# Patient Record
Sex: FEMALE | Race: WHITE | NOT HISPANIC OR LATINO | Employment: OTHER | URBAN - METROPOLITAN AREA
[De-identification: names, ages, dates, MRNs, and addresses within clinical notes are randomized per-mention and may not be internally consistent; named-entity substitution may affect disease eponyms.]

---

## 2017-01-02 ENCOUNTER — MEDICAL CORRESPONDENCE (OUTPATIENT)
Dept: HEALTH INFORMATION MANAGEMENT | Facility: CLINIC | Age: 47
End: 2017-01-02

## 2017-01-04 DIAGNOSIS — E78.5 HYPERLIPIDEMIA LDL GOAL <100: Primary | ICD-10-CM

## 2017-01-04 DIAGNOSIS — I10 ESSENTIAL HYPERTENSION WITH GOAL BLOOD PRESSURE LESS THAN 130/85: ICD-10-CM

## 2017-01-04 RX ORDER — FLUVASTATIN 40 MG/1
40 CAPSULE ORAL 2 TIMES DAILY
Qty: 180 CAPSULE | Refills: 3 | Status: SHIPPED | OUTPATIENT
Start: 2017-01-04 | End: 2017-09-28

## 2017-01-05 DIAGNOSIS — J45.31 MILD PERSISTENT ASTHMA WITH ACUTE EXACERBATION: Primary | ICD-10-CM

## 2017-01-05 RX ORDER — ALBUTEROL SULFATE 0.83 MG/ML
1 SOLUTION RESPIRATORY (INHALATION) EVERY 6 HOURS PRN
Qty: 30 VIAL | Refills: 1 | Status: SHIPPED | OUTPATIENT
Start: 2017-01-05 | End: 2017-11-10

## 2017-01-05 NOTE — TELEPHONE ENCOUNTER
Message received from pharmacy. Patient is requesting nebulizer solution and mask for the machine. Please print out prescription and patient will . Thank you         Routed to Dr. Bullock

## 2017-01-06 ENCOUNTER — OFFICE VISIT (OUTPATIENT)
Dept: FAMILY MEDICINE | Facility: CLINIC | Age: 47
End: 2017-01-06

## 2017-01-06 ENCOUNTER — RECORDS - HEALTHEAST (OUTPATIENT)
Dept: ADMINISTRATIVE | Facility: OTHER | Age: 47
End: 2017-01-06

## 2017-01-06 VITALS
OXYGEN SATURATION: 97 % | SYSTOLIC BLOOD PRESSURE: 137 MMHG | DIASTOLIC BLOOD PRESSURE: 90 MMHG | HEART RATE: 114 BPM | TEMPERATURE: 99.1 F

## 2017-01-06 DIAGNOSIS — L02.01 ABSCESS OF CHIN: ICD-10-CM

## 2017-01-06 DIAGNOSIS — J45.31 MILD PERSISTENT ASTHMA WITH ACUTE EXACERBATION: Primary | ICD-10-CM

## 2017-01-06 RX ORDER — PREDNISONE 50 MG/1
50 TABLET ORAL DAILY
Qty: 5 TABLET | Refills: 0 | Status: SHIPPED | OUTPATIENT
Start: 2017-01-06 | End: 2017-01-20

## 2017-01-06 RX ORDER — AZITHROMYCIN 250 MG/1
TABLET, FILM COATED ORAL
Qty: 6 TABLET | Refills: 0 | Status: SHIPPED | OUTPATIENT
Start: 2017-01-06 | End: 2017-09-28

## 2017-01-06 NOTE — PATIENT INSTRUCTIONS
Thank you for coming to [facility].  **If you had lab testing today and your results are reassuring or normal they will be be mailed to you within 7 days.   **If the lab tests need quick action we will call you with the results.  The phone number we will call with results is # [phone] (home) . If this is not the best number please call our clinic and change the number.  If you need any refills please call your pharmacy and they will contact us.  If you have any concerns about today's visit or wish to schedule another appointment please call our office during normal business hours [phone] (8-5:00 M-F)  If you have urgent medical concerns please call [phone] at any time of the day.  If you a medical emergency please call [phone]  Again thank you for choosing [facility] and please let us know how we can best partner with you to improve you and your family's health.

## 2017-01-06 NOTE — MR AVS SNAPSHOT
After Visit Summary   1/6/2017    Teresa Perez    MRN: [id number]           Patient Information     Date Of Birth          1970        Visit Information        Provider Department      1/6/2017 2:30 PM Tyra Bullock MD [facility]        Today's Diagnoses     Mild persistent asthma with acute exacerbation    -  1       Care Instructions    Thank you for coming to [facility].  **If you had lab testing today and your results are reassuring or normal they will be be mailed to you within 7 days.   **If the lab tests need quick action we will call you with the results.  The phone number we will call with results is # [phone] (home) . If this is not the best number please call our clinic and change the number.  If you need any refills please call your pharmacy and they will contact us.  If you have any concerns about today's visit or wish to schedule another appointment please call our office during normal business hours [phone] (8-5:00 M-F)  If you have urgent medical concerns please call [phone] at any time of the day.  If you a medical emergency please call [phone]  Again thank you for choosing [facility] and please let us know how we can best partner with you to improve you and your family's health.            Follow-ups after your visit        Who to contact     Please call your clinic at [phone] to:    Ask questions about your health    Make or cancel appointments    Discuss your medicines    Learn about your test results    Speak to your doctor   If you have compliments or concerns about an experience at your clinic, or if you wish to file a complaint, please contact [facility] Patient Relations at [phone] or email us at [email or web address]         Additional Information About Your Visit        MyChart Information     [employer] is an electronic gateway that provides easy, online access to your medical records. With  [employer], you can request a clinic appointment, read your test results, renew a prescription or communicate with your care team.     To sign up for [employer] visit the website at [email or web address]   You will be asked to enter the access code listed below, as well as some personal information. Please follow the directions to create your username and password.     Your access code is: [email or web address]  Expires: 3/29/2017  3:06 PM     Your access code will  in 90 days. If you need help or a new code, please contact your [facility] or call [phone] for assistance.        Care EveryWhere ID     This is your Care EveryWhere ID. This could be used by other organizations to access your [place] medical records  [id number]        Your Vitals Were     Pulse Temperature Pulse Oximetry             114 99.1  F (37.3  C) (Oral) 97%          Blood Pressure from Last 3 Encounters:   17 137/90   16 135/89   16 133/85    Weight from Last 3 Encounters:   16 245 lb 4 oz (111.245 kg)   16 247 lb (112.038 kg)   16 243 lb 3.2 oz (110.315 kg)              Today, you had the following     No orders found for display         Today's Medication Changes          These changes are accurate as of: 17  3:29 PM.  If you have any questions, ask your nurse or doctor.               Start taking these medicines.        Dose/Directions    azithromycin 250 MG tablet   Commonly known as:  ZITHROMAX   Used for:  Mild persistent asthma with acute exacerbation   Started by:  Tyra Bullock MD        Two tablets first day, then one tablet daily for four days.   Quantity:  6 tablet   Refills:  0       predniSONE 50 MG tablet   Commonly known as:  DELTASONE   Used for:  Mild persistent asthma with acute exacerbation   Started by:  Tyra Bullock MD        Dose:  50 mg   Take 1 tablet (50 mg) by mouth daily   Quantity:  5 tablet   Refills:  0         These medicines  have changed or have updated prescriptions.        Dose/Directions    * order for DME   This may have changed:  Another medication with the same name was added. Make sure you understand how and when to take each.   Used for:  NNAMDI (obstructive sleep apnea)   Changed by:  Tyra Bullock MD        Equipment being ordered: CPAP.  1 device.  Per sleep study recommendations.  Brand:  Respironics, Type:  Nasal Wisp,  Size:  Small   Quantity:  1 Device   Refills:  0       * order for DME   This may have changed:  Another medication with the same name was added. Make sure you understand how and when to take each.   Used for:  Mild persistent asthma with acute exacerbation   Changed by:  Tyra Bullock MD        Adult nebulizer mask and tubing.   Quantity:  1 each   Refills:  0       * order for DME   This may have changed:  You were already taking a medication with the same name, and this prescription was added. Make sure you understand how and when to take each.   Used for:  Mild persistent asthma with acute exacerbation   Changed by:  Tyra Bullock MD        Equipment being ordered: Nebulizer supplies for life.   Quantity:  1 Device   Refills:  0       * Notice:  This list has 3 medication(s) that are the same as other medications prescribed for you. Read the directions carefully, and ask your doctor or other care provider to review them with you.         Where to get your medicines      These medications were sent to [facility] - [place], [place] - [address], [place] [address]     Phone:  [phone]    - azithromycin 250 MG tablet  - predniSONE 50 MG tablet      Some of these will need a paper prescription and others can be bought over the counter.  Ask your nurse if you have questions.     Bring a paper prescription for each of these medications    - order for DME             Primary Care Provider Office Phone # Fax #    Tyra Bullock [phone]        [facility] [address] [place] [address]        Thank you!     Thank you for choosing [facility]  for your care. Our goal is always to provide you with excellent care. Hearing back from our patients is one way we can continue to improve our services. Please take a few minutes to complete the written survey that you may receive in the mail after your visit with us. Thank you!             Your Updated Medication List - Protect others around you: Learn how to safely use, store and throw away your medicines at www.disposemymeds.org.          This list is accurate as of: 1/6/17  3:29 PM.  Always use your most recent med list.                   Brand Name Dispense Instructions for use    * albuterol 108 (90 BASE) MCG/ACT Inhaler    PROAIR HFA/PROVENTIL HFA/VENTOLIN HFA    3 Inhaler    Inhale 2 puffs into the lungs every 6 hours as needed for shortness of breath / dyspnea or wheezing       * albuterol (2.5 MG/3ML) 0.083% neb solution     30 vial    Take 1 vial (2.5 mg) by nebulization every 6 hours as needed for shortness of breath / dyspnea or wheezing       azelastine 0.05 % Soln ophthalmic solution    OPTIVAR    1 Bottle    Apply 1 drop to eye 2 times daily       azithromycin 250 MG tablet    ZITHROMAX    6 tablet    Two tablets first day, then one tablet daily for four days.       blood glucose lancets standard    no brand specified    1 Box    Use to test blood sugar 4 times daily or as directed.       blood glucose monitoring meter device kit    no brand specified    1 kit    Use to test blood sugar 4 times daily or as directed.       blood glucose monitoring test strip    no brand specified    100 strip    Use to test blood sugars 4 times daily or as directed       carboxymethylcellul-glycerin 0.5-0.9 % Soln ophthalmic solution    OPTIVE/REFRESH OPTIVE    1 Bottle    Place 1 drop into both eyes 3 times daily       clindamycin 1 % lotion    CLINDAMAX    60 mL    Apply topically 2 times daily        dicyclomine 10 MG capsule    BENTYL    120 capsule    Take 1 capsule (10 mg) by mouth 4 times daily (before meals and nightly) From Judy PEÑA       diphenhydrAMINE 50 MG capsule    BENADRYL     Take  mg by mouth At Bedtime.       docosanol 10 % Crea cream    ABREVA    1 Tube    Apply topically 5 times daily       docusate sodium 100 MG tablet    COLACE    120 tablet    Take 200 mg by mouth 2 times daily       EPINEPHrine 0.3 MG/0.3ML injection     2 each    Inject 0.3 mLs (0.3 mg) into the muscle once as needed for anaphylaxis       * FentaNYL 62.5 MCG/HR Pt72     10 patch    Place 1 patch onto the skin every 72 hours       * fentaNYL 50 mcg/hr 72 hr patch    DURAGESIC    10 patch    Place 1 patch onto the skin every 72 hours       fluticasone 50 MCG/ACT spray    FLONASE    9.9 g    Spray 2 sprays into both nostrils daily       fluvastatin 40 MG capsule    LESCOL    180 capsule    Take 1 capsule (40 mg) by mouth 2 times daily       gabapentin 300 MG capsule    NEURONTIN    90 capsule    Take 1 capsule (300 mg) by mouth 3 times daily       glipiZIDE 10 MG 24 hr tablet    GLUCOTROL XL    60 tablet    Take 2 tablets (20 mg) by mouth daily       HYDROcodone-acetaminophen 5-325 MG per tablet    NORCO    270 tablet    Take max 10 tablest per day.  Take 2 tabs 4x per day and can take an extra 1-2 tabs PRN.       hydrOXYzine 25 MG tablet    ATARAX    60 tablet    Take 1-2 tablets (25-50 mg) by mouth every 6 hours as needed for itching       insulin aspart 100 UNIT/ML injection    NovoLOG FLEXPEN    1 Month    Take 14 Units with meals.       insulin degludec 200 UNIT/ML pen    TRESIBA    10 pen    Inject 54 Units Subcutaneous daily       lisinopril 20 MG tablet    PRINIVIL/ZESTRIL    30 tablet    Take 1 tablet (20 mg) by mouth daily       loratadine 10 MG tablet    CLARITIN    30 tablet    Take 1 tablet (10 mg) by mouth daily       medroxyPROGESTERone 150 MG/ML injection    DEPO-PROVERA    1 mL     Inject 1 mL (150 mg) into the muscle every 3 months       melatonin 3 MG tablet      Take 3 tablets (9 mg) by mouth At Bedtime       miconazole 2 % cream    MICATIN    5 g    Place 1 applicator vaginally At Bedtime Substitute as needed.       nabumetone 750 MG tablet    RELAFEN    60 tablet    Take 1 tablet (750 mg) by mouth 2 times daily as needed for moderate pain       naloxone nasal spray    NARCAN    0.2 mL    Spray 1 spray (4 mg) into one nostril alternating nostrils as needed for opioid reversal (every 2-3 minutes until medical assistance arrives.)       ondansetron 4 MG tablet    ZOFRAN    18 tablet    Take 1 tablet (4 mg) by mouth every 8 hours as needed for nausea       * order for DME     1 Device    Equipment being ordered: CPAP.  1 device.  Per sleep study recommendations.  Brand:  RespirFiFully, Type:  Nasal Wisp,  Size:  Small       * order for DME     1 each    Adult nebulizer mask and tubing.       * order for DME     1 Device    Equipment being ordered: Nebulizer supplies for life.       pramipexole 0.125 MG tablet    MIRAPEX    90 tablet    Take 1 tablet (0.125 mg) by mouth At Bedtime       predniSONE 50 MG tablet    DELTASONE    5 tablet    Take 1 tablet (50 mg) by mouth daily       ranitidine 150 MG tablet    ZANTAC    60 tablet    Take 1 tablet (150 mg) by mouth 2 times daily       sennosides 8.6 MG tablet    SENOKOT    60 tablet    Take 2 tablets by mouth 2 times daily       * SEROQUEL 300 MG tablet   Generic drug:  QUEtiapine      Take 300 mg by mouth daily.       * SEROQUEL 50 MG tablet   Generic drug:  QUEtiapine     1 tablet    Take 1 tablet (50 mg) by mouth every morning For anxiety.  From Psychiatrist.       sitagliptin 100 MG tablet    JANUVIA    90 tablet    Take 1 tablet (100 mg) by mouth daily       SUMAtriptan 100 MG tablet    IMITREX    9 tablet    Take 1 tablet (100 mg) by mouth at onset of headache       topiramate ER - 24 hour 100 MG sprinkle capsule    QUDEXY XR    90 capsule     Take 1 capsule (100 mg) by mouth daily       venlafaxine 75 MG 24 hr capsule    EFFEXOR-XR    30 capsule    Take 1 capsule (75 mg) by mouth 3 times daily       * Notice:  This list has 9 medication(s) that are the same as other medications prescribed for you. Read the directions carefully, and ask your doctor or other care provider to review them with you.

## 2017-01-07 NOTE — PROGRESS NOTES
"SUBJECTIVE:  This is a 46-year-old female, well-known to me, with past medical history significant for asthma, chronic cigarette use, hypertension, chronic pain, poorly controlled insulin-dependent diabetes, and recent abscess beneath the chin, who presents for followup of abscess and evaluation of breathing.     After removal of the packing last week, the abscess in her chin is closed up on the outside.  It is nontender.  There is no erythema and it appears to be healing well.  She hasn't had any discharge from it.  It feels quite normal on exam.  There is a small area that is still scabbed over, but it is healing appropriately.     Her biggest concern is her breathing.  She had a sleep study and was diagnosed with obstructive sleep apnea and she picked up CPAP machine about a week ago.  She talked with a gal at the supply company, who told her that it was working fine and taught her how to use it, and she went home.  The night she started the CPAP, she started feeling uncomfortable.  She describes this as feeling as if there was fluid going into her lungs.  It felt like it was hard to get air in.  She got wheezy and had a cough that produced thicker sputum.  She no longer had albuterol medication for her nebulizer, except what was , but has been using her inhaler, which helps somewhat but it doesn't last very long.  She noticed fevers and chills with this; her temperature was somewhat elevated today in clinic.  She has sweats and mild tachycardia.  She tried taking a couple of over-the-counter Nyquil, as well as DayQuil and Tylenol Cold and Flu, and none of this was particularly helpful.  She says she also has problems with the CPAP; it shows it is \"redlined\", as if she hasn't been using it, but she has been using it regularly at night.  She feels her lungs are not adjusting well to the humidified air.  In discussing the sleep study this further, she actually had the sleep study done a year and a half ago " at [facility].  She was supposed to go back for a second portion of the testing and she never did.  She isn't sure of the CPAP machine is sitting appropriately or if it is the right size and settings for her.     OBJECTIVE:   VITAL SIGNS:  Reviewed.  They are within the normal range.  Initially, she is mildly tachycardic, but this was normalized by the time I listened.   GENERAL:  This is a fatigued-appearing,  female in no acute distress.   HEENT:  Pupils are equal and reactive.  No conjunctival erythema.  She has sinus tenderness most prominent in the left maxillary sinus, but it is present bilaterally in the frontal sinuses as well.  No significant TMJ tenderness.  Throat is clear and nonerythematous.  There is mild irritation in the posterior pharynx.  Tympanic membranes are viewed bilaterally and these are of normal appearance.   NECK:  No palpable lymphadenopathy.   CARDIAC:  Heart has regular rate and rhythm with no murmurs, rubs, or gallops.  She has mild tachycardia, around 100.   RESPIRATORY:  Lungs show diffuse wheezes that are more significant on expiration than inspiration.  She has trouble moving air.  O2 sats are 97%.     ASSESSMENT:  A 46-year-old female presenting for evaluation of chin abscess, issues with CPAP, and acute respiratory distress.   1.  Abscess beneath the chin.  This is resolving appropriately and should continue to improve by itself.  No intervention needed.   2.  CPAP use for obstructive sleep apnea.  Clearly, it seems like she needs the second half of the test completed and that she needs instructions on how to use it appropriately.  She'll plan to return her CPAP, as she doesn't want to get in trouble for noncompliance.  She'll go back to [facility] and request the second portion of the test.   3.  Asthma exacerbation.  She has diffuse wheezing and poor air movement on exam, although her O2 sats are appropriate.  We'll treat this with 50 mg of prednisone daily x five days, as  well as short course of antibiotics with azithromycin, as she tolerates this better than most other antibiotics.  We'll refill albuterol nebs and prescribe new tubing and mask.  We'll have her use the nebs q.i.d.  If she doesn't feel significantly better by Monday, she should come back for recheck.  Otherwise, we'll follow up later next week or at her chronic pain management visit.     Tyra Bullock

## 2017-01-09 ENCOUNTER — TELEPHONE (OUTPATIENT)
Dept: FAMILY MEDICINE | Facility: CLINIC | Age: 47
End: 2017-01-09

## 2017-01-09 DIAGNOSIS — E11.9 DIABETES MELLITUS, TYPE 2 (H): Primary | ICD-10-CM

## 2017-01-09 NOTE — TELEPHONE ENCOUNTER
[facility] phone call message-patient reporting a symptom:     Symptom:  Still having cough and shortness of breath and needs nebulizer RX called into pharmacy.    Same Day Visit Offered: n/a     Additional comments: Please advise.     OK to leave message on voice mail? Yes    Primary language: English      needed? No    Call taken on January 9, 2017 at 9:51 AM by Makeda Stanton

## 2017-01-09 NOTE — TELEPHONE ENCOUNTER
Per [facility], tribesa is covered this yr. Will need pen needles.     Pen needles sent to pharmacy.     Routed to Dr. Bullock. /YULISA Massey

## 2017-01-09 NOTE — TELEPHONE ENCOUNTER
Pt is taking her azithromycin and prednisone currently. Pt would like nebulizer rx, she called [facility] and it was not there.     1/5/17 nebulizer soln rx was printed. Called this into [facility] today since they never got this rx. Advised to call clinic if no improvement in her symptoms. Pt verbalizes understanding of the directions and information. Gave pt opportunity to ask additional questions or address concerns. Pt is directed to call back if condition worsens, new symptoms develop, or there is no improvement.     Routed to Dr. Bullock. /YULISA Massey

## 2017-01-18 DIAGNOSIS — G89.4 CHRONIC PAIN SYNDROME: Primary | ICD-10-CM

## 2017-01-18 RX ORDER — HYDROXYZINE HYDROCHLORIDE 25 MG/1
25-50 TABLET, FILM COATED ORAL EVERY 6 HOURS PRN
Qty: 120 TABLET | Refills: 3 | Status: SHIPPED | OUTPATIENT
Start: 2017-01-18 | End: 2017-05-17

## 2017-01-19 ENCOUNTER — TELEPHONE (OUTPATIENT)
Dept: FAMILY MEDICINE | Facility: CLINIC | Age: 47
End: 2017-01-19

## 2017-01-19 DIAGNOSIS — F11.90 CHRONIC NARCOTIC USE: Primary | ICD-10-CM

## 2017-01-19 NOTE — TELEPHONE ENCOUNTER
Per [facility], PA needed for topiramate ER. topiramate immediate release is covered but pt prefers ER.     Also, fluvastatin requires PA. Would you like to fill out PA or send an alternative.     Routed to Dr. Bullock. /YULISA Massey

## 2017-01-19 NOTE — TELEPHONE ENCOUNTER
Ok.  I will fill both of those out.  I think I already did the fluvastatin, but probably did it too early before the new year.      I'm assuming the insulin situation for this patient has been taken care of, correct?    Tyra Bullock    Routed to triage RN.

## 2017-01-20 ENCOUNTER — OFFICE VISIT (OUTPATIENT)
Dept: FAMILY MEDICINE | Facility: CLINIC | Age: 47
End: 2017-01-20

## 2017-01-20 ENCOUNTER — TELEPHONE (OUTPATIENT)
Dept: FAMILY MEDICINE | Facility: CLINIC | Age: 47
End: 2017-01-20

## 2017-01-20 VITALS
OXYGEN SATURATION: 98 % | BODY MASS INDEX: 47.85 KG/M2 | WEIGHT: 245 LBS | HEART RATE: 114 BPM | DIASTOLIC BLOOD PRESSURE: 86 MMHG | SYSTOLIC BLOOD PRESSURE: 124 MMHG | TEMPERATURE: 98.4 F

## 2017-01-20 DIAGNOSIS — Z79.4 TYPE 2 DIABETES MELLITUS WITHOUT COMPLICATION, WITH LONG-TERM CURRENT USE OF INSULIN (H): ICD-10-CM

## 2017-01-20 DIAGNOSIS — F11.90 CHRONIC NARCOTIC USE: ICD-10-CM

## 2017-01-20 DIAGNOSIS — F51.01 PRIMARY INSOMNIA: ICD-10-CM

## 2017-01-20 DIAGNOSIS — Z00.00 PREVENTATIVE HEALTH CARE: ICD-10-CM

## 2017-01-20 DIAGNOSIS — G25.81 RESTLESS LEG SYNDROME: ICD-10-CM

## 2017-01-20 DIAGNOSIS — G89.4 CHRONIC PAIN SYNDROME: ICD-10-CM

## 2017-01-20 DIAGNOSIS — E11.9 TYPE 2 DIABETES MELLITUS WITHOUT COMPLICATION, WITH LONG-TERM CURRENT USE OF INSULIN (H): ICD-10-CM

## 2017-01-20 DIAGNOSIS — J45.31 MILD PERSISTENT ASTHMA WITH ACUTE EXACERBATION: ICD-10-CM

## 2017-01-20 LAB
AMPHETAMINES QUAL: NEGATIVE
BARBITURATES QUAL URINE: NEGATIVE
BENZODIAZEPINE QUAL URINE: NEGATIVE
BUPRENORPHINE QUAL URINE: NEGATIVE
CANNABINOIDS UR QL SCN: NEGATIVE
COCAINE QUAL URINE: NEGATIVE
HBA1C MFR BLD: 9.4 % (ref 4.1–5.7)
METHAMPHETAMINE: NEGATIVE
METHODONE QUAL: NEGATIVE
MORPHINE QUAL: NEGATIVE
OXYCODONE QUAL: NEGATIVE
TEMPERATURE OF URINE WAS BETWEEN 90-100 DEGREES F: YES

## 2017-01-20 RX ORDER — PREDNISONE 10 MG/1
TABLET ORAL
Qty: 21 TABLET | Refills: 0 | Status: SHIPPED | OUTPATIENT
Start: 2017-01-20 | End: 2017-09-28

## 2017-01-20 RX ORDER — PHENOL 1.4 %
10 AEROSOL, SPRAY (ML) MUCOUS MEMBRANE
Qty: 30 TABLET | Refills: 11 | Status: SHIPPED | OUTPATIENT
Start: 2017-01-20 | End: 2017-09-28

## 2017-01-20 RX ORDER — FENTANYL 50 UG/1
1 PATCH TRANSDERMAL
Qty: 10 PATCH | Refills: 0 | Status: SHIPPED | OUTPATIENT
Start: 2017-01-20 | End: 2017-02-23

## 2017-01-20 RX ORDER — HYDROCODONE BITARTRATE AND ACETAMINOPHEN 5; 325 MG/1; MG/1
TABLET ORAL
Qty: 270 TABLET | Refills: 0 | Status: SHIPPED | OUTPATIENT
Start: 2017-01-20 | End: 2017-02-23

## 2017-01-20 RX ORDER — PRAMIPEXOLE DIHYDROCHLORIDE 0.25 MG/1
0.25 TABLET ORAL AT BEDTIME
Qty: 30 TABLET | Refills: 3 | Status: SHIPPED | OUTPATIENT
Start: 2017-01-20 | End: 2017-04-10

## 2017-01-20 NOTE — TELEPHONE ENCOUNTER
[facility] phone call message- general phone call:    Reason for call: Patient had blood work done today and forgot to ask Dr. Bullock to include a Hep C test. Patient reports she was seen today in clinic. Would like to speak with a nurse or Dr. Bullock.     Return call needed: Yes    OK to leave a message on voice mail? Yes    Primary language: English      needed? No    Call taken on January 20, 2017 at 11:56 AM by Zachery Pena

## 2017-01-20 NOTE — MR AVS SNAPSHOT
After Visit Summary   1/20/2017    Teresa Perez    MRN: [id number]           Patient Information     Date Of Birth          1970        Visit Information        Provider Department      1/20/2017 9:40 AM Tyra Bullock MD [facility]        Today's Diagnoses     Diabetes mellitus, type 2 (H)    -  1     Chronic narcotic use         Mild persistent asthma with acute exacerbation         Restless leg syndrome         Primary insomnia         Chronic pain syndrome           Care Instructions    1)  Take 2 pill daily for 1 week then 1 pill daily for 1 week of the prednisone  2)  Continue to use the nebs 4x per day.    3)  Keep pain medications the same.    Heat on the stomach to help with pain  4)  Prescription for melatonin for sleep, and get in to see your psychiast to talk about sleep  5)  Higher dose of the restless legs medicine.      Follow up 2 weeks.          Follow-ups after your visit        Who to contact     Please call your clinic at [phone] to:    Ask questions about your health    Make or cancel appointments    Discuss your medicines    Learn about your test results    Speak to your doctor   If you have compliments or concerns about an experience at your clinic, or if you wish to file a complaint, please contact [facility] Patient Relations at [phone] or email us at [email or web address]         Additional Information About Your Visit        MyChart Information     Getaround is an electronic gateway that provides easy, online access to your medical records. With Getaround, you can request a clinic appointment, read your test results, renew a prescription or communicate with your care team.     To sign up for [employer] visit the website at [email or web address]   You will be asked to enter the access code listed below, as well as some personal information. Please follow the directions to create your username and password.      Your access code is: [email or web address]  Expires: 3/29/2017  3:06 PM     Your access code will  in 90 days. If you need help or a new code, please contact your [facility] or call [phone] for assistance.        Care EveryWhere ID     This is your Care EveryWhere ID. This could be used by other organizations to access your [facility] medical records  [id number]        Your Vitals Were     Pulse Temperature Pulse Oximetry             114 98.4  F (36.9  C) (Oral) 98%          Blood Pressure from Last 3 Encounters:   17 124/86   17 137/90   16 135/89    Weight from Last 3 Encounters:   17 245 lb (111.131 kg)   16 245 lb 4 oz (111.245 kg)   16 247 lb (112.038 kg)              We Performed the Following     Hemoglobin A1c ([facility])     Rapid Urine Drug Screen ([facility])          Today's Medication Changes          These changes are accurate as of: 17 11:00 AM.  If you have any questions, ask your nurse or doctor.               These medicines have changed or have updated prescriptions.        Dose/Directions    * melatonin 3 MG tablet   This may have changed:  Another medication with the same name was added. Make sure you understand how and when to take each.   Changed by:  Tyra Bullock MD        Dose:  9 mg   Take 3 tablets (9 mg) by mouth At Bedtime   Refills:  0       * Melatonin 10 MG Tabs tablet   This may have changed:  You were already taking a medication with the same name, and this prescription was added. Make sure you understand how and when to take each.   Used for:  Primary insomnia   Changed by:  Tyra Bullock MD        Dose:  10 mg   Take 1 tablet (10 mg) by mouth nightly as needed for sleep   Quantity:  30 tablet   Refills:  11       pramipexole 0.25 MG tablet   Commonly known as:  MIRAPEX   This may have changed:    - medication strength  - how much to take   Used for:  Restless leg syndrome   Changed by:  Kobe  Tyra ZAVALETA MD        Dose:  0.25 mg   Take 1 tablet (0.25 mg) by mouth At Bedtime   Quantity:  30 tablet   Refills:  3       predniSONE 10 MG tablet   Commonly known as:  DELTASONE   This may have changed:    - medication strength  - how much to take  - how to take this  - when to take this  - additional instructions   Used for:  Mild persistent asthma with acute exacerbation   Changed by:  Tyra Bullock MD        Take 2 tabs daily for 1 week, then 1 tab daily for 1 week.   Quantity:  21 tablet   Refills:  0       * Notice:  This list has 2 medication(s) that are the same as other medications prescribed for you. Read the directions carefully, and ask your doctor or other care provider to review them with you.         Where to get your medicines      These medications were sent to [facility] - [place], [place] - [address], [place] [address]     Phone:  [phone]    - Melatonin 10 MG Tabs tablet  - pramipexole 0.25 MG tablet  - predniSONE 10 MG tablet      Some of these will need a paper prescription and others can be bought over the counter.  Ask your nurse if you have questions.     Bring a paper prescription for each of these medications    - fentaNYL 50 mcg/hr 72 hr patch  - HYDROcodone-acetaminophen 5-325 MG per tablet             Primary Care Provider Office Phone # Fax #    Tyra Bullock [phone]       [facility] [address] [place] [address]        Thank you!     Thank you for choosing [facility]  for your care. Our goal is always to provide you with excellent care. Hearing back from our patients is one way we can continue to improve our services. Please take a few minutes to complete the written survey that you may receive in the mail after your visit with us. Thank you!             Your Updated Medication List - Protect others around you: Learn how to safely use, store and throw away your medicines at  www.disposemymeds.org.          This list is accurate as of: 1/20/17 11:00 AM.  Always use your most recent med list.                   Brand Name Dispense Instructions for use    * albuterol 108 (90 BASE) MCG/ACT Inhaler    PROAIR HFA/PROVENTIL HFA/VENTOLIN HFA    3 Inhaler    Inhale 2 puffs into the lungs every 6 hours as needed for shortness of breath / dyspnea or wheezing       * albuterol (2.5 MG/3ML) 0.083% neb solution     30 vial    Take 1 vial (2.5 mg) by nebulization every 6 hours as needed for shortness of breath / dyspnea or wheezing       azelastine 0.05 % Soln ophthalmic solution    OPTIVAR    1 Bottle    Apply 1 drop to eye 2 times daily       azithromycin 250 MG tablet    ZITHROMAX    6 tablet    Two tablets first day, then one tablet daily for four days.       blood glucose lancets standard    no brand specified    1 Box    Use to test blood sugar 4 times daily or as directed.       blood glucose monitoring meter device kit    no brand specified    1 kit    Use to test blood sugar 4 times daily or as directed.       blood glucose monitoring test strip    no brand specified    100 strip    Use to test blood sugars 4 times daily or as directed       carboxymethylcellul-glycerin 0.5-0.9 % Soln ophthalmic solution    OPTIVE/REFRESH OPTIVE    1 Bottle    Place 1 drop into both eyes 3 times daily       clindamycin 1 % lotion    CLINDAMAX    60 mL    Apply topically 2 times daily       dicyclomine 10 MG capsule    BENTYL    120 capsule    Take 1 capsule (10 mg) by mouth 4 times daily (before meals and nightly) From Judy PEÑA       diphenhydrAMINE 50 MG capsule    BENADRYL     Take  mg by mouth At Bedtime.       docosanol 10 % Crea cream    ABREVA    1 Tube    Apply topically 5 times daily       docusate sodium 100 MG tablet    COLACE    120 tablet    Take 200 mg by mouth 2 times daily       EPINEPHrine 0.3 MG/0.3ML injection     2 each    Inject 0.3 mLs (0.3 mg) into the muscle once as  needed for anaphylaxis       * FentaNYL 62.5 MCG/HR Pt72     10 patch    Place 1 patch onto the skin every 72 hours       * fentaNYL 50 mcg/hr 72 hr patch    DURAGESIC    10 patch    Place 1 patch onto the skin every 72 hours       fluticasone 50 MCG/ACT spray    FLONASE    9.9 g    Spray 2 sprays into both nostrils daily       fluvastatin 40 MG capsule    LESCOL    180 capsule    Take 1 capsule (40 mg) by mouth 2 times daily       gabapentin 300 MG capsule    NEURONTIN    90 capsule    Take 1 capsule (300 mg) by mouth 3 times daily       glipiZIDE 10 MG 24 hr tablet    GLUCOTROL XL    60 tablet    Take 2 tablets (20 mg) by mouth daily       HYDROcodone-acetaminophen 5-325 MG per tablet    NORCO    270 tablet    Take max 10 tablest per day.  Take 2 tabs 4x per day and can take an extra 1-2 tabs PRN.       hydrOXYzine 25 MG tablet    ATARAX    120 tablet    Take 1-2 tablets (25-50 mg) by mouth every 6 hours as needed for itching       insulin aspart 100 UNIT/ML injection    NovoLOG FLEXPEN    1 Month    Take 14 Units with meals.       insulin degludec 200 UNIT/ML pen    TRESIBA    10 pen    Inject 54 Units Subcutaneous daily       insulin pen needle 31G X 5 MM    B-D U/F    100 each    Use 1 daily or as directed.       lisinopril 20 MG tablet    PRINIVIL/ZESTRIL    30 tablet    Take 1 tablet (20 mg) by mouth daily       loratadine 10 MG tablet    CLARITIN    30 tablet    Take 1 tablet (10 mg) by mouth daily       medroxyPROGESTERone 150 MG/ML injection    DEPO-PROVERA    1 mL    Inject 1 mL (150 mg) into the muscle every 3 months       * melatonin 3 MG tablet      Take 3 tablets (9 mg) by mouth At Bedtime       * Melatonin 10 MG Tabs tablet     30 tablet    Take 1 tablet (10 mg) by mouth nightly as needed for sleep       miconazole 2 % cream    MICATIN    5 g    Place 1 applicator vaginally At Bedtime Substitute as needed.       nabumetone 750 MG tablet    RELAFEN    60 tablet    Take 1 tablet (750 mg) by mouth 2  times daily as needed for moderate pain       naloxone nasal spray    NARCAN    0.2 mL    Spray 1 spray (4 mg) into one nostril alternating nostrils as needed for opioid reversal (every 2-3 minutes until medical assistance arrives.)       ondansetron 4 MG tablet    ZOFRAN    18 tablet    Take 1 tablet (4 mg) by mouth every 8 hours as needed for nausea       * order for DME     1 Device    Equipment being ordered: CPAP.  1 device.  Per sleep study recommendations.  Brand:  Respironics, Type:  Nasal Wisp,  Size:  Small       * order for DME     1 each    Adult nebulizer mask and tubing.       * order for DME     1 Device    Equipment being ordered: Nebulizer supplies for life.       pramipexole 0.25 MG tablet    MIRAPEX    30 tablet    Take 1 tablet (0.25 mg) by mouth At Bedtime       predniSONE 10 MG tablet    DELTASONE    21 tablet    Take 2 tabs daily for 1 week, then 1 tab daily for 1 week.       ranitidine 150 MG tablet    ZANTAC    60 tablet    Take 1 tablet (150 mg) by mouth 2 times daily       sennosides 8.6 MG tablet    SENOKOT    60 tablet    Take 2 tablets by mouth 2 times daily       * SEROQUEL 300 MG tablet   Generic drug:  QUEtiapine      Take 300 mg by mouth daily.       * SEROQUEL 50 MG tablet   Generic drug:  QUEtiapine     1 tablet    Take 1 tablet (50 mg) by mouth every morning For anxiety.  From Psychiatrist.       sitagliptin 100 MG tablet    JANUVIA    90 tablet    Take 1 tablet (100 mg) by mouth daily       SUMAtriptan 100 MG tablet    IMITREX    9 tablet    Take 1 tablet (100 mg) by mouth at onset of headache       topiramate ER - 24 hour 100 MG sprinkle capsule    QUDEXY XR    90 capsule    Take 1 capsule (100 mg) by mouth daily       venlafaxine 75 MG 24 hr capsule    EFFEXOR-XR    30 capsule    Take 1 capsule (75 mg) by mouth 3 times daily       * Notice:  This list has 11 medication(s) that are the same as other medications prescribed for you. Read the directions carefully, and ask your  doctor or other care provider to review them with you.

## 2017-01-20 NOTE — TELEPHONE ENCOUNTER
We should be able to add on the Hep C.  If we cannot add it on today, can add it on when she is next due for diabetes testing.  And yes, we were going to adjust her insulin.  She should take 60 Units of Tresiba, and 22 Units of Novolog with meals.    And please apologize that we forgot to finish the details on her insulin plan.      Thank you!    Tyra Bullock    ROuted to triage RN.

## 2017-01-20 NOTE — TELEPHONE ENCOUNTER
Can test for Hep C be added to the labs she had drawn today. ( Per lab they are able to add this if okayed by Dr. Bullock). Patient also wants to know if her insulin should be adjusted based of her A1c results.  Please advise. /YULISA Ozuna      /YULISA Ozuna  Routed to Dr. Bullock

## 2017-01-20 NOTE — Clinical Note
January 23, 2017      Teresa Perez  [address]  [place] [address]        Dear Teresa,    Please see below for your test results.    Resulted Orders   Rapid Urine Drug Screen ([facility])   Result Value Ref Range    Amphetamines Qual NEGATIVE NEGATIVE    Barbiturates Qual Urine NEGATIVE NEGATIVE    Buprenorphine Qual Urine NEGATIVE NEGATIVE    Benzodiazepine Qual Urine NEGATIVE NEGATIVE    Cocaine Qual Urine NEGATIVE NEGATIVE    Cannabinoids Qual Urine NEGATIVE NEGATIVE    Methamphetamine Qual NEGATIVE NEGATIVE    Methadone Qual NEGATIVE NEGATIVE    Morphine Qual NEGATIVE NEGATIVE    Oxycodone Qual NEGATIVE NEGATIVE    Temperature of Urine was Between  Degrees F YES YES      Comment:      This is a preliminary screening test that detects drugs-of-abuse in urine at   specified detection levels.  To confirm preliminary results, a more specific   method such as Gas Chromatography/Mass Spectrometry (GC/MS) must be used.        Hemoglobin A1c ([facility])   Result Value Ref Range    Hemoglobin A1C 9.4 (H) 4.1 - 5.7 %   Hepatitis C Antibody ([facility])   Result Value Ref Range    Hepatitis C Antibody Screen Negative Negative    Narrative    Test performed by:  [facility]  [address] [place] [address]   Teresa Perez-    Your hepatitis C testing is normal.  This is good news.  Please call the clinic at [phone] if you have any questions.      Tyra Bullock

## 2017-01-20 NOTE — TELEPHONE ENCOUNTER
Patient notified of insulin plan and that the Hep c can be added to her current labs. Patient verbalized understanding. /YULISA Ozuna

## 2017-01-20 NOTE — PATIENT INSTRUCTIONS
1)  Take 2 pill daily for 1 week then 1 pill daily for 1 week of the prednisone  2)  Continue to use the nebs 4x per day.    3)  Keep pain medications the same.    Heat on the stomach to help with pain  4)  Prescription for melatonin for sleep, and get in to see your psychiast to talk about sleep  5)  Higher dose of the restless legs medicine.      Follow up 2 weeks.

## 2017-01-21 LAB — HCV AB SER QL: NEGATIVE

## 2017-01-22 NOTE — PROGRESS NOTES
Quick Note:    Teresa Perez-    Your hepatitis C testing is normal. This is good news. Please call the clinic at [phone] if you have any questions.     Tyra Bullock    Please send results to patient.     ______

## 2017-01-22 NOTE — PROGRESS NOTES
There are no exam notes on file for this visit.  Chief Complaint   Patient presents with     Pain     CPM     Cough     f/u cough, still not better after finishing meds     Blood pressure 124/86, pulse 114, temperature 98.4  F (36.9  C), temperature source Oral, weight 245 lb (111.131 kg), SpO2 98 %.    SUBJECTIVE:  This is a 46-year-old female, well-known to me with a complex past medical history including asthma and chronic smoker and obstructive sleep apnea and restless legs syndrome, chronic pain syndrome with chronic headaches, neck pain and low back pain, insulin-dependent type 2 diabetes and multiple mental health diagnoses including bipolar disorder.  She presents today for chronic pain management followup visit, as well as followup of a recent asthma exacerbation.     1.  Asthma exacerbation.  She feels like her breathing is a little bit better.  She still reports frequent coughing.  She is no longer coughing as much stuff up, but still feels quite wheezy.  She explains that her breathing is not better because she has not received any medication of albuterol for her nebulizer machine.  She did take the prednisone, which she thinks helps some and then used her albuterol inhaler.  When she was able to fill the nebs over the following couple of days her breathing got better, but she was concerned that she needs an extended course of prednisone as well as azithromycin because her breathing was not improving as much.  She continues to have some mild intermittent fevers but nothing too significant.  She is not using the CPAP and she has returned it.  She feels like the restless leg medication is helping with movement at night and has tried taking 2 pills at a time which is more helpful than just taking 1.  This helps with sleep, which has been quite frustrating for her as the coughing keeps her up at night.   2.  Diabetes.  Has had difficulty with controlling her diabetes on the prednisone.  Blood sugar this  morning was 260 and typically has been between 200 and 250.  She has had a couple of blood sugars that were lower for her at 98 and 100 but nothing below 70.  She gets symptomatic when she is in the low 100s.  Appetite has been erratic because of the difficulty breathing.  She is now taking Trasiba as Lantus is no longer covered by her insurance, but is still doing NovoLog.  She is doing 50 units of Trasiba at night and 18 units of NovoLog with meals between 2 and 3 times a day depending on her numbers.       3.  Pain continues to be a problem.  She is hopeful that her breathing will get better and then she will be able to get in for steroid injections of her SI joints.  Her knees are also becoming more painful.  She is wanting to get into pool therapy but has not gotten this established yet again because of difficulties with her breathing.  She requests that we continue her fentanyl patch on a stable dose in the short-term because of the difficulties she has had with getting in for these additional treatment.  She is also complaining of some abdominal pain.  This is located in the mid-epigastric region and extending more toward the right.  She describes it as a stabbing type pain that feels like it is jabbing into her abdomen.  She thinks this might be from trying to open a window at her apartment that has been stuck and frozen shut.  Both she and her PCA have been unable to open the window and tried and this seems to aggravate the pain.  When I ask her if this could be associated with her cough she agrees with this too as the coughing has been quite painful for her.        OBJECTIVE:   VITAL SIGNS:  Reviewed and they are all within the normal range.  O2 sats are good today 98%.  Blood pressure is appropriate.  She is mildly tachycardic.   GENERAL:  Comfortable appearing  female, mildly pale, no significant respiratory distress.   HEENT:  She continues to have some bilateral sinus tenderness.  Throat is  clear and nonerythematous.  No palpable lymphadenopathy.   CARDIAC:  Heart is mildly tachycardic between 100 and 105. Regular rate and rhythm, no murmurs, rubs, or gallops   RESPIRATORY:  Lungs are clear.  She has some diminished air flow bilaterally in the bases, but the wheezing has improved significantly.  I don't appreciate any crackles.   ABDOMEN:  Belly is soft.  She has mild pain with palpation over the mid epigastric to the right side area which is worse when she is trying to get up from a seated position.      LABORATORY DATA:  Her urine was checked today and was as expected given her pain medication regimen.  A1c was also checked today which was 9.4, up from her last visit.      ASSESSMENT AND PLAN:  46-year-old female presenting for followup of asthma exacerbation as well as chronic pain management visit.   1.  Asthma exacerbation.  O2 sats are good.  Wheezing has improved.  Recommended to continue to use the neb solution 4 times daily.  I will give her chronic prolonged course of prednisone at 20 mg daily for the next 7 days.  I think this will clear things up.  I do not think further antibiotics are warranted.  No significant signs of pneumonia today and exam is negative for that.  I think she would benefit from pulmonary function testing in the future, as I am worried that there is a component to COPD to this as well.  She continues to smoke and smoking cessation has been a challenge, but we will continue to encourage that going forward as well.   2.  Type II diabetes, on insulin.  Poorly controlled and worse now that she is on the prednisone.  We will increase her Trasiba to 60 units daily and increase the mealtime NovoLog to 22 units.  I think we will have to do split dosing of the Trasiba if we are unable to get her fasting sugars down.  It do think this will improve when she is off of the prednisone.   3.  Hypertension.  This is well-controlled today.   4.  Restless leg syndrome.  Increased  prescription for her Mirapex.   5.  Obstructive sleep apnea.  We will plan to get her back in for a sleep study.  Sleeping on her side currently.   4.  Chronic pain management:  Due to the multiple situations I will keep her on a fentanyl patch at 50 mcg.  We will continue to decrease by about 10% starting at her next visit going forward.  Will need to decrease some of the hydrocodone as well, has this has been up to help her with withdrawal symptoms.  She needs to get in for SI joint injections and pool therapy going forward.     She will follow up with me in 2 weeks for a recheck given the multiple areas of concern.  If breathing is better, would recommend pulmonary function testing at that time.  She may also need a controller inhaler for her asthma.     Tyra Bullock            Patient Instructions   1)  Take 2 pill daily for 1 week then 1 pill daily for 1 week of the prednisone  2)  Continue to use the nebs 4x per day.    3)  Keep pain medications the same.    Heat on the stomach to help with pain  4)  Prescription for melatonin for sleep, and get in to see your psychiast to talk about sleep  5)  Higher dose of the restless legs medicine.      Follow up 2 weeks.

## 2017-01-23 NOTE — TELEPHONE ENCOUNTER
[facility] phone call message- general phone call:    Reason for call: Calling about two prior auths.  Please call    Return call needed: Yes    OK to leave a message on voice mail? Yes    Primary language: English      needed? No    Call taken on January 23, 2017 at 4:53 PM by Mary Ann Giraldo

## 2017-01-25 ENCOUNTER — TELEPHONE (OUTPATIENT)
Dept: FAMILY MEDICINE | Facility: CLINIC | Age: 47
End: 2017-01-25

## 2017-01-25 NOTE — TELEPHONE ENCOUNTER
[facility] phone call message- patient requesting results:    Test: Lab    Date of test: 1/20/17    Additional Comments: she needs a call back with her results.    OK to leave a message on voice mail? Yes      Primary language: English      needed? No    Call taken on January 25, 2017 at 3:48 PM by Lo Lao

## 2017-01-25 NOTE — TELEPHONE ENCOUNTER
Did pt's PA over the phone yesterday afternoon (through [employer] [facility]) and it will take 24-48 hrs to process.     /YULISA Massey

## 2017-01-25 NOTE — TELEPHONE ENCOUNTER
Received decision from [employer]. Fluvastatin PA has been denied. They would like pt to try the preferred formulary alternative: Pravastatin first.     *oer note from insurance-This case has been reviewed by a registered pharmacist and a physician. If your treating doctor would like to discuss this decision w/ a physician, the doctor may call [facility] at [phone]    Decision on topiramate ER has not been received yet.     Routed to Dr. Bullock. /YULISA Massey

## 2017-01-26 DIAGNOSIS — G89.4 CHRONIC PAIN SYNDROME: Primary | ICD-10-CM

## 2017-01-26 DIAGNOSIS — T78.40XS ALLERGIC REACTION, SEQUELA: Primary | ICD-10-CM

## 2017-01-26 DIAGNOSIS — R11.0 NAUSEA: ICD-10-CM

## 2017-01-26 RX ORDER — AZELASTINE HYDROCHLORIDE 0.5 MG/ML
1 SOLUTION/ DROPS OPHTHALMIC 2 TIMES DAILY
Qty: 1 BOTTLE | Refills: 11 | Status: SHIPPED | OUTPATIENT
Start: 2017-01-26 | End: 2017-01-31

## 2017-01-26 RX ORDER — ONDANSETRON 4 MG/1
4 TABLET, FILM COATED ORAL EVERY 8 HOURS PRN
Qty: 18 TABLET | Refills: 2 | Status: SHIPPED | OUTPATIENT
Start: 2017-01-26 | End: 2017-06-22

## 2017-01-31 ENCOUNTER — TELEPHONE (OUTPATIENT)
Dept: FAMILY MEDICINE | Facility: CLINIC | Age: 47
End: 2017-01-31

## 2017-01-31 DIAGNOSIS — T78.40XS ALLERGIC REACTION, SEQUELA: Primary | ICD-10-CM

## 2017-01-31 RX ORDER — AZELASTINE HYDROCHLORIDE 0.5 MG/ML
1 SOLUTION/ DROPS OPHTHALMIC 2 TIMES DAILY
Qty: 1 BOTTLE | Refills: 11 | Status: SHIPPED | OUTPATIENT
Start: 2017-01-31 | End: 2018-02-14

## 2017-01-31 NOTE — TELEPHONE ENCOUNTER
Per Tara the patient has to have tried pravastatin or lovastatin before insurance will cover the fluvastatin. /YULISA Ozuna  Routed to Dr. Bullock

## 2017-01-31 NOTE — TELEPHONE ENCOUNTER
[facility] phone call message- general phone call:    Reason for call: The prior authorization you sent was rejected.  The reason why was due to a formulary change.  Pt is stating she is unable to tolerate any of the other statin's.  They need the prior authorization to state that she cannot tolerate the other statin's.  Please call.    Return call needed: Yes    OK to leave a message on voice mail? Yes    Primary language: English      needed? No    Call taken on January 31, 2017 at 3:37 PM by Mary Ann Giraldo

## 2017-02-02 NOTE — TELEPHONE ENCOUNTER
Can you guys call Teresa and let her know that the fluvastatin has been denied, and have her schedule a follow up visit so we can discuss which alternative we should try.      Trya Bullock    Routed to triage RN.

## 2017-02-15 DIAGNOSIS — K59.00 CONSTIPATION, UNSPECIFIED CONSTIPATION TYPE: ICD-10-CM

## 2017-02-15 DIAGNOSIS — G89.4 CHRONIC PAIN SYNDROME: ICD-10-CM

## 2017-02-16 RX ORDER — SENNOSIDES 8.6 MG
2 TABLET ORAL 2 TIMES DAILY
Qty: 120 TABLET | Refills: 11 | Status: SHIPPED | OUTPATIENT
Start: 2017-02-16 | End: 2018-03-14

## 2017-02-16 RX ORDER — ASPIRIN 81 MG
200 TABLET, DELAYED RELEASE (ENTERIC COATED) ORAL
Qty: 120 TABLET | Refills: 3 | Status: SHIPPED | OUTPATIENT
Start: 2017-02-16 | End: 2017-07-11

## 2017-02-16 NOTE — TELEPHONE ENCOUNTER
Prescriptions sent.  It was not totally clear what Senexon is--I'm assuming that is Senna-S, so 120 tablet monthly supply of that was sent, as well as the docusate.  There is also a combo tab of the two together which could be an option as well.  Please have patient call if she would like a different make up to the pills.      Tyra Bullock

## 2017-02-20 DIAGNOSIS — M54.50 CHRONIC BILATERAL LOW BACK PAIN WITHOUT SCIATICA: Primary | ICD-10-CM

## 2017-02-20 DIAGNOSIS — G89.29 CHRONIC BILATERAL LOW BACK PAIN WITHOUT SCIATICA: Primary | ICD-10-CM

## 2017-02-20 DIAGNOSIS — F11.90 CHRONIC NARCOTIC USE: ICD-10-CM

## 2017-02-22 ENCOUNTER — TELEPHONE (OUTPATIENT)
Dept: FAMILY MEDICINE | Facility: CLINIC | Age: 47
End: 2017-02-22

## 2017-02-22 NOTE — TELEPHONE ENCOUNTER
Pt states she had blood work done at psych yesterday. A1c=9.2 and cholesterol lipids are elevated. Pt has an appt with Dr. Bullock tomorrow. Pt understands that statins will be discussed tomorrow.   Pt states her psych report is on care everywhere. Pt states stress test was done yesterday as well.     Pt has an alppt at 2:10pm on 2/23.  Routed to Dr. Bullock. /YULISA Massey

## 2017-02-22 NOTE — TELEPHONE ENCOUNTER
[facility] phone call message- general phone call:    Reason for call: She had blood work done yesterday in [facility] with her mental health doctor and she wanted to give you results.    Return call needed: Yes    OK to leave a message on voice mail? Yes    Primary language: English      needed? No    Call taken on February 22, 2017 at 3:22 PM by Lo Lao

## 2017-02-23 ENCOUNTER — OFFICE VISIT (OUTPATIENT)
Dept: FAMILY MEDICINE | Facility: CLINIC | Age: 47
End: 2017-02-23

## 2017-02-23 VITALS
WEIGHT: 242 LBS | DIASTOLIC BLOOD PRESSURE: 86 MMHG | SYSTOLIC BLOOD PRESSURE: 132 MMHG | TEMPERATURE: 98.2 F | BODY MASS INDEX: 47.26 KG/M2 | OXYGEN SATURATION: 97 % | HEART RATE: 111 BPM

## 2017-02-23 DIAGNOSIS — F11.90 CHRONIC NARCOTIC USE: ICD-10-CM

## 2017-02-23 DIAGNOSIS — M54.50 CHRONIC BILATERAL LOW BACK PAIN WITHOUT SCIATICA: ICD-10-CM

## 2017-02-23 DIAGNOSIS — G89.29 CHRONIC BILATERAL LOW BACK PAIN WITHOUT SCIATICA: ICD-10-CM

## 2017-02-23 DIAGNOSIS — E78.5 HYPERLIPIDEMIA LDL GOAL <100: Primary | ICD-10-CM

## 2017-02-23 DIAGNOSIS — G89.4 CHRONIC PAIN SYNDROME: ICD-10-CM

## 2017-02-23 DIAGNOSIS — R05.9 COUGH: ICD-10-CM

## 2017-02-23 LAB
AMPHETAMINES QUAL: NEGATIVE
BARBITURATES QUAL URINE: NEGATIVE
BENZODIAZEPINE QUAL URINE: NEGATIVE
BUPRENORPHINE QUAL URINE: NEGATIVE
CANNABINOIDS UR QL SCN: NEGATIVE
COCAINE QUAL URINE: NEGATIVE
METHAMPHETAMINE: NEGATIVE
METHODONE QUAL: NEGATIVE
MORPHINE QUAL: NEGATIVE
OXYCODONE QUAL: NEGATIVE
TEMPERATURE OF URINE WAS BETWEEN 90-100 DEGREES F: YES

## 2017-02-23 RX ORDER — BENZONATATE 200 MG/1
200 CAPSULE ORAL 3 TIMES DAILY PRN
Qty: 42 CAPSULE | Refills: 0 | Status: SHIPPED | OUTPATIENT
Start: 2017-02-23 | End: 2017-03-14

## 2017-02-23 RX ORDER — PRAVASTATIN SODIUM 20 MG
20 TABLET ORAL DAILY
Qty: 90 TABLET | Refills: 3 | Status: SHIPPED | OUTPATIENT
Start: 2017-02-23 | End: 2017-03-23

## 2017-02-23 RX ORDER — HYDROCODONE BITARTRATE AND ACETAMINOPHEN 5; 325 MG/1; MG/1
TABLET ORAL
Qty: 270 TABLET | Refills: 0 | Status: SHIPPED | OUTPATIENT
Start: 2017-02-23 | End: 2017-03-23

## 2017-02-23 RX ORDER — FENTANYL 50 UG/1
1 PATCH TRANSDERMAL
Qty: 10 PATCH | Refills: 0 | Status: SHIPPED | OUTPATIENT
Start: 2017-02-23 | End: 2017-03-23

## 2017-02-23 NOTE — TELEPHONE ENCOUNTER
Good to know.  Despite the fact that she has given me permission to see the records, I cannot view care everywhere until she signs the form--so we will have her do that when she arrives for her visit.  We'll have to continue to work on that A1c, as it is still too high and puts her at risk for infections.  I'll discuss all of this with her at the visit today.  Thanks!    Tyra Bullock    Routed to triage RN.

## 2017-02-24 ENCOUNTER — RECORDS - HEALTHEAST (OUTPATIENT)
Dept: ADMINISTRATIVE | Facility: OTHER | Age: 47
End: 2017-02-24

## 2017-02-24 NOTE — PROGRESS NOTES
There are no exam notes on file for this visit.  Chief Complaint   Patient presents with     Refill Request     Cough     not getting better, is coughing up mucus     Radiology Visit     would like to get an MRI of her spine. [facility] or [facility]- pt is claustophobia     Medication Request     was told she would need to start on a cholesterol medicine     Blood pressure 132/86, pulse 111, temperature 98.2  F (36.8  C), temperature source Oral, weight 242 lb (109.8 kg), SpO2 97 %.    SUBJECTIVE:  This is a 46-year-old female, well-known to me, with complex past medical history including chronic pain, hypertension, hyperlipidemia, insulin-dependent type II diabetes, obesity, asthma, and chronic smoker.  She presents today for chronic pain management visit but she also has a couple of other concerns to address:     1.  Shortness of breath.  She continues to have difficulty with breathing.  She had multiple respiratory infections over the winter season.  The most recent was treated with Augmentin and prednisone.  It improved an awful lot, but over the last week it has gotten worse.  She coughs up yellow-white sputum.  She endorses fevers and chills.  She continues to have frequent nightly sweats.  She coughs throughout the day and night, and this exacerbates her neck and back pain, as well as causing pain in the ribs.  She had loose stools since taking the Augmentin and the clindamycin, and she would like to avoid antibiotics if possible.  She uses her albuterol inhaler regularly and finds it  somewhat helpful.     2.  She has an appointment with the [facility] tomorrow.  The plan is to get settings all organized for her sleep apnea machine.  She is only being seen for an initial evaluation tomorrow.       3.  She continues to have increased trouble with mental health.  She is approaching the anniversary of the death of her mother, as well as her mother's upcoming birthday.  She is worried about how she will  be able to tolerate this.  She saw her psychiatrist recently at [facility].  He said she has some aspects of borderline personality disorder and he recommended DBT.  He says that he'll prescribe sleep medications for her as long as they are approved by the Sleep Clinic.  She has been taking 25 mg of melatonin.  He doesn't want her to be taking that much, nor do I.  He recommended that she be seen at the [facility] at [facility], and she requests a referral for this today.       4.  Pain is worse lately.  The coughing makes her more uncomfortable.  She feels that she needs an MRI.  She says that her back continues to get worse and that she has worsening shooting pain down into the legs.  She describes it as an electric feeling that is worse on the left side.  It makes it very difficult for her to lie down and get much sleep.  Previously, during an MRI, she had to be under general anesthesia and she requests that she be sedated with general anesthesia for this procedure as well. Recalls most recent MRI wasabout 10 years ago, but I don't see records of this in the chart.  It most likely was done when she was getting pain management services at [facility].  We discussed my concerns with doing imaging here.  If we do further imagine, I would want old comparisons to be available, and to have an expert order the appropriate views, given her significant history.  I would like to talk to [facility] first.  She does not want imaging done at [facility] because of previous difficulty with delivery at [facility].  She nearly  and she had significant bleeding.  She has a PTSD from the experience and worries that getting an MRI would cause flashbacks.    5.  Also reports worsening withdrawal-type symptoms.  She gets an electric sensation down the legs and she feels that the legs are jerking.  She takes restless leg medicine with some improvement, but not as much as she hoped for.  She describes increased pain in her  thighs and difficulty sleeping for more than 2 hours straight because of the movement and jerking-type of motion.   6.  Blood sugars are somewhat better.  Her A1C was rechecked at the psychiatrist due to medication monitoring, and it decreased from 9.4 to 9.2.  Blood sugars at home are typically between 150 and 210, with a high in the 280s.  She still had a couple symptomatic episodes of hypoglycemia, and she hasn't checked her sugars since this happened.  She ate some snacks and that was helpful.  She didn't bring her meter in today and I've never really consistently seen her bring in a review of blood sugars.     An EKG was done at the psychiatrist's office and she would like to review this with me as well.     OBJECTIVE:   VITAL SIGNS:  Reviewed.  Blood pressure is mildly elevated.  Pulse is mildly tachycardic.  She is afebrile.  O2  sats are 97%.   GENERAL:  Fatigued-appearing  female in no acute distress.   HEENT:  Mild bilateral sinus tenderness with palpation, in both maxillary and frontal sinuses.  Mild nasal congestion.  Throat is clear and nonerythematous.  No exudate.   NECK:  Lymphadenopathy that is nontender.   CARDIAC:  Heart is regular rate and rhythm with no murmurs, rubs or gallops.  She is mildly tachycardic at about 100-105.   RESPIRATORY:  Lungs are clear to auscultation bilaterally with no crackles or wheezes.  Good air flow.  No increased work of breathing.   EXTREMITIES:  Warm and well-perfused.  She walks slowly, but without a limp and is able to get up and down off the chair without significant difficulty.     LABS:  Labs were discussed with her.  Her urine is appropriate.  We also discussed other labs from [facility] noted above.     ASSESSMENT AND PLAN:  This is a 46-year-old female presenting for CPM visit, as well as evaluation of a number of other issues.   1.  Shortness of breath.  She has history of asthma but there is no wheezing or fevers here in clinic.  She doesn't want to take  further antibiotics.  In fact, I think she appears nontoxic.  We'll treat this as a URI, give her some Tessalon Perles, and encourage increased fluid intake.   2.  Obstructive sleep apnea.  She'll follow up with sleep doctor tomorrow.   3.  Hyperlipidemia.  Her lipids are elevated as measured at the psychiatrist.  She continues to have difficulty with simvastatin.  She has an allergy to simvastatin and atorvastatin.  We discussed pravastatin and we'll start at low dose of 20 mg daily.   4.  Type II diabetes.  A1C is still above goal.  She is on prednisone for her breathing.  Sugars are improving, so we'll give it a little more time.  We'll plan to recheck this in 6 weeks and we'll likely need to increase insulin.  Her Lantus is now approaching the level where we may need to do split dosing.  We could potentially consider 70/30 insulin at that time.     5.  Depression and PTSD.  I appreciate the input of her psychiatrist.  Agree that DBT would likely be helpful.  We'll need Sleep Medicine's advice.  We'll watch her closely over the next couple of weeks, given the upcoming anniversary dates.  She sees a psychologist who comes to her house and we'll increase his visits in preparation for those upcoming dates.   6.  Chronic pain.  She goes to [facility].  We were supposed to decrease fentanyl today, but we'll stick to it for another month.  I discussed that we'll decrease it at the next visit.  She wants to get into [facility] and see what they have to offer her, so I'll place a referral.  She also requests a repeat MRI.  Has more symptoms per histry symptoms, including increased electrical sensations going down her legs; however, I wanted to make sure we get appropriate next step imaging, given the fact that she has claustrophobia and wants to be put completely out during the procedure.  We'll need to contact the folks at [facility] and possibly [facility] as well to arrange which MRI is appropriate and  to see whether she needs to go over to have it done under increased sedation.     I spent 45 minutes with the patient, greater than 50% in counseling and coordination of care.     Tyra Bullock        Patient Instructions   Dr. Bullock will check on MRI (what stuff is need to get the right pictures under anesthesia for the lumbar spine)  Dr. Bullock will place referral for pain clinic at [place].    Take the tessalon perles for cough.  Lots of fluids.    Start taking the new cholesterol medication.  1 pill per day (Pravastatin)    Keep diabetes medications the same and check in 4 weeks.   Go to DBT!  Increase your visits with your therapist for the short term.      Schedule every 2 weeks for a while so we can take care of everything.

## 2017-03-01 ENCOUNTER — RECORDS - HEALTHEAST (OUTPATIENT)
Dept: ADMINISTRATIVE | Facility: OTHER | Age: 47
End: 2017-03-01

## 2017-03-08 DIAGNOSIS — G89.4 CHRONIC PAIN SYNDROME: ICD-10-CM

## 2017-03-10 ENCOUNTER — RECORDS - HEALTHEAST (OUTPATIENT)
Dept: ADMINISTRATIVE | Facility: OTHER | Age: 47
End: 2017-03-10

## 2017-03-14 DIAGNOSIS — R05.9 COUGH: ICD-10-CM

## 2017-03-15 RX ORDER — BENZONATATE 200 MG/1
200 CAPSULE ORAL 3 TIMES DAILY PRN
Qty: 42 CAPSULE | Refills: 1 | Status: SHIPPED | OUTPATIENT
Start: 2017-03-15 | End: 2017-04-07

## 2017-03-20 ENCOUNTER — HOSPITAL ENCOUNTER (OUTPATIENT)
Dept: CARDIOLOGY | Facility: CLINIC | Age: 47
Discharge: HOME | End: 2017-03-20

## 2017-03-20 ENCOUNTER — TRANSFERRED RECORDS (OUTPATIENT)
Dept: HEALTH INFORMATION MANAGEMENT | Facility: CLINIC | Age: 47
End: 2017-03-20

## 2017-03-20 DIAGNOSIS — I15.8 OTHER SECONDARY HYPERTENSION: ICD-10-CM

## 2017-03-20 LAB
AORTIC ROOT: 3.1 CM
BSA FOR ECHO PROCEDURE: 2.14 M2
CV ECHO HEIGHT: 60 IN
CV ECHO WEIGHT: 239 LBS
DOP CALC LVOT AREA: 3.14 CM2
DOP CALC LVOT DIAMETER: 2 CM
DOP CALC LVOT PEAK VEL: 73.2 CM/S
DOP CALC LVOT STROKE VOLUME: 34.2 CM3
DOP CALCLVOT PEAK VEL VTI: 10.9 CM
EJECTION FRACTION: 60 % (ref 55–75)
INTERVENTRICULAR SEPTUM IN END DIASTOLE: 1.1 CM (ref 0.6–0.9)
IVS/PW RATIO: 1.1
LA AREA 1: 11.8 CM2
LA AREA 2: 10 CM2
LEFT ATRIUM LENGTH: 4 CM
LEFT ATRIUM VOLUME INDEX: 11.7 ML/M2
LEFT ATRIUM VOLUME: 25.1 CM3
LEFT VENTRICLE DIASTOLIC VOLUME INDEX: 20.1 CM3/M2 (ref 34–74)
LEFT VENTRICLE DIASTOLIC VOLUME: 43.1 CM3 (ref 46–106)
LEFT VENTRICLE MASS INDEX: 63.6 G/M2
LEFT VENTRICLE SYSTOLIC VOLUME INDEX: 8 CM3/M2 (ref 11–31)
LEFT VENTRICLE SYSTOLIC VOLUME: 17.2 CM3 (ref 14–42)
LEFT VENTRICULAR INTERNAL DIMENSION IN DIASTOLE: 4 CM (ref 3.8–5.2)
LEFT VENTRICULAR MASS: 136.2 G
LEFT VENTRICULAR OUTFLOW TRACT MEAN GRADIENT: 1 MMHG
LEFT VENTRICULAR OUTFLOW TRACT MEAN VELOCITY: 45.8 CM/S
LEFT VENTRICULAR OUTFLOW TRACT PEAK GRADIENT: 2 MMHG
LEFT VENTRICULAR POSTERIOR WALL IN END DIASTOLE: 1 CM (ref 0.6–0.9)
LV STROKE VOLUME INDEX: 16 ML/M2
MITRAL VALVE E/A RATIO: 0.7
MV AVERAGE E/E' RATIO: 10.6 CM/S
MV DECELERATION TIME: 158 MS
MV E'TISSUE VEL-LAT: 6.85 CM/S
MV E'TISSUE VEL-MED: 5.22 CM/S
MV LATERAL E/E' RATIO: 9.3
MV MEDIAL E/E' RATIO: 12.3
MV PEAK A VELOCITY: 88.8 CM/S
MV PEAK E VELOCITY: 64 CM/S
NUC REST DIASTOLIC VOLUME INDEX: 3824 LBS
NUC REST SYSTOLIC VOLUME INDEX: 60 IN

## 2017-03-20 ASSESSMENT — MIFFLIN-ST. JEOR: SCORE: 1630.6

## 2017-03-23 ENCOUNTER — OFFICE VISIT (OUTPATIENT)
Dept: FAMILY MEDICINE | Facility: CLINIC | Age: 47
End: 2017-03-23

## 2017-03-23 VITALS
BODY MASS INDEX: 48.04 KG/M2 | OXYGEN SATURATION: 99 % | HEART RATE: 102 BPM | DIASTOLIC BLOOD PRESSURE: 85 MMHG | TEMPERATURE: 98.5 F | WEIGHT: 246 LBS | SYSTOLIC BLOOD PRESSURE: 126 MMHG

## 2017-03-23 DIAGNOSIS — E78.5 HYPERLIPIDEMIA LDL GOAL <100: ICD-10-CM

## 2017-03-23 DIAGNOSIS — J45.901 ASTHMA EXACERBATION: ICD-10-CM

## 2017-03-23 DIAGNOSIS — G89.4 CHRONIC PAIN SYNDROME: ICD-10-CM

## 2017-03-23 DIAGNOSIS — M51.369 DDD (DEGENERATIVE DISC DISEASE), LUMBAR: ICD-10-CM

## 2017-03-23 DIAGNOSIS — Z23 NEED FOR VACCINATION: Primary | ICD-10-CM

## 2017-03-23 DIAGNOSIS — B00.9 HSV (HERPES SIMPLEX VIRUS) INFECTION: ICD-10-CM

## 2017-03-23 RX ORDER — PRAVASTATIN SODIUM 20 MG
40 TABLET ORAL DAILY
Qty: 180 TABLET | Refills: 0 | Status: SHIPPED | OUTPATIENT
Start: 2017-03-23 | End: 2017-10-24

## 2017-03-23 RX ORDER — FENTANYL 50 UG/1
1 PATCH TRANSDERMAL
Qty: 10 PATCH | Refills: 0 | Status: SHIPPED | OUTPATIENT
Start: 2017-03-23 | End: 2017-07-01

## 2017-03-23 RX ORDER — DOCOSANOL 100 MG/G
CREAM TOPICAL
Qty: 1 TUBE | Refills: 5 | Status: SHIPPED | OUTPATIENT
Start: 2017-03-23 | End: 2018-06-26

## 2017-03-23 RX ORDER — HYDROCODONE BITARTRATE AND ACETAMINOPHEN 5; 325 MG/1; MG/1
TABLET ORAL
Qty: 240 TABLET | Refills: 0 | Status: SHIPPED | OUTPATIENT
Start: 2017-03-23 | End: 2017-04-20

## 2017-03-23 NOTE — MR AVS SNAPSHOT
After Visit Summary   3/23/2017    Teresa Perez    MRN: [id number]           Patient Information     Date Of Birth          1970        Visit Information        Provider Department      3/23/2017 9:20 AM Tyra Bullock MD [facility]        Today's Diagnoses     Need for vaccination    -  1    HSV (herpes simplex virus) infection        Asthma exacerbation        Chronic pain syndrome          Care Instructions    New inhaler!  Use 2 puff 2x per day to help with cough  Will do breathing tests next visit if not improving.    Refill pain medications.  Keep fentanyl the same.    Make a plan for mom's birthday.          Follow-ups after your visit        Who to contact     Please call your clinic at [phone] to:    Ask questions about your health    Make or cancel appointments    Discuss your medicines    Learn about your test results    Speak to your doctor   If you have compliments or concerns about an experience at your clinic, or if you wish to file a complaint, please contact [facility] Patient Relations at [phone] or email us at [email or web address]         Additional Information About Your Visit        MyChart Information     Tutamee is an electronic gateway that provides easy, online access to your medical records. With Tutamee, you can request a clinic appointment, read your test results, renew a prescription or communicate with your care team.     To sign up for Tutamee visit the website at [email or web address]   You will be asked to enter the access code listed below, as well as some personal information. Please follow the directions to create your username and password.     Your access code is: [email or web address]  Expires: 3/29/2017  4:06 PM     Your access code will  in 90 days. If you need help or a new code, please contact your [facility] or call [phone] for assistance.        Care  EveryWhere ID     This is your Care EveryWhere ID. This could be used by other organizations to access your [facility] medical records  [id number]        Your Vitals Were     Pulse Temperature Pulse Oximetry BMI (Body Mass Index)          102 98.5  F (36.9  C) (Oral) 99% 48.04 kg/m2         Blood Pressure from Last 3 Encounters:   03/23/17 126/85   02/23/17 132/86   01/20/17 124/86    Weight from Last 3 Encounters:   03/23/17 246 lb (111.6 kg)   02/23/17 242 lb (109.8 kg)   01/20/17 245 lb (111.1 kg)              We Performed the Following     INJECTION INTRAMUSCULAR OR SUB-Q     medroxyPROGESTERone (DEPO-PROVERA) injection 150 mg (Charge)          Today's Medication Changes          These changes are accurate as of: 3/23/17 10:39 AM.  If you have any questions, ask your nurse or doctor.               Start taking these medicines.        Dose/Directions    mometasone-formoterol 100-5 MCG/ACT oral inhaler   Commonly known as:  DULERA   Used for:  Asthma exacerbation   Started by:  Tyra Bullock MD        Dose:  2 puff   Inhale 2 puffs into the lungs 2 times daily   Quantity:  13 g   Refills:  3         These medicines have changed or have updated prescriptions.        Dose/Directions    HYDROcodone-acetaminophen 5-325 MG per tablet   Commonly known as:  NORCO   This may have changed:  additional instructions   Used for:  Chronic pain syndrome   Changed by:  Tyra Bullock MD        Take max 8 tablest per day.  Take 1 tabs 4x per day and can take an extra 2-3 tabs PRN.   Quantity:  240 tablet   Refills:  0            Where to get your medicines      These medications were sent to [facility] - [place], [place] - [facility]   [address], [place] [address]     Phone:  [phone]     docosanol 10 % Crea cream    mometasone-formoterol 100-5 MCG/ACT oral inhaler         Some of these will need a paper prescription and others can be bought over the counter.  Ask your nurse if you have  questions.     Bring a paper prescription for each of these medications     fentaNYL 50 mcg/hr 72 hr patch    HYDROcodone-acetaminophen 5-325 MG per tablet                Primary Care Provider Office Phone # Fax #    Tyra Bullock [phone]       [facility] [address] [place] [address]        Thank you!     Thank you for choosing [facility]  for your care. Our goal is always to provide you with excellent care. Hearing back from our patients is one way we can continue to improve our services. Please take a few minutes to complete the written survey that you may receive in the mail after your visit with us. Thank you!             Your Updated Medication List - Protect others around you: Learn how to safely use, store and throw away your medicines at www.disposemymeds.org.          This list is accurate as of: 3/23/17 10:39 AM.  Always use your most recent med list.                   Brand Name Dispense Instructions for use    * albuterol 108 (90 BASE) MCG/ACT Inhaler    PROAIR HFA/PROVENTIL HFA/VENTOLIN HFA    3 Inhaler    Inhale 2 puffs into the lungs every 6 hours as needed for shortness of breath / dyspnea or wheezing       * albuterol (2.5 MG/3ML) 0.083% neb solution     30 vial    Take 1 vial (2.5 mg) by nebulization every 6 hours as needed for shortness of breath / dyspnea or wheezing       azelastine 0.05 % Soln ophthalmic solution    OPTIVAR    1 Bottle    Apply 1 drop to eye 2 times daily       azithromycin 250 MG tablet    ZITHROMAX    6 tablet    Two tablets first day, then one tablet daily for four days.       benzonatate 200 MG capsule    TESSALON    42 capsule    Take 1 capsule (200 mg) by mouth 3 times daily as needed for cough       blood glucose lancets standard    no brand specified    1 Box    Use to test blood sugar 4 times daily or as directed.       blood glucose monitoring meter device kit    no brand specified    1 kit    Use to test blood sugar 4 times  daily or as directed.       blood glucose monitoring test strip    no brand specified    100 strip    Use to test blood sugars 4 times daily or as directed       carboxymethylcellul-glycerin 0.5-0.9 % Soln ophthalmic solution    OPTIVE/REFRESH OPTIVE    1 Bottle    Place 1 drop into both eyes 3 times daily       clindamycin 1 % lotion    CLINDAMAX    60 mL    Apply topically 2 times daily       dicyclomine 10 MG capsule    BENTYL    120 capsule    Take 1 capsule (10 mg) by mouth 4 times daily (before meals and nightly) From Judy PEÑA       diphenhydrAMINE 50 MG capsule    BENADRYL     Take  mg by mouth At Bedtime.       docosanol 10 % Crea cream    ABREVA    1 Tube    Apply topically 5 times daily       docusate sodium 100 MG tablet    COLACE    120 tablet    Take 200 mg by mouth 2 times daily       EPINEPHrine 0.3 MG/0.3ML injection     2 each    Inject 0.3 mLs (0.3 mg) into the muscle once as needed for anaphylaxis       * FentaNYL 62.5 MCG/HR Pt72     10 patch    Place 1 patch onto the skin every 72 hours       * fentaNYL 50 mcg/hr 72 hr patch    DURAGESIC    10 patch    Place 1 patch onto the skin every 72 hours       fluticasone 50 MCG/ACT spray    FLONASE    9.9 g    Spray 2 sprays into both nostrils daily       fluvastatin 40 MG capsule    LESCOL    180 capsule    Take 1 capsule (40 mg) by mouth 2 times daily       gabapentin 300 MG capsule    NEURONTIN    90 capsule    Take 1 capsule (300 mg) by mouth 3 times daily       glipiZIDE 10 MG 24 hr tablet    GLUCOTROL XL    60 tablet    Take 2 tablets (20 mg) by mouth daily       HYDROcodone-acetaminophen 5-325 MG per tablet    NORCO    240 tablet    Take max 8 tablest per day.  Take 1 tabs 4x per day and can take an extra 2-3 tabs PRN.       hydrOXYzine 25 MG tablet    ATARAX    120 tablet    Take 1-2 tablets (25-50 mg) by mouth every 6 hours as needed for itching       insulin aspart 100 UNIT/ML injection    NovoLOG FLEXPEN    3 mL    Take  22 Units with meals.       insulin degludec 200 UNIT/ML pen    TRESIBA    3 mL    Inject 60 Units Subcutaneous daily       insulin pen needle 31G X 5 MM    B-D U/F    100 each    Use 1 daily or as directed.       lisinopril 20 MG tablet    PRINIVIL/ZESTRIL    30 tablet    Take 1 tablet (20 mg) by mouth daily       loratadine 10 MG tablet    CLARITIN    30 tablet    Take 1 tablet (10 mg) by mouth daily       medroxyPROGESTERone 150 MG/ML injection    DEPO-PROVERA    1 mL    Inject 1 mL (150 mg) into the muscle every 3 months       * melatonin 3 MG tablet      Take 3 tablets (9 mg) by mouth At Bedtime       * Melatonin 10 MG Tabs tablet     30 tablet    Take 1 tablet (10 mg) by mouth nightly as needed for sleep       miconazole 2 % cream    MICATIN    5 g    Place 1 applicator vaginally At Bedtime Substitute as needed.       mometasone-formoterol 100-5 MCG/ACT oral inhaler    DULERA    13 g    Inhale 2 puffs into the lungs 2 times daily       nabumetone 750 MG tablet    RELAFEN    60 tablet    Take 1 tablet (750 mg) by mouth 2 times daily as needed for moderate pain       naloxone nasal spray    NARCAN    0.2 mL    Spray 1 spray (4 mg) into one nostril alternating nostrils as needed for opioid reversal (every 2-3 minutes until medical assistance arrives.)       ondansetron 4 MG tablet    ZOFRAN    18 tablet    Take 1 tablet (4 mg) by mouth every 8 hours as needed for nausea       * order for DME     1 Device    Equipment being ordered: CPAP.  1 device.  Per sleep study recommendations.  Brand:  Respironics, Type:  Nasal Wisp,  Size:  Small       * order for DME     1 each    Adult nebulizer mask and tubing.       * order for DME     1 Device    Equipment being ordered: Nebulizer supplies for life.       pramipexole 0.25 MG tablet    MIRAPEX    30 tablet    Take 1 tablet (0.25 mg) by mouth At Bedtime       pravastatin 20 MG tablet    PRAVACHOL    90 tablet    Take 1 tablet (20 mg) by mouth daily       predniSONE 10 MG  tablet    DELTASONE    21 tablet    Take 2 tabs daily for 1 week, then 1 tab daily for 1 week.       ranitidine 150 MG tablet    ZANTAC    60 tablet    Take 1 tablet (150 mg) by mouth 2 times daily       sennosides 8.6 MG tablet    SENOKOT    120 tablet    Take 2 tablets by mouth 2 times daily       * SEROQUEL 300 MG tablet   Generic drug:  QUEtiapine      Take 300 mg by mouth daily.       * SEROQUEL 50 MG tablet   Generic drug:  QUEtiapine     1 tablet    Take 1 tablet (50 mg) by mouth every morning For anxiety.  From Psychiatrist.       sitagliptin 100 MG tablet    JANUVIA    90 tablet    Take 1 tablet (100 mg) by mouth daily       SUMAtriptan 100 MG tablet    IMITREX    9 tablet    Take 1 tablet (100 mg) by mouth at onset of headache       topiramate ER - 24 hour 100 MG sprinkle capsule    QUDEXY XR    90 capsule    Take 1 capsule (100 mg) by mouth daily       venlafaxine 75 MG 24 hr capsule    EFFEXOR-XR    30 capsule    Take 1 capsule (75 mg) by mouth 3 times daily       * Notice:  This list has 11 medication(s) that are the same as other medications prescribed for you. Read the directions carefully, and ask your doctor or other care provider to review them with you.

## 2017-03-23 NOTE — PATIENT INSTRUCTIONS
New inhaler!  Use 2 puff 2x per day to help with cough  Will do breathing tests next visit if not improving.    Refill pain medications.  Keep fentanyl the same.    Make a plan for mom's birthday.      XR Lumbar Epidural Injection referral:  Patient requested that referral and demographics faxed to [facility] and they will call patient to schedule.  Information faxed today.  [facility]  PH:  [phone]  FAX:  [phone]  Makeda ZAVALETA Pack  3/27/17

## 2017-03-23 NOTE — NURSING NOTE
The following medication was given:     MEDICATION: Medroxyprogesterone 150 mg  ROUTE: IM  SITE: Deltoid - Right  DOSE: 1ml  LOT #: [id number]  :  Modality   EXPIRATION DATE:  9/2020  NDC#: [address]-5   Medication administered by: Fran No CMA  Pt was on time, depo given. Next one due 6/8/17-6/22/17. Reminder card given to pt.

## 2017-03-24 NOTE — PROGRESS NOTES
"  Chronic Pain Follow-Up Visit    Also having continued difficulty with breathing.  Continues to have a cough, coughing up some sputum.  Gets a little better with nebs.  Still having some tactile fevers and chills.  Less wheezing, but still wheezing occasionally.  Having more difficulty sleeping.  Currently working to get another sleep study.  She had an ECHO completed at [facility], and would like me to look at the results through Care Everywhere.  Continues to note shortenss of breath at night.  Continues to smoke 1/2 pack on a good day (mental health) and up to 2 packs on a bad day.  Would like to quit, but has struggled even with medicinal support and given upcoming anniversaries does not feel that now would be a good time.      Also wants a refill on Abreva.  Continues to have lip pain and sores, worse with this cough.      Due to depo shot for menorrhagia.      Tolerating the change to pravastatin due to insurance not covering fluvastatin.  Allergy to atorvastatin and simvastatin.  Okay with increasing dose today.  High risk for vascular problems.      BP improved today from last visit.  Taking her medications regularly.      Reports blood sugars are \"good\". Does not remember her numbers.  Has been giving her insulin regularly.  Denies any low sugars.  Appetite has been poor due to respiratory symptoms and abominal pain from coughing.  Eating very irregularly.      Pain Update:  Location of pain: lower back.    Overall control: Inadequate pain control  Analgesia/pain control: Recent changes:  Wors.  Having more pain.  Improvement in symptoms with SI joint injections, but hoping for another lumbar spine injection.  Last one was 8/16/2016.  Good results and good improvement in pain.  Would be eligible fr another injection as she is 6 months out.  She is requesting a referral today.    Has been stable on Fentanyl 50mcg pain for last few months.  Have not decreased dose due to difficulty with withdrawals, " difficulty getting in for injections because of recent skin and respiratory infections.    Has been taking extra norco to help with withdrawals, but these have improved so will plan to decrease dose of this today.    Referral placed for pain clinic at [place].  Patient's psychiatrist is there, and recommended coordinating services.  Patient has not pursued an appointment as of yet.  Has not started DBT as recommended by her psychiatrist either.  Has been difficulty as recently celebrated anniversary of her mothers death.  Ordered [employer] pizza with her brother (was her mom's favorite spot).  Mom's birthday coming up April 9th, and worried about this anniversary as well.    She is only able to do limited activity, but is working on increasing her walking.  Feels better when she is able to get outside.  Now walking 8 blocks per day.  Gets sore, but makes it through.  Does not walk when weather is poor.      Adherance     How often do you take extra pain medicine:Daily.  Taking extra PRN norco regularly.  Out of entire 270 tabs per month today    Did you take your pain medication today? YES    Adverse effects: No.  Denies constipation.       Database checked today? Yes. Details: appropriate.      PHQ-9 SCORE 12/2/2016   Total Score 11     No flowsheet data found.      Functional Assessment  Questionnaire -5  Physical Functional Ability Questionnaire:  See flowsheet.  3, 2, 2, 1, 1 Total 45.        FUNCTIONAL ASSESSMENT QUESTIONNAIRE SCORE 3/24/2017   Total Score 45        Problem, Medication and Allergy Lists were reviewed and are current..           Physical Exam:     Vitals:    03/23/17 0918   BP: 126/85   Pulse: 102   Temp: 98.5  F (36.9  C)   TempSrc: Oral   SpO2: 99%   Weight: 246 lb (111.6 kg)     Body mass index is 48.04 kg/(m^2).  Vitals were reviewed and were normal  Vitals:  Vitals are reviewed and are within the normal range.  No increased work of breathing.  Intermittent parosysmal cough.    Gen:   Fatigued, pale, mild distress.    Throat:  Clear.  Non-erythematous and without exudate  Neck:  No cervical lymphadenopathy  Cardiac:  Regular rate and rhythm, no murmurs, rubs or gallops  Respiratory:  Lungs clear anteriorly.  Scattered crackles, but no wheezes bilaterally.  Lungs sounds in bases are distant.    Abdomen:  Soft, non-tender, non-distended, bowel sounds positive.  Mild diffuse tenderness.  No guarding.    Extremities:  Warm, well-perfused, pulses 2+/4, no lower extremity edema  Skin:  Mucous membranes moist.  No rash.   Capillary refill <2 secs.          Results:     Results for orders placed or performed in visit on 02/23/17   Rapid Urine Drug Screen ([facility])   Result Value Ref Range    Amphetamines Qual NEGATIVE NEGATIVE    Barbiturates Qual Urine NEGATIVE NEGATIVE    Buprenorphine Qual Urine NEGATIVE NEGATIVE    Benzodiazepine Qual Urine NEGATIVE NEGATIVE    Cocaine Qual Urine NEGATIVE NEGATIVE    Cannabinoids Qual Urine NEGATIVE NEGATIVE    Methamphetamine Qual NEGATIVE NEGATIVE    Methadone Qual NEGATIVE NEGATIVE    Morphine Qual NEGATIVE NEGATIVE    Oxycodone Qual NEGATIVE NEGATIVE    Temperature of Urine was Between  Degrees F YES YES      rapid urine drug screen obtained today: Yes    Assessment and Plan    Teresa Perez is here for follow up of chronic pain caused by degenerative disc disease of neck and low back, bilateral osteoarthritis of knees s/p TKA, chronic headaches.  .    Teresa was seen today for pain and results.    Diagnoses and all orders for this visit:    Need for vaccination.  Due for depo for menorrhagia.    -     INJECTION INTRAMUSCULAR OR SUB-Q  -     medroxyPROGESTERone (DEPO-PROVERA) injection 150 mg (Charge)    HSV (herpes simplex virus) infection.  HAving continued symptoms.  Refill given.    -     docosanol (ABREVA) 10 % CREA cream; Apply topically 5 times daily    Asthma exacerbation.  Continued shortness of breath.  Lungs clear, but diminished.  O2 sats  stable, but still having trouble with cough.  Has never been on a controller.  2 courses of oral prednisone.  Long history of smoking still smoking 1/2-2 packs daily.  Will cover for both asthma and COPD with dulera.  Would recommend PFTs once cough is improved.  Will work on smoking cessation once anniversaries are past.    -     mometasone-formoterol (DULERA) 100-5 MCG/ACT oral inhaler; Inhale 2 puffs into the lungs 2 times daily    Chronic pain syndrome.  COntinue same dose of fentanyl.  Place orders for epidural spine injection per patient request.  Decrease Hydrocodone by 30 pills as no longer having as much withdrawal.  Will continue to work on titrating down on dose.  WIll encourage [facility] evaluation in the upcoming months.  Pursing NNAMDI evaluation and treatment.  Feel this will help patient function better with pain.  Continue daily walks and activity.    -     fentaNYL (DURAGESIC) 50 mcg/hr 72 hr patch; Place 1 patch onto the skin every 72 hours  -     HYDROcodone-acetaminophen (NORCO) 5-325 MG per tablet; Take max 8 tablest per day.  Take 1 tabs 4x per day and can take an extra 2-3 tabs PRN.    Hyperlipidemia LDL goal <100  -     pravastatin (PRAVACHOL) 20 MG tablet; Take 2 tablets (40 mg) by mouth daily    I spent 40 min with patient >50% on counseling and coordination of care.      Chronic Pain Syndrome:  Care plan updated with patient, see below for details.  Naloxone has been prescribed.       Tyra Bullock MD    Patient Instructions   New inhaler!  Use 2 puff 2x per day to help with cough  Will do breathing tests next visit if not improving.    Refill pain medications.  Keep fentanyl the same.    Make a plan for mom's birthday.

## 2017-03-27 NOTE — PROGRESS NOTES
Left voicemail message for patient to call me to schedule steroid injection.  Makeda ZAVALETA Pack  3/27/17

## 2017-04-07 DIAGNOSIS — K58.9 IRRITABLE BOWEL SYNDROME, UNSPECIFIED TYPE: Primary | ICD-10-CM

## 2017-04-07 DIAGNOSIS — R05.9 COUGH: ICD-10-CM

## 2017-04-07 RX ORDER — BENZONATATE 200 MG/1
200 CAPSULE ORAL 3 TIMES DAILY PRN
Qty: 42 CAPSULE | Refills: 1 | Status: SHIPPED | OUTPATIENT
Start: 2017-04-07 | End: 2017-09-28

## 2017-04-07 NOTE — TELEPHONE ENCOUNTER
Pt requesting a refill on Dicyclomine 10mg capsule, take 1 capsule by mouth 4 times daily before meals and nightly from [facility]. Please advise. Thanks

## 2017-04-10 DIAGNOSIS — G25.81 RESTLESS LEG SYNDROME: ICD-10-CM

## 2017-04-10 RX ORDER — PRAMIPEXOLE DIHYDROCHLORIDE 0.25 MG/1
0.25 TABLET ORAL AT BEDTIME
Qty: 30 TABLET | Refills: 5 | Status: SHIPPED | OUTPATIENT
Start: 2017-04-10 | End: 2017-04-20

## 2017-04-11 RX ORDER — DICYCLOMINE HYDROCHLORIDE 10 MG/1
10 CAPSULE ORAL
Qty: 120 CAPSULE | Refills: 0 | Status: SHIPPED | OUTPATIENT
Start: 2017-04-11 | End: 2017-09-28

## 2017-04-11 NOTE — TELEPHONE ENCOUNTER
Prescription for Bentyl sent.  Should get prescription from GI in the long term.      Tyra Bullock    Routed to triage RN.

## 2017-04-19 DIAGNOSIS — G62.9 NEUROPATHY: ICD-10-CM

## 2017-04-19 DIAGNOSIS — I10 ESSENTIAL HYPERTENSION, BENIGN: ICD-10-CM

## 2017-04-20 ENCOUNTER — OFFICE VISIT (OUTPATIENT)
Dept: FAMILY MEDICINE | Facility: CLINIC | Age: 47
End: 2017-04-20

## 2017-04-20 VITALS — DIASTOLIC BLOOD PRESSURE: 93 MMHG | SYSTOLIC BLOOD PRESSURE: 147 MMHG | HEART RATE: 97 BPM | TEMPERATURE: 99 F

## 2017-04-20 DIAGNOSIS — G89.4 CHRONIC PAIN SYNDROME: ICD-10-CM

## 2017-04-20 DIAGNOSIS — K75.81 NASH (NONALCOHOLIC STEATOHEPATITIS): ICD-10-CM

## 2017-04-20 DIAGNOSIS — Z79.4 TYPE 2 DIABETES MELLITUS WITH OTHER SKIN COMPLICATION, WITH LONG-TERM CURRENT USE OF INSULIN (H): Primary | ICD-10-CM

## 2017-04-20 DIAGNOSIS — J45.901 ASTHMA EXACERBATION: ICD-10-CM

## 2017-04-20 DIAGNOSIS — E11.628 TYPE 2 DIABETES MELLITUS WITH OTHER SKIN COMPLICATION, WITH LONG-TERM CURRENT USE OF INSULIN (H): Primary | ICD-10-CM

## 2017-04-20 DIAGNOSIS — I10 ESSENTIAL HYPERTENSION: ICD-10-CM

## 2017-04-20 DIAGNOSIS — E78.01 FAMILIAL HYPERCHOLESTEROLEMIA: ICD-10-CM

## 2017-04-20 DIAGNOSIS — Z79.4 TYPE 2 DIABETES MELLITUS WITH OTHER SKIN COMPLICATION, WITH LONG-TERM CURRENT USE OF INSULIN (H): ICD-10-CM

## 2017-04-20 DIAGNOSIS — G25.81 RESTLESS LEG SYNDROME: ICD-10-CM

## 2017-04-20 DIAGNOSIS — E11.628 TYPE 2 DIABETES MELLITUS WITH OTHER SKIN COMPLICATION, WITH LONG-TERM CURRENT USE OF INSULIN (H): ICD-10-CM

## 2017-04-20 DIAGNOSIS — F11.90 CHRONIC NARCOTIC USE: ICD-10-CM

## 2017-04-20 LAB
BUN SERPL-MCNC: 10.3 MG/DL (ref 7–19)
CALCIUM SERPL-MCNC: 10 MG/DL (ref 8.5–10.1)
CHLORIDE SERPLBLD-SCNC: 103.5 MMOL/L (ref 98–110)
CHOLEST SERPL-MCNC: 192.5 MG/DL (ref 0–200)
CHOLEST/HDLC SERPL: 6.3 {RATIO} (ref 0–5)
CO2 SERPL-SCNC: 17.5 MMOL/L (ref 20–32)
CREAT SERPL-MCNC: 0.6 MG/DL (ref 0.5–1)
GFR SERPL CREATININE-BSD FRML MDRD: >90 ML/MIN/1.7 M2
GLUCOSE SERPL-MCNC: 167 MG'DL (ref 70–99)
HBA1C MFR BLD: 8.2 % (ref 4.1–5.7)
HDLC SERPL-MCNC: 30.8 MG/DL
LDLC SERPL CALC-MCNC: 128 MG/DL (ref 0–129)
POTASSIUM SERPL-SCNC: 3.8 MMOL/DL (ref 3.2–4.6)
SODIUM SERPL-SCNC: 138.3 MMOL/L (ref 132–142)
TRIGL SERPL-MCNC: 170.8 MG/DL (ref 0–150)
VLDL CHOLESTEROL: 34.2 MG/DL (ref 7–32)

## 2017-04-20 RX ORDER — PRAMIPEXOLE DIHYDROCHLORIDE 0.5 MG/1
0.5 TABLET ORAL AT BEDTIME
Qty: 90 TABLET | Refills: 1 | Status: SHIPPED | OUTPATIENT
Start: 2017-04-20 | End: 2017-10-05

## 2017-04-20 RX ORDER — LISINOPRIL 20 MG/1
20 TABLET ORAL DAILY
Qty: 30 TABLET | Refills: 5 | Status: SHIPPED | OUTPATIENT
Start: 2017-04-20 | End: 2017-10-24

## 2017-04-20 RX ORDER — FENTANYL 37.5 UG/H
1 PATCH, EXTENDED RELEASE TRANSDERMAL
Qty: 10 PATCH | Refills: 0 | Status: SHIPPED | OUTPATIENT
Start: 2017-04-20 | End: 2017-05-24

## 2017-04-20 RX ORDER — PRAMIPEXOLE DIHYDROCHLORIDE 0.5 MG/1
0.25 TABLET ORAL AT BEDTIME
Qty: 90 TABLET | Refills: 1 | Status: SHIPPED | OUTPATIENT
Start: 2017-04-20 | End: 2017-04-20

## 2017-04-20 RX ORDER — HYDROCODONE BITARTRATE AND ACETAMINOPHEN 5; 325 MG/1; MG/1
TABLET ORAL
Qty: 270 TABLET | Refills: 0 | Status: SHIPPED | OUTPATIENT
Start: 2017-04-20 | End: 2017-05-24

## 2017-04-20 RX ORDER — GABAPENTIN 300 MG/1
300 CAPSULE ORAL 3 TIMES DAILY
Qty: 90 CAPSULE | Refills: 5 | Status: SHIPPED | OUTPATIENT
Start: 2017-04-20 | End: 2017-09-28

## 2017-04-20 RX ORDER — FENTANYL 25 UG/1
1 PATCH TRANSDERMAL
Qty: 10 PATCH | Refills: 0 | Status: SHIPPED | OUTPATIENT
Start: 2017-04-20 | End: 2017-05-24

## 2017-04-20 NOTE — PATIENT INSTRUCTIONS
1)  Increase inhaler to stronger dose.    2)  Increase restless leg medication to higher dose  3)  Complete sleep study  4)  Get injections done!  5)  Keep up with the walking and the weight loss  6)  Increase insulin with meals to 24 units.

## 2017-04-20 NOTE — LETTER
April 21, 2017      Teresa Perez  [address]    [place] [address]        Dear Teresa,    Please see below for your test results.    Here is a copy of your lab results.  As we discussed in clinic, your A1c (diabetes test) is better at 8.2.  Nice job with the weight loss and exercise.  Your kidney tests (BMP) are good.  Your cholesterol is high.  As long as the new medication isn't causing side effects, I'd like you to increase the dose.  Please take 60mg of the Pravastatin daily.  If you still have 20mg tabs, take 3 together.  I have sent a new prescription for 40mg tabs--taking 1.5 tabs of these.  We'll continue to increase the dose slowly over time.  Please call the clinic at [phone] if you have any questions.        Resulted Orders   Lipid Panel ([facility])   Result Value Ref Range    Cholesterol 192.5 0.0 - 200.0 mg/dL    Cholesterol/HDL Ratio 6.3 (H) 0.0 - 5.0    HDL Cholesterol 30.8 (L) >40.0 mg/dL    LDL Cholesterol Calculated 128 0 - 129 mg/dL    Triglycerides 170.8 (H) 0.0 - 150.0 mg/dL    VLDL Cholesterol 34.2 (H) 7.0 - 32.0 mg/dL   Basic Metabolic Panel ([facility])   Result Value Ref Range    Urea Nitrogen 10.3 7.0 - 19.0 mg/dL    Calcium 10.0 8.5 - 10.1 mg/dL    Chloride 103.5 98.0 - 110.0 mmol/L    Carbon Dioxide 17.5 (L) 20.0 - 32.0 mmol/L    Creatinine 0.6 0.5 - 1.0 mg/dL    Glucose 167.0 (H) 70.0 - 99.0 mg'dL    Potassium 3.8 3.2 - 4.6 mmol/dL    Sodium 138.3 132.0 - 142.0 mmol/L    GFR Estimate >90 >60.0 mL/min/1.7 m2    GFR Estimate If Black >90 >60.0 mL/min/1.7 m2   Hemoglobin A1c ([facility])   Result Value Ref Range    Hemoglobin A1C 8.2 (H) 4.1 - 5.7 %       If you have any questions, please call the clinic to make an appointment.    Sincerely,    Tyra Bullock MD

## 2017-04-20 NOTE — MR AVS SNAPSHOT
After Visit Summary   4/20/2017    Teresa Perez    MRN: [id number]           Patient Information     Date Of Birth          1970        Visit Information        Provider Department      4/20/2017 3:10 PM Tyra Bullock MD [facility]        Today's Diagnoses     Chronic narcotic use        LOMAS (nonalcoholic steatohepatitis)        Familial hypercholesterolemia        Type 2 diabetes mellitus with other skin complication, with long-term current use of insulin (H)        Essential hypertension        Chronic pain syndrome        Asthma exacerbation        Restless leg syndrome          Care Instructions    1)  Increase inhaler to stronger dose.    2)  Increase restless leg medication to higher dose  3)  Complete sleep study  4)  Get injections done!  5)  Keep up with the walking and the weight loss  6)  Increase insulin with meals to 24 units.            Follow-ups after your visit        Who to contact     Please call your clinic at [phone] to:    Ask questions about your health    Make or cancel appointments    Discuss your medicines    Learn about your test results    Speak to your doctor   If you have compliments or concerns about an experience at your clinic, or if you wish to file a complaint, please contact [facility] Patient Relations at [phone] or email us at [email or web address]         Additional Information About Your Visit        MyChart Information     [employer] is an electronic gateway that provides easy, online access to your medical records. With [employer], you can request a clinic appointment, read your test results, renew a prescription or communicate with your care team.     To sign up for [employer] visit the website at [email or web address]   You will be asked to enter the access code listed below, as well as some personal information. Please follow the directions to create your username and password.     Your  access code is: [email or web address]  Expires: 2017  4:05 PM     Your access code will  in 90 days. If you need help or a new code, please contact your [facility] or call [phone] for assistance.        Care EveryWhere ID     This is your Care EveryWhere ID. This could be used by other organizations to access your [facility] medical records  [id number]        Your Vitals Were     Pulse Temperature                97 99  F (37.2  C) (Oral)           Blood Pressure from Last 3 Encounters:   17 (!) 147/93   17 126/85   17 132/86    Weight from Last 3 Encounters:   17 246 lb (111.6 kg)   17 242 lb (109.8 kg)   17 245 lb (111.1 kg)              We Performed the Following     Basic Metabolic Panel ([facility])     Hemoglobin A1c ([facility])     Lipid Panel ([facility])     Rapid Urine Drug Screen ([facility])          Today's Medication Changes          These changes are accurate as of: 17  4:05 PM.  If you have any questions, ask your nurse or doctor.               These medicines have changed or have updated prescriptions.        Dose/Directions    * FentaNYL 62.5 MCG/HR Pt72   This may have changed:  Another medication with the same name was added. Make sure you understand how and when to take each.   Used for:  Chronic pain syndrome   Changed by:  Tyra Bullock MD        Dose:  1 patch   Place 1 patch onto the skin every 72 hours   Quantity:  10 patch   Refills:  0       * fentaNYL 50 mcg/hr 72 hr patch   Commonly known as:  DURAGESIC   This may have changed:  Another medication with the same name was added. Make sure you understand how and when to take each.   Used for:  Chronic pain syndrome   Changed by:  Tyra Bullock MD        Dose:  1 patch   Place 1 patch onto the skin every 72 hours   Quantity:  10 patch   Refills:  0       * FentaNYL 37.5 MCG/HR Pt72   This may have changed:  You were already taking a medication with the same name,  and this prescription was added. Make sure you understand how and when to take each.   Used for:  Chronic narcotic use   Changed by:  Tyra Bullock MD        Dose:  1 patch   Place 1 patch onto the skin every 72 hours   Quantity:  10 patch   Refills:  0       HYDROcodone-acetaminophen 5-325 MG per tablet   Commonly known as:  NORCO   This may have changed:  additional instructions   Used for:  Chronic pain syndrome   Changed by:  Tyra Bullock MD        Take max 9 tablest per day.  Take 1 tabs 4x per day and can take an extra 2-3 tabs PRN.   Quantity:  270 tablet   Refills:  0       * mometasone-formoterol 100-5 MCG/ACT oral inhaler   Commonly known as:  DULERA   This may have changed:  Another medication with the same name was added. Make sure you understand how and when to take each.   Used for:  Asthma exacerbation   Changed by:  Tyra Bullock MD        Dose:  2 puff   Inhale 2 puffs into the lungs 2 times daily   Quantity:  13 g   Refills:  3       * mometasone-formoterol 200-5 MCG/ACT oral inhaler   Commonly known as:  DULERA   This may have changed:  You were already taking a medication with the same name, and this prescription was added. Make sure you understand how and when to take each.   Used for:  Asthma exacerbation   Changed by:  Tyra Bullock MD        Dose:  2 puff   Inhale 2 puffs into the lungs 2 times daily   Quantity:  13 g   Refills:  1       pramipexole 0.5 MG tablet   Commonly known as:  MIRAPEX   This may have changed:  medication strength   Used for:  Restless leg syndrome   Changed by:  Tyra Bullock MD        Dose:  0.25 mg   Take 0.5 tablets (0.25 mg) by mouth At Bedtime   Quantity:  90 tablet   Refills:  1       * Notice:  This list has 5 medication(s) that are the same as other medications prescribed for you. Read the directions carefully, and ask your doctor or other care provider to review them with you.         Where to get your medicines      These  medications were sent to [facility] [place], [place] - [address] [facility]  [address], [place] [address]     Phone:  [phone]     mometasone-formoterol 200-5 MCG/ACT oral inhaler    pramipexole 0.5 MG tablet         Some of these will need a paper prescription and others can be bought over the counter.  Ask your nurse if you have questions.     Bring a paper prescription for each of these medications     FentaNYL 37.5 MCG/HR Pt72    HYDROcodone-acetaminophen 5-325 MG per tablet                Primary Care Provider Office Phone # Fax #    Tyra Bullock [phone]       [facility] [address] [place] [address]        Thank you!     Thank you for choosing [facility]  for your care. Our goal is always to provide you with excellent care. Hearing back from our patients is one way we can continue to improve our services. Please take a few minutes to complete the written survey that you may receive in the mail after your visit with us. Thank you!             Your Updated Medication List - Protect others around you: Learn how to safely use, store and throw away your medicines at www.disposemymeds.org.          This list is accurate as of: 4/20/17  4:05 PM.  Always use your most recent med list.                   Brand Name Dispense Instructions for use    * albuterol 108 (90 BASE) MCG/ACT Inhaler    PROAIR HFA/PROVENTIL HFA/VENTOLIN HFA    3 Inhaler    Inhale 2 puffs into the lungs every 6 hours as needed for shortness of breath / dyspnea or wheezing       * albuterol (2.5 MG/3ML) 0.083% neb solution     30 vial    Take 1 vial (2.5 mg) by nebulization every 6 hours as needed for shortness of breath / dyspnea or wheezing       azelastine 0.05 % Soln ophthalmic solution    OPTIVAR    1 Bottle    Apply 1 drop to eye 2 times daily       azithromycin 250 MG tablet    ZITHROMAX    6 tablet    Two tablets first day, then one tablet daily for four days.       benzonatate  200 MG capsule    TESSALON    42 capsule    Take 1 capsule (200 mg) by mouth 3 times daily as needed for cough       blood glucose lancets standard    no brand specified    1 Box    Use to test blood sugar 4 times daily or as directed.       blood glucose monitoring meter device kit    no brand specified    1 kit    Use to test blood sugar 4 times daily or as directed.       blood glucose monitoring test strip    no brand specified    100 strip    Use to test blood sugars 4 times daily or as directed       carboxymethylcellul-glycerin 0.5-0.9 % Soln ophthalmic solution    OPTIVE/REFRESH OPTIVE    1 Bottle    Place 1 drop into both eyes 3 times daily       clindamycin 1 % lotion    CLINDAMAX    60 mL    Apply topically 2 times daily       * dicyclomine 10 MG capsule    BENTYL    120 capsule    Take 1 capsule (10 mg) by mouth 4 times daily (before meals and nightly) From [facility]       * dicyclomine 10 MG capsule    BENTYL    120 capsule    Take 1 capsule (10 mg) by mouth 4 times daily (before meals and nightly)       diphenhydrAMINE 50 MG capsule    BENADRYL     Take  mg by mouth At Bedtime.       docosanol 10 % Crea cream    ABREVA    1 Tube    Apply topically 5 times daily       docusate sodium 100 MG tablet    COLACE    120 tablet    Take 200 mg by mouth 2 times daily       EPINEPHrine 0.3 MG/0.3ML injection     2 each    Inject 0.3 mLs (0.3 mg) into the muscle once as needed for anaphylaxis       * FentaNYL 62.5 MCG/HR Pt72     10 patch    Place 1 patch onto the skin every 72 hours       * fentaNYL 50 mcg/hr 72 hr patch    DURAGESIC    10 patch    Place 1 patch onto the skin every 72 hours       * FentaNYL 37.5 MCG/HR Pt72     10 patch    Place 1 patch onto the skin every 72 hours       fluticasone 50 MCG/ACT spray    FLONASE    9.9 g    Spray 2 sprays into both nostrils daily       fluvastatin 40 MG capsule    LESCOL    180 capsule    Take 1 capsule (40 mg) by mouth 2 times daily        gabapentin 300 MG capsule    NEURONTIN    90 capsule    Take 1 capsule (300 mg) by mouth 3 times daily       glipiZIDE 10 MG 24 hr tablet    GLUCOTROL XL    60 tablet    Take 2 tablets (20 mg) by mouth daily       HYDROcodone-acetaminophen 5-325 MG per tablet    NORCO    270 tablet    Take max 9 tablest per day.  Take 1 tabs 4x per day and can take an extra 2-3 tabs PRN.       hydrOXYzine 25 MG tablet    ATARAX    120 tablet    Take 1-2 tablets (25-50 mg) by mouth every 6 hours as needed for itching       insulin aspart 100 UNIT/ML injection    NovoLOG FLEXPEN    3 mL    Take 22 Units with meals.       insulin degludec 200 UNIT/ML pen    TRESIBA    3 mL    Inject 60 Units Subcutaneous daily       insulin pen needle 31G X 5 MM    B-D U/F    100 each    Use 1 daily or as directed.       lisinopril 20 MG tablet    PRINIVIL/ZESTRIL    30 tablet    Take 1 tablet (20 mg) by mouth daily       loratadine 10 MG tablet    CLARITIN    30 tablet    Take 1 tablet (10 mg) by mouth daily       medroxyPROGESTERone 150 MG/ML injection    DEPO-PROVERA    1 mL    Inject 1 mL (150 mg) into the muscle every 3 months       * melatonin 3 MG tablet      Take 3 tablets (9 mg) by mouth At Bedtime       * Melatonin 10 MG Tabs tablet     30 tablet    Take 1 tablet (10 mg) by mouth nightly as needed for sleep       miconazole 2 % cream    MICATIN    5 g    Place 1 applicator vaginally At Bedtime Substitute as needed.       * mometasone-formoterol 100-5 MCG/ACT oral inhaler    DULERA    13 g    Inhale 2 puffs into the lungs 2 times daily       * mometasone-formoterol 200-5 MCG/ACT oral inhaler    DULERA    13 g    Inhale 2 puffs into the lungs 2 times daily       nabumetone 750 MG tablet    RELAFEN    60 tablet    Take 1 tablet (750 mg) by mouth 2 times daily as needed for moderate pain       naloxone nasal spray    NARCAN    0.2 mL    Spray 1 spray (4 mg) into one nostril alternating nostrils as needed for opioid reversal (every 2-3 minutes  until medical assistance arrives.)       ondansetron 4 MG tablet    ZOFRAN    18 tablet    Take 1 tablet (4 mg) by mouth every 8 hours as needed for nausea       * order for DME     1 Device    Equipment being ordered: CPAP.  1 device.  Per sleep study recommendations.  Brand:  Respironics, Type:  Nasal Wisp,  Size:  Small       * order for DME     1 each    Adult nebulizer mask and tubing.       * order for DME     1 Device    Equipment being ordered: Nebulizer supplies for life.       pramipexole 0.5 MG tablet    MIRAPEX    90 tablet    Take 0.5 tablets (0.25 mg) by mouth At Bedtime       pravastatin 20 MG tablet    PRAVACHOL    180 tablet    Take 2 tablets (40 mg) by mouth daily       predniSONE 10 MG tablet    DELTASONE    21 tablet    Take 2 tabs daily for 1 week, then 1 tab daily for 1 week.       ranitidine 150 MG tablet    ZANTAC    60 tablet    Take 1 tablet (150 mg) by mouth 2 times daily       sennosides 8.6 MG tablet    SENOKOT    120 tablet    Take 2 tablets by mouth 2 times daily       * SEROQUEL 300 MG tablet   Generic drug:  QUEtiapine      Take 300 mg by mouth daily.       * SEROQUEL 50 MG tablet   Generic drug:  QUEtiapine     1 tablet    Take 1 tablet (50 mg) by mouth every morning For anxiety.  From Psychiatrist.       sitagliptin 100 MG tablet    JANUVIA    90 tablet    Take 1 tablet (100 mg) by mouth daily       SUMAtriptan 100 MG tablet    IMITREX    9 tablet    Take 1 tablet (100 mg) by mouth at onset of headache       topiramate ER - 24 hour 100 MG sprinkle capsule    QUDEXY XR    90 capsule    Take 1 capsule (100 mg) by mouth daily       venlafaxine 75 MG 24 hr capsule    EFFEXOR-XR    30 capsule    Take 1 capsule (75 mg) by mouth 3 times daily       * Notice:  This list has 16 medication(s) that are the same as other medications prescribed for you. Read the directions carefully, and ask your doctor or other care provider to review them with you.

## 2017-04-21 DIAGNOSIS — E78.5 HYPERLIPIDEMIA LDL GOAL <100: ICD-10-CM

## 2017-04-21 RX ORDER — PRAVASTATIN SODIUM 40 MG
60 TABLET ORAL DAILY
Qty: 135 TABLET | Refills: 0 | Status: SHIPPED | OUTPATIENT
Start: 2017-04-21 | End: 2017-07-19

## 2017-04-21 NOTE — PROGRESS NOTES
Teresa Perez-    Here is a copy of your lab results.  As we discussed in clinic, your A1c (diabetes test) is better at 8.2.  Nice job with the weight loss and exercise.  Your kidney tests (BMP) are good.  Your cholesterol is high.  As long as the new medication isn't causing side effects, I'd like you to increase the dose.  Please take 60mg of the Pravastatin daily.  If you still have 20mg tabs, take 3 together.  I have sent a new prescription for 40mg tabs--taking 1.5 tabs of these.  We'll continue to increase the dose slowly over time.  Please call the clinic at [phone] if you have any questions.      Tyra Bullock    Please send results to patient.

## 2017-04-24 NOTE — PROGRESS NOTES
There are no exam notes on file for this visit.  Chief Complaint   Patient presents with     Pain Management     Pt is here for CPM today.      Blood pressure (!) 147/93, pulse 97, temperature 99  F (37.2  C), temperature source Oral.    SUBJECTIVE:  Teresa Perez is a 46-year-old female presenting for CPM followup.  She has a complex past medical history including multiple areas of joint pain, hypertension, hyperlipidemia, insulin-dependent type II diabetes, asthma.       She reports that everything is high today.  The blood sugars have been up.  She said that her breathing has been better.  She has been using her daily inhaler, Dulera and she is taking 2 puffs twice a day.  She finds that helpful.  He feels like her breathing is finally improving.  She has some coughing and coughs up some clearish stuff, especially in the morning.  She is using albuterol a couple times a day as well, 2 puffs at a time and finds that this is helpful.   She still is having difficulty with sleeping.  Her psychiatrist will not prescribe anything for her, until she has a sleep study completed.  This was scheduled for 5/24.  She feels her sleeping is in a crisis.  She denies any sleep in the last 2 days.  She can't get her brain to slow down.  We sent an evaluation and Sleep Clinic and saw REINA Villagomez there.  She states the sleep troubles are not associated with her breathing, does not feel like she is gasping for air and it does not sound like she is coughing or waking up short of breath, just feels like she cannot get her brain to slow down.  Both going to sleep and staying asleep are difficult.  She does not feel comfortable making changes right now until the sleep study has been completed.      Pain remains a problem.  She has been trying to walk more.  She is doing like 8 blocks.  He has lost a couple pounds from our last visit.  She tried to get up all the things that are on her list but she has not scheduled the pain  clinic as of yet.   She has been offered injections, but says that they called with a new order which is set up and in place for her.  She continues to notice that she is sweating all the time and is uncomfortable.  The pain is most severe in her knees and back.     Diabetes.  Blood sugars remain high.  She says that they are typically in the 170s to 200s in the morning.  He does not have sugars with her today.  She is taking 50 units of Lantus at night and 22 units of NovoLog when she eats meals which can be between 3 times daily depending on her eating schedule.     She had been taking her blood pressure meds and will have some mild chest discomfort at times.  She feels like her heart is racing and that she gets sweaty.     We discussed starting more intensive care management plan here and she is on board with doing this.     OBJECTIVE:   VITAL SIGNS:  Reviewed.  Blood pressure is mildly elevated today.   GENERAL:  Comfortable-appearing,  female in no acute distress.  Walks slowly and does appear mildly anxious today.   CARDIAC:  Heart is regular rate and rhythm, no murmurs, rubs, or gallops   RESPIRATORY:  Lungs are improved.  They are clear without wheezes bilaterally, no crackles.  Air flow is somewhat diminished in the bases, but improved.   EXTREMITIES:  Warm and well-perfused.      LABS:  She did not leave urine today.  I didn't notice this until after the visit.   PHQ-9 and FAQ5 were completed.  FAQ5 with a total score of 35 and a PHQ-9 with a total of 17.      ASSESSMENT:  A 46-year-old female here for chronic pain management followup as well as followup on multiple issues.   1.  Asthma.  Better with a controller inhaler.  We will continue this.   2.  Hypertension.  Blood pressure was elevated today.  It has been stable previously.  We will continue with same medications and follow closely.   3.  Diabetes.  Blood sugars are elevated fasting.  We will continue Lantus with 60 units and increase her  NovoLog to 20 with meals.   4.  Obstructive sleep apnea and sleep study.  Will have her complete a sleep study and follow up with the psychiatrist for options for insomnia treatment.   5.  Chronic pain management.  She will work with Mally our care coordinator.  We will schedule a time in the near future.     I spent 40 min with the patient >50% on counseling and coordination of care.     Tyra Bullock          Patient Instructions   1)  Increase inhaler to stronger dose.    2)  Increase restless leg medication to higher dose  3)  Complete sleep study  4)  Get injections done!  5)  Keep up with the walking and the weight loss  6)  Increase insulin with meals to 24 units.

## 2017-05-11 ENCOUNTER — TRANSFERRED RECORDS (OUTPATIENT)
Dept: HEALTH INFORMATION MANAGEMENT | Facility: CLINIC | Age: 47
End: 2017-05-11

## 2017-05-17 DIAGNOSIS — J45.901 ASTHMA EXACERBATION: ICD-10-CM

## 2017-05-17 DIAGNOSIS — Z79.4 TYPE 2 DIABETES MELLITUS WITH HYPERGLYCEMIA, WITH LONG-TERM CURRENT USE OF INSULIN (H): Primary | ICD-10-CM

## 2017-05-17 DIAGNOSIS — E11.9 TYPE 2 DIABETES MELLITUS WITHOUT COMPLICATION (H): ICD-10-CM

## 2017-05-17 DIAGNOSIS — E11.65 TYPE 2 DIABETES MELLITUS WITH HYPERGLYCEMIA, WITH LONG-TERM CURRENT USE OF INSULIN (H): Primary | ICD-10-CM

## 2017-05-17 DIAGNOSIS — G89.4 CHRONIC PAIN SYNDROME: ICD-10-CM

## 2017-05-17 RX ORDER — HYDROXYZINE HYDROCHLORIDE 25 MG/1
25-50 TABLET, FILM COATED ORAL EVERY 6 HOURS PRN
Qty: 120 TABLET | Refills: 5 | Status: SHIPPED | OUTPATIENT
Start: 2017-05-17 | End: 2018-12-15

## 2017-05-22 DIAGNOSIS — G89.29 CHRONIC BILATERAL LOW BACK PAIN WITHOUT SCIATICA: ICD-10-CM

## 2017-05-22 DIAGNOSIS — M54.50 CHRONIC BILATERAL LOW BACK PAIN WITHOUT SCIATICA: ICD-10-CM

## 2017-05-22 DIAGNOSIS — M51.369 BULGING LUMBAR DISC: ICD-10-CM

## 2017-05-24 ENCOUNTER — TELEPHONE (OUTPATIENT)
Dept: FAMILY MEDICINE | Facility: CLINIC | Age: 47
End: 2017-05-24

## 2017-05-24 ENCOUNTER — TRANSFERRED RECORDS (OUTPATIENT)
Dept: HEALTH INFORMATION MANAGEMENT | Facility: CLINIC | Age: 47
End: 2017-05-24

## 2017-05-24 DIAGNOSIS — G89.4 CHRONIC PAIN SYNDROME: ICD-10-CM

## 2017-05-24 DIAGNOSIS — F11.90 CHRONIC NARCOTIC USE: ICD-10-CM

## 2017-05-24 RX ORDER — FENTANYL 37.5 UG/H
1 PATCH, EXTENDED RELEASE TRANSDERMAL
Qty: 1 PATCH | Refills: 0 | Status: SHIPPED | OUTPATIENT
Start: 2017-05-24 | End: 2017-05-24

## 2017-05-24 RX ORDER — FENTANYL 25 UG/1
1 PATCH TRANSDERMAL
Qty: 1 PATCH | Refills: 0 | Status: SHIPPED | OUTPATIENT
Start: 2017-05-24 | End: 2017-05-26

## 2017-05-24 RX ORDER — HYDROCODONE BITARTRATE AND ACETAMINOPHEN 5; 325 MG/1; MG/1
TABLET ORAL
Qty: 18 TABLET | Refills: 0 | Status: SHIPPED | OUTPATIENT
Start: 2017-05-24 | End: 2017-05-26

## 2017-05-24 NOTE — TELEPHONE ENCOUNTER
Patient walked in to clinic today requesting pain medications.  Due to change her last Fentanyl patch today, and out of her oxycodone.  Has an appointment scheduled with me on Friday.  Discussed with Luz, and will fill patient for 1 patch and enough oxycodone to get her through until her visit on friday, then will refill the rest of her medications at that time.      Tyra Bullock

## 2017-05-25 DIAGNOSIS — F11.90 CHRONIC NARCOTIC USE: Primary | ICD-10-CM

## 2017-05-25 DIAGNOSIS — E11.9 DIABETES MELLITUS, TYPE 2 (H): ICD-10-CM

## 2017-05-26 ENCOUNTER — OFFICE VISIT (OUTPATIENT)
Dept: FAMILY MEDICINE | Facility: CLINIC | Age: 47
End: 2017-05-26

## 2017-05-26 VITALS
HEART RATE: 97 BPM | TEMPERATURE: 98.3 F | BODY MASS INDEX: 45.5 KG/M2 | SYSTOLIC BLOOD PRESSURE: 115 MMHG | OXYGEN SATURATION: 97 % | WEIGHT: 233 LBS | DIASTOLIC BLOOD PRESSURE: 79 MMHG

## 2017-05-26 DIAGNOSIS — G89.4 CHRONIC PAIN SYNDROME: ICD-10-CM

## 2017-05-26 DIAGNOSIS — Z79.4 TYPE 2 DIABETES MELLITUS WITHOUT COMPLICATION, WITH LONG-TERM CURRENT USE OF INSULIN (H): ICD-10-CM

## 2017-05-26 DIAGNOSIS — K59.00 CONSTIPATION, UNSPECIFIED CONSTIPATION TYPE: Primary | ICD-10-CM

## 2017-05-26 DIAGNOSIS — J30.2 SEASONAL ALLERGIC RHINITIS, UNSPECIFIED ALLERGIC RHINITIS TRIGGER: ICD-10-CM

## 2017-05-26 DIAGNOSIS — F11.90 CHRONIC NARCOTIC USE: ICD-10-CM

## 2017-05-26 DIAGNOSIS — E11.9 TYPE 2 DIABETES MELLITUS WITHOUT COMPLICATION, WITH LONG-TERM CURRENT USE OF INSULIN (H): ICD-10-CM

## 2017-05-26 DIAGNOSIS — J45.40 MODERATE PERSISTENT ASTHMA WITHOUT COMPLICATION: ICD-10-CM

## 2017-05-26 DIAGNOSIS — M51.369 DDD (DEGENERATIVE DISC DISEASE), LUMBAR: ICD-10-CM

## 2017-05-26 LAB
AMPHETAMINES QUAL: NEGATIVE
BARBITURATES QUAL URINE: NEGATIVE
BENZODIAZEPINE QUAL URINE: NEGATIVE
BUPRENORPHINE QUAL URINE: NEGATIVE
CANNABINOIDS UR QL SCN: NEGATIVE
COCAINE QUAL URINE: POSITIVE
METHAMPHETAMINE: NEGATIVE
METHODONE QUAL: NEGATIVE
MORPHINE QUAL: NEGATIVE
OXYCODONE QUAL: NEGATIVE
TEMPERATURE OF URINE WAS BETWEEN 90-100 DEGREES F: YES

## 2017-05-26 RX ORDER — HYDROCODONE BITARTRATE AND ACETAMINOPHEN 5; 325 MG/1; MG/1
TABLET ORAL
Qty: 270 TABLET | Refills: 0 | Status: SHIPPED | OUTPATIENT
Start: 2017-05-26 | End: 2017-05-26

## 2017-05-26 RX ORDER — LORATADINE 10 MG/1
20 TABLET ORAL DAILY
Qty: 180 TABLET | Refills: 3 | Status: SHIPPED | OUTPATIENT
Start: 2017-05-26 | End: 2018-05-24

## 2017-05-26 RX ORDER — HYDROCODONE BITARTRATE AND ACETAMINOPHEN 5; 325 MG/1; MG/1
TABLET ORAL
Qty: 270 TABLET | Refills: 0 | Status: SHIPPED | OUTPATIENT
Start: 2017-05-26 | End: 2017-06-30

## 2017-05-26 RX ORDER — HYDROCODONE BITARTRATE AND ACETAMINOPHEN 5; 325 MG/1; MG/1
TABLET ORAL
Qty: 18 TABLET | Refills: 0 | OUTPATIENT
Start: 2017-05-26

## 2017-05-26 RX ORDER — FENTANYL 25 UG/1
1 PATCH TRANSDERMAL
Qty: 10 PATCH | Refills: 0 | Status: SHIPPED | OUTPATIENT
Start: 2017-05-26 | End: 2017-07-01

## 2017-05-26 RX ORDER — FENTANYL 25 UG/1
1 PATCH TRANSDERMAL
Qty: 1 PATCH | Refills: 0 | OUTPATIENT
Start: 2017-05-26

## 2017-05-26 RX ORDER — FENTANYL 25 UG/1
1 PATCH TRANSDERMAL
Qty: 10 PATCH | Refills: 0 | Status: SHIPPED | OUTPATIENT
Start: 2017-05-26 | End: 2017-05-26

## 2017-05-26 NOTE — PATIENT INSTRUCTIONS
Try the suppository to get things moving  Use 1 per day, until BM  If not working after 3 days, call Dr. Bullock for other option.    Keep using the docusate and senna as you are doing.    We will try to do the increased claritin  Refill pain medications.    Referral for the Right side of the back (L4-5)  New inhaler--try using the Combivent instead of the albuterol when having trouble breathing.    Diabetes supplies!          Referral for injection has been sent to [facility], they will contact patient to schedule this.  F: [phone]    05/30/17  11:32 AM    Carey  Referral Coordinator  [phone]

## 2017-05-26 NOTE — MR AVS SNAPSHOT
After Visit Summary   5/26/2017    Teresa Perez    MRN: [id number]           Patient Information     Date Of Birth          1970        Visit Information        Provider Department      5/26/2017 9:40 AM Tyra Bullock MD [facility]        Today's Diagnoses     Constipation, unspecified constipation type    -  1    Chronic pain syndrome        Moderate persistent asthma without complication        Type 2 diabetes mellitus without complication, with long-term current use of insulin (H)        Seasonal allergic rhinitis, unspecified allergic rhinitis trigger          Care Instructions    Try the suppository to get things moving  Use 1 per day, until BM  If not working after 3 days, call Dr. Bullock for other option.    Keep using the docusate and senna as you are doing.    We will try to do the increased claritin  Refill pain medications.    Referral for the Right side of the back (L4-5)  New inhaler--try using the Combivent instead of the albuterol when having trouble breathing.    Diabetes supplies!              Follow-ups after your visit        Future tests that were ordered for you today     Open Future Orders        Priority Expected Expires Ordered    Rapid Urine Drug Screen ([facility]) Routine 5/26/2017 8/25/2017 5/25/2017            Who to contact     Please call your clinic at [phone] to:    Ask questions about your health    Make or cancel appointments    Discuss your medicines    Learn about your test results    Speak to your doctor   If you have compliments or concerns about an experience at your clinic, or if you wish to file a complaint, please contact [facility] Patient Relations at [phone] or email us at [email or web address]         Additional Information About Your Visit        MyChart Information     [employer] is an electronic gateway that provides easy, online access to your medical records. With [employer], you can request a  clinic appointment, read your test results, renew a prescription or communicate with your care team.     To sign up for My[employer] visit the website at [email or web address]   You will be asked to enter the access code listed below, as well as some personal information. Please follow the directions to create your username and password.     Your access code is: [email or web address]  Expires: 2017  4:05 PM     Your access code will  in 90 days. If you need help or a new code, please contact your [facility] or call [phone] for assistance.        Care EveryWhere ID     This is your Care EveryWhere ID. This could be used by other organizations to access your [place] medical records  [id number]        Your Vitals Were     Pulse Temperature Pulse Oximetry BMI (Body Mass Index)          97 98.3  F (36.8  C) (Oral) 97% 45.5 kg/m2         Blood Pressure from Last 3 Encounters:   17 115/79   17 (!) 147/93   17 126/85    Weight from Last 3 Encounters:   17 233 lb (105.7 kg)   17 246 lb (111.6 kg)   17 242 lb (109.8 kg)              Today, you had the following     No orders found for display         Today's Medication Changes          These changes are accurate as of: 17 11:08 AM.  If you have any questions, ask your nurse or doctor.               Start taking these medicines.        Dose/Directions    glycerin (adult) 2 G Supp Suppository   Used for:  Constipation, unspecified constipation type   Started by:  Tyra Bullock MD        Dose:  1 suppository   Place 1 suppository rectally daily as needed for constipation   Quantity:  3 suppository   Refills:  3       Ipratropium-Albuterol  MCG/ACT inhaler   Commonly known as:  COMBIVENT RESPIMAT   Used for:  Moderate persistent asthma without complication   Started by:  Tyra Bullock MD        Dose:  1 puff   Inhale 1 puff into the lungs 4 times daily Not to exceed 6 doses per  day.   Quantity:  1 Inhaler   Refills:  1         These medicines have changed or have updated prescriptions.        Dose/Directions    * blood glucose lancets standard   Commonly known as:  no brand specified   This may have changed:  Another medication with the same name was added. Make sure you understand how and when to take each.   Used for:  Type 2 diabetes mellitus without complication, with long-term current use of insulin (H)   Changed by:  Tyra Bullock MD        Use to test blood sugar 4 times daily or as directed.   Quantity:  1 Box   Refills:  0       * blood glucose lancets standard   Commonly known as:  no brand specified   This may have changed:  You were already taking a medication with the same name, and this prescription was added. Make sure you understand how and when to take each.   Used for:  Type 2 diabetes mellitus without complication, with long-term current use of insulin (H)   Changed by:  Tyra Bullock MD        Use to test blood sugar 3 times daily or as directed.   Quantity:  1 Box   Refills:  prn       * blood glucose monitoring meter device kit   Commonly known as:  no brand specified   This may have changed:  Another medication with the same name was added. Make sure you understand how and when to take each.   Used for:  Type 2 diabetes mellitus without complication, with long-term current use of insulin (H)   Changed by:  Tyra Bullock MD        Use to test blood sugar 4 times daily or as directed.   Quantity:  1 kit   Refills:  0       * blood glucose monitoring meter device kit   Commonly known as:  no brand specified   This may have changed:  You were already taking a medication with the same name, and this prescription was added. Make sure you understand how and when to take each.   Used for:  Type 2 diabetes mellitus without complication, with long-term current use of insulin (H)   Changed by:  Tyra Bullock MD        Use to test blood sugar 3 times  daily or as directed.   Quantity:  1 kit   Refills:  0       * blood glucose monitoring test strip   Commonly known as:  no brand specified   This may have changed:  Another medication with the same name was added. Make sure you understand how and when to take each.   Used for:  Type 2 diabetes mellitus without complication, with long-term current use of insulin (H)   Changed by:  Tyra Bullock MD        Use to test blood sugars 4 times daily or as directed   Quantity:  100 strip   Refills:  prn       * blood glucose monitoring test strip   Commonly known as:  no brand specified   This may have changed:  You were already taking a medication with the same name, and this prescription was added. Make sure you understand how and when to take each.   Used for:  Type 2 diabetes mellitus without complication, with long-term current use of insulin (H)   Changed by:  Tyra Bullock MD        Use to test blood sugars 3 times daily or as directed   Quantity:  100 strip   Refills:  prn       loratadine 10 MG tablet   Commonly known as:  CLARITIN   This may have changed:  how much to take   Used for:  Seasonal allergic rhinitis, unspecified allergic rhinitis trigger   Changed by:  Tyra Bullock MD        Dose:  20 mg   Take 2 tablets (20 mg) by mouth daily   Quantity:  180 tablet   Refills:  3       * Notice:  This list has 6 medication(s) that are the same as other medications prescribed for you. Read the directions carefully, and ask your doctor or other care provider to review them with you.         Where to get your medicines      These medications were sent to [facility] - [place], [place] - [address], [place] [address]     Phone:  [phone]     blood glucose lancets standard    blood glucose monitoring meter device kit    blood glucose monitoring test strip    glycerin (adult) 2 G Supp Suppository    Ipratropium-Albuterol  MCG/ACT inhaler    loratadine 10 MG  tablet         Some of these will need a paper prescription and others can be bought over the counter.  Ask your nurse if you have questions.     Bring a paper prescription for each of these medications     fentaNYL 25 mcg/hr 72 hr patch    HYDROcodone-acetaminophen 5-325 MG per tablet                Primary Care Provider Office Phone # Fax #    Tyra Bullokc [phone]       [facility] [address] [place] [address]        Thank you!     Thank you for choosing [facility]  for your care. Our goal is always to provide you with excellent care. Hearing back from our patients is one way we can continue to improve our services. Please take a few minutes to complete the written survey that you may receive in the mail after your visit with us. Thank you!             Your Updated Medication List - Protect others around you: Learn how to safely use, store and throw away your medicines at www.disposemymeds.org.          This list is accurate as of: 5/26/17 11:08 AM.  Always use your most recent med list.                   Brand Name Dispense Instructions for use    * albuterol 108 (90 BASE) MCG/ACT Inhaler    PROAIR HFA/PROVENTIL HFA/VENTOLIN HFA    3 Inhaler    Inhale 2 puffs into the lungs every 6 hours as needed for shortness of breath / dyspnea or wheezing       * albuterol (2.5 MG/3ML) 0.083% neb solution     30 vial    Take 1 vial (2.5 mg) by nebulization every 6 hours as needed for shortness of breath / dyspnea or wheezing       azelastine 0.05 % Soln ophthalmic solution    OPTIVAR    1 Bottle    Apply 1 drop to eye 2 times daily       azithromycin 250 MG tablet    ZITHROMAX    6 tablet    Two tablets first day, then one tablet daily for four days.       benzonatate 200 MG capsule    TESSALON    42 capsule    Take 1 capsule (200 mg) by mouth 3 times daily as needed for cough       * blood glucose lancets standard    no brand specified    1 Box    Use to test blood sugar 4 times  daily or as directed.       * blood glucose lancets standard    no brand specified    1 Box    Use to test blood sugar 3 times daily or as directed.       * blood glucose monitoring meter device kit    no brand specified    1 kit    Use to test blood sugar 4 times daily or as directed.       * blood glucose monitoring meter device kit    no brand specified    1 kit    Use to test blood sugar 3 times daily or as directed.       * blood glucose monitoring test strip    no brand specified    100 strip    Use to test blood sugars 4 times daily or as directed       * blood glucose monitoring test strip    no brand specified    100 strip    Use to test blood sugars 3 times daily or as directed       carboxymethylcellul-glycerin 0.5-0.9 % Soln ophthalmic solution    OPTIVE/REFRESH OPTIVE    1 Bottle    Place 1 drop into both eyes 3 times daily       clindamycin 1 % lotion    CLINDAMAX    60 mL    Apply topically 2 times daily       * dicyclomine 10 MG capsule    BENTYL    120 capsule    Take 1 capsule (10 mg) by mouth 4 times daily (before meals and nightly) From CARMEN RODRIGUEZ, Judy Chavarria       * dicyclomine 10 MG capsule    BENTYL    120 capsule    Take 1 capsule (10 mg) by mouth 4 times daily (before meals and nightly)       diphenhydrAMINE 50 MG capsule    BENADRYL     Take  mg by mouth At Bedtime.       docosanol 10 % Crea cream    ABREVA    1 Tube    Apply topically 5 times daily       docusate sodium 100 MG tablet    COLACE    120 tablet    Take 200 mg by mouth 2 times daily       EPINEPHrine 0.3 MG/0.3ML injection     2 each    Inject 0.3 mLs (0.3 mg) into the muscle once as needed for anaphylaxis       * FentaNYL 62.5 MCG/HR Pt72     10 patch    Place 1 patch onto the skin every 72 hours       * fentaNYL 50 mcg/hr 72 hr patch    DURAGESIC    10 patch    Place 1 patch onto the skin every 72 hours       * fentaNYL 25 mcg/hr 72 hr patch    DURAGESIC    10 patch    Place 1 patch onto the skin every 72 hours        fluticasone 50 MCG/ACT spray    FLONASE    9.9 g    Spray 2 sprays into both nostrils daily       fluvastatin 40 MG capsule    LESCOL    180 capsule    Take 1 capsule (40 mg) by mouth 2 times daily       gabapentin 300 MG capsule    NEURONTIN    90 capsule    Take 1 capsule (300 mg) by mouth 3 times daily       glipiZIDE 10 MG 24 hr tablet    GLUCOTROL XL    60 tablet    Take 2 tablets (20 mg) by mouth daily       glycerin (adult) 2 G Supp Suppository     3 suppository    Place 1 suppository rectally daily as needed for constipation       HYDROcodone-acetaminophen 5-325 MG per tablet    NORCO    270 tablet    Take max 9 tablest per day.  Take 1 tabs 4x per day and can take an extra 2-3 tabs PRN.       hydrOXYzine 25 MG tablet    ATARAX    120 tablet    Take 1-2 tablets (25-50 mg) by mouth every 6 hours as needed for itching, anxiety or other (sleep)       insulin aspart 100 UNIT/ML injection    NovoLOG FLEXPEN    3 mL    Take 22 Units with meals.       insulin degludec 200 UNIT/ML pen    TRESIBA    3 mL    Inject 60 Units Subcutaneous daily       insulin pen needle 31G X 5 MM    B-D U/F    100 each    Use 1 daily or as directed.       Ipratropium-Albuterol  MCG/ACT inhaler    COMBIVENT RESPIMAT    1 Inhaler    Inhale 1 puff into the lungs 4 times daily Not to exceed 6 doses per day.       lisinopril 20 MG tablet    PRINIVIL/ZESTRIL    30 tablet    Take 1 tablet (20 mg) by mouth daily       loratadine 10 MG tablet    CLARITIN    180 tablet    Take 2 tablets (20 mg) by mouth daily       medroxyPROGESTERone 150 MG/ML injection    DEPO-PROVERA    1 mL    Inject 1 mL (150 mg) into the muscle every 3 months       * melatonin 3 MG tablet      Take 3 tablets (9 mg) by mouth At Bedtime       * Melatonin 10 MG Tabs tablet     30 tablet    Take 1 tablet (10 mg) by mouth nightly as needed for sleep       miconazole 2 % cream    MICATIN    5 g    Place 1 applicator vaginally At Bedtime Substitute as needed.       *  mometasone-formoterol 100-5 MCG/ACT oral inhaler    DULERA    13 g    Inhale 2 puffs into the lungs 2 times daily       * mometasone-formoterol 200-5 MCG/ACT oral inhaler    DULERA    13 g    Inhale 2 puffs into the lungs 2 times daily       nabumetone 750 MG tablet    RELAFEN    60 tablet    Take 1 tablet (750 mg) by mouth 2 times daily as needed for moderate pain       naloxone nasal spray    NARCAN    0.2 mL    Spray 1 spray (4 mg) into one nostril alternating nostrils as needed for opioid reversal (every 2-3 minutes until medical assistance arrives.)       ondansetron 4 MG tablet    ZOFRAN    18 tablet    Take 1 tablet (4 mg) by mouth every 8 hours as needed for nausea       * order for DME     1 Device    Equipment being ordered: CPAP.  1 device.  Per sleep study recommendations.  Brand:  RespirEBOOKAPLACE, Type:  Nasal Wisp,  Size:  Small       * order for DME     1 each    Adult nebulizer mask and tubing.       * order for DME     1 Device    Equipment being ordered: Nebulizer supplies for life.       pramipexole 0.5 MG tablet    MIRAPEX    90 tablet    Take 1 tablet (0.5 mg) by mouth At Bedtime       * pravastatin 20 MG tablet    PRAVACHOL    180 tablet    Take 2 tablets (40 mg) by mouth daily       * pravastatin 40 MG tablet    PRAVACHOL    135 tablet    Take 1.5 tablets (60 mg) by mouth daily       predniSONE 10 MG tablet    DELTASONE    21 tablet    Take 2 tabs daily for 1 week, then 1 tab daily for 1 week.       ranitidine 150 MG tablet    ZANTAC    60 tablet    Take 1 tablet (150 mg) by mouth 2 times daily       sennosides 8.6 MG tablet    SENOKOT    120 tablet    Take 2 tablets by mouth 2 times daily       * SEROQUEL 300 MG tablet   Generic drug:  QUEtiapine      Take 300 mg by mouth daily.       * SEROQUEL 50 MG tablet   Generic drug:  QUEtiapine     1 tablet    Take 1 tablet (50 mg) by mouth every morning For anxiety.  From Psychiatrist.       sitagliptin 100 MG tablet    JANUVIA    90 tablet    Take 1  tablet (100 mg) by mouth daily       SUMAtriptan 100 MG tablet    IMITREX    9 tablet    Take 1 tablet (100 mg) by mouth at onset of headache       topiramate  MG 24 hr capsule    QUDEXY XR    90 capsule    Take 1 capsule (100 mg) by mouth daily       venlafaxine 75 MG 24 hr capsule    EFFEXOR-XR    30 capsule    Take 1 capsule (75 mg) by mouth 3 times daily       * Notice:  This list has 24 medication(s) that are the same as other medications prescribed for you. Read the directions carefully, and ask your doctor or other care provider to review them with you.

## 2017-05-26 NOTE — PROGRESS NOTES
There are no exam notes on file for this visit.  Chief Complaint   Patient presents with     Recheck Medication     Pain     Blood Draw     per pt's Othopedics recommend her getting a uric acid drawn to ck for gout     Referral     needs another referral to [facility] to get spine injection on R side, had it on the L side on Monday     Abdominal Pain     per pt had new mesh put in last year and now having some abd pain     Blood pressure 115/79, pulse 97, temperature 98.3  F (36.8  C), temperature source Oral, weight 233 lb (105.7 kg), SpO2 97 %.    SUBJECTIVE:  This is a 46-year-old female presenting for followup of chronic pain today.  She has a couple of concerns currently.  She was seen by Dr. Sales at the orthopedic office for evaluation of swelling in her left knee.  They looked at the imaging there and they felt that everything was in alignment and there was no concern about the replacement; however, he was concerned that there may be some component of gout contributing to what is going on in her knee, and they would like to have testing for this.    She recently had an injection done one week ago on the left side into her L4-L5 disk.  Since then, it has been more irritated.  This is a typical pattern for her and she says that it starts to slow down over time.  She would like another referral placed to do the right side of the L4-L5 area.       She has been more active and has been outside walking.  She has been watching her diet and has lost 13 pounds and is quite proud about this.  She would like to continue to work on losing weight going forward.     She hasn't been able to get to the [facility] for further sleep apnea testing.  She has rescheduled this appointment for 7/7.       She continues to have some difficulty breathing.  She feels like she is coughing all of the time.  The Dulera inhaler has been helping her, but she is still having some trouble with coughing.  She is still using her albuterol  three or four times daily at minimum.  She still gets fevers and chills at night.  She also feels that she has some stuff draining down the back of her throat.       She says that her blood sugars are currently okay, but she can't tell me what the numbers are.  Later, she states that her glucometer isn't working, so she has been unable to check them.  She would like me to send for a new glucometer, as well as strips and lancets, to [facility].  She does endorse that she has some lows.  She says at times during the day she just doesn't feel hungry and forgets to eat.  She does eat something when she starts to feel that way and things are better.       She continues to have abdominal pain.  It is achy and uncomfortable around the area where she had her previous hernia repair.  She has had more difficulty lately with constipation.  She hasn't had a bowel movement for the last couple of days and had a bad one last night.  The last couple of stools have also been hard and painful.  They were darker-colored, but not black, and there was no blood.  She is taking docusate b.i.d. and Senna two tablets each time, and she needs a prescription for them today.       She is in agreement with coming back in the next few weeks for her depo injection and to talk about her diabetes again.  She is also in agreement with completing pulmonary function testing to better quantify her lung disease.     OBJECTIVE:   VITAL SIGNS:  Reviewed.  Blood pressure is 115/79.  Pulse is 97.  Weight is 233, which is down 13 pounds from last visit.  O2 sats are 97%.   GENERAL:  Comfortable-appearing,  female in no acute distress.   CARDIAC:  Heart is regular rate and rhythm w/no murmurs, rubs, or gallops.   RESPIRATORY:  Overall, lungs are clear.  There is a little bit of rhonchi at the bases bilaterally that is a little worse on the right than on the left.  There are scattered wheezes.   EXTREMITIES:  Her left knee shows a previous  surgical scar.  There is some mild fluid in the anterior medial side, but it is pretty minimal.  ROM is good.  There is no erythema or warmth to the touch.  Overall, it looks like it is healing appropriately.     LABS: UDS was positive for cocaine today.  Sample was sent for confirmatory testing.     ASSESSMENT/PLAN:  A 46-year-old female presenting for CPM followup.   1.  Lower back pain.  She is having an injection on Monday.  Today, I'll place a referral for an injection to be completed on the other side.   2.  Left knee pain.  Per Dr. Sales, no concerns about her replacement.  He would like us check uric acid level and I'll do this and check diabetes labs as well.   3.  Constipation.  This is likely secondary to her opiates.  We'll prescribe a glycerin suppository that she can use p.r.n.  I recommended continuing docusate and Senna and increasing water as well as intake of fiber in her diet.   4.  Diabetes.  I think she has no idea where her sugars are.  Januvia wasn't covered by insurance. Prescription sent for glucose meter and supplies so she can check sugars at home and we can have a better  Understanding of her glucose control.  I'm not sure what she is really taking and at what frequency.  She would benefit from diabetic education again as well.  5.  Hypertension.  This is well-controlled.   6.  Allergies.  She is having more trouble.  She is wondering if she could try Claritin 20 mg daily.  This has been suggested to many of my patients in the past, but I'm concerned that she isn't covered.  We'll try prescribing it to see if this is covered.     I spent 40 minutes with the patient,  greater than 60% in counseling and coordination of care.     Tyra Bullock      Patient Instructions   Try the suppository to get things moving  Use 1 per day, until BM  If not working after 3 days, call Dr. Bullock for other option.    Keep using the docusate and senna as you are doing.    We will try to do the  increased claritin  Refill pain medications.    Referral for the Right side of the back (L4-5)  New inhaler--try using the Combivent instead of the albuterol when having trouble breathing.    Diabetes supplies!

## 2017-05-26 NOTE — PROGRESS NOTES
Teresa Perez-    Your initial urine test is positive for cocaine.  I will send it for a confirmatory test, as this may be a false positive.  We will discuss this more at your next visit, but it is against clinic policy to prescribe narcotics to those who are also using other drugs.  Please call the clinic at [phone] if you have any questions.      Tyra Bullock    Please send results to patient.

## 2017-05-26 NOTE — LETTER
June 5, 2017      Teresa Perez  [address]   [place] [address]      Please see below for your test results.    Resulted Orders   Rapid Urine Drug Screen ([facility])   Result Value Ref Range    Amphetamines Qual NEGATIVE NEGATIVE    Barbiturates Qual Urine NEGATIVE NEGATIVE    Buprenorphine Qual Urine NEGATIVE NEGATIVE    Benzodiazepine Qual Urine NEGATIVE NEGATIVE    Cocaine Qual Urine POSITIVE (A) NEGATIVE    Cannabinoids Qual Urine NEGATIVE NEGATIVE    Methamphetamine Qual NEGATIVE NEGATIVE    Methadone Qual NEGATIVE NEGATIVE    Morphine Qual NEGATIVE NEGATIVE    Oxycodone Qual NEGATIVE NEGATIVE    Temperature of Urine was Between  Degrees F YES YES      Comment:      This is a preliminary screening test that detects drugs-of-abuse in urine at   specified detection levels.  To confirm preliminary results, a more specific   method such as Gas Chromatography/Mass Spectrometry (GC/MS) must be used.        Cocaine Urine (C[place]u) ([facility])   Result Value Ref Range    Cocaine-By Gc/Ms 95 Cutoff: 50 ng/mL    Benzoylecgonine-By Gc/Ms 46645 Cutoff: 50 ng/mL    Interpretation Positive.       Comment:         -------------------ADDITIONAL INFORMATION-------------------  This report is intended for use in clinical monitoring and   management of patients.  It is not intended for use in   employment-related testing.  This test was developed and its performance characteristics   determined by [facility] in a manner consistent with CLIA   requirements. This test has not been cleared or approved by   the U.S. Food and Drug Administration.     Test Performed by:  [facility]  [address] [place] [address]      Narrative    Test performed by:  [address] [address]             Teresa Perez-    Your urine test was positive for cocaine.  This is a problem with our current pain medicine protocol.  We'll discuss this  more at your follow up visit.  Please call the clinic at [phone] if you have any questions.      Tyra Bullock

## 2017-05-26 NOTE — LETTER
May 26, 2017      Teresa Perez  [address]   [place] [address]      Please see below for your test results.    Resulted Orders   Rapid Urine Drug Screen ([facility])   Result Value Ref Range    Amphetamines Qual NEGATIVE NEGATIVE    Barbiturates Qual Urine NEGATIVE NEGATIVE    Buprenorphine Qual Urine NEGATIVE NEGATIVE    Benzodiazepine Qual Urine NEGATIVE NEGATIVE    Cocaine Qual Urine POSITIVE (A) NEGATIVE    Cannabinoids Qual Urine NEGATIVE NEGATIVE    Methamphetamine Qual NEGATIVE NEGATIVE    Methadone Qual NEGATIVE NEGATIVE    Morphine Qual NEGATIVE NEGATIVE    Oxycodone Qual NEGATIVE NEGATIVE    Temperature of Urine was Between  Degrees F YES YES      Comment:      This is a preliminary screening test that detects drugs-of-abuse in urine at   specified detection levels.  To confirm preliminary results, a more specific   method such as Gas Chromatography/Mass Spectrometry (GC/MS) must be used.              Teresa Perez-    Your initial urine test is positive for cocaine.  I will send it for a confirmatory test, as this may be a false positive.  We will discuss this more at your next visit, but it is against clinic policy to prescribe narcotics to those who are also using other drugs.  Please call the clinic at [phone] if you have any questions.      Tyra Bullock

## 2017-05-31 ENCOUNTER — TELEPHONE (OUTPATIENT)
Dept: FAMILY MEDICINE | Facility: CLINIC | Age: 47
End: 2017-05-31

## 2017-05-31 NOTE — TELEPHONE ENCOUNTER
Medication clarification request from [facility]    PRESCRIBED: FENTANYL 25 MCG/HR PATCH  QUANTITY: 9  DIRECTIONS: PLACE 1 PATCH ONTO THE SKIN EVERY 72 HOURS    CLARIFICATION REQUEST: **PER INSURANCE, ONLY ALLOWS MAX #10/25 DAYS. PT L/F #1 ON 5/24/17, SO WE CAN ONLY GIVE HER #9 FOR THE MONTH**

## 2017-06-01 ENCOUNTER — TRANSFERRED RECORDS (OUTPATIENT)
Dept: HEALTH INFORMATION MANAGEMENT | Facility: CLINIC | Age: 47
End: 2017-06-01

## 2017-06-02 LAB
BENZOYLECGONINE-BY GC/MS: NORMAL NG/ML
COCAINE-BY GC/MS: 95 NG/ML
INTERPRETATION: NORMAL

## 2017-06-03 NOTE — PROGRESS NOTES
Teresa Perez-    Your urine test was positive for cocaine.  This is a problem with our current pain medicine protocol.  We'll discuss this more at your follow up visit.  Please call the clinic at [phone] if you have any questions.      Tyra Bullock    Please send results to patient.

## 2017-06-08 ENCOUNTER — ALLIED HEALTH/NURSE VISIT (OUTPATIENT)
Dept: FAMILY MEDICINE | Facility: CLINIC | Age: 47
End: 2017-06-08

## 2017-06-08 VITALS
SYSTOLIC BLOOD PRESSURE: 135 MMHG | DIASTOLIC BLOOD PRESSURE: 90 MMHG | HEART RATE: 107 BPM | OXYGEN SATURATION: 97 % | TEMPERATURE: 98.4 F

## 2017-06-08 DIAGNOSIS — N80.9 ENDOMETRIOSIS: Primary | ICD-10-CM

## 2017-06-08 NOTE — MR AVS SNAPSHOT
After Visit Summary   2017    Teresa Perez    MRN: [id number]           Patient Information     Date Of Birth          1970        Visit Information        Provider Department      2017 1:30 PM Nurse, [facility]        Today's Diagnoses     Endometriosis    -  1       Follow-ups after your visit        Who to contact     Please call your clinic at [phone] to:    Ask questions about your health    Make or cancel appointments    Discuss your medicines    Learn about your test results    Speak to your doctor   If you have compliments or concerns about an experience at your clinic, or if you wish to file a complaint, please contact [facility] Patient Relations at [phone] or email us at [email or web address]         Additional Information About Your Visit        MyChart Information     Digitick is an electronic gateway that provides easy, online access to your medical records. With Digitick, you can request a clinic appointment, read your test results, renew a prescription or communicate with your care team.     To sign up for [employer] visit the website at [email or web address]   You will be asked to enter the access code listed below, as well as some personal information. Please follow the directions to create your username and password.     Your access code is: [email or web address]  Expires: 2017  4:05 PM     Your access code will  in 90 days. If you need help or a new code, please contact your [facility] or call [phone] for assistance.        Care EveryWhere ID     This is your Care EveryWhere ID. This could be used by other organizations to access your [facility] medical records  [id number]        Your Vitals Were     Pulse Temperature Pulse Oximetry             107 98.4  F (36.9  C) (Oral) 97%          Blood Pressure from Last 3 Encounters:   17 135/90   17 115/79    04/20/17 (!) 147/93    Weight from Last 3 Encounters:   05/26/17 233 lb (105.7 kg)   03/23/17 246 lb (111.6 kg)   02/23/17 242 lb (109.8 kg)              We Performed the Following     INJECTION INTRAMUSCULAR OR SUB-Q     medroxyPROGESTERone (DEPO-PROVERA) injection 150 mg (Charge)        Primary Care Provider Office Phone # Fax #    Tyra Bullock [phone]       [facility] [address] [place] [address]        Thank you!     Thank you for choosing [facility]  for your care. Our goal is always to provide you with excellent care. Hearing back from our patients is one way we can continue to improve our services. Please take a few minutes to complete the written survey that you may receive in the mail after your visit with us. Thank you!             Your Updated Medication List - Protect others around you: Learn how to safely use, store and throw away your medicines at www.disposemymeds.org.          This list is accurate as of: 6/8/17  2:57 PM.  Always use your most recent med list.                   Brand Name Dispense Instructions for use    * albuterol 108 (90 BASE) MCG/ACT Inhaler    PROAIR HFA/PROVENTIL HFA/VENTOLIN HFA    3 Inhaler    Inhale 2 puffs into the lungs every 6 hours as needed for shortness of breath / dyspnea or wheezing       * albuterol (2.5 MG/3ML) 0.083% neb solution     30 vial    Take 1 vial (2.5 mg) by nebulization every 6 hours as needed for shortness of breath / dyspnea or wheezing       azelastine 0.05 % Soln ophthalmic solution    OPTIVAR    1 Bottle    Apply 1 drop to eye 2 times daily       azithromycin 250 MG tablet    ZITHROMAX    6 tablet    Two tablets first day, then one tablet daily for four days.       benzonatate 200 MG capsule    TESSALON    42 capsule    Take 1 capsule (200 mg) by mouth 3 times daily as needed for cough       * blood glucose lancets standard    no brand specified    1 Box    Use to test blood sugar 4 times daily or as  directed.       * blood glucose lancets standard    no brand specified    1 Box    Use to test blood sugar 3 times daily or as directed.       * blood glucose monitoring meter device kit    no brand specified    1 kit    Use to test blood sugar 4 times daily or as directed.       * blood glucose monitoring meter device kit    no brand specified    1 kit    Use to test blood sugar 3 times daily or as directed.       * blood glucose monitoring test strip    no brand specified    100 strip    Use to test blood sugars 4 times daily or as directed       * blood glucose monitoring test strip    no brand specified    100 strip    Use to test blood sugars 3 times daily or as directed       carboxymethylcellul-glycerin 0.5-0.9 % Soln ophthalmic solution    OPTIVE/REFRESH OPTIVE    1 Bottle    Place 1 drop into both eyes 3 times daily       clindamycin 1 % lotion    CLINDAMAX    60 mL    Apply topically 2 times daily       * dicyclomine 10 MG capsule    BENTYL    120 capsule    Take 1 capsule (10 mg) by mouth 4 times daily (before meals and nightly) From CARMEN RODRIGUEZ, Judy Chavarria       * dicyclomine 10 MG capsule    BENTYL    120 capsule    Take 1 capsule (10 mg) by mouth 4 times daily (before meals and nightly)       diphenhydrAMINE 50 MG capsule    BENADRYL     Take  mg by mouth At Bedtime.       docosanol 10 % Crea cream    ABREVA    1 Tube    Apply topically 5 times daily       docusate sodium 100 MG tablet    COLACE    120 tablet    Take 200 mg by mouth 2 times daily       EPINEPHrine 0.3 MG/0.3ML injection     2 each    Inject 0.3 mLs (0.3 mg) into the muscle once as needed for anaphylaxis       * FentaNYL 62.5 MCG/HR Pt72     10 patch    Place 1 patch onto the skin every 72 hours       * fentaNYL 50 mcg/hr 72 hr patch    DURAGESIC    10 patch    Place 1 patch onto the skin every 72 hours       * fentaNYL 25 mcg/hr 72 hr patch    DURAGESIC    10 patch    Place 1 patch onto the skin every 72 hours       fluticasone  50 MCG/ACT spray    FLONASE    9.9 g    Spray 2 sprays into both nostrils daily       fluvastatin 40 MG capsule    LESCOL    180 capsule    Take 1 capsule (40 mg) by mouth 2 times daily       gabapentin 300 MG capsule    NEURONTIN    90 capsule    Take 1 capsule (300 mg) by mouth 3 times daily       glipiZIDE 10 MG 24 hr tablet    GLUCOTROL XL    60 tablet    Take 2 tablets (20 mg) by mouth daily       glycerin (adult) 2 G Supp Suppository     3 suppository    Place 1 suppository rectally daily as needed for constipation       HYDROcodone-acetaminophen 5-325 MG per tablet    NORCO    270 tablet    Take max 9 tablest per day.  Take 1 tabs 4x per day and can take an extra 2-3 tabs PRN.       hydrOXYzine 25 MG tablet    ATARAX    120 tablet    Take 1-2 tablets (25-50 mg) by mouth every 6 hours as needed for itching, anxiety or other (sleep)       insulin aspart 100 UNIT/ML injection    NovoLOG FLEXPEN    3 mL    Take 22 Units with meals.       insulin degludec 200 UNIT/ML pen    TRESIBA    3 mL    Inject 60 Units Subcutaneous daily       insulin pen needle 31G X 5 MM    B-D U/F    100 each    Use 1 daily or as directed.       Ipratropium-Albuterol  MCG/ACT inhaler    COMBIVENT RESPIMAT    1 Inhaler    Inhale 1 puff into the lungs 4 times daily Not to exceed 6 doses per day.       lisinopril 20 MG tablet    PRINIVIL/ZESTRIL    30 tablet    Take 1 tablet (20 mg) by mouth daily       loratadine 10 MG tablet    CLARITIN    180 tablet    Take 2 tablets (20 mg) by mouth daily       medroxyPROGESTERone 150 MG/ML injection    DEPO-PROVERA    1 mL    Inject 1 mL (150 mg) into the muscle every 3 months       * melatonin 3 MG tablet      Take 3 tablets (9 mg) by mouth At Bedtime       * Melatonin 10 MG Tabs tablet     30 tablet    Take 1 tablet (10 mg) by mouth nightly as needed for sleep       miconazole 2 % cream    MICATIN    5 g    Place 1 applicator vaginally At Bedtime Substitute as needed.       *  mometasone-formoterol 100-5 MCG/ACT oral inhaler    DULERA    13 g    Inhale 2 puffs into the lungs 2 times daily       * mometasone-formoterol 200-5 MCG/ACT oral inhaler    DULERA    13 g    Inhale 2 puffs into the lungs 2 times daily       nabumetone 750 MG tablet    RELAFEN    60 tablet    Take 1 tablet (750 mg) by mouth 2 times daily as needed for moderate pain       naloxone nasal spray    NARCAN    0.2 mL    Spray 1 spray (4 mg) into one nostril alternating nostrils as needed for opioid reversal (every 2-3 minutes until medical assistance arrives.)       ondansetron 4 MG tablet    ZOFRAN    18 tablet    Take 1 tablet (4 mg) by mouth every 8 hours as needed for nausea       * order for DME     1 Device    Equipment being ordered: CPAP.  1 device.  Per sleep study recommendations.  Brand:  RespirVirgin Play, Type:  Nasal Wisp,  Size:  Small       * order for DME     1 each    Adult nebulizer mask and tubing.       * order for DME     1 Device    Equipment being ordered: Nebulizer supplies for life.       pramipexole 0.5 MG tablet    MIRAPEX    90 tablet    Take 1 tablet (0.5 mg) by mouth At Bedtime       * pravastatin 20 MG tablet    PRAVACHOL    180 tablet    Take 2 tablets (40 mg) by mouth daily       * pravastatin 40 MG tablet    PRAVACHOL    135 tablet    Take 1.5 tablets (60 mg) by mouth daily       predniSONE 10 MG tablet    DELTASONE    21 tablet    Take 2 tabs daily for 1 week, then 1 tab daily for 1 week.       ranitidine 150 MG tablet    ZANTAC    60 tablet    Take 1 tablet (150 mg) by mouth 2 times daily       sennosides 8.6 MG tablet    SENOKOT    120 tablet    Take 2 tablets by mouth 2 times daily       * SEROQUEL 300 MG tablet   Generic drug:  QUEtiapine      Take 300 mg by mouth daily.       * SEROQUEL 50 MG tablet   Generic drug:  QUEtiapine     1 tablet    Take 1 tablet (50 mg) by mouth every morning For anxiety.  From Psychiatrist.       sitagliptin 100 MG tablet    JANUVIA    90 tablet    Take 1  tablet (100 mg) by mouth daily       SUMAtriptan 100 MG tablet    IMITREX    9 tablet    Take 1 tablet (100 mg) by mouth at onset of headache       topiramate  MG 24 hr capsule    QUDEXY XR    90 capsule    Take 1 capsule (100 mg) by mouth daily       venlafaxine 75 MG 24 hr capsule    EFFEXOR-XR    30 capsule    Take 1 capsule (75 mg) by mouth 3 times daily       * Notice:  This list has 24 medication(s) that are the same as other medications prescribed for you. Read the directions carefully, and ask your doctor or other care provider to review them with you.

## 2017-06-08 NOTE — NURSING NOTE
I administered the following to Teresa Perez.    MEDICATION: Medroxyprogesterone 150 mg  ROUTE: IM  SITE: Deltoid - Right  DOSE: 150 mg per ml  LOT #: [id number]  :  [employer]   EXPIRATION DATE:  10 / 2021  NDC#: [address]-4   Patient is on time and the reminder card was given to her.  Her next depo due between 08/24/2017 - 09/07/2017      Was entire vial of medication used? Yes    Name of provider who requested the injection: Dr. Bullock  Name of provider on site (faculty or community preceptor) at the time of performing the injection: Dr. Vidal    November Paw, RMA

## 2017-06-19 DIAGNOSIS — M51.369 DDD (DEGENERATIVE DISC DISEASE), LUMBAR: ICD-10-CM

## 2017-06-22 DIAGNOSIS — G89.4 CHRONIC PAIN SYNDROME: ICD-10-CM

## 2017-06-22 DIAGNOSIS — R11.0 NAUSEA: ICD-10-CM

## 2017-06-22 RX ORDER — ONDANSETRON 4 MG/1
4 TABLET, FILM COATED ORAL EVERY 8 HOURS PRN
Qty: 18 TABLET | Refills: 2 | Status: SHIPPED | OUTPATIENT
Start: 2017-06-22 | End: 2018-12-15

## 2017-06-27 ENCOUNTER — TRANSFERRED RECORDS (OUTPATIENT)
Dept: HEALTH INFORMATION MANAGEMENT | Facility: CLINIC | Age: 47
End: 2017-06-27

## 2017-06-28 DIAGNOSIS — G89.29 CHRONIC BILATERAL LOW BACK PAIN WITHOUT SCIATICA: Primary | ICD-10-CM

## 2017-06-28 DIAGNOSIS — M54.50 CHRONIC BILATERAL LOW BACK PAIN WITHOUT SCIATICA: Primary | ICD-10-CM

## 2017-06-28 RX ORDER — ACETAMINOPHEN 500 MG
1000 TABLET ORAL 3 TIMES DAILY PRN
Qty: 180 TABLET | Refills: 3 | Status: SHIPPED | OUTPATIENT
Start: 2017-06-28 | End: 2017-12-08

## 2017-06-28 RX ORDER — ACETAMINOPHEN 500 MG
TABLET ORAL
COMMUNITY
Start: 2015-03-10 | End: 2017-06-28

## 2017-06-30 ENCOUNTER — OFFICE VISIT (OUTPATIENT)
Dept: FAMILY MEDICINE | Facility: CLINIC | Age: 47
End: 2017-06-30

## 2017-06-30 VITALS
SYSTOLIC BLOOD PRESSURE: 137 MMHG | OXYGEN SATURATION: 99 % | TEMPERATURE: 98.8 F | BODY MASS INDEX: 44.72 KG/M2 | WEIGHT: 229 LBS | DIASTOLIC BLOOD PRESSURE: 88 MMHG | HEART RATE: 226 BPM

## 2017-06-30 DIAGNOSIS — E11.9 TYPE 2 DIABETES MELLITUS WITHOUT COMPLICATION, WITH LONG-TERM CURRENT USE OF INSULIN (H): ICD-10-CM

## 2017-06-30 DIAGNOSIS — G89.29 CHRONIC NECK PAIN: ICD-10-CM

## 2017-06-30 DIAGNOSIS — G43.009 NONINTRACTABLE MIGRAINE, UNSPECIFIED MIGRAINE TYPE: ICD-10-CM

## 2017-06-30 DIAGNOSIS — G44.219 EPISODIC TENSION-TYPE HEADACHE, NOT INTRACTABLE: ICD-10-CM

## 2017-06-30 DIAGNOSIS — G89.29 CHRONIC PAIN OF LEFT KNEE: Primary | ICD-10-CM

## 2017-06-30 DIAGNOSIS — G89.29 CHRONIC BILATERAL LOW BACK PAIN WITH BILATERAL SCIATICA: ICD-10-CM

## 2017-06-30 DIAGNOSIS — Z79.4 TYPE 2 DIABETES MELLITUS WITHOUT COMPLICATION, WITH LONG-TERM CURRENT USE OF INSULIN (H): ICD-10-CM

## 2017-06-30 DIAGNOSIS — M54.41 CHRONIC BILATERAL LOW BACK PAIN WITH BILATERAL SCIATICA: ICD-10-CM

## 2017-06-30 DIAGNOSIS — M54.42 CHRONIC BILATERAL LOW BACK PAIN WITH BILATERAL SCIATICA: ICD-10-CM

## 2017-06-30 DIAGNOSIS — M54.2 CHRONIC NECK PAIN: ICD-10-CM

## 2017-06-30 DIAGNOSIS — M25.562 CHRONIC PAIN OF LEFT KNEE: Primary | ICD-10-CM

## 2017-06-30 DIAGNOSIS — G89.4 CHRONIC PAIN SYNDROME: ICD-10-CM

## 2017-06-30 LAB
HBA1C MFR BLD: 8.8 % (ref 4.1–5.7)
URATE SERPL-MCNC: 3.3 MG/DL (ref 2–7.5)

## 2017-06-30 RX ORDER — QUETIAPINE FUMARATE 300 MG/1
TABLET, FILM COATED ORAL
COMMUNITY
Start: 2017-06-14 | End: 2020-04-21

## 2017-06-30 RX ORDER — SUMATRIPTAN 6 MG/.5ML
6 INJECTION, SOLUTION SUBCUTANEOUS ONCE
Qty: 0.5 ML | Refills: 0 | OUTPATIENT
Start: 2017-06-30 | End: 2017-06-30

## 2017-06-30 RX ORDER — CLONIDINE HYDROCHLORIDE 0.1 MG/1
0.1 TABLET ORAL
COMMUNITY
Start: 2017-06-27 | End: 2017-09-28

## 2017-06-30 RX ORDER — QUETIAPINE FUMARATE 50 MG/1
50 TABLET, FILM COATED ORAL 3 TIMES DAILY
COMMUNITY
Start: 2017-06-14 | End: 2022-03-30

## 2017-06-30 RX ORDER — HYDROCODONE BITARTRATE AND ACETAMINOPHEN 5; 325 MG/1; MG/1
TABLET ORAL
Qty: 105 TABLET | Refills: 0 | Status: SHIPPED | OUTPATIENT
Start: 2017-06-30 | End: 2017-07-21

## 2017-06-30 ASSESSMENT — PATIENT HEALTH QUESTIONNAIRE - PHQ9: 5. POOR APPETITE OR OVEREATING: NEARLY EVERY DAY

## 2017-06-30 ASSESSMENT — ANXIETY QUESTIONNAIRES
2. NOT BEING ABLE TO STOP OR CONTROL WORRYING: MORE THAN HALF THE DAYS
IF YOU CHECKED OFF ANY PROBLEMS ON THIS QUESTIONNAIRE, HOW DIFFICULT HAVE THESE PROBLEMS MADE IT FOR YOU TO DO YOUR WORK, TAKE CARE OF THINGS AT HOME, OR GET ALONG WITH OTHER PEOPLE: VERY DIFFICULT
7. FEELING AFRAID AS IF SOMETHING AWFUL MIGHT HAPPEN: NOT AT ALL
5. BEING SO RESTLESS THAT IT IS HARD TO SIT STILL: MORE THAN HALF THE DAYS
6. BECOMING EASILY ANNOYED OR IRRITABLE: SEVERAL DAYS
1. FEELING NERVOUS, ANXIOUS, OR ON EDGE: NEARLY EVERY DAY
3. WORRYING TOO MUCH ABOUT DIFFERENT THINGS: MORE THAN HALF THE DAYS
GAD7 TOTAL SCORE: 13

## 2017-06-30 NOTE — PATIENT INSTRUCTIONS
Continue with the walking and the weight loss.  Schedule MRI with sedation  May need to schedule a pre-op exam given the need for sedation  Schedule with [place] [facility].      Pain medication titration:  Start at 6 pills/day, decrease by one pill every 5 days.      No further pain medication prescriptions from this clinic.        [facility]  [phone]   Fax: [phone]   [address]   [address]   [place] [address]   Orders for MRIs have been sent and they will contact patient to schedule.   Carey  06/30/17    Pain Clinic  [facility]- s/w intake and she has to be referred from within [facility] to be reviewed and scheduled.   D/w Pt and she will call back next week   Daiana No 1:46 PM 6/30/2017

## 2017-06-30 NOTE — PROGRESS NOTES
Discussed A1c with patient.  Elevated from last time, but did get an epidural steroid injection.  Will continue with the 60 of Lantus, and will increase the mealtime to 28 Units with meals.  Discussed that next step is to increase the Long acting, and that this may require splitting the dose morning and evening, and patient would like to avoid that if possible.      Tyra Bullock    Please send results to patient.

## 2017-06-30 NOTE — MR AVS SNAPSHOT
After Visit Summary   6/30/2017    Teresa Perez    MRN: [id number]           Patient Information     Date Of Birth          1970        Visit Information        Provider Department      6/30/2017 9:00 AM Tyra Bullock MD [facility]        Today's Diagnoses     Chronic pain of left knee    -  1    Chronic bilateral low back pain with bilateral sciatica        Chronic neck pain        Type 2 diabetes mellitus without complication, with long-term current use of insulin (H)        Episodic tension-type headache, not intractable        Chronic pain syndrome          Care Instructions    Continue with the walking and the weight loss.  Schedule MRI with sedation  May need to schedule a pre-op exam given the need for sedation  Schedule with [facility].      Pain medication titration:  Start at 6 pills/day, decrease by one pill every 5 days.      No further pain medication prescriptions from this clinic.            Follow-ups after your visit        Additional Services     PAIN MANAGEMENT REFERRAL (External)       Patient prefers to be called    Reason for Referral: Pain Clinic Referral:  Patient request [facility].  Patient gets psychiatric care at [facility] and would like to have coordinated services.       needed: No  Language: English    May leave message on voicemail: Yes    ([place] Only) Referral should be tracked (Yes/No)?                  Future tests that were ordered for you today     Open Future Orders        Priority Expected Expires Ordered    MRI THORACIC SPINE W/O CONTRAST Routine  6/30/2018 6/30/2017    MRI CERVICAL SPINE W/O CONTRAST Routine  6/30/2018 6/30/2017    MRI LUMBAR SPINE W/O CONTRAST Routine  6/30/2018 6/30/2017    PAIN MANAGEMENT REFERRAL (External) Routine  9/30/2017 6/30/2017            Who to contact     Please call your clinic at [phone] to:    Ask questions about your health    Make or cancel appointments    Discuss  your medicines    Learn about your test results    Speak to your doctor   If you have compliments or concerns about an experience at your clinic, or if you wish to file a complaint, please contact [facility] Patient Relations at [phone] or email us at [email or web address]         Additional Information About Your Visit        [employer] Information     [employer] is an electronic gateway that provides easy, online access to your medical records. With [employer], you can request a clinic appointment, read your test results, renew a prescription or communicate with your care team.     To sign up for [employer] visit the website at [email or web address]   You will be asked to enter the access code listed below, as well as some personal information. Please follow the directions to create your username and password.     Your access code is: [email or web address]  Expires: 2017  4:05 PM     Your access code will  in 90 days. If you need help or a new code, please contact your [facility] or call [phone] for assistance.        Care EveryWhere ID     This is your Care EveryWhere ID. This could be used by other organizations to access your [place] medical records  [id number]        Your Vitals Were     Pulse Temperature Pulse Oximetry BMI (Body Mass Index)          226 98.8  F (37.1  C) 99% 44.72 kg/m2         Blood Pressure from Last 3 Encounters:   17 137/88   17 135/90   17 115/79    Weight from Last 3 Encounters:   17 229 lb (103.9 kg)   17 233 lb (105.7 kg)   17 246 lb (111.6 kg)              We Performed the Following     Hemoglobin A1c (P FM)     Rapid Urine Drug Screen (P FM)     Uric Acid ([facility])          Today's Medication Changes          These changes are accurate as of: 17  9:48 AM.  If you have any questions, ask your nurse or doctor.               These medicines have changed or have  updated prescriptions.        Dose/Directions    HYDROcodone-acetaminophen 5-325 MG per tablet   Commonly known as:  NORCO   This may have changed:  additional instructions   Used for:  Chronic pain syndrome   Changed by:  Tyra Bullock MD        Take 6 pills for 5 days, then 5 pills for 5 days, then 4 pills for 5 days, then 3 pills for 5 days, the 2 pills for 5 days then 1 pill for 5 days.  No further refills   Quantity:  105 tablet   Refills:  0       * SUMAtriptan 100 MG tablet   Commonly known as:  IMITREX   This may have changed:  Another medication with the same name was added. Make sure you understand how and when to take each.   Used for:  Migraine, unspecified, without mention of intractable migraine without mention of status migrainosus, Endometriosis, Encounter for smoking cessation counseling, Diabetes mellitus, type 2 (H), Asthma with exacerbation, Asthma, Type II or unspecified type diabetes mellitus without mention of complication, not stated as uncontrolled, Familial hypercholesterolemia, HTN (hypertension), Pneumonia, Up-to-date with immunizations, Chronic allergic conjunctivitis, Pain, Anaphylactic reaction, Hyperlipidemia LDL goal <100, COPD (chronic obstructive pulmonary disease) (H), Skin rash   Changed by:  Tyra Bullock MD        Dose:  100 mg   Take 1 tablet (100 mg) by mouth at onset of headache   Quantity:  9 tablet   Refills:  5       * SUMAtriptan 6 MG/0.5ML injection   Commonly known as:  IMITREX   This may have changed:  You were already taking a medication with the same name, and this prescription was added. Make sure you understand how and when to take each.   Used for:  Episodic tension-type headache, not intractable   Changed by:  Tyra Bullock MD        Dose:  6 mg   Inject 0.5 mLs (6 mg) Subcutaneous once for 1 dose   Quantity:  0.5 mL   Refills:  0       * Notice:  This list has 2 medication(s) that are the same as other medications prescribed for you. Read the  directions carefully, and ask your doctor or other care provider to review them with you.         Where to get your medicines      Some of these will need a paper prescription and others can be bought over the counter.  Ask your nurse if you have questions.     Bring a paper prescription for each of these medications     HYDROcodone-acetaminophen 5-325 MG per tablet       You don't need a prescription for these medications     SUMAtriptan 6 MG/0.5ML injection                Primary Care Provider Office Phone # Fax #    Tyra Bullock [phone]       [facility] [address] [place] [address]        Equal Access to Services     [facility]: Hadii aad ku hadasho Soomaali, waaxda luqadaha, qaybta kaalmada jason, miguel parekh. So [facility] [phone].    ATENCIÓN: Si habla español, tiene a tang disposición servicios gratuitos de asistencia lingüística. Llame al [phone].    We comply with applicable federal civil rights laws and [place] laws. We do not discriminate on the basis of race, color, national origin, age, disability sex, sexual orientation or gender identity.            Thank you!     Thank you for choosing [facility]  for your care. Our goal is always to provide you with excellent care. Hearing back from our patients is one way we can continue to improve our services. Please take a few minutes to complete the written survey that you may receive in the mail after your visit with us. Thank you!             Your Updated Medication List - Protect others around you: Learn how to safely use, store and throw away your medicines at www.disposemymeds.org.          This list is accurate as of: 6/30/17  9:48 AM.  Always use your most recent med list.                   Brand Name Dispense Instructions for use Diagnosis    acetaminophen 500 MG tablet    TYLENOL    180 tablet    Take 2 tablets (1,000 mg) by mouth 3 times daily as needed for mild pain     Chronic bilateral low back pain without sciatica       * albuterol 108 (90 BASE) MCG/ACT Inhaler    PROAIR HFA/PROVENTIL HFA/VENTOLIN HFA    3 Inhaler    Inhale 2 puffs into the lungs every 6 hours as needed for shortness of breath / dyspnea or wheezing    Mild persistent asthma without complication       * albuterol (2.5 MG/3ML) 0.083% neb solution     30 vial    Take 1 vial (2.5 mg) by nebulization every 6 hours as needed for shortness of breath / dyspnea or wheezing    Mild persistent asthma with acute exacerbation       azelastine 0.05 % Soln ophthalmic solution    OPTIVAR    1 Bottle    Apply 1 drop to eye 2 times daily    Allergic reaction, sequela       azithromycin 250 MG tablet    ZITHROMAX    6 tablet    Two tablets first day, then one tablet daily for four days.    Mild persistent asthma with acute exacerbation       benzonatate 200 MG capsule    TESSALON    42 capsule    Take 1 capsule (200 mg) by mouth 3 times daily as needed for cough    Cough       * blood glucose lancets standard    no brand specified    1 Box    Use to test blood sugar 4 times daily or as directed.    Type 2 diabetes mellitus without complication, with long-term current use of insulin (H)       * blood glucose lancets standard    no brand specified    1 Box    Use to test blood sugar 3 times daily or as directed.    Type 2 diabetes mellitus without complication, with long-term current use of insulin (H)       * blood glucose monitoring meter device kit    no brand specified    1 kit    Use to test blood sugar 4 times daily or as directed.    Type 2 diabetes mellitus without complication, with long-term current use of insulin (H)       * blood glucose monitoring meter device kit    no brand specified    1 kit    Use to test blood sugar 3 times daily or as directed.    Type 2 diabetes mellitus without complication, with long-term current use of insulin (H)       * blood glucose monitoring test strip    no brand specified    100  strip    Use to test blood sugars 4 times daily or as directed    Type 2 diabetes mellitus without complication, with long-term current use of insulin (H)       * blood glucose monitoring test strip    no brand specified    100 strip    Use to test blood sugars 3 times daily or as directed    Type 2 diabetes mellitus without complication, with long-term current use of insulin (H)       carboxymethylcellul-glycerin 0.5-0.9 % Soln ophthalmic solution    OPTIVE/REFRESH OPTIVE    1 Bottle    Place 1 drop into both eyes 3 times daily    Dry eyes       clindamycin 1 % lotion    CLINDAMAX    60 mL    Apply topically 2 times daily    Abscess of anal and rectal regions       cloNIDine 0.1 MG tablet    CATAPRES     Take 0.1 mg by mouth        * dicyclomine 10 MG capsule    BENTYL    120 capsule    Take 1 capsule (10 mg) by mouth 4 times daily (before meals and nightly) From Judy PEÑA    Irritable bowel syndrome without diarrhea       * dicyclomine 10 MG capsule    BENTYL    120 capsule    Take 1 capsule (10 mg) by mouth 4 times daily (before meals and nightly)    Irritable bowel syndrome, unspecified type       diphenhydrAMINE 50 MG capsule    BENADRYL     Take  mg by mouth At Bedtime.        docosanol 10 % Crea cream    ABREVA    1 Tube    Apply topically 5 times daily    HSV (herpes simplex virus) infection       docusate sodium 100 MG tablet    COLACE    120 tablet    Take 200 mg by mouth 2 times daily    Chronic pain syndrome, Constipation, unspecified constipation type       EPINEPHrine 0.3 MG/0.3ML injection     2 each    Inject 0.3 mLs (0.3 mg) into the muscle once as needed for anaphylaxis    Endometriosis, Encounter for smoking cessation counseling       * FentaNYL 62.5 MCG/HR Pt72     10 patch    Place 1 patch onto the skin every 72 hours    Chronic pain syndrome       * fentaNYL 50 mcg/hr 72 hr patch    DURAGESIC    10 patch    Place 1 patch onto the skin every 72 hours    Chronic pain syndrome        * fentaNYL 25 mcg/hr 72 hr patch    DURAGESIC    10 patch    Place 1 patch onto the skin every 72 hours    Chronic pain syndrome       fluticasone 50 MCG/ACT spray    FLONASE    9.9 g    Spray 2 sprays into both nostrils daily    Chronic allergic conjunctivitis       fluvastatin 40 MG capsule    LESCOL    180 capsule    Take 1 capsule (40 mg) by mouth 2 times daily    Hyperlipidemia LDL goal <100, Essential hypertension with goal blood pressure less than 130/85       gabapentin 300 MG capsule    NEURONTIN    90 capsule    Take 1 capsule (300 mg) by mouth 3 times daily    Neuropathy (H)       glipiZIDE 10 MG 24 hr tablet    GLUCOTROL XL    60 tablet    Take 2 tablets (20 mg) by mouth daily    Endometriosis, Encounter for smoking cessation counseling       glycerin (adult) 2 G Supp Suppository     3 suppository    Place 1 suppository rectally daily as needed for constipation    Constipation, unspecified constipation type       HYDROcodone-acetaminophen 5-325 MG per tablet    NORCO    105 tablet    Take 6 pills for 5 days, then 5 pills for 5 days, then 4 pills for 5 days, then 3 pills for 5 days, the 2 pills for 5 days then 1 pill for 5 days.  No further refills    Chronic pain syndrome       hydrOXYzine 25 MG tablet    ATARAX    120 tablet    Take 1-2 tablets (25-50 mg) by mouth every 6 hours as needed for itching, anxiety or other (sleep)    Chronic pain syndrome       insulin aspart 100 UNIT/ML injection    NovoLOG FLEXPEN    3 mL    Take 22 Units with meals.    Type 2 diabetes mellitus without complication, with long-term current use of insulin (H)       insulin degludec 200 UNIT/ML pen    TRESIBA    3 mL    Inject 60 Units Subcutaneous daily    Type 2 diabetes mellitus without complication, with long-term current use of insulin (H)       insulin pen needle 31G X 5 MM    B-D U/F    100 each    Use 1 daily or as directed.    Diabetes mellitus, type 2 (H)       Ipratropium-Albuterol  MCG/ACT inhaler     COMBIVENT RESPIMAT    1 Inhaler    Inhale 1 puff into the lungs 4 times daily Not to exceed 6 doses per day.    Moderate persistent asthma without complication       lisinopril 20 MG tablet    PRINIVIL/ZESTRIL    30 tablet    Take 1 tablet (20 mg) by mouth daily    Essential hypertension, benign       loratadine 10 MG tablet    CLARITIN    180 tablet    Take 2 tablets (20 mg) by mouth daily    Seasonal allergic rhinitis, unspecified allergic rhinitis trigger       medroxyPROGESTERone 150 MG/ML injection    DEPO-PROVERA    1 mL    Inject 1 mL (150 mg) into the muscle every 3 months    Endometriosis       * melatonin 3 MG tablet      Take 3 tablets (9 mg) by mouth At Bedtime        * Melatonin 10 MG Tabs tablet     30 tablet    Take 1 tablet (10 mg) by mouth nightly as needed for sleep    Primary insomnia       miconazole 2 % cream    MICATIN    5 g    Place 1 applicator vaginally At Bedtime Substitute as needed.    Vaginal candidiasis       * mometasone-formoterol 100-5 MCG/ACT oral inhaler    DULERA    13 g    Inhale 2 puffs into the lungs 2 times daily    Asthma exacerbation       * mometasone-formoterol 200-5 MCG/ACT oral inhaler    DULERA    13 g    Inhale 2 puffs into the lungs 2 times daily    Asthma exacerbation       nabumetone 750 MG tablet    RELAFEN    60 tablet    Take 1 tablet (750 mg) by mouth 2 times daily as needed for moderate pain    Chronic pain syndrome       naloxone nasal spray    NARCAN    0.2 mL    Spray 1 spray (4 mg) into one nostril alternating nostrils as needed for opioid reversal (every 2-3 minutes until medical assistance arrives.)    Chronic narcotic use       ondansetron 4 MG tablet    ZOFRAN    18 tablet    Take 1 tablet (4 mg) by mouth every 8 hours as needed for nausea    Chronic pain syndrome, Nausea       * order for DME     1 Device    Equipment being ordered: CPAP.  1 device.  Per sleep study recommendations.  Brand:  Respironics, Type:  Nasal Wisp,  Size:  Small    NNAMDI  (obstructive sleep apnea)       * order for DME     1 each    Adult nebulizer mask and tubing.    Mild persistent asthma with acute exacerbation       * order for DME     1 Device    Equipment being ordered: Nebulizer supplies for life.    Mild persistent asthma with acute exacerbation       pramipexole 0.5 MG tablet    MIRAPEX    90 tablet    Take 1 tablet (0.5 mg) by mouth At Bedtime    Restless leg syndrome       * pravastatin 20 MG tablet    PRAVACHOL    180 tablet    Take 2 tablets (40 mg) by mouth daily    Hyperlipidemia LDL goal <100       * pravastatin 40 MG tablet    PRAVACHOL    135 tablet    Take 1.5 tablets (60 mg) by mouth daily    Hyperlipidemia LDL goal <100       predniSONE 10 MG tablet    DELTASONE    21 tablet    Take 2 tabs daily for 1 week, then 1 tab daily for 1 week.    Mild persistent asthma with acute exacerbation       ranitidine 150 MG tablet    ZANTAC    60 tablet    Take 1 tablet (150 mg) by mouth 2 times daily    Gastroesophageal reflux disease without esophagitis       sennosides 8.6 MG tablet    SENOKOT    120 tablet    Take 2 tablets by mouth 2 times daily    Constipation, unspecified constipation type       * SEROQUEL 300 MG tablet   Generic drug:  QUEtiapine      Take 300 mg by mouth daily.        * SEROQUEL 50 MG tablet   Generic drug:  QUEtiapine     1 tablet    Take 1 tablet (50 mg) by mouth every morning For anxiety.  From Psychiatrist.        * QUEtiapine 50 MG tablet    SEROquel     TAKE 1 TABLET BY MOUTH TWICE DAILY        * QUEtiapine 300 MG tablet    SEROquel     TAKE 1 TABLET BY MOUTH AT BEDTIME. INDICATIONS: MANIC PHASE OF MANIC-DEPRESSION -CARMEN DURAN RN        sitagliptin 100 MG tablet    JANUVIA    90 tablet    Take 1 tablet (100 mg) by mouth daily    Type 2 diabetes mellitus with hyperglycemia, with long-term current use of insulin (H)       * SUMAtriptan 100 MG tablet    IMITREX    9 tablet    Take 1 tablet (100 mg) by mouth at onset of headache    Migraine,  unspecified, without mention of intractable migraine without mention of status migrainosus, Endometriosis, Encounter for smoking cessation counseling, Diabetes mellitus, type 2 (H), Asthma with exacerbation, Asthma, Type II or unspecified type diabetes mellitus without mention of complication, not stated as uncontrolled, Familial hypercholesterolemia, HTN (hypertension), Pneumonia, Up-to-date with immunizations, Chronic allergic conjunctivitis, Pain, Anaphylactic reaction, Hyperlipidemia LDL goal <100, COPD (chronic obstructive pulmonary disease) (H), Skin rash       * SUMAtriptan 6 MG/0.5ML injection    IMITREX    0.5 mL    Inject 0.5 mLs (6 mg) Subcutaneous once for 1 dose    Episodic tension-type headache, not intractable       topiramate  MG 24 hr capsule    QUDEXY XR    90 capsule    Take 1 capsule (100 mg) by mouth daily    Chronic migraine without aura without status migrainosus, not intractable       venlafaxine 75 MG 24 hr capsule    EFFEXOR-XR    30 capsule    Take 1 capsule (75 mg) by mouth 3 times daily        * Notice:  This list has 28 medication(s) that are the same as other medications prescribed for you. Read the directions carefully, and ask your doctor or other care provider to review them with you.

## 2017-06-30 NOTE — LETTER
July 3, 2017      Teresa Perez  [address]   [place] [address]        Dear Teresa,    Please see below for your test results.  Here is a copy of your lab results.  Your uric acid level is normal.  Your hemoglobin A1c we discussed over the phone, is higher than expected.  I hope that all your hard work walking and the increased insulin will help.  Please call the clinic at [phone] if you have any questions.        Resulted Orders   Hemoglobin A1c ([facility])   Result Value Ref Range    Hemoglobin A1C 8.8 (H) 4.1 - 5.7 %       If you have any questions, please call the clinic to make an appointment.    Sincerely,    Tyra Bullock MD

## 2017-06-30 NOTE — NURSING NOTE
The following medication was given:     MEDICATION: Sumatriptan Succinate 6MG/0.5ML SC INJ SOLN (IMITREX)  ROUTE: SQ  SITE: Arm - Right  DOSE: 1   LOT #: [id number]  :  [employer]  EXPIRATION DATE:  10/01/2017  NDC#: 9852-9017-67   Medication administered by: DENY Dahl Dr. was available on site at the time of this service.

## 2017-06-30 NOTE — LETTER
July 3, 2017      Teresa Perez  [address]    [place] [address]        Please see below for your test results.    Resulted Orders   Uric Acid ([facility])   Result Value Ref Range    Uric Acid 3.3 2.0 - 7.5 mg/dL    Narrative    Test performed by:  [facility]  [address] [place] [address]   Hemoglobin A1c ([facility])   Result Value Ref Range    Hemoglobin A1C 8.8 (H) 4.1 - 5.7 %     Teresa Perez-    Here is a copy of your lab results.  Your uric acid level is normal.  Your hemoglobin A1c we discussed over the phone, is higher than expected.  I hope that all your hard work walking and the increased insulin will help.  Please call the clinic at [phone] if you have any questions.      Tyra Bullock

## 2017-07-01 NOTE — PROGRESS NOTES
Nursing Notes:   Delilah Frost CMA  6/30/2017 11:43 AM  Signed  The following medication was given:     MEDICATION: Sumatriptan Succinate 6MG/0.5ML SC INJ SOLN (IMITREX)  ROUTE: SQ  SITE: Arm - Right  DOSE: 1   LOT #: [id number]  :  [employer]  EXPIRATION DATE:  10/01/2017  NDC#: 5808-0076-02   Medication administered by: DENY Dahl Dr. was available on site at the time of this service.       SUBJECTIVE  Teresa Perez is a 46 year old female with past medical history significant for    Patient Active Problem List   Diagnosis     Health Care Home     Acute peptic ulcer     Other allergy, other than to medicinal agents     Bipolar disorder (H)     Bulging lumbar disc     Cervical dysplasia     Common migraine without aura     Constipation     Dwarfism     Familial hypercholesterolemia     HTN (hypertension)     Insomnia     Low back pain     Intermittent asthma     Leg pain, bilateral     Smoking     Degeneration of thoracic or thoracolumbar intervertebral disc     Diabetes mellitus, type 2 (H)     LOMAS (nonalcoholic steatohepatitis)     Disease of lung     Hemorrhoids     Parotid mass     Endometriosis     NNAMDI (obstructive sleep apnea)     History of total right knee replacement     Lateral epicondylitis     Impingement syndrome, shoulder, left     Hx of total knee replacement, left     Chronic pain syndrome     Others present at the visit:  None    Presents for   Chief Complaint   Patient presents with     Pain Management     CPM     Presents for CPM.  Shares that last few days have been difficult.  Ran out of medications 4 days ago and has been having significant withdrawal symptoms.  Was seen at the Emergency room for this, and was evaluated, given clonidine and discharged, because nothing else was wrong.  Lots of nausea, vomiting, and diarrhea. She does not want to go back on the Fentanyl--doesn't feel like it really help, but does report improvement with  "hydrocodone/acetaminophen.  Discussed that urine tx screen and confirmatory testing were positive for cocaine.  Patient denies using.  Reports that she must have gotten a \"laced\" cigarette.  Understands that we will no longer be able to give her pain medications here at [facility]--the plan in the long term was to transfer her to a pain clinic anyway.  She has been requesting to go to pain clinic at [facility], because that is where she gets her psychiatric care.  Would like both groups to be able to communicated with each other.      Recently go an injection in her lumbar spine.  She reports that it was not helpful--\"went down the left leg instead of the right\".  Worried that she may have to get an MRI at [facility]--has PTSD response to the machine there after a difficult delivery that lead to bleeding.  Would like to do this at [facility] prior to starting pain treatment at [facility].  Would need to be sedated, as she gets very claustrophobic in small spaces.  This seems reasonable, and I will place referral for this today.  She will need to come back in for a pre-operative evaluation.      Having more stressors with family.  Father is visiting from [place] and showed up early.  Continues to have conflict with sister, and drawn into that with father's visit.  Reports no sleep in the past 4 days due to pain.  Having significant headache and requesting an imitrex injection today to help with headache.  Wanting to get in for pool therapy--has the referral for [facility], but has been unable to schedule due to family visiting.  Recently saw the orthopedics for another evaluation for knee pain and swelling.  Requesting a uric acid test, because she continues to have pain, and intermittent swelling and redness.      Also requesting diabetes testing.  Has been walking more and watching what she is eating.  Weight is down--proud to be below 230.  Sugars are between 150-200, and she expects that her A1c is going to be better.  "       OBJECTIVE:  Vitals: /88  Pulse (!) 226  Temp 98.8  F (37.1  C)  Wt 229 lb (103.9 kg)  SpO2 99%  BMI 44.72 kg/m2  BMI= Body mass index is 44.72 kg/(m^2).  Objective:    Vitals:  Vitals are reviewed and are within the normal range  Gen:  Alert, pleasant, no acute distress.    Cardiac:  Regular rate and rhythm, no murmurs, rubs or gallops.  MIld tachycardia, about 110.    Respiratory:  Lungs clear to auscultation bilaterally  Abdomen:  Soft, non-tender, non-distended, bowel sounds positive  Extremities:  Warm, well-perfused, pulses 2+/4, no lower extremity edema.  Knee is tender along joint line with palpation, but no erythema or warmth.  [place] slow, but normal.      Results for orders placed or performed in visit on 06/30/17   Uric Acid ([facility])   Result Value Ref Range    Uric Acid 3.3 2.0 - 7.5 mg/dL    Narrative    Test performed by:  [facility]  [address] [place] [address]   Hemoglobin A1c ([facility])   Result Value Ref Range    Hemoglobin A1C 8.8 (H) 4.1 - 5.7 %       ASSESSMENT AND PLAN:      Teresa was seen today for pain management. Due to cocaine in screen and confirmatory test, will no longer be eligible for chronic narcotics here.  I did give her one final script for hydrocodone to taper-  105 pills--starting with 6 pills per day, decreasing by 1 pill every 5 days for a 30 day taper.  Referral placed for pain clinic at [place].  Recommended pool therapy at [facility], continuing to walk and lose weight. Will place orders for MRI to reassess back and neck pain.      Diagnoses and all orders for this visit:    Chronic pain of left knee  -     Uric Acid ([facility])    Chronic bilateral low back pain with bilateral sciatica  -     PAIN MANAGEMENT REFERRAL (External); Future  -     MRI THORACIC SPINE W/O CONTRAST; Future  -     MRI CERVICAL SPINE W/O CONTRAST; Future  -     MRI LUMBAR SPINE W/O CONTRAST; Future  -     Cancel: Rapid Urine Drug Screen ([facility])    Chronic neck  pain  -     MRI THORACIC SPINE W/O CONTRAST; Future  -     MRI CERVICAL SPINE W/O CONTRAST; Future    Type 2 diabetes mellitus without complication, with long-term current use of insulin (H)  -     Hemoglobin A1c ([facility])    Episodic tension-type headache, not intractable  -     SUMAtriptan (IMITREX) 6 MG/0.5ML injection; Inject 0.5 mLs (6 mg) Subcutaneous once for 1 dose    Chronic pain syndrome  -     HYDROcodone-acetaminophen (NORCO) 5-325 MG per tablet; Take 6 pills for 5 days, then 5 pills for 5 days, then 4 pills for 5 days, then 3 pills for 5 days, the 2 pills for 5 days then 1 pill for 5 days.  No further refills    Migraine  -     SUMAtriptan (IMITREX) 6 MG/0.5ML, SC inj ([facility])  -     INJECTION INTRAMUSCULAR OR SUB-Q        Patient Instructions   Continue with the walking and the weight loss.  Schedule MRI with sedation  May need to schedule a pre-op exam given the need for sedation  Schedule with [facility] pain clinic.      Pain medication titration:  Start at 6 pills/day, decrease by one pill every 5 days.      No further pain medication prescriptions from this clinic.        [facility]  [phone]   Fax: [phone]   [address]   [address]   [place] [address]   Orders for MRIs have been sent and they will contact patient to schedule.   Carey  06/30/17    Pain Clinic  [facility]- s/w intake and she has to be referred from within [facility] to be reviewed and scheduled.   D/w Pt and she will call back next week   Daiana No 1:46 PM 6/30/2017          Tyra Bullokc

## 2017-07-02 NOTE — PROGRESS NOTES
Teresa Perez-    Here is a copy of your lab results.  Your uric acid level is normal.  Your hemoglobin A1c we discussed over the phone, is higher than expected.  I hope that all your hard work walking and the increased insulin will help.  Please call the clinic at [phone] if you have any questions.      Tyra Bullock    Please send results to patient.

## 2017-07-03 ENCOUNTER — TELEPHONE (OUTPATIENT)
Dept: FAMILY MEDICINE | Facility: CLINIC | Age: 47
End: 2017-07-03

## 2017-07-03 DIAGNOSIS — G89.4 CHRONIC PAIN SYNDROME: Primary | ICD-10-CM

## 2017-07-03 NOTE — TELEPHONE ENCOUNTER
Message noted.  Will place referrals for pain clinic as long as clinic is appropriate ([facility] would be fine).  Let me know if you hear more.  Patient is NOT eligible for further narcotics here at [facility].      Thanks for your help Le!    Tyra Bullock    Routed to Daiana No

## 2017-07-03 NOTE — TELEPHONE ENCOUNTER
Pt states she would like to be referred to [facility]. Told pt I was not aware of this clinic at [place], I will call to verify and see what the referral process is and let the pt know. If a referral is needed I will connect with Dr. Kobe Hernandez

## 2017-07-03 NOTE — NURSING NOTE
Results from appointment with Dr. Bullock on 6/30/17 at 9:00 am    Bipolar Screening    Have you experienced sustained periods of feeling uncharacteristically energetic? No    Have you had periods of not sleeping, but not feeling tired? No    Have you felt that your thoughts were racing and couldn't be slowed down? Yes    Have you had periods where you were excessive in sexual interest, spending money, or taking unusual risks? Yes    Primary Care PTSD Screen    In your life, have you ever had any experience that was so frightening, horrible, or upsetting that, in the past month, you...    1.) Have had nightmares about it or thought about it when you did not want to? Yes    2.) Tried hard not to think about it or went out of your way to avoid situations that remind you of it? Yes    3.) Were constantly on guard, watchful, or easily startled? Yes    4.) Felt numb or detached from others, activities, or your surroundings? Yes

## 2017-07-04 ASSESSMENT — ANXIETY QUESTIONNAIRES: GAD7 TOTAL SCORE: 13

## 2017-07-04 ASSESSMENT — PATIENT HEALTH QUESTIONNAIRE - PHQ9: SUM OF ALL RESPONSES TO PHQ QUESTIONS 1-9: 9

## 2017-07-05 NOTE — TELEPHONE ENCOUNTER
"S/w [place] and they do not have any type of pain clinic with in [place]. Contacted pt and gave her info, she states \" they why do they advertise it on their website.\" I told pt I cannot speak for [place], but pt is more than welcome to call and ask again. Pt declined. Pt states she would like to try [facility] on [address] or [facility] on [address]. I told the pt [facility] on [address] is  Pain Clinic and they are not accepting pts at this time, they have [facility] providers but is owned by . [facility] is not accepting new pts. Pt then stated she would like to try [facility]. Told pt that it can take up tp 4-8 weeks to get in, explained to pt that we would send records and referral. Then [facility] would send pt a packet to complete and send back after review if pt is a candidate they would schedule her an appt. Will need to send message to Dr. Bullock for new referral to [facility]. Pt verbalized understanding. Told py in the meantime if she find anothers pain clinic she welcome to call them and ask what the referral process is and I'd be happy to d/w Dr. Bullock. Pt verbalized understanding.    Daiana No  Routed to Dr. Bullock   "

## 2017-07-06 NOTE — TELEPHONE ENCOUNTER
Referral and records faxed to [facility] - [phone]  They will send packet to pt to complete and then review and call pt to schedule if she is a candidate./PT is aware  Daiana No

## 2017-07-08 ENCOUNTER — HEALTH MAINTENANCE LETTER (OUTPATIENT)
Age: 47
End: 2017-07-08

## 2017-07-11 DIAGNOSIS — G89.4 CHRONIC PAIN SYNDROME: ICD-10-CM

## 2017-07-11 DIAGNOSIS — K59.00 CONSTIPATION, UNSPECIFIED CONSTIPATION TYPE: ICD-10-CM

## 2017-07-12 RX ORDER — ASPIRIN 81 MG
200 TABLET, DELAYED RELEASE (ENTERIC COATED) ORAL
Qty: 120 TABLET | Refills: 3 | Status: SHIPPED | OUTPATIENT
Start: 2017-07-12 | End: 2017-09-28

## 2017-07-19 DIAGNOSIS — E78.5 HYPERLIPIDEMIA LDL GOAL <100: ICD-10-CM

## 2017-07-20 DIAGNOSIS — J45.909 ASTHMA: ICD-10-CM

## 2017-07-20 DIAGNOSIS — J45.901 ASTHMA WITH EXACERBATION: ICD-10-CM

## 2017-07-20 DIAGNOSIS — G43.709 CHRONIC MIGRAINE WITHOUT AURA WITHOUT STATUS MIGRAINOSUS, NOT INTRACTABLE: Primary | ICD-10-CM

## 2017-07-20 DIAGNOSIS — Z71.6 ENCOUNTER FOR SMOKING CESSATION COUNSELING: ICD-10-CM

## 2017-07-20 DIAGNOSIS — E78.01 FAMILIAL HYPERCHOLESTEROLEMIA: ICD-10-CM

## 2017-07-20 DIAGNOSIS — E11.9 DIABETES MELLITUS, TYPE 2 (H): ICD-10-CM

## 2017-07-20 DIAGNOSIS — N80.9 ENDOMETRIOSIS: ICD-10-CM

## 2017-07-20 RX ORDER — PRAVASTATIN SODIUM 40 MG
80 TABLET ORAL DAILY
Qty: 90 TABLET | Refills: 1 | Status: SHIPPED | OUTPATIENT
Start: 2017-07-20 | End: 2017-09-21

## 2017-07-20 RX ORDER — SUMATRIPTAN 100 MG/1
100 TABLET, FILM COATED ORAL
Qty: 9 TABLET | Refills: 5 | Status: SHIPPED | OUTPATIENT
Start: 2017-07-20 | End: 2017-11-10

## 2017-07-21 ENCOUNTER — OFFICE VISIT (OUTPATIENT)
Dept: FAMILY MEDICINE | Facility: CLINIC | Age: 47
End: 2017-07-21

## 2017-07-21 VITALS
OXYGEN SATURATION: 97 % | HEART RATE: 115 BPM | SYSTOLIC BLOOD PRESSURE: 137 MMHG | WEIGHT: 231 LBS | DIASTOLIC BLOOD PRESSURE: 89 MMHG | BODY MASS INDEX: 45.11 KG/M2 | TEMPERATURE: 97.6 F

## 2017-07-21 DIAGNOSIS — M54.42 CHRONIC BILATERAL LOW BACK PAIN WITH LEFT-SIDED SCIATICA: ICD-10-CM

## 2017-07-21 DIAGNOSIS — B37.0 THRUSH: ICD-10-CM

## 2017-07-21 DIAGNOSIS — G89.29 CHRONIC BILATERAL LOW BACK PAIN WITH LEFT-SIDED SCIATICA: ICD-10-CM

## 2017-07-21 DIAGNOSIS — G62.9 PERIPHERAL POLYNEUROPATHY: ICD-10-CM

## 2017-07-21 DIAGNOSIS — G62.9 NEUROPATHY: ICD-10-CM

## 2017-07-21 DIAGNOSIS — G89.4 CHRONIC PAIN SYNDROME: Primary | ICD-10-CM

## 2017-07-21 DIAGNOSIS — J45.40 MODERATE PERSISTENT ASTHMA WITHOUT COMPLICATION: ICD-10-CM

## 2017-07-21 DIAGNOSIS — J45.901 ASTHMA EXACERBATION: ICD-10-CM

## 2017-07-21 LAB
% GRANULOCYTES: 77.8 %G (ref 40–75)
FOLATE SERPL-MCNC: 15.2 NG/ML
GRANULOCYTES #: 11.8 K/UL (ref 1.6–8.3)
HCT VFR BLD AUTO: 50.9 % (ref 35–47)
HEMOGLOBIN: 16.5 G/DL (ref 11.7–15.7)
LYMPHOCYTES # BLD AUTO: 2.7 K/UL (ref 0.8–5.3)
LYMPHOCYTES NFR BLD AUTO: 17.5 %L (ref 20–48)
MCH RBC QN AUTO: 30.8 PG (ref 26.5–35)
MCHC RBC AUTO-ENTMCNC: 32.4 G/DL (ref 32–36)
MCV RBC AUTO: 95 FL (ref 78–100)
MID #: 0.7 K/UL (ref 0–2.2)
MID %: 4.7 %M (ref 0–20)
PLATELET # BLD AUTO: 352 K/UL (ref 150–450)
RBC # BLD AUTO: 5.4 M/UL (ref 3.8–5.2)
TSH SERPL DL<=0.05 MIU/L-ACNC: 2.54 UIU/ML (ref 0.3–5)
VIT B12 SERPL-MCNC: 1012 PG/ML (ref 213–816)
WBC # BLD AUTO: 15.2 K/UL (ref 4–11)

## 2017-07-21 RX ORDER — BACLOFEN 10 MG/1
TABLET ORAL
Qty: 116 TABLET | Refills: 1 | Status: SHIPPED | OUTPATIENT
Start: 2017-07-21 | End: 2017-08-18

## 2017-07-21 RX ORDER — NYSTATIN 100000/ML
500000 SUSPENSION, ORAL (FINAL DOSE FORM) ORAL 4 TIMES DAILY
Qty: 60 ML | Refills: 0 | Status: SHIPPED | OUTPATIENT
Start: 2017-07-21 | End: 2017-08-02

## 2017-07-21 RX ORDER — GABAPENTIN 600 MG/1
TABLET ORAL
Qty: 90 TABLET | Refills: 3 | Status: SHIPPED | OUTPATIENT
Start: 2017-07-21 | End: 2017-10-24

## 2017-07-21 ASSESSMENT — ANXIETY QUESTIONNAIRES
3. WORRYING TOO MUCH ABOUT DIFFERENT THINGS: NEARLY EVERY DAY
1. FEELING NERVOUS, ANXIOUS, OR ON EDGE: MORE THAN HALF THE DAYS
7. FEELING AFRAID AS IF SOMETHING AWFUL MIGHT HAPPEN: NOT AT ALL
5. BEING SO RESTLESS THAT IT IS HARD TO SIT STILL: NEARLY EVERY DAY
6. BECOMING EASILY ANNOYED OR IRRITABLE: NOT AT ALL
IF YOU CHECKED OFF ANY PROBLEMS ON THIS QUESTIONNAIRE, HOW DIFFICULT HAVE THESE PROBLEMS MADE IT FOR YOU TO DO YOUR WORK, TAKE CARE OF THINGS AT HOME, OR GET ALONG WITH OTHER PEOPLE: VERY DIFFICULT
2. NOT BEING ABLE TO STOP OR CONTROL WORRYING: MORE THAN HALF THE DAYS
GAD7 TOTAL SCORE: 13

## 2017-07-21 ASSESSMENT — PATIENT HEALTH QUESTIONNAIRE - PHQ9: 5. POOR APPETITE OR OVEREATING: NEARLY EVERY DAY

## 2017-07-21 NOTE — PATIENT INSTRUCTIONS
1)  Referral for pain clinic at [facility]  --After you hear back from your  if there are other options we want to look at as well, give us a call.    2)  Get in to schedule pool therapy, and work on Y membership  3)  Take 600mg of Gabapentin in the morning, 1200 at night.    4)  Use the baclofen, starting 5mg 3x daily  5)  Use tylenol, 1000mg 3x per day, BUT NOT MORE!  6)  Get back on the Dulera and Combivent, 2 puffs of each 2x daily.    7)  Use the swish and spit for the thrush.      Try to cut down on the smoking.      Stop in lab for blood draw.      Follow up in 2-4 weeks for recheck.        [facility] --[facility] is  Pain Clinic  [address] [place] [address]  [phone]      Appointment   Date: 10/5/17  Time: 3:40pm  Dr. Jose Roberto Mg  *Referral and notes have been sent to clinic and they have already contacted patient to schedule this.   Carey  07/21/17

## 2017-07-21 NOTE — MR AVS SNAPSHOT
After Visit Summary   7/21/2017    Teresa Perez    MRN: [id number]           Patient Information     Date Of Birth          1970        Visit Information        Provider Department      7/21/2017 8:40 AM Tyra Bullock MD [facility]        Today's Diagnoses     Chronic pain syndrome    -  1    Peripheral polyneuropathy (H)        Moderate persistent asthma without complication        Asthma exacerbation        Thrush        Neuropathy (H)        Chronic bilateral low back pain with left-sided sciatica          Care Instructions    1)  Referral for pain clinic at [facility]  --After you hear back from your  if there are other options we want to look at as well, give us a call.    2)  Get in to schedule pool therapy, and work on Y membership  3)  Take 600mg of Gabapentin in the morning, 1200 at night.    4)  Use the baclofen, starting 5mg 3x daily  5)  Use tylenol, 1000mg 3x per day, BUT NOT MORE!  6)  Get back on the Dulera and Combivent, 2 puffs of each 2x daily.    7)  Use the swish and spit for the thrush.      Try to cut down on the smoking.      Stop in lab for blood draw.      Follow up in 2-4 weeks for recheck.            Follow-ups after your visit        Additional Services     PAIN MANAGEMENT REFERRAL (External)       Patient prefers to be called    Reason for Referral: Pain Clinic Referral.  [facility].    Patient was previously seen at [facility] for an evaluation in fall of 2016.  Had evaluation but plan at that time was to look at other options.  She has chronic pain in neck. Low back, knees, feet, and chronic headaches.  Previously at [facility] for many years until not covered by her insurance.       needed: No  Language: English    May leave message on voicemail: Yes    ([place] Only) Referral should be tracked (Yes/No)?                  Future tests that were ordered for you today     Open Future Orders        Priority Expected  Expires Ordered    PAIN MANAGEMENT REFERRAL (External) Routine  2017            Who to contact     Please call your clinic at [phone] to:    Ask questions about your health    Make or cancel appointments    Discuss your medicines    Learn about your test results    Speak to your doctor   If you have compliments or concerns about an experience at your clinic, or if you wish to file a complaint, please contact [facility] Patient Relations at [phone] or email us at [email or web address]         Additional Information About Your Visit        Night Out Information     Night Out is an electronic gateway that provides easy, online access to your medical records. With Night Out, you can request a clinic appointment, read your test results, renew a prescription or communicate with your care team.     To sign up for Night Out visit the website at [email or web address]   You will be asked to enter the access code listed below, as well as some personal information. Please follow the directions to create your username and password.     Your access code is: [email or web address]  Expires: 10/19/2017  9:47 AM     Your access code will  in 90 days. If you need help or a new code, please contact your [facility] or call [phone] for assistance.        Care EveryWhere ID     This is your Care EveryWhere ID. This could be used by other organizations to access your [place] medical records  [id number]        Your Vitals Were     Pulse Temperature Pulse Oximetry BMI (Body Mass Index)          115 97.6  F (36.4  C) 97% 45.11 kg/m2         Blood Pressure from Last 3 Encounters:   17 137/89   17 137/88   17 135/90    Weight from Last 3 Encounters:   17 231 lb (104.8 kg)   17 229 lb (103.9 kg)   17 233 lb (105.7 kg)              We Performed the Following     CBC with Diff Plt ([facility])     Folate  Serum  ([facility])     Thyroid [place] ([facility])     Vitamin B12 ([facility])          Today's Medication Changes          These changes are accurate as of: 7/21/17  9:50 AM.  If you have any questions, ask your nurse or doctor.               Start taking these medicines.        Dose/Directions    baclofen 10 MG tablet   Commonly known as:  LIORESAL   Used for:  Chronic bilateral low back pain with left-sided sciatica   Started by:  Tyra Bullock MD        Take 1/2 tab (5 mg) three times daily for 3-7 days, then Take 1 tab (10 mg) three times daily for 3-7 days, then Take 1 and 1/2 tab (15 mg) three times daily.   Quantity:  116 tablet   Refills:  1       nystatin 290227 UNIT/ML suspension   Commonly known as:  MYCOSTATIN   Used for:  Thrush   Started by:  Tyra Bullock MD        Dose:  834683 Units   Take 5 mLs (500,000 Units) by mouth 4 times daily   Quantity:  60 mL   Refills:  0         These medicines have changed or have updated prescriptions.        Dose/Directions    * gabapentin 300 MG capsule   Commonly known as:  NEURONTIN   This may have changed:  Another medication with the same name was added. Make sure you understand how and when to take each.   Used for:  Neuropathy (H)   Changed by:  Tyra Bullock MD        Dose:  300 mg   Take 1 capsule (300 mg) by mouth 3 times daily   Quantity:  90 capsule   Refills:  5       * gabapentin 600 MG tablet   Commonly known as:  NEURONTIN   This may have changed:  You were already taking a medication with the same name, and this prescription was added. Make sure you understand how and when to take each.   Used for:  Chronic bilateral low back pain with left-sided sciatica   Changed by:  Tyra Bullock MD        Take 1 tab in the morning, and 2 tabs at night.   Quantity:  90 tablet   Refills:  3       Ipratropium-Albuterol  MCG/ACT inhaler   Commonly known as:  COMBIVENT RESPIMAT   This may have changed:    - how much to take  - when to take  this   Used for:  Moderate persistent asthma without complication   Changed by:  Tyra Bullock MD        Dose:  2 puff   Inhale 2 puffs into the lungs 2 times daily Not to exceed 6 doses per day.   Quantity:  1 Inhaler   Refills:  1       * Notice:  This list has 2 medication(s) that are the same as other medications prescribed for you. Read the directions carefully, and ask your doctor or other care provider to review them with you.      Stop taking these medicines if you haven't already. Please contact your care team if you have questions.     HYDROcodone-acetaminophen 5-325 MG per tablet   Commonly known as:  NORCO   Stopped by:  Tyra Bullock MD                Where to get your medicines      These medications were sent to [facility] - [place], [place] - [address], [place] [address]     Phone:  [phone]     gabapentin 600 MG tablet    Ipratropium-Albuterol  MCG/ACT inhaler    mometasone-formoterol 200-5 MCG/ACT oral inhaler    nystatin [id number] UNIT/ML suspension         Some of these will need a paper prescription and others can be bought over the counter.  Ask your nurse if you have questions.     Bring a paper prescription for each of these medications     baclofen 10 MG tablet                Primary Care Provider Office Phone # Fax #    Tyra Bullock [phone]       [facility] [address] [place] [address]        Equal Access to Services     GALINA DOTY AH: Hadii ismael russo hadasho Soomaali, waaxda luqadaha, qaybta kaalmada barrieyaeileen, miguel parekh. So [facility] [phone].    ATENCIÓN: Si habla español, tiene a tang disposición servicios gratuitos de asistencia lingüística. Lucas al [phone].    We comply with applicable federal civil rights laws and [place] laws. We do not discriminate on the basis of race, color, national origin, age, disability sex, sexual orientation or gender  identity.            Thank you!     Thank you for choosing [facility]  for your care. Our goal is always to provide you with excellent care. Hearing back from our patients is one way we can continue to improve our services. Please take a few minutes to complete the written survey that you may receive in the mail after your visit with us. Thank you!             Your Updated Medication List - Protect others around you: Learn how to safely use, store and throw away your medicines at www.disposemymeds.org.          This list is accurate as of: 7/21/17  9:50 AM.  Always use your most recent med list.                   Brand Name Dispense Instructions for use Diagnosis    acetaminophen 500 MG tablet    TYLENOL    180 tablet    Take 2 tablets (1,000 mg) by mouth 3 times daily as needed for mild pain    Chronic bilateral low back pain without sciatica       * albuterol 108 (90 BASE) MCG/ACT Inhaler    PROAIR HFA/PROVENTIL HFA/VENTOLIN HFA    3 Inhaler    Inhale 2 puffs into the lungs every 6 hours as needed for shortness of breath / dyspnea or wheezing    Mild persistent asthma without complication       * albuterol (2.5 MG/3ML) 0.083% neb solution     30 vial    Take 1 vial (2.5 mg) by nebulization every 6 hours as needed for shortness of breath / dyspnea or wheezing    Mild persistent asthma with acute exacerbation       azelastine 0.05 % Soln ophthalmic solution    OPTIVAR    1 Bottle    Apply 1 drop to eye 2 times daily    Allergic reaction, sequela       azithromycin 250 MG tablet    ZITHROMAX    6 tablet    Two tablets first day, then one tablet daily for four days.    Mild persistent asthma with acute exacerbation       baclofen 10 MG tablet    LIORESAL    116 tablet    Take 1/2 tab (5 mg) three times daily for 3-7 days, then Take 1 tab (10 mg) three times daily for 3-7 days, then Take 1 and 1/2 tab (15 mg) three times daily.    Chronic bilateral low back pain with left-sided sciatica       benzonatate 200 MG  capsule    TESSALON    42 capsule    Take 1 capsule (200 mg) by mouth 3 times daily as needed for cough    Cough       * blood glucose lancets standard    no brand specified    1 Box    Use to test blood sugar 4 times daily or as directed.    Type 2 diabetes mellitus without complication, with long-term current use of insulin (H)       * blood glucose lancets standard    no brand specified    1 Box    Use to test blood sugar 3 times daily or as directed.    Type 2 diabetes mellitus without complication, with long-term current use of insulin (H)       * blood glucose monitoring meter device kit    no brand specified    1 kit    Use to test blood sugar 4 times daily or as directed.    Type 2 diabetes mellitus without complication, with long-term current use of insulin (H)       * blood glucose monitoring meter device kit    no brand specified    1 kit    Use to test blood sugar 3 times daily or as directed.    Type 2 diabetes mellitus without complication, with long-term current use of insulin (H)       * blood glucose monitoring test strip    no brand specified    100 strip    Use to test blood sugars 4 times daily or as directed    Type 2 diabetes mellitus without complication, with long-term current use of insulin (H)       * blood glucose monitoring test strip    no brand specified    100 strip    Use to test blood sugars 3 times daily or as directed    Type 2 diabetes mellitus without complication, with long-term current use of insulin (H)       carboxymethylcellul-glycerin 0.5-0.9 % Soln ophthalmic solution    OPTIVE/REFRESH OPTIVE    1 Bottle    Place 1 drop into both eyes 3 times daily    Dry eyes       clindamycin 1 % lotion    CLINDAMAX    60 mL    Apply topically 2 times daily    Abscess of anal and rectal regions       cloNIDine 0.1 MG tablet    CATAPRES     Take 0.1 mg by mouth        * dicyclomine 10 MG capsule    BENTYL    120 capsule    Take 1 capsule (10 mg) by mouth 4 times daily (before meals and  nightly) From [facility], Judy Chavarria    Irritable bowel syndrome without diarrhea       * dicyclomine 10 MG capsule    BENTYL    120 capsule    Take 1 capsule (10 mg) by mouth 4 times daily (before meals and nightly)    Irritable bowel syndrome, unspecified type       diphenhydrAMINE 50 MG capsule    BENADRYL     Take  mg by mouth At Bedtime.        docosanol 10 % Crea cream    ABREVA    1 Tube    Apply topically 5 times daily    HSV (herpes simplex virus) infection       docusate sodium 100 MG tablet    COLACE    120 tablet    Take 200 mg by mouth 2 times daily    Chronic pain syndrome, Constipation, unspecified constipation type       EPINEPHrine 0.3 MG/0.3ML injection 2-pack    EPIPEN/ADRENACLICK/or ANY BX GENERIC EQUIV    2 each    Inject 0.3 mLs (0.3 mg) into the muscle once as needed for anaphylaxis    Endometriosis, Encounter for smoking cessation counseling       FentaNYL 62.5 MCG/HR Pt72     10 patch    Place 1 patch onto the skin every 72 hours    Chronic pain syndrome       fluticasone 50 MCG/ACT spray    FLONASE    9.9 g    Spray 2 sprays into both nostrils daily    Chronic allergic conjunctivitis       fluvastatin 40 MG capsule    LESCOL    180 capsule    Take 1 capsule (40 mg) by mouth 2 times daily    Hyperlipidemia LDL goal <100, Essential hypertension with goal blood pressure less than 130/85       * gabapentin 300 MG capsule    NEURONTIN    90 capsule    Take 1 capsule (300 mg) by mouth 3 times daily    Neuropathy (H)       * gabapentin 600 MG tablet    NEURONTIN    90 tablet    Take 1 tab in the morning, and 2 tabs at night.    Chronic bilateral low back pain with left-sided sciatica       glipiZIDE 10 MG 24 hr tablet    GLUCOTROL XL    60 tablet    Take 2 tablets (20 mg) by mouth daily    Endometriosis, Encounter for smoking cessation counseling       glycerin (adult) 2 G Supp Suppository     3 suppository    Place 1 suppository rectally daily as needed for constipation    Constipation,  unspecified constipation type       hydrOXYzine 25 MG tablet    ATARAX    120 tablet    Take 1-2 tablets (25-50 mg) by mouth every 6 hours as needed for itching, anxiety or other (sleep)    Chronic pain syndrome       insulin aspart 100 UNIT/ML injection    NovoLOG FLEXPEN    3 mL    Take 28 Units with meals.    Type 2 diabetes mellitus without complication, with long-term current use of insulin (H)       insulin degludec 200 UNIT/ML pen    TRESIBA    3 mL    Inject 60 Units Subcutaneous daily    Type 2 diabetes mellitus without complication, with long-term current use of insulin (H)       insulin pen needle 31G X 5 MM    B-D U/F    100 each    Use 1 daily or as directed.    Diabetes mellitus, type 2 (H)       Ipratropium-Albuterol  MCG/ACT inhaler    COMBIVENT RESPIMAT    1 Inhaler    Inhale 2 puffs into the lungs 2 times daily Not to exceed 6 doses per day.    Moderate persistent asthma without complication       lisinopril 20 MG tablet    PRINIVIL/ZESTRIL    30 tablet    Take 1 tablet (20 mg) by mouth daily    Essential hypertension, benign       loratadine 10 MG tablet    CLARITIN    180 tablet    Take 2 tablets (20 mg) by mouth daily    Seasonal allergic rhinitis, unspecified allergic rhinitis trigger       medroxyPROGESTERone 150 MG/ML injection    DEPO-PROVERA    1 mL    Inject 1 mL (150 mg) into the muscle every 3 months    Endometriosis       * melatonin 3 MG tablet      Take 3 tablets (9 mg) by mouth At Bedtime        * Melatonin 10 MG Tabs tablet     30 tablet    Take 1 tablet (10 mg) by mouth nightly as needed for sleep    Primary insomnia       miconazole 2 % cream    MICATIN    5 g    Place 1 applicator vaginally At Bedtime Substitute as needed.    Vaginal candidiasis       * mometasone-formoterol 100-5 MCG/ACT oral inhaler    DULERA    13 g    Inhale 2 puffs into the lungs 2 times daily    Asthma exacerbation       * mometasone-formoterol 200-5 MCG/ACT oral inhaler    DULERA    13 g    Inhale 2  puffs into the lungs 2 times daily    Asthma exacerbation       nabumetone 750 MG tablet    RELAFEN    60 tablet    Take 1 tablet (750 mg) by mouth 2 times daily as needed for moderate pain    Chronic pain syndrome       naloxone nasal spray    NARCAN    0.2 mL    Spray 1 spray (4 mg) into one nostril alternating nostrils as needed for opioid reversal (every 2-3 minutes until medical assistance arrives.)    Chronic narcotic use       nystatin [id number] UNIT/ML suspension    MYCOSTATIN    60 mL    Take 5 mLs (500,000 Units) by mouth 4 times daily    Thrush       ondansetron 4 MG tablet    ZOFRAN    18 tablet    Take 1 tablet (4 mg) by mouth every 8 hours as needed for nausea    Chronic pain syndrome, Nausea       * order for DME     1 Device    Equipment being ordered: CPAP.  1 device.  Per sleep study recommendations.  Brand:  Respironics, Type:  Nasal Wisp,  Size:  Small    NNAMDI (obstructive sleep apnea)       * order for DME     1 each    Adult nebulizer mask and tubing.    Mild persistent asthma with acute exacerbation       * order for DME     1 Device    Equipment being ordered: Nebulizer supplies for life.    Mild persistent asthma with acute exacerbation       pramipexole 0.5 MG tablet    MIRAPEX    90 tablet    Take 1 tablet (0.5 mg) by mouth At Bedtime    Restless leg syndrome       * pravastatin 20 MG tablet    PRAVACHOL    180 tablet    Take 2 tablets (40 mg) by mouth daily    Hyperlipidemia LDL goal <100       * pravastatin 40 MG tablet    PRAVACHOL    90 tablet    Take 2 tablets (80 mg) by mouth daily    Hyperlipidemia LDL goal <100       predniSONE 10 MG tablet    DELTASONE    21 tablet    Take 2 tabs daily for 1 week, then 1 tab daily for 1 week.    Mild persistent asthma with acute exacerbation       ranitidine 150 MG tablet    ZANTAC    60 tablet    Take 1 tablet (150 mg) by mouth 2 times daily    Gastroesophageal reflux disease without esophagitis       sennosides 8.6 MG tablet    SENOKOT    120 tablet     Take 2 tablets by mouth 2 times daily    Constipation, unspecified constipation type       * SEROQUEL 300 MG tablet   Generic drug:  QUEtiapine      Take 300 mg by mouth daily.        * SEROQUEL 50 MG tablet   Generic drug:  QUEtiapine     1 tablet    Take 1 tablet (50 mg) by mouth every morning For anxiety.  From Psychiatrist.        * QUEtiapine 50 MG tablet    SEROquel     TAKE 1 TABLET BY MOUTH TWICE DAILY        * QUEtiapine 300 MG tablet    SEROquel     TAKE 1 TABLET BY MOUTH AT BEDTIME. INDICATIONS: MANIC PHASE OF MANIC-DEPRESSION -CARMEN DURAN RN        sitagliptin 100 MG tablet    JANUVIA    90 tablet    Take 1 tablet (100 mg) by mouth daily    Type 2 diabetes mellitus with hyperglycemia, with long-term current use of insulin (H)       SUMAtriptan 100 MG tablet    IMITREX    9 tablet    Take 1 tablet (100 mg) by mouth at onset of headache    Chronic migraine without aura without status migrainosus, not intractable       topiramate  MG 24 hr capsule    QUDEXY XR    90 capsule    Take 1 capsule (100 mg) by mouth daily    Chronic migraine without aura without status migrainosus, not intractable       venlafaxine 75 MG 24 hr capsule    EFFEXOR-XR    30 capsule    Take 1 capsule (75 mg) by mouth 3 times daily        * Notice:  This list has 25 medication(s) that are the same as other medications prescribed for you. Read the directions carefully, and ask your doctor or other care provider to review them with you.

## 2017-07-21 NOTE — LETTER
July 24, 2017      Teresa Perez  [address]   [place] [address]        Dear Teresa,  Here is a copy of your lab results.  The good news is that your B12, folate, and thyroid levels are all normal.  This is good news, but it does not explain why you are having numbness in your toes.  I suspect that it is because we are not doing a good job with your blood sugars.  The other thing is that you have some abnormalities on your complete blood count.  Your hemoglobin is high, and so is your white count.  A couple of things can do this, but one of the biggest one is if your body is not getting enough oxygen--either because of sleep apnea at night, or because of smoking during the day, or because of both.  We need to make sure that we are doing a good job taking care of your breathing.  If you have not finished the sleep study and need us to help you schedule this, please call and let us know.  Cutting back on smoking will help as well.  Please call the clinic at [phone] if you have any questions.      Please see below for your test results.    Resulted Orders   CBC with Diff Plt ([facility])   Result Value Ref Range    WBC 15.2 (H) 4.0 - 11.0 K/uL    Lymphocytes # 2.7 0.8 - 5.3 K/uL    % Lymphocytes 17.5 (L) 20.0 - 48.0 %L    Mid # 0.7 0.0 - 2.2 K/uL    Mid % 4.7 0.0 - 20.0 %M    GRANULOCYTES # 11.8 (H) 1.6 - 8.3 K/uL    % Granulocytes 77.8 (H) 40.0 - 75.0 %G    RBC 5.4 (H) 3.8 - 5.2 M/uL    Hemoglobin 16.5 (H) 11.7 - 15.7 g/dL    Hematocrit 50.9 (H) 35.0 - 47.0 %    MCV 95.0 78.0 - 100.0 fL    MCH 30.8 26.5 - 35.0    MCHC 32.4 32.0 - 36.0 g/dL    Platelets 352.0 150.0 - 450.0 K/uL   Vitamin B12 ([facility])   Result Value Ref Range    Vitamin B12 1012 (H) 213 - 816 pg/mL    Narrative    Test performed by:  [facility]  [address] [place] [address]   Folate  Serum ([email or web address]   Result Value Ref Range    Folate 15.2 >=3.5 ng/mL    Narrative    Test performed by:  [facility]  LABORAT[facility]  [address] [place] [address]   Thyroid [place] ([facility])   Result Value Ref Range    TSH 2.54 0.30 - 5.00 uIU/mL    Narrative    Test performed by:  [facility]  [address] [place] [address]       If you have any questions, please call the clinic to make an appointment.    Sincerely,    Tyra Bullock MD

## 2017-07-22 ASSESSMENT — ANXIETY QUESTIONNAIRES: GAD7 TOTAL SCORE: 13

## 2017-07-22 ASSESSMENT — PATIENT HEALTH QUESTIONNAIRE - PHQ9: SUM OF ALL RESPONSES TO PHQ QUESTIONS 1-9: 10

## 2017-07-23 NOTE — PROGRESS NOTES
"  There are no exam notes on file for this visit.    SUBJECTIVE  Teresa Perez is a 46 year old female with past medical history significant for    Patient Active Problem List   Diagnosis     Health Care Home     Acute peptic ulcer     Other allergy, other than to medicinal agents     Bipolar disorder (H)     Bulging lumbar disc     Cervical dysplasia     Common migraine without aura     Constipation     Dwarfism     Familial hypercholesterolemia     HTN (hypertension)     Insomnia     Low back pain     Intermittent asthma     Leg pain, bilateral     Smoking     Degeneration of thoracic or thoracolumbar intervertebral disc     Diabetes mellitus, type 2 (H)     LOMAS (nonalcoholic steatohepatitis)     Disease of lung     Hemorrhoids     Parotid mass     Endometriosis     NNAMDI (obstructive sleep apnea)     History of total right knee replacement     Lateral epicondylitis     Impingement syndrome, shoulder, left     Hx of total knee replacement, left     Chronic pain syndrome     Others present at the visit:  Patient's PCA Carey was present for the end of the visit.      Presents for   Chief Complaint   Patient presents with     Pain Management     cpm numbness and tingling in fourth toe and arms     Here for CPM follow up visit.  Last visit we discussed that urine testing for cocaine was positive.  Patient shared that this was because family was visit.  Father, who was in town from [place] uses, and she \"wasn't making good choices.\"  Shares that she was \"sick of being good all the time.\"  Has not used since then, and shares that things have been difficult without the pain medications.  She is noting pain in her low back, bilateral knees, neck, as well as headache.  Has been having numbness in her feet, specifically in the 4th toe on each side.  Migraine headaches have been worse.  She is also noting sweats and chills, especially at night.      She shares that pain clinic at [facility] is a no-go, because she would " "have to have a primary care doc there.  Feels that the wait for [facility] is too long--that they want all records for the past 5 years, and that after that there are no guarantees that they would accept her.  She has placed a call with her Med[employer] Coordinator looking at other options in [facility], but has not heard back.  Has been seen previously at [facility], and would like to try going back there for now, so referral was placed today.      Has been having more headaches.  Uses imitrex more regularly, but notes that it makes her feel flushes and dizzy.  Having more aura symptoms as well--doesn't feel like herself.  Noting increased sensitivity to light and sound and increased neck pain.     Not sleeping well because of pain.  Has been having more soreness in her knees.  Told at the orthopedist that she is not a candidate currently for further injections.  Has not scheduled with pool therapy \"my head is not in the right place, so it is hard to get things done.\" Currently she is using tylenol (\"too much\"), some left over vicodin, heating pad, and \"some other OTC stuff\".  Using melatonin to sleep.  Takes gabapentin--currently 300mg in AM and 600mg in PM.  Has been on muscle relaxers in the past--she remembers flexeril, but didn't think it helped much.      Wondering if we should continue to increase her Pravastatin, as she is due for a refill of this.     Having increased sinus pressure.  Sensation of fullness.  Feels like her breathing has been worse.  Has been using combivent 2x per day, and PRN albuterol.  She has not been using Dulera--did not know that she was supposed to continue this.  Some sputum, but not much coming up.  Has not finished sleep study and does not have CPAP.  Has significant allergies.        Noting white coating on her gums, worried that she has thrush.      OBJECTIVE:  Vitals: /89  Pulse 115  Temp 97.6  F (36.4  C)  Wt 231 lb (104.8 kg)  SpO2 97%  BMI 45.11 kg/m2  BMI= Body " mass index is 45.11 kg/(m^2).  Vitals:  Vitals are reviewed and are within the normal range  Gen:  Alert, pleasant, no acute distress  HEENT:  Throat clear.  Mild nasal congestion.  Bilateral sinus tenderness.  No lymphadenopathy.  Small, flat white patches over gums.    Cardiac:  Regular rate and rhythm, no murmurs, rubs or gallops  Respiratory:  Lungs clear to auscultation bilaterally  Abdomen:  Soft, non-tender, non-distended, bowel sounds positive  Extremities:  Warm, well-perfused, pulses 2+/4, no lower extremity edema.  Decreased sensation over 4th toe, otherwise normal.  Non-tender on arch or base of foot.  No erythema or swelling.     Results for orders placed or performed in visit on 07/21/17   CBC with Diff Plt ([facility])   Result Value Ref Range    WBC 15.2 (H) 4.0 - 11.0 K/uL    Lymphocytes # 2.7 0.8 - 5.3 K/uL    % Lymphocytes 17.5 (L) 20.0 - 48.0 %L    Mid # 0.7 0.0 - 2.2 K/uL    Mid % 4.7 0.0 - 20.0 %M    GRANULOCYTES # 11.8 (H) 1.6 - 8.3 K/uL    % Granulocytes 77.8 (H) 40.0 - 75.0 %G    RBC 5.4 (H) 3.8 - 5.2 M/uL    Hemoglobin 16.5 (H) 11.7 - 15.7 g/dL    Hematocrit 50.9 (H) 35.0 - 47.0 %    MCV 95.0 78.0 - 100.0 fL    MCH 30.8 26.5 - 35.0    MCHC 32.4 32.0 - 36.0 g/dL    Platelets 352.0 150.0 - 450.0 K/uL   Vitamin B12 ([facility])   Result Value Ref Range    Vitamin B12 1012 (H) 213 - 816 pg/mL    Narrative    Test performed by:  [facility] [address] [place] [address]   Folate  Serum ([facility])   Result Value Ref Range    Folate 15.2 >=3.5 ng/mL    Narrative    Test performed by:  [facility] [address] [place] [address]   Thyroid [place] ([facility])   Result Value Ref Range    TSH 2.54 0.30 - 5.00 uIU/mL    Narrative    Test performed by:  [facility]  [address] [place] [address]           ASSESSMENT AND PLAN:      Teresa was seen today for pain management.  Discussed that she is no longer eligible for narcotics here.  Will place  referral as requested for [facility].  Discussed appropriate tylenol dosing of 1000mg TID, will increase gabapenting to 600mg in AM, 1200mg in PM.  Baclofen for muscle relaxant.  Encouraged her to get in for pool therapy, keep working on sleep with her psychiatrist.  We will test for possible etiologies for her peripheral neuropathy--TSH, B12, Folate, as well as a CBC.  IF normal, will have to re-discuss diabetes control.      Discussed importance of controller meds for asthma and will have her restart dulera.  Refilled combivent for break through.  Encouraged smoking cessation.      Diagnoses and all orders for this visit:    Chronic pain syndrome  -     PAIN MANAGEMENT REFERRAL (External); Future    Peripheral polyneuropathy (H)  -     CBC with Diff Plt ([facility])  -     Vitamin B12 ([facility])  -     Folate  Serum ([facility])  -     Thyroid [place] ([facility])    Moderate persistent asthma without complication  -     Ipratropium-Albuterol (COMBIVENT RESPIMAT)  MCG/ACT inhaler; Inhale 2 puffs into the lungs 2 times daily Not to exceed 6 doses per day.    Asthma exacerbation  -     mometasone-formoterol (DULERA) 200-5 MCG/ACT oral inhaler; Inhale 2 puffs into the lungs 2 times daily    Thrush  -     nystatin (MYCOSTATIN) 432484 UNIT/ML suspension; Take 5 mLs (500,000 Units) by mouth 4 times daily    Neuropathy (H)    Chronic bilateral low back pain with left-sided sciatica  -     gabapentin (NEURONTIN) 600 MG tablet; Take 1 tab in the morning, and 2 tabs at night.  -     baclofen (LIORESAL) 10 MG tablet; Take 1/2 tab (5 mg) three times daily for 3-7 days, then  Take 1 tab (10 mg) three times daily for 3-7 days, then  Take 1 and 1/2 tab (15 mg) three times daily.        Patient Instructions   1)  Referral for pain clinic at [facility]  --After you hear back from your  if there are other options we want to look at as well, give us a call.    2)  Get in to schedule pool therapy, and work on [employer]  membership  3)  Take 600mg of Gabapentin in the morning, 1200 at night.    4)  Use the baclofen, starting 5mg 3x daily  5)  Use tylenol, 1000mg 3x per day, BUT NOT MORE!  6)  Get back on the Dulera and Combivent, 2 puffs of each 2x daily.    7)  Use the swish and spit for the thrush.      Try to cut down on the smoking.      Stop in lab for blood draw.      Follow up in 2-4 weeks for recheck.        [facility]  [facility]  [address] [place] [address]  [phone]      Appointment   Date: 10/5/17  Time: 3:40pm  Dr. Jose Roberto Mg  *Referral and notes have been sent to clinic and they have already contacted patient to schedule this.   Carey  07/21/17        Follow up in 1 month for follow up diabetes.      Tyra Bullock

## 2017-07-23 NOTE — PROGRESS NOTES
Teresa Perez-    Here is a copy of your lab results.  The good news is that your B12, folate, and thyroid levels are all normal.  This is good news, but it does not explain why you are having numbness in your toes.  I suspect that it is because we are not doing a good job with your blood sugars.  The other thing is that you have some abnormalities on your complete blood count.  Your hemoglobin is high, and so is your white count.  A couple of things can do this, but one of the biggest one is if your body is not getting enough oxygen--either because of sleep apnea at night, or because of smoking during the day, or because of both.  We need to make sure that we are doing a good job taking care of your breathing.  If you have not finished the sleep study and need us to help you schedule this, please call and let us know.  Cutting back on smoking will help as well.  Please call the clinic at [phone] if you have any questions.      Tyra Bullock    Please send results to patient.

## 2017-07-25 ENCOUNTER — TELEPHONE (OUTPATIENT)
Dept: FAMILY MEDICINE | Facility: CLINIC | Age: 47
End: 2017-07-25

## 2017-07-25 NOTE — TELEPHONE ENCOUNTER
"She told me that she needs to be \"put out\" for the procedure or it won't work.  She has requested [facility] because she feels comfortable and knows the people there, but is insistent that she needs to be put out for the procedure.  She will need to pre-op for this as well.      Can you call and ask her what she would prefer--scheduling at [facility] with sedation, or going to [facility] and using oral sedation medications.      Thanks!    Tyra Bullock    "

## 2017-07-25 NOTE — TELEPHONE ENCOUNTER
Is there another medication she should be taking? I am assuming she is claustrophobic and needing medication for this? We would need to prescribe this for her.   Please advise.

## 2017-07-25 NOTE — TELEPHONE ENCOUNTER
[facility] phone call message- general phone call:    Reason for call: she was scheduled for a MRI and the only thing they have is IV vallum and she is allergic to that so she needs to be scheduled at [facility].    Return call needed: Yes    OK to leave a message on voice mail? Yes    Primary language: English      needed? No    Call taken on July 25, 2017 at 9:36 AM by Lo Lao

## 2017-07-27 DIAGNOSIS — E11.9 DIABETES MELLITUS, TYPE 2 (H): ICD-10-CM

## 2017-07-28 ENCOUNTER — TRANSFERRED RECORDS (OUTPATIENT)
Dept: HEALTH INFORMATION MANAGEMENT | Facility: CLINIC | Age: 47
End: 2017-07-28

## 2017-07-28 NOTE — TELEPHONE ENCOUNTER
Sounds like a plan.  We will do [facility] with sedation.  I think they order their own conscious sedation--I only send in scripts if she needs or medications.  But she may need a pre-op.  Can you see what other information they need from me to schedule the imaging?    THanks!    Tyra Bullock    Routed to referral coordinator.

## 2017-08-02 DIAGNOSIS — B37.0 THRUSH: ICD-10-CM

## 2017-08-02 RX ORDER — NYSTATIN 100000/ML
500000 SUSPENSION, ORAL (FINAL DOSE FORM) ORAL 4 TIMES DAILY
Qty: 60 ML | Refills: 0 | Status: SHIPPED | OUTPATIENT
Start: 2017-08-02 | End: 2017-09-28

## 2017-08-18 DIAGNOSIS — M54.42 CHRONIC BILATERAL LOW BACK PAIN WITH LEFT-SIDED SCIATICA: ICD-10-CM

## 2017-08-18 DIAGNOSIS — G89.29 CHRONIC BILATERAL LOW BACK PAIN WITH LEFT-SIDED SCIATICA: ICD-10-CM

## 2017-08-18 RX ORDER — BACLOFEN 10 MG/1
TABLET ORAL
Qty: 116 TABLET | Refills: 1 | Status: SHIPPED | OUTPATIENT
Start: 2017-08-18 | End: 2017-09-28

## 2017-09-06 ENCOUNTER — TELEPHONE (OUTPATIENT)
Dept: FAMILY MEDICINE | Facility: CLINIC | Age: 47
End: 2017-09-06

## 2017-09-06 ENCOUNTER — ALLIED HEALTH/NURSE VISIT (OUTPATIENT)
Dept: FAMILY MEDICINE | Facility: CLINIC | Age: 47
End: 2017-09-06

## 2017-09-06 VITALS
SYSTOLIC BLOOD PRESSURE: 107 MMHG | HEART RATE: 103 BPM | DIASTOLIC BLOOD PRESSURE: 74 MMHG | TEMPERATURE: 97.7 F | BODY MASS INDEX: 42.69 KG/M2 | WEIGHT: 218.6 LBS

## 2017-09-06 DIAGNOSIS — Z30.9 ENCOUNTER FOR CONTRACEPTIVE MANAGEMENT, UNSPECIFIED TYPE: Primary | ICD-10-CM

## 2017-09-06 DIAGNOSIS — K59.00 CONSTIPATION, UNSPECIFIED CONSTIPATION TYPE: Primary | ICD-10-CM

## 2017-09-06 DIAGNOSIS — G89.4 CHRONIC PAIN SYNDROME: ICD-10-CM

## 2017-09-06 RX ORDER — POLYETHYLENE GLYCOL 3350 17 G/17G
1 POWDER, FOR SOLUTION ORAL DAILY
Qty: 510 G | Refills: 3 | Status: SHIPPED | OUTPATIENT
Start: 2017-09-06 | End: 2017-09-28

## 2017-09-06 NOTE — NURSING NOTE
I administered the following to Teresa Perez.    MEDICATION: Medroxyprogesterone 150 mg  ROUTE: IM  SITE: Deltoid - Right  DOSE: 150 mg / mL  LOT #: [id number]  :  Syn[employer]   EXPIRATION DATE:  01/2022  NDC#: [address]-3   Gave patient a reminder card to come back November 22, 2017 through December 6, 2017.     Was entire vial of medication used? Yes    Name of provider who requested the injection: Dr. Bullock  Name of provider on site (faculty or community preceptor) at the time of performing the injection: Dr. Rolo Nickerson, A

## 2017-09-06 NOTE — MR AVS SNAPSHOT
After Visit Summary   2017    Teresa Perez    MRN: [id number]           Patient Information     Date Of Birth          1970        Visit Information        [facility]        Today's Diagnoses     Encounter for contraceptive management, unspecified type    -  1       Follow-ups after your visit        Follow-up notes from your care team     Return in about 3 months (around 2017).      Who to contact     Please call your clinic at [phone] to:    Ask questions about your health    Make or cancel appointments    Discuss your medicines    Learn about your test results    Speak to your doctor   If you have compliments or concerns about an experience at your clinic, or if you wish to file a complaint, please contact [facility] Patient Relations at [phone] or email us at [email or web address]         Additional Information About Your Visit        MyChart Information     [employer] is an electronic gateway that provides easy, online access to your medical records. With [email or web address], you can request a clinic appointment, read your test results, renew a prescription or communicate with your care team.     To sign up for [employer] visit the website at [email or web address]   You will be asked to enter the access code listed below, as well as some personal information. Please follow the directions to create your username and password.     Your access code is: [email or web address]  Expires: 10/19/2017  9:47 AM     Your access code will  in 90 days. If you need help or a new code, please contact your [facility] or call [phone] for assistance.        Care EveryWhere ID     This is your Care EveryWhere ID. This could be used by other organizations to access your [place] medical records  [id number]        Your Vitals Were     Pulse Temperature BMI (Body Mass  Index)             103 97.7  F (36.5  C) (Oral) 42.69 kg/m2          Blood Pressure from Last 3 Encounters:   09/06/17 107/74   07/21/17 137/89   06/30/17 137/88    Weight from Last 3 Encounters:   09/06/17 218 lb 9.6 oz (99.2 kg)   07/21/17 231 lb (104.8 kg)   06/30/17 229 lb (103.9 kg)              We Performed the Following     INJECTION INTRAMUSCULAR OR SUB-Q     medroxyPROGESTERone (DEPO-PROVERA) injection 150 mg (Charge)        Primary Care Provider Office Phone # Fax #    Tyra Bullock [phone]       [facility] [address] [place] [address]        Equal Access to Services     GALINA DOTY : Dex gilbert Soedenilson, waaxda luqadaha, qaybta kaalmada adeblayneyaeileen, miguel parekh. So [facility] [phone].    ATENCIÓN: Si habla español, tiene a atng disposición servicios gratuitos de asistencia lingüística. Llame al [phone].    We comply with applicable federal civil rights laws and [place] laws. We do not discriminate on the basis of race, color, national origin, age, disability sex, sexual orientation or gender identity.            Thank you!     Thank you for choosing [facility]  for your care. Our goal is always to provide you with excellent care. Hearing back from our patients is one way we can continue to improve our services. Please take a few minutes to complete the written survey that you may receive in the mail after your visit with us. Thank you!             Your Updated Medication List - Protect others around you: Learn how to safely use, store and throw away your medicines at www.disposemymeds.org.          This list is accurate as of: 9/6/17 11:15 AM.  Always use your most recent med list.                   Brand Name Dispense Instructions for use Diagnosis    acetaminophen 500 MG tablet    TYLENOL    180 tablet    Take 2 tablets (1,000 mg) by mouth 3 times daily as needed for mild pain    Chronic bilateral low back pain  without sciatica       * albuterol 108 (90 BASE) MCG/ACT Inhaler    PROAIR HFA/PROVENTIL HFA/VENTOLIN HFA    3 Inhaler    Inhale 2 puffs into the lungs every 6 hours as needed for shortness of breath / dyspnea or wheezing    Mild persistent asthma without complication       * albuterol (2.5 MG/3ML) 0.083% neb solution     30 vial    Take 1 vial (2.5 mg) by nebulization every 6 hours as needed for shortness of breath / dyspnea or wheezing    Mild persistent asthma with acute exacerbation       azelastine 0.05 % Soln ophthalmic solution    OPTIVAR    1 Bottle    Apply 1 drop to eye 2 times daily    Allergic reaction, sequela       azithromycin 250 MG tablet    ZITHROMAX    6 tablet    Two tablets first day, then one tablet daily for four days.    Mild persistent asthma with acute exacerbation       baclofen 10 MG tablet    LIORESAL    116 tablet    Take 1/2 tab (5 mg) three times daily for 3-7 days, then Take 1 tab (10 mg) three times daily for 3-7 days, then Take 1 and 1/2 tab (15 mg) three times daily.    Chronic bilateral low back pain with left-sided sciatica       benzonatate 200 MG capsule    TESSALON    42 capsule    Take 1 capsule (200 mg) by mouth 3 times daily as needed for cough    Cough       * blood glucose lancets standard    no brand specified    1 Box    Use to test blood sugar 4 times daily or as directed.    Type 2 diabetes mellitus without complication, with long-term current use of insulin (H)       * blood glucose lancets standard    no brand specified    1 Box    Use to test blood sugar 3 times daily or as directed.    Type 2 diabetes mellitus without complication, with long-term current use of insulin (H)       * blood glucose monitoring meter device kit    no brand specified    1 kit    Use to test blood sugar 4 times daily or as directed.    Type 2 diabetes mellitus without complication, with long-term current use of insulin (H)       * blood glucose monitoring meter device kit    no brand  specified    1 kit    Use to test blood sugar 3 times daily or as directed.    Type 2 diabetes mellitus without complication, with long-term current use of insulin (H)       * blood glucose monitoring test strip    no brand specified    100 strip    Use to test blood sugars 4 times daily or as directed    Type 2 diabetes mellitus without complication, with long-term current use of insulin (H)       * blood glucose monitoring test strip    no brand specified    100 strip    Use to test blood sugars 3 times daily or as directed    Type 2 diabetes mellitus without complication, with long-term current use of insulin (H)       carboxymethylcellul-glycerin 0.5-0.9 % Soln ophthalmic solution    OPTIVE/REFRESH OPTIVE    1 Bottle    Place 1 drop into both eyes 3 times daily    Dry eyes       clindamycin 1 % lotion    CLINDAMAX    60 mL    Apply topically 2 times daily    Abscess of anal and rectal regions       cloNIDine 0.1 MG tablet    CATAPRES     Take 0.1 mg by mouth        * dicyclomine 10 MG capsule    BENTYL    120 capsule    Take 1 capsule (10 mg) by mouth 4 times daily (before meals and nightly) From [place] Judy RODRIGUEZ    Irritable bowel syndrome without diarrhea       * dicyclomine 10 MG capsule    BENTYL    120 capsule    Take 1 capsule (10 mg) by mouth 4 times daily (before meals and nightly)    Irritable bowel syndrome, unspecified type       diphenhydrAMINE 50 MG capsule    BENADRYL     Take  mg by mouth At Bedtime.        docosanol 10 % Crea cream    ABREVA    1 Tube    Apply topically 5 times daily    HSV (herpes simplex virus) infection       docusate sodium 100 MG tablet    COLACE    120 tablet    Take 200 mg by mouth 2 times daily    Chronic pain syndrome, Constipation, unspecified constipation type       EPINEPHrine 0.3 MG/0.3ML injection 2-pack    EPIPEN/ADRENACLICK/or ANY BX GENERIC EQUIV    2 each    Inject 0.3 mLs (0.3 mg) into the muscle once as needed for anaphylaxis    Endometriosis,  Encounter for smoking cessation counseling       FentaNYL 62.5 MCG/HR Pt72     10 patch    Place 1 patch onto the skin every 72 hours    Chronic pain syndrome       fluticasone 50 MCG/ACT spray    FLONASE    9.9 g    Spray 2 sprays into both nostrils daily    Chronic allergic conjunctivitis       fluvastatin 40 MG capsule    LESCOL    180 capsule    Take 1 capsule (40 mg) by mouth 2 times daily    Hyperlipidemia LDL goal <100, Essential hypertension with goal blood pressure less than 130/85       * gabapentin 300 MG capsule    NEURONTIN    90 capsule    Take 1 capsule (300 mg) by mouth 3 times daily    Neuropathy (H)       * gabapentin 600 MG tablet    NEURONTIN    90 tablet    Take 1 tab in the morning, and 2 tabs at night.    Chronic bilateral low back pain with left-sided sciatica       glipiZIDE 10 MG 24 hr tablet    GLUCOTROL XL    60 tablet    Take 2 tablets (20 mg) by mouth daily    Endometriosis, Encounter for smoking cessation counseling       glycerin (adult) 2 G Supp Suppository     3 suppository    Place 1 suppository rectally daily as needed for constipation    Constipation, unspecified constipation type       hydrOXYzine 25 MG tablet    ATARAX    120 tablet    Take 1-2 tablets (25-50 mg) by mouth every 6 hours as needed for itching, anxiety or other (sleep)    Chronic pain syndrome       insulin aspart 100 UNIT/ML injection    NovoLOG FLEXPEN    3 mL    Take 28 Units with meals.    Type 2 diabetes mellitus without complication, with long-term current use of insulin (H)       insulin degludec 200 UNIT/ML pen    TRESIBA    3 mL    Inject 60 Units Subcutaneous daily    Type 2 diabetes mellitus without complication, with long-term current use of insulin (H)       insulin pen needle 31G X 5 MM    B-D U/F    100 each    Use 4 daily or as directed.    Diabetes mellitus, type 2 (H)       Ipratropium-Albuterol  MCG/ACT inhaler    COMBIVENT RESPIMAT    1 Inhaler    Inhale 2 puffs into the lungs 2 times  daily Not to exceed 6 doses per day.    Moderate persistent asthma without complication       lisinopril 20 MG tablet    PRINIVIL/ZESTRIL    30 tablet    Take 1 tablet (20 mg) by mouth daily    Essential hypertension, benign       loratadine 10 MG tablet    CLARITIN    180 tablet    Take 2 tablets (20 mg) by mouth daily    Seasonal allergic rhinitis, unspecified allergic rhinitis trigger       medroxyPROGESTERone 150 MG/ML injection    DEPO-PROVERA    1 mL    Inject 1 mL (150 mg) into the muscle every 3 months    Endometriosis       * melatonin 3 MG tablet      Take 3 tablets (9 mg) by mouth At Bedtime        * Melatonin 10 MG Tabs tablet     30 tablet    Take 1 tablet (10 mg) by mouth nightly as needed for sleep    Primary insomnia       miconazole 2 % cream    MICATIN    5 g    Place 1 applicator vaginally At Bedtime Substitute as needed.    Vaginal candidiasis       * mometasone-formoterol 100-5 MCG/ACT oral inhaler    DULERA    13 g    Inhale 2 puffs into the lungs 2 times daily    Asthma exacerbation       * mometasone-formoterol 200-5 MCG/ACT oral inhaler    DULERA    13 g    Inhale 2 puffs into the lungs 2 times daily    Asthma exacerbation       nabumetone 750 MG tablet    RELAFEN    60 tablet    Take 1 tablet (750 mg) by mouth 2 times daily as needed for moderate pain    Chronic pain syndrome       naloxone nasal spray    NARCAN    0.2 mL    Spray 1 spray (4 mg) into one nostril alternating nostrils as needed for opioid reversal (every 2-3 minutes until medical assistance arrives.)    Chronic narcotic use       nystatin 862302 UNIT/ML suspension    MYCOSTATIN    60 mL    Take 5 mLs (500,000 Units) by mouth 4 times daily    Thrush       ondansetron 4 MG tablet    ZOFRAN    18 tablet    Take 1 tablet (4 mg) by mouth every 8 hours as needed for nausea    Chronic pain syndrome, Nausea       * order for DME     1 Device    Equipment being ordered: CPAP.  1 device.  Per sleep study recommendations.  Brand:   Respironics, Type:  Nasal Wisp,  Size:  Small    NNAMDI (obstructive sleep apnea)       * order for DME     1 each    Adult nebulizer mask and tubing.    Mild persistent asthma with acute exacerbation       * order for DME     1 Device    Equipment being ordered: Nebulizer supplies for life.    Mild persistent asthma with acute exacerbation       pramipexole 0.5 MG tablet    MIRAPEX    90 tablet    Take 1 tablet (0.5 mg) by mouth At Bedtime    Restless leg syndrome       * pravastatin 20 MG tablet    PRAVACHOL    180 tablet    Take 2 tablets (40 mg) by mouth daily    Hyperlipidemia LDL goal <100       * pravastatin 40 MG tablet    PRAVACHOL    90 tablet    Take 2 tablets (80 mg) by mouth daily    Hyperlipidemia LDL goal <100       predniSONE 10 MG tablet    DELTASONE    21 tablet    Take 2 tabs daily for 1 week, then 1 tab daily for 1 week.    Mild persistent asthma with acute exacerbation       ranitidine 150 MG tablet    ZANTAC    60 tablet    Take 1 tablet (150 mg) by mouth 2 times daily    Gastroesophageal reflux disease without esophagitis       sennosides 8.6 MG tablet    SENOKOT    120 tablet    Take 2 tablets by mouth 2 times daily    Constipation, unspecified constipation type       * SEROQUEL 300 MG tablet   Generic drug:  QUEtiapine      Take 300 mg by mouth daily.        * SEROQUEL 50 MG tablet   Generic drug:  QUEtiapine     1 tablet    Take 1 tablet (50 mg) by mouth every morning For anxiety.  From Psychiatrist.        * QUEtiapine 50 MG tablet    SEROquel     TAKE 1 TABLET BY MOUTH TWICE DAILY        * QUEtiapine 300 MG tablet    SEROquel     TAKE 1 TABLET BY MOUTH AT BEDTIME. INDICATIONS: MANIC PHASE OF MANIC-DEPRESSION -CARMEN DURAN RN        sitagliptin 100 MG tablet    JANUVIA    90 tablet    Take 1 tablet (100 mg) by mouth daily    Type 2 diabetes mellitus with hyperglycemia, with long-term current use of insulin (H)       SUMAtriptan 100 MG tablet    IMITREX    9 tablet    Take 1 tablet (100 mg) by  mouth at onset of headache    Chronic migraine without aura without status migrainosus, not intractable       topiramate  MG 24 hr capsule    QUDEXY XR    90 capsule    Take 1 capsule (100 mg) by mouth daily    Chronic migraine without aura without status migrainosus, not intractable       venlafaxine 75 MG 24 hr capsule    EFFEXOR-XR    30 capsule    Take 1 capsule (75 mg) by mouth 3 times daily        * Notice:  This list has 25 medication(s) that are the same as other medications prescribed for you. Read the directions carefully, and ask your doctor or other care provider to review them with you.

## 2017-09-12 ENCOUNTER — TELEPHONE (OUTPATIENT)
Dept: FAMILY MEDICINE | Facility: CLINIC | Age: 47
End: 2017-09-12

## 2017-09-12 NOTE — TELEPHONE ENCOUNTER
[facility] phone call message- general phone call:    Reason for call: the pt called to ask for a prior authorization  for her nabumetone 750 mg it is no longer covered by  Her insurance     Return call needed: Yes    OK to leave a message on voice mail? Yes    Primary language: English      needed? No    Call taken on September 12, 2017 at 3:09 PM by Juan Peralta

## 2017-09-15 NOTE — TELEPHONE ENCOUNTER
PA was denied on 9/15/17. Reason: is not supported by the FDA or by one of the Medicare approved referrences for treating your medical condition(s): chronic pain syndrome.   You have the right to appeal.   Routed to Dr. Bullock. /YULISA Massey

## 2017-09-15 NOTE — TELEPHONE ENCOUNTER
If patient would like to continue to take the medication, she should schedule a follow up visit for us to complete the prior authorization together.  I would be more than happy to work on it with her.  Can you let her know?    Tyra Bullock    Routed to Triage RN.

## 2017-09-18 NOTE — TELEPHONE ENCOUNTER
Gave msg to pt, she will call back to schedule an appt w/ Dr. Bullock to discuss this further. /YULISA Massey

## 2017-09-20 DIAGNOSIS — J45.40 MODERATE PERSISTENT ASTHMA WITHOUT COMPLICATION: ICD-10-CM

## 2017-09-20 DIAGNOSIS — J45.30 MILD PERSISTENT ASTHMA WITHOUT COMPLICATION: ICD-10-CM

## 2017-09-20 RX ORDER — ALBUTEROL SULFATE 90 UG/1
2 AEROSOL, METERED RESPIRATORY (INHALATION) EVERY 6 HOURS PRN
Qty: 1 INHALER | Refills: 0 | Status: SHIPPED | OUTPATIENT
Start: 2017-09-20 | End: 2017-11-10

## 2017-09-21 DIAGNOSIS — E78.5 HYPERLIPIDEMIA LDL GOAL <100: ICD-10-CM

## 2017-09-21 RX ORDER — PRAVASTATIN SODIUM 80 MG/1
80 TABLET ORAL DAILY
Qty: 90 TABLET | Refills: 3 | Status: SHIPPED | OUTPATIENT
Start: 2017-09-21 | End: 2017-09-28

## 2017-09-23 ENCOUNTER — TRANSFERRED RECORDS (OUTPATIENT)
Dept: HEALTH INFORMATION MANAGEMENT | Facility: CLINIC | Age: 47
End: 2017-09-23

## 2017-09-28 ENCOUNTER — TELEPHONE (OUTPATIENT)
Dept: FAMILY MEDICINE | Facility: CLINIC | Age: 47
End: 2017-09-28

## 2017-09-28 ENCOUNTER — OFFICE VISIT (OUTPATIENT)
Dept: FAMILY MEDICINE | Facility: CLINIC | Age: 47
End: 2017-09-28

## 2017-09-28 VITALS
SYSTOLIC BLOOD PRESSURE: 121 MMHG | BODY MASS INDEX: 44.41 KG/M2 | HEART RATE: 103 BPM | TEMPERATURE: 99.1 F | DIASTOLIC BLOOD PRESSURE: 84 MMHG | WEIGHT: 227.4 LBS

## 2017-09-28 DIAGNOSIS — J45.40 MODERATE PERSISTENT ASTHMA WITHOUT COMPLICATION: Primary | ICD-10-CM

## 2017-09-28 DIAGNOSIS — S20.212D CHEST WALL CONTUSION, LEFT, SUBSEQUENT ENCOUNTER: Primary | ICD-10-CM

## 2017-09-28 DIAGNOSIS — M54.42 CHRONIC BILATERAL LOW BACK PAIN WITH LEFT-SIDED SCIATICA: ICD-10-CM

## 2017-09-28 DIAGNOSIS — G89.29 CHRONIC BILATERAL LOW BACK PAIN WITH LEFT-SIDED SCIATICA: ICD-10-CM
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
## 2017-09-28 DIAGNOSIS — R42 DIZZINESS: ICD-10-CM

## 2017-09-28 DIAGNOSIS — S01.311D: ICD-10-CM

## 2017-09-28 DIAGNOSIS — J45.20 INTERMITTENT ASTHMA, UNCOMPLICATED: ICD-10-CM

## 2017-09-28 RX ORDER — ASPIRIN 81 MG
100-200 TABLET, DELAYED RELEASE (ENTERIC COATED) ORAL
Qty: 120 TABLET | Refills: 3 | COMMUNITY
Start: 2017-09-28 | End: 2018-03-14

## 2017-09-28 RX ORDER — OXYCODONE AND ACETAMINOPHEN 5; 325 MG/1; MG/1
1 TABLET ORAL EVERY 4 HOURS PRN
Qty: 12 TABLET | Refills: 0 | Status: SHIPPED | OUTPATIENT
Start: 2017-09-28 | End: 2018-03-14

## 2017-09-28 RX ORDER — BUDESONIDE AND FORMOTEROL FUMARATE DIHYDRATE 160; 4.5 UG/1; UG/1
2 AEROSOL RESPIRATORY (INHALATION) 2 TIMES DAILY
Qty: 3 INHALER | Refills: 3 | Status: SHIPPED | OUTPATIENT
Start: 2017-09-28 | End: 2018-10-18

## 2017-09-28 RX ORDER — BACLOFEN 10 MG/1
TABLET ORAL
Qty: 135 TABLET | Refills: 1 | Status: SHIPPED | OUTPATIENT
Start: 2017-09-28 | End: 2018-01-03

## 2017-09-28 RX ORDER — BACITRACIN ZINC AND POLYMYXIN B SULFATE 500; 1000 [USP'U]/G; [USP'U]/G
OINTMENT TOPICAL 2 TIMES DAILY
Qty: 15 G | Refills: 0 | Status: SHIPPED | OUTPATIENT
Start: 2017-09-28 | End: 2018-03-14
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
## 2017-09-28 NOTE — TELEPHONE ENCOUNTER
Prior Authorization needed on:  09/28/17    Medication:  Dulera Dose:  200 mcg/5mcg    Pharmacy confirmed as   [facility] - [place], [place] - [address]  [address]  [place] [address]  Phone: [phone] Fax: [phone]    [facility] [address] [facility] [employer] - [place], [place] - [address]  [address] [address]  Phone: [phone] Fax: [phone]  : Yes    Insurance Name:  [employer] Part D [id number]   Insurance Phone: [phone]  Insurance Patient ID: [id number]    Alternatives Suggested:  Breo or symbicort    Siobhan Fernández September 28, 2017 at 2:36 PM

## 2017-09-28 NOTE — TELEPHONE ENCOUNTER
Prescription sent for Breo.  Patient may need additional teaching in how to use this medication.  Will assess at next clinic visit.      Tyra Bullock    Routed to triage RN

## 2017-09-28 NOTE — PROGRESS NOTES
Transitional Care / Medication Management Note                                                       Teresa was referred by Dr. Chauhan for pharmacy services for Med Rec    MEDICATION REVIEW:  Discussed all medication indications, dosage and effectiveness, adverse effects, and adherence with patient/caregiver.    Pt had meds with them: no  Pt had med list with them: no  Pt was knowledgeable about meds: yes, and reported she felt very comfortable with names, directions, and indications  Medications set up by: self  Medications administered by someone else (e.g., LTCF): No  Pt uses a medication box or automated dispenser: yes  Called pharmacy to obtain or clarify med list:  yes  Called HHN or LTCF to obtain or clarify med list:  no  Patient has been seen by PharmD in past 6 months:  no    Medication Discrepancies  Medications on EMR med list that pt is NOT taking:  yes,     Azityhromycin    Benzonatate    Optive/refresh drops    Clonidine    Dicyclomine    Fentanyl    Fluvastatin    Glipizide    Nystatin    Miralax    Prednisone    Sitagliptin  Medications pt IS taking that are NOT on EMR med list (e.g., from specialist, hospital): none  OTC meds/ dietary supplements pt taking on own that are NOT on EMR med list:  none  Dosage listed differently than how patient is taking: yes     docusate and senna    melatonin  Frequency listed differently than how patient is taking: yes, venlafaxine  Duplicate medication on list (two occurrences of the same medication):  Yes    Gabapentin    Melatonin    Dulera    Pravastatin    quetiapine fumarate  TOTAL NUMBER OF MEDICATION DISCREPANCIES:  19    Subjective                                                       Patient reports the following problems or concerns with their medications:  yes, the clinic list is not up to dateand it gets more confusing when at home if the clinic list is not the same as what she is doing in her medication box.   Patient reports the following adverse  reactions to medications:  none  Pt reports missing doses:  2 to 3 times per month  Additional subjective information (e.g., reason for visit, frequency of PRNs, reasons meds were D/C ed):    Patient was quite confident in medication use as reviewed    Patient sees psychologist as well as Dr. Bullock for care    Objective                                                       Patient Active Problem List   Diagnosis     Health Care Home     Acute peptic ulcer     Other allergy, other than to medicinal agents     Bipolar disorder (H)     Bulging lumbar disc     Cervical dysplasia     Common migraine without aura     Constipation     Dwarfism     Familial hypercholesterolemia     HTN (hypertension)     Insomnia     Low back pain     Intermittent asthma     Leg pain, bilateral     Smoking     Degeneration of thoracic or thoracolumbar intervertebral disc     Diabetes mellitus, type 2 (H)     LOMAS (nonalcoholic steatohepatitis)     Disease of lung     Hemorrhoids     Parotid mass     Endometriosis     NNAMDI (obstructive sleep apnea)     History of total right knee replacement     Lateral epicondylitis     Impingement syndrome, shoulder, left     Hx of total knee replacement, left     Chronic pain syndrome       Current Outpatient Prescriptions   Medication Sig Dispense Refill     oxyCODONE-acetaminophen (PERCOCET) 5-325 MG per tablet Take 1 tablet by mouth every 4 hours as needed for pain 12 tablet 0     bacitracin-polymyxin b (POLYSPORIN) ointment Apply topically 2 times daily 15 g 0     docusate sodium (COLACE) 100 MG tablet Take 100-200 mg by mouth 2 times daily 120 tablet 3     baclofen (LIORESAL) 10 MG tablet Take 1 and 1/2 tab (15 mg) three times daily. 135 tablet 1     [DISCONTINUED] pravastatin (PRAVACHOL) 80 MG tablet Take 1 tablet (80 mg) by mouth daily 90 tablet 3     albuterol (PROAIR HFA/PROVENTIL HFA/VENTOLIN HFA) 108 (90 BASE) MCG/ACT Inhaler Inhale 2 puffs into the lungs every 6 hours as needed for shortness  of breath / dyspnea or wheezing 1 Inhaler 0     Ipratropium-Albuterol (COMBIVENT RESPIMAT)  MCG/ACT inhaler Inhale 2 puffs into the lungs 2 times daily Not to exceed 6 doses per day. 1 Inhaler 0     nabumetone (RELAFEN) 750 MG tablet Take 1 tablet (750 mg) by mouth 2 times daily as needed for moderate pain 60 tablet 1     insulin pen needle (B-D U/F) 31G X 5 MM Use 4 daily or as directed. 100 each 11     mometasone-formoterol (DULERA) 200-5 MCG/ACT oral inhaler Inhale 2 puffs into the lungs 2 times daily 13 g 11     gabapentin (NEURONTIN) 600 MG tablet Take 1 tab in the morning, and 2 tabs at night. 90 tablet 3     SUMAtriptan (IMITREX) 100 MG tablet Take 1 tablet (100 mg) by mouth at onset of headache 9 tablet 5     QUEtiapine (SEROQUEL) 50 MG tablet TAKE 1 TABLET BY MOUTH TWICE DAILY       QUEtiapine (SEROQUEL) 300 MG tablet TAKE 1 TABLET BY MOUTH AT BEDTIME. INDICATIONS: MANIC PHASE OF MANIC-DEPRESSION -CARMEN DURAN, YULISA       insulin aspart (NOVOLOG FLEXPEN) 100 UNIT/ML injection Take 28 Units with meals. 3 mL 11     acetaminophen (TYLENOL) 500 MG tablet Take 2 tablets (1,000 mg) by mouth 3 times daily as needed for mild pain 180 tablet 3     ondansetron (ZOFRAN) 4 MG tablet Take 1 tablet (4 mg) by mouth every 8 hours as needed for nausea 18 tablet 2     glycerin, adult, 2 G SUPP Suppository Place 1 suppository rectally daily as needed for constipation 3 suppository 3     blood glucose monitoring (NO BRAND SPECIFIED) meter device kit Use to test blood sugar 3 times daily or as directed. 1 kit 0     blood glucose monitoring (NO BRAND SPECIFIED) test strip Use to test blood sugars 3 times daily or as directed 100 strip prn     blood glucose (NO BRAND SPECIFIED) lancets standard Use to test blood sugar 3 times daily or as directed. 1 Box prn     loratadine (CLARITIN) 10 MG tablet Take 2 tablets (20 mg) by mouth daily 180 tablet 3     hydrOXYzine (ATARAX) 25 MG tablet Take 1-2 tablets (25-50 mg) by mouth every 6  hours as needed for itching, anxiety or other (sleep) 120 tablet 5     lisinopril (PRINIVIL/ZESTRIL) 20 MG tablet Take 1 tablet (20 mg) by mouth daily 30 tablet 5     pramipexole (MIRAPEX) 0.5 MG tablet Take 1 tablet (0.5 mg) by mouth At Bedtime 90 tablet 1     [DISCONTINUED] gabapentin (NEURONTIN) 300 MG capsule Take 1 capsule (300 mg) by mouth 3 times daily 90 capsule 5     docosanol (ABREVA) 10 % CREA cream Apply topically 5 times daily 1 Tube 5     pravastatin (PRAVACHOL) 20 MG tablet Take 2 tablets (40 mg) by mouth daily 180 tablet 0     [DISCONTINUED] mometasone-formoterol (DULERA) 100-5 MCG/ACT oral inhaler Inhale 2 puffs into the lungs 2 times daily 13 g 3     sennosides (SENOKOT) 8.6 MG tablet Take 2 tablets by mouth 2 times daily 120 tablet 11     azelastine (OPTIVAR) 0.05 % SOLN ophthalmic solution Apply 1 drop to eye 2 times daily 1 Bottle 11     insulin degludec (TRESIBA) 200 UNIT/ML pen Inject 60 Units Subcutaneous daily 3 mL 11     order for DME Equipment being ordered: Nebulizer supplies for life. 1 Device 0     albuterol (2.5 MG/3ML) 0.083% neb solution Take 1 vial (2.5 mg) by nebulization every 6 hours as needed for shortness of breath / dyspnea or wheezing 30 vial 1     order for DME Adult nebulizer mask and tubing. 1 each 0     ranitidine (ZANTAC) 150 MG tablet Take 1 tablet (150 mg) by mouth 2 times daily 60 tablet 11     topiramate ER - 24 hour (QUDEXY XR) 100 MG sprinkle capsule Take 1 capsule (100 mg) by mouth daily 90 capsule 3     order for DME Equipment being ordered: CPAP.  1 device.  Per sleep study recommendations.  Brand:  Respironics, Type:  Nasal Wisp,  Size:  Small 1 Device 0     naloxone (NARCAN) nasal spray Spray 1 spray (4 mg) into one nostril alternating nostrils as needed for opioid reversal (every 2-3 minutes until medical assistance arrives.) 0.2 mL 0     fluticasone (FLONASE) 50 MCG/ACT nasal spray Spray 2 sprays into both nostrils daily 9.9 g 11     venlafaxine (EFFEXOR-XR)  75 MG 24 hr capsule Take 225 mg by mouth daily 30 capsule      melatonin 3 MG tablet Take 3-9 mg by mouth nightly as needed       medroxyPROGESTERone (DEPO-PROVERA) 150 MG/ML injection Inject 1 mL (150 mg) into the muscle every 3 months 1 mL 3     EPINEPHrine (EPIPEN) 0.3 MG/0.3ML injection Inject 0.3 mLs (0.3 mg) into the muscle once as needed for anaphylaxis 2 each 1     [DISCONTINUED] QUEtiapine (SEROQUEL) 50 MG tablet Take 1 tablet (50 mg) by mouth every morning For anxiety.  From Psychiatrist. 1 tablet 0     miconazole (MICATIN) 2 % vaginal cream Place 1 applicator vaginally At Bedtime Substitute as needed. 5 g 3     clindamycin (CLINDAMAX) 1 % lotion Apply topically 2 times daily 60 mL 11     diphenhydrAMINE (BENADRYL) 50 MG capsule Take  mg by mouth At Bedtime.       [DISCONTINUED] QUEtiapine (SEROQUEL) 300 MG tablet Take 300 mg by mouth daily.         Social History   Substance Use Topics     Smoking status: Current Every Day Smoker     Packs/day: 1.00     Types: Cigarettes     Smokeless tobacco: Never Used     Alcohol use No       Lab Results   Component Value Date    A1C 8.8 06/30/2017    A1C 8.2 04/20/2017    A1C 9.4 01/20/2017    A1C 8.7 11/04/2016    A1C 8.5 09/06/2016     Last Basic Metabolic Panel:  Lab Results   Component Value Date    .3 04/20/2017      Lab Results   Component Value Date    POTASSIUM 3.8 04/20/2017     Lab Results   Component Value Date    CHLORIDE 103.5 04/20/2017     Lab Results   Component Value Date    TRUNG 10.0 04/20/2017     Lab Results   Component Value Date    CO2 17.5 04/20/2017     Lab Results   Component Value Date    BUN 10.3 04/20/2017     Lab Results   Component Value Date    CR 0.6 04/20/2017     Lab Results   Component Value Date    .0 04/20/2017       BP Readings from Last 3 Encounters:   09/28/17 121/84   09/06/17 107/74   07/21/17 137/89       The 10-year ASCVD risk score ([place] JOHNSON Jr, et al., 2013) is: 20.6%    Values used to calculate the  score:      Age: 46 years      Sex: Female      Is Non- : No      Diabetic: Yes      Tobacco smoker: Yes      Systolic Blood Pressure: 121 mmHg      Is BP treated: Yes      HDL Cholesterol: 30.8 mg/dL      Total Cholesterol: 6.3 mmol/L    PHQ-9 score:    PHQ-9 SCORE 7/21/2017   Total Score 10       Assessment                                                       Polypharmacy - uncontrolled     Medication list is not updated, and confuses patient at times    Drug-drug interactions to be aware of    Quetiapine + Ondansetron + Hydroxyzine + Venlafaxine --> increase risk for QT prolongation    Nabumetone (NSAID) + Venlafaxine --> increase risk of bleed and GI ulcer    Sumatriptan + Venlafaxine  --> Increase risk of serotonin syndrome    Plan/Recommendations                                                       Updated medication list in the EMR; deleted meds patient no longer taking and added meds patient is now taking, and changed doses where there was a dose discrepancy.    All medications were reviewed and found to be indicated, effective, safe and convenient/ affordable unless drug therapy problem(s) was/were identified, as are described below.      Completed at this visit    Polypharmacy     Updated clinic medication list     Options for treatment and/or follow-up care were reviewed with the patient.  Teresa was engaged and actively involved in the decision making process, verbalized understanding of the options discussed, and was satisfied with the final plan.    Follow-up                                                       Patient should follow up with Dr. Norman or Dr. Bullock.  Patient was provided with written instructions/medication list via AVS.     Dr. Norman was provided the recommendations above  in clinic today, was available for supervision during this visit and is the authorizing prescriber for this visit through the pharmacist collaborative practice agreement.    [place]  JENISE Terrell, PharmD Student      Drug therapy problems identified  1. Med: Polypharmacy - Safety - medication instruction discrepancies - Resolution: update clinic med list; resolved    # of medical conditions addressed: 10+  # of medications addressed: 46+  # of medication discrepancies identified: 19  # of DTP identified: 1  Time spent: 45 minutes  Level of service: 2NC    The student acted as scribe and the encounter documented was completely performed by myself. I have reviewed and verified the student s documentation and found it to be correct and complete.  Alejandra Olson, Pharm.D.

## 2017-09-29 NOTE — TELEPHONE ENCOUNTER
Yes.  She doesn't need both!  Sorry for the confusion.      Tyra Bullock    Routed to Triage RN.
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
## 2017-10-05 DIAGNOSIS — G25.81 RESTLESS LEG SYNDROME: ICD-10-CM

## 2017-10-05 DIAGNOSIS — G89.4 CHRONIC PAIN SYNDROME: ICD-10-CM

## 2017-10-05 RX ORDER — PRAMIPEXOLE DIHYDROCHLORIDE 0.5 MG/1
0.5 TABLET ORAL AT BEDTIME
Qty: 90 TABLET | Refills: 0 | Status: SHIPPED | OUTPATIENT
Start: 2017-10-05 | End: 2017-11-10

## 2017-10-06 ENCOUNTER — HOSPITAL ENCOUNTER (OUTPATIENT)
Dept: NUCLEAR MEDICINE | Facility: CLINIC | Age: 47
Discharge: HOME | End: 2017-10-06
Attending: STUDENT IN AN ORGANIZED HEALTH CARE EDUCATION/TRAINING PROGRAM

## 2017-10-06 ENCOUNTER — RECORDS - HEALTHEAST (OUTPATIENT)
Dept: ADMINISTRATIVE | Facility: OTHER | Age: 47
End: 2017-10-06

## 2017-10-06 ENCOUNTER — OFFICE VISIT (OUTPATIENT)
Dept: FAMILY MEDICINE | Facility: CLINIC | Age: 47
End: 2017-10-06

## 2017-10-06 ENCOUNTER — HOSPITAL ENCOUNTER (OUTPATIENT)
Dept: RADIOLOGY | Facility: CLINIC | Age: 47
Discharge: HOME | End: 2017-10-06
Attending: STUDENT IN AN ORGANIZED HEALTH CARE EDUCATION/TRAINING PROGRAM

## 2017-10-06 VITALS
WEIGHT: 221.4 LBS | DIASTOLIC BLOOD PRESSURE: 85 MMHG | OXYGEN SATURATION: 94 % | BODY MASS INDEX: 43.24 KG/M2 | TEMPERATURE: 99 F | HEART RATE: 118 BPM | SYSTOLIC BLOOD PRESSURE: 132 MMHG

## 2017-10-06 DIAGNOSIS — R06.02 SHORTNESS OF BREATH: ICD-10-CM

## 2017-10-06 DIAGNOSIS — R00.0 TACHYCARDIA: ICD-10-CM

## 2017-10-06 DIAGNOSIS — R05.9 COUGH: Primary | ICD-10-CM

## 2017-10-06 DIAGNOSIS — R06.02 SOB (SHORTNESS OF BREATH): ICD-10-CM

## 2017-10-06 LAB
BUN SERPL-MCNC: 13.4 MG/DL (ref 7–19)
CALCIUM SERPL-MCNC: 10.2 MG/DL (ref 8.5–10.1)
CHLORIDE SERPLBLD-SCNC: 98.6 MMOL/L (ref 98–110)
CO2 SERPL-SCNC: 21.1 MMOL/L (ref 20–32)
CREAT SERPL-MCNC: 0.5 MG/DL (ref 0.5–1)
D DIMER PPP FEU-MCNC: 0.94 FEU UG/ML
GFR SERPL CREATININE-BSD FRML MDRD: >90 ML/MIN/1.7 M2
GLUCOSE SERPL-MCNC: 290.6 MG'DL (ref 70–99)
HCT VFR BLD AUTO: 44.6 % (ref 35–47)
HEMOGLOBIN: 14.6 G/DL (ref 11.7–15.7)
MCH RBC QN AUTO: 30.5 PG (ref 26.5–35)
MCHC RBC AUTO-ENTMCNC: 32.7 G/DL (ref 32–36)
MCV RBC AUTO: 93.1 FL (ref 78–100)
PLATELET # BLD AUTO: 283 K/UL (ref 150–450)
POTASSIUM SERPL-SCNC: 3.7 MMOL/DL (ref 3.2–4.6)
RBC # BLD AUTO: 4.8 M/UL (ref 3.8–5.2)
SODIUM SERPL-SCNC: 131.7 MMOL/L (ref 132–142)
WBC # BLD AUTO: 18 K/UL (ref 4–11)

## 2017-10-06 RX ORDER — OXYCODONE AND ACETAMINOPHEN 5; 325 MG/1; MG/1
1 TABLET ORAL EVERY 4 HOURS PRN
Qty: 18 TABLET | Refills: 0 | Status: SHIPPED | OUTPATIENT
Start: 2017-10-06 | End: 2018-03-14

## 2017-10-06 RX ORDER — DOXYCYCLINE 100 MG/1
100 CAPSULE ORAL 2 TIMES DAILY
Qty: 20 CAPSULE | Refills: 0 | Status: SHIPPED | OUTPATIENT
Start: 2017-10-06 | End: 2017-11-10

## 2017-10-06 RX ORDER — BENZONATATE 100 MG/1
100 CAPSULE ORAL 3 TIMES DAILY PRN
Qty: 42 CAPSULE | Refills: 1 | Status: SHIPPED | OUTPATIENT
Start: 2017-10-06 | End: 2017-10-17

## 2017-10-06 RX ORDER — PREDNISONE 20 MG/1
40 TABLET ORAL DAILY
Qty: 10 TABLET | Refills: 0 | Status: SHIPPED | OUTPATIENT
Start: 2017-10-06 | End: 2017-10-11

## 2017-10-06 ASSESSMENT — PATIENT HEALTH QUESTIONNAIRE - PHQ9: SUM OF ALL RESPONSES TO PHQ QUESTIONS 1-9: 10
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
## 2017-10-06 NOTE — PATIENT INSTRUCTIONS
Teresa,    Thank you for coming to see me in clinic today.  You most likely have a pneumonia that is causing your cough and increased sputum production.  I would like to treat you for this with Doxycycline 100 mg, twice daily, for a total of 10 days.  I am also drawing a D-Dimer level to rule out a blood clot in your lung that would make you short of breath and have an elevated heart rate.  I will call you with these results.  If the D-Dimer test is elevated, you will need to go to the hospital for a CT scan of the chest to make sure you don't have a blood clot.  Call or let us know if your symptoms change or do not improve in the next few days.    Sincerely,    Dr. Masters      [facility]  Radiology Department 1st floor  [address] [address]  [phone]    Appointment  Date: Today 10/6/17  Time: 3:45PM    Please contact the above clinic if you need to cancel or reschedule. Feel free to contact me with any questions. Thanks!    Carey  Referral Coordinator  [phone]      Called patient.

## 2017-10-06 NOTE — PROGRESS NOTES
Preceptor attestation:  Patient seen and discussed with the resident. Assessment and plan reviewed with resident and agreed upon.  Supervising physician: Alber Rivera  [facility]
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
## 2017-10-07 ASSESSMENT — ASTHMA QUESTIONNAIRES: ACT_TOTALSCORE: 20

## 2017-10-10 ENCOUNTER — TELEPHONE (OUTPATIENT)
Dept: FAMILY MEDICINE | Facility: CLINIC | Age: 47
End: 2017-10-10

## 2017-10-10 NOTE — TELEPHONE ENCOUNTER
Received note from insurance that prior authorization for Nabumetone has been denied.  Other covered options include meloxicam, ibuprofen, naproxen.  Pt has allergy to naproxen and aspirin.  Given this allergy history, would like to discuss options with patient before prescribing and alternative.  Recommend that patient schedule follow up appointment to discuss.      Tyra Bullock    Routed to triage RN.

## 2017-10-11 NOTE — TELEPHONE ENCOUNTER
Noted.   Routed to - per DR. Bullock, let's discuss nabumetone not being covered and discuss optons. Please call pt to schedule this. Thank you.     /YULISA Massey

## 2017-10-13 ENCOUNTER — TELEPHONE (OUTPATIENT)
Dept: FAMILY MEDICINE | Facility: CLINIC | Age: 47
End: 2017-10-13

## 2017-10-13 NOTE — TELEPHONE ENCOUNTER
Pt states she has been having diarrhea since taking doxy. Going to the bathroom at least 5x a day. Denies nausea/vomiting. Symptoms are the same, has not worsened since ov on 10/6. Pt unable to come in today for f/u (as recommended by Dr. Masters). Pt states she doesn't have a ride to the clinic.     Discussed this w/ Dr. Woodard in clinic, stop the doxy and come on Monday. Gave these instructions to the pt. Also told pt to increase water intake and get plenty of rest. If symptoms worsen, bao breathing problems, go to the ED.      Routed to Dr. Masters and Dr. Woodard and Dr. Bullock (PCP).

## 2017-10-13 NOTE — TELEPHONE ENCOUNTER
[facility] phone call message- general phone call:    Reason for call: returning Dr Masters's phone call from yesterday.  She does not feel well.    Return call needed: Yes    OK to leave a message on voice mail? Yes    Primary language: English      needed? No    Call taken on October 13, 2017 at 1:04 PM by Mary Ann Giraldo

## 2017-10-17 ENCOUNTER — OFFICE VISIT (OUTPATIENT)
Dept: FAMILY MEDICINE | Facility: CLINIC | Age: 47
End: 2017-10-17

## 2017-10-17 VITALS
HEART RATE: 124 BPM | BODY MASS INDEX: 43.4 KG/M2 | TEMPERATURE: 98.6 F | SYSTOLIC BLOOD PRESSURE: 148 MMHG | DIASTOLIC BLOOD PRESSURE: 85 MMHG | WEIGHT: 222.2 LBS

## 2017-10-17 DIAGNOSIS — R05.9 COUGH: ICD-10-CM

## 2017-10-17 DIAGNOSIS — G89.4 CHRONIC PAIN SYNDROME: ICD-10-CM

## 2017-10-17 DIAGNOSIS — H10.45 CHRONIC ALLERGIC CONJUNCTIVITIS: ICD-10-CM

## 2017-10-17 RX ORDER — FLUTICASONE PROPIONATE 50 MCG
2 SPRAY, SUSPENSION (ML) NASAL DAILY
Qty: 9.9 G | Refills: 11 | Status: SHIPPED | OUTPATIENT
Start: 2017-10-17 | End: 2018-11-13

## 2017-10-17 RX ORDER — IBUPROFEN 600 MG/1
600 TABLET, FILM COATED ORAL EVERY 6 HOURS PRN
Qty: 90 TABLET | Refills: 1 | Status: SHIPPED | OUTPATIENT
Start: 2017-10-17 | End: 2017-10-17

## 2017-10-17 RX ORDER — BENZONATATE 100 MG/1
200 CAPSULE ORAL 3 TIMES DAILY PRN
Qty: 42 CAPSULE | Refills: 1 | Status: SHIPPED | OUTPATIENT
Start: 2017-10-17 | End: 2017-11-07

## 2017-10-17 RX ORDER — PREDNISONE 20 MG/1
TABLET ORAL
Qty: 20 TABLET | Refills: 0 | Status: SHIPPED | OUTPATIENT
Start: 2017-10-17 | End: 2017-11-10

## 2017-10-17 NOTE — PATIENT INSTRUCTIONS
Teresa,    Thanks for coming in to see me today!  I prescribed you Flonase nasal spray, Prednisone tapering dose, the pain medication, and more of the Tessalon pearls.  Please come back on Friday to see me so we can do an EKG and see how your symptoms are.  Thank you!    Sincerely,    Dr. Masters
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
## 2017-10-17 NOTE — PROGRESS NOTES
Preceptor attestation:  Patient seen and discussed with the resident. Assessment and plan reviewed with resident and agreed upon.  Supervising physician: Rao Lux

## 2017-10-17 NOTE — PROGRESS NOTES
S: Teresa Perez is a 46 year old female with a PMH of hypertension, bipolar disorder, chronic tobacco abuse, and severe asthma presenting to clinic today with a chief complaint of continued cough, sputum production, and general malaise.    Teresa was seen by me in clinic on 10/6.  At that time, she had an elevated white blood cell count, cough productive of yellow/green sputum, and shortness of breath.  Since she was tachycardic, there was also concern for a PE; her D-Dimer was elevated and she was sent over to [facility] for a V/Q scan (contrast allergy so no CT PE run).  Her V/Q scan was negative.  She was sent home with a 10-day course of Doxycycline and a 5-day course of Prednisone for presumed community-acquired pneumonia vs. COPD exacerbation.      She reports that she started taking the Doxycycline as prescribed but started getting diarrhea 4-5 x per day with the abx.  She called the clinic and was advised to stop the antibiotic, which she did after about 7 days total of treatment.  The diarrhea has resolved at this time.  She felt that the Prednisone burst did help her cough and shortness of breath.  She also noted that the Tessalon pearls I had prescribed at that time had helped with her cough.  However, today, the patient states that her cough, sputum production, nasal congestion, and ear fullness are worsening.  She does not note increased shortness of breath, but endorses continued patterns of fevers and chills.  She has cut back on smoking to about 5 cigarettes per day and continues to use her 3 inhalers; Breo-Ellipta (Fluticasone-vilanterol), Combivent (Ipratropium-Albuterol), and her Albuterol rescue inhaler.    Of note, the patient asked me repeatedly for refills of her Percocet, which she was originally prescribed due to bruised ribs in the emergency room several weeks ago.       ROS:  Constitutional: + fevers, + chills.  ENT:  No acute change in hearing, + for left ear fullness/popping.  +  sinus/nasal congestion.  + sore throat.    Card/Vasc: No chest pain or palpitations.  Respiratory: No shortness of breath. + cough. + sputum production.  GI: No nausea, vomiting, or constipation, + diarrhea.  Musculoskeletal:  + paracostal pain.  Psych:  Euthymic.  Heme/Lymph:  No lower extremity edema.  Allergy/Immunology: + allergies (cat dander).    Patient Active Problem List   Diagnosis     Health Care Home     Acute peptic ulcer     Other allergy, other than to medicinal agents     Bipolar disorder (H)     Bulging lumbar disc     Cervical dysplasia     Common migraine without aura     Constipation     Dwarfism     Familial hypercholesterolemia     HTN (hypertension)     Insomnia     Low back pain     Intermittent asthma     Leg pain, bilateral     Smoking     Degeneration of thoracic or thoracolumbar intervertebral disc     Diabetes mellitus, type 2 (H)     LOMAS (nonalcoholic steatohepatitis)     Disease of lung     Hemorrhoids     Parotid mass     Endometriosis     NNAMDI (obstructive sleep apnea)     History of total right knee replacement     Lateral epicondylitis     Impingement syndrome, shoulder, left     Hx of total knee replacement, left     Chronic pain syndrome     Current Outpatient Prescriptions   Medication Sig Dispense Refill     benzonatate (TESSALON) 100 MG capsule Take 2 capsules (200 mg) by mouth 3 times daily as needed for cough 42 capsule 1     predniSONE (DELTASONE) 20 MG tablet Take 3 tabs (60 mg) by mouth daily x 3 days, 2 tabs (40 mg) daily x 3 days, 1 tab (20 mg) daily x 3 days, then 1/2 tab (10 mg) x 3 days. 20 tablet 0     fluticasone (FLONASE) 50 MCG/ACT spray Spray 2 sprays into both nostrils daily 9.9 g 11     doxycycline (VIBRAMYCIN) 100 MG capsule Take 1 capsule (100 mg) by mouth 2 times daily 20 capsule 0     oxyCODONE-acetaminophen (PERCOCET) 5-325 MG per tablet Take 1 tablet by mouth every 4 hours as needed for pain maximum 2 tablet(s) per day 18 tablet 0     pramipexole  (MIRAPEX) 0.5 MG tablet Take 1 tablet (0.5 mg) by mouth At Bedtime 90 tablet 0     oxyCODONE-acetaminophen (PERCOCET) 5-325 MG per tablet Take 1 tablet by mouth every 4 hours as needed for pain 12 tablet 0     bacitracin-polymyxin b (POLYSPORIN) ointment Apply topically 2 times daily 15 g 0     docusate sodium (COLACE) 100 MG tablet Take 100-200 mg by mouth 2 times daily 120 tablet 3     baclofen (LIORESAL) 10 MG tablet Take 1 and 1/2 tab (15 mg) three times daily. 135 tablet 1     budesonide-formoterol (SYMBICORT) 160-4.5 MCG/ACT Inhaler Inhale 2 puffs into the lungs 2 times daily 3 Inhaler 3     fluticasone-vilanterol (BREO ELLIPTA) 100-25 MCG/INH oral inhaler Inhale 1 puff into the lungs daily 1 Inhaler 1     albuterol (PROAIR HFA/PROVENTIL HFA/VENTOLIN HFA) 108 (90 BASE) MCG/ACT Inhaler Inhale 2 puffs into the lungs every 6 hours as needed for shortness of breath / dyspnea or wheezing 1 Inhaler 0     Ipratropium-Albuterol (COMBIVENT RESPIMAT)  MCG/ACT inhaler Inhale 2 puffs into the lungs 2 times daily Not to exceed 6 doses per day. 1 Inhaler 0     insulin pen needle (B-D U/F) 31G X 5 MM Use 4 daily or as directed. 100 each 11     gabapentin (NEURONTIN) 600 MG tablet Take 1 tab in the morning, and 2 tabs at night. 90 tablet 3     SUMAtriptan (IMITREX) 100 MG tablet Take 1 tablet (100 mg) by mouth at onset of headache 9 tablet 5     QUEtiapine (SEROQUEL) 50 MG tablet TAKE 1 TABLET BY MOUTH TWICE DAILY       QUEtiapine (SEROQUEL) 300 MG tablet TAKE 1 TABLET BY MOUTH AT BEDTIME. INDICATIONS: MANIC PHASE OF MANIC-DEPRESSION -CARMEN DURAN RN       insulin aspart (NOVOLOG FLEXPEN) 100 UNIT/ML injection Take 28 Units with meals. 3 mL 11     acetaminophen (TYLENOL) 500 MG tablet Take 2 tablets (1,000 mg) by mouth 3 times daily as needed for mild pain 180 tablet 3     ondansetron (ZOFRAN) 4 MG tablet Take 1 tablet (4 mg) by mouth every 8 hours as needed for nausea 18 tablet 2     glycerin, adult, 2 G SUPP  Suppository Place 1 suppository rectally daily as needed for constipation 3 suppository 3     blood glucose monitoring (NO BRAND SPECIFIED) meter device kit Use to test blood sugar 3 times daily or as directed. 1 kit 0     blood glucose monitoring (NO BRAND SPECIFIED) test strip Use to test blood sugars 3 times daily or as directed 100 strip prn     blood glucose (NO BRAND SPECIFIED) lancets standard Use to test blood sugar 3 times daily or as directed. 1 Box prn     loratadine (CLARITIN) 10 MG tablet Take 2 tablets (20 mg) by mouth daily 180 tablet 3     hydrOXYzine (ATARAX) 25 MG tablet Take 1-2 tablets (25-50 mg) by mouth every 6 hours as needed for itching, anxiety or other (sleep) 120 tablet 5     lisinopril (PRINIVIL/ZESTRIL) 20 MG tablet Take 1 tablet (20 mg) by mouth daily 30 tablet 5     docosanol (ABREVA) 10 % CREA cream Apply topically 5 times daily 1 Tube 5     pravastatin (PRAVACHOL) 20 MG tablet Take 2 tablets (40 mg) by mouth daily 180 tablet 0     sennosides (SENOKOT) 8.6 MG tablet Take 2 tablets by mouth 2 times daily 120 tablet 11     azelastine (OPTIVAR) 0.05 % SOLN ophthalmic solution Apply 1 drop to eye 2 times daily 1 Bottle 11     insulin degludec (TRESIBA) 200 UNIT/ML pen Inject 60 Units Subcutaneous daily 3 mL 11     order for DME Equipment being ordered: Nebulizer supplies for life. 1 Device 0     albuterol (2.5 MG/3ML) 0.083% neb solution Take 1 vial (2.5 mg) by nebulization every 6 hours as needed for shortness of breath / dyspnea or wheezing 30 vial 1     order for DME Adult nebulizer mask and tubing. 1 each 0     ranitidine (ZANTAC) 150 MG tablet Take 1 tablet (150 mg) by mouth 2 times daily 60 tablet 11     topiramate ER - 24 hour (QUDEXY XR) 100 MG sprinkle capsule Take 1 capsule (100 mg) by mouth daily 90 capsule 3     order for DME Equipment being ordered: CPAP.  1 device.  Per sleep study recommendations.  Brand:  Respironics, Type:  Nasal Wisp,  Size:  Small 1 Device 0      naloxone (NARCAN) nasal spray Spray 1 spray (4 mg) into one nostril alternating nostrils as needed for opioid reversal (every 2-3 minutes until medical assistance arrives.) 0.2 mL 0     venlafaxine (EFFEXOR-XR) 75 MG 24 hr capsule Take 225 mg by mouth daily 30 capsule      melatonin 3 MG tablet Take 3-9 mg by mouth nightly as needed       medroxyPROGESTERone (DEPO-PROVERA) 150 MG/ML injection Inject 1 mL (150 mg) into the muscle every 3 months 1 mL 3     EPINEPHrine (EPIPEN) 0.3 MG/0.3ML injection Inject 0.3 mLs (0.3 mg) into the muscle once as needed for anaphylaxis 2 each 1     miconazole (MICATIN) 2 % vaginal cream Place 1 applicator vaginally At Bedtime Substitute as needed. 5 g 3     clindamycin (CLINDAMAX) 1 % lotion Apply topically 2 times daily 60 mL 11     diphenhydrAMINE (BENADRYL) 50 MG capsule Take  mg by mouth At Bedtime.       O: /85  Pulse 124  Temp 98.6  F (37  C) (Oral)  Wt 222 lb 3.2 oz (100.8 kg)  BMI 43.4 kg/m2     Constitutional:  Well-nourished, pale, obese female who appears uncomfortable but in no acute distress.  Eyes:  Extraocular movements are intact.  Head:  Normocephalic, hair short.  ENT/Mouth:  TMs normal color and landmarks, with mild erythema noted over the middle ear ossicles on the left but no bulging or erythema over the membrane itself; oropharynx pink and moist without exudates.  Neck: Supple, without cervical or supraclavicular lymphadenopathy.  CV:  Heart rate is tachycardic, with a regular rhythm.  I do not appreciate any clicks, murmurs, or rubs on exam.   Respiratory:  Wheezes on exam diffusely throughout the lung field.  Inspiration is clear but long expiratory wheezes are consistent throughout.  GI/Abdomen: Soft, non-tender, no masses noted, BS intact throughout.  Long midline scar present with numerous stria as well.  Integument:  No rash or skin change noted over the anterior or posterior thorax or abdomen.  Extrem: No cyanosis, edema, or calf  tenderness.  Psych: Euthymic      Assessment and Plan:  Teresa was seen today for recheck.    Diagnoses and all orders for this visit:    Cough, chronic  -with stable O2 saturation at 96% and tachycardic at 124 on exam today, with diffuse wheezes.  Her tachycardia appears to be chronic, as she has been documented to be tachycardic at her visits over the last several months.  -most likely multi-factorial at this point.  The patient was treated for a COPD exacerbation with Doxycycline and Prednisone with minimal improvement.  She takes three inhalers daily and has severe environmental allergies (including to cat dander, although she lives with a cat and does not want to get rid of it).  -will proceed with benzonatate (TESSALON) 100 MG capsule; Take 2 capsules (200 mg) by mouth 3 times daily as needed for cough, since this helped with her cough initially.  -will also recommend predniSONE (DELTASONE) 20 MG tablet; Take 3 tabs (60 mg) by mouth daily x 3 days, 2 tabs (40 mg) daily x 3 days, 1 tab (20 mg) daily x 3 days, then 1/2 tab (10 mg) x 3 days to decrease inflammation.  -fluticasone (FLONASE) 50 MCG/ACT spray; Spray 2 sprays into both nostrils daily for nasal congestion and subsequent eustacian tube dysfunction.  - after discussion with Dr. Timmons, recommend that Teresa return to clinic by the end of the week to evaluate symptom progression and perform EKG at that time, given persistent tachycardia and it does not appear that we have a baseline EKG.    Chronic pain syndrome  -asked by the patient to refill her Nabumetone, but it is not covered by her insurance and it does not appear that she has been taking this for quite some time.  Prescribed Ibuprofen but patient states she is allergic (anaphylaxis) to all other NSAIDs aside from Nabumetone.  Spoke with the patient on the phone after her visit and she does have Extra-Strength Tylenol at home, which she will continue to use.  We discussed at length the reason why we  would not continue to prescribe narcotic medications and that we would not be refilling her Percocet.     Hypertension  -elevated BP today in clinic of 148/85.  Appears that patient is on Lisinopril 20 mg daily; will re-assess at next visit for need to make change in dose or begin second medication.  Previously BP was well controlled.    Chronic issues  -I think Teresa could benefit greatly from the input of our pharmacy staff, as well as behavioral health.  There appear to be a few medications that are on her med list that she is not actually taking.  I also think with her psychiatric history and desire for narcotic medications (with previous + test for cocaine in the urine), our behavioral health staff may be able to help work through some of her health issues in an efficient manner.  Would greatly appreciate their assistance during follow-up visit with her later this week.       This patient was seen and discussed with Dr. Rao Timmons MD.    Alber Masters MD, PGY 1  [facility]

## 2017-10-24 DIAGNOSIS — I10 ESSENTIAL HYPERTENSION, BENIGN: ICD-10-CM

## 2017-10-24 DIAGNOSIS — G89.29 CHRONIC BILATERAL LOW BACK PAIN WITH LEFT-SIDED SCIATICA: ICD-10-CM

## 2017-10-24 DIAGNOSIS — M54.42 CHRONIC BILATERAL LOW BACK PAIN WITH LEFT-SIDED SCIATICA: ICD-10-CM

## 2017-10-24 DIAGNOSIS — E78.5 HYPERLIPIDEMIA LDL GOAL <100: ICD-10-CM

## 2017-10-25 RX ORDER — LISINOPRIL 20 MG/1
20 TABLET ORAL DAILY
Qty: 30 TABLET | Refills: 5 | Status: SHIPPED | OUTPATIENT
Start: 2017-10-25 | End: 2017-11-10

## 2017-10-25 RX ORDER — PRAVASTATIN SODIUM 40 MG
40 TABLET ORAL DAILY
Qty: 90 TABLET | Refills: 0 | Status: SHIPPED | OUTPATIENT
Start: 2017-10-25 | End: 2017-11-10

## 2017-10-25 RX ORDER — GABAPENTIN 600 MG/1
TABLET ORAL
Qty: 90 TABLET | Refills: 1 | Status: SHIPPED | OUTPATIENT
Start: 2017-10-25 | End: 2018-01-10

## 2017-11-03 DIAGNOSIS — Z79.4 TYPE 2 DIABETES MELLITUS WITHOUT COMPLICATION, WITH LONG-TERM CURRENT USE OF INSULIN (H): ICD-10-CM

## 2017-11-03 DIAGNOSIS — E11.9 TYPE 2 DIABETES MELLITUS WITHOUT COMPLICATION, WITH LONG-TERM CURRENT USE OF INSULIN (H): ICD-10-CM

## 2017-11-06 ENCOUNTER — TELEPHONE (OUTPATIENT)
Dept: FAMILY MEDICINE | Facility: CLINIC | Age: 47
End: 2017-11-06

## 2017-11-06 DIAGNOSIS — E11.8 TYPE 2 DIABETES MELLITUS WITH COMPLICATION, WITH LONG-TERM CURRENT USE OF INSULIN (H): Primary | ICD-10-CM

## 2017-11-06 DIAGNOSIS — Z79.4 TYPE 2 DIABETES MELLITUS WITH COMPLICATION, WITH LONG-TERM CURRENT USE OF INSULIN (H): Primary | ICD-10-CM

## 2017-11-06 NOTE — TELEPHONE ENCOUNTER
PA is needed for novolog now, per [facility]. The alternative is lispro (Humalog). Please send new rx if alt is appropriate. Thanks.  Routed to Dr. Bullock PCP. /YULISA Massey

## 2017-11-07 ENCOUNTER — DOCUMENTATION ONLY (OUTPATIENT)
Dept: FAMILY MEDICINE | Facility: CLINIC | Age: 47
End: 2017-11-07

## 2017-11-07 ENCOUNTER — OFFICE VISIT (OUTPATIENT)
Dept: FAMILY MEDICINE | Facility: CLINIC | Age: 47
End: 2017-11-07

## 2017-11-07 VITALS
BODY MASS INDEX: 44.18 KG/M2 | OXYGEN SATURATION: 97 % | DIASTOLIC BLOOD PRESSURE: 89 MMHG | SYSTOLIC BLOOD PRESSURE: 129 MMHG | HEART RATE: 114 BPM | TEMPERATURE: 99.1 F | WEIGHT: 226.2 LBS

## 2017-11-07 DIAGNOSIS — R05.9 COUGH: Primary | ICD-10-CM

## 2017-11-07 DIAGNOSIS — G47.33 OSA (OBSTRUCTIVE SLEEP APNEA): Primary | ICD-10-CM

## 2017-11-07 DIAGNOSIS — E11.8 TYPE 2 DIABETES MELLITUS WITH COMPLICATION, WITH LONG-TERM CURRENT USE OF INSULIN (H): ICD-10-CM

## 2017-11-07 DIAGNOSIS — G47.33 OSA (OBSTRUCTIVE SLEEP APNEA): ICD-10-CM

## 2017-11-07 DIAGNOSIS — E78.01 FAMILIAL HYPERCHOLESTEROLEMIA: ICD-10-CM

## 2017-11-07 DIAGNOSIS — K75.81 NASH (NONALCOHOLIC STEATOHEPATITIS): ICD-10-CM

## 2017-11-07 DIAGNOSIS — G43.009 MIGRAINE WITHOUT AURA AND WITHOUT STATUS MIGRAINOSUS, NOT INTRACTABLE: ICD-10-CM

## 2017-11-07 DIAGNOSIS — Z79.4 TYPE 2 DIABETES MELLITUS WITH COMPLICATION, WITH LONG-TERM CURRENT USE OF INSULIN (H): ICD-10-CM

## 2017-11-07 DIAGNOSIS — R05.9 COUGH: ICD-10-CM

## 2017-11-07 DIAGNOSIS — I10 ESSENTIAL HYPERTENSION: ICD-10-CM

## 2017-11-07 DIAGNOSIS — I10 ESSENTIAL HYPERTENSION, BENIGN: ICD-10-CM

## 2017-11-07 DIAGNOSIS — J01.90 ACUTE SINUSITIS WITH COEXISTING CONDITION REQUIRING PROPHYLACTIC TREATMENT: ICD-10-CM

## 2017-11-07 DIAGNOSIS — F17.200 SMOKING: ICD-10-CM

## 2017-11-07 RX ORDER — BENZONATATE 100 MG/1
200 CAPSULE ORAL 3 TIMES DAILY PRN
Qty: 42 CAPSULE | Refills: 1 | Status: SHIPPED | OUTPATIENT
Start: 2017-11-07 | End: 2017-11-17

## 2017-11-07 RX ORDER — AZITHROMYCIN 250 MG/1
TABLET, FILM COATED ORAL
Qty: 6 TABLET | Refills: 0 | Status: SHIPPED | OUTPATIENT
Start: 2017-11-07 | End: 2018-03-14

## 2017-11-07 NOTE — PROGRESS NOTES
Spoke with patient who called and stated she was still coughing and feeling ill after getting over a respiratory infection(s) that she has been dealing with over the last several weeks. She has been treated with multiple rounds of steroids as well as an antibiotic. She also endorses a headache with some neck soreness. No vision changes She took her migraine medication last night but that did not resolve headache. She also mentions some throat discomfort and body aches. She has been using her controller inhalers and then also using albuterol 2 puffs every 6 hours. Denies significant shortness of breath. No chest pain. Has ongoing intermittent chills, night sweats.    Advised patient that she should be re-evaluated in clinic this morning to see if she has another respiratory infection. Told her she can use her albuterol every few hours until she is seen. She is agreeable to plan and if starts feeling worse she will call back.    Usman Saravia DO  PGY 3
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
## 2017-11-10 ENCOUNTER — OFFICE VISIT (OUTPATIENT)
Dept: FAMILY MEDICINE | Facility: CLINIC | Age: 47
End: 2017-11-10

## 2017-11-10 VITALS
HEART RATE: 101 BPM | WEIGHT: 227.4 LBS | SYSTOLIC BLOOD PRESSURE: 129 MMHG | BODY MASS INDEX: 44.41 KG/M2 | TEMPERATURE: 98 F | DIASTOLIC BLOOD PRESSURE: 86 MMHG | OXYGEN SATURATION: 98 %

## 2017-11-10 DIAGNOSIS — G47.33 OSA (OBSTRUCTIVE SLEEP APNEA): ICD-10-CM

## 2017-11-10 DIAGNOSIS — I10 ESSENTIAL HYPERTENSION, BENIGN: ICD-10-CM

## 2017-11-10 DIAGNOSIS — K21.9 GASTROESOPHAGEAL REFLUX DISEASE WITHOUT ESOPHAGITIS: ICD-10-CM

## 2017-11-10 DIAGNOSIS — J45.30 MILD PERSISTENT ASTHMA WITHOUT COMPLICATION: ICD-10-CM

## 2017-11-10 DIAGNOSIS — I10 ESSENTIAL HYPERTENSION: ICD-10-CM

## 2017-11-10 DIAGNOSIS — E78.01 FAMILIAL HYPERCHOLESTEROLEMIA: ICD-10-CM

## 2017-11-10 DIAGNOSIS — B37.0 THRUSH: ICD-10-CM

## 2017-11-10 DIAGNOSIS — Z71.6 ENCOUNTER FOR SMOKING CESSATION COUNSELING: ICD-10-CM

## 2017-11-10 DIAGNOSIS — R07.89 CHEST WALL PAIN: ICD-10-CM

## 2017-11-10 DIAGNOSIS — I10 ESSENTIAL HYPERTENSION: Primary | ICD-10-CM

## 2017-11-10 DIAGNOSIS — Z79.4 TYPE 2 DIABETES MELLITUS WITH COMPLICATION, WITH LONG-TERM CURRENT USE OF INSULIN (H): ICD-10-CM

## 2017-11-10 DIAGNOSIS — J45.31 MILD PERSISTENT ASTHMA WITH ACUTE EXACERBATION: ICD-10-CM

## 2017-11-10 DIAGNOSIS — E78.5 HYPERLIPIDEMIA LDL GOAL <100: ICD-10-CM

## 2017-11-10 DIAGNOSIS — G25.81 RESTLESS LEG SYNDROME: ICD-10-CM

## 2017-11-10 DIAGNOSIS — R06.02 SOB (SHORTNESS OF BREATH): Primary | ICD-10-CM

## 2017-11-10 DIAGNOSIS — G43.709 CHRONIC MIGRAINE WITHOUT AURA WITHOUT STATUS MIGRAINOSUS, NOT INTRACTABLE: ICD-10-CM

## 2017-11-10 DIAGNOSIS — N80.9 ENDOMETRIOSIS: ICD-10-CM

## 2017-11-10 DIAGNOSIS — E11.8 TYPE 2 DIABETES MELLITUS WITH COMPLICATION, WITH LONG-TERM CURRENT USE OF INSULIN (H): ICD-10-CM

## 2017-11-10 LAB
% GRANULOCYTES: 75 %G (ref 40–75)
BUN SERPL-MCNC: 13.1 MG/DL (ref 7–19)
CALCIUM SERPL-MCNC: 9.1 MG/DL (ref 8.5–10.1)
CHLORIDE SERPLBLD-SCNC: 104.5 MMOL/L (ref 98–110)
CHOLEST SERPL-MCNC: 164 MG/DL (ref 0–200)
CHOLEST/HDLC SERPL: 5 {RATIO} (ref 0–5)
CO2 SERPL-SCNC: 23.1 MMOL/L (ref 20–32)
CREAT SERPL-MCNC: 0.5 MG/DL (ref 0.5–1)
GFR SERPL CREATININE-BSD FRML MDRD: >90 ML/MIN/1.7 M2
GLUCOSE SERPL-MCNC: 332.4 MG'DL (ref 70–99)
GRANULOCYTES #: 10.2 K/UL (ref 1.6–8.3)
HBA1C MFR BLD: 9.2 % (ref 4.1–5.7)
HCT VFR BLD AUTO: 44.6 % (ref 35–47)
HDLC SERPL-MCNC: 32.8 MG/DL
HEMOGLOBIN: 14.3 G/DL (ref 11.7–15.7)
LDLC SERPL CALC-MCNC: 60 MG/DL (ref 0–129)
LYMPHOCYTES # BLD AUTO: 2.6 K/UL (ref 0.8–5.3)
LYMPHOCYTES NFR BLD AUTO: 18.9 %L (ref 20–48)
MCH RBC QN AUTO: 30 PG (ref 26.5–35)
MCHC RBC AUTO-ENTMCNC: 32.1 G/DL (ref 32–36)
MCV RBC AUTO: 93.6 FL (ref 78–100)
MID #: 0.8 K/UL (ref 0–2.2)
MID %: 6.1 %M (ref 0–20)
PLATELET # BLD AUTO: 284 K/UL (ref 150–450)
POTASSIUM SERPL-SCNC: 3.9 MMOL/DL (ref 3.2–4.6)
RBC # BLD AUTO: 4.8 M/UL (ref 3.8–5.2)
SODIUM SERPL-SCNC: 134.7 MMOL/L (ref 132–142)
TRIGL SERPL-MCNC: 355.2 MG/DL (ref 0–150)
VLDL CHOLESTEROL: 71 MG/DL (ref 7–32)
WBC # BLD AUTO: 13.6 K/UL (ref 4–11)

## 2017-11-10 RX ORDER — PRAVASTATIN SODIUM 80 MG/1
80 TABLET ORAL DAILY
Qty: 90 TABLET | Refills: 1 | Status: SHIPPED | OUTPATIENT
Start: 2017-11-10 | End: 2018-03-09

## 2017-11-10 RX ORDER — EPINEPHRINE 0.3 MG/.3ML
0.3 INJECTION SUBCUTANEOUS
Qty: 2 ML | Refills: 0 | Status: SHIPPED | OUTPATIENT
Start: 2017-11-10 | End: 2018-10-18

## 2017-11-10 RX ORDER — PRAMIPEXOLE DIHYDROCHLORIDE 0.75 MG/1
0.75 TABLET ORAL AT BEDTIME
Qty: 90 TABLET | Refills: 1 | Status: SHIPPED | OUTPATIENT
Start: 2017-11-10 | End: 2018-02-14

## 2017-11-10 RX ORDER — MELOXICAM 7.5 MG/1
7.5 TABLET ORAL 2 TIMES DAILY
Qty: 180 TABLET | Refills: 1 | Status: SHIPPED | OUTPATIENT
Start: 2017-11-10 | End: 2018-02-06

## 2017-11-10 RX ORDER — SUMATRIPTAN 100 MG/1
100 TABLET, FILM COATED ORAL
Qty: 9 TABLET | Refills: 5 | Status: SHIPPED | OUTPATIENT
Start: 2017-11-10 | End: 2018-12-15

## 2017-11-10 RX ORDER — ALBUTEROL SULFATE 0.83 MG/ML
1 SOLUTION RESPIRATORY (INHALATION) EVERY 6 HOURS PRN
Qty: 30 VIAL | Refills: 1 | Status: SHIPPED | OUTPATIENT
Start: 2017-11-10 | End: 2018-10-18

## 2017-11-10 RX ORDER — LISINOPRIL 20 MG/1
20 TABLET ORAL DAILY
Qty: 30 TABLET | Refills: 5 | Status: SHIPPED | OUTPATIENT
Start: 2017-11-10 | End: 2017-12-08

## 2017-11-10 RX ORDER — OXYCODONE AND ACETAMINOPHEN 7.5; 325 MG/1; MG/1
1 TABLET ORAL EVERY 6 HOURS PRN
Qty: 15 TABLET | Refills: 0 | Status: SHIPPED | OUTPATIENT
Start: 2017-11-10 | End: 2018-03-14

## 2017-11-10 RX ORDER — NYSTATIN 100000/ML
500000 SUSPENSION, ORAL (FINAL DOSE FORM) ORAL 4 TIMES DAILY
Qty: 60 ML | Refills: 0 | Status: SHIPPED | OUTPATIENT
Start: 2017-11-10 | End: 2019-09-20

## 2017-11-10 RX ORDER — ALBUTEROL SULFATE 90 UG/1
2 AEROSOL, METERED RESPIRATORY (INHALATION) EVERY 6 HOURS PRN
Qty: 2 INHALER | Refills: 2 | Status: SHIPPED | OUTPATIENT
Start: 2017-11-10 | End: 2018-03-14

## 2017-11-10 NOTE — LETTER
November 13, 2017      Teresa Perez  [address]    [place] [address]        Dear Teresa,  Here is a copy of your lab results from our visit on Friday.  Your A1c is better than we expected at 9.2, but is still too high.  Continue to work on taking your insulin regularly, and things should get better now that you no longer need to take the prednisone for your breathing.  Your cholesterol is much better--I would like you taking 80mg per day given your risk factors.  You kidney tests (including creatinine) look good.  Your potassium is normal. Your white blood count is improving, and your hemoglobin is good.  We are still waiting on the tuberculosis testing.  Please call the clinic at [phone] if you have any questions.     Please see below for your test results.    Resulted Orders   CBC with Diff Plt ([facility])   Result Value Ref Range    WBC 13.6 (H) 4.0 - 11.0 K/uL    Lymphocytes # 2.6 0.8 - 5.3 K/uL    % Lymphocytes 18.9 (L) 20.0 - 48.0 %L    Mid # 0.8 0.0 - 2.2 K/uL    Mid % 6.1 0.0 - 20.0 %M    GRANULOCYTES # 10.2 (H) 1.6 - 8.3 K/uL    % Granulocytes 75.0 40.0 - 75.0 %G    RBC 4.8 3.8 - 5.2 M/uL    Hemoglobin 14.3 11.7 - 15.7 g/dL    Hematocrit 44.6 35.0 - 47.0 %    MCV 93.6 78.0 - 100.0 fL    MCH 30.0 26.5 - 35.0    MCHC 32.1 32.0 - 36.0 g/dL    Platelets 284.0 150.0 - 450.0 K/uL   Lipid Panel ([place])   Result Value Ref Range    Cholesterol 164.0 0.0 - 200.0 mg/dL    Cholesterol/HDL Ratio 5.0 0.0 - 5.0    HDL Cholesterol 32.8 (L) >40.0 mg/dL    LDL Cholesterol Calculated 60 0 - 129 mg/dL    Triglycerides 355.2 (H) 0.0 - 150.0 mg/dL    VLDL Cholesterol 71.0 (H) 7.0 - 32.0 mg/dL   Hemoglobin A1c ([facility])   Result Value Ref Range    Hemoglobin A1C 9.2 (H) 4.1 - 5.7 %   Basic Metabolic Panel ([place])   Result Value Ref Range    Urea Nitrogen 13.1 7.0 - 19.0 mg/dL    Calcium 9.1 8.5 - 10.1 mg/dL    Chloride 104.5 98.0 - 110.0 mmol/L    Carbon Dioxide 23.1 20.0 - 32.0 mmol/L    Creatinine 0.5  0.5 - 1.0 mg/dL    Glucose 332.4 (H) 70.0 - 99.0 mg'dL    Potassium 3.9 3.2 - 4.6 mmol/dL    Sodium 134.7 132.0 - 142.0 mmol/L    GFR Estimate >90 >60.0 mL/min/1.7 m2    GFR Estimate If Black >90 >60.0 mL/min/1.7 m2       If you have any questions, please call the clinic to make an appointment.    Sincerely,    Tyra Bullock MD

## 2017-11-10 NOTE — LETTER
November 14, 2017      Teresa Perez  [address]   [place] [address]      Please see below for your test results.    Resulted Orders   CBC with Diff Plt ([facility])   Result Value Ref Range    WBC 13.6 (H) 4.0 - 11.0 K/uL    Lymphocytes # 2.6 0.8 - 5.3 K/uL    % Lymphocytes 18.9 (L) 20.0 - 48.0 %L    Mid # 0.8 0.0 - 2.2 K/uL    Mid % 6.1 0.0 - 20.0 %M    GRANULOCYTES # 10.2 (H) 1.6 - 8.3 K/uL    % Granulocytes 75.0 40.0 - 75.0 %G    RBC 4.8 3.8 - 5.2 M/uL    Hemoglobin 14.3 11.7 - 15.7 g/dL    Hematocrit 44.6 35.0 - 47.0 %    MCV 93.6 78.0 - 100.0 fL    MCH 30.0 26.5 - 35.0    MCHC 32.1 32.0 - 36.0 g/dL    Platelets 284.0 150.0 - 450.0 K/uL   Lipid Panel ([facility])   Result Value Ref Range    Cholesterol 164.0 0.0 - 200.0 mg/dL    Cholesterol/HDL Ratio 5.0 0.0 - 5.0    HDL Cholesterol 32.8 (L) >40.0 mg/dL    LDL Cholesterol Calculated 60 0 - 129 mg/dL    Triglycerides 355.2 (H) 0.0 - 150.0 mg/dL    VLDL Cholesterol 71.0 (H) 7.0 - 32.0 mg/dL   Hemoglobin A1c ([facility])   Result Value Ref Range    Hemoglobin A1C 9.2 (H) 4.1 - 5.7 %   Basic Metabolic Panel ([facility])   Result Value Ref Range    Urea Nitrogen 13.1 7.0 - 19.0 mg/dL    Calcium 9.1 8.5 - 10.1 mg/dL    Chloride 104.5 98.0 - 110.0 mmol/L    Carbon Dioxide 23.1 20.0 - 32.0 mmol/L    Creatinine 0.5 0.5 - 1.0 mg/dL    Glucose 332.4 (H) 70.0 - 99.0 mg'dL    Potassium 3.9 3.2 - 4.6 mmol/dL    Sodium 134.7 132.0 - 142.0 mmol/L    GFR Estimate >90 >60.0 mL/min/1.7 m2    GFR Estimate If Black >90 >60.0 mL/min/1.7 m2   Myc Tuberc Qtferon ([facility])   Result Value Ref Range    QTF Result Negative Negative    QTF Interpretation       No interferon-gamma response to M. tuberculosis antigens was detected.  Infecton with M.   tuberculosis is unlikely.  A negative result alone does not exclude infection with M.   tuberculosis      QTF Nil 0.05 IU/mL    QTF TB Antigen _ Nil 0.12 IU/mL    QTF Mitogen - Nil >10.00 IU/mL    Narrative    Test performed by:  [facility]  [address] [place] [address]           Teresa Perez-    Your tuberculosis blood test was negative.  This is good news.  Please call the clinic at [phone] if you have any questions.      Tyra Bullock

## 2017-11-10 NOTE — PATIENT INSTRUCTIONS
Meloxicam:  Anti-inflammatory, 1 pill morning and night.   Oxycodone, Can take for chest wall pain  Get in to pain clinic.  No more scripts for the rib pain from [place].    Pravastatin, take 80mg per day (2 pills for now, then go to one with next prescription)  Diabetes is not bad.  Keep on same regimen.   Lungs getting better:  --Complete sleep study  --Do pulmonary function testing  --Work on quitting smoking.    Stop in lab for TB test.      Schedule appt with Dr. Bullock in 2-3 weeks.
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
## 2017-11-11 ASSESSMENT — ASTHMA QUESTIONNAIRES: ACT_TOTALSCORE: 11

## 2017-11-12 NOTE — PROGRESS NOTES
Teresa Perez-    Here is a copy of your lab results from our visit on Friday.  Your A1c is better than we expected at 9.2, but is still too high.  Continue to work on taking your insulin regularly, and things should get better now that you no longer need to take the prednisone for your breathing.  Your cholesterol is much better--I would like you taking 80mg per day given your risk factors.  You kidney tests (including creatinine) look good.  Your potassium is normal. Your white blood count is improving, and your hemoglobin is good.  We are still waiting on the tuberculosis testing.  Please call the clinic at [phone] if you have any questions.      Tyra Bullock    Please send results to patient.
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
## 2017-11-13 LAB
QTF INTERPRETATION: NORMAL
QTF MITOGEN - NIL: >10 IU/ML
QTF NIL: 0.05 IU/ML
QTF RESULT: NEGATIVE
QTF TB ANTIGEN - NIL: 0.12 IU/ML

## 2017-11-13 NOTE — PROGRESS NOTES
Teresa Perez-    Your tuberculosis blood test was negative.  This is good news.  Please call the clinic at [phone] if you have any questions.      Tyra Bullock    Please send results to patient.

## 2017-11-17 DIAGNOSIS — R05.9 COUGH: ICD-10-CM

## 2017-11-17 RX ORDER — BENZONATATE 100 MG/1
200 CAPSULE ORAL 3 TIMES DAILY PRN
Qty: 42 CAPSULE | Refills: 0 | Status: SHIPPED | OUTPATIENT
Start: 2017-11-17 | End: 2017-11-27

## 2017-11-27 ENCOUNTER — ALLIED HEALTH/NURSE VISIT (OUTPATIENT)
Dept: FAMILY MEDICINE | Facility: CLINIC | Age: 47
End: 2017-11-27

## 2017-11-27 VITALS
TEMPERATURE: 99.6 F | DIASTOLIC BLOOD PRESSURE: 88 MMHG | HEART RATE: 108 BPM | SYSTOLIC BLOOD PRESSURE: 131 MMHG | OXYGEN SATURATION: 97 %

## 2017-11-27 DIAGNOSIS — Z30.42 ENCOUNTER FOR SURVEILLANCE OF INJECTABLE CONTRACEPTIVE: Primary | ICD-10-CM

## 2017-11-27 DIAGNOSIS — R05.9 COUGH: ICD-10-CM

## 2017-11-27 RX ORDER — BENZONATATE 100 MG/1
200 CAPSULE ORAL 3 TIMES DAILY PRN
Qty: 42 CAPSULE | Refills: 0 | Status: SHIPPED | OUTPATIENT
Start: 2017-11-27 | End: 2017-12-08
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
## 2017-11-27 NOTE — NURSING NOTE
I administered the following to Teresa Perez.    MEDICATION: Medroxyprogesterone 150 mg  ROUTE: IM  SITE: Deltoid - Right  DOSE: 150 mg/ mL  LOT #: [id number]   :  Cloud[employer]   EXPIRATION DATE:  02/2022  NDC#: [address]-9     Gave patient a reminder to come back February 12, 2018 through March 2018     Was entire vial of medication used? Yes    Name of provider who requested the injection: Dr. Bullock  Name of provider on site (faculty or community preceptor) at the time of performing the injection: Dr. Keven Nickerson, A

## 2017-12-08 ENCOUNTER — OFFICE VISIT (OUTPATIENT)
Dept: FAMILY MEDICINE | Facility: CLINIC | Age: 47
End: 2017-12-08

## 2017-12-08 VITALS
BODY MASS INDEX: 45.04 KG/M2 | HEART RATE: 109 BPM | TEMPERATURE: 98.4 F | WEIGHT: 230.6 LBS | OXYGEN SATURATION: 99 % | DIASTOLIC BLOOD PRESSURE: 87 MMHG | SYSTOLIC BLOOD PRESSURE: 133 MMHG

## 2017-12-08 DIAGNOSIS — H91.93 BILATERAL HEARING LOSS, UNSPECIFIED HEARING LOSS TYPE: ICD-10-CM

## 2017-12-08 DIAGNOSIS — R05.9 COUGH: ICD-10-CM

## 2017-12-08 DIAGNOSIS — G89.29 CHRONIC BILATERAL LOW BACK PAIN WITHOUT SCIATICA: ICD-10-CM

## 2017-12-08 DIAGNOSIS — H92.01 RIGHT EAR PAIN: ICD-10-CM

## 2017-12-08 DIAGNOSIS — M79.644 PAIN OF FINGER OF RIGHT HAND: Primary | ICD-10-CM

## 2017-12-08 DIAGNOSIS — I10 ESSENTIAL HYPERTENSION, BENIGN: ICD-10-CM

## 2017-12-08 DIAGNOSIS — M54.50 CHRONIC BILATERAL LOW BACK PAIN WITHOUT SCIATICA: ICD-10-CM

## 2017-12-08 RX ORDER — LISINOPRIL 40 MG/1
40 TABLET ORAL DAILY
Qty: 90 TABLET | Refills: 1 | Status: SHIPPED | OUTPATIENT
Start: 2017-12-08 | End: 2018-05-24

## 2017-12-08 RX ORDER — ACETAMINOPHEN 500 MG
1000 TABLET ORAL 3 TIMES DAILY PRN
Qty: 180 TABLET | Refills: 3 | Status: SHIPPED | OUTPATIENT
Start: 2017-12-08 | End: 2018-08-16

## 2017-12-08 RX ORDER — BENZONATATE 200 MG/1
200 CAPSULE ORAL 3 TIMES DAILY PRN
Qty: 60 CAPSULE | Refills: 1 | Status: SHIPPED | OUTPATIENT
Start: 2017-12-08 | End: 2018-06-26
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
## 2017-12-08 NOTE — LETTER
December 12, 2017      Teresa Perez  [address]   [place] [address]        Dear Teresa,    Please see below for your test results.    I am one of Dr. Bullock's partners covering for her while she is out of town.   The radiology read of your finger confirms that there are no fractures or dislocations.    Sincerely,    Tyra Bullock MD
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
## 2017-12-12 ENCOUNTER — TELEPHONE (OUTPATIENT)
Dept: FAMILY MEDICINE | Facility: CLINIC | Age: 47
End: 2017-12-12

## 2017-12-12 ENCOUNTER — TRANSFERRED RECORDS (OUTPATIENT)
Dept: HEALTH INFORMATION MANAGEMENT | Facility: CLINIC | Age: 47
End: 2017-12-12

## 2017-12-12 NOTE — TELEPHONE ENCOUNTER
Patient states that the welts went away but questions if there is something else that can be prescribed. Please advise. /YULISA Ozuna  Routed to Dr. Bullock

## 2017-12-12 NOTE — PROGRESS NOTES
I am one of Dr. Bullock's partners covering for her while she is out of town.   The radiology read of your finger confirms that there are no fractures or dislocations.

## 2017-12-12 NOTE — TELEPHONE ENCOUNTER
[facility] phone call message-patient reporting a symptom:     Symptom: Pt said she is having an allergic reaction to the capsaicin 0.035% cream. She said she is has welts. She wants to know if something else could be prescribed?      Same Day Visit Offered: Yes, declined    Additional comments: None     OK to leave message on voice mail? Yes    Primary language: English      needed? No    Call taken on December 12, 2017 at 1:23 PM by Mary Osman

## 2017-12-13 NOTE — TELEPHONE ENCOUNTER
Patient was also told to artemio tape, ice, and use tylenol.  There are not likely other creams that would be helpful.  Traumatic pain does usually heal over time (a few days) and the Xray was negative for fracture. Would defer any further prescriptions to Dr. Bullock.  IF it is not getting better over the next week or two she should have a follow up visit.    aMi Quinn MD  Routed to RN.

## 2017-12-14 ENCOUNTER — OFFICE VISIT (OUTPATIENT)
Dept: FAMILY MEDICINE | Facility: CLINIC | Age: 47
End: 2017-12-14
Payer: MEDICARE

## 2017-12-14 VITALS
SYSTOLIC BLOOD PRESSURE: 128 MMHG | TEMPERATURE: 97 F | DIASTOLIC BLOOD PRESSURE: 85 MMHG | HEART RATE: 112 BPM | OXYGEN SATURATION: 98 %

## 2017-12-14 DIAGNOSIS — M54.42 ACUTE LEFT-SIDED LOW BACK PAIN WITH LEFT-SIDED SCIATICA: Primary | ICD-10-CM

## 2017-12-14 RX ORDER — CYCLOBENZAPRINE HCL 5 MG
5 TABLET ORAL 3 TIMES DAILY PRN
Qty: 42 TABLET | Refills: 0 | Status: SHIPPED | OUTPATIENT
Start: 2017-12-14 | End: 2018-03-14

## 2017-12-14 ASSESSMENT — PAIN SCALES - GENERAL: PAINLEVEL: WORST PAIN (10)
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
## 2017-12-14 NOTE — PATIENT INSTRUCTIONS
We will call to set up care with [facility].  Can buy Biofreeze over the counter for back.  Stop taking Baclofen and take Flexeril instead.     When You Have Low Back Pain  Caring for Your Back  You are not alone.  Low back pain is very common. Nearly half of all adults have low back pain in any given year.  The good news is that back pain is rarely a danger to your health. Most people can manage their back pain on their own and about half of them start feeling better within 2 weeks. In 9 out of 10 cases, low back pain goes away or no longer limits daily activity within 6 weeks.  Your outlook is good!  Your symptoms tell us that your low back pain is most likely not a danger to you. Most of the time we do not know the exact cause of low back pain, even if you see a doctor or have an MRI. However, treatment can still work without knowing the cause of the pain. Less than 1 in 100 people need surgery for their back pain.  What can I do about my low back pain?  There are three things you can do to ease low back pain and help it go away.    Use heat or cold packs.    Take medicine as directed.    Use positions, movements and exercises.  Using heat or cold packs  Try cold packs or gentle heat to ease your pain. Use whichever gives you the most relief. Apply the cold pack or heat for 15 minutes at a time, as often as needed.  Taking medicine    If your doctor has prescribed medicine, be sure to follow the directions.    If you take over-the-counter medicine, read and follow the directions.    Talk to your doctor if you have any questions.  Using positions, movements and exercises  Research tells us that moving your joints and muscles can help you recover from back pain. Such activity should be simple and gentle.  Use the positions in the photos as well as walking to help relieve your pain. Try taking a short walk every 3 to 4 hours during the day. Walk for a few minutes inside your home or take longer walks outside, on a  treadmill or at a mall. Slowly increase the amount of time you walk.  Expect discomfort when you begin, but it should lessen as your back starts to heal. When your back feels better, walk daily to keep your back and body healthy.  Finding a comfortable position  When your back pain is new, certain positions will ease your pain. Gently try each of the positions below until you find one that is helpful. Once you find a position of comfort, use it as often as you like when you are resting. You will recover faster if you combine rest with activity.         When should I call my doctor?  Your back pain should improve over the first couple of weeks. As it improves, you should be able to return to your normal activities. But call your doctor if:    You have a sudden change in your ability to control?your bladder or bowels.    You feel tingling in your groin or legs.    The pain spreads down your leg and into your foot.    Your toes, feet or leg muscles feel weak.    You feel generally unwell or sick.    Your pain does not get better or gets worse.  For informational purposes only. Not to replace the advice of your health care provider.  Copyright   2013 [employer]. All rights reserved. [employer] [id number] - REV 03/16.      Referral for  Sp[facility] Center has been sent along with notes for their review. They will reach out to patient to schedule appointment once reviewed.   Carey  12/19/17
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
## 2017-12-14 NOTE — PROGRESS NOTES
Preceptor attestation:  Vital signs reviewed: /85  Pulse 112  Temp 97  F (36.1  C) (Tympanic)  SpO2 98%  Breastfeeding? No    Patient seen and discussed with the resident. Assessment and plan reviewed with resident and agreed upon.    Supervising physician: Lis Vidal MD  [facility]         SUBJECTIVE       Teresa Perez is a 47 year old  female with a PMH significant for:     Patient Active Problem List   Diagnosis     Health Care Home     Acute peptic ulcer     Other allergy, other than to medicinal agents     Bipolar disorder (H)     Bulging lumbar disc     Cervical dysplasia     Common migraine without aura     Constipation     Dwarfism     Familial hypercholesterolemia     HTN (hypertension)     Insomnia     Low back pain     Intermittent asthma     Leg pain, bilateral     Smoking     Degeneration of thoracic or thoracolumbar intervertebral disc     Diabetes mellitus, type 2 (H)     LOMAS (nonalcoholic steatohepatitis)     Disease of lung     Hemorrhoids     Parotid mass     Endometriosis     NNAMDI (obstructive sleep apnea)     History of total right knee replacement     Lateral epicondylitis     Impingement syndrome, shoulder, left     Hx of total knee replacement, left     Chronic pain syndrome     Cough     Patient presents with:  ER F/U: [facility] on Tuesday 12/11/17 for low back pain radiating down left hip and leg - cat scan showed a compressed disc - pain is radiating towards the left groin area   Med Rec    Patient presents for follow-up from [facility] ER visit for fall and back pain.  Cat tripped her at home. This is the second time in two weeks. Cat [place] as she was heading there and caught her off guard. She landed on her bottom and did not hit her head. She has pain in her low back and it radiates to the right side and goes down her right leg. Moving and getting up and down is painful. She has chronic back pain, however pain is different than the pain she  has had before. More excruciating. Radiating pain. No loss of bowel or bladder. She has had night sweats for several months. No new fevers or chills and no infection seen on CT. Not currently taking pain medications. Not going to pain clinic currently, although plans to go to [facility] pain clinic in [place], [place].    She is taking gabapentin and Baclofen. She is not using capsaicin because she had an allergic reaction to it, which she says is hives. Burned her skin and left welts. States she is due for a cortisone shot in her back, last was perhaps 6-7 months ago. That has been helpful for her in the past.    Still working on quitting smoking but having trouble due to anxiety.    PMH, Medications and Allergies were reviewed and updated as needed.    ROS: As above per HPI        OBJECTIVE     Vitals:    12/14/17 1010   BP: 128/85   Pulse: 112   Temp: 97  F (36.1  C)   TempSrc: Tympanic   SpO2: 98%     There is no height or weight on file to calculate BMI.    GEN: AAox3, appears stated age, appears moderately uncomfortable with movement  HEENT: EOMI, head normocephalic, sclera anicteric, trachea midline  PULM: Non-labored  NEURO: GCS 15  EXTREMITIES: Tender to palpation over L3-L4 area and over left sacroiliac joint, also tender over left greater trochanter, referred pain in the left lower back with straight leg raise on the left, referred pain in the same area with internal and external rotation of the hip  GAIT: normal  PSYCH: euthymic, linear thoughts, no delusions/hallucinations, comprehensible    LABS/IMAGING/EKG  No results found for this or any previous visit (from the past 24 hour(s)).    ASSESSMENT AND PLAN     (M54.42) Acute left-sided low back pain with left-sided sciatica  (primary encounter diagnosis)  Comment: Acute on chronic low back pain with left sided sciatica. CT in the Emergency Dept with chronic lumbar degenerative changes as well as worsened compression of the L1-L2 disc space. We  discussed how her symptoms are not due to fracture or herniation and that managing her pain would require a good long term solution. She did request narcotics, which I denied. I feel that she would benefit from referral to spine care for possible injections, which has been helpful for her in the past. In the meantime, she will take Flexeril PRN for muscle spasms, continue Tylenol and gabapentin, and use ice/heat and movement. I explained my reasoning and she seemed to be fine with this plan.   Plan: cyclobenzaprine (FLEXERIL) 5 MG tablet,         [facility] SPINE CARE REFERRAL    Patient did not comply with medication reconciliation and so complete rec was unable to be performed.     RTC in 1 month for diabetes check or sooner if develops new or worsening back symptoms.       Mari Carter MD PGY-1  [facility]    Discussed with Dr. Vidal who agrees with the above assessment and plan.

## 2017-12-21 ENCOUNTER — AMBULATORY - HEALTHEAST (OUTPATIENT)
Dept: PHYSICAL MEDICINE AND REHAB | Facility: CLINIC | Age: 47
End: 2017-12-21

## 2017-12-21 DIAGNOSIS — M54.42 ACUTE LEFT-SIDED LOW BACK PAIN WITH LEFT-SIDED SCIATICA: ICD-10-CM

## 2018-01-03 ENCOUNTER — COMMUNICATION - HEALTHEAST (OUTPATIENT)
Dept: PHYSICAL MEDICINE AND REHAB | Facility: CLINIC | Age: 48
End: 2018-01-03

## 2018-01-03 DIAGNOSIS — G89.29 CHRONIC BILATERAL LOW BACK PAIN WITH LEFT-SIDED SCIATICA: ICD-10-CM

## 2018-01-03 DIAGNOSIS — M54.42 CHRONIC BILATERAL LOW BACK PAIN WITH LEFT-SIDED SCIATICA: ICD-10-CM

## 2018-01-03 RX ORDER — BACLOFEN 10 MG/1
TABLET ORAL
Qty: 135 TABLET | Refills: 3 | Status: SHIPPED | OUTPATIENT
Start: 2018-01-03 | End: 2018-03-14

## 2018-01-09 ENCOUNTER — TELEPHONE (OUTPATIENT)
Dept: FAMILY MEDICINE | Facility: CLINIC | Age: 48
End: 2018-01-09

## 2018-01-09 NOTE — TELEPHONE ENCOUNTER
[facility] phone call message- patient requesting a refill:    Full Medication Name: Neurontin     Dose: 600mg    Pharmacy confirmed as   [facility] - [place], [place] - [address]  [address]  [address]  [place] [address]  Phone: [phone] Fax: [phone]    [id number] - [place], [place] - [address] [facility] [place] [place]  [address] [facility] [address]  Phone: [phone] Fax: [phone]  : [facility]    Additional Comments: she is put and needs a refill.     OK to leave a message on voice mail? Yes    Primary language: English      needed? No    Call taken on January 9, 2018 at 2:09 PM by Lo Lao

## 2018-01-10 DIAGNOSIS — M54.42 CHRONIC BILATERAL LOW BACK PAIN WITH LEFT-SIDED SCIATICA: ICD-10-CM

## 2018-01-10 DIAGNOSIS — G89.29 CHRONIC BILATERAL LOW BACK PAIN WITH LEFT-SIDED SCIATICA: ICD-10-CM

## 2018-01-11 RX ORDER — GABAPENTIN 600 MG/1
TABLET ORAL
Qty: 90 TABLET | Refills: 5 | Status: SHIPPED | OUTPATIENT
Start: 2018-01-11 | End: 2018-03-14

## 2018-02-06 DIAGNOSIS — R07.89 CHEST WALL PAIN: ICD-10-CM

## 2018-02-06 RX ORDER — MELOXICAM 7.5 MG/1
7.5 TABLET ORAL 2 TIMES DAILY
Qty: 180 TABLET | Refills: 1 | Status: SHIPPED | OUTPATIENT
Start: 2018-02-06 | End: 2018-03-14

## 2018-02-09 DIAGNOSIS — G43.709 CHRONIC MIGRAINE WITHOUT AURA WITHOUT STATUS MIGRAINOSUS, NOT INTRACTABLE: ICD-10-CM

## 2018-02-09 RX ORDER — TOPIRAMATE 100 MG/1
100 CAPSULE, EXTENDED RELEASE ORAL DAILY
Qty: 90 CAPSULE | Refills: 0 | Status: SHIPPED | OUTPATIENT
Start: 2018-02-09 | End: 2018-03-14

## 2018-02-14 DIAGNOSIS — G25.81 RESTLESS LEG SYNDROME: ICD-10-CM

## 2018-02-14 DIAGNOSIS — T78.40XS ALLERGIC REACTION, SEQUELA: ICD-10-CM

## 2018-02-15 RX ORDER — PRAMIPEXOLE DIHYDROCHLORIDE 0.75 MG/1
0.75 TABLET ORAL AT BEDTIME
Qty: 90 TABLET | Refills: 1 | Status: SHIPPED | OUTPATIENT
Start: 2018-02-15 | End: 2018-03-14

## 2018-02-15 RX ORDER — AZELASTINE HYDROCHLORIDE 0.5 MG/ML
1 SOLUTION/ DROPS OPHTHALMIC 2 TIMES DAILY
Qty: 1 BOTTLE | Refills: 11 | Status: SHIPPED | OUTPATIENT
Start: 2018-02-15 | End: 2018-12-15

## 2018-02-20 ENCOUNTER — TELEPHONE (OUTPATIENT)
Dept: FAMILY MEDICINE | Facility: CLINIC | Age: 48
End: 2018-02-20

## 2018-02-22 NOTE — TELEPHONE ENCOUNTER
Spoke with patient on the phone in the evening of 2/20.  Patient states that she has had ongoing cough chills fatigue for last several days.  She says this is been an on again and off again problem throughout the winter.  He endorses being diagnosed with pneumonia at one point as well.  She says the cough is in the fatigue of the most concerning things are troubling things for her.  She has taken some over-the-counter cough medicines with minimal relief.  She is using her Combivent 3-4 times a day.  She does not believe she is wheezing.  She does not had any measured fevers.  She is hydrating well.  She is not having trouble breathing or chest pain.    Instructed patient to use small doses of honey for the cough and she can increase her Combivent every 4 hours if needed.  Recommend that she be evaluated tomorrow in clinic or sooner if she were to develop trouble breathing, chest pain.    Usman Saravia DO  PGY 3

## 2018-03-02 DIAGNOSIS — G89.4 CHRONIC PAIN SYNDROME: ICD-10-CM

## 2018-03-02 DIAGNOSIS — R11.0 NAUSEA: ICD-10-CM

## 2018-03-05 RX ORDER — ONDANSETRON 4 MG/1
4 TABLET, FILM COATED ORAL EVERY 8 HOURS PRN
Qty: 18 TABLET | Refills: 2 | OUTPATIENT
Start: 2018-03-05

## 2018-03-07 DIAGNOSIS — E11.8 TYPE 2 DIABETES MELLITUS WITH COMPLICATION, WITH LONG-TERM CURRENT USE OF INSULIN (H): Primary | ICD-10-CM

## 2018-03-07 DIAGNOSIS — Z79.4 TYPE 2 DIABETES MELLITUS WITH COMPLICATION, WITH LONG-TERM CURRENT USE OF INSULIN (H): Primary | ICD-10-CM

## 2018-03-07 DIAGNOSIS — I10 ESSENTIAL HYPERTENSION: ICD-10-CM

## 2018-03-09 ENCOUNTER — ALLIED HEALTH/NURSE VISIT (OUTPATIENT)
Dept: FAMILY MEDICINE | Facility: CLINIC | Age: 48
End: 2018-03-09
Payer: MEDICARE

## 2018-03-09 VITALS
BODY MASS INDEX: 46.29 KG/M2 | DIASTOLIC BLOOD PRESSURE: 90 MMHG | TEMPERATURE: 98.3 F | SYSTOLIC BLOOD PRESSURE: 141 MMHG | WEIGHT: 237 LBS | HEART RATE: 85 BPM

## 2018-03-09 DIAGNOSIS — N80.9 ENDOMETRIOSIS: Primary | ICD-10-CM

## 2018-03-09 DIAGNOSIS — E78.5 HYPERLIPIDEMIA LDL GOAL <100: ICD-10-CM

## 2018-03-09 RX ORDER — PRAVASTATIN SODIUM 80 MG/1
80 TABLET ORAL DAILY
Qty: 90 TABLET | Refills: 1 | Status: SHIPPED | OUTPATIENT
Start: 2018-03-09 | End: 2018-09-19
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
## 2018-03-09 NOTE — NURSING NOTE
The following medication was given:     MEDICATION: Medroxyprogesterone 150 mg  ROUTE: IM  SITE: Deltoid - Left  DOSE: 150 mg  LOT #: [id number]  :  [email or web address]   EXPIRATION DATE:  5/31/2021  NDC#: [address]-2   Medication administered by: Geneva Rivera CMA  Next depo injection is due between 5/25 to 6/22/2018    Dr. Guzman was available on site at the time of this service.     Reminder card given to patient.

## 2018-03-14 ENCOUNTER — OFFICE VISIT (OUTPATIENT)
Dept: FAMILY MEDICINE | Facility: CLINIC | Age: 48
End: 2018-03-14
Payer: MEDICARE

## 2018-03-14 VITALS
DIASTOLIC BLOOD PRESSURE: 88 MMHG | SYSTOLIC BLOOD PRESSURE: 139 MMHG | TEMPERATURE: 98.5 F | HEART RATE: 103 BPM | WEIGHT: 234.6 LBS | BODY MASS INDEX: 45.82 KG/M2

## 2018-03-14 DIAGNOSIS — G43.809 OTHER MIGRAINE WITHOUT STATUS MIGRAINOSUS, NOT INTRACTABLE: Primary | ICD-10-CM

## 2018-03-14 DIAGNOSIS — E11.8 TYPE 2 DIABETES MELLITUS WITH COMPLICATION, WITH LONG-TERM CURRENT USE OF INSULIN (H): ICD-10-CM

## 2018-03-14 DIAGNOSIS — G25.81 RESTLESS LEG SYNDROME: ICD-10-CM

## 2018-03-14 DIAGNOSIS — K59.00 CONSTIPATION, UNSPECIFIED CONSTIPATION TYPE: ICD-10-CM

## 2018-03-14 DIAGNOSIS — I10 ESSENTIAL HYPERTENSION: ICD-10-CM

## 2018-03-14 DIAGNOSIS — G89.29 CHRONIC BILATERAL LOW BACK PAIN WITH LEFT-SIDED SCIATICA: ICD-10-CM

## 2018-03-14 DIAGNOSIS — K43.9 VENTRAL HERNIA WITHOUT OBSTRUCTION OR GANGRENE: ICD-10-CM

## 2018-03-14 DIAGNOSIS — M54.42 CHRONIC BILATERAL LOW BACK PAIN WITH LEFT-SIDED SCIATICA: ICD-10-CM

## 2018-03-14 DIAGNOSIS — Z79.4 TYPE 2 DIABETES MELLITUS WITH COMPLICATION, WITH LONG-TERM CURRENT USE OF INSULIN (H): ICD-10-CM

## 2018-03-14 DIAGNOSIS — N80.9 ENDOMETRIOSIS: ICD-10-CM

## 2018-03-14 LAB
BUN SERPL-MCNC: 20.5 MG/DL (ref 7–19)
CALCIUM SERPL-MCNC: 10.3 MG/DL (ref 8.5–10.1)
CHLORIDE SERPLBLD-SCNC: 98 MMOL/L (ref 98–110)
CO2 SERPL-SCNC: 25.9 MMOL/L (ref 20–32)
CREAT SERPL-MCNC: 0.5 MG/DL (ref 0.5–1)
GFR SERPL CREATININE-BSD FRML MDRD: >90 ML/MIN/1.7 M2
GLUCOSE SERPL-MCNC: 313.7 MG'DL (ref 70–99)
HBA1C MFR BLD: 9.3 % (ref 4.1–5.7)
POTASSIUM SERPL-SCNC: 4.2 MMOL/DL (ref 3.2–4.6)
SODIUM SERPL-SCNC: 134.2 MMOL/L (ref 132–142)

## 2018-03-14 RX ORDER — GABAPENTIN 600 MG/1
TABLET ORAL
Qty: 450 TABLET | Refills: 1 | Status: SHIPPED | OUTPATIENT
Start: 2018-03-14 | End: 2018-06-26

## 2018-03-14 RX ORDER — BACLOFEN 20 MG/1
20 TABLET ORAL 3 TIMES DAILY
Qty: 180 TABLET | Refills: 1 | Status: SHIPPED | OUTPATIENT
Start: 2018-03-14 | End: 2018-06-26

## 2018-03-14 RX ORDER — PRAMIPEXOLE DIHYDROCHLORIDE 1 MG/1
1 TABLET ORAL AT BEDTIME
Qty: 90 TABLET | Refills: 1 | Status: SHIPPED | OUTPATIENT
Start: 2018-03-14 | End: 2018-06-20

## 2018-03-14 RX ORDER — SENNOSIDES 8.6 MG
2 TABLET ORAL 2 TIMES DAILY
Qty: 1 TABLET | Refills: 0 | Status: SHIPPED | OUTPATIENT
Start: 2018-03-14 | End: 2018-10-18

## 2018-03-14 RX ORDER — PRAMIPEXOLE DIHYDROCHLORIDE 0.75 MG/1
0.75 TABLET ORAL AT BEDTIME
Qty: 90 TABLET | Refills: 1 | Status: SHIPPED | OUTPATIENT
Start: 2018-03-14 | End: 2018-06-26

## 2018-03-14 RX ORDER — TOPIRAMATE 25 MG/1
TABLET, FILM COATED ORAL
Qty: 140 TABLET | Refills: 0 | Status: SHIPPED | OUTPATIENT
Start: 2018-03-14 | End: 2018-04-13

## 2018-03-14 RX ORDER — ASPIRIN 81 MG
100-200 TABLET, DELAYED RELEASE (ENTERIC COATED) ORAL
Qty: 1 TABLET | Refills: 0 | Status: SHIPPED | OUTPATIENT
Start: 2018-03-14 | End: 2018-10-18

## 2018-03-14 NOTE — PATIENT INSTRUCTIONS
Allergies:   Deep clean  Continue claritin and flonase.      Foot pain:  Wear something on your feet!    Soaking feet is good.  Check everynight  Increase gabapentin to 900mg AM, afternoon, and 1200 at Bedtime.      For diabetes:  Increase trujeo to 70 Units at night.  36 Units with meals.    Call if BS consistently above 200mg.    Keep up with the weight loss!      For pain:  Increase baclofen to 20mg 3x per day.  No zombie  Increase the pramipexole to 1 mg per day.      Stop stool softeners from getting sent.  Use diet.      Continue to get depo shots.      Stop in Lab.      Think about diabetic shoes.        [facility]  Address: [address] [place], [place] [address]  [address]  [phone]  Appointment  Date:3/19/18  Time:10:15    Please contact the above clinic if you need to cancel or reschedule. Feel free to contact me with any questions. Thanks!    Carey  Care Coordinator  [phone]    Called patient.     [facility] for injection  [address] [place] [address]   [phone]   Fax: [phone]   Orders have been sent, they will contact patient to schedule.  Carey  18

## 2018-03-14 NOTE — MR AVS SNAPSHOT
After Visit Summary   3/14/2018    Teresa Perez    MRN: [id number]           Patient Information     Date Of Birth          1970        Visit Information        Provider Department      3/14/2018 1:50 PM Tyra Bullock MD [facility]        Today's Diagnoses     Other migraine without status migrainosus, not intractable    -  1    Restless leg syndrome        Chronic bilateral low back pain with left-sided sciatica        Constipation, unspecified constipation type        Ventral hernia without obstruction or gangrene        Type 2 diabetes mellitus with complication, with long-term current use of insulin (H)        Essential hypertension          Care Instructions    Allergies:   Deep clean  Continue claritin and flonase.      Foot pain:  Wear something on your feet!    Soaking feet is good.  Check everynight  Increase gabapentin to 900mg AM, afternoon, and 1200 at Bedtime.      For diabetes:  Increase trujeo to 70 Units at night.  36 Units with meals.    Call if BS consistently above 200mg.    Keep up with the weight loss!      For pain:  Increase baclofen to 20mg 3x per day.  No zombie  Increase the pramipexole to 1 mg per day.      Stop stool softeners from getting sent.  Use diet.      Continue to get depo shots.      Stop in Lab.      Think about diabetic shoes.              Follow-ups after your visit        Additional Services     GENERAL SURG ADULT REFERRAL       Patient prefers to be called    Reason for Referral: recurrent ventral hernia.  Please refer back to previous surgeon.       needed: No  Language: English    May leave message on voicemail: Yes    ([facility] Only) Referral should be tracked (Yes/No)?                  Future tests that were ordered for you today     Open Future Orders        Priority Expected Expires Ordered    GENERAL SURG ADULT REFERRAL Routine  6/14/2018 3/14/2018            Who to contact     Please call your clinic at [phone]  to:    Ask questions about your health    Make or cancel appointments    Discuss your medicines    Learn about your test results    Speak to your doctor            Additional Information About Your Visit        [employer] Information     [employer] is an electronic gateway that provides easy, online access to your medical records. With [employer], you can request a clinic appointment, read your test results, renew a prescription or communicate with your care team.     To sign up for [employer] visit the website at [email or web address]   You will be asked to enter the access code listed below, as well as some personal information. Please follow the directions to create your username and password.     Your access code is: [email or web address]  Expires: 2018  4:03 PM     Your access code will  in 90 days. If you need help or a new code, please contact your [facility] or call [phone] for assistance.        Care EveryWhere ID     This is your Care EveryWhere ID. This could be used by other organizations to access your [facility] medical records  [id number]        Your Vitals Were     Pulse Temperature BMI (Body Mass Index)             103 98.5  F (36.9  C) (Oral) 45.82 kg/m2          Blood Pressure from Last 3 Encounters:   18 139/88   18 141/90   17 128/85    Weight from Last 3 Encounters:   18 234 lb 9.6 oz (106.4 kg)   18 237 lb (107.5 kg)   17 230 lb 9.6 oz (104.6 kg)              We Performed the Following     Basic Metabolic Panel ([facility])     Hemoglobin A1c ([facility])          Today's Medication Changes          These changes are accurate as of 3/14/18  3:16 PM.  If you have any questions, ask your nurse or doctor.               Start taking these medicines.        Dose/Directions    topiramate 25 MG tablet   Commonly known as:  TOPAMAX   Used for:  Other migraine without status migrainosus, not intractable   Replaces:  topiramate  MG 24 hr  capsule   Started by:  Tyra Bullock MD        Take 1 tablet (25 mg) twice daily for 1 week, 2 tablets twice daily for 1 week, 3 tablets twice daily for 1 week, then 4 tablets twice daily.   Quantity:  140 tablet   Refills:  0         These medicines have changed or have updated prescriptions.        Dose/Directions    albuterol (2.5 MG/3ML) 0.083% neb solution   This may have changed:  Another medication with the same name was removed. Continue taking this medication, and follow the directions you see here.   Used for:  Mild persistent asthma with acute exacerbation   Changed by:  Tyra Bullock MD        Dose:  1 vial   Take 1 vial (2.5 mg) by nebulization every 6 hours as needed for shortness of breath / dyspnea or wheezing   Quantity:  30 vial   Refills:  1       baclofen 20 MG tablet   Commonly known as:  LIORESAL   This may have changed:    - medication strength  - how much to take  - how to take this  - when to take this  - additional instructions   Used for:  Chronic bilateral low back pain with left-sided sciatica   Changed by:  Tyra Bullock MD        Dose:  20 mg   Take 1 tablet (20 mg) by mouth 3 times daily   Quantity:  180 tablet   Refills:  1       gabapentin 600 MG tablet   Commonly known as:  NEURONTIN   This may have changed:  additional instructions   Used for:  Chronic bilateral low back pain with left-sided sciatica   Changed by:  Tyra Bullock MD        Take 1.5 tabs in AM and afternoon, take 2 tabs at bedtime.   Quantity:  450 tablet   Refills:  1       * pramipexole dihydrochloride 0.75 MG Tabs   This may have changed:  Another medication with the same name was added. Make sure you understand how and when to take each.   Used for:  Restless leg syndrome   Changed by:  Tyra Bullock MD        Dose:  0.75 mg   Take 0.75 mg by mouth At Bedtime   Quantity:  90 tablet   Refills:  1       * pramipexole 1 MG tablet   Commonly known as:  MIRAPEX   This may have changed:   You were already taking a medication with the same name, and this prescription was added. Make sure you understand how and when to take each.   Used for:  Restless leg syndrome   Changed by:  Tyra Bullock MD        Dose:  1 mg   Take 1 tablet (1 mg) by mouth At Bedtime   Quantity:  90 tablet   Refills:  1       * Notice:  This list has 2 medication(s) that are the same as other medications prescribed for you. Read the directions carefully, and ask your doctor or other care provider to review them with you.      Stop taking these medicines if you haven't already. Please contact your care team if you have questions.     azithromycin 250 MG tablet   Commonly known as:  ZITHROMAX   Stopped by:  Tyra Bullock MD           bacitracin-polymyxin b ointment   Commonly known as:  POLYSPORIN   Stopped by:  Tyra Bullock MD           capsaicin 0.035 % Crea   Stopped by:  Tyra Bullock MD           cyclobenzaprine 5 MG tablet   Commonly known as:  FLEXERIL   Stopped by:  Tyra Bullock MD           meloxicam 7.5 MG tablet   Commonly known as:  MOBIC   Stopped by:  Tyra Bullock MD           oxyCODONE-acetaminophen 5-325 MG per tablet   Commonly known as:  PERCOCET   Stopped by:  Tyra Bullock MD           oxyCODONE-acetaminophen 7.5-325 MG per tablet   Commonly known as:  PERCOCET   Stopped by:  Tyra Bullock MD           topiramate  MG 24 hr capsule   Commonly known as:  QUDEXY XR   Replaced by:  topiramate 25 MG tablet   Stopped by:  Tyra Bullock MD                Where to get your medicines      These medications were sent to [facility] - [place], [place] - [address], [place] [address]     Phone:  [phone]     baclofen 20 MG tablet    docusate sodium 100 MG tablet    gabapentin 600 MG tablet    pramipexole 1 MG tablet    pramipexole dihydrochloride 0.75 MG Tabs    sennosides 8.6 MG tablet         Some of these will need a  paper prescription and others can be bought over the counter.  Ask your nurse if you have questions.     Bring a paper prescription for each of these medications     topiramate 25 MG tablet                Primary Care Provider Office Phone # Fax #    Tyra Bullock [phone]       [facility] [address] [place] [address]        Equal Access to Services     GALINA DOTY : Hadii aad ku hadasho Soomaali, waaxda luqadaha, qaybta kaalmada adeegyada, waxay idiin hayaan adeeg khaggiesh lalandon . So [facility] [phone].    ATENCIÓN: Si habla español, tiene a tang disposición servicios gratuitos de asistencia lingüística. Llame al [phone].    We comply with applicable federal civil rights laws and [place] laws. We do not discriminate on the basis of race, color, national origin, age, disability, sex, sexual orientation, or gender identity.            Thank you!     Thank you for choosing [facility]  for your care. Our goal is always to provide you with excellent care. Hearing back from our patients is one way we can continue to improve our services. Please take a few minutes to complete the written survey that you may receive in the mail after your visit with us. Thank you!             Your Updated Medication List - Protect others around you: Learn how to safely use, store and throw away your medicines at www.disposemymeds.org.          This list is accurate as of 3/14/18  3:16 PM.  Always use your most recent med list.                   Brand Name Dispense Instructions for use Diagnosis    acetaminophen 500 MG tablet    TYLENOL    180 tablet    Take 2 tablets (1,000 mg) by mouth 3 times daily as needed for mild pain    Chronic bilateral low back pain without sciatica       albuterol (2.5 MG/3ML) 0.083% neb solution     30 vial    Take 1 vial (2.5 mg) by nebulization every 6 hours as needed for shortness of breath / dyspnea or wheezing    Mild persistent asthma with acute exacerbation        azelastine 0.05 % Soln ophthalmic solution    OPTIVAR    1 Bottle    Apply 1 drop to eye 2 times daily    Allergic reaction, sequela       baclofen 20 MG tablet    LIORESAL    180 tablet    Take 1 tablet (20 mg) by mouth 3 times daily    Chronic bilateral low back pain with left-sided sciatica       benzonatate 200 MG capsule    TESSALON    60 capsule    Take 1 capsule (200 mg) by mouth 3 times daily as needed for cough    Cough       blood glucose lancets standard    no brand specified    1 Box    Use to test blood sugar 3 times daily or as directed.    Type 2 diabetes mellitus without complication, with long-term current use of insulin (H)       blood glucose monitoring meter device kit    no brand specified    1 kit    Use to test blood sugar 3 times daily or as directed.    Type 2 diabetes mellitus without complication, with long-term current use of insulin (H)       blood glucose monitoring test strip    no brand specified    100 strip    Use to test blood sugars 3 times daily or as directed    Type 2 diabetes mellitus without complication, with long-term current use of insulin (H)       budesonide-formoterol 160-4.5 MCG/ACT Inhaler    SYMBICORT    3 Inhaler    Inhale 2 puffs into the lungs 2 times daily    Intermittent asthma, uncomplicated       clindamycin 1 % lotion    CLINDAMAX    60 mL    Apply topically 2 times daily    Abscess of anal and rectal regions       diphenhydrAMINE 50 MG capsule    BENADRYL     Take  mg by mouth At Bedtime.        docosanol 10 % Crea cream    ABREVA    1 Tube    Apply topically 5 times daily    HSV (herpes simplex virus) infection       docusate sodium 100 MG tablet    COLACE    1 tablet    Take 100-200 mg by mouth 2 times daily    Constipation, unspecified constipation type       EPINEPHrine 0.3 MG/0.3ML injection 2-pack    EPIPEN/ADRENACLICK/or ANY BX GENERIC EQUIV    2 mL    Inject 0.3 mLs (0.3 mg) into the muscle once as needed for anaphylaxis    Endometriosis,  Encounter for smoking cessation counseling       fluticasone 50 MCG/ACT spray    FLONASE    9.9 g    Spray 2 sprays into both nostrils daily    Chronic allergic conjunctivitis       gabapentin 600 MG tablet    NEURONTIN    450 tablet    Take 1.5 tabs in AM and afternoon, take 2 tabs at bedtime.    Chronic bilateral low back pain with left-sided sciatica       glycerin (adult) 2 G Supp Suppository     3 suppository    Place 1 suppository rectally daily as needed for constipation    Constipation, unspecified constipation type       hydrOXYzine 25 MG tablet    ATARAX    120 tablet    Take 1-2 tablets (25-50 mg) by mouth every 6 hours as needed for itching, anxiety or other (sleep)    Chronic pain syndrome       insulin glargine U-300 300 UNIT/ML injection    TOUJEO    6 mL    Inject 60 Units Subcutaneous At Bedtime    Type 2 diabetes mellitus with complication, with long-term current use of insulin (H)       insulin lispro 100 UNIT/ML injection    HumaLOG PEN    15 mL    Inject 28 Units Subcutaneous 3 times daily (before meals)    Type 2 diabetes mellitus with complication, with long-term current use of insulin (H)       insulin pen needle 31G X 5 MM    B-D U/F    100 each    Use 4 daily or as directed.    Diabetes mellitus, type 2 (H)       Ipratropium-Albuterol  MCG/ACT inhaler    COMBIVENT RESPIMAT    1 Inhaler    Inhale 2 puffs into the lungs 2 times daily Not to exceed 6 doses per day.    Moderate persistent asthma without complication       lisinopril 40 MG tablet    PRINIVIL/ZESTRIL    90 tablet    Take 1 tablet (40 mg) by mouth daily    Essential hypertension, benign       loratadine 10 MG tablet    CLARITIN    180 tablet    Take 2 tablets (20 mg) by mouth daily    Seasonal allergic rhinitis, unspecified allergic rhinitis trigger       medroxyPROGESTERone 150 MG/ML injection    DEPO-PROVERA    1 mL    Inject 1 mL (150 mg) into the muscle every 3 months    Endometriosis       melatonin 3 MG tablet       Take 3-9 mg by mouth nightly as needed        miconazole 2 % cream    MICATIN    5 g    Place 1 applicator vaginally At Bedtime Substitute as needed.    Vaginal candidiasis       naloxone nasal spray    NARCAN    0.2 mL    Spray 1 spray (4 mg) into one nostril alternating nostrils as needed for opioid reversal (every 2-3 minutes until medical assistance arrives.)    Chronic narcotic use       nystatin 091477 UNIT/ML suspension    MYCOSTATIN    60 mL    Take 5 mLs (500,000 Units) by mouth 4 times daily    Thrush       ondansetron 4 MG tablet    ZOFRAN    18 tablet    Take 1 tablet (4 mg) by mouth every 8 hours as needed for nausea    Chronic pain syndrome, Nausea       * order for DME     1 Device    Equipment being ordered: CPAP.  1 device.  Per sleep study recommendations.  Brand:  Respironics, Type:  Nasal Wisp,  Size:  Small    NNAMDI (obstructive sleep apnea)       * order for DME     1 each    Adult nebulizer mask and tubing.    Mild persistent asthma with acute exacerbation       * order for DME     1 Device    Equipment being ordered: Nebulizer supplies for life.    Mild persistent asthma with acute exacerbation       * pramipexole dihydrochloride 0.75 MG Tabs     90 tablet    Take 0.75 mg by mouth At Bedtime    Restless leg syndrome       * pramipexole 1 MG tablet    MIRAPEX    90 tablet    Take 1 tablet (1 mg) by mouth At Bedtime    Restless leg syndrome       pravastatin 80 MG tablet    PRAVACHOL    90 tablet    Take 1 tablet (80 mg) by mouth daily    Hyperlipidemia LDL goal <100       * QUEtiapine 50 MG tablet    SEROquel     TAKE 1 TABLET BY MOUTH TWICE DAILY        * QUEtiapine 300 MG tablet    SEROquel     TAKE 1 TABLET BY MOUTH AT BEDTIME. INDICATIONS: MANIC PHASE OF MANIC-DEPRESSION -CARMEN DURAN RN        ranitidine 150 MG tablet    ZANTAC    60 tablet    Take 1 tablet (150 mg) by mouth 2 times daily    Gastroesophageal reflux disease without esophagitis       sennosides 8.6 MG tablet    SENOKOT    1  tablet    Take 2 tablets by mouth 2 times daily    Constipation, unspecified constipation type       SUMAtriptan 100 MG tablet    IMITREX    9 tablet    Take 1 tablet (100 mg) by mouth at onset of headache    Chronic migraine without aura without status migrainosus, not intractable       topiramate 25 MG tablet    TOPAMAX    140 tablet    Take 1 tablet (25 mg) twice daily for 1 week, 2 tablets twice daily for 1 week, 3 tablets twice daily for 1 week, then 4 tablets twice daily.    Other migraine without status migrainosus, not intractable       venlafaxine 75 MG 24 hr capsule    EFFEXOR-XR    30 capsule    Take 225 mg by mouth daily        * Notice:  This list has 7 medication(s) that are the same as other medications prescribed for you. Read the directions carefully, and ask your doctor or other care provider to review them with you.

## 2018-03-14 NOTE — LETTER
March 16, 2018      Teresa Perez  [address]   [place] [address]        Dear Teresa,  Here are your lab results.  Your blood sugars and A1c are still higher than we'd like.  We'll keep working on it.  Please call the clinic at [phone] if you have any questions.     Please see below for your test results.    Resulted Orders   Hemoglobin A1c ([facility])   Result Value Ref Range    Hemoglobin A1C 9.3 (H) 4.1 - 5.7 %   Basic Metabolic Panel ([facility])   Result Value Ref Range    Urea Nitrogen 20.5 (H) 7.0 - 19.0 mg/dL    Calcium 10.3 (H) 8.5 - 10.1 mg/dL    Chloride 98.0 98.0 - 110.0 mmol/L    Carbon Dioxide 25.9 20.0 - 32.0 mmol/L    Creatinine 0.5 0.5 - 1.0 mg/dL    Glucose 313.7 (H) 70.0 - 99.0 mg'dL    Potassium 4.2 3.2 - 4.6 mmol/dL    Sodium 134.2 132.0 - 142.0 mmol/L    GFR Estimate >90 >60.0 mL/min/1.7 m2    GFR Estimate If Black >90 >60.0 mL/min/1.7 m2       If you have any questions, please call the clinic to make an appointment.    Sincerely,    Tyra Bullock MD

## 2018-03-15 NOTE — PROGRESS NOTES
Teresa Perez-    Here are your lab results.  Your blood sugars and A1c are still higher than we'd like.  We'll keep working on it.  Please call the clinic at [phone] if you have any questions.      Tyra Bullock    Please send results to patient.

## 2018-03-16 ENCOUNTER — TELEPHONE (OUTPATIENT)
Dept: FAMILY MEDICINE | Facility: CLINIC | Age: 48
End: 2018-03-16

## 2018-03-16 NOTE — TELEPHONE ENCOUNTER
Patient is calling wondering if we will send her a referral to [facility] for an injection.   Please advise.

## 2018-03-17 ASSESSMENT — ASTHMA QUESTIONNAIRES: ACT_TOTALSCORE: 17

## 2018-03-19 ENCOUNTER — COMMUNICATION - HEALTHEAST (OUTPATIENT)
Dept: SURGERY | Facility: CLINIC | Age: 48
End: 2018-03-19

## 2018-03-22 ENCOUNTER — OFFICE VISIT (OUTPATIENT)
Dept: FAMILY MEDICINE | Facility: CLINIC | Age: 48
End: 2018-03-22
Payer: MEDICARE

## 2018-03-22 VITALS
BODY MASS INDEX: 44.18 KG/M2 | HEART RATE: 96 BPM | OXYGEN SATURATION: 97 % | WEIGHT: 234 LBS | RESPIRATION RATE: 20 BRPM | HEIGHT: 61 IN | DIASTOLIC BLOOD PRESSURE: 87 MMHG | TEMPERATURE: 97.1 F | SYSTOLIC BLOOD PRESSURE: 133 MMHG

## 2018-03-22 DIAGNOSIS — Z91.81 PERSONAL HISTORY OF FALL: ICD-10-CM

## 2018-03-22 DIAGNOSIS — M25.562 ACUTE PAIN OF LEFT KNEE: ICD-10-CM

## 2018-03-22 DIAGNOSIS — M25.521 RIGHT ELBOW PAIN: ICD-10-CM

## 2018-03-22 DIAGNOSIS — F44.5 PSYCHOGENIC NONEPILEPTIC SEIZURE: Primary | ICD-10-CM

## 2018-03-22 RX ORDER — LIDOCAINE 50 MG/G
OINTMENT TOPICAL PRN
Qty: 30 G | Refills: 0 | Status: SHIPPED | OUTPATIENT
Start: 2018-03-22 | End: 2018-06-26

## 2018-03-22 NOTE — PATIENT INSTRUCTIONS
- Continue using tylenol 1000mg (2 tablets) three times a day  - Try lidocaine cream topically to left knee, right elbow as needed for the next few days  - If no improvement or symptoms worsen, return to clinic next week for further assessment

## 2018-03-22 NOTE — PROGRESS NOTES
Preceptor attestation:  Patient seen and discussed with the resident. Assessment and plan reviewed with resident and agreed upon.  Supervising physician: Alber Linton  [facility]

## 2018-03-22 NOTE — TELEPHONE ENCOUNTER
Thank you Carey.  I told patient it might take me a while to get these set up because the orders are complicated.  I think I have found the previous orders from last year, and placed them in patient's visit not from 3/14.  Let me know if this is not correct or if you need anything else!    Tyra Bullock    Routed to Referral coordinator.

## 2018-03-22 NOTE — MR AVS SNAPSHOT
After Visit Summary   3/22/2018    Teresa Perez    MRN: [id number]           Patient Information     Date Of Birth          1970        Visit Information        Provider Department      3/22/2018 11:20 AM Lyle Juarez MD [facility]        Today's Diagnoses     Psychogenic nonepileptic seizure    -  1    Personal history of fall        Acute pain of left knee        Right elbow pain          Care Instructions    - Continue using tylenol 1000mg (2 tablets) three times a day  - Try lidocaine cream topically to left knee, right elbow as needed for the next few days  - If no improvement or symptoms worsen, return to clinic next week for further assessment          Follow-ups after your visit        Who to contact     Please call your clinic at [phone] to:    Ask questions about your health    Make or cancel appointments    Discuss your medicines    Learn about your test results    Speak to your doctor            Additional Information About Your Visit        MyChart Information     [employer] is an electronic gateway that provides easy, online access to your medical records. With [employer], you can request a clinic appointment, read your test results, renew a prescription or communicate with your care team.     To sign up for "[employer]"t visit the website at [email or web address]   You will be asked to enter the access code listed below, as well as some personal information. Please follow the directions to create your username and password.     Your access code is: [email or web address]  Expires: 2018  4:03 PM     Your access code will  in 90 days. If you need help or a new code, please contact your [facility] or call [phone] for assistance.        Care EveryWhere ID     This is your Care EveryWhere ID. This could be used by other organizations to access your [facility] medical records  [id number]        Your Vitals Were     Pulse Temperature Respirations  "Height Pulse Oximetry BMI (Body Mass Index)    96 97.1  F (36.2  C) (Oral) 20 5' 1\" (154.9 cm) 97% 44.21 kg/m2       Blood Pressure from Last 3 Encounters:   03/22/18 133/87   03/14/18 139/88   03/09/18 141/90    Weight from Last 3 Encounters:   03/22/18 234 lb (106.1 kg)   03/14/18 234 lb 9.6 oz (106.4 kg)   03/09/18 237 lb (107.5 kg)              Today, you had the following     No orders found for display         Today's Medication Changes          These changes are accurate as of 3/22/18 12:06 PM.  If you have any questions, ask your nurse or doctor.               Start taking these medicines.        Dose/Directions    lidocaine 5 % ointment   Commonly known as:  XYLOCAINE   Used for:  Personal history of fall, Acute pain of left knee, Right elbow pain, Psychogenic nonepileptic seizure   Started by:  Lyle Juarez MD        Apply topically as needed for moderate pain   Quantity:  30 g   Refills:  0            Where to get your medicines      These medications were sent to [facility] - [place], [place] - [address], [place] [address]     Phone:  [phone]     lidocaine 5 % ointment                Primary Care Provider Office Phone # Fax #    Tyra Bullock [phone]       [facility] [address] [place] [address]        Equal Access to Services     AGLINA DOTY AH: Hadii ismael ku hadasho Soomaali, waaxda luqadaha, qaybta kaalmada adeegyada, waxay joshua valle ah. So [facility] [phone].    ATENCIÓN: Si habla español, tiene a tang disposición servicios gratuitos de asistencia lingüística. Llame al [phone].    We comply with applicable federal civil rights laws and [place] laws. We do not discriminate on the basis of race, color, national origin, age, disability, sex, sexual orientation, or gender identity.            Thank you!     Thank you for choosing [facility]  for your care. Our goal is always to provide you with " excellent care. Hearing back from our patients is one way we can continue to improve our services. Please take a few minutes to complete the written survey that you may receive in the mail after your visit with us. Thank you!             Your Updated Medication List - Protect others around you: Learn how to safely use, store and throw away your medicines at www.disposemymeds.org.          This list is accurate as of 3/22/18 12:06 PM.  Always use your most recent med list.                   Brand Name Dispense Instructions for use Diagnosis    acetaminophen 500 MG tablet    TYLENOL    180 tablet    Take 2 tablets (1,000 mg) by mouth 3 times daily as needed for mild pain    Chronic bilateral low back pain without sciatica       albuterol (2.5 MG/3ML) 0.083% neb solution     30 vial    Take 1 vial (2.5 mg) by nebulization every 6 hours as needed for shortness of breath / dyspnea or wheezing    Mild persistent asthma with acute exacerbation       azelastine 0.05 % Soln ophthalmic solution    OPTIVAR    1 Bottle    Apply 1 drop to eye 2 times daily    Allergic reaction, sequela       baclofen 20 MG tablet    LIORESAL    180 tablet    Take 1 tablet (20 mg) by mouth 3 times daily    Chronic bilateral low back pain with left-sided sciatica       benzonatate 200 MG capsule    TESSALON    60 capsule    Take 1 capsule (200 mg) by mouth 3 times daily as needed for cough    Cough       blood glucose lancets standard    no brand specified    1 Box    Use to test blood sugar 3 times daily or as directed.    Type 2 diabetes mellitus without complication, with long-term current use of insulin (H)       blood glucose monitoring meter device kit    no brand specified    1 kit    Use to test blood sugar 3 times daily or as directed.    Type 2 diabetes mellitus without complication, with long-term current use of insulin (H)       blood glucose monitoring test strip    no brand specified    100 strip    Use to test blood sugars 3 times  daily or as directed    Type 2 diabetes mellitus without complication, with long-term current use of insulin (H)       budesonide-formoterol 160-4.5 MCG/ACT Inhaler    SYMBICORT    3 Inhaler    Inhale 2 puffs into the lungs 2 times daily    Intermittent asthma, uncomplicated       clindamycin 1 % lotion    CLINDAMAX    60 mL    Apply topically 2 times daily    Abscess of anal and rectal regions       diphenhydrAMINE 50 MG capsule    BENADRYL     Take  mg by mouth At Bedtime.        docosanol 10 % Crea cream    ABREVA    1 Tube    Apply topically 5 times daily    HSV (herpes simplex virus) infection       docusate sodium 100 MG tablet    COLACE    1 tablet    Take 100-200 mg by mouth 2 times daily    Constipation, unspecified constipation type       EPINEPHrine 0.3 MG/0.3ML injection 2-pack    EPIPEN/ADRENACLICK/or ANY BX GENERIC EQUIV    2 mL    Inject 0.3 mLs (0.3 mg) into the muscle once as needed for anaphylaxis    Endometriosis, Encounter for smoking cessation counseling       fluticasone 50 MCG/ACT spray    FLONASE    9.9 g    Spray 2 sprays into both nostrils daily    Chronic allergic conjunctivitis       gabapentin 600 MG tablet    NEURONTIN    450 tablet    Take 1.5 tabs in AM and afternoon, take 2 tabs at bedtime.    Chronic bilateral low back pain with left-sided sciatica       glycerin (adult) 2 G Supp Suppository     3 suppository    Place 1 suppository rectally daily as needed for constipation    Constipation, unspecified constipation type       hydrOXYzine 25 MG tablet    ATARAX    120 tablet    Take 1-2 tablets (25-50 mg) by mouth every 6 hours as needed for itching, anxiety or other (sleep)    Chronic pain syndrome       insulin glargine U-300 300 UNIT/ML injection    TOUJEO    6 mL    Inject 60 Units Subcutaneous At Bedtime    Type 2 diabetes mellitus with complication, with long-term current use of insulin (H)       insulin lispro 100 UNIT/ML injection    HumaLOG PEN    15 mL    Inject 28  Units Subcutaneous 3 times daily (before meals)    Type 2 diabetes mellitus with complication, with long-term current use of insulin (H)       insulin pen needle 31G X 5 MM    B-D U/F    100 each    Use 4 daily or as directed.    Diabetes mellitus, type 2 (H)       Ipratropium-Albuterol  MCG/ACT inhaler    COMBIVENT RESPIMAT    1 Inhaler    Inhale 2 puffs into the lungs 2 times daily Not to exceed 6 doses per day.    Moderate persistent asthma without complication       lidocaine 5 % ointment    XYLOCAINE    30 g    Apply topically as needed for moderate pain    Personal history of fall, Acute pain of left knee, Right elbow pain, Psychogenic nonepileptic seizure       lisinopril 40 MG tablet    PRINIVIL/ZESTRIL    90 tablet    Take 1 tablet (40 mg) by mouth daily    Essential hypertension, benign       loratadine 10 MG tablet    CLARITIN    180 tablet    Take 2 tablets (20 mg) by mouth daily    Seasonal allergic rhinitis, unspecified allergic rhinitis trigger       medroxyPROGESTERone 150 MG/ML injection    DEPO-PROVERA    1 mL    Inject 1 mL (150 mg) into the muscle every 3 months    Endometriosis       melatonin 3 MG tablet      Take 3-9 mg by mouth nightly as needed        miconazole 2 % cream    MICATIN    5 g    Place 1 applicator vaginally At Bedtime Substitute as needed.    Vaginal candidiasis       naloxone nasal spray    NARCAN    0.2 mL    Spray 1 spray (4 mg) into one nostril alternating nostrils as needed for opioid reversal (every 2-3 minutes until medical assistance arrives.)    Chronic narcotic use       nystatin 262820 UNIT/ML suspension    MYCOSTATIN    60 mL    Take 5 mLs (500,000 Units) by mouth 4 times daily    Thrush       ondansetron 4 MG tablet    ZOFRAN    18 tablet    Take 1 tablet (4 mg) by mouth every 8 hours as needed for nausea    Chronic pain syndrome, Nausea       * order for DME     1 Device    Equipment being ordered: CPAP.  1 device.  Per sleep study recommendations.  Brand:   Respironics, Type:  Nasal Wisp,  Size:  Small    NNADMI (obstructive sleep apnea)       * order for DME     1 each    Adult nebulizer mask and tubing.    Mild persistent asthma with acute exacerbation       * order for DME     1 Device    Equipment being ordered: Nebulizer supplies for life.    Mild persistent asthma with acute exacerbation       * pramipexole dihydrochloride 0.75 MG Tabs     90 tablet    Take 0.75 mg by mouth At Bedtime    Restless leg syndrome       * pramipexole 1 MG tablet    MIRAPEX    90 tablet    Take 1 tablet (1 mg) by mouth At Bedtime    Restless leg syndrome       pravastatin 80 MG tablet    PRAVACHOL    90 tablet    Take 1 tablet (80 mg) by mouth daily    Hyperlipidemia LDL goal <100       * QUEtiapine 50 MG tablet    SEROquel     TAKE 1 TABLET BY MOUTH TWICE DAILY        * QUEtiapine 300 MG tablet    SEROquel     TAKE 1 TABLET BY MOUTH AT BEDTIME. INDICATIONS: MANIC PHASE OF MANIC-DEPRESSION -CARMEN DURAN RN        ranitidine 150 MG tablet    ZANTAC    60 tablet    Take 1 tablet (150 mg) by mouth 2 times daily    Gastroesophageal reflux disease without esophagitis       sennosides 8.6 MG tablet    SENOKOT    1 tablet    Take 2 tablets by mouth 2 times daily    Constipation, unspecified constipation type       SUMAtriptan 100 MG tablet    IMITREX    9 tablet    Take 1 tablet (100 mg) by mouth at onset of headache    Chronic migraine without aura without status migrainosus, not intractable       topiramate 25 MG tablet    TOPAMAX    140 tablet    Take 1 tablet (25 mg) twice daily for 1 week, 2 tablets twice daily for 1 week, 3 tablets twice daily for 1 week, then 4 tablets twice daily.    Other migraine without status migrainosus, not intractable       venlafaxine 75 MG 24 hr capsule    EFFEXOR-XR    30 capsule    Take 225 mg by mouth daily        * Notice:  This list has 7 medication(s) that are the same as other medications prescribed for you. Read the directions carefully, and ask your  doctor or other care provider to review them with you.

## 2018-03-22 NOTE — PROGRESS NOTES
[facility] Visit    Subjective:  Teresa Perez is a 47 year old female with a PMHx significant for   Patient Active Problem List   Diagnosis     Health Care Home     Acute peptic ulcer     Other allergy, other than to medicinal agents     Bipolar disorder (H)     Bulging lumbar disc     Cervical dysplasia     Common migraine without aura     Constipation     Dwarfism     Familial hypercholesterolemia     HTN (hypertension)     Insomnia     Low back pain     Intermittent asthma     Leg pain, bilateral     Smoking     Degeneration of thoracic or thoracolumbar intervertebral disc     Diabetes mellitus, type 2 (H)     LOMAS (nonalcoholic steatohepatitis)     Disease of lung     Hemorrhoids     Parotid mass     Endometriosis     NNAMDI (obstructive sleep apnea)     History of total right knee replacement     Lateral epicondylitis     Impingement syndrome, shoulder, left     Hx of total knee replacement, left     Chronic pain syndrome     Cough    who presents with right knee and left elbow soreness after a fall.     Patient fell 5 days ago from sitting position after reportedly suffering a psychogenic seizure. She hit her head, left knee and right arm. She describes the impact on her head as minor as it only bumped against a piece of furniture, but the fall to the ground onto her left knee and right arm were more significant. She believes she had a stress-induced psychogenic seizure, which she reports a history of since 12 years ago. This episode was witnessed by 2 friends who told her that she didn't move or blink for possibly a few minutes after she fell. Denies any focal movements, tongue biting, abnormal posturing, head turning, incontinence. She was sitting for awhile and then stood up when she maybe felt some palpitations and unsteady. Unsure if she felt dizzy, lightheaded. Does not describe any presyncope or postictal state and was alert after waking. No significant cardiac history.      Of  "note, she recalls having a Neurology evaluation and EEG about 12 years after her first episode and was told she gets psychogenic seizures. She's had three in the past year. Her known triggers include stress, including emotional and physical stress. She was thinking about the upcoming 2nd anniversary of her mother's passing in April before this most recent episode.     Her main concern today is for pain control for her knee and elbow related to the fall. She denies any limited range of motion, weakness, numbness or tingling, incoordination, significant bruising, difficulty walking. She does feel supported by her brother, PCA, and several nearby friends. She sees her therapist every other week, and her psychiatrist every couple of months, and maintains strong relationships which both. She denies any SI/HI and doesn't need any additional resources right now.     Significant ROS:   No N/V, migraine, headache, vertigo, chest pain, SOB, cough.   No hx of CAD/MI, exertional syncope, heart failure, conduction abnormality.   No known FHx of sudden cardiac death.  No tongue biting, focal movements, abnormal posturing, head turning.   No recent new meds or changes.    Objective:  Vitals:    03/22/18 1121   BP: 133/87   BP Location: Left arm   Patient Position: Sitting   Cuff Size: Adult Large   Pulse: 96   Resp: 20   Temp: 97.1  F (36.2  C)   TempSrc: Oral   SpO2: 97%   Weight: 234 lb (106.1 kg)   Height: 5' 1\" (154.9 cm)     Body mass index is 44.21 kg/(m^2).    GEN: NAD, pleasant, alert  EYES: grossly normal to inspection, PERRL, EOMI, normal conjunctivae/sclerae  HENT: normal ear canals/TM's, nose & mouth w/o ulcers or lesions, clear oropharynx, MMM  NECK: no LAD, asymmetry, masses, scars; thyroid normal to palpation  RESP: CTAB, no w/r/r  CV: RRR, nl S1/S2, no S3/S4, no m/r/g, no peripheral edema, peripheral pulses strong  ABD: soft, NT/ND, no rebound  MSK: no MSK defects noted. Gait appears unsteady but at baseline with a " cane  No swelling, erythema, ecchymosis, limited ROM or strength, or significant tenderness to palpation of any extremities including RUE and LLE  NEURO: normal strength and tone, sensory exam grossly normal, mentation intact, speech normal  PSYCH: mentation appears normal, affect normal/bright     Assessment/Plan:  Teresa was seen today for seizures.    Diagnoses and all orders for this visit:    Personal history of fall resulting in acute pain of left knee and right elbow pain.  Patient's main concern is pain control. Benign physical exam; no imaging indicated. After discussion, we will try tylenol 1000mg TID in addition to lidocaine ointment. Of note, she has a history of significant opioid dependence and was tapered over several years off high-dose opioids, as well as cocaine use.   -     lidocaine (XYLOCAINE) 5 % ointment; Apply topically as needed for moderate pain    Psychogenic nonepileptic seizure. She is confident that this led to her fall. No concerning history for epileptic seizure activity nor cardiac disease. Ddx includes vasovagal, orthostatic hypotension, dehydration, hypoglycemia. Could consider future work up with orthostatics, EKG, Hgb/Hct, Holter, BMP.     Options for treatment and follow-up care were reviewed with the patient who was engaged and actively involved in the decision making process, verbalized understanding of the options discussed, and satisfied with the final plan.    Patient was staffed with supervising physician, Dr. Linton.     Lyle Juarez MD, PGY-1  [facility]
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
## 2018-04-04 DIAGNOSIS — G89.29 CHRONIC BILATERAL LOW BACK PAIN WITH LEFT-SIDED SCIATICA: ICD-10-CM

## 2018-04-04 DIAGNOSIS — M54.42 CHRONIC BILATERAL LOW BACK PAIN WITH LEFT-SIDED SCIATICA: ICD-10-CM

## 2018-04-09 ENCOUNTER — TRANSFERRED RECORDS (OUTPATIENT)
Dept: HEALTH INFORMATION MANAGEMENT | Facility: CLINIC | Age: 48
End: 2018-04-09

## 2018-04-13 DIAGNOSIS — G43.809 OTHER MIGRAINE WITHOUT STATUS MIGRAINOSUS, NOT INTRACTABLE: ICD-10-CM

## 2018-04-13 RX ORDER — TOPIRAMATE 50 MG/1
TABLET, FILM COATED ORAL
Qty: 60 TABLET | Refills: 3 | Status: SHIPPED | OUTPATIENT
Start: 2018-04-13 | End: 2018-07-19

## 2018-05-24 DIAGNOSIS — J30.2 CHRONIC SEASONAL ALLERGIC RHINITIS, UNSPECIFIED TRIGGER: Primary | ICD-10-CM

## 2018-05-24 DIAGNOSIS — I10 ESSENTIAL HYPERTENSION, BENIGN: ICD-10-CM

## 2018-05-24 RX ORDER — LISINOPRIL 40 MG/1
40 TABLET ORAL DAILY
Qty: 90 TABLET | Refills: 1 | Status: SHIPPED | OUTPATIENT
Start: 2018-05-24 | End: 2018-06-26

## 2018-05-24 RX ORDER — LORATADINE 10 MG/1
20 TABLET ORAL DAILY
Qty: 60 TABLET | Refills: 0 | Status: SHIPPED | OUTPATIENT
Start: 2018-05-24 | End: 2018-07-19

## 2018-06-19 ENCOUNTER — ALLIED HEALTH/NURSE VISIT (OUTPATIENT)
Dept: FAMILY MEDICINE | Facility: CLINIC | Age: 48
End: 2018-06-19
Payer: MEDICARE

## 2018-06-19 VITALS
OXYGEN SATURATION: 97 % | SYSTOLIC BLOOD PRESSURE: 127 MMHG | RESPIRATION RATE: 18 BRPM | TEMPERATURE: 98.9 F | DIASTOLIC BLOOD PRESSURE: 88 MMHG | BODY MASS INDEX: 43.27 KG/M2 | WEIGHT: 229 LBS | HEART RATE: 119 BPM

## 2018-06-19 DIAGNOSIS — N80.9 ENDOMETRIOSIS: Primary | ICD-10-CM

## 2018-06-19 NOTE — NURSING NOTE
The following medication was given:     MEDICATION: Medroxyprogesterone 150 mg  ROUTE: IM  SITE: Deltoid - Left  DOSE: 150 mg  LOT #: [id number]  :  [employer]   EXPIRATION DATE:  7/31/22  NDC#: [address]-5   Medication administered by: Geneva Rivera CMA  Next depo injection is due between 9/4 to 10/2/2018  A reminder card was given    Dr. Bullock was available on site at the time of this service.     
no deformity, pain or tenderness. no restriction of movement

## 2018-06-19 NOTE — MR AVS SNAPSHOT
After Visit Summary   2018    Teresa Perez    MRN: [id number]           Patient Information     Date Of Birth          1970        Visit Information        Provider Department      2018 1:15 PM Nurse, Michael [facility]        Today's Diagnoses     Endometriosis    -  1       Follow-ups after your visit        Who to contact     Please call your clinic at [phone] to:    Ask questions about your health    Make or cancel appointments    Discuss your medicines    Learn about your test results    Speak to your doctor            Additional Information About Your Visit        MyChart Information     [employer] is an electronic gateway that provides easy, online access to your medical records. With [employer], you can request a clinic appointment, read your test results, renew a prescription or communicate with your care team.     To sign up for [employer] visit the website at [email or web address]   You will be asked to enter the access code listed below, as well as some personal information. Please follow the directions to create your username and password.     Your access code is: [email or web address]  Expires: 2018  1:43 PM     Your access code will  in 90 days. If you need help or a new code, please contact your [facility] or call [phone] for assistance.        Care EveryWhere ID     This is your Care EveryWhere ID. This could be used by other organizations to access your [facility] medical records  [id number]        Your Vitals Were     Pulse Temperature Respirations Pulse Oximetry BMI (Body Mass Index)       119 98.9  F (37.2  C) (Oral) 18 97% 43.27 kg/m2        Blood Pressure from Last 3 Encounters:   18 127/88   18 133/87   18 139/88    Weight from Last 3 Encounters:   18 229 lb (103.9 kg)   18 234 lb (106.1 kg)   18 234 lb 9.6 oz (106.4 kg)              We Performed the Following     INJECTION  INTRAMUSCULAR OR SUB-Q     medroxyPROGESTERone (DEPO-PROVERA) injection 150 mg (Charge)        Primary Care Provider Office Phone # Fax #    Tyra Bullock [phone]       [address] [place] [address]        Equal Access to Services     GALINA DOTY : Hadii ismael russo hadasho Soomaali, waaxda luqadaha, qaybta kaalmada adeegyada, waxbladimir lewis madisyntiana meadaggieotilia parekh. So [facility] [phone].    ATENCIÓN: Si habla español, tiene a tang disposición servicios gratuitos de asistencia lingüística. Llame al [phone].    We comply with applicable federal civil rights laws and [place] laws. We do not discriminate on the basis of race, color, national origin, age, disability, sex, sexual orientation, or gender identity.            Thank you!     Thank you for choosing [facility]  for your care. Our goal is always to provide you with excellent care. Hearing back from our patients is one way we can continue to improve our services. Please take a few minutes to complete the written survey that you may receive in the mail after your visit with us. Thank you!             Your Updated Medication List - Protect others around you: Learn how to safely use, store and throw away your medicines at www.disposemymeds.org.          This list is accurate as of 6/19/18  1:43 PM.  Always use your most recent med list.                   Brand Name Dispense Instructions for use Diagnosis    acetaminophen 500 MG tablet    TYLENOL    180 tablet    Take 2 tablets (1,000 mg) by mouth 3 times daily as needed for mild pain    Chronic bilateral low back pain without sciatica       albuterol (2.5 MG/3ML) 0.083% neb solution     30 vial    Take 1 vial (2.5 mg) by nebulization every 6 hours as needed for shortness of breath / dyspnea or wheezing    Mild persistent asthma with acute exacerbation       azelastine 0.05 % Soln ophthalmic solution    OPTIVAR    1 Bottle    Apply 1 drop to eye 2 times daily    Allergic  reaction, sequela       baclofen 20 MG tablet    LIORESAL    180 tablet    Take 1 tablet (20 mg) by mouth 3 times daily    Chronic bilateral low back pain with left-sided sciatica       benzonatate 200 MG capsule    TESSALON    60 capsule    Take 1 capsule (200 mg) by mouth 3 times daily as needed for cough    Cough       blood glucose lancets standard    no brand specified    1 Box    Use to test blood sugar 3 times daily or as directed.    Type 2 diabetes mellitus without complication, with long-term current use of insulin (H)       blood glucose monitoring meter device kit    no brand specified    1 kit    Use to test blood sugar 3 times daily or as directed.    Type 2 diabetes mellitus without complication, with long-term current use of insulin (H)       blood glucose monitoring test strip    no brand specified    100 strip    Use to test blood sugars 3 times daily or as directed    Type 2 diabetes mellitus without complication, with long-term current use of insulin (H)       budesonide-formoterol 160-4.5 MCG/ACT Inhaler    SYMBICORT    3 Inhaler    Inhale 2 puffs into the lungs 2 times daily    Intermittent asthma, uncomplicated       clindamycin 1 % lotion    CLINDAMAX    60 mL    Apply topically 2 times daily    Abscess of anal and rectal regions       diphenhydrAMINE 50 MG capsule    BENADRYL     Take  mg by mouth At Bedtime.        docosanol 10 % Crea cream    ABREVA    1 Tube    Apply topically 5 times daily    HSV (herpes simplex virus) infection       docusate sodium 100 MG tablet    COLACE    1 tablet    Take 100-200 mg by mouth 2 times daily    Constipation, unspecified constipation type       EPINEPHrine 0.3 MG/0.3ML injection 2-pack    EPIPEN/ADRENACLICK/or ANY BX GENERIC EQUIV    2 mL    Inject 0.3 mLs (0.3 mg) into the muscle once as needed for anaphylaxis    Endometriosis, Encounter for smoking cessation counseling       fluticasone 50 MCG/ACT spray    FLONASE    9.9 g    Spray 2 sprays into  both nostrils daily    Chronic allergic conjunctivitis       gabapentin 600 MG tablet    NEURONTIN    450 tablet    Take 1.5 tabs in AM and afternoon, take 2 tabs at bedtime.    Chronic bilateral low back pain with left-sided sciatica       glycerin (adult) 2 g Supp Suppository     3 suppository    Place 1 suppository rectally daily as needed for constipation    Constipation, unspecified constipation type       hydrOXYzine 25 MG tablet    ATARAX    120 tablet    Take 1-2 tablets (25-50 mg) by mouth every 6 hours as needed for itching, anxiety or other (sleep)    Chronic pain syndrome       insulin glargine U-300 300 UNIT/ML injection    TOUJEO    6 mL    Inject 70 Units Subcutaneous At Bedtime    Type 2 diabetes mellitus with complication, with long-term current use of insulin (H)       insulin lispro 100 UNIT/ML injection    HumaLOG PEN    15 mL    Inject 36 Units Subcutaneous 2 times daily (before meals)    Type 2 diabetes mellitus with complication, with long-term current use of insulin (H)       insulin pen needle 31G X 5 MM    B-D U/F    100 each    Use 4 daily or as directed.    Diabetes mellitus, type 2 (H)       Ipratropium-Albuterol  MCG/ACT inhaler    COMBIVENT RESPIMAT    1 Inhaler    Inhale 2 puffs into the lungs 2 times daily Not to exceed 6 doses per day.    Moderate persistent asthma without complication       lidocaine 5 % ointment    XYLOCAINE    30 g    Apply topically as needed for moderate pain    Personal history of fall, Acute pain of left knee, Right elbow pain, Psychogenic nonepileptic seizure       lisinopril 40 MG tablet    PRINIVIL/ZESTRIL    90 tablet    Take 1 tablet (40 mg) by mouth daily    Essential hypertension, benign       loratadine 10 MG tablet    CLARITIN    60 tablet    Take 2 tablets (20 mg) by mouth daily    Chronic seasonal allergic rhinitis, unspecified trigger       medroxyPROGESTERone 150 MG/ML injection    DEPO-PROVERA    1 mL    Inject 1 mL (150 mg) into the  muscle every 3 months    Endometriosis       melatonin 3 MG tablet      Take 3-9 mg by mouth nightly as needed        miconazole 2 % cream    MICATIN    5 g    Place 1 applicator vaginally At Bedtime Substitute as needed.    Vaginal candidiasis       naloxone nasal spray    NARCAN    0.2 mL    Spray 1 spray (4 mg) into one nostril alternating nostrils as needed for opioid reversal (every 2-3 minutes until medical assistance arrives.)    Chronic narcotic use       nystatin 846102 UNIT/ML suspension    MYCOSTATIN    60 mL    Take 5 mLs (500,000 Units) by mouth 4 times daily    Thrush       ondansetron 4 MG tablet    ZOFRAN    18 tablet    Take 1 tablet (4 mg) by mouth every 8 hours as needed for nausea    Chronic pain syndrome, Nausea       * order for DME     1 Device    Equipment being ordered: CPAP.  1 device.  Per sleep study recommendations.  Brand:  RespirADEA Cutterss, Type:  Nasal Wisp,  Size:  Small    NNAMDI (obstructive sleep apnea)       * order for DME     1 each    Adult nebulizer mask and tubing.    Mild persistent asthma with acute exacerbation       * order for DME     1 Device    Equipment being ordered: Nebulizer supplies for life.    Mild persistent asthma with acute exacerbation       * pramipexole dihydrochloride 0.75 MG Tabs     90 tablet    Take 0.75 mg by mouth At Bedtime    Restless leg syndrome       * pramipexole 1 MG tablet    MIRAPEX    90 tablet    Take 1 tablet (1 mg) by mouth At Bedtime    Restless leg syndrome       pravastatin 80 MG tablet    PRAVACHOL    90 tablet    Take 1 tablet (80 mg) by mouth daily    Hyperlipidemia LDL goal <100       * QUEtiapine 50 MG tablet    SEROquel     TAKE 1 TABLET BY MOUTH TWICE DAILY        * QUEtiapine 300 MG tablet    SEROquel     TAKE 1 TABLET BY MOUTH AT BEDTIME. INDICATIONS: MANIC PHASE OF MANIC-DEPRESSION -CARMEN DURAN RN        ranitidine 150 MG tablet    ZANTAC    60 tablet    Take 1 tablet (150 mg) by mouth 2 times daily    Gastroesophageal reflux  disease without esophagitis       sennosides 8.6 MG tablet    SENOKOT    1 tablet    Take 2 tablets by mouth 2 times daily    Constipation, unspecified constipation type       SUMAtriptan 100 MG tablet    IMITREX    9 tablet    Take 1 tablet (100 mg) by mouth at onset of headache    Chronic migraine without aura without status migrainosus, not intractable       topiramate 50 MG tablet    TOPAMAX    60 tablet    Take 1 tablet 2x daily.    Other migraine without status migrainosus, not intractable       venlafaxine 75 MG 24 hr capsule    EFFEXOR-XR    30 capsule    Take 225 mg by mouth daily        * Notice:  This list has 7 medication(s) that are the same as other medications prescribed for you. Read the directions carefully, and ask your doctor or other care provider to review them with you.

## 2018-06-20 DIAGNOSIS — G25.81 RESTLESS LEG SYNDROME: ICD-10-CM

## 2018-06-20 RX ORDER — PRAMIPEXOLE DIHYDROCHLORIDE 1 MG/1
1 TABLET ORAL AT BEDTIME
Qty: 90 TABLET | Refills: 1 | Status: SHIPPED | OUTPATIENT
Start: 2018-06-20 | End: 2018-12-13

## 2018-06-24 DIAGNOSIS — E78.2 MIXED HYPERLIPIDEMIA: ICD-10-CM

## 2018-06-24 DIAGNOSIS — Z79.4 TYPE 2 DIABETES MELLITUS WITH COMPLICATION, WITH LONG-TERM CURRENT USE OF INSULIN (H): ICD-10-CM

## 2018-06-24 DIAGNOSIS — E11.8 TYPE 2 DIABETES MELLITUS WITH COMPLICATION, WITH LONG-TERM CURRENT USE OF INSULIN (H): ICD-10-CM

## 2018-06-24 DIAGNOSIS — I10 ESSENTIAL HYPERTENSION: Primary | ICD-10-CM

## 2018-06-26 ENCOUNTER — OFFICE VISIT (OUTPATIENT)
Dept: FAMILY MEDICINE | Facility: CLINIC | Age: 48
End: 2018-06-26
Payer: MEDICARE

## 2018-06-26 VITALS
HEART RATE: 105 BPM | TEMPERATURE: 99.4 F | RESPIRATION RATE: 16 BRPM | OXYGEN SATURATION: 97 % | SYSTOLIC BLOOD PRESSURE: 113 MMHG | BODY MASS INDEX: 43.76 KG/M2 | DIASTOLIC BLOOD PRESSURE: 80 MMHG | WEIGHT: 231.6 LBS

## 2018-06-26 DIAGNOSIS — J32.9 CHRONIC SINUSITIS, UNSPECIFIED LOCATION: ICD-10-CM

## 2018-06-26 DIAGNOSIS — I10 ESSENTIAL HYPERTENSION, BENIGN: ICD-10-CM

## 2018-06-26 DIAGNOSIS — I10 ESSENTIAL HYPERTENSION: ICD-10-CM

## 2018-06-26 DIAGNOSIS — B00.9 HSV (HERPES SIMPLEX VIRUS) INFECTION: ICD-10-CM

## 2018-06-26 DIAGNOSIS — K61.2 ABSCESS OF ANAL AND RECTAL REGIONS: ICD-10-CM

## 2018-06-26 DIAGNOSIS — E78.2 MIXED HYPERLIPIDEMIA: ICD-10-CM

## 2018-06-26 DIAGNOSIS — E11.8 TYPE 2 DIABETES MELLITUS WITH COMPLICATION, WITH LONG-TERM CURRENT USE OF INSULIN (H): ICD-10-CM

## 2018-06-26 DIAGNOSIS — F44.5 PSYCHOGENIC NONEPILEPTIC SEIZURE: ICD-10-CM

## 2018-06-26 DIAGNOSIS — M54.42 CHRONIC BILATERAL LOW BACK PAIN WITH LEFT-SIDED SCIATICA: ICD-10-CM

## 2018-06-26 DIAGNOSIS — G89.29 CHRONIC BILATERAL LOW BACK PAIN WITH LEFT-SIDED SCIATICA: ICD-10-CM

## 2018-06-26 DIAGNOSIS — R05.9 COUGH: ICD-10-CM

## 2018-06-26 DIAGNOSIS — R10.2 PELVIC PAIN IN FEMALE: Primary | ICD-10-CM

## 2018-06-26 DIAGNOSIS — Z79.4 TYPE 2 DIABETES MELLITUS WITH COMPLICATION, WITH LONG-TERM CURRENT USE OF INSULIN (H): ICD-10-CM

## 2018-06-26 DIAGNOSIS — M25.521 RIGHT ELBOW PAIN: ICD-10-CM

## 2018-06-26 DIAGNOSIS — Z91.81 PERSONAL HISTORY OF FALL: ICD-10-CM

## 2018-06-26 DIAGNOSIS — M25.562 ACUTE PAIN OF LEFT KNEE: ICD-10-CM

## 2018-06-26 DIAGNOSIS — J45.40 MODERATE PERSISTENT ASTHMA WITHOUT COMPLICATION: ICD-10-CM

## 2018-06-26 LAB
BILIRUBIN UR: NEGATIVE
BLOOD UR: NEGATIVE
BUN SERPL-MCNC: 15.6 MG/DL (ref 7–19)
CALCIUM SERPL-MCNC: 9.3 MG/DL (ref 8.5–10.1)
CHLORIDE SERPLBLD-SCNC: 98.5 MMOL/L (ref 98–110)
CHOLEST SERPL-MCNC: 185.8 MG/DL (ref 0–200)
CHOLEST/HDLC SERPL: 6.1 {RATIO} (ref 0–5)
CO2 SERPL-SCNC: 23 MMOL/L (ref 20–32)
CREAT SERPL-MCNC: 0.5 MG/DL (ref 0.5–1)
CREAT UR-MCNC: 109.8 MG/DL
GFR SERPL CREATININE-BSD FRML MDRD: >90 ML/MIN/1.7 M2
GLUCOSE SERPL-MCNC: 370 MG'DL (ref 70–99)
GLUCOSE URINE: ABNORMAL
HBA1C MFR BLD: 10.9 % (ref 4.1–5.7)
HDLC SERPL-MCNC: 30.6 MG/DL
KETONES UR QL: NEGATIVE
LDLC SERPL CALC-MCNC: 104 MG/DL (ref 0–129)
LEUKOCYTE ESTERASE UR: NEGATIVE
MICROALBUMIN UR-MCNC: 1.49 MG/DL (ref 0–1.99)
MICROALBUMIN/CREAT UR: 13.6 MG/G
NITRITE UR QL STRIP: NEGATIVE
PH UR STRIP: 5.5 [PH] (ref 5–7)
POTASSIUM SERPL-SCNC: 4.1 MMOL/DL (ref 3.2–4.6)
PROTEIN UR: NEGATIVE
SODIUM SERPL-SCNC: 130.2 MMOL/L (ref 132–142)
SP GR UR STRIP: >=1.03
TRIGL SERPL-MCNC: 254.2 MG/DL (ref 0–150)
UROBILINOGEN UR STRIP-ACNC: ABNORMAL
VLDL CHOLESTEROL: 50.8 MG/DL (ref 7–32)

## 2018-06-26 RX ORDER — GABAPENTIN 600 MG/1
TABLET ORAL
Qty: 450 TABLET | Refills: 1 | Status: SHIPPED | OUTPATIENT
Start: 2018-06-26 | End: 2018-08-21

## 2018-06-26 RX ORDER — DOCOSANOL 100 MG/G
CREAM TOPICAL
Qty: 1 TUBE | Refills: 5 | Status: SHIPPED | OUTPATIENT
Start: 2018-06-26 | End: 2018-12-15

## 2018-06-26 RX ORDER — BACLOFEN 20 MG/1
20 TABLET ORAL 3 TIMES DAILY
Qty: 180 TABLET | Refills: 1 | Status: SHIPPED | OUTPATIENT
Start: 2018-06-26 | End: 2018-12-15

## 2018-06-26 RX ORDER — CLINDAMYCIN PHOSPHATE 10 UG/ML
LOTION TOPICAL 2 TIMES DAILY
Qty: 60 ML | Refills: 11 | Status: SHIPPED | OUTPATIENT
Start: 2018-06-26 | End: 2018-12-15

## 2018-06-26 RX ORDER — BENZONATATE 200 MG/1
200 CAPSULE ORAL 3 TIMES DAILY PRN
Qty: 60 CAPSULE | Refills: 1 | Status: SHIPPED | OUTPATIENT
Start: 2018-06-26 | End: 2018-07-19

## 2018-06-26 RX ORDER — LISINOPRIL 40 MG/1
40 TABLET ORAL DAILY
Qty: 90 TABLET | Refills: 1 | Status: SHIPPED | OUTPATIENT
Start: 2018-06-26 | End: 2018-09-17

## 2018-06-26 RX ORDER — LIDOCAINE 50 MG/G
OINTMENT TOPICAL PRN
Qty: 30 G | Refills: 0 | Status: SHIPPED | OUTPATIENT
Start: 2018-06-26 | End: 2018-10-18

## 2018-06-26 RX ORDER — SULFAMETHOXAZOLE/TRIMETHOPRIM 800-160 MG
1 TABLET ORAL 2 TIMES DAILY
Qty: 14 TABLET | Refills: 0 | Status: SHIPPED | OUTPATIENT
Start: 2018-06-26 | End: 2018-08-21

## 2018-06-26 NOTE — MR AVS SNAPSHOT
After Visit Summary   2018    Teresa Perez    MRN: [id number]           Patient Information     Date Of Birth          1970        Visit Information        Provider Department      2018 9:20 AM Tyra Bullock MD [facility]        Today's Diagnoses     Pelvic pain in female    -  1    Type 2 diabetes mellitus with complication, with long-term current use of insulin (H)        Essential hypertension        Mixed hyperlipidemia        Abscess of anal and rectal regions        Personal history of fall        Acute pain of left knee        Right elbow pain        Psychogenic nonepileptic seizure        Chronic bilateral low back pain with left-sided sciatica        Cough        Essential hypertension, benign        HSV (herpes simplex virus) infection        Moderate persistent asthma without complication        Chronic sinusitis, unspecified location           Follow-ups after your visit        Follow-up notes from your care team     Return in about 4 weeks (around 2018).      Who to contact     Please call your clinic at [phone] to:    Ask questions about your health    Make or cancel appointments    Discuss your medicines    Learn about your test results    Speak to your doctor            Additional Information About Your Visit        MyChart Information     [employer] is an electronic gateway that provides easy, online access to your medical records. With [facility], you can request a clinic appointment, read your test results, renew a prescription or communicate with your care team.     To sign up for [facility] visit the website at [email or web address]   You will be asked to enter the access code listed below, as well as some personal information. Please follow the directions to create your username and password.     Your access code is: [email or web address]  Expires: 2018  1:43 PM     Your access code will  in 90 days. If you need help or a new code, please  contact your [facility] or call [phone] for assistance.        Care EveryWhere ID     This is your Care EveryWhere ID. This could be used by other organizations to access your [place] medical records  [id number]        Your Vitals Were     Pulse Temperature Respirations Pulse Oximetry BMI (Body Mass Index)       105 99.4  F (37.4  C) (Oral) 16 97% 43.76 kg/m2        Blood Pressure from Last 3 Encounters:   06/26/18 113/80   06/19/18 127/88   03/22/18 133/87    Weight from Last 3 Encounters:   06/26/18 231 lb 9.6 oz (105.1 kg)   06/19/18 229 lb (103.9 kg)   03/22/18 234 lb (106.1 kg)              We Performed the Following     Basic Metabolic Panel ([place])     Hemoglobin A1c ([facility])     Lipid Panel ([place])     Microalbumin Creatinine Ratio Random Ur ([facility])     Urinalysis, Micro If ([facility])          Today's Medication Changes          These changes are accurate as of 6/26/18 11:59 PM.  If you have any questions, ask your nurse or doctor.               Start taking these medicines.        Dose/Directions    sulfamethoxazole-trimethoprim 800-160 MG per tablet   Commonly known as:  BACTRIM DS/SEPTRA DS   Used for:  Chronic sinusitis, unspecified location   Started by:  Tyra Bullock MD        Dose:  1 tablet   Take 1 tablet by mouth 2 times daily   Quantity:  14 tablet   Refills:  0         These medicines have changed or have updated prescriptions.        Dose/Directions    insulin glargine U-300 300 UNIT/ML injection   Commonly known as:  TOUJEO   This may have changed:  how much to take   Used for:  Type 2 diabetes mellitus with complication, with long-term current use of insulin (H)   Changed by:  Tyra Bullock MD        Dose:  80 Units   Inject 80 Units Subcutaneous At Bedtime   Quantity:  6 mL   Refills:  11       insulin lispro 100 UNIT/ML injection   Commonly known as:  HumaLOG PEN   This may have changed:  how much to take   Used for:  Type 2  diabetes mellitus with complication, with long-term current use of insulin (H)   Changed by:  Tyra Bullock MD        Dose:  40 Units   Inject 40 Units Subcutaneous 2 times daily (before meals)   Quantity:  15 mL   Refills:  11       pramipexole 1 MG tablet   Commonly known as:  MIRAPEX   This may have changed:  Another medication with the same name was removed. Continue taking this medication, and follow the directions you see here.   Used for:  Restless leg syndrome   Changed by:  Tyra Bullock MD        Dose:  1 mg   Take 1 tablet (1 mg) by mouth At Bedtime   Quantity:  90 tablet   Refills:  1         Stop taking these medicines if you haven't already. Please contact your care team if you have questions.     melatonin 3 MG tablet   Stopped by:  Tyra Bullock MD                Where to get your medicines      These medications were sent to [facility] - [place], [place] - [address], [place] [address]     Phone:  [phone]     baclofen 20 MG tablet    benzonatate 200 MG capsule    clindamycin 1 % lotion    docosanol 10 % Crea cream    gabapentin 600 MG tablet    insulin glargine U-300 300 UNIT/ML injection    insulin lispro 100 UNIT/ML injection    Ipratropium-Albuterol  MCG/ACT inhaler    lidocaine 5 % ointment    lisinopril 40 MG tablet    sulfamethoxazole-trimethoprim 800-160 MG per tablet                Primary Care Provider Office Phone # Fax #    Tyra Bullock [phone]       [address] [place] [address]        Equal Access to Services     [facility] AH: Hadii ismael ku hadasho Soomaali, waaxda luqadaha, qaybta kaalmada adeegyada, miguel valle . So [facility] [phone].    ATENCIÓN: Si habla español, tiene a tang disposición servicios gratuitos de asistencia lingüística. Llame al [phone].    We comply with applicable federal civil rights laws and [place] laws. We do not discriminate on the  basis of race, color, national origin, age, disability, sex, sexual orientation, or gender identity.            Thank you!     Thank you for choosing [facility]  for your care. Our goal is always to provide you with excellent care. Hearing back from our patients is one way we can continue to improve our services. Please take a few minutes to complete the written survey that you may receive in the mail after your visit with us. Thank you!             Your Updated Medication List - Protect others around you: Learn how to safely use, store and throw away your medicines at www.disposemymeds.org.          This list is accurate as of 6/26/18 11:59 PM.  Always use your most recent med list.                   Brand Name Dispense Instructions for use Diagnosis    acetaminophen 500 MG tablet    TYLENOL    180 tablet    Take 2 tablets (1,000 mg) by mouth 3 times daily as needed for mild pain    Chronic bilateral low back pain without sciatica       albuterol (2.5 MG/3ML) 0.083% neb solution     30 vial    Take 1 vial (2.5 mg) by nebulization every 6 hours as needed for shortness of breath / dyspnea or wheezing    Mild persistent asthma with acute exacerbation       azelastine 0.05 % Soln ophthalmic solution    OPTIVAR    1 Bottle    Apply 1 drop to eye 2 times daily    Allergic reaction, sequela       baclofen 20 MG tablet    LIORESAL    180 tablet    Take 1 tablet (20 mg) by mouth 3 times daily    Chronic bilateral low back pain with left-sided sciatica       benzonatate 200 MG capsule    TESSALON    60 capsule    Take 1 capsule (200 mg) by mouth 3 times daily as needed for cough    Cough       blood glucose lancets standard    no brand specified    1 Box    Use to test blood sugar 3 times daily or as directed.    Type 2 diabetes mellitus without complication, with long-term current use of insulin (H)       blood glucose monitoring meter device kit    no brand specified    1 kit    Use to test blood sugar 3 times daily  or as directed.    Type 2 diabetes mellitus without complication, with long-term current use of insulin (H)       blood glucose monitoring test strip    no brand specified    100 strip    Use to test blood sugars 3 times daily or as directed    Type 2 diabetes mellitus without complication, with long-term current use of insulin (H)       budesonide-formoterol 160-4.5 MCG/ACT Inhaler    SYMBICORT    3 Inhaler    Inhale 2 puffs into the lungs 2 times daily    Intermittent asthma, uncomplicated       clindamycin 1 % lotion    CLINDAMAX    60 mL    Apply topically 2 times daily    Abscess of anal and rectal regions       diphenhydrAMINE 50 MG capsule    BENADRYL     Take  mg by mouth At Bedtime.        docosanol 10 % Crea cream    ABREVA    1 Tube    Apply topically 5 times daily    HSV (herpes simplex virus) infection       docusate sodium 100 MG tablet    COLACE    1 tablet    Take 100-200 mg by mouth 2 times daily    Constipation, unspecified constipation type       EPINEPHrine 0.3 MG/0.3ML injection 2-pack    EPIPEN/ADRENACLICK/or ANY BX GENERIC EQUIV    2 mL    Inject 0.3 mLs (0.3 mg) into the muscle once as needed for anaphylaxis    Endometriosis, Encounter for smoking cessation counseling       fluticasone 50 MCG/ACT spray    FLONASE    9.9 g    Spray 2 sprays into both nostrils daily    Chronic allergic conjunctivitis       gabapentin 600 MG tablet    NEURONTIN    450 tablet    Take 1.5 tabs in AM and afternoon, take 2 tabs at bedtime.    Chronic bilateral low back pain with left-sided sciatica       glycerin (adult) 2 g Supp Suppository     3 suppository    Place 1 suppository rectally daily as needed for constipation    Constipation, unspecified constipation type       hydrOXYzine 25 MG tablet    ATARAX    120 tablet    Take 1-2 tablets (25-50 mg) by mouth every 6 hours as needed for itching, anxiety or other (sleep)    Chronic pain syndrome       insulin glargine U-300 300 UNIT/ML injection    TOUJEO     6 mL    Inject 80 Units Subcutaneous At Bedtime    Type 2 diabetes mellitus with complication, with long-term current use of insulin (H)       insulin lispro 100 UNIT/ML injection    HumaLOG PEN    15 mL    Inject 40 Units Subcutaneous 2 times daily (before meals)    Type 2 diabetes mellitus with complication, with long-term current use of insulin (H)       insulin pen needle 31G X 5 MM    B-D U/F    100 each    Use 4 daily or as directed.    Diabetes mellitus, type 2 (H)       Ipratropium-Albuterol  MCG/ACT inhaler    COMBIVENT RESPIMAT    1 Inhaler    Inhale 2 puffs into the lungs 2 times daily Not to exceed 6 doses per day.    Moderate persistent asthma without complication       lidocaine 5 % ointment    XYLOCAINE    30 g    Apply topically as needed for moderate pain    Personal history of fall, Acute pain of left knee, Right elbow pain, Psychogenic nonepileptic seizure       lisinopril 40 MG tablet    PRINIVIL/ZESTRIL    90 tablet    Take 1 tablet (40 mg) by mouth daily    Essential hypertension, benign       loratadine 10 MG tablet    CLARITIN    60 tablet    Take 2 tablets (20 mg) by mouth daily    Chronic seasonal allergic rhinitis, unspecified trigger       medroxyPROGESTERone 150 MG/ML injection    DEPO-PROVERA    1 mL    Inject 1 mL (150 mg) into the muscle every 3 months    Endometriosis       miconazole 2 % cream    MICATIN    5 g    Place 1 applicator vaginally At Bedtime Substitute as needed.    Vaginal candidiasis       naloxone nasal spray    NARCAN    0.2 mL    Spray 1 spray (4 mg) into one nostril alternating nostrils as needed for opioid reversal (every 2-3 minutes until medical assistance arrives.)    Chronic narcotic use       nystatin 364129 UNIT/ML suspension    MYCOSTATIN    60 mL    Take 5 mLs (500,000 Units) by mouth 4 times daily    Thrush       ondansetron 4 MG tablet    ZOFRAN    18 tablet    Take 1 tablet (4 mg) by mouth every 8 hours as needed for nausea    Chronic pain  syndrome, Nausea       * order for DME     1 Device    Equipment being ordered: CPAP.  1 device.  Per sleep study recommendations.  Brand:  Respironics, Type:  Nasal Wisp,  Size:  Small    NNAMDI (obstructive sleep apnea)       * order for DME     1 each    Adult nebulizer mask and tubing.    Mild persistent asthma with acute exacerbation       * order for DME     1 Device    Equipment being ordered: Nebulizer supplies for life.    Mild persistent asthma with acute exacerbation       pramipexole 1 MG tablet    MIRAPEX    90 tablet    Take 1 tablet (1 mg) by mouth At Bedtime    Restless leg syndrome       pravastatin 80 MG tablet    PRAVACHOL    90 tablet    Take 1 tablet (80 mg) by mouth daily    Hyperlipidemia LDL goal <100       * QUEtiapine 50 MG tablet    SEROquel     Take 50 mg by mouth 3 times daily        * QUEtiapine 300 MG tablet    SEROquel     TAKE 1 TABLET BY MOUTH AT BEDTIME. INDICATIONS: MANIC PHASE OF MANIC-DEPRESSION -CARMEN DURAN RN        ranitidine 150 MG tablet    ZANTAC    60 tablet    Take 1 tablet (150 mg) by mouth 2 times daily    Gastroesophageal reflux disease without esophagitis       sennosides 8.6 MG tablet    SENOKOT    1 tablet    Take 2 tablets by mouth 2 times daily    Constipation, unspecified constipation type       sulfamethoxazole-trimethoprim 800-160 MG per tablet    BACTRIM DS/SEPTRA DS    14 tablet    Take 1 tablet by mouth 2 times daily    Chronic sinusitis, unspecified location       SUMAtriptan 100 MG tablet    IMITREX    9 tablet    Take 1 tablet (100 mg) by mouth at onset of headache    Chronic migraine without aura without status migrainosus, not intractable       topiramate 50 MG tablet    TOPAMAX    60 tablet    Take 1 tablet 2x daily.    Other migraine without status migrainosus, not intractable       venlafaxine 75 MG 24 hr capsule    EFFEXOR-XR    30 capsule    Take 225 mg by mouth daily        * Notice:  This list has 5 medication(s) that are the same as other  medications prescribed for you. Read the directions carefully, and ask your doctor or other care provider to review them with you.

## 2018-06-26 NOTE — LETTER
June 27, 2018      Teresa Perez  [address]    [place] [address]        Dear Teresa,  Here is a copy of your lab results.  Your urine is negative for infection--but has glucose.  You may be having more pressure because we urinate more when our blood sugars are too high.  You glucose and your A1c are both also too high.  Your cholesterol is stable.  I look forward to discussing your diabetes with you further at your next follow up visit, and hope you feel better.  Please call the clinic at [phone] if you have any questions.    Please see below for your test results.    Resulted Orders   Hemoglobin A1c ([facility])   Result Value Ref Range    Hemoglobin A1C 10.9 (H) 4.1 - 5.7 %   Basic Metabolic Panel ([place])   Result Value Ref Range    Urea Nitrogen 15.6 7.0 - 19.0 mg/dL    Calcium 9.3 8.5 - 10.1 mg/dL    Chloride 98.5 98.0 - 110.0 mmol/L    Carbon Dioxide 23.0 20.0 - 32.0 mmol/L    Creatinine 0.5 0.5 - 1.0 mg/dL    Glucose 370.0 (H) 70.0 - 99.0 mg'dL    Potassium 4.1 3.2 - 4.6 mmol/dL    Sodium 130.2 (L) 132.0 - 142.0 mmol/L    GFR Estimate >90 >60.0 mL/min/1.7 m2    GFR Estimate If Black >90 >60.0 mL/min/1.7 m2   Lipid Panel ([place])   Result Value Ref Range    Cholesterol 185.8 0.0 - 200.0 mg/dL    Cholesterol/HDL Ratio 6.1 (H) 0.0 - 5.0    HDL Cholesterol 30.6 (L) >40.0 mg/dL    LDL Cholesterol Calculated 104 0 - 129 mg/dL    Triglycerides 254.2 (H) 0.0 - 150.0 mg/dL    VLDL Cholesterol 50.8 (H) 7.0 - 32.0 mg/dL   Urinalysis, Micro If ([facility])   Result Value Ref Range    Specific Gravity Urine >=1.030 1.005 - 1.030    pH Urine 5.5 4.5 - 8.0    Leukocyte Esterase UR Negative NEGATIVE    Nitrite Urine Negative NEGATIVE    Protein UR Negative NEGATIVE    Glucose Urine 3+ (A) NEGATIVE    Ketones Urine Negative NEGATIVE    Urobilinogen mg/dL 0.2 E.U./dL 0.2 E.U./dL    Bilirubin UR Negative NEGATIVE    Blood UR Negative NEGATIVE       If you have any questions, please call the clinic to make  an appointment.    Sincerely,    Tyra Bullock MD

## 2018-06-26 NOTE — PROGRESS NOTES
Teresa Perez-    Here is a copy of your lab results.  Your urine is negative for infection--but has glucose.  You may be having more pressure because we urinate more when our blood sugars are too high.  You glucose and your A1c are both also too high.  Your cholesterol is stable.  I look forward to discussing your diabetes with you further at your next follow up visit, and hope you feel better.  Please call the clinic at [phone] if you have any questions.      Tyra Bullock    Please send results to patient.

## 2018-06-26 NOTE — PROGRESS NOTES
There are no exam notes on file for this visit.    SUBJECTIVE  Teresa Perez is a 47 year old female with past medical history significant for    Patient Active Problem List   Diagnosis     Health Care Home     Acute peptic ulcer     Other allergy, other than to medicinal agents     Bipolar disorder (H)     Bulging lumbar disc     Cervical dysplasia     Common migraine without aura     Constipation     Dwarfism     Familial hypercholesterolemia     HTN (hypertension)     Insomnia     Low back pain     Intermittent asthma     Leg pain, bilateral     Smoking     Degeneration of thoracic or thoracolumbar intervertebral disc     Diabetes mellitus, type 2 (H)     LOMAS (nonalcoholic steatohepatitis)     Disease of lung     Hemorrhoids     Parotid mass     Endometriosis     NNAMDI (obstructive sleep apnea)     History of total right knee replacement     Lateral epicondylitis     Impingement syndrome, shoulder, left     Hx of total knee replacement, left     Chronic pain syndrome     Cough     Others present at the visit:  none    Presents for   Chief Complaint   Patient presents with     Cough     Pt would like to discuss her cough.     UTI     Pt would like to know if she has a kidney infection because her whole body hurts with this cough.     Medication Reconciliation     Needs attention - pt states she was called and told that she needs to go through her meds to make sure she has refills on everything.      Smoking Cessation     Pt would like to quit smoking.      Patient is here for evaluation of cough, congestion, and increased shortness of breath.  She has underlying asthma and is a chronic smoker.  Shares that she would like to quit smoking.  Plans to use patches, but cannot afford them at this time until her next check comes in.  She lives in public housing and shares that they are supporting tendons who would like to quit 3 years and quit plan and she plans to utilize the services.  Breathing has been  challenging since this past winter.  Things got better for a while, but about 10 days ago there was a hot spell, and her breathing worsens.  Today she notices cough, productive of yellow sputum, whole body aches, runny nose, ear pressure, and bilateral sinus pressure.  She also has been feeling some fevers and chills, but has not taken her temperature and is having some nausea.  She has tried using both her Symbicort inhaler and her albuterol and they have been somewhat helpful.  She is also using cough drops, honey, tea, and warm salt water gargles.  Has not tried any other over-the-counter medications because that she worries they may have interaction.    She is also noting some lower pelvic pain.  She describes it as a sensation of fullness across her lower abdomen.  This is new.  She denies dysuria, no blood in the urine, no difficulty urinating.  Sometimes it is itchy.  She has had a abscess in the lower pannus region, but does not have symptoms of this today.  She has used clindamycin topical cream to help with some hidradenitis and is requesting a refill of this today.  Stools have been normal.  No vomiting.  She is worried she might have a UTI.    Shares that she has noted worsening in a ventral hernia.  Noticing that is protruding more than previously.  Has an appointment to see the surgeon.  She has been working on weight loss, but this has been challenging.  Has a history of a previous bariatric surgery.  A1c today is 10.9.  Discussed that this will affect her potential to heal from the surgery.  She has increased her insulin on her own and is using 70 units of long-acting insulin, and 40 units of short acting with meals.  Has not been checking her sugars regularly but will start doing so.  Discussed that this must decrease down to 9 for her to be a good candidate for surgery.  She will schedule a follow-up appointment to do this.    OBJECTIVE:  Vitals: /80 (BP Location: Left arm, Patient Position:  Sitting, Cuff Size: Adult Large)  Pulse 105  Temp 99.4  F (37.4  C) (Oral)  Resp 16  Wt 231 lb 9.6 oz (105.1 kg)  SpO2 97%  BMI 43.76 kg/m2  BMI= Body mass index is 43.76 kg/(m^2).  Objective:    Vitals:  Vitals are reviewed and are within the normal range  Gen:  Alert, pleasant, no acute distress  Head:  Normal cephalic, atraumatic  Ears:  Tympanic membranes viewed bilaterally, no erythema, bulging, or fluid present  Nose:  Bilateral nasal congestion, and bilateral sinus tenderness.    Throat:  Clear.  Non-erythematous and without exudate  Neck:  No cervical lymphadenopathy  Cardiac:  Regular rate and rhythm, no murmurs, rubs or gallops  Respiratory:  Lungs clear to auscultation bilaterally. Somewhat diminished in bases, but no crackles.    Abdomen:  Soft, non-tender, non-distended, bowel sounds positive  Extremities:  Warm, well-perfused, pulses 2+/4, no lower extremity edema  Skin:  Mucous membranes moist.  No rash.   Capillary refill <2 secs.      Results for orders placed or performed in visit on 06/26/18   Hemoglobin A1c ([facility])   Result Value Ref Range    Hemoglobin A1C 10.9 (H) 4.1 - 5.7 %   Basic Metabolic Panel ([place])   Result Value Ref Range    Urea Nitrogen 15.6 7.0 - 19.0 mg/dL    Calcium 9.3 8.5 - 10.1 mg/dL    Chloride 98.5 98.0 - 110.0 mmol/L    Carbon Dioxide 23.0 20.0 - 32.0 mmol/L    Creatinine 0.5 0.5 - 1.0 mg/dL    Glucose 370.0 (H) 70.0 - 99.0 mg'dL    Potassium 4.1 3.2 - 4.6 mmol/dL    Sodium 130.2 (L) 132.0 - 142.0 mmol/L    GFR Estimate >90 >60.0 mL/min/1.7 m2    GFR Estimate If Black >90 >60.0 mL/min/1.7 m2   Microalbumin Creatinine Ratio Random Ur ([facility])   Result Value Ref Range    Microalbumin, Urine 1.49 0.00 - 1.99 mg/dL    Creatinine, Urine 109.8 mg/dL    Albumin Urine mg/g Cr 13.6 <=19.9 mg/g    Narrative    Test performed by:  [facility]  [address] [place] [address]  Microalbumin, Random Urine  <2.0 mg/dL . . . . . . . . Normal  3.0-30.0 mg/dL  . . . . . . Microalbuminuria  >30.0 mg/dL . . . . . .  . Clinical Proteinuria  Microalbumin/Creatinine Ratio, Random Urine  <20 mg/g . . . . .. . . . Normal   mg/g . . . . . . . Microalbuminuria  >300 mg/g . . . . . . . . Clinical Proteinuria   Lipid Panel ([place])   Result Value Ref Range    Cholesterol 185.8 0.0 - 200.0 mg/dL    Cholesterol/HDL Ratio 6.1 (H) 0.0 - 5.0    HDL Cholesterol 30.6 (L) >40.0 mg/dL    LDL Cholesterol Calculated 104 0 - 129 mg/dL    Triglycerides 254.2 (H) 0.0 - 150.0 mg/dL    VLDL Cholesterol 50.8 (H) 7.0 - 32.0 mg/dL   Urinalysis, Micro If ([facility])   Result Value Ref Range    Specific Gravity Urine >=1.030 1.005 - 1.030    pH Urine 5.5 4.5 - 8.0    Leukocyte Esterase UR Negative NEGATIVE    Nitrite Urine Negative NEGATIVE    Protein UR Negative NEGATIVE    Glucose Urine 3+ (A) NEGATIVE    Ketones Urine Negative NEGATIVE    Urobilinogen mg/dL 0.2 E.U./dL 0.2 E.U./dL    Bilirubin UR Negative NEGATIVE    Blood UR Negative NEGATIVE             ASSESSMENT AND PLAN:      Teresa was seen today for cough, uti, medication reconciliation and smoking cessation.  Patient is concerned for UTI, though no urine available yet, and does have sinusitis on exam.  Lungs clear, without wheezing, so no concern for asthma exacerbation today, and would like to avoid steroids given uncontrolled diabetes.  Will give Bactrim to cover for both potential UTI and known sinusitis.  Refilled her regular medications.  Will increase insulin some from what she is already doing:  Will do 80 units of Lantus daily, and 40 Units of Novolog BID with meals.  Encouraged improved diabetes checks and control.  Follow up in 2-3 weeks to further discuss DM.      Diagnoses and all orders for this visit:    Pelvic pain in female  -     Urinalysis, Micro If ([facility])    Type 2 diabetes mellitus with complication, with long-term current use of insulin (H)  -     Hemoglobin A1c ([facility])  -     Microalbumin Creatinine Ratio Random  Ur ([facility])  -  Troujeo 80 Units, Novolog 40 Units BID.  Check sugars 2x daily before eating.      Essential hypertension  -     Basic Metabolic Panel ([place])    Mixed hyperlipidemia  -     Lipid Panel ([facility])    Chronic bilateral low back pain with left-sided sciatica  -     gabapentin (NEURONTIN) 600 MG tablet; Take 1.5 tabs in AM and afternoon, take 2 tabs at bedtime.  -     baclofen (LIORESAL) 20 MG tablet; Take 1 tablet (20 mg) by mouth 3 times daily    Cough  -     benzonatate (TESSALON) 200 MG capsule; Take 1 capsule (200 mg) by mouth 3 times daily as needed for cough    Essential hypertension, benign  -     lisinopril (PRINIVIL/ZESTRIL) 40 MG tablet; Take 1 tablet (40 mg) by mouth daily    HSV (herpes simplex virus) infection  -     docosanol (ABREVA) 10 % CREA cream; Apply topically 5 times daily    Moderate persistent asthma without complication  -     Ipratropium-Albuterol (COMBIVENT RESPIMAT)  MCG/ACT inhaler; Inhale 2 puffs into the lungs 2 times daily Not to exceed 6 doses per day.    Chronic sinusitis, unspecified location  -     sulfamethoxazole-trimethoprim (BACTRIM DS/SEPTRA DS) 800-160 MG per tablet; Take 1 tablet by mouth 2 times daily      Follow up in 2-3 weeks to discuss diabetes and potential referral to surgery for ventral hernia.      Tyra Bullock

## 2018-07-02 DIAGNOSIS — E11.8 TYPE 2 DIABETES MELLITUS WITH COMPLICATION, WITH LONG-TERM CURRENT USE OF INSULIN (H): ICD-10-CM

## 2018-07-02 DIAGNOSIS — Z79.4 TYPE 2 DIABETES MELLITUS WITH COMPLICATION, WITH LONG-TERM CURRENT USE OF INSULIN (H): ICD-10-CM

## 2018-07-19 DIAGNOSIS — R05.9 COUGH: ICD-10-CM

## 2018-07-19 DIAGNOSIS — G43.809 OTHER MIGRAINE WITHOUT STATUS MIGRAINOSUS, NOT INTRACTABLE: ICD-10-CM

## 2018-07-19 DIAGNOSIS — J30.2 CHRONIC SEASONAL ALLERGIC RHINITIS, UNSPECIFIED TRIGGER: ICD-10-CM

## 2018-07-19 RX ORDER — TOPIRAMATE 50 MG/1
TABLET, FILM COATED ORAL
Qty: 60 TABLET | Refills: 3 | Status: SHIPPED | OUTPATIENT
Start: 2018-07-19 | End: 2018-10-18

## 2018-07-19 RX ORDER — LORATADINE 10 MG/1
20 TABLET ORAL DAILY
Qty: 60 TABLET | Refills: 0 | Status: SHIPPED | OUTPATIENT
Start: 2018-07-19 | End: 2018-08-20

## 2018-07-19 RX ORDER — BENZONATATE 200 MG/1
200 CAPSULE ORAL 3 TIMES DAILY PRN
Qty: 60 CAPSULE | Refills: 0 | Status: SHIPPED | OUTPATIENT
Start: 2018-07-19 | End: 2018-10-16

## 2018-07-23 ENCOUNTER — OFFICE VISIT - HEALTHEAST (OUTPATIENT)
Dept: SURGERY | Facility: CLINIC | Age: 48
End: 2018-07-23

## 2018-07-23 DIAGNOSIS — K43.2 INCISIONAL HERNIA: ICD-10-CM

## 2018-07-23 ASSESSMENT — MIFFLIN-ST. JEOR: SCORE: 1595.22

## 2018-07-26 ENCOUNTER — HOSPITAL ENCOUNTER (OUTPATIENT)
Dept: CT IMAGING | Facility: CLINIC | Age: 48
Discharge: HOME | End: 2018-07-26
Attending: SURGERY

## 2018-07-26 DIAGNOSIS — K43.2 INCISIONAL HERNIA: ICD-10-CM

## 2018-07-27 ENCOUNTER — TRANSFERRED RECORDS (OUTPATIENT)
Dept: HEALTH INFORMATION MANAGEMENT | Facility: CLINIC | Age: 48
End: 2018-07-27

## 2018-07-30 ENCOUNTER — TRANSFERRED RECORDS (OUTPATIENT)
Dept: HEALTH INFORMATION MANAGEMENT | Facility: CLINIC | Age: 48
End: 2018-07-30

## 2018-07-30 ENCOUNTER — OFFICE VISIT - HEALTHEAST (OUTPATIENT)
Dept: SURGERY | Facility: CLINIC | Age: 48
End: 2018-07-30

## 2018-07-30 DIAGNOSIS — K43.2 INCISIONAL HERNIA, WITHOUT OBSTRUCTION OR GANGRENE: ICD-10-CM

## 2018-08-02 ENCOUNTER — AMBULATORY - HEALTHEAST (OUTPATIENT)
Dept: SURGERY | Facility: CLINIC | Age: 48
End: 2018-08-02

## 2018-08-02 DIAGNOSIS — K43.2 INCISIONAL HERNIA: ICD-10-CM

## 2018-08-16 DIAGNOSIS — G89.29 CHRONIC BILATERAL LOW BACK PAIN WITHOUT SCIATICA: ICD-10-CM

## 2018-08-16 DIAGNOSIS — M54.50 CHRONIC BILATERAL LOW BACK PAIN WITHOUT SCIATICA: ICD-10-CM

## 2018-08-16 RX ORDER — ACETAMINOPHEN 500 MG
1000 TABLET ORAL 3 TIMES DAILY PRN
Qty: 180 TABLET | Refills: 3 | Status: SHIPPED | OUTPATIENT
Start: 2018-08-16 | End: 2018-12-14

## 2018-08-16 NOTE — TELEPHONE ENCOUNTER
"Pt's pharmacy is requesting a refill of BD PEN DLS ЕЛЕНА 32X5/32\", however it is no longer on the pt's med list.  Please advise.  Deana Castellanos, DENY      "

## 2018-08-20 DIAGNOSIS — J30.2 CHRONIC SEASONAL ALLERGIC RHINITIS, UNSPECIFIED TRIGGER: ICD-10-CM

## 2018-08-20 DIAGNOSIS — Z01.818 PRE-OP EXAM: Primary | ICD-10-CM

## 2018-08-20 DIAGNOSIS — E11.8 TYPE 2 DIABETES MELLITUS WITH COMPLICATION, WITH LONG-TERM CURRENT USE OF INSULIN (H): ICD-10-CM

## 2018-08-20 DIAGNOSIS — I10 ESSENTIAL HYPERTENSION: ICD-10-CM

## 2018-08-20 DIAGNOSIS — Z79.4 TYPE 2 DIABETES MELLITUS WITH COMPLICATION, WITH LONG-TERM CURRENT USE OF INSULIN (H): ICD-10-CM

## 2018-08-20 DIAGNOSIS — K75.81 NASH (NONALCOHOLIC STEATOHEPATITIS): ICD-10-CM

## 2018-08-21 ENCOUNTER — RECORDS - HEALTHEAST (OUTPATIENT)
Dept: ADMINISTRATIVE | Facility: OTHER | Age: 48
End: 2018-08-21

## 2018-08-21 ENCOUNTER — OFFICE VISIT (OUTPATIENT)
Dept: FAMILY MEDICINE | Facility: CLINIC | Age: 48
End: 2018-08-21
Payer: MEDICARE

## 2018-08-21 VITALS
OXYGEN SATURATION: 99 % | BODY MASS INDEX: 43.65 KG/M2 | HEART RATE: 104 BPM | SYSTOLIC BLOOD PRESSURE: 139 MMHG | HEIGHT: 61 IN | RESPIRATION RATE: 20 BRPM | DIASTOLIC BLOOD PRESSURE: 88 MMHG | TEMPERATURE: 98.8 F | WEIGHT: 231.2 LBS

## 2018-08-21 DIAGNOSIS — I10 ESSENTIAL HYPERTENSION: ICD-10-CM

## 2018-08-21 DIAGNOSIS — Z79.4 TYPE 2 DIABETES MELLITUS WITH HYPERGLYCEMIA, WITH LONG-TERM CURRENT USE OF INSULIN (H): ICD-10-CM

## 2018-08-21 DIAGNOSIS — E11.8 TYPE 2 DIABETES MELLITUS WITH COMPLICATION, WITH LONG-TERM CURRENT USE OF INSULIN (H): ICD-10-CM

## 2018-08-21 DIAGNOSIS — J30.2 CHRONIC SEASONAL ALLERGIC RHINITIS, UNSPECIFIED TRIGGER: ICD-10-CM

## 2018-08-21 DIAGNOSIS — Z79.4 TYPE 2 DIABETES MELLITUS WITH COMPLICATION, WITH LONG-TERM CURRENT USE OF INSULIN (H): ICD-10-CM

## 2018-08-21 DIAGNOSIS — K75.81 NASH (NONALCOHOLIC STEATOHEPATITIS): ICD-10-CM

## 2018-08-21 DIAGNOSIS — G89.29 CHRONIC BILATERAL LOW BACK PAIN WITH LEFT-SIDED SCIATICA: ICD-10-CM

## 2018-08-21 DIAGNOSIS — Z01.818 PRE-OP EXAM: Primary | ICD-10-CM

## 2018-08-21 DIAGNOSIS — M54.42 CHRONIC BILATERAL LOW BACK PAIN WITH LEFT-SIDED SCIATICA: ICD-10-CM

## 2018-08-21 DIAGNOSIS — E11.65 TYPE 2 DIABETES MELLITUS WITH HYPERGLYCEMIA, WITH LONG-TERM CURRENT USE OF INSULIN (H): ICD-10-CM

## 2018-08-21 DIAGNOSIS — J01.01 ACUTE RECURRENT MAXILLARY SINUSITIS: ICD-10-CM

## 2018-08-21 LAB
% GRANULOCYTES: 75.3 %G (ref 40–75)
ALBUMIN SERPL-MCNC: 4.2 MG/DL (ref 3.9–5.1)
ALP SERPL-CCNC: 104.5 U/L (ref 40–150)
ALT SERPL-CCNC: 21.5 U/L (ref 0–45)
AST SERPL-CCNC: 14.8 U/L (ref 0–45)
BILIRUB SERPL-MCNC: <0.3 MG/DL (ref 0.2–1.3)
BILIRUBIN DIRECT: 0.1 MG/DL (ref 0–0.2)
BUN SERPL-MCNC: 11.7 MG/DL (ref 7–19)
CALCIUM SERPL-MCNC: 9.4 MG/DL (ref 8.5–10.1)
CHLORIDE SERPLBLD-SCNC: 100.4 MMOL/L (ref 98–110)
CO2 SERPL-SCNC: 27.6 MMOL/L (ref 20–32)
CREAT SERPL-MCNC: 0.5 MG/DL (ref 0.5–1)
GFR SERPL CREATININE-BSD FRML MDRD: >90 ML/MIN/1.7 M2
GLUCOSE SERPL-MCNC: 279.8 MG'DL (ref 70–99)
GRANULOCYTES #: 9 K/UL (ref 1.6–8.3)
HBA1C MFR BLD: 10.1 % (ref 4.1–5.7)
HCT VFR BLD AUTO: 45.7 % (ref 35–47)
HEMOGLOBIN: 14.9 G/DL (ref 11.7–15.7)
LYMPHOCYTES # BLD AUTO: 2.3 K/UL (ref 0.8–5.3)
LYMPHOCYTES NFR BLD AUTO: 19.4 %L (ref 20–48)
MCH RBC QN AUTO: 30.3 PG (ref 26.5–35)
MCHC RBC AUTO-ENTMCNC: 32.6 G/DL (ref 32–36)
MCV RBC AUTO: 93 FL (ref 78–100)
MID #: 0.6 K/UL (ref 0–2.2)
MID %: 5.3 %M (ref 0–20)
PLATELET # BLD AUTO: 265 K/UL (ref 150–450)
POTASSIUM SERPL-SCNC: 4 MMOL/DL (ref 3.2–4.6)
PROT SERPL-MCNC: 7 G/DL (ref 6.8–8.8)
RBC # BLD AUTO: 4.9 M/UL (ref 3.8–5.2)
SODIUM SERPL-SCNC: 133.8 MMOL/L (ref 132–142)
WBC # BLD AUTO: 11.9 K/UL (ref 4–11)

## 2018-08-21 RX ORDER — AZITHROMYCIN 250 MG/1
TABLET, FILM COATED ORAL
Qty: 11 TABLET | Refills: 0 | Status: SHIPPED | OUTPATIENT
Start: 2018-08-21 | End: 2018-10-18

## 2018-08-21 RX ORDER — GABAPENTIN 600 MG/1
TABLET ORAL
Qty: 450 TABLET | Refills: 3 | Status: SHIPPED | OUTPATIENT
Start: 2018-08-21 | End: 2018-10-18

## 2018-08-21 RX ORDER — LORATADINE 10 MG/1
20 TABLET ORAL DAILY
Qty: 60 TABLET | Refills: 5 | Status: SHIPPED | OUTPATIENT
Start: 2018-08-21 | End: 2018-09-17

## 2018-08-21 RX ORDER — LORATADINE 10 MG/1
20 TABLET ORAL DAILY
Qty: 60 TABLET | Refills: 5 | Status: SHIPPED | OUTPATIENT
Start: 2018-08-21 | End: 2018-08-21

## 2018-08-21 NOTE — MR AVS SNAPSHOT
After Visit Summary   8/21/2018    Teresa Perez    MRN: [id number]           Patient Information     Date Of Birth          1970        Visit Information        Provider Department      8/21/2018 2:30 PM Tyra Bullock MD [facility]        Today's Diagnoses     Pre-op exam    -  1    Essential hypertension        Type 2 diabetes mellitus with complication, with long-term current use of insulin (H)        LOMAS (nonalcoholic steatohepatitis)        Acute recurrent maxillary sinusitis        Type 2 diabetes mellitus with hyperglycemia, with long-term current use of insulin (H)        Chronic seasonal allergic rhinitis, unspecified trigger        Chronic bilateral low back pain with left-sided sciatica          Care Instructions    1)  For sinus and cough:  Azithromycin:  2 pills day, then 1 pill every day for 10 days.    2)  Continue to use your inhalers.  Use the red inhaler (symbicort EVERYDAY)  Try to increase the combivent to 3x per day--puffs.    3)  Keep working on quitting smoking.    4)  Increase the Trudjeo  90 Units at night.   Keep the humolog at 40 Units with meals.   Check blood sugars before meals and call us on Monday  Want blood sugars less than 200 for a couple of days before surgery.      Call us on Monday with blood sugars and an update on your breathing.      Schedule another pre-op prior to surgery.              Follow-ups after your visit        Who to contact     Please call your clinic at [phone] to:    Ask questions about your health    Make or cancel appointments    Discuss your medicines    Learn about your test results    Speak to your doctor            Additional Information About Your Visit        MyChart Information     [employer] is an electronic gateway that provides easy, online access to your medical records. With [facility], you can request a clinic appointment, read your test results, renew a prescription or communicate with your care team.     To  "sign up for MyChart visit the website at [email or web address]   You will be asked to enter the access code listed below, as well as some personal information. Please follow the directions to create your username and password.     Your access code is: [email or web address]  Expires: 2018  1:43 PM     Your access code will  in 90 days. If you need help or a new code, please contact your [facility] or call [phone] for assistance.        Care EveryWhere ID     This is your Care EveryWhere ID. This could be used by other organizations to access your [facility] medical records  [id number]        Your Vitals Were     Pulse Temperature Respirations Height Pulse Oximetry BMI (Body Mass Index)    104 98.8  F (37.1  C) (Oral) 20 5' 1.42\" (156 cm) 99% 43.09 kg/m2       Blood Pressure from Last 3 Encounters:   18 139/88   18 113/80   18 127/88    Weight from Last 3 Encounters:   18 231 lb 3.2 oz (104.9 kg)   18 231 lb 9.6 oz (105.1 kg)   18 229 lb (103.9 kg)              We Performed the Following     Basic Metabolic Panel ([place])     CBC with Diff Plt ([facility])     EKG 12-lead complete w/read - Clinics     Hemoglobin A1c ([facility])     Hepatic Panel ([place])          Today's Medication Changes          These changes are accurate as of 18  3:46 PM.  If you have any questions, ask your nurse or doctor.               Start taking these medicines.        Dose/Directions    azithromycin 250 MG tablet   Commonly known as:  ZITHROMAX   Used for:  Acute recurrent maxillary sinusitis   Started by:  Tyra Bullock MD        Two tablets first day, then one tablet daily for nine days.   Quantity:  11 tablet   Refills:  0         Stop taking these medicines if you haven't already. Please contact your care team if you have questions.     sulfamethoxazole-trimethoprim 800-160 MG per tablet   Commonly known as:  BACTRIM DS/SEPTRA DS   Stopped by:  " Tyra Bullock MD                Where to get your medicines      These medications were sent to [facility] - [place], [place] - [address], [place] [address]     Phone:  [phone]     azithromycin 250 MG tablet    gabapentin 600 MG tablet    insulin pen needle 31G X 5 MM    loratadine 10 MG tablet                Primary Care Provider Office Phone # Fax #    Tyra Bullock [phone]       [address] [place] [address]        Equal Access to Services     [facility] : Hadii aad ku hadasho Soomaali, waaxda luqadaha, qaybta kaalmada adeegyada, waxay idiin hayaan adeeg khhilario valle . So [facility] [phone].    ATENCIÓN: Si habla español, tiene a tang disposición servicios gratuitos de asistencia lingüística. [facility] [phone].    We comply with applicable federal civil rights laws and [place] laws. We do not discriminate on the basis of race, color, national origin, age, disability, sex, sexual orientation, or gender identity.            Thank you!     Thank you for choosing [facility]  for your care. Our goal is always to provide you with excellent care. Hearing back from our patients is one way we can continue to improve our services. Please take a few minutes to complete the written survey that you may receive in the mail after your visit with us. Thank you!             Your Updated Medication List - Protect others around you: Learn how to safely use, store and throw away your medicines at www.disposemymeds.org.          This list is accurate as of 8/21/18  3:46 PM.  Always use your most recent med list.                   Brand Name Dispense Instructions for use Diagnosis    acetaminophen 500 MG tablet    TYLENOL    180 tablet    Take 2 tablets (1,000 mg) by mouth 3 times daily as needed for mild pain    Chronic bilateral low back pain without sciatica       albuterol (2.5 MG/3ML) 0.083% neb solution     30 vial    Take 1 vial (2.5  mg) by nebulization every 6 hours as needed for shortness of breath / dyspnea or wheezing    Mild persistent asthma with acute exacerbation       azelastine 0.05 % ophthalmic solution    OPTIVAR    1 Bottle    Apply 1 drop to eye 2 times daily    Allergic reaction, sequela       azithromycin 250 MG tablet    ZITHROMAX    11 tablet    Two tablets first day, then one tablet daily for nine days.    Acute recurrent maxillary sinusitis       baclofen 20 MG tablet    LIORESAL    180 tablet    Take 1 tablet (20 mg) by mouth 3 times daily    Chronic bilateral low back pain with left-sided sciatica       benzonatate 200 MG capsule    TESSALON    60 capsule    Take 1 capsule (200 mg) by mouth 3 times daily as needed for cough    Cough       blood glucose lancets standard    no brand specified    1 Box    Use to test blood sugar 3 times daily or as directed.    Type 2 diabetes mellitus without complication, with long-term current use of insulin (H)       blood glucose monitoring meter device kit    no brand specified    1 kit    Use to test blood sugar 3 times daily or as directed.    Type 2 diabetes mellitus without complication, with long-term current use of insulin (H)       blood glucose monitoring test strip    no brand specified    100 strip    Use to test blood sugars 3 times daily or as directed    Type 2 diabetes mellitus without complication, with long-term current use of insulin (H)       budesonide-formoterol 160-4.5 MCG/ACT Inhaler    SYMBICORT    3 Inhaler    Inhale 2 puffs into the lungs 2 times daily    Intermittent asthma, uncomplicated       clindamycin 1 % lotion    CLINDAMAX    60 mL    Apply topically 2 times daily    Abscess of anal and rectal regions       diphenhydrAMINE 50 MG capsule    BENADRYL     Take  mg by mouth At Bedtime.        docosanol 10 % Crea cream    ABREVA    1 Tube    Apply topically 5 times daily    HSV (herpes simplex virus) infection       docusate sodium 100 MG tablet     COLACE    1 tablet    Take 100-200 mg by mouth 2 times daily    Constipation, unspecified constipation type       EPINEPHrine 0.3 MG/0.3ML injection 2-pack    EPIPEN/ADRENACLICK/or ANY BX GENERIC EQUIV    2 mL    Inject 0.3 mLs (0.3 mg) into the muscle once as needed for anaphylaxis    Endometriosis, Encounter for smoking cessation counseling       fluticasone 50 MCG/ACT spray    FLONASE    9.9 g    Spray 2 sprays into both nostrils daily    Chronic allergic conjunctivitis       gabapentin 600 MG tablet    NEURONTIN    450 tablet    Take 1.5 tabs in AM and afternoon, take 2 tabs at bedtime.    Chronic bilateral low back pain with left-sided sciatica       glycerin (adult) 2 g Supp Suppository     3 suppository    Place 1 suppository rectally daily as needed for constipation    Constipation, unspecified constipation type       hydrOXYzine 25 MG tablet    ATARAX    120 tablet    Take 1-2 tablets (25-50 mg) by mouth every 6 hours as needed for itching, anxiety or other (sleep)    Chronic pain syndrome       insulin glargine U-300 300 UNIT/ML injection    TOUJEO    6 mL    Inject 80 Units Subcutaneous At Bedtime    Type 2 diabetes mellitus with complication, with long-term current use of insulin (H)       insulin lispro 100 UNIT/ML injection    HumaLOG PEN    15 mL    Inject 40 Units Subcutaneous 2 times daily (before meals)    Type 2 diabetes mellitus with complication, with long-term current use of insulin (H)       insulin pen needle 31G X 5 MM    B-D U/F    100 each    Use 4 daily or as directed.    Type 2 diabetes mellitus with hyperglycemia, with long-term current use of insulin (H)       Ipratropium-Albuterol  MCG/ACT inhaler    COMBIVENT RESPIMAT    1 Inhaler    Inhale 2 puffs into the lungs 2 times daily Not to exceed 6 doses per day.    Moderate persistent asthma without complication       lidocaine 5 % ointment    XYLOCAINE    30 g    Apply topically as needed for moderate pain    Personal history of  fall, Acute pain of left knee, Right elbow pain, Psychogenic nonepileptic seizure       lisinopril 40 MG tablet    PRINIVIL/ZESTRIL    90 tablet    Take 1 tablet (40 mg) by mouth daily    Essential hypertension, benign       loratadine 10 MG tablet    CLARITIN    60 tablet    Take 2 tablets (20 mg) by mouth daily    Chronic seasonal allergic rhinitis, unspecified trigger       medroxyPROGESTERone 150 MG/ML injection    DEPO-PROVERA    1 mL    Inject 1 mL (150 mg) into the muscle every 3 months    Endometriosis       miconazole 2 % cream    MICATIN    5 g    Place 1 applicator vaginally At Bedtime Substitute as needed.    Vaginal candidiasis       naloxone nasal spray    NARCAN    0.2 mL    Spray 1 spray (4 mg) into one nostril alternating nostrils as needed for opioid reversal (every 2-3 minutes until medical assistance arrives.)    Chronic narcotic use       nystatin 143924 UNIT/ML suspension    MYCOSTATIN    60 mL    Take 5 mLs (500,000 Units) by mouth 4 times daily    Thrush       ondansetron 4 MG tablet    ZOFRAN    18 tablet    Take 1 tablet (4 mg) by mouth every 8 hours as needed for nausea    Chronic pain syndrome, Nausea       * order for DME     1 Device    Equipment being ordered: CPAP.  1 device.  Per sleep study recommendations.  Brand:  Respironics, Type:  Nasal Wisp,  Size:  Small    NNAMDI (obstructive sleep apnea)       * order for DME     1 each    Adult nebulizer mask and tubing.    Mild persistent asthma with acute exacerbation       * order for DME     1 Device    Equipment being ordered: Nebulizer supplies for life.    Mild persistent asthma with acute exacerbation       pramipexole 1 MG tablet    MIRAPEX    90 tablet    Take 1 tablet (1 mg) by mouth At Bedtime    Restless leg syndrome       pravastatin 80 MG tablet    PRAVACHOL    90 tablet    Take 1 tablet (80 mg) by mouth daily    Hyperlipidemia LDL goal <100       * QUEtiapine 50 MG tablet    SEROquel     Take 50 mg by mouth 3 times daily         * QUEtiapine 300 MG tablet    SEROquel     TAKE 1 TABLET BY MOUTH AT BEDTIME. INDICATIONS: MANIC PHASE OF MANIC-DEPRESSION -CARMEN DURAN RN        ranitidine 150 MG tablet    ZANTAC    60 tablet    Take 1 tablet (150 mg) by mouth 2 times daily    Gastroesophageal reflux disease without esophagitis       sennosides 8.6 MG tablet    SENOKOT    1 tablet    Take 2 tablets by mouth 2 times daily    Constipation, unspecified constipation type       SUMAtriptan 100 MG tablet    IMITREX    9 tablet    Take 1 tablet (100 mg) by mouth at onset of headache    Chronic migraine without aura without status migrainosus, not intractable       topiramate 50 MG tablet    TOPAMAX    60 tablet    Take 1 tablet 2x daily.    Other migraine without status migrainosus, not intractable       venlafaxine 75 MG 24 hr capsule    EFFEXOR-XR    30 capsule    Take 225 mg by mouth daily        * Notice:  This list has 5 medication(s) that are the same as other medications prescribed for you. Read the directions carefully, and ask your doctor or other care provider to review them with you.

## 2018-08-21 NOTE — PATIENT INSTRUCTIONS
1)  For sinus and cough:  Azithromycin:  2 pills day, then 1 pill every day for 10 days.    2)  Continue to use your inhalers.  Use the red inhaler (symbicort EVERYDAY)  Try to increase the combivent to 3x per day--puffs.    3)  Keep working on quitting smoking.    4)  Increase the Trudjeo  90 Units at night.   Keep the humolog at 40 Units with meals.   Check blood sugars before meals and call us on Monday  Want blood sugars less than 200 for a couple of days before surgery.      Call us on Monday with blood sugars and an update on your breathing.      Schedule another pre-op prior to surgery.

## 2018-08-22 NOTE — PROGRESS NOTES
There are no exam notes on file for this visit.    SUBJECTIVE  Teresa Perez is a 47 year old female with past medical history significant for    Patient Active Problem List   Diagnosis     Health Care Home     Acute peptic ulcer     Other allergy, other than to medicinal agents     Bipolar disorder (H)     Bulging lumbar disc     Cervical dysplasia     Common migraine without aura     Constipation     Dwarfism     Familial hypercholesterolemia     HTN (hypertension)     Insomnia     Low back pain     Intermittent asthma     Leg pain, bilateral     Smoking     Degeneration of thoracic or thoracolumbar intervertebral disc     Diabetes mellitus, type 2 (H)     LOMAS (nonalcoholic steatohepatitis)     Disease of lung     Hemorrhoids     Parotid mass     Endometriosis     NNAMDI (obstructive sleep apnea)     History of total right knee replacement     Lateral epicondylitis     Impingement syndrome, shoulder, left     Hx of total knee replacement, left     Chronic pain syndrome     Cough     Others present at the visit:  Patient's VINEET, Carey    Presents for   Chief Complaint   Patient presents with     Pre-Op Exam     hernia repair, on 9/7/18, [facility], Dr. Ware     Patient presents today for a preoperative evaluation prior to undergoing a hernia repair for a large ventral hernia with Dr. Ware at [facility] on September 7.  Unfortunately, patient is having significant cough, congestion, and shortness of breath today.  She also has an A1c of 10.1.  Because of this, I do not feel comfortable clearing her for surgery so we will switch this visit from a preop to an acute care visit to address those 2 concerns.    Type 2 diabetes.  She endorses taking 80 units of Troudjeo long acting insulin daily at bedtime.  Is also taking 40 units of Humalog twice daily with her large meals.  Said sugars are typically between 200-300.  Highest level has been up to 375.  Lowest has been down to 115 and she has only  had symptoms of hypoglycemia once.  Describes an appetite that waxes and wanes.  Some days she does not feel hungry and will eat much of anything.  Some days she eats large amounts of food.  Depends on how she is feeling.  Oftentimes her breathing affects how much she is comfortable eating.  She does not have her meter with her today.  Says that she has lost weight, but looking at her monitored weights here her weight is relatively stable.  Does endorse some increased urinary frequency.  No dysuria.  Has been drinking more water lately.  Does note that her urine has a strong smell.    She is also complaining of a cough.  The cough is productive with white thick mucousy stuff.  Occasionally when the cough is bad there is some blood.  It is at times yellow-green and sometimes it is gray.  She notices wheezing, most prominently in the morning time.  Describes some difficulty with throat clearing.  Denies shortness of breath.  Denies worsening heartburn or reflux symptoms.  Has been using her inhalers intermittently.  She is using the Combivent 2 puffs twice daily.  Also endorses using the Symbicort 2 puffs twice daily.  She has been working on quitting smoking.  Has increased incentives as her building is now smoke-free.  She is still smoking a couple of cigarettes a day, and is using a nicotine vapor instead.  Would like to quit, but is happy that she has been able to cut back.    She has previous diagnosis of sleep apnea.  Says this is no longer a problem.  Is not waking up gasping for air at night.  Says this is improved since she has lost weight however her weight has not decreased.    She received tramadol for her hernia pain from the surgeon and is requesting that I refill this medication today.  Discussed with her, that she is not a candidate for chronic pain management here at [facility], and that we cannot refill her tramadol.    OBJECTIVE:  Vitals: /88  Pulse 104  Temp 98.8  F (37.1  C) (Oral)  Resp 20  " Ht 5' 1.42\" (156 cm)  Wt 231 lb 3.2 oz (104.9 kg)  SpO2 99%  BMI 43.09 kg/m2  BMI= Body mass index is 43.09 kg/(m^2).  Vitals:  Vitals are reviewed and are within the normal range.  Oxygen saturations are quite good. Blood pressure is borderline elevated.  Gen:  Alert, pleasant, occasional cough.  She wears a mask today.  No increased work of breathing.  Nose: Bilateral maxillary sinus tenderness.  Bilateral frontal sinus tenderness.  Significant nasal congestion with drainage today.  Throat: Clear.  Mild pharyngeal irritation and evidence of postnasal drip.  Neck: No significant anterior or posterior cervical lymphadenopathy.  Cardiac:  Regular rate and rhythm, no murmurs, rubs or gallops  Respiratory: Scattered wheezes bilaterally that clear with cough.  No evidence of consolidation.  Somewhat diminished at bases.  Abdomen:  Soft, non-tender, mild distention.  bowel sounds positive, growing protruding anterior ventral hernia extending up from the umbilicus towards the xiphoid process.  Extremities:  Warm, well-perfused, pulses 2+/4, no lower extremity edema    Results for orders placed or performed in visit on 08/21/18   Basic Metabolic Panel ([facility])   Result Value Ref Range    Urea Nitrogen 11.7 7.0 - 19.0 mg/dL    Calcium 9.4 8.5 - 10.1 mg/dL    Chloride 100.4 98.0 - 110.0 mmol/L    Carbon Dioxide 27.6 20.0 - 32.0 mmol/L    Creatinine 0.5 0.5 - 1.0 mg/dL    Glucose 279.8 (H) 70.0 - 99.0 mg'dL    Potassium 4.0 3.2 - 4.6 mmol/dL    Sodium 133.8 132.0 - 142.0 mmol/L    GFR Estimate >90 >60.0 mL/min/1.7 m2    GFR Estimate If Black >90 >60.0 mL/min/1.7 m2   Hemoglobin A1c ([facility])   Result Value Ref Range    Hemoglobin A1C 10.1 (H) 4.1 - 5.7 %   CBC with Diff Plt ([facility])   Result Value Ref Range    WBC 11.9 (H) 4.0 - 11.0 K/uL    Lymphocytes # 2.3 0.8 - 5.3 K/uL    % Lymphocytes 19.4 (L) 20.0 - 48.0 %L    Mid # 0.6 0.0 - 2.2 K/uL    Mid % 5.3 0.0 - 20.0 %M    GRANULOCYTES # 9.0 (H) 1.6 - 8.3 K/uL    % " Granulocytes 75.3 (H) 40.0 - 75.0 %G    RBC 4.9 3.8 - 5.2 M/uL    Hemoglobin 14.9 11.7 - 15.7 g/dL    Hematocrit 45.7 35.0 - 47.0 %    MCV 93.0 78.0 - 100.0 fL    MCH 30.3 26.5 - 35.0    MCHC 32.6 32.0 - 36.0 g/dL    Platelets 265.0 150.0 - 450.0 K/uL   Hepatic Panel ([facility])   Result Value Ref Range    Albumin 4.2 3.9 - 5.1 mg/dL    Alkaline Phosphatase 104.5 40.0 - 150.0 U/L    ALT 21.5 0.0 - 45.0 U/L    AST 14.8 0.0 - 45.0 U/L    Bilirubin Direct 0.1 0.0 - 0.2 mg/dL    Bilirubin Total <0.3 0.2 - 1.3 mg/dL    Protein Total 7.0 6.8 - 8.8 g/dL       ASSESSMENT AND PLAN:      Teresa was seen today for pre-op exam.  Patient is high risk for surgery given her poorly controlled diabetes, poorly controlled asthma, and acute sinus symptoms.  We will treat with antibiotics for acute sinusitis.  Ideally, I would use amoxicillin or Augmentin, but she has an allergy.  We will treat with azithromycin.  I will try to avoid steroids given her poorly controlled diabetes.  Encouraged her to continue to take her allergy medications, including no sprays, and use her asthma inhalers.  Reviewed her inhalers and timing for taking them.  I am not sure she is consistent about using them.  I continue to worry about her obstructive sleep apnea but she has struggled with the mask is not interested in doing that at this time.    Diabetes is also poorly controlled.  We will increase her Troujeo from 80 to 90 units.  Continue with the 40 of HumLog with meals.  I did discuss with the pharmacist switching to Bydureon once weekly as this will likely improve compliance.  I think she might still need some mealtime insulin as well, but will approach this with her next week.  She is to call on Monday with her blood sugars so we can adjust insulin levels at that time.  Would like to see blood sugars fairly consistently around 200 in order to clear her for surgery.  She will schedule a follow-up preop visit prior to surgery on September 7.    Patient  was not given tramadol or any pain medication for chronic pain today.  She is not a candidate for this at [facility] and should not be given pain medications except for acute new pathology.    Diagnoses and all orders for this visit:    Pre-op exam  -     EKG 12-lead complete w/read - Clinics  -     Basic Metabolic Panel ([facility])  -     Hemoglobin A1c ([facility])  -     CBC with Diff Plt ([facility])  -     Hepatic Panel ([facility])    Essential hypertension  -     Basic Metabolic Panel ([facility])    Type 2 diabetes mellitus with complication, with long-term current use of insulin (H)  -     Basic Metabolic Panel ([facility])  -     Hemoglobin A1c ([facility])    LOMAS (nonalcoholic steatohepatitis)  -     Basic Metabolic Panel ([facility])  -     Hepatic Panel ([facility])    Acute recurrent maxillary sinusitis  -     azithromycin (ZITHROMAX) 250 MG tablet; Two tablets first day, then one tablet daily for nine days.    Type 2 diabetes mellitus with hyperglycemia, with long-term current use of insulin (H)  -     insulin pen needle (B-D U/F) 31G X 5 MM; Use 4 daily or as directed.    Chronic seasonal allergic rhinitis, unspecified trigger  -     loratadine (CLARITIN) 10 MG tablet; Take 2 tablets (20 mg) by mouth daily    Chronic bilateral low back pain with left-sided sciatica  -     gabapentin (NEURONTIN) 600 MG tablet; Take 1.5 tabs in AM and afternoon, take 2 tabs at bedtime.        Patient Instructions   1)  For sinus and cough:  Azithromycin:  2 pills day, then 1 pill every day for 10 days.    2)  Continue to use your inhalers.  Use the red inhaler (symbicort EVERYDAY)  Try to increase the combivent to 3x per day--puffs.    3)  Keep working on quitting smoking.    4)  Increase the Trudjeo  90 Units at night.   Keep the humolog at 40 Units with meals.   Check blood sugars before meals and call us on Monday  Want blood sugars less than 200 for a couple of days before surgery.      Call us on Monday with blood sugars and an  update on your breathing.      Schedule another pre-op prior to surgery.          Tyra Bullock

## 2018-08-22 NOTE — PROGRESS NOTES
Teresa Perez-    Here is a copy of your lab results.  As we discussed in clinic, your kidney tests look good.  Liver tests are good.  Blood counts, including hemoglobin look okay.  Make sure to take the medication until it is gone, and hopefully the breathing will improve.  If not, we should do that xray that we talked about in clinic.  Make sure to check your blood sugars regularly and write them down so we can adjust your insulin to get the A1c down.  Additionally, the pharmacists recommended a once weekly injection, Trulicity, that might help as well.  Please call the clinic at [phone] if you have any questions.      Tyra Bullock    Please send results to patient.

## 2018-08-27 ENCOUNTER — TELEPHONE (OUTPATIENT)
Dept: FAMILY MEDICINE | Facility: CLINIC | Age: 48
End: 2018-08-27

## 2018-08-27 NOTE — TELEPHONE ENCOUNTER
Patient calling with her blood sugar reading. Log listed below, please advise. /YULISA Ozuna    08/23 124 pm 204   1009pm  153    08/24 1146 am 476   625 pm   346    08/25 11 am  239  145 pm  308  634 pm  144       08/26 205 pm 447  458 pm  339  938pm   202    08/27 923am  342  1157am  272

## 2018-08-27 NOTE — TELEPHONE ENCOUNTER
[facility] phone call message- general phone call:    Reason for call: Pt calling to give Dr Bullock glucose readings.     Return call needed: Yes    OK to leave a message on voice mail? Yes    Primary language: English      needed? No    Call taken on August 27, 2018 at 12:55 PM by Alix Moeller

## 2018-08-27 NOTE — TELEPHONE ENCOUNTER
Thank you!  We need to increase things some.      Would like her to go up to 100 Units daily of the Troujeo, and we'll see what that does for the sugars.      Please have patient continue to check sugars 2x per day before meals, call back if she has any low sugars, and call back on Friday morning so we can adjust her insulin again at that time.      Tyra Bullock    Routed to Triage RN.

## 2018-08-29 ENCOUNTER — MEDICAL CORRESPONDENCE (OUTPATIENT)
Dept: HEALTH INFORMATION MANAGEMENT | Facility: CLINIC | Age: 48
End: 2018-08-29

## 2018-08-31 ENCOUNTER — TELEPHONE (OUTPATIENT)
Dept: FAMILY MEDICINE | Facility: CLINIC | Age: 48
End: 2018-08-31

## 2018-08-31 NOTE — TELEPHONE ENCOUNTER
Information below was called to patient and she verbalizes understanding. Patient will call with he bs readings on Tuesday. /YULISA Ozuna  Routed to Dr. Bullock

## 2018-08-31 NOTE — TELEPHONE ENCOUNTER
Patient calling with bs readings. Please advise. /YULISA Ozuna      08/27 507 pm 170   911pm  191  08/28 641 am 242   219pm  186   841 pm 158  08/29 748 am 392 Forgot to take bs before eating.   838 pm 165  08/30 1010 am 205   5 pm  194   746 pm 252  08/31 944 am 207    Routed to Dr. Bullock

## 2018-08-31 NOTE — TELEPHONE ENCOUNTER
This looks much better!  I would like her to go up to 105 Units of Troujeo daily.      Please have her call us with blood sugars on Tuesday.      I don't think we are going to be able to get sugars down well enough prior to surgery on 9/7, so I would suggest that she call the surgeon to reschedule for later in the month and schedule a pre operative visit as well.      Tyra Bullock    Routed to Triage RN.

## 2018-08-31 NOTE — TELEPHONE ENCOUNTER
[facility] phone call message- general phone call:    Reason for call: Pt calling with glucose readings.     Return call needed: Yes    OK to leave a message on voice mail? Yes    Primary language: English      needed? No    Call taken on August 31, 2018 at 9:55 AM by Alix Moeller

## 2018-09-04 ENCOUNTER — TELEPHONE (OUTPATIENT)
Dept: FAMILY MEDICINE | Facility: CLINIC | Age: 48
End: 2018-09-04

## 2018-09-04 NOTE — TELEPHONE ENCOUNTER
[facility] phone call message- general phone call:    Reason for call: She needs to give a list of blood glucose readings.    Return call needed: Yes    OK to leave a message on voice mail? Yes    Primary language: English      needed? No    Call taken on September 4, 2018 at 9:22 AM by Lo Lao

## 2018-09-04 NOTE — TELEPHONE ENCOUNTER
All are fasting blood sugars before meals except for evening bs on 9/1:   8/31 207 240  9/1  321 245 (381 this was not fasting)  9/2  248 194 271 (=125 @ HS)  9/3 221 190 135   9/4 196 227     Hernia repair surgery rescheduled to Sept 27th. Pt was told to get DM under control first.     Routed to Dr. Bullock. /YULISA Massey

## 2018-09-05 DIAGNOSIS — Z79.4 TYPE 2 DIABETES MELLITUS WITHOUT COMPLICATION, WITH LONG-TERM CURRENT USE OF INSULIN (H): ICD-10-CM

## 2018-09-05 DIAGNOSIS — E11.9 TYPE 2 DIABETES MELLITUS WITHOUT COMPLICATION, WITH LONG-TERM CURRENT USE OF INSULIN (H): ICD-10-CM

## 2018-09-05 NOTE — TELEPHONE ENCOUNTER
Let's go up to 110 unit of Troujeo.  Call in with BS on Monday.      Tyra Bullock    Routed to Triage RN.

## 2018-09-10 ENCOUNTER — TELEPHONE (OUTPATIENT)
Dept: FAMILY MEDICINE | Facility: CLINIC | Age: 48
End: 2018-09-10

## 2018-09-10 DIAGNOSIS — Z79.4 TYPE 2 DIABETES MELLITUS WITH COMPLICATION, WITH LONG-TERM CURRENT USE OF INSULIN (H): ICD-10-CM

## 2018-09-10 DIAGNOSIS — E11.8 TYPE 2 DIABETES MELLITUS WITH COMPLICATION, WITH LONG-TERM CURRENT USE OF INSULIN (H): ICD-10-CM

## 2018-09-10 NOTE — TELEPHONE ENCOUNTER
[facility] phone call message- general phone call:    Reason for call: Pt would like a call back re being seen for her pre op.    Return call needed: Yes    OK to leave a message on voice mail? Yes    Primary language: English      needed? No    Call taken on September 10, 2018 at 11:35 AM by Whitney Pedraza

## 2018-09-10 NOTE — TELEPHONE ENCOUNTER
Blood sugars (fasting before meals):   9/4 196 227 199  9/5 182 273 167 187(she sometimes check before she gives gives herself Toujeo)  9/6 270 127 99  9/7 171 203 188 142  9/8 209 247 213  9/9 190 223 (PM)  9/10 275     Pt has been taking 40 units of Humalog before meals and 110 units of Toujeo at bedtime.      Routed to Dr. Bullock. /YULISA Massey

## 2018-09-10 NOTE — TELEPHONE ENCOUNTER
[facility] phone call message- general phone call:    Reason for call: Calling to give the nurse her blood sugar readings for the last week.    Return call needed: Yes    OK to leave a message on voice mail? Yes    Primary language: English      needed? No    Call taken on September 10, 2018 at 8:55 AM by Mary Ann Giraldo

## 2018-09-10 NOTE — TELEPHONE ENCOUNTER
Pt would like to know if scheduling an appt on 9/26 for pre op is too short notice, given her surgery is on 9/27? Should she see a different provider?     Routed to Dr. Bullock. /YULISA Massey

## 2018-09-11 ENCOUNTER — TELEPHONE (OUTPATIENT)
Dept: FAMILY MEDICINE | Facility: CLINIC | Age: 48
End: 2018-09-11

## 2018-09-11 NOTE — TELEPHONE ENCOUNTER
Clearly she and I are thinking the same thing.      If I know that her blood sugars are better (which I'm pretty sure they will be), and she is NOT having any trouble breathing, doing the pre-op on the 26th would be fine.  My worry is that if she is having trouble breathing that we'd have to do antibiotics or steroids and then would not have time to do so before surgery.      Probably safest to have her schedule with someone else.  Make sure she discusses her breathing closely with Dr. Ulloa today.      Thanks!    Tyra Bullock

## 2018-09-11 NOTE — TELEPHONE ENCOUNTER
These are way better!  Yay!!!!    Let's increase the Toujeo to 115, keep the mealtime at 40.      Have her call back on Friday.      We're getting closer.  Can you make sure she has scheduled a pre-op evaluation so we don't have to scramble right before surgery?    Tyra Bullock    Routed to Triage RN>

## 2018-09-11 NOTE — TELEPHONE ENCOUNTER
Please clarify dosing of pt's Juojeo Solostar 300 units/M.  Pt states she is taking 110 units at bedtime.  If increased please send a new Rx.  Deana Castellanos, CMA

## 2018-09-11 NOTE — TELEPHONE ENCOUNTER
Gave msg to pt.     Pt will have to see a different provider for pre-op. Dr. Bullock doesn't have an opening anymore on 26th. Pt aware. Will schedule a wk before surgery.    Pt canceled her appt w/ Dr. Ulloa but will see Dr. Masters tomorrow to discuss breathing.     Per [facility] pharmacy they need a new Rx for Toujeo. See pending Rx--please review and sign. Thank you.     Routed to Dr. Bullock. /YULISA Massey

## 2018-09-12 ENCOUNTER — TELEPHONE (OUTPATIENT)
Dept: FAMILY MEDICINE | Facility: CLINIC | Age: 48
End: 2018-09-12

## 2018-09-12 PROBLEM — K43.2 RECURRENT VENTRAL HERNIA: Status: ACTIVE | Noted: 2018-09-12

## 2018-09-12 NOTE — TELEPHONE ENCOUNTER
Message noted.  Glad that we are getting the breathing taken care of early as well, and this could also delay surgery.      Prescription signed.  Please let me know if you need anything else.      Spoke with Pharm Ds about this this morning as well.      Tyra Bullock

## 2018-09-12 NOTE — TELEPHONE ENCOUNTER
Pt states her medical transport didn't show up today. She rescheduled for Monday w/ Dr. Hirsch and she has an appt w/ Dr. Bullock on the 26th just in case she needs to do anything.     Routed to Dr. Kobe bartholomew-- /YULISA Massey

## 2018-09-12 NOTE — TELEPHONE ENCOUNTER
[facility] phone call message- general phone call:    Reason for call: the pt canceled the appointment and would like the Dr to call them    Return call needed: Yes    OK to leave a message on voice mail? Yes    Primary language: English      needed? No    Call taken on September 12, 2018 at 1:04 PM by Juan Peralta

## 2018-09-17 ENCOUNTER — TELEPHONE (OUTPATIENT)
Dept: FAMILY MEDICINE | Facility: CLINIC | Age: 48
End: 2018-09-17

## 2018-09-17 ENCOUNTER — OFFICE VISIT (OUTPATIENT)
Dept: FAMILY MEDICINE | Facility: CLINIC | Age: 48
End: 2018-09-17
Payer: MEDICARE

## 2018-09-17 ENCOUNTER — RECORDS - HEALTHEAST (OUTPATIENT)
Dept: ADMINISTRATIVE | Facility: OTHER | Age: 48
End: 2018-09-17

## 2018-09-17 VITALS
HEART RATE: 110 BPM | DIASTOLIC BLOOD PRESSURE: 90 MMHG | SYSTOLIC BLOOD PRESSURE: 126 MMHG | TEMPERATURE: 98.4 F | OXYGEN SATURATION: 96 % | BODY MASS INDEX: 44.44 KG/M2 | WEIGHT: 238.4 LBS | RESPIRATION RATE: 20 BRPM

## 2018-09-17 DIAGNOSIS — Z79.4 TYPE 2 DIABETES MELLITUS WITHOUT COMPLICATION, WITH LONG-TERM CURRENT USE OF INSULIN (H): ICD-10-CM

## 2018-09-17 DIAGNOSIS — K43.2 INCISIONAL HERNIA, WITHOUT OBSTRUCTION OR GANGRENE: ICD-10-CM

## 2018-09-17 DIAGNOSIS — K21.9 GASTROESOPHAGEAL REFLUX DISEASE WITHOUT ESOPHAGITIS: ICD-10-CM

## 2018-09-17 DIAGNOSIS — R10.84 ABDOMINAL PAIN, GENERALIZED: ICD-10-CM

## 2018-09-17 DIAGNOSIS — I10 ESSENTIAL HYPERTENSION, BENIGN: ICD-10-CM

## 2018-09-17 DIAGNOSIS — E11.8 TYPE 2 DIABETES MELLITUS WITH COMPLICATION, WITH LONG-TERM CURRENT USE OF INSULIN (H): Primary | ICD-10-CM

## 2018-09-17 DIAGNOSIS — J45.40 MODERATE PERSISTENT ASTHMA WITHOUT COMPLICATION: ICD-10-CM

## 2018-09-17 DIAGNOSIS — J30.2 CHRONIC SEASONAL ALLERGIC RHINITIS, UNSPECIFIED TRIGGER: ICD-10-CM

## 2018-09-17 DIAGNOSIS — E11.9 TYPE 2 DIABETES MELLITUS WITHOUT COMPLICATION, WITH LONG-TERM CURRENT USE OF INSULIN (H): ICD-10-CM

## 2018-09-17 DIAGNOSIS — Z01.818 PREOP GENERAL PHYSICAL EXAM: Primary | ICD-10-CM

## 2018-09-17 DIAGNOSIS — Z79.4 TYPE 2 DIABETES MELLITUS WITH COMPLICATION, WITH LONG-TERM CURRENT USE OF INSULIN (H): Primary | ICD-10-CM

## 2018-09-17 RX ORDER — LISINOPRIL 40 MG/1
40 TABLET ORAL DAILY
Qty: 90 TABLET | Refills: 1 | Status: SHIPPED | OUTPATIENT
Start: 2018-09-17 | End: 2018-12-15

## 2018-09-17 RX ORDER — LORATADINE 10 MG/1
10 TABLET ORAL DAILY
Qty: 60 TABLET | Refills: 5 | Status: SHIPPED | OUTPATIENT
Start: 2018-09-17 | End: 2018-11-23

## 2018-09-17 RX ORDER — SALSALATE 500 MG/1
500 TABLET, FILM COATED ORAL 2 TIMES DAILY
Qty: 60 TABLET | Refills: 1 | Status: SHIPPED | OUTPATIENT
Start: 2018-09-17 | End: 2018-09-21

## 2018-09-17 NOTE — PATIENT INSTRUCTIONS
Presurgery Checklist  You are scheduled to have surgery. The healthcare staff will try to make your stay comfortable. Use the guidelines below to remind yourself what to do before surgery. Be sure to follow any specific pre-op instructions from your surgeon or nurse.   Preparing for Surgery  Ask your surgeon if you ll need a blood transfusion during surgery and if so, how to prepare for it. In some cases, you can donate blood before surgery. If needed, this blood can be given back (transfused) to you during or after surgery.  If you are having abdominal surgery, ask what you need to do to clear your bowel.  Tell your surgeon if you have allergies to any medications or foods.  Arrange for an adult family member or friend to drive you home after surgery. If possible, have someone ready to help you at home as you recover.  Call the surgeon if you get a cold, fever, sore throat, diarrhea, or other health problem just before surgery. Your surgeon can decide whether or not to postpone the surgery.  Medications  Tell your surgeon about all medications you take, including prescription and over-the-counter products such as herbal remedies and vitamins. Ask if you should continue taking them.  If you take ibuprofen, naproxen, or  blood thinners  such as aspirin, clopidogrel (Plavix), or warfarin (Coumadin), ask your surgeon whether you should stop taking them and how long before surgery you should stop.  You may be told to take antibiotics just before surgery to prevent infection. If so, follow instructions carefully on how to take them.  If you are told to take medications called anticoagulants to prevent blood clots after surgery, be sure to follow the instructions on how to take them.  Stop Smoking  If you smoke, healing may take longer. So at least 2 week(s) before surgery, stop smoking.  Bathing or Showering Before Surgery  If instructed, wash with antibacterial soap. Afterward, do not use lotions or powders.  If you  are having surgery on the head, you may be asked to shampoo with antibacterial soap. Follow instructions for doing so.  Do Not Remove Hair from the Surgery Site  Do not shave hair from the incision site, unless you are given specific instructions to do so. Usually, if hair needs to be removed, it will be done at the hospital right before surgery.  Don t Eat or Drink  Your doctor will tell you when to stop eating and drinking. If you do not follow your doctor's instructions, your procedure may be postponed or rescheduled for another day.  If your surgeon tells you to continue any medications, take them with small sips of water.  You can brush your teeth and rinse your mouth, but don t swallow any water.  Day of Surgery  Do not wear makeup. Do not use perfume, deodorant, or hairspray. Remove nail polish and artificial nails.  Leave jewelry (including rings), watches, and other valuables at home.  Be sure to bring health insurance cards or forms and a photo ID.  Bring a list of your medications (include the name, dose, how often you take them, and the time last dose was taken).  Arrive on time at the hospital or surgery facility.    DIABETES EDUCATOR REFERRAL  September 17, 2018 at 4:43 pm Referral, demographics and medication list faxed to [facility] at [phone] who will contact patient to schedule.    [facility]  Phone: [phone]  Fax: [phone]    [facility]  [address] [place] [address]    [address] [place] [address]

## 2018-09-17 NOTE — TELEPHONE ENCOUNTER
Pharmacy Note    I called [facility] to see which Toujeo she had picked up, and she got the regular Toujeo Solostar pen, for which the max dose to give in one injection is 80U.  There is a new Toujeo Solostar Max pen, for which the max dose to dial up in one injection is 160U.  Roger, pharmacist at [facility], ran it through and it is covered for her, for 0 dollar copay.    I sent Rx for the Solostar Max pen, for the new dose Dr. Bullock wants of 120 units to [facility].  Dr. Bullock will call pt to let her know.  Roger will order it today and it will be in tomorrow.    Chandni Phillips, Pharm.D.

## 2018-09-17 NOTE — NURSING NOTE
I administered the following to Teresa Perez.    MEDICATION: Medroxyprogesterone 150 mg  ROUTE: IM  SITE: Deltoid - Left  DOSE: 150 mg/ mL  LOT #: [id number]  :  Critique^It   EXPIRATION DATE:  11/30/20  NDC#: [address]-7     Gave patient  a reminder card to come back December 3, 2018- December 31, 2018   Was entire vial of medication used? Yes    Did the patient bring this medication to the clinic to be injected? No    Name of provider who requested the injection: Dr. Bullock  Name of provider on site (faculty or community preceptor) at the time of performing the injection: Dr. Hirsch/ Dr. Keven Nickerson A

## 2018-09-17 NOTE — TELEPHONE ENCOUNTER
I spoke with Dr. Hirsch and it sounds like patient saw her in clinic today and no changes were made in her insulin regimen, but patient is still not controlled well enough to be appropriate for surgery.      Would like her to increase Troujeo to 120 Units.  Continue with same mealtime insulin.  Will discuss with patient at her follow up visit on 9/26.  Please have her call if she is experiencing any low sugars in the interim.      Tyra Bullock    Routed to Triage RN.

## 2018-09-17 NOTE — MR AVS SNAPSHOT
After Visit Summary   9/17/2018    Teresa Perez    MRN: [id number]           Patient Information     Date Of Birth          1970        Visit Information        Provider Department      9/17/2018 11:00 AM Shavonne Hirsch MD [facility]        Today's Diagnoses     Preop general physical exam    -  1    Type 2 diabetes mellitus without complication, with long-term current use of insulin (H)        Chronic seasonal allergic rhinitis, unspecified trigger        Essential hypertension, benign        Gastroesophageal reflux disease without esophagitis        Moderate persistent asthma without complication          Care Instructions      Presurgery Checklist  You are scheduled to have surgery. The healthcare staff will try to make your stay comfortable. Use the guidelines below to remind yourself what to do before surgery. Be sure to follow any specific pre-op instructions from your surgeon or nurse.   Preparing for Surgery  Ask your surgeon if you ll need a blood transfusion during surgery and if so, how to prepare for it. In some cases, you can donate blood before surgery. If needed, this blood can be given back (transfused) to you during or after surgery.  If you are having abdominal surgery, ask what you need to do to clear your bowel.  Tell your surgeon if you have allergies to any medications or foods.  Arrange for an adult family member or friend to drive you home after surgery. If possible, have someone ready to help you at home as you recover.  Call the surgeon if you get a cold, fever, sore throat, diarrhea, or other health problem just before surgery. Your surgeon can decide whether or not to postpone the surgery.  Medications  Tell your surgeon about all medications you take, including prescription and over-the-counter products such as herbal remedies and vitamins. Ask if you should continue taking them.  If you take ibuprofen, naproxen, or  blood thinners  such as aspirin,  clopidogrel (Plavix), or warfarin (Coumadin), ask your surgeon whether you should stop taking them and how long before surgery you should stop.  You may be told to take antibiotics just before surgery to prevent infection. If so, follow instructions carefully on how to take them.  If you are told to take medications called anticoagulants to prevent blood clots after surgery, be sure to follow the instructions on how to take them.  Stop Smoking  If you smoke, healing may take longer. So at least 2 week(s) before surgery, stop smoking.  Bathing or Showering Before Surgery  If instructed, wash with antibacterial soap. Afterward, do not use lotions or powders.  If you are having surgery on the head, you may be asked to shampoo with antibacterial soap. Follow instructions for doing so.  Do Not Remove Hair from the Surgery Site  Do not shave hair from the incision site, unless you are given specific instructions to do so. Usually, if hair needs to be removed, it will be done at the hospital right before surgery.  Don t Eat or Drink  Your doctor will tell you when to stop eating and drinking. If you do not follow your doctor's instructions, your procedure may be postponed or rescheduled for another day.  If your surgeon tells you to continue any medications, take them with small sips of water.  You can brush your teeth and rinse your mouth, but don t swallow any water.  Day of Surgery  Do not wear makeup. Do not use perfume, deodorant, or hairspray. Remove nail polish and artificial nails.  Leave jewelry (including rings), watches, and other valuables at home.  Be sure to bring health insurance cards or forms and a photo ID.  Bring a list of your medications (include the name, dose, how often you take them, and the time last dose was taken).  Arrive on time at the hospital or surgery facility.    DIABETES EDUCATOR REFERRAL  September 17, 2018 at 4:43 pm Referral, demographics and medication list faxed to [facility] at [phone] who will contact patient to schedule.    [facility]  Phone: [phone]  Fax: [phone]    [facility]  [address] [place] [address]    [facility]  [address] [place] [address]          Follow-ups after your visit        Additional Services     DIABETES EDUCATOR REFERRAL       Date of Diagnosis: years ago    Diabetes Education Order:  Choose either self management  Diabetes Self Management Class  One on One with Diabetes Educator:  Medical Nutrition Therapy, Glucose Monitoring, Meal Planning , Weight Management/Exercise and Coping with Diabetes   Insulin Start or Adjust:                        New to insulin?  No                        RN to calculate and adjust insulin?   Yes                        Patient to continue oral medicine?  Yes     needed: No  Language: English    Recent labs including A1C, Lipid panel, FBS or casual glucose or GCT:    Lab Results       Component                Value               Date                       A1C                      10.1                08/21/2018                 A1C                      10.9                06/26/2018            Lab Results       Component                Value               Date                       CHOL                     185.8               06/26/2018                 CHOL                     164.0               11/10/2017            Lab Results       Component                Value               Date                       HDL                      30.6                06/26/2018                 HDL                      32.8                11/10/2017            Lab Results       Component                Value               Date                       LDL                      104                 06/26/2018                 LDL                      60                  11/10/2017            Lab Results       Component                Value                Date                       TRIG                     254.2               2018                 TRIG                     355.2               11/10/2017            Lab Results       Component                Value               Date                       CHOLHDLRATIO             6.1                 2018                 CHOLHDLRATIO             5.0                 11/10/2017            Lab Results       Component                Value               Date                       GLC                      279.8               2018                 GLC                      370.0               2018          ]    ([facility] Only) Referral should be tracked (Yes/No)?                  Follow-up notes from your care team     Return in about 2 weeks (around 10/1/2018) for DM2.      Your next 10 appointments already scheduled     Sep 26, 2018  8:00 AM CDT   Return Visit with Tyra Bullock MD   [facility] ([facility])    [address] [place] [address]   [phone]              Who to contact     Please call your clinic at [phone] to:    Ask questions about your health    Make or cancel appointments    Discuss your medicines    Learn about your test results    Speak to your doctor            Additional Information About Your Visit        MyChart Information     [employer] is an electronic gateway that provides easy, online access to your medical records. With [employer], you can request a clinic appointment, read your test results, renew a prescription or communicate with your care team.     To sign up for Everypostt visit the website at [email or web address]   You will be asked to enter the access code listed below, as well as some personal information. Please follow the directions to create your username and password.     Your access code is: [email or web address]  Expires: 2018 12:17 PM     Your access code will  in 90 days. If you need help or a new code, please contact your  [facility] or call [phone] for assistance.        Care EveryWhere ID     This is your Care EveryWhere ID. This could be used by other organizations to access your [place] medical records  [id number]        Your Vitals Were     Pulse Temperature Respirations Pulse Oximetry BMI (Body Mass Index)       110 98.4  F (36.9  C) (Oral) 20 96% 44.44 kg/m2        Blood Pressure from Last 3 Encounters:   09/17/18 126/90   08/21/18 139/88   06/26/18 113/80    Weight from Last 3 Encounters:   09/17/18 238 lb 6.4 oz (108.1 kg)   08/21/18 231 lb 3.2 oz (104.9 kg)   06/26/18 231 lb 9.6 oz (105.1 kg)                 Today's Medication Changes          These changes are accurate as of 9/17/18 11:59 PM.  If you have any questions, ask your nurse or doctor.               Start taking these medicines.        Dose/Directions    insulin glargine U-300 300 UNIT/ML injection   Commonly known as:  TOUJEO   Used for:  Type 2 diabetes mellitus with complication, with long-term current use of insulin (H)   Started by:  Tyra Bullock MD        Dose:  120 Units   Inject 120 Units Subcutaneous daily   Quantity:  12 mL   Refills:  11       salsalate 500 MG tablet   Commonly known as:  DISALCID   Used for:  Abdominal pain, generalized, Incisional hernia, without obstruction or gangrene   Started by:  Tyra Bullock MD        Dose:  500 mg   Take 1 tablet (500 mg) by mouth 2 times daily   Quantity:  60 tablet   Refills:  1         These medicines have changed or have updated prescriptions.        Dose/Directions    loratadine 10 MG tablet   Commonly known as:  CLARITIN   This may have changed:  how much to take   Used for:  Chronic seasonal allergic rhinitis, unspecified trigger   Changed by:  Shavonne Hirsch MD        Dose:  10 mg   Take 1 tablet (10 mg) by mouth daily   Quantity:  60 tablet   Refills:  5            Where to get your medicines      These medications were sent to [facility] - [place], [place] - [address], [place] [address]     Phone:  [phone]     blood glucose monitoring test strip    insulin glargine U-300 300 UNIT/ML injection    Ipratropium-Albuterol  MCG/ACT inhaler    lisinopril 40 MG tablet    loratadine 10 MG tablet    ranitidine 150 MG tablet    salsalate 500 MG tablet                Primary Care Provider Office Phone # Fax #    Tyra Bullock [phone]       [address] [place] [address]        Equal Access to Services     [facility]: Hadii aad ku hadasho Soomaali, waaxda luqadaha, qaybta kaalmada adeegyada, waxbladimir avalosin haybetyn adeblayne valle . So [facility] [phone].    ATENCIÓN: Si habla español, tiene a tang disposición servicios gratuitos de asistencia lingüística. [facility] [phone].    We comply with applicable federal civil rights laws and [place] laws. We do not discriminate on the basis of race, color, national origin, age, disability, sex, sexual orientation, or gender identity.            Thank you!     Thank you for choosing [facility]  for your care. Our goal is always to provide you with excellent care. Hearing back from our patients is one way we can continue to improve our services. Please take a few minutes to complete the written survey that you may receive in the mail after your visit with us. Thank you!             Your Updated Medication List - Protect others around you: Learn how to safely use, store and throw away your medicines at www.disposemymeds.org.          This list is accurate as of 9/17/18 11:59 PM.  Always use your most recent med list.                   Brand Name Dispense Instructions for use Diagnosis    acetaminophen 500 MG tablet    TYLENOL    180 tablet    Take 2 tablets (1,000 mg) by mouth 3 times daily as needed for mild pain    Chronic bilateral low back pain without sciatica       albuterol (2.5 MG/3ML) 0.083% neb solution     30 vial     Take 1 vial (2.5 mg) by nebulization every 6 hours as needed for shortness of breath / dyspnea or wheezing    Mild persistent asthma with acute exacerbation       azelastine 0.05 % ophthalmic solution    OPTIVAR    1 Bottle    Apply 1 drop to eye 2 times daily    Allergic reaction, sequela       azithromycin 250 MG tablet    ZITHROMAX    11 tablet    Two tablets first day, then one tablet daily for nine days.    Acute recurrent maxillary sinusitis       baclofen 20 MG tablet    LIORESAL    180 tablet    Take 1 tablet (20 mg) by mouth 3 times daily    Chronic bilateral low back pain with left-sided sciatica       benzonatate 200 MG capsule    TESSALON    60 capsule    Take 1 capsule (200 mg) by mouth 3 times daily as needed for cough    Cough       blood glucose lancets standard    no brand specified    1 Box    Use to test blood sugar 3 times daily or as directed.    Type 2 diabetes mellitus without complication, with long-term current use of insulin (H)       blood glucose monitoring meter device kit    no brand specified    1 kit    Use to test blood sugar 3 times daily or as directed.    Type 2 diabetes mellitus without complication, with long-term current use of insulin (H)       blood glucose monitoring test strip    no brand specified    100 strip    Use to test blood sugars 3 times daily or as directed    Type 2 diabetes mellitus without complication, with long-term current use of insulin (H)       budesonide-formoterol 160-4.5 MCG/ACT Inhaler    SYMBICORT    3 Inhaler    Inhale 2 puffs into the lungs 2 times daily    Intermittent asthma, uncomplicated       clindamycin 1 % lotion    CLINDAMAX    60 mL    Apply topically 2 times daily    Abscess of anal and rectal regions       diphenhydrAMINE 50 MG capsule    BENADRYL     Take  mg by mouth At Bedtime.        docosanol 10 % Crea cream    ABREVA    1 Tube    Apply topically 5 times daily    HSV (herpes simplex virus) infection       docusate sodium 100  MG tablet    COLACE    1 tablet    Take 100-200 mg by mouth 2 times daily    Constipation, unspecified constipation type       EPINEPHrine 0.3 MG/0.3ML injection 2-pack    EPIPEN/ADRENACLICK/or ANY BX GENERIC EQUIV    2 mL    Inject 0.3 mLs (0.3 mg) into the muscle once as needed for anaphylaxis    Endometriosis, Encounter for smoking cessation counseling       fluticasone 50 MCG/ACT spray    FLONASE    9.9 g    Spray 2 sprays into both nostrils daily    Chronic allergic conjunctivitis       gabapentin 600 MG tablet    NEURONTIN    450 tablet    Take 1.5 tabs in AM and afternoon, take 2 tabs at bedtime.    Chronic bilateral low back pain with left-sided sciatica       glycerin (adult) 2 g Supp Suppository     3 suppository    Place 1 suppository rectally daily as needed for constipation    Constipation, unspecified constipation type       hydrOXYzine 25 MG tablet    ATARAX    120 tablet    Take 1-2 tablets (25-50 mg) by mouth every 6 hours as needed for itching, anxiety or other (sleep)    Chronic pain syndrome       insulin glargine U-300 300 UNIT/ML injection    TOUJEO    12 mL    Inject 120 Units Subcutaneous daily    Type 2 diabetes mellitus with complication, with long-term current use of insulin (H)       insulin lispro 100 UNIT/ML injection    HumaLOG PEN    15 mL    Inject 40 Units Subcutaneous 2 times daily (before meals)    Type 2 diabetes mellitus with complication, with long-term current use of insulin (H)       insulin pen needle 31G X 5 MM    B-D U/F    100 each    Use 4 daily or as directed.    Type 2 diabetes mellitus with hyperglycemia, with long-term current use of insulin (H)       Ipratropium-Albuterol  MCG/ACT inhaler    COMBIVENT RESPIMAT    1 Inhaler    Inhale 2 puffs into the lungs 2 times daily Not to exceed 6 doses per day.    Moderate persistent asthma without complication       lidocaine 5 % ointment    XYLOCAINE    30 g    Apply topically as needed for moderate pain    Personal  history of fall, Acute pain of left knee, Right elbow pain, Psychogenic nonepileptic seizure       lisinopril 40 MG tablet    PRINIVIL/ZESTRIL    90 tablet    Take 1 tablet (40 mg) by mouth daily    Essential hypertension, benign       loratadine 10 MG tablet    CLARITIN    60 tablet    Take 1 tablet (10 mg) by mouth daily    Chronic seasonal allergic rhinitis, unspecified trigger       medroxyPROGESTERone 150 MG/ML injection    DEPO-PROVERA    1 mL    Inject 1 mL (150 mg) into the muscle every 3 months    Endometriosis       miconazole 2 % cream    MICATIN    5 g    Place 1 applicator vaginally At Bedtime Substitute as needed.    Vaginal candidiasis       naloxone nasal spray    NARCAN    0.2 mL    Spray 1 spray (4 mg) into one nostril alternating nostrils as needed for opioid reversal (every 2-3 minutes until medical assistance arrives.)    Chronic narcotic use       nystatin 574881 UNIT/ML suspension    MYCOSTATIN    60 mL    Take 5 mLs (500,000 Units) by mouth 4 times daily    Thrush       ondansetron 4 MG tablet    ZOFRAN    18 tablet    Take 1 tablet (4 mg) by mouth every 8 hours as needed for nausea    Chronic pain syndrome, Nausea       * order for DME     1 Device    Equipment being ordered: CPAP.  1 device.  Per sleep study recommendations.  Brand:  Respironics, Type:  Nasal Wisp,  Size:  Small    NNAMDI (obstructive sleep apnea)       * order for DME     1 each    Adult nebulizer mask and tubing.    Mild persistent asthma with acute exacerbation       * order for DME     1 Device    Equipment being ordered: Nebulizer supplies for life.    Mild persistent asthma with acute exacerbation       pramipexole 1 MG tablet    MIRAPEX    90 tablet    Take 1 tablet (1 mg) by mouth At Bedtime    Restless leg syndrome       * QUEtiapine 50 MG tablet    SEROquel     Take 50 mg by mouth 3 times daily        * QUEtiapine 300 MG tablet    SEROquel     TAKE 1 TABLET BY MOUTH AT BEDTIME. INDICATIONS: MANIC PHASE OF  MANIC-DEPRESSION -CARMEN DURAN RN        ranitidine 150 MG tablet    ZANTAC    60 tablet    Take 1 tablet (150 mg) by mouth 2 times daily    Gastroesophageal reflux disease without esophagitis       salsalate 500 MG tablet    DISALCID    60 tablet    Take 1 tablet (500 mg) by mouth 2 times daily    Abdominal pain, generalized, Incisional hernia, without obstruction or gangrene       sennosides 8.6 MG tablet    SENOKOT    1 tablet    Take 2 tablets by mouth 2 times daily    Constipation, unspecified constipation type       SUMAtriptan 100 MG tablet    IMITREX    9 tablet    Take 1 tablet (100 mg) by mouth at onset of headache    Chronic migraine without aura without status migrainosus, not intractable       topiramate 50 MG tablet    TOPAMAX    60 tablet    Take 1 tablet 2x daily.    Other migraine without status migrainosus, not intractable       venlafaxine 75 MG 24 hr capsule    EFFEXOR-XR    30 capsule    Take 225 mg by mouth daily        * Notice:  This list has 5 medication(s) that are the same as other medications prescribed for you. Read the directions carefully, and ask your doctor or other care provider to review them with you.

## 2018-09-17 NOTE — TELEPHONE ENCOUNTER
Discussed with patient over the phone.  She has been doing one injection:  Will dial the pen up to 80U, but then keep it in her skin to dial up the additional units.  Not sure if this is absorbing appropriately, so will have her go back down to 110 Units with the new Max Troujeo pens that Dr. Phillips called in for her.      Pt will continue to check sugars, will call with lows, and will bring BS readings in for her appt on 9/26.  Will do diabetic ed per Dr. Hirsch's recs.      Pt also reporting continued abdominal pain, crampy, throughout her abdomen.  Stools are somewhat lose.  Also had a recent fall and having pain in her elbow.  Discussed that we cannot prescribe narcotics.  She has a referral for pain clinic, and reports having a clinic picked out, but plans to wait until after surgery to pursue this.  She is requesting an anti-inflammatory medication for pain.  Was on nabumetone in the past, but no longer covered by insurance.  Cannot tolerate ibuprofen or naproxen, meloxicam was not effective.      Will see if pharm D have a different NSAID to recommend.  For now, will see if salsylate is covered and go from there.      Tyra Bullock    Routed to Triage RN and Pharm D.

## 2018-09-17 NOTE — TELEPHONE ENCOUNTER
[facility] phone call message- general phone call:    Reason for call: Giving her BS readings:    09/10     8:45 am 275     1:58 pm 138     7:17 pm 258    09/11   9:51 am 225     1:20 pm 164     6:52 pm 183    09/12   8:08 am 323     1:53 pm 386     7:27 pm 296    09/13   5:36 am 250    11:56 am 195     4:24 pm 288     8:00 pm 86    09/14   8:27 am 219     8:24 pm 124    09/15  11:33 am 195     7:14 pm 183    09/16   9:34 am 220     3:10 pm 207     7:51 pm 175                             Return call needed: Yes    OK to leave a message on voice mail? YES    Primary language: English      needYeed? No    Call taken on September 17, 2018 at 9:02 AM by Mary Ann Giraldo

## 2018-09-17 NOTE — PROGRESS NOTES
[address] [address]  Phone: [phone]  Fax: [phone]    9/17/2018    Adult PRE-OP Evaluation:    Teresa Perez, 1970 presents for pre-operative evaluation and assessment as requested by  (Unknown by patient), prior to undergoing surgery/procedure for treatment of  hernia .    Proposed procedure: hernia repair    Date of Surgery/ Procedure: 9/27/2018  Hospital/Surgical Facility: [facility], Fax: [phone]     Primary Physician: Tyra Bullock  Type of Anesthesia Anticipated: General  History of anesthesia complications: NONE  History of  abnormal bleeding: NONE   History of blood transfusions: YES.  No complications.  Patient has a Health Care Directive or Living Will:  YES     Preoperative Questions   1. NO - Do you have a history of heart attack, stroke, stent, bypass or surgery on an artery in the head, neck, heart or legs?  2. NO - Do you ever have any pain or discomfort in your chest?  3. NO - Have you ever had a severe pain across the front of your chest lasting for half an hour or more?  4. NO - Do you have a history of Congestive Heart Failure?  5. NO - Are you troubled by shortness of breath when: walking on the level/ up a slight hill/ at night?  6. NO - Does your chest ever sound wheezy or whistling?  7. NO - Do you currently have a cold, bronchitis or other respiratory infection?  8. NO - Have you had a cold, bronchitis or other respiratory infection within the last 2 weeks?  9. Yes- Do you usually have a cough? Related to smoking. 1/2ppd  10. Yes- Do you sometimes get pains in the calves of your legs when you walk?  11. NO - Do you or anyone in your family have previous history of blood clots?  12. NO - Do you or does anyone in your family have a serious bleeding problem such as prolonged bleeding following surgeries or cuts?  13. Yes- Have you ever had problems with anemia or been told to take iron pills?  14. NO- Have you had any  abnormal blood loss such as black, tarry or bloody stools, or abnormal vaginal bleeding?  15. YES- Have you ever had a blood transfusion?  16. YES- Have you or any of your relatives ever had problems with anesthesia? Mother with Malignant hyperthermia  17. YES- Do you have sleep apnea, excessive snoring or daytime drowsiness? Does not use CPAP  18. NO - Do you have any prosthetic heart valves?  19. YES- Do you have prosthetic joints? Both Knees  20. NO - Is there any chance that you may be pregnant?    Patient Active Problem List   Diagnosis     Health Care Home     Acute peptic ulcer     Other allergy, other than to medicinal agents     Bipolar disorder (H)     Bulging lumbar disc     Cervical dysplasia     Common migraine without aura     Constipation     Dwarfism     Familial hypercholesterolemia     HTN (hypertension)     Insomnia     Low back pain     Intermittent asthma     Leg pain, bilateral     Smoking     Degeneration of thoracic or thoracolumbar intervertebral disc     Diabetes mellitus, type 2 (H)     LOMAS (nonalcoholic steatohepatitis)     Disease of lung     Hemorrhoids     Parotid mass     Endometriosis     NNAMDI (obstructive sleep apnea)     History of total right knee replacement     Lateral epicondylitis     Impingement syndrome, shoulder, left     Hx of total knee replacement, left     Chronic pain syndrome     Cough     Borderline personality disorder     Moderate recurrent major depression (H)     Recurrent ventral hernia         Current Outpatient Prescriptions on File Prior to Visit:  acetaminophen (TYLENOL) 500 MG tablet Take 2 tablets (1,000 mg) by mouth 3 times daily as needed for mild pain   albuterol (2.5 MG/3ML) 0.083% neb solution Take 1 vial (2.5 mg) by nebulization every 6 hours as needed for shortness of breath / dyspnea or wheezing   azelastine (OPTIVAR) 0.05 % SOLN ophthalmic solution Apply 1 drop to eye 2 times daily   azithromycin (ZITHROMAX) 250 MG tablet Two tablets first day,  then one tablet daily for nine days.   baclofen (LIORESAL) 20 MG tablet Take 1 tablet (20 mg) by mouth 3 times daily   benzonatate (TESSALON) 200 MG capsule Take 1 capsule (200 mg) by mouth 3 times daily as needed for cough   blood glucose (NO BRAND SPECIFIED) lancets standard Use to test blood sugar 3 times daily or as directed.   blood glucose monitoring (NO BRAND SPECIFIED) meter device kit Use to test blood sugar 3 times daily or as directed.   budesonide-formoterol (SYMBICORT) 160-4.5 MCG/ACT Inhaler Inhale 2 puffs into the lungs 2 times daily   clindamycin (CLINDAMAX) 1 % lotion Apply topically 2 times daily   diphenhydrAMINE (BENADRYL) 50 MG capsule Take  mg by mouth At Bedtime.   docosanol (ABREVA) 10 % CREA cream Apply topically 5 times daily   docusate sodium (COLACE) 100 MG tablet Take 100-200 mg by mouth 2 times daily   EPINEPHrine (EPIPEN/ADRENACLICK/OR ANY BX GENERIC EQUIV) 0.3 MG/0.3ML injection 2-pack Inject 0.3 mLs (0.3 mg) into the muscle once as needed for anaphylaxis   fluticasone (FLONASE) 50 MCG/ACT spray Spray 2 sprays into both nostrils daily   gabapentin (NEURONTIN) 600 MG tablet Take 1.5 tabs in AM and afternoon, take 2 tabs at bedtime.   glycerin, adult, 2 G SUPP Suppository Place 1 suppository rectally daily as needed for constipation   hydrOXYzine (ATARAX) 25 MG tablet Take 1-2 tablets (25-50 mg) by mouth every 6 hours as needed for itching, anxiety or other (sleep)   insulin glargine U-300 (TOUJEO) 300 UNIT/ML injection Inject 115 Units Subcutaneous At Bedtime   insulin lispro (HUMALOG PEN) 100 UNIT/ML injection Inject 40 Units Subcutaneous 2 times daily (before meals)   insulin pen needle (B-D U/F) 31G X 5 MM Use 4 daily or as directed.   lidocaine (XYLOCAINE) 5 % ointment Apply topically as needed for moderate pain   medroxyPROGESTERone (DEPO-PROVERA) 150 MG/ML injection Inject 1 mL (150 mg) into the muscle every 3 months   miconazole (MICATIN) 2 % vaginal cream Place 1  applicator vaginally At Bedtime Substitute as needed.   naloxone (NARCAN) nasal spray Spray 1 spray (4 mg) into one nostril alternating nostrils as needed for opioid reversal (every 2-3 minutes until medical assistance arrives.)   nystatin (MYCOSTATIN) 495286 UNIT/ML suspension Take 5 mLs (500,000 Units) by mouth 4 times daily   ondansetron (ZOFRAN) 4 MG tablet Take 1 tablet (4 mg) by mouth every 8 hours as needed for nausea   order for DME Equipment being ordered: Nebulizer supplies for life.   order for DME Adult nebulizer mask and tubing.   order for DME Equipment being ordered: CPAP.  1 device.  Per sleep study recommendations.  Brand:  Respironics, Type:  Nasal Wisp,  Size:  Small   pramipexole (MIRAPEX) 1 MG tablet Take 1 tablet (1 mg) by mouth At Bedtime   pravastatin (PRAVACHOL) 80 MG tablet Take 1 tablet (80 mg) by mouth daily   QUEtiapine (SEROQUEL) 300 MG tablet TAKE 1 TABLET BY MOUTH AT BEDTIME. INDICATIONS: MANIC PHASE OF MANIC-DEPRESSION -CARMEN DURAN RN   QUEtiapine (SEROQUEL) 50 MG tablet Take 50 mg by mouth 3 times daily   sennosides (SENOKOT) 8.6 MG tablet Take 2 tablets by mouth 2 times daily   SUMAtriptan (IMITREX) 100 MG tablet Take 1 tablet (100 mg) by mouth at onset of headache   topiramate (TOPAMAX) 50 MG tablet Take 1 tablet 2x daily.   venlafaxine (EFFEXOR-XR) 75 MG 24 hr capsule Take 225 mg by mouth daily   [DISCONTINUED] Ipratropium-Albuterol (COMBIVENT RESPIMAT)  MCG/ACT inhaler Inhale 2 puffs into the lungs 2 times daily Not to exceed 6 doses per day.   [DISCONTINUED] lisinopril (PRINIVIL/ZESTRIL) 40 MG tablet Take 1 tablet (40 mg) by mouth daily     No current facility-administered medications on file prior to visit.     OTC products: Tylenol    Allergies   Allergen Reactions     Augmentin Nausea and Vomiting and Hives     Bee Venom Anaphylaxis     Nuts Anaphylaxis     Abilify Discmelt      Animal Dander Other (See Comments)     asthma     Aspirin      Atorvastatin      Contrast  Dye      Metformin      Naproxen      Niacin      Valium [Diazepam]      Zocor [Simvastatin - High Dose]      Latex Allergy: NO    Social History     Social History     Marital status: Single     Spouse name: N/A     Number of children: N/A     Years of education: N/A     Social History Main Topics     Smoking status: Current Every Day Smoker     Packs/day: 1.00     Types: Cigarettes     Smokeless tobacco: Never Used     Alcohol use No     Drug use: No     Sexual activity: Not Asked     Other Topics Concern     None     Social History Narrative       REVIEW OF SYSTEMS:   Constitutional, HEENT, cardiovascular, pulmonary, GI, , musculoskeletal, neuro, skin, endocrine and psych systems are negative, except as otherwise noted.    Feelings of fevers/chills 9/17/2018, weight gain, hearing loss not see by ENT. Fall yesterday onto right arm.     EXAM:     Patient Vitals for the past 24 hrs:   BP Temp Temp src Pulse Resp SpO2 Weight   09/17/18 1107 126/90 98.4  F (36.9  C) Oral 110 20 96 % 238 lb 6.4 oz (108.1 kg)     Body mass index is 44.44 kg/(m^2).  GENERAL: healthy, alert and no distress  EYES: Eyes grossly normal to inspection, extraocular movements - intact, and PERRL  HENT: ear canals- normal; TMs- normal; Nose- normal; Mouth- no ulcers, no lesions  NECK: no tenderness, no adenopathy, no asymmetry, no masses, no stiffness; thyroid- normal to palpation  RESP: lungs clear to auscultation - no rales, no rhonchi, no wheezes  CV: regular rates and rhythm, normal S1 S2, no S3 or S4 and no murmur, no click or rub -  ABDOMEN: soft, obese, with soft and reducible ventral hernia, no tenderness, no  hepatosplenomegaly, no masses, normal bowel sounds  MS: extremities- no gross deformities noted, no edema  SKIN: no suspicious lesions, no rashes  NEURO: strength and tone- normal, sensory exam- grossly normal, mentation- intact, speech- normal, reflexes- symmetric  BACK: no CVA tenderness, no paralumbar tenderness  PSYCH: Alert  and oriented times 3; speech- coherent , normal rate and volume; able to articulate logical thoughts  LYMPHATICS: ant. cervical- normal, post. cervical- normal    DIAGNOSTICS:      EKG reviewed from prior and A1c reviewed> no new labs at this time.     RISK ASSESSMENT:     Cardiovascular Risk:  -Patient is able to perform ADL's without assistance without chest pain.  -The patient does not have chest pain with exertion.  -Patient does not have a history of congestive heart failure.    -The patient does not have a history of stroke and does not have a history of valvular disease.    Pulmonary Risk:  -In terms of risk factors for pulmonary complication, the patient has a history of smoking as well as obstructive sleep apnea which she does not wear a CPAP for.  She also has a recurrent cough associated with smoking.  Patient is high risk for possible COPD.    Perioperative Complications:  -The patient does not have a history of bleeding or clotting problems in the past.    -The patient has not had complications from surgeries.    -The patient has a family history of any anesthesia or surgical complications.      IMPRESSION:   Reason for surgery/procedure: Midline surgical site ventral hernia      RECOMMENDATIONS:     At this point in time we will not proceed with planned surgical intervention for patient's hernia.  Patient still does not appear to have her blood sugars well controlled at this time and due to risks associated with healing and infection postoperatively, discussed with patient that it would be best to continue working on improving blood sugars prior to this nonurgent surgery.  Patient agrees with this plan.  She will continue lifestyle modification and to work with her primary care physician, Dr. Bullock to work on improving her overall glucose control. Patient also should also plan to continue working on smoking cessation and be advised to consider using her CPAP for sleep apnea.  Patient may benefit from  spirometry testing as well.  At this point in time, patient will contact the surgery center to cancel her surgery until further notice.  Patient agrees with plan.  Patient last had increase in insulin 1 week ago and therefore unlikely seen for benefit and recheck an A1c will not be beneficial at this time.     Patient was seen and discussed with Dr. Baca who agrees with assessment and plan.     Shavonne Hirsch, DO  PGY3    Please contact our office if there are any further questions or information required about this patient.

## 2018-09-18 ENCOUNTER — COMMUNICATION - HEALTHEAST (OUTPATIENT)
Dept: ADMINISTRATIVE | Facility: CLINIC | Age: 48
End: 2018-09-18

## 2018-09-19 DIAGNOSIS — E78.5 HYPERLIPIDEMIA LDL GOAL <100: ICD-10-CM

## 2018-09-19 RX ORDER — PRAVASTATIN SODIUM 80 MG/1
80 TABLET ORAL DAILY
Qty: 90 TABLET | Refills: 3 | Status: SHIPPED | OUTPATIENT
Start: 2018-09-19 | End: 2018-12-13

## 2018-09-20 NOTE — TELEPHONE ENCOUNTER
Other NSAID options (in order of recommendation) -    Meloxicam    Diclofenac    Etodolac    Alejandra Olson Pharm.D.

## 2018-09-21 DIAGNOSIS — R10.84 ABDOMINAL PAIN, GENERALIZED: ICD-10-CM

## 2018-09-21 DIAGNOSIS — K43.2 INCISIONAL HERNIA, WITHOUT OBSTRUCTION OR GANGRENE: ICD-10-CM

## 2018-09-21 RX ORDER — SALSALATE 500 MG/1
500 TABLET, FILM COATED ORAL 2 TIMES DAILY
Qty: 60 TABLET | Refills: 1 | Status: SHIPPED | OUTPATIENT
Start: 2018-09-21 | End: 2018-10-18

## 2018-09-21 NOTE — PROGRESS NOTES
Preceptor Attestation:   Patient seen, evaluated and discussed with the resident. I have verified the content of the note, which accurately reflects my assessment of the patient and the plan of care.   Supervising Physician:  Jaswant Baca MD

## 2018-10-01 NOTE — TELEPHONE ENCOUNTER
Message from pharm D noted.  Will discuss with patient at next follow up visit.      Have not heard from or seen patient recently to talk about blood sugars and pain.  Can we reach out to patient and have her schedule a follow up visit?    Tyra Bullock    Routed to LAURA Leblanc

## 2018-10-02 ENCOUNTER — DOCUMENTATION ONLY (OUTPATIENT)
Dept: FAMILY MEDICINE | Facility: CLINIC | Age: 48
End: 2018-10-02

## 2018-10-02 NOTE — PROGRESS NOTES
Received a call from [facility] Scheduling department to let us know they have made 3 attempts to reach Teresa regarding her Diabetic Education referral however patient is not returning their calls. They will not be making any additional attempt to reach her for this referral at this time.

## 2018-10-08 NOTE — TELEPHONE ENCOUNTER
Called patient and left a generic message for pt to call the clinic back.  Please schedule an appointment to follow up on blood sugars.  Deana Castellanos, CMA

## 2018-10-11 ENCOUNTER — TRANSFERRED RECORDS (OUTPATIENT)
Dept: HEALTH INFORMATION MANAGEMENT | Facility: CLINIC | Age: 48
End: 2018-10-11

## 2018-10-15 ENCOUNTER — COMMUNICATION - HEALTHEAST (OUTPATIENT)
Dept: SURGERY | Facility: CLINIC | Age: 48
End: 2018-10-15

## 2018-10-15 ENCOUNTER — TELEPHONE (OUTPATIENT)
Dept: FAMILY MEDICINE | Facility: CLINIC | Age: 48
End: 2018-10-15

## 2018-10-15 NOTE — TELEPHONE ENCOUNTER
[facility] phone call message- general phone call:    Reason for call: Pt was in [facility] and Dr Baca and Dr Walker wrote her out a prescription for ultra fine sis pen needle and she doesn't have this one. Can this be called to [facility].    Return call needed: Yes    OK to leave a message on voice mail? Yes    Primary language: English      needed? No    Call taken on October 15, 2018 at 12:57 PM by Mary Ann Giraldo

## 2018-10-16 ENCOUNTER — TELEPHONE (OUTPATIENT)
Dept: FAMILY MEDICINE | Facility: CLINIC | Age: 48
End: 2018-10-16

## 2018-10-16 DIAGNOSIS — R05.9 COUGH: ICD-10-CM

## 2018-10-16 RX ORDER — BENZONATATE 200 MG/1
200 CAPSULE ORAL 3 TIMES DAILY PRN
Qty: 60 CAPSULE | Refills: 0 | Status: SHIPPED | OUTPATIENT
Start: 2018-10-16 | End: 2018-12-15

## 2018-10-16 NOTE — TELEPHONE ENCOUNTER
Agree with plan.  60 Units of Troudjeo is equivalent to 60 Units of lantus, and likely easier to take with the pen than with the syringe.      Patient should bring ALL of her medications, including all of the insulin she is using/has used recently to the visit.  Please also bring glucometer and list of blood sugars as well.      Tyra Bullock    Routed to LAURA Krishnan.

## 2018-10-16 NOTE — TELEPHONE ENCOUNTER
Date of discharge: 10/12/18   Facility of discharge: [facility]  Patient concerns about condition: Concerns include the insulin she was given because she did not get the syringes for the vial. I told her I would talk to Dr. Bullock about that.   Patient concerns about medications: Concerns include no syringes and also needs a medication refilled.  Full med reconciliation will be completed at clinic visit.  Patient concerns about transitioning: No concerns at this time.  Clinic office visit appointment date: 10/18/18  Patient reminded to bring all medications (prescription and over-the-counter) to clinic appointment: Yes    Talked to Dr. Bullock about the insulin that she was given in the hospital. Dr. Bullock does not want her to take that insulin because she has some at home to take but would like Teresa to take the 60 units instead of the 120 unit she was taking before. Will contact the patient about the change in insulin and will see her on Thursday.

## 2018-10-16 NOTE — TELEPHONE ENCOUNTER
Patient needs medication refilked and also needs syringes for her diabetes medication that she received in the hospital. She has the needs just needs the syringe to draw up the medication.

## 2018-10-18 ENCOUNTER — OFFICE VISIT (OUTPATIENT)
Dept: FAMILY MEDICINE | Facility: CLINIC | Age: 48
End: 2018-10-18
Payer: MEDICARE

## 2018-10-18 ENCOUNTER — OFFICE VISIT (OUTPATIENT)
Dept: PHARMACY | Facility: CLINIC | Age: 48
End: 2018-10-18
Payer: MEDICARE

## 2018-10-18 VITALS
SYSTOLIC BLOOD PRESSURE: 134 MMHG | TEMPERATURE: 99.2 F | DIASTOLIC BLOOD PRESSURE: 83 MMHG | OXYGEN SATURATION: 97 % | HEART RATE: 110 BPM | RESPIRATION RATE: 16 BRPM

## 2018-10-18 DIAGNOSIS — J45.20 INTERMITTENT ASTHMA, UNCOMPLICATED: ICD-10-CM

## 2018-10-18 DIAGNOSIS — R05.9 COUGH: Primary | ICD-10-CM

## 2018-10-18 DIAGNOSIS — F44.5 PSYCHOGENIC NONEPILEPTIC SEIZURE: ICD-10-CM

## 2018-10-18 DIAGNOSIS — Z91.81 PERSONAL HISTORY OF FALL: ICD-10-CM

## 2018-10-18 DIAGNOSIS — N80.9 ENDOMETRIOSIS: ICD-10-CM

## 2018-10-18 DIAGNOSIS — Z79.4 TYPE 2 DIABETES MELLITUS WITH COMPLICATION, WITH LONG-TERM CURRENT USE OF INSULIN (H): ICD-10-CM

## 2018-10-18 DIAGNOSIS — G43.809 OTHER MIGRAINE WITHOUT STATUS MIGRAINOSUS, NOT INTRACTABLE: ICD-10-CM

## 2018-10-18 DIAGNOSIS — M54.42 CHRONIC BILATERAL LOW BACK PAIN WITH LEFT-SIDED SCIATICA: ICD-10-CM

## 2018-10-18 DIAGNOSIS — J45.31 MILD PERSISTENT ASTHMA WITH ACUTE EXACERBATION: ICD-10-CM

## 2018-10-18 DIAGNOSIS — Z71.6 ENCOUNTER FOR SMOKING CESSATION COUNSELING: ICD-10-CM

## 2018-10-18 DIAGNOSIS — J45.40 MODERATE PERSISTENT ASTHMA WITHOUT COMPLICATION: ICD-10-CM

## 2018-10-18 DIAGNOSIS — E11.8 TYPE 2 DIABETES MELLITUS WITH COMPLICATION, WITH LONG-TERM CURRENT USE OF INSULIN (H): ICD-10-CM

## 2018-10-18 DIAGNOSIS — G89.29 CHRONIC BILATERAL LOW BACK PAIN WITH LEFT-SIDED SCIATICA: ICD-10-CM

## 2018-10-18 DIAGNOSIS — M25.562 ACUTE PAIN OF LEFT KNEE: ICD-10-CM

## 2018-10-18 DIAGNOSIS — M62.82 NON-TRAUMATIC RHABDOMYOLYSIS: Primary | ICD-10-CM

## 2018-10-18 DIAGNOSIS — M25.521 RIGHT ELBOW PAIN: ICD-10-CM

## 2018-10-18 RX ORDER — BUDESONIDE AND FORMOTEROL FUMARATE DIHYDRATE 160; 4.5 UG/1; UG/1
2 AEROSOL RESPIRATORY (INHALATION) 2 TIMES DAILY
Qty: 3 INHALER | Refills: 3 | Status: SHIPPED | OUTPATIENT
Start: 2018-10-18 | End: 2018-12-15

## 2018-10-18 RX ORDER — PREDNISONE 50 MG/1
50 TABLET ORAL DAILY
Qty: 7 TABLET | Refills: 0 | Status: SHIPPED | OUTPATIENT
Start: 2018-10-18 | End: 2019-03-01

## 2018-10-18 RX ORDER — GABAPENTIN 600 MG/1
TABLET ORAL
Qty: 180 TABLET | Refills: 3 | Status: SHIPPED | OUTPATIENT
Start: 2018-10-18 | End: 2018-12-15

## 2018-10-18 RX ORDER — EPINEPHRINE 0.3 MG/.3ML
0.3 INJECTION SUBCUTANEOUS
Qty: 2 ML | Refills: 0 | Status: SHIPPED | OUTPATIENT
Start: 2018-10-18 | End: 2018-12-15

## 2018-10-18 RX ORDER — TOPIRAMATE 50 MG/1
TABLET, FILM COATED ORAL
Qty: 60 TABLET | Refills: 3 | COMMUNITY
Start: 2018-10-18 | End: 2018-12-13

## 2018-10-18 RX ORDER — AZITHROMYCIN 250 MG/1
TABLET, FILM COATED ORAL
Qty: 6 TABLET | Refills: 0 | Status: SHIPPED | OUTPATIENT
Start: 2018-10-18 | End: 2019-06-18

## 2018-10-18 RX ORDER — ALBUTEROL SULFATE 0.83 MG/ML
2.5 SOLUTION RESPIRATORY (INHALATION) EVERY 6 HOURS PRN
Qty: 30 VIAL | Refills: 1 | Status: SHIPPED | OUTPATIENT
Start: 2018-10-18 | End: 2018-12-15

## 2018-10-18 RX ORDER — LIDOCAINE 50 MG/G
OINTMENT TOPICAL PRN
Qty: 150 G | Refills: 3 | Status: SHIPPED | OUTPATIENT
Start: 2018-10-18 | End: 2018-12-15

## 2018-10-18 RX ORDER — LIDOCAINE 50 MG/G
OINTMENT TOPICAL PRN
Qty: 30 G | Refills: 3 | Status: SHIPPED | OUTPATIENT
Start: 2018-10-18 | End: 2018-10-18

## 2018-10-18 NOTE — PATIENT INSTRUCTIONS
Prednisone, 1 pill daily for 1 week  Azithromycin-  2 pills today, then 1 pill each day after  Gabapentin 2 pills 3x perday  Refill inhalers  Large jar of lidocaine   Follow up next week.    Bydureon 1x per week.

## 2018-10-18 NOTE — PROGRESS NOTES
Transitional Care / Medication Management Note                                                       Teresa was referred by Dr. Bullock for pharmacy services for TCM.    MEDICATION REVIEW:  Discussed all medication indications, dosage and effectiveness, adverse effects, and adherence with patient/caregiver.    Pt had meds with them: no  Pt had med list with them: no  Pt was knowledgeable about meds: yes  Medications set up by: Self  Medications administered by someone else (e.g., [facility]): No  Pt uses a medication box or automated dispenser: Did not address  Called pharmacy to obtain or clarify med list:  no  Called [facility] or [facility] to obtain or clarify med list:  no  Patient has been seen by PharmD in past 6 months:  no   Needed: no    Medication Discrepancies  Medications on EMR med list that pt is NOT taking:  yes, Docusate, Salsalate, Senokot  Medications pt IS taking that are NOT on EMR med list (e.g., from specialist, hospital): none  OTC meds/ dietary supplements pt taking on own that are NOT on EMR med list:  none  Dosage listed differently than how patient is taking: yes, Toujeo, Humalog  Frequency listed differently than how patient is taking: none  Duplicate medication on list (two occurrences of the same medication):  none  TOTAL NUMBER OF MEDICATION DISCREPANCIES:  5    The following medications were added in the hospital:    None  The following medications were discontinued in the hospital:    None  The following medications had dose/frequency changes in the hospital:    Toujeo (basal insulin) changed from 120 unit(s) to 60 unit(s)     Humalog (mealtime insulin) changed from 40 unit(s) to 20 unit(s)     Subjective                                                       Patient reports the following problems or concerns with their medications:  none  Patient reports the following adverse reactions to medications:  none  Pt reports missing doses:  never  Additional subjective information (e.g., reason  for visit, frequency of PRNs, reasons meds were D/C ed):    Pt would like to know if she could get a jar of lidocaine instead of a tube due to needing a larger quantity    Pt is needing refills of her inhaler and lidocaine    Objective                                                       Patient Active Problem List   Diagnosis     Health Care Home     Acute peptic ulcer     Other allergy, other than to medicinal agents     Bipolar disorder (H)     Bulging lumbar disc     Cervical dysplasia     Common migraine without aura     Constipation     Dwarfism     Familial hypercholesterolemia     HTN (hypertension)     Insomnia     Low back pain     Intermittent asthma     Leg pain, bilateral     Smoking     Degeneration of thoracic or thoracolumbar intervertebral disc     Diabetes mellitus, type 2 (H)     LOMAS (nonalcoholic steatohepatitis)     Disease of lung     Hemorrhoids     Parotid mass     Endometriosis     NNAMDI (obstructive sleep apnea)     History of total right knee replacement     Lateral epicondylitis     Impingement syndrome, shoulder, left     Hx of total knee replacement, left     Chronic pain syndrome     Cough     Borderline personality disorder (H)     Moderate recurrent major depression (H)     Recurrent ventral hernia       Current Outpatient Prescriptions   Medication Sig Dispense Refill     acetaminophen (TYLENOL) 500 MG tablet Take 2 tablets (1,000 mg) by mouth 3 times daily as needed for mild pain 180 tablet 3     albuterol (2.5 MG/3ML) 0.083% neb solution Take 1 vial (2.5 mg) by nebulization every 6 hours as needed for shortness of breath / dyspnea or wheezing 30 vial 1     azelastine (OPTIVAR) 0.05 % SOLN ophthalmic solution Apply 1 drop to eye 2 times daily 1 Bottle 11     azithromycin (ZITHROMAX) 250 MG tablet Two tablets first day, then one tablet daily for four days. 6 tablet 0     azithromycin (ZITHROMAX) 250 MG tablet Two tablets first day, then one tablet daily for nine days. 11 tablet 0      baclofen (LIORESAL) 20 MG tablet Take 1 tablet (20 mg) by mouth 3 times daily 180 tablet 1     benzonatate (TESSALON) 200 MG capsule Take 1 capsule (200 mg) by mouth 3 times daily as needed for cough 60 capsule 0     blood glucose (NO BRAND SPECIFIED) lancets standard Use to test blood sugar 3 times daily or as directed. 1 Box prn     blood glucose monitoring (NO BRAND SPECIFIED) meter device kit Use to test blood sugar 3 times daily or as directed. 1 kit 0     blood glucose monitoring (NO BRAND SPECIFIED) test strip Use to test blood sugars 3 times daily or as directed 100 strip prn     budesonide-formoterol (SYMBICORT) 160-4.5 MCG/ACT Inhaler Inhale 2 puffs into the lungs 2 times daily 3 Inhaler 3     clindamycin (CLINDAMAX) 1 % lotion Apply topically 2 times daily 60 mL 11     diphenhydrAMINE (BENADRYL) 50 MG capsule Take  mg by mouth At Bedtime.       docosanol (ABREVA) 10 % CREA cream Apply topically 5 times daily 1 Tube 5     docusate sodium (COLACE) 100 MG tablet Take 100-200 mg by mouth 2 times daily 1 tablet 0     EPINEPHrine (EPIPEN/ADRENACLICK/OR ANY BX GENERIC EQUIV) 0.3 MG/0.3ML injection 2-pack Inject 0.3 mLs (0.3 mg) into the muscle once as needed for anaphylaxis 2 mL 0     exenatide ER (BYDUREON BCISE) 2 MG/0.85ML SQ autoinjector for weekly inj Inject 2 mg Subcutaneous every 7 days 3.4 mL 1     fluticasone (FLONASE) 50 MCG/ACT spray Spray 2 sprays into both nostrils daily 9.9 g 11     gabapentin (NEURONTIN) 600 MG tablet Take 2 tabs three times daily. 180 tablet 3     glycerin, adult, 2 G SUPP Suppository Place 1 suppository rectally daily as needed for constipation 3 suppository 3     hydrOXYzine (ATARAX) 25 MG tablet Take 1-2 tablets (25-50 mg) by mouth every 6 hours as needed for itching, anxiety or other (sleep) 120 tablet 5     insulin glargine U-300 (TOUJEO) 300 UNIT/ML injection Inject 120 Units Subcutaneous daily 12 mL 11     insulin lispro (HUMALOG PEN) 100 UNIT/ML injection Inject  40 Units Subcutaneous 2 times daily (before meals) 15 mL 11     insulin pen needle (B-D U/F) 31G X 5 MM Use 4 daily or as directed. 100 each 11     Ipratropium-Albuterol (COMBIVENT RESPIMAT)  MCG/ACT inhaler Inhale 2 puffs into the lungs 2 times daily Not to exceed 6 doses per day. 1 Inhaler 0     lidocaine (XYLOCAINE) 5 % ointment Apply topically as needed for moderate pain 150 g 3     lidocaine (XYLOCAINE) 5 % ointment Apply topically as needed for moderate pain 30 g 0     lisinopril (PRINIVIL/ZESTRIL) 40 MG tablet Take 1 tablet (40 mg) by mouth daily 90 tablet 1     loratadine (CLARITIN) 10 MG tablet Take 1 tablet (10 mg) by mouth daily 60 tablet 5     medroxyPROGESTERone (DEPO-PROVERA) 150 MG/ML injection Inject 1 mL (150 mg) into the muscle every 3 months 1 mL 3     miconazole (MICATIN) 2 % vaginal cream Place 1 applicator vaginally At Bedtime Substitute as needed. 5 g 3     naloxone (NARCAN) nasal spray Spray 1 spray (4 mg) into one nostril alternating nostrils as needed for opioid reversal (every 2-3 minutes until medical assistance arrives.) 0.2 mL 0     nystatin (MYCOSTATIN) 575979 UNIT/ML suspension Take 5 mLs (500,000 Units) by mouth 4 times daily 60 mL 0     ondansetron (ZOFRAN) 4 MG tablet Take 1 tablet (4 mg) by mouth every 8 hours as needed for nausea 18 tablet 2     order for DME Equipment being ordered: Nebulizer supplies for life. 1 Device 0     order for DME Adult nebulizer mask and tubing. 1 each 0     order for DME Equipment being ordered: CPAP.  1 device.  Per sleep study recommendations.  Brand:  Respironics, Type:  Nasal Wisp,  Size:  Small 1 Device 0     pramipexole (MIRAPEX) 1 MG tablet Take 1 tablet (1 mg) by mouth At Bedtime 90 tablet 1     pravastatin (PRAVACHOL) 80 MG tablet Take 1 tablet (80 mg) by mouth daily 90 tablet 3     predniSONE (DELTASONE) 50 MG tablet Take 1 tablet (50 mg) by mouth daily 7 tablet 0     QUEtiapine (SEROQUEL) 300 MG tablet TAKE 1 TABLET BY MOUTH AT  BEDTIME. INDICATIONS: MANIC PHASE OF MANIC-DEPRESSION -CARMEN DURAN RN       QUEtiapine (SEROQUEL) 50 MG tablet Take 50 mg by mouth 3 times daily       ranitidine (ZANTAC) 150 MG tablet Take 1 tablet (150 mg) by mouth 2 times daily 60 tablet 11     salsalate (DISALCID) 500 MG tablet Take 1 tablet (500 mg) by mouth 2 times daily 60 tablet 1     sennosides (SENOKOT) 8.6 MG tablet Take 2 tablets by mouth 2 times daily 1 tablet 0     SUMAtriptan (IMITREX) 100 MG tablet Take 1 tablet (100 mg) by mouth at onset of headache 9 tablet 5     topiramate (TOPAMAX) 50 MG tablet Take 1 tablet 2x daily. 60 tablet 3     venlafaxine (EFFEXOR-XR) 75 MG 24 hr capsule Take 225 mg by mouth daily 30 capsule      [DISCONTINUED] gabapentin (NEURONTIN) 600 MG tablet Take 1.5 tabs in AM and afternoon, take 2 tabs at bedtime. 450 tablet 3       Social History   Substance Use Topics     Smoking status: Current Every Day Smoker     Packs/day: 1.00     Types: Cigarettes     Smokeless tobacco: Never Used     Alcohol use No       Estimated Creatinine Clearance: 160.9 mL/min (based on Cr of 0.5).    Lab Results   Component Value Date    A1C 10.1 08/21/2018    A1C 10.9 06/26/2018    A1C 9.3 03/14/2018    A1C 9.2 11/10/2017    A1C 8.8 06/30/2017     Last Comprehensive Metabolic Panel:  Sodium   Date Value Ref Range Status   08/21/2018 133.8 132.0 - 142.0 mmol/L Final     Potassium   Date Value Ref Range Status   08/21/2018 4.0 3.2 - 4.6 mmol/dL Final     Chloride   Date Value Ref Range Status   08/21/2018 100.4 98.0 - 110.0 mmol/L Final     Carbon Dioxide   Date Value Ref Range Status   08/21/2018 27.6 20.0 - 32.0 mmol/L Final     Glucose   Date Value Ref Range Status   08/21/2018 279.8 (H) 70.0 - 99.0 mg'dL Final     Urea Nitrogen   Date Value Ref Range Status   08/21/2018 11.7 7.0 - 19.0 mg/dL Final     Creatinine   Date Value Ref Range Status   08/21/2018 0.5 0.5 - 1.0 mg/dL Final     GFR Estimate   Date Value Ref Range Status   08/21/2018 >90  >60.0 mL/min/1.7 m2 Final     Calcium   Date Value Ref Range Status   08/21/2018 9.4 8.5 - 10.1 mg/dL Final       BP Readings from Last 3 Encounters:   10/18/18 134/83   09/17/18 126/90   08/21/18 139/88       The 10-year ASCVD risk score (Gopal JOHNSON Jr, et al., 2013) is: 23.5%    Values used to calculate the score:      Age: 47 years      Sex: Female      Is Non- : No      Diabetic: Yes      Tobacco smoker: Yes      Systolic Blood Pressure: 134 mmHg      Is BP treated: Yes      HDL Cholesterol: 30.6 mg/dL      Total Cholesterol: 6.1 mmol/L    PHQ-9 score:    PHQ-9 SCORE 10/6/2017   Total Score 10       Assessment                                                       T2DM - uncontrolled     A1c 9.1 from 10/11/18    Currently taking basal and mealtime insulin    Pain - uncontrolled     Back/joint pain still present with Lidocaine ointment, APAP 2 tab TID and Gabapentin 900 mg BID, 1200 mg at bedtime     Plan/Recommendations                                                       Updated medication list in the EMR; deleted meds patient no longer taking and added meds patient is now taking, and changed doses where there was a dose discrepancy.    All medications were reviewed and found to be indicated, effective, safe and convenient/ affordable unless drug therapy problem(s) was/were identified, as are described below.      Completed at this visit    T2DM     Per discussion with Dr. Bullock, started pt on Bydureon    Continued insulin as changed in the hospital    Pain    Per Dr. Bullock, increased Gabapentin dose to 1200 mg TID    COPD    Dr. Bullock assessed the pt's symptoms and prescribed Prednisone, Ahritmyocin, and refilled pt's inhalers.    To be completed at a future visit    T2DM     Reassess BG & efficacy of meds at next visit    Pain    Reassess pain level to determine if therapy is efficacious    COPD    Follow-up to see if pt's symptoms have resolved.    Options for treatment and/or  follow-up care were reviewed with the patient.  Teresa was engaged and actively involved in the decision making process, verbalized understanding of the options discussed, and was satisfied with the final plan.      Follow-up                                                       Patient should follow up in 1 week with Dr. Bullock.  Patient was provided with written instructions/medication list via AVS.     Dr. Bullock was provided the recommendations above  in clinic today and Dr. Bullock was available for supervision during this visit and is the authorizing prescriber for this visit through the pharmacist collaborative practice agreement.    Tamara Aquino, [facility] Resident      Drug therapy problems identified  1. Med: Bydureon - Indication - Needs additional drug therapy - Resolution: Intiate Drug; resolved    # of medical conditions addressed: 3  # of medications addressed: 46  # of medication discrepancies identified: 5  # of DTP identified: 1  Time spent: 20 minutes  Level of service: 2NC

## 2018-10-18 NOTE — MR AVS SNAPSHOT
After Visit Summary   10/18/2018    Teresa Perez    MRN: [id number]           Patient Information     Date Of Birth          1970        Visit Information        Provider Department      10/18/2018 2:30 PM Tamara Aquino, [facility]        Today's Diagnoses     Endometriosis        Encounter for smoking cessation counseling        Type 2 diabetes mellitus with complication, with long-term current use of insulin (H)        Other migraine without status migrainosus, not intractable           Follow-ups after your visit        Who to contact     Please call your clinic at [phone] to:    Ask questions about your health    Make or cancel appointments    Discuss your medicines    Learn about your test results    Speak to your doctor            Additional Information About Your Visit        MyChart Information     [facility] is an electronic gateway that provides easy, online access to your medical records. With [facility], you can request a clinic appointment, read your test results, renew a prescription or communicate with your care team.     To sign up for [facility] visit the website at [email or web address]   You will be asked to enter the access code listed below, as well as some personal information. Please follow the directions to create your username and password.     Your access code is: [email or web address]  Expires: 2018 12:17 PM     Your access code will  in 90 days. If you need help or a new code, please contact your [facility] or call [phone] for assistance.        Care EveryWhere ID     This is your Care EveryWhere ID. This could be used by other organizations to access your [facility] medical records  [id number]         Blood Pressure from Last 3 Encounters:   10/18/18 134/83   18 126/90   18 139/88    Weight from Last 3 Encounters:   18 238 lb 6.4 oz (108.1 kg)   18 231 lb 3.2 oz (104.9 kg)   18 231  lb 9.6 oz (105.1 kg)              Today, you had the following     No orders found for display         Today's Medication Changes          These changes are accurate as of 10/18/18 11:59 PM.  If you have any questions, ask your nurse or doctor.               Start taking these medicines.        Dose/Directions    azithromycin 250 MG tablet   Commonly known as:  ZITHROMAX   Used for:  Cough   Started by:  Tyra Bullock MD        Two tablets first day, then one tablet daily for four days.   Quantity:  6 tablet   Refills:  0       exenatide ER 2 MG/0.85ML SQ autoinjector for weekly inj   Commonly known as:  BYDUREON BCise   Used for:  Type 2 diabetes mellitus with complication, with long-term current use of insulin (H)   Started by:  Tyra Bullock MD        Dose:  2 mg   Inject 2 mg Subcutaneous every 7 days   Quantity:  3.4 mL   Refills:  1       predniSONE 50 MG tablet   Commonly known as:  DELTASONE   Used for:  Cough   Started by:  Tyra Bullock MD        Dose:  50 mg   Take 1 tablet (50 mg) by mouth daily   Quantity:  7 tablet   Refills:  0         These medicines have changed or have updated prescriptions.        Dose/Directions    gabapentin 600 MG tablet   Commonly known as:  NEURONTIN   This may have changed:  additional instructions   Used for:  Chronic bilateral low back pain with left-sided sciatica   Changed by:  Tyra Bullock MD        Take 2 tabs three times daily.   Quantity:  180 tablet   Refills:  3       insulin glargine U-300 300 UNIT/ML injection   Commonly known as:  TOUJEO   This may have changed:  how much to take   Used for:  Type 2 diabetes mellitus with complication, with long-term current use of insulin (H)   Changed by:  Tyra Bullock MD        Dose:  60 Units   Inject 60 Units Subcutaneous daily   Quantity:  12 mL   Refills:  11       insulin lispro 100 UNIT/ML injection   Commonly known as:  HumaLOG PEN   This may have changed:    - how much to take  - when  to take this   Used for:  Type 2 diabetes mellitus with complication, with long-term current use of insulin (H)   Changed by:  Tyra Bullock MD        Dose:  20 Units   Inject 20 Units Subcutaneous 3 times daily (before meals)   Quantity:  15 mL   Refills:  11       topiramate 50 MG tablet   Commonly known as:  TOPAMAX   This may have changed:  additional instructions   Used for:  Other migraine without status migrainosus, not intractable   Changed by:  Tamara Aquino, ALYSIA        Take 1 tablet by mouth twice daily   Quantity:  60 tablet   Refills:  3            Where to get your medicines      These [facility] - [place], [place] - [address], [place] [address]     Phone:  [phone]     albuterol (2.5 MG/3ML) 0.083% neb solution    azithromycin 250 MG tablet    budesonide-formoterol 160-4.5 MCG/ACT Inhaler    EPINEPHrine 0.3 MG/0.3ML injection 2-pack    exenatide ER 2 MG/0.85ML SQ autoinjector for weekly inj    gabapentin 600 MG tablet    insulin glargine U-300 300 UNIT/ML injection    insulin lispro 100 UNIT/ML injection    Ipratropium-Albuterol  MCG/ACT inhaler    lidocaine 5 % ointment    predniSONE 50 MG tablet                Primary Care Provider Office Phone # Fax #    Tyra Bullock [phone]       [address] [place] [address]        Equal Access to Services     [facility] AH: Hadii aad ku hadasho Soomaali, waaxda luqadaha, qaybta kaalmada adeegyada, miguel lewis haybeti valle . So [facility] [phone].    ATENCIÓN: Si habla español, tiene a tang disposición servicios gratuitos de asistencia lingüística. Llame al [phone].    We comply with applicable federal civil rights laws and [place] laws. We do not discriminate on the basis of race, color, national origin, age, disability, sex, sexual orientation, or gender identity.            Thank you!     Thank you for choosing [facility]   for your care. Our goal is always to provide you with excellent care. Hearing back from our patients is one way we can continue to improve our services. Please take a few minutes to complete the written survey that you may receive in the mail after your visit with us. Thank you!             Your Updated Medication List - Protect others around you: Learn how to safely use, store and throw away your medicines at www.disposemymeds.org.          This list is accurate as of 10/18/18 11:59 PM.  Always use your most recent med list.                   Brand Name Dispense Instructions for use Diagnosis    acetaminophen 500 MG tablet    TYLENOL    180 tablet    Take 2 tablets (1,000 mg) by mouth 3 times daily as needed for mild pain    Chronic bilateral low back pain without sciatica       albuterol (2.5 MG/3ML) 0.083% neb solution     30 vial    Take 1 vial (2.5 mg) by nebulization every 6 hours as needed for shortness of breath / dyspnea or wheezing    Mild persistent asthma with acute exacerbation       azelastine 0.05 % ophthalmic solution    OPTIVAR    1 Bottle    Apply 1 drop to eye 2 times daily    Allergic reaction, sequela       azithromycin 250 MG tablet    ZITHROMAX    6 tablet    Two tablets first day, then one tablet daily for four days.    Cough       baclofen 20 MG tablet    LIORESAL    180 tablet    Take 1 tablet (20 mg) by mouth 3 times daily    Chronic bilateral low back pain with left-sided sciatica       benzonatate 200 MG capsule    TESSALON    60 capsule    Take 1 capsule (200 mg) by mouth 3 times daily as needed for cough    Cough       blood glucose lancets standard    no brand specified    1 Box    Use to test blood sugar 3 times daily or as directed.    Type 2 diabetes mellitus without complication, with long-term current use of insulin (H)       blood glucose monitoring meter device kit    no brand specified    1 kit    Use to test blood sugar 3 times daily or as directed.    Type 2 diabetes  mellitus without complication, with long-term current use of insulin (H)       blood glucose monitoring test strip    no brand specified    100 strip    Use to test blood sugars 3 times daily or as directed    Type 2 diabetes mellitus without complication, with long-term current use of insulin (H)       budesonide-formoterol 160-4.5 MCG/ACT Inhaler    SYMBICORT    3 Inhaler    Inhale 2 puffs into the lungs 2 times daily    Intermittent asthma, uncomplicated       clindamycin 1 % lotion    CLINDAMAX    60 mL    Apply topically 2 times daily    Abscess of anal and rectal regions       diphenhydrAMINE 50 MG capsule    BENADRYL     Take  mg by mouth At Bedtime.        docosanol 10 % Crea cream    ABREVA    1 Tube    Apply topically 5 times daily    HSV (herpes simplex virus) infection       EPINEPHrine 0.3 MG/0.3ML injection 2-pack    EPIPEN/ADRENACLICK/or ANY BX GENERIC EQUIV    2 mL    Inject 0.3 mLs (0.3 mg) into the muscle once as needed for anaphylaxis    Endometriosis, Encounter for smoking cessation counseling       exenatide ER 2 MG/0.85ML SQ autoinjector for weekly inj    BYDUREON BCise    3.4 mL    Inject 2 mg Subcutaneous every 7 days    Type 2 diabetes mellitus with complication, with long-term current use of insulin (H)       fluticasone 50 MCG/ACT spray    FLONASE    9.9 g    Spray 2 sprays into both nostrils daily    Chronic allergic conjunctivitis       gabapentin 600 MG tablet    NEURONTIN    180 tablet    Take 2 tabs three times daily.    Chronic bilateral low back pain with left-sided sciatica       glycerin (adult) 2 g Supp Suppository     3 suppository    Place 1 suppository rectally daily as needed for constipation    Constipation, unspecified constipation type       hydrOXYzine 25 MG tablet    ATARAX    120 tablet    Take 1-2 tablets (25-50 mg) by mouth every 6 hours as needed for itching, anxiety or other (sleep)    Chronic pain syndrome       insulin glargine U-300 300 UNIT/ML injection     TOUJEO    12 mL    Inject 60 Units Subcutaneous daily    Type 2 diabetes mellitus with complication, with long-term current use of insulin (H)       insulin lispro 100 UNIT/ML injection    HumaLOG PEN    15 mL    Inject 20 Units Subcutaneous 3 times daily (before meals)    Type 2 diabetes mellitus with complication, with long-term current use of insulin (H)       insulin pen needle 31G X 5 MM    B-D U/F    100 each    Use 4 daily or as directed.    Type 2 diabetes mellitus with hyperglycemia, with long-term current use of insulin (H)       Ipratropium-Albuterol  MCG/ACT inhaler    COMBIVENT RESPIMAT    1 Inhaler    Inhale 2 puffs into the lungs 2 times daily Not to exceed 6 doses per day.    Moderate persistent asthma without complication       lidocaine 5 % ointment    XYLOCAINE    150 g    Apply topically as needed for moderate pain    Chronic bilateral low back pain with left-sided sciatica       lisinopril 40 MG tablet    PRINIVIL/ZESTRIL    90 tablet    Take 1 tablet (40 mg) by mouth daily    Essential hypertension, benign       loratadine 10 MG tablet    CLARITIN    60 tablet    Take 1 tablet (10 mg) by mouth daily    Chronic seasonal allergic rhinitis, unspecified trigger       medroxyPROGESTERone 150 MG/ML injection    DEPO-PROVERA    1 mL    Inject 1 mL (150 mg) into the muscle every 3 months    Endometriosis       miconazole 2 % cream    MICATIN    5 g    Place 1 applicator vaginally At Bedtime Substitute as needed.    Vaginal candidiasis       naloxone nasal spray    NARCAN    0.2 mL    Spray 1 spray (4 mg) into one nostril alternating nostrils as needed for opioid reversal (every 2-3 minutes until medical assistance arrives.)    Chronic narcotic use       nystatin 260718 UNIT/ML suspension    MYCOSTATIN    60 mL    Take 5 mLs (500,000 Units) by mouth 4 times daily    Thrush       ondansetron 4 MG tablet    ZOFRAN    18 tablet    Take 1 tablet (4 mg) by mouth every 8 hours as needed for nausea     Chronic pain syndrome, Nausea       * order for DME     1 each    Adult nebulizer mask and tubing.    Mild persistent asthma with acute exacerbation       * order for DME     1 Device    Equipment being ordered: Nebulizer supplies for life.    Mild persistent asthma with acute exacerbation       pramipexole 1 MG tablet    MIRAPEX    90 tablet    Take 1 tablet (1 mg) by mouth At Bedtime    Restless leg syndrome       pravastatin 80 MG tablet    PRAVACHOL    90 tablet    Take 1 tablet (80 mg) by mouth daily    Hyperlipidemia LDL goal <100       predniSONE 50 MG tablet    DELTASONE    7 tablet    Take 1 tablet (50 mg) by mouth daily    Cough       * QUEtiapine 50 MG tablet    SEROquel     Take 50 mg by mouth 3 times daily        * QUEtiapine 300 MG tablet    SEROquel     TAKE 1 TABLET BY MOUTH AT BEDTIME. INDICATIONS: MANIC PHASE OF MANIC-DEPRESSION -CARMEN DURAN RN        ranitidine 150 MG tablet    ZANTAC    60 tablet    Take 1 tablet (150 mg) by mouth 2 times daily    Gastroesophageal reflux disease without esophagitis       SUMAtriptan 100 MG tablet    IMITREX    9 tablet    Take 1 tablet (100 mg) by mouth at onset of headache    Chronic migraine without aura without status migrainosus, not intractable       topiramate 50 MG tablet    TOPAMAX    60 tablet    Take 1 tablet by mouth twice daily    Other migraine without status migrainosus, not intractable       venlafaxine 75 MG 24 hr capsule    EFFEXOR-XR    30 capsule    Take 225 mg by mouth daily        * Notice:  This list has 4 medication(s) that are the same as other medications prescribed for you. Read the directions carefully, and ask your doctor or other care provider to review them with you.

## 2018-10-18 NOTE — MR AVS SNAPSHOT
After Visit Summary   10/18/2018    Teresa Perez    MRN: [id number]           Patient Information     Date Of Birth          1970        Visit Information        Provider Department      10/18/2018 1:50 PM Tyra Bullock MD [facility]        Today's Diagnoses     Cough    -  1    Chronic bilateral low back pain with left-sided sciatica        Type 2 diabetes mellitus with complication, with long-term current use of insulin (H)          Care Instructions    Prednisone, 1 pill daily for 1 week  Azithromycin-  2 pills today, then 1 pill each day after  Gabapentin 2 pills 3x perday  Refill inhalers  Large jar of lidocaine   Follow up next week.    Bydureon 1x per week.            Follow-ups after your visit        Future tests that were ordered for you today     Open Future Orders        Priority Expected Expires Ordered    Basic Metabolic Panel ([place]) Routine 10/18/2018 2018 10/18/2018    Hepatic Panel ([place]) Routine 10/18/2018 2018 10/18/2018    CK Total ([facility]) Routine 10/18/2018 2018 10/18/2018            Who to contact     Please call your clinic at [phone] to:    Ask questions about your health    Make or cancel appointments    Discuss your medicines    Learn about your test results    Speak to your doctor            Additional Information About Your Visit        MyChart Information     [employer] is an electronic gateway that provides easy, online access to your medical records. With [employer], you can request a clinic appointment, read your test results, renew a prescription or communicate with your care team.     To sign up for [employer] visit the website at [email or web address]   You will be asked to enter the access code listed below, as well as some personal information. Please follow the directions to create your username and password.     Your access code is: [email or web address]  Expires: 2018 12:17 PM     Your access code will  in  90 days. If you need help or a new code, please contact your [facility] or call [phone] for assistance.        Care EveryWhere ID     This is your Care EveryWhere ID. This could be used by other organizations to access your [place] medical records  [id number]        Your Vitals Were     Pulse Temperature Respirations Pulse Oximetry          110 99.2  F (37.3  C) (Oral) 16 97%         Blood Pressure from Last 3 Encounters:   10/18/18 134/83   09/17/18 126/90   08/21/18 139/88    Weight from Last 3 Encounters:   09/17/18 238 lb 6.4 oz (108.1 kg)   08/21/18 231 lb 3.2 oz (104.9 kg)   06/26/18 231 lb 9.6 oz (105.1 kg)              We Performed the Following     XR CHEST 2 VW          Today's Medication Changes          These changes are accurate as of 10/18/18  3:33 PM.  If you have any questions, ask your nurse or doctor.               Start taking these medicines.        Dose/Directions    exenatide ER 2 MG/0.85ML SQ autoinjector for weekly inj   Commonly known as:  BYDUREON BCise   Used for:  Type 2 diabetes mellitus with complication, with long-term current use of insulin (H)   Started by:  Tyra Bullock MD        Dose:  2 mg   Inject 2 mg Subcutaneous every 7 days   Quantity:  3.4 mL   Refills:  1       predniSONE 50 MG tablet   Commonly known as:  DELTASONE   Used for:  Cough   Started by:  Tyra Bullock MD        Dose:  50 mg   Take 1 tablet (50 mg) by mouth daily   Quantity:  7 tablet   Refills:  0         These medicines have changed or have updated prescriptions.        Dose/Directions    * azithromycin 250 MG tablet   Commonly known as:  ZITHROMAX   This may have changed:  Another medication with the same name was added. Make sure you understand how and when to take each.   Used for:  Acute recurrent maxillary sinusitis   Changed by:  Tyra Bullock MD        Two tablets first day, then one tablet daily for nine days.   Quantity:  11 tablet   Refills:  0        * azithromycin 250 MG tablet   Commonly known as:  ZITHROMAX   This may have changed:  You were already taking a medication with the same name, and this prescription was added. Make sure you understand how and when to take each.   Used for:  Cough   Changed by:  Tyra Bullock MD        Two tablets first day, then one tablet daily for four days.   Quantity:  6 tablet   Refills:  0       gabapentin 600 MG tablet   Commonly known as:  NEURONTIN   This may have changed:  additional instructions   Used for:  Chronic bilateral low back pain with left-sided sciatica   Changed by:  Tyra Bullock MD        Take 2 tabs three times daily.   Quantity:  180 tablet   Refills:  3       * lidocaine 5 % ointment   Commonly known as:  XYLOCAINE   This may have changed:  Another medication with the same name was added. Make sure you understand how and when to take each.   Used for:  Personal history of fall, Acute pain of left knee, Right elbow pain, Psychogenic nonepileptic seizure   Changed by:  Tyra Bullock MD        Apply topically as needed for moderate pain   Quantity:  30 g   Refills:  0       * lidocaine 5 % ointment   Commonly known as:  XYLOCAINE   This may have changed:  You were already taking a medication with the same name, and this prescription was added. Make sure you understand how and when to take each.   Used for:  Chronic bilateral low back pain with left-sided sciatica   Changed by:  Tyra Bullock MD        Apply topically as needed for moderate pain   Quantity:  150 g   Refills:  3       * Notice:  This list has 4 medication(s) that are the same as other medications prescribed for you. Read the directions carefully, and ask your doctor or other care provider to review them with you.         Where to get your medicines      [facility] [place] [place] - [facility] [address], [place] [address]     Phone:  [phone]      azithromycin 250 MG tablet    exenatide ER 2 MG/0.85ML SQ autoinjector for weekly inj    gabapentin 600 MG tablet    lidocaine 5 % ointment    predniSONE 50 MG tablet                Primary Care Provider Office Phone # Fax #    Tyra Bullock [phone]       [address] [place] [address]        Equal Access to Services     GALINA DOTY : Hadii aad ku hadasho Soomaali, waaxda luqadaha, qaybta kaalmada adeegyada, waxbladimir avalosin haybetyn adeblayne kelsyotilia valle . So [facility] [phone].    ATENCIÓN: Si habla español, tiene a tang disposición servicios gratuitos de asistencia lingüística. [facility] [phone].    We comply with applicable federal civil rights laws and [place] laws. We do not discriminate on the basis of race, color, national origin, age, disability, sex, sexual orientation, or gender identity.            Thank you!     Thank you for choosing [facility]  for your care. Our goal is always to provide you with excellent care. Hearing back from our patients is one way we can continue to improve our services. Please take a few minutes to complete the written survey that you may receive in the mail after your visit with us. Thank you!             Your Updated Medication List - Protect others around you: Learn how to safely use, store and throw away your medicines at www.disposemymeds.org.          This list is accurate as of 10/18/18  3:33 PM.  Always use your most recent med list.                   Brand Name Dispense Instructions for use Diagnosis    acetaminophen 500 MG tablet    TYLENOL    180 tablet    Take 2 tablets (1,000 mg) by mouth 3 times daily as needed for mild pain    Chronic bilateral low back pain without sciatica       albuterol (2.5 MG/3ML) 0.083% neb solution     30 vial    Take 1 vial (2.5 mg) by nebulization every 6 hours as needed for shortness of breath / dyspnea or wheezing    Mild persistent asthma with acute exacerbation       azelastine 0.05 % ophthalmic  solution    OPTIVAR    1 Bottle    Apply 1 drop to eye 2 times daily    Allergic reaction, sequela       * azithromycin 250 MG tablet    ZITHROMAX    11 tablet    Two tablets first day, then one tablet daily for nine days.    Acute recurrent maxillary sinusitis       * azithromycin 250 MG tablet    ZITHROMAX    6 tablet    Two tablets first day, then one tablet daily for four days.    Cough       baclofen 20 MG tablet    LIORESAL    180 tablet    Take 1 tablet (20 mg) by mouth 3 times daily    Chronic bilateral low back pain with left-sided sciatica       benzonatate 200 MG capsule    TESSALON    60 capsule    Take 1 capsule (200 mg) by mouth 3 times daily as needed for cough    Cough       blood glucose lancets standard    no brand specified    1 Box    Use to test blood sugar 3 times daily or as directed.    Type 2 diabetes mellitus without complication, with long-term current use of insulin (H)       blood glucose monitoring meter device kit    no brand specified    1 kit    Use to test blood sugar 3 times daily or as directed.    Type 2 diabetes mellitus without complication, with long-term current use of insulin (H)       blood glucose monitoring test strip    no brand specified    100 strip    Use to test blood sugars 3 times daily or as directed    Type 2 diabetes mellitus without complication, with long-term current use of insulin (H)       budesonide-formoterol 160-4.5 MCG/ACT Inhaler    SYMBICORT    3 Inhaler    Inhale 2 puffs into the lungs 2 times daily    Intermittent asthma, uncomplicated       clindamycin 1 % lotion    CLINDAMAX    60 mL    Apply topically 2 times daily    Abscess of anal and rectal regions       diphenhydrAMINE 50 MG capsule    BENADRYL     Take  mg by mouth At Bedtime.        docosanol 10 % Crea cream    ABREVA    1 Tube    Apply topically 5 times daily    HSV (herpes simplex virus) infection       docusate sodium 100 MG tablet    COLACE    1 tablet    Take 100-200 mg by mouth  2 times daily    Constipation, unspecified constipation type       EPINEPHrine 0.3 MG/0.3ML injection 2-pack    EPIPEN/ADRENACLICK/or ANY BX GENERIC EQUIV    2 mL    Inject 0.3 mLs (0.3 mg) into the muscle once as needed for anaphylaxis    Endometriosis, Encounter for smoking cessation counseling       exenatide ER 2 MG/0.85ML SQ autoinjector for weekly inj    BYDUREON BCise    3.4 mL    Inject 2 mg Subcutaneous every 7 days    Type 2 diabetes mellitus with complication, with long-term current use of insulin (H)       fluticasone 50 MCG/ACT spray    FLONASE    9.9 g    Spray 2 sprays into both nostrils daily    Chronic allergic conjunctivitis       gabapentin 600 MG tablet    NEURONTIN    180 tablet    Take 2 tabs three times daily.    Chronic bilateral low back pain with left-sided sciatica       glycerin (adult) 2 g Supp Suppository     3 suppository    Place 1 suppository rectally daily as needed for constipation    Constipation, unspecified constipation type       hydrOXYzine 25 MG tablet    ATARAX    120 tablet    Take 1-2 tablets (25-50 mg) by mouth every 6 hours as needed for itching, anxiety or other (sleep)    Chronic pain syndrome       insulin glargine U-300 300 UNIT/ML injection    TOUJEO    12 mL    Inject 120 Units Subcutaneous daily    Type 2 diabetes mellitus with complication, with long-term current use of insulin (H)       insulin lispro 100 UNIT/ML injection    HumaLOG PEN    15 mL    Inject 40 Units Subcutaneous 2 times daily (before meals)    Type 2 diabetes mellitus with complication, with long-term current use of insulin (H)       insulin pen needle 31G X 5 MM    B-D U/F    100 each    Use 4 daily or as directed.    Type 2 diabetes mellitus with hyperglycemia, with long-term current use of insulin (H)       Ipratropium-Albuterol  MCG/ACT inhaler    COMBIVENT RESPIMAT    1 Inhaler    Inhale 2 puffs into the lungs 2 times daily Not to exceed 6 doses per day.    Moderate persistent asthma  without complication       * lidocaine 5 % ointment    XYLOCAINE    30 g    Apply topically as needed for moderate pain    Personal history of fall, Acute pain of left knee, Right elbow pain, Psychogenic nonepileptic seizure       * lidocaine 5 % ointment    XYLOCAINE    150 g    Apply topically as needed for moderate pain    Chronic bilateral low back pain with left-sided sciatica       lisinopril 40 MG tablet    PRINIVIL/ZESTRIL    90 tablet    Take 1 tablet (40 mg) by mouth daily    Essential hypertension, benign       loratadine 10 MG tablet    CLARITIN    60 tablet    Take 1 tablet (10 mg) by mouth daily    Chronic seasonal allergic rhinitis, unspecified trigger       medroxyPROGESTERone 150 MG/ML injection    DEPO-PROVERA    1 mL    Inject 1 mL (150 mg) into the muscle every 3 months    Endometriosis       miconazole 2 % cream    MICATIN    5 g    Place 1 applicator vaginally At Bedtime Substitute as needed.    Vaginal candidiasis       naloxone nasal spray    NARCAN    0.2 mL    Spray 1 spray (4 mg) into one nostril alternating nostrils as needed for opioid reversal (every 2-3 minutes until medical assistance arrives.)    Chronic narcotic use       nystatin 234408 UNIT/ML suspension    MYCOSTATIN    60 mL    Take 5 mLs (500,000 Units) by mouth 4 times daily    Thrush       ondansetron 4 MG tablet    ZOFRAN    18 tablet    Take 1 tablet (4 mg) by mouth every 8 hours as needed for nausea    Chronic pain syndrome, Nausea       * order for DME     1 Device    Equipment being ordered: CPAP.  1 device.  Per sleep study recommendations.  Brand:  Respironics, Type:  Nasal Wisp,  Size:  Small    NNAMDI (obstructive sleep apnea)       * order for DME     1 each    Adult nebulizer mask and tubing.    Mild persistent asthma with acute exacerbation       * order for DME     1 Device    Equipment being ordered: Nebulizer supplies for life.    Mild persistent asthma with acute exacerbation       pramipexole 1 MG tablet     MIRAPEX    90 tablet    Take 1 tablet (1 mg) by mouth At Bedtime    Restless leg syndrome       pravastatin 80 MG tablet    PRAVACHOL    90 tablet    Take 1 tablet (80 mg) by mouth daily    Hyperlipidemia LDL goal <100       predniSONE 50 MG tablet    DELTASONE    7 tablet    Take 1 tablet (50 mg) by mouth daily    Cough       * QUEtiapine 50 MG tablet    SEROquel     Take 50 mg by mouth 3 times daily        * QUEtiapine 300 MG tablet    SEROquel     TAKE 1 TABLET BY MOUTH AT BEDTIME. INDICATIONS: MANIC PHASE OF MANIC-DEPRESSION -CARMEN DURAN RN        ranitidine 150 MG tablet    ZANTAC    60 tablet    Take 1 tablet (150 mg) by mouth 2 times daily    Gastroesophageal reflux disease without esophagitis       salsalate 500 MG tablet    DISALCID    60 tablet    Take 1 tablet (500 mg) by mouth 2 times daily    Abdominal pain, generalized, Incisional hernia, without obstruction or gangrene       sennosides 8.6 MG tablet    SENOKOT    1 tablet    Take 2 tablets by mouth 2 times daily    Constipation, unspecified constipation type       SUMAtriptan 100 MG tablet    IMITREX    9 tablet    Take 1 tablet (100 mg) by mouth at onset of headache    Chronic migraine without aura without status migrainosus, not intractable       topiramate 50 MG tablet    TOPAMAX    60 tablet    Take 1 tablet 2x daily.    Other migraine without status migrainosus, not intractable       venlafaxine 75 MG 24 hr capsule    EFFEXOR-XR    30 capsule    Take 225 mg by mouth daily        * Notice:  This list has 9 medication(s) that are the same as other medications prescribed for you. Read the directions carefully, and ask your doctor or other care provider to review them with you.

## 2018-10-18 NOTE — LETTER
October 22, 2018      Teresa Perez  [address]   [place] [address]        Dear Teresa,    The official read on your chest xray was negative.  No signs of pneumonia or cancer.  This is good news.  Please call the clinic at [phone] if you have any questions.    If you have any questions, please call the clinic to make an appointment.    Sincerely,    Tyra Bullock MD

## 2018-10-19 NOTE — PROGRESS NOTES
Hospitalization Follow-up Visit         HPI       Hospital Follow-up Visit:    Hospital:  [facility]   Date of Admission: 10/11/2018  Date of Discharge: 10/12/2018  Reason(s) for Admission: Prolonged episode of loss of consciousness.  Rhabdomyolysis.            Problems taking medications regularly: Patient had some initial confusion over her insulin.  Had Toujeo at home but was getting Lantus in the hospital.  Was discharged with a prescription for Lantus but no vials or syringes.  This was previously discussed over the phone it was okay to her continue the Toujeo.  Has been taking 20 units of Humalog with meals, sometimes 2, sometimes 3 depending on her appetite that day.       Post Discharge Medication Reconciliation: discharge medications reconciled and changed, per note/orders (see AVS).       Problems adhering to non-medication therapy:  None       Medications reviewed by: by PharmD    Summary of hospitalization:  [facility] discharge summary reviewed  [facility] discharge summary reviewed    Patient was admitted overnight, after experiencing a period of up to 8 hours where she lost consciousness.  Had an elevated CK of 3900 on admission.  This down trended.  She was given a fluid resuscitation, and renal function was normal.  UA was positive for cocaine opiates and THC.  Patient endorses that the opiates per from the emergency department.  Worried that her THC was laced with K2 and cocaine.  Head CT done at that time was normal.  She did have some headache, swelling externally, but CT internally was normal.  Additionally there were concerns that she may have had a hypoglycemic episode.  We had been titrating up on her insulin in an attempt to get her blood sugars under better control so she can have abdominal hernia surgery.  Finally, she is on multiple sedating medications, and it was concerned that these were interacting to cause problems.  She continues to have generalized pain,  including pain    Bilaterally in her legs and lower back.  Pain radiates down, and is worse on the left-hand side.  She has been using lidocaine ointment which has been mildly helpful.  Finds gabapentin helpful and is wondering if she can increase her dose of this.  Would also like to increase the dose of her Mirapex.  Is requesting stronger pain medications, as she has difficulty sleeping moving around and functioning.    Additionally today she is having difficulty with cough.  This started the day after hospital discharge.  Describes it as being productive of thick brown to white sputum.  Notes some fevers and chills, and a temperature is somewhat elevated today.  She has pain with coughing.  Feels more short of breath.  Would like a refill on her albuterol, Combivent, and Symbicort today.  Feels like the medications are helping, but they are not strong enough.  Has mask and tubing for her neb machine, but needs more albuterol for this as well.    Feels more pain in her abdomen.  This is worse with coughing.  Feels as though her hernia is getting bigger and it is a burning sensation as the inside pushes outwards.  This pain has become more and more intolerable for her.  Feels like she needs to have the surgery as soon as possible to improve this pain.    Has been checking blood sugars regularly.  Shares that for the first few days after discharge these were relatively well controlled in the mid 100s to mid 200s.  This morning however blood sugar was 448.  She thinks this is because she has an infection.  Has not had any low sugars.    Diagnostic Tests/Treatments reviewed.  Follow up needed: none  Other Healthcare Providers Involved in Patient s Care:         None  Update since discharge: She has had no further episodes of loss of consciousness.  Blood sugars remain difficult to control.  Has developed a new cough with increased shortness of breath.  Having quite a bit of diffuse body pain from where she hit arms,  legs, and head when she fell.   Plan of care communicated with patient and Carey MANLEY                       Review of Systems:   CONSTITUTIONAL: Reports fatigue, fevers, chills.  Generalized weakness.  SKIN: no worrisome rashes, no worrisome moles, no worrisome lesions.  Multiple areas of bruising.  Has a bruise in the area of her previous IVs well.  EYES: no acute vision problems or changes  ENT: no ear problems, no mouth problems, no throat problems  RESP: Endorses cough, productive, and shortness of breath.  CV: no chest pain, no palpitations, no new or worsening peripheral edema  GI: no nausea, no vomiting, no constipation, no diarrhea            Physical Exam:     Vitals:    10/18/18 1405   BP: 134/83   BP Location: Left arm   Patient Position: Sitting   Cuff Size: Adult Large   Pulse: 110   Resp: 16   Temp: 99.2  F (37.3  C)   TempSrc: Oral   SpO2: 97%     There is no height or weight on file to calculate BMI.  Objective:    Vitals:  Vitals are reviewed and are within the normal range.  Sats are appropriate.  Temperature is mildly elevated but not febrile.  Gen:  Alert, pleasant, n appears tired.  Some discomfort but no acute distress.  Head:  Normal cephalic, atraumatic  Ears:  Tympanic membranes viewed bilaterally, no erythema, bulging, or fluid present.  Does look somewhat dull as if fluid is present.  Nose: Bilateral congestion.  Mild bilateral frontal sinus tenderness.  Throat:  Clear.  Non-erythematous and without exudate  Neck:  No cervical lymphadenopathy.  Skin feels mildly warm.  Cardiac:  Regular rate and rhythm, no murmurs, rubs or gallops  Respiratory: Occasional scattered expiratory wheeze.  No crackles.  Airflow is good.  Abdomen:  Soft, epigastric tenderness in the area of the hernia.  It is easily reducible and soft.,  Mild distention., bowel sounds positive  Extremities:  Warm, well-perfused, pulses 2+/4, no lower extremity edema  Skin:  Mucous membranes moist.  Multiple areas of bruising.   Capillary refill <2 secs.             Results:     Results from last visit   Results for orders placed or performed in visit on 08/21/18   Basic Metabolic Panel ([place])   Result Value Ref Range    Urea Nitrogen 11.7 7.0 - 19.0 mg/dL    Calcium 9.4 8.5 - 10.1 mg/dL    Chloride 100.4 98.0 - 110.0 mmol/L    Carbon Dioxide 27.6 20.0 - 32.0 mmol/L    Creatinine 0.5 0.5 - 1.0 mg/dL    Glucose 279.8 (H) 70.0 - 99.0 mg'dL    Potassium 4.0 3.2 - 4.6 mmol/dL    Sodium 133.8 132.0 - 142.0 mmol/L    GFR Estimate >90 >60.0 mL/min/1.7 m2    GFR Estimate If Black >90 >60.0 mL/min/1.7 m2   Hemoglobin A1c ([facility])   Result Value Ref Range    Hemoglobin A1C 10.1 (H) 4.1 - 5.7 %   CBC with Diff Plt ([facility])   Result Value Ref Range    WBC 11.9 (H) 4.0 - 11.0 K/uL    Lymphocytes # 2.3 0.8 - 5.3 K/uL    % Lymphocytes 19.4 (L) 20.0 - 48.0 %L    Mid # 0.6 0.0 - 2.2 K/uL    Mid % 5.3 0.0 - 20.0 %M    GRANULOCYTES # 9.0 (H) 1.6 - 8.3 K/uL    % Granulocytes 75.3 (H) 40.0 - 75.0 %G    RBC 4.9 3.8 - 5.2 M/uL    Hemoglobin 14.9 11.7 - 15.7 g/dL    Hematocrit 45.7 35.0 - 47.0 %    MCV 93.0 78.0 - 100.0 fL    MCH 30.3 26.5 - 35.0    MCHC 32.6 32.0 - 36.0 g/dL    Platelets 265.0 150.0 - 450.0 K/uL   Hepatic Panel ([place])   Result Value Ref Range    Albumin 4.2 3.9 - 5.1 mg/dL    Alkaline Phosphatase 104.5 40.0 - 150.0 U/L    ALT 21.5 0.0 - 45.0 U/L    AST 14.8 0.0 - 45.0 U/L    Bilirubin Direct 0.1 0.0 - 0.2 mg/dL    Bilirubin Total <0.3 0.2 - 1.3 mg/dL    Protein Total 7.0 6.8 - 8.8 g/dL     X-ray: This was directly read and interpreted by me.  No evidence of infiltrate.  Mild perihilar thickening.  She has known pulmonary nodules.  I do not appreciate a change in this but will await official radiology read.    Assessment and Plan      Teresa was seen today for follow up for and medication reconciliation.    Diagnoses and all orders for this visit:    Cough.  No signs of pneumonia outside of the mild increase in temp.  Will treat this as a  COPD exacerbation.  We will do prednisone and azithromycin.  Goal is to get the cough calm down so it is not straining on her hernia and her pain is better controlled.  We will refill her Symbicort, Combivent, and albuterol nebs.  She has equipment for her neb machine.  Okay to continue to use those.  Tessalon Perles were also refilled and these are another option.  -     XR CHEST 2 VW  -     predniSONE (DELTASONE) 50 MG tablet; Take 1 tablet (50 mg) by mouth daily  -     azithromycin (ZITHROMAX) 250 MG tablet; Two tablets first day, then one tablet daily for four days.    Chronic bilateral low back pain with left-sided sciatica back and joint pain continue to be a problem.  She is not a candidate for narcotics here, especially with the substances that were present in her urine.  We will increase her gabapentin to 1200 mg 3 times daily.  Refilled lidocaine.. We will need to be careful as medications could have contributed to her symptoms.  -     gabapentin (NEURONTIN) 600 MG tablet; Take 2 tabs three times daily.  -     lidocaine (XYLOCAINE) 5 % ointment; Apply topically as needed for moderate pain    Type 2 diabetes mellitus with complication, with long-term current use of insulin (H).  Blood sugars are still kind of all over the place.  She did not have her meter with her today.  We will add the by Marcell is recommended by Pharm.D.'s.  I will keep her other insulin the same and we will do the 60 units of the Toujeo, as well as 20 units of Humalog with meals.  We will have her call in with her sugars every week and follow-up with me with a goal of getting the A1c down for upcoming surgery on November 16.  Discussed that I will not click her for surgery until her A1c is below 8 or sugars are consistently below 200 as we want this procedure to be successful and keep her pain down for the long-term.  She is in agreement with this.  -     exenatide ER (BYDUREON BCISE) 2 MG/0.85ML SQ autoinjector for weekly inj; Inject  2 mg Subcutaneous every 7 days    I spent 45 minutes with the patient greater than 50% in counseling and coordination of care.    E&M code to be billed if TCM cannot be: 42342    Type of decision making: High complexity (69553)    Options for treatment and follow-up care were reviewed with the patient  Teresa Perez   engaged in the decision making process and verbalized understanding of the options discussed and agreed with the final plan.      Tyra Bullock MD

## 2018-10-20 ASSESSMENT — ASTHMA QUESTIONNAIRES: ACT_TOTALSCORE: 15

## 2018-10-20 NOTE — PROGRESS NOTES
Teresa Perez-    The official read on your chest xray was negative.  No signs of pneumonia or cancer.  This is good news.  Please call the clinic at [phone] if you have any questions.      Tyra Bullock    Please send results to patient.

## 2018-10-22 ENCOUNTER — TELEPHONE (OUTPATIENT)
Dept: FAMILY MEDICINE | Facility: CLINIC | Age: 48
End: 2018-10-22

## 2018-10-22 NOTE — TELEPHONE ENCOUNTER
[facility] phone call message- general phone call:    Reason for call: Her blood sugar is running at 458.She would like a call back from a nurse re her insulin.    Return call needed: Yes    OK to leave a message on voice mail? Yes    Primary language: English      needed? No    Call taken on October 22, 2018 at 8:47 AM by Lo Lao

## 2018-10-22 NOTE — TELEPHONE ENCOUNTER
"Spoke with Patient who stated that Dr Bullock had placed her on Prednisone on 10/18/18 for a cough.  Since then her Blood sugars have been running \"really high\".  Over the weekend they have been in the 350's, but this morning they are 458.  Patient is having no c/o symptoms or issues with the high blood sugars but continues with her cough and wheezing.  Patient states she takes 20 units of insulin before meals and then 60 units of insulin at bedtime.  Patient wondering if she should be increasing her insulin doses.    Routed to Dr. Bullock/NANCY Kohler RN    "

## 2018-10-23 NOTE — TELEPHONE ENCOUNTER
Spoke with patient and gave her insulin orders per Dr. Bullock:    Increase to 65 units of the Troudejeo daily  Continue with the 20 units Humolog with meals.  Give 2 additional units for every 50 over 150    150-199:  20 units  200-249:  22 units  250-299:  24 units  300-349:  26 units  400-449:  28 units   > 450:      30 units.    Asked patient to write down instructions and then had patient read back instructions to ensure she understood.  Patient will be done with her Prednisone tomorrow.    Pt verbalizes understanding of the directions and information. Gave pt opportunity to ask additional questions or address concerns. Pt is directed to call back if condition worsens, new symptoms develop, or there is no improvement.     Routed to Dr. Bullock/NANCY Kohler RN

## 2018-10-23 NOTE — TELEPHONE ENCOUNTER
I am a little nervous about overshooting given her recent episode.      Let's increase to 65 Units of the Nancydjejean daily    Continue with the 20 units Humolog with meals, and see if she could add a sliding scale:    2 additional units for every 50 over 150:    150-199:  20 Units  200-249:  22 Units  250-299:  24 Units  300-349:  26 Units  400-449:  28 Units  >450:  30 Units.      I think she can handle the complexity of the sliding scale, but if you have concerns when you call her, please let me know.

## 2018-10-26 NOTE — PROGRESS NOTES
I have verified the content of the note, which accurately reflects my assessment of the patient and the plan of care.   Alejandra Olson, PharmD

## 2018-11-06 ENCOUNTER — TELEPHONE (OUTPATIENT)
Dept: FAMILY MEDICINE | Facility: CLINIC | Age: 48
End: 2018-11-06

## 2018-11-06 NOTE — TELEPHONE ENCOUNTER
Called and spoke with patient.  She has a pre-op scheduled on 11/12 with Dr. Rivera.  Surgery to repair an abdominal hernia is scheduled for 11/16.      Patient wants me to know about upcoming appt and contact Dr. Rivera about labs ahead of time.      We have not cleared her previously for this surgery because of difficulty breathing--patient has poorly controlled asthma with likely component of COPD and is a smoker.  Also because of poorly controlled blood sugars.  She shares that sugars have been better.  She is now taking Bydureon as well as Troudjeo and Humolog with meals.  Had episode where she lost consciousness in October, which hypoglycemia may have played a part in, but she denies any recent lows, and reports BS have been between 150-250 pre-meal.      Will send this message along to Dr. Rivera to make sure he is aware of upcoming visit.  Counseled patient to bring in medication and glucometer to the visit.      Tyra Bullock    Routed to Dr. Rivera.     Problem: Diabetes  Goal: Glycemic balance achieved/maintained  Goal is to maintain blood sugar within range with no episodes of hypoglycemia   Outcome: Outcome Not Met, Continue to Monitor  Pt on insulin gtt. BS monitored q1 hours. Will continue to monitor closely.

## 2018-11-06 NOTE — TELEPHONE ENCOUNTER
[facility] phone call message- general phone call:    Reason for call: Pt would like to speak with Dr Bullock only.regarding her up coming surgery.    Return call needed: Yes    OK to leave a message on voice mail? Yes    Primary language: English      needed? No    Call taken on November 6, 2018 at 2:16 PM by Mary Ann Giraldo

## 2018-11-07 NOTE — TELEPHONE ENCOUNTER
Meghna,    If you have previously not recommended her for surgery has anything changed?  What else do I need to know before seeing her on Monday?    Alber

## 2018-11-12 DIAGNOSIS — E11.8 TYPE 2 DIABETES MELLITUS WITH COMPLICATION, WITH LONG-TERM CURRENT USE OF INSULIN (H): ICD-10-CM

## 2018-11-12 DIAGNOSIS — I10 ESSENTIAL HYPERTENSION: Primary | ICD-10-CM

## 2018-11-12 DIAGNOSIS — Z79.4 TYPE 2 DIABETES MELLITUS WITH COMPLICATION, WITH LONG-TERM CURRENT USE OF INSULIN (H): ICD-10-CM

## 2018-11-13 ENCOUNTER — TELEPHONE (OUTPATIENT)
Dept: FAMILY MEDICINE | Facility: CLINIC | Age: 48
End: 2018-11-13

## 2018-11-13 DIAGNOSIS — H10.45 CHRONIC ALLERGIC CONJUNCTIVITIS: ICD-10-CM

## 2018-11-13 RX ORDER — FLUTICASONE PROPIONATE 50 MCG
2 SPRAY, SUSPENSION (ML) NASAL DAILY
Qty: 9.9 G | Refills: 11 | Status: SHIPPED | OUTPATIENT
Start: 2018-11-13 | End: 2018-12-15

## 2018-11-13 NOTE — TELEPHONE ENCOUNTER
Called and spoke to patient.  She did not show up yesterday because she developed nausea, vomiting and diarrhea.  Does not feel well.  Blood sugars have been high during this time, and she is having more trouble with her breathing.  Difficulty with solids, but taking in fluids okay and does not think she is dehydrated.      Recommended that patient come in and be seen, but she does not want to come in.  Recommended pushing fluids, clear liquids, advance as tolerated, getting rest and using tylenol for pain.      Patient will call and cancel her upcoming surgery for Friday.  I shared that we want her feeling well, blood sugars under good control, breathing well, prior to surgery.  Would like her to schedule follow up with me.  Patient will call to schedule.      Tyra Bullock    Routed to Triage RN.

## 2018-11-13 NOTE — TELEPHONE ENCOUNTER
[facility] [[email or web address] Family Medicine phone call message- general phone call:    Reason for call: The Pt called to ask to talk to the Dr about her surgery she has scheduled and would like a call back she only wants to talk to the Dr not the nurse       Return call needed: Yes    OK to leave a message on voice mail? Yes    Primary language: English      needed? No    Call taken on November 13, 2018 at 1:09 PM by Juan Peralta

## 2018-11-14 ENCOUNTER — RECORDS - HEALTHEAST (OUTPATIENT)
Dept: ADMINISTRATIVE | Facility: OTHER | Age: 48
End: 2018-11-14

## 2018-11-23 ENCOUNTER — TELEPHONE (OUTPATIENT)
Dept: FAMILY MEDICINE | Facility: CLINIC | Age: 48
End: 2018-11-23

## 2018-11-23 DIAGNOSIS — E11.8 TYPE 2 DIABETES MELLITUS WITH COMPLICATION, WITH LONG-TERM CURRENT USE OF INSULIN (H): ICD-10-CM

## 2018-11-23 DIAGNOSIS — Z79.4 TYPE 2 DIABETES MELLITUS WITH COMPLICATION, WITH LONG-TERM CURRENT USE OF INSULIN (H): ICD-10-CM

## 2018-11-23 DIAGNOSIS — J30.2 SEASONAL ALLERGIES: Primary | ICD-10-CM

## 2018-11-23 RX ORDER — LORATADINE 10 MG/1
10 TABLET ORAL DAILY
Qty: 60 TABLET | Refills: 5 | Status: SHIPPED | OUTPATIENT
Start: 2018-11-23 | End: 2019-08-30

## 2018-11-23 NOTE — TELEPHONE ENCOUNTER
Refill request put in to Dr. Bullock, per  staff, patient only have enough till tomorrow. I was ask to have one of the preceptor who is on the floor to see if they are comfortable to go ahead and refill patient's insulin. Discuss with Dr. Rivera, who is comfortable and agree to do refill for patient. I re-route refill request to Dr. Rivera.     I called patient to update her of refill statue.     Meenakshi Lacey, CMA

## 2018-11-23 NOTE — TELEPHONE ENCOUNTER
[facility] phone call message- patient requesting a refill:    Full Medication Name:   insulin lispro (HUMALOG PEN) 100 UNIT/ML injection        Pharmacy confirmed as   [address]  : Yes    Additional Comments:    Please refill RX above, patient still waiting.     OK to leave a message on voice mail? Yes    Primary language: English      needed? No    Call taken on November 23, 2018 at 1:10 PM by Makeda Stanton

## 2018-12-12 DIAGNOSIS — G43.809 OTHER MIGRAINE WITHOUT STATUS MIGRAINOSUS, NOT INTRACTABLE: ICD-10-CM

## 2018-12-12 DIAGNOSIS — Z79.4 TYPE 2 DIABETES MELLITUS WITH COMPLICATION, WITH LONG-TERM CURRENT USE OF INSULIN (H): ICD-10-CM

## 2018-12-12 DIAGNOSIS — G25.81 RESTLESS LEG SYNDROME: ICD-10-CM

## 2018-12-12 DIAGNOSIS — E11.8 TYPE 2 DIABETES MELLITUS WITH COMPLICATION, WITH LONG-TERM CURRENT USE OF INSULIN (H): ICD-10-CM

## 2018-12-12 DIAGNOSIS — K21.9 GASTROESOPHAGEAL REFLUX DISEASE WITHOUT ESOPHAGITIS: ICD-10-CM

## 2018-12-12 DIAGNOSIS — E78.5 HYPERLIPIDEMIA LDL GOAL <100: ICD-10-CM

## 2018-12-13 DIAGNOSIS — E11.8 TYPE 2 DIABETES MELLITUS WITH COMPLICATION, WITH LONG-TERM CURRENT USE OF INSULIN (H): ICD-10-CM

## 2018-12-13 DIAGNOSIS — G89.29 CHRONIC BILATERAL LOW BACK PAIN WITHOUT SCIATICA: ICD-10-CM

## 2018-12-13 DIAGNOSIS — Z79.4 TYPE 2 DIABETES MELLITUS WITH COMPLICATION, WITH LONG-TERM CURRENT USE OF INSULIN (H): ICD-10-CM

## 2018-12-13 DIAGNOSIS — M54.50 CHRONIC BILATERAL LOW BACK PAIN WITHOUT SCIATICA: ICD-10-CM

## 2018-12-13 DIAGNOSIS — G25.81 RESTLESS LEG SYNDROME: ICD-10-CM

## 2018-12-13 RX ORDER — TOPIRAMATE 50 MG/1
TABLET, FILM COATED ORAL
Qty: 60 TABLET | Refills: 3 | Status: SHIPPED | OUTPATIENT
Start: 2018-12-13 | End: 2018-12-15

## 2018-12-13 RX ORDER — PRAMIPEXOLE DIHYDROCHLORIDE 1 MG/1
1 TABLET ORAL AT BEDTIME
Qty: 90 TABLET | Refills: 1 | Status: SHIPPED | OUTPATIENT
Start: 2018-12-13 | End: 2019-03-12

## 2018-12-13 RX ORDER — PRAVASTATIN SODIUM 80 MG/1
80 TABLET ORAL DAILY
Qty: 90 TABLET | Refills: 3 | Status: SHIPPED | OUTPATIENT
Start: 2018-12-13 | End: 2020-02-24

## 2018-12-14 ENCOUNTER — OFFICE VISIT (OUTPATIENT)
Dept: FAMILY MEDICINE | Facility: CLINIC | Age: 48
End: 2018-12-14
Payer: MEDICARE

## 2018-12-14 VITALS
RESPIRATION RATE: 20 BRPM | DIASTOLIC BLOOD PRESSURE: 82 MMHG | SYSTOLIC BLOOD PRESSURE: 134 MMHG | WEIGHT: 237 LBS | BODY MASS INDEX: 44.17 KG/M2 | TEMPERATURE: 98.8 F | HEART RATE: 103 BPM | OXYGEN SATURATION: 97 %

## 2018-12-14 DIAGNOSIS — J45.20 INTERMITTENT ASTHMA, UNCOMPLICATED: ICD-10-CM

## 2018-12-14 DIAGNOSIS — H10.45 CHRONIC ALLERGIC CONJUNCTIVITIS: ICD-10-CM

## 2018-12-14 DIAGNOSIS — J45.40 MODERATE PERSISTENT ASTHMA WITHOUT COMPLICATION: ICD-10-CM

## 2018-12-14 DIAGNOSIS — M54.42 CHRONIC BILATERAL LOW BACK PAIN WITH LEFT-SIDED SCIATICA: ICD-10-CM

## 2018-12-14 DIAGNOSIS — B00.9 HSV (HERPES SIMPLEX VIRUS) INFECTION: ICD-10-CM

## 2018-12-14 DIAGNOSIS — R05.9 COUGH: ICD-10-CM

## 2018-12-14 DIAGNOSIS — K61.2 ABSCESS OF ANAL AND RECTAL REGIONS: ICD-10-CM

## 2018-12-14 DIAGNOSIS — G89.4 CHRONIC PAIN SYNDROME: ICD-10-CM

## 2018-12-14 DIAGNOSIS — G43.709 CHRONIC MIGRAINE WITHOUT AURA WITHOUT STATUS MIGRAINOSUS, NOT INTRACTABLE: ICD-10-CM

## 2018-12-14 DIAGNOSIS — Z79.4 TYPE 2 DIABETES MELLITUS WITH HYPERGLYCEMIA, WITH LONG-TERM CURRENT USE OF INSULIN (H): ICD-10-CM

## 2018-12-14 DIAGNOSIS — J45.31 MILD PERSISTENT ASTHMA WITH ACUTE EXACERBATION: ICD-10-CM

## 2018-12-14 DIAGNOSIS — L03.211 CELLULITIS OF FACE: Primary | ICD-10-CM

## 2018-12-14 DIAGNOSIS — G43.809 OTHER MIGRAINE WITHOUT STATUS MIGRAINOSUS, NOT INTRACTABLE: ICD-10-CM

## 2018-12-14 DIAGNOSIS — I10 ESSENTIAL HYPERTENSION, BENIGN: ICD-10-CM

## 2018-12-14 DIAGNOSIS — E11.9 TYPE 2 DIABETES MELLITUS WITHOUT COMPLICATION, WITH LONG-TERM CURRENT USE OF INSULIN (H): ICD-10-CM

## 2018-12-14 DIAGNOSIS — E11.8 TYPE 2 DIABETES MELLITUS WITH COMPLICATION, WITH LONG-TERM CURRENT USE OF INSULIN (H): ICD-10-CM

## 2018-12-14 DIAGNOSIS — N80.9 ENDOMETRIOSIS: ICD-10-CM

## 2018-12-14 DIAGNOSIS — F11.90 CHRONIC NARCOTIC USE: ICD-10-CM

## 2018-12-14 DIAGNOSIS — R11.0 NAUSEA: ICD-10-CM

## 2018-12-14 DIAGNOSIS — E11.65 TYPE 2 DIABETES MELLITUS WITH HYPERGLYCEMIA, WITH LONG-TERM CURRENT USE OF INSULIN (H): ICD-10-CM

## 2018-12-14 DIAGNOSIS — K59.00 CONSTIPATION, UNSPECIFIED CONSTIPATION TYPE: ICD-10-CM

## 2018-12-14 DIAGNOSIS — B37.0 THRUSH: ICD-10-CM

## 2018-12-14 DIAGNOSIS — Z71.6 ENCOUNTER FOR SMOKING CESSATION COUNSELING: ICD-10-CM

## 2018-12-14 DIAGNOSIS — B37.31 VAGINAL CANDIDIASIS: ICD-10-CM

## 2018-12-14 DIAGNOSIS — T78.40XS ALLERGIC REACTION, SEQUELA: ICD-10-CM

## 2018-12-14 DIAGNOSIS — Z79.4 TYPE 2 DIABETES MELLITUS WITHOUT COMPLICATION, WITH LONG-TERM CURRENT USE OF INSULIN (H): ICD-10-CM

## 2018-12-14 DIAGNOSIS — B02.9 HERPES ZOSTER WITHOUT COMPLICATION: ICD-10-CM

## 2018-12-14 DIAGNOSIS — Z79.4 TYPE 2 DIABETES MELLITUS WITH COMPLICATION, WITH LONG-TERM CURRENT USE OF INSULIN (H): ICD-10-CM

## 2018-12-14 DIAGNOSIS — G89.29 CHRONIC BILATERAL LOW BACK PAIN WITH LEFT-SIDED SCIATICA: ICD-10-CM

## 2018-12-14 RX ORDER — MEDROXYPROGESTERONE ACETATE 150 MG/ML
150 INJECTION, SUSPENSION INTRAMUSCULAR
Qty: 1 ML | Refills: 3 | OUTPATIENT
Start: 2018-12-14 | End: 2019-12-20

## 2018-12-14 RX ORDER — MEDROXYPROGESTERONE ACETATE 150 MG/ML
150 INJECTION, SUSPENSION INTRAMUSCULAR
Status: DISCONTINUED | OUTPATIENT
Start: 2018-12-14 | End: 2019-12-20

## 2018-12-14 RX ORDER — ACETAMINOPHEN 500 MG
1000 TABLET ORAL 3 TIMES DAILY PRN
Qty: 180 TABLET | Refills: 3 | Status: SHIPPED | OUTPATIENT
Start: 2018-12-14 | End: 2019-12-18

## 2018-12-14 RX ORDER — CEPHALEXIN 500 MG/1
500 CAPSULE ORAL 4 TIMES DAILY
Qty: 40 CAPSULE | Refills: 0 | Status: SHIPPED | OUTPATIENT
Start: 2018-12-14 | End: 2019-02-27

## 2018-12-14 RX ADMIN — MEDROXYPROGESTERONE ACETATE 150 MG: 150 INJECTION, SUSPENSION INTRAMUSCULAR at 17:00

## 2018-12-14 NOTE — TELEPHONE ENCOUNTER
Pt. Was going to the [facility]'s off [address] she did not like them so now she is going to [facility] pharmacy which when they transferred everything over the medications that did not have refills left on them did not get sent over. I called the pharmacy to see what they had in their system and the pateint would like the rest of the medications sent over. Please advice on if all of this medications are correct.

## 2018-12-14 NOTE — PROCEDURES
Prior to injection, I verified the patient identity using patient's name and date of birth. Patient was instructed to report any adverse reaction to me immediately.    I administered the injection to Teresa Perez.    Was entire vial of medication used? Yes    Did the patient bring this medication to the clinic to be injected? No    Name of provider who requested the injection: Dr. Sal Norman   Name of provider on site (faculty or community preceptor) at the time of performing the injection: Dr. Sal Norman     Date of next injection: Between 03/01 to 03/24/2019  Date of next office visit with provider to renew medication plan (must be seen annually): 12/14/2019    Mj Damon CMA

## 2018-12-14 NOTE — PROGRESS NOTES
"There are no exam notes on file for this visit.  Chief Complaint   Patient presents with     URI     still sick, nothing is getting better, she woke up yesterday and felt a lump on her ear that extends to her head and down to the middle of the neck      Derm Problem     has a rash on her nose and redness started a few days ago    The patient comes in today with several concerns.  She is accompanied by a friend who is present throughout the visit.    The patient tells me she is \"still battling this chest cold\".  She notes that she was on antibiotics about 1 month ago for a cough.  She notes that she continues to have the cough.  The cough is productive of white and occasionally yellow sputum.  The patient does tell me that she has shortness of breath, chills, and occasional night sweats.  She does not have a thermometer at home and so has not checked for fever.  The patient tells me over the past 2 weeks she has had a runny nose, as well as pressure in her ears.    What is concerning to the patient, and what brought her in today, is that she now has a lump in front of her left ear, and lumps in the left side of her neck.  Additionally, during this time she has developed some redness on her nose and around her eyes.  She attributes this to the support area of her eyeglasses, which she believes has irritated her nose.    The redness on her face has developed over the past 2 days.  During this time she believes that it is worsening.  She believes that it is become \"more swollen\" overnight.    Objective: This is a well-developed female who is in no acute distress.  Her vital signs are as noted below, and are within normal limits with the exception of a minimally elevated heart rate.  Blood pressure 134/82, pulse 103, temperature 98.8  F (37.1  C), temperature source Oral, resp. rate 20, weight 107.5 kg (237 lb), SpO2 97 %, not currently breastfeeding.    Her tympanic membranes are within normal limits.  Her heart has a " regular rate and rhythm.  I do not appreciate any murmurs, rubs, or gallops.  Her lungs are clear to auscultation.  She has a normal respiratory rate.  There are no wheezes, rales, or rhonchi.      Examination of her face does reveal an erythematous rash which extends from the bridge of her nose to the area under and above her left eye.  This is a salmon colored erythema.  There is some question of small vesicles present on her nasal bridge.  She does have a tender left preauricular lymph node, and some left anterior cervical adenopathy.  All of these lymph nodes are freely mobile.    Assessment and plan:    Cellulitis of face  -     cephALEXin (KEFLEX) 500 MG capsule; Take 1 capsule (500 mg) by mouth 4 times daily for 10 days  -     Herpes Simp PCR ([employer])  -     Varicella Zost Pcr ([facility])    I believe the erythematous rash is consistent with cellulitis.  I am a little concerned about herpes simplex in view of the question of vesicles on the bridge of her nose.  Therefore, I have obtained specimens for PCR for both herpes simplex as well as varicella-zoster.  This is a midline lesion, and I do not believe therefore that zoster is likely.    The patient and I discussed indications to return to clinic.  We discussed indications to proceed to the emergency department over the weekend.  If she is not greatly improved by Monday, she will call our office and return to clinic.    Endometriosis  -     medroxyPROGESTERone (DEPO-PROVERA) injection 150 mg; Inject 1 mL (150 mg) into the muscle every 3 months    The patient would like her medroxyprogesterone injection today.  She is within the range of dates of that are acceptable for injection of medroxyprogesterone.  She tells me that she does not use this for contraception, but instead uses this for control of her endometriosis.  This is been helpful for her.  This injection is repeated for her today.        Patient Instructions     Patient Education     Facial  Cellulitis  Cellulitis is an infection of the deep layers of skin. A break in the skin, such as a cut or scratch, can let bacteria under the skin. It may also occur from an infected oil gland (pimple) or hair follicle. If the bacteria get to deep layers of the skin, it can be serious. If not treated, cellulitis can get into the bloodstream and lymph nodes. The infection can then spread throughout the body. This causes serious illness.  Cellulitis causes the affected skin to become red, swollen, warm, and sore. The reddened areas have a visible border. You may have a fever, chills, and pain.  Cellulitis is treated with antibiotics taken for 7 to 10 days. Symptoms should get better 1 to 2 days after treatment is started. Make sure to take all the antibiotics for the full number of days until they are gone. Keep taking the medication even if your symptoms go away.  Home care  Follow these tips:    Take all of the antibiotic medicine exactly as directed until it is gone. Don t miss any doses, especially during the first 7 days. Don t stop taking it when your symptoms get better.    Use a cool compress (face cloth soaked in cool water) on your face to help reduce swelling and pain.    You may use acetaminophen or ibuprofen to reduce pain. Don t use these if you have chronic liver or kidney disease, or ever had a stomach ulcer or gastrointestinal bleeding. Talk with your healthcare provider first.  Follow-up care  Follow up with your healthcare provider, or as advised. If your infection does not go away on the first antibiotic, your healthcare provider will prescribe a different one.  When to seek medical advice  Call your healthcare provider right away if any of these occur:    Fever higher of 100.4  F (38.0  C) or higher after 2 days on antibiotics    Red areas that spread    Swelling or pain that gets worse    Fluid leaking from the skin (pus)    An eyelid that swells shut or leaks fluid (pus)    Headache or neck pain  that gets worse    Unusual drowsiness or confusion    Convulsions (seizure)    Change in eyesight     Date Last Reviewed: 9/1/2016 2000-2018 [employer]. [address] [place] [address], [place] [address]. All rights reserved. This information is not intended as a substitute for professional medical care. Always follow your healthcare professional's instructions.         The patient was actively involved in the decision making process, and all the questions were answered to their satisfaction prior to leaving.

## 2018-12-14 NOTE — PATIENT INSTRUCTIONS
Patient Education     Facial Cellulitis  Cellulitis is an infection of the deep layers of skin. A break in the skin, such as a cut or scratch, can let bacteria under the skin. It may also occur from an infected oil gland (pimple) or hair follicle. If the bacteria get to deep layers of the skin, it can be serious. If not treated, cellulitis can get into the bloodstream and lymph nodes. The infection can then spread throughout the body. This causes serious illness.  Cellulitis causes the affected skin to become red, swollen, warm, and sore. The reddened areas have a visible border. You may have a fever, chills, and pain.  Cellulitis is treated with antibiotics taken for 7 to 10 days. Symptoms should get better 1 to 2 days after treatment is started. Make sure to take all the antibiotics for the full number of days until they are gone. Keep taking the medication even if your symptoms go away.  Home care  Follow these tips:    Take all of the antibiotic medicine exactly as directed until it is gone. Don t miss any doses, especially during the first 7 days. Don t stop taking it when your symptoms get better.    Use a cool compress (face cloth soaked in cool water) on your face to help reduce swelling and pain.    You may use acetaminophen or ibuprofen to reduce pain. Don t use these if you have chronic liver or kidney disease, or ever had a stomach ulcer or gastrointestinal bleeding. Talk with your healthcare provider first.  Follow-up care  Follow up with your healthcare provider, or as advised. If your infection does not go away on the first antibiotic, your healthcare provider will prescribe a different one.  When to seek medical advice  Call your healthcare provider right away if any of these occur:    Fever higher of 100.4  F (38.0  C) or higher after 2 days on antibiotics    Red areas that spread    Swelling or pain that gets worse    Fluid leaking from the skin (pus)    An eyelid that swells shut or leaks fluid  (pus)    Headache or neck pain that gets worse    Unusual drowsiness or confusion    Convulsions (seizure)    Change in eyesight     Date Last Reviewed: 9/1/2016 2000-2018 [employer]. [address] [place] [address], [place] [address]. All rights reserved. This information is not intended as a substitute for professional medical care. Always follow your healthcare professional's instructions.

## 2018-12-15 LAB
HSV SPECIMEN: NORMAL
HSV TYPE 1: NEGATIVE
HSV TYPE 2: NEGATIVE
VARICELLA ZOSTER DNA COMMENT: ABNORMAL
VZV DNA SPEC QL NAA+PROBE: ABNORMAL
VZV PCR SPECIMEN: ABNORMAL

## 2018-12-15 RX ORDER — HYDROXYZINE HYDROCHLORIDE 25 MG/1
25-50 TABLET, FILM COATED ORAL EVERY 6 HOURS PRN
Qty: 120 TABLET | Refills: 5 | Status: SHIPPED | OUTPATIENT
Start: 2018-12-15 | End: 2019-03-06

## 2018-12-15 RX ORDER — EPINEPHRINE 0.3 MG/.3ML
0.3 INJECTION SUBCUTANEOUS
Qty: 2 ML | Refills: 0 | Status: SHIPPED | OUTPATIENT
Start: 2018-12-15 | End: 2020-12-16

## 2018-12-15 RX ORDER — ONDANSETRON 4 MG/1
4 TABLET, FILM COATED ORAL EVERY 8 HOURS PRN
Qty: 18 TABLET | Refills: 0 | Status: SHIPPED | OUTPATIENT
Start: 2018-12-15 | End: 2020-11-20

## 2018-12-15 RX ORDER — AZELASTINE HYDROCHLORIDE 0.5 MG/ML
1 SOLUTION/ DROPS OPHTHALMIC 2 TIMES DAILY
Qty: 1 BOTTLE | Refills: 11 | Status: SHIPPED | OUTPATIENT
Start: 2018-12-15 | End: 2020-04-14

## 2018-12-15 RX ORDER — FLUTICASONE PROPIONATE 50 MCG
2 SPRAY, SUSPENSION (ML) NASAL DAILY
Qty: 9.9 G | Refills: 11 | Status: SHIPPED | OUTPATIENT
Start: 2018-12-15 | End: 2020-04-21

## 2018-12-15 RX ORDER — DOCOSANOL 100 MG/G
CREAM TOPICAL
Qty: 1 TUBE | Refills: 5 | Status: SHIPPED | OUTPATIENT
Start: 2018-12-15 | End: 2019-06-18

## 2018-12-15 RX ORDER — BENZONATATE 200 MG/1
200 CAPSULE ORAL 3 TIMES DAILY PRN
Qty: 60 CAPSULE | Refills: 0 | Status: SHIPPED | OUTPATIENT
Start: 2018-12-15 | End: 2019-06-18

## 2018-12-15 RX ORDER — LISINOPRIL 40 MG/1
40 TABLET ORAL DAILY
Qty: 90 TABLET | Refills: 1 | Status: SHIPPED | OUTPATIENT
Start: 2018-12-15 | End: 2019-11-11

## 2018-12-15 RX ORDER — TOPIRAMATE 50 MG/1
TABLET, FILM COATED ORAL
Qty: 60 TABLET | Refills: 5 | Status: SHIPPED | OUTPATIENT
Start: 2018-12-15 | End: 2019-08-30

## 2018-12-15 RX ORDER — BUDESONIDE AND FORMOTEROL FUMARATE DIHYDRATE 160; 4.5 UG/1; UG/1
2 AEROSOL RESPIRATORY (INHALATION) 2 TIMES DAILY
Qty: 3 INHALER | Refills: 3 | Status: SHIPPED | OUTPATIENT
Start: 2018-12-15 | End: 2019-06-18

## 2018-12-15 RX ORDER — CLINDAMYCIN PHOSPHATE 10 UG/ML
LOTION TOPICAL 2 TIMES DAILY
Qty: 60 ML | Refills: 11 | Status: SHIPPED | OUTPATIENT
Start: 2018-12-15 | End: 2019-06-18

## 2018-12-15 RX ORDER — GABAPENTIN 600 MG/1
TABLET ORAL
Qty: 180 TABLET | Refills: 3 | Status: SHIPPED | OUTPATIENT
Start: 2018-12-15 | End: 2019-06-18

## 2018-12-15 RX ORDER — BACLOFEN 20 MG/1
20 TABLET ORAL 3 TIMES DAILY
Qty: 180 TABLET | Refills: 1 | Status: SHIPPED | OUTPATIENT
Start: 2018-12-15 | End: 2019-02-25

## 2018-12-15 RX ORDER — MICONAZOLE NITRATE 2 %
1 CREAM WITH APPLICATOR VAGINAL AT BEDTIME
Qty: 5 G | Refills: 3 | OUTPATIENT
Start: 2018-12-15

## 2018-12-15 RX ORDER — LIDOCAINE 50 MG/G
OINTMENT TOPICAL PRN
Qty: 150 G | Refills: 3 | Status: SHIPPED | OUTPATIENT
Start: 2018-12-15 | End: 2019-06-18

## 2018-12-15 RX ORDER — NYSTATIN 100000/ML
500000 SUSPENSION, ORAL (FINAL DOSE FORM) ORAL 4 TIMES DAILY
Qty: 60 ML | Refills: 0 | OUTPATIENT
Start: 2018-12-15

## 2018-12-15 RX ORDER — ALBUTEROL SULFATE 0.83 MG/ML
2.5 SOLUTION RESPIRATORY (INHALATION) EVERY 6 HOURS PRN
Qty: 30 VIAL | Refills: 1 | Status: SHIPPED | OUTPATIENT
Start: 2018-12-15 | End: 2019-10-09

## 2018-12-15 RX ORDER — SUMATRIPTAN 100 MG/1
100 TABLET, FILM COATED ORAL
Qty: 9 TABLET | Refills: 5 | Status: SHIPPED | OUTPATIENT
Start: 2018-12-15 | End: 2019-10-22

## 2018-12-15 ASSESSMENT — ASTHMA QUESTIONNAIRES: ACT_TOTALSCORE: 15

## 2018-12-18 ENCOUNTER — OFFICE VISIT (OUTPATIENT)
Dept: FAMILY MEDICINE | Facility: CLINIC | Age: 48
End: 2018-12-18
Payer: MEDICARE

## 2018-12-18 ENCOUNTER — TELEPHONE (OUTPATIENT)
Dept: FAMILY MEDICINE | Facility: CLINIC | Age: 48
End: 2018-12-18

## 2018-12-18 VITALS
DIASTOLIC BLOOD PRESSURE: 82 MMHG | RESPIRATION RATE: 16 BRPM | SYSTOLIC BLOOD PRESSURE: 134 MMHG | OXYGEN SATURATION: 94 % | HEART RATE: 112 BPM | TEMPERATURE: 98.6 F

## 2018-12-18 DIAGNOSIS — B02.9 HERPES ZOSTER WITHOUT COMPLICATION: Primary | ICD-10-CM

## 2018-12-18 RX ORDER — FAMCICLOVIR 500 MG/1
500 TABLET ORAL 3 TIMES DAILY
Qty: 21 TABLET | Refills: 0 | Status: SHIPPED | OUTPATIENT
Start: 2018-12-18 | End: 2018-12-20

## 2018-12-18 RX ORDER — OXYCODONE HYDROCHLORIDE 5 MG/1
5 TABLET ORAL EVERY 6 HOURS PRN
Qty: 12 TABLET | Refills: 0 | Status: SHIPPED | OUTPATIENT
Start: 2018-12-18 | End: 2018-12-18

## 2018-12-18 RX ORDER — KETOROLAC TROMETHAMINE 30 MG/ML
30 INJECTION, SOLUTION INTRAMUSCULAR; INTRAVENOUS ONCE
Status: COMPLETED | OUTPATIENT
Start: 2018-12-18 | End: 2018-12-18

## 2018-12-18 RX ORDER — OXYCODONE HYDROCHLORIDE 5 MG/1
5 TABLET ORAL EVERY 6 HOURS PRN
Qty: 20 TABLET | Refills: 0 | Status: SHIPPED | OUTPATIENT
Start: 2018-12-18 | End: 2019-02-27

## 2018-12-18 RX ADMIN — KETOROLAC TROMETHAMINE 30 MG: 30 INJECTION, SOLUTION INTRAMUSCULAR; INTRAVENOUS at 15:21

## 2018-12-18 NOTE — PATIENT INSTRUCTIONS
Continue taking the antibiotics and the antivirals until they are gone.    Cold or warm compress on the eye and forehead.    Schedule with eye doctor in next 2 days.    Toradol today.    Oxycodone for next couple of days;  #20 tabs.      Increase the Troujeo to 70 Units at bedtime for next 1 week, and then see where sugars are.    Call if sugars >400 -500.      OPHTHALMOLOGY ADULT REFERRAL  December 21, 2018 at 10:48 am patient states she had seen by her own eye doctor yesterday. Also asked for refill of oxycodone - explained would need an office visit to discuss. Leanna Car, CMA

## 2018-12-18 NOTE — NURSING NOTE
I administered the following to Teresa Perez.    MEDICATION: Ketorolac Tromethamine 60MG/2ML (30 mg/mL) (Toradol)  ROUTE: IM  SITE: Deltoid - Left  DOSE: 30 mg per 1 mL  LOT #: [id number]  :  [employer]  EXPIRATION DATE:  11/31/2019  NDC#: 06205-587-82     Was entire vial of medication used? No, The remainder 30 MG of 1ML was discarded as unavoidable waste.    Did the patient bring this medication to the clinic to be injected? No    Name of provider who requested the injection: Dr. Bullock  Name of provider on site (faculty or community preceptor) at the time of performing the injection: Dr. Kobe No MA

## 2018-12-18 NOTE — TELEPHONE ENCOUNTER
Discussed with staff.  Pt seen in ED over weekend (because she wasn't improved) and started on doxycycline.  Pt should say on doxycyline and start the antiviral.  Follow up with us toward the end of the week

## 2018-12-18 NOTE — TELEPHONE ENCOUNTER
Patient called back, she stated that she was seen at the ED on Sunday because it was not getting better. She was given Doxycyline but now this is spreading to her one of her eyes and moving to the other one. We made an appointment today 12/18/19 at 210 pm with Dr. Bullock to see what is going on.

## 2018-12-19 NOTE — PROGRESS NOTES
Nursing Notes:   Faizan No Mai  12/18/2018  3:54 PM  Signed  I administered the following to Teresa Perez.    MEDICATION: Ketorolac Tromethamine 60MG/2ML (30 mg/mL) (Toradol)  ROUTE: IM  SITE: Deltoid - Left  DOSE: 30 mg per 1 mL  LOT #: [id number]  :  [employer]  EXPIRATION DATE:  11/31/2019  NDC#: 17994-083-04     Was entire vial of medication used? No, The remainder 30 MG of 1ML was discarded as unavoidable waste.    Did the patient bring this medication to the clinic to be injected? No    Name of provider who requested the injection: Dr. Bullock  Name of provider on site (faculty or community preceptor) at the time of performing the injection: MARCIE Ragsdale Mai  Treesa Perez is a 48 year old female with past medical history significant for    Patient Active Problem List   Diagnosis     Health Care Home     Acute peptic ulcer     Other allergy, other than to medicinal agents     Bipolar disorder (H)     Bulging lumbar disc     Cervical dysplasia     Common migraine without aura     Constipation     Dwarfism     Familial hypercholesterolemia     Insomnia     Low back pain     Intermittent asthma     Leg pain, bilateral     Smoking     Degeneration of thoracic or thoracolumbar intervertebral disc     LOMAS (nonalcoholic steatohepatitis)     Disease of lung     Hemorrhoids     Parotid mass     Endometriosis     NNAMDI (obstructive sleep apnea)     History of total right knee replacement     Lateral epicondylitis     Impingement syndrome, shoulder, left     Hx of total knee replacement, left     Chronic pain syndrome     Cough     Borderline personality disorder (H)     Moderate recurrent major depression (H)     Recurrent ventral hernia     Type 2 diabetes mellitus with complication, with long-term current use of insulin (H)     Essential hypertension     Others present at the visit:  Patient's PCA, Carey and son, Aubrey    Presents for   Chief Complaint    Patient presents with     RECHECK     follow up on shingle per patient      Patient is here for follow-up on the rash present on her face.  Was seen this past Friday in clinic, and started on Keflex for likely cellulitis.  Symptoms were not getting better on the Keflex, so she was seen at [facility] ER on Sunday night, and was started on doxycycline at that time.  She has been taking the doxycycline since Sunday and does not feel that the rash is getting better.  Received a call this morning that her swabs were positive for herpes zoster, and has started treatment that was sent in for this.  Has only taken 1 dose and does not notice any change yet at this time.  She shares that the redness is slightly worse.  She notices pain over her nasal bridge as well as extending towards her eye on both sides, however it is worse on the left.  Notices some itching, pain, and irritation in her eye as well.  Has bought some over-the-counter moisturizing drops which has been somewhat helpful.      Also complaining of a severe headache.  This is located on the left side is present behind her eye and she describes it as a stabbing pain that shoots back into her head.  She endorses fever and chills, with some diaphoresis, and continuous pounding pressure from the headache.  Did receive some Toradol in the ER you on Sunday and found this helpful.  Was given tramadol to take at home which has not been helpful for her.  Also received some type of Magic mouthwash, swish and spit because of ulcerations inside her mouth.  She has been using this and has found it helpful.  Shares that those lesions are slowly starting to resolve.  She also describes irritation and ulcerations within her nose.  She would like to see an eye doctor about this.  No pain with eye motion.  Positive for sensitivity to light.  Positive for increased hearing but no double vision, or specific vision changes.    She continues to have difficulty with pain.   Noticing worsening pain now in her left lower back.  No new trauma.  Describes it as feeling like she is been pricked by needles and itching along the lower back extending towards her lateral side.  Has been using a heating pad on this which is now become more irritating.  Has tried using some topical cream, but this also irritates the area.  She is worried that she has further changes in her back.  Previously had discussed her going to a pain clinic, and she said she is not being seen anywhere for this.  We have also had multiple discussions that she has a complex pain in back and neck history, which I think warrants specialty care.  Has gotten her imaging through [facility] in the past.  She thinks she might need a MRI.    Shares that since she is developed the infection, that she has noted that blood sugars have increased, and recently have been consistently >300.  Before the infection, BS were in the upper 100s.  Has been using 65 Units daily of the Troudjeo, and 20 units of Novolog with meals.  No hypoglycemic episodes.  Has not been eating well since the infection started.      OBJECTIVE:  Vitals: /82   Pulse 112   Temp 98.6  F (37  C) (Oral)   Resp 16   SpO2 94%   BMI= There is no height or weight on file to calculate BMI.  Objective:    Vitals:  Vitals are reviewed and are within the normal range  Gen:  Alert, pleasant, no acute distress  HEENT: Cranial nerves intact.  Eye movements normal.  No pain with movement to the eye.  Normal pupil constriction with light.  She has a about 1 cm vertical and half a centimeter horizontal black eschar on the bridge of her nose.  Extending erythema around it that extends into the eyebrow ridge on the left side.  Some swelling above the eye but no swelling extending down below the eye on that side.  Mild conjunctival erythema.  Some area of redness also extending towards the eyebrow on the right side.  No noticeable pus or blisters present.  She has tenderness with  palpation over her frontal and maxillary sinuses.  Some palpable lymph nodes in the preauricular, postauricular, and upper tonsillar region on the left side.  Cardiac:  Regular rate and rhythm, no murmurs, rubs or gallops.  Mild tachycardia  Respiratory: Initially has some scattered rhonchi but these clear with cough.  Her lungs actually sound quite good today.  Abdomen:  Soft, non-tender, non-distended, bowel sounds positive  Back: She has pain with palpation over the vertebral bodies extending from T4 all the way down to L5.  Some paraspinous muscle tenderness as well.  The skin extending to the left side is warm and clammy it looks like some early intertrigo forming, but I do not appreciate any significant blisters or other rash present.  She has CVA tenderness but I think this is referred.    ASSESSMENT AND PLAN:      Teresa was seen today for recheck.  Unfortunately, patient has what I believe to be a shingles infection in the ophthalmic branch with an overlying cellulitis.  She is Galdino on doxycycline and we will continue with this.  Has started treatment with famciclovir for herpes zoster.  We will continue with this.  I would like her to see the ophthalmologist to ensure that there is no visual complications at this time.  There is an eschar forming over her nose, recommended using warm compresses and some bacitracin ointment.  Recommended cold packs and compresses to help with discomfort as well as moisturizing eyedrops.  She is on gabapentin and will continue this for pain.  I do think that this warrants narcotics, despite her history of previous substance abuse.  We will give her 20 tabs of oxycodone today.  I would like her to follow-up with myself or Dr. Riley who is seen the rash in the past at the end of this week or early next week, to ensure that things are resolving.    Additionally, the healing process will be slowed because of her poorly controlled diabetes.  We will increase the Toujeo to 70  units, and continue with the 20 units with meals.  Instructed that she should call the clinic and discuss if blood sugars are above 400 or if she is having episodes of hyperglycemia.    She has concerns about her back, but I do not think this is urgent today.  No history of trauma and no acute neurologic symptoms.  We discussed again that I do not feel comfortable managing her back pain, and that she should see a specialty provider for this.    Should follow-up after symptoms have improved as she is still hoping to have an abdominal hernia repaired, and we need to ensure that her asthma and diabetes are under appropriate control for her to do well without surgery.    Diagnoses and all orders for this visit:    Herpes zoster without complication  -     Discontinue: oxyCODONE (ROXICODONE) 5 MG tablet; Take 1 tablet (5 mg) by mouth every 6 hours as needed for pain  -     ketorolac (TORADOL) injection 30 mg; Inject 1 mL (30 mg) into the muscle once  -     OPHTHALMOLOGY ADULT REFERRAL; Future  -     oxyCODONE (ROXICODONE) 5 MG tablet; Take 1 tablet (5 mg) by mouth every 6 hours as needed for pain        Patient Instructions   Continue taking the antibiotics and the antivirals until they are gone.    Cold or warm compress on the eye and forehead.    Schedule with eye doctor in next 2 days.    Toradol today.    Oxycodone for next couple of days;  #20 tabs.      Increase the Troujeo to 70 Units at bedtime for next 1 week, and then see where sugars are.    Call if sugars >400 -500.          Patient will call and schedule appointment later this week.  We will get her in to see ophthalmology.  He is to follow-up in the next couple of weeks for chronic disease management.  I will call her surgeon to cancel her upcoming hernia repair.    Tyra Bullock

## 2018-12-20 DIAGNOSIS — B02.9 HERPES ZOSTER WITHOUT COMPLICATION: ICD-10-CM

## 2018-12-20 RX ORDER — FAMCICLOVIR 500 MG/1
500 TABLET ORAL 3 TIMES DAILY
Qty: 21 TABLET | Refills: 0 | Status: SHIPPED | OUTPATIENT
Start: 2018-12-20 | End: 2019-02-27

## 2018-12-21 ENCOUNTER — TELEPHONE (OUTPATIENT)
Dept: FAMILY MEDICINE | Facility: CLINIC | Age: 48
End: 2018-12-21

## 2018-12-21 NOTE — TELEPHONE ENCOUNTER
Called and spoke with patient.  Was seen at her eye doctor and everything looked okay.  Using the drops, taking the antibiotics.  She thinks some of the areas of swelling are a little better, but things are still painful.  Noting cough and increased shortness of breath, as well as some nausea an stomach upset.      Discussed that she should continue to take her medications and follow up.  She is unable to get an appt with myself or Dr. Chauhan.  Will go to the ER if things are not getting better.      Tyra Bullock    Routed to Triage RN.

## 2018-12-21 NOTE — TELEPHONE ENCOUNTER
Patient wants Dr. Bullock to call personally.     Wants to discuss her condition - has not changed.

## 2019-01-16 ENCOUNTER — TELEPHONE (OUTPATIENT)
Dept: FAMILY MEDICINE | Facility: CLINIC | Age: 49
End: 2019-01-16

## 2019-01-16 ENCOUNTER — DOCUMENTATION ONLY (OUTPATIENT)
Dept: FAMILY MEDICINE | Facility: CLINIC | Age: 49
End: 2019-01-16

## 2019-01-16 DIAGNOSIS — Z79.4 TYPE 2 DIABETES MELLITUS WITH COMPLICATION, WITH LONG-TERM CURRENT USE OF INSULIN (H): Primary | ICD-10-CM

## 2019-01-16 DIAGNOSIS — E11.8 TYPE 2 DIABETES MELLITUS WITH COMPLICATION, WITH LONG-TERM CURRENT USE OF INSULIN (H): Primary | ICD-10-CM

## 2019-01-16 NOTE — TELEPHONE ENCOUNTER
Prescription sent for Rominaity.  Per Dr. Aquino, will cost $3.80.  Will have Deana call and inform patient and have her schedule a follow up appt to discuss further.      Tyra Bullock

## 2019-01-16 NOTE — PROGRESS NOTES
Informed that patient's Bydureon is backordered until late May 2019. Sent rx for Trulicity due to also being once weekly. Called pharmacy, Trulicity is covered with a $3.80 co-pay.    Tamara Aquino, PharmD Resident

## 2019-01-16 NOTE — TELEPHONE ENCOUNTER
Bydureon 2 mg autoinject is on backorder until late May 2019.  Please advise.  Deana Castellanos, CMA

## 2019-01-21 ENCOUNTER — TELEPHONE (OUTPATIENT)
Dept: FAMILY MEDICINE | Facility: CLINIC | Age: 49
End: 2019-01-21

## 2019-01-21 NOTE — TELEPHONE ENCOUNTER
[facility] pharmacy [phone] sent a script clarification stating that they received two different scripts for Trulicity.  One for 0.75MG as well as one for 1.5MG.  They are wondering which is correct.  Please clarify and I will call them back.  Deana Castellanos, [facility]

## 2019-02-25 DIAGNOSIS — G89.29 CHRONIC BILATERAL LOW BACK PAIN WITH LEFT-SIDED SCIATICA: ICD-10-CM

## 2019-02-25 DIAGNOSIS — M54.42 CHRONIC BILATERAL LOW BACK PAIN WITH LEFT-SIDED SCIATICA: ICD-10-CM

## 2019-02-25 RX ORDER — BACLOFEN 20 MG/1
20 TABLET ORAL 3 TIMES DAILY
Qty: 180 TABLET | Refills: 0 | Status: SHIPPED | OUTPATIENT
Start: 2019-02-25 | End: 2019-03-07

## 2019-02-27 ENCOUNTER — RECORDS - HEALTHEAST (OUTPATIENT)
Dept: ADMINISTRATIVE | Facility: OTHER | Age: 49
End: 2019-02-27

## 2019-02-27 ENCOUNTER — OFFICE VISIT (OUTPATIENT)
Dept: FAMILY MEDICINE | Facility: CLINIC | Age: 49
End: 2019-02-27
Payer: MEDICARE

## 2019-02-27 VITALS
SYSTOLIC BLOOD PRESSURE: 135 MMHG | BODY MASS INDEX: 42.27 KG/M2 | RESPIRATION RATE: 16 BRPM | HEART RATE: 113 BPM | OXYGEN SATURATION: 99 % | DIASTOLIC BLOOD PRESSURE: 84 MMHG | TEMPERATURE: 98.9 F | WEIGHT: 226.8 LBS

## 2019-02-27 DIAGNOSIS — K75.81 NASH (NONALCOHOLIC STEATOHEPATITIS): Primary | ICD-10-CM

## 2019-02-27 DIAGNOSIS — E11.8 TYPE 2 DIABETES MELLITUS WITH COMPLICATION, WITH LONG-TERM CURRENT USE OF INSULIN (H): ICD-10-CM

## 2019-02-27 DIAGNOSIS — Z79.4 TYPE 2 DIABETES MELLITUS WITH COMPLICATION, WITH LONG-TERM CURRENT USE OF INSULIN (H): ICD-10-CM

## 2019-02-27 DIAGNOSIS — J01.01 ACUTE RECURRENT MAXILLARY SINUSITIS: Primary | ICD-10-CM

## 2019-02-27 DIAGNOSIS — M79.675 PAIN OF TOE OF LEFT FOOT: ICD-10-CM

## 2019-02-27 DIAGNOSIS — K75.81 NASH (NONALCOHOLIC STEATOHEPATITIS): ICD-10-CM

## 2019-02-27 LAB
ALBUMIN SERPL-MCNC: 4.9 MG/DL (ref 3.9–5.1)
ALP SERPL-CCNC: 113.2 U/L (ref 40–150)
ALT SERPL-CCNC: 22.5 U/L (ref 0–45)
AST SERPL-CCNC: 16.5 U/L (ref 0–45)
BILIRUB SERPL-MCNC: <0.3 MG/DL (ref 0.2–1.3)
BILIRUBIN DIRECT: 0.1 MG/DL (ref 0–0.2)
BUN SERPL-MCNC: 12.2 MG/DL (ref 7–19)
CALCIUM SERPL-MCNC: 9.9 MG/DL (ref 8.5–10.1)
CHLORIDE SERPLBLD-SCNC: 100.3 MMOL/L (ref 98–110)
CO2 SERPL-SCNC: 22 MMOL/L (ref 20–32)
CREAT SERPL-MCNC: 0.6 MG/DL (ref 0.5–1)
GFR SERPL CREATININE-BSD FRML MDRD: >90 ML/MIN/1.7 M2
GLUCOSE SERPL-MCNC: 282.5 MG'DL (ref 70–99)
HBA1C MFR BLD: 10.6 % (ref 4.1–5.7)
POTASSIUM SERPL-SCNC: 3.7 MMOL/DL (ref 3.2–4.6)
PROT SERPL-MCNC: 7.7 G/DL (ref 6.8–8.8)
SODIUM SERPL-SCNC: 135.4 MMOL/L (ref 132–142)

## 2019-02-27 RX ORDER — ECHINACEA PURPUREA EXTRACT 125 MG
TABLET ORAL
Qty: 15 ML | Refills: 1 | Status: SHIPPED | OUTPATIENT
Start: 2019-02-27 | End: 2022-01-04

## 2019-02-27 RX ORDER — AZITHROMYCIN 250 MG/1
TABLET, FILM COATED ORAL
Qty: 6 TABLET | Refills: 0 | Status: SHIPPED | OUTPATIENT
Start: 2019-02-27 | End: 2019-06-18

## 2019-02-27 NOTE — PATIENT INSTRUCTIONS
Schedule follow up visit.      Antibiotics:  2 pills today, then 1 pill daily    Nasal saline nose spray.    Lots of fluids    Insulin to 25 Units with each meal plus sliding scale  Keep long insulin at 70.      Follow up in 1 week, and every 1-2 weeks since then.      We will figure out podiatrist.      Diabetic shoes.      PODIATRY/FOOT & ANKLE SURGERY REFERRAL  February 28, 2019 at 4:21 pm patient wants to go here in [place]. Advised will call back with appointment details.    [facility]   Phone: [phone]  Fax: [phone]    [facility]  [address] [place] [address]    Appointment:  Thursday March 7, 2019  Arrival Time:  12:45 pm  Provider:  Dr. Sherman    Please bring a copy of your insurance card and photo ID    If you cannot make this appointment please call [phone] to reschedule    February 28, 2019 at 4:37 pm called and relayed details - no additional questions at this time. [facility]    ADDENDUM 3/4/2019 9:08 AM Referral, demographics, medication list, radiology reports and office note faxed to [phone]. [facility]

## 2019-02-27 NOTE — LETTER
March 1, 2019      Teresa Perez  [address]   [place] [address]        Dear Teresa,  Here is a copy of your lab results.  Your liver tests look good.  Hemoglobin A1c is still high.  Your kidney tests are stable.  I look forward to us working together to get your A1c down and your breathing better so that you can have surgery!  Please call the clinic at [phone] if you have any questions.     Please see below for your test results.    Resulted Orders   Hepatic Panel ([place])   Result Value Ref Range    Albumin 4.9 3.9 - 5.1 mg/dL    Alkaline Phosphatase 113.2 40.0 - 150.0 U/L    ALT 22.5 0.0 - 45.0 U/L    AST 16.5 0.0 - 45.0 U/L    Bilirubin Direct 0.1 0.0 - 0.2 mg/dL    Bilirubin Total <0.3 0.2 - 1.3 mg/dL    Protein Total 7.7 6.8 - 8.8 g/dL   Basic Metabolic Panel ([facility])   Result Value Ref Range    Urea Nitrogen 12.2 7.0 - 19.0 mg/dL    Calcium 9.9 8.5 - 10.1 mg/dL    Chloride 100.3 98.0 - 110.0 mmol/L    Carbon Dioxide 22.0 20.0 - 32.0 mmol/L    Creatinine 0.6 0.5 - 1.0 mg/dL    Glucose 282.5 (H) 70.0 - 99.0 mg'dL    Potassium 3.7 3.2 - 4.6 mmol/dL    Sodium 135.4 132.0 - 142.0 mmol/L    GFR Estimate >90 >60.0 mL/min/1.7 m2    GFR Estimate If Black >90 >60.0 mL/min/1.7 m2   Hemoglobin A1c ([facility])   Result Value Ref Range    Hemoglobin A1C 10.6 (H) 4.1 - 5.7 %       If you have any questions, please call the clinic to make an appointment.    Sincerely,    Tyra Bullock MD

## 2019-02-28 NOTE — RESULT ENCOUNTER NOTE
Teresa Perez-    Here is a copy of your lab results.  Your liver tests look good.  Hemoglobin A1c is still high.  Your kidney tests are stable.  I look forward to us working together to get your A1c down and your breathing better so that you can have surgery!  Please call the clinic at [phone] if you have any questions.      Tyra Bullock    Please send results to patient.

## 2019-03-01 NOTE — PROGRESS NOTES
There are no exam notes on file for this visit.    SUBJECTIVE  Teresa Perez is a 48 year old female with past medical history significant for    Patient Active Problem List   Diagnosis     Health Care Home     Acute peptic ulcer     Other allergy, other than to medicinal agents     Bipolar disorder (H)     Bulging lumbar disc     Cervical dysplasia     Common migraine without aura     Constipation     Dwarfism     Familial hypercholesterolemia     Insomnia     Low back pain     Intermittent asthma     Leg pain, bilateral     Smoking     Degeneration of thoracic or thoracolumbar intervertebral disc     LOMAS (nonalcoholic steatohepatitis)     Disease of lung     Hemorrhoids     Parotid mass     Endometriosis     NNAMDI (obstructive sleep apnea)     History of total right knee replacement     Lateral epicondylitis     Impingement syndrome, shoulder, left     Hx of total knee replacement, left     Chronic pain syndrome     Cough     Borderline personality disorder (H)     Moderate recurrent major depression (H)     Recurrent ventral hernia     Type 2 diabetes mellitus with complication, with long-term current use of insulin (H)     Essential hypertension     Others present at the visit:  Patient's PCA, Carey    Presents for   Chief Complaint   Patient presents with     Shingles     Pt is here to follow up on shingles.     Medication Reconciliation     Complete.      Asthma     Pt states she is unable to perform a PFT today as she is coughing still and does not have time.      Patient is here to follow-up on a number of things.    1) noting continued pain in her nose and face since the shingles infection in December.  She notes that it is difficult for her to breathe in and out of her nose and it feels like she has an ulcer or something stuck inside.  Painful with blowing her nose.  It hurts where her glasses sit on the area where she had a large scab, and which has currently scarred.  She describes nasal congestion,  some redness, itching, and drainage from both eyes, as well as some difficulty with her vision.  Has had some intermittent fevers and chills, but these have been long-standing.  At night she wakes up feeling drenched, and this is been worse over the last few days.  She has tried using Tessalon Perles to help with the cough but they have not been helpful anymore.  Is using some Tylenol which is also not helpful.  She denies any wheezing, shortness of breath, but endorses a uncomfortable, severe cough.  Is coughing up thick yellow mucus.  She describes fullness in both of her ears, but it is worse in the left.  Has had more difficulty with balance from this as well.    2) continues to have abdominal pain.  This is located in her mid abdomen at the location of her large ventral hernia.  Painful when she coughs.  Painful when she takes a deep breath.  She feels like the area has become larger, and more firm, and protrudes more.  It causes her significant pain, and she like to be able to have it surgically repaired.  She has been getting loose, watery diarrhea stools for the last month.  No difficulties with urination.  Has stopped taking any stool softeners.  Does notice that the diarrhea gets worse when the hernia is more painful.  We discussed again that she needs to be medically stable from a respiratory and diabetes standpoint in order to be an appropriate surgical candidate.  We do not want her to break open the surgical site afterwards fixed.    3) blood sugars have been challenging.  She shares that blood sugars are often up to 300-400.  She has been taking 70 units of Tresiba at bedtime, and 20 units of NovoLog with meals.  Denies any symptoms of lows.  No periods where she is passed out.  She shares that her diet has been more irregular.  Her son and his significant other have been living with her because they do not have another place to stay.  Often have unhealthy foods and sweets in the home.  She is trying  to avoid these but is been challenging.    4) mental health continues to be a challenge.  She is seeing a psychiatrist at [facility], and this is been helpful.  Worries a lot about her son, as well as her grandson, who is no longer in son's custody.  She worries that she may be asked upon to provide foster care, however she does not feel that this would be a good situation for her.  Continues to have difficulty with sleep.  Is seeing a counselor, Ofelia, for this and is found this to be helpful.    5) She is having left-sided toe pain, and would like to see a podiatrist about this.    OBJECTIVE:  Vitals: /84 (BP Location: Left arm, Patient Position: Sitting, Cuff Size: Adult Large)   Pulse 113   Temp 98.9  F (37.2  C) (Oral)   Resp 16   Wt 102.9 kg (226 lb 12.8 oz)   SpO2 99%   BMI 42.27 kg/m    BMI= Body mass index is 42.27 kg/m .    Vitals:  Vitals are reviewed and are within the normal range.  Patient is tachycardic, But this has been a consistent pattern for her.  Is  Gen:  Alert, pleasant, no acute distress, appears anxious.  Some coughing but no increased work of breathing.  Head:  Normal cephalic, atraumatic  Ears:  Tympanic membranes viewed bilaterally, no erythema, bulging.  There is some fluid present in the left TM, and the right lower quadrant.  Nose: Bilateral congestion with some hypertrophy of the turbinates.  I do not appreciate any bleeding or ulceration.  She has an external scar on the skin on her left nasal bridge.  Does not appear infected today.  Well-healed.  I see no evidence of acute shingles.  It is tender with palpation.  Throat: Bilateral erythema  without exudate  Neck: Bilateral nontender tonsillar lymphadenopathy  Cardiac:  Regular rate and rhythm, no murmurs, rubs or gallops, tachycardic  Respiratory:  Lungs clear to auscultation bilaterally, no wheezing or crackles.  This is the best that her lungs have sounded in a long time.  Abdomen: Tender with palpation.  Large  protruding ventral hernia in the left upper quadrant.  It is firm but reducible.  Bowel sounds are present.  Extremities: Not examined.        ASSESSMENT AND PLAN:      Teresa was seen today for shingles, medication reconciliation and asthma.  She has new acute upper respiratory symptoms.  Consistent with a sinusitis, but patient has multiple medication allergies.  Will treat with azithromycin as she has tolerated this in the past.  Discussed using nasal saline.  Encouraged her to push fluids.  Lungs are clear, but she is having a significant cough as well.  She is using her inhalers regularly.  We will need to do pulmonary function testing once acute respiratory symptoms have improved.     We had a long denise conversation about the requirements for her to qualify for surgery.  This includes improved A1c of less than 9 with good control of her sugars, as well as stable respiratory status with clearance of cough and appropriate control of her asthma/COPD.  Without appropriate blood sugar control and improvement of her respiratory symptoms, I do not think she will do well long-term following this hernia repair, encouraged her to schedule visits every 1-2 weeks for the next few months so we can get this under control.    Diagnoses and all orders for this visit:    Acute recurrent maxillary sinusitis  -     azithromycin (ZITHROMAX) 250 MG tablet; Take 2 tablets (500 mg) by mouth daily for 1 day, THEN 1 tablet (250 mg) daily for 4 days.  -     sodium chloride (OCEAN) 0.65 % nasal spray; Use 3-4x daily    LOMAS (nonalcoholic steatohepatitis)  -     Hepatic Panel ([facility])    Type 2 diabetes mellitus with complication, with long-term current use of insulin (H)  -     Basic Metabolic Panel ([facility])  -     Hemoglobin A1c ([facility])    Pain of toe of left foot  -     PODIATRY/FOOT & ANKLE SURGERY REFERRAL; Future        Patient Instructions   Schedule follow up visit.      Antibiotics:  2 pills today, then 1 pill  daily    Nasal saline nose spray.    Lots of fluids    Insulin to 25 Units with each meal plus sliding scale  Keep long insulin at 70.      Follow up in 1 week, and every 1-2 weeks since then.      We will figure out podiatrist.      Diabetic shoes.      PODIATRY/FOOT & ANKLE SURGERY REFERRAL  February 28, 2019 at 4:21 pm patient wants to go here in [place]. Advised will call back with appointment details.    [facility]   Phone: [phone]  Fax: [phone]    [address] [place] [address]    Appointment:  Thursday March 7, 2019  Arrival Time:  12:45 pm  Provider:  Dr. Sherman    Please bring a copy of your insurance card and photo ID    If you cannot make this appointment please call [phone] to reschedule    February 28, 2019 at 4:37 pm called and relayed details - no additional questions at this time. [facility]    **HOLD FOR COMPLETED OV NOTE Referral, demographics, medication list, radiology reports and office note faxed to [phone]      Follow up in 1-2 weeks.      Tyra Bullock

## 2019-03-04 ENCOUNTER — RECORDS - HEALTHEAST (OUTPATIENT)
Dept: ADMINISTRATIVE | Facility: OTHER | Age: 49
End: 2019-03-04

## 2019-03-04 ENCOUNTER — AMBULATORY - HEALTHEAST (OUTPATIENT)
Dept: ADMINISTRATIVE | Facility: CLINIC | Age: 49
End: 2019-03-04

## 2019-03-04 DIAGNOSIS — M79.675 PAIN OF TOE OF LEFT FOOT: ICD-10-CM

## 2019-03-06 ENCOUNTER — AMBULATORY - HEALTHEAST (OUTPATIENT)
Dept: PODIATRY | Facility: CLINIC | Age: 49
End: 2019-03-06

## 2019-03-06 ENCOUNTER — TELEPHONE (OUTPATIENT)
Dept: FAMILY MEDICINE | Facility: CLINIC | Age: 49
End: 2019-03-06

## 2019-03-06 DIAGNOSIS — G89.4 CHRONIC PAIN SYNDROME: ICD-10-CM

## 2019-03-06 RX ORDER — HYDROXYZINE HYDROCHLORIDE 25 MG/1
25-50 TABLET, FILM COATED ORAL EVERY 6 HOURS PRN
Qty: 360 TABLET | Refills: 3 | Status: SHIPPED | OUTPATIENT
Start: 2019-03-06 | End: 2019-06-18

## 2019-03-06 NOTE — TELEPHONE ENCOUNTER
Pt's insurance or pharmacy is requesting a 90 day supply for Hydroxyzine.  Please send a new prescription to reflect this request.  Deana Castellanos, CMA

## 2019-03-07 ENCOUNTER — TELEPHONE (OUTPATIENT)
Dept: FAMILY MEDICINE | Facility: CLINIC | Age: 49
End: 2019-03-07

## 2019-03-07 DIAGNOSIS — G89.29 CHRONIC BILATERAL LOW BACK PAIN WITH LEFT-SIDED SCIATICA: ICD-10-CM

## 2019-03-07 DIAGNOSIS — M54.42 CHRONIC BILATERAL LOW BACK PAIN WITH LEFT-SIDED SCIATICA: ICD-10-CM

## 2019-03-07 RX ORDER — BACLOFEN 20 MG/1
20 TABLET ORAL 3 TIMES DAILY
Qty: 270 TABLET | Refills: 1 | Status: SHIPPED | OUTPATIENT
Start: 2019-03-07 | End: 2019-12-21

## 2019-03-07 NOTE — TELEPHONE ENCOUNTER
Pt's insurance or pharmacy is requesting a 90 day supply for Baclofen.  Please send a new prescription to reflect this request.  Deana Castellanos, CMA

## 2019-03-08 NOTE — TELEPHONE ENCOUNTER
Pt's insurance or pharmacy is requesting a 90 day supply for Pramipexol.  Please send a new prescription to reflect this request.  Deana Castellanos, CMA

## 2019-03-12 DIAGNOSIS — G25.81 RESTLESS LEG SYNDROME: ICD-10-CM

## 2019-03-12 RX ORDER — PRAMIPEXOLE DIHYDROCHLORIDE 1 MG/1
1 TABLET ORAL AT BEDTIME
Qty: 90 TABLET | Refills: 0 | Status: SHIPPED | OUTPATIENT
Start: 2019-03-12 | End: 2019-06-18

## 2019-03-15 ENCOUNTER — ANESTHESIA - HEALTHEAST (OUTPATIENT)
Dept: INTENSIVE CARE | Facility: CLINIC | Age: 49
End: 2019-03-15

## 2019-03-15 ENCOUNTER — TRANSFERRED RECORDS (OUTPATIENT)
Dept: HEALTH INFORMATION MANAGEMENT | Facility: CLINIC | Age: 49
End: 2019-03-15

## 2019-03-15 ENCOUNTER — RECORDS - HEALTHEAST (OUTPATIENT)
Dept: INTENSIVE CARE | Facility: CLINIC | Age: 49
End: 2019-03-15

## 2019-03-15 ASSESSMENT — MIFFLIN-ST. JEOR
SCORE: 1592.04
SCORE: 1592.04

## 2019-03-16 ENCOUNTER — TRANSFERRED RECORDS (OUTPATIENT)
Dept: HEALTH INFORMATION MANAGEMENT | Facility: CLINIC | Age: 49
End: 2019-03-16

## 2019-03-16 ASSESSMENT — MIFFLIN-ST. JEOR
SCORE: 1614.27
SCORE: 1614.27

## 2019-03-18 ENCOUNTER — SURGERY - HEALTHEAST (OUTPATIENT)
Dept: SURGERY | Facility: CLINIC | Age: 49
End: 2019-03-18

## 2019-03-18 ENCOUNTER — AMBULATORY - HEALTHEAST (OUTPATIENT)
Dept: VASCULAR SURGERY | Facility: CLINIC | Age: 49
End: 2019-03-18

## 2019-03-18 ENCOUNTER — ANESTHESIA - HEALTHEAST (OUTPATIENT)
Dept: SURGERY | Facility: CLINIC | Age: 49
End: 2019-03-18

## 2019-03-18 ENCOUNTER — TRANSFERRED RECORDS (OUTPATIENT)
Dept: HEALTH INFORMATION MANAGEMENT | Facility: CLINIC | Age: 49
End: 2019-03-18

## 2019-03-18 ASSESSMENT — MIFFLIN-ST. JEOR
SCORE: 1615.63
SCORE: 1615.63

## 2019-03-19 ENCOUNTER — ANESTHESIA - HEALTHEAST (OUTPATIENT)
Dept: SURGERY | Facility: CLINIC | Age: 49
End: 2019-03-19

## 2019-03-20 ENCOUNTER — APPOINTMENT (OUTPATIENT)
Dept: SURGERY | Facility: PHYSICIAN GROUP | Age: 49
End: 2019-03-20
Payer: MEDICARE

## 2019-03-20 ENCOUNTER — COMMUNICATION - HEALTHEAST (OUTPATIENT)
Dept: VASCULAR SURGERY | Facility: CLINIC | Age: 49
End: 2019-03-20

## 2019-03-20 ENCOUNTER — SURGERY - HEALTHEAST (OUTPATIENT)
Dept: SURGERY | Facility: CLINIC | Age: 49
End: 2019-03-20

## 2019-03-20 ENCOUNTER — TRANSFERRED RECORDS (OUTPATIENT)
Dept: HEALTH INFORMATION MANAGEMENT | Facility: CLINIC | Age: 49
End: 2019-03-20

## 2019-03-20 ASSESSMENT — MIFFLIN-ST. JEOR
SCORE: 1584.79

## 2019-03-21 ENCOUNTER — TRANSFERRED RECORDS (OUTPATIENT)
Dept: HEALTH INFORMATION MANAGEMENT | Facility: CLINIC | Age: 49
End: 2019-03-21

## 2019-03-21 ENCOUNTER — RECORDS - HEALTHEAST (OUTPATIENT)
Dept: ADMINISTRATIVE | Facility: OTHER | Age: 49
End: 2019-03-21

## 2019-03-21 ASSESSMENT — MIFFLIN-ST. JEOR
SCORE: 1589.78
SCORE: 1589.78

## 2019-03-22 ENCOUNTER — TRANSFERRED RECORDS (OUTPATIENT)
Dept: HEALTH INFORMATION MANAGEMENT | Facility: CLINIC | Age: 49
End: 2019-03-22

## 2019-03-22 ASSESSMENT — MIFFLIN-ST. JEOR
SCORE: 1589.78
SCORE: 1589.78

## 2019-03-25 ENCOUNTER — TRANSFERRED RECORDS (OUTPATIENT)
Dept: HEALTH INFORMATION MANAGEMENT | Facility: CLINIC | Age: 49
End: 2019-03-25

## 2019-03-28 ENCOUNTER — TRANSFERRED RECORDS (OUTPATIENT)
Dept: HEALTH INFORMATION MANAGEMENT | Facility: CLINIC | Age: 49
End: 2019-03-28

## 2019-04-01 ENCOUNTER — AMBULATORY - HEALTHEAST (OUTPATIENT)
Dept: NEUROLOGY | Facility: CLINIC | Age: 49
End: 2019-04-01

## 2019-04-01 ENCOUNTER — TRANSFERRED RECORDS (OUTPATIENT)
Dept: HEALTH INFORMATION MANAGEMENT | Facility: CLINIC | Age: 49
End: 2019-04-01

## 2019-04-04 ENCOUNTER — TELEPHONE (OUTPATIENT)
Dept: FAMILY MEDICINE | Facility: CLINIC | Age: 49
End: 2019-04-04

## 2019-04-04 NOTE — TELEPHONE ENCOUNTER
[facility] phone call message- general phone call:    Reason for call: Pt is looking for lab counts. Would like to speak to Dr. Chauhan or Dr. Bullock.     Return call needed: Yes    OK to leave a message on voice mail? Yes    Primary language: English      needed? No    Call taken on April 4, 2019 at 9:10 AM by Nay Jefferson

## 2019-04-11 PROBLEM — I63.9 CEREBROVASCULAR ACCIDENT (CVA) DUE TO EMBOLISM (H): Status: ACTIVE | Noted: 2019-04-11

## 2019-04-11 PROBLEM — N18.5 CKD (CHRONIC KIDNEY DISEASE) STAGE 5, GFR LESS THAN 15 ML/MIN (H): Status: ACTIVE | Noted: 2019-04-11

## 2019-04-11 PROBLEM — I67.1 NONRUPTURED CEREBRAL ANEURYSM: Status: ACTIVE | Noted: 2019-04-11

## 2019-04-11 PROBLEM — S88.919A AMPUTATION OF LEG (H): Status: ACTIVE | Noted: 2019-04-11

## 2019-04-26 ENCOUNTER — MEDICAL CORRESPONDENCE (OUTPATIENT)
Dept: HEALTH INFORMATION MANAGEMENT | Facility: CLINIC | Age: 49
End: 2019-04-26

## 2019-04-30 ENCOUNTER — RECORDS - HEALTHEAST (OUTPATIENT)
Dept: LAB | Facility: CLINIC | Age: 49
End: 2019-04-30

## 2019-04-30 LAB
ANION GAP SERPL CALCULATED.3IONS-SCNC: 10 MMOL/L (ref 5–18)
BUN SERPL-MCNC: 20 MG/DL (ref 8–22)
CALCIUM SERPL-MCNC: 9.4 MG/DL (ref 8.5–10.5)
CHLORIDE BLD-SCNC: 104 MMOL/L (ref 98–107)
CO2 SERPL-SCNC: 22 MMOL/L (ref 22–31)
CREAT SERPL-MCNC: 0.93 MG/DL (ref 0.6–1.1)
GFR SERPL CREATININE-BSD FRML MDRD: >60 ML/MIN/1.73M2
GLUCOSE BLD-MCNC: 293 MG/DL (ref 70–125)
POTASSIUM BLD-SCNC: 3.7 MMOL/L (ref 3.5–5)
SODIUM SERPL-SCNC: 136 MMOL/L (ref 136–145)

## 2019-05-06 ENCOUNTER — TRANSFERRED RECORDS (OUTPATIENT)
Dept: HEALTH INFORMATION MANAGEMENT | Facility: CLINIC | Age: 49
End: 2019-05-06

## 2019-05-06 ENCOUNTER — OFFICE VISIT - HEALTHEAST (OUTPATIENT)
Dept: VASCULAR SURGERY | Facility: CLINIC | Age: 49
End: 2019-05-06

## 2019-05-06 DIAGNOSIS — Z89.512 HX OF BKA, LEFT (H): ICD-10-CM

## 2019-05-07 ENCOUNTER — TELEPHONE (OUTPATIENT)
Dept: FAMILY MEDICINE | Facility: CLINIC | Age: 49
End: 2019-05-07

## 2019-05-07 NOTE — TELEPHONE ENCOUNTER
[facility] phone call message- general phone call:    Reason for call: Wants to know if  will follow for home care.    Return call needed: Yes    OK to leave a message on voice mail? Yes    Primary language: English      needed? No    Call taken on May 7, 2019 at 11:49 AM by Lo Lao

## 2019-05-07 NOTE — TELEPHONE ENCOUNTER
Agree.  I will follow the patient for home care.  I also noted that she has a 40 min extended visit scheduled for 5/21 to review her hospitalizations both at [facility] and at [facility], as well as her recent TCU stay.      Tyra Bullock

## 2019-05-07 NOTE — TELEPHONE ENCOUNTER
Gave verbal order that Dr. Bullock will follow the pt for home care.  Routed to Dr. Bullock. /YULISA Massey

## 2019-05-10 ENCOUNTER — MEDICAL CORRESPONDENCE (OUTPATIENT)
Dept: HEALTH INFORMATION MANAGEMENT | Facility: CLINIC | Age: 49
End: 2019-05-10

## 2019-05-14 ENCOUNTER — TELEPHONE (OUTPATIENT)
Dept: FAMILY MEDICINE | Facility: CLINIC | Age: 49
End: 2019-05-14

## 2019-05-14 DIAGNOSIS — Z86.73 HISTORY OF CVA (CEREBROVASCULAR ACCIDENT): ICD-10-CM

## 2019-05-14 DIAGNOSIS — I10 BENIGN ESSENTIAL HYPERTENSION: Primary | ICD-10-CM

## 2019-05-14 RX ORDER — AMLODIPINE BESYLATE 10 MG/1
10 TABLET ORAL DAILY
Qty: 30 TABLET | Refills: 0 | Status: SHIPPED | OUTPATIENT
Start: 2019-05-14 | End: 2019-06-07

## 2019-05-14 RX ORDER — METOPROLOL TARTRATE 25 MG/1
25 TABLET, FILM COATED ORAL 2 TIMES DAILY
Qty: 60 TABLET | Refills: 0 | Status: SHIPPED | OUTPATIENT
Start: 2019-05-14 | End: 2019-06-07

## 2019-05-14 NOTE — TELEPHONE ENCOUNTER
I am okay with giving a 1 month supply until she can come in for her TCU follow up visit.      Prescriptions sent.      Tyra Bullock    Routed to LAURA Leblanc.

## 2019-05-14 NOTE — TELEPHONE ENCOUNTER
[facility] phone call message- patient requesting a refill:    Full Medication Name: Metoprolol 25 mg - take two times daily    amlodipine 10 mg  - one tablet every evening    Pharmacy confirmed as   [facility]/pharmacy #[id number] - [place], [place] - [address] [place] [facility]  [address]  [place] [address]  Phone: [phone] Fax: [phone]  : Yes    Additional Comments: pt was put on these medications by another physician.  She was discharged and now she will not have enough to last her until she comes and sees Dr Bullock.  She is unsure if she should continue to take these or not.    OK to leave a message on voice mail? Yes    Primary language: English      needed? No    Call taken on May 14, 2019 at 4:40 PM by Mary Ann Giraldo

## 2019-05-15 ENCOUNTER — TELEPHONE (OUTPATIENT)
Dept: FAMILY MEDICINE | Facility: CLINIC | Age: 49
End: 2019-05-15

## 2019-05-15 NOTE — TELEPHONE ENCOUNTER
[facility] phone call message- general phone call:    Reason for call:     Pt is calling to request Clopidogrel. She states the hospital prescribed them to her and she now needs a refill    Pharmacy    [facility]   [facility]    Return call needed: Yes    OK to leave a message on voice mail? Yes    Primary language: English      needed? No    Call taken on May 15, 2019 at 2:30 PM by Nay Jefferson

## 2019-05-16 ENCOUNTER — TELEPHONE (OUTPATIENT)
Dept: FAMILY MEDICINE | Facility: CLINIC | Age: 49
End: 2019-05-16

## 2019-05-16 ENCOUNTER — MEDICAL CORRESPONDENCE (OUTPATIENT)
Dept: HEALTH INFORMATION MANAGEMENT | Facility: CLINIC | Age: 49
End: 2019-05-16

## 2019-05-16 DIAGNOSIS — Z86.73 HISTORY OF STROKE: Primary | ICD-10-CM

## 2019-05-16 RX ORDER — CLOPIDOGREL BISULFATE 75 MG/1
75 TABLET ORAL DAILY
Qty: 90 TABLET | Refills: 1 | Status: SHIPPED | OUTPATIENT
Start: 2019-05-16 | End: 2019-11-11

## 2019-05-16 NOTE — TELEPHONE ENCOUNTER
Pt is calling to request Clopidogrel. She states the hospital prescribed them to her and she now needs a refill. Please advise.  Deana Castellanos, CMA

## 2019-05-16 NOTE — TELEPHONE ENCOUNTER
Prescription sent.  Will review medication list with patient at hospital follow up visit next week.      Tyra Bullock      Routed to LAURA Leblanc.

## 2019-05-20 DIAGNOSIS — E11.8 TYPE 2 DIABETES MELLITUS WITH COMPLICATION, WITH LONG-TERM CURRENT USE OF INSULIN (H): ICD-10-CM

## 2019-05-20 DIAGNOSIS — D50.9 IRON DEFICIENCY ANEMIA, UNSPECIFIED IRON DEFICIENCY ANEMIA TYPE: ICD-10-CM

## 2019-05-20 DIAGNOSIS — I10 BENIGN ESSENTIAL HYPERTENSION: Primary | ICD-10-CM

## 2019-05-20 DIAGNOSIS — Z79.4 TYPE 2 DIABETES MELLITUS WITH COMPLICATION, WITH LONG-TERM CURRENT USE OF INSULIN (H): ICD-10-CM

## 2019-05-28 ENCOUNTER — DOCUMENTATION ONLY (OUTPATIENT)
Dept: FAMILY MEDICINE | Facility: CLINIC | Age: 49
End: 2019-05-28

## 2019-05-28 ENCOUNTER — MEDICAL CORRESPONDENCE (OUTPATIENT)
Dept: HEALTH INFORMATION MANAGEMENT | Facility: CLINIC | Age: 49
End: 2019-05-28

## 2019-05-28 NOTE — PROGRESS NOTES
To be completed in Nursing note:    Please reference list for forms that require a visit for completion.  Please remind patients that providers are given 3-5 business days to complete and return forms.      Form type: Home Health Care Orders    Date form received: 19     Date form completed by Physician: 19    How was form returned to patient (mailed, faxed, or at  for patient to ): Faxed Back to Home Health Care at [phone] on 19    Date form mailed/faxed/left at  for patient and sent to HIM for [email or web address]      Once form is left for patient, faxed, or mailed PCS will then close the documentation only encounter.

## 2019-06-07 DIAGNOSIS — I10 BENIGN ESSENTIAL HYPERTENSION: ICD-10-CM

## 2019-06-07 DIAGNOSIS — Z86.73 HISTORY OF CVA (CEREBROVASCULAR ACCIDENT): ICD-10-CM

## 2019-06-07 RX ORDER — AMLODIPINE BESYLATE 10 MG/1
10 TABLET ORAL DAILY
Qty: 30 TABLET | Refills: 0 | Status: SHIPPED | OUTPATIENT
Start: 2019-06-07 | End: 2019-07-09

## 2019-06-07 RX ORDER — METOPROLOL TARTRATE 25 MG/1
25 TABLET, FILM COATED ORAL 2 TIMES DAILY
Qty: 60 TABLET | Refills: 0 | Status: SHIPPED | OUTPATIENT
Start: 2019-06-07 | End: 2019-06-18

## 2019-06-18 ENCOUNTER — OFFICE VISIT (OUTPATIENT)
Dept: FAMILY MEDICINE | Facility: CLINIC | Age: 49
End: 2019-06-18
Payer: MEDICARE

## 2019-06-18 VITALS
SYSTOLIC BLOOD PRESSURE: 116 MMHG | BODY MASS INDEX: 38.58 KG/M2 | TEMPERATURE: 99.2 F | RESPIRATION RATE: 20 BRPM | WEIGHT: 207 LBS | OXYGEN SATURATION: 96 % | HEART RATE: 87 BPM | DIASTOLIC BLOOD PRESSURE: 77 MMHG

## 2019-06-18 DIAGNOSIS — Z86.73 HISTORY OF CVA (CEREBROVASCULAR ACCIDENT): ICD-10-CM

## 2019-06-18 DIAGNOSIS — K61.2 ABSCESS OF ANAL AND RECTAL REGIONS: ICD-10-CM

## 2019-06-18 DIAGNOSIS — E83.42 HYPOMAGNESEMIA: ICD-10-CM

## 2019-06-18 DIAGNOSIS — J45.20 MILD INTERMITTENT ASTHMA WITHOUT COMPLICATION: ICD-10-CM

## 2019-06-18 DIAGNOSIS — Z79.4 TYPE 2 DIABETES MELLITUS WITH COMPLICATION, WITH LONG-TERM CURRENT USE OF INSULIN (H): ICD-10-CM

## 2019-06-18 DIAGNOSIS — G89.29 CHRONIC BILATERAL LOW BACK PAIN WITH LEFT-SIDED SCIATICA: ICD-10-CM

## 2019-06-18 DIAGNOSIS — F31.70 BIPOLAR DISORDER IN FULL REMISSION, MOST RECENT EPISODE UNSPECIFIED TYPE (H): ICD-10-CM

## 2019-06-18 DIAGNOSIS — E11.8 TYPE 2 DIABETES MELLITUS WITH COMPLICATION, WITH LONG-TERM CURRENT USE OF INSULIN (H): ICD-10-CM

## 2019-06-18 DIAGNOSIS — I10 BENIGN ESSENTIAL HYPERTENSION: ICD-10-CM

## 2019-06-18 DIAGNOSIS — M54.42 CHRONIC BILATERAL LOW BACK PAIN WITH LEFT-SIDED SCIATICA: ICD-10-CM

## 2019-06-18 DIAGNOSIS — Z51.89 ENCOUNTER FOR WOUND CARE: ICD-10-CM

## 2019-06-18 DIAGNOSIS — K43.2 RECURRENT VENTRAL HERNIA: ICD-10-CM

## 2019-06-18 DIAGNOSIS — I10 ESSENTIAL HYPERTENSION: ICD-10-CM

## 2019-06-18 DIAGNOSIS — S88.919A AMPUTATION OF LEG (H): Primary | ICD-10-CM

## 2019-06-18 DIAGNOSIS — D50.9 IRON DEFICIENCY ANEMIA, UNSPECIFIED IRON DEFICIENCY ANEMIA TYPE: ICD-10-CM

## 2019-06-18 LAB
% GRANULOCYTES: 68.7 %G (ref 40–75)
BUN SERPL-MCNC: 23.9 MG/DL (ref 7–19)
CALCIUM SERPL-MCNC: 9.3 MG/DL (ref 8.5–10.1)
CHLORIDE SERPLBLD-SCNC: 104.2 MMOL/L (ref 98–110)
CO2 SERPL-SCNC: 21.8 MMOL/L (ref 20–32)
CREAT SERPL-MCNC: 0.7 MG/DL (ref 0.5–1)
GFR SERPL CREATININE-BSD FRML MDRD: >90 ML/MIN/1.7 M2
GLUCOSE SERPL-MCNC: 205.6 MG'DL (ref 70–99)
GRANULOCYTES #: 7.6 K/UL (ref 1.6–8.3)
HBA1C MFR BLD: 7.4 % (ref 4.1–5.7)
HCT VFR BLD AUTO: 38.2 % (ref 35–47)
HEMOGLOBIN: 12.4 G/DL (ref 11.7–15.7)
LYMPHOCYTES # BLD AUTO: 2.9 K/UL (ref 0.8–5.3)
LYMPHOCYTES NFR BLD AUTO: 25.8 %L (ref 20–48)
MAGNESIUM SERPL-MCNC: 2 MG/DL (ref 1.8–2.6)
MCH RBC QN AUTO: 29 PG (ref 26.5–35)
MCHC RBC AUTO-ENTMCNC: 32.5 G/DL (ref 32–36)
MCV RBC AUTO: 89.5 FL (ref 78–100)
MID #: 0.6 K/UL (ref 0–2.2)
MID %: 5.5 %M (ref 0–20)
PLATELET # BLD AUTO: 281 K/UL (ref 150–450)
POTASSIUM SERPL-SCNC: 3.6 MMOL/DL (ref 3.2–4.6)
RBC # BLD AUTO: 4.3 M/UL (ref 3.8–5.2)
SODIUM SERPL-SCNC: 136.6 MMOL/L (ref 132–142)
WBC # BLD AUTO: 11.1 K/UL (ref 4–11)

## 2019-06-18 RX ORDER — VENLAFAXINE HYDROCHLORIDE 150 MG/1
300 CAPSULE, EXTENDED RELEASE ORAL DAILY
COMMUNITY
Start: 2019-06-18 | End: 2020-06-24

## 2019-06-18 RX ORDER — NYSTATIN 100000 [USP'U]/G
POWDER TOPICAL PRN
Qty: 60 G | Refills: 0 | Status: SHIPPED | OUTPATIENT
Start: 2019-06-18 | End: 2020-06-03

## 2019-06-18 RX ORDER — QUETIAPINE FUMARATE 50 MG/1
50 TABLET, FILM COATED ORAL 2 TIMES DAILY
COMMUNITY
Start: 2019-06-18 | End: 2019-09-20

## 2019-06-18 RX ORDER — METOPROLOL TARTRATE 25 MG/1
25 TABLET, FILM COATED ORAL 2 TIMES DAILY
Qty: 60 TABLET | Refills: 0 | Status: SHIPPED | OUTPATIENT
Start: 2019-06-18 | End: 2019-07-09

## 2019-06-18 RX ORDER — HYDROXYZINE HYDROCHLORIDE 50 MG/1
50 TABLET, FILM COATED ORAL EVERY 6 HOURS PRN
Qty: 180 TABLET | Refills: 0 | Status: SHIPPED | OUTPATIENT
Start: 2019-06-18 | End: 2019-07-18

## 2019-06-18 RX ORDER — GABAPENTIN 600 MG/1
TABLET ORAL
Qty: 180 TABLET | Refills: 3 | Status: SHIPPED | OUTPATIENT
Start: 2019-06-18 | End: 2020-06-03

## 2019-06-18 RX ORDER — CLINDAMYCIN PHOSPHATE 10 UG/ML
LOTION TOPICAL 2 TIMES DAILY
Qty: 60 ML | Refills: 11 | Status: SHIPPED | OUTPATIENT
Start: 2019-06-18 | End: 2019-09-20

## 2019-06-18 RX ORDER — INSULIN GLARGINE 100 [IU]/ML
70 INJECTION, SOLUTION SUBCUTANEOUS DAILY
Qty: 15 ML | Refills: 11 | Status: SHIPPED | OUTPATIENT
Start: 2019-06-18 | End: 2019-10-23

## 2019-06-18 NOTE — PATIENT INSTRUCTIONS
Order physical therapy  Increase mealtime insulin to 8 units plus sliding scale.   No more wound care!  Use powder if too moist  Ask the [facility] folks about prosthetic options to help with irritation and moisture.      PHYSICAL THERAPY REFERRAL   June 20, 2019 Demographics and referral for Physical Therapy faxed to [facility] at [phone].   [facility]  Phone: [phone]  Fax: [phone]  Scheduling Hours: Monday - Friday, 7 am to 4:30 pm    [facility]  [address] [place] [address]    [facility]   [address], [address]  [place] [address]    [facility]  [facility]  [address] [place] [address]    [facility]  [address], [address]  [place] [address]    [facility]  [address] [address]

## 2019-06-18 NOTE — LETTER
June 19, 2019      Teresa Perez  [address]    [place] [address]        Dear Teresa,    Here is a copy of your lab results.  Magnesium level is good, so you do not need to take that medication any more.  The rest of your electrolytes:  Calcium, sodium, chloride, potassium all look good as well, so no other vitamins are needed.  Your A1c is excellent!  Great job with the blood sugars and diabetes.  Your blood counts are also good.  I have sent in the forms for your prosthetic, and you should here from [facility] about this in the next few weeks.  Please call the clinic at [phone] if you have any questions.       Please see below for your test results.    Resulted Orders   Basic Metabolic Panel ([place])   Result Value Ref Range    Urea Nitrogen 23.9 (H) 7.0 - 19.0 mg/dL    Calcium 9.3 8.5 - 10.1 mg/dL    Chloride 104.2 98.0 - 110.0 mmol/L    Carbon Dioxide 21.8 20.0 - 32.0 mmol/L    Creatinine 0.7 0.5 - 1.0 mg/dL    Glucose 205.6 (H) 70.0 - 99.0 mg'dL    Potassium 3.6 3.2 - 4.6 mmol/dL    Sodium 136.6 132.0 - 142.0 mmol/L    GFR Estimate >90 >60.0 mL/min/1.7 m2    GFR Estimate If Black >90 >60.0 mL/min/1.7 m2   Hemoglobin A1c ([facility])   Result Value Ref Range    Hemoglobin A1C 7.4 (H) 4.1 - 5.7 %   CBC with Diff Plt ([facility] )   Result Value Ref Range    WBC 11.1 (H) 4.0 - 11.0 K/uL    Lymphocytes # 2.9 0.8 - 5.3 K/uL    % Lymphocytes 25.8 20.0 - 48.0 %L    Mid # 0.6 0.0 - 2.2 K/uL    Mid % 5.5 0.0 - 20.0 %M    GRANULOCYTES # 7.6 1.6 - 8.3 K/uL    % Granulocytes 68.7 40.0 - 75.0 %G    RBC 4.3 3.8 - 5.2 M/uL    Hemoglobin 12.4 11.7 - 15.7 g/dL    Hematocrit 38.2 35.0 - 47.0 %    MCV 89.5 78.0 - 100.0 fL    MCH 29.0 26.5 - 35.0    MCHC 32.5 32.0 - 36.0 g/dL    Platelets 281.0 150.0 - 450.0 K/uL   Magnesium ([facility])   Result Value Ref Range    Magnesium 2.0 1.8 - 2.6 mg/dL    Narrative    Test performed by:  [facility]  [address] [place] [address]       If you have any  questions, please call the clinic to make an appointment.    Sincerely,    Tyra Bullock MD

## 2019-06-19 ASSESSMENT — PATIENT HEALTH QUESTIONNAIRE - PHQ9: SUM OF ALL RESPONSES TO PHQ QUESTIONS 1-9: 8

## 2019-06-19 NOTE — RESULT ENCOUNTER NOTE
Teresa Perez-    Here is a copy of your lab results.  Magnesium level is good, so you do not need to take that medication any more.  The rest of your electrolytes:  Calcium, sodium, chloride, potassium all look good as well, so no other vitamins are needed.  Your A1c is excellent!  Great job with the blood sugars and diabetes.  Your blood counts are also good.  I have sent in the forms for your prosthetic, and you should here from [facility] about this in the next few weeks.  Please call the clinic at [phone] if you have any questions.      Tyra Bullock    Please send results to patient.

## 2019-06-19 NOTE — PROGRESS NOTES
There are no exam notes on file for this visit.    SUBJECTIVE  Teresa Perez is a 48 year old female with past medical history significant for    Patient Active Problem List   Diagnosis     Health Care Home     Acute peptic ulcer     Other allergy, other than to medicinal agents     Bipolar disorder (H)     Bulging lumbar disc     Cervical dysplasia     Common migraine without aura     Constipation     Dwarfism     Familial hypercholesterolemia     Insomnia     Low back pain     Intermittent asthma     Leg pain, bilateral     Smoking     Degeneration of thoracic or thoracolumbar intervertebral disc     LOMAS (nonalcoholic steatohepatitis)     Disease of lung     Hemorrhoids     Parotid mass     Endometriosis     NNAMDI (obstructive sleep apnea)     History of total right knee replacement     Lateral epicondylitis     Impingement syndrome, shoulder, left     Hx of total knee replacement, left     Chronic pain syndrome     Cough     Borderline personality disorder (H)     Moderate recurrent major depression (H)     Recurrent ventral hernia     Type 2 diabetes mellitus with complication, with long-term current use of insulin (H)     Essential hypertension     Nonruptured cerebral aneurysm     Cerebrovascular accident (CVA) due to embolism (H)     Amputation of leg (H)     CKD (chronic kidney disease) stage 5, GFR less than 15 ml/min (H)     Others present at the visit:  Patient's Carey MANLEY    Presents for   Chief Complaint   Patient presents with     Wound Check     follow up naif     Patient presents today for follow-up after a prolonged hospitalization.  She initially was hospitalized at [facility], after being found down at home.  Testing was positive for cocaine, and rhabdomyolysis, with acute limb ischemia.  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  Also suffered a stroke during this time.  She discharged from [facility] to [facility], then to [facility], and  then to [facility].  She returned back home on June 7, where she received some home care, PT, and OT.    Patient reports that things in general have been going well since returning home.  She reports some difficulty with the nursing company, that they were quite unreliable, and regularly missed visits, including her first intake and medication set up visit.  She was informed about her medications prior to discharge from TCU, and has been taking them appropriately herself since arriving at home.  We were able to read review and update her entire medication list today.    Had been receiving wound care for a large pressure ulcer on her left hip and buttocks.  Previously had dressings in place, and had some bacitracin and other creams that they were putting on the area.  Also had chronic wounds on her left elbow, and across her forehead in the mid forehead as well as over her left ear.  Shares that she does pick at the area on her forehead and ear, and this has been slower to heal.  Notes that she has had more moisture and irritation, as well as rubbing on her backside since the change in weather, and the increased heat.  She spends most of her time in her wheelchair.  Would like to try putting a towel beneath it to help prevent moisture.  They are hoping they will not need further nursing care, as this has not been particularly helpful.  She notices a sensation of numbness in the area of her stump, where the wound is, but is developing increased sensation there.  It is mildly painful.  The gabapentin, and baclofen seem to help some.  She was given exercises and strengthening activities to do during rehab and TCU, and she continues to do these at home.    She plans to go to [facility] for a prosthetic for her left lower leg.  Has had some difficulty with the sleep proper that she is supposed to use.  The Dreft detergent that they recommend it is cost prohibitive for her.  Gets redness and irritation when she has it in  place.  Also notices that it becomes sweaty and uncomfortable.  She has been getting around well in her wheelchair.  Is able to get up and stand on her single leg.  Does worry that she has not been able to be as active and that she is gaining weight back.  Is requesting a referral for physical therapy.  She would like to get out of her house to do this if possible.    She reports that mental health has been okay.  Medications have been stable.  She was around family members and did resume smoking.  She is smoking about a half a pack per day.  Would like to work on quitting, but does not feel like she is ready right now.  Would like to schedule follow-up with her psychiatrist to review medications.  Is not seeing a therapist right now.    She reports her breathing is good.  She is not using any inhalers regularly.  She does have some occasional cough, which she feels is worse from her smoking.  Some wheezing early in the morning.  This is been worse over the last couple weeks with increased allergies.  She is taking her allergy medication regularly.  Continues to notice protrusion and discomfort in her abdomen in the area of the hernia when she coughs.    She and I reviewed her current diabetes regimen.  She is taking 70 units once daily of the Basaglar, and is doing 6 units with meals as well as a 2units for every 50 above 200 sliding scale.  Brings her meter in with her today.  We reviewed her A1c, which was 7.4.  She reports taking her insulin regularly.  Has had one sugar in the 70s, and a couple other times above that where she was mildly symptomatic with hypoglycemia.  No episodes of passing out.  No sugars below 70.  Blood sugars on her meter typically range between 140 and 190, with an occasional 200-300.  Congratulated her on his future improvement.    She is requesting a referral to audiology for hearing test.    She wants to check about the dates on her next Depo shot.      She has multiple forms that she  would like completed for her home care services, as well as a face-to-face form for her prosthetic from [email or web address].    REVIEW OF SYMPTOMS    GENERAL:  No intentional weight loss or gain.  Occasional headaches.  No dizziness or lightheadedness.  No increased fatigue.  No fevers or chills  HEENT:  No problems with vision or hearing.  No eye concerns.  Positive for allergies or nasal congestion.  No sore throat or difficulty swallowing.  Positive for difficulty hearing  Neck:  Chronic neck pain, stable.    CARDS:  No chest pain or palpitations.  Positive for shortness of breath with exertion.    RESP:  History of asthma.  Occasional morning wheezing.  Chronic daily cough  GI:  Positive for abdominal pain at location of hernia.  No heartburn.  No nausea or vomitting.  No constipation or diarrhea  :  Normal urination, no dysuria, no sexual concerns  EXTREMITIES:  No weakness.  No extremity pain.    SKIN:  No rashes, bruises, lumps or bumps.    Psyche:  PHQ-9 negative.  No hx of depression or anxiety.      Habits:    Smoking:  Current daily smoker  Alcohol:  Very intermittent alcohol use  Illicit Drugs: No current illicit drug use.  Hx of previous marijuana and cocaine use.      OBJECTIVE:  Vitals: /77 (BP Location: Right arm, Patient Position: Sitting, Cuff Size: Adult Large)   Pulse 87   Temp 99.2  F (37.3  C) (Oral)   Resp 20   Wt 93.9 kg (207 lb)   SpO2 96%   BMI 38.58 kg/m    BMI= Body mass index is 38.58 kg/m .  Objective:    Vitals:  Vitals are reviewed and are within the normal range  Gen:  Alert, pleasant, no acute distress  Psych: Mood and affect are bright.  Patient is engaged, with clear thought process.  She demonstrates high level of motivation to continue with rehab and to improve.  Good insight, and realistic expectations.  Head: Scars across the upper forehead with 2 slightly open areas yet to heal.  Nose: Bilateral congestion.  Mild sinus tenderness.  Throat:  Clear.  Non-erythematous and  without exudate  Neck:  No cervical lymphadenopathy  Cardiac:  Regular rate and rhythm, no murmurs, rubs or gallops  Respiratory:  Lungs clear to auscultation bilaterally.  No wheezing.  Good airflow.  Abdomen:  Soft, mildly tender.  Large postsurgical scar in mid abdomen.  Large protruding ventral hernia.  Easily reducible.  Back: She has a well-healed scar in her left buttocks extending to her posterior thigh and inner leg.  Outer area shows hyperpigmentation and inner area shows pink well-healed tissue.  No open lesions.  Good wound healing present.  No discharge, erythema, or warmth.  Extremities: Above-the-knee amputation of the left leg.  The stump is well-healed.  No swelling, tenderness, or erythema.  No evidence of infection or discharge.  She has good strength with lifting her left upper leg, as well as abducting and abducting.  Can also fully straighten that limb.  She is able to get up and down off of her wheelchair on her own, including lifting and raising her pants.  Gait is uneven due to the lack of leg.  Balance on the one leg is good and she is able to turn and pivot.  Right leg with 5 out of 5 strength throughout.    Results for orders placed or performed in visit on 06/18/19   Basic Metabolic Panel ([place])   Result Value Ref Range    Urea Nitrogen 23.9 (H) 7.0 - 19.0 mg/dL    Calcium 9.3 8.5 - 10.1 mg/dL    Chloride 104.2 98.0 - 110.0 mmol/L    Carbon Dioxide 21.8 20.0 - 32.0 mmol/L    Creatinine 0.7 0.5 - 1.0 mg/dL    Glucose 205.6 (H) 70.0 - 99.0 mg'dL    Potassium 3.6 3.2 - 4.6 mmol/dL    Sodium 136.6 132.0 - 142.0 mmol/L    GFR Estimate >90 >60.0 mL/min/1.7 m2    GFR Estimate If Black >90 >60.0 mL/min/1.7 m2   Hemoglobin A1c ([facility])   Result Value Ref Range    Hemoglobin A1C 7.4 (H) 4.1 - 5.7 %   CBC with Diff Plt ([facility])   Result Value Ref Range    WBC 11.1 (H) 4.0 - 11.0 K/uL    Lymphocytes # 2.9 0.8 - 5.3 K/uL    % Lymphocytes 25.8 20.0 - 48.0 %L    Mid # 0.6 0.0 - 2.2 K/uL    Mid %  5.5 0.0 - 20.0 %M    GRANULOCYTES # 7.6 1.6 - 8.3 K/uL    % Granulocytes 68.7 40.0 - 75.0 %G    RBC 4.3 3.8 - 5.2 M/uL    Hemoglobin 12.4 11.7 - 15.7 g/dL    Hematocrit 38.2 35.0 - 47.0 %    MCV 89.5 78.0 - 100.0 fL    MCH 29.0 26.5 - 35.0    MCHC 32.5 32.0 - 36.0 g/dL    Platelets 281.0 150.0 - 450.0 K/uL   Magnesium ([facility])   Result Value Ref Range    Magnesium 2.0 1.8 - 2.6 mg/dL    Narrative    Test performed by:  [facility]  [address] [place] [address]        ASSESSMENT AND PLAN:      Teresa was seen today for wound check, post hospital check, and needs forms completed for home care as well as face-to-face visit for prosthetic.    Diagnoses and all orders for this visit:    Hypomagnesemia.  Patient concerned about needing continued magnesium supplement.  Will check levels.  These were normal.  No need for continued supplementation.  -     [facility]    Benign essential hypertension.  Blood pressure is well controlled today.  We will continue lisinopril 40, amlodipine 10, metoprolol 25 twice daily.  Electrolytes checked today, and renal function is normal.  Appropriate potassium.  -     Basic Metabolic Panel ([place])  -     metoprolol tartrate (LOPRESSOR) 25 MG tablet; Take 1 tablet (25 mg) by mouth 2 times daily    Type 2 diabetes mellitus with complication, with long-term current use of insulin (H).    A1c much improved at 7.4.  Blood sugars typically between 140 and 180.  Will increase mealtime Humalog insulin from 6 units to 8 units, and continue with the 2 for 50 sliding scale above 200.  She is currently taking Basaglar, 70 units, and so this was added to her medication list as well.  -     Hemoglobin A1c (P )    Iron deficiency anemia, unspecified iron deficiency anemia type.  Hemoglobin is stable.  -     CBC with Diff Plt ([facility])    Chronic bilateral low back pain with left-sided sciatica.  Back pain is stable.  -     gabapentin (NEURONTIN) 600 MG tablet; Take 2  tabs three times daily.    History of CVA (cerebrovascular accident).  Patient has allergy to aspirin.  We will continue with Plavix.  No new symptoms at this time.    Encounter for wound care.  Wounds appear to be healing well on exam.  Anticipate they will continue to feel better.  We discussed putting a towel underneath her bottom in the chair, getting up regularly, and resuming physical therapy.  I also provided them with some nystatin powder in case she gets more irritation from the warmth.  Her PCA, Carey will look at the area twice a week, and call and let us know if there is new skin breakdown appearing.  -     nystatin (MYCOSTATIN) 169628 UNIT/GM external powder; Apply topically as needed for other (moisture)    Bipolar disorder in full remission, most recent episode unspecified type (H).  Mental health is good today.  Updated her med list to reflect the 300 mg of venlafaxine she is taking daily, as well as the Seroquel 300 mg at night and 50 mg 3 times daily, and the hydroxyzine 50 mg 3 times daily as needed.  She will schedule follow-up with her psychiatrist.  -     hydrOXYzine (ATARAX) 50 MG tablet; Take 1 tablet (50 mg) by mouth every 6 hours as needed for itching, anxiety or other (sleep)      Mild intermittent asthma without complication.  Asthma is well controlled.  She is currently just using albuterol as needed.  Lungs are clear.  Working on quitting smoking again.  She is not ready to resume patches today but we will plan to do so over the next few weeks.    Recurrent ventral hernia, this is a chronic problem.  As breathing, and blood sugars are better, after things are set with her prosthetic, we can discuss moving forward with surgery to repair this.    Amputation of leg (H).  Patient with history of above-the-knee amputation on the left secondary to ischemic limb.  Patient requesting continued physical therapy, and orders were placed for this today.  She is highly motivated, and has been doing  exercises at home already.  Has good range of motion and strength in the stump at this time.  Patient typically has ambulated in the community regularly.  She is taking public transportation currently.  She is using her wheelchair around her building.  Anticipate the patient will be motivated and successful with walking on uneven terrain, walking up to 1 to 2 miles per day in [facility], and throughout the city.  She does live in apartment but it has been elevated.  She plans to continue to visit family, spend time outside, care for her cat, and perform activities at home.  Patient has potential to be a community ambulator, and would be expected to resume previous activities that she enjoyed prior to surgery.    I spent 60 minutes with the patient greater than 50% on chart review, review of documentation and hospitalization history, including med rec, and extensive physical examination.      Patient Instructions   Order physical therapy  Increase mealtime insulin to 8 units plus sliding scale.   No more wound care!  Use powder if too moist  Ask the [employer] folks about prosthetic options to help with irritation and moisture.      Follow up in 1 month for recheck.      Tyra Bullock

## 2019-06-20 ENCOUNTER — AMBULATORY - HEALTHEAST (OUTPATIENT)
Dept: ADMINISTRATIVE | Facility: REHABILITATION | Age: 49
End: 2019-06-20

## 2019-06-20 DIAGNOSIS — S88.919A AMPUTATION OF LEG (H): ICD-10-CM

## 2019-06-20 ASSESSMENT — ASTHMA QUESTIONNAIRES: ACT_TOTALSCORE: 25

## 2019-06-24 ENCOUNTER — TELEPHONE (OUTPATIENT)
Dept: FAMILY MEDICINE | Facility: CLINIC | Age: 49
End: 2019-06-24

## 2019-06-24 NOTE — TELEPHONE ENCOUNTER
Alternative has been requested from patient's pharmacy for her Basaglar.  Ok to change to Lantus?  Deana Castellanos, [facility]  [phone]

## 2019-06-24 NOTE — TELEPHONE ENCOUNTER
Patient was previously on Tresiba.  I would prefer that if it is covered.  Can you check?  Then I will send in a prescription either for Tresiba or for Lantus if not covered.      Tyra Bullock    Routed to LAURA Leblanc.

## 2019-06-26 ENCOUNTER — DOCUMENTATION ONLY (OUTPATIENT)
Dept: FAMILY MEDICINE | Facility: CLINIC | Age: 49
End: 2019-06-26

## 2019-06-26 NOTE — PROGRESS NOTES
According to [facility] pharmacy pt has another prescription of Gabapentin for 300 mg from another provider.  Not sure who it is, I tried calling the pharmacy and waited over 10 minutes to speak to the pharmacist.  Which dose would you like her to be on?  I will call the pharmacy and let them know.  Deana Castellanos, [facility]

## 2019-07-03 ENCOUNTER — DOCUMENTATION ONLY (OUTPATIENT)
Dept: FAMILY MEDICINE | Facility: CLINIC | Age: 49
End: 2019-07-03

## 2019-07-03 ENCOUNTER — MEDICAL CORRESPONDENCE (OUTPATIENT)
Dept: HEALTH INFORMATION MANAGEMENT | Facility: CLINIC | Age: 49
End: 2019-07-03

## 2019-07-03 NOTE — PROGRESS NOTES
To be completed in Nursing note:    Please reference list for forms that require a visit for completion.  Please remind patients that providers are given 3-5 business days to complete and return forms.      Form type: Drug Interaction Physician Order    Date form received: 19    Date form completed by Physician: 7/3/19    How was form returned to patient (mailed, faxed, or at  for patient to ): Fax to [employer] [facility] at [phone]    Date form mailed/faxed/left at  for patient and sent to HIM for scannin/3/19      Once form is left for patient, faxed, or mailed PCS will then close the documentation only encounter.

## 2019-07-05 ENCOUNTER — MEDICAL CORRESPONDENCE (OUTPATIENT)
Dept: HEALTH INFORMATION MANAGEMENT | Facility: CLINIC | Age: 49
End: 2019-07-05

## 2019-07-09 DIAGNOSIS — Z86.73 HISTORY OF CVA (CEREBROVASCULAR ACCIDENT): ICD-10-CM

## 2019-07-09 DIAGNOSIS — I10 BENIGN ESSENTIAL HYPERTENSION: ICD-10-CM

## 2019-07-09 RX ORDER — METOPROLOL TARTRATE 25 MG/1
25 TABLET, FILM COATED ORAL 2 TIMES DAILY
Qty: 180 TABLET | Refills: 3 | Status: SHIPPED | OUTPATIENT
Start: 2019-07-09 | End: 2020-06-03

## 2019-07-09 RX ORDER — AMLODIPINE BESYLATE 10 MG/1
10 TABLET ORAL DAILY
Qty: 90 TABLET | Refills: 3 | Status: SHIPPED | OUTPATIENT
Start: 2019-07-09 | End: 2020-06-03

## 2019-07-10 ENCOUNTER — DOCUMENTATION ONLY (OUTPATIENT)
Dept: FAMILY MEDICINE | Facility: CLINIC | Age: 49
End: 2019-07-10

## 2019-07-10 ENCOUNTER — TRANSFERRED RECORDS (OUTPATIENT)
Dept: HEALTH INFORMATION MANAGEMENT | Facility: CLINIC | Age: 49
End: 2019-07-10

## 2019-07-10 ENCOUNTER — MEDICAL CORRESPONDENCE (OUTPATIENT)
Dept: HEALTH INFORMATION MANAGEMENT | Facility: CLINIC | Age: 49
End: 2019-07-10

## 2019-07-10 NOTE — PROGRESS NOTES
To be completed in Nursing note:    Please reference list for forms that require a visit for completion.  Please remind patients that providers are given 3-5 business days to complete and return forms.      Form type: Home Health Care Plan of Care    Date form received: 19    Date form completed by Physician: 07/10/19    How was form returned to patient (mailed, faxed, or at  for patient to ): Faxed back to [facility] at [phone]    Date form mailed/faxed/left at  for patient and sent to HIM for scannin/10/19      Once form is left for patient, faxed, or mailed PCS will then close the documentation only encounter.

## 2019-07-12 ENCOUNTER — TELEPHONE (OUTPATIENT)
Dept: FAMILY MEDICINE | Facility: CLINIC | Age: 49
End: 2019-07-12

## 2019-07-12 NOTE — TELEPHONE ENCOUNTER
Attempted to call, left message to see how pt is doing after hospital stay and for pt to call clinic back. Will try again later. Bryant MAGANAA

## 2019-07-13 ENCOUNTER — TELEPHONE (OUTPATIENT)
Dept: FAMILY MEDICINE | Facility: CLINIC | Age: 49
End: 2019-07-13

## 2019-07-13 NOTE — TELEPHONE ENCOUNTER
Writer was called by microbiology lab due to a stool culture result that was positive for Salmonella.  Patient recently hospitalized at [facility] for diarrhea and hyperkalemia.  On discharge patient was continuing to have diarrhea but had good p.o. intake.  Writer called patient, however phone was not in service.  This phone number was updated at last hospitalization, discharge date 7/11/19.    Answering service page responded to at ~12:30PM 7/13/19  Patient called at ~12:35 PM 7/13/19 - no answer  Patient called for a second time at 2:37PM 7/13/19 - no answer    Aubrey Ulloa MD PGY2

## 2019-07-18 DIAGNOSIS — F41.1 GENERALIZED ANXIETY DISORDER: Primary | ICD-10-CM

## 2019-07-18 RX ORDER — HYDROXYZINE HYDROCHLORIDE 50 MG/1
50 TABLET, FILM COATED ORAL EVERY 6 HOURS PRN
Qty: 180 TABLET | Refills: 0 | Status: SHIPPED | OUTPATIENT
Start: 2019-07-18 | End: 2019-08-30

## 2019-07-21 ENCOUNTER — MEDICAL CORRESPONDENCE (OUTPATIENT)
Dept: HEALTH INFORMATION MANAGEMENT | Facility: CLINIC | Age: 49
End: 2019-07-21

## 2019-07-22 ENCOUNTER — DOCUMENTATION ONLY (OUTPATIENT)
Dept: FAMILY MEDICINE | Facility: CLINIC | Age: 49
End: 2019-07-22

## 2019-07-22 ENCOUNTER — MEDICAL CORRESPONDENCE (OUTPATIENT)
Dept: HEALTH INFORMATION MANAGEMENT | Facility: CLINIC | Age: 49
End: 2019-07-22

## 2019-07-22 NOTE — PROGRESS NOTES
To be completed in Nursing note:    Please reference list for forms that require a visit for completion.  Please remind patients that providers are given 3-5 business days to complete and return forms.      Form type: Home Health Care Plan of Care    Date form received: 19    Date form completed by Physician: 19    How was form returned to patient (mailed, faxed, or at  for patient to ): Faxed back to [phone]    Date form mailed/faxed/left at  for patient and sent to HIM for scannin19      Once form is left for patient, faxed, or mailed PCS will then close the documentation only encounter.

## 2019-07-25 ENCOUNTER — DOCUMENTATION ONLY (OUTPATIENT)
Dept: FAMILY MEDICINE | Facility: CLINIC | Age: 49
End: 2019-07-25

## 2019-07-25 NOTE — PROGRESS NOTES
To be completed in Nursing note:    Please reference list for forms that require a visit for completion.  Please remind patients that providers are given 3-5 business days to complete and return forms.      Form type: Home Care discontinue Summary    Date form received: 19    Date form completed by Physician: 19    How was form returned to patient (mailed, faxed, or at  for patient to ): Faxed back to [phone]    Date form mailed/faxed/left at  for patient and sent to HIM for scannin19      Once form is left for patient, faxed, or mailed PCS will then close the documentation only encounter.

## 2019-07-31 ENCOUNTER — TELEPHONE (OUTPATIENT)
Dept: FAMILY MEDICINE | Facility: CLINIC | Age: 49
End: 2019-07-31

## 2019-07-31 DIAGNOSIS — F41.9 ANXIETY: Primary | ICD-10-CM

## 2019-07-31 RX ORDER — HYDROXYZINE PAMOATE 25 MG/1
25-50 CAPSULE ORAL 3 TIMES DAILY PRN
Qty: 100 CAPSULE | Refills: 1 | Status: SHIPPED | OUTPATIENT
Start: 2019-07-31 | End: 2019-08-30

## 2019-07-31 NOTE — TELEPHONE ENCOUNTER
Alternative Requested: Hydroxyzine HCL 50 mg tablet    See pharmacy comments: Alternative requested: can not get Hydroxyzine 50 mg tabs manufactur can not supply. Would you like to change to the capsules we are getting that now.     Please advise. Meenakshi Lacey CMA

## 2019-08-13 ENCOUNTER — TELEPHONE (OUTPATIENT)
Dept: FAMILY MEDICINE | Facility: CLINIC | Age: 49
End: 2019-08-13

## 2019-08-13 NOTE — TELEPHONE ENCOUNTER
[facility] phone call message- general phone call:    Reason for call: Has questions about 5% Lidocaine ointment.    Return call needed: Yes    OK to leave a message on voice mail? Yes    Primary language: English      needed? No    Call taken on August 13, 2019 at 9:14 AM by Mary Ann Giraldo

## 2019-08-14 ENCOUNTER — DOCUMENTATION ONLY (OUTPATIENT)
Dept: FAMILY MEDICINE | Facility: CLINIC | Age: 49
End: 2019-08-14

## 2019-08-14 NOTE — PROGRESS NOTES
To be completed in Nursing note:    Please reference list for forms that require a visit for completion.  Please remind patients that providers are given 3-5 business days to complete and return forms.      Form type: [employer]    Date form received: 19    Date form completed by Physician: 19    How was form returned to patient (mailed, faxed, or at  for patient to ): Faxed back to [employer] at [phone]    Date form mailed/faxed/left at  for patient and sent to HIM for scannin19      Once form is left for patient, faxed, or mailed PCS will then close the documentation only encounter.

## 2019-08-15 ENCOUNTER — MEDICAL CORRESPONDENCE (OUTPATIENT)
Dept: HEALTH INFORMATION MANAGEMENT | Facility: CLINIC | Age: 49
End: 2019-08-15

## 2019-08-23 NOTE — TELEPHONE ENCOUNTER
Called [facility] back and had them take Teresa out of her system.  They also took Dr. Bullock out of their system so we will no longer receive faxes or calls from them.   Deana Castellanos, CMA

## 2019-08-30 ENCOUNTER — OFFICE VISIT (OUTPATIENT)
Dept: FAMILY MEDICINE | Facility: CLINIC | Age: 49
End: 2019-08-30
Payer: MEDICARE

## 2019-08-30 VITALS
SYSTOLIC BLOOD PRESSURE: 117 MMHG | RESPIRATION RATE: 16 BRPM | WEIGHT: 211.4 LBS | TEMPERATURE: 99 F | DIASTOLIC BLOOD PRESSURE: 75 MMHG | HEART RATE: 85 BPM | OXYGEN SATURATION: 99 % | BODY MASS INDEX: 39.4 KG/M2

## 2019-08-30 DIAGNOSIS — S88.919A AMPUTATION OF LEG (H): ICD-10-CM

## 2019-08-30 DIAGNOSIS — M25.511 ACUTE PAIN OF RIGHT SHOULDER: ICD-10-CM

## 2019-08-30 DIAGNOSIS — J45.31 MILD PERSISTENT ASTHMA WITH ACUTE EXACERBATION: ICD-10-CM

## 2019-08-30 DIAGNOSIS — L73.2 HYDRADENITIS: ICD-10-CM

## 2019-08-30 DIAGNOSIS — Z79.4 TYPE 2 DIABETES MELLITUS WITH COMPLICATION, WITH LONG-TERM CURRENT USE OF INSULIN (H): Primary | ICD-10-CM

## 2019-08-30 DIAGNOSIS — F41.1 GENERALIZED ANXIETY DISORDER: ICD-10-CM

## 2019-08-30 DIAGNOSIS — J30.2 SEASONAL ALLERGIES: ICD-10-CM

## 2019-08-30 DIAGNOSIS — F12.90 MARIJUANA USE, CONTINUOUS: ICD-10-CM

## 2019-08-30 DIAGNOSIS — E11.8 TYPE 2 DIABETES MELLITUS WITH COMPLICATION, WITH LONG-TERM CURRENT USE OF INSULIN (H): Primary | ICD-10-CM

## 2019-08-30 DIAGNOSIS — H90.71 MIXED CONDUCTIVE AND SENSORINEURAL HEARING LOSS OF RIGHT EAR, UNSPECIFIED HEARING STATUS ON CONTRALATERAL SIDE: ICD-10-CM

## 2019-08-30 DIAGNOSIS — N92.4 EXCESSIVE BLEEDING IN PREMENOPAUSAL PERIOD: ICD-10-CM

## 2019-08-30 DIAGNOSIS — Z87.891 PERSONAL HISTORY OF TOBACCO USE, PRESENTING HAZARDS TO HEALTH: ICD-10-CM

## 2019-08-30 DIAGNOSIS — G54.6 PHANTOM LIMB PAIN (H): ICD-10-CM

## 2019-08-30 DIAGNOSIS — I10 BENIGN ESSENTIAL HYPERTENSION: ICD-10-CM

## 2019-08-30 LAB
BUN SERPL-MCNC: 16 MG/DL (ref 7–19)
CALCIUM SERPL-MCNC: 8.7 MG/DL (ref 8.5–10.1)
CHLORIDE SERPLBLD-SCNC: 104.4 MMOL/L (ref 98–110)
CHOLEST SERPL-MCNC: 166.9 MG/DL (ref 0–200)
CHOLEST/HDLC SERPL: 6 {RATIO} (ref 0–5)
CO2 SERPL-SCNC: 20.9 MMOL/L (ref 20–32)
CREAT SERPL-MCNC: 0.6 MG/DL (ref 0.5–1)
CREAT UR-MCNC: 38.8 MG/DL
GFR SERPL CREATININE-BSD FRML MDRD: >90 ML/MIN/1.7 M2
GLUCOSE SERPL-MCNC: 225 MG'DL (ref 70–99)
HBA1C MFR BLD: 8.1 % (ref 4.1–5.7)
HDLC SERPL-MCNC: 27.7 MG/DL
LDLC SERPL CALC-MCNC: 84 MG/DL (ref 0–129)
MICROALBUMIN UR-MCNC: <0.5 MG/DL (ref 0–1.99)
MICROALBUMIN/CREAT UR: NORMAL MG/G{CREAT}
POTASSIUM SERPL-SCNC: 4.2 MMOL/DL (ref 3.2–4.6)
SODIUM SERPL-SCNC: 134.9 MMOL/L (ref 132–142)
TRIGL SERPL-MCNC: 278 MG/DL (ref 0–150)
VLDL CHOLESTEROL: 55.6 MG/DL (ref 7–32)

## 2019-08-30 RX ORDER — MEDROXYPROGESTERONE ACETATE 150 MG/ML
150 INJECTION, SUSPENSION INTRAMUSCULAR ONCE
Status: COMPLETED | OUTPATIENT
Start: 2019-08-30 | End: 2019-08-30

## 2019-08-30 RX ORDER — LORATADINE 10 MG/1
10 TABLET ORAL DAILY
Qty: 60 TABLET | Refills: 5 | Status: SHIPPED | OUTPATIENT
Start: 2019-08-30 | End: 2019-10-22

## 2019-08-30 RX ORDER — CLINDAMYCIN PHOSPHATE 11.9 MG/ML
SOLUTION TOPICAL 2 TIMES DAILY
Qty: 60 ML | Refills: 11 | Status: SHIPPED | OUTPATIENT
Start: 2019-08-30 | End: 2021-07-13

## 2019-08-30 RX ADMIN — MEDROXYPROGESTERONE ACETATE 150 MG: 150 INJECTION, SUSPENSION INTRAMUSCULAR at 16:07

## 2019-08-30 NOTE — LETTER
"September 3, 2019      Teresa Perez  [address]  [place] [address]        Dear Teresa,    Here is a copy of your lab results.  Your hemoglobin A1c is increasing.  I think the new medication will help with this.  If you have any questions or concerns about how to use the new pen, please let us know.  Your cholesterol looks good.  Your kidney tests are excellent.  I look forward to hearing how things do with the new prosthetic!  Please call the clinic at [phone] if you have any questions.       Please see below for your test results.    Resulted Orders   Hemoglobin A1c ([facility])   Result Value Ref Range    Hemoglobin A1C 8.1 (H) 4.1 - 5.7 %   Basic Metabolic Panel ([place])   Result Value Ref Range    Urea Nitrogen 16.0 7.0 - 19.0 mg/dL    Calcium 8.7 8.5 - 10.1 mg/dL    Chloride 104.4 98.0 - 110.0 mmol/L    Carbon Dioxide 20.9 20.0 - 32.0 mmol/L    Creatinine 0.6 0.5 - 1.0 mg/dL    Glucose 225.0 (H) 70.0 - 99.0 mg'dL    Potassium 4.2 3.2 - 4.6 mmol/dL    Sodium 134.9 132.0 - 142.0 mmol/L    GFR Estimate >90 >60.0 mL/min/1.7 m2    GFR Estimate If Black >90 >60.0 mL/min/1.7 m2   Lipid Panel ([place])   Result Value Ref Range    Cholesterol 166.9 0.0 - 200.0 mg/dL    Cholesterol/HDL Ratio 6.0 (H) 0.0 - 5.0    HDL Cholesterol 27.7 (L) >40.0 mg/dL    LDL Cholesterol Calculated 84 0 - 129 mg/dL    Triglycerides 278.0 (H) 0.0 - 150.0 mg/dL    VLDL Cholesterol 55.6 (H) 7.0 - 32.0 mg/dL   Microalbumin Creatinine Ratio Random Ur ([facility])   Result Value Ref Range    Microalbumin, Urine <0.50 0.00 - 1.99 mg/dL    Creatinine, Urine 38.8 mg/dL    Albumin Urine mg/g Cr See Note.       Comment:      \"Unable to calculate: Creatinine and/or Microalbumin value below detectable   level\"      Narrative    Test performed by:  [facility]  [address] [place] [address]  Microalbumin, Random Urine  <2.0 mg/dL . . . . . . . . Normal  3.0-30.0 mg/dL . . . . . . Microalbuminuria  >30.0 mg/dL . . . " . . .  . Clinical Proteinuria  Microalbumin/Creatinine Ratio, Random Urine  <20 mg/g . . . . .. . . . Normal   mg/g . . . . . . . Microalbuminuria  >300 mg/g . . . . . . . . Clinical Proteinuria       If you have any questions, please call the clinic to make an appointment.    Sincerely,    Tyra Bullock MD

## 2019-08-30 NOTE — PROGRESS NOTES
Nursing Notes:   Deana Castellanos CMA  8/30/2019  4:09 PM  Signed  Clinic Administered Medication Documentation    MEDICATION LIST:   Depo Provera Documentation    Prior to injection, verified patient identity using patient's name and date of birth. Medication was administered. Please see MAR and medication order for additional information.     BP: 117/75    LAST PAP/EXAM: No results found for: PAP  URINE HCG:not indicated    NEXT INJECTION DUE: 11/15/19 - 11/29/19    Was entire vial of medication used? Yes  Vial/Syringe: Single dose vial  Expiration Date:  10/20    Next Depo due between November 15, 2019- December 13, 2019        SUBJECTIVE  Teresajacobo Perez is a 48 year old female with past medical history significant for    Patient Active Problem List   Diagnosis     Health Care Home     Acute peptic ulcer     Other allergy, other than to medicinal agents     Bipolar disorder (H)     Bulging lumbar disc     Cervical dysplasia     Common migraine without aura     Constipation     Dwarfism     Familial hypercholesterolemia     Insomnia     Low back pain     Intermittent asthma     Leg pain, bilateral     Smoking     Degeneration of thoracic or thoracolumbar intervertebral disc     LOMAS (nonalcoholic steatohepatitis)     Disease of lung     Hemorrhoids     Parotid mass     Endometriosis     NNAMDI (obstructive sleep apnea)     History of total right knee replacement     Lateral epicondylitis     Impingement syndrome, shoulder, left     Hx of total knee replacement, left     Chronic pain syndrome     Cough     Borderline personality disorder (H)     Moderate recurrent major depression (H)     Recurrent ventral hernia     Type 2 diabetes mellitus with complication, with long-term current use of insulin (H)     Essential hypertension     Nonruptured cerebral aneurysm     Cerebrovascular accident (CVA) due to embolism (H)     Amputation of leg (H)     CKD (chronic kidney disease) stage 5, GFR less than 15 ml/min  (H)     Others present at the visit:  None    Presents for   Chief Complaint   Patient presents with     Follow Up     Pt is here to follow up on many things.     Medication Reconciliation     Complete.      1)  Status post amputation.  Prosthetic.  Phantom Limb pain.  Patient reports doing well with her physical therapy.  She has been to the [facility] and they have prescribed her new prosthetic.  There were canceled final measurements and it supposed to be ready soon.  [facility] to utilize her [facility].  Is getting around okay in her wheelchair but does get stiff and sore in her shoulders and arms at times.  She continues to be more active than she was previously.  Is making new friends at her new place of residence off of [address] in [place].  She likes the building, as it is quiet, there is not the same exposure to drugs and other things.  She has made some friends in the building.  Even without a prosthetic she has been more active and is feeling less fear and Agoraphobia.  She has cut off connections with her son, due to his drug use and poor influence, and is feeling good about this.      She is noticing pain in her area of previous limb.  She notices an aching, and overall discomfort there.  The gabapentin does not seem to help.  Sleep medicines does not seem to help.  She does not want to take narcotics.  Discussed a couple options including trying Cymbalta, but with that would need to talk to her psychiatrist and switch her off of the Effexor.  She would be open to seeing a pain clinic for specifically treatment of phantom limb pain.    Has been noticing some increased pain and discomfort in her right shoulder as well as in her hands.  Worse with using her wheelchair regularly.  Has some difficulty raising her right arm above her head, and notices soreness in her shoulder blade as well as anterior portion of the shoulder.  It hurts with movement.  She  has been trying Tylenol for this but it is not helped.  Is interested in using intermittent NSAIDs for discomfort in her hands and arms..  Has been taking Tylenol without much improvement in her pain.      2)  Mental Health and Substance Use: Was in to see her psychiatrist.  Reports that things are going well.  Again she is less anxious, and having fewer Agoura phobia symptoms.  Had been taking quite a bit of Benadryl and hydroxyzine is no longer taking these medications.  Is still getting good relief from the Effexor.  She is seeing a therapist every other week, but has access to see them more if needed.  Additionally she is wondering if she can come off of the Topamax.  Had taken this previously for headaches but has not found it felt helpful and would like to decrease her medications.  Psychiatrist had suggested she no longer needs that medication either.  She declines any recent cocaine use.  She is still smoking, and reports using marijuana regularly.    3)  Breathing.  Smoking.  Mold.  Breathing has been fairly stable.  She reports using her inhalers regularly.  Has been worse due to increased allergy symptoms as of late.  She is requesting refills on her allergy medications today.  Also is concerned that there is some black mold in her building, and feels this makes her symptoms worse.  She does continue to smoke, and would like to work on quitting.  Has to leave that building to do so so she thinks the winter months might be a good time to quit.  Is not ready to do this yet.    4)  Diabetes.  Blood pressure.   She reports that blood sugars overall have been good.  She continues to use 60 units of Lantus daily at bedtime.  Is a bit disappointed because she has gained some weight back over the last couple of months.  Is doing mealtime sugars, and gives herself insulin only if her sugar levels are above 200.  This only happens 4-5 times a week, so she is may be taking an extra 50 to 60 units during the week.   Had been on Trulicity in the past, and did well with this as a once weekly injection.  She is interested in restarting this or similar GLP-1 medication.  Does continue to endorse having difficulty with her stools, and was admitted earlier this summer for severe diarrhea.  This has improved but she is still having 2-3 looses stools per day.      5)  Menorrhagia/Depo/fevers.    She is very concerned today about being able to get her Depakote shot.  Last received a shot at our clinic in January, and then reports receiving a an injection while at [facility], she thinks in April.  She gets double shots to help with menorrhagia.  Reports concerns that she will develop a blood clot if she does not get her shot.  She is not sexually active at this time.  When sexually active she is sexually active with females.  She also continues to notice fevers.  She gets flushed and sweaty occasionally.  Oftentimes will feel like her head is on fire.  This has become more common lately.  Does get dizzy and lightheaded with this.  Discussed that this could be premenopausal symptoms, especially with the change in her double dosing.    6) right ear difficulty hearing.  Would like a referral to have her hearing tested.  Is having difficulty hearing feeling congestion and blockage in her right ear.    7) Hydradenitis:  develops bumps under her armpits.  Sometimes they burst open with pussy discharge.  Are very painful.  Worse on the right side than the left.  She also sometimes gets these in her groin area.      OBJECTIVE:  Vitals: /75 (BP Location: Left arm, Patient Position: Sitting, Cuff Size: Adult Large)   Pulse 85   Temp 99  F (37.2  C) (Oral)   Resp 16   Wt 95.9 kg (211 lb 6.4 oz)   SpO2 99%   BMI 39.40 kg/m    BMI= Body mass index is 39.4 kg/m .  Vitals:  Vitals are reviewed and are within the normal range.  Blood pressures well controlled.  Gen:  Alert, pleasant, no acute distress.  Cooperative and in good spirits  today.  Chest: Evidence bilaterally of scarring as well as healed areas of hidradenitis present.  Cardiac:  Regular rate and rhythm, no murmurs, rubs or gallops  Respiratory:  Lungs clear to auscultation bilaterally  Abdomen: Mild diffuse tenderness.  Large palpable ventral hernia.  Bowel sounds positive.  Extremities: Amputation of left lower leg above the knee.  Stump is healing well.  Mild swelling bilaterally in the hands.  No erythema or warmth.  Normal range of motion, without focal symptoms.  Right shoulder has some pain in the anterior AC joint, as well as posteriorly adjacent to the scapula.  Has some difficulty with reaching her arm behind her head.  Negative Neer's test.  Negative empty can test.  Pain with crossover raise.  Mild pain with Willams, negative apprehension test.    Results for orders placed or performed in visit on 08/30/19   Hemoglobin A1c ([facility])   Result Value Ref Range    Hemoglobin A1C 8.1 (H) 4.1 - 5.7 %   Basic Metabolic Panel ([facility])   Result Value Ref Range    Urea Nitrogen 16.0 7.0 - 19.0 mg/dL    Calcium 8.7 8.5 - 10.1 mg/dL    Chloride 104.4 98.0 - 110.0 mmol/L    Carbon Dioxide 20.9 20.0 - 32.0 mmol/L    Creatinine 0.6 0.5 - 1.0 mg/dL    Glucose 225.0 (H) 70.0 - 99.0 mg'dL    Potassium 4.2 3.2 - 4.6 mmol/dL    Sodium 134.9 132.0 - 142.0 mmol/L    GFR Estimate >90 >60.0 mL/min/1.7 m2    GFR Estimate If Black >90 >60.0 mL/min/1.7 m2   Lipid Panel ([facility])   Result Value Ref Range    Cholesterol 166.9 0.0 - 200.0 mg/dL    Cholesterol/HDL Ratio 6.0 (H) 0.0 - 5.0    HDL Cholesterol 27.7 (L) >40.0 mg/dL    LDL Cholesterol Calculated 84 0 - 129 mg/dL    Triglycerides 278.0 (H) 0.0 - 150.0 mg/dL    VLDL Cholesterol 55.6 (H) 7.0 - 32.0 mg/dL   Microalbumin Creatinine Ratio Random Ur ([facility])   Result Value Ref Range    Microalbumin, Urine <0.50 0.00 - 1.99 mg/dL    Creatinine, Urine 38.8 mg/dL    Albumin Urine mg/g Cr See Note.     Narrative    Test performed by:  [facility]  [address] [place] [address]  Microalbumin, Random Urine  <2.0 mg/dL . . . . . . . . Normal  3.0-30.0 mg/dL . . . . . . Microalbuminuria  >30.0 mg/dL . . . . . .  . Clinical Proteinuria  Microalbumin/Creatinine Ratio, Random Urine  <20 mg/g . . . . .. . . . Normal   mg/g . . . . . . . Microalbuminuria  >300 mg/g . . . . . . . . Clinical Proteinuria       ASSESSMENT AND PLAN:      Teresa was seen today for follow up and medication reconciliation.    Diagnoses and all orders for this visit:    Type 2 diabetes mellitus with complication, with long-term current use of insulin (H).  Type 2 diabetes on insulin.  A1c is trending upwards.  Is only receiving minimal mealtime insulin, and only if sugars are above 200.  We will have her continue her Lantus 60 units, as well as her sliding scale insulin if mealtime sugars are elevated, but will restart the GLP-1 and use by tracee as this is the medication approved by her insurance.  She will come back if she needs further instruction how to use the pen.  They this will also help from a weight loss standpoint.  Encouraged her to continue to be active and work hard with physical therapy.  -     Hemoglobin A1c ([facility])  -     Basic Metabolic Panel ([facility])  -     Lipid Panel ([facility])  -     Microalbumin Creatinine Ratio Random Ur ([facility])  -     exenatide ER (BYDUREON) 2 MG pen; Inject 2 mg Subcutaneous every 7 days    Mild persistent asthma with acute exacerbation, Seasonal allergies,Personal history of tobacco use, presenting hazards to health,  Marijuana use, continuous.  Breathing has been somewhat worse since the allergy season restarted.  Will restart her Claritin.  Continue with regular inhalers.  Encourage smoking cessation, but she does not feel she is ready at this time.  We will continue to work on this.  Discussed that this would be important to do prior to having her ventral hernia repaired.  -     loratadine (CLARITIN) 10 MG tablet; Take  1 tablet (10 mg) by mouth daily    Hydradenitis.  Having episodes of this.  Will refill clindamycin gel to use daily.  -     clindamycin (CLEOCIN T) 1 % external solution; Apply topically 2 times daily To underarms and groin area.    Mixed conductive and sensorineural hearing loss of right ear, unspecified hearing status on contralateral side.  Patient is concerned with hearing loss.  Will do an audiology referral.  -     AUDIOLOGY ADULT REFERRAL; Future    Amputation of leg (H).  History of previous amputation.  Stump appears to be healing better.  Will place referral for physical therapy to learn how to use her prosthetic.  Has been much more social recently, and is excited to get out more with her new leg.  -     Cancel: PHYSICAL THERAPY REFERRAL; Future  -     PHYSICAL THERAPY REFERRAL; Future    Acute pain of right shoulder.  Pain in shoulders, likely through overuse from being out in her wheelchair.  Consistent with rotator cuff irritation.  Will refer for physical therapy.  While she was using her prosthetic I have a feeling she will still spend much time in her wheelchair, and exercises will be helpful for this.  Okay to use some additional ibuprofen 200 to 400 mg 1-2 times a day as needed.  -     PHYSICAL THERAPY REFERRAL; Future    Excessive bleeding in premenopausal period.  Has heavy periods.  Double given today.  She is not sexually active, uses it only for menorrhagia.  It was appropriate to do this without the 2-week waiting.  As we had difficulty getting the dates of her last injection in [facility].  -     medroxyPROGESTERone (DEPO-PROVERA) injection 150 mg    Phantom limb pain (H), Generalized anxiety disorder, borderline personality disorder, agoraphobia.   Mental health symptoms have been improving.  Medication list updated to reflect the changes made by her psychiatrist, which include discontinuation of Benadryl, hydroxyzine, and recommendations to stop Topamax.  She is only taking 25 mg  once daily with Topamax, so we will stop that today.  Cymbalta might be better for phantom limb pain than Effexor, but Effexor has been effective for her mental health.  Patient is open to doing a pain consult if needed in the future for this.  We will continue with the gabapentin, and her Effexor as prescribed by psychiatry.    Benign essential hypertension.  Blood pressure well controlled today.  Continue lisinopril.      I spent 50 minutes with the patient greater than 50% in counseling coronation care.    Tyra Bullock MD      Patient Instructions   For blood pressure:    Amlodipine in AM  Lisinopril at night  Metoprolol both AM and night.      STOP:  Hydroxyzine (anxiety medicine)  Topamax  (Headache Medicine)  Ropinirole/Mirapex  (leg medication)    Start:  Ibuprofen:  400mg 2x per day as needed for shoulder and leg pain.    Make sure to take with food!  Restart allergy medications.  (pill, nose sprays)  Cream for the armpits.  (Clindamycin)  Use after shower and in the morning.  Use a deodorant NOT antiperspirant.    Once weekly diabetes medication, so hopefully will not have to do meal time, (but will have available in case you need it!)    Physical therapy for leg and right shoulder.      Audiology appt for the hearing.      Depo shot.      Dr. Bullock to call Dr. Georges about trying Cymbalta instead of Effexor for phantom limb pain.      Stop in lab for blood tests.          Follow up in 6 weeks  for recheck of multiple issues.      Tyra Bullock MD

## 2019-08-30 NOTE — NURSING NOTE
Clinic Administered Medication Documentation    MEDICATION LIST:   Depo Provera Documentation    Prior to injection, verified patient identity using patient's name and date of birth. Medication was administered. Please see MAR and medication order for additional information.     BP: 117/75    LAST PAP/EXAM: No results found for: PAP  URINE HCG:not indicated    NEXT INJECTION DUE: 11/15/19 - 11/29/19    Was entire vial of medication used? Yes  Vial/Syringe: Single dose vial  Expiration Date:  10/20    Next Depo due between November 15, 2019- December 13, 2019

## 2019-08-30 NOTE — PATIENT INSTRUCTIONS
For blood pressure:    Amlodipine in AM  Lisinopril at night  Metoprolol both AM and night.      STOP:  Hydroxyzine (anxiety medicine)  Topamax  (Headache Medicine)  Ropinirole/Mirapex  (leg medication)    Start:  Ibuprofen:  400mg 2x per day as needed for shoulder and leg pain.    Make sure to take with food!  Restart allergy medications.  (pill, nose sprays)  Cream for the armpits.  (Clindamycin)  Use after shower and in the morning.  Use a deodorant NOT antiperspirant.    Once weekly diabetes medication, so hopefully will not have to do meal time, (but will have available in case you need it!)    Physical therapy for leg and right shoulder.      Audiology appt for the hearing.      Depo shot.      Dr. Bullock to call Dr. Georges about trying Cymbalta instead of Effexor for phantom limb pain.      Stop in lab for blood tests.      09/03/19  PHYSICAL THERAPY REFERRAL   Both PT referrals:   Demographics and referral for Physical Therapy faxed to [facility] at [phone], they will contact patient.     [facility]  Phone: [phone]  Fax: [phone]    OTOLARYNGOLOGY REFERRAL   [facility]  Phone: [phone]  Fax: [phone]    Referral, demographics and office note faxed to [phone], they will contact patient to schedule.     Maura Galvan

## 2019-09-02 NOTE — RESULT ENCOUNTER NOTE
Teresa Perez-    Here is a copy of your lab results.  Your hemoglobin A1c is increasing.  I think the new medication will help with this.  If you have any questions or concerns about how to use the new pen, please let us know.  Your cholesterol looks good.  Your kidney tests are excellent.  I look forward to hearing how things do with the new prosthetic!  Please call the clinic at [phone] if you have any questions.      Tyra Bullock MD    Please send results to patient.

## 2019-09-03 ENCOUNTER — AMBULATORY - HEALTHEAST (OUTPATIENT)
Dept: ADMINISTRATIVE | Facility: REHABILITATION | Age: 49
End: 2019-09-03

## 2019-09-03 DIAGNOSIS — S88.919A AMPUTATION OF LEG (H): ICD-10-CM

## 2019-09-03 DIAGNOSIS — M25.511 ACUTE PAIN OF RIGHT SHOULDER: ICD-10-CM

## 2019-09-04 ENCOUNTER — AMBULATORY - HEALTHEAST (OUTPATIENT)
Dept: ADMINISTRATIVE | Facility: CLINIC | Age: 49
End: 2019-09-04

## 2019-09-04 DIAGNOSIS — H91.93 DIFFICULTY HEARING, BILATERAL: ICD-10-CM

## 2019-09-11 ENCOUNTER — DOCUMENTATION ONLY (OUTPATIENT)
Dept: FAMILY MEDICINE | Facility: CLINIC | Age: 49
End: 2019-09-11

## 2019-09-11 ENCOUNTER — TELEPHONE (OUTPATIENT)
Dept: FAMILY MEDICINE | Facility: CLINIC | Age: 49
End: 2019-09-11

## 2019-09-11 DIAGNOSIS — G47.33 OSA (OBSTRUCTIVE SLEEP APNEA): Primary | ICD-10-CM

## 2019-09-11 NOTE — TELEPHONE ENCOUNTER
Pt requesting prescription from Dr. Bullock for new CPAP mask and tubing. Requesting prescription be faxed to [facility] (fax #[phone]). Note routed to Dr. Bullock. ./[email or web address]

## 2019-09-11 NOTE — PROGRESS NOTES
To be completed in Nursing note:    Please reference list for forms that require a visit for completion.  Please remind patients that providers are given 3-5 business days to complete and return forms.      Form type: [facility] MEDICAL STATEMENT    Date form received: 19    Date form completed by Physician: 19    How was form returned to patient (mailed, faxed, or at  for patient to ): Faxed back to [phone]    Date form mailed/faxed/left at  for patient and sent to HIM for [email or web address]      Once form is left for patient, faxed, or mailed PCS will then close the documentation only encounter.

## 2019-09-12 ENCOUNTER — TELEPHONE (OUTPATIENT)
Dept: FAMILY MEDICINE | Facility: CLINIC | Age: 49
End: 2019-09-12

## 2019-09-12 NOTE — TELEPHONE ENCOUNTER
[facility] phone call message- general phone call:    Reason for call: She needs a new sleep apnea machine sent to get.    Action desired: call back.    Return call needed: Yes    OK to leave a message on voice mail? Yes    Advised patient to response may take up to 2 business days: Yes    Primary language: English      needed? No    Call taken on September 12, 2019 at 8:20 AM by Lo Lao

## 2019-09-13 ENCOUNTER — TRANSFERRED RECORDS (OUTPATIENT)
Dept: HEALTH INFORMATION MANAGEMENT | Facility: CLINIC | Age: 49
End: 2019-09-13

## 2019-09-13 NOTE — TELEPHONE ENCOUNTER
Emanuel BARCLAY verified that it is CPAP mask and tubing that is needed -- NOT machine as is indicated in a subsequent telephone message.  Dr. Bullock is out of the clinic currently, so I filled this as precepting provider.    Lis Vidal MD

## 2019-09-16 ENCOUNTER — TELEPHONE (OUTPATIENT)
Dept: FAMILY MEDICINE | Facility: CLINIC | Age: 49
End: 2019-09-16

## 2019-09-16 NOTE — TELEPHONE ENCOUNTER
Had appointment cancelled so it did not count as a no-show against the patient.  Deana Castellanos, CMA

## 2019-09-16 NOTE — TELEPHONE ENCOUNTER
[facility] phone call message-patient reporting a symptom:     Symptom: Glucose running about 397-480 in am and also evening     Same Day Visit Offered: no, pt talk to triage    Additional comments: pt tallked to triage    OK to leave message on voice mail? No    Primary language: English      needed? No    Call taken on September 16, 2019 at 11:16 AM by Alix Moeller

## 2019-09-16 NOTE — TELEPHONE ENCOUNTER
"Pt states her sugars have been running in the 300-400s for a couple days. Stated she feels like she has infection in her lung, endorses cough. Pt also states her brain \"feels weird,\" and she has been dizzy intermittently for a few days with blurred vision and intermittent headaches. Denies headache at this time.     Pt requesting instructions on changing her insulin dose. Informed pt I was unable to do so over the phone, and requested she come in for an appt today. Pt agreeable, transferred to  to schedule.    Pt advised to call back or call 911 if she experienced a sudden, worse headache of her life, increased in dizziness, passing out, or other concerning symptoms.     Note routed to Dr. Sung, Dr. Bullock. ./LR  "

## 2019-09-16 NOTE — TELEPHONE ENCOUNTER
Pt states she is at the hospital now and will be discharging soon. She will call back tomorrow to make an appointment. She wanted to let the RN and Deana know

## 2019-09-20 ENCOUNTER — OFFICE VISIT (OUTPATIENT)
Dept: PHARMACY | Facility: PHYSICIAN GROUP | Age: 49
End: 2019-09-20
Payer: COMMERCIAL

## 2019-09-20 ENCOUNTER — OFFICE VISIT (OUTPATIENT)
Dept: FAMILY MEDICINE | Facility: CLINIC | Age: 49
End: 2019-09-20
Payer: MEDICARE

## 2019-09-20 VITALS
TEMPERATURE: 98.6 F | OXYGEN SATURATION: 96 % | SYSTOLIC BLOOD PRESSURE: 130 MMHG | DIASTOLIC BLOOD PRESSURE: 85 MMHG | RESPIRATION RATE: 16 BRPM | HEART RATE: 112 BPM

## 2019-09-20 VITALS
SYSTOLIC BLOOD PRESSURE: 130 MMHG | HEART RATE: 112 BPM | TEMPERATURE: 98.6 F | RESPIRATION RATE: 16 BRPM | OXYGEN SATURATION: 96 % | DIASTOLIC BLOOD PRESSURE: 85 MMHG

## 2019-09-20 DIAGNOSIS — Z79.4 TYPE 2 DIABETES MELLITUS WITH COMPLICATION, WITH LONG-TERM CURRENT USE OF INSULIN (H): ICD-10-CM

## 2019-09-20 DIAGNOSIS — K43.2 RECURRENT VENTRAL HERNIA: ICD-10-CM

## 2019-09-20 DIAGNOSIS — Z79.4 TYPE 2 DIABETES MELLITUS WITH COMPLICATION, WITH LONG-TERM CURRENT USE OF INSULIN (H): Primary | ICD-10-CM

## 2019-09-20 DIAGNOSIS — E11.8 TYPE 2 DIABETES MELLITUS WITH COMPLICATION, WITH LONG-TERM CURRENT USE OF INSULIN (H): ICD-10-CM

## 2019-09-20 DIAGNOSIS — E11.8 TYPE 2 DIABETES MELLITUS WITH COMPLICATION, WITH LONG-TERM CURRENT USE OF INSULIN (H): Primary | ICD-10-CM

## 2019-09-20 LAB — GLUCOSE CASUAL: 268 MG/DL (ref 51–200)

## 2019-09-20 PROCEDURE — 99207 ZZC NO CHARGE LOS: CPT | Performed by: PHARMACIST

## 2019-09-20 RX ORDER — BUDESONIDE AND FORMOTEROL FUMARATE DIHYDRATE 160; 4.5 UG/1; UG/1
2 AEROSOL RESPIRATORY (INHALATION) 2 TIMES DAILY
COMMUNITY
Start: 2019-09-20 | End: 2020-06-03

## 2019-09-20 NOTE — PROGRESS NOTES
[facility]         SUBJECTIVE       Teresa Perez is a 48 year old female with a PMH of     Patient Active Problem List   Diagnosis     Health Care Home     Acute peptic ulcer     Other allergy, other than to medicinal agents     Bipolar disorder (H)     Bulging lumbar disc     Cervical dysplasia     Common migraine without aura     Constipation     Dwarfism     Familial hypercholesterolemia     Insomnia     Low back pain     Intermittent asthma     Leg pain, bilateral     Smoking     Degeneration of thoracic or thoracolumbar intervertebral disc     LOMAS (nonalcoholic steatohepatitis)     Disease of lung     Hemorrhoids     Parotid mass     Endometriosis     NNAMDI (obstructive sleep apnea)     History of total right knee replacement     Lateral epicondylitis     Impingement syndrome, shoulder, left     Hx of total knee replacement, left     Chronic pain syndrome     Cough     Borderline personality disorder (H)     Moderate recurrent major depression (H)     Recurrent ventral hernia     Type 2 diabetes mellitus with complication, with long-term current use of insulin (H)     Essential hypertension     Nonruptured cerebral aneurysm     Cerebrovascular accident (CVA) due to embolism (H)     Amputation of leg (H)     CKD (chronic kidney disease) stage 5, GFR less than 15 ml/min (H)      presenting to clinic today for follow-up after ED visit on September 16.  She presented to the emergency department for evaluation of generalized weakness and fatigue, which she was concerned that she had had a stroke.  She had no focal neuro weakness or findings.  Per ED note patient was found to have a mild leukocytosis and no evidence of infection on chest x-ray and UA.  Patient was treated with fluids for mild hyperglycemia.  Additionally she was given morphine for pain related to her hernia.  Patient says ED doctors thought her symptoms were most likely related to a viral illness.    Since her time in the  emergency department, she has had little improvement in symptoms.  Continues to endorse dizziness, productive cough, subjective fevers and feeling flushed, and headaches.  She notes that her headaches feels a bit different than her migraines that she experiences.  The headaches come and go and are primarily on the left side.  In addition to the symptoms the patient notes pain related to her abdominal hernia and increased urinary frequency.    Patient denies focal weakness, chest pain, shortness of breath, nausea, vomiting, constipation, diarrhea, urinary pains.    She reports that her sugars have been high in the last week.  She says as high as 300-400.  In the emergency department her glucose level was about 250.  She describes how she uses her insulin around mealtimes.  She checks her glucose levels before mealtime, and if it is less than 200 then the patient does not use any insulin.  If her blood glucose is more than 200 but less than 250, she uses 8 units.  If it is above 250 but less than 300, she uses 10 units.  This is different than how her sliding scale is prescribed by her primary care provider, Dr. Bullock.    PMH, Medications and Allergies were reviewed and updated as needed.      ROS:  See HPI    Current Outpatient Medications   Medication Sig Dispense Refill     acetaminophen (TYLENOL) 500 MG tablet Take 2 tablets (1,000 mg) by mouth 3 times daily as needed for mild pain 180 tablet 3     albuterol (PROVENTIL) (2.5 MG/3ML) 0.083% neb solution Take 1 vial (2.5 mg) by nebulization every 6 hours as needed for shortness of breath / dyspnea or wheezing 30 vial 1     amLODIPine (NORVASC) 10 MG tablet Take 1 tablet (10 mg) by mouth daily 90 tablet 3     azelastine (OPTIVAR) 0.05 % ophthalmic solution Apply 1 drop to eye 2 times daily 1 Bottle 11     baclofen (LIORESAL) 20 MG tablet Take 1 tablet (20 mg) by mouth 3 times daily 270 tablet 1     blood glucose (NO BRAND SPECIFIED) lancets standard Use to test  blood sugar 3 times daily or as directed. 100 each 11     blood glucose monitoring (NO BRAND SPECIFIED) test strip Use to test blood sugars 3 times daily or as directed 100 strip prn     budesonide-formoterol (SYMBICORT) 160-4.5 MCG/ACT Inhaler Inhale 2 puffs into the lungs 2 times daily       clindamycin (CLEOCIN T) 1 % external solution Apply topically 2 times daily To underarms and groin area. 60 mL 11     clopidogrel (PLAVIX) 75 MG tablet Take 1 tablet (75 mg) by mouth daily 90 tablet 1     EPINEPHrine (EPIPEN/ADRENACLICK/OR ANY BX GENERIC EQUIV) 0.3 MG/0.3ML injection 2-pack Inject 0.3 mLs (0.3 mg) into the muscle once as needed for anaphylaxis 2 mL 0     exenatide ER (BYDUREON) 2 MG pen Inject 2 mg Subcutaneous every 7 days 12 each 3     fluticasone (FLONASE) 50 MCG/ACT nasal spray Spray 2 sprays into both nostrils daily 9.9 g 11     gabapentin (NEURONTIN) 600 MG tablet Take 2 tabs three times daily. 180 tablet 3     insulin glargine (BASAGLAR KWIKPEN) 100 UNIT/ML pen Inject 70 Units Subcutaneous daily 15 mL 11     insulin lispro (HUMALOG PEN) 100 UNIT/ML pen Take 8 Units daily with meals, plus 2 Units for every 50 above 200. 15 mL 11     insulin pen needle (B-D U/F) 31G X 5 MM miscellaneous Use 4 daily or as directed. 100 each 11     lisinopril (PRINIVIL/ZESTRIL) 40 MG tablet Take 1 tablet (40 mg) by mouth daily 90 tablet 1     loratadine (CLARITIN) 10 MG tablet Take 1 tablet (10 mg) by mouth daily 60 tablet 5     medroxyPROGESTERone (DEPO-PROVERA) 150 MG/ML IM injection Inject 1 mL (150 mg) into the muscle every 3 months 1 mL 3     metoprolol tartrate (LOPRESSOR) 25 MG tablet Take 1 tablet (25 mg) by mouth 2 times daily 180 tablet 3     nystatin (MYCOSTATIN) 679234 UNIT/GM external powder Apply topically as needed for other (moisture) 60 g 0     ondansetron (ZOFRAN) 4 MG tablet Take 1 tablet (4 mg) by mouth every 8 hours as needed for nausea 18 tablet 0     order for DME Equipment being ordered: CPAP mask  and tubing 1 each 0     order for DME Equipment being ordered: Nebulizer supplies for life. 1 Device 0     order for DME Adult nebulizer mask and tubing. 1 each 0     pravastatin (PRAVACHOL) 80 MG tablet Take 1 tablet (80 mg) by mouth daily 90 tablet 3     QUEtiapine (SEROQUEL) 300 MG tablet TAKE 1 TABLET BY MOUTH AT BEDTIME. INDICATIONS: MANIC PHASE OF MANIC-DEPRESSION -CARMEN DURAN RN       QUEtiapine (SEROQUEL) 50 MG tablet Take 50 mg by mouth 3 times daily       ranitidine (ZANTAC) 150 MG tablet Take 1 tablet (150 mg) by mouth 2 times daily 60 tablet 11     sodium chloride (OCEAN) 0.65 % nasal spray Use 3-4x daily 15 mL 1     SUMAtriptan (IMITREX) 100 MG tablet Take 1 tablet (100 mg) by mouth at onset of headache for migraine 9 tablet 5     venlafaxine (EFFEXOR-XR) 150 MG 24 hr capsule Take 2 capsules (300 mg) by mouth daily              OBJECTIVE:       Vitals:   Vitals:    09/20/19 1320   BP: 130/85   Pulse: 112   Resp: 16   Temp: 98.6  F (37  C)   TempSrc: Oral   SpO2: 96%     BMI: There is no height or weight on file to calculate BMI.    GEN: NAD, sitting in wheelchair  EYES: grossly normal to inspection, EOMI, normal conjunctivae/sclerae  HENT: MMM  NECK: no LAD, masses  RESP: CTAB, no w/r/r  CV: tachycardic, regular rhythm, nl S1/S2, no m/r/g, no peripheral edema  ABD: Left-sided abdominal hernia, non-reducible while patient is sitting in wheelchair.  Very tender to palpation.  MSK: no MSK defects noted  SKIN: no suspicious lesions or rashes  NEURO: no obvious focal deficits, normal strength and tone, mentation intact, speech normal  PSYCH: mentation appears normal, affect normal/bright          ASSESSMENT and PLAN:   Patient here for follow-up after going to the emergency department earlier this week because she thought she was having a stroke.  The patient's symptoms and work-up completed at the emergency department is most consistent with viral illness.  This may explain why her glucose has been running  a little high lately, and her fatigue.  Reassured patient that it may take a few weeks before she starts feeling better after a viral illness.  Reassured patient that her symptoms are not consistent with stroke.    (E11.8,  Z79.4) Type 2 diabetes mellitus with complication, with long-term current use of insulin (H)  (primary encounter diagnosis)  Comment: Has been experiencing hyperglycemia most likely secondary to a viral infection.  Glucose today was 268.  Advised patient to continue taking her insulin as she has historically.  It should be noted that the patient is taking her mealtime insulin differently than how it is prescribed.  Instead of taking 8 units of insulin before every meal, the patient only takes insulin if her sugars are higher than 200.   Plan: Glucose Casual ([facility]).  Insulin use before mealtime should be reviewed once the patient is feeling better.    (K43.2) Recurrent ventral hernia  Comment: No acute changes to the hernia.  Patient received morphine in the emergency department and says that it helps with the pain associated with the ventral hernia.   Plan: Follow up with Dr. Bullock regarding pain management.    The patient would also like to talk about her asthma symptoms.  Since she has not been experiencing any acute asthma related symptoms, I recommended that she follow-up with Dr. Bullock.    Return to clinic within 1 month for follow up of blood sugars, hernia, and asthma.  Patient was advised to return sooner if develops new or worsening symptoms.    Options for treatment and/or follow-up care were reviewed with the patient was actively involved in the decision making process. Patient verbalized understanding and was in agreement with the plan.    The patient was seen by and discussed with MD Madhuri Rizvi MD PGY1  [facility]  [phone]

## 2019-09-20 NOTE — PROGRESS NOTES
Preceptor attestation:  Vital signs reviewed: /85   Pulse 112   Temp 98.6  F (37  C) (Oral)   Resp 16   SpO2 96%     Patient seen, evaluated, and discussed with the resident.  I have verified the content of the note, which accurately reflects my assessment of the patient and the plan of care.    Supervising physician: Lis Vidal MD  [facility]

## 2019-09-20 NOTE — PROGRESS NOTES
SUBJECTIVE/OBJECTIVE:                Teresa Perez is a 48 year old female coming in for a TCM visit.  She was discharged from [facility] on 9/16/19 for dizziness (rule out stroke per patient).     Chief Complaint: Er follow up; worried about resolution of stroke.    Allergies/ADRs: Reviewed in Epic  Tobacco: 0-1 pack per day - is not interested in quitting Tobacco Cessation Action Plan: Information offered: Patient not interested at this time  Alcohol: none  PMH: Reviewed in Epic    Medication Adherence/Access:  no issues reported    Diabetes:  Pt currently taking basaglar and Humalog.  Was supposed to be taking exanetide but patient states there was an issue with it getting filled at the pharmacy. Pt is not experiencing side effects.  SMBG.   Ranges (patient reported): 300-400. States it is due to current viral infection.  Patient is not experiencing hypoglycemia  Recent symptoms of high blood sugar? none  ACEi/ARB: Yes: lisinopril 40mg daily.   Urine Albumin:   Lab Results   Component Value Date    UMALCR See Note. 08/30/2019      Aspirin: Not taking due to age <50 years    Today's Vitals: /85   Pulse 112   Temp 98.6  F (37  C) (Oral)   Resp 16   SpO2 96%     ASSESSMENT:                 Current medications were reviewed today.      Medication Adherence: good, no issues identified    Diabetes: Needs Improvement. Patient is not meeting A1c goal of < 7%. Aspirin therapy is not indicated in this patient due to age. Pt is not taking aspirin.. Called pharmacy and straightened out issue with Bydureon.  Patient will be able to       PLAN:                  Post Discharge Medication Reconciliation Status: discharge medications reconciled, continue medications without change.    For future visit-    Follow up Bydureon adherence and efficacy.    I spent 20 minutes with this patient today. Dr. Paige was provided the recommendations above  in clinic today and Dr. Ronnie Jean was available for supervision  during this visit and is the authorizing prescriber for this visit through the pharmacist collaborative practice agreement.    Will follow up in 2 - 4 weeks.    The patient was given a summary of these recommendations as an after visit summary.    Alejandra Olson Pharm.D.

## 2019-09-23 NOTE — RESULT ENCOUNTER NOTE
Hello November,    Please call the following patient with the results below. Thank you!    Chela Moore,    I hope you're well. I wanted to communicate with you the results of the tests that we did.     The laboratory results show your glucose level was elevated at 268. As we discussed during our visit, your sugars may be elevated due to your viral illness. Please follow up with Dr. Bullock once you are feeling better to check your re-check your sugars. If your symptoms do not get better in the next week or you start feeling worse, please make an appointment in clinic sooner. Let me know if you have any other questions or concerns.     Thank you!  Madhuri Paige MD PGY1

## 2019-09-26 ENCOUNTER — COMMUNICATION - HEALTHEAST (OUTPATIENT)
Dept: AUDIOLOGY | Facility: CLINIC | Age: 49
End: 2019-09-26

## 2019-09-30 ENCOUNTER — TELEPHONE (OUTPATIENT)
Dept: FAMILY MEDICINE | Facility: CLINIC | Age: 49
End: 2019-09-30

## 2019-09-30 DIAGNOSIS — Z79.4 TYPE 2 DIABETES MELLITUS WITH HYPERGLYCEMIA, WITH LONG-TERM CURRENT USE OF INSULIN (H): Primary | ICD-10-CM

## 2019-09-30 DIAGNOSIS — E11.65 TYPE 2 DIABETES MELLITUS WITH HYPERGLYCEMIA, WITH LONG-TERM CURRENT USE OF INSULIN (H): Primary | ICD-10-CM

## 2019-09-30 NOTE — TELEPHONE ENCOUNTER
Following copied from result note:    Notes recorded by Bryant No CMA on 9/30/2019 at 11:23 AM CDT  Pt states she is not getting better and her sugars are still high. Routed to Dr. Bullock for FYI no action needed. JUNIOR Rodrigues    ------    Notes recorded by Bryant No CMA on 9/30/2019 at 11:13 AM CDT  Called and notified pt of results. Pt understood and scheduled a f/u appt. W/ Dr. Bullock on 10/15/19. Bryant PACHECO    NEW PLAN:        Patient is schedule for follow up, but given that she is feeling unwell and sugars remain high, we should increase her insulin.      Right now she is taking 70 Units of basaglar and the Bydureon, as well as novolog.    I would like her to take the Novolog as follows with her meals.      If BS <100, no Novolog  IF -150, take 4 Units  If -200, take 6 Units  If -250, take 8 Units  If -300, take 10 Units  If -350, take 12 Units  If -400, take 14 Units  If BS above 400, take 16 Units.     I also want to make sure that she keeps the 10/15 appt, and let us know next week if BS remain consistently above 200.       Vicky, can you call patient and let her know?    Let me know if you have further questions.      Tyra Bullock MD

## 2019-10-01 ENCOUNTER — TELEPHONE (OUTPATIENT)
Dept: FAMILY MEDICINE | Facility: CLINIC | Age: 49
End: 2019-10-01

## 2019-10-01 DIAGNOSIS — S88.919A AMPUTATION OF LEG (H): ICD-10-CM

## 2019-10-01 DIAGNOSIS — Z79.4 TYPE 2 DIABETES MELLITUS WITH HYPERGLYCEMIA, WITH LONG-TERM CURRENT USE OF INSULIN (H): Primary | ICD-10-CM

## 2019-10-01 DIAGNOSIS — E11.65 TYPE 2 DIABETES MELLITUS WITH HYPERGLYCEMIA, WITH LONG-TERM CURRENT USE OF INSULIN (H): Primary | ICD-10-CM

## 2019-10-01 DIAGNOSIS — L84 PRE-ULCERATIVE CALLUSES: ICD-10-CM

## 2019-10-01 NOTE — TELEPHONE ENCOUNTER
[facility] phone call message- general phone call:    Reason for call: Pt is calling to speak with nurse regarding her blood sugars and dm.    Return call needed: Yes    OK to leave a message on voice mail? Yes    Primary language: English      needed? No    Call taken on October 1, 2019 at 12:21 PM by Grisel Flores-Cardona

## 2019-10-01 NOTE — TELEPHONE ENCOUNTER
Spoke with pt, aware Dr. Bullock would like to change her sliding scale as sugars remain high. Pt understood, wanted to call back later today to discuss as she was busy at the time of call. ./LR

## 2019-10-04 ENCOUNTER — TELEPHONE (OUTPATIENT)
Dept: FAMILY MEDICINE | Facility: CLINIC | Age: 49
End: 2019-10-04

## 2019-10-04 NOTE — TELEPHONE ENCOUNTER
Relayed following sliding scale to pt per Dr. Bullock:     If BS <100, no Humalog  IF -150, take 4 Units  If -200, take 6 Units  If -250, take 8 Units  If -300, take 10 Units  If -350, take 12 Units  If -400, take 14 Units  If BS above 400, take 16 Units.     Pt was able to repeat scale back correctly. All questions answered. Letter sent today with instructions as well.    Pt instructed to check sugar frequently during this change and call the clinic if they remained over 200 or went below 70. Pt verbalized understanding.    Pt also requesting an order for Diabetic shoes be sent to [facility] (fax [phone]). Note routed to Dr. Bullock. ./KRISS

## 2019-10-09 ENCOUNTER — OFFICE VISIT (OUTPATIENT)
Dept: FAMILY MEDICINE | Facility: CLINIC | Age: 49
End: 2019-10-09
Payer: MEDICARE

## 2019-10-09 VITALS
SYSTOLIC BLOOD PRESSURE: 127 MMHG | HEART RATE: 102 BPM | RESPIRATION RATE: 20 BRPM | DIASTOLIC BLOOD PRESSURE: 82 MMHG | OXYGEN SATURATION: 97 % | TEMPERATURE: 99.3 F

## 2019-10-09 DIAGNOSIS — H10.31 ACUTE CONJUNCTIVITIS OF RIGHT EYE, UNSPECIFIED ACUTE CONJUNCTIVITIS TYPE: Primary | ICD-10-CM

## 2019-10-09 DIAGNOSIS — J06.9 VIRAL URI: ICD-10-CM

## 2019-10-09 DIAGNOSIS — J45.30 MILD PERSISTENT ASTHMA WITHOUT COMPLICATION: ICD-10-CM

## 2019-10-09 RX ORDER — POLYMYXIN B SULFATE AND TRIMETHOPRIM 1; 10000 MG/ML; [USP'U]/ML
1-2 SOLUTION OPHTHALMIC EVERY 4 HOURS
Qty: 10 ML | Refills: 0 | Status: SHIPPED | OUTPATIENT
Start: 2019-10-09 | End: 2019-12-20

## 2019-10-09 RX ORDER — ALBUTEROL SULFATE 0.83 MG/ML
2.5 SOLUTION RESPIRATORY (INHALATION) EVERY 6 HOURS PRN
Qty: 30 VIAL | Refills: 1 | Status: SHIPPED | OUTPATIENT
Start: 2019-10-09 | End: 2020-04-21

## 2019-10-09 ASSESSMENT — ANXIETY QUESTIONNAIRES
1. FEELING NERVOUS, ANXIOUS, OR ON EDGE: SEVERAL DAYS
7. FEELING AFRAID AS IF SOMETHING AWFUL MIGHT HAPPEN: NOT AT ALL
5. BEING SO RESTLESS THAT IT IS HARD TO SIT STILL: SEVERAL DAYS
6. BECOMING EASILY ANNOYED OR IRRITABLE: NOT AT ALL
IF YOU CHECKED OFF ANY PROBLEMS ON THIS QUESTIONNAIRE, HOW DIFFICULT HAVE THESE PROBLEMS MADE IT FOR YOU TO DO YOUR WORK, TAKE CARE OF THINGS AT HOME, OR GET ALONG WITH OTHER PEOPLE: SOMEWHAT DIFFICULT
3. WORRYING TOO MUCH ABOUT DIFFERENT THINGS: SEVERAL DAYS
2. NOT BEING ABLE TO STOP OR CONTROL WORRYING: MORE THAN HALF THE DAYS
GAD7 TOTAL SCORE: 7

## 2019-10-09 ASSESSMENT — PATIENT HEALTH QUESTIONNAIRE - PHQ9
5. POOR APPETITE OR OVEREATING: MORE THAN HALF THE DAYS
SUM OF ALL RESPONSES TO PHQ QUESTIONS 1-9: 8

## 2019-10-09 ASSESSMENT — PAIN SCALES - GENERAL: PAINLEVEL: WORST PAIN (10)

## 2019-10-09 NOTE — PROGRESS NOTES
Preceptor Attestation:   Patient seen, evaluated and discussed with the resident. I have verified the content of the note, which accurately reflects my assessment of the patient and the plan of care.   Supervising Physician:  Abrahan Guzman MD.

## 2019-10-09 NOTE — PATIENT INSTRUCTIONS
Eye irritation  - Start Polytrim drops every 4-6 hours for 5-7 days   - If noticing worsening pain, redness or swelling, vision changes or light sensitivity--> call the clinic and be seen right away    Throat  - Can try cepacol to help with pain in the back of your throat  - Can try salt water rinses to help with ulcers  - Call the clinic if pain worsens and makes it challenging for you to eat or drink fluids

## 2019-10-09 NOTE — NURSING NOTE
Chief Complaint   Patient presents with     RECHECK     [place] [facility] (Right) and facial swelling for the last 2 days      Mj Damon, CMA

## 2019-10-09 NOTE — PROGRESS NOTES
SUBJECTIVE       Teresa Perez is a 48 year old  female with a PMH significant for   Patient Active Problem List   Diagnosis     Health Care Home     Acute peptic ulcer     Other allergy, other than to medicinal agents     Bipolar disorder (H)     Bulging lumbar disc     Cervical dysplasia     Common migraine without aura     Constipation     Dwarfism     Familial hypercholesterolemia     Insomnia     Low back pain     Intermittent asthma     Leg pain, bilateral     Smoking     Degeneration of thoracic or thoracolumbar intervertebral disc     LOMAS (nonalcoholic steatohepatitis)     Disease of lung     Hemorrhoids     Parotid mass     Endometriosis     NNAMDI (obstructive sleep apnea)     History of total right knee replacement     Lateral epicondylitis     Impingement syndrome, shoulder, left     Hx of total knee replacement, left     Chronic pain syndrome     Cough     Borderline personality disorder (H)     Moderate recurrent major depression (H)     Recurrent ventral hernia     Type 2 diabetes mellitus with complication, with long-term current use of insulin (H)     Essential hypertension     Nonruptured cerebral aneurysm     Cerebrovascular accident (CVA) due to embolism (H)     Amputation of leg (H)     CKD (chronic kidney disease) stage 5, GFR less than 15 ml/min (H)        who presents with right eye redness and pain that started a few days ago. Initially began with sore throat. Does have sore throat and productive cough with mostly clear sputum. Right eye redness and irritation is stable. She did have right eye discharge this morning that made it difficult to open her right eye. She wears glasses, but no contacts. Has had tactile fevers. Headaches have been worse with current symptoms. No significant congestion. She is eating less because of pain with swallowing, but doing okay with fluids. Not sleeping as well with current illness. Feels that her mood is stable. No recent known sick contacts.  History of tonsillectomy many years ago.     Tylenol and ibuprofen to help with symptoms with limited improvement in symptoms.           REVIEW OF SYSTEMS     See HPI          OBJECTIVE     Vitals:    10/09/19 1248   BP: 127/82   Pulse: 102   Resp: 20   Temp: 99.3  F (37.4  C)   TempSrc: Oral   SpO2: 97%     There is no height or weight on file to calculate BMI.    Gen:  NAD, good color, appears well hydrated  Head: Normocephalic and atraumatic  Eyes: Right conjunctival injection and approximately 3mm papule on upper lateral right eyelid, not visible on external eyelid. No significant discharge or drainage. Does have right infraorbital edema, but no erythema or other evidence of infection. Left eye normal. PERRLA and EOMI bilaterally.   Ears: TMs normal color and landmarks  Nose: No significant  congestion  Throat: oropharynx pink and moist, no tonsils. No significant erythema in posterior pharynx. Does have oral ulcers on upper gum line.No other ulcerations noted on exam.    Neck: supple with shotty lymphadenopathy  CV:  RRR  - no murmurs, age appropriate rate. Rate improved from initial vitals.   Pulm:  CTAB, no wheezes/rales/rhonchi, good air entry   Skin: No rash      No results found for this or any previous visit (from the past 24 hour(s)).        ASSESSMENT AND PLAN      Teresa was seen today for recheck.    Diagnoses and all orders for this visit:    Acute conjunctivitis of right eye, unspecified acute conjunctivitis type  Exam findings consistent with right eye conjunctivitis and likely internal stye. Less likely to be chalazion as painful and more erythematous than would typically expect. Recommended warm compressed and polytrim drops for 5-7 days. No evidence of periorbital or preseptal cellulitis on exam today, but did discuss return precautions. Patient has follow up with Dr. Bullock already scheduled on 10/15/19, but will return if symptoms worsen.    -     trimethoprim-polymyxin b (POLYTRIM) 30433-8.1  UNIT/ML-% ophthalmic solution; Place 1-2 drops into the right eye every 4 hours    Viral URI  Symptoms most likely caused by viral etiology, discussed supportive cares. Afebrile on exam today. No evidence of AOM or pneumonia on exam. No ulcers in posterior pharynx. Able to maintain adequate oral intake at this time. Ordered Cepacol to help with posterior throat pain. Discussed that she could try salt water rinses to help with discomfort related to oral ulcers, but that if this makes pain worse, would recommend discontinuing. Discussed return precautions.   -     Menthol (CEPACOL SORE THROAT) 5.4 MG LOZG; Take 1 lozenge by mouth every 3 hours as needed (Sore throat)    Mild persistent asthma without complication  Patient currently only has symbicort at home. Requesting refill of albuterol. Ordered today. Lungs clear to auscultation on exam and no evidence of acute exacerbation with current illness.   -     albuterol (PROVENTIL) (2.5 MG/3ML) 0.083% neb solution; Take 1 vial (2.5 mg) by nebulization every 6 hours as needed for shortness of breath / dyspnea or wheezing          Carey Mills MD, PGY2  [facility]    Patient discussed with Dr. Abrahan Guzman  who agrees with the assessment and plan.

## 2019-10-10 ASSESSMENT — ANXIETY QUESTIONNAIRES: GAD7 TOTAL SCORE: 7

## 2019-10-14 DIAGNOSIS — E11.65 TYPE 2 DIABETES MELLITUS WITH HYPERGLYCEMIA, WITH LONG-TERM CURRENT USE OF INSULIN (H): Primary | ICD-10-CM

## 2019-10-14 DIAGNOSIS — Z79.4 TYPE 2 DIABETES MELLITUS WITH HYPERGLYCEMIA, WITH LONG-TERM CURRENT USE OF INSULIN (H): Primary | ICD-10-CM

## 2019-10-21 DIAGNOSIS — I10 BENIGN ESSENTIAL HYPERTENSION: ICD-10-CM

## 2019-10-21 DIAGNOSIS — Z79.4 TYPE 2 DIABETES MELLITUS WITH HYPERGLYCEMIA, WITH LONG-TERM CURRENT USE OF INSULIN (H): Primary | ICD-10-CM

## 2019-10-21 DIAGNOSIS — E11.65 TYPE 2 DIABETES MELLITUS WITH HYPERGLYCEMIA, WITH LONG-TERM CURRENT USE OF INSULIN (H): Primary | ICD-10-CM

## 2019-10-22 ENCOUNTER — OFFICE VISIT (OUTPATIENT)
Dept: FAMILY MEDICINE | Facility: CLINIC | Age: 49
End: 2019-10-22
Payer: MEDICARE

## 2019-10-22 ENCOUNTER — OFFICE VISIT (OUTPATIENT)
Dept: PHARMACY | Facility: PHYSICIAN GROUP | Age: 49
End: 2019-10-22
Payer: COMMERCIAL

## 2019-10-22 VITALS
DIASTOLIC BLOOD PRESSURE: 83 MMHG | OXYGEN SATURATION: 100 % | WEIGHT: 220.4 LBS | BODY MASS INDEX: 41.08 KG/M2 | TEMPERATURE: 98.7 F | SYSTOLIC BLOOD PRESSURE: 126 MMHG | HEART RATE: 96 BPM | RESPIRATION RATE: 18 BRPM

## 2019-10-22 DIAGNOSIS — I10 ESSENTIAL HYPERTENSION: ICD-10-CM

## 2019-10-22 DIAGNOSIS — I10 BENIGN ESSENTIAL HYPERTENSION: ICD-10-CM

## 2019-10-22 DIAGNOSIS — Z79.4 TYPE 2 DIABETES MELLITUS WITH HYPERGLYCEMIA, WITH LONG-TERM CURRENT USE OF INSULIN (H): ICD-10-CM

## 2019-10-22 DIAGNOSIS — R10.84 ABDOMINAL PAIN, GENERALIZED: ICD-10-CM

## 2019-10-22 DIAGNOSIS — J45.20 MILD INTERMITTENT ASTHMA WITHOUT COMPLICATION: ICD-10-CM

## 2019-10-22 DIAGNOSIS — K21.9 GASTROESOPHAGEAL REFLUX DISEASE, ESOPHAGITIS PRESENCE NOT SPECIFIED: ICD-10-CM

## 2019-10-22 DIAGNOSIS — R05.9 COUGH: Primary | ICD-10-CM

## 2019-10-22 DIAGNOSIS — J30.2 SEASONAL ALLERGIES: ICD-10-CM

## 2019-10-22 DIAGNOSIS — G43.709 CHRONIC MIGRAINE WITHOUT AURA WITHOUT STATUS MIGRAINOSUS, NOT INTRACTABLE: ICD-10-CM

## 2019-10-22 DIAGNOSIS — Z23 NEED FOR PROPHYLACTIC VACCINATION AND INOCULATION AGAINST INFLUENZA: ICD-10-CM

## 2019-10-22 DIAGNOSIS — Z79.4 TYPE 2 DIABETES MELLITUS WITH COMPLICATION, WITH LONG-TERM CURRENT USE OF INSULIN (H): Primary | ICD-10-CM

## 2019-10-22 DIAGNOSIS — Z71.6 ENCOUNTER FOR SMOKING CESSATION COUNSELING: ICD-10-CM

## 2019-10-22 DIAGNOSIS — E11.8 TYPE 2 DIABETES MELLITUS WITH COMPLICATION, WITH LONG-TERM CURRENT USE OF INSULIN (H): Primary | ICD-10-CM

## 2019-10-22 DIAGNOSIS — E11.65 TYPE 2 DIABETES MELLITUS WITH HYPERGLYCEMIA, WITH LONG-TERM CURRENT USE OF INSULIN (H): ICD-10-CM

## 2019-10-22 LAB
BUN SERPL-MCNC: 15.1 MG/DL (ref 7–19)
CALCIUM SERPL-MCNC: 9.3 MG/DL (ref 8.5–10.1)
CHLORIDE SERPLBLD-SCNC: 97.7 MMOL/L (ref 98–110)
CO2 SERPL-SCNC: 28.9 MMOL/L (ref 20–32)
CREAT SERPL-MCNC: 0.5 MG/DL (ref 0.5–1)
GFR SERPL CREATININE-BSD FRML MDRD: >90 ML/MIN/1.7 M2
GLUCOSE SERPL-MCNC: 374.7 MG'DL (ref 70–99)
HBA1C MFR BLD: 11.1 % (ref 4.1–5.7)
POTASSIUM SERPL-SCNC: 4 MMOL/DL (ref 3.2–4.6)
SODIUM SERPL-SCNC: 131.2 MMOL/L (ref 132–142)

## 2019-10-22 PROCEDURE — 99606 MTMS BY PHARM EST 15 MIN: CPT | Performed by: PHARMACIST

## 2019-10-22 PROCEDURE — 99607 MTMS BY PHARM ADDL 15 MIN: CPT | Performed by: PHARMACIST

## 2019-10-22 RX ORDER — IBUPROFEN 600 MG/1
600 TABLET, FILM COATED ORAL EVERY 6 HOURS PRN
Qty: 60 TABLET | Refills: 1 | Status: SHIPPED | OUTPATIENT
Start: 2019-10-22 | End: 2020-02-17

## 2019-10-22 RX ORDER — AZITHROMYCIN 250 MG/1
TABLET, FILM COATED ORAL
Qty: 6 TABLET | Refills: 0 | Status: SHIPPED | OUTPATIENT
Start: 2019-10-22 | End: 2019-11-26

## 2019-10-22 RX ORDER — LIDOCAINE 50 MG/G
OINTMENT TOPICAL 3 TIMES DAILY PRN
Qty: 150 G | Refills: 1 | Status: SHIPPED | OUTPATIENT
Start: 2019-10-22 | End: 2020-04-14

## 2019-10-22 RX ORDER — SUMATRIPTAN 100 MG/1
100 TABLET, FILM COATED ORAL
Qty: 9 TABLET | Refills: 5 | Status: SHIPPED | OUTPATIENT
Start: 2019-10-22 | End: 2020-04-21

## 2019-10-22 RX ORDER — LORATADINE 10 MG/1
10 TABLET ORAL 2 TIMES DAILY
Qty: 60 TABLET | Refills: 5 | Status: SHIPPED | OUTPATIENT
Start: 2019-10-22 | End: 2020-01-28

## 2019-10-22 RX ORDER — NICOTINE 21 MG/24HR
1 PATCH, TRANSDERMAL 24 HOURS TRANSDERMAL EVERY 24 HOURS
Qty: 30 PATCH | Refills: 1 | Status: SHIPPED | OUTPATIENT
Start: 2019-10-22 | End: 2021-07-13

## 2019-10-22 RX ORDER — CALCIUM CARBONATE 500 MG/1
1 TABLET, CHEWABLE ORAL 2 TIMES DAILY PRN
Qty: 100 TABLET | Refills: 1 | Status: SHIPPED | OUTPATIENT
Start: 2019-10-22 | End: 2021-05-19

## 2019-10-22 RX ORDER — ALBUTEROL SULFATE 90 UG/1
1-2 AEROSOL, METERED RESPIRATORY (INHALATION) EVERY 4 HOURS PRN
Qty: 1 INHALER | Refills: 11 | Status: SHIPPED | OUTPATIENT
Start: 2019-10-22 | End: 2020-06-03

## 2019-10-22 NOTE — Clinical Note
Andi Coffman,I saw her A1C after she left. I have some suggestions for DM control for next visit- see my note. I will keep an eye out for her, and you can blue dot her next time she comes in. Chandni

## 2019-10-22 NOTE — PATIENT INSTRUCTIONS
Suellen continue with 60Unit.    Continue with the Bydureon    If BS <100, no Humalog  IF -150, take 8 Units  If -200, take 10 Units  If -250, take 12 Units  If -300, take 14Units  If -350, take 16 Units  If -400, take 18 Units  If BS above 400, take 20 Units.     For cough:    1)  Take azithromycin, 2 pills today, then 1 pill per day until gone.  2)  Keep using your symbicort  3)  Use your nebs in AM and PM.  In between, use the Combivent or albuterol inhaler 2 puffs every 4 hours when awake.   Wear patches and work on quitting smoking.    Ibuprofen, 1 pill up to 3x per day with food to help with pain.  Watch for heart burn!.

## 2019-10-22 NOTE — LETTER
October 23, 2019      Teresa Perez  [address]  [place] [address]        Dear Teresa,    Here is a copy of your lab results.  They show that your blood sugar continues to be high.  I hope the new sliding scale helps.  Make sure to let us know if those levels are high at home, and follow up in 3-4 weeks for recheck.  Please call the clinic at [phone] if you have any questions.       Please see below for your test results.    Resulted Orders   Hemoglobin A1c ([facility])   Result Value Ref Range    Hemoglobin A1C 11.1 (H) 4.1 - 5.7 %   Basic Metabolic Panel ([place])   Result Value Ref Range    Urea Nitrogen 15.1 7.0 - 19.0 mg/dL    Calcium 9.3 8.5 - 10.1 mg/dL    Chloride 97.7 (L) 98.0 - 110.0 mmol/L    Carbon Dioxide 28.9 20.0 - 32.0 mmol/L    Creatinine 0.5 0.5 - 1.0 mg/dL    Glucose 374.7 (H) 70.0 - 99.0 mg'dL    Potassium 4.0 3.2 - 4.6 mmol/dL    Sodium 131.2 (L) 132.0 - 142.0 mmol/L    GFR Estimate >90 >60.0 mL/min/1.7 m2    GFR Estimate If Black >90 >60.0 mL/min/1.7 m2       If you have any questions, please call the clinic to make an appointment.    Sincerely,    Tyra Bullock MD

## 2019-10-23 ASSESSMENT — ASTHMA QUESTIONNAIRES: ACT_TOTALSCORE: 14

## 2019-10-23 NOTE — RESULT ENCOUNTER NOTE
Teresa Perez-    Here is a copy of your lab results.  They show that your blood sugar continues to be high.  I hope the new sliding scale helps.  Make sure to let us know if those levels are high at home, and follow up in 3-4 weeks for recheck.  Please call the clinic at [phone] if you have any questions.      Tyra Bullock MD    Please send results to patient.

## 2019-10-23 NOTE — PROGRESS NOTES
"SUBJECTIVE/OBJECTIVE:                Teresa Perez is a 48 year old female seen for a follow-up visit for Medication Therapy Management.  She was referred to me from Dr. Bullock.     Chief Complaint: Follow up from MTM visit on 9/20/19.     Tobacco:  reports that she has been smoking cigarettes. She has been smoking about 0.50 packs per day. She has never used smokeless tobacco.  Alcohol: Social History    Substance and Sexual Activity      Alcohol use: No        Alcohol/week: 0.0 standard drinks    Medication Adherence/Access:  no issues reported    Diabetes:  Pt currently taking Bydureon (she restarted this after the last visit 9/20 and has not missed doses), Humalog per sliding scale, and Toujeo 60 units daily per pt. Pt is not experiencing side effects.  SMBG Ranges (patient reported): low 300s. She said this is because of her current viral illness and her BS were in the 200s prior to this.  Patient is not experiencing hypoglycemia  ACEi/ARB: Yes: lisinopril.   Urine Albumin:   Lab Results   Component Value Date    UMALCR See Note. 08/30/2019      Aspirin: taking clopidogrel daily  Lab Results   Component Value Date    A1C 11.1 10/22/2019    A1C 8.1 08/30/2019    A1C 7.4 06/18/2019    A1C 10.6 02/27/2019    A1C 10.1 08/21/2018       Asthma: Currently taking Sybicort 2 puffs BID and is taking regularly as a controller.  She does not have a rescue inhaler and uses albuterol nebs when she has symptoms. She prefers to have an inhaler for rescue.    HTN: Currently taking amlodipine, lisinopril, metoprolol. No adverse effects.    BP Readings from Last 1 Encounters:   10/22/19 126/83     Pulse Readings from Last 1 Encounters:   10/22/19 96     Wt Readings from Last 1 Encounters:   10/22/19 220 lb 6.4 oz (100 kg)     Ht Readings from Last 1 Encounters:   08/21/18 5' 1.42\" (1.56 m)     Estimated body mass index is 41.08 kg/m  as calculated from the following:    Height as of 8/21/18: 5' 1.42\" (1.56 m).    Weight as " of an earlier encounter on 10/22/19: 220 lb 6.4 oz (100 kg).    Temp Readings from Last 1 Encounters:   10/22/19 98.7  F (37.1  C) (Oral)         ASSESSMENT:                  Medication Adherence: good, no issues identified    Diabetes: A1C elevated today.  Although current elevated BS could be from her viral illness, the high A1C shows long-term diabetes control is not adequate.    Bydureon may take up to 2 months to reach maximum/peak effect; she has only been taking for 1 month.    She has an allergy listed to metformin; unsure what the reaction was (could have been GI side effect)    I do not see on her past med list that she has taken pioglitazone in the past.    Asthma: On controller. No rescue inhaler    HTN: Stable     PLAN:                    Start rescue inhaler.  Sent Rx for albuterol MDI.    Dr. Bullock adjusted her Humalog sliding scale.    I spent 30 minutes with this patient today. Dr. Bullock was provided the recommendations above  in clinic today and Dr. Bullock was available for supervision during this visit and is the authorizing prescriber for this visit through the pharmacist collaborative practice agreement.. A copy of the visit note was provided to the patient's primary care provider.     The patient was given a summary of these recommendations as an after visit summary per Dr. Bullock.    Medication issues to be addressed at a future visit      Reassess and adjust diabetes therapy. After ~11/20/19, Renettareon will be at steady state and peak effect.    Determine what her reaction was to metformin and determine if this can be restarted.  If not, consider adding pioglitazone.    Can increase Toujeo.  Ultimate goal is to decrease/discontinue Humalog as her GLP-1 agonist works to increase mealtime insulin release and decrease post-prandial blood sugars.    Chandni Phillips, Pharm.D.

## 2019-10-25 NOTE — PROGRESS NOTES
There are no exam notes on file for this visit.    SUBJECTIVE  Teresa Perez is a 48 year old female with past medical history significant for    Patient Active Problem List   Diagnosis     Health Care Home     Acute peptic ulcer     Other allergy, other than to medicinal agents     Bipolar disorder (H)     Bulging lumbar disc     Cervical dysplasia     Common migraine without aura     Constipation     Dwarfism     Familial hypercholesterolemia     Insomnia     Low back pain     Intermittent asthma     Leg pain, bilateral     Smoking     Degeneration of thoracic or thoracolumbar intervertebral disc     LOMAS (nonalcoholic steatohepatitis)     Disease of lung     Hemorrhoids     Parotid mass     Endometriosis     NNAMDI (obstructive sleep apnea)     History of total right knee replacement     Lateral epicondylitis     Impingement syndrome, shoulder, left     Hx of total knee replacement, left     Chronic pain syndrome     Cough     Borderline personality disorder (H)     Moderate recurrent major depression (H)     Recurrent ventral hernia     Type 2 diabetes mellitus with complication, with long-term current use of insulin (H)     Essential hypertension     Nonruptured cerebral aneurysm     Cerebrovascular accident (CVA) due to embolism (H)     Amputation of leg (H)     CKD (chronic kidney disease) stage 5, GFR less than 15 ml/min (H)     Others present at the visit: Patient's VINEET, Carey.    Presents for   Chief Complaint   Patient presents with     Cough     Pt is here for a cough.     Imm/Inj     Pt will have her flu shot today.  She is wondering if she needs the PCV-13 yet.     Medication Reconciliation     Complete.      Imm/Inj     Flu Shot     Patient was seen earlier this month for evaluation of conjunctivitis and viral upper respiratory infection.  Patient shares that this improved.  3 days ago, however, she developed another cough.  She describes production of yellow/green sputum.  Cough is present all the  time but is worse when she wakes up in the morning and when she goes to bed at night.  She does continue to smoke and is interested in quitting today.  She would like a prescription for some patches as well as some gum.  She also shares that they have been doing some renovations in the building and there is increased her to the air and this has been worsening her symptoms.  She endorses fevers and chills.  Endorses ear pressure and popping.  Endorses runny nose, denies sore throat.  She continues to have both chest and lower belly pain when she is coughing.  Has had more difficulty eating because of this and notices that her appetite is down.  Her acid reflux has been slightly worse but not significantly.  She got some over-the-counter Robitussin on Sunday and helped a little bit but did not really make any difference.    She has underlying COPD.  Has been taking her controller inhaler, Symbicort, on a regular basis.  Also has a nebulizer machine with albuterol at home and has been using this a couple of times per day.  Does feel like it helps.  Feels like her breathing is loud and somewhat wheezy.  Feels she needs an antibiotic at this time.      We also discussed her diabetes.   She reports that blood sugars have been quite high.  Oftentimes into the 300s.  Sometimes it so high that she can even read them.  She is still doing 60 units daily of the Toujeo.  Is also doing the once weekly Bydurian.  She has been doing a sliding scale based on her blood sugar readings.  Has not had any low sugars.  She has noticed that she is gained weight.  Has been less active because of the cough.  Has not been able to get fitted for her prosthetic yet because of this.  Is worried that if she had to be on steroids for cough that it will make her diabetes worse.  We discussed her A1c today which is 11.1.  She is disappointed with this and would like to make changes.    OBJECTIVE:  Vitals: /83 (BP Location: Left arm, Patient  Position: Sitting, Cuff Size: Adult Large)   Pulse 96   Temp 98.7  F (37.1  C) (Oral)   Resp 18   Wt 100 kg (220 lb 6.4 oz)   SpO2 100%   BMI 41.08 kg/m    BMI= Body mass index is 41.08 kg/m .  Vitals:  Vitals are reviewed and are within the normal range.  Afebrile.  Mildly tachycardic, but this is actually better than her normal baseline.  Weight is up from last visit.  Gen:  Alert, pleasant, no acute distress  HEENT: Significant bilateral sinus congestion and pain with palpation over the sinuses.  Throat is clear.  No significant lymphadenopathy.  Tympanic membranes show small amount of fluid behind both TMs.  No erythema or bulging.  Cardiac:  Regular rate and rhythm, no murmurs, rubs or gallops  Respiratory: Lungs with scattered wheezes, worse on expiration.  No rhonchi or crackles.  Abdomen:  Soft, non-tender, non-distended, bowel sounds positive  Extremities:  Warm, well-perfused, pulses 2+/4, no lower extremity edema    Results for orders placed or performed in visit on 10/22/19   Hemoglobin A1c ([facility])   Result Value Ref Range    Hemoglobin A1C 11.1 (H) 4.1 - 5.7 %   Basic Metabolic Panel ([place])   Result Value Ref Range    Urea Nitrogen 15.1 7.0 - 19.0 mg/dL    Calcium 9.3 8.5 - 10.1 mg/dL    Chloride 97.7 (L) 98.0 - 110.0 mmol/L    Carbon Dioxide 28.9 20.0 - 32.0 mmol/L    Creatinine 0.5 0.5 - 1.0 mg/dL    Glucose 374.7 (H) 70.0 - 99.0 mg'dL    Potassium 4.0 3.2 - 4.6 mmol/dL    Sodium 131.2 (L) 132.0 - 142.0 mmol/L    GFR Estimate >90 >60.0 mL/min/1.7 m2    GFR Estimate If Black >90 >60.0 mL/min/1.7 m2           ASSESSMENT AND PLAN:      Teresa was seen today for cough, imm/inj, medication reconciliation and imm/inj.    Diagnoses and all orders for this visit:    Cough.  Cough, with increased shortness of breath.  She does not have an albuterol or a Combivent inhaler at home.  Needs a rescue.  Prescribe this today.  She will continue with her Symbicort, and I will do a short course of  antibiotics for presumed sinusitis.  We will do azithromycin as patient has multiple other allergies and this is worked well in the past.  She is having worsening heartburn with the cough so we will prescribe Tums to help with this.  She can use external lidocaine ointment on the areas of her belly that it becomes sore from all the coughing.  -     Ipratropium-Albuterol (COMBIVENT RESPIMAT)  MCG/ACT inhaler; Inhale 1 puff into the lungs 4 times daily  -     azithromycin (ZITHROMAX) 250 MG tablet; Take 2 tablets (500 mg) by mouth daily for 1 day, THEN 1 tablet (250 mg) daily for 4 days.  -     ibuprofen (ADVIL/MOTRIN) 600 MG tablet; Take 1 tablet (600 mg) by mouth every 6 hours as needed for moderate pain  -     calcium carbonate (TUMS) 500 MG chewable tablet; Take 1 tablet (500 mg) by mouth 2 times daily as needed for heartburn  -     lidocaine (XYLOCAINE) 5 % external ointment; Apply topically 3 times daily as needed for moderate pain    Type 2 diabetes mellitus with hyperglycemia, with long-term current use of insulin (H).  Diabetes is poorly controlled today.  We adjusted her sliding scale insulin.  Please see after visit notes.  We will continue with the Toujeo 60 and the Bydureon once weekly.  Request that the patient follow-up in 2 weeks so we can recheck her sugars and adjust accordingly.  -     Hemoglobin A1c ([facility])    Benign essential hypertension.  Blood pressure is appropriate today.  BMP is normal.  Continue with the lisinopril.  Patient did have some questions about lisinopril and whether not this might be causing her cough, and I do not think this is the case.  -     Basic Metabolic Panel ([place])    Need for prophylactic vaccination and inoculation against influenza  -     Fluzone quad, multidose 0.5ml, 6+ months [92669]    Mild intermittent asthma without complication.  Underlying asthma is worsened currently due to infection.  Refilled her inhalers.  -     albuterol (PROAIR HFA/PROVENTIL  HFA/VENTOLIN HFA) 108 (90 Base) MCG/ACT inhaler; Inhale 1-2 puffs into the lungs every 4 hours as needed for shortness of breath / dyspnea or wheezing  -     Ipratropium-Albuterol (COMBIVENT RESPIMAT)  MCG/ACT inhaler; Inhale 1 puff into the lungs 4 times daily    Encounter for smoking cessation counseling.  Discussed smoking cessation.  She is smoking about a pack to pack and half per day.  Will prescribe patches and gum.  She endorses high motivation to quit as she does not like the cough nor the abdominal pain.  -     nicotine (NICODERM CQ) 21 MG/24HR 24 hr patch; Place 1 patch onto the skin every 24 hours  -     nicotine (NICORETTE) 2 MG gum; Place 1 each (2 mg) inside cheek as needed for smoking cessation    Gastroesophageal reflux disease, esophagitis presence not specified  -     calcium carbonate (TUMS) 500 MG chewable tablet; Take 1 tablet (500 mg) by mouth 2 times daily as needed for heartburn    Abdominal pain, generalized  -     lidocaine (XYLOCAINE) 5 % external ointment; Apply topically 3 times daily as needed for moderate pain    Seasonal allergies  -     loratadine (CLARITIN) 10 MG tablet; Take 1 tablet (10 mg) by mouth 2 times daily    Chronic migraine without aura without status migrainosus, not intractable  -     SUMAtriptan (IMITREX) 100 MG tablet; Take 1 tablet (100 mg) by mouth at onset of headache for migraine        Patient Instructions   Troudjeo continue with 60Unit.    Continue with the Bydureon    If BS <100, no Humalog  IF -150, take 8 Units  If -200, take 10 Units  If -250, take 12 Units  If -300, take 14Units  If -350, take 16 Units  If -400, take 18 Units  If BS above 400, take 20 Units.     For cough:    1)  Take azithromycin, 2 pills today, then 1 pill per day until gone.  2)  Keep using your symbicort  3)  Use your nebs in AM and PM.  In between, use the Combivent or albuterol inhaler 2 puffs every 4 hours when awake.   Wear patches and  work on quitting smoking.    Ibuprofen, 1 pill up to 3x per day with food to help with pain.  Watch for heart burn!.           Follow up in 2 weeks for recheck breathing and diabetes.      Tyra Bullock MD

## 2019-11-11 DIAGNOSIS — I10 ESSENTIAL HYPERTENSION, BENIGN: ICD-10-CM

## 2019-11-11 DIAGNOSIS — Z86.73 HISTORY OF STROKE: ICD-10-CM

## 2019-11-11 RX ORDER — LISINOPRIL 40 MG/1
40 TABLET ORAL DAILY
Qty: 90 TABLET | Refills: 1 | Status: SHIPPED | OUTPATIENT
Start: 2019-11-11 | End: 2020-05-26

## 2019-11-11 RX ORDER — CLOPIDOGREL BISULFATE 75 MG/1
75 TABLET ORAL DAILY
Qty: 90 TABLET | Refills: 1 | Status: SHIPPED | OUTPATIENT
Start: 2019-11-11 | End: 2020-05-26

## 2019-11-26 ENCOUNTER — MEDICAL CORRESPONDENCE (OUTPATIENT)
Dept: HEALTH INFORMATION MANAGEMENT | Facility: CLINIC | Age: 49
End: 2019-11-26

## 2019-11-26 ENCOUNTER — OFFICE VISIT (OUTPATIENT)
Dept: FAMILY MEDICINE | Facility: CLINIC | Age: 49
End: 2019-11-26
Payer: MEDICARE

## 2019-11-26 VITALS
DIASTOLIC BLOOD PRESSURE: 76 MMHG | HEART RATE: 106 BPM | TEMPERATURE: 98.2 F | RESPIRATION RATE: 18 BRPM | OXYGEN SATURATION: 97 % | SYSTOLIC BLOOD PRESSURE: 112 MMHG

## 2019-11-26 DIAGNOSIS — M79.642 PAIN OF LEFT HAND: ICD-10-CM

## 2019-11-26 DIAGNOSIS — N92.4 EXCESSIVE BLEEDING IN PREMENOPAUSAL PERIOD: ICD-10-CM

## 2019-11-26 DIAGNOSIS — E11.65 TYPE 2 DIABETES MELLITUS WITH HYPERGLYCEMIA, WITH LONG-TERM CURRENT USE OF INSULIN (H): Primary | ICD-10-CM

## 2019-11-26 DIAGNOSIS — Z79.4 TYPE 2 DIABETES MELLITUS WITH HYPERGLYCEMIA, WITH LONG-TERM CURRENT USE OF INSULIN (H): Primary | ICD-10-CM

## 2019-11-26 DIAGNOSIS — M79.642 PAIN OF LEFT HAND: Primary | ICD-10-CM

## 2019-11-26 DIAGNOSIS — J45.20 MILD INTERMITTENT ASTHMA WITHOUT COMPLICATION: ICD-10-CM

## 2019-11-26 DIAGNOSIS — B37.0 THRUSH: ICD-10-CM

## 2019-11-26 DIAGNOSIS — E11.65 TYPE 2 DIABETES MELLITUS WITH HYPERGLYCEMIA, WITH LONG-TERM CURRENT USE OF INSULIN (H): ICD-10-CM

## 2019-11-26 DIAGNOSIS — Z79.4 TYPE 2 DIABETES MELLITUS WITH HYPERGLYCEMIA, WITH LONG-TERM CURRENT USE OF INSULIN (H): ICD-10-CM

## 2019-11-26 DIAGNOSIS — L84 PRE-ULCERATIVE CORN OR CALLOUS: ICD-10-CM

## 2019-11-26 LAB — HBA1C MFR BLD: 10.1 % (ref 4.1–5.7)

## 2019-11-26 RX ORDER — MEDROXYPROGESTERONE ACETATE 150 MG/ML
150 INJECTION, SUSPENSION INTRAMUSCULAR
Status: DISCONTINUED | OUTPATIENT
Start: 2019-11-26 | End: 2022-07-19

## 2019-11-26 RX ORDER — NYSTATIN 100000/ML
500000 SUSPENSION, ORAL (FINAL DOSE FORM) ORAL DAILY PRN
Qty: 500 ML | Refills: 1 | Status: SHIPPED | OUTPATIENT
Start: 2019-11-26 | End: 2021-05-18

## 2019-11-26 RX ORDER — TIOTROPIUM BROMIDE 18 UG/1
18 CAPSULE ORAL; RESPIRATORY (INHALATION) DAILY
Qty: 90 CAPSULE | Refills: 1 | Status: SHIPPED | OUTPATIENT
Start: 2019-11-26 | End: 2020-12-23

## 2019-11-26 RX ADMIN — MEDROXYPROGESTERONE ACETATE 150 MG: 150 INJECTION, SUSPENSION INTRAMUSCULAR at 10:26

## 2019-11-26 NOTE — LETTER
November 27, 2019      Teresa Perez  [address]  [place] [address]        Dear Teresa,    As we discussed in clinic, your A1c is getting better.  This is good news!  I heard you were not able to stay to meet with the pharmacist.  Can you make sure to bring all of your medications, including your inhalers, and your glucometer to your next follow up visit so we can review the medications together?   I'll be thinking about you and your family during this holiday season.  Hang in there, and reach out for help if needed.  Please call the clinic at [phone] if you have any questions.       Please see below for your test results.    Resulted Orders   Hemoglobin A1c ([facility])   Result Value Ref Range    Hemoglobin A1C 10.1 (H) 4.1 - 5.7 %       If you have any questions, please call the clinic to make an appointment.    Sincerely,    Tyra Bullock MD

## 2019-11-26 NOTE — NURSING NOTE
Clinic Administered Medication Documentation    MEDICATION LIST:   Depo Provera Documentation    Prior to injection, verified patient identity using patient's name and date of birth. Medication was administered. Please see MAR and medication order for additional information. Patient instructed to remain in clinic for 15 minutes.    BP: 112/76    LAST PAP/EXAM: No results found for: PAP  URINE HCG:not indicated    NEXT INJECTION DUE: 2/11/20 - 2/25/20    Was entire vial of medication used? Yes  Vial/Syringe: Single dose vial  Expiration Date:  12/20      Name of provider who requested the medication administration: Kobe  Name of provider on site (faculty or community preceptor) at the time of performing the medication administration: Kobe    Date of next administration: 11/26/19

## 2019-11-26 NOTE — PATIENT INSTRUCTIONS
1)  Diabetes:    Troudjeo continue with 60Unit.    Continue with the Bydureon     If BS <100, no Humalog  IF -150, take 12 Units  If -200, take 14 Units  If -250, take 16 Units  If -300, take 18Units  If -350, take 20 Units  If -400, take 22 Units  If BS above 400, take 24 Units.     2)  For Cough/Breathing    New inhaler:  Spiriva/tioptropium.  One puff daily.    Pharmacist to show how to do it.      3)  For Hand  xrays  Ice and ace wrap, and gentle range of motion exercises.    4)  Foot    Forms completed today for [employer].

## 2019-11-26 NOTE — LETTER
November 27, 2019      Teresa Perez  [address]  [place] [address]        Dear Teresa,    Your xray shows no sign of fracture in the bones.  This is good news!  Continue to ice and use the ace wrap to help with the pain.  Please call the clinic at [phone] if you have any questions.       If you have any questions, please call the clinic to make an appointment.    Sincerely,    Tyra Bullock MD

## 2019-11-26 NOTE — PROGRESS NOTES
Nursing Notes:   Deana Castellanos CMA  11/26/2019 10:28 AM  Signed  Clinic Administered Medication Documentation    MEDICATION LIST:   Depo Provera Documentation    Prior to injection, verified patient identity using patient's name and date of birth. Medication was administered. Please see MAR and medication order for additional information. Patient instructed to remain in clinic for 15 minutes.    BP: 112/76    LAST PAP/EXAM: No results found for: PAP  URINE HCG:not indicated    NEXT INJECTION DUE: 2/11/20 - 2/25/20    Was entire vial of medication used? Yes  Vial/Syringe: Single dose vial  Expiration Date:  12/20      Name of provider who requested the medication administration: Kobe  Name of provider on site (faculty or community preceptor) at the time of performing the medication administration: Kobe    Date of next administration: 11/26/19            TACO AUSTIN Chris is a 49 year old female with past medical history significant for    Patient Active Problem List   Diagnosis     Health Care Home     Acute peptic ulcer     Other allergy, other than to medicinal agents     Bipolar disorder (H)     Bulging lumbar disc     Cervical dysplasia     Common migraine without aura     Constipation     Dwarfism     Familial hypercholesterolemia     Insomnia     Low back pain     Intermittent asthma     Leg pain, bilateral     Smoking     Degeneration of thoracic or thoracolumbar intervertebral disc     LOMAS (nonalcoholic steatohepatitis)     Disease of lung     Hemorrhoids     Parotid mass     Endometriosis     NNAMDI (obstructive sleep apnea)     History of total right knee replacement     Lateral epicondylitis     Impingement syndrome, shoulder, left     Hx of total knee replacement, left     Chronic pain syndrome     Cough     Borderline personality disorder (H)     Moderate recurrent major depression (H)     Recurrent ventral hernia     Type 2 diabetes mellitus with complication, with long-term  current use of insulin (H)     Essential hypertension     Nonruptured cerebral aneurysm     Cerebrovascular accident (CVA) due to embolism (H)     Amputation of leg (H)     CKD (chronic kidney disease) stage 5, GFR less than 15 ml/min (H)     Pre-ulcerative corn or callous     Others present at the visit:  None    Presents for   Chief Complaint   Patient presents with     Diabetes     Pt is here to follow up on blood sugars.     Cough     Pt is here to follow up on cough.     Contraception     Pt is due for her Depo.     Forms     Pt has forms for diabetic shoes to be filled out.     Medication Reconciliation     Complete.      Fall     Pt fell and needs her L hand looked at today.      Patient is here to follow-up on multiple issues.    1) diabetes.  She shares that her blood sugars have been going down fairly consistently over the last month.  She has had several blood sugars under 200, but most of them are right around 200, and she has occasional above 300.  Is eating well and regularly, but does have some intermittent difficulty with her stomach, and she has yesterday she had one episode of vomiting and diarrhea.  She did so from something she ate and she feels better today.  Frequently she is not feeling hungry earlier in the day and so does not eat until later in the daytime.  She has been taking 60 units of the Toujeo, using the weekly Bydureon, and is doing the sliding scale as instructed.  She is usually only eating twice a day so she is not often taking 3 times daily of the mealtime insulin.  She does report some difficulties with numbness and tingling in her one remaining leg.  Feels like she has a burning sensation that is present within the muscle in the back of the calf.  Sometimes associated with activity and sometimes present at rest.    2) her breathing has improved since completing the antibiotics and the prednisone.  She reports that she is using the gray and red Symbicort inhaler 2 puffs twice  daily.  She is also using the nebulizer a.m. and p.m. regularly, and will take the Combivent a couple of times per day.  Also has albuterol that she uses for rescue.  ACT is 18 and is improved today.  She still does notice an occasional cough and this causes pain in her abdomen in the area of the previous hernia.  She is working to quit smoking, but this has been challenging.  Is not feeling confident that she would be able to quit right now.    3) she shares that she needs forms completed for a diabetic shoe.  Also shares that she is getting fitted next week until just for her new prosthetic leg.  Is looking forward to being more active and walking around more.  She shares that in her new building she has been much more social and is out of her room a lot.  Feels the prosthetic will give her more function in life back.    4) Also noticing some left hand and wrist pain.  Present over the thumb and extending down towards the radial head.  Injured the area when she fell off the bed during the night while having a drain.  Hit the thumb straight forward onto the floor.  No bleeding or abrasions.  She did have some pain and swelling immediately.  This happens 3 days ago and the swelling has improved.  She does feel like the hand is a little bit weaker than normal, but some of that is because it is painful.  She is been using lidocaine cream which has not been helpful.  She is been using Tylenol and ibuprofen, which have not been helpful.  She has been using ice and that has been somewhat helpful but only for short period of time.  She is worried that this might be broken.    5) she continues to follow regularly with her psychiatrist and therapist.  Shares that her brother recently went to visit her father and he is doing poorly from health perspective.  This is very challenging for her, and she worries about her response once he passes away.  Has been dealing with this relatively well and does have a appointment with the  psychiatrist next week.  Shares that she continues to do well with her PCA, and now has a new person who comes in for homemaking support.  Is happy where she lives and feels safe  again.    OBJECTIVE:  Vitals: /76 (BP Location: Left arm, Patient Position: Sitting, Cuff Size: Adult Large)   Pulse 106   Temp 98.2  F (36.8  C) (Oral)   Resp 18   SpO2 97%   BMI= There is no height or weight on file to calculate BMI.  Objective:    Vitals:  Vitals are reviewed and are within the normal range.  Sats are good.  Blood pressure is good.  She remains mildly tachycardic, which is chronic for her.  Gen:  Alert, pleasant, no acute distress  Cardiac:  Regular rate and rhythm, no murmurs, rubs or gallops  Respiratory:  Lungs clear to auscultation bilaterally, no crackles or wheezes present.  Abdomen:  Soft, moderate tenderness over the bulging area of her ventral hernia., bowel sounds positive  Extremities: Has a above-the-knee amputation on the left leg.  The right leg shows somewhat diminished but still present monofilament testing sensation on exam.  There is evidence of pre-ulcer callus along the medial portion of the toe as well as on the base of the foot.  She has decreased sensation in particular in toes 3 and 4.  Her hand is somewhat tender with palpation.  There is no significant erythema, swelling.  Has decreased range of motion secondary to pain, but no real point tenderness.   strength is diminished right and this is secondary to effort.  Psych: Patient is appropriately concerned and worried about the health of her father.  Otherwise is bright and engaged, very pleasant today.    Results for orders placed or performed in visit on 11/26/19   Hemoglobin A1c ([facility])     Status: Abnormal   Result Value Ref Range    Hemoglobin A1C 10.1 (H) 4.1 - 5.7 %       ASSESSMENT AND PLAN:      Teresa was seen today for diabetes, cough, contraception, forms, medication reconciliation and fall.    Diagnoses and all orders  for this visit:    Pain of left hand.  X-ray negative for fracture.  Given a bandage and encouraged to use ice to treat this.  -     XR Hand Left G/E 3 Views; Future    Type 2 diabetes mellitus with hyperglycemia, with long-term current use of insulin (H).  A1c is down one-point and just 4 weeks.  Congratulated her on this.  I did titrate up her sliding scale slightly.  Could consider going up in the Toujeo, although she had difficulty when she was hospitalized last time with significant hypoglycemia on the dose of 70 units/day.  We will give her a little bit more time to settle and at this new sliding scale, and we will see her back in 1 month to recheck.  Discussed again that I do not feel would be appropriate to do an elective surgery on her hernia until her blood sugars and cough are better.  -     Hemoglobin A1c ([facility])    Mild intermittent asthma without complication.  Still with an ACT of 18.  Is doing her Symbicort regularly as well as nebs, Combivent, and albuterol.  Given her long history of smoking there is likely a component of COPD to this and will try Spiriva.  It was unclear to me whether this is covered by insurance and patient was not willing to stay to talk with pharmacist about how to appropriately use the medication.  We will have her bring this with her to her next follow-up visit, to ensure she is able to use the inhaler appropriately.  -     tiotropium (SPIRIVA) 18 MCG inhaled capsule; Inhale 1 capsule (18 mcg) into the lungs daily    Thrush.  Getting some thrush symptoms with inhaled corticosteroid.  We will give her some nystatin swish and spit to use after using inhaler.  -     nystatin (MYCOSTATIN) 511726 UNIT/ML suspension; Take 5 mLs (500,000 Units) by mouth daily as needed (thrush)    Excessive bleeding in premenopausal period.  Capital given for menorrhagia today.  As she has not had periods with this and is doing well.  -     medroxyPROGESTERone (DEPO-PROVERA) injection 150  mg    Pre-ulcerative corn or callous.  Poorly controlled diabetes with pre-ulcerative callus and abnormal sensation on her feet.  Forms completed for diabetic foot wear.  She will let us know if she needs additional information to receive the shoes going forward.  Is looking forward to getting her new prosthetic and increasing her mobility and function.        Patient Instructions   1)  Diabetes:    Troudjeo continue with 60Unit.    Continue with the Bydureon     If BS <100, no Humalog  IF -150, take  12 Units  If -200, take 1 4 Units  If -250, take 1 6 Units  If -300, take 18Units  If -350, take 20 Units  If -400, take 22 Units  If BS above 400, take 2 4 Units.     2)  For Cough/Breathing    New inhaler:  Spiriva/tioptropium.  One puff daily.    Pharmacist to show how to do it.      3)  For Hand  xrays  Ice and ace wrap, and gentle range of motion exercises.    4)  Foot    Forms completed today for [employer].      I spent 45 minutes with patient greater than 50% in counseling and coordination of care.    Follow up in 4 weeks  for recheck diabetes and breathing.      Tyra Bullock MD

## 2019-11-27 ASSESSMENT — ASTHMA QUESTIONNAIRES: ACT_TOTALSCORE: 19

## 2019-11-27 NOTE — RESULT ENCOUNTER NOTE
Teresa Perez-    Your xray shows no sign of fracture in the bones.  This is good news!  Continue to ice and use the ace wrap to help with the pain.  Please call the clinic at [phone] if you have any questions.      Tyra Bullock MD    Please send results to patient.

## 2019-11-27 NOTE — RESULT ENCOUNTER NOTE
Teresa Perez-    As we discussed in clinic, your A1c is getting better.  This is good news!  I heard you were not able to stay to meet with the pharmacist.  Can you make sure to bring all of your medications, including your inhalers, and your glucometer to your next follow up visit so we can review the medications together?   I'll be thinking about you and your family during this holiday season.  Hang in there, and reach out for help if needed.  Please call the clinic at [phone] if you have any questions.      Tyra Bullock MD    Please send results to patient.

## 2019-12-05 ENCOUNTER — DOCUMENTATION ONLY (OUTPATIENT)
Dept: FAMILY MEDICINE | Facility: CLINIC | Age: 49
End: 2019-12-05

## 2019-12-05 NOTE — PATIENT INSTRUCTIONS
My Asthma Action Plan  Name: Teresa Perez  YOB: 1970  Date: 12/5/2019   My doctor: Tyra Bullock   My clinic:   [facility] [address] [place] [address]  [phone]    My Asthma Severity: Intermittent Asthma Avoid your asthma triggers: Patient is unaware of triggers      GREEN ZONE   Good Control    I feel good    No cough or wheeze    Can work, sleep and play without asthma symptoms       Take your asthma control medicine every day.  Take the medications listed below daily.    Albuterol    1. If exercise triggers your asthma, take your rescue medication (2 puffs of albuterol, Ventolin/Pro-Air) 15 minutes before exercise or sports, and during exercise if you have asthma symptoms.  2. Spacer to use with inhaler: If you have a spacer, make sure to use it with your inhaler.              YELLOW ZONE Getting Worse  I have ANY of these:    I do not feel good    Cough or wheeze    Chest feels tight    Wake up at night   1. Keep taking your Green Zone medications.  2. Start taking your rescue medicine (1-2 puffs of albuterol - Ventolin/Pro-Air) every 4-6 hours as needed.  3. If symptoms are not controlled with above, can take 2 puffs every 20 minutes for up to 1 hour, then continue every 4 hours if needed.   4. If you do not return to the Green Zone in 12-24 hours or you get worse, call the clinic.         RED ZONE Medical Alert - Get Help  I have ANY of these:    I feel awful    Medicine is not helping    Breathing getting harder    Trouble walking or talking    Nose opens wide to breathe       1. Take your rescue medicine NOW (6-8 puffs of albuterol - Ventolin/Pro-Air) for every 20 minutes for up to 1 hour.  2. Call your doctor NOW.  3. If you are still in the Red Zone after 20 minutes and you have not reached your doctor:    Take your rescue medicine again (6-8 puffs of albuterol - Ventolin/Pro-Air) and    Call 911 or go to the emergency room right away    See your regular doctor  within 1 weeks of an Emergency Room or Urgent Care visit for follow-up treatment.        This Asthma Action Plan provides authorization for the administration of medication described in the AAP.  YES  Electronically signed by: Deana Castellanos CMA    Annual Reminders:  Meet with Asthma Educator,  Flu Shot in the Fall, Pneumonia Shot  Pharmacy: [id number] - [place], [place] - [address] AT [facility]

## 2019-12-05 NOTE — PROGRESS NOTES
To be completed in Nursing note:    Please reference list for forms that require a visit for completion.  Please remind patients that providers are given 3-5 business days to complete and return forms.      Form type: Statement of Certifying Physician for [employer]    Date form received: 19    Date form completed by Physician: 19    How was form returned to patient (mailed, faxed, or at  for patient to ): Faxed back to [phone]    Date form mailed/faxed/left at  for patient and sent to HIM for [email or web address]      Once form is left for patient, faxed, or mailed PCS will then close the documentation only encounter.

## 2019-12-05 NOTE — PROGRESS NOTES
To be completed in Nursing note:    Please reference list for forms that require a visit for completion.  Please remind patients that providers are given 3-5 business days to complete and return forms.      Form type: Prosthetic CMN Orders    Date form received: 19    Date form completed by Physician: 19    How was form returned to patient (mailed, faxed, or at  for patient to ): Faxed back to [phone]    Date form mailed/faxed/left at  for patient and sent to HIM for [email or web address]      Once form is left for patient, faxed, or mailed PCS will then close the documentation only encounter.

## 2019-12-05 NOTE — MR AVS SNAPSHOT
After Visit Summary   2/23/2017    Teresa Perez    MRN: [id number]           Patient Information     Date Of Birth          1970        Visit Information        Provider Department      2/23/2017 2:10 PM Tyra Bullock MD [facility]        Today's Diagnoses     Hyperlipidemia LDL goal <100    -  1    Chronic pain syndrome        Cough          Care Instructions    Dr. Bullock will check on MRI (what stuff is need to get the right pictures under anesthesia for the lumbar spine)  Dr. Bullock will place referral for pain clinic at [facility].    Take the tessalon perles for cough.  Lots of fluids.    Start taking the new cholesterol medication.  1 pill per day (Pravastatin)    Keep diabetes medications the same and check in 4 weeks.   Go to DBT!  Increase your visits with your therapist for the short term.      Schedule every 2 weeks for a while so we can take care of everything.          Follow-ups after your visit        Who to contact     Please call your clinic at [phone] to:    Ask questions about your health    Make or cancel appointments    Discuss your medicines    Learn about your test results    Speak to your doctor   If you have compliments or concerns about an experience at your clinic, or if you wish to file a complaint, please contact [facility] Patient Relations at [phone] or email us at [email or web address]         Additional Information About Your Visit        MyChart Information     [employer] is an electronic gateway that provides easy, online access to your medical records. With [employer], you can request a clinic appointment, read your test results, renew a prescription or communicate with your care team.     To sign up for [employer] visit the website at [email or web address]   You will be asked to enter the access code listed below, as well as some personal information. Please follow the directions to create your  DATE OF SERVICE:  2019    On 2019 at 1448, this 21-year-old  1, para 0 -American   female with an intrauterine pregnancy at 39 weeks and 2/7 days under epidural   anesthesia, delivered a viable female infant with Apgar scores of 8 and 9,   weighing 7 pounds 1.4 ounces or 3215 g.  Patient was admitted in active labor,   found to be 4-5 cm, and GBS was positive.  Patient received 2 doses of   penicillin prior to delivery and also received an epidural.  Patient did   immediately after the epidural had an artificial rupture of membranes with   clear fluid and progressed to complete.  Delivery was via normal spontaneous   vaginal delivery to a sterile field in an occiput anterior position.  Loose   nuchal cord x1 was reduced after delivery of the infant.  Infant was placed on   maternal abdomen with the waiting RN.  Delayed cord clamping occurred until   the cord stopped pulsating.  The cord was clamped by myself and cut by the   father of the baby.  An intact placenta with a 3-vessel cord delivered   spontaneously at 1454.  Pitocin was then started wide open in the IV.  Fundus   was found to be firm.  Patient had excellent hemostasis.  Vagina was explored   and periurethral abrasions were noted, but no lacerations, no repair was   performed.    ESTIMATED BLOOD LOSS:  200 mL.       ____________________________________     VAMSHI YEUNG / CELESTE    DD:  2019 15:10:19  DT:  2019 22:54:36    D#:  [id number]  Job#:  [id number]   username and password.     Your access code is: [email or web address]  Expires: 3/29/2017  3:06 PM     Your access code will  in 90 days. If you need help or a new code, please contact your [facility] or call [phone] for assistance.        Care EveryWhere ID     This is your Care EveryWhere ID. This could be used by other organizations to access your [place] medical records  [id number]        Your Vitals Were     Pulse Temperature Pulse Oximetry BMI (Body Mass Index)          111 98.2  F (36.8  C) (Oral) 97% 47.26 kg/m2         Blood Pressure from Last 3 Encounters:   17 132/86   17 124/86   17 137/90    Weight from Last 3 Encounters:   17 242 lb (109.8 kg)   17 245 lb (111.1 kg)   16 245 lb 4 oz (111.2 kg)              Today, you had the following     No orders found for display         Today's Medication Changes          These changes are accurate as of: 17  3:04 PM.  If you have any questions, ask your nurse or doctor.               Start taking these medicines.        Dose/Directions    benzonatate 200 MG capsule   Commonly known as:  TESSALON   Used for:  Cough   Started by:  Tyra Bullock MD        Dose:  200 mg   Take 1 capsule (200 mg) by mouth 3 times daily as needed for cough   Quantity:  42 capsule   Refills:  0       pravastatin 20 MG tablet   Commonly known as:  PRAVACHOL   Used for:  Hyperlipidemia LDL goal <100   Started by:  Tyra Bullock MD        Dose:  20 mg   Take 1 tablet (20 mg) by mouth daily   Quantity:  90 tablet   Refills:  3            Where to get your medicines      These medications were sent to [facility] - [place], [place] - [address], [place] [address]     Phone:  [phone]     benzonatate 200 MG capsule    pravastatin 20 MG tablet         Some of these will need a paper prescription and others can be bought over the counter.  Ask your nurse if you have  questions.     Bring a paper prescription for each of these medications     fentaNYL 50 mcg/hr 72 hr patch    HYDROcodone-acetaminophen 5-325 MG per tablet                Primary Care Provider Office Phone # Fax #    Tyra Bullock [phone]       [facility] [address] [place] [address]        Thank you!     Thank you for choosing [facility]  for your care. Our goal is always to provide you with excellent care. Hearing back from our patients is one way we can continue to improve our services. Please take a few minutes to complete the written survey that you may receive in the mail after your visit with us. Thank you!             Your Updated Medication List - Protect others around you: Learn how to safely use, store and throw away your medicines at www.disposemymeds.org.          This list is accurate as of: 2/23/17  3:04 PM.  Always use your most recent med list.                   Brand Name Dispense Instructions for use    * albuterol 108 (90 BASE) MCG/ACT Inhaler    PROAIR HFA/PROVENTIL HFA/VENTOLIN HFA    3 Inhaler    Inhale 2 puffs into the lungs every 6 hours as needed for shortness of breath / dyspnea or wheezing       * albuterol (2.5 MG/3ML) 0.083% neb solution     30 vial    Take 1 vial (2.5 mg) by nebulization every 6 hours as needed for shortness of breath / dyspnea or wheezing       azelastine 0.05 % Soln ophthalmic solution    OPTIVAR    1 Bottle    Apply 1 drop to eye 2 times daily       azithromycin 250 MG tablet    ZITHROMAX    6 tablet    Two tablets first day, then one tablet daily for four days.       benzonatate 200 MG capsule    TESSALON    42 capsule    Take 1 capsule (200 mg) by mouth 3 times daily as needed for cough       blood glucose lancets standard    no brand specified    1 Box    Use to test blood sugar 4 times daily or as directed.       blood glucose monitoring meter device kit    no brand specified    1 kit    Use to test blood sugar 4 times  daily or as directed.       blood glucose monitoring test strip    no brand specified    100 strip    Use to test blood sugars 4 times daily or as directed       carboxymethylcellul-glycerin 0.5-0.9 % Soln ophthalmic solution    OPTIVE/REFRESH OPTIVE    1 Bottle    Place 1 drop into both eyes 3 times daily       clindamycin 1 % lotion    CLINDAMAX    60 mL    Apply topically 2 times daily       dicyclomine 10 MG capsule    BENTYL    120 capsule    Take 1 capsule (10 mg) by mouth 4 times daily (before meals and nightly) From Judy PEÑA       diphenhydrAMINE 50 MG capsule    BENADRYL     Take  mg by mouth At Bedtime.       docosanol 10 % Crea cream    ABREVA    1 Tube    Apply topically 5 times daily       docusate sodium 100 MG tablet    COLACE    120 tablet    Take 200 mg by mouth 2 times daily       EPINEPHrine 0.3 MG/0.3ML injection     2 each    Inject 0.3 mLs (0.3 mg) into the muscle once as needed for anaphylaxis       * FentaNYL 62.5 MCG/HR Pt72     10 patch    Place 1 patch onto the skin every 72 hours       * fentaNYL 50 mcg/hr 72 hr patch    DURAGESIC    10 patch    Place 1 patch onto the skin every 72 hours       fluticasone 50 MCG/ACT spray    FLONASE    9.9 g    Spray 2 sprays into both nostrils daily       fluvastatin 40 MG capsule    LESCOL    180 capsule    Take 1 capsule (40 mg) by mouth 2 times daily       gabapentin 300 MG capsule    NEURONTIN    90 capsule    Take 1 capsule (300 mg) by mouth 3 times daily       glipiZIDE 10 MG 24 hr tablet    GLUCOTROL XL    60 tablet    Take 2 tablets (20 mg) by mouth daily       HYDROcodone-acetaminophen 5-325 MG per tablet    NORCO    270 tablet    Take max 10 tablest per day.  Take 2 tabs 4x per day and can take an extra 1-2 tabs PRN.       hydrOXYzine 25 MG tablet    ATARAX    120 tablet    Take 1-2 tablets (25-50 mg) by mouth every 6 hours as needed for itching       insulin aspart 100 UNIT/ML injection    NovoLOG FLEXPEN    3 mL    Take  22 Units with meals.       insulin degludec 200 UNIT/ML pen    TRESIBA    3 mL    Inject 60 Units Subcutaneous daily       insulin pen needle 31G X 5 MM    B-D U/F    100 each    Use 1 daily or as directed.       lisinopril 20 MG tablet    PRINIVIL/ZESTRIL    30 tablet    Take 1 tablet (20 mg) by mouth daily       loratadine 10 MG tablet    CLARITIN    30 tablet    Take 1 tablet (10 mg) by mouth daily       medroxyPROGESTERone 150 MG/ML injection    DEPO-PROVERA    1 mL    Inject 1 mL (150 mg) into the muscle every 3 months       * melatonin 3 MG tablet      Take 3 tablets (9 mg) by mouth At Bedtime       * Melatonin 10 MG Tabs tablet     30 tablet    Take 1 tablet (10 mg) by mouth nightly as needed for sleep       miconazole 2 % cream    MICATIN    5 g    Place 1 applicator vaginally At Bedtime Substitute as needed.       nabumetone 750 MG tablet    RELAFEN    60 tablet    Take 1 tablet (750 mg) by mouth 2 times daily as needed for moderate pain       naloxone nasal spray    NARCAN    0.2 mL    Spray 1 spray (4 mg) into one nostril alternating nostrils as needed for opioid reversal (every 2-3 minutes until medical assistance arrives.)       ondansetron 4 MG tablet    ZOFRAN    18 tablet    Take 1 tablet (4 mg) by mouth every 8 hours as needed for nausea       * order for DME     1 Device    Equipment being ordered: CPAP.  1 device.  Per sleep study recommendations.  Brand:  Respironics, Type:  Nasal Wisp,  Size:  Small       * order for DME     1 each    Adult nebulizer mask and tubing.       * order for DME     1 Device    Equipment being ordered: Nebulizer supplies for life.       pramipexole 0.25 MG tablet    MIRAPEX    30 tablet    Take 1 tablet (0.25 mg) by mouth At Bedtime       pravastatin 20 MG tablet    PRAVACHOL    90 tablet    Take 1 tablet (20 mg) by mouth daily       predniSONE 10 MG tablet    DELTASONE    21 tablet    Take 2 tabs daily for 1 week, then 1 tab daily for 1 week.       ranitidine 150 MG  tablet    ZANTAC    60 tablet    Take 1 tablet (150 mg) by mouth 2 times daily       sennosides 8.6 MG tablet    SENOKOT    120 tablet    Take 2 tablets by mouth 2 times daily       * SEROQUEL 300 MG tablet   Generic drug:  QUEtiapine      Take 300 mg by mouth daily.       * SEROQUEL 50 MG tablet   Generic drug:  QUEtiapine     1 tablet    Take 1 tablet (50 mg) by mouth every morning For anxiety.  From Psychiatrist.       sitagliptin 100 MG tablet    JANUVIA    90 tablet    Take 1 tablet (100 mg) by mouth daily       SUMAtriptan 100 MG tablet    IMITREX    9 tablet    Take 1 tablet (100 mg) by mouth at onset of headache       topiramate ER - 24 hour 100 MG sprinkle capsule    QUDEXY XR    90 capsule    Take 1 capsule (100 mg) by mouth daily       venlafaxine 75 MG 24 hr capsule    EFFEXOR-XR    30 capsule    Take 1 capsule (75 mg) by mouth 3 times daily       * Notice:  This list has 11 medication(s) that are the same as other medications prescribed for you. Read the directions carefully, and ask your doctor or other care provider to review them with you.

## 2019-12-06 ENCOUNTER — MEDICAL CORRESPONDENCE (OUTPATIENT)
Dept: HEALTH INFORMATION MANAGEMENT | Facility: CLINIC | Age: 49
End: 2019-12-06

## 2019-12-17 ENCOUNTER — TRANSFERRED RECORDS (OUTPATIENT)
Dept: HEALTH INFORMATION MANAGEMENT | Facility: CLINIC | Age: 49
End: 2019-12-17

## 2019-12-20 ENCOUNTER — OFFICE VISIT (OUTPATIENT)
Dept: FAMILY MEDICINE | Facility: CLINIC | Age: 49
End: 2019-12-20
Payer: MEDICARE

## 2019-12-20 ENCOUNTER — OFFICE VISIT (OUTPATIENT)
Dept: PHARMACY | Facility: PHYSICIAN GROUP | Age: 49
End: 2019-12-20
Payer: COMMERCIAL

## 2019-12-20 VITALS
BODY MASS INDEX: 42.8 KG/M2 | WEIGHT: 229.6 LBS | SYSTOLIC BLOOD PRESSURE: 111 MMHG | TEMPERATURE: 98.4 F | RESPIRATION RATE: 16 BRPM | OXYGEN SATURATION: 97 % | DIASTOLIC BLOOD PRESSURE: 71 MMHG | HEART RATE: 100 BPM

## 2019-12-20 DIAGNOSIS — E11.65 TYPE 2 DIABETES MELLITUS WITH HYPERGLYCEMIA, WITH LONG-TERM CURRENT USE OF INSULIN (H): ICD-10-CM

## 2019-12-20 DIAGNOSIS — Z79.4 TYPE 2 DIABETES MELLITUS WITH HYPERGLYCEMIA, WITH LONG-TERM CURRENT USE OF INSULIN (H): ICD-10-CM

## 2019-12-20 DIAGNOSIS — Z71.6 ENCOUNTER FOR SMOKING CESSATION COUNSELING: ICD-10-CM

## 2019-12-20 DIAGNOSIS — E11.65 TYPE 2 DIABETES MELLITUS WITH HYPERGLYCEMIA, WITH LONG-TERM CURRENT USE OF INSULIN (H): Primary | ICD-10-CM

## 2019-12-20 DIAGNOSIS — J01.01 ACUTE RECURRENT MAXILLARY SINUSITIS: Primary | ICD-10-CM

## 2019-12-20 DIAGNOSIS — Z79.4 TYPE 2 DIABETES MELLITUS WITH HYPERGLYCEMIA, WITH LONG-TERM CURRENT USE OF INSULIN (H): Primary | ICD-10-CM

## 2019-12-20 DIAGNOSIS — K21.9 GASTROESOPHAGEAL REFLUX DISEASE, ESOPHAGITIS PRESENCE NOT SPECIFIED: ICD-10-CM

## 2019-12-20 DIAGNOSIS — J44.9 CHRONIC OBSTRUCTIVE PULMONARY DISEASE, UNSPECIFIED COPD TYPE (H): ICD-10-CM

## 2019-12-20 DIAGNOSIS — M79.10 MUSCLE PAIN: ICD-10-CM

## 2019-12-20 LAB
CRP SERPL-MCNC: 1.2 MG/DL (ref 0–0.8)
ERYTHROCYTE [SEDIMENTATION RATE] IN BLOOD: 45 MM/HR (ref 0–20)
HBA1C MFR BLD: 10.1 % (ref 4.1–5.7)

## 2019-12-20 PROCEDURE — 99607 MTMS BY PHARM ADDL 15 MIN: CPT | Performed by: PHARMACIST

## 2019-12-20 PROCEDURE — 99606 MTMS BY PHARM EST 15 MIN: CPT | Performed by: PHARMACIST

## 2019-12-20 RX ORDER — AZITHROMYCIN 250 MG/1
TABLET, FILM COATED ORAL
Qty: 6 TABLET | Refills: 0 | Status: SHIPPED | OUTPATIENT
Start: 2019-12-20 | End: 2019-12-25

## 2019-12-20 RX ORDER — CYCLOBENZAPRINE HCL 10 MG
10 TABLET ORAL DAILY PRN
Qty: 30 TABLET | Refills: 0 | Status: SHIPPED | OUTPATIENT
Start: 2019-12-20 | End: 2020-01-17

## 2019-12-20 RX ORDER — INSULIN LISPRO 100 [IU]/ML
INJECTION, SOLUTION INTRAVENOUS; SUBCUTANEOUS
Qty: 15 ML | Refills: 3 | Status: SHIPPED | OUTPATIENT
Start: 2019-12-20 | End: 2020-01-28

## 2019-12-20 NOTE — PROGRESS NOTES
SUBJECTIVE/OBJECTIVE:                Teresa Perez is a 49 year old female coming in for a follow-up visit for Medication Therapy Management.  She was referred to me from Dr. Meghna Bullock.     Chief Complaint: Follow up from [facility] visit on 9/20/19 and 10/22/2019.    Tobacco:  reports that she has been smoking cigarettes. She has been smoking about 0.50 packs per day. She has never used smokeless tobacco.Tobacco Cessation Action Plan: Continue nicoderm CQ and Nicotine gum as needed. Patient with increased stressors recently and not a point to strongly consider complete cessation.   Alcohol: none    Medication Adherence/Access:  no issues reported    Diabetes:  Pt currently taking Tuojeo (60 units at bedtime), Humalog sliding scale with meals and exanetide (2mg q7days). Pt is not experiencing side effects.  SMBG: typically four times daily.   Ranges (patient reported): 300s fasting 4/7 days of the week, typically in the 400's before meals. No recent lows. Patient notes she has been sick recently.   Patient is not experiencing hypoglycemia  ACEi/ARB: Yes: Lisinopril 40 mg daily.   Urine Albumin:   Lab Results   Component Value Date    UMALCR See Note. 08/30/2019      Aspirin: Not taking due to allergy. On Plavix 75 mg daily instead.     COPD versus asthma: Current medications: Short-Acting Bronchodilator: Albuterol MDI as needed. Not needing on a regular basis.  Patient is also taking ICS/LABA- Symbicort 2 puff(s) twice daily, LAMA- Spiriva one puff(s) once daily and JAMAR/MORIS- Combivent -respimat 20-100mcg/act 1 puff four times daily.    Smoking Cessation:  How much does she smoke:  1/2 ppd, recently slightly less than normal. She is using nicoderm patch and gum occasionally, but not yet ready to completely quit smoking. Too much stress right now.    Today's Vitals:   BP Readings from Last 1 Encounters:   12/20/19 111/71     Pulse Readings from Last 1 Encounters:   12/20/19 100     Wt Readings from Last 1  "Encounters:   12/20/19 229 lb 9.6 oz (104.1 kg)     Ht Readings from Last 1 Encounters:   08/21/18 5' 1.42\" (1.56 m)     Estimated body mass index is 42.8 kg/m  as calculated from the following:    Height as of 8/21/18: 5' 1.42\" (1.56 m).    Weight as of an earlier encounter on 12/20/19: 229 lb 9.6 oz (104.1 kg).    Temp Readings from Last 1 Encounters:   12/20/19 98.4  F (36.9  C) (Oral)       ASSESSMENT:                Medication Adherence: good, no issues identified    Diabetes: Needs Improvement. Patient is not meeting A1c goal of < 8%. BS are significantly elevated in the 300-400 range, although patient reports that she hasn't been feeling well recently. Bydureon should now be at steady state and patient, but her sugars have not improved.   Pt would benefit from Basal Insulin (Tuojeo) :  increase dose to 65u daily at bedtime and Bolus / Rapid Acting Insulin (Humalog) : increase dose to 14u for BG between 100-150 plus additional 2u for every 50 above 150. No humalog if BG <100. Could consider pioglitazone in the future as no known history of CHF and patient does have LOMAS. Patient does have history of \"throat swelling\" with metformin.     COPD: Stable. Patient would benefit from discontinuation of Combivent inhaler as she is already getting LAMA daily and MORIS as needed.     Smoking cessation: Needs Improvement. Pt continues to use tobacco. Pt is not ready to quit using tobacco.      PLAN:                  Diabetes    Increase Tuojeo to 65u at bedtime    Increase Humalog to 14u with meals if BG between 100-150, plus 2u for every 50 above 150. Hold if BG <100.    COPD    Discontinue Combivent-respimat     Continue Spiriva, Symbicort and albuterol with no medication changes     Smoking cessation    Continue Nicoderm and Nicorette for now     Medication issues to be addressed af a future visit    Reassess and adjust diabetes therapy as needed. May need to further increase Toujeo and Humalog.    Could consider " adding Pioglitazone in the future if diabetes still not well controlled    Continue to assess readiness for additional smoking cessation assistance        I spent 20 minutes with this patient today. All changes were made via collaborative practice agreement with Dr. Meghna Bullock. A copy of the visit note was provided to the patient's referring provider.     Will follow up in 1 month.    The patient was given a summary of these recommendations as an after visit summary by Dr. Bullock.     Carey Mills MD, PGY2    I have verified the content of the note, which accurately reflects my assessment of the patient and the plan of care.   Alejandra Olson, [facility], PharmD

## 2019-12-20 NOTE — RESULT ENCOUNTER NOTE
Teresa Perez-    Here is a copy of your lab results.  Your CRP and ESR are slightly elevated.   Please follow up with your orthopedic doctor to discuss this.   Your A1c is stable, but still higher than we would like.  We'll keep working on this.  The increase insulin should help.  Please call the clinic at [phone] if you have any questions.      Tyra Bullock MD    Please send results to patient AND to patient's orthopedist, Dr. Turpin, [facility].

## 2019-12-20 NOTE — PATIENT INSTRUCTIONS
Azithromycin, 2 pills today, then 1 pill per day for next 4 days.    Keep doing nasal saline washes and flonase  Keep doing Symbicort and Spiriva  Use albuterol rescue as needed  Stop taking combivent.      For pain:    Can take up to 1000mg (2 extra strength) tylenol, up to 3x per day  Can take up to 600mg ibuprofen, up to 3x per day.  (Take with food or milk)      For diabetes:  Increase toujeo to 65 Units once per day at Bedtime.    Continue with the Bydureon     Increase your sliding scale:    If BS <100, no Humalog  IF -150, take 14 Units  If -200, take 16 Units  If -250, take 18 Units  If -300, take 20 Units  If -350, take 22 Units  If -400, take 24 Units  If BS above 400, take 26 Units.

## 2019-12-20 NOTE — LETTER
December 23, 2019      Teresa Perez  [address]  [place] [address]        Dear Teresa,    Please see below for your test results.   Here is a copy of your lab results.  Your CRP and ESR are slightly elevated.   Please follow up with your orthopedic doctor to discuss this.   Your A1c is stable, but still higher than we would like.  We'll keep working on this.  The increase insulin should help.  Please call the clinic at [phone] if you have any questions.       Resulted Orders   Hemoglobin A1c ([facility])   Result Value Ref Range    Hemoglobin A1C 10.1 (H) 4.1 - 5.7 %   Erythrocyte Sedimentation Rate ([facility])   Result Value Ref Range    Sed Rate 45 (H) 0 - 20 mm/hr   C-Reactive Protein ([facility])   Result Value Ref Range    C-Reactive Protein 1.2 (H) 0.0 - 0.8 mg/dL    Narrative    Test performed by:  [facility]  [address] [place] [address]       If you have any questions, please call the clinic to make an appointment.    Sincerely,    Tyra Bullock MD

## 2019-12-21 RX ORDER — OMEPRAZOLE 40 MG/1
40 CAPSULE, DELAYED RELEASE ORAL DAILY
Qty: 30 CAPSULE | Refills: 0 | Status: SHIPPED | OUTPATIENT
Start: 2019-12-21 | End: 2020-01-20
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
## 2019-12-23 ENCOUNTER — OFFICE VISIT - HEALTHEAST (OUTPATIENT)
Dept: AUDIOLOGY | Facility: CLINIC | Age: 49
End: 2019-12-23

## 2019-12-23 ENCOUNTER — COMMUNICATION - HEALTHEAST (OUTPATIENT)
Dept: ADMINISTRATIVE | Facility: CLINIC | Age: 49
End: 2019-12-23

## 2019-12-23 DIAGNOSIS — H93.13 TINNITUS OF BOTH EARS: ICD-10-CM

## 2019-12-23 DIAGNOSIS — H92.02 EAR PAIN, LEFT: ICD-10-CM

## 2019-12-23 DIAGNOSIS — R42 DIZZINESS: ICD-10-CM

## 2019-12-23 DIAGNOSIS — H90.3 SENSORINEURAL HEARING LOSS, BILATERAL: ICD-10-CM

## 2019-12-24 DIAGNOSIS — K21.9 GASTROESOPHAGEAL REFLUX DISEASE, ESOPHAGITIS PRESENCE NOT SPECIFIED: Primary | ICD-10-CM

## 2019-12-26 RX ORDER — FAMOTIDINE 40 MG/1
40 TABLET, FILM COATED ORAL 2 TIMES DAILY
Qty: 180 TABLET | Refills: 3 | Status: SHIPPED | OUTPATIENT
Start: 2019-12-26 | End: 2020-11-21

## 2019-12-31 NOTE — TELEPHONE ENCOUNTER
Called [facility] today and the pharmacy tech told me that her insurance will only cover Ranitidine and that they have stock from a safe  to give to the patient.  Please send the Ranitidine back to the pharmacy.  Deana Castellanos, CMA

## 2020-01-07 ENCOUNTER — OFFICE VISIT - HEALTHEAST (OUTPATIENT)
Dept: OTOLARYNGOLOGY | Facility: CLINIC | Age: 50
End: 2020-01-07

## 2020-01-07 ENCOUNTER — TRANSFERRED RECORDS (OUTPATIENT)
Dept: HEALTH INFORMATION MANAGEMENT | Facility: CLINIC | Age: 50
End: 2020-01-07

## 2020-01-07 ENCOUNTER — OFFICE VISIT - HEALTHEAST (OUTPATIENT)
Dept: AUDIOLOGY | Facility: CLINIC | Age: 50
End: 2020-01-07

## 2020-01-07 DIAGNOSIS — H90.3 SENSORINEURAL HEARING LOSS (SNHL) OF BOTH EARS: ICD-10-CM

## 2020-01-07 DIAGNOSIS — H90.3 SENSORINEURAL HEARING LOSS, BILATERAL: ICD-10-CM

## 2020-01-07 DIAGNOSIS — R42 LIGHTHEADEDNESS: ICD-10-CM

## 2020-01-21 ENCOUNTER — TELEPHONE (OUTPATIENT)
Dept: FAMILY MEDICINE | Facility: CLINIC | Age: 50
End: 2020-01-21

## 2020-01-21 NOTE — TELEPHONE ENCOUNTER
[facility] phone call message- general phone call:    Reason for call:     Kristian from [employer]  is calling because pt's certificate of need is to term on 02/18/2020 and they are needing a new one to be filled by Dr. Bullock before the service is lapsed.    Pt still needs medical transportation and nothing else has really changed. However, this form can be filled online. If there is any questions, please call Kristian back.       [email or web address]    Action desired:    Fill form online    Return call needed: Only if needed    OK to leave a message on voice mail? Yes    Advised patient to response may take up to 2 business days: Yes    Primary language: English      needed? No    Call taken on January 21, 2020 at 3:18 PM by Nay Jefferson CMA

## 2020-01-21 NOTE — TELEPHONE ENCOUNTER
I have never completed this type of form before.  Typically with a more complicated form, we request that the patient come in for a visit.  She is coming in for a follow up visit on 1/28, and I will complete this form with the patient to assist at that time.     Tyra Bullock MD    Routed to YULISA Perry.

## 2020-01-28 ENCOUNTER — OFFICE VISIT (OUTPATIENT)
Dept: FAMILY MEDICINE | Facility: CLINIC | Age: 50
End: 2020-01-28
Payer: MEDICARE

## 2020-01-28 ENCOUNTER — DOCUMENTATION ONLY (OUTPATIENT)
Dept: FAMILY MEDICINE | Facility: CLINIC | Age: 50
End: 2020-01-28

## 2020-01-28 VITALS
RESPIRATION RATE: 16 BRPM | OXYGEN SATURATION: 95 % | HEART RATE: 95 BPM | TEMPERATURE: 98.5 F | SYSTOLIC BLOOD PRESSURE: 115 MMHG | DIASTOLIC BLOOD PRESSURE: 76 MMHG

## 2020-01-28 DIAGNOSIS — J44.9 CHRONIC OBSTRUCTIVE PULMONARY DISEASE, UNSPECIFIED COPD TYPE (H): ICD-10-CM

## 2020-01-28 DIAGNOSIS — E11.65 TYPE 2 DIABETES MELLITUS WITH HYPERGLYCEMIA, WITH LONG-TERM CURRENT USE OF INSULIN (H): ICD-10-CM

## 2020-01-28 DIAGNOSIS — K59.00 CONSTIPATION, UNSPECIFIED CONSTIPATION TYPE: ICD-10-CM

## 2020-01-28 DIAGNOSIS — K76.0 FATTY LIVER: ICD-10-CM

## 2020-01-28 DIAGNOSIS — Z79.4 TYPE 2 DIABETES MELLITUS WITH HYPERGLYCEMIA, WITH LONG-TERM CURRENT USE OF INSULIN (H): ICD-10-CM

## 2020-01-28 DIAGNOSIS — R10.13 ABDOMINAL PAIN, EPIGASTRIC: Primary | ICD-10-CM

## 2020-01-28 DIAGNOSIS — J30.2 SEASONAL ALLERGIES: ICD-10-CM

## 2020-01-28 RX ORDER — ASPIRIN 81 MG
100 TABLET, DELAYED RELEASE (ENTERIC COATED) ORAL DAILY PRN
Qty: 60 TABLET | Refills: 1 | Status: SHIPPED | OUTPATIENT
Start: 2020-01-28 | End: 2020-06-03

## 2020-01-28 RX ORDER — LORATADINE 10 MG/1
10 TABLET ORAL DAILY
Qty: 90 TABLET | Refills: 3 | Status: SHIPPED | OUTPATIENT
Start: 2020-01-28 | End: 2020-04-21

## 2020-01-28 RX ORDER — PANTOPRAZOLE SODIUM 40 MG/1
40 TABLET, DELAYED RELEASE ORAL DAILY
COMMUNITY
End: 2020-02-17

## 2020-01-28 RX ORDER — INSULIN LISPRO 100 [IU]/ML
INJECTION, SOLUTION INTRAVENOUS; SUBCUTANEOUS
Qty: 15 ML | Refills: 3 | Status: SHIPPED | OUTPATIENT
Start: 2020-01-28 | End: 2020-01-30

## 2020-01-28 NOTE — PATIENT INSTRUCTIONS
Increase insulin today for meals.  (Start at 20 Units with meals)  Loratidine 1x per day.  New prescription sent.    Schedule appt with gastroenterologist  Use throat lozenges and drink lots of water.    Try to cut back on the smoking.    Keep up with the healthy eating and diet!  Keep working on getting the weight down.   Stop taking the omeprazole, and keep taking the protonix (pantoprazole)  Take     Follow up in 4 weeks and will do labs at that time.

## 2020-01-28 NOTE — PROGRESS NOTES
There are no exam notes on file for this visit.    SUBJECTIVE  Teresa Perez is a 49 year old female with past medical history significant for    Patient Active Problem List   Diagnosis     Health Care Home     Acute peptic ulcer     Other allergy, other than to medicinal agents     Bipolar disorder (H)     Bulging lumbar disc     Cervical dysplasia     Common migraine without aura     Constipation     Dwarfism     Familial hypercholesterolemia     Insomnia     Low back pain     Intermittent asthma     Leg pain, bilateral     Smoking     Degeneration of thoracic or thoracolumbar intervertebral disc     LOMAS (nonalcoholic steatohepatitis)     Disease of lung     Hemorrhoids     Parotid mass     Endometriosis     NNAMDI (obstructive sleep apnea)     History of total right knee replacement     Lateral epicondylitis     Impingement syndrome, shoulder, left     Hx of total knee replacement, left     Chronic pain syndrome     Cough     Borderline personality disorder (H)     Moderate recurrent major depression (H)     Recurrent ventral hernia     Type 2 diabetes mellitus with complication, with long-term current use of insulin (H)     Essential hypertension     Nonruptured cerebral aneurysm     Cerebrovascular accident (CVA) due to embolism (H)     Amputation of leg (H)     CKD (chronic kidney disease) stage 5, GFR less than 15 ml/min (H)     Pre-ulcerative corn or callous     Others present at the visit:  None    Presents for   Chief Complaint   Patient presents with     RECHECK     follow up Er visit     Patient presents for follow-up from recent ER visit today.  She was seen at [facility] 2 weeks ago, with complaints of bilateral belly pain in the upper epigastric region.  She describes it as feeling like a charley horse as if someone is stabbing her, and it happens different times in different sides, often feeling like a pulled muscle.  She has a known large hernia in her abdomen, but this is worse than the pain  she had previously in that area.  They did labs which showed an elevated white count but no other etiology.  Had an ultrasound that was negative for gallstones, and a CT abdomen pelvis that showed the hernia without other changes.  It did also show chronic fatty liver disease which is known and severe.  She has been taking pantoprazole, and this seems to help some with pain.  Is also taking omeprazole.  Is using some Zofran and this is been helpful.  She continues to feel intermittently nauseous and with these sharp pains that last for 2 to 3 hours.  Has not had vomiting.  Appetite has been somewhat diminished because of this.  She describes normal stools that are loose but not diarrhea, but has had some intermittent constipation.  No blood in her stool.  Feels like she is not fully able to empty her bladder with urination, but denies dysuria, no hematuria.  She has been able to drink fluids although this is somewhat hard.    Patient has been using marijuana regularly to treat her chronic pain in her back and neck as well as her chronic abdominal pain.  She buys this off the street and is using about 2 blunts a day.  Feels like it helps with the pain.  She reports that she took a week off of using the marijuana and did not notice any improvement in the nausea or abdominal symptoms.  She has chronic poorly controlled diabetes.  Has had some sugars in the last couple weeks that have been above high at 500.  She has had 2 times when she is had fasting below 200 but otherwise they have been significantly high.  Has had difficulty with hyperglycemia in the past, but also severe symptomatic hypoglycemia.  She has been taking 65 units of the Toujeo and 18 units of Humalog +2 units for every 50/150 with her meals.  She is trying to work on eating healthy and is getting meals from the chips program that are specific for her diabetes.  She is trying to eat more fresh fruits and veggies, and avoiding red meat and switching to  turkey.  Has been trying to hold off on eating for periods of time until she is too hungry in order to help lose weight.  She is frustrated because her prosthetic will not fit with her current weight.  Did well with her diet and exercise when she was in a transitional care unit, and denies willingness to go for further diet or lifestyle education about diabetes.  She has been frustrated with the weight gain, and is committed to losing weight.    She has chronic fevers chills and sweats and these are unchanged.  Does not have a thermometer at home.  She wonders if she is having some upper respiratory symptoms as well.  She notices increased wheezing and some coughing.  This is nonproductive.  Has been using her inhalers regularly and finds it helpful.  Noticed wheezing this morning but says that is has improved by the time of our visit today.  She does continue to smoke cigarettes as well.    Also has multiple questions today about needing a potential electric scooter, and how she might be able to get 1.  Has some difficulty getting around because of her amputation, and can use her wheelchair and a walker, but uses the walker only when her PCA is around due to balance issues.    She shares that she recently saw the ear specialist and has ordered her hearing aids, and needs to  new glasses, but that they are ready.  Things are up-to-date on the health maintenance for cat and so she is ready to make more changes and get into see the gastroenterologist for her belly pain.    OBJECTIVE:  Vitals: /76 (BP Location: Right arm)   Pulse 95   Temp 98.5  F (36.9  C) (Oral)   Resp 16   SpO2 95%   BMI= There is no height or weight on file to calculate BMI.  Vitals:  Vitals are reviewed and are within the normal range.  No weight obtained today.  Gen:  Alert, pleasant, no acute distress  HEENT: Throat is clear, nonerythematous, no cervical lymphadenopathy present.  Cardiac:  Regular rate and rhythm, no murmurs,  rubs or gallops, mild tachycardia.  Respiratory:  Lungs clear to auscultation bilaterally, good airflow to bases, no crackles or wheezes.  Abdomen:  Soft, generalized tenderness.  She has a large palpable hernia which is reproducible.  Extremities: Above-the-knee amputation on the left leg.      ASSESSMENT AND PLAN:      Teresa was seen today for recheck.  Here for ER follow-up visit.  Fairly extensive lab and imaging, which showed known hernia, known fatty liver, and elevated white count.  Has had some improvement in pain with pantoprazole.  She has a GI consult scheduled.  We discussed some of my thoughts on possible contributing factors to her abdominal pain, which include her regular marijuana use, her chronic hyperglycemia which could lead to gastroparesis, as well as the underlying hernia.  Discussed the importance of improving her blood sugar control, and improving her breathing so that she would qualify for a hernia repair.  I did prescribe some Colace to use as needed with constipation.  She will discontinue her omeprazole as the pantoprazole has been helping.  We will continue to increase her diabetes medications and she will continue on the Toujeo 65 units once daily and increase her lispro to 20 units +2 for every 50 above 150.  She will bring her meter with her next visit.  We will recheck an A1c at that time.    Her breathing is actually better than it typically is.  No changes in medications right now.  No need for antibiotics or steroids.  What does need further monitoring for CPAP and obstructive sleep apnea, but we did not discuss this today and should be followed up with at her next visit.    Diagnoses and all orders for this visit:    Abdominal pain, epigastric.  Likely secondary to patient's large hernia, but could be due to irritable bowel, fatty liver, or GERD.  She appears comfortable today, and has follow-up scheduled with [facility].  Appreciate their input.  Constipation, unspecified  constipation type  -     docusate sodium (COLACE) 100 MG tablet; Take 1 tablet (100 mg) by mouth daily as needed for constipation  Fatty liver.  Has severe fatty liver with normal LFTs.  Will follow with [facility] for this as well.    Type 2 diabetes mellitus with hyperglycemia, with long-term current use of insulin (H).  We will continue to titrate up on her insulin.  We will increase her mealtime insulin to 20 units if less than 150, and 2 units for every 50 above 150.  Continue with the same dose of 65 units of the Toujeo.  -     insulin lispro (HUMALOG KWIKPEN) 100 UNIT/ML (1 unit dial) pen; If BS <100, no Humalog IF -150, take 20 Units.  Increase 2 units for every 50 above 150.    Seasonal allergies.  Patient is requesting to stay this only once daily.  New prescription was given.  -     loratadine (CLARITIN) 10 MG tablet; Take 1 tablet (10 mg) by mouth daily    Chronic obstructive pulmonary disease, unspecified COPD type (H).  Breathing is overall stable today.  Lungs are clear.  No need for steroids, or antibiotics currently.  We will continue with current inhaler regimen.    I discussed my concerns about her frequent marijuana use, and that this is both increasing her appetite, worsening her breathing, and causing her abdominal pain.  She does not feel this is the case, and has stopped using for a week without improvement in symptoms.  We will continue to discuss this going forward.    Patient Instructions   Increase insulin today for meals.  (Start at 20 Units with meals)  Loratidine 1x per day.  New prescription sent.    Schedule appt with gastroenterologist  Use throat lozenges and drink lots of water.    Try to cut back on the smoking.    Keep up with the healthy eating and diet!  Keep working on getting the weight down.   Stop taking the omeprazole, and keep taking the protonix (pantoprazole)  Take     Follow up in 4 weeks and will do labs at that time.      I spent 45 minutes with the patient  greater than 50% counseling coronation of care.    Tyra Bullock MD

## 2020-01-28 NOTE — PROGRESS NOTES
Interprofessional Team Consultation Note     Requesting Provider: Dr. Bullock    Consultants:  Behavioral Health: Dr. Lopez  Care Coordination: Matthew  PharmD: Dr. Phillips  Family Medicine Physicians: Dr. Bullock, Dr. Sung, Dr. Juarez, and Dr. Tidewll    IDENTIFYING DATA/REASON FOR REFERRAL:  Teresa Perez is 49 year old female who is cared for by Dr. Bullock.? Dr. Bullock is requesting consultation related to patient with challenging diabetes. ?Relevant clinical information obtained from requesting PCP, interprofessional team members noted above and review of the medical record.     Patient Active Problem List   Diagnosis     Health Care Home     Acute peptic ulcer     Other allergy, other than to medicinal agents     Bipolar disorder (H)     Bulging lumbar disc     Cervical dysplasia     Common migraine without aura     Constipation     Dwarfism     Familial hypercholesterolemia     Insomnia     Low back pain     Intermittent asthma     Leg pain, bilateral     Smoking     Degeneration of thoracic or thoracolumbar intervertebral disc     LOMAS (nonalcoholic steatohepatitis)     Disease of lung     Hemorrhoids     Parotid mass     Endometriosis     NNAMDI (obstructive sleep apnea)     History of total right knee replacement     Lateral epicondylitis     Impingement syndrome, shoulder, left     Hx of total knee replacement, left     Chronic pain syndrome     Cough     Borderline personality disorder (H)     Moderate recurrent major depression (H)     Recurrent ventral hernia     Type 2 diabetes mellitus with complication, with long-term current use of insulin (H)     Essential hypertension     Nonruptured cerebral aneurysm     Cerebrovascular accident (CVA) due to embolism (H)     Amputation of leg (H)     CKD (chronic kidney disease) stage 5, GFR less than 15 ml/min (H)     Pre-ulcerative corn or callous     Current Outpatient Medications   Medication     albuterol (PROAIR HFA/PROVENTIL HFA/VENTOLIN  HFA) 108 (90 Base) MCG/ACT inhaler     albuterol (PROVENTIL) (2.5 MG/3ML) 0.083% neb solution     amLODIPine (NORVASC) 10 MG tablet     azelastine (OPTIVAR) 0.05 % ophthalmic solution     blood glucose (NO BRAND SPECIFIED) lancets standard     blood glucose monitoring (NO BRAND SPECIFIED) test strip     budesonide-formoterol (SYMBICORT) 160-4.5 MCG/ACT Inhaler     calcium carbonate (TUMS) 500 MG chewable tablet     clindamycin (CLEOCIN T) 1 % external solution     clopidogrel (PLAVIX) 75 MG tablet     [employer]S ACETAMINOPHEN EX  MG tablet     cyclobenzaprine (FLEXERIL) 10 MG tablet     docusate sodium (COLACE) 100 MG tablet     EPINEPHrine (EPIPEN/ADRENACLICK/OR ANY BX GENERIC EQUIV) 0.3 MG/0.3ML injection 2-pack     exenatide ER (BYDUREON) 2 MG pen     famotidine (PEPCID) 40 MG tablet     fluticasone (FLONASE) 50 MCG/ACT nasal spray     gabapentin (NEURONTIN) 600 MG tablet     ibuprofen (ADVIL/MOTRIN) 600 MG tablet     insulin glargine U-300 (TOUJEO SOLOSTAR) 300 UNIT/ML (1 units dial) pen     insulin lispro (HUMALOG KWIKPEN) 100 UNIT/ML (1 unit dial) pen     insulin pen needle (31G X 5 MM) 31G X 5 MM miscellaneous     lidocaine (XYLOCAINE) 5 % external ointment     lisinopril (PRINIVIL/ZESTRIL) 40 MG tablet     loratadine (CLARITIN) 10 MG tablet     metoprolol tartrate (LOPRESSOR) 25 MG tablet     nicotine (NICODERM CQ) 21 MG/24HR 24 hr patch     nicotine (NICORETTE) 2 MG gum     nystatin (MYCOSTATIN) 750411 UNIT/GM external powder     nystatin (MYCOSTATIN) 617713 UNIT/ML suspension     ondansetron (ZOFRAN) 4 MG tablet     order for DME     pantoprazole (PROTONIX) 40 MG EC tablet     pravastatin (PRAVACHOL) 80 MG tablet     QUEtiapine (SEROQUEL) 300 MG tablet     QUEtiapine (SEROQUEL) 50 MG tablet     sodium chloride (OCEAN) 0.65 % nasal spray     SUMAtriptan (IMITREX) 100 MG tablet     tiotropium (SPIRIVA) 18 MCG inhaled capsule     venlafaxine (EFFEXOR-XR) 150 MG 24 hr capsule     Current Facility-Administered  Medications   Medication     medroxyPROGESTERone (DEPO-PROVERA) injection 150 mg       Topics Discussed:  Patient is well known to Dr. Bullock. Dr. Bullock reported that patient experienced a lot of change after using cocaine and experiencing a stroke and an amputation. After release from [facility], patient was referred to [facility] and a TCU where she became medically stable and diabetes improved. Patient has a history of mental health concerns with diagnoses of bipolar disorder, MDD, and borderline personality disorder. She is now in an independent living facility with PCA services. She is connected to psychiatry, but has been reluctant to go to therapy. Since change of setting, patient's health has declined. She has been having more abdominal pain, complicated by a history of hernia. She is smoking marijuana and has started smoking cigarettes again. Patient has gained weight and diabetes is uncontrolled. Team discussed connecting patient with nurse medication management services to provide psychoeducation and support. Patient needs to improve abdominal pain in order to have a needed surgery, however, she frequently presents multiple issues in her medical appointments and has difficulty with medical adherence and behavioral changes. Discussed benefits of motivational interviewing and additional support from pharmacy and behavioral health. Patient may also benefit from an updated sleep study and a CPAP, and connection to homecare nurses.     Recommendations/Action Items:  1. SW will reach out to patient to update care team and identify case management and connection to services.   2. SW will look into referral to CPAP and previous sleep study visit.   3. Dr. Bullock will plan to schedule patient during [facility] time when pharmacy and behavioral health is available to speak to patient.     Lola Lopez, PhD     Disclaimer  The above treatment recommendations are based on consultation with the patient's  primary care provider and a review of relevant information in EPIC.? I have not personally examined the patient.? All recommendations should be implemented with considerations of the patient's relevant prior history and current clinical status.  Please contact me with any questions about the care of this patient.

## 2020-01-29 ENCOUNTER — OFFICE VISIT - HEALTHEAST (OUTPATIENT)
Dept: AUDIOLOGY | Facility: CLINIC | Age: 50
End: 2020-01-29

## 2020-01-29 DIAGNOSIS — H90.3 SENSORINEURAL HEARING LOSS, BILATERAL: ICD-10-CM

## 2020-01-29 NOTE — PROGRESS NOTES
Social Work Note:    Data and Intervention:     SW called Pt to gather more information on natural and formal supports. Pt confirmed her PCA remains the same as documented in Care Teams. When asked if her PCA assists in medication management, Pt advised she manages them on her own. SW asked if she wanted/ needed assistance with administering her meds. Pt declined. Pt receives psychiatric care at [facility]. Psychiatrist remains the same as documented in Care Teams. DESMOND needs to be updated. Psychotherapy services are provided bi-weekly through [facility] with [place]. This provider has been updated in Care Teams. An DESMOND is needed to speak with this provider, if there is a need in the future. Per Pt, she has a  through [facility], Lis, who recently assisted her in applying for CADI waiver. Lis has assisted Teresa with transitioning from the TCU to her current residence. SW asked about services provided to her at current residence. Teresa responded that she is offered many services. When asked to provide examples, she suggested she has another doctor appt now and had to go. This worker thanked Teresa for her time and advised I will call back another time.     CHADD called [facility] and spoke with Shaun who declared there is no  assigned. SW advised Pt states she has a worker from [facility] by the name of Lis. He provided phone numbers for two financial workers with the name of Lis; Pillo ([phone]) and Sue ([phone]). SW called both numbers. Neither were available and no V/M messages were left.      CHADD called [facility] to know if [employer] Care Coordinator Michael Dunn ([phone]) is currently working with Pt. Michael no longer is employed at [facility] [employer] nor is there a different  working with Pt at this time. SW removed this person from Care Teams.     CHADD called [facility] to know if Nurse Practitioner  Arlin Helm ([phone]) is a current provider for Pt. Arlin no longer is employed with [employer]. SW removed this person from Care Teams.       Assessment and Plan:    SW will reach out to [facility] with more questions to determine who Pt is connected with for case management and which programs/ services are currently being offered to Pt.     SW will contact Pt again to continue the conversation in regards to her formal and informal supports.     MEGHANN Lam

## 2020-01-30 ENCOUNTER — DOCUMENTATION ONLY (OUTPATIENT)
Dept: FAMILY MEDICINE | Facility: CLINIC | Age: 50
End: 2020-01-30

## 2020-01-30 DIAGNOSIS — E11.65 TYPE 2 DIABETES MELLITUS WITH HYPERGLYCEMIA, WITH LONG-TERM CURRENT USE OF INSULIN (H): ICD-10-CM

## 2020-01-30 DIAGNOSIS — Z79.4 TYPE 2 DIABETES MELLITUS WITH HYPERGLYCEMIA, WITH LONG-TERM CURRENT USE OF INSULIN (H): ICD-10-CM

## 2020-01-30 RX ORDER — INSULIN LISPRO 100 [IU]/ML
INJECTION, SOLUTION INTRAVENOUS; SUBCUTANEOUS
Qty: 15 ML | Refills: 3 | Status: SHIPPED | OUTPATIENT
Start: 2020-01-30 | End: 2020-03-21

## 2020-01-30 NOTE — PROGRESS NOTES
[facility] pharmacy sent a script clarification request, please clarify total daily dose on her Humalog Kwikpen on script.   PH: [phone]  Deana Castellanos, [facility]

## 2020-01-30 NOTE — PROGRESS NOTES
I think I fixed the problem by adding a total daily insulin amount to the prescription.  Can you call them and make sure this works?    Thanks.    Tyra Bullock MD    Routed to LAURA Leblanc.

## 2020-01-30 NOTE — PROGRESS NOTES
I have reviewed and agree with the behavioral health fellow's summary and recommendations.  Evie Santiago, PhD., LP

## 2020-02-03 NOTE — PROGRESS NOTES
Social Work Note:    Data and Intervention:     SW contacted Lis Ferro ([phone]) with [facility]. No answer. No V/M was left. SW contacted Lis Rogers ([phone]) with [facility]. No V/M was left.    Assessment and Plan:    SW will attempt another outreach to gather more information in regards to who Pt may be assigned to for case management and what services potentially are being offered at this time.     MEGHANN Lam

## 2020-02-14 ENCOUNTER — TELEPHONE (OUTPATIENT)
Dept: FAMILY MEDICINE | Facility: CLINIC | Age: 50
End: 2020-02-14

## 2020-02-14 NOTE — TELEPHONE ENCOUNTER
[facility] phone call message- general phone call:    Reason for call: the care coordinator called to ask about the certificate of need for transportation form.  And would like a call back at [phone] ext [phone] and the fax number [phone]  Attention to taylor  And member id and pt name on the cover sheet     Return call needed: Yes    OK to leave a message on voice mail? Yes    Primary language: English      needed? No    Call taken on February 14, 2020 at 11:18 AM by Juan Peralta

## 2020-02-14 NOTE — TELEPHONE ENCOUNTER
Dr. Bullock,       Do you know anything about this form?  I guess you stated in an earlier telephone encounter that you needed to fill it out online with the patient present.   [facility], [facility]

## 2020-02-17 ENCOUNTER — DOCUMENTATION ONLY (OUTPATIENT)
Dept: FAMILY MEDICINE | Facility: CLINIC | Age: 50
End: 2020-02-17

## 2020-02-17 ENCOUNTER — OFFICE VISIT (OUTPATIENT)
Dept: FAMILY MEDICINE | Facility: CLINIC | Age: 50
End: 2020-02-17
Payer: MEDICARE

## 2020-02-17 VITALS
WEIGHT: 231 LBS | BODY MASS INDEX: 43.06 KG/M2 | OXYGEN SATURATION: 98 % | TEMPERATURE: 98.8 F | HEART RATE: 111 BPM | RESPIRATION RATE: 24 BRPM | DIASTOLIC BLOOD PRESSURE: 84 MMHG | SYSTOLIC BLOOD PRESSURE: 136 MMHG

## 2020-02-17 DIAGNOSIS — R05.9 COUGH: Primary | ICD-10-CM

## 2020-02-17 DIAGNOSIS — R10.11 RUQ ABDOMINAL PAIN: ICD-10-CM

## 2020-02-17 DIAGNOSIS — N92.4 EXCESSIVE BLEEDING IN PREMENOPAUSAL PERIOD: ICD-10-CM

## 2020-02-17 LAB
FLUAV AG UPPER RESP QL IA.RAPID: NEGATIVE
FLUBV AG UPPER RESP QL IA.RAPID: NEGATIVE

## 2020-02-17 RX ORDER — AZITHROMYCIN 250 MG/1
TABLET, FILM COATED ORAL
Qty: 6 TABLET | Refills: 0 | Status: SHIPPED | OUTPATIENT
Start: 2020-02-17 | End: 2020-06-03

## 2020-02-17 RX ORDER — PANTOPRAZOLE SODIUM 40 MG/1
40 TABLET, DELAYED RELEASE ORAL DAILY
Qty: 30 TABLET | Refills: 0 | Status: SHIPPED | OUTPATIENT
Start: 2020-02-17 | End: 2020-03-10

## 2020-02-17 RX ORDER — IBUPROFEN 600 MG/1
600 TABLET, FILM COATED ORAL EVERY 6 HOURS PRN
Qty: 60 TABLET | Refills: 1 | Status: SHIPPED | OUTPATIENT
Start: 2020-02-17 | End: 2020-05-13

## 2020-02-17 RX ORDER — MEDROXYPROGESTERONE ACETATE 150 MG/ML
150 INJECTION, SUSPENSION INTRAMUSCULAR ONCE
Status: COMPLETED | OUTPATIENT
Start: 2020-02-17 | End: 2020-02-17

## 2020-02-17 RX ADMIN — MEDROXYPROGESTERONE ACETATE 150 MG: 150 INJECTION, SUSPENSION INTRAMUSCULAR at 16:36

## 2020-02-17 NOTE — TELEPHONE ENCOUNTER
I looked back farther into patient's chart and found a message that included the website for the form.  I went ahead and filled it out online and with the okay from Dr. Guzman, put Dr. Bullock's name at the bottom of the form.  I called Kristian from [employer] and asked him if there was a print out we can scan to the patient's chart and if it were going to be effective for tomorrow, when her current term ends.    Deana Castellanos, CMA

## 2020-02-17 NOTE — PROGRESS NOTES
Preceptor Attestation:   Patient seen, evaluated and discussed with the resident. I have verified the content of the note, which accurately reflects my assessment of the patient and the plan of care.   Supervising Physician:  Darryl Newman MD.

## 2020-02-17 NOTE — NURSING NOTE
Clinic Administered Medication Documentation    MEDICATION LIST:   Depo Provera Documentation    Prior to injection, verified patient identity using patient's name and date of birth. Medication was administered. Please see MAR and medication order for additional information. Patient instructed to remain in clinic for 15 minutes.    BP: 136/84    LAST PAP/EXAM: No results found for: PAP  URINE HCG:not indicated    NEXT INJECTION DUE: 5/4/20 - 5/18/20    Was entire vial of medication used? Yes  Vial/Syringe: Single dose vial  Expiration Date:  01/2021  Name of provider requesting the injection: Erika  Name of provider supervising immunization: Trent

## 2020-02-17 NOTE — PROGRESS NOTES
[facility]         SUBJECTIVE   Teresa Perez is a 49 year old female with a PMH of:  Patient Active Problem List   Diagnosis     Health Care Home     Acute peptic ulcer     Other allergy, other than to medicinal agents     Bipolar disorder (H)     Bulging lumbar disc     Cervical dysplasia     Common migraine without aura     Constipation     Dwarfism     Familial hypercholesterolemia     Insomnia     Low back pain     Intermittent asthma     Leg pain, bilateral     Smoking     Degeneration of thoracic or thoracolumbar intervertebral disc     LOMAS (nonalcoholic steatohepatitis)     Disease of lung     Hemorrhoids     Parotid mass     Endometriosis     NNAMDI (obstructive sleep apnea)     History of total right knee replacement     Lateral epicondylitis     Impingement syndrome, shoulder, left     Hx of total knee replacement, left     Chronic pain syndrome     Cough     Borderline personality disorder (H)     Moderate recurrent major depression (H)     Recurrent ventral hernia     Type 2 diabetes mellitus with complication, with long-term current use of insulin (H)     Essential hypertension     Nonruptured cerebral aneurysm     Cerebrovascular accident (CVA) due to embolism (H)     Amputation of leg (H)     CKD (chronic kidney disease) stage 5, GFR less than 15 ml/min (H)     Pre-ulcerative corn or callous     presenting to clinic today with a chief complaint of night sweats a 3-day history of night sweats, productive cough, and posttussive emesis.  She states that she chronically has a smoker's cough, so she is unsure when this new cough started.  However, for the last 3 nights, she reports that she has woken up multiple times a drenched in sweat and had to change her clothing and sheets multiple times each night.  She also reports that she is coughing hard enough that she is having posttussive emesis 2 or more times per day.  Her cough is productive of blood-tinged brown sputum.  She also  endorses wheezing.  She is using her Spiriva and Symbicort regularly.  She is also using albuterol 4 times per day, twice with her inhaler and twice with her nebulizer.  She has no known sick contacts.    PMH, Medications and Allergies were reviewed and updated as needed.    ROS:  General: Positive for subjective fevers, night sweats; no chills  Head: No headache  Ears: No acute change in hearing.    CV: No chest pain or palpitations.  Resp: See HPI.  GI: No nausea, vomiting, constipation, diarrhea  : No urinary pains    Current Outpatient Medications   Medication Sig Dispense Refill     albuterol (PROAIR HFA/PROVENTIL HFA/VENTOLIN HFA) 108 (90 Base) MCG/ACT inhaler Inhale 1-2 puffs into the lungs every 4 hours as needed for shortness of breath / dyspnea or wheezing 1 Inhaler 11     albuterol (PROVENTIL) (2.5 MG/3ML) 0.083% neb solution Take 1 vial (2.5 mg) by nebulization every 6 hours as needed for shortness of breath / dyspnea or wheezing 30 vial 1     amLODIPine (NORVASC) 10 MG tablet Take 1 tablet (10 mg) by mouth daily 90 tablet 3     azelastine (OPTIVAR) 0.05 % ophthalmic solution Apply 1 drop to eye 2 times daily 1 Bottle 11     blood glucose (NO BRAND SPECIFIED) lancets standard Use to test blood sugar 3 times daily or as directed. 100 each 11     blood glucose (ONETOUCH ULTRA) test strip USE TO TEST BLOOD SUGARS 3 TIMES DAILY OR AS DIRECTED 300 strip PRN     budesonide-formoterol (SYMBICORT) 160-4.5 MCG/ACT Inhaler Inhale 2 puffs into the lungs 2 times daily       calcium carbonate (TUMS) 500 MG chewable tablet Take 1 tablet (500 mg) by mouth 2 times daily as needed for heartburn 100 tablet 1     clindamycin (CLEOCIN T) 1 % external solution Apply topically 2 times daily To underarms and groin area. 60 mL 11     clopidogrel (PLAVIX) 75 MG tablet Take 1 tablet (75 mg) by mouth daily 90 tablet 1     CVS ACETAMINOPHEN EX  MG tablet TAKE 2 TABLETS BY MOUTH 3 TIMES DAILY AS NEEDED FOR MILD PAIN 180  tablet 2     cyclobenzaprine (FLEXERIL) 10 MG tablet TAKE 1 TABLET (10 MG) BY MOUTH DAILY AS NEEDED FOR MUSCLE SPASMS 30 tablet 0     docusate sodium (COLACE) 100 MG tablet Take 1 tablet (100 mg) by mouth daily as needed for constipation 60 tablet 1     EPINEPHrine (EPIPEN/ADRENACLICK/OR ANY BX GENERIC EQUIV) 0.3 MG/0.3ML injection 2-pack Inject 0.3 mLs (0.3 mg) into the muscle once as needed for anaphylaxis 2 mL 0     exenatide ER (BYDUREON) 2 MG pen Inject 2 mg Subcutaneous every 7 days 12 each 3     famotidine (PEPCID) 40 MG tablet Take 1 tablet (40 mg) by mouth 2 times daily 180 tablet 3     fluticasone (FLONASE) 50 MCG/ACT nasal spray Spray 2 sprays into both nostrils daily 9.9 g 11     gabapentin (NEURONTIN) 600 MG tablet Take 2 tabs three times daily. 180 tablet 3     ibuprofen (ADVIL/MOTRIN) 600 MG tablet Take 1 tablet (600 mg) by mouth every 6 hours as needed for moderate pain 60 tablet 1     insulin glargine U-300 (TOUJEO SOLOSTAR) 300 UNIT/ML (1 units dial) pen Inject 65 Units Subcutaneous At Bedtime 7.5 mL 11     insulin lispro (HUMALOG KWIKPEN) 100 UNIT/ML (1 unit dial) pen If BS <100, no Humalog IF -150, take 20 Units.  Increase 2 units for every 50 above 150.  Total Daily dose is 80 units/day 15 mL 3     insulin pen needle (31G X 5 MM) 31G X 5 MM miscellaneous USE 4 DAILY OR AS DIRECTED. 100 each 10     lidocaine (XYLOCAINE) 5 % external ointment Apply topically 3 times daily as needed for moderate pain 150 g 1     lisinopril (PRINIVIL/ZESTRIL) 40 MG tablet Take 1 tablet (40 mg) by mouth daily 90 tablet 1     loratadine (CLARITIN) 10 MG tablet Take 1 tablet (10 mg) by mouth daily 90 tablet 3     metoprolol tartrate (LOPRESSOR) 25 MG tablet Take 1 tablet (25 mg) by mouth 2 times daily 180 tablet 3     nicotine (NICODERM CQ) 21 MG/24HR 24 hr patch Place 1 patch onto the skin every 24 hours 30 patch 1     nicotine (NICORETTE) 2 MG gum Place 1 each (2 mg) inside cheek as needed for smoking cessation  100 tablet 1     nystatin (MYCOSTATIN) 042999 UNIT/GM external powder Apply topically as needed for other (moisture) 60 g 0     nystatin (MYCOSTATIN) 737109 UNIT/ML suspension Take 5 mLs (500,000 Units) by mouth daily as needed (thrush) 500 mL 1     ondansetron (ZOFRAN) 4 MG tablet Take 1 tablet (4 mg) by mouth every 8 hours as needed for nausea 18 tablet 0     order for DME Equipment being ordered: CPAP mask and tubing 1 each 0     pantoprazole (PROTONIX) 40 MG EC tablet Take 40 mg by mouth daily       pravastatin (PRAVACHOL) 80 MG tablet Take 1 tablet (80 mg) by mouth daily 90 tablet 3     QUEtiapine (SEROQUEL) 300 MG tablet TAKE 1 TABLET BY MOUTH AT BEDTIME. INDICATIONS: MANIC PHASE OF MANIC-DEPRESSION -CARMEN DURAN RN       QUEtiapine (SEROQUEL) 50 MG tablet Take 50 mg by mouth 3 times daily       sodium chloride (OCEAN) 0.65 % nasal spray Use 3-4x daily 15 mL 1     SUMAtriptan (IMITREX) 100 MG tablet Take 1 tablet (100 mg) by mouth at onset of headache for migraine 9 tablet 5     tiotropium (SPIRIVA) 18 MCG inhaled capsule Inhale 1 capsule (18 mcg) into the lungs daily 90 capsule 1     venlafaxine (EFFEXOR-XR) 150 MG 24 hr capsule Take 2 capsules (300 mg) by mouth daily            OBJECTIVE:   Vitals:   Vitals:    02/17/20 1554   BP: 136/84   Pulse: 111   Resp: 24   Temp: 98.8  F (37.1  C)   TempSrc: Oral   SpO2: 98%   Weight: 104.8 kg (231 lb)     BMI: Body mass index is 43.06 kg/m .    Gen:  Well nourished and in no acute distress  HEENT: Extraocular movement intact.  Neck: Supple without lymphadenopathy  CV:  RRR  - no murmurs noted   Pulm:  Diffusely coarse breath sounds without any focal findings, no wheezes or crackles noted, good air entry  ABD: Soft, nontender, large hernia present, no rebound, BS intact throughout  Extrem: No cyanosis, edema or clubbing. L BKA.  Psych: Euthymic           ASSESSMENT and PLAN:   Teresa was seen today for cough, perspiration and contraception.    Diagnoses and all orders  for this visit:    Cough  -     XR CHEST 2 VW  -     Influenza A/B Antigen ([facility])  -     ibuprofen (ADVIL/MOTRIN) 600 MG tablet; Take 1 tablet (600 mg) by mouth every 6 hours as needed for moderate pain  -     azithromycin (ZITHROMAX) 250 MG tablet; Take 2 tablets (500 mg) by mouth daily for 1 day, THEN 1 tablet (250 mg) daily for 4 days.  Concern for infectious etiology with her reported night sweats and posttussive emesis.  Chest x-ray did not show infiltrate.  Influenza was negative.  Will treat with a course of azithromycin.    RUQ abdominal pain  -     pantoprazole (PROTONIX) 40 MG EC tablet; Take 1 tablet (40 mg) by mouth daily  Refilled prescription that was started in the ED on 1/20/2020 for a 30 day supply.  Will need reevaluation on follow-up. Suspect that her pain is related to her abdominal hernia, but she would like to continue this medication at this time.    Excessive bleeding in premenopausal period  -     medroxyPROGESTERone (DEPO-PROVERA) injection 150 mg    Return to clinic in on 3/4/2020 as previously scheduled for follow up with PCP, Dr. Bullock, regarding diabetes. Return sooner if develops new or worsening symptoms.    Options for treatment and/or follow-up care were reviewed with the patient was actively involved in the decision making process. Patient verbalized understanding and was in agreement with the plan.    The patient was seen by and discussed with Reji Newman MD.    Patricia James MD PGY2

## 2020-02-17 NOTE — PROGRESS NOTES
To be completed in Nursing note:    Please reference list for forms that require a visit for completion.  Please remind patients that providers are given 3-5 business days to complete and return forms.      Form type: [employer] Certification of Need for Special Transportation Form    Date form received: 20    Date form completed by Physician: 20    How was form returned to patient (mailed, faxed, or at  for patient to ): Faxed back to [employer] at [phone] attn: Kristian    Date form mailed/faxed/left at  for patient and sent to HIM for [email or web address]      Once form is left for patient, faxed, or mailed PCS will then close the documentation only encounter.

## 2020-02-20 ENCOUNTER — TELEPHONE (OUTPATIENT)
Dept: FAMILY MEDICINE | Facility: CLINIC | Age: 50
End: 2020-02-20

## 2020-02-20 NOTE — TELEPHONE ENCOUNTER
Pt's pharmacy states that patient is requesting a prescription of Tramadol, however it is no longer on the pt's med list.  Please advise.  Deana Castellanos, CMA

## 2020-02-24 DIAGNOSIS — E78.5 HYPERLIPIDEMIA LDL GOAL <100: ICD-10-CM

## 2020-02-24 RX ORDER — PRAVASTATIN SODIUM 80 MG/1
80 TABLET ORAL DAILY
Qty: 90 TABLET | Refills: 3 | Status: SHIPPED | OUTPATIENT
Start: 2020-02-24 | End: 2021-02-02

## 2020-03-02 DIAGNOSIS — E11.65 TYPE 2 DIABETES MELLITUS WITH HYPERGLYCEMIA, WITH LONG-TERM CURRENT USE OF INSULIN (H): ICD-10-CM

## 2020-03-02 DIAGNOSIS — R10.11 RUQ ABDOMINAL PAIN: ICD-10-CM

## 2020-03-02 DIAGNOSIS — Z79.4 TYPE 2 DIABETES MELLITUS WITH HYPERGLYCEMIA, WITH LONG-TERM CURRENT USE OF INSULIN (H): ICD-10-CM

## 2020-03-02 DIAGNOSIS — E78.5 HYPERLIPIDEMIA LDL GOAL <100: ICD-10-CM

## 2020-03-02 DIAGNOSIS — R05.9 COUGH: Primary | ICD-10-CM

## 2020-03-02 NOTE — PROGRESS NOTES
Social Work Note:    Data and Intervention:     CHADD contacted Lis Ferro ([phone]) with [facility]. Lis reports Joy Ramirez is Pt's [facility]  ([phone]). SW added Joy to care teams. Pt is receiving food support benefits, MSA, full medical assistance, and full QMB (Qualified Medicare Beneficiary) benefits, which covers all premiums, through [facility].      MEGHANN Lam

## 2020-03-03 ENCOUNTER — TRANSFERRED RECORDS (OUTPATIENT)
Dept: HEALTH INFORMATION MANAGEMENT | Facility: CLINIC | Age: 50
End: 2020-03-03

## 2020-03-10 DIAGNOSIS — R10.11 RUQ ABDOMINAL PAIN: ICD-10-CM

## 2020-03-10 RX ORDER — PANTOPRAZOLE SODIUM 40 MG/1
TABLET, DELAYED RELEASE ORAL
Qty: 30 TABLET | Refills: 0 | Status: SHIPPED | OUTPATIENT
Start: 2020-03-10 | End: 2020-04-14

## 2020-03-16 DIAGNOSIS — R10.84 ABDOMINAL PAIN, GENERALIZED: ICD-10-CM

## 2020-03-16 DIAGNOSIS — R05.9 COUGH: ICD-10-CM

## 2020-03-16 RX ORDER — LIDOCAINE 50 MG/G
OINTMENT TOPICAL 3 TIMES DAILY PRN
Qty: 150 G | Status: CANCELLED | OUTPATIENT
Start: 2020-03-16

## 2020-03-19 DIAGNOSIS — M79.10 MUSCLE PAIN: ICD-10-CM

## 2020-03-19 DIAGNOSIS — E11.65 TYPE 2 DIABETES MELLITUS WITH HYPERGLYCEMIA, WITH LONG-TERM CURRENT USE OF INSULIN (H): Primary | ICD-10-CM

## 2020-03-19 DIAGNOSIS — Z79.4 TYPE 2 DIABETES MELLITUS WITH HYPERGLYCEMIA, WITH LONG-TERM CURRENT USE OF INSULIN (H): Primary | ICD-10-CM

## 2020-03-19 DIAGNOSIS — K76.0 FATTY LIVER: ICD-10-CM

## 2020-03-19 DIAGNOSIS — I10 ESSENTIAL HYPERTENSION, BENIGN: ICD-10-CM

## 2020-03-20 ENCOUNTER — VIRTUAL VISIT (OUTPATIENT)
Dept: FAMILY MEDICINE | Facility: CLINIC | Age: 50
End: 2020-03-20
Payer: MEDICARE

## 2020-03-20 DIAGNOSIS — I10 ESSENTIAL HYPERTENSION, BENIGN: ICD-10-CM

## 2020-03-20 DIAGNOSIS — G89.29 CHRONIC PAIN OF RIGHT KNEE: ICD-10-CM

## 2020-03-20 DIAGNOSIS — Z79.4 TYPE 2 DIABETES MELLITUS WITH COMPLICATION, WITH LONG-TERM CURRENT USE OF INSULIN (H): ICD-10-CM

## 2020-03-20 DIAGNOSIS — J32.9 SINUSITIS, UNSPECIFIED CHRONICITY, UNSPECIFIED LOCATION: Primary | ICD-10-CM

## 2020-03-20 DIAGNOSIS — R05.9 COUGH: ICD-10-CM

## 2020-03-20 DIAGNOSIS — J45.20 MILD INTERMITTENT ASTHMA WITHOUT COMPLICATION: ICD-10-CM

## 2020-03-20 DIAGNOSIS — M25.561 CHRONIC PAIN OF RIGHT KNEE: ICD-10-CM

## 2020-03-20 DIAGNOSIS — E11.8 TYPE 2 DIABETES MELLITUS WITH COMPLICATION, WITH LONG-TERM CURRENT USE OF INSULIN (H): ICD-10-CM

## 2020-03-20 DIAGNOSIS — K76.0 FATTY LIVER: ICD-10-CM

## 2020-03-20 DIAGNOSIS — J44.9 CHRONIC OBSTRUCTIVE PULMONARY DISEASE, UNSPECIFIED COPD TYPE (H): ICD-10-CM

## 2020-03-20 DIAGNOSIS — Z79.4 TYPE 2 DIABETES MELLITUS WITH HYPERGLYCEMIA, WITH LONG-TERM CURRENT USE OF INSULIN (H): ICD-10-CM

## 2020-03-20 DIAGNOSIS — E11.65 TYPE 2 DIABETES MELLITUS WITH HYPERGLYCEMIA, WITH LONG-TERM CURRENT USE OF INSULIN (H): ICD-10-CM

## 2020-03-20 RX ORDER — CEFDINIR 300 MG/1
300 CAPSULE ORAL 2 TIMES DAILY
Qty: 20 CAPSULE | Refills: 0 | Status: SHIPPED | OUTPATIENT
Start: 2020-03-20 | End: 2020-04-21

## 2020-03-20 NOTE — PATIENT INSTRUCTIONS
1)  Do antibiotics for the sinuses.    Omnicef. 1 pill 2x per day for 10 days.    Watch out for weird colored urine and poop.     2)  Diabetes:  --Always eat a small snack unless you absolutely cant.   --If eating a snack but not meal, don't need to check sugar, and only give 10 units.    --Keep same units and sliding scale if you have a meal.    --Keep doing the Bydureon  --Increase your Toujeo (long acting to 70 Units).

## 2020-03-21 RX ORDER — INSULIN GLARGINE 300 U/ML
70 INJECTION, SOLUTION SUBCUTANEOUS AT BEDTIME
Qty: 7.5 ML | Refills: 11 | Status: SHIPPED | OUTPATIENT
Start: 2020-03-21 | End: 2020-04-21

## 2020-03-21 RX ORDER — INSULIN LISPRO 100 [IU]/ML
INJECTION, SOLUTION INTRAVENOUS; SUBCUTANEOUS
Qty: 15 ML | Refills: 3 | Status: SHIPPED | OUTPATIENT
Start: 2020-03-21 | End: 2020-06-29

## 2020-03-27 ENCOUNTER — TELEPHONE (OUTPATIENT)
Dept: FAMILY MEDICINE | Facility: CLINIC | Age: 50
End: 2020-03-27

## 2020-03-27 DIAGNOSIS — R05.9 COUGH: ICD-10-CM

## 2020-03-27 DIAGNOSIS — T78.40XS ALLERGIC REACTION, SEQUELA: ICD-10-CM

## 2020-03-27 DIAGNOSIS — R10.84 ABDOMINAL PAIN, GENERALIZED: ICD-10-CM

## 2020-03-27 NOTE — TELEPHONE ENCOUNTER
[facility] phone call message- patient requesting a refill:    Full Medication Name: eye drops, lidocaine  .    Dose: .    Pharmacy confirmed as   [id number] - [place], [place] - [address] AT [facility]  [address]  [place] [address]  Phone: [phone] Fax: [phone]  : Yes    Additional Comments: she needs these refilled.     OK to leave a message on voice mail? Yes      Primary language: English      needed? No    Call taken on March 27, 2020 at 11:32 AM by Lo Lao

## 2020-04-03 ENCOUNTER — TELEPHONE (OUTPATIENT)
Dept: PHARMACY | Facility: PHYSICIAN GROUP | Age: 50
End: 2020-04-03

## 2020-04-03 DIAGNOSIS — E11.65 TYPE 2 DIABETES MELLITUS WITH HYPERGLYCEMIA, WITH LONG-TERM CURRENT USE OF INSULIN (H): Primary | ICD-10-CM

## 2020-04-03 DIAGNOSIS — Z79.4 TYPE 2 DIABETES MELLITUS WITH HYPERGLYCEMIA, WITH LONG-TERM CURRENT USE OF INSULIN (H): Primary | ICD-10-CM

## 2020-04-03 RX ORDER — FLASH GLUCOSE SCANNING READER
EACH MISCELLANEOUS
Qty: 1 DEVICE | Refills: 0 | Status: SHIPPED | OUTPATIENT
Start: 2020-04-03 | End: 2020-04-13

## 2020-04-03 RX ORDER — FLASH GLUCOSE SENSOR
KIT MISCELLANEOUS
Qty: 2 EACH | Refills: 11 | Status: SHIPPED | OUTPATIENT
Start: 2020-04-03 | End: 2020-06-03

## 2020-04-03 NOTE — TELEPHONE ENCOUNTER
I was reviewing patient chart because of the a history of poorly controlled diabetes.  Patient has Medicare and Medicaid and should be able to get the freestyle augustine covered under her insurance.  Spoke with Dr. Bullock to see if this would be a good idea and she agreed.  I called patient and she is agreeable to this as well.  I will send prescriptions to [facility] to get started on coverage.    Alejandra Olson, Pharm.D.

## 2020-04-13 ENCOUNTER — TELEPHONE (OUTPATIENT)
Dept: FAMILY MEDICINE | Facility: CLINIC | Age: 50
End: 2020-04-13

## 2020-04-13 RX ORDER — FLASH GLUCOSE SCANNING READER
EACH MISCELLANEOUS
Qty: 1 DEVICE | Refills: 1 | Status: SHIPPED | OUTPATIENT
Start: 2020-04-13 | End: 2020-06-03

## 2020-04-13 NOTE — TELEPHONE ENCOUNTER
Patient scheduled for visit on 4/21. Called patient to request she come in for this appointment rather than telephone/video. Left message with this request. ./LR

## 2020-04-13 NOTE — TELEPHONE ENCOUNTER
[facility] phone call message- general phone call:    Reason for call: she needs orders for a wheel chair and a hospital bed sent to her  waver person   [phone].    Action desired: call back.    Return call needed: Yes    OK to leave a message on voice mail? Yes    Advised patient to response may take up to 2 business days: Yes    Primary language: English      needed? No    Call taken on April 13, 2020 at 11:26 AM by Lo Lao

## 2020-04-13 NOTE — TELEPHONE ENCOUNTER
Message reviewed.  I am unable to complete these prescriptions for the patient because I have not seen her in person for an evaluation since 1/28/2020.  She is due for labs and follow up.  I think we will have to have her come in to complete the form.  She already has a wheelchair (per my knowledge) and I'd have to review the criteria for the hospital bed.      Can you see if patient would be willing to come in?    Tyra Bullock MD    Routed to YULISA Perry.

## 2020-04-14 DIAGNOSIS — R10.11 RUQ ABDOMINAL PAIN: ICD-10-CM

## 2020-04-14 RX ORDER — LIDOCAINE 50 MG/G
OINTMENT TOPICAL 3 TIMES DAILY PRN
Qty: 150 G | Refills: 1 | Status: SHIPPED | OUTPATIENT
Start: 2020-04-14 | End: 2020-04-21

## 2020-04-14 RX ORDER — PANTOPRAZOLE SODIUM 40 MG/1
40 TABLET, DELAYED RELEASE ORAL DAILY
Qty: 30 TABLET | Refills: 0 | Status: SHIPPED | OUTPATIENT
Start: 2020-04-14 | End: 2020-04-21

## 2020-04-14 RX ORDER — AZELASTINE HYDROCHLORIDE 0.5 MG/ML
1 SOLUTION/ DROPS OPHTHALMIC 2 TIMES DAILY
Qty: 1 BOTTLE | Refills: 11 | Status: SHIPPED | OUTPATIENT
Start: 2020-04-14 | End: 2020-04-21

## 2020-04-16 ENCOUNTER — MEDICAL CORRESPONDENCE (OUTPATIENT)
Dept: HEALTH INFORMATION MANAGEMENT | Facility: CLINIC | Age: 50
End: 2020-04-16

## 2020-04-16 NOTE — TELEPHONE ENCOUNTER
MTM referral placed to review new Freestyle Elder and reconcile medication.  Patient has upcoming visit on 4/21.  Would want MTM

## 2020-04-20 DIAGNOSIS — K76.0 FATTY LIVER: ICD-10-CM

## 2020-04-20 DIAGNOSIS — E11.65 TYPE 2 DIABETES MELLITUS WITH HYPERGLYCEMIA, WITH LONG-TERM CURRENT USE OF INSULIN (H): ICD-10-CM

## 2020-04-20 DIAGNOSIS — E11.9 TYPE 2 DIABETES, HBA1C GOAL < 7% (H): ICD-10-CM

## 2020-04-20 DIAGNOSIS — R05.9 COUGH: Primary | ICD-10-CM

## 2020-04-20 DIAGNOSIS — I10 ESSENTIAL HYPERTENSION, BENIGN: ICD-10-CM

## 2020-04-20 DIAGNOSIS — Z79.4 TYPE 2 DIABETES MELLITUS WITH HYPERGLYCEMIA, WITH LONG-TERM CURRENT USE OF INSULIN (H): ICD-10-CM

## 2020-04-21 ENCOUNTER — OFFICE VISIT (OUTPATIENT)
Dept: FAMILY MEDICINE | Facility: CLINIC | Age: 50
End: 2020-04-21
Payer: MEDICARE

## 2020-04-21 VITALS
DIASTOLIC BLOOD PRESSURE: 76 MMHG | SYSTOLIC BLOOD PRESSURE: 114 MMHG | BODY MASS INDEX: 44.17 KG/M2 | HEART RATE: 82 BPM | OXYGEN SATURATION: 96 % | WEIGHT: 237 LBS | RESPIRATION RATE: 16 BRPM | TEMPERATURE: 98.7 F

## 2020-04-21 DIAGNOSIS — I10 ESSENTIAL HYPERTENSION, BENIGN: ICD-10-CM

## 2020-04-21 DIAGNOSIS — E11.65 TYPE 2 DIABETES MELLITUS WITH HYPERGLYCEMIA, WITH LONG-TERM CURRENT USE OF INSULIN (H): ICD-10-CM

## 2020-04-21 DIAGNOSIS — M54.50 CHRONIC BILATERAL LOW BACK PAIN WITHOUT SCIATICA: ICD-10-CM

## 2020-04-21 DIAGNOSIS — R10.84 ABDOMINAL PAIN, GENERALIZED: ICD-10-CM

## 2020-04-21 DIAGNOSIS — G43.709 CHRONIC MIGRAINE WITHOUT AURA WITHOUT STATUS MIGRAINOSUS, NOT INTRACTABLE: ICD-10-CM

## 2020-04-21 DIAGNOSIS — I63.419 CEREBROVASCULAR ACCIDENT (CVA) DUE TO EMBOLISM OF MIDDLE CEREBRAL ARTERY, UNSPECIFIED BLOOD VESSEL LATERALITY (H): ICD-10-CM

## 2020-04-21 DIAGNOSIS — J30.2 SEASONAL ALLERGIES: ICD-10-CM

## 2020-04-21 DIAGNOSIS — S88.919A AMPUTATION OF LEG (H): ICD-10-CM

## 2020-04-21 DIAGNOSIS — T78.40XS ALLERGIC REACTION, SEQUELA: ICD-10-CM

## 2020-04-21 DIAGNOSIS — Z79.4 TYPE 2 DIABETES MELLITUS WITH HYPERGLYCEMIA, WITH LONG-TERM CURRENT USE OF INSULIN (H): ICD-10-CM

## 2020-04-21 DIAGNOSIS — R07.9 CHEST PAIN, UNSPECIFIED TYPE: Primary | ICD-10-CM

## 2020-04-21 DIAGNOSIS — H10.45 CHRONIC ALLERGIC CONJUNCTIVITIS: ICD-10-CM

## 2020-04-21 DIAGNOSIS — R10.11 RUQ ABDOMINAL PAIN: ICD-10-CM

## 2020-04-21 DIAGNOSIS — R05.9 COUGH: ICD-10-CM

## 2020-04-21 DIAGNOSIS — J45.30 MILD PERSISTENT ASTHMA WITHOUT COMPLICATION: ICD-10-CM

## 2020-04-21 DIAGNOSIS — K76.0 FATTY LIVER: ICD-10-CM

## 2020-04-21 DIAGNOSIS — G89.29 CHRONIC BILATERAL LOW BACK PAIN WITHOUT SCIATICA: ICD-10-CM

## 2020-04-21 LAB
% GRANULOCYTES: 72.8 %G (ref 40–75)
ALBUMIN SERPL-MCNC: 4.2 MG/DL (ref 3.9–5.1)
ALP SERPL-CCNC: 77.3 U/L (ref 40–150)
ALT SERPL-CCNC: 34.1 U/L (ref 0–45)
AST SERPL-CCNC: 21.5 U/L (ref 0–45)
BILIRUB SERPL-MCNC: 0.3 MG/DL (ref 0.2–1.3)
BILIRUBIN DIRECT: <0.1 MG/DL (ref 0–0.2)
BUN SERPL-MCNC: 18.9 MG/DL (ref 7–19)
CALCIUM SERPL-MCNC: 9.7 MG/DL (ref 8.5–10.1)
CHLORIDE SERPLBLD-SCNC: 104.2 MMOL/L (ref 98–110)
CHOLEST SERPL-MCNC: 161.7 MG/DL (ref 0–200)
CHOLEST/HDLC SERPL: 5.9 {RATIO} (ref 0–5)
CO2 SERPL-SCNC: 24.2 MMOL/L (ref 20–32)
CREAT SERPL-MCNC: 0.6 MG/DL (ref 0.5–1)
CRP SERPL-MCNC: 1.5 MG/DL (ref 0–0.8)
GFR SERPL CREATININE-BSD FRML MDRD: >90 ML/MIN/1.7 M2
GLUCOSE SERPL-MCNC: 249.1 MG'DL (ref 70–99)
GRANULOCYTES #: 6.8 K/UL (ref 1.6–8.3)
HBA1C MFR BLD: 10.7 % (ref 4.1–5.7)
HCT VFR BLD AUTO: 44.4 % (ref 35–47)
HDLC SERPL-MCNC: 27.6 MG/DL
HEMOGLOBIN: 14.2 G/DL (ref 11.7–15.7)
LDLC SERPL CALC-MCNC: 69 MG/DL (ref 0–129)
LYMPHOCYTES # BLD AUTO: 2 K/UL (ref 0.8–5.3)
LYMPHOCYTES NFR BLD AUTO: 20.9 %L (ref 20–48)
MCH RBC QN AUTO: 30.2 PG (ref 26.5–35)
MCHC RBC AUTO-ENTMCNC: 32 G/DL (ref 32–36)
MCV RBC AUTO: 94.5 FL (ref 78–100)
MID #: 0.6 K/UL (ref 0–2.2)
MID %: 6.3 %M (ref 0–20)
PLATELET # BLD AUTO: 263 K/UL (ref 150–450)
POTASSIUM SERPL-SCNC: 4.4 MMOL/L (ref 3.2–4.6)
PROT SERPL-MCNC: 7.3 G/DL (ref 6.8–8.8)
RBC # BLD AUTO: 4.7 M/UL (ref 3.8–5.2)
SODIUM SERPL-SCNC: 135.1 MMOL/L (ref 132–142)
TRIGL SERPL-MCNC: 325.6 MG/DL (ref 0–150)
VLDL CHOLESTEROL: 65.1 MG/DL (ref 7–32)
WBC # BLD AUTO: 9.4 K/UL (ref 4–11)

## 2020-04-21 RX ORDER — LIDOCAINE 50 MG/G
OINTMENT TOPICAL 3 TIMES DAILY PRN
Qty: 150 G | Refills: 1 | Status: SHIPPED | OUTPATIENT
Start: 2020-04-21 | End: 2020-06-03

## 2020-04-21 RX ORDER — PANTOPRAZOLE SODIUM 40 MG/1
40 TABLET, DELAYED RELEASE ORAL DAILY
Qty: 90 TABLET | Refills: 3 | Status: SHIPPED | OUTPATIENT
Start: 2020-04-21 | End: 2021-03-18

## 2020-04-21 RX ORDER — LORATADINE 10 MG/1
10 TABLET ORAL DAILY
Qty: 90 TABLET | Refills: 3 | Status: SHIPPED | OUTPATIENT
Start: 2020-04-21 | End: 2021-07-13

## 2020-04-21 RX ORDER — ALBUTEROL SULFATE 0.83 MG/ML
2.5 SOLUTION RESPIRATORY (INHALATION) EVERY 6 HOURS PRN
Qty: 30 VIAL | Refills: 3 | Status: SHIPPED | OUTPATIENT
Start: 2020-04-21 | End: 2021-02-24

## 2020-04-21 RX ORDER — FLUTICASONE PROPIONATE 50 MCG
2 SPRAY, SUSPENSION (ML) NASAL DAILY
Qty: 9.9 G | Refills: 11 | Status: SHIPPED | OUTPATIENT
Start: 2020-04-21 | End: 2023-03-31

## 2020-04-21 RX ORDER — SUMATRIPTAN 50 MG/1
50 TABLET, FILM COATED ORAL
Qty: 18 TABLET | Refills: 1 | Status: SHIPPED | OUTPATIENT
Start: 2020-04-21 | End: 2020-06-03

## 2020-04-21 RX ORDER — INSULIN GLARGINE 300 U/ML
75 INJECTION, SOLUTION SUBCUTANEOUS AT BEDTIME
Qty: 7.5 ML | Refills: 11 | Status: SHIPPED | OUTPATIENT
Start: 2020-04-21 | End: 2020-06-03

## 2020-04-21 RX ORDER — QUETIAPINE FUMARATE 400 MG/1
500 TABLET, FILM COATED ORAL AT BEDTIME
COMMUNITY
Start: 2020-04-21 | End: 2022-06-06

## 2020-04-21 RX ORDER — AZELASTINE HYDROCHLORIDE 0.5 MG/ML
1 SOLUTION/ DROPS OPHTHALMIC 2 TIMES DAILY
Qty: 1 BOTTLE | Refills: 11 | Status: SHIPPED | OUTPATIENT
Start: 2020-04-21 | End: 2021-02-09

## 2020-04-21 ASSESSMENT — ASTHMA QUESTIONNAIRES
QUESTION_2 LAST FOUR WEEKS HOW OFTEN HAVE YOU HAD SHORTNESS OF BREATH: ONCE A DAY
ACT_TOTALSCORE: 18
QUESTION_4 LAST FOUR WEEKS HOW OFTEN HAVE YOU USED YOUR RESCUE INHALER OR NEBULIZER MEDICATION (SUCH AS ALBUTEROL): ONCE A WEEK OR LESS
QUESTION_3 LAST FOUR WEEKS HOW OFTEN DID YOUR ASTHMA SYMPTOMS (WHEEZING, COUGHING, SHORTNESS OF BREATH, CHEST TIGHTNESS OR PAIN) WAKE YOU UP AT NIGHT OR EARLIER THAN USUAL IN THE MORNING: NOT AT ALL
QUESTION_1 LAST FOUR WEEKS HOW MUCH OF THE TIME DID YOUR ASTHMA KEEP YOU FROM GETTING AS MUCH DONE AT WORK, SCHOOL OR AT HOME: A LITTLE OF THE TIME
QUESTION_5 LAST FOUR WEEKS HOW WOULD YOU RATE YOUR ASTHMA CONTROL: SOMEWHAT CONTROLLED

## 2020-04-21 NOTE — Clinical Note
Hi.  Let me know if you have questions or need different referrals for OT and injections.  I tend to mess these ones up.  Thanks for your patience!

## 2020-04-21 NOTE — LETTER
April 23, 2020      Teresa Perez  [address]  [place] [address]        Dear ,    We are writing to inform you of your test results.    Here is a copy of your lab results.  Your cholesterol remains excellent.  There are still some signs of inflammation in your blood, but they are low. Your liver function tests are normal.  This is great!  Your kidney scores look good.  You A1c is high and continues to go up. I hope that increasing the long acting insulin will help, but I also think checking in regularly with our pharmacy team will be helpful as well.  Please call the clinic at [phone] if you have any questions.  I will have our team fax these results to Dr. Salamanca as well.       Resulted Orders   Hemoglobin A1c ([facility])   Result Value Ref Range    Hemoglobin A1C 10.7 (H) 4.1 - 5.7 %   Hepatic Panel ([facility])   Result Value Ref Range    Albumin 4.2 3.9 - 5.1 mg/dL    Alkaline Phosphatase 77.3 40.0 - 150.0 U/L    ALT 34.1 0.0 - 45.0 U/L    AST 21.5 0.0 - 45.0 U/L    Bilirubin Direct <0.1 0.0 - 0.2 mg/dL    Bilirubin Total 0.3 0.2 - 1.3 mg/dL    Protein Total 7.3 6.8 - 8.8 g/dL   C-Reactive Protein ([facility])   Result Value Ref Range    C-Reactive Protein 1.5 (H) 0.0 - 0.8 mg/dL    Narrative    Test performed by:  [facility] [address] [place] [address]   CBC with Diff Plt ([facility])   Result Value Ref Range    WBC 9.4 4.0 - 11.0 K/uL    Lymphocytes # 2.0 0.8 - 5.3 K/uL    % Lymphocytes 20.9 20.0 - 48.0 %L    Mid # 0.6 0.0 - 2.2 K/uL    Mid % 6.3 0.0 - 20.0 %M    GRANULOCYTES # 6.8 1.6 - 8.3 K/uL    % Granulocytes 72.8 40.0 - 75.0 %G    RBC 4.7 3.8 - 5.2 M/uL    Hemoglobin 14.2 11.7 - 15.7 g/dL    Hematocrit 44.4 35.0 - 47.0 %    MCV 94.5 78.0 - 100.0 fL    MCH 30.2 26.5 - 35.0    MCHC 32.0 32.0 - 36.0 g/dL    Platelets 263.0 150.0 - 450.0 K/uL   Basic Metabolic Panel ([facility])   Result Value Ref Range    Urea Nitrogen 18.9 7.0 - 19.0 mg/dL    Calcium 9.7 8.5 -  10.1 mg/dL    Chloride 104.2 98.0 - 110.0 mmol/L    Carbon Dioxide 24.2 20.0 - 32.0 mmol/L    Creatinine 0.6 0.5 - 1.0 mg/dL    Glucose 249.1 (H) 70.0 - 99.0 mg'dL    Potassium 4.4 3.2 - 4.6 mmol/L    Sodium 135.1 132.0 - 142.0 mmol/L    GFR Estimate >90 >60.0 mL/min/1.7 m2    GFR Estimate If Black >90 >60.0 mL/min/1.7 m2   Lipid Panel ([facility]) - Results < 1 hr   Result Value Ref Range    Cholesterol 161.7 0.0 - 200.0 mg/dL    Cholesterol/HDL Ratio 5.9 (H) 0.0 - 5.0    HDL Cholesterol 27.6 (L) >40.0 mg/dL    LDL Cholesterol Calculated 69 0 - 129 mg/dL    Triglycerides 325.6 (H) 0.0 - 150.0 mg/dL    VLDL Cholesterol 65.1 (H) 7.0 - 32.0 mg/dL       If you have any questions or concerns, please call the clinic at the number listed above.       Sincerely,        Tyra Bullock MD

## 2020-04-21 NOTE — PATIENT INSTRUCTIONS
Expect call from Becca about hospital bed and scooter/power chair  Expect call from Maura about injections for back  Expect call from Pharmacy team about new glucose meter and adjusting medications.      -Increase Toudjeo to 75 Units  -Keep sliding scale the same (20 + 2 for greater than 50)  -Continue with Tuesday Bydureon    Lungs sound GREAT!  Keep working on the smoking.  No changes in inhalers today  New prescription for nebs if you need them.      For migraine to decrease the dose on the sumatriptan to 50mcg to see if this help with side effects.      Follow up in 1 month in person for Depo and recheck of breathing/medications.      Stop in lab for blood tests today.  Will also send results to Dr. Salamanca, psychiatrist ([facility])    04/24/20  [facility]  XR Epidural Injection  [facility]: [phone]  Fax: [phone]    Demographics and referral faxed to [phone]. They will contact patient to schedule    Maura Galvan    04/27/20  OT Referral  [facility] [place] [facility] [place] [address] [place] [address]  Phone: [phone]  Fax: [phone]     Demographics and orders faxed to [facility] [phone] who will contact patient to schedule.     Maura Galvan

## 2020-04-21 NOTE — LETTER
April 22, 2020      Teresa Perez  [address]  [place] [address]        Dear ,    We are writing to inform you of your test results.    Here is a copy of your lab results.  Your cholesterol remains excellent.  There are still some signs of inflammation in your blood, but they are low. Your liver function tests are normal.  This is great!  Your kidney scores look good.  You A1c is high and continues to go up. I hope that increasing the long acting insulin will help, but I also think checking in regularly with our pharmacy team will be helpful as well.  Please call the clinic at [phone] if you have any questions.  I will have our team fax these results to Dr. Salamanca as well.       Resulted Orders   Hemoglobin A1c ([facility])   Result Value Ref Range    Hemoglobin A1C 10.7 (H) 4.1 - 5.7 %   Hepatic Panel ([facility])   Result Value Ref Range    Albumin 4.2 3.9 - 5.1 mg/dL    Alkaline Phosphatase 77.3 40.0 - 150.0 U/L    ALT 34.1 0.0 - 45.0 U/L    AST 21.5 0.0 - 45.0 U/L    Bilirubin Direct <0.1 0.0 - 0.2 mg/dL    Bilirubin Total 0.3 0.2 - 1.3 mg/dL    Protein Total 7.3 6.8 - 8.8 g/dL   C-Reactive Protein ([facility])   Result Value Ref Range    C-Reactive Protein 1.5 (H) 0.0 - 0.8 mg/dL    Narrative    Test performed by:  [facility]  [address] [place] [address]   CBC with Diff Plt ([facility])   Result Value Ref Range    WBC 9.4 4.0 - 11.0 K/uL    Lymphocytes # 2.0 0.8 - 5.3 K/uL    % Lymphocytes 20.9 20.0 - 48.0 %L    Mid # 0.6 0.0 - 2.2 K/uL    Mid % 6.3 0.0 - 20.0 %M    GRANULOCYTES # 6.8 1.6 - 8.3 K/uL    % Granulocytes 72.8 40.0 - 75.0 %G    RBC 4.7 3.8 - 5.2 M/uL    Hemoglobin 14.2 11.7 - 15.7 g/dL    Hematocrit 44.4 35.0 - 47.0 %    MCV 94.5 78.0 - 100.0 fL    MCH 30.2 26.5 - 35.0    MCHC 32.0 32.0 - 36.0 g/dL    Platelets 263.0 150.0 - 450.0 K/uL   Basic Metabolic Panel ([place])   Result Value Ref Range    Urea Nitrogen 18.9 7.0 - 19.0 mg/dL    Calcium 9.7 8.5 -  10.1 mg/dL    Chloride 104.2 98.0 - 110.0 mmol/L    Carbon Dioxide 24.2 20.0 - 32.0 mmol/L    Creatinine 0.6 0.5 - 1.0 mg/dL    Glucose 249.1 (H) 70.0 - 99.0 mg'dL    Potassium 4.4 3.2 - 4.6 mmol/L    Sodium 135.1 132.0 - 142.0 mmol/L    GFR Estimate >90 >60.0 mL/min/1.7 m2    GFR Estimate If Black >90 >60.0 mL/min/1.7 m2   Lipid Panel ([facility]) - Results < 1 hr   Result Value Ref Range    Cholesterol 161.7 0.0 - 200.0 mg/dL    Cholesterol/HDL Ratio 5.9 (H) 0.0 - 5.0    HDL Cholesterol 27.6 (L) >40.0 mg/dL    LDL Cholesterol Calculated 69 0 - 129 mg/dL    Triglycerides 325.6 (H) 0.0 - 150.0 mg/dL    VLDL Cholesterol 65.1 (H) 7.0 - 32.0 mg/dL       If you have any questions or concerns, please call the clinic at the number listed above.       Sincerely,        Tyra Bullock MD

## 2020-04-22 ENCOUNTER — TELEPHONE (OUTPATIENT)
Dept: FAMILY MEDICINE | Facility: CLINIC | Age: 50
End: 2020-04-22

## 2020-04-22 ASSESSMENT — ASTHMA QUESTIONNAIRES: ACT_TOTALSCORE: 18

## 2020-04-22 NOTE — TELEPHONE ENCOUNTER
Called pharmacy they said that the patient does not meet Medicare guidelines. The patient needs to be testing 4 times a day and also injecting at least 3 times a day to be covered. The clinics notes show that she is only testing 3 times a day. Please advise. JUNIOR Caicedo

## 2020-04-22 NOTE — RESULT ENCOUNTER NOTE
Teresa Perez-    Here is a copy of your lab results.  Your cholesterol remains excellent.  There are still some signs of inflammation in your blood, but they are low. Your liver function tests are normal.  This is great!  Your kidney scores look good.  You A1c is high and continues to go up. I hope that increasing the long acting insulin will help, but I also think checking in regularly with our pharmacy team will be helpful as well.  Please call the clinic at [phone] if you have any questions.  I will have our team fax these results to Dr. Salamanca as well.      Tyra Bullock MD    Please send results to patient.  Please also send a copy of results and EKG to patient's psychiatrist at [facility], DR. Salamanca.

## 2020-04-22 NOTE — TELEPHONE ENCOUNTER
[facility] phone call message- general phone call:    Reason for call: she needs a call back re her DM she got a call from [place] [facility] and they told her her machine is not covered.    Action desired: call back.    Return call needed: Yes    OK to leave a message on voice mail? Yes    Advised patient to response may take up to 2 business days: Yes    Primary language: English      needed? No    Call taken on April 22, 2020 at 10:39 AM by Lo Lao

## 2020-04-22 NOTE — PROGRESS NOTES
There are no exam notes on file for this visit.    SUBJECTIVE  Teresa Perez is a 49 year old female with past medical history significant for    Patient Active Problem List   Diagnosis     Health Care Home     Acute peptic ulcer     Other allergy, other than to medicinal agents     Bipolar disorder (H)     Bulging lumbar disc     Cervical dysplasia     Common migraine without aura     Constipation     Dwarfism     Familial hypercholesterolemia     Insomnia     Low back pain     Intermittent asthma     Leg pain, bilateral     Smoking     Degeneration of thoracic or thoracolumbar intervertebral disc     LOMAS (nonalcoholic steatohepatitis)     Disease of lung     Hemorrhoids     Parotid mass     Endometriosis     NNAMDI (obstructive sleep apnea)     History of total right knee replacement     Lateral epicondylitis     Impingement syndrome, shoulder, left     Hx of total knee replacement, left     Chronic pain syndrome     Cough     Borderline personality disorder (H)     Moderate recurrent major depression (H)     Recurrent ventral hernia     Type 2 diabetes mellitus with complication, with long-term current use of insulin (H)     Essential hypertension     Nonruptured cerebral aneurysm     Cerebrovascular accident (CVA) due to embolism (H)     Amputation of leg (H)     CKD (chronic kidney disease) stage 5, GFR less than 15 ml/min (H)     Pre-ulcerative corn or callous     Others present at the visit:  None    Presents for   Chief Complaint   Patient presents with     Follow Up     Follow up cough     other     Power wheel chair and hospital bed     Patient presents today for follow-up on multiple chronic issues.  She is overall she has been feeling well.  Describes some difficulty with her sleep, but talks with her psychiatrist, and Dr. Salamanca about this recently, and he increased her Seroquel from 300-400.  This seems to be helping some.    Also wanted to follow-up on her breathing.  She finds her breathing is  sporadic, and at times has some difficulty.  This is been going on for months.  She endorses symptoms of wheezing, cough, and some worsening difficulty before she goes to bed at night.  She notices some increased sputum production.  No fevers or chills.  She reports that she has been sheltering in and not leaving her home.  Is worried that this could be her asthma but also that it could be some anxiety.  She does some relaxation meditation before she goes to bed and this does help some.  We reviewed her inhalers including Symbicort, Spiriva, and she is taking both regularly and appropriately.  She is using her rescue inhaler 1-2 times per day 2 puffs at a time and feels better when she uses it.  She shares that she has been cutting down her smoking and is now smoking only about half a pack a day.  Is using nicotine gum and has a piece of gum in place right now.  Feels like it is been helpful.    She shares that she remains frustrated with decreased mobility.  Was in to see the orthopedist for some pain in her knee, and they do not think she is a surgical candidate.  She has been less mobile, and does report having some difficulty with occasional chest pain.  Sometimes it is sharp, sometimes dull, and feels like it hard to take a big deep breath.  This comes and goes.  It is located in her mid to left upper chest and the pain feels deep inside.  Typically it will last for about 15 minutes.  Sometimes she gets shooting pain that goes down her left arm.  She endorses some episodes of diaphoresis as well as shortness of breath and nausea with these episodes.  Do not seem to be associated with physical activity or movement.  They also get better when she is able to take some deep breaths and use relaxation and meditation techniques.    We reviewed her diabetes.  She has been trying to eat more regularly and get some snacks and during the day versus just one large meal.  She brings her glucometer with her today, and I was  "able to review these.  Typically blood sugars have been consistently between 250 and 350.  She has had none above \"too high to read \", and has a couple less than 200 but she endorses feeling symptoms of hypoglycemia when she is below 200 including shakes and symptoms of nausea.  She does not have any symptoms during the night.  She reports taking her insulin regularly, and this includes doing 70 units of the Toujeo nightly, as well as Humalog with meals she is doing 20 units and then 2 units for every 50 above 100.  She usually gives herself between 20 and 30 units with each meal.  She still does not quite eat regular meals and so she will give herself mealtime insulin between 2 or 3 times a day when she has a larger amount to eat.  She does the Bydureon every Tuesday.  Some days when her mood is poor she has poor appetite and will eat only small amounts of food.  Then she does not use any mealtime insulin.    She endorses some difficulty with daily headaches.  These have been more frequent.  She takes sumatriptan and this improves her headaches but she notices that it makes her feel warm and flushed and makes it feel like her arms and legs are swollen.  She is wondering if there are other options because she does not like the side effects.    Patient is also describing worsening low back pain.  Describes aching discomfort bilaterally across her lower back.  She has had injections done at [facility] previously with good relief.  Has been out extended period of time since she has had these done and would like me to place referrals for this today.    She shares that she has a new , who she is working with to pursue a hospital bed and a power wheelchair.  She endorses difficulties with transfers in and out of her wheelchair as and she has pain and discomfort in her hands from using the wheels herself.  About a year ago, she was undergoing the process of evaluation for prosthetic leg, but this is currently very " uncomfortable and she is not able to use it.  Feels like the power chair would give her increased mobility.  She reports that she had an evaluation when she was in rehab at [facility], and that they qualified her for the services.  Has seen physical therapy intermittently at home as well for conditioning since her discharge from [facility].    Stomach continues to cause her pain and she hopes to have the hernia repaired at some point in time.  Pain improved with daily pantoprazole.  When not taking the medication, patient has worsening symptoms.      OBJECTIVE:  Vitals: /76 (BP Location: Right arm)   Pulse 82   Temp 98.7  F (37.1  C) (Oral)   Resp 16   Wt 107.5 kg (237 lb)   SpO2 96%   BMI 44.17 kg/m    BMI= Body mass index is 44.17 kg/m .  Objective:    Vitals:  Vitals are reviewed and are within the normal range.  Blood pressure under good control.  Gen:  Alert, pleasant, no acute distress  Cardiac:  Regular rate and rhythm, no murmurs, rubs or gallops  Respiratory:  Lungs clear to auscultation bilaterally, no crackles or wheezes.  This is the best I have heard her lungs in a long time.  Abdomen:  Soft, mild generalized discomfort, with large ventral hernia present.  Mild distention, bowel sounds positive  Extremities: Strength is intact in her right leg.  She has 1+ pitting edema in the lower shin.  Pain along the joint line and crepitus with range of motion.  Her warm, well-perfused, pulses 2+/4, no lower extremity edema.  Left leg is amputated above the knee.  Well-healed.  Patient endorses pain with palpation across the upper shoulders.  She has some evidence of thickening and arthritis bilaterally in her hands with callus formation from using the wheelchair.    Results for orders placed or performed in visit on 04/21/20   Hemoglobin A1c ([facility])     Status: Abnormal   Result Value Ref Range    Hemoglobin A1C 10.7 (H) 4.1 - 5.7 %   Hepatic Panel ([place])     Status: None   Result Value Ref Range     Albumin 4.2 3.9 - 5.1 mg/dL    Alkaline Phosphatase 77.3 40.0 - 150.0 U/L    ALT 34.1 0.0 - 45.0 U/L    AST 21.5 0.0 - 45.0 U/L    Bilirubin Direct <0.1 0.0 - 0.2 mg/dL    Bilirubin Total 0.3 0.2 - 1.3 mg/dL    Protein Total 7.3 6.8 - 8.8 g/dL   C-Reactive Protein ([facility])     Status: Abnormal   Result Value Ref Range    C-Reactive Protein 1.5 (H) 0.0 - 0.8 mg/dL    Narrative    Test performed by:  [facility]  [address] [place] [address]   CBC with Diff Plt ([facility])     Status: None   Result Value Ref Range    WBC 9.4 4.0 - 11.0 K/uL    Lymphocytes # 2.0 0.8 - 5.3 K/uL    % Lymphocytes 20.9 20.0 - 48.0 %L    Mid # 0.6 0.0 - 2.2 K/uL    Mid % 6.3 0.0 - 20.0 %M    GRANULOCYTES # 6.8 1.6 - 8.3 K/uL    % Granulocytes 72.8 40.0 - 75.0 %G    RBC 4.7 3.8 - 5.2 M/uL    Hemoglobin 14.2 11.7 - 15.7 g/dL    Hematocrit 44.4 35.0 - 47.0 %    MCV 94.5 78.0 - 100.0 fL    MCH 30.2 26.5 - 35.0    MCHC 32.0 32.0 - 36.0 g/dL    Platelets 263.0 150.0 - 450.0 K/uL   Basic Metabolic Panel ([place])     Status: Abnormal   Result Value Ref Range    Urea Nitrogen 18.9 7.0 - 19.0 mg/dL    Calcium 9.7 8.5 - 10.1 mg/dL    Chloride 104.2 98.0 - 110.0 mmol/L    Carbon Dioxide 24.2 20.0 - 32.0 mmol/L    Creatinine 0.6 0.5 - 1.0 mg/dL    Glucose 249.1 (H) 70.0 - 99.0 mg'dL    Potassium 4.4 3.2 - 4.6 mmol/L    Sodium 135.1 132.0 - 142.0 mmol/L    GFR Estimate >90 >60.0 mL/min/1.7 m2    GFR Estimate If Black >90 >60.0 mL/min/1.7 m2   Lipid Panel ([facility]) - Results < 1 hr     Status: Abnormal   Result Value Ref Range    Cholesterol 161.7 0.0 - 200.0 mg/dL    Cholesterol/HDL Ratio 5.9 (H) 0.0 - 5.0    HDL Cholesterol 27.6 (L) >40.0 mg/dL    LDL Cholesterol Calculated 69 0 - 129 mg/dL    Triglycerides 325.6 (H) 0.0 - 150.0 mg/dL    VLDL Cholesterol 65.1 (H) 7.0 - 32.0 mg/dL       EKG: This was reviewed by me.  It shows normal sinus rhythm with no ST or T wave changes present.    ASSESSMENT AND PLAN:      Teresa was seen today for  follow up and other.  Multiple chronic issues were addressed today.  We ordered labs, but these were not available till after the visit and have not yet been discussed by me with the patient.  Pending labs include BMP, A1c, lipid panel, CBC, LFTs, and CRP.    Diagnoses and all orders for this visit:    Chest pain, unspecified type.  Pain is not associated with activity and is atypical in nature.  Nonetheless patient has a number of risk factors.  EKG obtained today and shows no evidence of ST changes.  Does improve with deep breathing and meditation.  Will need to follow closely.  -     EKG 12-lead complete w/read - Clinics    Chest and abdominal pain with cough.  Concern for more persistent lung infection.  We will recheck elevated inflammatory markers, and continuing to use the lidocaine as this helps with her cough and chest pain, as well as abdominal pain from coughing.  -     lidocaine (XYLOCAINE) 5 % external ointment; Apply topically 3 times daily as needed for moderate pain  -     C-Reactive Protein ([facility])  -     CBC with Diff Plt ([facility])    Abdominal pain, generalized.  Persistent large ventral hernia.  Likely causing worsening reflux and chronic pain.  Patient should continue on pantoprazole because of this.  -     pantoprazole (PROTONIX) 40 MG EC tablet; Take 1 tablet (40 mg) by mouth daily    Moderate persistent asthma without likely component of COPD.  Patient endorses taking regular regimen of Spiriva, Symbicort, and using albuterol appropriately.  Still not well controlled but improved and lung sound better.  I think much of this is because she is not smoking as much and congratulated patient on this.  She is needing refills on her nebs which she uses for worsening exacerbation and this was provided today.  -     albuterol (PROVENTIL) (2.5 MG/3ML) 0.083% neb solution; Take 1 vial (2.5 mg) by nebulization every 6 hours as needed for shortness of breath / dyspnea or wheezing    Chronic allergic  conjunctivitis  Seasonal allergies  Refilled patient's chronic allergy medications.  -     azelastine (OPTIVAR) 0.05 % ophthalmic solution; Apply 1 drop to eye 2 times daily  -     fluticasone (FLONASE) 50 MCG/ACT nasal spray; Spray 2 sprays into both nostrils daily  -     loratadine (CLARITIN) 10 MG tablet; Take 1 tablet (10 mg) by mouth daily    Chronic migraine without aura without status migrainosus, not intractable.  Discussed that triptan's have a very different action and many other medications for migraine, and that she may just have side effects because of her high dose.  Will decrease to 100 mg to 50 mg tablets and see if this improves her symptoms.  -     SUMAtriptan (IMITREX) 50 MG tablet; Take 1 tablet (50 mg) by mouth at onset of headache for migraine    Type 2 diabetes mellitus with hyperglycemia, with long-term current use of insulin (H).  Patient's glucometer showing significant chronic hyperglycemia with symptoms with blood sugars below 200.  Increased her Toujeo today from 70Units to 75 Units.  Continue with same sliding scale and weeklyBydurian  Appreciate Pharm.D. input, and assistance with getting her covered for a freestyle augustine.  -     Hemoglobin A1c (P )  -     Basic Metabolic Panel ([place])    Essential hypertension, benign.  Blood pressure well controlled today on lisinopril, metoprolol, and amlodipine.  Will recheck renal function.  No change in medication today.    -     Basic Metabolic Panel ([place])  -     Lipid Panel ([facility]) - Results < 1 hr    Fatty liver.  LFTs are stable.  Patient is on a high dose statin as is appropriate.  LDL is appropriate.  -     Hepatic Panel ([place])    Chronic bilateral low back pain without sciatica.  Patient reports worsening low back pain.  Requesting a repeat epidural back injection in the lumbar L4-5 and L5-S1 region.  These have been done previously at [facility].  Referral placed for this.  Appreciate referral coordinator support in  getting this scheduled.  -     XR Lumbar Sacral Transforminal Inj Left; Future    Amputation of leg (H)  Chronic bilateral low back pain without sciatica.  Cerebrovascular accident (CVA) due to embolism of middle cerebral artery, unspecified blood vessel laterality (H)  Right knee pain.    Bilateral hand pain and swelling.     -     OCCUPATIONAL THERAPY REFERRAL; Future    Other orders.  We will have Pharm.D. follow-up with patient about lab results as well as adjustment in medications.  Would request that they focus specifically on her diabetes at this time as it is been challenging to control.  -     MED THERAPY MANAGE REFERRAL        Patient Instructions   Expect call from Becca about hospital bed and scooter/power chair  Expect call from Maura about injections for back  Expect call from Pharmacy team about new glucose meter and adjusting medications.      -Increase Toudjeo to 75 Units  -Keep sliding scale the same (20 + 2 for greater than 50)  -Continue with Tuesday Bydureon    Lungs sound GREAT!  Keep working on the smoking.  No changes in inhalers today  New prescription for nebs if you need them.      For migraine to decrease the dose on the sumatriptan to 50mcg to see if this help with side effects.      Follow up in 1 month in person for Depo and recheck of breathing/medications.      Stop in lab for blood tests today.  Will also send results to Dr. Salamanca, [place]      Follow up in 1 month  for Depo shot and follow up chronic issue.  Follow up weekly with pharm D by phone for focused diabetes management.      I spent 50 minutes with the patient greater than for percent in counseling and coordination of care.    Tyra Bullock MD

## 2020-04-22 NOTE — TELEPHONE ENCOUNTER
They said that the notes that we have on file does not state she test her sugars 4 times a day. She does meet the injection 4 times a day just not checking at least 4 times a day. The office notes need to show that kind of evidence. Please advise. JUNIOR Caicedo

## 2020-04-23 ENCOUNTER — ALLIED HEALTH/NURSE VISIT (OUTPATIENT)
Dept: PHARMACY | Facility: PHYSICIAN GROUP | Age: 50
End: 2020-04-23
Payer: MEDICARE

## 2020-04-23 DIAGNOSIS — Z53.9 ERRONEOUS ENCOUNTER--DISREGARD: Primary | ICD-10-CM

## 2020-05-04 ENCOUNTER — TRANSFERRED RECORDS (OUTPATIENT)
Dept: HEALTH INFORMATION MANAGEMENT | Facility: CLINIC | Age: 50
End: 2020-05-04

## 2020-05-13 DIAGNOSIS — R05.9 COUGH: ICD-10-CM

## 2020-05-13 RX ORDER — IBUPROFEN 600 MG/1
TABLET, FILM COATED ORAL
Qty: 60 TABLET | Refills: 3 | Status: SHIPPED | OUTPATIENT
Start: 2020-05-13 | End: 2020-06-03

## 2020-05-13 NOTE — TELEPHONE ENCOUNTER
Patient is allergic to ASA and Naproxen however per dispense report patient has been filling ibuprofen consistently with last fill 3/13/20. Sent in 3 month refill for requested prescription (ibuprofen).    Mehran Alfaro  Pharmacy Resident

## 2020-05-14 NOTE — TELEPHONE ENCOUNTER
I have verified the content of the note, which accurately reflects my assessment of the patient and the plan of care.   Alejandra Olson, [facility], PharmD

## 2020-05-22 ENCOUNTER — ALLIED HEALTH/NURSE VISIT (OUTPATIENT)
Dept: FAMILY MEDICINE | Facility: CLINIC | Age: 50
End: 2020-05-22
Payer: MEDICARE

## 2020-05-22 DIAGNOSIS — N80.9 ENDOMETRIOSIS: Primary | ICD-10-CM

## 2020-05-22 RX ORDER — MEDROXYPROGESTERONE ACETATE 150 MG/ML
150 INJECTION, SUSPENSION INTRAMUSCULAR
Status: DISCONTINUED | OUTPATIENT
Start: 2020-05-22 | End: 2022-07-19

## 2020-05-22 RX ADMIN — MEDROXYPROGESTERONE ACETATE 150 MG: 150 INJECTION, SUSPENSION INTRAMUSCULAR at 13:56

## 2020-05-22 NOTE — PROGRESS NOTES
Clinic Administered Medication Documentation      Depo Provera Documentation    URINE HCG: not indicated    Depo-Provera Standing Order inclusion/exclusion criteria reviewed.   Patient meets: inclusion criteria     BP: Data Unavailable  LAST PAP/EXAM: No results found for: PAP    Prior to injection, verified patient identity using patient's name and date of birth. Medication was administered. Please see MAR and medication order for additional information.     Was entire vial of medication used? Yes  Vial/Syringe: Single dose vial  Expiration Date:  10/21    Patient instructed to report any adverse reaction to staff immediately .  NEXT INJECTION DUE: 8/7/20 - 8/21/20      Name of provider who requested the medication administration: Kobe  Name of provider on site (faculty or community preceptor) at the time of performing the medication administration: Kobe    Date of next administration: 8/8/20 - 8/22/20  Deana Castellanos [facility]

## 2020-06-03 ENCOUNTER — TELEPHONE (OUTPATIENT)
Dept: FAMILY MEDICINE | Facility: CLINIC | Age: 50
End: 2020-06-03

## 2020-06-03 ENCOUNTER — VIRTUAL VISIT (OUTPATIENT)
Dept: FAMILY MEDICINE | Facility: CLINIC | Age: 50
End: 2020-06-03
Payer: MEDICARE

## 2020-06-03 VITALS — WEIGHT: 240 LBS | HEIGHT: 61 IN | BODY MASS INDEX: 45.31 KG/M2

## 2020-06-03 DIAGNOSIS — J44.1 COPD EXACERBATION (H): ICD-10-CM

## 2020-06-03 DIAGNOSIS — G25.81 RESTLESS LEGS SYNDROME: Primary | ICD-10-CM

## 2020-06-03 DIAGNOSIS — R10.84 ABDOMINAL PAIN, GENERALIZED: ICD-10-CM

## 2020-06-03 DIAGNOSIS — Z86.73 HISTORY OF CVA (CEREBROVASCULAR ACCIDENT): ICD-10-CM

## 2020-06-03 DIAGNOSIS — G89.29 CHRONIC BILATERAL LOW BACK PAIN WITH LEFT-SIDED SCIATICA: ICD-10-CM

## 2020-06-03 DIAGNOSIS — Z79.4 TYPE 2 DIABETES MELLITUS WITHOUT COMPLICATION, WITH LONG-TERM CURRENT USE OF INSULIN (H): ICD-10-CM

## 2020-06-03 DIAGNOSIS — M54.42 CHRONIC BILATERAL LOW BACK PAIN WITH LEFT-SIDED SCIATICA: ICD-10-CM

## 2020-06-03 DIAGNOSIS — Z20.822 SUSPECTED COVID-19 VIRUS INFECTION: ICD-10-CM

## 2020-06-03 DIAGNOSIS — G43.709 CHRONIC MIGRAINE WITHOUT AURA WITHOUT STATUS MIGRAINOSUS, NOT INTRACTABLE: ICD-10-CM

## 2020-06-03 DIAGNOSIS — E11.9 TYPE 2 DIABETES MELLITUS WITHOUT COMPLICATION, WITH LONG-TERM CURRENT USE OF INSULIN (H): ICD-10-CM

## 2020-06-03 DIAGNOSIS — Z51.89 ENCOUNTER FOR WOUND CARE: ICD-10-CM

## 2020-06-03 DIAGNOSIS — Z79.4 TYPE 2 DIABETES MELLITUS WITH HYPERGLYCEMIA, WITH LONG-TERM CURRENT USE OF INSULIN (H): ICD-10-CM

## 2020-06-03 DIAGNOSIS — I10 BENIGN ESSENTIAL HYPERTENSION: ICD-10-CM

## 2020-06-03 DIAGNOSIS — J45.20 MILD INTERMITTENT ASTHMA WITHOUT COMPLICATION: ICD-10-CM

## 2020-06-03 DIAGNOSIS — K59.00 CONSTIPATION, UNSPECIFIED CONSTIPATION TYPE: ICD-10-CM

## 2020-06-03 DIAGNOSIS — R05.9 COUGH: ICD-10-CM

## 2020-06-03 DIAGNOSIS — E11.65 TYPE 2 DIABETES MELLITUS WITH HYPERGLYCEMIA, WITH LONG-TERM CURRENT USE OF INSULIN (H): ICD-10-CM

## 2020-06-03 RX ORDER — LIDOCAINE 50 MG/G
OINTMENT TOPICAL 3 TIMES DAILY PRN
Qty: 150 G | Refills: 1 | Status: SHIPPED | OUTPATIENT
Start: 2020-06-03 | End: 2021-01-06

## 2020-06-03 RX ORDER — GABAPENTIN 600 MG/1
TABLET ORAL
Qty: 180 TABLET | Refills: 3 | Status: SHIPPED | OUTPATIENT
Start: 2020-06-03 | End: 2020-07-23

## 2020-06-03 RX ORDER — AMLODIPINE BESYLATE 10 MG/1
10 TABLET ORAL DAILY
Qty: 90 TABLET | Refills: 3 | Status: SHIPPED | OUTPATIENT
Start: 2020-06-03 | End: 2021-07-05

## 2020-06-03 RX ORDER — ACETAMINOPHEN 500 MG
1000 TABLET ORAL 3 TIMES DAILY PRN
Qty: 100 TABLET | Refills: 3 | Status: SHIPPED | OUTPATIENT
Start: 2020-06-03 | End: 2020-09-14

## 2020-06-03 RX ORDER — ASPIRIN 81 MG
100 TABLET, DELAYED RELEASE (ENTERIC COATED) ORAL DAILY PRN
Qty: 60 TABLET | Refills: 1 | Status: SHIPPED | OUTPATIENT
Start: 2020-06-03 | End: 2022-07-19

## 2020-06-03 RX ORDER — PRAMIPEXOLE DIHYDROCHLORIDE 0.25 MG/1
0.25 TABLET ORAL AT BEDTIME
Qty: 30 TABLET | Refills: 1 | Status: SHIPPED | OUTPATIENT
Start: 2020-06-03 | End: 2020-06-28

## 2020-06-03 RX ORDER — PREDNISONE 50 MG/1
50 TABLET ORAL DAILY
Qty: 5 TABLET | Refills: 0 | Status: SHIPPED | OUTPATIENT
Start: 2020-06-03 | End: 2020-11-03

## 2020-06-03 RX ORDER — BUDESONIDE AND FORMOTEROL FUMARATE DIHYDRATE 160; 4.5 UG/1; UG/1
2 AEROSOL RESPIRATORY (INHALATION) 2 TIMES DAILY
Qty: 10.2 G | Refills: 11 | Status: SHIPPED | OUTPATIENT
Start: 2020-06-03 | End: 2021-07-23

## 2020-06-03 RX ORDER — BACLOFEN 20 MG/1
20 TABLET ORAL 3 TIMES DAILY PRN
Qty: 90 TABLET | Refills: 1 | Status: SHIPPED | OUTPATIENT
Start: 2020-06-03 | End: 2020-06-28

## 2020-06-03 RX ORDER — ALBUTEROL SULFATE 90 UG/1
1-2 AEROSOL, METERED RESPIRATORY (INHALATION) EVERY 4 HOURS PRN
Qty: 1 INHALER | Refills: 11 | Status: SHIPPED | OUTPATIENT
Start: 2020-06-03 | End: 2021-07-13

## 2020-06-03 RX ORDER — IBUPROFEN 600 MG/1
600 TABLET, FILM COATED ORAL 2 TIMES DAILY PRN
Qty: 60 TABLET | Refills: 3 | Status: SHIPPED | OUTPATIENT
Start: 2020-06-03 | End: 2021-04-07

## 2020-06-03 RX ORDER — INSULIN GLARGINE 300 U/ML
80 INJECTION, SOLUTION SUBCUTANEOUS AT BEDTIME
Qty: 7.5 ML | Refills: 11 | Status: SHIPPED | OUTPATIENT
Start: 2020-06-03 | End: 2020-08-12

## 2020-06-03 RX ORDER — NYSTATIN 100000 [USP'U]/G
POWDER TOPICAL PRN
Qty: 60 G | Refills: 0 | Status: SHIPPED | OUTPATIENT
Start: 2020-06-03 | End: 2022-05-24

## 2020-06-03 RX ORDER — METOPROLOL TARTRATE 25 MG/1
25 TABLET, FILM COATED ORAL 2 TIMES DAILY
Qty: 180 TABLET | Refills: 3 | Status: SHIPPED | OUTPATIENT
Start: 2020-06-03 | End: 2021-03-30

## 2020-06-03 RX ORDER — AZITHROMYCIN 250 MG/1
TABLET, FILM COATED ORAL
Qty: 6 TABLET | Refills: 0 | Status: SHIPPED | OUTPATIENT
Start: 2020-06-03 | End: 2020-06-08

## 2020-06-03 RX ORDER — SUMATRIPTAN 50 MG/1
50 TABLET, FILM COATED ORAL
Qty: 18 TABLET | Refills: 1 | Status: SHIPPED | OUTPATIENT
Start: 2020-06-03 | End: 2021-04-09

## 2020-06-03 ASSESSMENT — MIFFLIN-ST. JEOR: SCORE: 1648.07

## 2020-06-03 NOTE — PROGRESS NOTES
"Family Medicine Telephone Visit Note         Telephone Visit Consent   Patient was verbally read the following and verbal consent was obtained.    \"Telephone visits are billed at different rates depending on your insurance coverage. During this emergency period, for some insurers they may be billed the same as an in-person visit.  Please reach out to your insurance provider with any questions.  If during the course of the call the physician/provider feels a telephone visit is not appropriate, you will not be charged for this service.\"    Name person giving consent:  Patient   Date verbal consent given:  6/3/2020  Time verbal consent given:  1:40 PM    Chief Complaint   Patient presents with     Diabetes     Cough     Refill Request     gabapentin (NEURONTIN) 600 MG tablet, lidocaine (XYLOCAINE) 5 % external ointment and muscle relaxer              HPI   Patients name: Teresa  Appointment start time:  1:41 PM  Appointment end time: 2:11PM     Came in to get DEPO shot back in April, but .  Didn't have any appointments avialablewith me at that time, so did not schedule a visit to talk about diabetes as we discussed last visit.      We discussed her blood sugars and she feels better about where they have been.    Describes  Several under 200,   Morning:  Between 200-300 usually.  180, up too 400  Mid day:  Before eating, between 180-250, 120 up to 450  Evenin-230, 130 up to 289.      Has some times when she feels like she is getting low in the afternoon.  Sugars then are below 200, but not less than 100. She notices symptoms of hypoglycemia frequently even when sugars are in the normal range.      Eating regularly has been difficult.  She describes some nausea and discomfort.  Is trying to drink more water but is been hard to keep the water down.  Her meals continue to be quite sporadic.  Appetite changes every day, and sometimes she has enough energy to eat and sometimes she does not.  Today at the time of our " call she had not had anything to eat.  Also feels like the weather is affecting her, and describes poor appetite and increased nausea when it is warm outside.      Her current insulin regimen is as follows:     She will use NovoLog and give  20 Units over 100, then 2 for every 50 above that.    75 for long acting Lanus   Bydureon once per week.      Weight seems up and down.  Does not think she is really gained a bunch for weight, but is not sure about this.    We discussed options, and she would like to start by increasing her Tresiba  from 75 to 80.   Agrees to do a follow up by phone in 2 weeks.      She is also describing new, and worsening upper respiratory symptoms.  Having more trouble with breathing, and coughing a lot.  Started about a week ago.  Coughing up stuff, brown, no blood, sometimes green.  Having running nose and congestion, having ear pain, headaches, and sinus pain.  Feels short of breath too.  Having fevers and chills.  Only contacts currently are a small [place] of friend and Carey,her PCA.  No known contacts who have COVID-19.    She does describe worsening of her breathing, and increased allergy symptoms over the last couple of weeks.  Also shares that typically the change to warm air will irritate her breathing.  She describes symptoms of sneezing, itchy eyes, and feeling somewhat puffy.     She has been using a rescue inhaler every few days.  2 puffs at a time.  Feels better with using it.  Using her other regular inhalers.  Grey and red inhaler, and Spiriva.  Belly hurts with all the coughing.  This is been quite painful and she worries that her hernia is getting worse.    Needs refills on many of her medication, and would like to go through the list together today.      Current Outpatient Medications   Medication Sig Dispense Refill     albuterol (PROAIR HFA/PROVENTIL HFA/VENTOLIN HFA) 108 (90 Base) MCG/ACT inhaler Inhale 1-2 puffs into the lungs every 4 hours as needed for shortness of  breath / dyspnea or wheezing 1 Inhaler 11     albuterol (PROVENTIL) (2.5 MG/3ML) 0.083% neb solution Take 1 vial (2.5 mg) by nebulization every 6 hours as needed for shortness of breath / dyspnea or wheezing 30 vial 3     amLODIPine (NORVASC) 10 MG tablet Take 1 tablet (10 mg) by mouth daily 90 tablet 3     azelastine (OPTIVAR) 0.05 % ophthalmic solution Apply 1 drop to eye 2 times daily 1 Bottle 11     blood glucose (NO BRAND SPECIFIED) lancets standard Use to test blood sugar 3 times daily or as directed. 100 each 11     blood glucose (ONETOUCH ULTRA) test strip USE TO TEST BLOOD SUGARS 3 TIMES DAILY OR AS DIRECTED 300 strip PRN     budesonide-formoterol (SYMBICORT) 160-4.5 MCG/ACT Inhaler Inhale 2 puffs into the lungs 2 times daily       calcium carbonate (TUMS) 500 MG chewable tablet Take 1 tablet (500 mg) by mouth 2 times daily as needed for heartburn 100 tablet 1     clindamycin (CLEOCIN T) 1 % external solution Apply topically 2 times daily To underarms and groin area. 60 mL 11     clopidogrel (PLAVIX) 75 MG tablet TAKE 1 TABLET BY MOUTH EVERY DAY 90 tablet 1     Continuous Blood Gluc  (TypekitYLE ANAIS 14 DAY READER) JONAH Use to read blood sugars as 's instructions. 1 Device 1     Continuous Blood Gluc Sensor (FREESTYLE ANAIS 14 DAY SENSOR) [facility] Use with sensor to check blood sugars. Remove and replace every 14 days. 2 each 11     [employer] ACETAMINOPHEN EX  MG tablet TAKE 2 TABLETS BY MOUTH 3 TIMES DAILY AS NEEDED FOR MILD PAIN 180 tablet 2     docusate sodium (COLACE) 100 MG tablet Take 1 tablet (100 mg) by mouth daily as needed for constipation 60 tablet 1     EPINEPHrine (EPIPEN/ADRENACLICK/OR ANY BX GENERIC EQUIV) 0.3 MG/0.3ML injection 2-pack Inject 0.3 mLs (0.3 mg) into the muscle once as needed for anaphylaxis 2 mL 0     exenatide ER (BYDUREON) 2 MG pen Inject 2 mg Subcutaneous every 7 days 12 each 3     famotidine (PEPCID) 40 MG tablet Take 1 tablet (40 mg) by mouth 2 times daily  180 tablet 3     fluticasone (FLONASE) 50 MCG/ACT nasal spray Spray 2 sprays into both nostrils daily 9.9 g 11     gabapentin (NEURONTIN) 600 MG tablet Take 2 tabs three times daily. 180 tablet 3     ibuprofen (ADVIL/MOTRIN) 600 MG tablet TAKE 1 TABLET BY MOUTH EVERY 6 HOURS AS NEEDED FOR MODERATE PAIN 60 tablet 3     insulin glargine U-300 (TOUJEO SOLOSTAR) 300 UNIT/ML (1 units dial) pen Inject 75 Units Subcutaneous At Bedtime 7.5 mL 11     insulin lispro (HUMALOG KWIKPEN) 100 UNIT/ML (1 unit dial) KWIKPEN If BS <100, no Humalog IF -150, take 20 Units.  Increase 2 units for every 50 above 150.  Total Daily dose is 80 units/day 15 mL 3     insulin pen needle (31G X 5 MM) 31G X 5 MM miscellaneous USE 4 DAILY OR AS DIRECTED. 100 each 10     lidocaine (XYLOCAINE) 5 % external ointment Apply topically 3 times daily as needed for moderate pain 150 g 1     lisinopril (ZESTRIL) 40 MG tablet TAKE 1 TABLET BY MOUTH EVERY DAY 90 tablet 1     loratadine (CLARITIN) 10 MG tablet Take 1 tablet (10 mg) by mouth daily 90 tablet 3     metoprolol tartrate (LOPRESSOR) 25 MG tablet Take 1 tablet (25 mg) by mouth 2 times daily 180 tablet 3     nicotine (NICODERM CQ) 21 MG/24HR 24 hr patch Place 1 patch onto the skin every 24 hours 30 patch 1     nicotine (NICORETTE) 2 MG gum Place 1 each (2 mg) inside cheek as needed for smoking cessation 100 tablet 1     nystatin (MYCOSTATIN) 476795 UNIT/GM external powder Apply topically as needed for other (moisture) 60 g 0     nystatin (MYCOSTATIN) 797259 UNIT/ML suspension Take 5 mLs (500,000 Units) by mouth daily as needed (thrush) 500 mL 1     ondansetron (ZOFRAN) 4 MG tablet Take 1 tablet (4 mg) by mouth every 8 hours as needed for nausea 18 tablet 0     order for DME Equipment being ordered: CPAP mask and tubing 1 each 0     pantoprazole (PROTONIX) 40 MG EC tablet Take 1 tablet (40 mg) by mouth daily 90 tablet 3     pravastatin (PRAVACHOL) 80 MG tablet Take 1 tablet (80 mg) by mouth daily  "90 tablet 3     QUEtiapine (SEROQUEL) 400 MG tablet Take 1 tablet (400 mg) by mouth At Bedtime       QUEtiapine (SEROQUEL) 50 MG tablet Take 50 mg by mouth 3 times daily       sodium chloride (OCEAN) 0.65 % nasal spray Use 3-4x daily 15 mL 1     SUMAtriptan (IMITREX) 50 MG tablet Take 1 tablet (50 mg) by mouth at onset of headache for migraine 18 tablet 1     tiotropium (SPIRIVA) 18 MCG inhaled capsule Inhale 1 capsule (18 mcg) into the lungs daily 90 capsule 1     venlafaxine (EFFEXOR-XR) 150 MG 24 hr capsule Take 2 capsules (300 mg) by mouth daily       Allergies   Allergen Reactions     Augmentin Nausea and Vomiting and Hives     Bee Venom Anaphylaxis     Nuts Anaphylaxis     Abilify Discmelt      Animal Dander Other (See Comments)     asthma     Aspirin      Atorvastatin      Contrast Dye      Metformin Difficulty breathing     \"throat swelling and elevated liver enzymes\"     Naproxen      Niacin      Valium [Diazepam]      Zocor [Simvastatin - High Dose]             Physical Exam:     Ht 1.537 m (5' 0.5\")   Wt 108.9 kg (240 lb)   BMI 46.10 kg/m    Estimated body mass index is 46.1 kg/m  as calculated from the following:    Height as of this encounter: 1.537 m (5' 0.5\").    Weight as of this encounter: 108.9 kg (240 lb).    Exam:  Constitutional: alert and mild distress  Psychiatric: mentation appears normal and affect normal/bright          Assessment and Plan     Teresa was seen today for diabetes, cough and refill request.   Multiple issues discussed.  Reviewed her medication list and refilled medications as appropriate.  We will send the prescriptions to the pharmacy in [place] as her regular pharmacy is not open currently.    Visit was initially scheduled for diabetes.  Blood sugar still significantly above goal.  Will increase her long-acting Tresiba insulin to 80 units daily.  Continue with same sliding scale as well as the Bydureon.  Sent in a prescription for testing supplies that she reports she is out " of these.  Her diet has been very irregular and I think is contributing to the problem.  Encouraged her at least eat a small amount during mealtimes even she is not feeling very hungry.    Additionally is having worsening shortness of breath.  Worsening allergy symptoms.  She is high risk for complications of COVID given underlying diabetes, previous history of need for dialysis, and underlying COPD/asthma.  Will order COVID testing, and start treatment for a asthma/COPD exacerbation.  Will treat with azithromycin and prednisone.  She is not feeling better in the next 2 days she needs to come in for an in person visit to be better evaluated in our respiratory response unit, and hopefully will be able to complete COVID testing here that time.    Diagnoses and all orders for this visit:    Restless legs syndrome  -     pramipexole (MIRAPEX) 0.25 MG tablet; Take 1 tablet (0.25 mg) by mouth At Bedtime    Chronic bilateral low back pain with left-sided sciatica  -     gabapentin (NEURONTIN) 600 MG tablet; Take 2 tabs three times daily.    Cough  -     ibuprofen (ADVIL/MOTRIN) 600 MG tablet; Take 1 tablet (600 mg) by mouth 2 times daily as needed for moderate pain  -     lidocaine (XYLOCAINE) 5 % external ointment; Apply topically 3 times daily as needed for moderate pain    Abdominal pain, generalized  -     baclofen (LIORESAL) 20 MG tablet; Take 1 tablet (20 mg) by mouth 3 times daily as needed for muscle spasms  -     acetaminophen (TYLENOL) 500 MG tablet; Take 2 tablets (1,000 mg) by mouth 3 times daily as needed for mild pain  -     lidocaine (XYLOCAINE) 5 % external ointment; Apply topically 3 times daily as needed for moderate pain    Benign essential hypertension  -     amLODIPine (NORVASC) 10 MG tablet; Take 1 tablet (10 mg) by mouth daily  -     metoprolol tartrate (LOPRESSOR) 25 MG tablet; Take 1 tablet (25 mg) by mouth 2 times daily    Type 2 diabetes mellitus without complication, with long-term current use  of insulin (H)  -     blood glucose (NO BRAND SPECIFIED) lancets standard; Use to test blood sugar 3 times daily or as directed.  -     blood glucose (ONETOUCH ULTRA) test strip; USE TO TEST BLOOD SUGARS 3 TIMES DAILY OR AS DIRECTED    Encounter for wound care  -     nystatin (MYCOSTATIN) 217964 UNIT/GM external powder; Apply topically as needed for other (moisture)    Mild intermittent asthma without complication  -     budesonide-formoterol (SYMBICORT) 160-4.5 MCG/ACT Inhaler; Inhale 2 puffs into the lungs 2 times daily  -     albuterol (PROAIR HFA/PROVENTIL HFA/VENTOLIN HFA) 108 (90 Base) MCG/ACT inhaler; Inhale 1-2 puffs into the lungs every 4 hours as needed for shortness of breath / dyspnea or wheezing    Constipation, unspecified constipation type  -     docusate sodium (COLACE) 100 MG tablet; Take 1 tablet (100 mg) by mouth daily as needed for constipation    History of CVA (cerebrovascular accident)  -     metoprolol tartrate (LOPRESSOR) 25 MG tablet; Take 1 tablet (25 mg) by mouth 2 times daily    Chronic migraine without aura without status migrainosus, not intractable  -     SUMAtriptan (IMITREX) 50 MG tablet; Take 1 tablet (50 mg) by mouth at onset of headache for migraine    COPD exacerbation (H)  -     predniSONE (DELTASONE) 50 MG tablet; Take 1 tablet (50 mg) by mouth daily  -     azithromycin (ZITHROMAX) 250 MG tablet; Take 2 tablets (500 mg) by mouth daily for 1 day, THEN 1 tablet (250 mg) daily for 4 days.    Suspected Covid-19 Virus Infection.  COVID testing ordered.        Refilled medications that would be required in the next 3 months.     After Visit Information:  Spoke with [facility] RN who will call to discuss process regarding testing with patient.  Would like her scheduled in our you for further evaluation later this week, and so she will get scheduled for that visit on Friday so we can better assess her breathing status.    Appointment end time: 2:11 PM  This is a telephone visit that  took 30 minutes.    Clinician location:  [facility]    Tyra Bullock MD

## 2020-06-03 NOTE — TELEPHONE ENCOUNTER
Informed patient of COVID-19 testing process. She states her PCA can assist her in driving to the test site. She is aware to call me with any issues or questions.     Scheduled for follow up on Friday w/ Dr. Juarez in [facility] for in clinic assessment. Routed to Dr. Bullock. ./KRISS

## 2020-06-04 DIAGNOSIS — Z20.822 SUSPECTED COVID-19 VIRUS INFECTION: ICD-10-CM

## 2020-06-04 PROCEDURE — 87635 SARS-COV-2 COVID-19 AMP PRB: CPT | Performed by: STUDENT IN AN ORGANIZED HEALTH CARE EDUCATION/TRAINING PROGRAM

## 2020-06-04 PROCEDURE — 99000 SPECIMEN HANDLING OFFICE-LAB: CPT | Performed by: STUDENT IN AN ORGANIZED HEALTH CARE EDUCATION/TRAINING PROGRAM

## 2020-06-04 NOTE — LETTER
June 8, 2020      Teresa Perez  [address]  [place] [address]        Dear ,    We are writing to inform you of your test results.    Your testing for COVID-19 came back negative.  This is good news.  Continue to be careful around others and wear a mask when you can.  I hope your breathing starts feeling better.  Please call the clinic at [phone] if you have any questions.       Resulted Orders   Symptomatic COVID-19 Virus (Coronavirus) by PCR   Result Value Ref Range    COVID-19 Virus PCR to [facility] - Source Nasopharyngeal     COVID-19 Virus PCR to [facility] - Result Not Detected       Comment:      Collection of multiple specimens from the same patient may be necessary to   detect the virus. The possibility of a false negative should be considered if   the patient's recent exposure or clinical presentation suggests 2019 nCOV   infection and diagnostic tests for other causes of illness are negative.   Repeat testing may be considered in this setting.  Viral RNA was extracted via a validated method and subsequently underwent   single step reverse transcriptase-real time polymerase chain reaction using   primers to the CDC specified N1,N2 gene targets of CoV2 and human RNP as an   internal control.  A negative result does not rule out the presence of real-time PCR inhibitors   in the specimen or COVID-19 RNA in concentrations below the limit of detection   of the assay. The possibility of a false negative should be considered if the   patients recent exposure or clinical presentation suggests COVID-19.   Additional testing or repeat testing requires consultation with the   laborator  y.  Nasopharyngeal specimen is the preferred choice for swab-based SARS CoV2   testing. When collection of a nasopharyngeal swab is not possible the   following are acceptable alternatives:  an oropharyngeal (OP) specimen collected by a healthcare professional, or a   nasal mid-turbinate (NMT) swab  collected by a healthcare professional or by   onsite self-collection (using a flocked tapered swab), or an anterior nares   specimen collected by a healthcare professional or by onsite self-collection   (using a round foam swab). (Centers for Disease Control)  Testing performed by [facility], Room [address], [address] [place] [address]. This test was developed and its   performance characteristics determined by the [facility] [employer]   [place]. It has not been cleared or approved by the FDA.  The laboratory is regulated under the Clinical Laboratory Improvement   Amendments of 1988 (CLIA-88) as qualified to perform high-complexity testin  g.   This test is used for clinical purposes. It should not be regarded as   investigational or for research.         If you have any questions or concerns, please call the clinic at the number listed above.       Sincerely,        LOGAN LATHAM MD 2

## 2020-06-05 ENCOUNTER — OFFICE VISIT (OUTPATIENT)
Dept: FAMILY MEDICINE | Facility: CLINIC | Age: 50
End: 2020-06-05
Payer: MEDICARE

## 2020-06-05 VITALS
OXYGEN SATURATION: 95 % | DIASTOLIC BLOOD PRESSURE: 82 MMHG | SYSTOLIC BLOOD PRESSURE: 118 MMHG | HEART RATE: 88 BPM | TEMPERATURE: 99.1 F

## 2020-06-05 DIAGNOSIS — N93.9 VAGINAL SPOTTING: ICD-10-CM

## 2020-06-05 DIAGNOSIS — M25.512 ACUTE PAIN OF LEFT SHOULDER: ICD-10-CM

## 2020-06-05 DIAGNOSIS — J44.1 COPD EXACERBATION (H): Primary | ICD-10-CM

## 2020-06-05 LAB
SARS-COV-2 RNA SPEC QL NAA+PROBE: NOT DETECTED
SPECIMEN SOURCE: NORMAL

## 2020-06-05 NOTE — PROGRESS NOTES
[facility] Visit    Subjective:  Teresa Perez is a 49 year old female with a PMHx significant for   Patient Active Problem List   Diagnosis     Health Care Home     Acute peptic ulcer     Other allergy, other than to medicinal agents     Bipolar disorder (H)     Bulging lumbar disc     Cervical dysplasia     Common migraine without aura     Constipation     Dwarfism     Familial hypercholesterolemia     Insomnia     Low back pain     Intermittent asthma     Leg pain, bilateral     Smoking     Degeneration of thoracic or thoracolumbar intervertebral disc     LOMAS (nonalcoholic steatohepatitis)     Disease of lung     Hemorrhoids     Parotid mass     Endometriosis     NNAMDI (obstructive sleep apnea)     History of total right knee replacement     Lateral epicondylitis     Impingement syndrome, shoulder, left     Hx of total knee replacement, left     Chronic pain syndrome     Cough     Borderline personality disorder (H)     Moderate recurrent major depression (H)     Recurrent ventral hernia     Type 2 diabetes mellitus with complication, with long-term current use of insulin (H)     Essential hypertension     Nonruptured cerebral aneurysm     Cerebrovascular accident (CVA) due to embolism (H)     Amputation of leg (H)     CKD (chronic kidney disease) stage 5, GFR less than 15 ml/min (H)     Pre-ulcerative corn or callous    who presents with cough.     Patient complains of ~1.5 weeks of a mildly productive cough, wheezing and subjective fevers and chills.  She hasn't measured any temps because she doesn't have a thermometer.  Denies rigorous chills.  Her cough was getting worse for a few days after it started but it has been stable for the past few days.  She has some dark sputum production but no blood.  Otherwise, she has clear nasal congestion, sore ears bilaterally and a mild sore throat.  No chest pain, significant respiratory distress, increased work of breathing, inability to speak or  sleep.  Is staying hydrated and tolerating her normal appetite.  Has chronic trace edema of her right lower leg without changes.      She was prescribed Prednisone x5d and Azithromycin x5d for a suspected COPD exacerbation, and started treatment yesterday.  She has also been using Albuterol PRN about 2-3 times per day with significant relief.     She also complains of a sore left shoulder after slipped out of her wheelchair 2 days ago.  She denies any bruising, swelling or focal neuro deficits.  She would be interested in following up for this after her lung issue resolves.  She also mentions having some vaginal spotting recently that she'd also like to discuss with her PCP after she feels better.       Objective:  Vitals:    06/05/20 1132   BP: 118/82   Pulse: 88   Temp: 99.1  F (37.3  C)   TempSrc: Oral   SpO2: 95%     GEN: NAD, cooperative, alert  EYES: grossly normal to inspection, normal conjunctivae/sclerae  HENT: normal ear canals/TM's, nose & mouth w/o ulcers or lesions, clear oropharynx, MMM  NECK: no LAD, masses  RESP: diffuse soft wheezes R>L, no obvious crackles or consolidation, no increased work of breathing, fair air movement throughout  CV: RRR, nl S1/S2, no m/r/g, trace RLE edema  MSK: in wheelchair, no LLE.  Left shoulder without gross deformity/bruising/swelling.  Some muscular tenderness diffusely over left deltoid but no focal bony tenderness. Forward shoulder flexion range of motion is mildly limited versus right side, but no weakness throughout  PSYCH: mentation appears normal, affect normal/bright    Assessment/Plan:  Teresa was seen today for cough and headache.    Diagnoses and all orders for this visit:    COPD exacerbation (H)  Patient has had wheezing, productive cough and subjective fevers for the past 1.5 weeks.  Ddx includes bacterial/viral pneumonia, URI, COPD exacerbation.  COVID19 testing is pending.  Vitals are normal and reassuring.  Exam is notable for wheezing, but no obvious  pneumonia or any signs of respiratory distress.  Held off CXR today since overall clinically reassuring.  Agree with prednisone and azithromycin, encouraged her to complete course and use albuterol PRN.  Return precautions reviewed including worsening or new symptoms.  Encouraged social distancing as well.  Given a thermometer to bring home today.    Acute pain of left shoulder, possibly muscle strain  Patient had a non-syncopal fall from her wheelchair onto her left shoulder 2 days ago and is feeling sore.  Encouraged stretches and supportive cares with Tylenol PRN for now.  Could consider imaging or PT at follow up if concerned.     Vaginal spotting, unclear etiology  Patient briefly mentioned wanting to discuss some vaginal spotting recently with her PCP.  Has been on Depo for several years without recent changes.       Options for treatment and follow-up care were reviewed with the patient who was engaged and actively involved in the decision making process, verbalized understanding of the options discussed, and satisfied with the final plan.    Patient was staffed with supervising physician, Dr. Bullock.     Lyle Juarez MD, PGY3  [facility]

## 2020-06-05 NOTE — PATIENT INSTRUCTIONS
Your symptoms show that you may have coronavirus (COVID-19). This illness can cause fever, cough and trouble breathing. Many people get a mild case and get better on their own. Some people can get very sick.     Not all patients are tested for COVID-19. If you need to be tested, your care team will let you know.     How can I protect others?    Without a test, we can't know for sure that you have COVID-19. For safety, it's very important to follow these rules.    Stay home and away from others (self-isolate) until:    At least 10 days have passed since your symptoms started. And     You've had no fever--and no medicine that reduces fever--for 3 full days (72 hours). And      Your other symptoms have resolved (gotten better).     During this time:    Stay in your own room (and use your own bathroom), if you can.    Stay away from others in your home. No hugging, kissing or shaking hands.    No visitors.    Don't go to work, school or anywhere else.     Clean  high touch  surfaces often (doorknobs, counters, handles, etc.). Use a household cleaning spray or wipes.    Cover your mouth and nose with a mask, tissue or wash cloth to avoid spreading germs.    Wash your hands and face often. Use soap and water.    For more tips, go to https://www.cdc.gov/coronavirus/2019-ncov/downloads/10Things.pdf.    How can I take care of myself?    1. Get lots of rest. Drink extra fluids (unless a doctor has told you not to).       2. If you have other health problems (like cancer, heart failure, an organ transplant or severe kidney disease): Call your specialty clinic if you don't feel better in the next 2 days.    3. Know when to call 911: If your breathing is so bad that it keeps you from doing normal activities, call 911 or go to the emergency room. Tell them that you've been staying home and may have COVID-19.      What are the symptoms of COVID-19?     The most common symptoms are cough, fever and trouble breathing.     Less  common symptoms include body aches, chills, diarrhea (loose, watery poops), fatigue (feeling very tired), headache, runny nose, sore throat and loss of smell.     COVID-19 can cause severe coughing (bronchitis) and lung infection (pneumonia).    How does it spread?     The virus may spread when a person coughs or sneezes into the air. The virus can travel about 6 feet this way, and it can live on surfaces.      Common  (household disinfectants) will kill the virus.    Who is at risk?  Anyone can catch COVID-19 if they're around someone who has the virus.    How can others protect themselves?     Stay away from people who have COVID-19 (or symptoms of COVID-19).    Wash hands often with soap and water. Or, use hand  with at least 60% alcohol.    Avoid touching the eyes, nose or mouth.     Wear a face mask when you go out in public, when sick or when caring for a sick person.      For more about COVID-19 and caring for yourself at home, please visit the CDC website at https://www.cdc.gov/coronavirus/2019-ncov/about/steps-when-sick.html.     To learn about care at [facility], go to [email or web address].    Below are the COVID-19 hotlines at the [place] [facility] ([facility]). Interpreters are available.     For health questions: Call 390-912-1360 or 1-835.539.8223 (7 a.m. to 7 p.m.)    For questions about schools and childcare: Call 897-362-6354 or 1-231.334.9373 (7 a.m. to 7 p.m.)        For the shoulder pain just try and take tylenol, then later on we can discuss maybe a shoulder injection which that can be discuss with Dr. Bullock.

## 2020-06-06 NOTE — RESULT ENCOUNTER NOTE
Teresa Perez-    Your testing for COVID-19 came back negative.  This is good news.  Continue to be careful around others and wear a mask when you can.  I hope your breathing starts feeling better.  Please call the clinic at [phone] if you have any questions.      Tyra Bullock MD    Please send results to patient.

## 2020-06-06 NOTE — PROGRESS NOTES
Preceptor Attestation:  Patient seen and evaluated in person. I discussed the patient with the resident. I have verified the content of the note, which accurately reflects my assessment of the patient and the plan of care.  Supervising Physician:  Tyra Bullock MD.

## 2020-06-18 ENCOUNTER — TELEPHONE (OUTPATIENT)
Dept: FAMILY MEDICINE | Facility: CLINIC | Age: 50
End: 2020-06-18

## 2020-06-18 ENCOUNTER — VIRTUAL VISIT (OUTPATIENT)
Dept: FAMILY MEDICINE | Facility: CLINIC | Age: 50
End: 2020-06-18
Payer: MEDICARE

## 2020-06-18 VITALS — BODY MASS INDEX: 45.14 KG/M2 | WEIGHT: 235 LBS

## 2020-06-18 DIAGNOSIS — M53.3 PAIN IN THE COCCYX: Primary | ICD-10-CM

## 2020-06-18 ASSESSMENT — PAIN SCALES - GENERAL: PAINLEVEL: WORST PAIN (10)

## 2020-06-18 NOTE — TELEPHONE ENCOUNTER
[facility] phone call message- general phone call:    Reason for call: Lower back pain doing down to legs,    Action desired: call back.    Return call needed: Yes    OK to leave a message on voice mail? Yes    Advised patient to response may take up to 2 business days: Yes    Primary language: English      needed? No    Call taken on June 18, 2020 at 9:35 AM by Lo Lao

## 2020-06-18 NOTE — PROGRESS NOTES
Preceptor Attestation:    I talked to the patient on the phone and discussed the patient with the resident. I have verified the content of the note, which accurately reflects my assessment of the patient and the plan of care.   Supervising Physician:  Abrahan Guzman MD.

## 2020-06-18 NOTE — PROGRESS NOTES
"Family Medicine Telephone Visit Note           Telephone Visit Consent   Patient was verbally read the following and verbal consent was obtained.    \"Telephone visits are billed at different rates depending on your insurance coverage. During this emergency period, for some insurers they may be billed the same as an in-person visit.  Please reach out to your insurance provider with any questions.  If during the course of the call the physician/provider feels a telephone visit is not appropriate, you will not be charged for this service.\"    Name person giving consent:  Patient   Date verbal consent given:  6/18/2020  Time verbal consent given:  1:15 PM      Chief Complaint   Patient presents with     Pain     pain near tailbone and radiating to left leg x2-3 days, pt sits in wheel chair, h/o chronic pain            HPI   Patients name: Teresa  Appointment start time:  1:18 PM    Excruciating tailbone pain going into left limb. This is different than her typical phantom pain. This pain started a couple of days ago. Fell out of her wheelchair about a week ago. She landed on her right knee and left limb. No saddle anesthesia. No new numbness. She does have residual mild numbness of left limb from stroke. Has not been able to get into a comfortable position to sleep.      Back Pain      Duration: 3-4 days        Specific cause: fall     Description:   Location of pain: low back both  Character of pain: stabbing and constant  Pain radiation:none  New numbness or weakness in legs, not attributed to pain:  No   Any new bowel or bladder incontinence?No     Intensity: severe    History:   Pain interferes with job/home/school: No, patient not working   History of back problems: previous degenerative joint disease of the lumbar spine  Therapies tried without relief: high dose Tylenol (1000mg) and 600mg Ibuprofen every 8 hours, ice    Alleviating factors:   Improved by: None      Precipitating factors:  Worsened by: " Nothing    Accompanying Signs & Symptoms:  Risk of Fracture:  YES Degenerative thoracic disc, amputation of leg, wheelchair, prior CVA  Risk of Cauda Equina:No   Risk of Infection:  No   Risk of Cancer:  No       Current Outpatient Medications   Medication Sig Dispense Refill     acetaminophen (TYLENOL) 500 MG tablet Take 2 tablets (1,000 mg) by mouth 3 times daily as needed for mild pain 100 tablet 3     albuterol (PROAIR HFA/PROVENTIL HFA/VENTOLIN HFA) 108 (90 Base) MCG/ACT inhaler Inhale 1-2 puffs into the lungs every 4 hours as needed for shortness of breath / dyspnea or wheezing 1 Inhaler 11     albuterol (PROVENTIL) (2.5 MG/3ML) 0.083% neb solution Take 1 vial (2.5 mg) by nebulization every 6 hours as needed for shortness of breath / dyspnea or wheezing 30 vial 3     amLODIPine (NORVASC) 10 MG tablet Take 1 tablet (10 mg) by mouth daily 90 tablet 3     azelastine (OPTIVAR) 0.05 % ophthalmic solution Apply 1 drop to eye 2 times daily 1 Bottle 11     baclofen (LIORESAL) 20 MG tablet Take 1 tablet (20 mg) by mouth 3 times daily as needed for muscle spasms 90 tablet 1     blood glucose (NO BRAND SPECIFIED) lancets standard Use to test blood sugar 3 times daily or as directed. 100 each 11     blood glucose (ONETOUCH ULTRA) test strip USE TO TEST BLOOD SUGARS 3 TIMES DAILY OR AS DIRECTED 300 strip PRN     budesonide-formoterol (SYMBICORT) 160-4.5 MCG/ACT Inhaler Inhale 2 puffs into the lungs 2 times daily 10.2 g 11     calcium carbonate (TUMS) 500 MG chewable tablet Take 1 tablet (500 mg) by mouth 2 times daily as needed for heartburn 100 tablet 1     clindamycin (CLEOCIN T) 1 % external solution Apply topically 2 times daily To underarms and groin area. 60 mL 11     clopidogrel (PLAVIX) 75 MG tablet TAKE 1 TABLET BY MOUTH EVERY DAY 90 tablet 1     docusate sodium (COLACE) 100 MG tablet Take 1 tablet (100 mg) by mouth daily as needed for constipation 60 tablet 1     EPINEPHrine (EPIPEN/ADRENACLICK/OR ANY BX GENERIC  EQUIV) 0.3 MG/0.3ML injection 2-pack Inject 0.3 mLs (0.3 mg) into the muscle once as needed for anaphylaxis 2 mL 0     exenatide ER (BYDUREON) 2 MG pen Inject 2 mg Subcutaneous every 7 days 12 each 3     famotidine (PEPCID) 40 MG tablet Take 1 tablet (40 mg) by mouth 2 times daily 180 tablet 3     fluticasone (FLONASE) 50 MCG/ACT nasal spray Spray 2 sprays into both nostrils daily 9.9 g 11     gabapentin (NEURONTIN) 600 MG tablet Take 2 tabs three times daily. 180 tablet 3     ibuprofen (ADVIL/MOTRIN) 600 MG tablet Take 1 tablet (600 mg) by mouth 2 times daily as needed for moderate pain 60 tablet 3     insulin glargine U-300 (TOUJEO SOLOSTAR) 300 UNIT/ML (1 units dial) pen Inject 80 Units Subcutaneous At Bedtime 7.5 mL 11     insulin lispro (HUMALOG KWIKPEN) 100 UNIT/ML (1 unit dial) KWIKPEN If BS <100, no Humalog IF -150, take 20 Units.  Increase 2 units for every 50 above 150.  Total Daily dose is 80 units/day 15 mL 3     insulin pen needle (31G X 5 MM) 31G X 5 MM miscellaneous USE 4 DAILY OR AS DIRECTED. 100 each 10     lidocaine (XYLOCAINE) 5 % external ointment Apply topically 3 times daily as needed for moderate pain 150 g 1     lisinopril (ZESTRIL) 40 MG tablet TAKE 1 TABLET BY MOUTH EVERY DAY 90 tablet 1     loratadine (CLARITIN) 10 MG tablet Take 1 tablet (10 mg) by mouth daily 90 tablet 3     metoprolol tartrate (LOPRESSOR) 25 MG tablet Take 1 tablet (25 mg) by mouth 2 times daily 180 tablet 3     nicotine (NICODERM CQ) 21 MG/24HR 24 hr patch Place 1 patch onto the skin every 24 hours 30 patch 1     nicotine (NICORETTE) 2 MG gum Place 1 each (2 mg) inside cheek as needed for smoking cessation 100 tablet 1     nystatin (MYCOSTATIN) 503748 UNIT/GM external powder Apply topically as needed for other (moisture) 60 g 0     nystatin (MYCOSTATIN) 014405 UNIT/ML suspension Take 5 mLs (500,000 Units) by mouth daily as needed (thrush) 500 mL 1     ondansetron (ZOFRAN) 4 MG tablet Take 1 tablet (4 mg) by mouth  "every 8 hours as needed for nausea 18 tablet 0     order for DME Equipment being ordered: CPAP mask and tubing 1 each 0     pantoprazole (PROTONIX) 40 MG EC tablet Take 1 tablet (40 mg) by mouth daily 90 tablet 3     pramipexole (MIRAPEX) 0.25 MG tablet Take 1 tablet (0.25 mg) by mouth At Bedtime 30 tablet 1     pravastatin (PRAVACHOL) 80 MG tablet Take 1 tablet (80 mg) by mouth daily 90 tablet 3     predniSONE (DELTASONE) 50 MG tablet Take 1 tablet (50 mg) by mouth daily 5 tablet 0     QUEtiapine (SEROQUEL) 400 MG tablet Take 1 tablet (400 mg) by mouth At Bedtime       QUEtiapine (SEROQUEL) 50 MG tablet Take 50 mg by mouth 3 times daily       sodium chloride (OCEAN) 0.65 % nasal spray Use 3-4x daily 15 mL 1     SUMAtriptan (IMITREX) 50 MG tablet Take 1 tablet (50 mg) by mouth at onset of headache for migraine 18 tablet 1     tiotropium (SPIRIVA) 18 MCG inhaled capsule Inhale 1 capsule (18 mcg) into the lungs daily 90 capsule 1     venlafaxine (EFFEXOR-XR) 150 MG 24 hr capsule Take 2 capsules (300 mg) by mouth daily       Allergies   Allergen Reactions     Augmentin Nausea and Vomiting and Hives     Bee Venom Anaphylaxis     Nuts Anaphylaxis     Abilify Discmelt      Animal Dander Other (See Comments)     asthma     Aspirin      Atorvastatin      Contrast Dye      Metformin Difficulty breathing     \"throat swelling and elevated liver enzymes\"     Naproxen      Niacin      Valium [Diazepam]      Zocor [Simvastatin - High Dose]               Review of Systems:     Constitutional, HEENT, cardiovascular, pulmonary, gi and gu systems are negative, except as otherwise noted.         Physical Exam:     Wt 106.6 kg (235 lb)   BMI 45.14 kg/m    Estimated body mass index is 45.14 kg/m  as calculated from the following:    Height as of 6/3/20: 1.537 m (5' 0.5\").    Weight as of this encounter: 106.6 kg (235 lb).    Exam:  Constitutional: healthy, alert and no distress  Psychiatric: mentation appears normal and affect " normal/bright        Assessment and Plan   1. Pain in the coccyx  Patient with pain in tailbone starting a few days after a fall from her wheelchair suspicious for traumatic injury. Differential also includes muscle strain. Lack of saddle anesthesia/bladder incontinence reassuring against cauda equina involvement. No new neurologic symptoms. Patient was advised to offset her Tylenol and Ibuprofen for increased efficacy and to try heat therapy. Patient will come in tomorrow morning 6/19 for Xray of lumbar spine.    - XR Lumbar Spine 2-3 Views*; Future    Refilled medications that would be required in the next 3 months.     After Visit Information:  Patient declined AVS     No follow-ups on file.    Appointment end time: 1:38 PM  This is a telephone visit that took 20 minutes.      Clinician location:  [facility] MD  I precepted today with Dr. Abrahan Guzman.

## 2020-06-18 NOTE — TELEPHONE ENCOUNTER
Patient reports she is having lower back pain radiating down her left leg. The pain started two days ago. Patient denies injury or trauma. No numbness or tingling. She has tried extra strength Tylenol and Ibuprofen and using ice packs without relief. No bowel or bladder incontinence. No saddle anesthesia.     She does have chronic pain, but notes this is completely different. Recommended telephone appointment today which patient is agreeable to. Scheduled with Dr. Cheng this afternoon. Routed to Dr. Skylar Dr.  ./KRISS

## 2020-06-19 ENCOUNTER — OFFICE VISIT (OUTPATIENT)
Dept: FAMILY MEDICINE | Facility: CLINIC | Age: 50
End: 2020-06-19
Payer: MEDICARE

## 2020-06-19 VITALS
TEMPERATURE: 98 F | OXYGEN SATURATION: 95 % | RESPIRATION RATE: 18 BRPM | HEART RATE: 114 BPM | DIASTOLIC BLOOD PRESSURE: 88 MMHG | SYSTOLIC BLOOD PRESSURE: 137 MMHG

## 2020-06-19 DIAGNOSIS — M53.3 PAIN IN THE COCCYX: ICD-10-CM

## 2020-06-19 DIAGNOSIS — M53.3 PAIN IN THE COCCYX: Primary | ICD-10-CM

## 2020-06-19 ASSESSMENT — PAIN SCALES - GENERAL: PAINLEVEL: WORST PAIN (10)

## 2020-06-19 NOTE — PROGRESS NOTES
[facility] Note    Patient: Teresa Perez  : 1970  MRN: [id number]         SUBJECTIVE       Teresa Perez is a 49 year old female with a PMH significant for:  Patient Active Problem List   Diagnosis     Health Care Home     Acute peptic ulcer     Other allergy, other than to medicinal agents     Bipolar disorder (H)     Bulging lumbar disc     Cervical dysplasia     Common migraine without aura     Constipation     Dwarfism     Familial hypercholesterolemia     Insomnia     Low back pain     Intermittent asthma     Leg pain, bilateral     Smoking     Degeneration of thoracic or thoracolumbar intervertebral disc     LOMAS (nonalcoholic steatohepatitis)     Disease of lung     Hemorrhoids     Parotid mass     Endometriosis     NNAMDI (obstructive sleep apnea)     History of total right knee replacement     Lateral epicondylitis     Impingement syndrome, shoulder, left     Hx of total knee replacement, left     Chronic pain syndrome     Cough     Borderline personality disorder (H)     Moderate recurrent major depression (H)     Recurrent ventral hernia     Type 2 diabetes mellitus with complication, with long-term current use of insulin (H)     Essential hypertension     Nonruptured cerebral aneurysm     Cerebrovascular accident (CVA) due to embolism (H)     Amputation of leg (H)     CKD (chronic kidney disease) stage 5, GFR less than 15 ml/min (H)     Pre-ulcerative corn or callous     She presents to clinic today with chief complaint of tailbone pain.    The patient's has had ongoing severe pain in her tailbone for the past week.  Her pain started after she fell out of her wheelchair.  She was being pushed by a friend and she hit a bump and flew out of the chair.  She landed on her tailbone.  She did not hit her head or lose consciousness.  Ever since the fall she has had severe tailbone pain.    She reports the pain is 10 out of 10 severity.  She does have underlying chronic low  back pain with radicular symptoms.  She has also undergone above-knee amputation on the left.  Since the fall she has had severe shooting pain down into her left buttock and into the left thigh.  No motor weakness in her lower extremities.  No new numbness or tingling in her lower extremities.  No saddle anesthesia, bowel dysfunction, bladder dysfunction, or incontinence.    The patient reports no skin ulceration or skin breakdown involving her sacral region or lower extremities.    Past Medical History, Past Surgical History, Medications, Allergies, and Family History were reviewed and updated as needed.        REVIEW OF SYSTEMS     CONSTITUTIONAL: No fever or chills.   HEENT: No headache or recent changes in vision.  SKIN: See above.   GENITOURINARY: See above.   MUSCULOSKELETAL: See above.   NEUROLOGIC: See above.         OBJECTIVE     Vitals:    06/19/20 0907   BP: 137/88   Pulse: 114   Resp: 18   Temp: 98  F (36.7  C)   SpO2: 95%     There is no height or weight on file to calculate BMI.    Physical Exam:  GENERAL: Awake, alert. No acute distress. Appears comfortable.  HEENT: Head: Normocephalic and atraumatic; no dysmorphic features. Eyes: Eye lids and lashes normal; pupils equal, round; extra ocular eye movements intact in all directions; no scleral icterus or conjunctival injection.   NECK: Supple and symmetric.   SKIN: Warm and dry.  There is no skin ulceration, open wounds or erythema involving the sacral region or buttocks.  There is an area of resolving ecchymoss present on the posterior aspect of the left thigh.  BACK: Symmetric, no curvature. Cervical, thoracic and lumbar spinous processes are non-tender to palpation; paraspinous muscles are non-tender to palpation.  There is moderate tenderness to palpation over the sacrum.  No costal vertebral tenderness.  NEUROLOGIC: Awake, alert. Speech is normal, no dysarthria. Thought process is linear, responding appropriately to questions. Sensation to light  touch is intact throughout the upper and lower extremities. Motor strength is 5/5 with hip flexion and extension bilaterally as well as plantar flexion, dorsi flexion, knee extension, and knee flexion on the right.  PSYCH: Normal affect, mood, orientation, memory and insight.    LABORATORY:  No results found for this or any previous visit (from the past 24 hour(s)).      ASSESSMENT AND PLAN     1. Pain in the coccyx  Presents with 1 week history of sacral pain that started after falling onto her sacrum from a wheelchair.  Radiograph of the lumbar spine and sacrum was reviewed in clinic today and per my read did not reveal evidence of acute fracture or subluxation or dislocation.  We will await formal read by radiology.  No red flag symptoms or signs on history or exam today.  Or urgent need to obtain more advanced imaging to rule out spinal compression, nerve root compression, or vertebral compression fracture.  Suspect the patient sustained a severe bruise to the coccyx or sacrum or possibly a minor nondisplaced fracture of the coccyx or sacrum.  We will order a donut pillow for the patient to sit on in order to take pressure off of her coccyx and sacrum as this will hopefully help relieve her pain.  We will also prescribe diclofenac gel to help with management of her pain.  We will avoid adding additional NSAIDs due to history of airway compromise with naproxen use.  We will avoid narcotics given past extensive use of several narcotic pain medications.  - order for DME; Equipment being ordered: donut pillow  Dispense: 1 Units; Refill: 0  - diclofenac (VOLTAREN) 1 % topical gel; Apply 4 g topically 4 times daily  Dispense: 100 g; Refill: 1      Return to clinic in 2-4 weeks for follow-up of coccyx/sacral pain if develops new, worsening or persistent symptoms.    Patient was discussed with attending physician, Dr. Sal Chauhan MD, who agrees with the assessment and plan.    Aubrey Tidwell, PGY-2  [place]  [facility]  6/19/2020        DME (Durable Medical Equipment) Orders and Documentation  Order for DME; Equipment being ordered: donut pillow  Dispense: 1 Units; Refill: 0      The patient was assessed and it was determined the patient is in need of the following listed DME Supplies/Equipment. Please complete supporting documentation below to demonstrate medical necessity.      Aubrey Tidwell MD  June 19, 2020

## 2020-06-19 NOTE — PROGRESS NOTES
Preceptor Attestation:    Patient seen and evaluated in person. I discussed the patient with the resident. I personally reviewed the imaging and agree with the interpretation documented by the resident. I have verified the content of the note, which accurately reflects my assessment of the patient and the plan of care.   Supervising Physician:  Sal Chauhan MD.

## 2020-06-24 DIAGNOSIS — F31.70 BIPOLAR DISORDER IN FULL REMISSION, MOST RECENT EPISODE UNSPECIFIED TYPE (H): Primary | ICD-10-CM

## 2020-06-24 RX ORDER — VENLAFAXINE HYDROCHLORIDE 150 MG/1
300 CAPSULE, EXTENDED RELEASE ORAL DAILY
Qty: 180 CAPSULE | Refills: 0 | Status: SHIPPED | OUTPATIENT
Start: 2020-06-24 | End: 2023-08-22

## 2020-07-09 DIAGNOSIS — M54.42 CHRONIC BILATERAL LOW BACK PAIN WITH LEFT-SIDED SCIATICA: ICD-10-CM

## 2020-07-09 DIAGNOSIS — G89.29 CHRONIC BILATERAL LOW BACK PAIN WITH LEFT-SIDED SCIATICA: ICD-10-CM

## 2020-07-10 ENCOUNTER — VIRTUAL VISIT (OUTPATIENT)
Dept: FAMILY MEDICINE | Facility: CLINIC | Age: 50
End: 2020-07-10
Payer: MEDICARE

## 2020-07-10 ENCOUNTER — TELEPHONE (OUTPATIENT)
Dept: FAMILY MEDICINE | Facility: CLINIC | Age: 50
End: 2020-07-10

## 2020-07-10 VITALS — WEIGHT: 235 LBS | BODY MASS INDEX: 46.13 KG/M2 | HEIGHT: 60 IN

## 2020-07-10 DIAGNOSIS — L73.2 HIDRADENITIS: Primary | ICD-10-CM

## 2020-07-10 RX ORDER — CEPHALEXIN 500 MG/1
500 CAPSULE ORAL 4 TIMES DAILY
Qty: 28 CAPSULE | Refills: 0 | Status: SHIPPED | OUTPATIENT
Start: 2020-07-10 | End: 2020-07-17

## 2020-07-10 ASSESSMENT — MIFFLIN-ST. JEOR: SCORE: 1618.24

## 2020-07-10 NOTE — PROGRESS NOTES
"Family Medicine Telephone Visit Note               Telephone Visit Consent   Patient was verbally read the following and verbal consent was obtained.    \"Telephone visits are billed at different rates depending on your insurance coverage. During this emergency period, for some insurers they may be billed the same as an in-person visit.  Please reach out to your insurance provider with any questions.  If during the course of the call the physician/provider feels a telephone visit is not appropriate, you will not be charged for this service.\"    Name person giving consent:  Patient   Date verbal consent given:  7/10/2020  Time verbal consent given:  4:00 PM       Chief Complaint   Patient presents with     Hair/Scalp Problem     poss infected hair folicle near thigh area, popped it on wednesday and still draining pus and blood, very painful, h/o having these in the past and Dr. Bullock gives her abx              HPI   Patients name: Teresa  Appointment start time:  4:00 PM    Patient is calling with an infected hair follicle in the thighs. She has a history of this. She started getting them last week, however on Wednesday they were getting larger and she lanced them with a needle sterilized with heat. They have now been continuously draining for 2 days. The pain improved after lancing them, but has since plateaud. She is not able to see the area but lanced by feel, however as mentioned above, experienced pain relief with this. She denies a history of MRSA.      On review this has been thought to the 2/2 Hidradenitis, which does have in armpit, and she has had surgery on the arm pits. As per chart review she was previously treated with Azithromycin. She does have an allergy listed to Augmentin, however she reports that it was only n/v/d as side effects without hives or throat swelling.     She does report feeling warm, and her temp during the visit was 99.0F. She does support chills.       Current Outpatient " Medications   Medication Sig Dispense Refill     acetaminophen (TYLENOL) 500 MG tablet Take 2 tablets (1,000 mg) by mouth 3 times daily as needed for mild pain 100 tablet 3     albuterol (PROAIR HFA/PROVENTIL HFA/VENTOLIN HFA) 108 (90 Base) MCG/ACT inhaler Inhale 1-2 puffs into the lungs every 4 hours as needed for shortness of breath / dyspnea or wheezing 1 Inhaler 11     albuterol (PROVENTIL) (2.5 MG/3ML) 0.083% neb solution Take 1 vial (2.5 mg) by nebulization every 6 hours as needed for shortness of breath / dyspnea or wheezing 30 vial 3     amLODIPine (NORVASC) 10 MG tablet Take 1 tablet (10 mg) by mouth daily 90 tablet 3     azelastine (OPTIVAR) 0.05 % ophthalmic solution Apply 1 drop to eye 2 times daily 1 Bottle 11     baclofen (LIORESAL) 20 MG tablet TAKE 1 TABLET (20 MG) BY MOUTH 3 TIMES DAILY AS NEEDED FOR MUSCLE SPASMS 90 tablet 1     blood glucose (NO BRAND SPECIFIED) lancets standard Use to test blood sugar 3 times daily or as directed. 100 each 11     blood glucose (ONETOUCH ULTRA) test strip USE TO TEST BLOOD SUGARS 3 TIMES DAILY OR AS DIRECTED 300 strip PRN     budesonide-formoterol (SYMBICORT) 160-4.5 MCG/ACT Inhaler Inhale 2 puffs into the lungs 2 times daily 10.2 g 11     calcium carbonate (TUMS) 500 MG chewable tablet Take 1 tablet (500 mg) by mouth 2 times daily as needed for heartburn 100 tablet 1     clopidogrel (PLAVIX) 75 MG tablet TAKE 1 TABLET BY MOUTH EVERY DAY 90 tablet 1     diclofenac (VOLTAREN) 1 % topical gel Apply 4 g topically 4 times daily 100 g 1     docusate sodium (COLACE) 100 MG tablet Take 1 tablet (100 mg) by mouth daily as needed for constipation 60 tablet 1     EPINEPHrine (EPIPEN/ADRENACLICK/OR ANY BX GENERIC EQUIV) 0.3 MG/0.3ML injection 2-pack Inject 0.3 mLs (0.3 mg) into the muscle once as needed for anaphylaxis 2 mL 0     exenatide ER (BYDUREON) 2 MG pen Inject 2 mg Subcutaneous every 7 days 12 each 3     fluticasone (FLONASE) 50 MCG/ACT nasal spray Spray 2 sprays  into both nostrils daily 9.9 g 11     gabapentin (NEURONTIN) 600 MG tablet Take 2 tabs three times daily. 180 tablet 3     ibuprofen (ADVIL/MOTRIN) 600 MG tablet Take 1 tablet (600 mg) by mouth 2 times daily as needed for moderate pain 60 tablet 3     insulin glargine U-300 (TOUJEO SOLOSTAR) 300 UNIT/ML (1 units dial) pen Inject 80 Units Subcutaneous At Bedtime 7.5 mL 11     insulin lispro (HUMALOG KWIKPEN) 100 UNIT/ML (1 unit dial) KWIKPEN IF BS <100, NO HUMALOG IF -150, TAKE 20 UNITS. INCREASE 2 UNITS FOR EVERY 50 ABOVE 150. TOTAL DAILY DOSE IS 80 UNITS/DAY 15 mL 3     insulin pen needle (31G X 5 MM) 31G X 5 MM miscellaneous USE 4 DAILY OR AS DIRECTED. 100 each 10     lidocaine (XYLOCAINE) 5 % external ointment Apply topically 3 times daily as needed for moderate pain 150 g 1     lisinopril (ZESTRIL) 40 MG tablet TAKE 1 TABLET BY MOUTH EVERY DAY 90 tablet 1     metoprolol tartrate (LOPRESSOR) 25 MG tablet Take 1 tablet (25 mg) by mouth 2 times daily 180 tablet 3     nystatin (MYCOSTATIN) 141216 UNIT/GM external powder Apply topically as needed for other (moisture) 60 g 0     nystatin (MYCOSTATIN) 606004 UNIT/ML suspension Take 5 mLs (500,000 Units) by mouth daily as needed (thrush) 500 mL 1     ondansetron (ZOFRAN) 4 MG tablet Take 1 tablet (4 mg) by mouth every 8 hours as needed for nausea 18 tablet 0     order for DME Equipment being ordered: donut pillow 1 Units 0     order for DME Equipment being ordered: CPAP mask and tubing 1 each 0     pantoprazole (PROTONIX) 40 MG EC tablet Take 1 tablet (40 mg) by mouth daily 90 tablet 3     pramipexole (MIRAPEX) 0.25 MG tablet TAKE 1 TABLET (0.25 MG) BY MOUTH AT BEDTIME 30 tablet 1     pravastatin (PRAVACHOL) 80 MG tablet Take 1 tablet (80 mg) by mouth daily 90 tablet 3     QUEtiapine (SEROQUEL) 400 MG tablet Take 1 tablet (400 mg) by mouth At Bedtime       QUEtiapine (SEROQUEL) 50 MG tablet Take 50 mg by mouth 3 times daily       sodium chloride (OCEAN) 0.65 %  "nasal spray Use 3-4x daily 15 mL 1     SUMAtriptan (IMITREX) 50 MG tablet Take 1 tablet (50 mg) by mouth at onset of headache for migraine 18 tablet 1     tiotropium (SPIRIVA) 18 MCG inhaled capsule Inhale 1 capsule (18 mcg) into the lungs daily 90 capsule 1     venlafaxine (EFFEXOR-XR) 150 MG 24 hr capsule Take 2 capsules (300 mg) by mouth daily 180 capsule 0     clindamycin (CLEOCIN T) 1 % external solution Apply topically 2 times daily To underarms and groin area. (Patient not taking: Reported on 7/10/2020) 60 mL 11     famotidine (PEPCID) 40 MG tablet Take 1 tablet (40 mg) by mouth 2 times daily (Patient not taking: Reported on 7/10/2020) 180 tablet 3     loratadine (CLARITIN) 10 MG tablet Take 1 tablet (10 mg) by mouth daily (Patient not taking: Reported on 7/10/2020) 90 tablet 3     nicotine (NICODERM CQ) 21 MG/24HR 24 hr patch Place 1 patch onto the skin every 24 hours (Patient not taking: Reported on 7/10/2020) 30 patch 1     nicotine (NICORETTE) 2 MG gum Place 1 each (2 mg) inside cheek as needed for smoking cessation (Patient not taking: Reported on 7/10/2020) 100 tablet 1     predniSONE (DELTASONE) 50 MG tablet Take 1 tablet (50 mg) by mouth daily (Patient not taking: Reported on 7/10/2020) 5 tablet 0     Allergies   Allergen Reactions     Augmentin Nausea and Vomiting and Hives     Bee Venom Anaphylaxis     Nuts Anaphylaxis     Abilify Discmelt      Animal Dander Other (See Comments)     asthma     Aspirin      Atorvastatin      Contrast Dye      Metformin Difficulty breathing     \"throat swelling and elevated liver enzymes\"     Naproxen      Niacin      Valium [Diazepam]      Zocor [Simvastatin - High Dose]            Assessment and Plan       ICD-10-CM    1. Hidradenitis  L73.2 cephALEXin (KEFLEX) 500 MG capsule     Will treat with keflex, she denies a history of MRSA. Discussed return precautions extensively including when to present to the ED. Will schedule her a visit for Monday, 40 min, in case " there is a need for I&D. Recommended that she closely monitor herself after taking the keflex.    After Visit Information:  Will print and mail AVS     Appointment end time: 4:20 PM  This is a telephone visit that took 20 minutes.      Clinician location:  [facility]     Aubrey Ulloa MD  I precepted today with Dr. Wall.

## 2020-07-10 NOTE — PROGRESS NOTES
Preceptor Attestation:   I talked to the patient on the phone. I discussed the patient with the resident. I have verified the content of the note, which accurately reflects my assessment of the patient and the plan of care.   Supervising Physician:  Juan Wall MD.

## 2020-07-13 ENCOUNTER — OFFICE VISIT (OUTPATIENT)
Dept: FAMILY MEDICINE | Facility: CLINIC | Age: 50
End: 2020-07-13
Payer: MEDICARE

## 2020-07-13 VITALS
TEMPERATURE: 98.7 F | OXYGEN SATURATION: 96 % | RESPIRATION RATE: 24 BRPM | DIASTOLIC BLOOD PRESSURE: 81 MMHG | SYSTOLIC BLOOD PRESSURE: 116 MMHG | HEART RATE: 108 BPM

## 2020-07-13 DIAGNOSIS — L73.2 HIDRADENITIS: Primary | ICD-10-CM

## 2020-07-13 RX ORDER — LIDOCAINE 50 MG/G
OINTMENT TOPICAL PRN
Qty: 30 G | Refills: 0 | Status: SHIPPED | OUTPATIENT
Start: 2020-07-13 | End: 2020-12-24

## 2020-07-13 RX ORDER — DOXYCYCLINE HYCLATE 100 MG
100 TABLET ORAL 2 TIMES DAILY
Qty: 14 TABLET | Refills: 0 | Status: SHIPPED | OUTPATIENT
Start: 2020-07-13 | End: 2020-07-20

## 2020-07-13 NOTE — PROGRESS NOTES
[facility]         SUBJECTIVE   Teresa Perez is a 49 year old female with a PMH of:  Patient Active Problem List   Diagnosis     Health Care Home     Acute peptic ulcer     Other allergy, other than to medicinal agents     Bipolar disorder (H)     Bulging lumbar disc     Cervical dysplasia     Common migraine without aura     Constipation     Dwarfism     Familial hypercholesterolemia     Insomnia     Low back pain     Intermittent asthma     Leg pain, bilateral     Smoking     Degeneration of thoracic or thoracolumbar intervertebral disc     LOMAS (nonalcoholic steatohepatitis)     Disease of lung     Hemorrhoids     Parotid mass     Endometriosis     NNAMDI (obstructive sleep apnea)     History of total right knee replacement     Lateral epicondylitis     Impingement syndrome, shoulder, left     Hx of total knee replacement, left     Chronic pain syndrome     Cough     Borderline personality disorder (H)     Moderate recurrent major depression (H)     Recurrent ventral hernia     Type 2 diabetes mellitus with complication, with long-term current use of insulin (H)     Essential hypertension     Nonruptured cerebral aneurysm     Cerebrovascular accident (CVA) due to embolism (H)     Amputation of leg (H)     CKD (chronic kidney disease) stage 5, GFR less than 15 ml/min (H)     Pre-ulcerative corn or callous     presenting to clinic today with a chief complaint of an infected hair follicle in the left inguinal area.  She was evaluated on 7/10/2020 via a virtual visit and started on Keflex.  She lanced the lesion herself on 7/8/2020 using a sterile needle, and it has been continuously draining since then.  She states that is still draining pus and blood.  She does not feel like it has improved much since starting the Keflex.  She states that there is now one small area on her right labia as well.  She does endorse chills.  She denies fevers.    PMH, Medications and Allergies were reviewed and  updated as needed.    ROS:  General: No fevers, endorses chills  Head: No headache  Ears: No acute change in hearing.    CV: No chest pain or palpitations.  Resp: No shortness of breath.  No cough. No hemoptysis.  GI: No nausea, vomiting, constipation, diarrhea  : No urinary pains    Current Outpatient Medications   Medication Sig Dispense Refill     acetaminophen (TYLENOL) 500 MG tablet Take 2 tablets (1,000 mg) by mouth 3 times daily as needed for mild pain 100 tablet 3     albuterol (PROAIR HFA/PROVENTIL HFA/VENTOLIN HFA) 108 (90 Base) MCG/ACT inhaler Inhale 1-2 puffs into the lungs every 4 hours as needed for shortness of breath / dyspnea or wheezing 1 Inhaler 11     albuterol (PROVENTIL) (2.5 MG/3ML) 0.083% neb solution Take 1 vial (2.5 mg) by nebulization every 6 hours as needed for shortness of breath / dyspnea or wheezing 30 vial 3     amLODIPine (NORVASC) 10 MG tablet Take 1 tablet (10 mg) by mouth daily 90 tablet 3     azelastine (OPTIVAR) 0.05 % ophthalmic solution Apply 1 drop to eye 2 times daily 1 Bottle 11     baclofen (LIORESAL) 20 MG tablet TAKE 1 TABLET (20 MG) BY MOUTH 3 TIMES DAILY AS NEEDED FOR MUSCLE SPASMS 90 tablet 1     blood glucose (NO BRAND SPECIFIED) lancets standard Use to test blood sugar 3 times daily or as directed. 100 each 11     blood glucose (ONETOUCH ULTRA) test strip USE TO TEST BLOOD SUGARS 3 TIMES DAILY OR AS DIRECTED 300 strip PRN     budesonide-formoterol (SYMBICORT) 160-4.5 MCG/ACT Inhaler Inhale 2 puffs into the lungs 2 times daily 10.2 g 11     calcium carbonate (TUMS) 500 MG chewable tablet Take 1 tablet (500 mg) by mouth 2 times daily as needed for heartburn 100 tablet 1     cephALEXin (KEFLEX) 500 MG capsule Take 1 capsule (500 mg) by mouth 4 times daily for 7 days 28 capsule 0     clindamycin (CLEOCIN T) 1 % external solution Apply topically 2 times daily To underarms and groin area. (Patient not taking: Reported on 7/10/2020) 60 mL 11     clopidogrel (PLAVIX) 75  MG tablet TAKE 1 TABLET BY MOUTH EVERY DAY 90 tablet 1     diclofenac (VOLTAREN) 1 % topical gel Apply 4 g topically 4 times daily 100 g 1     docusate sodium (COLACE) 100 MG tablet Take 1 tablet (100 mg) by mouth daily as needed for constipation 60 tablet 1     EPINEPHrine (EPIPEN/ADRENACLICK/OR ANY BX GENERIC EQUIV) 0.3 MG/0.3ML injection 2-pack Inject 0.3 mLs (0.3 mg) into the muscle once as needed for anaphylaxis 2 mL 0     exenatide ER (BYDUREON) 2 MG pen Inject 2 mg Subcutaneous every 7 days 12 each 3     famotidine (PEPCID) 40 MG tablet Take 1 tablet (40 mg) by mouth 2 times daily (Patient not taking: Reported on 7/10/2020) 180 tablet 3     fluticasone (FLONASE) 50 MCG/ACT nasal spray Spray 2 sprays into both nostrils daily 9.9 g 11     gabapentin (NEURONTIN) 600 MG tablet Take 2 tabs three times daily. 180 tablet 3     ibuprofen (ADVIL/MOTRIN) 600 MG tablet Take 1 tablet (600 mg) by mouth 2 times daily as needed for moderate pain 60 tablet 3     insulin glargine U-300 (TOUJEO SOLOSTAR) 300 UNIT/ML (1 units dial) pen Inject 80 Units Subcutaneous At Bedtime 7.5 mL 11     insulin lispro (HUMALOG KWIKPEN) 100 UNIT/ML (1 unit dial) KWIKPEN IF BS <100, NO HUMALOG IF -150, TAKE 20 UNITS. INCREASE 2 UNITS FOR EVERY 50 ABOVE 150. TOTAL DAILY DOSE IS 80 UNITS/DAY 15 mL 3     insulin pen needle (31G X 5 MM) 31G X 5 MM miscellaneous USE 4 DAILY OR AS DIRECTED. 100 each 10     lidocaine (XYLOCAINE) 5 % external ointment Apply topically 3 times daily as needed for moderate pain 150 g 1     lisinopril (ZESTRIL) 40 MG tablet TAKE 1 TABLET BY MOUTH EVERY DAY 90 tablet 1     loratadine (CLARITIN) 10 MG tablet Take 1 tablet (10 mg) by mouth daily (Patient not taking: Reported on 7/10/2020) 90 tablet 3     metoprolol tartrate (LOPRESSOR) 25 MG tablet Take 1 tablet (25 mg) by mouth 2 times daily 180 tablet 3     nicotine (NICODERM CQ) 21 MG/24HR 24 hr patch Place 1 patch onto the skin every 24 hours (Patient not taking:  Reported on 7/10/2020) 30 patch 1     nicotine (NICORETTE) 2 MG gum Place 1 each (2 mg) inside cheek as needed for smoking cessation (Patient not taking: Reported on 7/10/2020) 100 tablet 1     nystatin (MYCOSTATIN) 199082 UNIT/GM external powder Apply topically as needed for other (moisture) 60 g 0     nystatin (MYCOSTATIN) 228247 UNIT/ML suspension Take 5 mLs (500,000 Units) by mouth daily as needed (thrush) 500 mL 1     ondansetron (ZOFRAN) 4 MG tablet Take 1 tablet (4 mg) by mouth every 8 hours as needed for nausea 18 tablet 0     order for DME Equipment being ordered: donut pillow 1 Units 0     order for DME Equipment being ordered: CPAP mask and tubing 1 each 0     pantoprazole (PROTONIX) 40 MG EC tablet Take 1 tablet (40 mg) by mouth daily 90 tablet 3     pramipexole (MIRAPEX) 0.25 MG tablet TAKE 1 TABLET (0.25 MG) BY MOUTH AT BEDTIME 30 tablet 1     pravastatin (PRAVACHOL) 80 MG tablet Take 1 tablet (80 mg) by mouth daily 90 tablet 3     predniSONE (DELTASONE) 50 MG tablet Take 1 tablet (50 mg) by mouth daily (Patient not taking: Reported on 7/10/2020) 5 tablet 0     QUEtiapine (SEROQUEL) 400 MG tablet Take 1 tablet (400 mg) by mouth At Bedtime       QUEtiapine (SEROQUEL) 50 MG tablet Take 50 mg by mouth 3 times daily       sodium chloride (OCEAN) 0.65 % nasal spray Use 3-4x daily 15 mL 1     SUMAtriptan (IMITREX) 50 MG tablet Take 1 tablet (50 mg) by mouth at onset of headache for migraine 18 tablet 1     tiotropium (SPIRIVA) 18 MCG inhaled capsule Inhale 1 capsule (18 mcg) into the lungs daily 90 capsule 1     venlafaxine (EFFEXOR-XR) 150 MG 24 hr capsule Take 2 capsules (300 mg) by mouth daily 180 capsule 0          OBJECTIVE:   Vitals:   Vitals:    07/13/20 1317   BP: 116/81   Pulse: 108   Resp: 24   Temp: 98.7  F (37.1  C)   TempSrc: Oral   SpO2: 96%     Gen:  Well nourished and in no acute distress  HEENT: Extraocular movement intact.  Neck: supple without lymphadenopathy  CV:  RRR  - no murmurs noted    Pulm:  CTAB, no wheezes or crackles noted, good air entry   ABD: Soft, nontender, obese  Extrem: No cyanosis, edema or clubbing  Skin: Area of induration of left inguinal area with open puncture wound with minimal pustular drainage, no area of fluctuance, singular pustule on right labia  Psych: Euthymic          ASSESSMENT and PLAN:   Teresa was seen today for infection.    Diagnoses and all orders for this visit:    Hidradenitis  -     doxycycline hyclate (VIBRA-TABS) 100 MG tablet; Take 1 tablet (100 mg) by mouth 2 times daily for 7 days  -     lidocaine (XYLOCAINE) 5 % external ointment; Apply topically as needed for moderate pain    Indurated on exam without area of fluctuance, not amenable to drainage in clinic.  We will continue Keflex and add doxycycline as well.  Instructed her to follow-up if it is not improving within 1 week.  Could consider an ultrasound at that time to evaluate for a deeper abscess.  Prescribed lidocaine ointment as needed for pain.    Return to clinic in 1 week for follow up if not improving. Return sooner if develops new or worsening symptoms.    Options for treatment and/or follow-up care were reviewed with the patient was actively involved in the decision making process. Patient verbalized understanding and was in agreement with the plan.    The patient was seen by and discussed with Reji Newman MD.    Patricia James MD PGY3

## 2020-07-13 NOTE — PROGRESS NOTES
Preceptor Attestation:    Patient seen and evaluated in person. I discussed the patient with the resident. I have verified the content of the note, which accurately reflects my assessment of the patient and the plan of care.   Supervising Physician:  Darryl Newman MD.

## 2020-07-13 NOTE — PATIENT INSTRUCTIONS
-Keep taking Keflex.  -Start taking doxycycline as well.  -Can use topical lidocaine for pain.  -If not improved, follow-up in 1 week. Would consider ultrasound at that time.

## 2020-07-22 ENCOUNTER — TELEPHONE (OUTPATIENT)
Dept: FAMILY MEDICINE | Facility: CLINIC | Age: 50
End: 2020-07-22

## 2020-07-22 NOTE — TELEPHONE ENCOUNTER
[facility] phone call message- general phone call:    Reason for call: the worker called to ask to talk to the Dr about getting a power chair for the Pt and would like a call back     Return call needed: Yes    OK to leave a message on voice mail? Yes    Primary language: English      needed? No    Call taken on July 22, 2020 at 11:55 AM by Juan Peralta

## 2020-07-23 RX ORDER — GABAPENTIN 600 MG/1
TABLET ORAL
Qty: 180 TABLET | Refills: 3 | Status: SHIPPED | OUTPATIENT
Start: 2020-07-23 | End: 2020-10-23

## 2020-07-27 ENCOUNTER — TELEPHONE (OUTPATIENT)
Dept: FAMILY MEDICINE | Facility: CLINIC | Age: 50
End: 2020-07-27

## 2020-07-27 DIAGNOSIS — R10.84 ABDOMINAL PAIN, GENERALIZED: Primary | ICD-10-CM

## 2020-07-27 DIAGNOSIS — K43.9 VENTRAL HERNIA WITHOUT OBSTRUCTION OR GANGRENE: ICD-10-CM

## 2020-07-27 NOTE — TELEPHONE ENCOUNTER
[facility] phone call message- general phone call:    Reason for call:     Pt's needing 3 panel abdominal binder order sent  to [facility] in [place]    Action desired:     Call back    Return call needed: Yes    OK to leave a message on voice mail? Yes    Advised patient to response may take up to 2 business days: Yes    Primary language: English      needed? No    Call taken on July 27, 2020 at 12:05 PM by Nay Jefferson CMA

## 2020-07-27 NOTE — TELEPHONE ENCOUNTER
Prescription ordered, but needs to be printed and faxed to [facility] in [place].      PCS, can you help me with that?    Routed to Deana Castellanos.      Tyra Bullock MD

## 2020-07-28 DIAGNOSIS — K43.9 VENTRAL HERNIA WITHOUT OBSTRUCTION OR GANGRENE: ICD-10-CM

## 2020-07-28 DIAGNOSIS — R10.84 ABDOMINAL PAIN, GENERALIZED: ICD-10-CM

## 2020-08-11 DIAGNOSIS — E11.65 TYPE 2 DIABETES MELLITUS WITH HYPERGLYCEMIA, WITH LONG-TERM CURRENT USE OF INSULIN (H): Primary | ICD-10-CM

## 2020-08-11 DIAGNOSIS — Z79.4 TYPE 2 DIABETES MELLITUS WITH HYPERGLYCEMIA, WITH LONG-TERM CURRENT USE OF INSULIN (H): Primary | ICD-10-CM

## 2020-08-12 ENCOUNTER — OFFICE VISIT (OUTPATIENT)
Dept: FAMILY MEDICINE | Facility: CLINIC | Age: 50
End: 2020-08-12
Payer: MEDICARE

## 2020-08-12 VITALS
DIASTOLIC BLOOD PRESSURE: 83 MMHG | SYSTOLIC BLOOD PRESSURE: 136 MMHG | BODY MASS INDEX: 45.66 KG/M2 | WEIGHT: 234.2 LBS | OXYGEN SATURATION: 97 % | TEMPERATURE: 98.6 F | HEART RATE: 98 BPM | RESPIRATION RATE: 16 BRPM

## 2020-08-12 DIAGNOSIS — E11.65 TYPE 2 DIABETES MELLITUS WITH HYPERGLYCEMIA, WITH LONG-TERM CURRENT USE OF INSULIN (H): ICD-10-CM

## 2020-08-12 DIAGNOSIS — Z79.4 TYPE 2 DIABETES MELLITUS WITH HYPERGLYCEMIA, WITH LONG-TERM CURRENT USE OF INSULIN (H): ICD-10-CM

## 2020-08-12 DIAGNOSIS — Z86.73 HISTORY OF STROKE: ICD-10-CM

## 2020-08-12 DIAGNOSIS — J45.20 MILD INTERMITTENT ASTHMA WITHOUT COMPLICATION: Primary | ICD-10-CM

## 2020-08-12 DIAGNOSIS — R10.84 ABDOMINAL PAIN, GENERALIZED: ICD-10-CM

## 2020-08-12 DIAGNOSIS — N92.4 EXCESSIVE BLEEDING IN PREMENOPAUSAL PERIOD: ICD-10-CM

## 2020-08-12 DIAGNOSIS — T78.40XS ALLERGIC REACTION, SEQUELA: ICD-10-CM

## 2020-08-12 LAB
BUN SERPL-MCNC: 17.6 MG/DL (ref 7–19)
CALCIUM SERPL-MCNC: 9.8 MG/DL (ref 8.5–10.1)
CHLORIDE SERPLBLD-SCNC: 102.7 MMOL/L (ref 98–110)
CO2 SERPL-SCNC: 24.9 MMOL/L (ref 20–32)
CREAT SERPL-MCNC: 0.5 MG/DL (ref 0.5–1)
GFR SERPL CREATININE-BSD FRML MDRD: >90 ML/MIN/1.7 M2
GLUCOSE SERPL-MCNC: 343.5 MG'DL (ref 70–99)
HBA1C MFR BLD: 9.7 % (ref 4.1–5.7)
POTASSIUM SERPL-SCNC: 4.1 MMOL/L (ref 3.2–4.6)
SODIUM SERPL-SCNC: 135.2 MMOL/L (ref 132–142)
TSH SERPL DL<=0.05 MIU/L-ACNC: 0.93 UIU/ML (ref 0.3–5)

## 2020-08-12 RX ORDER — CLOPIDOGREL BISULFATE 75 MG/1
75 TABLET ORAL DAILY
Qty: 90 TABLET | Refills: 1 | Status: SHIPPED | OUTPATIENT
Start: 2020-08-12 | End: 2021-04-09

## 2020-08-12 RX ORDER — BACLOFEN 20 MG/1
20 TABLET ORAL 3 TIMES DAILY PRN
Qty: 90 TABLET | Refills: 1 | Status: SHIPPED | OUTPATIENT
Start: 2020-08-12 | End: 2020-08-18

## 2020-08-12 RX ORDER — MONTELUKAST SODIUM 10 MG/1
10 TABLET ORAL AT BEDTIME
Qty: 90 TABLET | Refills: 1 | Status: SHIPPED | OUTPATIENT
Start: 2020-08-12 | End: 2021-01-22

## 2020-08-12 RX ORDER — INSULIN GLARGINE 300 U/ML
45 INJECTION, SOLUTION SUBCUTANEOUS 2 TIMES DAILY
Qty: 9 ML | Refills: 11 | Status: SHIPPED | OUTPATIENT
Start: 2020-08-12 | End: 2020-11-21

## 2020-08-12 RX ADMIN — MEDROXYPROGESTERONE ACETATE 150 MG: 150 INJECTION, SUSPENSION INTRAMUSCULAR at 10:09

## 2020-08-12 NOTE — PATIENT INSTRUCTIONS
1)  For sinuses/Allergies and Breathing    --Keep up the good work with the inhalers  --New medication:  Singulair, take 1 pill daily  --Continue to use the flonase and try using either nasal saline to clear things out.  --Going outside, and keep moving arounds.      2)  For shoulders:  Come back in for evaluation for shoulder.      3)  For diabetes:  Increase the bedtime insulin:  Do 45 Units 2x per day at bedtime and in the morning  Keep doing the Bydureon 1x per week.  Keep same sliding scale  Good job with diet stuff!    4)  Depo shot today.

## 2020-08-12 NOTE — PROGRESS NOTES
There are no exam notes on file for this visit.    SUBJECTIVE  Teresa Perez is a 49 year old female with past medical history significant for    Patient Active Problem List   Diagnosis     Health Care Home     Acute peptic ulcer     Other allergy, other than to medicinal agents     Bipolar disorder (H)     Bulging lumbar disc     Cervical dysplasia     Common migraine without aura     Constipation     Dwarfism     Familial hypercholesterolemia     Insomnia     Low back pain     Intermittent asthma     Leg pain, bilateral     Smoking     Degeneration of thoracic or thoracolumbar intervertebral disc     LOMAS (nonalcoholic steatohepatitis)     Disease of lung     Hemorrhoids     Parotid mass     Endometriosis     NNAMDI (obstructive sleep apnea)     History of total right knee replacement     Lateral epicondylitis     Impingement syndrome, shoulder, left     Hx of total knee replacement, left     Chronic pain syndrome     Cough     Borderline personality disorder (H)     Moderate recurrent major depression (H)     Recurrent ventral hernia     Type 2 diabetes mellitus with complication, with long-term current use of insulin (H)     Essential hypertension     Nonruptured cerebral aneurysm     Cerebrovascular accident (CVA) due to embolism (H)     Amputation of leg (H)     CKD (chronic kidney disease) stage 5, GFR less than 15 ml/min (H)     Pre-ulcerative corn or callous     Others present at the visit:  Patient's Carey MANLEY    Presents for   Chief Complaint   Patient presents with     RECHECK     Follow up lab work      Cough     cough for the past few weeks. Coughing up muscus     other     depo     Patient presents to follow-up on a number of issues.    First, diabetes.  She has noted that her blood sugars have been better.  Fairly consistently now between 200 and 300 with an occasional above 300 and occasional below 200.  When they are below 200, she does feel shaky and jittery.  Has not had any sugars below 100.   She reports taking her medications regularly and is doing 88 units of the Toujeo, once weekly Bydureon, and is doing a 20+2 for greater than 150 mealtime coverage.  Still having some difficulty eating consistently but she is trying to do more healthy snacking and is eating more vegetables and fruits with this.  Is trying to eat even when she is not feeling hungry in order to be eating on a more regular schedule.  She brought her meter with her today and we reviewed sugars.  Morning sugars typically between 250 and 300.  Afternoon sugars between 180 and 260.  Evening sugars tend to run a little higher, between 250 and 350.  There are no lows on her glucometer.  We discussed her A1c today which was 9.7.    Also wanted to talk about cough.  She continues to have a cough.  Reports productive of brown to white sputum, nothing yellow or green.  She does continue to notice wheezing when she is breathing.  She has been unable to take her allergy medicine because her insurance would not pay for it.  Has noted increased congestion with her allergies.  She still using Flonase regularly.  Is using her inhalers on a regular basis and does feel like they help.  She continues to smoke cigarettes, but has cut back.  Is now using between a half a pack and 1 pack/day.  When she gets anxious this helps her cope with her anxiety.  Has patches and gum at home and does not think further interventions will be helpful right now.  She is working on cutting down slowly and has made good progress.    She is also due for her Depo shot today.  Has been on this for many years as treatment for menorrhagia.  Has not been getting periods, and really likes this.  Helps maintain her hemoglobin level as well.  She is wondering if she is needing any other vaccinations.    Is taking time to go outside on a regular basis and feels good with this.  Unfortunately states she is having bilateral pain in her shoulders, and is wondering if she can have  injections here.  Has benefited from these in the past.  Also gets back injections on occasion wonders if she is due for these today.    Continues to have abdominal pain in the area of her hernia.  Feels like it is getting bit bigger.  We discussed that we would want her breathing under good control and her A1c less than 9 before we could consider moving forward with surgical repair and a consult with the surgeon.    OBJECTIVE:  Vitals: /83 (BP Location: Right arm, Patient Position: Sitting, Cuff Size: Adult Regular)   Pulse 98   Temp 98.6  F (37  C) (Oral)   Resp 16   Wt 106.2 kg (234 lb 3.2 oz)   SpO2 97%   BMI 45.66 kg/m    BMI= Body mass index is 45.66 kg/m .  Objective:    Vitals:  Vitals are reviewed and are within the normal range.  Blood pressure is appropriately controlled.  Weight is stable, 1 pound down from last visit.  BMI 45.  Gen:  Alert, pleasant, no acute distress.  Appears comfortable today, no shortness of breath.  Cardiac:  Regular rate and rhythm, no murmurs, rubs or gallops  Respiratory:  Lungs clear to auscultation bilaterally.  Lung sound clear all the way down to the bases with improved airflow.  This is the best her lungs of sound in a long time.  Abdomen:  Soft, diffuse moderate tenderness.  Palpable large ventral hernia, easily reducible.  Extremities: Monofilament testing was normal today.  She has trace to 1+ lower extremity edema in her 1 remaining leg.  Sensation is diminished.  No skin breakdown.    Results for orders placed or performed in visit on 08/12/20   Hemoglobin A1c ([facility])     Status: Abnormal   Result Value Ref Range    Hemoglobin A1C 9.7 (H) 4.1 - 5.7 %       ASSESSMENT AND PLAN:      Teresa was seen today for recheck, cough and other.  Multiple issues were discussed today.    Diagnoses and all orders for this visit:    Mild intermittent asthma without complication.  Continues to have cough.  Lungs actually sound quite good today.  She has been unable to take  her regular allergy medicines because of cost.  Will try alternative with montelukast.  She has cut back on smoking and I think this will help as well.  Continue to decrease as she is able.  Counseled on this today.  No evidence of acute infection.  -     montelukast (SINGULAIR) 10 MG tablet; Take 1 tablet (10 mg) by mouth At Bedtime    Type 2 diabetes mellitus with hyperglycemia, with long-term current use of insulin (H).  A1c is improved by one-point today.  We will increase the dose of her Toujeo.  We will go from 80 units once daily to 45 units twice daily.  Continue with mealtime insulin and the Bydureon.  Continue to work on healthy eating and appropriate healthy snacks..  -     Cancel: Microalbumin Creatinine Ratio Random Ur ([facility])  -     Hemoglobin A1c ([facility])  -     Basic Metabolic Panel ([facility])  -     Thyroid Desha ([facility])  -     insulin glargine U-300 (TOUJEO SOLOSTAR) 300 UNIT/ML (1 units dial) pen; Inject 45 Units Subcutaneous 2 times daily      Multiple areas of joint pain.  Refilled patient's baclofen which is been helpful.  She is also noticing more shoulder pain from using her wheelchair.  Did not have time to evaluate this today, so she will schedule a follow-up visit with 1 of my partners in the next few days for evaluation of bilateral shoulder pain and possible injections.  -     baclofen (LIORESAL) 20 MG tablet; Take 1 tablet (20 mg) by mouth 3 times daily as needed for muscle spasms    History of stroke.  Patient requesting a refill on her Plavix.  -     clopidogrel (PLAVIX) 75 MG tablet; Take 1 tablet (75 mg) by mouth daily    Menorrhagia.  Patient has been on Depo-Provera long-term for this.  Works well for her.  Is due for her Depo shot today.    Patient Instructions   1)  For sinuses/Allergies and Breathing    --Keep up the good work with the inhalers  --New medication:  Singulair, take 1 pill daily  --Continue to use the flonase and try using either nasal saline to clear  things out.  --Going outside, and keep moving arounds.      2)  For shoulders:  Come back in for evaluation for shoulder.      3)  For diabetes:  Increase the bedtime insulin:  Do 45 Units 2x per day at bedtime and in the morning  Keep doing the Bydureon 1x per week.  Keep same sliding scale  Good job with diet stuff!    4)  Depo shot today.        Follow up in next few days for evaluation of shoulder and possible shoulder injections.      Tyra Bullock MD

## 2020-08-12 NOTE — NURSING NOTE
Clinic Administered Medication Documentation      Depo Provera Documentation    URINE HCG: not indicated    Depo-Provera Standing Order inclusion/exclusion criteria reviewed.   Patient meets: inclusion criteria     BP: 136/83  LAST PAP/EXAM: No results found for: PAP    Prior to injection, verified patient identity using patient's name and date of birth. Medication was administered. Please see MAR and medication order for additional information.     Was entire vial of medication used? Yes  Vial/Syringe: Single dose vial  Expiration Date:  01/30/2021    Patient instructed to remain in clinic for 15 minutes.  NEXT INJECTION DUE: 10/29/20 - 11/12/20      Name of provider who requested the medication administration: Dr. Bullock  Name of provider on site (faculty or community preceptor) at the time of performing the medication administration:     Date of next administration:  10/29/20 - 11/12/20  Date of next office visit with provider to renew medication plan (must be seen annually):

## 2020-08-12 NOTE — LETTER
August 20, 2020      Teresa Perez  [address]  [place] [address]        Dear ,    We are writing to inform you of your test results.    Your thyroid testing is in the normal range.  Your kidney tests are stable, which is good news.  As we discussed in your visit, your A1c is coming down, but it's still higher than we like it to be.  Keep working on getting regular, healthy meals.  Please call the clinic at [phone] if you have any questions.      Resulted Orders   Hemoglobin A1c ([facility])   Result Value Ref Range    Hemoglobin A1C 9.7 (H) 4.1 - 5.7 %   Basic Metabolic Panel ([facility])   Result Value Ref Range    Urea Nitrogen 17.6 7.0 - 19.0 mg/dL    Calcium 9.8 8.5 - 10.1 mg/dL    Chloride 102.7 98.0 - 110.0 mmol/L    Carbon Dioxide 24.9 20.0 - 32.0 mmol/L    Creatinine 0.5 0.5 - 1.0 mg/dL    Glucose 343.5 (H) 70.0 - 99.0 mg'dL    Potassium 4.1 3.2 - 4.6 mmol/L    Sodium 135.2 132.0 - 142.0 mmol/L    GFR Estimate >90 >60.0 mL/min/1.7 m2    GFR Estimate If Black >90 >60.0 mL/min/1.7 m2   Thyroid Nahma ([facility])   Result Value Ref Range    TSH 0.93 0.30 - 5.00 uIU/mL    Narrative    Test performed by:  [facility]  [address] [place] [address]       If you have any questions or concerns, please call the clinic at the number listed above.       Sincerely,        Tyra Bullock MD

## 2020-08-14 ENCOUNTER — AMBULATORY - HEALTHEAST (OUTPATIENT)
Dept: ADMINISTRATIVE | Facility: REHABILITATION | Age: 50
End: 2020-08-14

## 2020-08-14 ENCOUNTER — OFFICE VISIT (OUTPATIENT)
Dept: FAMILY MEDICINE | Facility: CLINIC | Age: 50
End: 2020-08-14
Payer: MEDICARE

## 2020-08-14 VITALS
TEMPERATURE: 98.4 F | HEART RATE: 91 BPM | DIASTOLIC BLOOD PRESSURE: 82 MMHG | SYSTOLIC BLOOD PRESSURE: 114 MMHG | OXYGEN SATURATION: 95 % | RESPIRATION RATE: 20 BRPM

## 2020-08-14 DIAGNOSIS — M25.512 ACUTE PAIN OF LEFT SHOULDER: ICD-10-CM

## 2020-08-14 DIAGNOSIS — M25.512 ACUTE PAIN OF LEFT SHOULDER: Primary | ICD-10-CM

## 2020-08-14 NOTE — PROGRESS NOTES
Preceptor attestation:  Vital signs reviewed: /82 (BP Location: Right arm, Patient Position: Sitting, Cuff Size: Adult Large)   Pulse 91   Temp 98.4  F (36.9  C) (Oral)   Resp 20   SpO2 95%     Patient seen, evaluated, and discussed with the resident.  I have verified the content of the note, which accurately reflects my assessment of the patient and the plan of care.    Supervising physician: Lis Vidal MD  [facility]

## 2020-08-14 NOTE — PATIENT INSTRUCTIONS
08/14/20   ORTHOPEDICS ADULT REFERRAL  [facility]  Phone: [phone]  Fax: [phone]    Online referral placed   And   Demographics, referral faxed to [phone]. They will contact patient to schedule.     PHYSICAL THERAPY REFERRAL   [facility]  Phone: [phone]  Fax: [phone]    Demographics and referral faxed to [phone]. They will contact patient to schedule.     Maura Galvan

## 2020-08-14 NOTE — PROGRESS NOTES
[facility] Visit    Subjective:  Teresa Perez is a 49 year old female with a PMHx significant for   Patient Active Problem List   Diagnosis     Health Care Home     Acute peptic ulcer     Other allergy, other than to medicinal agents     Bipolar disorder (H)     Bulging lumbar disc     Cervical dysplasia     Common migraine without aura     Constipation     Dwarfism     Familial hypercholesterolemia     Insomnia     Low back pain     Intermittent asthma     Leg pain, bilateral     Smoking     Degeneration of thoracic or thoracolumbar intervertebral disc     LOMAS (nonalcoholic steatohepatitis)     Disease of lung     Hemorrhoids     Parotid mass     Endometriosis     NNAMDI (obstructive sleep apnea)     History of total right knee replacement     Lateral epicondylitis     Impingement syndrome, shoulder, left     Hx of total knee replacement, left     Chronic pain syndrome     Cough     Borderline personality disorder (H)     Moderate recurrent major depression (H)     Recurrent ventral hernia     Type 2 diabetes mellitus with complication, with long-term current use of insulin (H)     Essential hypertension     Nonruptured cerebral aneurysm     Cerebrovascular accident (CVA) due to embolism (H)     Amputation of leg (H)     CKD (chronic kidney disease) stage 5, GFR less than 15 ml/min (H)     Pre-ulcerative corn or callous     Excessive bleeding in premenopausal period    who presents with left shoulder pain for the last 2-3 months. She thinks it is related to wheeling her wheel chair. Notices some sharp and dull pain at times. Worse with using the wheel chair, raising her left arm and flexing. She points to the are area of the humeral bicep groove as maximum pain center. No alleviating factors. Was supposed to see OT to help with wheelchair use, but this was put on hold due to covid. No symptoms in the R shoulder. She has had a right shoulder injection several years ago, none in the left.  She  is accompanied by her PCA Carey.      ROS:   A complete 12-point ROS was obtained and was negative except as stated above in HPI.    Objective:  Vitals:    08/14/20 0933   BP: 114/82   BP Location: Right arm   Patient Position: Sitting   Cuff Size: Adult Large   Pulse: 91   Resp: 20   Temp: 98.4  F (36.9  C)   TempSrc: Oral   SpO2: 95%     There is no height or weight on file to calculate BMI.    GEN: NAD, healthy, alert  RESP: Breathing comfortably on RA, no cough or wheeze  MSK:Left shoulder, Full strength in all directions, some pain with active motion. Limited abduction, otherwise ROM appears mostly intact. She has point tenderness over the superior biceps tendon, worse with flexion. She also has tenderness to palpation inferior to the acromion and with axial load of the AC. No crepitous with passive motion. No overt joint line tenderness  NEURO: sensory exam grossly normal  PSYCH: mentation appears normal, affect normal/bright    No results found for this or any previous visit (from the past 24 hour(s)).    Assessment/Plan:  Teresa was seen today for recheck.    Diagnoses and all orders for this visit:    Acute pain of left shoulder  Acute pain related likely to wheelchair use. Exam consistent with bicep tendinopathy and I also suspect subacromial bursitis. Given limited capabilties for these injections here in clinic, will refer to sports med ortho at [place]. Exam does not appear consistent with GH arthritis, XR deferred to ortho at this point. Did reorder OT to assist with how to better function with the wheelchair .  -     Orthopedic & Spine  Referral; Future  -     OCCUPATIONAL THERAPY REFERRAL; Future      Patient was staffed with supervising physician, Dr. Vidal.     Joshua Dial MD PGY3  [facility]

## 2020-08-17 ENCOUNTER — MEDICAL CORRESPONDENCE (OUTPATIENT)
Dept: HEALTH INFORMATION MANAGEMENT | Facility: CLINIC | Age: 50
End: 2020-08-17

## 2020-08-17 ENCOUNTER — DOCUMENTATION ONLY (OUTPATIENT)
Dept: FAMILY MEDICINE | Facility: CLINIC | Age: 50
End: 2020-08-17

## 2020-08-17 NOTE — PROGRESS NOTES
To be completed in Nursing note:    Please reference list for forms that require a visit for completion.  Please remind patients that providers are given 3-5 business days to complete and return forms.      Form type: [employer] Abdominal belt    Date form received: 20    Date form completed by Physician: 20    How was form returned to patient (mailed, faxed, or at  for patient to ): Faxed to [facility] at [phone]    Date form mailed/faxed/left at  for patient and sent to HIM for scannin20      Once form is left for patient, faxed, or mailed PCS will then close the documentation only encounter.

## 2020-08-18 ENCOUNTER — TRANSFERRED RECORDS (OUTPATIENT)
Dept: HEALTH INFORMATION MANAGEMENT | Facility: CLINIC | Age: 50
End: 2020-08-18

## 2020-08-18 NOTE — RESULT ENCOUNTER NOTE
Teresa Perez-    Your thyroid testing is in the normal range.  Your kidney tests are stable, which is good news.  As we discussed in your visit, your A1c is coming down, but it's still higher than we like it to be.  Keep working on getting regular, healthy meals.  Please call the clinic at [phone] if you have any questions.      Tyra Bullock MD    Please send results to patient.

## 2020-08-25 ENCOUNTER — TELEPHONE (OUTPATIENT)
Dept: FAMILY MEDICINE | Facility: CLINIC | Age: 50
End: 2020-08-25

## 2020-08-25 DIAGNOSIS — Z96.651 HISTORY OF TOTAL RIGHT KNEE REPLACEMENT: ICD-10-CM

## 2020-08-25 DIAGNOSIS — G89.29 CHRONIC BILATERAL LOW BACK PAIN WITH LEFT-SIDED SCIATICA: Primary | ICD-10-CM

## 2020-08-25 DIAGNOSIS — M51.369 BULGING LUMBAR DISC: ICD-10-CM

## 2020-08-25 DIAGNOSIS — S88.919A AMPUTATION OF LEG (H): ICD-10-CM

## 2020-08-25 DIAGNOSIS — M54.42 CHRONIC BILATERAL LOW BACK PAIN WITH LEFT-SIDED SCIATICA: Primary | ICD-10-CM

## 2020-08-25 NOTE — TELEPHONE ENCOUNTER
Per office notes, power wheelchair put on hold due to issues going to OT during COVID-19. New referral placed on 8/14/20. Left message for Stephani with this information, instructions to call back with questions. ./LR

## 2020-08-25 NOTE — TELEPHONE ENCOUNTER
[facility] phone call message- general phone call:    Reason for call:     Checking to see if Pt spoke with Dr. Bullock about getting a power wheelchair      CADI:  Ther No [phone]    Action desired:     Call back    Return call needed: Yes    OK to leave a message on voice mail? Yes    Advised patient to response may take up to 2 business days: Yes    Primary language: English      needed? No    Call taken on August 25, 2020 at 10:18 AM by Nay Jefferson CMA

## 2020-08-26 ENCOUNTER — AMBULATORY - HEALTHEAST (OUTPATIENT)
Dept: ADMINISTRATIVE | Facility: REHABILITATION | Age: 50
End: 2020-08-26

## 2020-08-26 DIAGNOSIS — M25.512 ACUTE PAIN OF LEFT SHOULDER: ICD-10-CM

## 2020-08-26 DIAGNOSIS — G89.29 CHRONIC BILATERAL LOW BACK PAIN WITH LEFT-SIDED SCIATICA: ICD-10-CM

## 2020-08-26 DIAGNOSIS — M54.42 CHRONIC BILATERAL LOW BACK PAIN WITH LEFT-SIDED SCIATICA: ICD-10-CM

## 2020-08-26 DIAGNOSIS — M25.512 ACUTE PAIN OF LEFT SHOULDER: Primary | ICD-10-CM

## 2020-08-26 NOTE — PROGRESS NOTES
August 26, 2020   PHYSICAL THERAPY REFERRAL   [facility]  Phone: [phone]  Fax: [phone]    Demographics and referral faxed to [phone]. They will contact patient to schedule.     Maura Galvan

## 2020-08-27 ENCOUNTER — DOCUMENTATION ONLY (OUTPATIENT)
Dept: FAMILY MEDICINE | Facility: CLINIC | Age: 50
End: 2020-08-27

## 2020-08-27 NOTE — PROGRESS NOTES
To be completed in Nursing note:    Please reference list for forms that require a visit for completion.  Please remind patients that providers are given 3-5 business days to complete and return forms.      Form type: [employer] Abdominal Belt Order - More Info Requested    Date form received: 20    Date form completed by Physician: 20    How was form returned to patient (mailed, faxed, or at  for patient to ): Faxed back to [phone]    Date form mailed/faxed/left at  for patient and sent to HIM for scannin20      Once form is left for patient, faxed, or mailed PCS will then close the documentation only encounter.

## 2020-09-14 DIAGNOSIS — R10.84 ABDOMINAL PAIN, GENERALIZED: ICD-10-CM

## 2020-09-14 RX ORDER — ACETAMINOPHEN 500 MG
1000 TABLET ORAL 3 TIMES DAILY PRN
Qty: 100 TABLET | Refills: 3 | Status: SHIPPED | OUTPATIENT
Start: 2020-09-14 | End: 2020-11-23

## 2020-09-14 NOTE — TELEPHONE ENCOUNTER
Patient asked the pharmacy to ask us to put more refills on her Tylenol.  Please advise.  Deana Castellanos, CMA

## 2020-09-16 NOTE — TELEPHONE ENCOUNTER
Pt's  Stephani has called and left a voicemail on the referrals office phone on 9/14 regarding some clarification needed about an OT Referral for a wheelchair assessment.    She is requesting a call back.     Stephani [phone]    [facility] routed to RN

## 2020-09-16 NOTE — TELEPHONE ENCOUNTER
Per discussion with referrals coordinator, likely need new OT referral that explicitly requests assessment for wheelchair. Routed to Dr. Bullock. ./KRISS

## 2020-09-17 NOTE — TELEPHONE ENCOUNTER
09/17/20   [facility]  Rehabilitation Services  [address] [place] [address]                   First Appointment: [phone]   Fax: [phone]    Fax PT/OT referral and demographics to [phone] and the Scheduling Department will contact patient to schedule.     Maura Galvan

## 2020-09-21 NOTE — TELEPHONE ENCOUNTER
PHONE # UPDATE TO CONTACT INFO FOR REHAB CENTER    [facility]  Rehabilitation Services  [address] [place] [address]                   First Appointment: [phone]

## 2020-09-24 ENCOUNTER — VIRTUAL VISIT (OUTPATIENT)
Dept: FAMILY MEDICINE | Facility: CLINIC | Age: 50
End: 2020-09-24
Payer: MEDICARE

## 2020-09-24 DIAGNOSIS — Z20.822 SUSPECTED COVID-19 VIRUS INFECTION: Primary | ICD-10-CM

## 2020-09-24 DIAGNOSIS — J45.21 MILD INTERMITTENT ASTHMA WITH ACUTE EXACERBATION: ICD-10-CM

## 2020-09-24 NOTE — PROGRESS NOTES
"Family Medicine Telephone Visit Note               Telephone Visit Consent   Patient was verbally read the following and verbal consent was obtained.    \"Telephone visits are billed at different rates depending on your insurance coverage. During this emergency period, for some insurers they may be billed the same as an in-person visit.  Please reach out to your insurance provider with any questions.  If during the course of the call the physician/provider feels a telephone visit is not appropriate, you will not be charged for this service.\"    Name person giving consent:  Patient   Date verbal consent given:  9/24/2020  Time verbal consent given:  11:04 AM           Chief Complaint   Patient presents with     Cough     x 5 days     Asthma     Wheezing     Nasal Congestion                 HPI   Patients name: Teresa  Appointment start time:  11:32 AM    She has cough and congestion and head pressure in sinuses and ears.  Nose is runny sometimes.  No fever, but has some chills and cold sweats.  Symptoms started 5 days ago.  No sick contacts, maybe friend had a cold and visited for an hour and then she went home.  Does have history of asthma and has need her inhaler every day for the past few days.  Usually doesn't need it at all. It doesn't always help.  Using nebulizer as well twice a day. Denies chest pain or SOB.     She had Covid test that was negative in June and asthma exacerbation treated with prednisone at that time. She thinks she may need prednisone now due to worsening wheezing and cough.           Review of Systems:     Constitutional, HEENT, cardiovascular, pulmonary, gi and gu systems are negative, except as otherwise noted.         Physical Exam:     There were no vitals taken for this visit.  Estimated body mass index is 45.66 kg/m  as calculated from the following:    Height as of 7/10/20: 1.525 m (5' 0.05\").    Weight as of 8/12/20: 106.2 kg (234 lb 3.2 oz).    Exam:  Constitutional: alert and no " distress  Psychiatric: mentation appears normal and affect flat          Assessment and Plan   Teresa was seen today for cough, asthma and nasal congestion.    Diagnoses and all orders for this visit:    Suspected COVID-19 virus infection: patient has multiple comorbidities including asthma and diabetes making her higher risk if she does have covid.  Recommend a covid test.  If she has covid would need close follow up to watch for worsening of symptoms.  Discussed when to go to the ER. Discussed needing to isolate and wear mask when coming to visit.  -     COVID-19 Virus PCR MRF ([facility]); Future    Mild intermittent asthma with acute exacerbation: sounds like asthma exacerbation that may need steroids. Recommend that she be evaluated with O2 sat, vitals and lung exam to rule out pneumonia and evaluate for prednisone burst.      After Visit Information:  Patient declined AVS   MEssage sent to  to call patient to schedule this ASAP.  Return in about 1 day (around 9/25/2020) for Follow up today or tomorrow in person in [facility] for asthma exacerbation and COVID test..    Appointment end time: 11:47 AM  This is a telephone visit that took 15 minutes.      Clinician location:  [facility]     Mai Quinn MD

## 2020-09-25 ENCOUNTER — OFFICE VISIT (OUTPATIENT)
Dept: FAMILY MEDICINE | Facility: CLINIC | Age: 50
End: 2020-09-25
Payer: MEDICARE

## 2020-09-25 VITALS
OXYGEN SATURATION: 98 % | RESPIRATION RATE: 18 BRPM | DIASTOLIC BLOOD PRESSURE: 71 MMHG | TEMPERATURE: 97.1 F | HEART RATE: 88 BPM | SYSTOLIC BLOOD PRESSURE: 107 MMHG

## 2020-09-25 DIAGNOSIS — G43.709 CHRONIC MIGRAINE WITHOUT AURA WITHOUT STATUS MIGRAINOSUS, NOT INTRACTABLE: ICD-10-CM

## 2020-09-25 DIAGNOSIS — Z23 NEED FOR PROPHYLACTIC VACCINATION AND INOCULATION AGAINST INFLUENZA: ICD-10-CM

## 2020-09-25 DIAGNOSIS — J45.20 MILD INTERMITTENT ASTHMA WITHOUT COMPLICATION: Primary | ICD-10-CM

## 2020-09-25 DIAGNOSIS — Z20.822 ENCOUNTER FOR LABORATORY TESTING FOR COVID-19 VIRUS: ICD-10-CM

## 2020-09-25 DIAGNOSIS — Z20.822 SUSPECTED COVID-19 VIRUS INFECTION: ICD-10-CM

## 2020-09-25 LAB
COVID-19 VIRUS PCR TO U OF MN - SOURCE: NORMAL
SARS-COV-2 RNA SPEC QL NAA+PROBE: NOT DETECTED

## 2020-09-25 RX ORDER — ACETAMINOPHEN 500 MG
500 TABLET ORAL ONCE
Status: DISCONTINUED | OUTPATIENT
Start: 2020-09-25 | End: 2020-09-28 | Stop reason: CLARIF

## 2020-09-25 NOTE — PATIENT INSTRUCTIONS
1. Increase albuterol inhalers for the next couple days as needed for wheezing. Your lungs sounded great today in clinic!  2. If wheezing worsens, come back to clinic. May need imaging at that time or steroids.  3. Tylenol here, take your sumatriptan at home.    Thanks for coming in today! - I will call with your covid test results.

## 2020-09-25 NOTE — PROGRESS NOTES
[facility] Visit    Subjective:  Teresa Perez is a 49 year old female with a PMHx significant for   Patient Active Problem List   Diagnosis     Health Care Home     Acute peptic ulcer     Other allergy, other than to medicinal agents     Bipolar disorder (H)     Bulging lumbar disc     Cervical dysplasia     Common migraine without aura     Constipation     Dwarfism     Familial hypercholesterolemia     Insomnia     Low back pain     Intermittent asthma     Leg pain, bilateral     Smoking     Degeneration of thoracic or thoracolumbar intervertebral disc     LOMAS (nonalcoholic steatohepatitis)     Disease of lung     Hemorrhoids     Parotid mass     Endometriosis     NNAMDI (obstructive sleep apnea)     History of total right knee replacement     Lateral epicondylitis     Impingement syndrome, shoulder, left     Hx of total knee replacement, left     Chronic pain syndrome     Cough     Borderline personality disorder (H)     Moderate recurrent major depression (H)     Recurrent ventral hernia     Type 2 diabetes mellitus with complication, with long-term current use of insulin (H)     Essential hypertension     Nonruptured cerebral aneurysm     Cerebrovascular accident (CVA) due to embolism (H)     Amputation of leg (H)     CKD (chronic kidney disease) stage 5, GFR less than 15 ml/min (H)     Pre-ulcerative corn or callous     Excessive bleeding in premenopausal period    who presents for Covid testing and asthma follow up.     Patient seen for virtual visit yesterday with Dr. Quinn. Reported she has had a cough for 5-6 days, wheezing and nasal congestion. Denies fever, but reported chills and sweats.     Today, patient reports productive white cough for one week, body aches, chills, loose stools, occasional shortness of breath, chest pain with cough, post-tussive dry heaves, headaches (migraines at baseline), sore throat, ear pain, nasal congestion. She denies dizziness, nausea, abdominal  pain, true chest pain, leg swelling.     Of note, friend visited for an hour and maybe had a cold with congestion/cough. She uses spiriva and symbicort as prescribed. She is using her albuterol and nebulizers twice a day for the last week. Patient has allergies, to cat dander included and still has her cat. She smokes 1/2 ppd.     Preventative care: NOT UTD. Needs flu vaccine.    PMH, Medications and Allergies were reviewed and updated as needed.    Objective:  Vitals:    09/25/20 1212   BP: 107/71   BP Location: Right arm   Patient Position: Sitting   Cuff Size: Adult Large   Pulse: 88   Resp: 18   Temp: 97.1  F (36.2  C)   TempSrc: Oral   SpO2: 98%     There is no height or weight on file to calculate BMI.    GEN: NAD, healthy, alert  HEENT: NC/AT, TMs normal bilaterally, EOMI, normal conjunctivae/sclerae, clear oropharynx, MMM  RESP: CTAB, no w/r/r  CV: RRR, nl S1/S2, no m/r/g, no peripheral edema  ABD: soft, NT/ND, +BS throughout  MSK: Left AKA, right leg no edema  SKIN: no suspicious lesions or rashes  NEURO: no obvious focal deficits  PSYCH: mentation appears normal, affect normal    No results found for any visits on 09/25/20.     Assessment/Plan:  Teresa was seen today for asthma, flu shot, medication reconciliation and imm/inj.    Diagnoses and all orders for this visit:    Mild intermittent asthma without complication  Encounter for laboratory testing for COVID-19 virus  Suspected COVID-19 virus infection  She is a smoker of 1/2 ppd, has a cat at home and was around a friend who had cold symptoms. Subjective chills, cough, body aches. She definitely needed Covid test today, but overall she appears really well and lung exam is completely normal. She just had prednisone in June and has diabetes. Given this and normal exam, do not think she warrents prednisone for asthma exacerbation. We discussed taking tylenol/imitrex at home for headache, increasing albuterol the next couple days as needed. She will follow  up with us if she does not improve. Consider xray or prednisone if worsening. We will call with Covid test.  -     COVID-19 Virus PCR MRF ([facility])    Need for prophylactic vaccination and inoculation against influenza  -     INFLUENZA VACCINE IM > 6 MONTHS VALENT IIV4 [57770]    Chronic migraine without aura without status migrainosus, not intractable  Left before tylenol was given. She has some at home.   -     acetaminophen (TYLENOL) tablet 500 mg        RTC if symptoms worsen or do not improve in the next week.    Options for treatment and follow-up care were reviewed with the patient who was engaged and actively involved in the decision making process, verbalized understanding of the options discussed, and satisfied with the final plan.    Patient was staffed with supervising physician, Dr. Vidal.     Maura Trinh MD, PGY2  [facility]

## 2020-09-25 NOTE — PROGRESS NOTES
Preceptor attestation:  Patient seen, evaluated, and discussed with the resident.  I have verified the content of the note, which accurately reflects my assessment of the patient and the plan of care.    Supervising physician: Lis Vidal MD  [facility]

## 2020-09-26 ASSESSMENT — ASTHMA QUESTIONNAIRES: ACT_TOTALSCORE: 12

## 2020-10-01 NOTE — RESULT ENCOUNTER NOTE
Called patient about negative test.  Discussed that she is still coughing but feeling better.  Recommended follow up appointment if not getting better.

## 2020-10-02 NOTE — PROGRESS NOTES
It wasn't given.  She left before I knew she wanted it.  Deana Castellanos, [facility]

## 2020-10-23 DIAGNOSIS — M54.42 CHRONIC BILATERAL LOW BACK PAIN WITH LEFT-SIDED SCIATICA: ICD-10-CM

## 2020-10-23 DIAGNOSIS — G89.29 CHRONIC BILATERAL LOW BACK PAIN WITH LEFT-SIDED SCIATICA: ICD-10-CM

## 2020-10-23 RX ORDER — GABAPENTIN 600 MG/1
TABLET ORAL
Qty: 180 TABLET | Refills: 0 | Status: SHIPPED | OUTPATIENT
Start: 2020-10-23 | End: 2022-01-04

## 2020-10-23 NOTE — TELEPHONE ENCOUNTER
Pt's insurance or pharmacy is requesting a 90 day supply for Gabapentin 600 mg.  Please send a new prescription to reflect this request.

## 2020-10-27 ENCOUNTER — HOSPITAL ENCOUNTER (OUTPATIENT)
Dept: OCCUPATIONAL THERAPY | Facility: CLINIC | Age: 50
Setting detail: THERAPIES SERIES
End: 2020-10-27
Attending: STUDENT IN AN ORGANIZED HEALTH CARE EDUCATION/TRAINING PROGRAM
Payer: MEDICARE

## 2020-10-27 ENCOUNTER — TELEPHONE (OUTPATIENT)
Dept: FAMILY MEDICINE | Facility: CLINIC | Age: 50
End: 2020-10-27

## 2020-10-27 DIAGNOSIS — M25.512 ACUTE PAIN OF LEFT SHOULDER: ICD-10-CM

## 2020-10-27 PROCEDURE — 97542 WHEELCHAIR MNGMENT TRAINING: CPT | Mod: GO | Performed by: OCCUPATIONAL THERAPIST

## 2020-10-27 NOTE — PROGRESS NOTES
SEATING AND WHEELED MOBILITY ASSESSMENT  10/27/20 1300   Quick Adds   Quick Adds Certification;Current Manual Wheelchair      Rehab Discipline OT   Funding Medicare/[employer] [facility]   Service Outpatient;Occupational Therapy;Seating/Wheeled Mobility Evaluation   Height 5   Weight 235   Start Of Care Date 10/27/20   Referring Physician Joshua Dial   Orders Evaluate And Treat As Indicated;Per Therapist Evaluation   Orders Date 08/14/20   Others Present at Evaluation PCA   Patient/Caregiver Goals Power mobility   Rehabilitation Technology Supplier [employer] from [employer]   Current Community Support Personal Care Attendant;Meals On Wheels;Transportation Service;Housekeeping Service  (31.5 PCA)   Patient role/Employment history Other/Comments  (Wishing to volunteer )   Fall Risk Screen   Fall screen completed by OT   Have you fallen 2 or more times in the past year? Yes   Have you fallen and had an injury in the past year? Yes  (tailbone fracture)   Is patient a fall risk? Yes   Medical History   Onset Of Illness/injury Or Date Of Surgery 8/14/20  (order)   Medical Diagnosis CVA, B TKA, L AKA amp, L shoulder impingement, CKD with prior hemodialysis   Cardio-Respiratory Status Inhaler  (asthma)   Current Manual Wheelchair   Age 5/20203    [employer]   Cushion Gel   Wheelchair Back Upholstered   Footrest Style Swing away   Manual Wheelchair Comments Unable to functionally propell for full day activies or even out of home due to shoulder pain and impingement from prolonged w/c propulsion.   Home Accessibility   Living Environment Apartment/condo   Primary Entrance Level;Elevator   All Rooms Wheelchair Accessible Yes   Community ADL   Transportation Transportation Services   Community Mobility Requirements Medical Appointments   Cognitive/Visual/Hearing Status   Vision Corrective Lenses   Hearing Intact   ADL Status   Feeding Independent   Grooming/Hygiene Independent   Dressing Requires Assist   Toileting Requires  Assist;Uses Equipment  (grab bars)   Bathing Requires Assist;Uses Equipment  (roll in shower chair with bench and grab bars)   Meal Preparation Requires Assist   Home Management Requires Assist   Balance   Unsupported Sitting Balance Uses Upper Extremities for Balance   Sitting Balance in Chair Uses Upper Extremities for Balance   Standing Balance Physical Assist Required   Ambulation   Ambulation Non Ambulatory   Ambulation Assist Requires Assist   Ambulation Comments Not elligable for prothetic due to R TKA    Transfers   Transfer Assist Independent;Moderate Assist   Transfer Method Stand Pivot   Sleep/Rest   Sleep Surface/Equipment hospital bed   Wheelchair Ability   Wheelchair Ability Quick Adds Manual Chair;Wheelchair Use   Manual Wheelchair Propulsion   Manual Wheelchair Propulsion Assist   Comments Shoulder impingememnt   Wheelchair Use   Ability to Perform Weight Shifts Assist   Bed Confined Without Wheelchair? Yes   Hours in Wheelchair Daily 12   Hours Spent Alone Daily 15   Neuromuscular   History of Pressure Sores No   Current Pressure Sores No   Pain Yes   Pain Location L stump phantom pain, back shoulders elbows and shoulders   Coordination UE Intact;LE Impaired   Tone Normal   Sensory Deficits Reported R Le neuropathies   Upper Extremities   Shoulder Position Functional Bilaterally   UE ROM WFL limited rotation due to pain   UE Strength 4/5    Dominance Right   Pelvis   Anterior/Posterior Pelvis Position Posterior Tilt   Trunk   Anterior/Posterior Trunk Position Increased Thoracic Kyphosis   Lower Extremities   LE ROM WFL even in stump   LE Strength 4/5   Foot Positioning Plantar flexed   Patient Measurements   Other per atp    Education Assessment   Barriers to Learning Physical   Preferred Learning Style Listening;Demonstration   Assessment/Plan   Criteria for Skilled Interventions Met Yes, Treatment Indicated   Treatment Diagnosis impaired participaiton in MRADLs   Therapy Frequency once   Planned  Therapy Interventions Wheelchair Management/Propulsion Training   Planned Therapy Interventions Comments Educated patient on power wheelchair options.  Safe trials of group 2 in clinic today.  Specs determines and measurements taken.   Risks and benefits of treatment have been explained Yes   Patient/family & other staff in agreement with plan of care Yes   Comments LMN to be completed.    Session Time   OT Wheelchair Management Minutes (63232) 45   Certification   Certification date from 10/27/20   Certification date to 10/27/20   Adult OT Eval Goals   OT Eval Goals (Adult) 1    OT Goal 1   Goal Identifier power w/c   Goal Description Patient to demonstrate a successful clinical trial of the recommended wheelchair   Target Date 10/27/20   Date Met 10/27/20   Electronically signed by:  Michelle UNGER/L, ATP      Occupational Therapist, Assistive   [phone]      fax: [phone]      [email or web address]  Seating Clinic- [facility], [address]  [address]  [place] [address]

## 2020-10-28 DIAGNOSIS — Z86.73 HISTORY OF CVA (CEREBROVASCULAR ACCIDENT): Primary | ICD-10-CM

## 2020-10-29 NOTE — PROGRESS NOTES
10/29/20   SPEECH THERAPY REFERRAL  [facility]  Phone: [phone]  Fax: [phone]    [address], [address]   [place] [address]    Referral and demographics faxed to [facility] at [phone] who will contact patient/family to schedule.     Maura Galvan

## 2020-10-30 ENCOUNTER — TRANSFERRED RECORDS (OUTPATIENT)
Dept: HEALTH INFORMATION MANAGEMENT | Facility: CLINIC | Age: 50
End: 2020-10-30

## 2020-11-02 DIAGNOSIS — Z79.4 TYPE 2 DIABETES MELLITUS WITH HYPERGLYCEMIA, WITH LONG-TERM CURRENT USE OF INSULIN (H): Primary | ICD-10-CM

## 2020-11-02 DIAGNOSIS — E11.65 TYPE 2 DIABETES MELLITUS WITH HYPERGLYCEMIA, WITH LONG-TERM CURRENT USE OF INSULIN (H): Primary | ICD-10-CM

## 2020-11-03 ENCOUNTER — OFFICE VISIT (OUTPATIENT)
Dept: FAMILY MEDICINE | Facility: CLINIC | Age: 50
End: 2020-11-03
Payer: MEDICARE

## 2020-11-03 VITALS
DIASTOLIC BLOOD PRESSURE: 73 MMHG | HEART RATE: 101 BPM | SYSTOLIC BLOOD PRESSURE: 114 MMHG | OXYGEN SATURATION: 96 % | TEMPERATURE: 98.8 F | RESPIRATION RATE: 20 BRPM

## 2020-11-03 DIAGNOSIS — L03.119 CELLULITIS AND ABSCESS OF LEG: ICD-10-CM

## 2020-11-03 DIAGNOSIS — I10 BENIGN ESSENTIAL HYPERTENSION: ICD-10-CM

## 2020-11-03 DIAGNOSIS — R05.9 COUGH: ICD-10-CM

## 2020-11-03 DIAGNOSIS — G89.29 CHRONIC BILATERAL LOW BACK PAIN WITHOUT SCIATICA: ICD-10-CM

## 2020-11-03 DIAGNOSIS — L02.419 CELLULITIS AND ABSCESS OF LEG: ICD-10-CM

## 2020-11-03 DIAGNOSIS — J45.20 MILD INTERMITTENT ASTHMA WITHOUT COMPLICATION: Primary | ICD-10-CM

## 2020-11-03 DIAGNOSIS — Z79.4 TYPE 2 DIABETES MELLITUS WITH HYPERGLYCEMIA, WITH LONG-TERM CURRENT USE OF INSULIN (H): ICD-10-CM

## 2020-11-03 DIAGNOSIS — N87.9 CERVICAL DYSPLASIA: ICD-10-CM

## 2020-11-03 DIAGNOSIS — J01.01 ACUTE RECURRENT MAXILLARY SINUSITIS: ICD-10-CM

## 2020-11-03 DIAGNOSIS — E11.65 TYPE 2 DIABETES MELLITUS WITH HYPERGLYCEMIA, WITH LONG-TERM CURRENT USE OF INSULIN (H): ICD-10-CM

## 2020-11-03 DIAGNOSIS — R21 RASH: ICD-10-CM

## 2020-11-03 DIAGNOSIS — M54.50 CHRONIC BILATERAL LOW BACK PAIN WITHOUT SCIATICA: ICD-10-CM

## 2020-11-03 LAB
COVID-19 VIRUS PCR TO U OF MN - SOURCE: NORMAL
HBA1C MFR BLD: 9.4 % (ref 4.1–5.7)
SARS-COV-2 RNA SPEC QL NAA+PROBE: NOT DETECTED

## 2020-11-03 PROCEDURE — 96372 THER/PROPH/DIAG INJ SC/IM: CPT | Performed by: STUDENT IN AN ORGANIZED HEALTH CARE EDUCATION/TRAINING PROGRAM

## 2020-11-03 PROCEDURE — 87635 SARS-COV-2 COVID-19 AMP PRB: CPT | Performed by: STUDENT IN AN ORGANIZED HEALTH CARE EDUCATION/TRAINING PROGRAM

## 2020-11-03 PROCEDURE — 83036 HEMOGLOBIN GLYCOSYLATED A1C: CPT | Performed by: STUDENT IN AN ORGANIZED HEALTH CARE EDUCATION/TRAINING PROGRAM

## 2020-11-03 PROCEDURE — 36415 COLL VENOUS BLD VENIPUNCTURE: CPT | Performed by: STUDENT IN AN ORGANIZED HEALTH CARE EDUCATION/TRAINING PROGRAM

## 2020-11-03 PROCEDURE — 99214 OFFICE O/P EST MOD 30 MIN: CPT | Mod: 25 | Performed by: STUDENT IN AN ORGANIZED HEALTH CARE EDUCATION/TRAINING PROGRAM

## 2020-11-03 RX ORDER — MEDROXYPROGESTERONE ACETATE 150 MG/ML
150 INJECTION, SUSPENSION INTRAMUSCULAR
Status: ACTIVE | OUTPATIENT
Start: 2020-11-03 | End: 2021-10-29

## 2020-11-03 RX ORDER — FLASH GLUCOSE SENSOR
KIT MISCELLANEOUS
Qty: 2 EACH | Refills: 11 | Status: SHIPPED | OUTPATIENT
Start: 2020-11-03 | End: 2020-11-05

## 2020-11-03 RX ORDER — AMMONIUM LACTATE 12 G/100G
CREAM TOPICAL 2 TIMES DAILY
Qty: 385 G | Refills: 1 | Status: SHIPPED | OUTPATIENT
Start: 2020-11-03 | End: 2022-01-26

## 2020-11-03 RX ORDER — CYCLOBENZAPRINE HCL 10 MG
10 TABLET ORAL 3 TIMES DAILY PRN
Qty: 30 TABLET | Refills: 1 | Status: SHIPPED | OUTPATIENT
Start: 2020-11-03 | End: 2020-11-20

## 2020-11-03 RX ORDER — SULFAMETHOXAZOLE/TRIMETHOPRIM 800-160 MG
1 TABLET ORAL 2 TIMES DAILY
Qty: 14 TABLET | Refills: 0 | Status: SHIPPED | OUTPATIENT
Start: 2020-11-03 | End: 2020-11-21

## 2020-11-03 RX ORDER — FLASH GLUCOSE SCANNING READER
EACH MISCELLANEOUS
Qty: 1 EACH | Refills: 0 | Status: SHIPPED | OUTPATIENT
Start: 2020-11-03 | End: 2020-11-05

## 2020-11-03 RX ADMIN — MEDROXYPROGESTERONE ACETATE 150 MG: 150 INJECTION, SUSPENSION INTRAMUSCULAR at 13:30

## 2020-11-03 NOTE — PROGRESS NOTES
There are no exam notes on file for this visit.    SUBJECTIVE  Teresa Perez is a 50 year old female with past medical history significant for    Patient Active Problem List   Diagnosis     Health Care Home     Acute peptic ulcer     Other allergy, other than to medicinal agents     Bipolar disorder (H)     Bulging lumbar disc     Cervical dysplasia     Common migraine without aura     Constipation     Dwarfism     Familial hypercholesterolemia     Insomnia     Low back pain     Intermittent asthma     Leg pain, bilateral     Smoking     Degeneration of thoracic or thoracolumbar intervertebral disc     LOMAS (nonalcoholic steatohepatitis)     Disease of lung     Hemorrhoids     Parotid mass     Endometriosis     NNAMDI (obstructive sleep apnea)     History of total right knee replacement     Lateral epicondylitis     Impingement syndrome, shoulder, left     Hx of total knee replacement, left     Chronic pain syndrome     Cough     Borderline personality disorder (H)     Moderate recurrent major depression (H)     Recurrent ventral hernia     Type 2 diabetes mellitus with complication, with long-term current use of insulin (H)     Essential hypertension     Nonruptured cerebral aneurysm     Cerebrovascular accident (CVA) due to embolism (H)     Amputation of leg (H)     CKD (chronic kidney disease) stage 5, GFR less than 15 ml/min (H)     Pre-ulcerative corn or callous     Excessive bleeding in premenopausal period       Others present at the visit:  Patient's PCA Carey    Presents for   Chief Complaint   Patient presents with     Diabetes     Pt is here to follow up on her diabetes.     Contraception     Pt is here for her depo.     Medication Reconciliation     Complete.      Patient presents today for follow-up of her diabetes as well as to receive her Depo shot.  Continues to receive Depo in order to avoid heavy and painful menses.  This is worked well for her.  We will plan to continue this.    We reviewed her  "diabetes.  Goal is getting her blood sugars down to an A1c of less than 8 in order to allow her to move forward with a hernia repair surgery.  She is currently doing 45 units of the Toujeo in the morning and at night, and then doing 20 units +2 for every 50 above 100 with her meals. Checking her sugars prior to meal, AM fasting, and with any signs of hypoglycemia.  Diet in general is irregular, and sometimes she has 1 meal a day sometimes 2, sometimes 3.  She does feel like the sugars have been better except over the last couple days because she celebrated her birthday indulge on some sweets.  She endorses feeling symptoms of shakiness and jitteriness when her sugars get low, but actually notices this happens most of time when they are between 102 100.  Feels like she would be \"passed out \"if they were below 100.  Typically feels her best when blood sugars are below between 175 and 225.  She is also continuing to take the bydurian.  She has been trying to make healthier dietary choices, and is eating more salads and vegetables.  Trying to increase her fiber intake.  Continues to check her sugars regularly and adjust her sliding scale depending on the levels.    Additionally, she is complaining of respiratory symptoms today.  Describes a worsening of her chronic cough, with some positive sputum production, which she describes as being yellow, as well as runny nose, sinus pressure, ear pressure, and general headaches.  She does endorse fevers and chills, but is sweaty and warm at baseline.  She does not feel like she has been wheezing or had increased short of breath, but she does have some pain in her chest with the cough.  No recent sick contacts.  No known contacts with Covid.    She also describes episodes of chest pain.  No pain currently but she experienced this Saturday as well as yesterday.  The pain is in the middle of her chest.  She describes it as a squeezing, and is accompanied by shortness of breath.  It " is a little bit worse with deep breaths, and is more painful when she coughs periods no heartburn, no episodes of diaphoresis, she has been moving around frequently with her wheelchair and this does not lead to chest pain.  She reports using her inhalers regularly and that they are working well.  Has not needed to use her nebs and feels like her asthma is overall under good control.  Does feel that she has had more mucus, and this sometimes makes her gag and feel uncomfortable.  Also noticing increased abdominal pain in the area of her ventral hernia.    She does not want to do a urine microalbumin because she is having some boils in her groin area bilaterally.  1 is currently draining.  She is working on trying to keep it dry and clean, and does feel like it is draining appropriately.    She continues to have pain in her back, and wonders if there are other options for medications.  She is currently taking Tylenol and ibuprofen, as well as using some topical products.  She is also looking for a different size on her abdominal wrap, because it does not seem to fit well.    OBJECTIVE:  Vitals: /73 (BP Location: Left arm, Patient Position: Sitting, Cuff Size: Adult Large)   Pulse 101   Temp 98.8  F (37.1  C) (Oral)   Resp 20   SpO2 96%   BMI= There is no height or weight on file to calculate BMI.  Objective:    Vitals:  Vitals are reviewed and are within the normal range.  Oxygen levels are good.  She is in no respiratory distress.  Blood pressure good.  Mildly tachycardic.  Afebrile.  Gen:  Alert, pleasant, occasional dry cough.  No acute distress.  No increased work of breathing.  HEENT: Tympanic membranes viewed bilaterally.  She does have some fluid present on the left ear, right side is clear.  Bilateral nasal congestion, and bilateral sinus pressure, worse in maxillary and frontal sinuses.  Throat is clear nonerythematous and without exudate.  Mild left-sided anterior cervical  lymphadenopathy.  Cardiac:  Regular rate and rhythm, no murmurs, rubs or gallops  Respiratory:  Lungs clear to auscultation bilaterally.  No crackles or wheezes.  Good airflow down to the bases.  Abdomen: Large, easy to reduce, left upper abdominal ventral hernia.  Mildly tender.  Extremities: Area of scaling irritation on her left elbow posterior forearm region.    Results for orders placed or performed in visit on 11/03/20   Hemoglobin A1c ([facility])     Status: Abnormal   Result Value Ref Range    Hemoglobin A1C 9.4 (H) 4.1 - 5.7 %       ASSESSMENT AND PLAN:      Teresa was seen today for diabetes, contraception and medication reconciliation.    Diagnoses and all orders for this visit:    Chest Pain/Costochondritis  Cough  Acute recurrent maxillary sinusitis.  Presenting with upper respiratory infection symptoms.  Afebrile.  Productive cough.  No recent known exposures.  High risk for complications if she did receive Covid.  Lungs are clear without evidence of wheeze despite underlying asthma.  We will treat with a short course of antibiotics with Bactrim.  Test for COVID-19.  Recommended pushing fluids, Tylenol and ibuprofen, and general symptomatic cares.  Bactrim was chosen as it also covers for overlapping cellulitis.  -     sulfamethoxazole-trimethoprim (BACTRIM DS) 800-160 MG tablet; Take 1 tablet by mouth 2 times daily  -     COVID-19 Virus PCR MRF ([facility])    Cellulitis and abscess of groin.  Will use Bactrim for antibiotic for URI symptoms as well as to help speed the resolution of these abscesses in her groin.  -     sulfamethoxazole-trimethoprim (BACTRIM DS) 800-160 MG tablet; Take 1 tablet by mouth 2 times daily    Mild intermittent asthma without complication.  This is stable.  No signs of exacerbation today.    Type 2 diabetes mellitus with hyperglycemia, with long-term current use of insulin (H).  A1c still above goal.  Still unable to move forward with surgery given these high sugar levels.  Has  symptoms when sugars are in the normal range.  Will prescribe freestyle gwen products today, as I think this would be quite helpful.  Appreciate Pharm.D. support and figuring out why and how to get this covered.  We will increase her NovoLog to 24 units with meals +2 for every 50 over 100, and continue with the 45 Units BID of the Toujeo.  -     Hemoglobin A1c ([facility])  -     Cancel: Microalbumin Creatinine Ratio Random Ur ([facility])    Benign essential hypertension.  Blood pressure at goal today..    Rash.  Now has thickening and some scaling over her elbow region on the left arm.  Recommended AmLactin for this.  -     ammonium lactate (AMLACTIN) 12 % external cream; Apply topically 2 times daily    Chronic bilateral low back pain without sciatica.  Patient is not a candidate for narcotics here.  We will do Tylenol, ibuprofen, topical creams, and she would like to try some Flexeril.  -     cyclobenzaprine (FLEXERIL) 10 MG tablet; Take 1 tablet (10 mg) by mouth 3 times daily as needed for muscle spasms    Cervical dysplasia Depo shot given today.  -     medroxyPROGESTERone (DEPO-PROVERA) injection 150 mg        Patient Instructions   1)  Glucsoe testing supplies:  Dr. Bullock will check with pharmacy team about coverage and getting Freestyle Gwen    Continue the Toujeo 45 Units 2x per day  Increase the Novolog with meals to 24 Units plus 2 for every 50 over 100.   Keep checking sugars!  Drink more water.      For cough and rash:  COVID testing  Antibiotics:  Bactrim 1 pill in AM and PM for 1 week.    Lots of fluids.   Keep doing your inhalers.      For rash on elbow:  New cream:  AmLactin, 2x per day    For back pain:  Flexeril (10mg)  up to 3x per day as needed.      Follow up in 2-3 weeks for recheck cough and blood sugars      Tyra Bullock MD

## 2020-11-03 NOTE — Clinical Note
Hi pharmDs-  I really think this patient would benefit from Freestyle Elder, but I seem to remember we had a problem getting this covered in the past.  Can you help me look into this?  (Also, suggestions for her diabetes meds would be useful as well!)  Thanks!

## 2020-11-03 NOTE — PATIENT INSTRUCTIONS
1)  Glucsoe testing supplies:  Dr. Bullock will check with pharmacy team about coverage and getting Freestyle Gwen    Continue the Toujeo 45 Units 2x per day  Increase the Novolog with meals to 24 Units plus 2 for every 50 over 100.   Keep checking sugars!  Drink more water.      For cough and rash:  COVID testing  Antibiotics:  Bactrim 1 pill in AM and PM for 1 week.    Lots of fluids.   Keep doing your inhalers.      For rash on elbow:  New cream:  AmLactin, 2x per day    For back pain:  Flexeril (10mg)  up to 3x per day as needed.      Follow up in 2-3 weeks for recheck cough and blood sugars

## 2020-11-05 RX ORDER — FLASH GLUCOSE SCANNING READER
EACH MISCELLANEOUS
Qty: 1 EACH | Refills: 0 | Status: SHIPPED | OUTPATIENT
Start: 2020-11-05 | End: 2022-03-30

## 2020-11-05 RX ORDER — FLASH GLUCOSE SENSOR
KIT MISCELLANEOUS
Qty: 2 EACH | Refills: 11 | Status: SHIPPED | OUTPATIENT
Start: 2020-11-05 | End: 2021-04-13

## 2020-11-05 NOTE — PROGRESS NOTES
Called patient to discuss negative COVID-19 swab, that the freestyle augustine prescription would be sent to a new pharmacy and should be mailed out to her.  She should call the clinic if it has not arrived within the week.     Tyra Bullock MD

## 2020-11-05 NOTE — RESULT ENCOUNTER NOTE
Called patient to inform her of negative COVID-19 testing.    A1c discussed with patient in clinic.     /Tyra Bullock MD

## 2020-11-13 ENCOUNTER — TELEPHONE (OUTPATIENT)
Dept: FAMILY MEDICINE | Facility: CLINIC | Age: 50
End: 2020-11-13

## 2020-11-13 NOTE — TELEPHONE ENCOUNTER
Patient reports she was having an issue with the pharmacy but it has been taken care off. No further concerns. ./LR

## 2020-11-13 NOTE — TELEPHONE ENCOUNTER
[facility] phone call message- general phone call:    Reason for call: She would like a call back.    Action desired: call back.    Return call needed: Yes    OK to leave a message on voice mail? Yes    Advised patient to response may take up to 2 business days: Yes    Primary language: English      needed? No    Call taken on November 13, 2020 at 1:36 PM by Lo Lao

## 2020-11-20 ENCOUNTER — OFFICE VISIT (OUTPATIENT)
Dept: FAMILY MEDICINE | Facility: CLINIC | Age: 50
End: 2020-11-20
Payer: MEDICARE

## 2020-11-20 ENCOUNTER — ANCILLARY PROCEDURE (OUTPATIENT)
Dept: GENERAL RADIOLOGY | Facility: CLINIC | Age: 50
End: 2020-11-20
Attending: STUDENT IN AN ORGANIZED HEALTH CARE EDUCATION/TRAINING PROGRAM
Payer: MEDICARE

## 2020-11-20 VITALS
HEART RATE: 118 BPM | DIASTOLIC BLOOD PRESSURE: 84 MMHG | WEIGHT: 241 LBS | SYSTOLIC BLOOD PRESSURE: 136 MMHG | TEMPERATURE: 98.7 F | RESPIRATION RATE: 16 BRPM | BODY MASS INDEX: 46.99 KG/M2 | OXYGEN SATURATION: 97 %

## 2020-11-20 DIAGNOSIS — K43.9 VENTRAL HERNIA WITHOUT OBSTRUCTION OR GANGRENE: ICD-10-CM

## 2020-11-20 DIAGNOSIS — E11.65 TYPE 2 DIABETES MELLITUS WITH HYPERGLYCEMIA, WITH LONG-TERM CURRENT USE OF INSULIN (H): ICD-10-CM

## 2020-11-20 DIAGNOSIS — R05.9 COUGH: ICD-10-CM

## 2020-11-20 DIAGNOSIS — H10.45 CHRONIC ALLERGIC CONJUNCTIVITIS: ICD-10-CM

## 2020-11-20 DIAGNOSIS — R11.0 NAUSEA: ICD-10-CM

## 2020-11-20 DIAGNOSIS — E66.01 MORBID OBESITY (H): ICD-10-CM

## 2020-11-20 DIAGNOSIS — Z96.651 HX OF TOTAL KNEE REPLACEMENT, RIGHT: ICD-10-CM

## 2020-11-20 DIAGNOSIS — M54.50 CHRONIC BILATERAL LOW BACK PAIN WITHOUT SCIATICA: ICD-10-CM

## 2020-11-20 DIAGNOSIS — G89.29 CHRONIC BILATERAL LOW BACK PAIN WITHOUT SCIATICA: ICD-10-CM

## 2020-11-20 DIAGNOSIS — S88.919A AMPUTATION OF LEG (H): Primary | ICD-10-CM

## 2020-11-20 DIAGNOSIS — G89.4 CHRONIC PAIN SYNDROME: ICD-10-CM

## 2020-11-20 DIAGNOSIS — J45.20 MILD INTERMITTENT ASTHMA WITHOUT COMPLICATION: ICD-10-CM

## 2020-11-20 DIAGNOSIS — Z79.4 TYPE 2 DIABETES MELLITUS WITH HYPERGLYCEMIA, WITH LONG-TERM CURRENT USE OF INSULIN (H): ICD-10-CM

## 2020-11-20 DIAGNOSIS — Z20.822 SUSPECTED COVID-19 VIRUS INFECTION: ICD-10-CM

## 2020-11-20 LAB
% IMMATURE GRANULOCYTES: 1 %
ABS IMMATURE GRANULOCYTES: 0.1 THOU/UL
BASOPHILS # BLD AUTO: 0.1 THOU/UL (ref 0–0.2)
BASOPHILS NFR BLD AUTO: 1 % (ref 0–2)
COVID-19 VIRUS PCR TO U OF MN - SOURCE: NORMAL
EOSINOPHIL # BLD AUTO: 0.1 THOU/UL (ref 0–0.4)
EOSINOPHIL NFR BLD AUTO: 1 % (ref 0–6)
ERYTHROCYTE [DISTWIDTH] IN BLOOD BY AUTOMATED COUNT: 13 % (ref 11–14.5)
HCT VFR BLD AUTO: 41.4 % (ref 35–47)
HGB BLD-MCNC: 14.1 G/DL (ref 12–16)
LYMPHOCYTES # BLD AUTO: 1.9 THOU/UL (ref 0.8–4.4)
LYMPHOCYTES NFR BLD AUTO: 19 % (ref 20–40)
MCH RBC QN AUTO: 30.5 PG (ref 27–34)
MCHC RBC AUTO-ENTMCNC: 34.1 G/DL (ref 32–36)
MCV RBC AUTO: 89 FL (ref 80–100)
MONOCYTES # BLD AUTO: 0.6 THOU/UL (ref 0–0.9)
MONOCYTES NFR BLD AUTO: 6 % (ref 2–10)
NEUTROPHILS # BLD AUTO: 7.2 THOU/UL (ref 2–7.7)
NEUTROPHILS NFR BLD AUTO: 72 % (ref 50–70)
PLATELET # BLD AUTO: 237 THOU/UL (ref 140–440)
PMV BLD AUTO: 10.9 FL (ref 8.5–12.5)
RBC # BLD AUTO: 4.63 MILL/UL (ref 3.8–5.4)
SARS-COV-2 RNA SPEC QL NAA+PROBE: NOT DETECTED
WBC # BLD AUTO: 10 THOU/UL (ref 4–11)

## 2020-11-20 PROCEDURE — 71046 X-RAY EXAM CHEST 2 VIEWS: CPT | Mod: FY | Performed by: RADIOLOGY

## 2020-11-20 PROCEDURE — 87635 SARS-COV-2 COVID-19 AMP PRB: CPT | Performed by: STUDENT IN AN ORGANIZED HEALTH CARE EDUCATION/TRAINING PROGRAM

## 2020-11-20 PROCEDURE — 36415 COLL VENOUS BLD VENIPUNCTURE: CPT | Performed by: STUDENT IN AN ORGANIZED HEALTH CARE EDUCATION/TRAINING PROGRAM

## 2020-11-20 PROCEDURE — 99215 OFFICE O/P EST HI 40 MIN: CPT | Performed by: STUDENT IN AN ORGANIZED HEALTH CARE EDUCATION/TRAINING PROGRAM

## 2020-11-20 RX ORDER — CYCLOBENZAPRINE HCL 10 MG
10 TABLET ORAL 3 TIMES DAILY PRN
Qty: 90 TABLET | Refills: 1 | Status: SHIPPED | OUTPATIENT
Start: 2020-11-20 | End: 2021-02-11

## 2020-11-20 RX ORDER — ONDANSETRON 4 MG/1
4 TABLET, FILM COATED ORAL EVERY 8 HOURS PRN
Qty: 18 TABLET | Refills: 0 | Status: SHIPPED | OUTPATIENT
Start: 2020-11-20 | End: 2021-02-15

## 2020-11-20 RX ORDER — FEXOFENADINE HCL 180 MG/1
180 TABLET ORAL DAILY
Qty: 30 TABLET | Refills: 5 | Status: SHIPPED | OUTPATIENT
Start: 2020-11-20 | End: 2021-07-13

## 2020-11-20 NOTE — PATIENT INSTRUCTIONS
Dr. Bullock will finish forms for wheelchair.      Blood sugars:  Goal is between 150-220.  Eat a little earlier (1:30 or 2:00)  Increase long acting insulin to 50 in AM and 50 in PM.    For cough:  Drink lots of water  Continue to use your inhalers.    Labs and xray today  Restart the Allegra allergy medication.  (Keep taking the Singulair if you want)     For Stomach:  Cutting back on marijuana  Use the nausea pills as needed.

## 2020-11-20 NOTE — LETTER
November 24, 2020      Teresa Perez  [address]  [place] [address]        Dear ,    We are writing to inform you of your test results.    Your chest xray came back normal.  I did not see any of the lab results come back--I wonder if you forgot to stop in the lab after your xray.  We are still waiting on your COVID swab, but this reassures me that the problem is not pneumonia or COVID.  Cutting back on the cigarettes and marijuana should help.  I look forward to talking with you in a few weeks more about your diabetes.  Increase the insulin to 50 Units in the morning and evening.  Please call the clinic at [phone] if you have any questions.       Resulted Orders   XR CHEST 2 VW    Narrative    EXAM: XR CHEST 2 VW  LOCATION: [facility]  DATE/TIME: 11/20/2020 9:32 AM    INDICATION: Cough  COMPARISON: None.      Impression    IMPRESSION: Negative chest. No evidence of pneumonia.       If you have any questions or concerns, please call the clinic at the number listed above.       Sincerely,        Tyra Bullock MD

## 2020-11-20 NOTE — PROGRESS NOTES
There are no exam notes on file for this visit.    SUBJECTIVE  Teresa Perez is a 50 year old female with past medical history significant for    Patient Active Problem List   Diagnosis     Health Care Home     Acute peptic ulcer     Other allergy, other than to medicinal agents     Bipolar disorder (H)     Bulging lumbar disc     Cervical dysplasia     Common migraine without aura     Constipation     Dwarfism     Familial hypercholesterolemia     Insomnia     Low back pain     Intermittent asthma     Leg pain, bilateral     Smoking     Degeneration of thoracic or thoracolumbar intervertebral disc     LOMAS (nonalcoholic steatohepatitis)     Disease of lung     Hemorrhoids     Parotid mass     Endometriosis     NNAMDI (obstructive sleep apnea)     History of total right knee replacement     Lateral epicondylitis     Impingement syndrome, shoulder, left     Hx of total knee replacement, left     Chronic pain syndrome     Cough     Borderline personality disorder (H)     Moderate recurrent major depression (H)     Recurrent ventral hernia     Type 2 diabetes mellitus with complication, with long-term current use of insulin (H)     Essential hypertension     Nonruptured cerebral aneurysm     Cerebrovascular accident (CVA) due to embolism (H)     Amputation of leg (H)     CKD (chronic kidney disease) stage 5, GFR less than 15 ml/min (H)     Pre-ulcerative corn or callous     Excessive bleeding in premenopausal period     Morbid obesity (H)     Others present at the visit:  Patient's Carey MANLEY    Presents for   Chief Complaint   Patient presents with     Clinic Care Coordination - Face To Face     Pt is here for a face to face for an automatic scooter wheelchair.     Medication Reconciliation     Complete.      Patient presents today for evaluation for a motorized wheelchair.  She has had an evaluation done by occupational therapy, and has trialed the new motorized scooter through [employer].  Has used a similar  scooter that she borrowed from a friend and found it helpful.  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  She has been fitted for a prosthetic for the left leg, but has struggled to utilize the prosthetic regularly, and has quite a bit of pain with use. Teresa also has history of osteoarthritis with previous knee replacement in her right knee, with limited range of motion.  Because of the rigidity in the right knee, she has difficulty with the turning and pivoting required with putting on and adjusting her prosthetic.  She has worked with physical therapy on this number of times, and would still like to pursue ambulation via prosthetic, but this is not realistic in the short-term.    Patient has been using a regular wheelchair.  She has adequate upper extremity strength to maneuver on and off of a motorized scooter.  She is able to lift and adjust her leg for appropriate positioning.  Reports excellent ability to drive the scooter, and has the cognitive function to do this well.  This would allow her increased freedom and ability to leave her apartment and even travel within her apartment on a more regular basis to be more independent.    Additionally, patient has a couple of other concerns today.    She continues to have difficulty with coughing.  She reports that it is quite painful, and the coughing has not really decreased in frequency.  Says cough got a little bit better and sputum got more clear after taking antibiotics, but the cough has returned.  She describes a white foamy sputum but nothing thick, yellow, or green.  She regularly has some fevers chills and night sweats, and this may be slightly worse.  The shortness of breath is worse in the morning.  She denies any wheezing.  Reports using her inhalers regularly including the Symbicort Quartz 2 puffs twice daily, the Spiriva once daily, and is using her albuterol 1-2 times per day.  This helps her breathing but  does not help with the cough.  The cough is quite painful.  No hemoptysis.  Covid testing done at her last visit was negative.  She denies any recent Covid exposures, and has tried to be careful and wear her mask when outside of her apartment.    She has noticed more congestion, and does not feel that the Singulair is working for her allergies.  Is wondering if there are any other options.    Continues to have significant abdominal pain.  Has a large ventral hernia, and it hurts when she coughs.  Sometimes the coughing is bad enough that she throws up.  She describes that as sputum and acid combined.  She continues to have difficulty with nausea, which improved some with Zofran.  This is not associated with eating, and she feels like her heartburn has been relatively good.  She is taking pantoprazole regularly and finds this helpful.  Uses Tums as needed for worsening symptoms.  She reports normal bowels that are soft and present about 2 times per day.  She does have some pain with bowel movements and notices blood at times, but only when she is straining to stool.  Has been urinating normally, without difficulty, and reports drinking lots of water on a regular basis.    We discussed her blood sugars and diabetes.  Has been able to receive a freestyle augustine and finds this quite helpful.  We downloaded the data and reviewed this together.  Blood sugars are fairly consistently in the mid 200s between 220 and 250, with some highs up to 400.  She reports feeling symptoms of lows still, and notes these even when sugars between 101 50.  Has only one sugar below 100 present.  Her diet continues to be irregular, and she eats meals when she is hungry.  This most frequently is in the mid afternoon, but not always.  She continues to take 45 units twice daily of the Toujeo, and denies missing doses of this.  She uses a sliding scale of mealtime insulin when she eats which can be between 1 and 3 times per day.  Continues to use  the Bydureon weekly.    Continues to have difficulty with pain.  Is taking baclofen 3 times a day instead of once.  Uses this almost every day.  Also taking Tylenol as well.  She is also smoking marijuana regularly for pain.  Buys this off the street.  Is doing a bowl to 2 bowls per day.    Is working on cutting back on her smoking but does continue to smoke.  Is doing about half a pack per day.  Has had some trouble getting her nicotine patches to stay located on her skin because she reports getting diaphoretic and sweaty on a regular basis.    OBJECTIVE:  Vitals: /84 (BP Location: Left arm, Patient Position: Sitting, Cuff Size: Adult Large)   Pulse 118   Temp 98.7  F (37.1  C) (Oral)   Resp 16   Wt 109.3 kg (241 lb)   SpO2 97%   BMI 46.99 kg/m    BMI= Body mass index is 46.99 kg/m .  Objective:    Vitals:  Vitals are reviewed and are within the normal range.  Weight continues to increase.  BMI is 47.  Gen:  Alert, pleasant, no acute distress.  No respiratory distress.  Occasional mild cough noted.  No increased work of breathing.  Cardiac:  Regular rate and rhythm, no murmurs, rubs or gallops  Respiratory: Lungs with bilateral basal expiratory rhonchi.  No crackles, minimal scattered wheezing that improves some with cough.  Good airflow.  Abdomen:  Soft, generalized tenderness, worse over the left upper quadrant where she has a large ventral hernia, which is easily reducible.  Extremities: Excellent strength bilaterally in legs.  Is able to lift herself up and down off her wheelchair using her arms.  Has a above-the-knee amputation present on the left side.  Some mild tenderness and effusion on the right knee.  Some limited range of motion with full flexion extension of that knee.  Patient is unable to ambulate.    Chest x-ray.  This was reviewed by me.  It is negative with no signs of pneumonia present.    ASSESSMENT AND PLAN:      Teresa was seen today for clinic care coordination - face to face and  medication reconciliation.      Amputation of leg (H)  Chronic bilateral low back pain without sciatica  Hx of total knee replacement, right  Patient here for face-to-face evaluation for wheelchair.  Patient has a chronic above-the-knee amputation on the left, and significant difficulty with ambulation secondary to arthritis and knee replacement on the right, as well as challenges with obesity and ability to fit the prosthetic on her leg.  She is per my exam strong enough to transfer onto the wheelchair, and has competency to effectively utilize the power scooter.  Face-to-face documentation provided below for this device.  Please let me know if further information is needed.  Refilled patient's prescription of Flexeril, encouraged use of Tylenol for pain, and continued exercises to maintain strength and function.  -     cyclobenzaprine (FLEXERIL) 10 MG tablet; Take 1 tablet (10 mg) by mouth 3 times daily as needed for muscle spasms        Cough  Chronic allergic conjunctivitis  Moderate persistent asthma, likely complicated by COPD.  Chronic smoker.  Chronic marijuana use  Concern for potential COVID-19 infection.  Rule out pneumonia.  Patient has persisting cough.  No worsening fevers or chills, and sputum is nonproductive.  I do hear bibasilar expiratory rhonchi, but I think this is more likely atelectasis than pneumonia.  Will check a chest x-ray and CBC, as well as retest patient for COVID-19.  She is using her inhalers regularly and appropriately.  Discussed with patient that I do think cigarette smoking and marijuana could be contributing to this, as well as her shallow breathing due to the large ventral hernia that causes pain for her.  Discussed symptomatic cares, as I suspect this is likely viral and will just take a little while to improve.  -     COVID-19 Virus PCR MRF ([facility])  -     XR CHEST 2 VW; Future  -     Cancel: CBC with Diff Plt ([facility])  -     CBC w/ Diff and Plt ([facility])  -      fexofenadine (ALLEGRA) 180 MG tablet; Take 1 tablet (180 mg) by mouth daily    Nausea  Morbid obesity (H)  Ventral hernia without obstruction or gangrene  Discussed with patient the importance of healthy diet, concern for marijuana use that may be contributing to her nausea, and concerns about current weight as well as blood sugar levels given her goal to pursue ventral hernia repair.  We will continue to discuss this with patient.  -     ondansetron (ZOFRAN) 4 MG tablet; Take 1 tablet (4 mg) by mouth every 8 hours as needed for nausea    Morbid Obesity  Type 2 diabetes  Checking sugars regularly using new Freestyle Elder monitor.  We reviewed these results and sugars are pretty consistently between 230 and 250.  We will increase her basal insulin from 45 to 50 units twice daily of the Toujeo.  Continue with by tracee, mealtime insulin with sliding scale addition, and recommended she follow-up in 2 weeks with a telephone visit to review sugars.    Documentation of Face to Face and Certification for Home Health Services    I certify that patient: Teresa AUSTIN Medford is under my care and that I, or a nurse practitioner or physician's assistant working with me, had a face-to-face encounter that meets the physician face-to-face encounter requirements with this patient on: 11/20/2020.    This encounter with the patient was in whole, or in part, for the following medical condition, which is the primary reason for home health care: above the knee amputation of left leg.    I certify that, based on my findings, the following services are medically necessary home health services: motorized scooter.      My clinical findings support the need for the above services because: patient is unable to ambulate on her own.        Based on the above findings. I certify that this patient has an above the knee amputation and would benefit from a motorized scooter/wheelchair  The patient is under my care, and I have initiated the  establishment of the plan of care.  This patient will be followed by a physician who will periodically review the plan of care.  Physician/Provider to provide follow up care: Tyra Bullock MD    Patient Instructions   Dr. Bullock will finish forms for wheelchair.      Blood sugars:  Goal is between 150-220.  Eat a little earlier (1:30 or 2:00)  Increase long acting insulin to 50 in AM and 50 in PM.    For cough:  Drink lots of water  Continue to use your inhalers.    Labs and xray today  Restart the Allegra allergy medication.  (Keep taking the Singulair if you want)     For Stomach:  Cutting back on marijuana  Use the nausea pills as needed.        Follow up in 2 weeks to review blood sugars, breathing, cough.     Tyra Bullock MD

## 2020-11-21 RX ORDER — INSULIN GLARGINE 300 U/ML
50 INJECTION, SOLUTION SUBCUTANEOUS 2 TIMES DAILY
Qty: 9 ML | Refills: 11 | Status: SHIPPED | OUTPATIENT
Start: 2020-11-21 | End: 2021-02-09

## 2020-11-21 NOTE — RESULT ENCOUNTER NOTE
Teresa Perez-    Your chest xray came back normal.  I did not see any of the lab results come back--I wonder if you forgot to stop in the lab after your xray.  We are still waiting on your COVID swab, but this reassures me that the problem is not pneumonia or COVID.  Cutting back on the cigarettes and marijuana should help.  I look forward to talking with you in a few weeks more about your diabetes.  Increase the insulin to 50 Units in the morning and evening.  Please call the clinic at [phone] if you have any questions.      Tyra Bullock MD    Please send results to patient.

## 2020-11-22 ENCOUNTER — TELEPHONE (OUTPATIENT)
Dept: FAMILY MEDICINE | Facility: CLINIC | Age: 50
End: 2020-11-22

## 2020-11-22 NOTE — TELEPHONE ENCOUNTER
Informed patient of negative COVID-19 results. She asked about her CXR as well- I let her know that it was read by radiologist as negative for pneumonia.     If patient had close exposure to someone with known COVID-19 (within 6 ft >15 min), patient needs to quarantine for a full 14 days after last exposure. Per MD:    Exposure is defined as being within 6 feet for more than 15 minutes to persons with confirmed COVID-19. All close contacts should follow a 14-day quarantine period. Even if the result is negative, these contacts should continue to quarantine for a full 14 days after last exposure and monitor for symptoms; infection could develop at any time during the quarantine period. Repeat testing at the end of the quarantine period may be recommended for staff or residents of congregate settings (acute care, assisted living, skilled nursing, group homes, long-term care facilities, substance use disorder treatment centers, homeless shelters, and correctional facilities).   [email or web address]/[email or web address]/hcp/eval.html    If patient continues to have ongoing exposure to someone with known COVID-19 (e.g. parent, caretaker), patient needs to quarantine for 14 days after the last exposure to this person during their (the exposure's) 10 days in quarantine. As long as the exposure has no prolonged fever or symptoms (which can extend quarantine time), patient will have to isolate for:    10 days + 14 days after date of exposure's initial symptoms for those exposed to symptomatic patients  10 days + 14 days after date of exposure's positive test for those exposed to asymptomatic patients      If patient was symptomatic at time of testing and remains symptomatic for >72 hours after time of test, can repeat COVID-19 testing.

## 2020-11-23 DIAGNOSIS — R10.84 ABDOMINAL PAIN, GENERALIZED: ICD-10-CM

## 2020-11-23 DIAGNOSIS — H10.45 CHRONIC ALLERGIC CONJUNCTIVITIS: Primary | ICD-10-CM

## 2020-11-23 RX ORDER — ACETAMINOPHEN 500 MG
1000 TABLET ORAL 3 TIMES DAILY PRN
Qty: 100 TABLET | Refills: 3 | Status: SHIPPED | OUTPATIENT
Start: 2020-11-23 | End: 2021-08-30

## 2020-11-23 RX ORDER — CETIRIZINE HYDROCHLORIDE 10 MG/1
10 TABLET ORAL DAILY
Qty: 90 TABLET | Refills: 1 | Status: SHIPPED | OUTPATIENT
Start: 2020-11-23 | End: 2021-07-13

## 2020-11-23 NOTE — RESULT ENCOUNTER NOTE
Called patient to discuss results.  Normal chest xray, no evidence of bacterial infection on blood work.  COVID testing negative.   Sent in prescription for zyrtec for congestion and allergies (Allegra not covered), and extra strength tylenol.      Tyra Bullock MD

## 2020-11-25 NOTE — PROGRESS NOTES
REQUISITION AND JUSTIFICATION FOR DURABLE MEDICAL EQUIPMENT    Patient Name:  Teresa Perez  MR #:  [id number]  :  1970  Age/Gender:  50 year old female  Visit Date:  Teresa Perez seen for seating and wheeled mobility evaluation by Michelle Rodriguez OTR/L, ATP and ATP from [facility] on 10/27/20.    CLINICAL CRITERIA FOR MOBILITY ASSISTIVE EQUIPMENT  Coverage Criteria Per Local Coverage Determination  A) Teresa has mobility limitations due to CVA, B TKA, L AKA, L shoulder impingement, CKD with prior dialysis and asthma that significantly impairs her ability to participate in all of her mobility-related activities of daily living (MRADL). Specifically affected are toileting (being able to get there in time to prevent accidents), dressing, and bathing (getting into the bathroom of designated place). Current equipment used is [employer] standard manual chair that she is unable to functionally propel. This patient needs the new equipment requested to be able to increase safe and pain free participation in MRADLS. Please see additional documentation in the seating and wheeled mobility report for details.   Teresa had a successful clinical trial here, and also a successful trial at home with the recommended equipment. Teresa is very willing and physically / cognitively able to use the recommended equipment to assist her with mobility-related activities of daily living and general mobility.     B) Jaimes mobility limitation cannot be sufficiently and safely resolved by the use of an appropriately fitted cane or walker because she is non ambulatory due to amputations and not tolerating prosthetic use. Strength of legs is 4/5 for one maximal repetition. Fatigue also impacts this patient's ability to ambulate, regardless of the gait aid.    C) Teresa does not have sufficient upper extremity function to self-propel an optimally-configured manual wheelchair in her home to perform MRADLs during a typical day due  to limitations in strength, endurance, range of motion, and coordination. Distance and time to propel a light weight manual wheelchair Nt due to shoulder pain and impingement.  Strength of arms is 4/5.  D)  Teresa is not able to use a POV/scooter because it will not fit in her home environment. Teresa is unable to safely transfer to and from a POV, unable to operate the tiller steering system, and unable to maintain postural stability and position while operating the POV. Teresa needs more appropriate seating and positioning than any scooter seat provides.  E) The need for this equipment is LIFETIME.   RECOMMENDATIONS FOR MOBILITY BASE, SEATING SYSTEM AND COMPONENTS  Hawa DIALLO 2S-SS- this mid wheel drive power wheelchair is needed for this patient to continue to have independent mobility and to be able to allow her to complete or assist in all of her mobility related activities of daily living (MRADLs). This wheelchair will also have the seating and positioning system and seat function she needs to be able to use and tolerate the wheelchair full time, and have functional and comfortable positioning for a full day's activities. Teresa has CVA, B TKA, L AKA, L shoulder impingement, CKD with prior dialysis and asthma which impairs her ability to move in her home without the use of the requested wheelchair. She lives in an accessible apartment with level access and uses transport services for transportation.    2 Batteries and  - gel sealed, and two are necessary to power the wheelchair. They are maintenance free and are safe for travel on the road or in the air. They are necessary to provide reliable use of the power wheelchair on a single charge.    Swing away joystick mount - needed to be able to move the joystick out of the way during transfers, or when at the desk using the computer, or at the table eating, so as not to inadvertently hit the joystick, thus moving the wheelchair or turning the power on or off  without her knowledge.    Synergy seating frame- solid surface to place rehab cushion on.    2 post, height adjustable, removable, full length armrests - needed for arm support at appropriate height, not available with standard armrests    DESIRE comfort 2SPP seat cushion - this pressure distribution and positioning seat cushion will optimally  distribute seating pressures to prevent pressure ulcers, but also provide a stable base of support for her to use during MRADLs.    This equipment is reasonable and necessary with reference to accepted standards of medical practice and treatment of this patient's condition and is not being recommended as a convenience item. Without this recommended equipment, she is highly likely to sustain injuries from falls, develop pressure sores or postural compensation, and/or be bed confined, which those costs far exceed the cost of the requested equipment.    Electronically signed by:  Michelle BANGURA, ATP      Occupational Therapist, Assistive   [phone]      fax: [phone]      [email or web address] [place] [address]  November 25, 2020    I have read and concur with the above recommendations.    Physician Printed Name __________________________________________    Physician SIgnature  _____________________________________________    Date of SIgnature ______________________________    Physician Phone  ______________________________

## 2020-12-08 ENCOUNTER — DOCUMENTATION ONLY (OUTPATIENT)
Dept: FAMILY MEDICINE | Facility: CLINIC | Age: 50
End: 2020-12-08

## 2020-12-08 NOTE — PROGRESS NOTES
To be completed in Nursing note:    Please reference list for forms that require a visit for completion.  Please remind patients that providers are given 3-5 business days to complete and return forms.      Form type: [employer] forms for Power Mobility Device    Date form received: 08    Date form completed by Physician: 20    How was form returned to patient (mailed, faxed, or at  for patient to ): Faxed back to [phone]    Date form mailed/faxed/left at  for patient and sent to HIM for scannin20      Once form is left for patient, faxed, or mailed PCS will then close the documentation only encounter.

## 2020-12-16 DIAGNOSIS — N80.9 ENDOMETRIOSIS: ICD-10-CM

## 2020-12-16 DIAGNOSIS — Z71.6 ENCOUNTER FOR SMOKING CESSATION COUNSELING: ICD-10-CM

## 2020-12-16 RX ORDER — EPINEPHRINE 0.3 MG/.3ML
0.3 INJECTION SUBCUTANEOUS
Qty: 2 ML | Refills: 0 | Status: SHIPPED | OUTPATIENT
Start: 2020-12-16 | End: 2020-12-23

## 2020-12-16 NOTE — TELEPHONE ENCOUNTER
[facility] phone call message- patient requesting a refill:    Full Medication Name: EPINEPHrine (EPIPEN/ADRENACLICK/OR ANY BX GENERIC EQUIV) 0.3 MG/0.3ML injection 2-pack    Dose: Sig - Route: Inject 0.3 mLs (0.3 mg) into the muscle once as needed for anaphylaxis - Intramuscular    Pharmacy confirmed as       [facility]/pharmacy #[id number] - [place], [place] - [address] AT [place] [address]  [place] [address]  Phone: [phone] Fax: [phone]  : Yes    Additional Comments: Please refill and give pt a call once refilled.     OK to leave a message on voice mail? Yes    Primary language: English      needed? No    Call taken on December 16, 2020 at 1:26 PM by Grisel Flores-Cardona

## 2020-12-22 DIAGNOSIS — N80.9 ENDOMETRIOSIS: ICD-10-CM

## 2020-12-22 DIAGNOSIS — Z71.6 ENCOUNTER FOR SMOKING CESSATION COUNSELING: ICD-10-CM

## 2020-12-23 ENCOUNTER — OFFICE VISIT (OUTPATIENT)
Dept: FAMILY MEDICINE | Facility: CLINIC | Age: 50
End: 2020-12-23
Payer: MEDICARE

## 2020-12-23 ENCOUNTER — TELEPHONE (OUTPATIENT)
Dept: FAMILY MEDICINE | Facility: CLINIC | Age: 50
End: 2020-12-23

## 2020-12-23 VITALS
OXYGEN SATURATION: 98 % | SYSTOLIC BLOOD PRESSURE: 118 MMHG | TEMPERATURE: 99 F | DIASTOLIC BLOOD PRESSURE: 74 MMHG | RESPIRATION RATE: 18 BRPM | HEART RATE: 104 BPM

## 2020-12-23 DIAGNOSIS — J45.20 MILD INTERMITTENT ASTHMA WITHOUT COMPLICATION: ICD-10-CM

## 2020-12-23 DIAGNOSIS — R05.9 COUGH: Primary | ICD-10-CM

## 2020-12-23 DIAGNOSIS — R91.8 PULMONARY NODULES: ICD-10-CM

## 2020-12-23 LAB
SARS-COV-2 RNA SPEC QL NAA+PROBE: NOT DETECTED
SPECIMEN SOURCE: NORMAL

## 2020-12-23 PROCEDURE — 87635 SARS-COV-2 COVID-19 AMP PRB: CPT | Performed by: STUDENT IN AN ORGANIZED HEALTH CARE EDUCATION/TRAINING PROGRAM

## 2020-12-23 PROCEDURE — 99214 OFFICE O/P EST MOD 30 MIN: CPT | Mod: CS | Performed by: STUDENT IN AN ORGANIZED HEALTH CARE EDUCATION/TRAINING PROGRAM

## 2020-12-23 RX ORDER — TIOTROPIUM BROMIDE 18 UG/1
18 CAPSULE ORAL; RESPIRATORY (INHALATION) DAILY
Qty: 90 CAPSULE | Refills: 3 | Status: SHIPPED | OUTPATIENT
Start: 2020-12-23 | End: 2021-07-13

## 2020-12-23 RX ORDER — EPINEPHRINE 0.3 MG/.3ML
0.3 INJECTION SUBCUTANEOUS
Qty: 2 ML | Refills: 0 | Status: SHIPPED | OUTPATIENT
Start: 2020-12-23 | End: 2021-04-09

## 2020-12-23 NOTE — PATIENT INSTRUCTIONS
20   CT  [facility]   [phone]  Fax Orders to [phone]     Order faxed to [phone], they will contact patient to schedule.     Maura Galvan    20   PULMONARY REFERRAL    [facility]  [address] [place] [address], [address]  [place] [address]  Phone: [phone]  Fax: [phone]    Referral, demographics, medication list and office notes faxed to [facility] at [phone]. They will contact pt to schedule.     Maura Galvan

## 2020-12-23 NOTE — TELEPHONE ENCOUNTER
Pt states that for the last few days she has been waking up with cold sweats.  She has been checking temp and the highest has been 99.7.  Pt states that she has a dry cough and occasionally coughs up yellow/green phlegm.  She has also been wheezing.  She has asthma and has been using her albuterol inhaler twice a day without much help.  Told pt that she needs to be seen and appt made with Dr Tidwell for today at 1:30 PM in [facility].  Told pt to check in at door [address].  Routed note to Dr Tidwell and Dr Bullock.

## 2020-12-23 NOTE — TELEPHONE ENCOUNTER
[facility] phone call message- general phone call:    Reason for call: she would like to talk to a nurse.    Action desired: call back.    Return call needed: Yes    OK to leave a message on voice mail? Yes    Advised patient to response may take up to 2 business days: Yes    Primary language: English      needed? No    Call taken on December 23, 2020 at 11:27 AM by Lo Lao

## 2020-12-23 NOTE — PROGRESS NOTES
[facility] Note    Patient: Teresa Perez  : 1970  MRN: [id number]         SUBJECTIVE       Teresa Perez is a 50 year old female with a PMH significant for:  Patient Active Problem List   Diagnosis     Health Care Home     Acute peptic ulcer     Other allergy, other than to medicinal agents     Bipolar disorder (H)     Bulging lumbar disc     Cervical dysplasia     Common migraine without aura     Constipation     Dwarfism     Familial hypercholesterolemia     Insomnia     Low back pain     Intermittent asthma     Leg pain, bilateral     Smoking     Degeneration of thoracic or thoracolumbar intervertebral disc     LOMAS (nonalcoholic steatohepatitis)     Disease of lung     Hemorrhoids     Parotid mass     Endometriosis     NNAMDI (obstructive sleep apnea)     History of total right knee replacement     Lateral epicondylitis     Impingement syndrome, shoulder, left     Hx of total knee replacement, left     Chronic pain syndrome     Cough     Borderline personality disorder (H)     Moderate recurrent major depression (H)     Recurrent ventral hernia     Type 2 diabetes mellitus with complication, with long-term current use of insulin (H)     Essential hypertension     Nonruptured cerebral aneurysm     Cerebrovascular accident (CVA) due to embolism (H)     Amputation of leg (H)     CKD (chronic kidney disease) stage 5, GFR less than 15 ml/min (H)     Pre-ulcerative corn or callous     Excessive bleeding in premenopausal period     Morbid obesity (H)     She presents to clinic today with chief complaint of cough.    Has had 3-day history of cough. Cough productive of yellow sputum. No blood in sputum. Also with shortness of breath and occasional wheezing. Overall, symptoms are worsening. Worried about possible pneumonia.    Has had chills and night sweats over the past 3 days. Reports temperatures of 99.0-99.8 F on home thermometer. Does have runny nose and nasal congestion. Reports  headache in bitemporal regions that is dull and achy. Feels that ears are popping. Reports throat is sore and dry. Chest feels diffusely tight. Overall feels fatigued.    Does have history of pulmonary nodules. Continues to smoke cigarettes. Last CT scan in May 2015 with 8x6 mm left lower lobe nodule, which was increased in size from 4 mm in January 2008. Referred to pulmonology who recommended repeat CT scan, which never appears to have been performed.    No loss of taste or smell. No nausea, vomiting, abdominal pain or diarrhea. No muscle or joint pain. Eating and drinking normally. No skin rash. Does have history of asthma and is using Symbicort inhaler twice daily. Used albuterol inhaler three times yesterday and symptoms seemed to get a little better with the albuterol. Blood sugars have been higher than normal over the past 3 days; typically 250-300.    Past Medical History, Past Surgical History, Medications, Allergies, and Family History were reviewed and updated as needed.        REVIEW OF SYSTEMS     CONSTITUTIONAL: See above.  HEENT: See above.  SKIN: See above.  RESPIRATORY: See above.  CARDIOVASCULAR: See above.  GASTROINTESTINAL: See above.  MUSCULOSKELETAL: See above.  ENDOCRINE: See above.        OBJECTIVE     Vitals:    12/23/20 1322   BP: 118/74   BP Location: Right arm   Patient Position: Sitting   Cuff Size: Adult Large   Pulse: 104   Resp: 18   Temp: 99  F (37.2  C)   TempSrc: Oral   SpO2: 98%     There is no height or weight on file to calculate BMI.    Physical Exam:  GENERAL: Awake, alert. No acute distress. Appears comfortable.  HEENT: Head: Normocephalic and atraumatic; no dysmorphic features. Eyes: Eye lids and lashes normal; pupils equal, round and reactive to light; no scleral icterus or conjunctival injection. Ears: Pinnae appear normal; external auditory canals patent; tympanic membranes pearly and opaque without erythema, effusion or bulging. NECK: Supple and symmetric. Trachea  midline. No anterior or posterior cervical lymphadenopathy, no supraclavicular lymphadenopathy.   SKIN: Warm and dry; well-perfused.  LUNGS: No tachypnea or increased work of breathing; good air movement throughout all lung fields; single expiratory wheeze appreciated but breath sounds otherwise breath sounds clear to auscultation bilaterally throughout all lung fields.  CARDIOVASCULAR: Regular rate and rhythm; normal S1 and S2; no S3 or S4; no murmur, rub or click. Radial pulses 2+ and symmetric; normal capillary refill.   ABDOMEN: Non-distended. Soft and non-tender in all quadrants; no masses or hepatosplenomegally.  PSYCH: Normal affect, mood, orientation, memory and insight.    LABORATORY:  No results found for this or any previous visit (from the past 24 hour(s)).      ASSESSMENT AND PLAN     1. Cough  Presents with productive cough, dyspnea, chills, rhinorrhea, nasal congestion, sore throat. Afebrile and hemodynamically stable in clinic. No tachypnea, work of breathing, or hypoxia. Breaths sounds clear with good air movement; good air movement with single wheeze appreciated with no crackles. Differential includes viral URI, COVID-19, pneumonia, tuberculosis, asthma, COPD, lung cancer. Suspect viral URI given constellation of symptoms, clear lung sounds on exam. COVID-19 PCR test obtained. No evidence of asthma or COPD exacerbation; will refill tiotropium today. Does have hx of lung nodules and was recommended to have repeat CT imaging so will order CT chest non-contrast and place Pulmonology referral. Encouraged conservative/symptomatic management. Return precautions discussed.  - COVID-19 Virus PCR MRF ([facility])    2. Pulmonary nodules  May 2015 CT chest with 8x6 mm left lower lobe nodule, slowly increased in size from 4 mm in January 2008. Pulmonology recommended repeat CT imaging which does not appear to have ever been completed. Presents today with acute on chronic cough, chills, night sweats, dyspnea.  Will order CT chest to assess size in pulmonary nodule and place Pulmonology referral.  - PULMONARY MEDICINE REFERRAL; Future  - CT Chest Low Dose Non Contrast; Future    3. Mild intermittent asthma without complication  Refill for tiotropium provided today.  - tiotropium (SPIRIVA) 18 MCG inhaled capsule; Inhale 1 capsule (18 mcg) into the lungs daily  Dispense: 90 capsule; Refill: 3      Return to clinic in 1-2 weeks for follow-up of symptoms or sooner if develops new or worsening symptoms.    Patient was discussed with attending physician, Dr. Donnell Evangelista MD, who agrees with the assessment and plan.    Aubrey Tidwell, PGY-3  [facility]  12/23/2020

## 2020-12-23 NOTE — PROGRESS NOTES
Preceptor Attestation:    Patient seen and evaluated in person. I discussed the patient with the resident. I have verified the content of the note, which accurately reflects my assessment of the patient and the plan of care.   Supervising Physician:  Donnell Evangelista MD.

## 2020-12-26 NOTE — RESULT ENCOUNTER NOTE
I called the patient and left a voicemail message to communicate these test results.    Aubrey Tidwell MD  December 26, 2020

## 2020-12-28 ENCOUNTER — AMBULATORY - HEALTHEAST (OUTPATIENT)
Dept: SCHEDULING | Facility: CLINIC | Age: 50
End: 2020-12-28

## 2020-12-28 DIAGNOSIS — R91.8 PULMONARY NODULES: ICD-10-CM

## 2020-12-30 ENCOUNTER — MEDICAL CORRESPONDENCE (OUTPATIENT)
Dept: HEALTH INFORMATION MANAGEMENT | Facility: CLINIC | Age: 50
End: 2020-12-30

## 2021-01-05 ENCOUNTER — RECORDS - HEALTHEAST (OUTPATIENT)
Dept: ADMINISTRATIVE | Facility: OTHER | Age: 51
End: 2021-01-05

## 2021-01-06 ENCOUNTER — DOCUMENTATION ONLY (OUTPATIENT)
Dept: FAMILY MEDICINE | Facility: CLINIC | Age: 51
End: 2021-01-06

## 2021-01-06 DIAGNOSIS — R10.84 ABDOMINAL PAIN, GENERALIZED: ICD-10-CM

## 2021-01-06 DIAGNOSIS — R05.9 COUGH: ICD-10-CM

## 2021-01-06 RX ORDER — LIDOCAINE 50 MG/G
OINTMENT TOPICAL 3 TIMES DAILY PRN
Qty: 150 G | Refills: 1 | Status: SHIPPED | OUTPATIENT
Start: 2021-01-06 | End: 2021-03-18

## 2021-01-06 NOTE — PROGRESS NOTES
Per [facility], please clarify the Lidocaine Topica Ointment Rx.  They would like to know How often patient is applying, Daily? Please send new Rx.  Deana Castellanos CMA

## 2021-01-06 NOTE — PROGRESS NOTES
Two lidocaine prescriptions in the chart.  Resent the option with 3x daily dosing to the pharmacy. Discontinued the second prescription.      Tyra Bullock MD    Routed to IVA Leblanc

## 2021-01-19 ENCOUNTER — TELEPHONE (OUTPATIENT)
Dept: FAMILY MEDICINE | Facility: CLINIC | Age: 51
End: 2021-01-19

## 2021-01-19 DIAGNOSIS — E11.65 TYPE 2 DIABETES MELLITUS WITH HYPERGLYCEMIA, WITH LONG-TERM CURRENT USE OF INSULIN (H): ICD-10-CM

## 2021-01-19 DIAGNOSIS — Z79.4 TYPE 2 DIABETES MELLITUS WITH HYPERGLYCEMIA, WITH LONG-TERM CURRENT USE OF INSULIN (H): ICD-10-CM

## 2021-01-19 RX ORDER — INSULIN LISPRO 100 [IU]/ML
INJECTION, SOLUTION INTRAVENOUS; SUBCUTANEOUS
Qty: 75 ML | Refills: 3 | Status: SHIPPED | OUTPATIENT
Start: 2021-01-19 | End: 2021-10-13

## 2021-01-19 NOTE — TELEPHONE ENCOUNTER
I adjusted the daily amount on the prescription and resent it for patient.  Can you check with her later this week and make sure she can pick it up?      Tyra Bullock MD

## 2021-01-19 NOTE — TELEPHONE ENCOUNTER
Patient was called to make an appointment for her Depo and during that conversation she stated she was out of her Humalog and when she tried to refill it, the pharmacy stated that it was too soon to fill. Please advise.  Deana Castellanos, CMA

## 2021-01-20 ENCOUNTER — TELEPHONE (OUTPATIENT)
Dept: FAMILY MEDICINE | Facility: CLINIC | Age: 51
End: 2021-01-20

## 2021-01-20 NOTE — TELEPHONE ENCOUNTER
Please schedule patient to come in for labs if she is able, otherwise we can just do them on the day of her Depo visit.  She also needs to be scheduled for a phone visit for Diabetes please.  Or I am at [phone] and I can schedule it.  Deana Castellanos, [facility]

## 2021-01-21 ENCOUNTER — TELEPHONE (OUTPATIENT)
Dept: FAMILY MEDICINE | Facility: CLINIC | Age: 51
End: 2021-01-21

## 2021-01-21 NOTE — TELEPHONE ENCOUNTER
[facility] phone call message- general phone call:    Reason for call: She needs a call back re her insulin.    Action desired: call back.    Return call needed: Yes    OK to leave a message on voice mail? Yes    Advised patient to response may take up to 2 business days: Yes    Primary language: English      needed? No    Call taken on January 21, 2021 at 11:23 AM by Lo Lao

## 2021-01-21 NOTE — TELEPHONE ENCOUNTER
Initially spoke with patient who reports she had not heard back regarding updated Humalog rx. Communicated this was sent to pharmacy.    Received call back from patient who stated pharmacy did not have this. Spoke with pharmacy directly who confirmed they did have updated rx, however when they tried to run it through insurance it was denied. Staff I spoke with stated they would call insurance directly to let them know about updated dose. Communicated this to patient. ./LR

## 2021-01-22 ENCOUNTER — COMMUNICATION - HEALTHEAST (OUTPATIENT)
Dept: PULMONOLOGY | Facility: OTHER | Age: 51
End: 2021-01-22

## 2021-01-28 ENCOUNTER — ALLIED HEALTH/NURSE VISIT (OUTPATIENT)
Dept: FAMILY MEDICINE | Facility: CLINIC | Age: 51
End: 2021-01-28
Payer: MEDICARE

## 2021-01-28 VITALS
OXYGEN SATURATION: 98 % | TEMPERATURE: 99 F | HEART RATE: 102 BPM | RESPIRATION RATE: 20 BRPM | DIASTOLIC BLOOD PRESSURE: 68 MMHG | SYSTOLIC BLOOD PRESSURE: 96 MMHG

## 2021-01-28 DIAGNOSIS — N80.9 ENDOMETRIOSIS: Primary | ICD-10-CM

## 2021-01-28 PROCEDURE — 96372 THER/PROPH/DIAG INJ SC/IM: CPT

## 2021-01-28 PROCEDURE — 99207 PR NO CHARGE LOS: CPT

## 2021-01-28 RX ORDER — MEDROXYPROGESTERONE ACETATE 150 MG/ML
150 INJECTION, SUSPENSION INTRAMUSCULAR
Status: DISCONTINUED | OUTPATIENT
Start: 2021-01-28 | End: 2021-01-28 | Stop reason: CLARIF

## 2021-01-28 RX ADMIN — MEDROXYPROGESTERONE ACETATE 150 MG: 150 INJECTION, SUSPENSION INTRAMUSCULAR at 13:57

## 2021-01-28 NOTE — NURSING NOTE
Clinic Administered Medication Documentation      Depo Provera Documentation    URINE HCG: not indicated    Depo-Provera Standing Order inclusion/exclusion criteria reviewed.   Patient meets: inclusion criteria     BP: 96/68  LAST PAP/EXAM: No results found for: PAP    Prior to injection, verified patient identity using patient's name and date of birth. Medication was administered. Please see MAR and medication order for additional information.     Was entire vial of medication used? Yes  Vial/Syringe: Single dose vial  Expiration Date:  06/30/2022    Patient instructed to remain in clinic for 15 minutes.  NEXT INJECTION DUE: 4/15/21 - 4/29/21      Name of provider who requested the medication administration: Dr. Woodard  Name of provider on site (faculty or community preceptor) at the time of performing the medication administration: Dr. Woodard    Date of next administration: 4/15/21 - 4/29/21  Date of next office visit with provider to renew medication plan (must be seen annually): 11/03/2021

## 2021-02-07 DIAGNOSIS — Z79.4 TYPE 2 DIABETES MELLITUS WITH HYPERGLYCEMIA, WITH LONG-TERM CURRENT USE OF INSULIN (H): ICD-10-CM

## 2021-02-07 DIAGNOSIS — E78.5 HYPERLIPIDEMIA LDL GOAL <100: Primary | ICD-10-CM

## 2021-02-07 DIAGNOSIS — E11.65 TYPE 2 DIABETES MELLITUS WITH HYPERGLYCEMIA, WITH LONG-TERM CURRENT USE OF INSULIN (H): ICD-10-CM

## 2021-02-09 ENCOUNTER — OFFICE VISIT (OUTPATIENT)
Dept: FAMILY MEDICINE | Facility: CLINIC | Age: 51
End: 2021-02-09
Payer: MEDICARE

## 2021-02-09 ENCOUNTER — RECORDS - HEALTHEAST (OUTPATIENT)
Dept: ADMINISTRATIVE | Facility: OTHER | Age: 51
End: 2021-02-09

## 2021-02-09 VITALS
TEMPERATURE: 98.6 F | DIASTOLIC BLOOD PRESSURE: 79 MMHG | OXYGEN SATURATION: 95 % | RESPIRATION RATE: 16 BRPM | HEART RATE: 96 BPM | SYSTOLIC BLOOD PRESSURE: 113 MMHG

## 2021-02-09 DIAGNOSIS — Z71.6 ENCOUNTER FOR SMOKING CESSATION COUNSELING: ICD-10-CM

## 2021-02-09 DIAGNOSIS — R91.8 PULMONARY NODULES: ICD-10-CM

## 2021-02-09 DIAGNOSIS — E78.5 HYPERLIPIDEMIA LDL GOAL <100: ICD-10-CM

## 2021-02-09 DIAGNOSIS — G89.4 CHRONIC PAIN SYNDROME: ICD-10-CM

## 2021-02-09 DIAGNOSIS — G47.33 OSA (OBSTRUCTIVE SLEEP APNEA): ICD-10-CM

## 2021-02-09 DIAGNOSIS — G54.6 PHANTOM LIMB PAIN (H): Primary | ICD-10-CM

## 2021-02-09 DIAGNOSIS — R07.9 CHEST PAIN, UNSPECIFIED TYPE: ICD-10-CM

## 2021-02-09 DIAGNOSIS — T78.40XS ALLERGIC REACTION, SEQUELA: ICD-10-CM

## 2021-02-09 DIAGNOSIS — Z79.4 TYPE 2 DIABETES MELLITUS WITH HYPERGLYCEMIA, WITH LONG-TERM CURRENT USE OF INSULIN (H): ICD-10-CM

## 2021-02-09 DIAGNOSIS — J45.40 MODERATE PERSISTENT ASTHMA WITHOUT COMPLICATION: ICD-10-CM

## 2021-02-09 DIAGNOSIS — E11.65 TYPE 2 DIABETES MELLITUS WITH HYPERGLYCEMIA, WITH LONG-TERM CURRENT USE OF INSULIN (H): ICD-10-CM

## 2021-02-09 DIAGNOSIS — K76.0 FATTY LIVER: ICD-10-CM

## 2021-02-09 LAB
BUN SERPL-MCNC: 23.3 MG/DL (ref 7–19)
CALCIUM SERPL-MCNC: 9.7 MG/DL (ref 8.5–10.1)
CHLORIDE SERPLBLD-SCNC: 97.6 MMOL/L (ref 98–110)
CO2 SERPL-SCNC: 23.1 MMOL/L (ref 20–32)
CREAT SERPL-MCNC: 0.7 MG/DL (ref 0.5–1)
GFR SERPL CREATININE-BSD FRML MDRD: >90 ML/MIN/1.7 M2
GLUCOSE SERPL-MCNC: 344.3 MG'DL (ref 70–99)
HBA1C MFR BLD: 10.1 % (ref 4.1–5.7)
POTASSIUM SERPL-SCNC: 4.2 MMOL/L (ref 3.2–4.6)
SODIUM SERPL-SCNC: 131.8 MMOL/L (ref 132–142)

## 2021-02-09 PROCEDURE — 90746 HEPB VACCINE 3 DOSE ADULT IM: CPT | Performed by: STUDENT IN AN ORGANIZED HEALTH CARE EDUCATION/TRAINING PROGRAM

## 2021-02-09 PROCEDURE — 83036 HEMOGLOBIN GLYCOSYLATED A1C: CPT | Performed by: STUDENT IN AN ORGANIZED HEALTH CARE EDUCATION/TRAINING PROGRAM

## 2021-02-09 PROCEDURE — 99215 OFFICE O/P EST HI 40 MIN: CPT | Mod: 25 | Performed by: STUDENT IN AN ORGANIZED HEALTH CARE EDUCATION/TRAINING PROGRAM

## 2021-02-09 PROCEDURE — G0010 ADMIN HEPATITIS B VACCINE: HCPCS | Performed by: STUDENT IN AN ORGANIZED HEALTH CARE EDUCATION/TRAINING PROGRAM

## 2021-02-09 PROCEDURE — 93000 ELECTROCARDIOGRAM COMPLETE: CPT | Performed by: STUDENT IN AN ORGANIZED HEALTH CARE EDUCATION/TRAINING PROGRAM

## 2021-02-09 PROCEDURE — 36415 COLL VENOUS BLD VENIPUNCTURE: CPT | Performed by: STUDENT IN AN ORGANIZED HEALTH CARE EDUCATION/TRAINING PROGRAM

## 2021-02-09 PROCEDURE — 80048 BASIC METABOLIC PNL TOTAL CA: CPT | Performed by: STUDENT IN AN ORGANIZED HEALTH CARE EDUCATION/TRAINING PROGRAM

## 2021-02-09 RX ORDER — LIDOCAINE 50 MG/G
1 PATCH TOPICAL EVERY 24 HOURS
Qty: 30 PATCH | Refills: 1 | Status: SHIPPED | OUTPATIENT
Start: 2021-02-09 | End: 2021-07-13

## 2021-02-09 RX ORDER — INSULIN GLARGINE 300 U/ML
55 INJECTION, SOLUTION SUBCUTANEOUS 2 TIMES DAILY
Qty: 9 ML | Refills: 11 | Status: SHIPPED | OUTPATIENT
Start: 2021-02-09 | End: 2021-03-30

## 2021-02-09 RX ORDER — AZELASTINE HYDROCHLORIDE 0.5 MG/ML
1 SOLUTION/ DROPS OPHTHALMIC 2 TIMES DAILY
Qty: 6 ML | Refills: 11 | Status: SHIPPED | OUTPATIENT
Start: 2021-02-09 | End: 2022-01-21

## 2021-02-09 NOTE — PATIENT INSTRUCTIONS
Call for CT for lungs and then call for pulmonology follow up visit.     Call [facility] pain clinic to see if you can set up an appointment    We will call about [facility] pain clinic and options for phantom limb and general pain management services.     Increase insulin long acting Toudjeo to 55Units in AM and PM  Keep sliding scale and mealtime novolog the same.     We will call you to schedule diabetes education visit.      [facility] and schedule heart testing.      Follow up in 1 month.     02/10/21   ECHOCARDIOGRAM  & NM TRISH SCAN  [facility]  ([facility])  Phone:[phone]  Fax:[phone]    Referral, demographics, office note and med list faxed to [phone]. They will contact patient to schedule.    GASTROENTEROLOGY REFERRAL  [facility]  Phone [phone]  Fax: [phone]    Online referral placed with [facility] who will contact patient to schedule.     02/10/21   PAIN MANAGEMENT REFERRAL  [facility]  Phone: [phone]  Fax: [phone]    [facility]  [address], [address]  [place] [address]    Faxed referral office notes and current medication list to [phone]. They will contact patient to schedule.     Maura Galvan      02/17/21   Diabetes Education Referral    Diabetes educator at [facility]  Phone: [phone]  Fax: [phone]    Faxed demographics, referral to [phone]. They will contact patient to schedule.     Maura Galvan

## 2021-02-09 NOTE — LETTER
February 11, 2021      Teresa Perez  [address]  [place] [address]        Dear ,    We are writing to inform you of your test results.    Here is a copy of your lab results.  Your blood sugar and A1c is still very high.  I know this is not a surprise.  I am glad we are including diabetic education as part of the plan, and are moving forward with the CPAP and pulmonology visit.  Please call the clinic at [phone] if you have any questions.       Resulted Orders   Basic Metabolic Panel ([facility])   Result Value Ref Range    Urea Nitrogen 23.3 (H) 7.0 - 19.0 mg/dL    Calcium 9.7 8.5 - 10.1 mg/dL    Chloride 97.6 (L) 98.0 - 110.0 mmol/L    Carbon Dioxide 23.1 20.0 - 32.0 mmol/L    Creatinine 0.7 0.5 - 1.0 mg/dL    Glucose 344.3 (H) 70.0 - 99.0 mg'dL    Potassium 4.2 3.2 - 4.6 mmol/L    Sodium 131.8 (L) 132.0 - 142.0 mmol/L    GFR Estimate >90 >60.0 mL/min/1.7 m2    GFR Estimate If Black >90 >60.0 mL/min/1.7 m2   Hemoglobin A1c ([facility])   Result Value Ref Range    Hemoglobin A1C 10.1 (H) 4.1 - 5.7 %       If you have any questions or concerns, please call the clinic at the number listed above.       Sincerely,      Tyra Bullock MD

## 2021-02-09 NOTE — NURSING NOTE
Request for Speical Transportation for patient was completed online today.  Message from [facility]:    Request Received  We have received your request and you will be notified of the member s approval or denial for special transportation services within 10 business days. We will contact you with any questions about your request.    If services have been approved, an authorization number and a date range for transportation services will be provided to you.      Questions about Special Transportation    If you have any questions, please contact the [facility] Provider Service Center at [phone].    [facility] members and member representatives should call the Customer Service number located on the back of their member ID card.    Thank you,    Baldomero Castellanos, CMA

## 2021-02-09 NOTE — PROGRESS NOTES
Nursing Notes:   Deana Castellanos CMA  2/9/2021  9:07 AM  Signed  Request for Speical Transportation for patient was completed online today.  Message from [employer]:    Request Received  We have received your request and you will be notified of the member s approval or denial for special transportation services within 10 business days. We will contact you with any questions about your request.    If services have been approved, an authorization number and a date range for transportation services will be provided to you.      Questions about Special Transportation    If you have any questions, please contact the [employer] [facility] at [phone].    [facility] members and member representatives should call the Customer Service number located on the back of their member ID card.    Thank you,    Baldomero Castellanos, [facility]        SUBJECTIVE  Teresa AUSTIN Chris is a 50 year old female with past medical history significant for    Patient Active Problem List   Diagnosis     Health Care Home     Acute peptic ulcer     Other allergy, other than to medicinal agents     Bipolar disorder (H)     Bulging lumbar disc     Cervical dysplasia     Common migraine without aura     Constipation     Dwarfism     Familial hypercholesterolemia     Insomnia     Low back pain     Intermittent asthma     Leg pain, bilateral     Smoking     Degeneration of thoracic or thoracolumbar intervertebral disc     LOMAS (nonalcoholic steatohepatitis)     Disease of lung     Hemorrhoids     Parotid mass     Endometriosis     NNAMDI (obstructive sleep apnea)     History of total right knee replacement     Lateral epicondylitis     Impingement syndrome, shoulder, left     Hx of total knee replacement, left     Chronic pain syndrome     Cough     Borderline personality disorder (H)     Moderate recurrent major depression (H)     Recurrent ventral hernia     Type 2 diabetes mellitus with complication, with long-term current use of  insulin (H)     Essential hypertension     Nonruptured cerebral aneurysm     Cerebrovascular accident (CVA) due to embolism (H)     Amputation of leg (H)     CKD (chronic kidney disease) stage 5, GFR less than 15 ml/min (H)     Pre-ulcerative corn or callous     Excessive bleeding in premenopausal period     Morbid obesity (H)     Others present at the visit:  None    Presents for   Chief Complaint   Patient presents with     Diabetes     Pt is here to follow up on diabetes.     Follow Up     Pt would still like to follow up with the [facility] pain clinic as well as the pulmonologist.  She has not yet done the CT, as she lost the number to call and make the appt.  She states the wheelchair is being delivered in two weeks.     Medication Reconciliation     Complete.      Patient presents for evaluation of multiple issues.    First concern is for pulmonary status.  She was unable to make the CT appointment to follow-up on her lung nodule, and therefore has been unable to schedule pulmonology follow-up.  She still has difficulty with her breathing, significant coughing, and pain in her large abdominal hernia with coughing.  She reports taking her inhalers regularly, and gets minimal relief.  She has been cutting back on her smoking, both cigarettes and marijuana, and is currently smoking half a pack of cigarettes per day and 1 joint.  Does notice worsening breathing with smoking, and this has helped her cut back some.  She has not used her CPAP or NNAMDI mask for a while, because they are old, need to be cleaned, and are not comfortable.  Does notice a chronic daily cough that is somewhat productive.  Gets more short of breath with activity and exertion.    She would also like to talk about her chest pain.  Has been getting chest pain present about 5 times per week.  It is located in the mid chest and is a pressure sensation.  It would last for couple of minutes, sometimes longer, and can be associated with exertion but  "also happens when sitting at rest.  She becomes short of breath, diaphoretic, and notices fast heart rate when the episodes happen.  Has been felt more bloated and swollen over the last few weeks as well.  She shares that she recently discovered further information about her mother who had \"enlarged blood vessels \".  Patient shares that her mother had something called \"fredy\" and had significant heart problems from this.  She is interested in seeing a cardiologist.    Also wants to talk about her diabetes.  Reports using her insulin regularly and is doing on average 4 shots per day.  Reports doing 50 units of the Toujeo twice daily, and 24 units of NovoLog with meals plus sliding scale.  Most of the sugars in her meter today are over 300.  She reports that she has a poor appetite and has not been eating differently than previous.  Does have chronic daily nausea and abdominal pain from her hernia, and this limits her intake.  Reports good water intake, and gets help from her PCA to cook meals.  No symptoms of low sugars.  She reports concerns about increasing her insulin on her own because she has had difficulty with hypoglycemia in the past.    She would like to talk about pain.  Has difficulty with chronic diffuse full body pain.  Feels like her joints are more swollen and painful, and she hurts everywhere.  More bothersome however is her phantom limb pain in the area of her previous amputation of her left leg.  She describes a sharp shooting pain \"like lightning \"that travels up her leg.  This comes and goes but can happen at any time, and does wake her up from sleep at night.  She tries to ignore the pain and does not find this helpful.  Is been taking gabapentin regularly for a while, which helps some with her pain but does not help with this type of pain.  She also gets spasms and cramping in her legs as well as some swelling.  She is interested in comprehensive pain management, as well as options for phantom " limb pain.  She has been treated for chronic pain at [facility] in the past.  Has also been treated here, but is no longer eligible for opiates at [place] because of previous cocaine use.  She uses marijuana daily.  Does feel that the marijuana is helpful for her pain.    She also shares she is due for GI follow-up.  Saw specialist there for fatty liver in the past.  Was on some type of injectable medication, and would like to get back into see them.    OBJECTIVE:  Vitals: /79 (BP Location: Right arm, Patient Position: Sitting, Cuff Size: Adult Large)   Pulse 96   Temp 98.6  F (37  C) (Oral)   Resp 16   SpO2 95%   BMI= There is no height or weight on file to calculate BMI.    Vitals:  Vitals are reviewed and are within the normal range.  Nontachycardic.  O2 sat stable.  Gen:  Alert, pleasant, no acute distress  Cardiac:  Regular rate and rhythm, no murmurs, rubs or gallops  Respiratory:  Lungs clear to auscultation bilaterally, some transmitted upper airway noises.  No crackles or wheezes.  Lungs actually sound better than typical.  Abdomen:  Soft, mild diffuse tenderness.  Large ventral hernia, easily reducible.  Extremities: Bilateral legs with trace edema, no pitting, pulses present on right leg.  Left stump without erythema, ulceration, and trace edema.  Nontender with palpation.    No results found for any visits on 02/09/21.      ASSESSMENT AND PLAN:      Teresa was seen today for diabetes, follow up and medication reconciliation.  Multiple issues to discuss today.    Diagnoses and all orders for this visit:    Phantom limb pain (H)  Chronic pain syndrome  Patient struggles with chronic pain.  Would like alternative suggestions for her phantom limb pain in particular.  Currently she is doing scheduled Tylenol, scheduled gabapentin, as needed ibuprofen, lidocaine ointment, and would like to start patches today.  She is also using Voltaren gel.  Previously on a pain contract with [facility], and  received narcotics here for period of time, but medications were discontinued after cocaine was present in her urine.  She does smoke marijuana on a regular basis which is helpful for her pain and would be interested in medical cannabis.  Appreciate recommendations on treatment for phantom limb pain and other nonnarcotic options.  -     PAIN MANAGEMENT REFERRAL; Future  -     lidocaine (LIDODERM) 5 % patch; Place 1 patch onto the skin every 24 hours To prevent lidocaine toxicity, patient should be patch free for 12 hrs daily.    Hypertension  Type 2 diabetes mellitus with hyperglycemia, with long-term current use of insulin (H)  Hyperlipidemia LDL goal <100  Awaiting A1c results.  Blood sugars on meter are significantly above goal.  She is on high doses of insulin.  We discussed the importance of getting blood sugars down in order to be eligible for the hernia repair surgery.  We will continue to titrate up and increase the Toujeo to 55 units twice daily.  Continue with the 24 units +2 per 50/100 sliding scale.  Will refer to diabetic education, check urine microalbumin.  She has had difficulty with her sensors for her freestyle augustine, so this has not been as helpful as we hoped.  Appreciate diabetic educator input.  -     Microalbumin Creatinine Ratio Random Ur ([facility])  -     Basic Metabolic Panel ([place])  -     Hemoglobin A1c ([facility])  -     insulin glargine U-300 (TOUJEO SOLOSTAR) 300 UNIT/ML (1 units dial) pen; Inject 55 Units Subcutaneous 2 times daily  -     AMBULATORY ADULT DIABETES EDUCATOR REFERRAL; Future      Allergic reaction, sequela.  Requesting refills.  -     azelastine (OPTIVAR) 0.05 % ophthalmic solution; Apply 1 drop to eye 2 times daily    Chest pain, unspecified type.  Has what sounds like chronic stable angina.  This however is an increase from previous symptoms last year.  Has multiple risk factors for cardiac disease.  Will order stress test, echo.  EKG looked okay today.  Would  benefit from a cardiologist, and will place referral for that as well.  -     EKG 12-lead complete w/read - Clinics  -     Echocardiogram Complete; Future  -     Nuclear Med with Lexiscan (Stress Test); Future    Fatty liver.  Previously seen at [facility] for this.  Reports being on some injectable treatment although I do not see further information, and she has not been in for a while.  Will place new referral and help reestablish care there for treatment of her fatty liver.  -     GASTROENTEROLOGY ADULT REF CONSULT ONLY; Future    Lung Nodule  Asthma  COPD  Chronic cigarette use  NNAMDI (obstructive sleep apnea).  Has underlying asthma, long-term smoker, NNAMDI, with likely COPD.  Needs monitoring of her lung nodule.  Provided information for getting rescheduled for her CT chest, and then will call and make a appointment with the pulmonology clinic.  Would like to also do further reevaluation of her CPAP mask, as I think she needs new fitting and adjustment.  I am hopeful this can be done through the same pulmonology clinic.  Patient has been working on cutting back on smoking.  We discussed this again today and she will continue to work on cutting back.  Has been challenging given the stressful situation of the pandemic and her increased anxiety.  -     SLEEP EVALUATION & MANAGEMENT REFERRAL - ADULT -Other (Respond in commments) ([facility] location (already doing pulm care there); Future    I spent 60 minutes with patient, including review of medications, previous specialty visits, EKG, and lab monitoring.    Patient Instructions   Call for CT for lungs and then call for pulmonology follow up visit.     Call [facility] pain clinic to see if you can set up an appointment    We will call about [facility] pain clinic and options for phantom limb and general pain management services.     Increase insulin long acting Toudjeo to 55Units in AM and PM  Keep sliding scale and mealtime novolog the same.     We will call  you to schedule diabetes education visit.      [facility] and schedule heart testing.      Follow up in 1 month.       Tyra Bullock MD

## 2021-02-11 ENCOUNTER — RECORDS - HEALTHEAST (OUTPATIENT)
Dept: CARDIOLOGY | Facility: HOSPITAL | Age: 51
End: 2021-02-11

## 2021-02-11 ENCOUNTER — RECORDS - HEALTHEAST (OUTPATIENT)
Dept: ADMINISTRATIVE | Facility: OTHER | Age: 51
End: 2021-02-11

## 2021-02-11 DIAGNOSIS — R07.9 CHEST PAIN, UNSPECIFIED TYPE: ICD-10-CM

## 2021-02-11 NOTE — RESULT ENCOUNTER NOTE
Teresa Perez-    Here is a copy of your lab results.  Your blood sugar and A1c is still very high.  I know this is not a surprise.  I am glad we are including diabetic education as part of the plan, and are moving forward with the CPAP and pulmonology visit.  Please call the clinic at [phone] if you have any questions.      Tyra Bullock MD    Please send results to patient.

## 2021-02-12 ENCOUNTER — AMBULATORY - HEALTHEAST (OUTPATIENT)
Dept: PALLIATIVE MEDICINE | Facility: OTHER | Age: 51
End: 2021-02-12

## 2021-02-12 DIAGNOSIS — R11.0 NAUSEA: ICD-10-CM

## 2021-02-12 DIAGNOSIS — G54.6 PHANTOM LIMB PAIN (H): ICD-10-CM

## 2021-02-12 DIAGNOSIS — G89.4 CHRONIC PAIN SYNDROME: ICD-10-CM

## 2021-02-15 RX ORDER — ONDANSETRON 4 MG/1
TABLET, FILM COATED ORAL
Qty: 18 TABLET | Refills: 0 | Status: SHIPPED | OUTPATIENT
Start: 2021-02-15 | End: 2021-02-24

## 2021-02-15 NOTE — TELEPHONE ENCOUNTER
I called Ms. Perez to discuss lack of insurance coverage for lidocaine patches.  She plans to schedule the pain referral appointment (they are playing phone tag right now).  She will use the lidocaine ointment in the meanwhile and doesn't need refill.  We also reviewed her recent lab results.  -- No action at this time.  No alternative sent.    [Aside: has she done mirror therapy?]    iLs Vidal MD  (covering for Dr. Bullock while out of office)

## 2021-02-15 NOTE — TELEPHONE ENCOUNTER
Please send alternative for Lidocaine Patches.  Not covered by insurance.  No alternatives were listed.  Deana Castellanos, CMA

## 2021-02-15 NOTE — TELEPHONE ENCOUNTER
Refilled ondansetron.  -- Reviewed QTc earlier this month: 443 ms (normal).    Lis Vidal MD  (covering for Dr. Bullock while out of office)

## 2021-02-17 ENCOUNTER — AMBULATORY - HEALTHEAST (OUTPATIENT)
Dept: PALLIATIVE MEDICINE | Facility: OTHER | Age: 51
End: 2021-02-17

## 2021-02-18 ENCOUNTER — COMMUNICATION - HEALTHEAST (OUTPATIENT)
Dept: ADMINISTRATIVE | Facility: CLINIC | Age: 51
End: 2021-02-18

## 2021-02-18 DIAGNOSIS — G25.81 RESTLESS LEGS SYNDROME: ICD-10-CM

## 2021-02-18 RX ORDER — PRAMIPEXOLE DIHYDROCHLORIDE 0.25 MG/1
0.25 TABLET ORAL AT BEDTIME
Qty: 90 TABLET | Refills: 1 | Status: SHIPPED | OUTPATIENT
Start: 2021-02-18 | End: 2021-03-18

## 2021-02-24 ENCOUNTER — TELEPHONE (OUTPATIENT)
Dept: FAMILY MEDICINE | Facility: CLINIC | Age: 51
End: 2021-02-24

## 2021-02-24 DIAGNOSIS — J45.30 MILD PERSISTENT ASTHMA WITHOUT COMPLICATION: ICD-10-CM

## 2021-02-24 DIAGNOSIS — M53.3 SACROILIAC JOINT PAIN: Primary | ICD-10-CM

## 2021-02-24 RX ORDER — ALBUTEROL SULFATE 0.83 MG/ML
2.5 SOLUTION RESPIRATORY (INHALATION) EVERY 6 HOURS PRN
Qty: 3 ML | Refills: 3 | Status: SHIPPED | OUTPATIENT
Start: 2021-02-24 | End: 2022-03-30

## 2021-02-24 NOTE — TELEPHONE ENCOUNTER
Made outreach call to patient after discussing her case with Dr. Lucio and [facility] pain clinic.      Got a packet in the mail from them and plans to complete this and get a visit scheduled.  She is hopeful there will be good options for her phantom limb pain, and understands they will not be prescribing opiates, and she is okay with that.  She is not desiring opiates right now--just wants improvement in phamtom leg symptoms.      Has been having more trouble with pain in lower back and has benefited from SI joint injections in the past.  She is hoping I can place aCDI order for SI joint injections on both sides.      Has appointment for the cardiac stress test coming up in two weeks.      Has not heard back from the lung specialists about a visit date, but she has schedule her chest  CT--but it's not until April.     Feeling overwhelmed with all the appointments and feeling like it is a lot right now.      Wheelchair is coming tomorrow!  Delivering the wheechair at 3:00PM.  She's looking forward to it, but it does make her nervous and has triggered some increased anxiety.      Wanted to check in on current supports.  She currently has a mental health  and a CADI waiver , virtual therapy weekly, and has a psychiatric visit this Friday.  Feeling well supported in moving forward with her visits and taking care of her health needs.      Wondering if we should change her blood sugars.  Had a couple of lows below 60s.  One was at night, and one in the morning a few days ago.  Her mom passed away at [facility], 2016.  Hadn't been back to the hospital since then.  Had an appt at  for heart testing, and woke up at 3:00AM vomitting and felt horrible.  She reschedule for 2 weeks from today.  Her brother is going to go with her for the test.  He was around when mom  as well.  This was when the sugars were low.  Get high if she eats late.      Discussed that with the lows, I would  like to wait to make changes.  Likely this was due to decreased appetite and vomitting with the increased anxiety.  I have refilled her zofran.     She is still coughing quite a bit, and sometimes bad enough that she throws up.  She requested a refill on her albuterol nebs.  These seem to help the breathing and cough some. .  Allergies are worse with cat shedding its coat and the change in weather has affected her breathing as well.      Albuterol neb refill sent.  No change in insulin recommended today  Orders for Bilateral SI joint injections placed.     Tyra Bullock MD

## 2021-02-24 NOTE — TELEPHONE ENCOUNTER
02/24/21   [facility]  [facility]: [phone]  Fax: [phone]    Orders and demographics faxed to [phone]. They will contact patient to schedule.     Maura Galvan

## 2021-03-05 ENCOUNTER — HOSPITAL ENCOUNTER (OUTPATIENT)
Dept: CT IMAGING | Facility: CLINIC | Age: 51
Discharge: HOME | End: 2021-03-05
Attending: STUDENT IN AN ORGANIZED HEALTH CARE EDUCATION/TRAINING PROGRAM

## 2021-03-08 ENCOUNTER — HOSPITAL ENCOUNTER (OUTPATIENT)
Dept: CT IMAGING | Facility: CLINIC | Age: 51
Discharge: HOME | End: 2021-03-08
Attending: STUDENT IN AN ORGANIZED HEALTH CARE EDUCATION/TRAINING PROGRAM

## 2021-03-08 DIAGNOSIS — R05.9 COUGH: ICD-10-CM

## 2021-03-08 DIAGNOSIS — R91.8 PULMONARY NODULES: ICD-10-CM

## 2021-03-09 ENCOUNTER — TELEPHONE (OUTPATIENT)
Dept: FAMILY MEDICINE | Facility: CLINIC | Age: 51
End: 2021-03-09

## 2021-03-09 DIAGNOSIS — R91.8 PULMONARY NODULES: ICD-10-CM

## 2021-03-09 DIAGNOSIS — R05.9 COUGH: ICD-10-CM

## 2021-03-09 NOTE — TELEPHONE ENCOUNTER
Called patient back, she states she did not need any assistance from me and would like to speak with Dr. Bullock. She declined to tell me what it was concerning. Routed to Dr. Bullock. ./KRISS

## 2021-03-09 NOTE — TELEPHONE ENCOUNTER
[facility] phone call message- general phone call:    Reason for call: she needs to talk to .    Action desired: call back    Return call needed: Yes    OK to leave a message on voice mail? Yes    Advised patient to response may take up to 2 business days: Yes    Primary language: English      needed? No    Call taken on March 9, 2021 at 9:42 AM by Lo Lao

## 2021-03-10 ENCOUNTER — TRANSFERRED RECORDS (OUTPATIENT)
Dept: HEALTH INFORMATION MANAGEMENT | Facility: CLINIC | Age: 51
End: 2021-03-10

## 2021-03-10 ENCOUNTER — HOSPITAL ENCOUNTER (OUTPATIENT)
Dept: NUCLEAR MEDICINE | Facility: CLINIC | Age: 51
Discharge: HOME | End: 2021-03-10
Attending: STUDENT IN AN ORGANIZED HEALTH CARE EDUCATION/TRAINING PROGRAM

## 2021-03-10 ENCOUNTER — HOSPITAL ENCOUNTER (OUTPATIENT)
Dept: CARDIOLOGY | Facility: CLINIC | Age: 51
Discharge: HOME | End: 2021-03-10
Attending: STUDENT IN AN ORGANIZED HEALTH CARE EDUCATION/TRAINING PROGRAM

## 2021-03-10 DIAGNOSIS — R07.9 CHEST PAIN, UNSPECIFIED TYPE: ICD-10-CM

## 2021-03-10 LAB
CV STRESS CURRENT BP HE: NORMAL
CV STRESS CURRENT HR HE: 102
CV STRESS CURRENT HR HE: 103
CV STRESS CURRENT HR HE: 104
CV STRESS CURRENT HR HE: 112
CV STRESS CURRENT HR HE: 112
CV STRESS CURRENT HR HE: 113
CV STRESS CURRENT HR HE: 114
CV STRESS CURRENT HR HE: 115
CV STRESS CURRENT HR HE: 115
CV STRESS CURRENT HR HE: 116
CV STRESS CURRENT HR HE: 116
CV STRESS CURRENT HR HE: 117
CV STRESS DEVIATION TIME HE: NORMAL
CV STRESS ECHO PERCENT HR HE: NORMAL
CV STRESS EXERCISE STAGE HE: NORMAL
CV STRESS FINAL RESTING BP HE: NORMAL
CV STRESS FINAL RESTING HR HE: 113
CV STRESS MAX HR HE: 118
CV STRESS MAX TREADMILL GRADE HE: 0
CV STRESS MAX TREADMILL SPEED HE: 0
CV STRESS PEAK DIA BP HE: NORMAL
CV STRESS PEAK SYS BP HE: NORMAL
CV STRESS PHASE HE: NORMAL
CV STRESS PROTOCOL HE: NORMAL
CV STRESS RESTING PT POSITION HE: NORMAL
CV STRESS ST DEVIATION AMOUNT HE: NORMAL
CV STRESS ST DEVIATION ELEVATION HE: NORMAL
CV STRESS ST EVELATION AMOUNT HE: NORMAL
CV STRESS TEST TYPE HE: NORMAL
CV STRESS TOTAL STAGE TIME MIN 1 HE: NORMAL
RATE PRESSURE PRODUCT: NORMAL
STRESS ECHO BASELINE DIASTOLIC HE: 114
STRESS ECHO BASELINE HR: 103
STRESS ECHO BASELINE SYSTOLIC BP: 156
STRESS ECHO CALCULATED PERCENT HR: 69 %
STRESS ECHO LAST STRESS DIASTOLIC BP: 76
STRESS ECHO LAST STRESS HR: 116
STRESS ECHO LAST STRESS SYSTOLIC BP: 121
STRESS ECHO TARGET HR: 171

## 2021-03-11 DIAGNOSIS — R07.9 CHEST PAIN, UNSPECIFIED TYPE: ICD-10-CM

## 2021-03-11 NOTE — LETTER
March 12, 2021      Teresa Perez  [address]  [place] [address]        Dear ,    We are writing to inform you of your test results.      I recently receive the results of your heart stress test.  Overall, your heart is strong and is pumping well, but there is an area that looks like it has had some damage in the past.  We should talk about this more together and make a plan for next steps.  Please call [phone] to schedule a follow up visit to discuss further.     No results found from the In Basket message.    If you have any questions or concerns, please call the clinic at the number listed above.       Sincerely,      Tyra Bullock MD

## 2021-03-12 ENCOUNTER — TELEPHONE (OUTPATIENT)
Dept: FAMILY MEDICINE | Facility: CLINIC | Age: 51
End: 2021-03-12

## 2021-03-12 NOTE — TELEPHONE ENCOUNTER
[facility] phone call message- general phone call:    Reason for call: she needs a call back from her PCP.    Action desired: call back.    Return call needed: Yes    OK to leave a message on voice mail? Yes    Advised patient to response may take up to 2 business days: Yes         Primary language: English      needed? No    Call taken on March 12, 2021 at 3:33 PM by Lo Lao

## 2021-03-12 NOTE — TELEPHONE ENCOUNTER
Attempted outreach call to patient today, 3/12/2021 at 11:57 AM.  No answer.  Left message to patient that I would be around until about 3:30, and asked her to call back during that time.     Tyra Bullock MD

## 2021-03-12 NOTE — RESULT ENCOUNTER NOTE
Teresa Perez-    I recently receive the results of your heart stress test.  Overall, your heart is strong and is pumping well, but there is an area that looks like it has had some damage in the past.  We should talk about this more together and make a plan for next steps.  Please call [phone] to schedule a follow up visit to discuss further.     Tyra Bullock MD    Please send results to patient.

## 2021-03-15 NOTE — TELEPHONE ENCOUNTER
Communicated following results to patient:    Teresa Perez-     I recently receive the results of your heart stress test.  Overall, your heart is strong and is pumping well, but there is an area that looks like it has had some damage in the past.  We should talk about this more together and make a plan for next steps.  Please call [phone] to schedule a follow up visit to discuss further.      Tyra Bullock MD    All questions answered. She is already scheduled for follow up with Dr. Bullock during her next available appointment today. ./LR

## 2021-03-15 NOTE — RESULT ENCOUNTER NOTE
I spoke with the patient via telephone call and communicated these results.    Aubrey Tidwell MD  March 15, 2021

## 2021-03-17 ENCOUNTER — TRANSFERRED RECORDS (OUTPATIENT)
Dept: HEALTH INFORMATION MANAGEMENT | Facility: CLINIC | Age: 51
End: 2021-03-17

## 2021-03-30 ENCOUNTER — VIRTUAL VISIT (OUTPATIENT)
Dept: FAMILY MEDICINE | Facility: CLINIC | Age: 51
End: 2021-03-30
Payer: MEDICARE

## 2021-03-30 ENCOUNTER — TELEPHONE (OUTPATIENT)
Dept: FAMILY MEDICINE | Facility: CLINIC | Age: 51
End: 2021-03-30

## 2021-03-30 DIAGNOSIS — R94.39 ABNORMAL STRESS TEST: Primary | ICD-10-CM

## 2021-03-30 DIAGNOSIS — E11.65 TYPE 2 DIABETES MELLITUS WITH HYPERGLYCEMIA, WITH LONG-TERM CURRENT USE OF INSULIN (H): ICD-10-CM

## 2021-03-30 DIAGNOSIS — Z86.73 HISTORY OF CVA (CEREBROVASCULAR ACCIDENT): ICD-10-CM

## 2021-03-30 DIAGNOSIS — I10 BENIGN ESSENTIAL HYPERTENSION: ICD-10-CM

## 2021-03-30 DIAGNOSIS — Z79.4 TYPE 2 DIABETES MELLITUS WITH HYPERGLYCEMIA, WITH LONG-TERM CURRENT USE OF INSULIN (H): ICD-10-CM

## 2021-03-30 DIAGNOSIS — Z71.6 ENCOUNTER FOR SMOKING CESSATION COUNSELING: ICD-10-CM

## 2021-03-30 PROCEDURE — 99443 PR PHYSICIAN TELEPHONE EVALUATION 21-30 MIN: CPT | Mod: 95 | Performed by: STUDENT IN AN ORGANIZED HEALTH CARE EDUCATION/TRAINING PROGRAM

## 2021-03-30 RX ORDER — INSULIN GLARGINE 300 U/ML
INJECTION, SOLUTION SUBCUTANEOUS
Qty: 9 ML | Refills: 11 | Status: SHIPPED | OUTPATIENT
Start: 2021-03-30 | End: 2021-08-31

## 2021-03-30 RX ORDER — NICOTINE 21 MG/24HR
1 PATCH, TRANSDERMAL 24 HOURS TRANSDERMAL EVERY 24 HOURS
Qty: 14 PATCH | Refills: 0 | Status: SHIPPED | OUTPATIENT
Start: 2021-03-30 | End: 2021-07-13

## 2021-03-30 RX ORDER — METOPROLOL TARTRATE 50 MG
50 TABLET ORAL 2 TIMES DAILY
Qty: 180 TABLET | Refills: 1 | Status: SHIPPED | OUTPATIENT
Start: 2021-03-30 | End: 2021-11-30

## 2021-03-30 NOTE — TELEPHONE ENCOUNTER
Outreach call placed to patient to check in after she missed her appointment.  Want to discuss stress test results and next steps, and would be happy to do this as either a phone or in person.  TCC.    Tyra Bullock MD

## 2021-03-30 NOTE — PROGRESS NOTES
Teresa is a 50 year old who is being evaluated via a billable telephone visit.      What phone number would you like to be contacted at? [phone]  How would you like to obtain your AVS? Mail a copy    Assessment and Plan:    Teresa was seen today for results, imm/inj and medication reconciliation.      Diagnoses and all orders for this visit:    Abnormal stress test.  Referral placed to discuss cardiac risk and further testing with cardiologist.  She is on lisinopril, metoprolol, statin, and Plavix.  Does have room to titrate up on metoprolol, so we did this today increasing from 25 mg to 50 mg.  Appreciate cardiology recs.  -     CARDIOLOGY EVAL ADULT REFERRAL; Future  -     metoprolol tartrate (LOPRESSOR) 50 MG tablet; Take 1 tablet (50 mg) by mouth 2 times daily    Encounter for smoking cessation counseling.  Discussed smoking cessation with patient for greater than 5 minutes.  She is currently smoking more than a pack a day.  Is interested in trying patches.  -     nicotine (NICODERM CQ) 21 MG/24HR 24 hr patch; Place 1 patch onto the skin every 24 hours  -     nicotine (NICORETTE) 4 MG lozenge; Place 1 lozenge (4 mg) inside cheek as needed for smoking cessation  -     nicotine (NICODERM CQ) 14 MG/24HR 24 hr patch; Place 1 patch onto the skin every 24 hours  -     nicotine (NICODERM CQ) 7 MG/24HR 24 hr patch; Place 1 patch onto the skin every 24 hours  -     CARDIOLOGY EVAL ADULT REFERRAL; Future    History of CVA (cerebrovascular accident)  -     metoprolol tartrate (LOPRESSOR) 50 MG tablet; Take 1 tablet (50 mg) by mouth 2 times daily    Type 2 diabetes mellitus with hyperglycemia, with long-term current use of insulin (H).  We will continue to titrate up gradually on her diabetes medications.  Increase from 55 units twice daily to 55 units in a.m. and 60 units in p.m. of her long-acting Toujeo.  No change in sliding scale today.  Encourage regular meal intake.  Continue regular BS checks using freestyle  augustine.  Patient is currently doing pre-meal checks, as well as intermittent 2 hours post checks.    -     insulin glargine U-300 (TOUJEO SOLOSTAR) 300 UNIT/ML (1 units dial) pen; Take 55 Units in AM and 60 Units in PM.    Patient was scheduled for Covid vaccine appointment at upcoming vaccine event at [facility] on April 3.      Bryson Moore is a 50 year old who presents for the following health issues:    Patient presents for telephone visit to discuss her recent stress test results.  Stress testing was done on March 10.  She notes that she did have some discomfort during the test, and had to do some deep breathing to help compensate for symptoms.  She described a sensation of chest pressure, as well as some shortness of breath.  Did report being very anxious about the test.    She continues to note chest pain with increased levels of exertion.  She has shortness of breath and cough at baseline, but notices that this gets worse when moving around.  She describes a strong history of heart disease and some abnormality in her mother that led to multiple vascular issues.  She does not remember the name.  She is concerned that she may have the same problem.    We discussed her stress test results which were mildly abnormal.  There is a small area of nontransmural infarction involving the distal septal wall, but no ischemia.  LV ejection fraction is greater than 70%.  We discussed that this warrants additional work-up, and that I recommend she see a cardiologist to discuss things further, and she agrees.    We reviewed medications that one should be on for heart disease, and she is already taking metoprolol, lisinopril, and is on Plavix because she has a allergy to aspirin.  She is on 80 mg of pravastatin due to an allergy to atorvastatin.  We discussed other recommendations for heart protection including improving her breathing and exercise tolerance, improving her glucose control for her diabetes, and  smoking cessation.    She is interested in quitting smoking.  Would like to start with patches.  Has done these in the past with mild success.  She is currently smoking a little over 1 pack a day.  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    We discussed her sugars.  Typically between 200-300.  She gets symptoms when below 200.  Is taking 55 units of the Toujeo in the morning and at night, and 25 units of NovoLog with meals, plus additional sliding scale.  She is still able to cook for herself without PCA support, and has moms meals delivered.  Reports good access to food currently.      Review of Systems   She continues to endorse daily cough, shortness of breath, sensation of fevers, anxiety, and depression.      Objective       Vitals:  No vitals were obtained today due to virtual visit.    Physical Exam   healthy, alert and mild distress  PSYCH: Alert and oriented times 3; coherent speech, normal   rate and volume, able to articulate logical thoughts, able   to abstract reason, no tangential thoughts, no hallucinations   or delusions  Her affect is worried  RESP: Occasional cough, no audible wheezing, able to talk in full sentences  Remainder of exam unable to be completed due to telephone visits    Stress test results discussed with patient, and outlined as above.    Phone call duration: 22 minutes

## 2021-04-01 ENCOUNTER — VIRTUAL VISIT (OUTPATIENT)
Dept: FAMILY MEDICINE | Facility: CLINIC | Age: 51
End: 2021-04-01
Payer: MEDICARE

## 2021-04-01 ENCOUNTER — TELEPHONE (OUTPATIENT)
Dept: FAMILY MEDICINE | Facility: CLINIC | Age: 51
End: 2021-04-01

## 2021-04-01 DIAGNOSIS — M79.652 PAIN OF LEFT THIGH: Primary | ICD-10-CM

## 2021-04-01 PROCEDURE — 99442 PR PHYSICIAN TELEPHONE EVALUATION 11-20 MIN: CPT | Mod: 95 | Performed by: STUDENT IN AN ORGANIZED HEALTH CARE EDUCATION/TRAINING PROGRAM

## 2021-04-01 NOTE — TELEPHONE ENCOUNTER
[facility] phone call message-patient reporting a symptom:     Symptom:    Pt wants to talk to Dr. Bullock about blood clots. She's having some pain on her left limb.     Same Day Visit Offered: No    Additional comments:     None    OK to leave message on voice mail? Yes    Primary language: English      needed? No    Call taken on April 1, 2021 at 10:48 AM by Nay Jefferson CMA

## 2021-04-01 NOTE — TELEPHONE ENCOUNTER
Patient endorses having excruciating pain from left leg above knee to pelvis for over a week.  She has a left AKA and pain begins above stump. No swelling or redness. No known injury or trauma. She has never felt this pain before.     Otherwise she feels ok - no shortness of breath, chest pain, headache. No pain in other limbs. She does not have transportation to come in today but would be able to get it for tomorrow. Given possible need for urgent US, agreed to begin with phone visit at 430 today so we can do US tomorrow if needed. Routed to Dr. James, Dr. Bullock. ./KRISS

## 2021-04-01 NOTE — PROGRESS NOTES
Teresa is a 50 year old who is being evaluated via a billable telephone visit.      What phone number would you like to be contacted at? [phone]  How would you like to obtain your AVS? Mail a copy    Assessment & Plan     Teresa was seen today for musculoskeletal problem and medication reconciliation.    Diagnoses and all orders for this visit:    Pain of left thigh    Recommended that she be evaluated in-person, which she states is difficult for her due to being wheelchair-bound and her insurance requirements that transportation is arranged in advance.  She was agreeable to being evaluated in clinic early next week.  Discussed going to the ED over the weekend if she were to develop worsening pain, noticeable redness or swelling, or difficulty breathing.     Return in about 3 days (around 4/4/2021) for in person, Follow up.    Patricia James MD PGY3  [facility]    Bryson Moore is a 50 year old who presents for the following health issues:    HPI   She reports a 2 day history of constant pain in her left thigh above her left AKA and into her groin. She also notes low back pain. She uses a wheelchair and states that she's not able to evaluate for redness or swelling due to her limited mobility. She has 2 SI joint injections a couple of weeks ago at [facility] and is wondering if the pain is secondary to that. She has no known injuries to the area and has never had pain like this before. She has not had any fevers or chills.    Review of Systems   Constitutional, HEENT, cardiovascular, pulmonary, gi and gu systems are negative, except as otherwise noted.      Objective       Vitals:  No vitals were obtained today due to virtual visit.    Physical Exam   healthy, alert and mild distress  PSYCH: Alert and oriented times 3; coherent speech, normal   rate and volume, able to articulate logical thoughts, able   to abstract reason, no tangential thoughts, no hallucinations   or delusions  Her  affect is normal  RESP: No cough, no audible wheezing, able to talk in full sentences  Remainder of exam unable to be completed due to telephone visits        Phone call duration: 16 minutes

## 2021-04-02 NOTE — PATIENT INSTRUCTIONS
04/02/21   [facility] [place] Cardiologist   [facility]  Phone: [phone]  Fax: [phone]    Demographics, referral, office notes, medication list, EKG and labs faxed to [facility] at [phone].       Nurse will review the referral and then they will contact patient to schedule.        Maura Galvan

## 2021-04-03 ENCOUNTER — IMMUNIZATION (OUTPATIENT)
Dept: FAMILY MEDICINE | Facility: CLINIC | Age: 51
End: 2021-04-03
Payer: MEDICARE

## 2021-04-03 PROCEDURE — 91303 PR COVID VAC JANSSEN AD26 0.5ML: CPT

## 2021-04-03 PROCEDURE — 0031A PR COVID VAC JANSSEN AD26 0.5ML: CPT

## 2021-04-06 ENCOUNTER — AMBULATORY - HEALTHEAST (OUTPATIENT)
Dept: CARDIOLOGY | Facility: CLINIC | Age: 51
End: 2021-04-06

## 2021-04-08 DIAGNOSIS — N80.9 ENDOMETRIOSIS: ICD-10-CM

## 2021-04-08 DIAGNOSIS — Z86.73 HISTORY OF STROKE: ICD-10-CM

## 2021-04-08 DIAGNOSIS — Z71.6 ENCOUNTER FOR SMOKING CESSATION COUNSELING: ICD-10-CM

## 2021-04-08 DIAGNOSIS — G43.709 CHRONIC MIGRAINE WITHOUT AURA WITHOUT STATUS MIGRAINOSUS, NOT INTRACTABLE: ICD-10-CM

## 2021-04-08 RX ORDER — SULFAMETHOXAZOLE/TRIMETHOPRIM 800-160 MG
TABLET ORAL
COMMUNITY
Start: 2020-11-03 | End: 2021-07-13

## 2021-04-09 RX ORDER — EPINEPHRINE 0.3 MG/.3ML
0.3 INJECTION SUBCUTANEOUS
Qty: 2 ML | Refills: 0 | Status: SHIPPED | OUTPATIENT
Start: 2021-04-09 | End: 2023-11-03

## 2021-04-09 RX ORDER — SULFAMETHOXAZOLE/TRIMETHOPRIM 800-160 MG
TABLET ORAL
OUTPATIENT
Start: 2021-04-09

## 2021-04-09 RX ORDER — CLOPIDOGREL BISULFATE 75 MG/1
75 TABLET ORAL DAILY
Qty: 90 TABLET | Refills: 1 | Status: SHIPPED | OUTPATIENT
Start: 2021-04-09 | End: 2021-08-10

## 2021-04-09 RX ORDER — SUMATRIPTAN 50 MG/1
50 TABLET, FILM COATED ORAL
Qty: 18 TABLET | Refills: 1 | Status: SHIPPED | OUTPATIENT
Start: 2021-04-09 | End: 2021-10-14

## 2021-04-12 ENCOUNTER — TELEPHONE (OUTPATIENT)
Dept: FAMILY MEDICINE | Facility: CLINIC | Age: 51
End: 2021-04-12

## 2021-04-12 NOTE — PROGRESS NOTES
Preceptor Attestation:   I discussed the patient with the resident. I talked to the patient on the phone. I have verified the content of the note, which accurately reflects my assessment of the patient and the plan of care.   Supervising Physician:  Jcarlos Woodard MD.

## 2021-04-12 NOTE — TELEPHONE ENCOUNTER
We are renewing Medicare compliance for patient however clinic visit with Kobe on 3/30/21 is missing patients testing frequency. Can we please have testing frequency or current use of CGM added to chart notes for review.    Thanks,  Richard [facility] Team

## 2021-04-14 NOTE — TELEPHONE ENCOUNTER
Patient has freestyle augustine.  Added this to note as well as testing frequency.      Tyra Bullock MD

## 2021-04-21 ENCOUNTER — OFFICE VISIT (OUTPATIENT)
Dept: FAMILY MEDICINE | Facility: CLINIC | Age: 51
End: 2021-04-21
Payer: MEDICARE

## 2021-04-21 ENCOUNTER — OFFICE VISIT (OUTPATIENT)
Dept: PHARMACY | Facility: CLINIC | Age: 51
End: 2021-04-21
Payer: COMMERCIAL

## 2021-04-21 VITALS
HEART RATE: 100 BPM | TEMPERATURE: 98.7 F | WEIGHT: 241.8 LBS | RESPIRATION RATE: 16 BRPM | BODY MASS INDEX: 47.14 KG/M2 | DIASTOLIC BLOOD PRESSURE: 82 MMHG | OXYGEN SATURATION: 96 % | SYSTOLIC BLOOD PRESSURE: 128 MMHG

## 2021-04-21 DIAGNOSIS — E66.01 MORBID OBESITY (H): ICD-10-CM

## 2021-04-21 DIAGNOSIS — E11.65 TYPE 2 DIABETES MELLITUS WITH HYPERGLYCEMIA, WITH LONG-TERM CURRENT USE OF INSULIN (H): Primary | ICD-10-CM

## 2021-04-21 DIAGNOSIS — Z79.4 TYPE 2 DIABETES MELLITUS WITH HYPERGLYCEMIA, WITH LONG-TERM CURRENT USE OF INSULIN (H): Primary | ICD-10-CM

## 2021-04-21 DIAGNOSIS — J45.40 MODERATE PERSISTENT ASTHMA WITHOUT COMPLICATION: ICD-10-CM

## 2021-04-21 DIAGNOSIS — Z79.4 TYPE 2 DIABETES MELLITUS WITH COMPLICATION, WITH LONG-TERM CURRENT USE OF INSULIN (H): Primary | ICD-10-CM

## 2021-04-21 DIAGNOSIS — Z79.4 TYPE 2 DIABETES MELLITUS WITH HYPERGLYCEMIA, WITH LONG-TERM CURRENT USE OF INSULIN (H): ICD-10-CM

## 2021-04-21 DIAGNOSIS — N39.3 FEMALE STRESS INCONTINENCE: ICD-10-CM

## 2021-04-21 DIAGNOSIS — K43.9 VENTRAL HERNIA WITHOUT OBSTRUCTION OR GANGRENE: ICD-10-CM

## 2021-04-21 DIAGNOSIS — Z71.6 ENCOUNTER FOR SMOKING CESSATION COUNSELING: ICD-10-CM

## 2021-04-21 DIAGNOSIS — R05.9 COUGH: Primary | ICD-10-CM

## 2021-04-21 DIAGNOSIS — G54.6 PHANTOM LIMB PAIN (H): ICD-10-CM

## 2021-04-21 DIAGNOSIS — E11.65 TYPE 2 DIABETES MELLITUS WITH HYPERGLYCEMIA, WITH LONG-TERM CURRENT USE OF INSULIN (H): ICD-10-CM

## 2021-04-21 DIAGNOSIS — E11.8 TYPE 2 DIABETES MELLITUS WITH COMPLICATION, WITH LONG-TERM CURRENT USE OF INSULIN (H): Primary | ICD-10-CM

## 2021-04-21 LAB
HBA1C MFR BLD: 9.8 % (ref 4.1–5.7)
SARS-COV-2 RNA RESP QL NAA+PROBE: NORMAL
SPECIMEN SOURCE: NORMAL

## 2021-04-21 PROCEDURE — 83036 HEMOGLOBIN GLYCOSYLATED A1C: CPT | Performed by: STUDENT IN AN ORGANIZED HEALTH CARE EDUCATION/TRAINING PROGRAM

## 2021-04-21 PROCEDURE — 96372 THER/PROPH/DIAG INJ SC/IM: CPT | Performed by: STUDENT IN AN ORGANIZED HEALTH CARE EDUCATION/TRAINING PROGRAM

## 2021-04-21 PROCEDURE — 99214 OFFICE O/P EST MOD 30 MIN: CPT | Mod: CS | Performed by: STUDENT IN AN ORGANIZED HEALTH CARE EDUCATION/TRAINING PROGRAM

## 2021-04-21 PROCEDURE — 99207 PR NO CHARGE LOS: CPT | Performed by: PHARMACIST

## 2021-04-21 PROCEDURE — 36415 COLL VENOUS BLD VENIPUNCTURE: CPT | Performed by: STUDENT IN AN ORGANIZED HEALTH CARE EDUCATION/TRAINING PROGRAM

## 2021-04-21 PROCEDURE — 99406 BEHAV CHNG SMOKING 3-10 MIN: CPT | Performed by: STUDENT IN AN ORGANIZED HEALTH CARE EDUCATION/TRAINING PROGRAM

## 2021-04-21 RX ORDER — AMITRIPTYLINE HYDROCHLORIDE 10 MG/1
10 TABLET ORAL AT BEDTIME
Qty: 30 TABLET | Refills: 1 | Status: SHIPPED | OUTPATIENT
Start: 2021-04-21 | End: 2021-06-07

## 2021-04-21 RX ADMIN — MEDROXYPROGESTERONE ACETATE 150 MG: 150 INJECTION, SUSPENSION INTRAMUSCULAR at 16:52

## 2021-04-21 NOTE — PATIENT INSTRUCTIONS
Get new Nebulizer machine.  -Until you get the neb machine, 2-4 puffs of rescue inhaler every 4 hours when awake.    -Keep doing other inhalers regularly  -Test for COVID  -No Antibiotics for now  -Once nebs come, do 3x per day  -Good work on decreasing smoking!  -New tape for keeping patches on.      For Diabetes:  Same regimen, except add one new pill (Jardiance)    Keep working with Richard about supplies  Keep appointment [facility]  Keep appointments coming up with Cardiology and Pulmonology.

## 2021-04-21 NOTE — NURSING NOTE
Blood pressure monitor and pulse ox given to patient through our free medical supply.  Deana Castellanos, CMA

## 2021-04-21 NOTE — LETTER
April 22, 2021      Teresajacobo Perez  [address]  [place] [address]        Dear ,    We are writing to inform you of your test results.    Your COVID-19 test is negative.  This is good news.  Please call the clinic at [phone] if you have any questions.      Resulted Orders   Hemoglobin A1c ([facility])   Result Value Ref Range    Hemoglobin A1C 9.8 (H) 4.1 - 5.7 %   COVID-19 Virus PCR MR[facility] ([facility])   Result Value Ref Range    COVID-19 Virus PCR to [facility] - Source Nasopharyngeal     COVID-19 Virus PCR to [facility] - Result       Test received-See reflex to IDDL test SARS CoV2 (COVID-19) Virus RT-PCR      Comment:      Performed and/or entered by:  [facility]  [address] [place] [address]       Narrative    Test performed by:  [facility]  [address], [address], [place] [address]   SARS-COV-2 (COVID-19) RT-PCR-IDDL ([facility]   Result Value Ref Range    SARS-CoV-2 Virus Specimen Source Nasopharyngeal     SARS-CoV-2 PCR Result NEGATIVE       Comment:      SARS-CoV2 (COVID-19) RNA not detected, presumed negative.    SARS-CoV-2 PCR Comment       Testing was performed using the Aptima SARS-CoV-2 Assay on the [employer]      Comment:      Instrument System. Additional information about this Emergency Use  Authorization (EUA) assay can be found via the Lab Guide.  This test should be ordered for the detection of SARS-CoV-2 in individuals who  meet SARS-CoV-2 clinical and/or epidemiological criteria. Test performance is  unknown in asymptomatic patients.  This test is for in vitro diagnostic use under the FDA EUA for laboratories  certified under CLIA to perform high complexity testing. This test has not   been  FDA cleared or approved.  A negative result does not rule out the presence of PCR inhibitors in the  specimen or target RNA in concentration below the limit of detection for the  assay. The possibility of a false  negative should be considered if the  patient's recent exposure or clinical presentation suggests COVID-19.  This test was validated by the [facility]  [facility]. This laboratory is certified under the Clinical Laboratory  Improvement Amendments of 1988 (CLIA-88) a s qualified to perform high  complexity laboratory testing.  Performed and/or entered by:  [facility]  [address] [place] [address]      Narrative    Test performed by:  [facility]  [address] [place] [address], [address], [place] [address]       If you have any questions or concerns, please call the clinic at the number listed above.       Sincerely,      Tyra Bullock MD

## 2021-04-21 NOTE — PROGRESS NOTES
Nursing Notes:   Deana Castellanos CMA  4/21/2021  4:24 PM  Signed  Blood pressure monitor and pulse ox given to patient through our free medical supply.  DENY Leavitt Christina, CMA  4/21/2021  4:55 PM  Signed  Clinic Administered Medication Documentation      Depo Provera Documentation    URINE HCG: not indicated    Depo-Provera Standing Order inclusion/exclusion criteria reviewed.   Patient meets: inclusion criteria     BP: 128/82  LAST PAP/EXAM: No results found for: PAP    Prior to injection, verified patient identity using patient's name and date of birth. Medication was administered. Please see MAR and medication order for additional information.     Was entire vial of medication used? Yes  Vial/Syringe: Single dose vial  Expiration Date:  09/22    Patient instructed to remain in clinic for 15 minutes.  NEXT INJECTION DUE: 7/7/21 - 7/21/21      Name of provider who requested the medication administration: Kobe  Name of provider on site (faculty or community preceptor) at the time of performing the medication administration: Kobe    Date of next administration: 06/08/21 - 06/22/21  Date of next office visit with provider to renew medication plan (must be seen annually): 06/22/21            TACO AUSTIN Chris is a 50 year old female with past medical history significant for    Patient Active Problem List   Diagnosis     Health Care Home     Acute peptic ulcer     Other allergy, other than to medicinal agents     Bipolar disorder (H)     Bulging lumbar disc     Cervical dysplasia     Common migraine without aura     Constipation     Dwarfism     Familial hypercholesterolemia     Insomnia     Low back pain     Intermittent asthma     Leg pain, bilateral     Smoking     Degeneration of thoracic or thoracolumbar intervertebral disc     LOMAS (nonalcoholic steatohepatitis)     Disease of lung     Hemorrhoids     Parotid mass     Endometriosis     NNAMDI  (obstructive sleep apnea)     History of total right knee replacement     Lateral epicondylitis     Impingement syndrome, shoulder, left     Hx of total knee replacement, left     Chronic pain syndrome     Cough     Borderline personality disorder (H)     Moderate recurrent major depression (H)     Recurrent ventral hernia     Type 2 diabetes mellitus with complication, with long-term current use of insulin (H)     Essential hypertension     Nonruptured cerebral aneurysm     Cerebrovascular accident (CVA) due to embolism (H)     Amputation of leg (H)     CKD (chronic kidney disease) stage 5, GFR less than 15 ml/min (H)     Pre-ulcerative corn or callous     Excessive bleeding in premenopausal period     Morbid obesity (H)     Others present at the visit:  None    Presents for   Chief Complaint   Patient presents with     Diabetes     Pt is here for a follow up on her diabetes.     Imm/Inj     Pt got her Depo today.     Medication Reconciliation     Complete.      Patient presents today for a number of chronic medical issues.    Her biggest concern today is her breathing.  She is worried she has pneumonia.  Has underlying asthma, and COPD/emphysema, but has noticed that recently her cough is worse.  She Describes it as a deep cough accompanied by some wheezing and she is coughing up blackish, and sometimes thick and pussy looking phlegm.  She has been working on quitting smoking, and is using patches, but has difficulty because sometimes they fall off.  She has frequent sweats, and the patches do not stay attached all day.  She is requesting a prescription for Medipore tape to help keep the patches in place.  She has frequent sweats, which we attribute to her perimenopausal status, so is not sure if she is having new fevers or chills.  Describes waking up drenched in sweat in the morning.  She does not have any known COVID-19 contacts.  Received her COVID-19 vaccine, a J&J vaccine on April 3.  The coughing is bad  enough that sometimes she has episodes of urinary incontinence and urinates on herself.    She describes taking the following medications for her breathing.  Uses Spiriva once daily, Symbicort 2 puffs twice daily, and is using her rescue albuterol inhaler 4-5 times per day.  She shares that her nebulizer machine is broken, so she is unable to use that and is requesting a DME order for this today.  She does get some improvement with using her rescue, but it does not always help.  She does have nebulizer solution at home.  She has no pulse oximeter, or blood pressure cuff at home and is interested in getting these today.    She has a couple of other concerns.  Continues to have high blood sugars.  This has been particularly bad over the last 4 to 5 days as she has been having more cough and feeling less well.  Blood sugars have typically been in the 200s, instead of in the 300s.  She shares she has new PCA support, and is getting meals regularly.  Still is very reliant on moms meals which may not always be great for her diabetes.  We reviewed her insulin regimen, and she is taking this as outlined in the chart.  Denies any symptoms of lows.    Continues have significant pain in her abdomen in the area of her hernia.  It is worse recently with the increased coughing.  She is requesting an abdominal binder for this.    Also shares she has a meeting coming up tomorrow with till just to talk about her prosthetic.  Needs new sleeves, as well as a refitting because she has gained so much weight.  Expresses some sadness over the weight gain due to Covid.  Has been unable to be as active.  Is smoking more marijuana.  Has been eating more than previously, because her not other things to do.  Has had significant weight gain in the last year.  She continues to have a lot of pain in her stump area, as well as in her lower abdomen extending up towards the groin.  Is wondering if there might be any medications we could try to help  with the pain.    OBJECTIVE:  Vitals: /82 (BP Location: Left arm, Patient Position: Sitting, Cuff Size: Adult Large)   Pulse 100   Temp 98.7  F (37.1  C) (Oral)   Resp 16   Wt 109.7 kg (241 lb 12.8 oz)   SpO2 96%   BMI 47.14 kg/m    BMI= Body mass index is 47.14 kg/m .  Objective:    Vitals:  Vitals are reviewed and are within the normal range.  Weight is stable, but she is up 30 pounds from her prolonged hospitalization following the leg leg amputation.  Mildly tachycardic, but this is baseline for her.  O2 sats are good at 96%.  Gen:  Alert, pleasant, no acute distress, coughing throughout the discussion.  No respiratory distress.  Cardiac:  Regular rate and rhythm, no murmurs, rubs or gallops  Respiratory: Lungs show scattered rhonchi with wheezes bilaterally.  No crackles.  No focal consolidation on auscultation.  She is moving air well.  Abdomen:  Soft, large ventral hernia that is moderately tender, but no rebound or guarding.  Easily reducible.      Results for orders placed or performed in visit on 04/21/21   Hemoglobin A1c ([facility])     Status: Abnormal   Result Value Ref Range    Hemoglobin A1C 9.8 (H) 4.1 - 5.7 %   COVID-19 Virus PCR MRF ([facility])     Status: None   Result Value Ref Range    COVID-19 Virus PCR to [facility] - Source Nasopharyngeal     COVID-19 Virus PCR to [facility] - Result       Test received-See reflex to IDDL test SARS CoV2 (COVID-19) Virus RT-PCR    Narrative    Test performed by:  [facility]  [address], [address], [place] [address]           ASSESSMENT AND PLAN:      Teresa was seen today for diabetes, imm/inj and medication reconciliation.  Seen today for multiple issues.    Diagnoses and all orders for this visit:    Cough.    Moderate persistent asthma without complication  COPD  Worsening recent cough.  Her O2 sats are good.  Fevers and chills are at her baseline.  I do not hear any focal consolidation.  We did test her for Covid  today.  Discussed that I do not think antibiotics are warranted, but that she might benefit from steroids.  We elected to avoid this as it causes her diabetes to worsen, and leads to further weight gain.  I think given her sats at 96%, we can get her nebulizer machine today, she can increase her albuterol use, and this should get better.  We discussed smoking cessation and she is continuing to work on this.  Prescribed taper to keep her patches in place, and congratulated her on cutting back with the cigarettes.  Did discuss that the marijuana use may be contributing to weight gain as well as respiratory troubles, and that limiting this would be helpful as well.  She is working with [facility] on getting CPAP.  Has upcoming visit with pulmonology as well.  -     COVID-19 Virus PCR MRF ([facility])  -     Nebulizer and Supplies Order for DME - ONLY FOR DME    Type 2 diabetes mellitus with hyperglycemia, with long-term current use of insulin (H).  A1c is improving gradually, but sugars are still high.  Will start Jardiance as this would be helpful for her kidneys, and should help with blood sugars without causing hypoglycemia.  No changes in her insulin regimen.  -     Hemoglobin A1c ([facility])  -     empagliflozin (JARDIANCE) 10 MG TABS tablet; Take 1 tablet (10 mg) by mouth daily    Encounter for smoking cessation counseling.  We discussed smoking cessation for greater than 5 minutes today.  Is doing well with patches.  They do not stay on however, so orders placed for meditate.  -     Miscellaneous Order for DME - ONLY FOR DME      Morbid obesity (H)  Ventral hernia without obstruction or gangrene.  Still working on getting blood sugars and breathing to a level where it would be safe to move forward with evaluation to repair this hernia.  Will prescribe an abdominal binder to help with pain today.  -     Miscellaneous Order for DME - ONLY FOR DME    Female stress incontinence.  Prescribed incontinence  briefs.  -     Miscellaneous Order for DME - ONLY FOR DME    Phantom limb pain (H).  Worsening pain in leg and hip.  She is already on gabapentin.  Taking Effexor for her mood.  Is awaiting referral at pain clinic for further evaluation of her phantom limb pain.  We will try a small dose of Elavil at night to see if this helps with some of this neuropathic pain.  -     amitriptyline (ELAVIL) 10 MG tablet; Take 1 tablet (10 mg) by mouth At Bedtime        Patient Instructions   Get new Nebulizer machine.  -Until you get the neb machine, 2-4 puffs of rescue inhaler every 4 hours when awake.    -Keep doing other inhalers regularly  -Test for COVID  -No Antibiotics for now  -Once nebs come, do 3x per day  -Good work on decreasing smoking!  -New tape for keeping patches on.      For Diabetes:  Same regimen, except add one new pill (Jardiance)    Keep working with Richard about supplies  Keep appointment [facility]  Keep appointments coming up with Cardiology and Pulmonology.        Follow up in 1 month for DM follow up.      Tyra Bullock MD

## 2021-04-21 NOTE — NURSING NOTE
Clinic Administered Medication Documentation      Depo Provera Documentation    URINE HCG: not indicated    Depo-Provera Standing Order inclusion/exclusion criteria reviewed.   Patient meets: inclusion criteria     BP: 128/82  LAST PAP/EXAM: No results found for: PAP    Prior to injection, verified patient identity using patient's name and date of birth. Medication was administered. Please see MAR and medication order for additional information.     Was entire vial of medication used? Yes  Vial/Syringe: Single dose vial  Expiration Date:  09/22    Patient instructed to remain in clinic for 15 minutes.  NEXT INJECTION DUE: 7/7/21 - 7/21/21      Name of provider who requested the medication administration: Kobe  Name of provider on site (faculty or community preceptor) at the time of performing the medication administration: Kobe    Date of next administration: 06/08/21 - 06/22/21  Date of next office visit with provider to renew medication plan (must be seen annually): 06/22/21

## 2021-04-22 LAB
LABORATORY COMMENT REPORT: NORMAL
SARS-COV-2 RNA RESP QL NAA+PROBE: NEGATIVE
SPECIMEN SOURCE: NORMAL

## 2021-04-22 NOTE — RESULT ENCOUNTER NOTE
Teresa Perez-    Your COVID-19 test is negative.  This is good news.  Please call the clinic at [phone] if you have any questions.      Tyra Bullock MD    Please send results to patient.

## 2021-04-22 NOTE — PROGRESS NOTES
I have verified the content of the note, which accurately reflects my assessment of the patient and the plan of care.   Alejandra Olson, [facility], PharmD

## 2021-04-27 ENCOUNTER — TELEPHONE (OUTPATIENT)
Dept: FAMILY MEDICINE | Facility: CLINIC | Age: 51
End: 2021-04-27

## 2021-04-27 DIAGNOSIS — R05.9 COUGH: Primary | ICD-10-CM

## 2021-04-27 DIAGNOSIS — Z79.4 TYPE 2 DIABETES MELLITUS WITHOUT COMPLICATION, WITH LONG-TERM CURRENT USE OF INSULIN (H): ICD-10-CM

## 2021-04-27 DIAGNOSIS — E11.9 TYPE 2 DIABETES MELLITUS WITHOUT COMPLICATION, WITH LONG-TERM CURRENT USE OF INSULIN (H): ICD-10-CM

## 2021-04-27 NOTE — TELEPHONE ENCOUNTER
[facility] phone call message- general phone call:    Reason for call: She would like to talk to a nurse.    Action desired: call back.    Return call needed: Yes    OK to leave a message on voice mail? Yes    Advised patient to response may take up to 2 business days: Yes    Primary language: English      needed? No    Call taken on April 27, 2021 at 9:31 AM by Lo Lao

## 2021-04-27 NOTE — TELEPHONE ENCOUNTER
Teresa shares her cough has not improved since her visit with Dr. Bullock on 4/21/21. Because the cough is forceful, it is aggravating a known abdominal hernia causing her pain. She reports she was going to have surgery for this but needs to get her blood sugars lower so it not being followed by another provider other than Dr. Bullock at this time. She wonders if there is anything else she can do for the cough and the hernia pain. Routed to Dr. Bullock. ./KRISS

## 2021-04-28 NOTE — TELEPHONE ENCOUNTER
Per Dr. Bullock:    She has a referral to see pulmonology.  Can we see if she has been scheduled to see them?  I think that would be an appropriate next step.     I would like to avoid steroids if possible given her diabetes, and her lungs were clear on our last visit.  I think pulm is the best option for right now.   As for pain for the hernia, I did write for an abdominal binder.  She can continue to do tylenol and ibuprofen.  She is not a candidate for narcotics.  She has also been referred to pain clinic (Dr. Lucio), and I have spoken to him, so that might be an option for pain.     I can send in some guafenacine or tessalon perles, but can't guarantee that they will be covered.  I also recommend tea with honey, and staying hydrated.  I know she is working on decreasing smoking, and that will help too.   Otherwise she can always schedule a follow up visit in the clinic for further evaluation of cough.     Spoke with patient. She has not established with pain clinic, has not made an appointment with pulmonology, and has not yet heard about the abdominal binder. We decided Teresa will call pulmonology, and I will see what is going on with the abdominal binder and see if she is able to schedule with pain clinic.    Spoke with [facility] - they had not received the rx for abdominal binder but can see it in Epic and will work on filling it. Per chart review, referral was sent to pain management on 2/10/21 and Dr. Bullock provided report to Dr. Lucio. Phone number for clinic: [phone].    Called patient to communicate this information, no answer and no voicemail box available. Will try again at later time. ./LR

## 2021-05-04 ENCOUNTER — TELEPHONE (OUTPATIENT)
Dept: FAMILY MEDICINE | Facility: CLINIC | Age: 51
End: 2021-05-04

## 2021-05-04 DIAGNOSIS — R05.9 COUGH: ICD-10-CM

## 2021-05-04 RX ORDER — BENZONATATE 200 MG/1
200 CAPSULE ORAL 3 TIMES DAILY PRN
Qty: 30 CAPSULE | Refills: 1 | Status: SHIPPED | OUTPATIENT
Start: 2021-05-04 | End: 2021-05-04

## 2021-05-04 RX ORDER — BENZONATATE 200 MG/1
200 CAPSULE ORAL 3 TIMES DAILY PRN
Qty: 30 CAPSULE | Refills: 1 | Status: SHIPPED | OUTPATIENT
Start: 2021-05-04 | End: 2022-01-04

## 2021-05-04 RX ORDER — GUAIFENESIN/DEXTROMETHORPHAN 100-10MG/5
5 SYRUP ORAL EVERY 4 HOURS PRN
Qty: 236 ML | Refills: 1 | Status: SHIPPED | OUTPATIENT
Start: 2021-05-04 | End: 2021-07-06

## 2021-05-04 RX ORDER — GUAIFENESIN/DEXTROMETHORPHAN 100-10MG/5
5 SYRUP ORAL EVERY 4 HOURS PRN
Qty: 236 ML | Refills: 1 | Status: SHIPPED | OUTPATIENT
Start: 2021-05-04 | End: 2021-05-04

## 2021-05-04 NOTE — TELEPHONE ENCOUNTER
Communicated below information to patient. Provided her with pain clinic number as well as number to pulm. She has been able to schedule with cardiology which I congratulated her for.      She would like to try cough medicine to the pharmacy to see if insurance will cover. Routed to Dr. Bullock. ./KRISS

## 2021-05-04 NOTE — TELEPHONE ENCOUNTER
[facility] phone call message- general phone call:    Reason for call: patient wanted to know if Dr. Bullock sent in a prescription for cough medicine. She is now using the [facility] at [address]     Return call needed: Yes    OK to leave a message on voice mail? Yes    Primary language: English      needed? No    Call taken on May 4, 2021 at 11:02 AM by Maura Galvan

## 2021-05-04 NOTE — TELEPHONE ENCOUNTER
I have sent in both tessalon perles and guafenesine to the pharmacy for her.     Tyra Bullock MD    Routed to Vicky MÁRQUEZ.

## 2021-05-18 ENCOUNTER — TELEPHONE (OUTPATIENT)
Dept: CARDIOLOGY | Facility: CLINIC | Age: 51
End: 2021-05-18

## 2021-05-25 ENCOUNTER — TELEPHONE (OUTPATIENT)
Dept: FAMILY MEDICINE | Facility: CLINIC | Age: 51
End: 2021-05-25

## 2021-05-25 NOTE — TELEPHONE ENCOUNTER
Teresa best she has been feeling ill since last night. She woke up last night every few hours drenched in sweat. Checked her temperature but was afebrile. She has a dull headache. Has had a frequent cough for multiple weeks which has not improved. She feels like she has a viral illness. Some shortness of breath but is finding relief with her inhaler.     She is not able to come in today due to needing one day in advance for transportation as she needs wheelchair transport. Discussed with [employer] who provided me with multiple vendors to call, all of whom could not accomodate a same day ride. Appointment scheduled for tomorrow morning. Patient aware to call back with any new or concerning symptoms, particularly worsening shortness of breath. Routed to Dr. Bullock, Dr. Walls. ./KRISS

## 2021-05-25 NOTE — TELEPHONE ENCOUNTER
[facility] phone call message- general phone call:    Reason for call: Pt would like to speak with nurse or  regarding symptoms she is having wondering if she should go to ER. Pt states she was drenched in sweat last night and is having a cough, pain in groin area.     Return call needed: Yes    OK to leave a message on voice mail? Yes    Primary language: English      needed? No    Call taken on May 25, 2021 at 11:37 AM by Grisel Flores-Cardona

## 2021-05-26 ENCOUNTER — RECORDS - HEALTHEAST (OUTPATIENT)
Dept: ADMINISTRATIVE | Facility: CLINIC | Age: 51
End: 2021-05-26

## 2021-05-26 ENCOUNTER — OFFICE VISIT (OUTPATIENT)
Dept: FAMILY MEDICINE | Facility: CLINIC | Age: 51
End: 2021-05-26
Payer: MEDICARE

## 2021-05-26 ENCOUNTER — ANCILLARY PROCEDURE (OUTPATIENT)
Dept: GENERAL RADIOLOGY | Facility: CLINIC | Age: 51
End: 2021-05-26
Attending: STUDENT IN AN ORGANIZED HEALTH CARE EDUCATION/TRAINING PROGRAM
Payer: MEDICARE

## 2021-05-26 VITALS
HEART RATE: 103 BPM | DIASTOLIC BLOOD PRESSURE: 83 MMHG | SYSTOLIC BLOOD PRESSURE: 125 MMHG | TEMPERATURE: 99.3 F | RESPIRATION RATE: 20 BRPM | OXYGEN SATURATION: 98 %

## 2021-05-26 DIAGNOSIS — M25.551 HIP PAIN, RIGHT: ICD-10-CM

## 2021-05-26 DIAGNOSIS — R05.9 COUGH: ICD-10-CM

## 2021-05-26 DIAGNOSIS — F44.5 PSEUDOSEIZURE: ICD-10-CM

## 2021-05-26 DIAGNOSIS — Z71.6 ENCOUNTER FOR TOBACCO USE CESSATION COUNSELING: ICD-10-CM

## 2021-05-26 DIAGNOSIS — Z13.220 LIPID SCREENING: ICD-10-CM

## 2021-05-26 DIAGNOSIS — R55 SYNCOPE, UNSPECIFIED SYNCOPE TYPE: Primary | ICD-10-CM

## 2021-05-26 LAB
BUN SERPL-MCNC: 17.4 MG/DL (ref 7–19)
CALCIUM SERPL-MCNC: 10.3 MG/DL (ref 8.5–10.1)
CHLORIDE SERPLBLD-SCNC: 97.6 MMOL/L (ref 98–110)
CHOLEST SERPL-MCNC: 173.5 MG/DL (ref 0–200)
CHOLEST/HDLC SERPL: 7 {RATIO} (ref 0–5)
CO2 SERPL-SCNC: 24.7 MMOL/L (ref 20–32)
CREAT SERPL-MCNC: 0.7 MG/DL (ref 0.5–1)
ERYTHROCYTE [DISTWIDTH] IN BLOOD BY AUTOMATED COUNT: 13 % (ref 10–15)
GFR SERPL CREATININE-BSD FRML MDRD: >90 ML/MIN/1.7 M2
GLUCOSE SERPL-MCNC: 191.5 MG'DL (ref 70–99)
HCT VFR BLD AUTO: 49.1 % (ref 35–47)
HDLC SERPL-MCNC: 24.8 MG/DL
HEMOGLOBIN: 16.9 G/DL (ref 11.7–15.7)
LABORATORY COMMENT REPORT: NORMAL
LDLC SERPL CALC-MCNC: 99 MG/DL (ref 0–129)
MCH RBC QN AUTO: 30.7 PG (ref 26.5–35)
MCHC RBC AUTO-ENTMCNC: 34.4 G/DL (ref 32–36)
MCV RBC AUTO: 89.3 FL (ref 78–100)
PLATELET # BLD AUTO: 292 10E9/L (ref 150–450)
POTASSIUM SERPL-SCNC: 4.3 MMOL/L (ref 3.2–4.6)
RBC # BLD AUTO: 5.5 10E12/L (ref 3.8–5.2)
SARS-COV-2 RNA RESP QL NAA+PROBE: NEGATIVE
SARS-COV-2 RNA RESP QL NAA+PROBE: NORMAL
SODIUM SERPL-SCNC: 135.1 MMOL/L (ref 132–142)
SPECIMEN SOURCE: NORMAL
SPECIMEN SOURCE: NORMAL
TRIGL SERPL-MCNC: 247.7 MG/DL (ref 0–150)
TSH SERPL DL<=0.05 MIU/L-ACNC: 1.95 UIU/ML (ref 0.3–5)
VLDL CHOLESTEROL: 49.5 MG/DL (ref 7–32)
WBC # BLD AUTO: 13.9 10E9/L (ref 4–11)

## 2021-05-26 PROCEDURE — 73502 X-RAY EXAM HIP UNI 2-3 VIEWS: CPT | Mod: RT | Performed by: RADIOLOGY

## 2021-05-26 PROCEDURE — 99214 OFFICE O/P EST MOD 30 MIN: CPT | Mod: GC | Performed by: STUDENT IN AN ORGANIZED HEALTH CARE EDUCATION/TRAINING PROGRAM

## 2021-05-26 PROCEDURE — 93005 ELECTROCARDIOGRAM TRACING: CPT | Performed by: STUDENT IN AN ORGANIZED HEALTH CARE EDUCATION/TRAINING PROGRAM

## 2021-05-26 PROCEDURE — 80061 LIPID PANEL: CPT | Performed by: STUDENT IN AN ORGANIZED HEALTH CARE EDUCATION/TRAINING PROGRAM

## 2021-05-26 PROCEDURE — 85027 COMPLETE CBC AUTOMATED: CPT | Performed by: STUDENT IN AN ORGANIZED HEALTH CARE EDUCATION/TRAINING PROGRAM

## 2021-05-26 PROCEDURE — 36415 COLL VENOUS BLD VENIPUNCTURE: CPT | Performed by: STUDENT IN AN ORGANIZED HEALTH CARE EDUCATION/TRAINING PROGRAM

## 2021-05-26 PROCEDURE — 80048 BASIC METABOLIC PNL TOTAL CA: CPT | Performed by: STUDENT IN AN ORGANIZED HEALTH CARE EDUCATION/TRAINING PROGRAM

## 2021-05-26 NOTE — RESULT ENCOUNTER NOTE
Please let the patient know that her x-rays came back negative for fractures or dislocation.  Recommend that she continues Tylenol, ibuprofen, rest, ice, Flexeril and follow-up in 1 to 2 weeks.

## 2021-05-26 NOTE — RESULT ENCOUNTER NOTE
Please let patient know that her lab results came back largely unremarkable.  However, given her history of stroke, her cholesterol LDL goal should be less than 70.  RRecommend that she continues to monitor her symptoms and follow-up in 1 to 2 weeks in person, so we can discuss further management and how to lower her LDL cholesterol.

## 2021-05-27 ENCOUNTER — RECORDS - HEALTHEAST (OUTPATIENT)
Dept: ADMINISTRATIVE | Facility: CLINIC | Age: 51
End: 2021-05-27

## 2021-05-27 NOTE — PROGRESS NOTES
I attempted to meet with the patient at 3:55 PM but she was busy with other members of her care team. I will retry later.

## 2021-05-27 NOTE — PROGRESS NOTES
Patient is currently a patient a [facility] and will transfer to [facility] tomorrow, 4/3/19. Patient currently sees Dr. Tyra Bullock at [facility] for primary care. She does not meet criteria for enrollment in [facility]. Will not send referral.

## 2021-05-27 NOTE — PATIENT INSTRUCTIONS
05/27/21   NEUROLOGY ADULT REFERRAL   [facility] [place]  Phone: [phone]  Fax: [phone]     Referral, demographics, office note and medication list faxed to [phone]. They will contact patient to schedule.     Maura Galvan

## 2021-05-28 ENCOUNTER — RECORDS - HEALTHEAST (OUTPATIENT)
Dept: ADMINISTRATIVE | Facility: CLINIC | Age: 51
End: 2021-05-28

## 2021-05-28 NOTE — PROGRESS NOTES
"HPI: Ms. Perez presents for follow-up of her peripheral vascular disease.  She presented to the hospital unconscious with elevated CK and significant rhabdomyolysis.  At that same time she was found to have a left popliteal artery occlusion with a non-viable left lower leg from the knee down.  She was stabilized in the hospital and underwent a guillotine amputation followed by formal closure.  She also had 2 small strokes while in the hospital.  The patient is here today to discuss her progress and evaluation of her stump wound.  She is working with the prosthetist to get fitted for a prosthesis.  Overall, she is doing well and continues to work on rehabilitation.  She has occasional phantom pain and we increased her Gabapentin dose today.     Allergies, Medications, Social History, Past Medical History and Past Surgical History were reviewed and are noted in the chart.    Review of Systems - Negative.    /72   Pulse 80   Temp 98.7  F (37.1  C)   Resp 20   Ht 5' 3\" (1.6 m)   BMI 36.65 kg/m    Body mass index is 36.65 kg/m .    EXAM:  GENERAL: This is a well developed female in no distress.  She is using a wheelchair to get around given the recent amputation.  HEAD AND NECK: Cranial nerves intact. No neck masses or bruits.  CHEST/LUNG: Clear  GROINS: Both femoral pulse palpable.  EXTREM: Left AKA stump is healing well with no signs of infection.  There is good scar tissue formation noted and no skin breakdown.    Assessment/Plan:  Teresa is doing well s/p left AKA for a non-viable limb after acute limb ischemia.    She continues to use the stump  as instructed.  She is working on getting fit for a prosthesis.  Also, she plans to transition to home soon given her progress.  Today we increased her Gabapentin to help with phantom pain in the left leg.  She says prior to admission is was chronically taking high doses of Gabapentin as well.     Total time spent 15 minutes face to face with patient " with more than 50% time spent in counseling and coordination of care.     Mahad Chatterjee MD  Vascular Surgery

## 2021-05-29 ENCOUNTER — RECORDS - HEALTHEAST (OUTPATIENT)
Dept: ADMINISTRATIVE | Facility: CLINIC | Age: 51
End: 2021-05-29

## 2021-05-30 ENCOUNTER — RECORDS - HEALTHEAST (OUTPATIENT)
Dept: ADMINISTRATIVE | Facility: CLINIC | Age: 51
End: 2021-05-30

## 2021-05-30 VITALS — WEIGHT: 239 LBS | HEIGHT: 60 IN | BODY MASS INDEX: 46.92 KG/M2

## 2021-05-31 ENCOUNTER — RECORDS - HEALTHEAST (OUTPATIENT)
Dept: ADMINISTRATIVE | Facility: CLINIC | Age: 51
End: 2021-05-31

## 2021-06-01 ENCOUNTER — RECORDS - HEALTHEAST (OUTPATIENT)
Dept: ADMINISTRATIVE | Facility: CLINIC | Age: 51
End: 2021-06-01

## 2021-06-01 VITALS — BODY MASS INDEX: 45.39 KG/M2 | HEIGHT: 60 IN | WEIGHT: 231.2 LBS

## 2021-06-01 NOTE — PROGRESS NOTES
Preceptor Attestation:    I discussed the patient with the resident and evaluated the patient in person. I have verified the content of the note, which accurately reflects my assessment of the patient and the plan of care.   Supervising Physician:  Jaswant Baca MD.

## 2021-06-02 VITALS
BODY MASS INDEX: 38.89 KG/M2 | WEIGHT: 219.5 LBS | HEIGHT: 63 IN | BODY MASS INDEX: 38.89 KG/M2 | WEIGHT: 219.5 LBS | HEIGHT: 63 IN

## 2021-06-03 ENCOUNTER — RECORDS - HEALTHEAST (OUTPATIENT)
Dept: MAMMOGRAPHY | Facility: CLINIC | Age: 51
End: 2021-06-03

## 2021-06-03 VITALS — BODY MASS INDEX: 36.65 KG/M2 | HEIGHT: 63 IN

## 2021-06-03 DIAGNOSIS — Z12.31 ENCOUNTER FOR SCREENING MAMMOGRAM FOR MALIGNANT NEOPLASM OF BREAST: ICD-10-CM

## 2021-06-05 NOTE — PROGRESS NOTES
Hearing Aid Evaluation:    Referring Physician: Dr. Bullock     History: Teresa Perez is seen for a hearing aid evaluation today. She is accompanied by her personal care assistant. Teresa ambulates by wheelchair. She has a history of moderate sloping to moderately-severe sensorineural hearing loss in her left ear and moderate sloping to severe sensorineural hearing loss in her right ear. Medical clearance has been provided from Dr. Jacinto. Teresa states she has difficulty hearing and tinnitus in both ears.     Procedure: Hearing aid styles, technology levels, trial period and realistic expectations are discussed in detail.  The patient is interested in pursuing -in-the-ear hearing devices, in both ears. Bilateral Phonak Audeo M50-R devices are selected. The hearing aids will be fit with Punt Clubhell earmolds. Earmold impressions are taken, bilaterally without incidence.  Otoscopy is normal pre and post.     Hearing Aids:  : Phonak  Model:  Audeo M50-R  Style:  EMMY  Color:  P1  : Size 1 power  Earmold: Phonak Punt Clubhell  Model #: [id number]    Plan:  Bilateral hearing aids are ordered on today s date. Teresa is scheduled to return for a hearing aid fitting on 1/29/2020. The patient should call this clinic with any questions or concerns. The patient verbalized understanding and is in agreement with this plan.    Caro Acevedo, [facility]  [place] Licensed Audiologist #[id number]

## 2021-06-05 NOTE — PROGRESS NOTES
HPI: This patient is a 48yo F who presents for evaluation of hearing and dizziness. There has been a gradual loss of hearing over the past several years in both ears, worse after her stroke in 3/2019. Denies otalgia, otorrhea, vertigo, and other major medical issues. She suffered a CVA and has had some intermittent dizziness since then, symptomatic lightheadedness not vertigo or oscillopsia. Her diabetes is out of control with an A1c last measured at 10.8 and she is on a plethora of medications that contribute to lightheadedness and dizziness. She also is s/p AKA on the left. She has a new prosthetic and is due to start some physical therapy to be able to walk with the prosthetic.    Past medical history, surgical history, social history, family history, medications, and allergies have been reviewed with the patient and are documented above.    Review of Systems: a 10-system review was performed. Pertinent positives are noted in the HPI and on a separate scanned document in the chart.    PHYSICAL EXAMINATION:  GEN: no acute distress, normocephalic  EYES: extraocular movements are intact, pupils are equal and round. Sclera clear.   EARS: auricles are normally formed. The external auditory canals are clear with minimal to no cerumen. Tympanic membranes are intact bilaterally with no signs of infection, effusion, retractions, or perforations.  NOSE: anterior nares are patent. There are no masses or lesions. The septum is non-obstructing.  OC/OP: clear, dentition is in good repair. The tongue and palate are fully mobile and symmetric. No masses or lesions.  NECK: soft and supple. No lymphadenopathy or masses. Airway is midline.  NEURO: CN VII and XII symmetric. alert and oriented. No spontaneous nystagmus. Wheelchair bound, left aka.  PULM: breathing comfortably on room air, normal chest expansion with respiration  CARDS: no cyanosis or clubbing, normal carotid pulses    AUDIOGRAM: moderate to severe symmetric SNHL,  Type A tymps  CT HEAD 3/2019: No significant paranasal sinus mucosal disease. No significant middle ear or mastoid effusion.    MEDICAL DECISION-MAKING: This patient is a 50yo F with symmetric sensorineural hearing loss. Discussed hearing protection. Medically clear for hearing aids should the patient desire them. Non-otologic dizziness; return to PCP or Neurology for this.

## 2021-06-05 NOTE — PROGRESS NOTES
Hearing Aid Fitting    Teresa was seen today for a hearing aid fitting. She has a history of moderate sloping to moderately-severe sensorineural hearing loss in the left ear and moderate sloping to severe sensorineural hearing loss in the right ear. Teresa has been medically cleared for devices from Dr. Jacinto. She was fit with bilateral Phonak Audeo M50-R devices. The hearing aids are fit with size 1 power receivers and cShell earmolds.    Hearing aid:  : Phonak  Devices:  Audeo M50-R  Style:  EMMY BTE  Serial numbers:  R: [id number], L: [id number]  Batteries:  rechargeable  Model number:  [id number]  Warranty expiration:  4/12/2023    Otoscopy reveals no occlusions, bilaterally. Real-ear measurements reveal appropriate audibility and output for NAL-NL2 gain targets. The overall gain is decreased to 70% for patient comfort. Teresa reports that her voice sounds loud with the hearing aids. She is counseled on wearing the hearing aids consistently in order to get used to the sound of her voice. The patient reports satisfaction with the fit and sound quality of the instruments.      Use, care, trial period and realistic expectations were reviewed in detail. Volume control is enabled.  She is able to demonstrate independent manipulation of the instruments with charging the hearing aids and with insertion/removal.  She is given written instruction on use, care, and warranty.  Teresa plans to contact the clinic to schedule a hearing aid check appointment in 3-4 weeks.    The patient verbalized understanding and is in agreement with this plan.    Caro Acevedo, [facility]  [place] Licensed Audiologist #[id number]

## 2021-06-07 ENCOUNTER — DOCUMENTATION ONLY (OUTPATIENT)
Dept: FAMILY MEDICINE | Facility: CLINIC | Age: 51
End: 2021-06-07

## 2021-06-07 DIAGNOSIS — Z12.31 VISIT FOR SCREENING MAMMOGRAM: Primary | ICD-10-CM

## 2021-06-08 DIAGNOSIS — Z12.31 VISIT FOR SCREENING MAMMOGRAM: ICD-10-CM

## 2021-06-08 LAB — MAMMOGRAM: NORMAL

## 2021-06-08 NOTE — RESULT ENCOUNTER NOTE
Teresa Perez-    Your mammogram came back looking normal, with NO signs of breast cancer.  This is good news.  Please call the clinic at [phone] if you have any questions.      Tyra Bullock MD    Please send results to patient.

## 2021-06-08 NOTE — LETTER
June 9, 2021      Teresa Perez  [address]  [place] [address]        Dear ,    We are writing to inform you of your test results.    Your mammogram came back looking normal, with NO signs of breast cancer.  This is good news.  Please call the clinic at [phone] if you have any questions.     Resulted Orders   PCS Use: Screening Digital Bilateral Mammogram   Result Value Ref Range    MAMMOGRAM         If you have any questions or concerns, please call the clinic at the number listed above.       Sincerely,      Tyra Bullock MD

## 2021-06-15 NOTE — TELEPHONE ENCOUNTER
Records received  2/17/2021 3:16pm inside [facility]  [place] - [phone]  Referring: Dr Tyra Bullock  DX: DM Type 2

## 2021-06-15 NOTE — PROGRESS NOTES
Referral received to the pain center.  Discussed with Dr. Bullock.  Patient previously seen at the [place] pain clinic.  2 years ago found down after cocaine had rhabdomyolysis and subsequent amputation and now phantom pain.    Patient previously seen in our clinic in 2005, discontinued from opioids.    Patient is interested in different options.  Dr. Bullock will review with patient we will look at different neuropathic agents, consider agents approaches such as medical cannabis, ketamine, spinal cord stimulator but opioids will not be part of the plan.

## 2021-06-17 ENCOUNTER — RECORDS - HEALTHEAST (OUTPATIENT)
Dept: CARDIOLOGY | Facility: CLINIC | Age: 51
End: 2021-06-17

## 2021-06-17 ENCOUNTER — RECORDS - HEALTHEAST (OUTPATIENT)
Dept: ADMINISTRATIVE | Facility: OTHER | Age: 51
End: 2021-06-17

## 2021-06-19 NOTE — LETTER
Letter by Erin Corona at      Author: Erin Corona Service: -- Author Type: --    Filed:  Encounter Date: 9/26/2019 Status: Signed         September 26, 2019    Teresa Perez  [address]  [place] [address]      Dear Teresa,    As a valued [facility] patient, your healthcare needs are our priority. Our clinic records indicate you have a referral to the Audiology Department for consultation.    To prevent further delays in your care please contact our office to schedule your appointment as soon as possible at [phone].      Sincerely,    [facility] ENT & Audiology Care Team

## 2021-06-19 NOTE — PROGRESS NOTES
Hernia education & surgery packet provided to pt.    Caden Heaton CMA ([facility])     Ph: [phone]    Fx: [phone]

## 2021-06-19 NOTE — PROGRESS NOTES
HPI: Teresa Perez is here for follow up for a recurrent incisional hernia.  She has been doing relatively well over the past year and a half but is developed a new bulge at the site of her incision.  He has a chronic cough which is exacerbating her symptoms of pain and discomfort.  She denies any fevers or chills but has had some mild nausea and vomiting.    Allergies, Medications, Social History, Past Medical History and Past Surgical History were reviewed and are noted in the chart.    /71 (Patient Site: Right Arm, Patient Position: Sitting, Cuff Size: Adult Large)  Pulse 98  Ht 5' (1.524 m)  Wt (!) 231 lb 3.2 oz (104.9 kg)  SpO2 96%  Breastfeeding? No  BMI 45.15 kg/m2  Body mass index is 45.15 kg/(m^2).      EXAM:   GENERAL: Appears well  Abdomen: Obese, small palpable upper midline incisional hernia, mild tenderness palpation    Incision 12/26/14 Groin (Active)       Incision 06/10/15 Leg Right (Active)       Incision 05/04/16 Knee Left (Active)       Incision 09/08/16 Lap site Abdomen LUQ;LLQ;RUQ;RLQ (Active)   Site Assessment Clean;Dry 9/11/2016  4:00 PM   Surrounding Tissue Assessment Clean;Dry;Intact 9/11/2016  4:00 PM   Closure Steristrips 9/11/2016  4:00 PM   Dressing Band-aid 9/11/2016  4:00 PM   Dressing Status  Clean;Dry;Intact 9/11/2016  4:00 PM       Incision 09/10/16 Surgical Abdomen (Active)   Site Assessment Clean;Dry;Approximated 9/10/2016 11:42 PM   Surrounding Tissue Assessment Clean;Dry;Intact 9/10/2016 11:42 PM   Closure Steristrips 9/10/2016 11:42 PM   Dressing Status  Clean;Dry 9/10/2016  7:02 PM       Incision 09/10/16 Surgical Other (Comment) (Active)   Site Assessment Clean;Dry 9/10/2016 11:42 PM   Surrounding Tissue Assessment Clean;Dry 9/10/2016 11:42 PM   Closure Steristrips 9/10/2016 11:42 PM   Dressing Status  Clean;Dry 9/10/2016  7:03 PM       Assessment/Plan: Teresa Perez has a recurrent incisional hernia.  This is expected given the fact that her previous  mesh was explanted.  At this point we will obtain a CT scan for better characterization.  She would like to have the hernia repaired.  I will have her undergo CT imaging and follow-up with me in clinic to discuss further options.  Abdelrahman Ware DO [facility] Department of Surgery

## 2021-06-19 NOTE — PROGRESS NOTES
HPI: Teresa Perez is here for follow up to discuss the results of her CT scan.  She continues to have pain would like to have her hernia repair.    Allergies, Medications, Social History, Past Medical History and Past Surgical History were reviewed and are noted in the chart.    BP (!) 171/101 (Patient Site: Right Arm, Patient Position: Sitting, Cuff Size: Adult Regular)  Pulse (!) 114  SpO2 97%  There is no height or weight on file to calculate BMI.      EXAM:   GENERAL: Appears well      Incision 12/26/14 Groin (Active)       Incision 06/10/15 Leg Right (Active)       Incision 05/04/16 Knee Left (Active)       Incision 09/08/16 Lap site Abdomen LUQ;LLQ;RUQ;RLQ (Active)   Site Assessment Clean;Dry 9/11/2016  4:00 PM   Surrounding Tissue Assessment Clean;Dry;Intact 9/11/2016  4:00 PM   Closure Steristrips 9/11/2016  4:00 PM   Dressing Band-aid 9/11/2016  4:00 PM   Dressing Status  Clean;Dry;Intact 9/11/2016  4:00 PM       Incision 09/10/16 Surgical Abdomen (Active)   Site Assessment Clean;Dry;Approximated 9/10/2016 11:42 PM   Surrounding Tissue Assessment Clean;Dry;Intact 9/10/2016 11:42 PM   Closure Steristrips 9/10/2016 11:42 PM   Dressing Status  Clean;Dry 9/10/2016  7:02 PM       Incision 09/10/16 Surgical Other (Comment) (Active)   Site Assessment Clean;Dry 9/10/2016 11:42 PM   Surrounding Tissue Assessment Clean;Dry 9/10/2016 11:42 PM   Closure Steristrips 9/10/2016 11:42 PM   Dressing Status  Clean;Dry 9/10/2016  7:03 PM       Assessment/Plan: Teresa Perez continues to have pain secondary to her hernia with cramping in the incisional region.  At this point we discussed the results of the CT scan and I went over the images with her.  She would like to pursue surgery as this is causing her significant discomfort.  I advised her that I can repair her hernia but there will always be a possibility that with the hernia fixed she still continues to have abdominal discomfort.  She understands this but  would like to proceed regardless.  Risks and benefits of the surgery were explained and she will need preoperative clearance for the surgery.    Abdelrahman Ware DO [facility] Department of Surgery

## 2021-06-20 NOTE — LETTER
Letter by Yolanda Vargas AuD at      Author: Yolanda Vargas AuD Service: -- Author Type: --    Filed:  Encounter Date: 12/23/2019 Status: Signed       [facility]- Audiology Benefit Letter    YONI KHAN  1970  [address]  [place] [address]    Insurance Company: Payor: MEDICARE / Plan: MEDICARE A AND B / Product Type: Medicare / MEDICA [place]  [place] Product: YES    ID # :  [id number]/ [id number]    Group#:  [id number]/ [id number]    Estimate of Benefits  Consult Visit Benefits:  MEDICARE PART A AND B/ [place]ICA [place] 12/19 ALANNAH    HAE, HAF, HAC Benefits:   COVERED SERVICE PATIENT HAS NOT RCVD JUAN HEARING AID BENEFITS WITHIN THE LAST 5 YEARS PER [place]    Batteries/Accessories Coverage:  COVERED/ COVERED    Representative name: VIA RTE AND MNITS  Call reference:   Date of contact: 12/23/19    Insurance verified today by ROHIT JEAN    Additional Information:      The information provided is an estimate of benefits. This does not guarantee coverage; the insurance company will make the final determination of coverage to include patient responsibility of payment by the patient.   [facility] is not responsible for the decisions made by the insurance company regarding coverage.  Any changes to coverage (new plan or new policy) or procedures may void the contents provided in this letter.     Term Definitions  Patient responsibility: Can include but not limited to: co-pays, co-insurance, deductibles, out-of-pocket and non-covered and/or policy exclusions.

## 2021-06-25 ENCOUNTER — DOCUMENTATION ONLY (OUTPATIENT)
Dept: FAMILY MEDICINE | Facility: CLINIC | Age: 51
End: 2021-06-25

## 2021-06-25 NOTE — ANESTHESIA POSTPROCEDURE EVALUATION
Patient: Teresa Perez  #2  REVISION, AMPUTATION SITE.  Left above knee amputation.  Anesthesia type: general    Patient location: floor  Last vitals:   Vitals:    03/20/19 1845   BP: 118/58   Pulse: 90   Resp: 18   Temp: 36.7  C (98.1  F)   SpO2: 97%     Post vital signs: stable  Level of consciousness: awake and responds to simple questions  Post-anesthesia pain: pain controlled  Post-anesthesia nausea and vomiting: no  Pulmonary: unassisted, return to baseline  Cardiovascular: stable and blood pressure at baseline  Hydration: adequate  Anesthetic events: no    QCDR Measures:  ASA# 11 - Asha-op Cardiac Arrest: ASA11B - Patient did NOT experience unanticipated cardiac arrest  ASA# 12 - Asha-op Mortality Rate: ASA12B - Patient did NOT die  ASA# 13 - PACU Re-Intubation Rate: ASA13B - Patient did NOT require a new airway mgmt  ASA# 10 - Composite Anes Safety: ASA10A - No serious adverse event    Additional Notes:  Pain much better after FNB in pacu

## 2021-06-25 NOTE — ANESTHESIA PROCEDURE NOTES
Emergent Intubation  Date/Time: 3/15/2019 7:51 PM    Performing CRNA: Levi Jaimes CRNA  Indications: respiratory distress  Route: oral  Technique: direct  Laryngoscope size: Mac 3  Tube size: 7.5 mm  Tube type: cuffed  Cuff inflated: yes  Level of Difficulty: 0ETT to lip: 21 cm  Tube secured with: ETT rich  ETCO2 = Yes  Breath sounds: equal  SaO2 %: 97    Sign out given. CXR and sedation per primary care team.

## 2021-06-25 NOTE — PROGRESS NOTES
Surgery/Procedure: revision amputation site left above knee amputation     Special Equipment: to be determned    Location: [facility]    Date: 03/20/19    Time: 15:30    Surgeon: Dr. Chatterjee    Assist: to be determned    Length of Surgery: 105 min OR Confirmed/ :  Dave Carnes  on 3/18/19    Orders In:  Yes    Provider Team Notified:  Yes    Entered on [employer] Calendar:  Yes    Post Op: to be determined

## 2021-06-25 NOTE — PROGRESS NOTES
Patient no-showed cardiology visits on 5/7, 4/23 and 6/23 through [facility].  Will discuss with patient at next follow up visit.     Tyra Bullock MD

## 2021-06-25 NOTE — ANESTHESIA PROCEDURE NOTES
Peripheral Block    Patient location during procedure: post-op  Start time: 3/20/2019 5:23 PM  End time: 3/20/2019 5:25 PM  post-op analgesia per surgeon order as noted in medical record  Staffing:  Performing  Anesthesiologist: Samaria Hanna MD  Preanesthetic Checklist  Completed: patient identified, site marked, risks, benefits, and alternatives discussed, timeout performed, consent obtained, at patient's request, airway assessed, oxygen available, suction available, emergency drugs available and hand hygiene performed  Peripheral Block  Block type: femoral  Prep: ChloraPrep  Patient position: supine  Patient monitoring: cardiac monitor, continuous pulse oximetry, heart rate and blood pressure  Laterality: left  Injection technique: ultrasound guided    Ultrasound used to visualize needle placement in proximity to nerve being blocked: yes   Permanent ultrasound image captured for medical record  Sterile gel and probe cover used for ultrasound.    Needle  Needle type: Stimuplex   Needle gauge: 20G  Needle length: 6 in  no peripheral nerve catheter placed  Assessment  Injection assessment: no difficulty with injection, negative aspiration for heme, no paresthesia on injection and incremental injection  Additional Notes  No issues.  Vital signs stable.  No signs of LAST.

## 2021-06-25 NOTE — ANESTHESIA PREPROCEDURE EVALUATION
Anesthesia Evaluation      Patient summary reviewed   History of anesthetic complications ( )     Airway   Mallampati: III  Neck ROM: full  Comment: Short, thick neck   Pulmonary - normal exam    breath sounds clear to auscultation  (+) asthma  sleep apnea,     PE comment: Diminished in bases bilaterally                          Cardiovascular - normal exam  Exercise tolerance: < 4 METS  (+) hypertension, , hypercholesterolemia,     ECG reviewed  Rhythm: regular  Rate: normal,      ROS comment: TTE 3/16/19-    Left ventricle ejection fraction is normal. The calculated left ventricular ejection fraction is 66% without wall motion abnormality.    Grossly normal right ventricular size with decreased systolic function.    No significant valvular disease    When compared to the previous study dated 3/20/2017, no significant change.  PE comment: Tachycardic, ,     Neuro/Psych    (+) seizures, neuromuscular disease,  depression, chronic pain    Comments: THC, Cocaine + on admission     Endo/Other    (+) diabetes mellitus type 2 poorly controlled using insulin, arthritis, obesity,      GI/Hepatic/Renal    (+) hiatal hernia, GERD,   chronic renal disease ( ),     Comments: BILLY 2/2 rhabdomyolysis - CRRT 3/15-3/16 now HD (last 3/19). K+ improving, acidosis improving with HD.      Other findings: Improving following emergent AKA 3/18/19 - self extubated POD #1 (3/19), immediate wean to room air. Off all pressors. H/o rabdo, BILLY, cr-10, K-3.9, hgb--pending Remains confused, but conversational.   L arm PICC      Dental    (+) poor dentition                         Anesthesia Plan  Planned anesthetic: general endotracheal and peripheral nerve block  Consent obtained for GETA vs PNB w/ MAC.   ASA 4   Induction: intravenous   Anesthetic plan and risks discussed with: healthcare power of  (consent obtained from patient's brother)  Anesthesia plan special considerations: increased risk of difficult airway, antiemetics,    Post-op plan: routine recovery

## 2021-06-25 NOTE — ANESTHESIA POSTPROCEDURE EVALUATION
Patient: Teresa Perez  AMPUTATION, ABOVE KNEE  Anesthesia type: general    Patient location: ICU  Last vitals:   Vitals:    03/18/19 2215   BP: 90/54   Pulse: 85   Resp: 28   Temp:    SpO2: 99%     Post vital signs: unstable  Level of consciousness: sedated  Post-anesthesia pain: pain controlled  Post-anesthesia nausea and vomiting: no  Pulmonary: ETT, ventilator  Cardiovascular: hypotensive and tachycardic  Hydration: adequate  Anesthetic events: no    QCDR Measures:  ASA# 11 - Asha-op Cardiac Arrest: ASA11B - Patient did NOT experience unanticipated cardiac arrest  ASA# 12 - Asha-op Mortality Rate: ASA12B - Patient did NOT die  ASA# 13 - PACU Re-Intubation Rate: ASA13X - Exclusion: organ donor or direct ICU transfer  ASA# 10 - Composite Anes Safety: ASA10A - No serious adverse event    Additional Notes: Patient Febrile.

## 2021-06-25 NOTE — ANESTHESIA CARE TRANSFER NOTE
Last vitals:   Vitals:    03/20/19 1630   BP: (!) 170/96   Pulse: 100   Resp: 12   Temp: 36.6  C (97.9  F)   SpO2: 99%     Patient's level of consciousness is drowsy  Spontaneous respirations: yes  Maintains airway independently: yes  Dentition unchanged: yes  Oropharynx: oropharynx clear of all foreign objects    QCDR Measures:  ASA# 20 - Surgical Safety Checklist: WHO surgical safety checklist completed prior to induction    PQRS# 430 - Adult PONV Prevention: 4558F - Pt received => 2 anti-emetic agents (different classes) preop & intraop  ASA# 8 - Peds PONV Prevention: NA - Not pediatric patient, not GA or 2 or more risk factors NOT present  PQRS# 424 - Asha-op Temp Management: 4559F - At least one body temp DOCUMENTED => 35.5C or 95.9F within required timeframe  PQRS# 426 - PACU Transfer Protocol: - Transfer of care checklist used  ASA# 14 - Acute Post-op Pain: ASA14B - Patient did NOT experience pain >= 7 out of 10

## 2021-06-28 ENCOUNTER — TELEPHONE (OUTPATIENT)
Dept: FAMILY MEDICINE | Facility: CLINIC | Age: 51
End: 2021-06-28

## 2021-06-28 DIAGNOSIS — Z79.4 TYPE 2 DIABETES MELLITUS WITH HYPERGLYCEMIA, WITH LONG-TERM CURRENT USE OF INSULIN (H): Primary | ICD-10-CM

## 2021-06-28 DIAGNOSIS — E11.65 TYPE 2 DIABETES MELLITUS WITH HYPERGLYCEMIA, WITH LONG-TERM CURRENT USE OF INSULIN (H): Primary | ICD-10-CM

## 2021-06-28 NOTE — TELEPHONE ENCOUNTER
Alternative of Bcise sent in for patient in place of the generic Bydureon.  .  If needing to change to Trulicity or Victoza, would want patient to be seen in clinic to discuss options.  She is also due for diabetes follow up and labs.     Tyra Bullock MD    Routed to Deanaflora MCCARTHYI.

## 2021-06-28 NOTE — PROGRESS NOTES
Progress Notes by Yolanda Vargas AuD at 12/23/2019 12:00 PM     Author: Yolanda Vargas AuD Service: -- Author Type: Audiologist    Filed: 12/23/2019  1:18 PM Encounter Date: 12/23/2019 Status: Signed    : Yolanda Vargas AuD (Audiologist)       Audiology Report:    Referring Provider:  Dr. Bullock    History:  Teresa Perez is seen today for comprehensive hearing evaluation. She has a history of 2 strokes which occurred in March 2019. Teresa reports that since the strokes she has experienced increased difficulty hearing and constant ringing tinnitus in both ears. She states that she also began experiencing intermittent otalgia in her left ear and intermittent dizziness after the strokes. Teresa reports her dizziness is sometimes lightheadedness and other times vertigo. She states the vertigo occurs a couple times a week and lasts for a couple of hours. Teresa reports she experiences migraines and her ears often pop. She ambulates by wheelchair and she reports she lost her left leg due to diabetes after her stroke. She denies a history of otorrhea, ear surgery, aural fullness, noise exposure, and family history of hearing loss.     Results:     Left Ear Right Ear   Otoscopy clear canals clear canals   Pure Tone Audiometry Moderate to moderately-severe sensorineural hearing loss  Moderate to severe sensorineural hearing loss    Word Recognition good fair   Acoustic Reflexes Present ipsilateral and contralateral reflexes Present ipsilateral and contralateral reflexes   Tympanometry normal (Type A)  normal (Type A)     Transducer: Insert earphones and Circumaural headphones    Reliability was good  and there was good  SRT to PTA agreement.       Plan:  Results are discussed in detail.  She should return for retesting in 1-2 years.  The patient is a candidate for hearing aids, and should consider pending medical clearance. A referral to ENT is recommended due to vertigo and for medical clearance for hearing  aids. Teresa is scheduled to see Dr. Jacinto on 1/7/2020 followed by a hearing aid evaluation that day.    Please see audiogram below or under media and audiogram in the patients chart.     Caro Acevedo, [facility]  [place] Licensed Audiologist #[id number]

## 2021-07-05 DIAGNOSIS — I10 BENIGN ESSENTIAL HYPERTENSION: ICD-10-CM

## 2021-07-05 RX ORDER — AMLODIPINE BESYLATE 10 MG/1
10 TABLET ORAL DAILY
Qty: 90 TABLET | Refills: 0 | Status: SHIPPED | OUTPATIENT
Start: 2021-07-05 | End: 2021-07-13

## 2021-07-06 DIAGNOSIS — G54.6 PHANTOM LIMB PAIN (H): ICD-10-CM

## 2021-07-06 DIAGNOSIS — R05.9 COUGH: ICD-10-CM

## 2021-07-06 RX ORDER — GUAIFENESIN/DEXTROMETHORPHAN 100-10MG/5
5 SYRUP ORAL EVERY 4 HOURS PRN
Qty: 236 ML | Refills: 1 | Status: SHIPPED | OUTPATIENT
Start: 2021-07-06 | End: 2021-07-13

## 2021-07-06 RX ORDER — AMITRIPTYLINE HYDROCHLORIDE 10 MG/1
TABLET ORAL
Qty: 30 TABLET | Refills: 3 | Status: SHIPPED | OUTPATIENT
Start: 2021-07-06 | End: 2021-10-13

## 2021-07-06 NOTE — TELEPHONE ENCOUNTER
[facility] phone call message- patient requesting a refill:    Full Medication Name: amitriptyline (ELAVIL) 10 MG tablet  - TAKE 1 TABLET BY MOUTH EVERYDAY AT BEDTIME    guaiFENesin-dextromethorphan (ROBITUSSIN DM) 100-10 MG/5ML syrup -Take 5 mLs by mouth every 4 hours as needed for cough - Oral     Pharmacy confirmed as   [id number] - [place], [place] - [address] AT [facility]  [address]  [place] [address]  Phone: [phone] Fax: [phone]  : Yes    Additional Comments: NONE    OK to leave a message on voice mail? Yes    Primary language: English      needed? No    Call taken on July 6, 2021 at 10:29 AM by Mary Ann Giraldo

## 2021-07-08 ENCOUNTER — TRANSFERRED RECORDS (OUTPATIENT)
Dept: HEALTH INFORMATION MANAGEMENT | Facility: CLINIC | Age: 51
End: 2021-07-08

## 2021-07-12 DIAGNOSIS — E11.65 TYPE 2 DIABETES MELLITUS WITH HYPERGLYCEMIA, WITH LONG-TERM CURRENT USE OF INSULIN (H): Primary | ICD-10-CM

## 2021-07-12 DIAGNOSIS — Z79.4 TYPE 2 DIABETES MELLITUS WITH HYPERGLYCEMIA, WITH LONG-TERM CURRENT USE OF INSULIN (H): Primary | ICD-10-CM

## 2021-07-13 ENCOUNTER — OFFICE VISIT (OUTPATIENT)
Dept: FAMILY MEDICINE | Facility: CLINIC | Age: 51
End: 2021-07-13
Payer: MEDICARE

## 2021-07-13 VITALS
DIASTOLIC BLOOD PRESSURE: 73 MMHG | SYSTOLIC BLOOD PRESSURE: 105 MMHG | HEART RATE: 97 BPM | RESPIRATION RATE: 16 BRPM | TEMPERATURE: 98.2 F | OXYGEN SATURATION: 96 %

## 2021-07-13 DIAGNOSIS — N92.4 EXCESSIVE BLEEDING IN PREMENOPAUSAL PERIOD: Primary | ICD-10-CM

## 2021-07-13 DIAGNOSIS — B37.31 YEAST INFECTION OF THE VAGINA: ICD-10-CM

## 2021-07-13 DIAGNOSIS — E11.65 TYPE 2 DIABETES MELLITUS WITH HYPERGLYCEMIA, WITH LONG-TERM CURRENT USE OF INSULIN (H): ICD-10-CM

## 2021-07-13 DIAGNOSIS — I10 BENIGN ESSENTIAL HYPERTENSION: ICD-10-CM

## 2021-07-13 DIAGNOSIS — R05.9 COUGH: ICD-10-CM

## 2021-07-13 DIAGNOSIS — T78.40XS ALLERGIC REACTION, SEQUELA: ICD-10-CM

## 2021-07-13 DIAGNOSIS — G25.81 RESTLESS LEGS SYNDROME: ICD-10-CM

## 2021-07-13 DIAGNOSIS — J45.20 MILD INTERMITTENT ASTHMA WITHOUT COMPLICATION: ICD-10-CM

## 2021-07-13 DIAGNOSIS — H10.45 CHRONIC ALLERGIC CONJUNCTIVITIS: ICD-10-CM

## 2021-07-13 DIAGNOSIS — H69.93 DYSFUNCTION OF BOTH EUSTACHIAN TUBES: ICD-10-CM

## 2021-07-13 DIAGNOSIS — Z79.4 TYPE 2 DIABETES MELLITUS WITH HYPERGLYCEMIA, WITH LONG-TERM CURRENT USE OF INSULIN (H): ICD-10-CM

## 2021-07-13 LAB — HBA1C MFR BLD: 8.5 % (ref 0–5.6)

## 2021-07-13 PROCEDURE — 80048 BASIC METABOLIC PNL TOTAL CA: CPT | Performed by: STUDENT IN AN ORGANIZED HEALTH CARE EDUCATION/TRAINING PROGRAM

## 2021-07-13 PROCEDURE — 36415 COLL VENOUS BLD VENIPUNCTURE: CPT | Performed by: STUDENT IN AN ORGANIZED HEALTH CARE EDUCATION/TRAINING PROGRAM

## 2021-07-13 PROCEDURE — 96372 THER/PROPH/DIAG INJ SC/IM: CPT | Performed by: STUDENT IN AN ORGANIZED HEALTH CARE EDUCATION/TRAINING PROGRAM

## 2021-07-13 PROCEDURE — 83036 HEMOGLOBIN GLYCOSYLATED A1C: CPT | Performed by: STUDENT IN AN ORGANIZED HEALTH CARE EDUCATION/TRAINING PROGRAM

## 2021-07-13 PROCEDURE — 99214 OFFICE O/P EST MOD 30 MIN: CPT | Mod: 25 | Performed by: STUDENT IN AN ORGANIZED HEALTH CARE EDUCATION/TRAINING PROGRAM

## 2021-07-13 RX ORDER — MEDROXYPROGESTERONE ACETATE 150 MG/ML
150 INJECTION, SUSPENSION INTRAMUSCULAR
Status: ACTIVE | OUTPATIENT
Start: 2021-07-13 | End: 2022-07-08

## 2021-07-13 RX ORDER — GUAIFENESIN/DEXTROMETHORPHAN 100-10MG/5
5 SYRUP ORAL EVERY 4 HOURS PRN
Qty: 236 ML | Refills: 1 | Status: SHIPPED | OUTPATIENT
Start: 2021-07-13 | End: 2021-08-18

## 2021-07-13 RX ORDER — FEXOFENADINE HCL 180 MG/1
180 TABLET ORAL DAILY
Qty: 30 TABLET | Refills: 5 | Status: SHIPPED | OUTPATIENT
Start: 2021-07-13 | End: 2022-05-24

## 2021-07-13 RX ORDER — TIOTROPIUM BROMIDE 18 UG/1
18 CAPSULE ORAL; RESPIRATORY (INHALATION) DAILY
Qty: 90 CAPSULE | Refills: 3 | Status: SHIPPED | OUTPATIENT
Start: 2021-07-13 | End: 2022-01-04

## 2021-07-13 RX ORDER — AMLODIPINE BESYLATE 10 MG/1
10 TABLET ORAL DAILY
Qty: 90 TABLET | Refills: 3 | Status: SHIPPED | OUTPATIENT
Start: 2021-07-13 | End: 2023-04-11

## 2021-07-13 RX ORDER — MONTELUKAST SODIUM 10 MG/1
1 TABLET ORAL AT BEDTIME
Qty: 90 TABLET | Refills: 1 | Status: SHIPPED | OUTPATIENT
Start: 2021-07-13 | End: 2022-01-17

## 2021-07-13 RX ORDER — DICLOFENAC POTASSIUM 50 MG/1
TABLET, FILM COATED ORAL
COMMUNITY
Start: 2021-07-08 | End: 2022-05-24

## 2021-07-13 RX ORDER — METOPROLOL SUCCINATE 50 MG/1
50 TABLET, EXTENDED RELEASE ORAL DAILY
Qty: 30 TABLET | Refills: 5 | Status: SHIPPED | OUTPATIENT
Start: 2021-07-13 | End: 2021-08-05

## 2021-07-13 RX ORDER — PRAMIPEXOLE DIHYDROCHLORIDE 0.5 MG/1
0.5 TABLET ORAL AT BEDTIME
Qty: 30 TABLET | Refills: 11 | Status: SHIPPED | OUTPATIENT
Start: 2021-07-13 | End: 2021-08-05

## 2021-07-13 RX ORDER — ALBUTEROL SULFATE 90 UG/1
1-2 AEROSOL, METERED RESPIRATORY (INHALATION) EVERY 4 HOURS PRN
Qty: 18 G | Refills: 3 | Status: SHIPPED | OUTPATIENT
Start: 2021-07-13 | End: 2022-03-09 | Stop reason: ALTCHOICE

## 2021-07-13 RX ADMIN — MEDROXYPROGESTERONE ACETATE 150 MG: 150 INJECTION, SUSPENSION INTRAMUSCULAR at 15:08

## 2021-07-13 NOTE — LETTER
July 16, 2021      Teresa Perez  [address]  [place] [address]        Dear ,    We are writing to inform you of your test results.    Here is a copy of your lab results.  As we discussed in clinic, your A1c is much improved.  Good job!  Our goal is to get it down to 7.0, but this is a great start.  Your kidney tests are also good.  No concerns.  Please call the clinic at [phone] if you have any questions.      Resulted Orders   Hemoglobin A1c   Result Value Ref Range    Hemoglobin A1C 8.5 (H) 0.0 - 5.6 %      Comment:      Normal <5.7%   Prediabetes 5.7-6.4%    Diabetes 6.5% or higher     Note: Adopted from ADA consensus guidelines.   Basic metabolic panel   Result Value Ref Range    Sodium 136 136 - 145 mmol/L    Potassium 4.7 3.5 - 5.0 mmol/L    Chloride 100 98 - 107 mmol/L    Carbon Dioxide (CO2) 22 22 - 31 mmol/L    Anion Gap 14 5 - 18 mmol/L    Urea Nitrogen 26 (H) 8 - 22 mg/dL    Creatinine 0.79 0.60 - 1.10 mg/dL    Calcium 9.9 8.5 - 10.5 mg/dL    Glucose 158 (H) 70 - 125 mg/dL    GFR Estimate 88 >60 mL/min/1.73m2      Comment:      As of July 11, 2021, eGFR is calculated by the CKD-EPI creatinine equation, without race adjustment. eGFR can be influenced by muscle mass, exercise, and diet. The reported eGFR is an estimation only and is only applicable if the renal function is stable.       If you have any questions or concerns, please call the clinic at the number listed above.       Sincerely,      Tyra Bullock MD

## 2021-07-13 NOTE — NURSING NOTE
Clinic Administered Medication Documentation    Administrations This Visit     medroxyPROGESTERone (DEPO-PROVERA) injection 150 mg     Admin Date  07/13/2021 Action  Given Dose  150 mg Route  Intramuscular Site  Right Deltoid Administered By  Deana Castellanos CMA    Ordering Provider: Tyra Bullock MD    NDC: 43318-446-00    Lot#: [id number]    : [facility]    Patient Supplied?: No                  Depo Provera Documentation    URINE HCG: not indicated    Depo-Provera Standing Order inclusion/exclusion criteria reviewed.   Patient meets: inclusion criteria     BP: 105/73  LAST PAP/EXAM: No results found for: PAP    Prior to injection, verified patient identity using patient's name and date of birth. Medication was administered. Please see MAR and medication order for additional information.     Was entire vial of medication used? Yes  Vial/Syringe: Single dose vial  Expiration Date:  12/31/22    Patient instructed to remain in clinic for 15 minutes.  NEXT INJECTION DUE: 9/28/21 - 10/12/21      Name of provider who requested the medication administration: Kobe  Name of provider on site (faculty or community preceptor) at the time of performing the medication administration: Kobe    Date of next administration: 09/29/21 - 10/13/21  Deana Castellanos CMA

## 2021-07-13 NOTE — PATIENT INSTRUCTIONS
Becca:   at [facility]  --[facility] for food  --Connecting with team for waiver and supply stuff.      Dr. Bullock is going to connect with pharmacy/nursing--telephone visits every 2 weeks to check in on blood sugars.      Medication changes:  --Flonase, 1 puff in each nostril in AM AND PM  --New inhaler (Dulera--replaces Symbicort)--2 puffs AM and PM.    --Keep doing the Spiriva  --Use albuterol as needed  --Great work cutting down on smoking.  --Keep taking allergy medicines.     For Diabetes:  --Let us know if having trouble with new Bydureon--we can help  --Increase the Jardiance to 25mg.  --No change in other medications today  --A1c is 8.5--this is the best in a long time!      For restless legs:  --Increase to 0.5mg per day and let your psychiatrist know.     Refill metoprolol (new type--take only 1 pill per day)  Refill amlodipine (1 pill per day.    Refill cough syrup.     Keep appts with heart, lung, and neurology  Use other transportation if needed.

## 2021-07-14 LAB
ANION GAP SERPL CALCULATED.3IONS-SCNC: 14 MMOL/L (ref 5–18)
BUN SERPL-MCNC: 26 MG/DL (ref 8–22)
CALCIUM SERPL-MCNC: 9.9 MG/DL (ref 8.5–10.5)
CHLORIDE BLD-SCNC: 100 MMOL/L (ref 98–107)
CO2 SERPL-SCNC: 22 MMOL/L (ref 22–31)
CREAT SERPL-MCNC: 0.79 MG/DL (ref 0.6–1.1)
GFR SERPL CREATININE-BSD FRML MDRD: 88 ML/MIN/1.73M2
GLUCOSE BLD-MCNC: 158 MG/DL (ref 70–125)
POTASSIUM BLD-SCNC: 4.7 MMOL/L (ref 3.5–5)
SODIUM SERPL-SCNC: 136 MMOL/L (ref 136–145)

## 2021-07-14 NOTE — RESULT ENCOUNTER NOTE
Teresa Perez-    Here is a copy of your lab results.  As we discussed in clinic, your A1c is much improved.  Good job!  Our goal is to get it down to 7.0, but this is a great start.  Your kidney tests are also good.  No concerns.  Please call the clinic at [phone] if you have any questions.      Tyra Bullock MD    Please send results to patient.
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
## 2021-07-15 ENCOUNTER — TELEPHONE (OUTPATIENT)
Dept: FAMILY MEDICINE | Facility: CLINIC | Age: 51
End: 2021-07-15

## 2021-07-15 NOTE — TELEPHONE ENCOUNTER
CHADD reached out to Teresa, via phone, to discuss getting her signed up for [facility] [employer]. She did not answer. CHADD did leave a brief VM and requested a return call to discuss.     CHADD will route to Dr. Bullock to inquire which medically tailored meal would be best for Teresa. Options are:    Heart healthy  Kidney-Friendly/Renal  [place] (for those w/ taste or swallowing problems)  Vegetarian    Will try calling Teresa again tomorrow.     JAD Mock

## 2021-07-19 DIAGNOSIS — B37.31 YEAST INFECTION OF THE VAGINA: ICD-10-CM

## 2021-07-19 NOTE — TELEPHONE ENCOUNTER
[facility] phone call message- patient requesting a refill:    Full Medication Name: monistat 7      Dose:     Pharmacy confirmed as   [id number] - [place], [place] - [address] [facility]  [address]  [place] [address]  Phone: [phone] Fax: [phone]      : Yes    Additional Comments: the pt did not get this from her last visit        OK to leave a message on voice mail? Yes    Primary language: English      needed? No    Call taken on July 19, 2021 at 11:07 AM by Juan Peralta

## 2021-07-20 NOTE — TELEPHONE ENCOUNTER
Re-ordered topical miconazole.  This was ordered on 7/13/21 but was not picked up, based on pharmacy dispense report.    Lis Vidal MD  (covering for Dr. Bullock while out of office)

## 2021-07-21 DIAGNOSIS — R10.84 ABDOMINAL PAIN, GENERALIZED: ICD-10-CM

## 2021-07-21 RX ORDER — BACLOFEN 20 MG/1
20 TABLET ORAL 3 TIMES DAILY PRN
Qty: 90 TABLET | Refills: 0 | Status: SHIPPED | OUTPATIENT
Start: 2021-07-21 | End: 2021-08-16

## 2021-07-23 ENCOUNTER — TELEPHONE (OUTPATIENT)
Dept: PHARMACY | Facility: CLINIC | Age: 51
End: 2021-07-23

## 2021-07-23 ENCOUNTER — RECORDS - HEALTHEAST (OUTPATIENT)
Dept: SCHEDULING | Facility: CLINIC | Age: 51
End: 2021-07-23

## 2021-07-23 ENCOUNTER — TRANSFERRED RECORDS (OUTPATIENT)
Dept: HEALTH INFORMATION MANAGEMENT | Facility: CLINIC | Age: 51
End: 2021-07-23

## 2021-07-23 DIAGNOSIS — N64.4 MASTODYNIA: ICD-10-CM

## 2021-07-23 DIAGNOSIS — J44.9 CHRONIC OBSTRUCTIVE PULMONARY DISEASE, UNSPECIFIED COPD TYPE (H): Primary | ICD-10-CM

## 2021-07-23 RX ORDER — FLUTICASONE PROPIONATE AND SALMETEROL XINAFOATE 230; 21 UG/1; UG/1
2 AEROSOL, METERED RESPIRATORY (INHALATION) 2 TIMES DAILY
Qty: 36 G | Refills: 3 | Status: SHIPPED | OUTPATIENT
Start: 2021-07-23 | End: 2022-01-04

## 2021-07-23 NOTE — TELEPHONE ENCOUNTER
"Received a PA request for Dulera for patient.  It appears at last visit with Dr. Bullock, she wanted to increase the ICS dose of her ICS/LAMA, but was unable to reach \"high dose\" with Symbicort. Sent rx for alternative agent, Advair HFA.  If that is not covered we will need a PA for the Dulera.    Alejandra Olson, Pharm.D.    "

## 2021-08-13 ENCOUNTER — TELEPHONE (OUTPATIENT)
Dept: FAMILY MEDICINE | Facility: CLINIC | Age: 51
End: 2021-08-13

## 2021-08-13 DIAGNOSIS — Z71.6 ENCOUNTER FOR TOBACCO USE CESSATION COUNSELING: Primary | ICD-10-CM

## 2021-08-13 NOTE — TELEPHONE ENCOUNTER
Pt's pharmacy is requesting a refill of Nicotine Gun 2 MG, however it is no longer on the pt's med list.  Please advise.  Deana Castellanos, CMA

## 2021-08-14 ENCOUNTER — APPOINTMENT (OUTPATIENT)
Dept: CT IMAGING | Facility: CLINIC | Age: 51
End: 2021-08-14
Attending: EMERGENCY MEDICINE
Payer: MEDICARE

## 2021-08-14 ENCOUNTER — APPOINTMENT (OUTPATIENT)
Dept: RADIOLOGY | Facility: CLINIC | Age: 51
End: 2021-08-14
Attending: EMERGENCY MEDICINE
Payer: MEDICARE

## 2021-08-14 ENCOUNTER — HOSPITAL ENCOUNTER (EMERGENCY)
Facility: CLINIC | Age: 51
Discharge: HOME | End: 2021-08-14
Attending: EMERGENCY MEDICINE | Admitting: EMERGENCY MEDICINE
Payer: MEDICARE

## 2021-08-14 VITALS
SYSTOLIC BLOOD PRESSURE: 164 MMHG | OXYGEN SATURATION: 95 % | TEMPERATURE: 98 F | HEART RATE: 101 BPM | BODY MASS INDEX: 47.77 KG/M2 | WEIGHT: 245 LBS | DIASTOLIC BLOOD PRESSURE: 75 MMHG | RESPIRATION RATE: 20 BRPM

## 2021-08-14 DIAGNOSIS — J18.9 COMMUNITY ACQUIRED PNEUMONIA OF RIGHT UPPER LOBE OF LUNG: ICD-10-CM

## 2021-08-14 LAB
ALBUMIN UR-MCNC: 30 MG/DL
ANION GAP SERPL CALCULATED.3IONS-SCNC: 16 MMOL/L (ref 5–18)
ANION GAP SERPL CALCULATED.3IONS-SCNC: 20 MMOL/L (ref 5–18)
APPEARANCE UR: CLEAR
ATRIAL RATE - MUSE: 102 BPM
BACTERIA #/AREA URNS HPF: ABNORMAL /HPF
BASOPHILS # BLD AUTO: 0.1 10E3/UL (ref 0–0.2)
BASOPHILS NFR BLD AUTO: 0 %
BILIRUB UR QL STRIP: NEGATIVE
BUN SERPL-MCNC: 14 MG/DL (ref 8–22)
BUN SERPL-MCNC: 18 MG/DL (ref 8–22)
CALCIUM SERPL-MCNC: 10.5 MG/DL (ref 8.5–10.5)
CALCIUM SERPL-MCNC: 8.5 MG/DL (ref 8.5–10.5)
CHLORIDE BLD-SCNC: 108 MMOL/L (ref 98–107)
CHLORIDE BLD-SCNC: 99 MMOL/L (ref 98–107)
CO2 SERPL-SCNC: 12 MMOL/L (ref 22–31)
CO2 SERPL-SCNC: 15 MMOL/L (ref 22–31)
COLOR UR AUTO: ABNORMAL
CREAT SERPL-MCNC: 0.71 MG/DL (ref 0.6–1.1)
CREAT SERPL-MCNC: 0.86 MG/DL (ref 0.6–1.1)
DIASTOLIC BLOOD PRESSURE - MUSE: 52 MMHG
EOSINOPHIL # BLD AUTO: 0 10E3/UL (ref 0–0.7)
EOSINOPHIL NFR BLD AUTO: 0 %
ERYTHROCYTE [DISTWIDTH] IN BLOOD BY AUTOMATED COUNT: 13.8 % (ref 10–15)
GFR SERPL CREATININE-BSD FRML MDRD: 80 ML/MIN/1.73M2
GFR SERPL CREATININE-BSD FRML MDRD: >90 ML/MIN/1.73M2
GLUCOSE BLD-MCNC: 150 MG/DL (ref 70–125)
GLUCOSE BLD-MCNC: 185 MG/DL (ref 70–125)
GLUCOSE BLDC GLUCOMTR-MCNC: 201 MG/DL (ref 70–125)
GLUCOSE UR STRIP-MCNC: >1000 MG/DL
HCT VFR BLD AUTO: 47 % (ref 35–47)
HGB BLD-MCNC: 15.3 G/DL (ref 11.7–15.7)
HGB UR QL STRIP: ABNORMAL
HOLD SPECIMEN: NORMAL
IMM GRANULOCYTES # BLD: 0.2 10E3/UL
IMM GRANULOCYTES NFR BLD: 1 %
INTERPRETATION ECG - MUSE: NORMAL
KETONES BLD-SCNC: 4.04 MMOL/L
KETONES UR STRIP-MCNC: >150 MG/DL
LEUKOCYTE ESTERASE UR QL STRIP: NEGATIVE
LYMPHOCYTES # BLD AUTO: 1.4 10E3/UL (ref 0.8–5.3)
LYMPHOCYTES NFR BLD AUTO: 7 %
MAGNESIUM SERPL-MCNC: 2.1 MG/DL (ref 1.8–2.6)
MCH RBC QN AUTO: 29.5 PG (ref 26.5–33)
MCHC RBC AUTO-ENTMCNC: 32.6 G/DL (ref 31.5–36.5)
MCV RBC AUTO: 91 FL (ref 78–100)
MONOCYTES # BLD AUTO: 1 10E3/UL (ref 0–1.3)
MONOCYTES NFR BLD AUTO: 5 %
MUCOUS THREADS #/AREA URNS LPF: PRESENT /LPF
NEUTROPHILS # BLD AUTO: 17 10E3/UL (ref 1.6–8.3)
NEUTROPHILS NFR BLD AUTO: 87 %
NITRATE UR QL: NEGATIVE
NRBC # BLD AUTO: 0 10E3/UL
NRBC BLD AUTO-RTO: 0 /100
P AXIS - MUSE: 49 DEGREES
PH UR STRIP: 5 [PH] (ref 5–7)
PLATELET # BLD AUTO: 348 10E3/UL (ref 150–450)
POTASSIUM BLD-SCNC: 3.5 MMOL/L (ref 3.5–5)
POTASSIUM BLD-SCNC: 4.4 MMOL/L (ref 3.5–5)
PR INTERVAL - MUSE: 122 MS
QRS DURATION - MUSE: 74 MS
QT - MUSE: 370 MS
QTC - MUSE: 482 MS
R AXIS - MUSE: 15 DEGREES
RBC # BLD AUTO: 5.19 10E6/UL (ref 3.8–5.2)
RBC URINE: 2 /HPF
SODIUM SERPL-SCNC: 134 MMOL/L (ref 136–145)
SODIUM SERPL-SCNC: 136 MMOL/L (ref 136–145)
SP GR UR STRIP: 1.03 (ref 1–1.03)
SQUAMOUS EPITHELIAL: 1 /HPF
SYSTOLIC BLOOD PRESSURE - MUSE: 102 MMHG
T AXIS - MUSE: 29 DEGREES
TROPONIN I SERPL-MCNC: <0.01 NG/ML (ref 0–0.29)
UROBILINOGEN UR STRIP-MCNC: <2 MG/DL
VENTRICULAR RATE- MUSE: 102 BPM
WBC # BLD AUTO: 19.7 10E3/UL (ref 4–11)
WBC URINE: <1 /HPF

## 2021-08-14 PROCEDURE — 99285 EMERGENCY DEPT VISIT HI MDM: CPT | Mod: 25

## 2021-08-14 PROCEDURE — 71045 X-RAY EXAM CHEST 1 VIEW: CPT

## 2021-08-14 PROCEDURE — 96361 HYDRATE IV INFUSION ADD-ON: CPT

## 2021-08-14 PROCEDURE — 258N000003 HC RX IP 258 OP 636: Performed by: EMERGENCY MEDICINE

## 2021-08-14 PROCEDURE — 85004 AUTOMATED DIFF WBC COUNT: CPT | Performed by: EMERGENCY MEDICINE

## 2021-08-14 PROCEDURE — 72125 CT NECK SPINE W/O DYE: CPT

## 2021-08-14 PROCEDURE — 250N000013 HC RX MED GY IP 250 OP 250 PS 637: Performed by: EMERGENCY MEDICINE

## 2021-08-14 PROCEDURE — 80048 BASIC METABOLIC PNL TOTAL CA: CPT | Performed by: EMERGENCY MEDICINE

## 2021-08-14 PROCEDURE — 96360 HYDRATION IV INFUSION INIT: CPT

## 2021-08-14 PROCEDURE — 71046 X-RAY EXAM CHEST 2 VIEWS: CPT

## 2021-08-14 PROCEDURE — 82010 KETONE BODYS QUAN: CPT | Performed by: EMERGENCY MEDICINE

## 2021-08-14 PROCEDURE — 72100 X-RAY EXAM L-S SPINE 2/3 VWS: CPT

## 2021-08-14 PROCEDURE — 84484 ASSAY OF TROPONIN QUANT: CPT | Performed by: EMERGENCY MEDICINE

## 2021-08-14 PROCEDURE — 83735 ASSAY OF MAGNESIUM: CPT | Performed by: EMERGENCY MEDICINE

## 2021-08-14 PROCEDURE — 93005 ELECTROCARDIOGRAM TRACING: CPT | Performed by: EMERGENCY MEDICINE

## 2021-08-14 PROCEDURE — 36415 COLL VENOUS BLD VENIPUNCTURE: CPT | Performed by: EMERGENCY MEDICINE

## 2021-08-14 PROCEDURE — 70450 CT HEAD/BRAIN W/O DYE: CPT

## 2021-08-14 PROCEDURE — 81001 URINALYSIS AUTO W/SCOPE: CPT | Performed by: EMERGENCY MEDICINE

## 2021-08-14 RX ORDER — PANTOPRAZOLE SODIUM 20 MG/1
40 TABLET, DELAYED RELEASE ORAL ONCE
Status: COMPLETED | OUTPATIENT
Start: 2021-08-14 | End: 2021-08-14

## 2021-08-14 RX ORDER — DOXYCYCLINE HYCLATE 50 MG/1
100 CAPSULE ORAL ONCE
Status: COMPLETED | OUTPATIENT
Start: 2021-08-14 | End: 2021-08-14

## 2021-08-14 RX ORDER — DOXYCYCLINE 100 MG/1
100 CAPSULE ORAL 2 TIMES DAILY
Qty: 20 CAPSULE | Refills: 0 | Status: SHIPPED | OUTPATIENT
Start: 2021-08-14 | End: 2021-08-18

## 2021-08-14 RX ORDER — HYDROCODONE BITARTRATE AND ACETAMINOPHEN 5; 325 MG/1; MG/1
1 TABLET ORAL ONCE
Status: COMPLETED | OUTPATIENT
Start: 2021-08-14 | End: 2021-08-14

## 2021-08-14 RX ADMIN — SODIUM CHLORIDE 1000 ML: 9 INJECTION, SOLUTION INTRAVENOUS at 17:44

## 2021-08-14 RX ADMIN — DOXYCYCLINE HYCLATE 100 MG: 50 CAPSULE ORAL at 17:43

## 2021-08-14 RX ADMIN — HYDROCODONE BITARTRATE AND ACETAMINOPHEN 1 TABLET: 5; 325 TABLET ORAL at 15:31

## 2021-08-14 RX ADMIN — SODIUM CHLORIDE 1000 ML: 9 INJECTION, SOLUTION INTRAVENOUS at 11:48

## 2021-08-14 RX ADMIN — PANTOPRAZOLE SODIUM 40 MG: 20 TABLET, DELAYED RELEASE ORAL at 12:49

## 2021-08-14 ASSESSMENT — ENCOUNTER SYMPTOMS
FATIGUE: 1
BACK PAIN: 1
COUGH: 0
HEMATURIA: 0
FEVER: 0
HEADACHES: 1
VOMITING: 1
CHILLS: 1
DIZZINESS: 1
FREQUENCY: 0
NAUSEA: 1
FLANK PAIN: 0
DIFFICULTY URINATING: 0
NECK PAIN: 1
SORE THROAT: 0
DIAPHORESIS: 1
DYSURIA: 0
SHORTNESS OF BREATH: 0

## 2021-08-14 NOTE — ED PROVIDER NOTES
Emergency Department Encounter     Evaluation Date & Time:   2021 11:04 AM    CHIEF COMPLAINT:  Generalized Body Aches and Blood Sugar Problem      Triage Note:No notes on file      Impression and Plan     ED COURSE & MEDICAL DECISION MAKIN:22 AM I met with the patient to gather history and perform an initial exam. PPE: gown, gloves, N95 mask, surgical mask, and eye protection.  11:45 AM RN informed me patient endorsed feeling depressed, but declined wanting to speak to social work. Patient reported that she has resources to deal with her mental health and states this has been an ongoing issue for a while.     Patient is a 50-year-old female presenting with generalized symptoms of fatigue, chills, sweats, nausea, vomiting. She also reports feeling dizzy. She believes she had a syncopal event today, she states she is having headache, neck pain, lower back pain. She does not remember taking her medications yesterday. She states her blood sugar was high this morning but it is unclear whether she took her insulin. She feels generally unwell. Initial laboratory studies with leukocytosis. I suspect that the etiology of her symptoms is infectious. Chest x-ray with possible infiltrate, but will repeat an upright chest to further clarify this. We will also obtain urinalysis to further evaluate for infectious etiology for her symptoms. Also plan for CT scan of the head, cervical spine, lumbar spine given her pain and fall today. If source of infection identified, will treat for such, and if remains stable, will likely be able to discharge home on oral antibiotics.     Patient signed out to oncoming provider with pending work-up    At the conclusion of the encounter I discussed the results of all the tests and the disposition. The questions were answered. The patient or family acknowledged understanding and was agreeable with the care plan.   INITIAL IMPRESSION:  1. Syncope  2. Lekocytosis      MEDICATIONS GIVEN  "IN THE EMERGENCY DEPARTMENT:  Medications   0.9% sodium chloride BOLUS (0 mLs Intravenous Stopped 8/14/21 1250)   pantoprazole (PROTONIX) EC tablet 40 mg (40 mg Oral Given 8/14/21 1249)       NEW PRESCRIPTIONS STARTED AT TODAY'S ED VISIT:  New Prescriptions    No medications on file       HPI     HPI     Teresa Perez is a 50 year old female with a pertinent history of dwarfism, asthma, type 2 diabetes, CKD (stage 5), hypertension, CVA, schizoaffective disorder, and s/p AKA (left) who presents to this ED via EMS for evaluation of fatigue.    Per chart review,  Patient had an Echocardiogram 03/15/2019 that showed left ventricular ejection fracture of 66%.    Patient reports over the past few days she has been feeling generally fatigued with chills, sweats, nausea, vomiting, and intermittent episodes of dizziness. She also endorses a headache, neck pain, and back pain. Patient states she is unsure if she took her medications yesterday and when she checked her blood sugars this morning it was 203. She cannot remember if she took insulin after this. Just prior to arrival, the patient was talking to her brother on the phone and told him that she \"didn't feel right\" and that he needed to call EMS. The patient thinks that after hanging up with her brother, she lost consciousness and woke up on the ground.    Patient denies any fevers, cough, congestion, sore throat, shortness of breath, chest pain, leg swelling, vision changes, urinary symptoms, or other symptoms or concerns at this time.      REVIEW OF SYSTEMS:  Review of Systems   Constitutional: Positive for chills, diaphoresis and fatigue. Negative for fever.        Positive for malaise.   HENT: Negative for congestion and sore throat.    Eyes: Negative for visual disturbance.   Respiratory: Negative for cough and shortness of breath.    Cardiovascular: Negative for chest pain and leg swelling.   Gastrointestinal: Positive for nausea and vomiting.   Genitourinary: " Negative for difficulty urinating, dysuria, flank pain, frequency, hematuria and urgency.   Musculoskeletal: Positive for back pain and neck pain.   Neurological: Positive for dizziness (intermittent) and headaches.   All other systems reviewed and are negative.        Medical History     Past Medical History:   Diagnosis Date     Acute left arterial ischemic stroke, ICA (internal carotid artery) (H) 3/26/2019     Anesthesia complication      Arthritis      Asthma      Bilateral leg pain      Bipolar disorder (H)      Bipolar disorder (H)      Bulging lumbar disc      Cerebral artery occlusion with cerebral infarction (H)      Cervical dysplasia      Chronic back pain      Chronic kidney disease      Constipation      Degeneration of thoracic or thoracolumbar intervertebral disc      Depressive disorder      Diabetes (H)      Diabetes (H)      Diabetes mellitus, type 2 (H)      Dwarfism      Endometriosis      Epilepsy (H)      Hemorrhoids      Hiatal hernia      History of anesthesia complications      History of blood transfusion      History of transfusion      Hypercholesteremia      Hypertension      Hypertension      Insomnia      Irritable bowel syndrome      Low back pain      Lung nodule      Migraine      MRSA (methicillin resistant staph aureus) culture positive      LOMAS (nonalcoholic steatohepatitis)      Obesity      Osteoarthritis      Osteoporosis      Parotid mass      Peptic ulcer      Sleep apnea      Uncomplicated asthma        Past Surgical History:   Procedure Laterality Date     AMPUTATE LEG ABOVE KNEE Left 3/18/2019    Procedure: AMPUTATION, ABOVE KNEE;  Surgeon: Mahad Chatterjee MD;  Location: [facility] OR;  Service: General     APPENDECTOMY       C TOTAL KNEE ARTHROPLASTY Right 6/10/2015    Procedure: RIGHT KNEE TOTAL ARTHROPLASTY;  Surgeon: Andrew Sales MD;  Location: [facility] OR;  Service: Orthopedics     C TOTAL KNEE ARTHROPLASTY Left 5/4/2016    Procedure: KNEE TOTAL  ARTHROPLASTY LEFT;  Surgeon: Andrew Sales MD;  Location: [facility] OR;  Service: Orthopedics     GASTRECTOMY       HERNIA REPAIR       IR CVC NON TUNNEL PLACEMENT  3/20/2019     IR CVC TUNNEL PLACEMENT > 5 YRS OF AGE  4/1/2019     IR NON TUNNELED CATHETER >5 YEARS  3/20/2019     IR TUNNELED CATHETER INSERT  4/1/2019     LAPAROSCOPIC HERNIORRHAPHY INCISIONAL N/A 9/8/2016    Procedure: LAPAROSCOPIC RECURRENT INCISIONAL HERNIA CONVERTED TO OPEN,EXTENSIVE LAPAROSCOPIC LYSIS OF ADHESIONS, EXPLANTATION OF PREVIOUS ABDOMINAL MESH.;  Surgeon: Abdelrahman Ware DO;  Location: [facility] OR;  Service:      LAPAROSCOPIC HERNIORRHAPHY INCISIONAL N/A 9/8/2016    Procedure: AND LAPAROSCOPIC UMBILICAL HERNIA REPAIR;  Surgeon: Abdelrahman Ware DO;  Location: [facility] OR;  Service:      left leg amputation Left 04/2019     OTHER SURGICAL HISTORY      neuroplasty decompression median nerve at carpal tunnel     OTHER SURGICAL HISTORY      laparoscopy for endometriosis     PICC AND MIDLINE TEAM LINE INSERTION  3/15/2019          TONSILLECTOMY         Family History   Problem Relation Age of Onset     Heart Disease Mother      Pancreatitis Mother      Cancer Father      No Known Problems Maternal Grandmother      No Known Problems Maternal Grandfather      No Known Problems Paternal Grandmother      No Known Problems Paternal Grandfather      No Known Problems Brother      No Known Problems Sister      No Known Problems Son      No Known Problems Daughter      No Known Problems Maternal Half-Brother      No Known Problems Maternal Half-Sister      No Known Problems Paternal Half-Brother      No Known Problems Paternal Half-Sister      No Known Problems Niece      No Known Problems Nephew      No Known Problems Cousin      No Known Problems Other      Diabetes No family hx of      Coronary Artery Disease No family hx of      Hypertension No family hx of      Hyperlipidemia No family hx of      Kidney Disease No  "family hx of      Cerebrovascular Disease No family hx of      Obesity No family hx of      Thrombosis No family hx of      Asthma No family hx of      Arthritis No family hx of      Thyroid Disease No family hx of      Depression No family hx of      Mental Illness No family hx of      Substance Abuse No family hx of      Cystic Fibrosis No family hx of      Early Death No family hx of      Coronary Artery Disease Early Onset No family hx of      Heart Failure No family hx of      Bleeding Diathesis No family hx of      Dementia No family hx of      Breast Cancer No family hx of      Ovarian Cancer No family hx of      Uterine Cancer No family hx of      Prostate Cancer No family hx of      Colorectal Cancer No family hx of      Pancreatic Cancer No family hx of      Lung Cancer No family hx of      Melanoma No family hx of      Autoimmune Disease No family hx of      Unknown/Adopted No family hx of      Genetic Disorder No family hx of      Colon Cancer Father      No Known Problems Sister      No Known Problems Daughter      Breast Cancer Maternal Aunt      Breast Cancer Paternal Aunt      Hereditary Breast and Ovarian Cancer Syndrome No family hx of      Endometrial Cancer No family hx of      Anesthesia Reaction No family hx of        Social History     Tobacco Use     Smoking status: Current Every Day Smoker     Packs/day: 0.50     Years: 33.00     Pack years: 16.50     Types: Cigarettes     Smokeless tobacco: Never Used   Substance Use Topics     Alcohol use: No     Alcohol/week: 0.0 standard drinks     Drug use: Yes     Types: Marijuana     Comment: Drug use: \"A little marijuana here and there, but no drug addiction.\"       acetaminophen (TYLENOL) 500 MG tablet  albuterol (PROAIR HFA/PROVENTIL HFA/VENTOLIN HFA) 108 (90 Base) MCG/ACT inhaler  albuterol (PROVENTIL) (2.5 MG/3ML) 0.083% neb solution  amitriptyline (ELAVIL) 10 MG tablet  amLODIPine (NORVASC) 10 MG tablet  ammonium lactate (AMLACTIN) 12 % external " cream  azelastine (OPTIVAR) 0.05 % ophthalmic solution  B-D U/F insulin pen needle  baclofen (LIORESAL) 20 MG tablet  benzonatate (TESSALON) 200 MG capsule  blood glucose (NO BRAND SPECIFIED) lancets standard  blood glucose (NO BRAND SPECIFIED) test strip  blood glucose (ONETOUCH ULTRA) test strip  TRUNG-GEST ANTACID 500 MG chewable tablet  clopidogrel (PLAVIX) 75 MG tablet  Continuous Blood Gluc  (FREESTYLE ANAIS 14 DAY READER) JONAH  Continuous Blood Gluc Sensor (FREESTYLE ANAIS 14 DAY SENSOR) MISC  cyclobenzaprine (FLEXERIL) 10 MG tablet  diclofenac (CATAFLAM) 50 MG tablet  diclofenac (VOLTAREN) 1 % topical gel  docusate sodium (COLACE) 100 MG tablet  empagliflozin (JARDIANCE) 25 MG TABS tablet  EPINEPHrine (ANY BX GENERIC EQUIV) 0.3 MG/0.3ML injection 2-pack  exenatide ER (BYDUREON BCISE) 2 MG/0.85ML auto-injector  fexofenadine (ALLEGRA) 180 MG tablet  fluticasone (FLONASE) 50 MCG/ACT nasal spray  fluticasone-salmeterol (ADVAIR-HFA) 230-21 MCG/ACT inhaler  gabapentin (NEURONTIN) 600 MG tablet  guaiFENesin-dextromethorphan (ROBITUSSIN DM) 100-10 MG/5ML syrup  ibuprofen (ADVIL/MOTRIN) 600 MG tablet  insulin glargine U-300 (TOUJEO SOLOSTAR) 300 UNIT/ML (1 units dial) pen  insulin lispro (HUMALOG KWIKPEN) 100 UNIT/ML (1 unit dial) KWIKPEN  lidocaine (XYLOCAINE) 5 % external ointment  lisinopril (ZESTRIL) 40 MG tablet  metoprolol succinate ER (TOPROL-XL) 50 MG 24 hr tablet  metoprolol tartrate (LOPRESSOR) 50 MG tablet  miconazole (MONISTAT 1 DAY OR NIGHT) 1200 & 2 MG & % kit  montelukast (SINGULAIR) 10 MG tablet  nicotine (NICODERM CQ) 7 MG/24HR 24 hr patch  nicotine (NICORETTE) 2 MG gum  nicotine (NICORETTE) 4 MG lozenge  nystatin (MYCOSTATIN) 473469 UNIT/GM external powder  nystatin (MYCOSTATIN) 746076 UNIT/ML suspension  ondansetron (ZOFRAN) 4 MG tablet  ONETOUCH ULTRA test strip  order for DME  order for DME  order for DME  pantoprazole (PROTONIX) 40 MG EC tablet  pramipexole (MIRAPEX) 0.5 MG  tablet  pravastatin (PRAVACHOL) 80 MG tablet  QUEtiapine (SEROQUEL) 400 MG tablet  QUEtiapine (SEROQUEL) 50 MG tablet  sodium chloride (OCEAN) 0.65 % nasal spray  SUMAtriptan (IMITREX) 50 MG tablet  tiotropium (SPIRIVA) 18 MCG inhaled capsule  venlafaxine (EFFEXOR-XR) 150 MG 24 hr capsule        Physical Exam     First Vitals:  Patient Vitals for the past 24 hrs:   BP Temp Temp src Pulse Resp SpO2 Weight   08/14/21 1301 -- -- -- 109 (!) 43 96 % --   08/14/21 1120 118/56 98  F (36.7  C) Oral 102 16 96 % 111.1 kg (245 lb)       PHYSICAL EXAM:   Gen:  Alert, awake, NAD  HENT:  Head atraumatic, PERRL, EOMI, no meningismus  Respiratory:  CTA, normal respiratory rate  Cardiovascular:  Regular rate and rhythm, no murmur  Abdomen:  Soft, nontender, normoactive bowel sounds  Musculoskeletal: Left above the knee amputation.  Integument:  No rash, warm, dry  Neuro:  GCS 15, A & O x 3, CN II - XII intact, no dysdiadochokinesia, negative Romberg, no pronator drift, no nystagmus, visual fields full to confrontation, normal gait, +5/5 strength in all extremities      Results     LAB:  All pertinent labs reviewed and interpreted  Labs Ordered and Resulted from Time of ED Arrival Up to the Time of Departure from the ED   BASIC METABOLIC PANEL - Abnormal; Notable for the following components:       Result Value    Sodium 134 (*)     Carbon Dioxide (CO2) 15 (*)     Anion Gap 20 (*)     Glucose 185 (*)     All other components within normal limits   GLUCOSE BY METER - Abnormal; Notable for the following components:    GLUCOSE BY METER POCT 201 (*)     All other components within normal limits   CBC WITH PLATELETS AND DIFFERENTIAL - Abnormal; Notable for the following components:    WBC Count 19.7 (*)     Absolute Neutrophils 17.0 (*)     Absolute Immature Granulocytes 0.2 (*)     All other components within normal limits   MAGNESIUM - Normal   TROPONIN I - Normal   CBC WITH PLATELETS & DIFFERENTIAL    Narrative:     The following  orders were created for panel order CBC with platelets differential.  Procedure                               Abnormality         Status                     ---------                               -----------         ------                     CBC with platelets and d...[[id number]]  Abnormal            Final result                 Please view results for these tests on the individual orders.   GLUCOSE MONITOR NURSING POCT   EXTRA BLUE TOP TUBE   EXTRA RED TOP TUBE   EXTRA GREEN TOP (LITHIUM HEPARIN) ON ICE   ROUTINE UA WITH MICROSCOPIC REFLEX TO CULTURE   PERIPHERAL IV CATHETER   EXTRA TUBE    Narrative:     The following orders were created for panel order [place] Draw.  Procedure                               Abnormality         Status                     ---------                               -----------         ------                     Extra Blue Top Tube[[id number]]                              Final result               Extra Red Top Tube[[id number]]                               Final result               Extra Green Top (Lithium...[[id number]]                      Final result                 Please view results for these tests on the individual orders.       RADIOLOGY:  Chest XR,  PA & LAT   Final Result   IMPRESSION:       Frontal view was acquired with apical lordotic technique.      There is an indistinct opacity in the right upper lung which does not obscure the underlying vessels, uncertain if this is a true airspace opacity or superimposition artifact as there is no clear correlate on the lateral view. Consider a repeat upright    PA for reassessment.      The lungs are otherwise clear.      Normal heart and mediastinal contours. No visible pleural fluid or pneumothorax.      Lumbar spine XR, 2-3 views   Preliminary Result   IMPRESSION: No significant change since 06/19/2020. No fracture.  Normal vertebral heights and alignment. Mild degenerative disc height loss and marginal osteophytic spurring  about the disc spaces throughout the lumbar spine. Mild facet arthrosis L4-S1.    Abdominal and pelvic surgical clips. Nonobstructive bowel gas pattern.         Cervical spine CT w/o contrast    (Results Pending)   Head CT w/o contrast    (Results Pending)              ECG:    Performed at: 11:40    Impression: Sinus tachycardia.    Rate: 102 BPM  Rhythm: Sinus tachycardia.  Axis: 15  TN Interval: 122 ms  QRS Interval: 74 ms  QTc Interval: 482 ms  ST Changes: None.  Comparison: When compared with ECG of 20-JAN-2020, no significant change was found.    I have independently reviewed and interpreted the EKS(s) documented above      [facility] System Documentation       I, Radha Gates, am serving as a scribe to document services personally performed by Dr. Gale Mcclure, based on my observation and the provider's statements to me. I, Gale Mcclure MD attest that Radha Gates is acting in a scribe capacity, has observed my performance of the services and has documented them in accordance with my direction.      Gale Mcclure MD  Emergency Medicine  [facility] EMERGENCY ROOM         Gale Mcclure MD  08/14/21 0461

## 2021-08-14 NOTE — ED NOTES
Pt reports that her and her son had an argument.  She reports that she has felt suicidal as a result.  She reports she has multiple therapists and has a support system.  She states she does not need to see a SW here that she has plenty of resources for follow up.  MD notified.

## 2021-08-14 NOTE — ED PROVIDER NOTES
eMERGENCY dEPARTMENT PROGRESS NOTE        FINAL IMPRESSION    1. Community acquired pneumonia of right upper lobe of lung         ED COURSE AND MEDICAL DECISION MAKING  Patient was signed out to me by Dr. Mcclure at 2:50 PM      Teresa Perez is a 50 year old female who presented to the ED for evaluation of fatigue, chills, sweats, nausea, and vomiting.      At the time of shift change the only thing was pending was her urinalysis and the plan would be to cover for UTI if this shows any sign of urinary infection otherwise treat for community-acquired pneumonia based on her symptoms of cough, weakness and her leukocytosis.  Regardless, plan would be to discharge with antibiotics.    Her urinalysis does not show any sign of infection and I will proceed with Dr. Mcclure's plan to discharge the patient with doxycycline to treat for community-acquired pneumonia.  I did caution the patient that if she feels worse in any way despite taking this medication she should come back to the emergency department and be reevaluated for possible further work-up or admission.  She is okay with this plan.    When reviewing her laboratory studies she was found to have a anion gap metabolic acidosis and she did have some mildly elevated ketones as well.  Her blood sugar is not very bad and she is given some IV fluids and repeated her metabolic panel and she is cleared her anion gap.  This is reassuring.  She is feeling better.  Plan is to continue with the original disposition and discharge with doxycycline and a low threshold to return here.        At the conclusion of the encounter I discussed the results of all of the tests and the disposition. The questions were answered. The patient or family acknowledged understanding and was agreeable with the care plan.     DISCHARGE PRESCRIPTIONS  New Prescriptions    DOXYCYCLINE HYCLATE (VIBRAMYCIN) 100 MG CAPSULE    Take 1 capsule (100 mg) by mouth 2 times daily for 10 days        LAB  Pertinent labs results reviewed   Results for orders placed or performed during the hospital encounter of 08/14/21   Chest XR,  PA & LAT    Impression    IMPRESSION:     Frontal view was acquired with apical lordotic technique.    There is an indistinct opacity in the right upper lung which does not obscure the underlying vessels, uncertain if this is a true airspace opacity or superimposition artifact as there is no clear correlate on the lateral view. Consider a repeat upright   PA for reassessment.    The lungs are otherwise clear.    Normal heart and mediastinal contours. No visible pleural fluid or pneumothorax.   Head CT w/o contrast    Impression    IMPRESSION:  1.  Negative for acute intracranial process.    2.  Chronic left parietal encephalomalacia.     Lumbar spine XR, 2-3 views    Impression    IMPRESSION: No significant change since 06/19/2020. No fracture.  Normal vertebral heights and alignment. Mild degenerative disc height loss and marginal osteophytic spurring about the disc spaces throughout the lumbar spine. Mild facet arthrosis L4-S1.   Abdominal and pelvic surgical clips. Nonobstructive bowel gas pattern.     XR Chest 1 View    Impression    IMPRESSION: Heart size and vascularity are normal. Bilateral bronchial wall thickening compatible with airway inflammation. No focal consolidation, pneumothorax nor pleural effusion.   Basic metabolic panel   Result Value Ref Range    Sodium 134 (L) 136 - 145 mmol/L    Potassium 4.4 3.5 - 5.0 mmol/L    Chloride 99 98 - 107 mmol/L    Carbon Dioxide (CO2) 15 (L) 22 - 31 mmol/L    Anion Gap 20 (H) 5 - 18 mmol/L    Urea Nitrogen 18 8 - 22 mg/dL    Creatinine 0.86 0.60 - 1.10 mg/dL    Calcium 10.5 8.5 - 10.5 mg/dL    Glucose 185 (H) 70 - 125 mg/dL    GFR Estimate 80 >60 mL/min/1.73m2   Result Value Ref Range    Magnesium 2.1 1.8 - 2.6 mg/dL   Troponin I (now)   Result Value Ref Range    Troponin I <0.01 0.00 - 0.29 ng/mL   UA with Microscopic reflex  to Culture    Specimen: Urine, Clean Catch   Result Value Ref Range    Color Urine Light Yellow Colorless, Straw, Light Yellow, Yellow    Appearance Urine Clear Clear    Glucose Urine >1000 (A) Negative mg/dL    Bilirubin Urine Negative Negative    Ketones Urine >150 (A) Negative mg/dL    Specific Gravity Urine 1.028 1.001 - 1.030    Blood Urine 0.06 mg/dL (A) Negative    pH Urine 5.0 5.0 - 7.0    Protein Albumin Urine 30  (A) Negative mg/dL    Urobilinogen Urine <2.0 <2.0 mg/dL    Nitrite Urine Negative Negative    Leukocyte Esterase Urine Negative Negative    Bacteria Urine Few (A) None Seen /HPF    Mucus Urine Present (A) None Seen /LPF    RBC Urine 2 <=2 /HPF    WBC Urine <1 <=5 /HPF    Squamous Epithelials Urine 1 <=1 /HPF   Glucose by meter   Result Value Ref Range    GLUCOSE BY METER POCT 201 (H) 70 - 125 mg/dL   CBC with platelets and differential   Result Value Ref Range    WBC Count 19.7 (H) 4.0 - 11.0 10e3/uL    RBC Count 5.19 3.80 - 5.20 10e6/uL    Hemoglobin 15.3 11.7 - 15.7 g/dL    Hematocrit 47.0 35.0 - 47.0 %    MCV 91 78 - 100 fL    MCH 29.5 26.5 - 33.0 pg    MCHC 32.6 31.5 - 36.5 g/dL    RDW 13.8 10.0 - 15.0 %    Platelet Count 348 150 - 450 10e3/uL    % Neutrophils 87 %    % Lymphocytes 7 %    % Monocytes 5 %    % Eosinophils 0 %    % Basophils 0 %    % Immature Granulocytes 1 %    NRBCs per 100 WBC 0 <1 /100    Absolute Neutrophils 17.0 (H) 1.6 - 8.3 10e3/uL    Absolute Lymphocytes 1.4 0.8 - 5.3 10e3/uL    Absolute Monocytes 1.0 0.0 - 1.3 10e3/uL    Absolute Eosinophils 0.0 0.0 - 0.7 10e3/uL    Absolute Basophils 0.1 0.0 - 0.2 10e3/uL    Absolute Immature Granulocytes 0.2 (H) <=0.0 10e3/uL    Absolute NRBCs 0.0 10e3/uL   Extra Blue Top Tube   Result Value Ref Range    Hold Specimen JIC    Extra Red Top Tube   Result Value Ref Range    Hold Specimen JIC    Extra Green Top (Lithium Heparin) ON ICE   Result Value Ref Range    Hold Specimen JIC    ECG 12-LEAD WITH MUSE (LHE)   Result Value Ref Range     Systolic Blood Pressure 102 mmHg    Diastolic Blood Pressure 52 mmHg    Ventricular Rate 102 BPM    Atrial Rate 102 BPM    RI Interval 122 ms    QRS Duration 74 ms     ms    QTc 482 ms    P Axis 49 degrees    R AXIS 15 degrees    T Axis 29 degrees    Interpretation ECG       Sinus tachycardia  Cannot rule out Anterior infarct (cited on or before 10-JUL-2019)  Abnormal ECG  When compared with ECG of 20-JAN-2020 15:35,  Criteria for Inferior infarct are no longer Present  Confirmed by SEE ED PROVIDER NOTE FOR, ECG INTERPRETATION ([id number]),  CLAUDE SIN ([id number]) on 8/14/2021 4:20:15 PM           RADIOLOGY    Pertinent imaging reviewed   Please see official radiology report.  XR Chest 1 View   Final Result   IMPRESSION: Heart size and vascularity are normal. Bilateral bronchial wall thickening compatible with airway inflammation. No focal consolidation, pneumothorax nor pleural effusion.      Head CT w/o contrast   Final Result   IMPRESSION:   1.  Negative for acute intracranial process.      2.  Chronic left parietal encephalomalacia.         Chest XR,  PA & LAT   Final Result   IMPRESSION:       Frontal view was acquired with apical lordotic technique.      There is an indistinct opacity in the right upper lung which does not obscure the underlying vessels, uncertain if this is a true airspace opacity or superimposition artifact as there is no clear correlate on the lateral view. Consider a repeat upright    PA for reassessment.      The lungs are otherwise clear.      Normal heart and mediastinal contours. No visible pleural fluid or pneumothorax.      Lumbar spine XR, 2-3 views   Final Result   IMPRESSION: No significant change since 06/19/2020. No fracture.  Normal vertebral heights and alignment. Mild degenerative disc height loss and marginal osteophytic spurring about the disc spaces throughout the lumbar spine. Mild facet arthrosis L4-S1.    Abdominal and pelvic surgical clips. Nonobstructive  bowel gas pattern.         Cervical spine CT w/o contrast    (Results Pending)          Kolton Aquino MD  08/14/21 1627       Kolton Aquino MD  08/14/21 1955

## 2021-08-14 NOTE — ED TRIAGE NOTES
Pt is diabetic.  She has been feeling weak, down, general fatigue for 3-4 days.  Has not been eating much. Been vomiting. Sporadically taking her insulin.  Was speaking to family member on phone and family called 911 due to pt seeming disoriented.  Did fall out of her wheelchair and was able to get back in the WC after much effort

## 2021-08-18 ENCOUNTER — TELEPHONE (OUTPATIENT)
Dept: FAMILY MEDICINE | Facility: CLINIC | Age: 51
End: 2021-08-18

## 2021-08-18 ENCOUNTER — VIRTUAL VISIT (OUTPATIENT)
Dept: FAMILY MEDICINE | Facility: CLINIC | Age: 51
End: 2021-08-18
Payer: MEDICARE

## 2021-08-18 DIAGNOSIS — R05.9 COUGH: ICD-10-CM

## 2021-08-18 DIAGNOSIS — J20.9 ACUTE BRONCHITIS, UNSPECIFIED ORGANISM: Primary | ICD-10-CM

## 2021-08-18 PROCEDURE — 99442 PR PHYSICIAN TELEPHONE EVALUATION 11-20 MIN: CPT | Mod: 95 | Performed by: STUDENT IN AN ORGANIZED HEALTH CARE EDUCATION/TRAINING PROGRAM

## 2021-08-18 RX ORDER — AZITHROMYCIN 250 MG/1
TABLET, FILM COATED ORAL
Qty: 6 TABLET | Refills: 0 | Status: SHIPPED | OUTPATIENT
Start: 2021-08-18 | End: 2021-08-23

## 2021-08-18 RX ORDER — GUAIFENESIN/DEXTROMETHORPHAN 100-10MG/5
5 SYRUP ORAL EVERY 4 HOURS PRN
Qty: 236 ML | Refills: 1 | Status: SHIPPED | OUTPATIENT
Start: 2021-08-18 | End: 2021-12-27

## 2021-08-18 NOTE — PATIENT INSTRUCTIONS
1. Please stop taking doxycycline and use azithromycin instead for 5 days.  2. Refilled Robitussin.  3. Please follow up if your symptoms do not get better.    Best regard,    Rene Stevens MD       Patient Education     Acute Bronchitis  Your healthcare provider has told you that you have acute bronchitis. Bronchitis is infection or inflammation of the airways in the lungs (bronchial tubes). Normally, air moves easily in and out of the airways. Bronchitis narrows the airways. This makes it harder for air to flow in and out of the lungs. This causes symptoms such as shortness of breath, coughing up yellow or green mucus, and wheezing.  Bronchitis can be acute or chronic. Acute means it happens quickly and goes away in a short time. Chronic means a condition lasts a long time and often comes back. Most people with acute bronchitis get better in 1 to 2 weeks.     What causes acute bronchitis?  Acute bronchitis is often caused by a virus such as a cold or the flu. In some cases, it may be caused by bacteria. Certain factors make it more likely for a cold or flu to turn into bronchitis. These include being very young, being elderly, having a heart or lung problem, or having a weak immune system. Cigarette smoking also makes bronchitis more likely.  When bronchitis develops, the airways become swollen. The airways may also become infected with bacteria. This is known as a secondary infection.  Symptoms of acute bronchitis  Symptoms can include:    Coughing with mucus    Wheezing    Feeling short of breath    Chest pain    Fever  Diagnosing acute bronchitis  Your healthcare provider will ask about your symptoms and health history. He or she will give you a physical exam. This will include listening to your lungs while you breathe. You may have a chest X-ray to look for infection in the lungs (pneumonia) if you have had a fever. You may also have blood tests to check for infection.  Treating acute bronchitis  Bronchitis  usually goes away in 1 to 2 weeks without treatment. You can help feel better by:    Taking medicine as directed. Talk to your healthcare provider before taking any over-the-counter medicines (OTC). Some OTC medicines help relieve inflammation in your bronchial tubes. They can also thin mucus. This makes it easier to cough up. Your healthcare provider may prescribe an inhaler to help open up the bronchial tubes. Most of the time, acute bronchitis is caused by a viral infection. Antibiotics are usually not prescribed for viral infections.    Drinking plenty of fluids, such as water, juice, or warm soup. Fluids loosen mucus so that you can cough it up. This helps you breathe more easily. Fluids also prevent dehydration.    Using a humidifier. This can help reduce coughing.    Getting plenty of rest    Not smoking. Also, don't let anyone else smoke in your home. In public places, move away from secondhand smoke.  Recovery and follow-up  Follow up with your doctor. You will likely feel better in 1 to 2 weeks. But you may have a dry cough for a longer time. Let your doctor know if you still have symptoms other than a dry cough after 2 weeks. Tell him or her if you get bronchial infections often.  Self-care tips  To get relief from your symptoms and prevent bronchitis:    Stop smoking. Stopping smoking is the most important step you can take to treat bronchitis. If you need help stopping smoking, talk with your healthcare provider.    Stay away from secondhand smoke and other irritants. Try to stay away from smoke, chemicals, fumes, and dust. Don t let anyone smoke in your home. Stay indoors on smoggy days.    Prevent lung infections. Ask your healthcare provider about the flu and pneumonia vaccines. Take steps to prevent colds and other lung infections.    Wash your hands well. Wash your hands often with soap and water. Use hand  when you can t wash your hands. Stay away from crowds during cold and flu  season.  When to call your healthcare provider  Call the healthcare provider if you have any of these:    Fever of 100.4 F ( 38.0 C) or higher, or as directed by your healthcare provider    Symptoms that get worse, or new symptoms    Breathing not getting better with treatments    Symptoms that don t start to get better in 1 week  Ioana last reviewed this educational content on 8/1/2019 2000-2021 [employer]. All rights reserved. This information is not intended as a substitute for professional medical care. Always follow your healthcare professional's instructions.

## 2021-08-18 NOTE — PROGRESS NOTES
Teresa is a 50 year old who is being evaluated via a billable telephone visit.      Assessment & Plan     Acute bronchitis, unspecified organism  Patient presents via phone for follow up of dyspnea & cough after being diagnosed with CAP & being prescribed doxycycline in ED on 8/14/21. Overall improving, but appears to have hives secondary to doxycycline. Transitioned patient to azithromycin.If symptoms do not fully resolve, should consider testing for Covid.  - azithromycin (ZITHROMAX) 250 MG tablet; Take 2 tablets (500 mg) by mouth daily for 1 day, THEN 1 tablet (250 mg) daily for 4 days.    Cough  Refilled.  - guaiFENesin-dextromethorphan (ROBITUSSIN DM) 100-10 MG/5ML syrup; Take 5 mLs by mouth every 4 hours as needed for cough      Return if symptoms worsen or fail to improve.    Patient staffed with Dr. Evangelista.    Rene Stevens MD PGY2  [facility]    Bryson Moore is a 50 year old who presents for the following health issues:  Chief Complaint   Patient presents with     Allergic Reaction     possibole reaction to meds given from pneumonia (8/14/21 [facility]), nausea, dizzy, bumps/hives,      Refill Request     cough medication        HPI     Allergies  Onset/Duration: 8/16/2021  Symptoms:   Red bumps with red base that are not pruritic that are located on bilateral forearms (R>L) and right leg after taking Doxycycline. Patient endorsed feeling more dizzy and nauseous several hours after, though, endorsed similar symptoms but worse symptoms on 8/14. Notes improvement in dyspnea, but cough is still bothersome.   Progression of Symptoms: better  Therapies tried and outcome: Robitussin previously helped.       Review of Systems   Constitutional, HEENT, cardiovascular, pulmonary, gi and gu systems are negative, except as otherwise noted.        Objective         Vitals:  No vitals were obtained today due to virtual visit.    Physical Exam   healthy, alert and no distress  PSYCH:  Alert and oriented times 3; coherent speech, normal   rate and volume, able to articulate logical thoughts, able   to abstract reason, no tangential thoughts, no hallucinations   or delusions  Her affect is normal  RESP: No cough, no audible wheezing, able to talk in full sentences  Remainder of exam unable to be completed due to telephone visits    Phone call duration: 12 minutes

## 2021-08-18 NOTE — TELEPHONE ENCOUNTER
Teresa was sent home from the ER with Doxycyline rx on 8/14/21. She was given one dose in the ER. Filled rx on Monday, took first dose. Began experiencing diffuse hives that day. Is also experiencing nausea, however per patient and ER notes had been experiencing n/v prior to ER visit. Last dose this morning, >4 hours ago. Denies angioedema, difficulty breathing, feeling of rapid heart rate, emesis, anxiety. Feels well otherwise. Some shortness of breath that was occurring prior to her ER visit that remains but is controlled well with her inhalers. Denies other new exposures.     Last dose was over 4 hours ago and patient denies angioedema, airway involvement, dizziness, or other s/s of anaphylaxis. She does have an epi pen at home and feels comfortable using it. Instructed her to STOP taking the medication and keep her epi pen nearby. Scheduled her with Dr. Stevens at 2:10. Instructed precautions to take her epipen (swelling, shortness of breath, feeling of rapid heart rate or anxiety), and to call 911 after if this occurs. She is agreeable to this plan. Routed to Dr. Bullock for FYI, Dr. Stevens. ./KRISS

## 2021-08-18 NOTE — TELEPHONE ENCOUNTER
[facility] phone call message- general phone call:    Reason for call: Pt would like to speak with nurse about allergic reaction pt had to medication, hives,nausa and ha    Return call needed: Yes    OK to leave a message on voice mail? Yes    Primary language: English      needed? No    Call taken on August 18, 2021 at 12:47 PM by Grisel Flores-Cardona

## 2021-08-18 NOTE — PROGRESS NOTES
Preceptor Attestation:    I discussed the patient with the resident and evaluated the patient in person. I have verified the content of the note, which accurately reflects my assessment of the patient and the plan of care.   Supervising Physician:  Donnell Evangelista MD.

## 2021-08-19 NOTE — TELEPHONE ENCOUNTER
Thanks for keeping me in the loop.  She has been on doxy quite a bit over the last few years--has lots of meds and so many potential QTC issue with other antibiotics, so I hope this is not a real allergy.  I think getting a visit scheduled is a great idea.     Thanks for taking good care of her, Dr. Stevens.    Dr. Bullock

## 2021-08-24 ENCOUNTER — TELEPHONE (OUTPATIENT)
Dept: FAMILY MEDICINE | Facility: CLINIC | Age: 51
End: 2021-08-24
Payer: MEDICARE

## 2021-08-27 DIAGNOSIS — Z79.4 TYPE 2 DIABETES MELLITUS WITH HYPERGLYCEMIA, WITH LONG-TERM CURRENT USE OF INSULIN (H): Primary | ICD-10-CM

## 2021-08-27 DIAGNOSIS — E11.65 TYPE 2 DIABETES MELLITUS WITH HYPERGLYCEMIA, WITH LONG-TERM CURRENT USE OF INSULIN (H): Primary | ICD-10-CM

## 2021-08-31 ENCOUNTER — OFFICE VISIT (OUTPATIENT)
Dept: FAMILY MEDICINE | Facility: CLINIC | Age: 51
End: 2021-08-31
Payer: MEDICARE

## 2021-08-31 ENCOUNTER — ANCILLARY PROCEDURE (OUTPATIENT)
Dept: GENERAL RADIOLOGY | Facility: CLINIC | Age: 51
End: 2021-08-31
Attending: STUDENT IN AN ORGANIZED HEALTH CARE EDUCATION/TRAINING PROGRAM
Payer: MEDICARE

## 2021-08-31 DIAGNOSIS — Z79.4 TYPE 2 DIABETES MELLITUS WITH HYPERGLYCEMIA, WITH LONG-TERM CURRENT USE OF INSULIN (H): ICD-10-CM

## 2021-08-31 DIAGNOSIS — R10.84 ABDOMINAL PAIN, GENERALIZED: ICD-10-CM

## 2021-08-31 DIAGNOSIS — E11.65 TYPE 2 DIABETES MELLITUS WITH HYPERGLYCEMIA, WITH LONG-TERM CURRENT USE OF INSULIN (H): ICD-10-CM

## 2021-08-31 DIAGNOSIS — R06.02 SHORTNESS OF BREATH: ICD-10-CM

## 2021-08-31 DIAGNOSIS — R06.02 SHORTNESS OF BREATH: Primary | ICD-10-CM

## 2021-08-31 LAB
CREAT UR-MCNC: 10 MG/DL
ERYTHROCYTE [DISTWIDTH] IN BLOOD BY AUTOMATED COUNT: 14.2 % (ref 10–15)
HBA1C MFR BLD: 8.5 % (ref 0–5.6)
HCT VFR BLD AUTO: 42.3 % (ref 35–47)
HGB BLD-MCNC: 14.1 G/DL (ref 11.7–15.7)
MCH RBC QN AUTO: 29.6 PG (ref 26.5–33)
MCHC RBC AUTO-ENTMCNC: 33.3 G/DL (ref 31.5–36.5)
MCV RBC AUTO: 89 FL (ref 78–100)
MICROALBUMIN UR-MCNC: <0.5 MG/DL (ref 0–1.99)
MICROALBUMIN/CREAT UR: NORMAL MG/G{CREAT}
PLATELET # BLD AUTO: 245 10E3/UL (ref 150–450)
RBC # BLD AUTO: 4.76 10E6/UL (ref 3.8–5.2)
WBC # BLD AUTO: 11.4 10E3/UL (ref 4–11)

## 2021-08-31 PROCEDURE — 36415 COLL VENOUS BLD VENIPUNCTURE: CPT | Performed by: STUDENT IN AN ORGANIZED HEALTH CARE EDUCATION/TRAINING PROGRAM

## 2021-08-31 PROCEDURE — 71046 X-RAY EXAM CHEST 2 VIEWS: CPT | Mod: FY | Performed by: RADIOLOGY

## 2021-08-31 PROCEDURE — 99214 OFFICE O/P EST MOD 30 MIN: CPT | Mod: CS | Performed by: STUDENT IN AN ORGANIZED HEALTH CARE EDUCATION/TRAINING PROGRAM

## 2021-08-31 PROCEDURE — 82043 UR ALBUMIN QUANTITATIVE: CPT | Performed by: STUDENT IN AN ORGANIZED HEALTH CARE EDUCATION/TRAINING PROGRAM

## 2021-08-31 PROCEDURE — 85027 COMPLETE CBC AUTOMATED: CPT | Performed by: STUDENT IN AN ORGANIZED HEALTH CARE EDUCATION/TRAINING PROGRAM

## 2021-08-31 PROCEDURE — U0005 INFEC AGEN DETEC AMPLI PROBE: HCPCS | Performed by: STUDENT IN AN ORGANIZED HEALTH CARE EDUCATION/TRAINING PROGRAM

## 2021-08-31 PROCEDURE — U0003 INFECTIOUS AGENT DETECTION BY NUCLEIC ACID (DNA OR RNA); SEVERE ACUTE RESPIRATORY SYNDROME CORONAVIRUS 2 (SARS-COV-2) (CORONAVIRUS DISEASE [COVID-19]), AMPLIFIED PROBE TECHNIQUE, MAKING USE OF HIGH THROUGHPUT TECHNOLOGIES AS DESCRIBED BY CMS-2020-01-R: HCPCS | Performed by: STUDENT IN AN ORGANIZED HEALTH CARE EDUCATION/TRAINING PROGRAM

## 2021-08-31 PROCEDURE — 83036 HEMOGLOBIN GLYCOSYLATED A1C: CPT | Performed by: STUDENT IN AN ORGANIZED HEALTH CARE EDUCATION/TRAINING PROGRAM

## 2021-08-31 RX ORDER — INSULIN GLARGINE 300 U/ML
INJECTION, SOLUTION SUBCUTANEOUS
Qty: 9 ML | Refills: 11 | Status: SHIPPED | OUTPATIENT
Start: 2021-08-31 | End: 2022-01-04

## 2021-08-31 RX ORDER — AMOXICILLIN 875 MG
875 TABLET ORAL 2 TIMES DAILY
Qty: 14 TABLET | Refills: 0 | Status: SHIPPED | OUTPATIENT
Start: 2021-08-31 | End: 2021-09-07

## 2021-08-31 RX ORDER — PREDNISONE 50 MG/1
50 TABLET ORAL DAILY
Qty: 5 TABLET | Refills: 0 | Status: SHIPPED | OUTPATIENT
Start: 2021-08-31 | End: 2021-10-15

## 2021-08-31 RX ORDER — ACETAMINOPHEN 500 MG
1000 TABLET ORAL 3 TIMES DAILY
Qty: 100 TABLET | Refills: 2 | Status: SHIPPED | OUTPATIENT
Start: 2021-08-31 | End: 2021-12-27

## 2021-08-31 NOTE — PATIENT INSTRUCTIONS
Add on blood work for infection  Check xray  COVID swab    Antibiotics:  Amoxicillin, 1 pill 2x per day for 1 week  Prednisone, 1 pill daily for 5 days    Keep appointment with pulmonology    Increase Toujeo to 60units in AM and 60 Units in PM while taking prednisone  Refill Tylenol  Follow up in 1-2 weeks after pulmonology appointment  Go to ER if symptoms are getting worse.

## 2021-08-31 NOTE — LETTER
September 1, 2021      Treesa Perez  [address]  [place] [address]        Dear ,    We are writing to inform you of your test results.    Here is a copy of your lab results.  Your blood counts for infection are coming down--this is good news!  Your chest xray is negative for pneumonia, and your hemoglobin A1c diabetes test is stable.  I hope the new regimen for your breathing helps and I'm glad you have follow up scheduled with the pulmonologist coming up.  We are still waiting on your COVID-19 testing--I will let you know if that comes back positive.  Please call the clinic at [phone] if you have any questions.         Resulted Orders   Hemoglobin A1c   Result Value Ref Range    Hemoglobin A1C 8.5 (H) 0.0 - 5.6 %      Comment:      Normal <5.7%   Prediabetes 5.7-6.4%    Diabetes 6.5% or higher     Note: Adopted from ADA consensus guidelines.   Albumin Random Urine Quantitative with Creat Ratio   Result Value Ref Range    Microalbumin Urine mg/dL <0.50 0.00 - 1.99 mg/dL    Creatinine Urine mg/dL 10 mg/dL    Microalbumin Urine mg/g Cr        Comment:      Unable to calculate:  Urine creatinine or microalbumin value below detectable level    Narrative    Microalbumin, Random Urine   <2.0 mg/dL . . . . . . . . Normal   3.0-30.0 mg/dL . . . . . . Microalbuminuria   >30.0 mg/dL . . . . . .  . Clinical Proteinuria     Microalbumin/Creatinine Ratio, Random Urine   <20 mg/g . . . . .. . . . Normal    mg/g . . . . . . . Microalbuminuria   >300 mg/g . . . . . . . . Clinical Proteinuria   CBC with platelets   Result Value Ref Range    WBC Count 11.4 (H) 4.0 - 11.0 10e3/uL    RBC Count 4.76 3.80 - 5.20 10e6/uL    Hemoglobin 14.1 11.7 - 15.7 g/dL    Hematocrit 42.3 35.0 - 47.0 %    MCV 89 78 - 100 fL    MCH 29.6 26.5 - 33.0 pg    MCHC 33.3 31.5 - 36.5 g/dL    RDW 14.2 10.0 - 15.0 %    Platelet Count 245 150 - 450 10e3/uL       If you have any questions or concerns, please call the  clinic at the number listed above.       Sincerely,      Tyra Bullock MD

## 2021-08-31 NOTE — PROGRESS NOTES
There are no exam notes on file for this visit.    ASSESSMENT AND PLAN:      Teresa was seen today for follow up and diabetes.  Visit initially scheduled for diabetes, however her continued cough, shortness of breath takes precedence today.    Diagnoses and all orders for this visit:    Shortness of breath/COPD exacerbation  Recently seen in the ED.  Treated with doxycycline for community-acquired pneumonia followed by azithromycin after rash developed.  Elevated white count at that time with clear chest x-ray.  Today, we rechecked her white count which is improving.  Her chest x-ray remains clear.  O2 sats are stable.  We will treat this as a COPD exacerbation with 5 days of prednisone, and 7 days of amoxicillin.  She does have an allergy to Augmentin but not to penicillins.  Lungs show scattered wheezes without crackles and no focal symptoms.   Given Cepacol lozenges for her sore throat and Covid 19 swab obtained today as well.  This may be worsening of her baseline lung function.  She has a diagnosis of asthma but likely has overlying COPD, and has upcoming pulmonology appointment.  Appreciate recs.  Patient continues to smoke but has cut down.  Is doing both cigarettes and marijuana at this time.  Continue life stressors have made it challenging for her to quit either of these.  -     Symptomatic COVID-19 Virus (Coronavirus) by PCR; Future  -     CBC with platelets; Future  -     XR CHEST 2 VW; Future  -     amoxicillin (AMOXIL) 875 MG tablet; Take 1 tablet (875 mg) by mouth 2 times daily for 7 days  -     predniSONE (DELTASONE) 50 MG tablet; Take 1 tablet (50 mg) by mouth daily  -     Symptomatic COVID-19 Virus (Coronavirus) by PCR Nose  -     CBC with platelets  -     benzocaine-menthol (CHLORASEPTIC) 6-10 MG lozenge; Place 1 lozenge inside cheek every 2 hours as needed for moderate pain    Type 2 diabetes mellitus with hyperglycemia, with long-term current use of insulin (H).  A1c stable.  Did not have time  to fully review blood sugars today.  We will increase her glargine to 60 and 60 in the short-term from 55 and 60 due to starting prednisone.  Congratulated her on continued improvement in her blood sugars.  Goal to get diabetes under appropriate control so she can be considered for hernia repair surgery due to continued chronic pain in this large ventral hernia.  -     Hemoglobin A1c  -     Albumin Random Urine Quantitative with Creat Ratio  -     insulin glargine U-300 (TOUJEO SOLOSTAR) 300 UNIT/ML (1 units dial) pen; Take 60 Units in AM and 60 Units in PM.    Abdominal pain, generalized.  Refilled Tylenol to help with abdominal pain and fevers.  -     acetaminophen ([facility] ACETAMINOPHEN EX ST) 500 MG tablet; Take 2 tablets (1,000 mg) by mouth 3 times daily    Tyra Bullock MD    Patient Instructions   Add on blood work for infection  Check xray  COVID swab    Antibiotics:  Amoxicillin, 1 pill 2x per day for 1 week  Prednisone, 1 pill daily for 5 days    Keep appointment with pulmonology    Increase Toujeo to 60units in AM and 60 Units in PM while taking prednisone  Refill Tylenol  Follow up in 1-2 weeks after pulmonology appointment  Go to ER if symptoms are getting worse.        Tyra Bullock MD    SUBJECTIVE  Teresa AUSTIN Chris is a 50 year old female with past medical history significant for    Patient Active Problem List   Diagnosis     Health Care Home     Acute peptic ulcer     Other allergy, other than to medicinal agents     Bipolar disorder (H)     Bulging lumbar disc     Cervical dysplasia     Common migraine without aura     Constipation     Dwarfism     Familial hypercholesterolemia     Insomnia     Low back pain     Intermittent asthma     Leg pain, bilateral     Smoking     Degeneration of thoracic or thoracolumbar intervertebral disc     LOMAS (nonalcoholic steatohepatitis)     Disease of lung     Hemorrhoids     Parotid mass     Endometriosis     NNAMDI (obstructive sleep apnea)     History of  total right knee replacement     Lateral epicondylitis     Impingement syndrome, shoulder, left     Hx of total knee replacement, left     Chronic pain syndrome     Cough     Borderline personality disorder (H)     Moderate recurrent major depression (H)     Recurrent ventral hernia     Type 2 diabetes mellitus with complication, with long-term current use of insulin (H)     Essential hypertension     Nonruptured cerebral aneurysm     Cerebrovascular accident (CVA) due to embolism (H)     Amputation of leg (H)     CKD (chronic kidney disease) stage 5, GFR less than 15 ml/min (H)     Pre-ulcerative corn or callous     Excessive bleeding in premenopausal period     Morbid obesity (H)     Pseudoseizure     Others present at the visit:  None    Presents for   Chief Complaint   Patient presents with     Follow Up     Pt is here to follow up from the ER for Pneumonia.     Diabetes     Pt is here to follow up on diabetes.      Patient seen today for evaluation of continued shortness of breath. She was seen at the emergency department on August 14 with complaints of fevers, chills, increased shortness of breath. At that time she was diagnosed with community-acquired pneumonia, and placed on antibiotics with doxycycline. She did have some evidence of dehydration and was given fluids. Also had a chest x-ray done at that time which showed an indistinct opacity in the right upper lung she had a normal head CT, unchanged lumbar spine x-ray, and an elevated white count.    She was seen in our clinic 4 days later after developing skin changes concerning for an allergic reaction to the doxycycline. At that time she was changed to azithromycin, which she tolerated well in the past.    She denies any significant improvement in symptoms. She is still feeling short of breath nearly all the time. She endorses cough, with episodes of posttussive emesis. She continues to have fevers, chills, and sweats. She is having chest pain when she  coughs. Has been throwing up at least daily. Notes runny nose and cough productive of a thick yellow mucus. She is having sinus pressure and ear pressure as well.    She continues to use her inhalers regularly and is doing 2 puffs twice daily of the Symbicort, 2 puffs 4-5 times daily of her albuterol, and is using her nebulizer machine 3 times a day. She gets temporary relief with nebs but this does not persist. Feels like she is unable to take a deep breath and that there is something stuck way down deep in her lungs. Also noticing continued irritation in her mouth with some sores developing in her mouth mouth and nose. Has been using nystatin swish for potential thrush. She notes pain with swallowing, and decreased overall oral intake. She has been taking Tylenol at home and this seems to help temporarily of symptoms, with her last dose at 3 AM. She is needing a refill on this.    Has not had a recent COVID-19 test. Does not have known COVID-19 contacts.    She continues to smoke and is smoking about 5 cigarettes/day. Has having increased anxiety as her father was recently hospitalized with a stroke. Very worried about him. She has been using increased marijuana to help cope with worsening anxiety. Has an upcoming appointment in the next few weeks with her psychiatrist. Continues to see her therapist regularly.    We have been working on getting her into see a pulmonologist, and she thinks she has an appointment scheduled in the upcoming week. Also has appointment coming up with a cardiologist as well as an echo scheduled for evaluation of cardiac symptoms.    She has multiple allergies to medications including doxycycline, Augmentin, but has tolerated penicillins in the past.    Of note she has diabetes, and endorses increasing sugars. She has been unable to  her Toujeo because the pharmacy says she is too early. She is doing 55 units in the morning and 6 units at night and continues to have significantly  high sugars.      OBJECTIVE:  Vitals: There were no vitals taken for this visit.  BMI= There is no height or weight on file to calculate BMI.  Objective:    Vitals:  Vitals are reviewed and are within the normal range  Gen:  Alert, pleasant, no acute distress, coughs frequently, in some distress with coughing.  Cardiac:  Regular rate and rhythm, no murmurs, rubs or gallops, mildly tachycardic  Respiratory: Lungs with scattered wheezes, most notably in the left upper lobe. Decreased airflow to the bases but I do not appreciate crackles or wheezes there.  Abdomen:  Soft, mildly tender. Large ventral hernia present.  Extremities:  Warm, well-perfused, pulses 2+/4, no lower extremity edema.  Amputation of left leg.      Results for orders placed or performed in visit on 08/31/21   XR CHEST 2 VW     Status: None    Narrative    EXAM: XR CHEST 2 VW  LOCATION: [facility]  DATE/TIME: 8/31/2021 9:54 AM    INDICATION:  Shortness of breath  COMPARISON: None.      Impression    IMPRESSION: Normal cardiac and mediastinal contours. The lungs are symmetrically inflated and are clear. No pleural effusion or pneumothorax.     Upper abdomen is unremarkable.     CONCLUSION:   Normal chest.    Results for orders placed or performed in visit on 08/31/21   Hemoglobin A1c     Status: Abnormal   Result Value Ref Range    Hemoglobin A1C 8.5 (H) 0.0 - 5.6 %   Albumin Random Urine Quantitative with Creat Ratio     Status: None   Result Value Ref Range    Microalbumin Urine mg/dL <0.50 0.00 - 1.99 mg/dL    Creatinine Urine mg/dL 10 mg/dL    Microalbumin Urine mg/g Cr      Narrative    Microalbumin, Random Urine   <2.0 mg/dL . . . . . . . . Normal   3.0-30.0 mg/dL . . . . . . Microalbuminuria   >30.0 mg/dL . . . . . .  . Clinical Proteinuria     Microalbumin/Creatinine Ratio, Random Urine   <20 mg/g . . . . .. . . . Normal    mg/g . . . . . . . Microalbuminuria   >300 mg/g . . . . . . . . Clinical Proteinuria    Symptomatic COVID-19 Virus (Coronavirus) by PCR Nose     Status: Normal    Specimen: Nose; Swab   Result Value Ref Range    SARS CoV2 PCR Negative Negative    Narrative    Testing was performed using the Xpert Xpress SARS-CoV-2 Assay on the  Cepheid Gene-Xpert Instrument Systems. Additional information about  this Emergency Use Authorization (EUA) assay can be found via the Lab  Guide. This test should be ordered for the detection of SARS-CoV-2 in  individuals who meet SARS-CoV-2 clinical and/or epidemiological  criteria. Test performance is unknown in asymptomatic patients. This  test is for in vitro diagnostic use under the FDA EUA for  laboratories certified under CLIA to perform high complexity testing.  This test has not been FDA cleared or approved. A negative result  does not rule out the presence of PCR inhibitors in the specimen or  target RNA in concentration below the limit of detection for the  assay. The possibility of a false negative should be considered if  the patient's recent exposure or clinical presentation suggests  COVID-19. This test was validated by the [facility]. This laboratory is certified under  the Clinical Laboratory Improvement Amendments of 1988 (CLIA-88) as  qualified to perform high complexity laboratory testing.     CBC with platelets     Status: Abnormal   Result Value Ref Range    WBC Count 11.4 (H) 4.0 - 11.0 10e3/uL    RBC Count 4.76 3.80 - 5.20 10e6/uL    Hemoglobin 14.1 11.7 - 15.7 g/dL    Hematocrit 42.3 35.0 - 47.0 %    MCV 89 78 - 100 fL    MCH 29.6 26.5 - 33.0 pg    MCHC 33.3 31.5 - 36.5 g/dL    RDW 14.2 10.0 - 15.0 %    Platelet Count 245 150 - 450 10e3/uL           Patient Instructions   Add on blood work for infection  Check xray  COVID swab    Antibiotics:  Amoxicillin, 1 pill 2x per day for 1 week  Prednisone, 1 pill daily for 5 days    Keep appointment with pulmonology    Increase Toujeo to 60units in AM and 60  Units in PM while taking prednisone  Refill Tylenol  Follow up in 1-2 weeks after pulmonology appointment  Go to ER if symptoms are getting worse.        Tyra Bullock MD

## 2021-09-01 LAB — SARS-COV-2 RNA RESP QL NAA+PROBE: NEGATIVE

## 2021-09-01 NOTE — RESULT ENCOUNTER NOTE
Left message on patient's voicemail about negative COVID-19 testing, negative xray, and improving white count.  All good signs.  Call clinic if she has questions or if symptoms are getting worse.    Tyra Bullock MD

## 2021-09-01 NOTE — RESULT ENCOUNTER NOTE
Teresa Perez-    Here is a copy of your lab results.  Your blood counts for infection are coming down--this is good news!  Your chest xray is negative for pneumonia, and your hemoglobin A1c diabetes test is stable.  I hope the new regimen for your breathing helps and I'm glad you have follow up scheduled with the pulmonologist coming up.  We are still waiting on your COVID-19 testing--I will let you know if that comes back positive.  Please call the clinic at [phone] if you have any questions.      Tyra Bullock MD    Please send results to patient.

## 2021-09-29 ENCOUNTER — TRANSFERRED RECORDS (OUTPATIENT)
Dept: HEALTH INFORMATION MANAGEMENT | Facility: CLINIC | Age: 51
End: 2021-09-29

## 2021-10-11 ENCOUNTER — ALLIED HEALTH/NURSE VISIT (OUTPATIENT)
Dept: FAMILY MEDICINE | Facility: CLINIC | Age: 51
End: 2021-10-11
Payer: MEDICARE

## 2021-10-11 VITALS — RESPIRATION RATE: 16 BRPM | HEART RATE: 107 BPM | OXYGEN SATURATION: 96 %

## 2021-10-11 DIAGNOSIS — Z30.42 ENCOUNTER FOR SURVEILLANCE OF INJECTABLE CONTRACEPTIVE: Primary | ICD-10-CM

## 2021-10-11 PROCEDURE — 96372 THER/PROPH/DIAG INJ SC/IM: CPT | Performed by: FAMILY MEDICINE

## 2021-10-11 RX ADMIN — MEDROXYPROGESTERONE ACETATE 150 MG: 150 INJECTION, SUSPENSION INTRAMUSCULAR at 09:44

## 2021-10-11 NOTE — NURSING NOTE
Clinic Administered Medication Documentation          Depo Provera Documentation    URINE HCG: not indicated    Depo-Provera Standing Order inclusion/exclusion criteria reviewed.   Patient meets: inclusion criteria     BP: Data Unavailable  LAST PAP/EXAM: No results found for: PAP    Prior to injection, verified patient identity using patient's name and date of birth. Medication was administered. Please see MAR and medication order for additional information.     Was entire vial of medication used? Yes  Vial/Syringe: Single dose vial  Expiration Date:  04/30/2023    Patient instructed to remain in clinic for 15 minutes.  NEXT INJECTION DUE: 12/28/21 - 1/11/22      Name of provider who requested the medication administration: Dr. Rivera  Name of provider on site (faculty or community preceptor) at the time of performing the medication administration: Dr. Rivera    Date of next administration: 12/28/21 - 1/11/22  Date of next office visit with provider to renew medication plan (must be seen annually): 7/13/2022

## 2021-10-14 ENCOUNTER — TELEPHONE (OUTPATIENT)
Dept: FAMILY MEDICINE | Facility: CLINIC | Age: 51
End: 2021-10-14

## 2021-10-14 NOTE — TELEPHONE ENCOUNTER
[facility] phone call message- general phone call:    Reason for call: Would like to speak with a nurse regarding Teresa's EKG results and some med changes.     Return call needed: Yes    OK to leave a message on voice mail? Yes    Primary language: English      needed? No    Call taken on October 14, 2021 at 11:22 AM by Mary Ann Giraldo

## 2021-10-14 NOTE — TELEPHONE ENCOUNTER
Teresa has had body aches, cough, and chills/sweats for three days. Just started checking her temperature today, tmax 99.4. No shortness of breath. Speaking in full sentences on the phone without audibly labored breathing.     We initially made her an appointment in the [facility] tomorrow. She then called back and let lópez know her medical transportation is completely full and unable to accommodate this request. Unfortunately this is an ongoing issue with her due to her insurance (Medicare) and her need for wheelchair transportation. In light of this, she will have a telephone visit tomorrow so we have a dedicated time to check in on her, discuss symptom management including prescription medication, etc.     Routed to Dr. Bullock and Dr. Pak. ./KRISS

## 2021-10-14 NOTE — TELEPHONE ENCOUNTER
Message noted.      I have discontinued her Amitryptyline, and Flexeril, as they could also contribute to QTC prolongation.      It does not look like she has been taking the Flexeril much lately, as it was last refilled in February.      She is on a low dose of the Amitryptyline, and so stopping this should not cause trouble.     The other one that can cause QTC prolongation is Sumatriptan, but she uses this only occasionally, so should be okay to continue with intermittent use.      Kristy, can you call her pharmacy and let them know we have discontinued the Amitriptyline and the flexeril.      I will talk more with her about her heavy THC use--because I think this is affecting her respiratory status as well.      Tyra Bullock MD    Routed to YULISA Perry

## 2021-10-14 NOTE — TELEPHONE ENCOUNTER
Janey from Dr. Georges' (psychiatry) office calls to get the following message to Dr. Bullock: He will be reducing Seroquel due to prolonged QTC (caller did not know QTC value). She will also be informing her cardiologist. He would like cards/PCP to address prolonged QTC as appropriate from their perspective. Also notes patient is engaged in heavy cannabis use which is worsening anxiety. He recommends not adding any more psychotropic medication to regimen due to ongoing risk of complications.     Last QTC in record here 482 on 8/14/21. Patient does have cardiology appointment tin November. Routed to Dr. Bullock. ./KRISS

## 2021-10-15 ENCOUNTER — APPOINTMENT (OUTPATIENT)
Dept: RADIOLOGY | Facility: CLINIC | Age: 51
End: 2021-10-15
Attending: FAMILY MEDICINE
Payer: MEDICARE

## 2021-10-15 ENCOUNTER — HOSPITAL ENCOUNTER (EMERGENCY)
Facility: CLINIC | Age: 51
Discharge: HOME | End: 2021-10-15
Attending: FAMILY MEDICINE | Admitting: FAMILY MEDICINE
Payer: MEDICARE

## 2021-10-15 ENCOUNTER — VIRTUAL VISIT (OUTPATIENT)
Dept: FAMILY MEDICINE | Facility: CLINIC | Age: 51
End: 2021-10-15
Payer: MEDICARE

## 2021-10-15 VITALS
RESPIRATION RATE: 20 BRPM | DIASTOLIC BLOOD PRESSURE: 64 MMHG | SYSTOLIC BLOOD PRESSURE: 135 MMHG | OXYGEN SATURATION: 95 % | TEMPERATURE: 98.1 F | WEIGHT: 245 LBS | HEIGHT: 60 IN | HEART RATE: 91 BPM | BODY MASS INDEX: 48.1 KG/M2

## 2021-10-15 DIAGNOSIS — I63.419 CEREBROVASCULAR ACCIDENT (CVA) DUE TO EMBOLISM OF MIDDLE CEREBRAL ARTERY, UNSPECIFIED BLOOD VESSEL LATERALITY (H): Primary | ICD-10-CM

## 2021-10-15 DIAGNOSIS — B34.9 VIRAL SYNDROME: ICD-10-CM

## 2021-10-15 DIAGNOSIS — E11.8 TYPE 2 DIABETES MELLITUS WITH COMPLICATION, WITH LONG-TERM CURRENT USE OF INSULIN (H): ICD-10-CM

## 2021-10-15 DIAGNOSIS — I10 ESSENTIAL HYPERTENSION: ICD-10-CM

## 2021-10-15 DIAGNOSIS — Z79.4 TYPE 2 DIABETES MELLITUS WITH COMPLICATION, WITH LONG-TERM CURRENT USE OF INSULIN (H): ICD-10-CM

## 2021-10-15 DIAGNOSIS — J20.9 ACUTE BRONCHITIS, UNSPECIFIED ORGANISM: ICD-10-CM

## 2021-10-15 LAB
ANION GAP SERPL CALCULATED.3IONS-SCNC: 13 MMOL/L (ref 5–18)
BASOPHILS # BLD AUTO: 0 10E3/UL (ref 0–0.2)
BASOPHILS NFR BLD AUTO: 0 %
BNP SERPL-MCNC: <10 PG/ML (ref 0–71)
BUN SERPL-MCNC: 18 MG/DL (ref 8–22)
CALCIUM SERPL-MCNC: 9.9 MG/DL (ref 8.5–10.5)
CHLORIDE BLD-SCNC: 102 MMOL/L (ref 98–107)
CO2 SERPL-SCNC: 21 MMOL/L (ref 22–31)
CREAT SERPL-MCNC: 0.65 MG/DL (ref 0.6–1.1)
EOSINOPHIL # BLD AUTO: 0.1 10E3/UL (ref 0–0.7)
EOSINOPHIL NFR BLD AUTO: 1 %
ERYTHROCYTE [DISTWIDTH] IN BLOOD BY AUTOMATED COUNT: 13.3 % (ref 10–15)
GFR SERPL CREATININE-BSD FRML MDRD: >90 ML/MIN/1.73M2
GLUCOSE BLD-MCNC: 129 MG/DL (ref 70–125)
HCT VFR BLD AUTO: 43.8 % (ref 35–47)
HGB BLD-MCNC: 14.6 G/DL (ref 11.7–15.7)
IMM GRANULOCYTES # BLD: 0.1 10E3/UL
IMM GRANULOCYTES NFR BLD: 1 %
LYMPHOCYTES # BLD AUTO: 2.4 10E3/UL (ref 0.8–5.3)
LYMPHOCYTES NFR BLD AUTO: 20 %
MAGNESIUM SERPL-MCNC: 1.9 MG/DL (ref 1.8–2.6)
MCH RBC QN AUTO: 30.2 PG (ref 26.5–33)
MCHC RBC AUTO-ENTMCNC: 33.3 G/DL (ref 31.5–36.5)
MCV RBC AUTO: 91 FL (ref 78–100)
MONOCYTES # BLD AUTO: 0.7 10E3/UL (ref 0–1.3)
MONOCYTES NFR BLD AUTO: 6 %
NEUTROPHILS # BLD AUTO: 8.8 10E3/UL (ref 1.6–8.3)
NEUTROPHILS NFR BLD AUTO: 72 %
NRBC # BLD AUTO: 0 10E3/UL
NRBC BLD AUTO-RTO: 0 /100
PLATELET # BLD AUTO: 249 10E3/UL (ref 150–450)
POTASSIUM BLD-SCNC: 4.1 MMOL/L (ref 3.5–5)
RBC # BLD AUTO: 4.83 10E6/UL (ref 3.8–5.2)
SARS-COV-2 RNA RESP QL NAA+PROBE: NEGATIVE
SODIUM SERPL-SCNC: 136 MMOL/L (ref 136–145)
TROPONIN I SERPL-MCNC: <0.01 NG/ML (ref 0–0.29)
TROPONIN T BLD-MCNC: 0 UG/L
WBC # BLD AUTO: 12.1 10E3/UL (ref 4–11)

## 2021-10-15 PROCEDURE — 80048 BASIC METABOLIC PNL TOTAL CA: CPT | Performed by: FAMILY MEDICINE

## 2021-10-15 PROCEDURE — 36415 COLL VENOUS BLD VENIPUNCTURE: CPT | Performed by: FAMILY MEDICINE

## 2021-10-15 PROCEDURE — 96361 HYDRATE IV INFUSION ADD-ON: CPT

## 2021-10-15 PROCEDURE — 84484 ASSAY OF TROPONIN QUANT: CPT | Performed by: FAMILY MEDICINE

## 2021-10-15 PROCEDURE — 250N000011 HC RX IP 250 OP 636: Performed by: FAMILY MEDICINE

## 2021-10-15 PROCEDURE — 85004 AUTOMATED DIFF WBC COUNT: CPT | Performed by: FAMILY MEDICINE

## 2021-10-15 PROCEDURE — 96374 THER/PROPH/DIAG INJ IV PUSH: CPT

## 2021-10-15 PROCEDURE — 99442 PR PHYSICIAN TELEPHONE EVALUATION 11-20 MIN: CPT | Mod: 95 | Performed by: STUDENT IN AN ORGANIZED HEALTH CARE EDUCATION/TRAINING PROGRAM

## 2021-10-15 PROCEDURE — 84484 ASSAY OF TROPONIN QUANT: CPT | Mod: 91 | Performed by: FAMILY MEDICINE

## 2021-10-15 PROCEDURE — 99285 EMERGENCY DEPT VISIT HI MDM: CPT | Mod: 25

## 2021-10-15 PROCEDURE — 83735 ASSAY OF MAGNESIUM: CPT | Performed by: FAMILY MEDICINE

## 2021-10-15 PROCEDURE — C9803 HOPD COVID-19 SPEC COLLECT: HCPCS

## 2021-10-15 PROCEDURE — 96375 TX/PRO/DX INJ NEW DRUG ADDON: CPT

## 2021-10-15 PROCEDURE — 87635 SARS-COV-2 COVID-19 AMP PRB: CPT | Performed by: FAMILY MEDICINE

## 2021-10-15 PROCEDURE — 258N000003 HC RX IP 258 OP 636: Performed by: FAMILY MEDICINE

## 2021-10-15 PROCEDURE — 71045 X-RAY EXAM CHEST 1 VIEW: CPT

## 2021-10-15 PROCEDURE — 93005 ELECTROCARDIOGRAM TRACING: CPT | Performed by: FAMILY MEDICINE

## 2021-10-15 PROCEDURE — 83880 ASSAY OF NATRIURETIC PEPTIDE: CPT | Performed by: FAMILY MEDICINE

## 2021-10-15 RX ORDER — PREDNISONE 20 MG/1
TABLET ORAL
Qty: 10 TABLET | Refills: 0 | Status: SHIPPED | OUTPATIENT
Start: 2021-10-15 | End: 2022-01-04

## 2021-10-15 RX ORDER — AZITHROMYCIN 250 MG/1
TABLET, FILM COATED ORAL
Qty: 6 TABLET | Refills: 0 | Status: SHIPPED | OUTPATIENT
Start: 2021-10-15 | End: 2021-10-20

## 2021-10-15 RX ORDER — MORPHINE SULFATE 4 MG/ML
4 INJECTION, SOLUTION INTRAMUSCULAR; INTRAVENOUS ONCE
Status: COMPLETED | OUTPATIENT
Start: 2021-10-15 | End: 2021-10-15

## 2021-10-15 RX ORDER — KETOROLAC TROMETHAMINE 15 MG/ML
15 INJECTION, SOLUTION INTRAMUSCULAR; INTRAVENOUS ONCE
Status: COMPLETED | OUTPATIENT
Start: 2021-10-15 | End: 2021-10-15

## 2021-10-15 RX ADMIN — KETOROLAC TROMETHAMINE 15 MG: 15 INJECTION, SOLUTION INTRAMUSCULAR; INTRAVENOUS at 10:57

## 2021-10-15 RX ADMIN — MORPHINE SULFATE 4 MG: 4 INJECTION INTRAVENOUS at 13:50

## 2021-10-15 RX ADMIN — SODIUM CHLORIDE 1000 ML: 9 INJECTION, SOLUTION INTRAVENOUS at 10:56

## 2021-10-15 ASSESSMENT — ENCOUNTER SYMPTOMS
NAUSEA: 1
FATIGUE: 0
ROS GI COMMENTS: POSITIVE FOR LOOSE STOOL.
DIAPHORESIS: 1
CHILLS: 1
VOMITING: 0
ABDOMINAL PAIN: 0
FEVER: 0
COUGH: 1
MYALGIAS: 1

## 2021-10-15 ASSESSMENT — MIFFLIN-ST. JEOR: SCORE: 1657.81

## 2021-10-15 NOTE — Clinical Note
Teresa Perez was seen and treated in our emergency department on 10/15/2021.  She may return to work on 10/18/2021.       If you have any questions or concerns, please don't hesitate to call.      Vernon Silva MD

## 2021-10-15 NOTE — ED TRIAGE NOTES
Cough and night sweats x 3 days. Chest pain and palpitations today. NSR per EMS. . 20 G IV in left hand.
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
## 2021-10-15 NOTE — PROGRESS NOTES
"Teresa is a 50 year old who is being evaluated via a billable telephone visit.      What phone number would you like to be contacted at? [phone]   How would you like to obtain your AVS? Mail a copy    Assessment/Plan: Teresa Perez is a 50-year-old female with known history of smoking, CKD stage V, type 2 diabetes with long-term use of insulin, essential hypertension, NNAMDI, CVA who is wheelchair-bound and unable to make it to clinic due to lack of transportation.  She has a complaint today of multiple days of cough with low-grade fever, cough productive of white, green and yellow mucus.  Her vital signs are reassuring based upon home pulse oximetry with SPO2 96% although she does have borderline tachycardia.  During our visit today however she brought up a concern that she has been having intermittent chest pain starting today which is not reproducible on self-examination.  Given her high risk status and inability to reproduce her chest pain on self-exam I have recommended that she present to the emergency department for further evaluation.  I do have some concern that she could potentially be experiencing ACS, although more likely is that she is having chest discomfort as a result of a viral syndrome.  It would be prudent to rule out influenza and COVID-19.  The patient has had a single COVID-19 [place] dosage and thus is at some risk of breakthrough infection.  She agrees to obtain transportation to the emergency department for further evaluation.  Her PCP has been informed.    Subjective   Teresa is a 50 year old who presents for the following health issues: cough, fever, sweats, also with new chest pain.    HPI     Pt reports that for the last few nights has woken with sweats, chills and fever.  Has measured her temperature - 98.9-99.4  Has been having coughing - has been coughing for \"quite some time\" but seems to have worsened in the past few days.  Has worsened in that she is coughing more than she was. " Waking up coughing - has been coughing up white mucous.  Has had green and yellow mixed in as well, increasing over the past few days.  Has had congestion for the past few days.   Has had some throat irritation. No pain with swallowing.  She is more out of breath than usual.  Pt has chronic leg swelling, no recent change.    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    She has been smoking outside her home. Otherwise she goes to [employer] for groceries and things, has been wearing a mask when she goes.    Has had subjective fevers during the daytime as well as at nighttime.  Has also been having body aches, with headaches and back pain.  Has been eating well.  Denies nausea or diarrhea.  Denies loss of sense of taste or smell.    Pt then states she also has new, intermittent chest pain - middle of chest, comes and goes, and worried because she has heart problems. This is new over the past few days, started this morning.  Her chest pain was first noticed when she was sitting in her wheelchair.  Has some chest pain with palpation but not the same as the type she is feeling.      Feels like fighting off an infection.  No open sores though, not sure what is going on.          Objective       Vitals:  No vitals were obtained today due to virtual visit.  Patient does have a pulse oximeter, and reports that SpO2 is 96%, HR .    Physical Exam   Patient is speaking in full sentences with intermittent cough.  No audible wheezing Some audible congestion.    PSYCH: Alert and oriented times 3; coherent speech, normal   rate and volume, able to articulate logical thoughts, able   to abstract reason, no tangential thoughts, no hallucinations   or delusions  Her affect is normal    Remainder of exam unable to be completed due to telephone visits    No diagnostic testing available        Phone call duration: 12 minutes

## 2021-10-16 LAB
ATRIAL RATE - MUSE: 88 BPM
DIASTOLIC BLOOD PRESSURE - MUSE: 80 MMHG
INTERPRETATION ECG - MUSE: NORMAL
P AXIS - MUSE: 41 DEGREES
PR INTERVAL - MUSE: 126 MS
QRS DURATION - MUSE: 78 MS
QT - MUSE: 376 MS
QTC - MUSE: 454 MS
R AXIS - MUSE: -12 DEGREES
SYSTOLIC BLOOD PRESSURE - MUSE: 147 MMHG
T AXIS - MUSE: 33 DEGREES
VENTRICULAR RATE- MUSE: 88 BPM

## 2021-10-26 ENCOUNTER — ANCILLARY PROCEDURE (OUTPATIENT)
Dept: GENERAL RADIOLOGY | Facility: CLINIC | Age: 51
End: 2021-10-26
Attending: FAMILY MEDICINE
Payer: MEDICARE

## 2021-10-26 ENCOUNTER — OFFICE VISIT (OUTPATIENT)
Dept: FAMILY MEDICINE | Facility: CLINIC | Age: 51
End: 2021-10-26
Payer: MEDICARE

## 2021-10-26 VITALS
HEART RATE: 105 BPM | OXYGEN SATURATION: 95 % | TEMPERATURE: 99.2 F | SYSTOLIC BLOOD PRESSURE: 116 MMHG | DIASTOLIC BLOOD PRESSURE: 78 MMHG | RESPIRATION RATE: 20 BRPM

## 2021-10-26 DIAGNOSIS — B96.89 ACUTE BACTERIAL SINUSITIS: Primary | ICD-10-CM

## 2021-10-26 DIAGNOSIS — R05.9 COUGH: ICD-10-CM

## 2021-10-26 DIAGNOSIS — R50.9 FEVER, UNSPECIFIED FEVER CAUSE: ICD-10-CM

## 2021-10-26 DIAGNOSIS — J02.9 ACUTE PHARYNGITIS, UNSPECIFIED ETIOLOGY: ICD-10-CM

## 2021-10-26 DIAGNOSIS — J01.90 ACUTE BACTERIAL SINUSITIS: Primary | ICD-10-CM

## 2021-10-26 DIAGNOSIS — M79.10 MYALGIA: ICD-10-CM

## 2021-10-26 DIAGNOSIS — R50.9 FEVER, UNSPECIFIED FEVER CAUSE: Primary | ICD-10-CM

## 2021-10-26 LAB
DEPRECATED S PYO AG THROAT QL EIA: NEGATIVE
FLUAV AG SPEC QL IA: NEGATIVE
FLUBV AG SPEC QL IA: NEGATIVE
GROUP A STREP BY PCR: NOT DETECTED

## 2021-10-26 PROCEDURE — 87651 STREP A DNA AMP PROBE: CPT | Performed by: STUDENT IN AN ORGANIZED HEALTH CARE EDUCATION/TRAINING PROGRAM

## 2021-10-26 PROCEDURE — 87804 INFLUENZA ASSAY W/OPTIC: CPT | Performed by: STUDENT IN AN ORGANIZED HEALTH CARE EDUCATION/TRAINING PROGRAM

## 2021-10-26 PROCEDURE — 99214 OFFICE O/P EST MOD 30 MIN: CPT | Mod: CS | Performed by: STUDENT IN AN ORGANIZED HEALTH CARE EDUCATION/TRAINING PROGRAM

## 2021-10-26 PROCEDURE — U0005 INFEC AGEN DETEC AMPLI PROBE: HCPCS | Performed by: STUDENT IN AN ORGANIZED HEALTH CARE EDUCATION/TRAINING PROGRAM

## 2021-10-26 PROCEDURE — U0003 INFECTIOUS AGENT DETECTION BY NUCLEIC ACID (DNA OR RNA); SEVERE ACUTE RESPIRATORY SYNDROME CORONAVIRUS 2 (SARS-COV-2) (CORONAVIRUS DISEASE [COVID-19]), AMPLIFIED PROBE TECHNIQUE, MAKING USE OF HIGH THROUGHPUT TECHNOLOGIES AS DESCRIBED BY CMS-2020-01-R: HCPCS | Performed by: STUDENT IN AN ORGANIZED HEALTH CARE EDUCATION/TRAINING PROGRAM

## 2021-10-26 PROCEDURE — 71046 X-RAY EXAM CHEST 2 VIEWS: CPT | Mod: FY | Performed by: RADIOLOGY

## 2021-10-26 RX ORDER — CEFDINIR 300 MG/1
300 CAPSULE ORAL 2 TIMES DAILY
Qty: 14 CAPSULE | Refills: 0 | Status: SHIPPED | OUTPATIENT
Start: 2021-10-26 | End: 2021-11-02

## 2021-10-26 RX ORDER — CODEINE PHOSPHATE AND GUAIFENESIN 10; 100 MG/5ML; MG/5ML
1-2 SOLUTION ORAL EVERY 4 HOURS PRN
Qty: 60 ML | Refills: 0 | Status: SHIPPED | OUTPATIENT
Start: 2021-10-26 | End: 2021-10-26

## 2021-10-26 RX ORDER — BENZONATATE 100 MG/1
100 CAPSULE ORAL 3 TIMES DAILY PRN
Qty: 60 CAPSULE | Refills: 1 | Status: SHIPPED | OUTPATIENT
Start: 2021-10-26 | End: 2022-01-04

## 2021-10-26 RX ORDER — LEVOFLOXACIN 500 MG/1
500 TABLET, FILM COATED ORAL DAILY
Qty: 7 TABLET | Refills: 0 | Status: SHIPPED | OUTPATIENT
Start: 2021-10-26 | End: 2021-10-26
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
## 2021-10-26 NOTE — PROGRESS NOTES
Preceptor Attestation:    I discussed the patient with the resident and evaluated the patient in person. I have verified the content of the note, which accurately reflects my assessment of the patient and the plan of care.   Supervising Physician:  Alber Linton MD.

## 2021-10-27 LAB — SARS-COV-2 RNA RESP QL NAA+PROBE: NEGATIVE

## 2021-10-28 NOTE — RESULT ENCOUNTER NOTE
Called patient with results. Continue abx for sinusitis. Recheck with PCP already scheduled 11/2.    Lamonte Moeller MD

## 2021-10-29 RX ORDER — AMITRIPTYLINE HYDROCHLORIDE 10 MG/1
10 TABLET ORAL
COMMUNITY
Start: 2021-10-13 | End: 2021-11-30

## 2021-10-29 RX ORDER — AMITRIPTYLINE HYDROCHLORIDE 10 MG/1
10 TABLET ORAL
OUTPATIENT
Start: 2021-10-29

## 2021-11-01 DIAGNOSIS — E11.8 TYPE 2 DIABETES MELLITUS WITH COMPLICATION, WITH LONG-TERM CURRENT USE OF INSULIN (H): Primary | ICD-10-CM

## 2021-11-01 DIAGNOSIS — Z79.4 TYPE 2 DIABETES MELLITUS WITH COMPLICATION, WITH LONG-TERM CURRENT USE OF INSULIN (H): Primary | ICD-10-CM

## 2021-11-02 ENCOUNTER — OFFICE VISIT (OUTPATIENT)
Dept: FAMILY MEDICINE | Facility: CLINIC | Age: 51
End: 2021-11-02
Payer: MEDICARE

## 2021-11-02 ENCOUNTER — ANCILLARY PROCEDURE (OUTPATIENT)
Dept: GENERAL RADIOLOGY | Facility: CLINIC | Age: 51
End: 2021-11-02
Attending: STUDENT IN AN ORGANIZED HEALTH CARE EDUCATION/TRAINING PROGRAM
Payer: MEDICARE

## 2021-11-02 VITALS
HEART RATE: 98 BPM | WEIGHT: 225.4 LBS | SYSTOLIC BLOOD PRESSURE: 125 MMHG | TEMPERATURE: 98.5 F | OXYGEN SATURATION: 95 % | BODY MASS INDEX: 44.02 KG/M2 | DIASTOLIC BLOOD PRESSURE: 85 MMHG | RESPIRATION RATE: 16 BRPM

## 2021-11-02 DIAGNOSIS — I10 ESSENTIAL HYPERTENSION, BENIGN: ICD-10-CM

## 2021-11-02 DIAGNOSIS — M79.674 PAIN OF TOE OF RIGHT FOOT: Primary | ICD-10-CM

## 2021-11-02 DIAGNOSIS — J45.20 MILD INTERMITTENT ASTHMA WITHOUT COMPLICATION: ICD-10-CM

## 2021-11-02 DIAGNOSIS — F31.32 BIPOLAR AFFECTIVE DISORDER, CURRENTLY DEPRESSED, MODERATE (H): ICD-10-CM

## 2021-11-02 DIAGNOSIS — E11.8 TYPE 2 DIABETES MELLITUS WITH COMPLICATION, WITH LONG-TERM CURRENT USE OF INSULIN (H): ICD-10-CM

## 2021-11-02 DIAGNOSIS — K75.81 NASH (NONALCOHOLIC STEATOHEPATITIS): ICD-10-CM

## 2021-11-02 DIAGNOSIS — M79.674 PAIN OF TOE OF RIGHT FOOT: ICD-10-CM

## 2021-11-02 DIAGNOSIS — M54.40 LOW BACK PAIN WITH SCIATICA, SCIATICA LATERALITY UNSPECIFIED, UNSPECIFIED BACK PAIN LATERALITY, UNSPECIFIED CHRONICITY: ICD-10-CM

## 2021-11-02 DIAGNOSIS — R05.9 COUGH: ICD-10-CM

## 2021-11-02 DIAGNOSIS — J43.8 OTHER EMPHYSEMA (H): ICD-10-CM

## 2021-11-02 DIAGNOSIS — Z79.4 TYPE 2 DIABETES MELLITUS WITH COMPLICATION, WITH LONG-TERM CURRENT USE OF INSULIN (H): ICD-10-CM

## 2021-11-02 DIAGNOSIS — Z71.6 ENCOUNTER FOR SMOKING CESSATION COUNSELING: ICD-10-CM

## 2021-11-02 LAB — HBA1C MFR BLD: 8.6 % (ref 0–5.6)

## 2021-11-02 PROCEDURE — 73620 X-RAY EXAM OF FOOT: CPT | Mod: RT | Performed by: RADIOLOGY

## 2021-11-02 PROCEDURE — 99215 OFFICE O/P EST HI 40 MIN: CPT | Performed by: STUDENT IN AN ORGANIZED HEALTH CARE EDUCATION/TRAINING PROGRAM

## 2021-11-02 PROCEDURE — 36415 COLL VENOUS BLD VENIPUNCTURE: CPT | Performed by: STUDENT IN AN ORGANIZED HEALTH CARE EDUCATION/TRAINING PROGRAM

## 2021-11-02 PROCEDURE — 83036 HEMOGLOBIN GLYCOSYLATED A1C: CPT | Performed by: STUDENT IN AN ORGANIZED HEALTH CARE EDUCATION/TRAINING PROGRAM

## 2021-11-02 RX ORDER — AMITRIPTYLINE HYDROCHLORIDE 10 MG/1
10 TABLET ORAL AT BEDTIME
Qty: 90 TABLET | Refills: 1 | Status: SHIPPED | OUTPATIENT
Start: 2021-11-02 | End: 2022-02-17

## 2021-11-02 NOTE — Clinical Note
This patient is on a ton of medications.  Has been taking the dietary/weight loss supplement KETO, and lost 20 pounds.  Is this safe?  Thanks for your insights!

## 2021-11-02 NOTE — PROGRESS NOTES
There are no exam notes on file for this visit.    ASSESSMENT AND PLAN:      Teresa was seen today for diabetes and chest pain.  Multiple issues were discussed today.    Diagnoses and all orders for this visit:    Pain of toe of right foot.  Traumatic injury to her right little toe.  X-ray per my read with concern for potential fracture, however radiology over read without concerns.  We will continue symptomatic care.  She is minimally ambulatory, so this should heal by limiting activity and using ice.  -     XR Foot Right 2 Views; Future    Type 2 diabetes mellitus with complication, with long-term current use of insulin (H).  Discussed A1c which is stable.  Even though she is off the prednisone will continue her current dose.  She does get hypoglycemic with levels as high as 150.  Discussed that her body will adjust to this, and continuing to decrease her A1c will be helpful in moving forward with necessary surgeries.  Additionally she is having difficulty managing her medications.  We will place a home care nurse referral for medication set up.  -     Hemoglobin A1c  -     Home Care Nursing Referral    Low back pain with sciatica, sciatica laterality unspecified, unspecified back pain laterality, unspecified chronicity.  The Elavil has been helpful for nerve pain as well as sleep.  Per patient is okay to continue this, but should not increase dose.  Medication refilled today.  -     amitriptyline (ELAVIL) 10 MG tablet; Take 1 tablet (10 mg) by mouth At Bedtime    Cough  Mild intermittent asthma without complication  Other emphysema (H)  Encounter for smoking cessation counseling  Patient with continued difficulty with shortness of breath and cough.  Multiple courses of antibiotics.  Course of prednisone.  Is on multiple inhalers.  Will order CT chest, and place additional referral for pulmonary medicine.  She is going to [facility] for her cardiac care, so would be reasonable to do a pulmonary  visits there as well.  We will give her a short course of Tylenol 3 for abdominal pain from cough, and she can continue to use her albuterol, guaifenesin dextromethorphan.  O2 sats are stable.  No new respiratory medications added today.  We did discuss smoking cessation.  Wants to more consistently use patches, but has difficulty keeping them in place.  She would like Medipore tape to help secure patches.  DME order was placed for this.  -     CT CHEST W/O & W CONTRAST; Future  -     Adult Pulmonary Medicine Referral; Future  -     acetaminophen-codeine (TYLENOL #3) 300-30 MG tablet; Take 1 tablet by mouth every 6 hours as needed for severe pain  -     Miscellaneous Order for DME - ONLY FOR DME    Essential hypertension, benign.  Blood pressure at goal.  -     Home Care Nursing Referral    Bipolar affective disorder, currently depressed, moderate (H).  Following regularly with psychiatry.  They have adjusted her medications and decreased her Seroquel due to concerns about side effects and changes on EKG.  Appreciate cardiology input.  Additional assistance with her complicated med regimen will be beneficial.  -     Home Care Nursing Referral    Chest pain.  This is atypical, and likely medically factorial.  I think most likely secondary to coughing.  Does have upcoming echo and cardiology visit.  Also with numerous psychosocial stressors.  She does not appear uncomfortable or in any distress today, so I think we can wait for further cardiac work-up later this month.    Morbid obesity.  Is taking new KETO diet pills.  I am concerned that this could be interacting with her other medications.  We will see if our pharmacy team can review this potential interactions.    I spent 60 minutes on visit, review of records, consultant with team for care, and documentation of this visit.    Patient Instructions   --Referral for pulmonology at [facility]/[place].    --Keep visits with the heart doctor including ECHO.    --Diabetes  looks good!  Keep same medications--no change today.  Do 60 Units in AM and PM of Toudjeo. Keep sliding scale the same.     --Refill Amitriptyline.     --Keep working on ILS worker, Adult Day program, and medication support.     -Referral for assistance with medication set up at home.      -Xray of right foot.  If borken, will want you non-weight bearing and in boot for 3 weeks.      --Tylenol #3 for cough.      --Look into bubble pack for meds    --Look into the medipore tape    --Check with pharmacy team about diet pills, to make sure they are safe and don't interact with other medications or heart.        Tyra Bullock MD    SUBJECTIVE  Teresa Perez is a 51 year old female with past medical history significant for    Patient Active Problem List   Diagnosis     Health Care Home     Acute peptic ulcer     Other allergy, other than to medicinal agents     Bipolar disorder (H)     Bulging lumbar disc     Cervical dysplasia     Common migraine without aura     Constipation     Dwarfism     Familial hypercholesterolemia     Insomnia     Low back pain     Intermittent asthma     Leg pain, bilateral     Smoking     Degeneration of thoracic or thoracolumbar intervertebral disc     LOMAS (nonalcoholic steatohepatitis)     Disease of lung     Hemorrhoids     Parotid mass     Endometriosis     NNAMDI (obstructive sleep apnea)     History of total right knee replacement     Lateral epicondylitis     Impingement syndrome, shoulder, left     Hx of total knee replacement, left     Chronic pain syndrome     Cough     Borderline personality disorder (H)     Moderate recurrent major depression (H)     Recurrent ventral hernia     Type 2 diabetes mellitus with complication, with long-term current use of insulin (H)     Essential hypertension     Nonruptured cerebral aneurysm     Cerebrovascular accident (CVA) due to embolism (H)     Amputation of leg (H)     CKD (chronic kidney disease) stage 5, GFR less than 15 ml/min (H)      Pre-ulcerative corn or callous     Excessive bleeding in premenopausal period     Morbid obesity (H)     Pseudoseizure     Others present at the visit:  None    Presents for   Chief Complaint   Patient presents with     Diabetes     Pt is here for diabetes follow up.     Chest Pain     Pt states she was having chest pain and is thinking it is due to anxiety and stress.      Complex patient presenting with multiple issues today.    First issue is right foot pain.  She has pain pregnancy present in her right little toe.  Describes being at home, when she lost control of her wheelchair, and ran over her toe with it.  Did not hurt initially, but now is painful with movement and she describes hearing a crack, which is then followed by pain.  She notes the following day that it was bruised.  She is very minimally weightbearing, and is just worried that it may be broken.    Second concern is diabetes.  Is wondering what her A1c looks like today.  Is still working on getting A1c down in order to be able to qualify for his hernia surgery.  She has been taking 60 units of the Toujeo twice daily, increased recently because of prednisone course.  Is doing a sliding scale.  Has having difficulty getting her freestyle augustine sensors to stay on because her arms get sweaty, and is now going back to just checking with a manual glucometer.  She is wondering if she might qualify for Dexcom and if this would stand better than the freestyle augustine.  She is needing a new glucometer battery and wonders if we can prescribe that today.  Reviewed reviewed her sugars.  They are typically in the 150s to 300s, and have been trending down.  She does feel symptomatic if her sugars are below 150.    Also complaining of chest today.  She notes that this happens on and off during the day.  She is concerned that it is due to anxiety and stress.  Recently saw her psychiatrist.  Was having abnormalities on her EKG, so they changed of her medications.   She is not taking as much Seroquel, and is still taking all of her Seroquel together at nighttime.  He is okay with us continue with the amitriptyline, but not increasing the dose past 10 mg.  Has had a number of stressors, and feels that this makes the chest pain worse.  It is not associated with activity.  She does have cardiology consult in place, and will be seen at [facility] cardiology clinic in the next 2 weeks.  Is also scheduled for a echo to further evaluate her heart function.  Has a strong family history of heart disease and is nervous about these outcomes.    Breathing also continues to be an issue.  She has been using her Symbicort 2 puffs 2 times a day and sometimes 2 puffs 3 times a day.  She has not been taking the Spiriva because she feels like the powder gets stuck in her mouth and is causing sores.  This makes it difficult to eat.  She still taking the Singulair.  Still using albuterol which has been helpful.  She takes to couple puffs 3-5 times during the day.  She does use her nebs a couple of times a day as well but does not find them as helpful as the inhaler.  Also notes that her nebulizer machine makes her Anxious which makes her more anxious.  Notes continued difficulty with cleanliness in her apartment building.  There is issues with dirt and dust in the hallway, and poor circulation in the building.  She gets her PCA to help with cleaning and this helps some but sometimes he coughs up additional dust as well.      She describes mostly coughing, less significant wheezing, no hemoptysis, sputum is thinner.  She has noticed the taste of blood in her mouth couple of times however.  She worries that she gets exposed to things at her apartment building because many people do warm as there.  Is an 18 floor building.  We reviewed her previous treatments for the symptoms, and she has been on a course of cefdinir.  Just took her last pill this morning.  Has had 2 courses  of azithromycin in the last 3 months, 1 course of prednisone 20 mg, has been on a course of amoxicillin and doxycycline, which caused an allergy.  None of this is really helped her breathing get better.  She still feeling quite short of breath and still coughing.  She endorses continued difficulties with allergy symptoms, and notes that she has difficulty affording her over-the-counter allergy allergy medicines at times.  She endorses a runny nose, as well as popping and fullness in her right ear.    She continues to smoke, and is smoking cigarettes regularly, as well as marijuana.  Is wanting to cut back.  She has difficulty keeping patches in place because they do not stick, because her skin gets sweaty.  She is done well with Medipore tape in the past and is wondering if we could prescribe this again.    She has not been able to keep an appointment with the pulmonary.  Does not have one on the books.  Is interested in moving forward with this as she is continuing to have difficulty with her breathing.    She is feeling somewhat overwhelmed by all of her visits, but is proud she is moving forward with things.  Was recently seen at [facility] for evaluation of her left shoulder.  This shows a rotator cuff tear, and she shares that they are recommending surgery for this.    Also has been in to see the eye doctor and is due to get new glasses.    Has recently seen the dentist, and notes continued difficulty with eating because of pain in her mouth.  She is due for new dentures as her current dentures do not fit.  This has limited her intake.    Finally, she wonders about a new medication supplement.  She bought this off of a [employer] commercial.  Is called keto.  She is wondering if it could cause any side effects.  Has lost 20 pounds since starting the medication.  She is wanting to lose weight to decrease the pressure on her abdomen and the pain that she is having with coughing.    Finally, she is having  more difficulty with taking care of all of her medical needs.  Endorses a good memory in general, but has noticed lately that she is forgetting more things.  This week does not have a PCA and it has been challenging.  She is wondering if there is options for bubble pack to help her remember medications, she is currently using a pillbox at home.  She beaters at home nurse to help out.  She shares that her  is helping with looking for a new ILS worker.  Is feeling like she is having more anxiety and trouble and needing more support at this time.    Recent telephone communication can fourth with patient's psychiatrist were reviewed.  Recent office visit note with [facility] has been reviewed.  Per chart review she has an appointment scheduled on 1122 for a echocardiogram, and an appointment with a provider on 11/30.      OBJECTIVE:  Vitals: /85 (BP Location: Left arm, Patient Position: Sitting, Cuff Size: Adult Large)   Pulse 98   Temp 98.5  F (36.9  C) (Oral)   Resp 16   Wt 102.2 kg (225 lb 6.4 oz)   SpO2 95%   BMI 44.02 kg/m    BMI= Body mass index is 44.02 kg/m .  Objective:    Vitals:  Vitals are reviewed and are within the normal range.  She is afebrile today.  O2 sats are stable at 95% on room air.  Blood pressure is appropriate.  She is mildly tachycardic but improved from baseline.  Weight is at 225 pounds with a BMI of 44.  Gen:  Alert, pleasant, no acute distress  Cardiac:  Regular rate and rhythm, no murmurs, rubs or gallops  Respiratory: Lungs are clear in the upper lung fields.  No crackles or wheezes.  Her lungs are diminished bilaterally in the bases, worse on the left than the right.  Abdomen:  Soft, has visible large ventral hernia, which protrudes with coughing.  Extremities: Left leg is amputated.  Right leg with bruising and tenderness over the lateral side of the little toe.  Normal movement but some pain with this.  Some pain with palpation of the metatarpal bone  extending upward on that fifth digit side.    Results for orders placed or performed in visit on 11/02/21   Hemoglobin A1c     Status: Abnormal   Result Value Ref Range    Hemoglobin A1C 8.6 (H) 0.0 - 5.6 %     I reviewed the x-ray.  Concern for potential fracture on the lateral side of the little toe, at the MTP joint.  Awaiting final radiology read.      Patient Instructions   --Referral for pulmonology at [facility].    --Keep visits with the heart doctor including ECHO.    --Diabetes looks good!  Keep same medications--no change today.  Do 60 Units in AM and PM of Toudjeo. Keep sliding scale the same.     --Refill Amitriptyline.     --Keep working on [employer] worker, Adult Day program, and medication support.     -Referral for assistance with medication set up at home.      -Xray of right foot.  If borken, will want you non-weight bearing and in boot for 3 weeks.      --Tylenol #3 for cough.      --Look into bubble pack for meds    --Look into the medipore tape    --Check with pharmacy team about diet pills, to make sure they are safe and don't interact with other medications or heart.        Tyra Bullock MD

## 2021-11-02 NOTE — PATIENT INSTRUCTIONS
--Referral for pulmonology at [facility]/[place].    --Keep visits with the heart doctor including ECHO.    --Diabetes looks good!  Keep same medications--no change today.  Do 60 Units in AM and PM of Caseyudtracyo. Keep sliding scale the same.     --Refill Amitriptyline.     --Keep working on [employer] worker, Adult Day program, and medication support.     -Referral for assistance with medication set up at home.      -Xray of right foot.  If borken, will want you non-weight bearing and in boot for 3 weeks.      --Tylenol #3 for cough.      --Look into bubble pack for meds    --Look into the medipore tape    --Check with pharmacy team about diet pills, to make sure they are safe and don't interact with other medications or heart.      11/04/21   HOME CARE REFERRAL  [facility]   Phone: [phone]  Fax: [phone]    Faxed referral, demographics, medication list and last office note. They will contact patient to schedule.    Maura Galvan    11/05/21  Pulmonary  [facility]  Phone:[phone]  All Referrals Faxed to: [phone]    Faxed demographics and referral. They will contact pt to schedule.    Maura Galvan

## 2021-11-02 NOTE — RESULT ENCOUNTER NOTE
Results discussed with patient in clinic.  BS are stable, and improved on meter after ending most recent course of prednisone.  Will continue with same regimen. Please call the clinic at [phone] if you have any questions.        Tyra Bullock MD    Please send results to patient.

## 2021-11-03 DIAGNOSIS — R05.9 COUGH: ICD-10-CM

## 2021-11-05 NOTE — PROGRESS NOTES
Addendum:  Outreach call placed to patient to discuss results from Pharm D team about KETO supplement.  Shared that we aren't sure we have all the ingredients, and that the salt and caffiene might affect her blood pressure (which has been good), but that we don't see any big interactions.  Okay to continue, knowing that the medication has not been studied and that it could have unknown risks.  Left message on patients VM with this information.    Tyra Bullock MD

## 2021-11-08 ENCOUNTER — TELEPHONE (OUTPATIENT)
Dept: FAMILY MEDICINE | Facility: CLINIC | Age: 51
End: 2021-11-08
Payer: MEDICARE

## 2021-11-08 NOTE — TELEPHONE ENCOUNTER
VM left with referral office stating they are at capacity for RN services and are unable to to accommodate this patient at this time.

## 2021-11-13 ENCOUNTER — HOSPITAL ENCOUNTER (OUTPATIENT)
Dept: CT IMAGING | Facility: CLINIC | Age: 51
Discharge: HOME | End: 2021-11-13
Attending: STUDENT IN AN ORGANIZED HEALTH CARE EDUCATION/TRAINING PROGRAM | Admitting: STUDENT IN AN ORGANIZED HEALTH CARE EDUCATION/TRAINING PROGRAM
Payer: MEDICARE

## 2021-11-13 DIAGNOSIS — J43.8 OTHER EMPHYSEMA (H): ICD-10-CM

## 2021-11-13 DIAGNOSIS — R05.9 COUGH: ICD-10-CM

## 2021-11-13 DIAGNOSIS — J45.20 MILD INTERMITTENT ASTHMA WITHOUT COMPLICATION: ICD-10-CM

## 2021-11-13 PROCEDURE — 71250 CT THORAX DX C-: CPT | Mod: MF

## 2021-11-15 ENCOUNTER — TELEPHONE (OUTPATIENT)
Dept: FAMILY MEDICINE | Facility: CLINIC | Age: 51
End: 2021-11-15
Payer: MEDICARE

## 2021-11-15 NOTE — RESULT ENCOUNTER NOTE
Teresa Perez-    Here are the results of your chest CT scan.  Overall, things look stable.  There is some changes in the top of the lungs that indicates emphysema or COPD.  There are a couple of small nodules,  but they have not changed or gotten larger.  No evidence of pneumonia.  Please make sure to do your best to make the cardiology and pulmonology visits so we can work on improving your breathing and getting you feeling better.  Please call the clinic at [phone] if you have any questions.      Tyra Bullock MD    Please send results to patient.

## 2021-11-15 NOTE — TELEPHONE ENCOUNTER
This SW and Care Coordinator Diego consulted with Maura in referrals regarding home care services for patient. Home care referral is for PT, speech therapy, skilled nursing, and medication management for psychiatric meds.     Patient sees psychiatrist at [facility]. Called Adult Psychiatry Clinic at [facility] ([phone]) to consult and see if there is another option for medication set up for psychiatric meds. They confirmed that she is a patient there, and will have the nurse call back.     ADDENDUM 11/16/2021 9:31 AM:    Call back from representative at [facility] psychiatry. Informed that they would do the same referral for home care nurse to provide med set up, and since there is an order for PT and speech therapy as well, they would likely send this back to the PCP side of things. SW tried to clarify that we are not able to help if that happens as there ar no options for     DrShiloh Stoner is out sick today, but representative will send him a message asking about options for medication set up.     Will wait for call back from [facility] and will proceed with home care referral after that.     Carey Espana LGSW

## 2021-11-16 ENCOUNTER — TRANSFERRED RECORDS (OUTPATIENT)
Dept: HEALTH INFORMATION MANAGEMENT | Facility: CLINIC | Age: 51
End: 2021-11-16
Payer: MEDICARE

## 2021-11-22 ENCOUNTER — HOSPITAL ENCOUNTER (OUTPATIENT)
Dept: CARDIOLOGY | Facility: CLINIC | Age: 51
Discharge: HOME | End: 2021-11-22
Attending: STUDENT IN AN ORGANIZED HEALTH CARE EDUCATION/TRAINING PROGRAM | Admitting: STUDENT IN AN ORGANIZED HEALTH CARE EDUCATION/TRAINING PROGRAM
Payer: MEDICARE

## 2021-11-22 DIAGNOSIS — E66.01 MORBID OBESITY (H): Primary | ICD-10-CM

## 2021-11-22 DIAGNOSIS — R07.9 CHEST PAIN, UNSPECIFIED TYPE: ICD-10-CM

## 2021-11-22 LAB — LVEF ECHO: NORMAL

## 2021-11-22 PROCEDURE — 255N000002 HC RX 255 OP 636: Performed by: STUDENT IN AN ORGANIZED HEALTH CARE EDUCATION/TRAINING PROGRAM

## 2021-11-22 PROCEDURE — 999N000208 ECHOCARDIOGRAM COMPLETE

## 2021-11-22 PROCEDURE — 93306 TTE W/DOPPLER COMPLETE: CPT | Mod: 26 | Performed by: INTERNAL MEDICINE

## 2021-11-22 RX ADMIN — HUMAN ALBUMIN MICROSPHERES AND PERFLUTREN 9 ML: 10; .22 INJECTION, SOLUTION INTRAVENOUS at 09:41

## 2021-11-22 NOTE — LETTER
2021      Teresa Khan  [address]  [place] [address]        Dear ,    We are writing to inform you of your test results.    I was able to review your ECHOcardiogram heart test.  Your heart function is good, with normal pumping, function.  No problem with the valves or other leakage.  This is good news.  Please call the clinic at [phone] if you have any questions.         Resulted Orders   Echocardiogram Complete   Result Value Ref Range    LVEF  55-60%     Narrative    [id number]  [id number]  [id number]^OLE^KEHINDE^WAQAR     [facility]  [facility]  Echocardiography Laboratory  [address]  [place] [address]  Phone [phone]  Fax [phone]     Name: TERESA KHAN  MRN: [id number]  : 1970  Study Date: 2021 09:01 AM  Age: 51 yrs  Gender: Female  Patient Location: [facility]  Reason For Study: Chest pain, unspecified type  Ordering Physician: KEHINDE HANDY  Referring Physician: KEHINDE HANDY  Performed By: Aniya Moeller     BSA: 2.0 m2  Height: 60 in  Weight: 225 lb  HR: 56  BP: 125/85 mmHg  ______________________________________________________________________________  Procedure  Complete Echo Adult. Optison (NDC #[id number]) given intravenously.     ______________________________________________________________________________  Interpretation Summary     The left ventricle is normal in size.  Left ventricular systolic function is normal.  The visual ejection fraction is 55-60%.  No regional wall motion abnormalities noted.  No hemodynamically significant valvular abnormalities on 2D or color flow  imaging. There is no comparison study available. The study was technically  difficult.  ______________________________________________________________________________  Left Ventricle  The left ventricle is normal in size. There is normal left ventricular wall  thickness. Left  ventricular systolic function is normal. The visual ejection  fraction is 55-60%. Grade I or early diastolic dysfunction. Diastolic Doppler  findings (E/E' ratio and/or other parameters) suggest left ventricular filling  pressures are indeterminate. No regional wall motion abnormalities noted.     Right Ventricle  The right ventricle is normal in structure, function and size.     Atria  Normal left atrial size. Right atrial size is normal. There is no atrial shunt  seen.     Mitral Valve  The mitral valve is normal in structure and function. There is trace mitral  regurgitation.     Tricuspid Valve  The tricuspid valve is normal in structure and function. There is trace  tricuspid regurgitation. IVC diameter <2.1 cm collapsing >50% with sniff  suggests a normal RA pressure of 3 mmHg.     Aortic Valve  The aortic valve is normal in structure and function. No aortic regurgitation  is present. No aortic stenosis is present.     Pulmonic Valve  The pulmonic valve is not well seen, but is grossly normal. There is trace  pulmonic valvular regurgitation.     Vessels  Normal size aorta.     Pericardium  There is no pericardial effusion.     Rhythm  Sinus rhythm was noted.     ______________________________________________________________________________  MMode/2D Measurements & Calculations  IVSd: 0.93 cm  LVIDd: 4.8 cm  LVIDs: 3.2 cm  LVPWd: 1.2 cm  FS: 34.3 %  LV mass(C)d: 185.8 grams  LV mass(C)dI: 94.7 grams/m2  Ao root diam: 3.3 cm  LA dimension: 3.6 cm     asc Aorta Diam: 2.9 cm  LA/Ao: 1.1  LVOT diam: 2.1 cm  LVOT area: 3.3 cm2  LA Volume (BP): 31.0 ml  LA Volume Index (BP): 15.8 ml/m2  RWT: 0.50     Doppler Measurements & Calculations  MV E max jesenia: 58.7 cm/sec  MV A max jesenia: 70.1 cm/sec  MV E/A: 0.84  MV dec time: 0.15 sec  PA acc time: 0.14 sec     E/E' avg: 10.9  Lateral E/e': 9.6  Medial E/e': 12.2     ______________________________________________________________________________  Report approved by: Steve Dial,  Chelsie 11/22/2021 01:56 PM             If you have any questions or concerns, please call the clinic at the number listed above.       Sincerely,      Tyra Bullock MD

## 2021-11-23 ENCOUNTER — MEDICAL CORRESPONDENCE (OUTPATIENT)
Dept: HEALTH INFORMATION MANAGEMENT | Facility: CLINIC | Age: 51
End: 2021-11-23
Payer: MEDICARE

## 2021-11-24 NOTE — RESULT ENCOUNTER NOTE
Teresa Perez-    KIP was able to review your ECHOcardiogram heart test.  Your heart function is good, with normal pumping, function.  No problem with the valves or other leakage.  This is good news.  Please call the clinic at [phone] if you have any questions.      Tyra Bullock MD    Please send results to patient.

## 2021-11-29 ENCOUNTER — TELEPHONE (OUTPATIENT)
Dept: FAMILY MEDICINE | Facility: CLINIC | Age: 51
End: 2021-11-29
Payer: MEDICARE

## 2021-11-29 NOTE — TELEPHONE ENCOUNTER
Spoke with Meghna, support team at [facility] about a form to hold anti-coagulation this afternoon.  Unfortunately, they were not able to take a verbal order.    Was unable to find the fax with the order form, so called and left a message about an alternative fax number.  The team was no longer available, so patient is likely unable to get her injection tomorrow.     Tyra Bullock MD

## 2021-11-30 ENCOUNTER — OFFICE VISIT (OUTPATIENT)
Dept: CARDIOLOGY | Facility: CLINIC | Age: 51
End: 2021-11-30
Payer: MEDICARE

## 2021-11-30 VITALS
DIASTOLIC BLOOD PRESSURE: 67 MMHG | WEIGHT: 225 LBS | OXYGEN SATURATION: 96 % | BODY MASS INDEX: 42.48 KG/M2 | SYSTOLIC BLOOD PRESSURE: 103 MMHG | HEART RATE: 94 BPM | HEIGHT: 61 IN

## 2021-11-30 DIAGNOSIS — Z71.6 ENCOUNTER FOR SMOKING CESSATION COUNSELING: ICD-10-CM

## 2021-11-30 DIAGNOSIS — R94.39 ABNORMAL STRESS TEST: ICD-10-CM

## 2021-11-30 PROCEDURE — 99204 OFFICE O/P NEW MOD 45 MIN: CPT | Performed by: INTERNAL MEDICINE

## 2021-11-30 ASSESSMENT — MIFFLIN-ST. JEOR: SCORE: 1570.03
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
## 2021-11-30 NOTE — PROGRESS NOTES
Service Date: 11/30/2021    NEW PATIENT     REFERRING PROVIDER:  Tyra Bullock MD     HISTORY OF PRESENT ILLNESS:  Ms. Perez is a pleasant, 51-year-old female with a complex medical history.  She has COPD, ongoing tobacco abuse, uncontrolled insulin-dependent diabetes, obstructive sleep apnea, morbid obesity, history of peripheral arterial disease with above-the-knee amputation of her left leg, history of stroke and cerebral aneurysm on Plavix.  She was referred to our office today to follow up on an abnormal stress test that was done in March. She had presented with symptoms of atypical chest pain.  She describes it as right medial-sided discomfort that occurs when she is upset or anxious.  It can take a few minutes up to 30 minutes to go away.  She does not note any relieving or exacerbating factors.  She does not note any exertional chest pains.  The nuclear stress test again was done in March and showed a small area of nontransmural scarring in the distal septal wall, no evidence of ischemia, and her ejection fraction was normal.  She also just recently had an echocardiogram last week showing normal LV and RV function. No regional wall motion abnormalities were noted.  No significant valvular abnormalities were noted.  She certainly has high-risk features, including poorly controlled insulin-dependent diabetes with peripheral arterial disease, a mixed hyperlipidemia, ongoing tobacco abuse and family history of coronary artery disease.  I did review a chest CT that she had done in March as well.  I believe this was done to assess her lungs.  She had emphysema and mild cylindrical bronchiectasis noted, hepatic steatosis and abdominal hernia, but specifically there were no coronary calcifications noted.  I did review her electrocardiogram from 10/15 showing a normal sinus rhythm, normal axis, poor R-wave progression in the anterior precordial leads, but no ST or T-wave abnormalities.  QT corrected intervals  within normal range.    PHYSICAL EXAMINATION:  Today her blood pressure is 103/67. Pulse is 94.  Weight is 225 pounds with a body mass index of 43.  Cardiovascular tones are regular and distant.  I do not appreciate a murmur.  Lungs demonstrate fine crackles throughout.  No wheezing.  Diminished lung sounds throughout lung fields.    ASSESSMENT AND PLAN:  This is a 51-year-old female with a complex medical history, including poorly controlled insulin-dependent diabetes, morbid obesity, hypertension, sleep apnea, history of stroke, on Plavix, peripheral arterial disease with above-the-knee leg amputation on the left, mixed hyperlipidemia and ongoing tobacco abuse.  She has a stress test showing a small area of scarring in the distal septum, but no ischemia and a normal echocardiogram.  She apparently is planning on 2 different surgeries, an abdominal ventral hernia surgery and then a left shoulder surgery.  I believe most of her risks for perioperative events are related to her lung disease.  Her echocardiogram findings and a nuclear stress test findings are reassuring; however, I would still suggest that she is intermediate risk for major adverse cardiac events related to general anesthesia.  I have reviewed her medications and agree with the current treatment of primary prevention for future cardiovascular disease.  I would optimize her diabetes control and recommend smoking cessation.  I am happy to see her back as needed.  Please feel free to contact me with any questions you have in regard to her care.      Thank you for allowing me to participate in the care of your nice patient.    cc:  Tyra Bullock MD   [address] [address]    Winter Jenkins DO        D: 2021   T: 2021   MT: zeina    Name:     FELIPE KHANNADIA MOTA  MRN:      [id number]        Account:      [id number]   :      1970           Service Date: 2021       Document:  [id number]
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
## 2021-12-03 ENCOUNTER — MEDICAL CORRESPONDENCE (OUTPATIENT)
Dept: HEALTH INFORMATION MANAGEMENT | Facility: CLINIC | Age: 51
End: 2021-12-03
Payer: MEDICARE

## 2021-12-08 LAB
ATRIAL RATE - MUSE: 91 BPM
DIASTOLIC BLOOD PRESSURE - MUSE: 78 MMHG
INTERPRETATION ECG - MUSE: NORMAL
P AXIS - MUSE: 53 DEGREES
PR INTERVAL - MUSE: 126 MS
QRS DURATION - MUSE: 76 MS
QT - MUSE: 366 MS
QTC - MUSE: 450 MS
R AXIS - MUSE: -4 DEGREES
SYSTOLIC BLOOD PRESSURE - MUSE: 134 MMHG
T AXIS - MUSE: 44 DEGREES
VENTRICULAR RATE- MUSE: 91 BPM

## 2022-01-04 ENCOUNTER — OFFICE VISIT (OUTPATIENT)
Dept: FAMILY MEDICINE | Facility: CLINIC | Age: 52
End: 2022-01-04
Payer: MEDICARE

## 2022-01-04 VITALS
HEART RATE: 93 BPM | OXYGEN SATURATION: 95 % | SYSTOLIC BLOOD PRESSURE: 110 MMHG | RESPIRATION RATE: 16 BRPM | DIASTOLIC BLOOD PRESSURE: 75 MMHG | TEMPERATURE: 98.4 F

## 2022-01-04 DIAGNOSIS — M54.42 CHRONIC BILATERAL LOW BACK PAIN WITH LEFT-SIDED SCIATICA: ICD-10-CM

## 2022-01-04 DIAGNOSIS — Z79.4 TYPE 2 DIABETES MELLITUS WITH HYPERGLYCEMIA, WITH LONG-TERM CURRENT USE OF INSULIN (H): ICD-10-CM

## 2022-01-04 DIAGNOSIS — Z23 NEED FOR PROPHYLACTIC VACCINATION AND INOCULATION AGAINST INFLUENZA: ICD-10-CM

## 2022-01-04 DIAGNOSIS — N80.9 ENDOMETRIOSIS: Primary | ICD-10-CM

## 2022-01-04 DIAGNOSIS — R05.9 COUGH: ICD-10-CM

## 2022-01-04 DIAGNOSIS — G47.33 OSA (OBSTRUCTIVE SLEEP APNEA): ICD-10-CM

## 2022-01-04 DIAGNOSIS — T78.40XS ALLERGIC REACTION, SEQUELA: ICD-10-CM

## 2022-01-04 DIAGNOSIS — J45.40 MODERATE PERSISTENT ASTHMA WITHOUT COMPLICATION: ICD-10-CM

## 2022-01-04 DIAGNOSIS — Z23 HIGH PRIORITY FOR 2019-NCOV VACCINE: ICD-10-CM

## 2022-01-04 DIAGNOSIS — E11.65 TYPE 2 DIABETES MELLITUS WITH HYPERGLYCEMIA, WITH LONG-TERM CURRENT USE OF INSULIN (H): ICD-10-CM

## 2022-01-04 DIAGNOSIS — G89.29 CHRONIC BILATERAL LOW BACK PAIN WITH LEFT-SIDED SCIATICA: ICD-10-CM

## 2022-01-04 PROCEDURE — 91306 COVID-19,PF,MODERNA (18+ YRS BOOSTER .25ML): CPT | Performed by: STUDENT IN AN ORGANIZED HEALTH CARE EDUCATION/TRAINING PROGRAM

## 2022-01-04 PROCEDURE — 96372 THER/PROPH/DIAG INJ SC/IM: CPT | Performed by: STUDENT IN AN ORGANIZED HEALTH CARE EDUCATION/TRAINING PROGRAM

## 2022-01-04 PROCEDURE — 99214 OFFICE O/P EST MOD 30 MIN: CPT | Mod: 25 | Performed by: STUDENT IN AN ORGANIZED HEALTH CARE EDUCATION/TRAINING PROGRAM

## 2022-01-04 PROCEDURE — G0008 ADMIN INFLUENZA VIRUS VAC: HCPCS | Performed by: STUDENT IN AN ORGANIZED HEALTH CARE EDUCATION/TRAINING PROGRAM

## 2022-01-04 PROCEDURE — 90686 IIV4 VACC NO PRSV 0.5 ML IM: CPT | Performed by: STUDENT IN AN ORGANIZED HEALTH CARE EDUCATION/TRAINING PROGRAM

## 2022-01-04 PROCEDURE — 0064A COVID-19,PF,MODERNA (18+ YRS BOOSTER .25ML): CPT | Performed by: STUDENT IN AN ORGANIZED HEALTH CARE EDUCATION/TRAINING PROGRAM

## 2022-01-04 RX ORDER — INSULIN GLARGINE 300 U/ML
INJECTION, SOLUTION SUBCUTANEOUS
Qty: 9 ML | Refills: 11 | Status: SHIPPED | OUTPATIENT
Start: 2022-01-04 | End: 2022-03-30

## 2022-01-04 RX ORDER — GABAPENTIN 600 MG/1
TABLET ORAL
Qty: 180 TABLET | Refills: 0 | Status: SHIPPED | OUTPATIENT
Start: 2022-01-04 | End: 2022-06-06

## 2022-01-04 RX ORDER — GUAIFENESIN AND DEXTROMETHORPHAN HYDROBROMIDE 100; 10 MG/5ML; MG/5ML
SOLUTION ORAL
Qty: 236 ML | Refills: 1 | Status: SHIPPED | OUTPATIENT
Start: 2022-01-04 | End: 2022-05-24

## 2022-01-04 RX ORDER — CETIRIZINE HYDROCHLORIDE 10 MG/1
10 TABLET ORAL 2 TIMES DAILY
Qty: 60 TABLET | Refills: 5 | Status: SHIPPED | OUTPATIENT
Start: 2022-01-04 | End: 2022-03-30

## 2022-01-04 RX ORDER — INSULIN LISPRO 100 [IU]/ML
INJECTION, SOLUTION INTRAVENOUS; SUBCUTANEOUS
Qty: 60 ML | Refills: 3 | Status: SHIPPED | OUTPATIENT
Start: 2022-01-04 | End: 2022-05-31

## 2022-01-04 RX ORDER — MEDROXYPROGESTERONE ACETATE 150 MG/ML
150 INJECTION, SUSPENSION INTRAMUSCULAR
Status: DISCONTINUED | OUTPATIENT
Start: 2022-01-04 | End: 2022-07-19

## 2022-01-04 RX ADMIN — MEDROXYPROGESTERONE ACETATE 150 MG: 150 INJECTION, SUSPENSION INTRAMUSCULAR at 09:06

## 2022-01-04 NOTE — PATIENT INSTRUCTIONS
1)  Depo Shot  2)  COVID Booster (Moderna) and Flu  3)  Allergies:  Add Zyrtec, if covered take 2x per day, if not covered, take 1x per day.  Keep doing flonase!  Keep up with the air movement in your unit.    4)  Referral for sleep study at mask refit at [facility]   5)  Pulmonologist:  New referral placed today.  6)  Reschedule appointment with Dr. Ware to discuss hernia repair (Teresa to do)  7)  Reschedule appointment with [place] to talk about back and further MRI imaging for next steps.    8)  For Diabetes, increase your sliding scale to 26 Units plus 2 based on BS and continue Toudjeo 60 2x per day.  We will reach out from our pharmacy team about Dexcom and follow up diabetes monitoring.     Follow up in 4 weeks to check in again.

## 2022-01-05 DIAGNOSIS — R05.9 COUGH: Primary | ICD-10-CM

## 2022-01-05 NOTE — PROGRESS NOTES
There are no exam notes on file for this visit.    ASSESSMENT AND PLAN:      Teresa was seen today for diabetes, contraception, imm/inj and imm/inj. multiple issues were addressed today.    Diagnoses and all orders for this visit:    Endometriosis.  On Depo for heavy menstrual bleeding.  This was given today.  -     medroxyPROGESTERone (DEPO-PROVERA) injection 150 mg    Need for prophylactic vaccination and inoculation against influenza.   High priority for 2019-nCoV vaccine  Covid and flu shot given today.    -     COVID-19,PF,MODERNA (18+ Yrs BOOSTER .25mL)  -     INFLUENZA VACCINE IM > 6 MONTHS VALENT IIV4 (AFLURIA/FLUZONE)    Chronic bilateral low back pain with left-sided sciatica.    Neck pain.  Left shoulder pain.  Following at [facility].  Recent nerve root injection with some improvement.  Would like to move forward with left shoulder surgery when appropriate, but would like to prioritize her ventral hernia repair first.  Would like a refill on gabapentin.  Is taking Tylenol as well.  Wondering if she needs imaging of her lower back.  Discussed that it would be best to consolidate all of her orthopedics at [facility], so will not order either an MRI right now.  Discussed that she is not a candidate for chronic pain medication here at [facility].    -     gabapentin (NEURONTIN) 600 MG tablet; Take 2 tabs three times daily.    Cough.    Moderate persistent asthma without complication  NNAMDI (obstructive sleep apnea  Tobacco use.  Cough continues to be bothersome and cause abdominal pain.  Will add Zyrtec to the Singulair to treat allergies.  Continue Flonase, and recommended nasal saline.  Did give her a small number of Tylenol with codeine to help with the cough with this particularly severe.  Continue to take her asthma inhalers.  Referral placed for pulmonary medicine evaluation at [facility], as well as sleep study.  Requested that patient's CADI waiver reach out to us with coordinating coverage  for the Medi tape so she can better utilize her patches.  I am happy to continue to support her with her smoking cessation plans.  -     Dextromethorphan-guaiFENesin (SM TUSSIN DM)  MG/5ML syrup; TAKE 5 MLS BY MOUTH EVERY 4 HOURS AS NEEDED FOR COUGH  -     acetaminophen-codeine (TYLENOL #3) 300-30 MG tablet; Take 1 tablet by mouth every 6 hours as needed for severe pain  -     Adult Pulmonary Medicine Referral; Future  -     SLEEP EVALUATION & MANAGEMENT REFERRAL - ADULT -; Future    Type 2 diabetes mellitus with hyperglycemia, with long-term current use of insulin (H).    A1c was improving at last visit, and sugars look better today.  She is not due for an A1c today.  Fasting sugars have been pretty decent, so we will titrate up on the lispro.  Will do 26 units +2 for every 50 above 150, and continue with the Toujeo 60 units in a.m. and p.m.  -     insulin glargine U-300 (TOUJEO SOLOSTAR) 300 UNIT/ML (1 units dial) pen; Take 60 Units in AM and 60 Units in PM.  -     insulin lispro (HUMALOG KWIKPEN) 100 UNIT/ML (1 unit dial) KWIKPEN; IF BS <100, NO HUMALOG. IF -150, TAKE 26 UNITS. INCREASE 2 UNITS FOR EVERY 50 ABOVE 150. TOTAL DAILY DOSE IS 90 UNITS/DAY    Moderate persistent asthma without complication  -     Adult Pulmonary Medicine Referral; Future    NNAMDI (obstructive sleep apnea)  -     SLEEP EVALUATION & MANAGEMENT REFERRAL - ADULT -; Future    Follow-up in 1 month.    Patient Instructions   1)  Depo Shot  2)  COVID Booster (Moderna) and Flu  3)  Allergies:  Add Zyrtec, if covered take 2x per day, if not covered, take 1x per day.  Keep doing flonase!  Keep up with the air movement in your unit.    4)  Referral for sleep study at mask refit at [facility]   5)  Pulmonologist:  New referral placed today.  6)  Reschedule appointment with Dr. Ware to discuss hernia repair (Teresa to do)  7)  Reschedule appointment with [facility] to talk about back and further MRI imaging for next steps.    8)  For Diabetes,  increase your sliding scale to 26 Units plus 2 based on BS and continue Toudjeo 60 2x per day.  We will reach out from our pharmacy team about Dexcom and follow up diabetes monitoring.     Follow up in 4 weeks to check in again.        Tyra Bullock MD    SUBJECTIVE  Teresa AUSTIN [place] is a 51 year old female with past medical history significant for    Patient Active Problem List   Diagnosis     Health Care Home     Acute peptic ulcer     Other allergy, other than to medicinal agents     Bipolar disorder (H)     Bulging lumbar disc     Cervical dysplasia     Common migraine without aura     Constipation     Dwarfism     Familial hypercholesterolemia     Insomnia     Low back pain     Intermittent asthma     Leg pain, bilateral     Smoking     Degeneration of thoracic or thoracolumbar intervertebral disc     LOMAS (nonalcoholic steatohepatitis)     Disease of lung     Hemorrhoids     Parotid mass     Endometriosis     NNAMDI (obstructive sleep apnea)     History of total right knee replacement     Lateral epicondylitis     Impingement syndrome of shoulder region, left     Hx of total knee replacement, left     Chronic pain syndrome     Cough     Borderline personality disorder (H)     Moderate recurrent major depression (H)     Recurrent ventral hernia     Type 2 diabetes mellitus with complication, with long-term current use of insulin (H)     Essential hypertension     Nonruptured cerebral aneurysm     Cerebrovascular accident (CVA) due to embolism (H)     Amputation of leg (H)     CKD (chronic kidney disease) stage 5, GFR less than 15 ml/min (H)     Pre-ulcerative corn or callous     Excessive bleeding in premenopausal period     Morbid obesity (H)     Pseudoseizure     Others present at the visit:  None    Presents for   Chief Complaint   Patient presents with     Diabetes     Pt is here to follow up on her A1C's.  She brought her meter.  She states they have been leveled out.      Contraception     Pt is also  here for her next Depo.      Imm/Inj     Flu Shot     Imm/Inj     COVID-19 VACCINE     Presenting for follow up on multiple concerns.      Diabetes:  Sugars have been higher recently due to a recent cortico steroid injection in her neck.  This was completed at [facility].  Sugars were up into the 300s,  Have been better since then.  Higher on occasion with the holidays as well.  Typically in the upper 100s or lower 200s.  Nothing below 100.  She continues to note symptoms of hypoglycemic with any sugars less than 150.  Treats this by eating a snack and feels better.  She was happy to hear that her A1c has improved.  No weights available today.    She shares that her cough continues to cause trouble for her, and that with that she has worsening pain in the hernia in her abdomen.  Is really wanting to get scheduled for surgery to have this hernia repaired as is it is causing her significant pain.  Notes that her allergy symptoms has been worse.  She has been taking montelukast, but is unable to get Zyrtec as it has not been covered by her insurance.  Is using Flonase regularly.  Is not interested in nasal saline.  She notes that her eyes are itchy, she is having drainage down the back of her throat, has the sensation of fullness and discomfort in her ears and feels like her head is all stuffed up.  Gets headaches regularly, and this has been slightly worse as well.  She reports her breathing actually has been good.  She is using just 1 inhaler, the Symbicort and is been helpful.    We discussed her underlying sleep apnea.  Does not have a mask or supplies that fit and has not had a recheck with a sleep specialist for many years.  Was previously seen at [facility], but has a PTSD response to going to this institution.  Would like her sleep apnea evaluation coordinated with her pulmonology evaluation.    We reviewed the notes from the cardiologist together.  She does have risk for cardiac pathology, but testing  looks normal.  They recommend further pulmonology work-up for clearance for surgery.  She would like to move forward with a pulmonology visit at [facility] so this can be coordinated, as well as having a sleep evaluation done there.  Has felt quite overwhelmed with the number of visits and specialist to see, and it makes it difficult for her to coordinate rides and activities.    This has been made even more challenging because she no longer has a PCA.  No longer has an ILS worker.  Does continue to see her psychologist and psychiatrist.  That has been going well.  Has difficulty taking care of everything at home without additional support.    She had some improvement in her neck pain from the injection, but it still bothering her quite a bit, and the left shoulder continues to be painful.  She is concerned that she may have pulled a muscle in her back from coughing so much, and is wondering if she should get follow-up on her lower spine, as she has a history of degenerative disc disease there.  Has more discomfort lately.  Has more difficulty transferring in and out of her wheelchair, as she does not have support at home with her right now.    She has been on Depo long-term for heavy menstrual bleeding, and this has gone well.  She is due for her Depo injection today.    Is also interested in quitting smoking.  Has noted some irritation in her gums from the lozenges, and does not want to continue to use them.  Has difficulty with the nicotine patches staying in place.  She is working with her CADI  to get some support to get Harsh tape or other apparatus to hold her nicotine patches in place.  She shares she is cut down her cigarettes.  About a week ago she was doing a pack per day and now it is much less than that.  She is also cut back on her THC use.  Is no longer using this every day.  Does think it has improved her breathing.  She is not taking prednisone right now.  Is not using Spiriva  right now.  She is taking Singulair.  She does notice that her allergies and breathing is worse in the winter, when she is stuck inside.  She opens the windows to help with this, and tries to clean her house daily.  She notices white powder outside of her apartment, which can be irritating to her breathing as well.          OBJECTIVE:  Vitals: /75 (BP Location: Right arm, Patient Position: Sitting, Cuff Size: Adult Large)   Pulse 93   Temp 98.4  F (36.9  C) (Oral)   Resp 16   SpO2 95%   BMI= There is no height or weight on file to calculate BMI.  Objective:    Vitals:  Vitals are reviewed and are within the normal range  Gen:  Alert, pleasant, no acute distress  Cardiac:  Regular rate and rhythm, no murmurs, rubs or gallops  Respiratory:  Lungs clear to auscultation bilaterally  Abdomen:  Soft, non-tender, non-distended, bowel sounds positive  Extremities:  Warm, well-perfused, pulses 2+/4, no lower extremity edema    No results found for any visits on 01/04/22.        Patient Instructions   1)  Depo Shot  2)  COVID Booster (Moderna) and Flu  3)  Allergies:  Add Zyrtec, if covered take 2x per day, if not covered, take 1x per day.  Keep doing flonase!  Keep up with the air movement in your unit.    4)  Referral for sleep study at [facility] at [facility]   5)  Pulmonologist:  New referral placed today.  6)  Reschedule appointment with Dr. Ware to discuss hernia repair (Teresa to do)  7)  Reschedule appointment with [facility] to talk about back and further MRI imaging for next steps.    8)  For Diabetes, increase your sliding scale to 26 Units plus 2 based on BS and continue Toudjeo 60 2x per day.  We will reach out from our pharmacy team about Dexcom and follow up diabetes monitoring.     Follow up in 4 weeks to check in again.        Tyra Bullock MD

## 2022-01-10 NOTE — PROGRESS NOTES
"Clinical Pharmacy Consult:                                                    Teresa Perez is a 50 year old female seen for a clinical pharmacist consult.  She was referred to me from Dr. Bullock.     Reason for Consult: Freestyle Readings    Discussion: Dr. Bullock asked me to see this patient to assess Freestyle Elder adherence and to run a report. While discussing this with the patient, she mentioned that she recently started the Freestyle and had been checking her blood sugars with a finger stick glucose meter. The levels shown were all taken in the afternoon and ranged from 190-278. She stated she experienced no low blood sugars recently. The report from the Freestyle Elder only had 2 days worth of readings and all levels were in the \"Very High\" zone.    We confirmed her regimen as Bydureon 2 mg weekly. Toujeo 55 units in the morning and 60 units at night, and Humalog 24 units with meals per sliding scale. Patient endorses metformin intolerance in the past. She has two months worth of Bydureon remaining. We discussed the discontinuation of Bydureon and she stated she would be okay trying Victoza in the future.          Plan:  1. Use Freestyle Elder to check blood sugars at least three times daily  2. Recommend adding an SGLT2 inhibitor, such as Jardiance 10 mg, for additional glycemic control. Discussed with Dr. Bullock  3. Bring Freestyle Elder to each clinic visit    Victorino Botello, AbrahamD    " Notified patients Mother Wili Klein with condition change and transfer to the ICU.

## 2022-01-17 ENCOUNTER — VIRTUAL VISIT (OUTPATIENT)
Dept: FAMILY MEDICINE | Facility: CLINIC | Age: 52
End: 2022-01-17
Payer: MEDICARE

## 2022-01-17 ENCOUNTER — TELEPHONE (OUTPATIENT)
Dept: FAMILY MEDICINE | Facility: CLINIC | Age: 52
End: 2022-01-17
Payer: MEDICARE

## 2022-01-17 DIAGNOSIS — R05.9 COUGH: ICD-10-CM

## 2022-01-17 DIAGNOSIS — R05.9 COUGH: Primary | ICD-10-CM

## 2022-01-17 PROCEDURE — 99441 PR PHYSICIAN TELEPHONE EVALUATION 5-10 MIN: CPT | Mod: 95 | Performed by: STUDENT IN AN ORGANIZED HEALTH CARE EDUCATION/TRAINING PROGRAM

## 2022-01-17 RX ORDER — CETIRIZINE HYDROCHLORIDE 10 MG/1
10 TABLET ORAL 2 TIMES DAILY PRN
Qty: 180 TABLET | Refills: 1 | Status: SHIPPED | OUTPATIENT
Start: 2022-01-17 | End: 2022-09-16

## 2022-01-17 NOTE — PROGRESS NOTES
Teresa is a 51 year old who is being evaluated via a billable telephone visit.      What phone number would you like to be contacted at? [phone]  How would you like to obtain your AVS? Mail a copy    Assessment & Plan     Dany Moore is a 51 year old woman with history notable for allergies and asthma presenting by telephone for follow up on cough and request for additional tylenol with codeine to help with pain from cough.  Saw Dr. Bullock on 1/4/2022, and cetirizine once to twice daily was recommended to help with allergies which may be contributing to cough.  She also prescribed 20 acetaminophen with codeine to help with pain from the cough.  Paty was not able to get cetirizine.  Cough has remained painful and seems to be making her abdominal hernia worsen.  Discussed that Tylenol with codeine is not an acceptable long-term solution for her cough.  Recommended trying Zyrtec to see if it will improve her allergy symptoms and her cough.  Resent prescription.  Refilled small amount of Tylenol with codeine for cough.  Recommended follow-up with Dr. Bullock next week.  - cetirizine (ZYRTEC) 10 MG tablet  Dispense: 180 tablet; Refill: 1    Diagnosis or treatment significantly limited by social determinants of health - Limited income, low health literacy      Tobacco Cessation:   reports that she has been smoking cigarettes. She has a 16.50 pack-year smoking history. She has never used smokeless tobacco.  Tobacco Cessation Action Plan: Patient working on cutting down    I precepted today with Dr. Baca.    Ofelia Neri MD, PGY-2  [facility]    Bryson Moore is a 51 year old who presents for the following health issues     HPI     Needing refill of Tylenol with codeine  Helps reduce the pain in her chest that travels to her abdomen and causes her hernia to be enlarged  Coughing is bothering her  Robitussin DM, helping some  Trying to stop smoking  Cough is productive of alexander/white  sputum  Sometimes she thinks the cough is getting worse  Sometimes getting sweating episodes at night, causing her to need to change her clothes  Happened three times in one night, other nights 1-2 times. Feels like a 48 hour bug  Last happened last week  Moved to a high-rise, does not have good circulation, very dry air  Appointment with pulmonology in March    Currently taking:  Singular, flonase 2 sprays twice a day for allergies   symbicort 2 puffs 3 times a day and rescue inhaler for asthma      Objective           Vitals:  No vitals were obtained today due to virtual visit.    Physical Exam   healthy, alert and no distress  PSYCH: Alert and oriented times 3; coherent speech, normal   rate and volume, able to articulate logical thoughts, able   to abstract reason, no tangential thoughts, no hallucinations   or delusions  Her affect is normal  RESP: No cough, no audible wheezing, able to talk in full sentences  Remainder of exam unable to be completed due to telephone visits          Phone call duration: 8 minutes

## 2022-01-17 NOTE — TELEPHONE ENCOUNTER
Patient calls for refill of Tylenol and Codeine. Discussed that Dr. Bullock is out, but that she will request a visit as this is a controlled substance and cannot be refilled without discussion. Patient scheduled with Dr. Neri this afternoon. Of note, transportation is very difficult for her because she is wheelchair bound and uses medical rides which she cannot due more than 2 days in advance, so this was scheduled as a phone visit. ./LR

## 2022-01-17 NOTE — TELEPHONE ENCOUNTER
[facility] phone call message- general phone call:    Reason for call: Pt would like to speak with  about cough .     Return call needed: Yes    OK to leave a message on voice mail? Yes    Primary language: English      needed? No    Call taken on January 17, 2022 at 11:57 AM by Grisel Flores-Cardona

## 2022-01-18 NOTE — PROGRESS NOTES
Preceptor Attestation:   I talked to the patient on the phone. I have verified the content of the note, which accurately reflects my assessment of the patient and the plan of care.   Supervising Physician:  Jaswant Baca MD.

## 2022-01-21 DIAGNOSIS — J43.9 PULMONARY EMPHYSEMA, UNSPECIFIED EMPHYSEMA TYPE (H): Primary | ICD-10-CM

## 2022-01-25 DIAGNOSIS — E11.65 TYPE 2 DIABETES MELLITUS WITH HYPERGLYCEMIA, WITH LONG-TERM CURRENT USE OF INSULIN (H): Primary | ICD-10-CM

## 2022-01-25 DIAGNOSIS — Z79.4 TYPE 2 DIABETES MELLITUS WITH HYPERGLYCEMIA, WITH LONG-TERM CURRENT USE OF INSULIN (H): Primary | ICD-10-CM

## 2022-01-25 RX ORDER — TIOTROPIUM BROMIDE 18 UG/1
CAPSULE ORAL; RESPIRATORY (INHALATION)
Qty: 90 CAPSULE | Refills: 2 | Status: SHIPPED | OUTPATIENT
Start: 2022-01-25 | End: 2022-03-30

## 2022-01-26 ENCOUNTER — OFFICE VISIT (OUTPATIENT)
Dept: FAMILY MEDICINE | Facility: CLINIC | Age: 52
End: 2022-01-26
Payer: MEDICARE

## 2022-01-26 VITALS
WEIGHT: 239.4 LBS | TEMPERATURE: 98.4 F | OXYGEN SATURATION: 97 % | BODY MASS INDEX: 45.98 KG/M2 | HEART RATE: 98 BPM | RESPIRATION RATE: 16 BRPM | DIASTOLIC BLOOD PRESSURE: 78 MMHG | SYSTOLIC BLOOD PRESSURE: 114 MMHG

## 2022-01-26 DIAGNOSIS — R21 RASH: ICD-10-CM

## 2022-01-26 DIAGNOSIS — L84 PRE-ULCERATIVE CALLUSES: ICD-10-CM

## 2022-01-26 DIAGNOSIS — L73.9 FOLLICULITIS: Primary | ICD-10-CM

## 2022-01-26 DIAGNOSIS — E11.65 TYPE 2 DIABETES MELLITUS WITH HYPERGLYCEMIA, WITH LONG-TERM CURRENT USE OF INSULIN (H): ICD-10-CM

## 2022-01-26 DIAGNOSIS — J30.2 SEASONAL ALLERGIC RHINITIS, UNSPECIFIED TRIGGER: ICD-10-CM

## 2022-01-26 DIAGNOSIS — Z79.4 TYPE 2 DIABETES MELLITUS WITH HYPERGLYCEMIA, WITH LONG-TERM CURRENT USE OF INSULIN (H): ICD-10-CM

## 2022-01-26 DIAGNOSIS — S88.919A AMPUTATION OF LEG (H): ICD-10-CM

## 2022-01-26 DIAGNOSIS — B37.0 THRUSH: ICD-10-CM

## 2022-01-26 LAB — HBA1C MFR BLD: 10.1 % (ref 0–5.6)

## 2022-01-26 PROCEDURE — 83036 HEMOGLOBIN GLYCOSYLATED A1C: CPT | Performed by: STUDENT IN AN ORGANIZED HEALTH CARE EDUCATION/TRAINING PROGRAM

## 2022-01-26 PROCEDURE — 99214 OFFICE O/P EST MOD 30 MIN: CPT | Performed by: STUDENT IN AN ORGANIZED HEALTH CARE EDUCATION/TRAINING PROGRAM

## 2022-01-26 PROCEDURE — 36415 COLL VENOUS BLD VENIPUNCTURE: CPT | Performed by: STUDENT IN AN ORGANIZED HEALTH CARE EDUCATION/TRAINING PROGRAM

## 2022-01-26 RX ORDER — SULFAMETHOXAZOLE/TRIMETHOPRIM 800-160 MG
1 TABLET ORAL 2 TIMES DAILY
Qty: 14 TABLET | Refills: 0 | Status: SHIPPED | OUTPATIENT
Start: 2022-01-26 | End: 2022-03-30

## 2022-01-26 RX ORDER — AMMONIUM LACTATE 12 G/100G
CREAM TOPICAL 2 TIMES DAILY
Qty: 385 G | Refills: 1 | Status: SHIPPED | OUTPATIENT
Start: 2022-01-26 | End: 2023-03-31

## 2022-01-26 RX ORDER — LORATADINE 10 MG/1
10 TABLET ORAL DAILY
Qty: 30 TABLET | Refills: 3 | Status: SHIPPED | OUTPATIENT
Start: 2022-01-26 | End: 2022-02-18

## 2022-01-26 ASSESSMENT — PATIENT HEALTH QUESTIONNAIRE - PHQ9: SUM OF ALL RESPONSES TO PHQ QUESTIONS 1-9: 10

## 2022-01-26 NOTE — PROGRESS NOTES
There are no exam notes on file for this visit.    ASSESSMENT AND PLAN:      Teresa was seen today for cough.    Diagnoses and all orders for this visit:    Folliculitis.  Areas of irritation and drainage consistent with hidradenitis/folliculitis.  We will treat with oral Bactrim twice daily for 7 days, and restart both her ammonium lactate and clindamycin solution to prevent recurrence.  Patient attributes some of her hyperglycemia to this but I am not convinced that the only etiology.  -     sulfamethoxazole-trimethoprim (BACTRIM DS) 800-160 MG tablet; Take 1 tablet by mouth 2 times daily    Type 2 diabetes mellitus with hyperglycemia, with long-term current use of insulin (H).  Reaffirmed patient's goals to be able to move forward with her abdominal surgery to repair this hernia that is causing quite a bit of pain.  A1c had been improved but it is up again now.  We will increase her Toujeo.  She is currently taking 120 units daily, will increase to 130 units daily.  Continue with her sliding scale.  I do think it be helpful to see her blood sugars with a continuous glucose monitor, but she has not been able to do the freestyle augustine.  We will work on getting her connected with our pharmacy team to help with this.  -     Hemoglobin A1c  -     Miscellaneous Order for DME - ONLY FOR DME    Seasonal allergic rhinitis, unspecified trigger.  We will try Claritin as it does not appear that Zyrtec is covered.  Continue with the Singulair.  -     loratadine (CLARITIN) 10 MG tablet; Take 1 tablet (10 mg) by mouth daily    Pre-ulcerative calluses.    Amputation of leg (H)  Order for diabetic shoes and new liner for her prosthetics were placed today and printed out for patient.  -     Miscellaneous Order for DME - ONLY FOR DME  -     Miscellaneous Order for DME - ONLY FOR DME    Rash.  Refilled her ammonium lactate for skin irritation issues.  -     ammonium lactate (AMLACTIN) 12 % external cream; Apply topically 2 times  daily    Cough and shortness of breath.  Her lungs overall are stable.  Recently sent in the Spiriva, show she will pick this up and restart this.  Has a pulmonary referral appointment in place currently.  She is decreasing her THC and cigarette use.  Encouraged her to continue to move forward with this as that will improve her lung function as well.  No concern for acute infection with cough today.    Patient Instructions   For Diabetes:    --Diabetic shoes  --Liner for prosthesis  --Increase the Toudeo to 130 Units  --Continue with sliding scale  -Take the antibiotics for infection.  Bactrim, 1 pill 2x per day, and restart the cream.      For Breathing  --New inhaler for Spiriva  --Keep using the Symbicort  --Use the rescue as needed  --Keep working on cuting back on the weed and the cigarettes  --Keep your appointment with pulmonology  -- the new allergy medication (Claritin).  Call if not covered.            Tyra Bullock MD    SUBJECTIVE  Teresa AUSTIN Chris is a 51 year old female with past medical history significant for    Patient Active Problem List   Diagnosis     Health Care Home     Acute peptic ulcer     Other allergy, other than to medicinal agents     Bipolar disorder (H)     Bulging lumbar disc     Cervical dysplasia     Common migraine without aura     Constipation     Dwarfism     Familial hypercholesterolemia     Insomnia     Low back pain     Intermittent asthma     Leg pain, bilateral     Smoking     Degeneration of thoracic or thoracolumbar intervertebral disc     LOMAS (nonalcoholic steatohepatitis)     Disease of lung     Hemorrhoids     Parotid mass     Endometriosis     NNAMDI (obstructive sleep apnea)     History of total right knee replacement     Lateral epicondylitis     Impingement syndrome of shoulder region, left     Hx of total knee replacement, left     Chronic pain syndrome     Cough     Borderline personality disorder (H)     Moderate recurrent major depression (H)      Recurrent ventral hernia     Type 2 diabetes mellitus with complication, with long-term current use of insulin (H)     Essential hypertension     Nonruptured cerebral aneurysm     Cerebrovascular accident (CVA) due to embolism (H)     Amputation of leg (H)     CKD (chronic kidney disease) stage 5, GFR less than 15 ml/min (H)     Pre-ulcerative corn or callous     Excessive bleeding in premenopausal period     Morbid obesity (H)     Pseudoseizure     Others present at the visit:  None    Presents for   Chief Complaint   Patient presents with     Cough     Pt is here to follow up on her cough.      Patient presents for follow-up on a number of issues.    1) shortness of breath.  She reports that her shortness of breath has been worse lately, most notably over the last week.  She does have a pulmonary specialty visit scheduled in March.  She has noticed increased anxiety as well and feels this is likely contributing to worsening shortness of breath.  Continues to have cough, notices a sore throat from this.  Has worsening pain in her abdominal hernia from the coughing.  She has been using Symbicort, and albuterol.  Has previously been on Spiriva and would like a refill of this medication.  She was not picking it up due to cost concerns.  She is wondering if she could get a refill of Robitussin with codeine, or Tylenol 3 today.  She has not been able to obtain the allergy medications that were prescribed for her.  It is unclear why, but I suspect it is not covered by insurance because it is available over-the-counter.    2) diabetes.  Her blood sugars have been higher lately.  She attributes this to worsening skin infections, secondary to hidradenitis in her armpits and groin region.  She describes 1 draining and 1 nondraining right Legion on the right and a draining lesion on the left.  This tends to increase her sugars.  Has been gaining weight as well.  Tried to use the new freestyle augustine to, but was unable to get  the sensors to attach, and has not been successful with this device.  She is requesting a refill prescription for diabetic shoes.  She is also due for a new liner for her prosthetic on her amputation on the left leg.  Continues to have difficulty with distal pain in the right foot and distal pain in the stump secondary to phantom limb pain.    3) chronic pain.  Continues have difficulty with shoulder pain.  Has difficulties with right knee pain.  She was able to see a specialist recently at [facility] who shares that she is a candidate for a repeat knee replacement, given her continued difficulty with pain there.  She is hoping to get into a pain clinic soon, as pain is been difficult to control.  Has lots of pain in her neck and back as well.  Is working on decreasing her overall cannabis use to qualify for pain clinic.  She is describing new numbness and tingling and shooting pain present in her hands and wrists today.  Pain in her abdomen with coughing.    4) Social stressors.  Has been without a PCA for a while.  Did have a phone interview with a new one, who will be starting on Monday.  She is trying to be open minded about this.  Is having increased difficulty with anxiety and panic attacks.  Her psychiatrist has decreased some of her medications recently and she is frustrated by this.  Shares that a family member was recently flown into regions with Covid and is very sick.  This has been quite stressful to.  She continues to avoid her son, who has been psychologically and verbally manipulative in order to obtain money for drugs.  Has been connecting more regularly with her brother Noel and that has been good.      OBJECTIVE:  Vitals: /78 (BP Location: Left arm, Patient Position: Sitting, Cuff Size: Adult Large)   Pulse 98   Temp 98.4  F (36.9  C) (Oral)   Resp 16   Wt 108.6 kg (239 lb 6.4 oz)   SpO2 97%   BMI 45.98 kg/m    BMI= Body mass index is 45.98 kg/m .  Objective:    Vitals:  Vitals are  reviewed and are within the normal range  Gen:  Alert, pleasant, no acute distress  Cardiac:  Regular rate and rhythm, no murmurs, rubs or gallops.   Respiratory:   Lungs are relatively clear today.  No increased work of breathing.  O2 sats are good.  Abdomen: Belly is soft but she has a large protruding ventral hernia in the left upper quadrant region.  Mildly tender with palpation but no rebound or guarding.  Bowel sounds are present.  Extremities:  Warm, well-perfused, pulses 2+/4, no lower extremity edema.  Monofilament exam completed on right foot with some diminished sensation on the bottom but overall intact.  She has multiple areas of preulcerative callus present on exam.  Skin: 2 cm in diameter bumps consistent with folliculitis present in her axillary region.  Moderately tender, with some drainage.  No extending erythema or warmth.    Results for orders placed or performed in visit on 01/26/22   Hemoglobin A1c     Status: Abnormal   Result Value Ref Range    Hemoglobin A1C 10.1 (H) 0.0 - 5.6 %     We discussed her elevated A1c together in clinic.      Patient Instructions   For Diabetes:    --Diabetic shoes  --Liner for prosthesis  --Increase the Toudeo to 130 Units  --Continue with sliding scale  -Take the antibiotics for infection.  Bactrim, 1 pill 2x per day, and restart the cream.      For Breathing  --New inhaler for Spiriva  --Keep using the Symbicort  --Use the rescue as needed  --Keep working on cuting back on the weed and the cigarettes  --Keep your appointment with pulmonology  -- the new allergy medication (Claritin).  Call if not covered.            Tyra Bullock MD

## 2022-01-26 NOTE — PATIENT INSTRUCTIONS
For Diabetes:    --Diabetic shoes  --Liner for prosthesis  --Increase the Toudeo to 130 Units  --Continue with sliding scale  -Take the antibiotics for infection.  Bactrim, 1 pill 2x per day, and restart the cream.      For Breathing  --New inhaler for Spiriva  --Keep using the Symbicort  --Use the rescue as needed  --Keep working on cuting back on the weed and the cigarettes  --Keep your appointment with pulmonology  -- the new allergy medication (Claritin).  Call if not covered.

## 2022-02-06 DIAGNOSIS — Z79.4 TYPE 2 DIABETES MELLITUS WITH HYPERGLYCEMIA, WITH LONG-TERM CURRENT USE OF INSULIN (H): ICD-10-CM

## 2022-02-06 DIAGNOSIS — E11.65 TYPE 2 DIABETES MELLITUS WITH HYPERGLYCEMIA, WITH LONG-TERM CURRENT USE OF INSULIN (H): ICD-10-CM

## 2022-02-06 DIAGNOSIS — G89.29 OTHER CHRONIC PAIN: Primary | ICD-10-CM

## 2022-02-08 ENCOUNTER — OFFICE VISIT (OUTPATIENT)
Dept: FAMILY MEDICINE | Facility: CLINIC | Age: 52
End: 2022-02-08
Payer: COMMERCIAL

## 2022-02-08 VITALS
TEMPERATURE: 98.8 F | SYSTOLIC BLOOD PRESSURE: 132 MMHG | RESPIRATION RATE: 16 BRPM | OXYGEN SATURATION: 96 % | HEART RATE: 84 BPM | DIASTOLIC BLOOD PRESSURE: 84 MMHG

## 2022-02-08 DIAGNOSIS — G56.02 CARPAL TUNNEL SYNDROME OF LEFT WRIST: Primary | ICD-10-CM

## 2022-02-08 DIAGNOSIS — Z79.4 TYPE 2 DIABETES MELLITUS WITH HYPERGLYCEMIA, WITH LONG-TERM CURRENT USE OF INSULIN (H): ICD-10-CM

## 2022-02-08 DIAGNOSIS — R06.02 SHORTNESS OF BREATH: ICD-10-CM

## 2022-02-08 DIAGNOSIS — R05.9 COUGH: ICD-10-CM

## 2022-02-08 DIAGNOSIS — E11.65 TYPE 2 DIABETES MELLITUS WITH HYPERGLYCEMIA, WITH LONG-TERM CURRENT USE OF INSULIN (H): ICD-10-CM

## 2022-02-08 DIAGNOSIS — F41.0 PANIC ATTACK: ICD-10-CM

## 2022-02-08 PROCEDURE — 99214 OFFICE O/P EST MOD 30 MIN: CPT | Performed by: STUDENT IN AN ORGANIZED HEALTH CARE EDUCATION/TRAINING PROGRAM

## 2022-02-08 RX ORDER — CODEINE PHOSPHATE AND GUAIFENESIN 10; 100 MG/5ML; MG/5ML
1-2 SOLUTION ORAL EVERY 4 HOURS PRN
Qty: 180 ML | Refills: 0 | Status: SHIPPED | OUTPATIENT
Start: 2022-02-08 | End: 2022-03-09

## 2022-02-08 NOTE — PATIENT INSTRUCTIONS
For wrist:    Wear the brace  Use the steroid cream for the hands.  2x per day.    Talk to [facility] about surgery at some point.      For cough:    Increase albuterol to 2 puffs 4x per day  Use the cough syrup  Keep doing all the nose stuff.      Call us if it is getting worse and we talk about options.

## 2022-02-09 NOTE — PROGRESS NOTES
There are no exam notes on file for this visit.    ASSESSMENT AND PLAN:      Teresa was seen today for cts.    Diagnoses and all orders for this visit:    Carpal tunnel syndrome of left wrist.  Exam positive for carpal tunnel.  We discussed anti-inflammatories, and she would like to avoid prednisone given the potential for hyperglycemia at this time.  We will do triamcinolone cream to treat her hands which show evidence of dyshidrotic eczema.  She was given a brace to wear to support the wrists and recommendations for home exercises.  Ultimately, I think she benefit from surgery, after we have prioritized her other health needs including the ventral hernia repair, and improving her blood sugars, and working on this chronic cough/shortness of breath.  She does have an upcoming pulmonology visit.  --Wrist splint brace provided for patient and form signed today.  --Home exercise regimen.  --Triamcinolone cream for dyshidrotic eczema.    Cough.   Shortness of breath   I think this is chronic and likely related to shortness of breath.  Her O2 sats are okay and she is wanting to avoid prednisone.  We will increase her albuterol to 2 puffs 4 times daily on a schedule, and provide some additional cough syrup in the short-term.  I do not think there is a benefit additional antibiotics, and this is within the realm of her normal shortness of breath and not concerning for Covid.  We have done multiple tests and she is not interested in testing for Covid today.  She is fully vaccinated and boosted   -Keep upcoming pulmonology visit.  Appreciate recommendations.   --Continue Spiriva and Symbicort.   --Increase albuterol to 2 puffs 4 times daily..  -     guaiFENesin-codeine (ROBITUSSIN AC) 100-10 MG/5ML solution; Take 5-10 mLs by mouth every 4 hours as needed for cough  -Call if symptoms are worsening and we should continue to consider prednisone at that time.  -Continue to work on decreasing cigarette and THC use.  She has  been making excellent progress in this direction.      Type 2 diabetes mellitus with hyperglycemia, with long-term current use of insulin (H)   -She reports improvement in sugars.  We did not discuss this further today.    Panic attack   -Multiple current psychosocial stressors.  Working with her mental health .  Has appointment with psychology coming up on Thursday.  Appreciate their input on her increased difficulty with panic attacks.  It has been challenging to balance medications because of her extensive list and potential for complications and medication interactions.        Patient Instructions   For wrist:    Wear the brace  Use the steroid cream for the hands.  2x per day.    Talk to [facility] about surgery at some point.      For cough:    Increase albuterol to 2 puffs 4x per day  Use the cough syrup  Keep doing all the nose stuff.      Call us if it is getting worse and we talk about options.        Tyra Bullock MD    SUBJECTIVE  Teresa Bridgese is a 51 year old female with past medical history significant for    Patient Active Problem List   Diagnosis     Health Care Home     Acute peptic ulcer     Other allergy, other than to medicinal agents     Bipolar disorder (H)     Bulging lumbar disc     Cervical dysplasia     Common migraine without aura     Constipation     Dwarfism     Familial hypercholesterolemia     Insomnia     Low back pain     Intermittent asthma     Leg pain, bilateral     Smoking     Degeneration of thoracic or thoracolumbar intervertebral disc     LOMAS (nonalcoholic steatohepatitis)     Disease of lung     Hemorrhoids     Parotid mass     Endometriosis     NNAMDI (obstructive sleep apnea)     History of total right knee replacement     Lateral epicondylitis     Impingement syndrome of shoulder region, left     Hx of total knee replacement, left     Chronic pain syndrome     Cough     Borderline personality disorder (H)     Moderate recurrent major depression (H)      Recurrent ventral hernia     Type 2 diabetes mellitus with complication, with long-term current use of insulin (H)     Essential hypertension     Nonruptured cerebral aneurysm     Cerebrovascular accident (CVA) due to embolism (H)     Amputation of leg (H)     CKD (chronic kidney disease) stage 5, GFR less than 15 ml/min (H)     Pre-ulcerative corn or callous     Excessive bleeding in premenopausal period     Morbid obesity (H)     Pseudoseizure     Others present at the visit:  None    Presents for   Chief Complaint   Patient presents with     Cts     Pt is here for carpal tunnel in her L wrist.  She is looking for a supportive brace to help with the pain.      Patient presents for evaluation of carpal tunnel, as well as follow-up on a couple of other chronic issues.    She has history of right-sided carpal tunnel with previous surgery about 20 to 25 years ago.  Now she is having more trouble with the left side.  She describes numbness and tingling present in the thumb, index and middle finger on her left hand.  Endorses weakness, decreased sensation, and skin changes and irritation across that hand.  It bothers her all the time and sometimes radiates up to her shoulder.  She does notice some occasional swelling and redness in the hand as well.  However she shares that she does have some difficulty with frequent falling and did have a recent accident where she landed on her hand.  She endorses good overall strength.  She is tried taking Tylenol and this does not help.  Has used a brace in the past and this helped a little bit.  She is interested in trying this.  She does some general stress ball squeezing but no other exercises at home.  Has been having chronic pain in her shoulders as well and is seeing folks at [facility] for this.    We discussed her blood sugars.  Has finished the course of Bactrim for folliculitis, and sugars have come down.  They are more in the high 100s to low 200s.  Has not been  having lows.  Is still without a PCA, and is in charge of preparing her own meals, which can be challenging.  Is concerned about having to take prednisone because she is worried about what it might do for her blood sugars.    We also discussed her moods.  She is endorsing worsening anxiety.  Some of this stems from continued difficulty with shortness of breath.  This has been getting worse as well.  Does not feel like the antibiotics helped with her breathing at all even if it did help with some of the skin things.  She does try taking hydroxyzine for her panic and does not find it helpful.  Is seeing her psychiatrist coming up this week on Thursday.  Having the panic attacks more frequently.  Also having more difficulty because of housing concerns right now.  Her mental health  is helping her with this, but it sounds like there was a situation where her money order did not arrive despite it being sent.  She is currently being threatened with a potential eviction.  Has been without her PCA or any PCA for several weeks.  Has been having lots of sinus pressure, nasal congestion, and shortness of breath.  Has been cutting back on smoking cigarettes as well as marijuana and is now down to very minimal use.  Is dealing with a lot of pain and feels very stressed out by the whole situation.    For sinus pressure, she has been using her Flonase, using her own medications, and using her inhalers regularly.  Does get some improvement with them.  Is used only using the albuterol occasionally right now.  She endorses some subjective fevers and chills.  No recent Covid exposures.  She is vaccinated.  Cough is chronic, but is worsening.    OBJECTIVE:  Vitals: /84 (BP Location: Left arm, Patient Position: Sitting, Cuff Size: Adult Large)   Pulse 84   Temp 98.8  F (37.1  C) (Oral)   Resp 16   SpO2 96%   BMI= There is no height or weight on file to calculate BMI.  Objective:    Vitals:  Vitals are reviewed and  are within the normal range.   Nontachycardic today with a normal temp, and O2 sats in an appropriate range.  Gen:  Alert, pleasant, no acute distress  HEENT: Tympanic membranes viewed bilaterally and appear normal.  She has tenderness in her frontal sinuses, maxillary sinuses, TMJ region, and really throughout her facial region.  Some nasal congestion.  Throat is mildly erythematous but no exudate and otherwise clear.  Neck: No palpable lymphadenopathy.  Cardiac:  Regular rate and rhythm, no murmurs, rubs or gallops, nontachycardic.  Respiratory:  Lungs clear to auscultation bilaterally, diminished, but no crackles or wheezes.  Abdomen: Diffuse mild tenderness with bowel sounds present, large ventral hernia  Extremities: Upper extremities with positive Tinel and Phalen sign on the left.  She has some mild synovial thickening, and some cracking on her fingers and joints consistent with eczema.  Strength is intact.  Pulses are strong.  Extremities are warm.    No results found for any visits on 02/08/22.        Patient Instructions   For wrist:    Wear the brace  Use the steroid cream for the hands.  2x per day.    Talk to [facility] about surgery at some point.      For cough:    Increase albuterol to 2 puffs 4x per day  Use the cough syrup  Keep doing all the nose stuff.      Call us if it is getting worse and we talk about options.        Tyra Bullock MD

## 2022-02-18 ENCOUNTER — TRANSFERRED RECORDS (OUTPATIENT)
Dept: HEALTH INFORMATION MANAGEMENT | Facility: CLINIC | Age: 52
End: 2022-02-18

## 2022-02-25 DIAGNOSIS — E11.65 TYPE 2 DIABETES MELLITUS WITH HYPERGLYCEMIA, WITH LONG-TERM CURRENT USE OF INSULIN (H): ICD-10-CM

## 2022-02-25 DIAGNOSIS — R10.84 ABDOMINAL PAIN, GENERALIZED: ICD-10-CM

## 2022-02-25 DIAGNOSIS — R06.00 DYSPNEA, UNSPECIFIED TYPE: Primary | ICD-10-CM

## 2022-02-25 DIAGNOSIS — Z79.4 TYPE 2 DIABETES MELLITUS WITH HYPERGLYCEMIA, WITH LONG-TERM CURRENT USE OF INSULIN (H): ICD-10-CM

## 2022-02-25 RX ORDER — EMPAGLIFLOZIN 25 MG/1
TABLET, FILM COATED ORAL
Qty: 90 TABLET | Refills: 3 | Status: SHIPPED | OUTPATIENT
Start: 2022-02-25 | End: 2023-05-30

## 2022-02-25 RX ORDER — BUDESONIDE AND FORMOTEROL FUMARATE DIHYDRATE 160; 4.5 UG/1; UG/1
AEROSOL RESPIRATORY (INHALATION)
Qty: 30.6 G | Refills: 0 | Status: SHIPPED | OUTPATIENT
Start: 2022-02-25 | End: 2022-03-09

## 2022-02-25 RX ORDER — PSEUDOEPHED/ACETAMINOPH/DIPHEN 30MG-500MG
TABLET ORAL
Qty: 100 TABLET | Refills: 2 | Status: SHIPPED | OUTPATIENT
Start: 2022-02-25 | End: 2022-04-28

## 2022-02-25 NOTE — TELEPHONE ENCOUNTER
Refill request for acetaminophen, empagliflozin, and Symbicort.    -- Up-to-date on diabetes visit (A1c 10.1% on 01/26/22).  -- Symbicort not on med list, but is mentioned in most recent office visit note.    Approved refills.    Lis Vidal MD  (covering for Dr. Bullock while out of office)

## 2022-02-28 DIAGNOSIS — R05.9 COUGH: ICD-10-CM

## 2022-02-28 DIAGNOSIS — R10.84 ABDOMINAL PAIN, GENERALIZED: ICD-10-CM

## 2022-02-28 RX ORDER — LIDOCAINE 50 MG/G
OINTMENT TOPICAL 3 TIMES DAILY PRN
Qty: 141.76 G | Refills: 1 | Status: SHIPPED | OUTPATIENT
Start: 2022-02-28 | End: 2022-10-20

## 2022-03-09 ENCOUNTER — VIRTUAL VISIT (OUTPATIENT)
Dept: FAMILY MEDICINE | Facility: CLINIC | Age: 52
End: 2022-03-09
Payer: COMMERCIAL

## 2022-03-09 ENCOUNTER — TELEPHONE (OUTPATIENT)
Dept: FAMILY MEDICINE | Facility: CLINIC | Age: 52
End: 2022-03-09

## 2022-03-09 DIAGNOSIS — R06.00 DYSPNEA, UNSPECIFIED TYPE: ICD-10-CM

## 2022-03-09 DIAGNOSIS — R05.9 COUGH: Primary | ICD-10-CM

## 2022-03-09 PROCEDURE — 99442 PR PHYSICIAN TELEPHONE EVALUATION 11-20 MIN: CPT | Mod: 95 | Performed by: STUDENT IN AN ORGANIZED HEALTH CARE EDUCATION/TRAINING PROGRAM

## 2022-03-09 RX ORDER — CODEINE PHOSPHATE AND GUAIFENESIN 10; 100 MG/5ML; MG/5ML
1-2 SOLUTION ORAL EVERY 4 HOURS PRN
Qty: 180 ML | Refills: 0 | Status: SHIPPED | OUTPATIENT
Start: 2022-03-09 | End: 2022-05-24

## 2022-03-09 RX ORDER — BUDESONIDE AND FORMOTEROL FUMARATE DIHYDRATE 160; 4.5 UG/1; UG/1
AEROSOL RESPIRATORY (INHALATION)
Qty: 20.4 G | Refills: 4 | Status: SHIPPED | OUTPATIENT
Start: 2022-03-09 | End: 2022-03-25

## 2022-03-09 NOTE — PROGRESS NOTES
"Teresa is a 51 year old who is being evaluated via a billable telephone visit.      What phone number would you like to be contacted at? [phone]  [facility]    Assessment & Plan     1. Cough  2. Dyspnea, unspecified type  Difficult to discern if this is asthma vs COPD exacerbation over the phone, potentially due to infection with symptoms of runny nose and watery eyes. She does have a wet cough heard over the phone. She has not had PFTs done, says she is scheduled for this on Monday and will see a pulmonologist after that.   - stop albuterol rescue inhaler  - start SMART therapy with continuing symbicort 2 puffs BID plus and additional 8 puffs as needed throughout the day  - resent prescription to pharmacy for robitussin   - advised patient come to clinic for physical visit if symptoms continue to progress to see if she should need antibiotics for COPD exacerbation or steroid burst for asthma vs COPD exacerbation  - keep PFT appointment      Review of prior external note(s) from - previous clinic encounter  Prescription drug management  25 minutes spent on the date of the encounter doing chart review, history and exam, documentation and further activities per the note     BMI:   Estimated body mass index is 45.98 kg/m  as calculated from the following:    Height as of 11/30/21: 1.537 m (5' 0.5\").    Weight as of 1/26/22: 108.6 kg (239 lb 6.4 oz).   Weight management plan: Patient was referred to their PCP to discuss a diet and exercise plan.    Benita Behm, MD PGY2  [facility]  03/09/22    I precepted today with Dr. Linton.    Subjective   Teresa is a 51 year old who presents for the following health issues:  Cough, shortness of breath    HPI     Patient has had a productive cough, white sputum, watery eyes and runny nose for over a week. Patient reports she was sick 1 month ago when she saw Dr. Bullock, symptoms got better for a while but symptoms " got worse again. Denies fevers, chills, chest pain from coughing so much, does have shortness of breath at baseline and feels it is a little worse than normal.  She has not taken robitussin prescribed 1 month ago. She uses rescue inhaler 2 times per day. She takes symbicort 2 puffs twice daily.     She has an appointment for pulmonary testing on Monday and says she will be seeing a pulmonologist.     Review of Systems   Constitutional, HEENT, cardiovascular, pulmonary, GI, , musculoskeletal, neuro, skin, endocrine and psych systems are negative, except as otherwise noted.      Objective           Vitals:  No vitals were obtained today due to virtual visit.    Physical Exam     PSYCH: Alert and oriented times 3; coherent speech, normal   rate and volume, able to articulate logical thoughts, able   to abstract reason, no tangential thoughts, no hallucinations   or delusions  Her affect is pleasant  RESP: Does have a we cough, no audible wheezing, able to talk in full sentences  Remainder of exam unable to be completed due to telephone visits      ----- Service Performed and Documented by Resident or Fellow ------        Phone call duration: 15 minutes

## 2022-03-09 NOTE — PROGRESS NOTES
Preceptor Attestation:   I talked to the patient on the phone. I have verified the content of the note, which accurately reflects my assessment of the patient and the plan of care.   Supervising Physician:  Alber Linton MD.

## 2022-03-09 NOTE — TELEPHONE ENCOUNTER
Spoke with patient - she did not cancel this visit but it was somehow cancelled in the system. Communicated to Zayra, clinic supervisor. Routed to Zayra Dougherty. ./KRISS

## 2022-03-09 NOTE — TELEPHONE ENCOUNTER
[facility] phone call message- general phone call:    Reason for call: the Pt called to ask for the Dr to call her she was scheduled for a phone appointment but it was canceled     Return call needed: Yes    OK to leave a message on voice mail? Yes    Primary language: English      needed? No    Call taken on March 9, 2022 at 3:32 PM by Juan Peralta

## 2022-03-10 NOTE — TELEPHONE ENCOUNTER
I reviewed patient's virtual visit with Dr. Fuentes yesterday.  I do believe all of her concerns have been addressed.  I will not reach out to patient, as she received excellent care yesterday and has appropriate follow-up scheduled with pulmonology for PFTs and further evaluation of her respiratory symptoms.    Tyra Bullock MD    Routed to Zayra Perry for FYI.

## 2022-03-14 ENCOUNTER — TELEPHONE (OUTPATIENT)
Dept: FAMILY MEDICINE | Facility: CLINIC | Age: 52
End: 2022-03-14

## 2022-03-14 DIAGNOSIS — Z79.4 TYPE 2 DIABETES MELLITUS WITH HYPERGLYCEMIA, WITH LONG-TERM CURRENT USE OF INSULIN (H): Primary | ICD-10-CM

## 2022-03-14 DIAGNOSIS — E11.65 TYPE 2 DIABETES MELLITUS WITH HYPERGLYCEMIA, WITH LONG-TERM CURRENT USE OF INSULIN (H): Primary | ICD-10-CM

## 2022-03-14 NOTE — TELEPHONE ENCOUNTER
New prescription for kit, strips, lancet sent (no brand selected to able to substitute as needed per insurance coverage)    Tyra Bullock MD

## 2022-03-14 NOTE — TELEPHONE ENCOUNTER
Patient needs a new Rx for Accu-guide monitor and test strips due to insurance coverage.  Please advise.  Deana Castellanos, CMA

## 2022-03-16 ENCOUNTER — ALLIED HEALTH/NURSE VISIT (OUTPATIENT)
Dept: PULMONOLOGY | Facility: CLINIC | Age: 52
End: 2022-03-16
Payer: COMMERCIAL

## 2022-03-16 DIAGNOSIS — R05.9 COUGH: ICD-10-CM

## 2022-03-16 PROCEDURE — 94727 GAS DIL/WSHOT DETER LNG VOL: CPT | Performed by: INTERNAL MEDICINE

## 2022-03-16 PROCEDURE — 94375 RESPIRATORY FLOW VOLUME LOOP: CPT | Performed by: INTERNAL MEDICINE

## 2022-03-16 PROCEDURE — 94729 DIFFUSING CAPACITY: CPT | Performed by: INTERNAL MEDICINE

## 2022-03-18 LAB
DLCOUNC-%PRED-PRE: 110 %
DLCOUNC-PRE: 20.58 ML/MIN/MMHG
DLCOUNC-PRED: 18.63 ML/MIN/MMHG
ERV-%PRED-PRE: 168 %
ERV-PRE: 0.28 L
ERV-PRED: 0.17 L
EXPTIME-PRE: 5.34 SEC
FEF2575-%PRED-PRE: 140 %
FEF2575-PRE: 3.56 L/SEC
FEF2575-PRED: 2.52 L/SEC
FEFMAX-%PRED-PRE: 121 %
FEFMAX-PRE: 7.52 L/SEC
FEFMAX-PRED: 6.21 L/SEC
FEV1-%PRED-PRE: 103 %
FEV1-PRE: 2.55 L
FEV1FEV6-PRE: 87 %
FEV1FEV6-PRED: 82 %
FEV1FVC-PRE: 87 %
FEV1FVC-PRED: 81 %
FEV1SVC-PRE: 90 %
FEV1SVC-PRED: 81 %
FIFMAX-PRE: 5.42 L/SEC
FRCN2-%PRED-PRE: 54 %
FRCN2-PRE: 1.36 L
FRCN2-PRED: 2.49 L
FVC-%PRED-PRE: 96 %
FVC-PRE: 2.93 L
FVC-PRED: 3.04 L
IC-%PRED-PRE: 89 %
IC-PRE: 2.56 L
IC-PRED: 2.87 L
RVN2-%PRED-PRE: 68 %
RVN2-PRE: 1.08 L
RVN2-PRED: 1.58 L
TLCN2-%PRED-PRE: 90 %
TLCN2-PRE: 3.92 L
TLCN2-PRED: 4.35 L
VA-%PRED-PRE: 96 %
VA-PRE: 4.05 L
VC-%PRED-PRE: 93 %
VC-PRE: 2.85 L
VC-PRED: 3.04 L

## 2022-03-30 ENCOUNTER — OFFICE VISIT (OUTPATIENT)
Dept: PHARMACY | Facility: CLINIC | Age: 52
End: 2022-03-30
Payer: COMMERCIAL

## 2022-03-30 ENCOUNTER — OFFICE VISIT (OUTPATIENT)
Dept: FAMILY MEDICINE | Facility: CLINIC | Age: 52
End: 2022-03-30
Payer: COMMERCIAL

## 2022-03-30 VITALS
RESPIRATION RATE: 16 BRPM | HEART RATE: 90 BPM | DIASTOLIC BLOOD PRESSURE: 76 MMHG | TEMPERATURE: 99.2 F | SYSTOLIC BLOOD PRESSURE: 128 MMHG | OXYGEN SATURATION: 96 %

## 2022-03-30 DIAGNOSIS — Z79.4 TYPE 2 DIABETES MELLITUS WITH COMPLICATION, WITH LONG-TERM CURRENT USE OF INSULIN (H): Primary | ICD-10-CM

## 2022-03-30 DIAGNOSIS — E11.65 TYPE 2 DIABETES MELLITUS WITH HYPERGLYCEMIA, WITH LONG-TERM CURRENT USE OF INSULIN (H): Primary | ICD-10-CM

## 2022-03-30 DIAGNOSIS — J45.30 MILD PERSISTENT ASTHMA WITHOUT COMPLICATION: ICD-10-CM

## 2022-03-30 DIAGNOSIS — E66.01 MORBID OBESITY (H): ICD-10-CM

## 2022-03-30 DIAGNOSIS — E11.8 TYPE 2 DIABETES MELLITUS WITH COMPLICATION, WITH LONG-TERM CURRENT USE OF INSULIN (H): Primary | ICD-10-CM

## 2022-03-30 DIAGNOSIS — Z79.4 TYPE 2 DIABETES MELLITUS WITH HYPERGLYCEMIA, WITH LONG-TERM CURRENT USE OF INSULIN (H): Primary | ICD-10-CM

## 2022-03-30 DIAGNOSIS — K75.81 NASH (NONALCOHOLIC STEATOHEPATITIS): ICD-10-CM

## 2022-03-30 DIAGNOSIS — J45.40 MODERATE PERSISTENT ASTHMA WITHOUT COMPLICATION: ICD-10-CM

## 2022-03-30 DIAGNOSIS — J45.20 INTERMITTENT ASTHMA: ICD-10-CM

## 2022-03-30 DIAGNOSIS — K43.2 RECURRENT VENTRAL HERNIA: ICD-10-CM

## 2022-03-30 DIAGNOSIS — R05.9 COUGH: ICD-10-CM

## 2022-03-30 DIAGNOSIS — J45.20 MILD INTERMITTENT ASTHMA, UNSPECIFIED WHETHER COMPLICATED: ICD-10-CM

## 2022-03-30 DIAGNOSIS — S88.919A AMPUTATION OF LEG (H): ICD-10-CM

## 2022-03-30 DIAGNOSIS — R10.84 ABDOMINAL PAIN, GENERALIZED: ICD-10-CM

## 2022-03-30 LAB
ALBUMIN SERPL-MCNC: 3.7 G/DL (ref 3.5–5)
ALP SERPL-CCNC: 92 U/L (ref 45–120)
ALT SERPL W P-5'-P-CCNC: 32 U/L (ref 0–45)
ANION GAP SERPL CALCULATED.3IONS-SCNC: 13 MMOL/L (ref 5–18)
AST SERPL W P-5'-P-CCNC: 25 U/L (ref 0–40)
BASOPHILS # BLD AUTO: 0 10E3/UL (ref 0–0.2)
BASOPHILS NFR BLD AUTO: 0 %
BILIRUB SERPL-MCNC: 0.3 MG/DL (ref 0–1)
BUN SERPL-MCNC: 18 MG/DL (ref 8–22)
CALCIUM SERPL-MCNC: 10.2 MG/DL (ref 8.5–10.5)
CHLORIDE BLD-SCNC: 105 MMOL/L (ref 98–107)
CO2 SERPL-SCNC: 22 MMOL/L (ref 22–31)
CREAT SERPL-MCNC: 0.77 MG/DL (ref 0.6–1.1)
EOSINOPHIL # BLD AUTO: 0.1 10E3/UL (ref 0–0.7)
EOSINOPHIL NFR BLD AUTO: 1 %
ERYTHROCYTE [DISTWIDTH] IN BLOOD BY AUTOMATED COUNT: 13.3 % (ref 10–15)
GFR SERPL CREATININE-BSD FRML MDRD: >90 ML/MIN/1.73M2
GLUCOSE BLD-MCNC: 157 MG/DL (ref 70–125)
HBA1C MFR BLD: 8.9 % (ref 0–5.6)
HCT VFR BLD AUTO: 44.5 % (ref 35–47)
HGB BLD-MCNC: 15 G/DL (ref 11.7–15.7)
IMM GRANULOCYTES # BLD: 0.1 10E3/UL
IMM GRANULOCYTES NFR BLD: 1 %
LIPASE SERPL-CCNC: 66 U/L (ref 0–52)
LYMPHOCYTES # BLD AUTO: 1.9 10E3/UL (ref 0.8–5.3)
LYMPHOCYTES NFR BLD AUTO: 21 %
MCH RBC QN AUTO: 30.4 PG (ref 26.5–33)
MCHC RBC AUTO-ENTMCNC: 33.7 G/DL (ref 31.5–36.5)
MCV RBC AUTO: 90 FL (ref 78–100)
MONOCYTES # BLD AUTO: 0.6 10E3/UL (ref 0–1.3)
MONOCYTES NFR BLD AUTO: 6 %
NEUTROPHILS # BLD AUTO: 6.7 10E3/UL (ref 1.6–8.3)
NEUTROPHILS NFR BLD AUTO: 71 %
PLATELET # BLD AUTO: 237 10E3/UL (ref 150–450)
POTASSIUM BLD-SCNC: 4.5 MMOL/L (ref 3.5–5)
PROT SERPL-MCNC: 7.2 G/DL (ref 6–8)
RBC # BLD AUTO: 4.93 10E6/UL (ref 3.8–5.2)
SODIUM SERPL-SCNC: 140 MMOL/L (ref 136–145)
WBC # BLD AUTO: 9.5 10E3/UL (ref 4–11)

## 2022-03-30 PROCEDURE — 83036 HEMOGLOBIN GLYCOSYLATED A1C: CPT | Performed by: STUDENT IN AN ORGANIZED HEALTH CARE EDUCATION/TRAINING PROGRAM

## 2022-03-30 PROCEDURE — 80053 COMPREHEN METABOLIC PANEL: CPT | Performed by: STUDENT IN AN ORGANIZED HEALTH CARE EDUCATION/TRAINING PROGRAM

## 2022-03-30 PROCEDURE — 96372 THER/PROPH/DIAG INJ SC/IM: CPT | Performed by: STUDENT IN AN ORGANIZED HEALTH CARE EDUCATION/TRAINING PROGRAM

## 2022-03-30 PROCEDURE — U0003 INFECTIOUS AGENT DETECTION BY NUCLEIC ACID (DNA OR RNA); SEVERE ACUTE RESPIRATORY SYNDROME CORONAVIRUS 2 (SARS-COV-2) (CORONAVIRUS DISEASE [COVID-19]), AMPLIFIED PROBE TECHNIQUE, MAKING USE OF HIGH THROUGHPUT TECHNOLOGIES AS DESCRIBED BY CMS-2020-01-R: HCPCS | Performed by: STUDENT IN AN ORGANIZED HEALTH CARE EDUCATION/TRAINING PROGRAM

## 2022-03-30 PROCEDURE — 99215 OFFICE O/P EST HI 40 MIN: CPT | Mod: 25 | Performed by: STUDENT IN AN ORGANIZED HEALTH CARE EDUCATION/TRAINING PROGRAM

## 2022-03-30 PROCEDURE — 85025 COMPLETE CBC W/AUTO DIFF WBC: CPT | Performed by: STUDENT IN AN ORGANIZED HEALTH CARE EDUCATION/TRAINING PROGRAM

## 2022-03-30 PROCEDURE — U0005 INFEC AGEN DETEC AMPLI PROBE: HCPCS | Performed by: STUDENT IN AN ORGANIZED HEALTH CARE EDUCATION/TRAINING PROGRAM

## 2022-03-30 PROCEDURE — 99607 MTMS BY PHARM ADDL 15 MIN: CPT

## 2022-03-30 PROCEDURE — 36415 COLL VENOUS BLD VENIPUNCTURE: CPT | Performed by: STUDENT IN AN ORGANIZED HEALTH CARE EDUCATION/TRAINING PROGRAM

## 2022-03-30 PROCEDURE — 83690 ASSAY OF LIPASE: CPT | Performed by: STUDENT IN AN ORGANIZED HEALTH CARE EDUCATION/TRAINING PROGRAM

## 2022-03-30 PROCEDURE — 99606 MTMS BY PHARM EST 15 MIN: CPT

## 2022-03-30 RX ORDER — PROCHLORPERAZINE 25 MG/1
1 SUPPOSITORY RECTAL
Qty: 3 EACH | Refills: 5 | Status: SHIPPED | OUTPATIENT
Start: 2022-03-30 | End: 2024-03-29

## 2022-03-30 RX ORDER — PROCHLORPERAZINE 25 MG/1
1 SUPPOSITORY RECTAL
Qty: 1 EACH | Refills: 1 | Status: SHIPPED | OUTPATIENT
Start: 2022-03-30 | End: 2024-03-29

## 2022-03-30 RX ORDER — PROCHLORPERAZINE 25 MG/1
1 SUPPOSITORY RECTAL ONCE
Qty: 1 EACH | Refills: 0 | Status: SHIPPED | OUTPATIENT
Start: 2022-03-30 | End: 2022-03-30

## 2022-03-30 RX ORDER — INSULIN GLARGINE 300 U/ML
INJECTION, SOLUTION SUBCUTANEOUS
Qty: 9 ML | Refills: 11 | COMMUNITY
Start: 2022-03-30 | End: 2022-05-31

## 2022-03-30 RX ORDER — ALBUTEROL SULFATE 0.83 MG/ML
2.5 SOLUTION RESPIRATORY (INHALATION) EVERY 6 HOURS PRN
Qty: 3 ML | Refills: 3 | Status: SHIPPED | OUTPATIENT
Start: 2022-03-30 | End: 2023-08-14

## 2022-03-30 RX ADMIN — MEDROXYPROGESTERONE ACETATE 150 MG: 150 INJECTION, SUSPENSION INTRAMUSCULAR at 10:20

## 2022-03-30 ASSESSMENT — ASTHMA QUESTIONNAIRES
QUESTION_5 LAST FOUR WEEKS HOW WOULD YOU RATE YOUR ASTHMA CONTROL: SOMEWHAT CONTROLLED
QUESTION_4 LAST FOUR WEEKS HOW OFTEN HAVE YOU USED YOUR RESCUE INHALER OR NEBULIZER MEDICATION (SUCH AS ALBUTEROL): ONE OR TWO TIMES PER DAY
QUESTION_3 LAST FOUR WEEKS HOW OFTEN DID YOUR ASTHMA SYMPTOMS (WHEEZING, COUGHING, SHORTNESS OF BREATH, CHEST TIGHTNESS OR PAIN) WAKE YOU UP AT NIGHT OR EARLIER THAN USUAL IN THE MORNING: TWO OR THREE NIGHTS A WEEK
ACT_TOTALSCORE: 14
ACT_TOTALSCORE: 14
QUESTION_1 LAST FOUR WEEKS HOW MUCH OF THE TIME DID YOUR ASTHMA KEEP YOU FROM GETTING AS MUCH DONE AT WORK, SCHOOL OR AT HOME: SOME OF THE TIME
QUESTION_2 LAST FOUR WEEKS HOW OFTEN HAVE YOU HAD SHORTNESS OF BREATH: ONCE OR TWICE A WEEK

## 2022-03-30 NOTE — PROGRESS NOTES
Medication Therapy Management (MTM) Encounter    ASSESSMENT:                            Medication Adherence/Access: See below for considerations    Type 2 diabetes: Uncontrolled with A1c of 10.1% above goal of < 7% and blood sugars mostly in the 200's which is above the goal of < 180 mg/dL post-prandial levels. This may be due to missing several insulin doses per week, as if she is only giving insulin when testing blood sugars, according to her meter she would be giving her insulin 1-2 times per day. Would significantly benefit from CGM and extra adhesive to ensure they remain in place. Once approved by insurance, should make an appointment to go over CGM, and would be reasonable to cut insulin dose in half to account for high likelihood of numerous missed doses currently to decrease risk of hypoglycemic events. Although above goal, would be reasonable to remain on current insulin doses for now awaiting CGM approval.    Asthma: Uncontrolled with ACT of 14 which is below the goal of > 19. However, with spirometry results signifying normal lung function, and the patient reporting symptoms and significant Symbicort use for cough after smoking, symptoms may be more of a reaction to smoke inhalation than asthma symptoms. Strongly encourage smoking cessation as this would likely give her the most relief as she is already on triple therapy. Would benefit from change in formulation of Spiriva for greater tolerance.     PLAN:                            1. You will get a call from [facility] about Dexcom. Make an appointment with a pharmacist here for education once you receive the supplies!  2. Stop the Spiriva capsule inhaler and start the new mist Spiriva inhaler (2 puffs once daily). We checked that it is covered under your insurance!     Follow-up: Return in about 2 weeks (around 4/13/2022).    Medication issues to be addressed at a future visit:      Continue to encourage smoking cessation and reassess if she is  "ready to quit    Once CGM covered, review and consider cutting insulin dose in half at initiation due to potential for poor current adherence with finger pricking    Patient is a candidate for remote monitoring CGM (would like to use her phone as a reader)    SUBJECTIVE/OBJECTIVE:                          Teresa Perez is a 51 year old female seen for a follow-up visit. Today's visit is a co-visit with Dr. Bullock. Today's visit is a follow-up MTM visit from 21 with Dr. Botello.     Reason for visit: MTM.    Allergies/ADRs: Reviewed in chart  Past Medical History: Reviewed in chart  Social History     Tobacco Use     Smoking status: Current Every Day Smoker     Packs/day: 0.50     Years: 33.00     Pack years: 16.50     Types: Cigarettes     Smokeless tobacco: Never Used   Substance Use Topics     Alcohol use: No     Alcohol/week: 0.0 standard drinks     Drug use: Yes     Types: Marijuana     Comment: Drug use: \"A little marijuana here and there, but no drug addiction.\"     ^Reviewed today    Medication Adherence/Access: Per patient, misses inhalers 1-2 times per week, misses insulin when she does not want to prick her finger, reports testing 4-5 times a day (takes insulin 5 times per day), however meter shows 1-2 tests per day, so patient may only be taking insulin 1-2 times per day.     Type 2 Diabetes:  Currently taking Jardiance 25 mg daily, Bydureon 2 mg weekly, Toujeo 65 units twice daily, Humalog 26 units 3 times daily and 2 units for every 50 mg/dL > 150 mg/dL. Patient is not experiencing side effects. Not on metformin due to allergy of throat swelling and difficulty breathing per chart review. As the patient does not like to prick her finger, and skips insulin doses when skipping pricking finger, would be very interested in a CGM. She has tried Freestyle in the past and reports difficulty with keeping it on as she reports heavy sweating.     Blood sugar monitorin-5 times daily reported, 1-2 times " daily per meter. Ranges (based on glucometer readings): 174-268 over last 10 days (varies fasting and post-prandial)  Symptoms of low blood sugar? shaky, dizzy -- 1-2x per month  Symptoms of high blood sugar? None  Aspirin: No  Statin: Yes: pravastatin 80 mg daily   ACEi/ARB: Yes: lisinopril 40 mg daily   Urine Albumin:   Lab Results   Component Value Date    UMALCR See Note. 08/30/2019      Lab Results   Component Value Date    A1C 10.1 01/26/2022    A1C 8.6 11/02/2021    A1C 8.5 08/31/2021    A1C 8.5 07/13/2021    A1C 9.8 04/21/2021    A1C 10.1 02/09/2021    A1C 9.4 11/03/2020    A1C 9.7 08/12/2020    A1C 10.7 04/21/2020     Most Recent Immunizations   Administered Date(s) Administered     COVID-19,PF,[place] 04/03/2021     COVID-19,PF,Moderna Booster 01/04/2022     Flu, Unspecified 03/26/2012     Hep B, Peds or Adolescent 04/09/1997     HepB-Adult 02/09/2021     Influenza (IIV3) PF 01/03/2014     Influenza Vaccine IM > 6 months Valent IIV4 (Alfuria,Fluzone) 01/04/2022     Influenza Vaccine, 6+MO IM (QUADRIVALENT W/PRESERVATIVES) 10/22/2019     Pneumococcal 23 valent 10/12/2010     TD (ADULT, 7+) 11/01/2002     TDAP Vaccine (Boostrix) 09/23/2017     Tdap (Adacel,Boostrix) 10/12/2010      Asthma: Currently taking Symbicort 160-4.5 mcg 2 puffs twice daily and 1 puff as needed, Spiriva Handihaler 18 mcg 1 puff daily, and montelukast 10 mg daily. Patient is experiencing the following side effects: reports thrush from powder of Spiriva since starting in January. Would be willing to try different formulation such as the Spiriva Respimat. Reports accurate technique, waiting 5 minutes between puffs and holding each puff in mouth for ~10 seconds. Does continue to smoke, says she has had a lot of stressors in life lately and has increased to 1/2-1 ppd most days, never exceeding 2 ppd. Uses Symbicort 1-2 times per day, typically once she starts coughing from smoking 1 cigarette after another. Has nicotine patches and  "lozenges at home but not yet interested in quitting. Reports trying every form of cessation medication in the past, anything works when she builds the willpower. Of note, reports now following with pulmonology, recently had spirometry test which resulted in normal airflow findings. New ACT today as below:    ACT Total Scores 4/21/2020 9/25/2020 3/30/2022   ACT TOTAL SCORE - - -   ASTHMA ER VISITS - - -   ASTHMA HOSPITALIZATIONS - - -   ACT TOTAL SCORE (Goal Greater than or Equal to 20) 18 12 14   In the past 12 months, how many times did you visit the emergency room for your asthma without being admitted to the hospital? 0 0 0   In the past 12 months, how many times were you hospitalized overnight because of your asthma? 0 0 0     BP Readings from Last 1 Encounters:   03/30/22 128/76     Pulse Readings from Last 1 Encounters:   03/30/22 90     Wt Readings from Last 1 Encounters:   01/26/22 239 lb 6.4 oz (108.6 kg)     Ht Readings from Last 1 Encounters:   11/30/21 5' 0.5\" (1.537 m)     Estimated body mass index is 45.98 kg/m  as calculated from the following:    Height as of 11/30/21: 5' 0.5\" (1.537 m).    Weight as of 1/26/22: 239 lb 6.4 oz (108.6 kg).    Temp Readings from Last 1 Encounters:   03/30/22 99.2  F (37.3  C) (Oral)     ----------------    I spent 30 minutes with this patient today. Dr. Bullock was provided the recommendations above  in clinic today and she is the authorizing prescriber for this visit through the pharmacist collaborative practice agreement.     The patient was given a summary of these recommendations. See Provider note/AVS from today.     Olga Call, Pharmacy Student     Medication Therapy Recommendations  Intermittent asthma    Current Medication: SPIRIVA HANDIHALER 18 MCG inhaled capsule (Discontinued)   Rationale: Undesirable effect - Adverse medication event - Safety   Recommendation: Change Medication - Spiriva Respimat 2.5 MCG/ACT Aers   Status: Accepted per Provider    "      Type 2 diabetes mellitus with complication, with long-term current use of insulin (H)    Current Medication: Continuous Blood Gluc Sensor (DEXCOM G6 SENSOR) MISC   Rationale: Synergistic therapy - Needs additional medication therapy - Indication   Recommendation: Start Medication - Dexcom G6 Sensor Misc   Status: Accepted per Provider            I was present with the pharmacy student who participated in the service and in the documentation of this note. I have verified the history, personally performed the medical decision making, and have verified the content of the note, which accurately reflects my assessment of the patient and the plan of care.     Ernst Latif, PharmD, [facility]

## 2022-03-30 NOTE — LETTER
March 31, 2022      [facility]  [address]  [place] [address]        Dear ,    We are writing to inform you of your test results.      Your COVID-19 test is negative.  This is good news.  Please call the clinic at [phone] if you have any questions.        Resulted Orders   Symptomatic; Unknown COVID-19 Virus (Coronavirus) by PCR Nose   Result Value Ref Range    SARS CoV2 PCR Negative Negative, Testing sent to reference lab. Results will be returned via unsolicited result      Comment:      NEGATIVE: SARS-CoV-2 (COVID-19) RNA not detected, presumed negative.    Narrative    Testing was performed using the [employer]pt[employer] SARS-CoV-2 Assay on the  [employer] Instrument System. Additional information about this  Emergency Use Authorization (EUA) assay can be found via the Lab  Guide. This test should be ordered for the detection of SARS-CoV-2 in  individuals who meet SARS-CoV-2 clinical and/or epidemiological  criteria. Test performance is unknown in asymptomatic patients. This  test is for in vitro diagnostic use under the FDA EUA for  laboratories certified under CLIA to perform high complexity testing.  This test has not been FDA cleared or approved. A negative result  does not rule out the presence of PCR inhibitors in the specimen or  target RNA in concentration below the limit of detection for the  assay. The possibility of a false negative should be considered if  the patient's recent exposure or clinical presentation suggests  COVID-19. This test was validated by the [facility]. This laboratory is certified under  the Clinical Laboratory Improvement Amendments of 1988 (CLIA-88) as  qualified to perform high complexity laboratory testing.   Hemoglobin A1c   Result Value Ref Range    Hemoglobin A1C 8.9 (H) 0.0 - 5.6 %      Comment:      Normal <5.7%   Prediabetes 5.7-6.4%    Diabetes 6.5% or higher     Note: Adopted from ADA consensus  guidelines.   Lipase   Result Value Ref Range    Lipase 66 (H) 0 - 52 U/L   Comprehensive metabolic panel   Result Value Ref Range    Sodium 140 136 - 145 mmol/L    Potassium 4.5 3.5 - 5.0 mmol/L    Chloride 105 98 - 107 mmol/L    Carbon Dioxide (CO2) 22 22 - 31 mmol/L    Anion Gap 13 5 - 18 mmol/L    Urea Nitrogen 18 8 - 22 mg/dL    Creatinine 0.77 0.60 - 1.10 mg/dL    Calcium 10.2 8.5 - 10.5 mg/dL    Glucose 157 (H) 70 - 125 mg/dL    Alkaline Phosphatase 92 45 - 120 U/L    AST 25 0 - 40 U/L    ALT 32 0 - 45 U/L    Protein Total 7.2 6.0 - 8.0 g/dL    Albumin 3.7 3.5 - 5.0 g/dL    Bilirubin Total 0.3 0.0 - 1.0 mg/dL    GFR Estimate >90 >60 mL/min/1.73m2      Comment:      Effective December 21, 2021 eGFRcr in adults is calculated using the 2021 CKD-EPI creatinine equation which includes age and gender (Len kessler al., NEJ, DOI: 10.1056/IRNKxf4752773)   CBC with platelets and differential   Result Value Ref Range    WBC Count 9.5 4.0 - 11.0 10e3/uL    RBC Count 4.93 3.80 - 5.20 10e6/uL    Hemoglobin 15.0 11.7 - 15.7 g/dL    Hematocrit 44.5 35.0 - 47.0 %    MCV 90 78 - 100 fL    MCH 30.4 26.5 - 33.0 pg    MCHC 33.7 31.5 - 36.5 g/dL    RDW 13.3 10.0 - 15.0 %    Platelet Count 237 150 - 450 10e3/uL    % Neutrophils 71 %    % Lymphocytes 21 %    % Monocytes 6 %    % Eosinophils 1 %    % Basophils 0 %    % Immature Granulocytes 1 %    Absolute Neutrophils 6.7 1.6 - 8.3 10e3/uL    Absolute Lymphocytes 1.9 0.8 - 5.3 10e3/uL    Absolute Monocytes 0.6 0.0 - 1.3 10e3/uL    Absolute Eosinophils 0.1 0.0 - 0.7 10e3/uL    Absolute Basophils 0.0 0.0 - 0.2 10e3/uL    Absolute Immature Granulocytes 0.1 <=0.4 10e3/uL       If you have any questions or concerns, please call the clinic at the number listed above.       Sincerely,      Tyra Bullock MD

## 2022-03-30 NOTE — PATIENT INSTRUCTIONS
1. You will get a call from [facility] about Dexcom. Make an appointment with a pharmacist here for education once you receive the supplies!  2. Stop the Spiriva capsule inhaler and start the new mist Spiriva inhaler (2 puffs once daily). We checked that it is covered under your insurance!       Stop in lab for blood tests.  We well call to schedule the CT to look at your abdomen.   Dr. Bullock will write a prescription for a wheelchair.  COVID test today.    Depo done today.      Call the surgeon and schedule follow up  Call the pulmonologist and schedule follow up   the nebs and use them 3x per day for the next few days.    Send [place] the paperwork for your current wheelchair    Follow up with Dr. Bullock in 2 weeks.

## 2022-03-30 NOTE — LETTER
March 31, 2022      Teresa Perez  [address]  [place] [address]        Dear ,    We are writing to inform you of your test results.    Here is a copy of your lab results.  They are stable--so signs of significant infection, but your lipase is a little high, and your white count for inflammation is high as well.  I will keep my eyes open for the CT results.  Your A1c for diabetes has come down nicely again.  This is great news.  Please call the clinic at [phone] if you have any questions.         Resulted Orders   Hemoglobin A1c   Result Value Ref Range    Hemoglobin A1C 8.9 (H) 0.0 - 5.6 %      Comment:      Normal <5.7%   Prediabetes 5.7-6.4%    Diabetes 6.5% or higher     Note: Adopted from ADA consensus guidelines.   Lipase   Result Value Ref Range    Lipase 66 (H) 0 - 52 U/L   Comprehensive metabolic panel   Result Value Ref Range    Sodium 140 136 - 145 mmol/L    Potassium 4.5 3.5 - 5.0 mmol/L    Chloride 105 98 - 107 mmol/L    Carbon Dioxide (CO2) 22 22 - 31 mmol/L    Anion Gap 13 5 - 18 mmol/L    Urea Nitrogen 18 8 - 22 mg/dL    Creatinine 0.77 0.60 - 1.10 mg/dL    Calcium 10.2 8.5 - 10.5 mg/dL    Glucose 157 (H) 70 - 125 mg/dL    Alkaline Phosphatase 92 45 - 120 U/L    AST 25 0 - 40 U/L    ALT 32 0 - 45 U/L    Protein Total 7.2 6.0 - 8.0 g/dL    Albumin 3.7 3.5 - 5.0 g/dL    Bilirubin Total 0.3 0.0 - 1.0 mg/dL    GFR Estimate >90 >60 mL/min/1.73m2      Comment:      Effective December 21, 2021 eGFRcr in adults is calculated using the 2021 CKD-EPI creatinine equation which includes age and gender (Len et al., NEJ, DOI: 10.1056/KQNHzf0237205)   CBC with platelets and differential   Result Value Ref Range    WBC Count 9.5 4.0 - 11.0 10e3/uL    RBC Count 4.93 3.80 - 5.20 10e6/uL    Hemoglobin 15.0 11.7 - 15.7 g/dL    Hematocrit 44.5 35.0 - 47.0 %    MCV 90 78 - 100 fL    MCH 30.4 26.5 - 33.0 pg    MCHC 33.7 31.5 - 36.5 g/dL    RDW 13.3 10.0 - 15.0 %    Platelet Count 237  150 - 450 10e3/uL    % Neutrophils 71 %    % Lymphocytes 21 %    % Monocytes 6 %    % Eosinophils 1 %    % Basophils 0 %    % Immature Granulocytes 1 %    Absolute Neutrophils 6.7 1.6 - 8.3 10e3/uL    Absolute Lymphocytes 1.9 0.8 - 5.3 10e3/uL    Absolute Monocytes 0.6 0.0 - 1.3 10e3/uL    Absolute Eosinophils 0.1 0.0 - 0.7 10e3/uL    Absolute Basophils 0.0 0.0 - 0.2 10e3/uL    Absolute Immature Granulocytes 0.1 <=0.4 10e3/uL       If you have any questions or concerns, please call the clinic at the number listed above.       Sincerely,      Tyra Bullock MD

## 2022-03-30 NOTE — PROGRESS NOTES
There are no exam notes on file for this visit.    ASSESSMENT AND PLAN:      Teresa was seen today for follow up and contraception.    Diagnoses and all orders for this visit:    Type 2 diabetes mellitus with hyperglycemia, with long-term current use of insulin (H).  No change in medications today.  A1c has had a nice drop in only 2 months.  Do not want her to have frequent lows.  She is interested in the Dexcom and so a device will be sent in for her.  Recommended regular meals, but this has been challenging without a PCA support.  -     Continuous Blood Gluc  (DEXCOM G6 ) JONAH; 1 each once for 1 dose Use to read blood sugars as per 's instructions.  -     Continuous Blood Gluc Transmit (DEXCOM G6 TRANSMITTER) MISC; 1 each every 3 months Change every 3 months.  -     Continuous Blood Gluc Sensor (DEXCOM G6 SENSOR) MISC; 1 each every 10 days Change every 10 days.  -     Discontinue: tiotropium (SPIRIVA RESPIMAT) 2.5 MCG/ACT inhaler; Inhale 2 puffs into the lungs daily  -     tiotropium (SPIRIVA RESPIMAT) 2.5 MCG/ACT inhaler; Inhale 2 puffs into the lungs daily  -     Hemoglobin A1c; Future  -     Hemoglobin A1c    Cough  -     Symptomatic; Unknown COVID-19 Virus (Coronavirus) by PCR Nose    Abdominal pain, generalized  LOMAS (nonalcoholic steatohepatitis)  Recurrent Ventral Hernia  Nausea and Vomitting  Morbid Obesity  Patient with large ventral hernia, chronic abdominal pain, fatty liver disease, moderate morbid obesity, with episode of vomiting with a popping sensation.  She is having some fevers and chills and does have tenderness on exam.  No rebound or guarding.  The hernia is reducible.  It is possible that she exacerbated the problem with this vomiting.  I also want to make sure I am not missing other potential etiologies.  Long-term goal for patient is to have this fixed as it is causing her significant pain, but we are working on operative risk factors including her breathing  and her diabetes.  Diabetes levels are much better than previous.  She has seen the pulmonologist for evaluation of her lung function as well as the cardiologist, so appreciate surgery recommendations and insides and neck steps to move forward towards this potential surgery.  -     CT ABDOMEN PELVIS W CONTRAST; Future  -     CBC with Platelets & Differential; Future  -     Comprehensive metabolic panel; Future  -     Lipase; Future  -     Lipase  -     Comprehensive metabolic panel  -     CBC with Platelets & Differential    Moderate persistent asthma without complication  Known asthma, with likely COPD as well and morbid obesity.  Appreciate Pharm.NAKUL Le.  We will change from the HandiHaler to a Spiriva Respimat.  Refilled her albuterol nebs she does sound wheezy on exam.  O2 sats are okay so will not give prednisone given difficulties with diabetes.  Test for Covid today.  She continues to smoke both cigarettes and marijuana, but is not in a good position to quit at this time.  Encourage patient to schedule her follow-up visit with the pulmonologist to discuss the results of their tests and see if they have additional recommendations to better control symptoms.  -     albuterol (PROVENTIL) (2.5 MG/3ML) 0.083% neb solution; Take 1 vial (2.5 mg) by nebulization every 6 hours as needed for shortness of breath / dyspnea or wheezing  -     Discontinue: tiotropium (SPIRIVA RESPIMAT) 2.5 MCG/ACT inhaler; Inhale 2 puffs into the lungs daily  -     tiotropium (SPIRIVA RESPIMAT) 2.5 MCG/ACT inhaler; Inhale 2 puffs into the lungs daily    Menorrhagia.  Depo shot given today.    Amputation of leg (H)  Bilateral shoulder pain  She is having difficulty getting around in her current wheelchair given multiple problems with parts.  This is a necessary assistive device because of her amputation, and need to get around and provide care for self at home.  She is currently without a PCA which is exacerbating this.  Now having  worsening bilateral shoulder pain, but need to get a stable PCA in place before we could consider surgery and would like to prioritize her hernia repair first.  She is following at [facility].    Patient will send in the paperwork from her previous wheelchair.    Patient Instructions   1. You will get a call from [facility] about Dexcom. Make an appointment with a pharmacist here for education once you receive the supplies!  2. Stop the Spiriva capsule inhaler and start the new mist Spiriva inhaler (2 puffs once daily). We checked that it is covered under your insurance!       Stop in lab for blood tests.  We well call to schedule the CT to look at your abdomen.   Dr. Bullock will write a prescription for a wheelchair.  COVID test today.    Depo done today.      Call the surgeon and schedule follow up  Call the pulmonologist and schedule follow up   the nebs and use them 3x per day for the next few days.    Send [place] the paperwork for your current wheelchair    Follow up with Dr. Bullock in 2 weeks.       Tyra Bullock MD    SUBJECTIVE  Teresa AUSTIN Chris is a 51 year old female with past medical history significant for    Patient Active Problem List   Diagnosis     Health Care Home     Acute peptic ulcer     Other allergy, other than to medicinal agents     Bipolar disorder (H)     Bulging lumbar disc     Cervical dysplasia     Common migraine without aura     Constipation     Dwarfism     Familial hypercholesterolemia     Insomnia     Low back pain     Intermittent asthma     Leg pain, bilateral     Smoking     Degeneration of thoracic or thoracolumbar intervertebral disc     LOMAS (nonalcoholic steatohepatitis)     Disease of lung     Hemorrhoids     Parotid mass     Endometriosis     NNAMDI (obstructive sleep apnea)     History of total right knee replacement     Lateral epicondylitis     Impingement syndrome of shoulder region, left     Hx of total knee replacement, left     Chronic pain  syndrome     Cough     Borderline personality disorder (H)     Moderate recurrent major depression (H)     Recurrent ventral hernia     Type 2 diabetes mellitus with complication, with long-term current use of insulin (H)     Essential hypertension     Nonruptured cerebral aneurysm     Cerebrovascular accident (CVA) due to embolism (H)     Amputation of leg (H)     CKD (chronic kidney disease) stage 5, GFR less than 15 ml/min (H)     Pre-ulcerative corn or callous     Excessive bleeding in premenopausal period     Morbid obesity (H)     Pseudoseizure     Others present at the visit:  Patient also seen by pharm D team.      Presents for   Chief Complaint   Patient presents with     Follow Up     Pt is here to follow up on her cough.     Contraception     Pt is also due for her Depo today.      Patient presents for follow-up today.    She is due for her Depo shot, which she gets every 3 months for menorrhagia.    She is also concerned about COVID-19 infection.  Does live in a communal apartment complex.  Continues to have cough.  This has been going on for couple of weeks.  Cough has been worse as of late, and continues to cause her significant abdominal pain in her ventral hernia.    She does endorse some fevers or chills.  Some increased shortness of breath.  No known Covid contacts.  She has been using her inhalers.  She needs additional neb solution, because the stuff she has at home is .    She is also describing worsening abdominal pain.  Had episodes of nausea and vomiting last week.  With an episode of throwing up she noticed increased pressure in her abdomen, and felt like something popped.  The pressure is on the right side.  She also endorses a fluttering type sensation in that area, with increased discomfort.  She is noticed increased acid reflux symptoms, diarrhea, and is still feeling somewhat nauseous but no longer vomiting.  Again has some subjective fevers and chills, but this is a chronic  problem for her.  No dysuria.    We reviewed her diabetes.  Blood sugars have been better.  They are typically between 180 and 250.  She has been using a regular glucometer, and has some trouble sticking her fingers at times.  Is still doing the same regimen of 65 units twice daily of the long-acting and 26 units plus sliding scale of the short acting.  Has had 1-2 lows in the last month.  Is feeling good that her sugars are coming down from where they were.  She is wondering if she is due for an A1c test today.    She is describing increased overall stressors.  Has been without a PCA for over 2 months.  She is with a new agency now, and someone was supposed to show up this weekend and no one did.  She is working with her [email or web address]  on this.  Is noting increased difficulty with performing her ADLs, and is now developing significant shoulder pain from maneuvering her wheelchair as well as trying to take care of things at home.  This has been very stressful.    She is also requesting a prescription for a manual wheelchair.  She has a above-the-knee amputation of her left leg, and typically uses a motorized scooter, however this is difficult to navigate at home.  Her current wheelchair is broken, the arms are not stable, and is not working effectively for her.    OBJECTIVE:  Vitals: /76 (BP Location: Left arm, Patient Position: Sitting, Cuff Size: Adult Large)   Pulse 90   Temp 99.2  F (37.3  C) (Oral)   Resp 16   SpO2 96%   BMI= There is no height or weight on file to calculate BMI.  Objective:    Vitals:  Vitals are reviewed and are within the normal range, O2 sats are stable, no fever, no tachycardia, blood pressure well controlled.  Gen:  Alert, pleasant, appears fatigued but in no acute distress.  Cardiac:  Regular rate and rhythm, no murmurs, rubs or gallops  Respiratory: Lung sounds are diminished.  She has scattered wheezes throughout with a worsening area of wheezing in her right lower  lobe.  Abdomen: Belly is soft.  She has mild diffuse tenderness.  Pain is worse over the hernia as well as toward the right side.  No rebound or guarding.  Bowel sounds are present.      Results for orders placed or performed in visit on 03/30/22   Hemoglobin A1c     Status: Abnormal   Result Value Ref Range    Hemoglobin A1C 8.9 (H) 0.0 - 5.6 %   Lipase     Status: Abnormal   Result Value Ref Range    Lipase 66 (H) 0 - 52 U/L   Comprehensive metabolic panel     Status: Abnormal   Result Value Ref Range    Sodium 140 136 - 145 mmol/L    Potassium 4.5 3.5 - 5.0 mmol/L    Chloride 105 98 - 107 mmol/L    Carbon Dioxide (CO2) 22 22 - 31 mmol/L    Anion Gap 13 5 - 18 mmol/L    Urea Nitrogen 18 8 - 22 mg/dL    Creatinine 0.77 0.60 - 1.10 mg/dL    Calcium 10.2 8.5 - 10.5 mg/dL    Glucose 157 (H) 70 - 125 mg/dL    Alkaline Phosphatase 92 45 - 120 U/L    AST 25 0 - 40 U/L    ALT 32 0 - 45 U/L    Protein Total 7.2 6.0 - 8.0 g/dL    Albumin 3.7 3.5 - 5.0 g/dL    Bilirubin Total 0.3 0.0 - 1.0 mg/dL    GFR Estimate >90 >60 mL/min/1.73m2   CBC with platelets and differential     Status: None   Result Value Ref Range    WBC Count 9.5 4.0 - 11.0 10e3/uL    RBC Count 4.93 3.80 - 5.20 10e6/uL    Hemoglobin 15.0 11.7 - 15.7 g/dL    Hematocrit 44.5 35.0 - 47.0 %    MCV 90 78 - 100 fL    MCH 30.4 26.5 - 33.0 pg    MCHC 33.7 31.5 - 36.5 g/dL    RDW 13.3 10.0 - 15.0 %    Platelet Count 237 150 - 450 10e3/uL    % Neutrophils 71 %    % Lymphocytes 21 %    % Monocytes 6 %    % Eosinophils 1 %    % Basophils 0 %    % Immature Granulocytes 1 %    Absolute Neutrophils 6.7 1.6 - 8.3 10e3/uL    Absolute Lymphocytes 1.9 0.8 - 5.3 10e3/uL    Absolute Monocytes 0.6 0.0 - 1.3 10e3/uL    Absolute Eosinophils 0.1 0.0 - 0.7 10e3/uL    Absolute Basophils 0.0 0.0 - 0.2 10e3/uL    Absolute Immature Granulocytes 0.1 <=0.4 10e3/uL   CBC with Platelets & Differential     Status: None    Narrative    The following orders were created for panel order CBC with  Platelets & Differential.  Procedure                               Abnormality         Status                     ---------                               -----------         ------                     CBC with platelets and d...[[id number]]                      Final result                 Please view results for these tests on the individual orders.           Patient Instructions   1. You will get a call from [facility] about Dexcom. Make an appointment with a pharmacist here for education once you receive the supplies!  2. Stop the Spiriva capsule inhaler and start the new mist Spiriva inhaler (2 puffs once daily). We checked that it is covered under your insurance!       Stop in lab for blood tests.  We well call to schedule the CT to look at your abdomen.   Dr. Bullock will write a prescription for a wheelchair.  COVID test today.    Depo done today.      Call the surgeon and schedule follow up  Call the pulmonologist and schedule follow up   the nebs and use them 3x per day for the next few days.    Send [place] the paperwork for your current wheelchair    Follow up with Dr. Bullock in 2 weeks.       Tyra Bullock MD  l

## 2022-03-31 LAB — SARS-COV-2 RNA RESP QL NAA+PROBE: NEGATIVE

## 2022-03-31 NOTE — RESULT ENCOUNTER NOTE
Teresa Perez-    Your COVID-19 test is negative.  This is good news.  Please call the clinic at [phone] if you have any questions.      Tyra Bullock MD    Please send results to patient.

## 2022-03-31 NOTE — RESULT ENCOUNTER NOTE
Teresa Perez-    Here is a copy of your lab results.  They are stable--so signs of significant infection, but your lipase is a little high, and your white count for inflammation is high as well.  I will keep my eyes open for the CT results.  Your A1c for diabetes has come down nicely again.  This is great news.  Please call the clinic at [phone] if you have any questions.      Tyra Bullock MD    Please send results to patient.

## 2022-04-01 NOTE — PROGRESS NOTES
I have verified the content of the note, which accurately reflects my assessment of the patient and the plan of care.   Tianna Koenig, DIANA, PharmD

## 2022-04-13 ENCOUNTER — TELEPHONE (OUTPATIENT)
Dept: FAMILY MEDICINE | Facility: CLINIC | Age: 52
End: 2022-04-13
Payer: COMMERCIAL

## 2022-04-15 NOTE — TELEPHONE ENCOUNTER
04/15/22  ORDER: CT ABDOMEN PELVIS W CONTRAST  [facility]  Phone: [phone]  Fax: [phone]    Referral and demographics faxed to [phone]    Nay Jefferson

## 2022-04-21 ENCOUNTER — TELEPHONE (OUTPATIENT)
Dept: FAMILY MEDICINE | Facility: CLINIC | Age: 52
End: 2022-04-21
Payer: COMMERCIAL

## 2022-04-21 DIAGNOSIS — L73.9 FOLLICULITIS: Primary | ICD-10-CM

## 2022-04-21 RX ORDER — SULFAMETHOXAZOLE/TRIMETHOPRIM 800-160 MG
1 TABLET ORAL 2 TIMES DAILY
Qty: 14 TABLET | Refills: 0 | Status: SHIPPED | OUTPATIENT
Start: 2022-04-21 | End: 2022-05-24

## 2022-04-21 NOTE — TELEPHONE ENCOUNTER
Ideally, I would want her to come in and be seen, but I know that the insurance is a barrier.      I will send in a prescription for Bactrim, 1 pill 2x daily for 1 week.  She has a lot of allergies, so I think this is the best option.     Tyra Bullock MD    Routed to YULISA Perry

## 2022-04-21 NOTE — TELEPHONE ENCOUNTER
"Teresa shares she has a history of \"cysts and boils.\" She currently has multiple on right axilla and two on groin area. A few are draining. No fevers/chills. Surrounding skin intact. She wonders if Dr. Bullock would be willing to put her on abx. She does not have insurance that is accepted by [facility] until 5/1 so is unable to do a visit and does not want to go to a new provider.     Routed to Dr. Bullock. ./LR  "

## 2022-04-21 NOTE — TELEPHONE ENCOUNTER
[facility] phone call message- general phone call:    Reason for call: pt just got out of the hospital on Friday and would like to speak with a nurse.    Return call needed: Yes    OK to leave a message on voice mail? Yes    Primary language: English      needed? No    Call taken on April 21, 2022 at 12:45 PM by Mary nAn Giraldo

## 2022-04-22 NOTE — TELEPHONE ENCOUNTER
Communicated this to Teresa. She will call once insurance is back to one accepted by [facility] to make an appointment. ./LR

## 2022-04-23 NOTE — PROGRESS NOTES
HU BLANCO  89y  Female      Patient is a 89y old  Female who presents with a chief complaint of Fall (20 Apr 2022 12:16)      INTERVAL HPI/OVERNIGHT EVENTS:  Patient seen and examined earlier this morning; patient's grand-daughter at bedside; spoke to patient's son over the phone. Attempted to call patient's daughter but was not able to get a hold of her. Patient has no new complaints. per grand-daughter, patient had episode of coffee-ground emesis yesterday.      REVIEW OF SYSTEMS: Limited due to underlying dementia    T(C): 35.7 (04-20-22 @ 13:14), Max: 37.4 (04-19-22 @ 20:00)  HR: 69 (04-20-22 @ 13:14) (69 - 108)  BP: 148/65 (04-20-22 @ 13:14) (129/67 - 155/73)  RR: 18 (04-20-22 @ 13:14) (18 - 18)  SpO2: 97% (04-20-22 @ 06:00) (96% - 97%)    PHYSICAL EXAM:  GENERAL: NAD, well-groomed, well-developed  HEAD:  Atraumatic, Normocephalic  EYES: EOMI, PERRLA, conjunctiva and sclera clear  ENMT: No tonsillar erythema, exudates, or enlargement; Moist mucous membranes  NECK: Supple, No JVD, Normal thyroid  NERVOUS SYSTEM:  Alert & Oriented X3, Good concentration; Motor Strength 5/5 B/L upper and lower extremities  CHEST/LUNG: Clear to percussion bilaterally; No rales, rhonchi, wheezing, or rubs  HEART: Regular rate and rhythm; No murmurs, rubs, or gallops  ABDOMEN: Soft, Nontender, Nondistended; Bowel sounds present  EXTREMITIES:  2+ Peripheral Pulses, No clubbing, cyanosis, or edema    Consultant(s) Notes Reviewed:  [x ] YES  [ ] NO  Care Discussed with Consultants/Other Providers [ x] YES  [ ] NO    LAB:                        8.0    8.85  )-----------( 188      ( 20 Apr 2022 07:12 )             25.2     04-20    142  |  108  |  27<H>  ----------------------------<  122<H>  4.4   |  22  |  1.2    Ca    9.6      20 Apr 2022 07:12    TPro  5.5<L>  /  Alb  3.6  /  TBili  0.6  /  DBili  x   /  AST  25  /  ALT  14  /  AlkPhos  54  04-20    LIVER FUNCTIONS - ( 20 Apr 2022 07:12 )  Alb: 3.6 g/dL / Pro: 5.5 g/dL / ALK PHOS: 54 U/L / ALT: 14 U/L / AST: 25 U/L / GGT: x           CARDIAC MARKERS ( 20 Apr 2022 07:12 )  x     / <0.01 ng/mL / 40 U/L / x     / 2.9 ng/mL  CARDIAC MARKERS ( 19 Apr 2022 19:50 )  x     / <0.01 ng/mL / 53 U/L / x     / 2.9 ng/mL              Drug Dosing Weight  Height (cm): 154.9 (19 Apr 2022 18:00)  Weight (kg): 120.9 (19 Apr 2022 18:00)  BMI (kg/m2): 50.4 (19 Apr 2022 18:00)  BSA (m2): 2.13 (19 Apr 2022 18:00)  Height (cm): 154.9 (04-19-22 @ 18:00)  Weight (kg): 120.9 (04-19-22 @ 18:00)  BMI (kg/m2): 50.4 (04-19-22 @ 18:00)  BSA (m2): 2.13 (04-19-22 @ 18:00)  CAPILLARY BLOOD GLUCOSE        I&O's Summary        RADIOLOGY & ADDITIONAL TESTS:  Imaging Personally Reviewed:  [x] YES  [ ] NO    HEALTH ISSUES - PROBLEM Dx:          MEDS:  acetaminophen     Tablet .. 650 milliGRAM(s) Oral every 6 hours PRN  aluminum hydroxide/magnesium hydroxide/simethicone Suspension 30 milliLiter(s) Oral every 4 hours PRN  atorvastatin 10 milliGRAM(s) Oral at bedtime  BACItracin   Ointment 1 Application(s) Topical two times a day  chlorhexidine 4% Liquid 1 Application(s) Topical <User Schedule>  levothyroxine 75 MICROGram(s) Oral daily  lisinopril 10 milliGRAM(s) Oral daily  melatonin 3 milliGRAM(s) Oral at bedtime PRN  metoprolol tartrate 12.5 milliGRAM(s) Oral every 12 hours  NIFEdipine XL 60 milliGRAM(s) Oral daily  ondansetron Injectable 4 milliGRAM(s) IV Push every 8 hours PRN  pantoprazole  Injectable 40 milliGRAM(s) IV Push every 12 hours  sodium chloride 0.9%. 1000 milliLiter(s) IV Continuous <Continuous>       HUROSSAN  89y  Female      Patient is a 89y old  Female who presents with a chief complaint of Fall (20 Apr 2022 12:16)      INTERVAL HPI/OVERNIGHT EVENTS:  Patient seen and examined earlier this morning; patient's daughter at bedside; Patient has no new complaints. Plan for EGD for tomorrow was discussed with patient and daughter.     REVIEW OF SYSTEMS: Limited due to underlying dementia    Vital Signs Last 24 Hrs  T(C): 35.1 (21 Apr 2022 13:55), Max: 36.7 (21 Apr 2022 05:39)  T(F): 95.1 (21 Apr 2022 13:55), Max: 98.1 (21 Apr 2022 05:39)  HR: 62 (21 Apr 2022 13:55) (56 - 70)  BP: 136/61 (21 Apr 2022 13:55) (136/61 - 183/78)  BP(mean): --  RR: 16 (21 Apr 2022 13:55) (16 - 18)  SpO2: --  PHYSICAL EXAM:  GENERAL: NAD, well-groomed, well-developed  HEAD:  Atraumatic, Normocephalic  EYES: EOMI, PERRLA, conjunctiva and sclera clear  ENMT: No tonsillar erythema, exudates, or enlargement; Moist mucous membranes  NECK: Supple, No JVD, Normal thyroid  NERVOUS SYSTEM:  Alert & Oriented X3, Good concentration; Motor Strength 5/5 B/L upper and lower extremities  CHEST/LUNG: Clear to percussion bilaterally; No rales, rhonchi, wheezing, or rubs  HEART: Regular rate and rhythm; No murmurs, rubs, or gallops  ABDOMEN: Soft, Nontender, Nondistended; Bowel sounds present  EXTREMITIES:  2+ Peripheral Pulses, No clubbing, cyanosis, or edema    Consultant(s) Notes Reviewed:  [x ] YES  [ ] NO  Care Discussed with Consultants/Other Providers [ x] YES  [ ] NO    LAB:                        9.2    6.63  )-----------( 149      ( 21 Apr 2022 07:06 )             28.7   04-21    144  |  111<H>  |  17  ----------------------------<  86  4.2   |  21  |  1.1    Ca    9.0      21 Apr 2022 07:06    TPro  5.0<L>  /  Alb  3.3<L>  /  TBili  2.5<H>  /  DBili  x   /  AST  21  /  ALT  13  /  AlkPhos  53  04-21            Drug Dosing Weight  Height (cm): 154.9 (19 Apr 2022 18:00)  Weight (kg): 120.9 (19 Apr 2022 18:00)  BMI (kg/m2): 50.4 (19 Apr 2022 18:00)  BSA (m2): 2.13 (19 Apr 2022 18:00)  Height (cm): 154.9 (04-19-22 @ 18:00)  Weight (kg): 120.9 (04-19-22 @ 18:00)  BMI (kg/m2): 50.4 (04-19-22 @ 18:00)  BSA (m2): 2.13 (04-19-22 @ 18:00)  CAPILLARY BLOOD GLUCOSE        I&O's Summary        RADIOLOGY & ADDITIONAL TESTS:  Imaging Personally Reviewed:  [x] YES  [ ] NO    HEALTH ISSUES - PROBLEM Dx:        MEDICATIONS  (STANDING):  atorvastatin 10 milliGRAM(s) Oral at bedtime  BACItracin   Ointment 1 Application(s) Topical two times a day  chlorhexidine 4% Liquid 1 Application(s) Topical <User Schedule>  levothyroxine 75 MICROGram(s) Oral daily  lisinopril 10 milliGRAM(s) Oral daily  metoprolol tartrate 12.5 milliGRAM(s) Oral every 12 hours  NIFEdipine XL 60 milliGRAM(s) Oral daily  pantoprazole  Injectable 40 milliGRAM(s) IV Push every 12 hours  sodium chloride 0.9%. 1000 milliLiter(s) (65 mL/Hr) IV Continuous <Continuous>    MEDICATIONS  (PRN):  acetaminophen     Tablet .. 650 milliGRAM(s) Oral every 6 hours PRN Temp greater or equal to 38C (100.4F), Mild Pain (1 - 3)  aluminum hydroxide/magnesium hydroxide/simethicone Suspension 30 milliLiter(s) Oral every 4 hours PRN Dyspepsia  cloNIDine 0.1 milliGRAM(s) Oral three times a day PRN sbp > 170 hold for HR <65  melatonin 3 milliGRAM(s) Oral at bedtime PRN Insomnia  ondansetron Injectable 4 milliGRAM(s) IV Push every 8 hours PRN Nausea and/or Vomiting   89yFemale  Being followed for coffee ground emesis   Interval history: Patient denies nausea, vomiting, hematemesis, melena, blood in stool, diarrhea, constipation, abdominal pain. Patient tolerating regular DASH diet.      PAST MEDICAL & SURGICAL HISTORY:   HTN (hypertension)    Hypothyroid    Hyperlipidemia    Dementia    No significant past surgical history            Social History: No smoking. No alcohol. No illegal drug use.            MEDICATIONS  (STANDING):  atorvastatin 10 milliGRAM(s) Oral at bedtime  BACItracin   Ointment 1 Application(s) Topical two times a day  chlorhexidine 4% Liquid 1 Application(s) Topical <User Schedule>  levothyroxine 75 MICROGram(s) Oral daily  lisinopril 10 milliGRAM(s) Oral daily  metoprolol tartrate 12.5 milliGRAM(s) Oral every 12 hours  NIFEdipine XL 60 milliGRAM(s) Oral daily  pantoprazole  Injectable 40 milliGRAM(s) IV Push every 12 hours  sodium chloride 0.9%. 1000 milliLiter(s) (65 mL/Hr) IV Continuous <Continuous>    MEDICATIONS  (PRN):  acetaminophen     Tablet .. 650 milliGRAM(s) Oral every 6 hours PRN Temp greater or equal to 38C (100.4F), Mild Pain (1 - 3)  aluminum hydroxide/magnesium hydroxide/simethicone Suspension 30 milliLiter(s) Oral every 4 hours PRN Dyspepsia  cloNIDine 0.1 milliGRAM(s) Oral three times a day PRN sbp > 170 hold for HR <65  melatonin 3 milliGRAM(s) Oral at bedtime PRN Insomnia  ondansetron Injectable 4 milliGRAM(s) IV Push every 8 hours PRN Nausea and/or Vomiting      Allergies:   No Known Allergies            REVIEW OF SYSTEMS:  General:  No weight loss, fevers, or chills.  Eyes:  No reported pain or visual changes  ENT:  No sore throat or runny nose.  NECK: No stiffness   CV:  No chest pain or palpitations.  Resp:  No shortness of breath, cough  GI:  No abdominal pain, nausea, vomiting, dysphagia, diarrhea or constipation. No rectal bleeding, melena, or hematemesis.  Muscle:  No aches or weakness  Neuro:  No tingling, numbness         VITAL SIGNS:   T(F): 98.1 (04-21-22 @ 05:39), Max: 98.1 (04-21-22 @ 05:39)  HR: 56 (04-21-22 @ 05:39) (56 - 70)  BP: 167/67 (04-21-22 @ 05:39) (148/65 - 183/78)  RR: 16 (04-21-22 @ 05:39) (16 - 18)  SpO2: --    PHYSICAL EXAM:  GENERAL: AAOx3, no acute distress.  HEAD:  Atraumatic, Normocephalic  EYES: conjunctiva and sclera clear  NECK: Supple, no JVD or thyromegaly  CHEST/LUNG: Clear to auscultation bilaterally; No wheeze, rhonchi, or rales  HEART: Regular rate and rhythm; normal S1, S2, No murmurs.  ABDOMEN: Soft, nontender, nondistended; Bowel sounds present  NEUROLOGY: No asterixis or tremor.   SKIN: Intact, no jaundice            LABS:                        9.2    6.63  )-----------( 149      ( 21 Apr 2022 07:06 )             28.7     04-21    144  |  111<H>  |  17  ----------------------------<  86  4.2   |  21  |  1.1    Ca    9.0      21 Apr 2022 07:06    TPro  5.0<L>  /  Alb  3.3<L>  /  TBili  2.5<H>  /  DBili  x   /  AST  21  /  ALT  13  /  AlkPhos  53  04-21    LIVER FUNCTIONS - ( 21 Apr 2022 07:06 )  Alb: 3.3 g/dL / Pro: 5.0 g/dL / ALK PHOS: 53 U/L / ALT: 13 U/L / AST: 21 U/L / GGT: x           PT/INR - ( 20 Apr 2022 16:27 )   PT: 13.00 sec;   INR: 1.13 ratio         PTT - ( 20 Apr 2022 16:27 )  PTT:32.9 sec    IMAGING:    < from: CT Abdomen and Pelvis No Cont (04.20.22 @ 17:13) >    ACC: [id number] EXAM:  CT ABDOMEN AND PELVIS                          PROCEDURE DATE:  04/20/2022          INTERPRETATION:  CLINICAL STATEMENT: Evaluation for retroperitoneal   hemorrhage.    TECHNIQUE: Contiguous axial CT images were obtained from the lower chest   to the pubic symphysis without intravenous contrast.  Oral contrast was   not administered.  Reformatted images in the coronal and sagittal planes   were acquired.    COMPARISON CT: 4/9/2021    OTHER STUDIES USED FOR CORRELATION: None.      FINDINGS:    LOWER CHEST: Minimal bibasilar subsegmental atelectasis. Coronary artery   calcifications. Small hiatal hernia.    HEPATOBILIARY: No focal abnormality within the unenhanced liver.   Cholelithiasis.    SPLEEN: Unremarkable.    PANCREAS: Unremarkable.    ADRENAL GLANDS: Unremarkable.    KIDNEYS: No hydronephrosis or obstructing  tract calculus.    ABDOMINOPELVIC NODES: No definite lymphadenopathy identified.    PELVIC ORGANS: Stable left adnexal cyst.    PERITONEUM/MESENTERY/BOWEL: No bowel obstruction. No free fluid or free   air.    BONES/SOFT TISSUES: Diffuse osteopenia. Degenerative changes along the   vertebral column. Stable mild L3 superior endplate compression deformity.    OTHER: Marked atherosclerosis.      IMPRESSION:  1.  No evidence of acute abdominopelvic pathology on this unenhanced exam.  2.  Specifically no evidence of intra-abdominal or retroperitoneal   hemorrhage.    --- End of Report ---            KEISHA RFANCIS MD; Attending Radiologist  This document has been electronically signed. Apr 21 2022  8:19AM    < end of copied text >         PHONE VISIT         SUBJECTIVE       Teresa AUSTIN Chris is a 49 year old  female with a PMH significant for:     Patient Active Problem List   Diagnosis     Health Care Home     Acute peptic ulcer     Other allergy, other than to medicinal agents     Bipolar disorder (H)     Bulging lumbar disc     Cervical dysplasia     Common migraine without aura     Constipation     Dwarfism     Familial hypercholesterolemia     Insomnia     Low back pain     Intermittent asthma     Leg pain, bilateral     Smoking     Degeneration of thoracic or thoracolumbar intervertebral disc     LOMAS (nonalcoholic steatohepatitis)     Disease of lung     Hemorrhoids     Parotid mass     Endometriosis     NNAMDI (obstructive sleep apnea)     History of total right knee replacement     Lateral epicondylitis     Impingement syndrome, shoulder, left     Hx of total knee replacement, left     Chronic pain syndrome     Cough     Borderline personality disorder (H)     Moderate recurrent major depression (H)     Recurrent ventral hernia     Type 2 diabetes mellitus with complication, with long-term current use of insulin (H)     Essential hypertension     Nonruptured cerebral aneurysm     Cerebrovascular accident (CVA) due to embolism (H)     Amputation of leg (H)     CKD (chronic kidney disease) stage 5, GFR less than 15 ml/min (H)     Pre-ulcerative corn or callous     Called patient on Friday, March 20 at 2:37PM at [phone].    Called patient/guardian to provide a phone visit due to COVID19 to reduce their exposure in clinic.     Called patient to discuss Shortness of breath and Diabetes    We discussed patient's diabetes.  She is concerned that her blood sugars are still high.  Other day up to 500 in the afternoon.  Been trying to get blood sugars down.  Has been challenging to do this because her appetite varies significantly.  At times she is not hungry and will eat minimal to nothing during the day, and at times she is very hungry and eats    BLANCO LUI  89y  Female      Patient is a 89y old  Female who presents with a chief complaint of Fall (20 Apr 2022 12:16)      INTERVAL HPI/OVERNIGHT EVENTS:  Patient seen and examined earlier this morning; Patient has no new complaints. Denies F/C, N/V, abd pain; discharge planning was discussed with her and her family.    REVIEW OF SYSTEMS: Limited due to underlying dementia    Vital Signs Last 24 Hrs  T(C): 35.8 (23 Apr 2022 05:04), Max: 36 (22 Apr 2022 13:18)  T(F): 96.5 (23 Apr 2022 05:04), Max: 96.8 (22 Apr 2022 13:18)  HR: 55 (23 Apr 2022 05:04) (55 - 82)  BP: 137/63 (23 Apr 2022 05:04) (137/63 - 154/67)  BP(mean): --  RR: 17 (23 Apr 2022 05:04) (17 - 17)  SpO2: --  PHYSICAL EXAM:  GENERAL: NAD, well-groomed, well-developed  HEAD:  Atraumatic, Normocephalic  EYES: EOMI, PERRLA, conjunctiva and sclera clear  ENMT: No tonsillar erythema, exudates, or enlargement; Moist mucous membranes  NECK: Supple, No JVD, Normal thyroid  NERVOUS SYSTEM:  Alert & Oriented X3, Good concentration; Motor Strength 5/5 B/L upper and lower extremities  CHEST/LUNG: Clear to percussion bilaterally; No rales, rhonchi, wheezing, or rubs  HEART: Regular rate and rhythm; No murmurs, rubs, or gallops  ABDOMEN: Soft, Nontender, Nondistended; Bowel sounds present  EXTREMITIES:  2+ Peripheral Pulses, No clubbing, cyanosis, or edema    Consultant(s) Notes Reviewed:  [x ] YES  [ ] NO  Care Discussed with Consultants/Other Providers [ x] YES  [ ] NO    LAB:                        10.3   6.03  )-----------( 191      ( 23 Apr 2022 07:50 )             31.4   04-22    142  |  108  |  12  ----------------------------<  73  4.7   |  21  |  1.0    Ca    9.7      22 Apr 2022 07:08    TPro  x   /  Alb  x   /  TBili  x   /  DBili  0.3  /  AST  x   /  ALT  x   /  AlkPhos  x   04-22        Drug Dosing Weight  Height (cm): 154.9 (19 Apr 2022 18:00)  Weight (kg): 120.9 (19 Apr 2022 18:00)  BMI (kg/m2): 50.4 (19 Apr 2022 18:00)  BSA (m2): 2.13 (19 Apr 2022 18:00)  Height (cm): 154.9 (04-19-22 @ 18:00)  Weight (kg): 120.9 (04-19-22 @ 18:00)  BMI (kg/m2): 50.4 (04-19-22 @ 18:00)  BSA (m2): 2.13 (04-19-22 @ 18:00)  CAPILLARY BLOOD GLUCOSE        I&O's Summary        RADIOLOGY & ADDITIONAL TESTS:  Imaging Personally Reviewed:  [x] YES  [ ] NO    HEALTH ISSUES - PROBLEM Dx:      MEDICATIONS  (STANDING):  atorvastatin 10 milliGRAM(s) Oral at bedtime  BACItracin   Ointment 1 Application(s) Topical two times a day  chlorhexidine 4% Liquid 1 Application(s) Topical <User Schedule>  levothyroxine 75 MICROGram(s) Oral daily  lisinopril 10 milliGRAM(s) Oral daily  metoprolol tartrate 12.5 milliGRAM(s) Oral every 12 hours  NIFEdipine XL 60 milliGRAM(s) Oral daily  pantoprazole  Injectable 40 milliGRAM(s) IV Push every 12 hours  sodium chloride 0.9%. 1000 milliLiter(s) (65 mL/Hr) IV Continuous <Continuous>    MEDICATIONS  (PRN):  acetaminophen     Tablet .. 650 milliGRAM(s) Oral every 6 hours PRN Temp greater or equal to 38C (100.4F), Mild Pain (1 - 3)  aluminum hydroxide/magnesium hydroxide/simethicone Suspension 30 milliLiter(s) Oral every 4 hours PRN Dyspepsia  cloNIDine 0.1 milliGRAM(s) Oral three times a day PRN sbp > 170 hold for HR <65  melatonin 3 milliGRAM(s) Oral at bedtime PRN Insomnia  ondansetron Injectable 4 milliGRAM(s) IV Push every 8 hours PRN Nausea and/or Vomiting       BLANCO LUI  89y  Female      Patient is a 89y old  Female who presents with a chief complaint of Fall (20 Apr 2022 12:16)      INTERVAL HPI/OVERNIGHT EVENTS:  Patient seen and examined earlier this morning; Patient has no new complaints. Plan for EGD for today was discussed with patient and daughter.     REVIEW OF SYSTEMS: Limited due to underlying dementia    Vital Signs Last 24 Hrs  T(C): 36.1 (22 Apr 2022 05:30), Max: 36.2 (21 Apr 2022 20:37)  T(F): 97 (22 Apr 2022 05:30), Max: 97.1 (21 Apr 2022 20:37)  HR: 61 (21 Apr 2022 20:37) (61 - 62)  BP: 138/65 (21 Apr 2022 20:37) (136/61 - 138/65)  BP(mean): --  RR: 16 (21 Apr 2022 20:37) (16 - 16)  SpO2: 96% (22 Apr 2022 08:06) (96% - 97%)  PHYSICAL EXAM:  GENERAL: NAD, well-groomed, well-developed  HEAD:  Atraumatic, Normocephalic  EYES: EOMI, PERRLA, conjunctiva and sclera clear  ENMT: No tonsillar erythema, exudates, or enlargement; Moist mucous membranes  NECK: Supple, No JVD, Normal thyroid  NERVOUS SYSTEM:  Alert & Oriented X3, Good concentration; Motor Strength 5/5 B/L upper and lower extremities  CHEST/LUNG: Clear to percussion bilaterally; No rales, rhonchi, wheezing, or rubs  HEART: Regular rate and rhythm; No murmurs, rubs, or gallops  ABDOMEN: Soft, Nontender, Nondistended; Bowel sounds present  EXTREMITIES:  2+ Peripheral Pulses, No clubbing, cyanosis, or edema    Consultant(s) Notes Reviewed:  [x ] YES  [ ] NO  Care Discussed with Consultants/Other Providers [ x] YES  [ ] NO    LAB:                        11.7   6.03  )-----------( 187      ( 22 Apr 2022 07:08 )             35.1   04-22    142  |  108  |  12  ----------------------------<  73  4.7   |  21  |  1.0    Ca    9.7      22 Apr 2022 07:08    TPro  6.3  /  Alb  3.9  /  TBili  1.7<H>  /  DBili  x   /  AST  27  /  ALT  15  /  AlkPhos  65  04-22          Drug Dosing Weight  Height (cm): 154.9 (19 Apr 2022 18:00)  Weight (kg): 120.9 (19 Apr 2022 18:00)  BMI (kg/m2): 50.4 (19 Apr 2022 18:00)  BSA (m2): 2.13 (19 Apr 2022 18:00)  Height (cm): 154.9 (04-19-22 @ 18:00)  Weight (kg): 120.9 (04-19-22 @ 18:00)  BMI (kg/m2): 50.4 (04-19-22 @ 18:00)  BSA (m2): 2.13 (04-19-22 @ 18:00)  CAPILLARY BLOOD GLUCOSE        I&O's Summary        RADIOLOGY & ADDITIONAL TESTS:  Imaging Personally Reviewed:  [x] YES  [ ] NO    HEALTH ISSUES - PROBLEM Dx:      MEDICATIONS  (STANDING):  atorvastatin 10 milliGRAM(s) Oral at bedtime  BACItracin   Ointment 1 Application(s) Topical two times a day  chlorhexidine 4% Liquid 1 Application(s) Topical <User Schedule>  levothyroxine 75 MICROGram(s) Oral daily  lisinopril 10 milliGRAM(s) Oral daily  metoprolol tartrate 12.5 milliGRAM(s) Oral every 12 hours  NIFEdipine XL 60 milliGRAM(s) Oral daily  pantoprazole  Injectable 40 milliGRAM(s) IV Push every 12 hours  sodium chloride 0.9%. 1000 milliLiter(s) (65 mL/Hr) IV Continuous <Continuous>    MEDICATIONS  (PRN):  acetaminophen     Tablet .. 650 milliGRAM(s) Oral every 6 hours PRN Temp greater or equal to 38C (100.4F), Mild Pain (1 - 3)  aluminum hydroxide/magnesium hydroxide/simethicone Suspension 30 milliLiter(s) Oral every 4 hours PRN Dyspepsia  cloNIDine 0.1 milliGRAM(s) Oral three times a day PRN sbp > 170 hold for HR <65  melatonin 3 milliGRAM(s) Oral at bedtime PRN Insomnia  ondansetron Injectable 4 milliGRAM(s) IV Push every 8 hours PRN Nausea and/or Vomiting   larger meals.  She has been continuing to do the same regimen of 65 units of Toujeo, and with meals she is checking her sugar, giving 28 units, and then 2 additional units for each 50 over 100.  Is trying to do this with every meal but sometimes will eat only 1 meal a day.    Couple of days ago had a low 145--had shakes, and felt dizzy.  Has only had symptoms a couple times.  Most sugars are in the 200-300 range.  No sugars below 100, even with symptoms.      Here are her sugars from today.  Blood sugar 289 in AM, 232 at lunch.    Today to eat.  Beef Stroganoff for breakfast (7:00AM)  Handful and a half.  Milk skim-  1 cup.    Chili--hamburger, beans, tomato, a bowls, with cheese. Milk.      Yesterday: Yesterday, she did not check her sugar at breakfast or lunch because she did not eat.  No breakfast, Diet pop to drink.  Lunch: Again did not eat any food, and had diet pop to drink.  Dinner:  Chili.  W/cheese, regular size bowl, no bread, glass of milk.    She shares it is very challenging for her to eat if she is not hungry.  However with further discussion she is willing to try eating a small snack.  Has not been drinking any water, and has instead been drinking diet pop.  Willing to work on taking in drink 3 glasses per day-- will add lemon on crystal light or other flavoring.      Discussed the following insulin regimen:  Toudjeo 70 units daily   Continue with the Bydureon weekly.   Continue with the same sliding scalb.    Anything above 100, do 20, then 2 on for every 50 above 100.        Patient has a history of smoking, with COPD, and asthma.  Has had chronic shortness of breath.  Still having trouble with her breathing.  Been coughing, sneezing, feeling like she has another infection right now.  Last time she was diagnosed with pneumonia in February.  Did not feel any better with the medication. Having some facial pain.  Having some ear pain with popping.  Cannot wear her hearing aids it doesn't feel good.   Has been having headaches everyday.  Still feeling hot and having cold sweats, but this is not new and has been going on for months.  Saw Dr. James in clinic in February and was treated for a possible pneumonia with azithromycin.  Does not feel like she got any better with the antibiotics at that time. Continues to use her inhalers regularly.  Gets some improvement with using the inhalers, as well as nebulizers.    Continuing to have difficulty with getting around and with function.  She is hoping weight is going down, but does not have a way to check her on weight.  Has put on 30 pounds in the last 6 months.  Now is too big for her to use the prosthetic.  Saw [facility] and was told that there was an infection in the knee, but didn't do anything about it right now.  Dr. Turpin is the new orthopedist at [facility] [phone].  Using the wheelchair all the time right now.  Hurts to put weight on the Right knee.  Was told she would not be a candidate for another knee replacement.  Has pain with any type of weightbearing exercise, and is wondering if she could get some tramadol prescribed through us.  She has been to pain clinic in the past, and is interested in pursuing pain clinic going forward.  Discussed that we do not prescribe medications like tramadol over the phone and that she is not a candidate for chronic opiates from our clinic because of her history of cocaine abuse in the past.    Continues to get PCA support from her long-term PCA, Carey.  Carey has been helping with cooking doing grocery shopping, so Teresa is able to remain at home and does not have to go right now.    I have reviewed and updated the patient's Past Medical History, Social History, Family History and Medication List.      ASSESSMENT AND PLAN     Diagnoses and all orders for this visit:    Chronic obstructive pulmonary disease, unspecified COPD type (H)  Mild intermittent asthma without complication  Sinusitis, unspecified chronicity,  unspecified location  Patient has significant underlying chronic lung disease.  Continues to have worsening cough, as well as sinus pressure.  Will try an alternative antibiotic regimen that should cover for sinusitis as well as pneumonia.  Has allergy to Augmentin.  Will try Omnicef.  Additionally she will continue her regular regimen which includes Spiriva once daily, budesonide 2 puffs twice daily, and regular albuterol use.  She has refills on all these medications.  -     cefdinir (OMNICEF) 300 MG capsule; Take 1 capsule (300 mg) by mouth 2 times daily    Type 2 diabetes mellitus with hyperglycemia, with long-term current use of insulin (H).  We will increase her Toujeo to 70 units.  Continue with the same sliding scale, as well as the Bydureon.  She is due for an A1c, and future does have been placed for this.  She will call when she is feeling better and able to come in for lab only visit.  -     insulin glargine U-300 (TOUJEO SOLOSTAR) 300 UNIT/ML (1 units dial) pen; Inject 70 Units Subcutaneous At Bedtime  -     insulin lispro (HUMALOG KWIKPEN) 100 UNIT/ML (1 unit dial) KWIKPEN; If BS <100, no Humalog IF -150, take 20 Units.  Increase 2 units for every 50 above 150.  Total Daily dose is 80 units/day  -     exenatide ER (BYDUREON) 2 MG pen; Inject 2 mg Subcutaneous every 7 days    Fatty liver.  We will have patient come in when she is feeling better to recheck LFTs for this.    Essential hypertension, benign.  Blood pressure has been stable over last few visits.  She is due for recheck of renal function.  We will do this at time of lab only visit.    Chronic pain of right knee.  Discussed that right now the knee pain is not urgent.  She is not a good candidate for chronic controlled substances through our clinic.  Would benefit from further physical therapy and additional supports going forward to increase her mobility.  We will continue to check in on this.  Encouraged her to continue to use Tylenol  and topical treatments as needed.        Please follow up in 4 weeks for discuss breathing and blood sugars or sooner if develops new or worsening symptoms.  Would recommend patient call and schedule lab only visit with blood pressure check when her respiratory symptoms have improved.      Phone call contact time  Call Started at 2:37  Call Ended at 3:02    Tyra Bullock MD

## 2022-04-26 ENCOUNTER — TRANSFERRED RECORDS (OUTPATIENT)
Dept: HEALTH INFORMATION MANAGEMENT | Facility: CLINIC | Age: 52
End: 2022-04-26

## 2022-05-06 DIAGNOSIS — R10.84 ABDOMINAL PAIN, GENERALIZED: ICD-10-CM

## 2022-05-06 RX ORDER — BACLOFEN 20 MG/1
20 TABLET ORAL 3 TIMES DAILY PRN
Qty: 60 TABLET | Refills: 1 | Status: SHIPPED | OUTPATIENT
Start: 2022-05-06 | End: 2022-07-19

## 2022-05-09 ENCOUNTER — OFFICE VISIT (OUTPATIENT)
Dept: SURGERY | Facility: CLINIC | Age: 52
End: 2022-05-09
Payer: COMMERCIAL

## 2022-05-09 DIAGNOSIS — K43.2 INCISIONAL HERNIA, WITHOUT OBSTRUCTION OR GANGRENE: ICD-10-CM

## 2022-05-09 DIAGNOSIS — K43.2 INCISIONAL HERNIA, WITHOUT OBSTRUCTION OR GANGRENE: Primary | ICD-10-CM

## 2022-05-09 PROCEDURE — 99212 OFFICE O/P EST SF 10 MIN: CPT | Performed by: SURGERY

## 2022-05-09 RX ORDER — TRAZODONE HYDROCHLORIDE 50 MG/1
TABLET, FILM COATED ORAL
COMMUNITY
Start: 2022-05-05 | End: 2022-07-19

## 2022-05-09 RX ORDER — TRAMADOL HYDROCHLORIDE 50 MG/1
50 TABLET ORAL EVERY 6 HOURS PRN
Qty: 10 TABLET | Refills: 0 | Status: SHIPPED | OUTPATIENT
Start: 2022-05-09 | End: 2022-05-09

## 2022-05-09 RX ORDER — PANTOPRAZOLE SODIUM 40 MG/1
40 TABLET, DELAYED RELEASE ORAL
COMMUNITY
End: 2022-05-24

## 2022-05-09 RX ORDER — MICONAZOLE NITRATE 20 MG/G
CREAM TOPICAL
COMMUNITY
End: 2022-07-19

## 2022-05-09 RX ORDER — CLINDAMYCIN PHOSPHATE 900 MG/50ML
900 INJECTION, SOLUTION INTRAVENOUS
Status: CANCELLED | OUTPATIENT
Start: 2022-07-05

## 2022-05-09 RX ORDER — CLINDAMYCIN PHOSPHATE 900 MG/50ML
900 INJECTION, SOLUTION INTRAVENOUS SEE ADMIN INSTRUCTIONS
Status: CANCELLED | OUTPATIENT
Start: 2022-07-05

## 2022-05-09 RX ORDER — CLOPIDOGREL BISULFATE 75 MG/1
75 TABLET ORAL
COMMUNITY
End: 2022-05-24

## 2022-05-09 RX ORDER — LANCETS
EACH MISCELLANEOUS
COMMUNITY
Start: 2022-04-03 | End: 2022-05-24

## 2022-05-09 RX ORDER — TRAMADOL HYDROCHLORIDE 50 MG/1
50 TABLET ORAL EVERY 6 HOURS PRN
Qty: 10 TABLET | Refills: 0 | Status: SHIPPED | OUTPATIENT
Start: 2022-05-09 | End: 2022-05-24

## 2022-05-09 RX ORDER — FLUTICASONE PROPIONATE 50 MCG
2 SPRAY, SUSPENSION (ML) NASAL
COMMUNITY
End: 2022-05-24

## 2022-05-09 RX ORDER — LISINOPRIL 40 MG/1
40 TABLET ORAL
COMMUNITY
End: 2022-05-24

## 2022-05-09 RX ORDER — MONTELUKAST SODIUM 10 MG/1
10 TABLET ORAL
COMMUNITY
End: 2022-05-24

## 2022-05-09 RX ORDER — EPINEPHRINE 0.3 MG/.3ML
0.3 INJECTION SUBCUTANEOUS
COMMUNITY
End: 2022-05-24

## 2022-05-09 RX ORDER — CALCIUM CARBONATE 500(1250)
500 TABLET,CHEWABLE ORAL
COMMUNITY
End: 2022-05-24

## 2022-05-09 RX ORDER — ACETAMINOPHEN 500 MG
1000 TABLET ORAL
Status: ON HOLD | COMMUNITY
End: 2022-07-05

## 2022-05-09 RX ORDER — TRAMADOL HYDROCHLORIDE 50 MG/1
50 TABLET ORAL EVERY 6 HOURS PRN
Qty: 10 TABLET | Refills: 0 | Status: CANCELLED | OUTPATIENT
Start: 2022-05-09

## 2022-05-09 RX ORDER — LIDOCAINE 50 MG/G
OINTMENT TOPICAL
COMMUNITY
End: 2022-05-24

## 2022-05-09 RX ORDER — BACLOFEN 20 MG/1
20 TABLET ORAL
COMMUNITY
Start: 2022-04-17 | End: 2022-05-24

## 2022-05-09 RX ORDER — SUMATRIPTAN 50 MG/1
50 TABLET, FILM COATED ORAL
COMMUNITY
End: 2022-11-29

## 2022-05-09 NOTE — PROGRESS NOTES
HPI: Teresa Perez is here for follow up to discuss her increasing incisional hernia.  She is now noticing worsening pain and actually had a presentation to the hospital recently for significant abdominal pain and obstruction.  This has since resolved but he continues to have discomfort and would like to have her hernia repaired.  This.     Allergies, Medications, Social History, Past Medical History and Past Surgical History were reviewed and are noted in the chart.    There were no vitals taken for this visit.  There is no height or weight on file to calculate BMI.      EXAM:   GENERAL: Appears well  Abdomen- large incisional hernia with palpable intestinal contents, mild tenderness palpation         Assessment/Plan: Teresa Perez continues to have symptoms relating to her large and increasing incisional hernia.  At this point she is at her wits end and would like to have the hernia fixed.  I explained the risks and benefits of the procedure including cardiopulmonary risks secondary to anesthesia in the perioperative timeframe.  She understands this and will undergo preoperative evaluation prior to surgery.  I will order a CT scan in the meantime for better characterization of her evolving hernia and we will plan on a robotic abdominal wall reconstruction.    Abdelrahman Ware DO [facility] Department of Surgery

## 2022-05-09 NOTE — LETTER
5/9/2022         RE: Teresa Perez  [address]  [place] [address]        Dear Colleague,    Thank you for referring your patient, Teresa Perez, to the [facility] AND [facility] [place]. Please see a copy of my visit note below.     HPI: Teresa Perez is here for follow up to discuss her increasing incisional hernia.  She is now noticing worsening pain and actually had a presentation to the hospital recently for significant abdominal pain and obstruction.  This has since resolved but he continues to have discomfort and would like to have her hernia repaired.  This.     Allergies, Medications, Social History, Past Medical History and Past Surgical History were reviewed and are noted in the chart.    There were no vitals taken for this visit.  There is no height or weight on file to calculate BMI.      EXAM:   GENERAL: Appears well  Abdomen- large incisional hernia with palpable intestinal contents, mild tenderness palpation         Assessment/Plan: Teresa Perez continues to have symptoms relating to her large and increasing incisional hernia.  At this point she is at her wits end and would like to have the hernia fixed.  I explained the risks and benefits of the procedure including cardiopulmonary risks secondary to anesthesia in the perioperative timeframe.  She understands this and will undergo preoperative evaluation prior to surgery.  I will order a CT scan in the meantime for better characterization of her evolving hernia and we will plan on a robotic abdominal wall reconstruction.    Abdelrahman Ware DO [facility] Department of Surgery      Again, thank you for allowing me to participate in the care of your patient.        Sincerely,        Abdelrahman Ware DO

## 2022-05-16 ENCOUNTER — TELEPHONE (OUTPATIENT)
Dept: FAMILY MEDICINE | Facility: CLINIC | Age: 52
End: 2022-05-16
Payer: COMMERCIAL

## 2022-05-20 ENCOUNTER — HOSPITAL ENCOUNTER (OUTPATIENT)
Dept: CT IMAGING | Facility: CLINIC | Age: 52
Discharge: HOME | End: 2022-05-20
Attending: SURGERY | Admitting: SURGERY
Payer: COMMERCIAL

## 2022-05-20 DIAGNOSIS — K43.2 INCISIONAL HERNIA, WITHOUT OBSTRUCTION OR GANGRENE: ICD-10-CM

## 2022-05-20 PROCEDURE — 74176 CT ABD & PELVIS W/O CONTRAST: CPT

## 2022-05-27 ENCOUNTER — OFFICE VISIT (OUTPATIENT)
Dept: FAMILY MEDICINE | Facility: CLINIC | Age: 52
End: 2022-05-27
Payer: COMMERCIAL

## 2022-05-27 VITALS
SYSTOLIC BLOOD PRESSURE: 108 MMHG | HEART RATE: 104 BPM | TEMPERATURE: 98.7 F | RESPIRATION RATE: 16 BRPM | DIASTOLIC BLOOD PRESSURE: 71 MMHG | OXYGEN SATURATION: 96 %

## 2022-05-27 DIAGNOSIS — E11.65 TYPE 2 DIABETES MELLITUS WITH HYPERGLYCEMIA, WITH LONG-TERM CURRENT USE OF INSULIN (H): Primary | ICD-10-CM

## 2022-05-27 DIAGNOSIS — J44.9 CHRONIC OBSTRUCTIVE PULMONARY DISEASE, UNSPECIFIED COPD TYPE (H): Primary | ICD-10-CM

## 2022-05-27 DIAGNOSIS — J30.2 SEASONAL ALLERGIC RHINITIS, UNSPECIFIED TRIGGER: ICD-10-CM

## 2022-05-27 DIAGNOSIS — E66.01 MORBID OBESITY (H): ICD-10-CM

## 2022-05-27 DIAGNOSIS — T30.0 BURN: ICD-10-CM

## 2022-05-27 DIAGNOSIS — G47.09 OTHER INSOMNIA: ICD-10-CM

## 2022-05-27 DIAGNOSIS — Z79.4 TYPE 2 DIABETES MELLITUS WITH COMPLICATION, WITH LONG-TERM CURRENT USE OF INSULIN (H): ICD-10-CM

## 2022-05-27 DIAGNOSIS — M25.512 LEFT SHOULDER PAIN, UNSPECIFIED CHRONICITY: ICD-10-CM

## 2022-05-27 DIAGNOSIS — E11.8 TYPE 2 DIABETES MELLITUS WITH COMPLICATION, WITH LONG-TERM CURRENT USE OF INSULIN (H): ICD-10-CM

## 2022-05-27 DIAGNOSIS — E11.65 TYPE 2 DIABETES MELLITUS WITH HYPERGLYCEMIA, WITH LONG-TERM CURRENT USE OF INSULIN (H): ICD-10-CM

## 2022-05-27 DIAGNOSIS — K43.2 RECURRENT VENTRAL HERNIA: ICD-10-CM

## 2022-05-27 DIAGNOSIS — Z79.4 TYPE 2 DIABETES MELLITUS WITH HYPERGLYCEMIA, WITH LONG-TERM CURRENT USE OF INSULIN (H): Primary | ICD-10-CM

## 2022-05-27 DIAGNOSIS — Z79.4 TYPE 2 DIABETES MELLITUS WITH HYPERGLYCEMIA, WITH LONG-TERM CURRENT USE OF INSULIN (H): ICD-10-CM

## 2022-05-27 DIAGNOSIS — Z13.9 ENCOUNTER FOR SCREENING INVOLVING SOCIAL DETERMINANTS OF HEALTH (SDOH): ICD-10-CM

## 2022-05-27 LAB
ANION GAP SERPL CALCULATED.3IONS-SCNC: 13 MMOL/L (ref 5–18)
BUN SERPL-MCNC: 20 MG/DL (ref 8–22)
CALCIUM SERPL-MCNC: 9.6 MG/DL (ref 8.5–10.5)
CHLORIDE BLD-SCNC: 102 MMOL/L (ref 98–107)
CHOLEST SERPL-MCNC: 171 MG/DL
CO2 SERPL-SCNC: 19 MMOL/L (ref 22–31)
CREAT SERPL-MCNC: 0.79 MG/DL (ref 0.6–1.1)
FASTING STATUS PATIENT QL REPORTED: ABNORMAL
GFR SERPL CREATININE-BSD FRML MDRD: >90 ML/MIN/1.73M2
GLUCOSE BLD-MCNC: 255 MG/DL (ref 70–125)
HBA1C MFR BLD: 9.2 % (ref 0–5.6)
HDLC SERPL-MCNC: 28 MG/DL
LDLC SERPL CALC-MCNC: 80 MG/DL
POTASSIUM BLD-SCNC: 4.1 MMOL/L (ref 3.5–5)
SODIUM SERPL-SCNC: 134 MMOL/L (ref 136–145)
TRIGL SERPL-MCNC: 314 MG/DL

## 2022-05-27 PROCEDURE — 36415 COLL VENOUS BLD VENIPUNCTURE: CPT | Performed by: STUDENT IN AN ORGANIZED HEALTH CARE EDUCATION/TRAINING PROGRAM

## 2022-05-27 PROCEDURE — 99214 OFFICE O/P EST MOD 30 MIN: CPT | Performed by: STUDENT IN AN ORGANIZED HEALTH CARE EDUCATION/TRAINING PROGRAM

## 2022-05-27 PROCEDURE — 80048 BASIC METABOLIC PNL TOTAL CA: CPT | Performed by: STUDENT IN AN ORGANIZED HEALTH CARE EDUCATION/TRAINING PROGRAM

## 2022-05-27 PROCEDURE — 80061 LIPID PANEL: CPT | Performed by: STUDENT IN AN ORGANIZED HEALTH CARE EDUCATION/TRAINING PROGRAM

## 2022-05-27 PROCEDURE — 83036 HEMOGLOBIN GLYCOSYLATED A1C: CPT | Performed by: STUDENT IN AN ORGANIZED HEALTH CARE EDUCATION/TRAINING PROGRAM

## 2022-05-27 RX ORDER — TRAZODONE HYDROCHLORIDE 50 MG/1
50 TABLET, FILM COATED ORAL AT BEDTIME
Qty: 15 TABLET | Refills: 0 | Status: SHIPPED | OUTPATIENT
Start: 2022-05-27 | End: 2022-06-06

## 2022-05-27 RX ORDER — LORATADINE 10 MG/1
10 TABLET ORAL DAILY
Qty: 15 TABLET | Refills: 0 | Status: SHIPPED | OUTPATIENT
Start: 2022-05-27 | End: 2022-06-06

## 2022-05-27 RX ORDER — ACETAMINOPHEN 500 MG
500-1000 TABLET ORAL EVERY 6 HOURS PRN
Qty: 90 TABLET | Refills: 0 | Status: SHIPPED | OUTPATIENT
Start: 2022-05-27 | End: 2022-09-14

## 2022-05-27 RX ORDER — BACLOFEN 20 MG/1
20 TABLET ORAL 2 TIMES DAILY
Qty: 20 TABLET | Refills: 0 | Status: SHIPPED | OUTPATIENT
Start: 2022-05-27 | End: 2022-07-18

## 2022-05-27 NOTE — PATIENT INSTRUCTIONS
Dr. Bullock to try to work some magic to get a pulmonology appointment prior to surgery.  Teresa to work on finding alternative housing with better air quality  Send information about wheelchair to [facility] again.     For diabetes:  Keep 65/65 toudjeo, and increase mealtime to 28 + 2 sliding scale.   Good job on the sugars!    Short term prescriptions sent to [facility] [address].     Silvadene Cream 2x per day on burn and keep it covered.      I no message about pulmonology byend of next week, call [facility].      06/06/22   PULMONARY REFERRAL    [facility]  [address], [address]  [place] [address]  Phone: [phone]  Fax: [phone]     Faxed demographic, referral order, and notes to [phone]. They will contact pt for an appointment.    Margarita Lacey

## 2022-05-27 NOTE — LETTER
June 1, 2022      Teresa Perez  [address]  [place] [address]        Dear ,    We are writing to inform you of your test results.    Here is a copy of your lab results.  As we discussed in clinic, your A1c is stable.  Your cholesterol is stable.  Your kidney function is stable.  I will do my best to get a pulmonology visit scheduled--that is the most important thing to get you ready for having surgery this summer.  Please call the clinic at [phone] if you have any questions.         Resulted Orders   Lipid Profile   Result Value Ref Range    Cholesterol 171 <=199 mg/dL    Triglycerides 314 (H) <=149 mg/dL    Direct Measure HDL 28 (L) >=50 mg/dL      Comment:      HDL Cholesterol Reference Range:     0-2 years:   No reference ranges established for patients under 2 years old  at [facility] for lipid analytes.    2-8 years:  Greater than 45 mg/dL     18 years and older:   Female: Greater than or equal to 50 mg/dL   Male:   Greater than or equal to 40 mg/dL    LDL Cholesterol Calculated 80 <=129 mg/dL    Patient Fasting > 8hrs? Unknown    Hemoglobin A1c   Result Value Ref Range    Hemoglobin A1C 9.2 (H) 0.0 - 5.6 %      Comment:      Normal <5.7%   Prediabetes 5.7-6.4%    Diabetes 6.5% or higher     Note: Adopted from ADA consensus guidelines.   Basic metabolic panel   Result Value Ref Range    Sodium 134 (L) 136 - 145 mmol/L    Potassium 4.1 3.5 - 5.0 mmol/L    Chloride 102 98 - 107 mmol/L    Carbon Dioxide (CO2) 19 (L) 22 - 31 mmol/L    Anion Gap 13 5 - 18 mmol/L    Urea Nitrogen 20 8 - 22 mg/dL    Creatinine 0.79 0.60 - 1.10 mg/dL    Calcium 9.6 8.5 - 10.5 mg/dL    Glucose 255 (H) 70 - 125 mg/dL    GFR Estimate >90 >60 mL/min/1.73m2      Comment:      Effective December 21, 2021 eGFRcr in adults is calculated using the 2021 CKD-EPI creatinine equation which includes age and gender (Len kessler al., NEJM, DOI: 10.1056/MZDHhu3020701)       If you have any questions or  concerns, please call the clinic at the number listed above.       Sincerely,      Tyra Bullock MD

## 2022-05-28 PROBLEM — J44.9 CHRONIC OBSTRUCTIVE PULMONARY DISEASE (H): Status: ACTIVE | Noted: 2022-05-28
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
## 2022-05-28 NOTE — RESULT ENCOUNTER NOTE
Teresa Perez-    Here is a copy of your lab results.  As we discussed in clinic, your A1c is stable.  Your cholesterol is stable.  Your kidney function is stable.  I will do my best to get a pulmonology visit scheduled--that is the most important thing to get you ready for having surgery this summer.  Please call the clinic at [phone] if you have any questions.      Tyra Bullock MD    Please send results to patient.

## 2022-05-31 ENCOUNTER — TELEPHONE (OUTPATIENT)
Dept: FAMILY MEDICINE | Facility: CLINIC | Age: 52
End: 2022-05-31
Payer: COMMERCIAL

## 2022-05-31 DIAGNOSIS — E11.65 TYPE 2 DIABETES MELLITUS WITH HYPERGLYCEMIA, WITH LONG-TERM CURRENT USE OF INSULIN (H): ICD-10-CM

## 2022-05-31 DIAGNOSIS — Z79.4 TYPE 2 DIABETES MELLITUS WITH HYPERGLYCEMIA, WITH LONG-TERM CURRENT USE OF INSULIN (H): ICD-10-CM

## 2022-05-31 RX ORDER — INSULIN GLARGINE 300 U/ML
INJECTION, SOLUTION SUBCUTANEOUS
Qty: 9 ML | Refills: 11 | Status: SHIPPED | OUTPATIENT
Start: 2022-05-31 | End: 2023-05-17

## 2022-05-31 RX ORDER — INSULIN LISPRO 100 [IU]/ML
INJECTION, SOLUTION INTRAVENOUS; SUBCUTANEOUS
Qty: 60 ML | Refills: 3 | Status: SHIPPED | OUTPATIENT
Start: 2022-05-31 | End: 2022-09-09

## 2022-05-31 NOTE — TELEPHONE ENCOUNTER
[facility] phone call message- medication clarification/question:    Full Medication Name: insulin lispro (HUMALOG KWIKPEN) 100 UNIT/ML (1 unit dial) KWIKPEN    Clarity: [facility] called because in the instructions there isn't a max daily dose.     Pharmacy confirmed as    [facility]  Phone: [phone]  Fax:[phone]  Yes    OK to leave a message on voice mail? Yes    Primary language: English      needed? No    Call taken on May 31, 2022 at 9:08 AM by Krista Davidson

## 2022-06-06 ENCOUNTER — TELEPHONE (OUTPATIENT)
Dept: FAMILY MEDICINE | Facility: CLINIC | Age: 52
End: 2022-06-06
Payer: COMMERCIAL

## 2022-06-06 DIAGNOSIS — G89.29 CHRONIC BILATERAL LOW BACK PAIN WITH LEFT-SIDED SCIATICA: ICD-10-CM

## 2022-06-06 DIAGNOSIS — F33.1 MODERATE RECURRENT MAJOR DEPRESSION (H): Primary | ICD-10-CM

## 2022-06-06 DIAGNOSIS — G47.09 OTHER INSOMNIA: ICD-10-CM

## 2022-06-06 DIAGNOSIS — M54.42 CHRONIC BILATERAL LOW BACK PAIN WITH LEFT-SIDED SCIATICA: ICD-10-CM

## 2022-06-06 RX ORDER — GABAPENTIN 600 MG/1
TABLET ORAL
Qty: 180 TABLET | Refills: 3 | Status: ON HOLD | OUTPATIENT
Start: 2022-06-06 | End: 2022-07-26

## 2022-06-06 RX ORDER — QUETIAPINE FUMARATE 100 MG/1
500 TABLET, FILM COATED ORAL AT BEDTIME
Qty: 150 TABLET | Refills: 0 | Status: SHIPPED | OUTPATIENT
Start: 2022-06-06 | End: 2022-06-29

## 2022-06-06 RX ORDER — TRAZODONE HYDROCHLORIDE 50 MG/1
50 TABLET, FILM COATED ORAL AT BEDTIME
Qty: 30 TABLET | Refills: 0 | Status: SHIPPED | OUTPATIENT
Start: 2022-06-06 | End: 2022-06-29

## 2022-06-06 NOTE — TELEPHONE ENCOUNTER
New scripts sent.  I sent prescriptions just for 1 month for those which should be filled by her psychiatrist, with a message on the prescription to send further requests to him.     Tyra Bullock MD    Routed to LAURA Leblanc.

## 2022-06-06 NOTE — TELEPHONE ENCOUNTER
Patient is changing Pharmacies.  She would like to  her prescriptions at the [facility] on [address]  There were multiple scripts for GABAPENTIN, QUETIAPINE, and TRAZODONE sent to [facility] who now has no record of them.  Please send new scripts for these medications to the [facility] on [address].  Deana Castellanos, [facility]

## 2022-06-07 ENCOUNTER — TELEPHONE (OUTPATIENT)
Dept: FAMILY MEDICINE | Facility: CLINIC | Age: 52
End: 2022-06-07
Payer: COMMERCIAL

## 2022-06-09 ENCOUNTER — TELEPHONE (OUTPATIENT)
Dept: FAMILY MEDICINE | Facility: CLINIC | Age: 52
End: 2022-06-09

## 2022-06-09 NOTE — TELEPHONE ENCOUNTER
Patient is no longer using [facility] per our last conversation.  Will not resend prescription.     Tyra Bullock MD    Routed to Deana arnold FYI.

## 2022-06-09 NOTE — TELEPHONE ENCOUNTER
[facility] phone call message- medication clarification/question:    Full Medication Name: lispro    Dose: 100 units     Question: how many times a day is it being taken      Pharmacy confirmed as      [facility], [place] [id number]: Yes    OK to leave a message on voice mail? Yes    Primary language: English      needed? No    Call taken on June 9, 2022 at 1:12 PM by Juan Peralta

## 2022-06-17 ENCOUNTER — MEDICAL CORRESPONDENCE (OUTPATIENT)
Dept: HEALTH INFORMATION MANAGEMENT | Facility: CLINIC | Age: 52
End: 2022-06-17

## 2022-06-27 DIAGNOSIS — J45.40 MODERATE PERSISTENT ASTHMA WITHOUT COMPLICATION: ICD-10-CM

## 2022-06-27 DIAGNOSIS — K43.2 RECURRENT VENTRAL HERNIA: ICD-10-CM

## 2022-06-27 DIAGNOSIS — J44.9 CHRONIC OBSTRUCTIVE PULMONARY DISEASE, UNSPECIFIED COPD TYPE (H): ICD-10-CM

## 2022-06-27 DIAGNOSIS — Z79.4 TYPE 2 DIABETES MELLITUS WITH HYPERGLYCEMIA, WITH LONG-TERM CURRENT USE OF INSULIN (H): ICD-10-CM

## 2022-06-27 DIAGNOSIS — Z01.818 PREOPERATIVE EXAMINATION: Primary | ICD-10-CM

## 2022-06-27 DIAGNOSIS — E11.65 TYPE 2 DIABETES MELLITUS WITH HYPERGLYCEMIA, WITH LONG-TERM CURRENT USE OF INSULIN (H): ICD-10-CM

## 2022-06-29 ENCOUNTER — OFFICE VISIT (OUTPATIENT)
Dept: FAMILY MEDICINE | Facility: CLINIC | Age: 52
End: 2022-06-29
Payer: COMMERCIAL

## 2022-06-29 VITALS
DIASTOLIC BLOOD PRESSURE: 75 MMHG | HEART RATE: 101 BPM | BODY MASS INDEX: 43.99 KG/M2 | RESPIRATION RATE: 16 BRPM | SYSTOLIC BLOOD PRESSURE: 114 MMHG | TEMPERATURE: 98.9 F | OXYGEN SATURATION: 94 % | WEIGHT: 229 LBS

## 2022-06-29 DIAGNOSIS — Z01.818 PREOPERATIVE EXAMINATION: ICD-10-CM

## 2022-06-29 DIAGNOSIS — Z79.4 TYPE 2 DIABETES MELLITUS WITH HYPERGLYCEMIA, WITH LONG-TERM CURRENT USE OF INSULIN (H): ICD-10-CM

## 2022-06-29 DIAGNOSIS — E11.65 TYPE 2 DIABETES MELLITUS WITH HYPERGLYCEMIA, WITH LONG-TERM CURRENT USE OF INSULIN (H): ICD-10-CM

## 2022-06-29 DIAGNOSIS — J45.40 MODERATE PERSISTENT ASTHMA WITHOUT COMPLICATION: ICD-10-CM

## 2022-06-29 DIAGNOSIS — G47.09 OTHER INSOMNIA: ICD-10-CM

## 2022-06-29 DIAGNOSIS — J44.9 CHRONIC OBSTRUCTIVE PULMONARY DISEASE, UNSPECIFIED COPD TYPE (H): ICD-10-CM

## 2022-06-29 DIAGNOSIS — K43.2 RECURRENT VENTRAL HERNIA: ICD-10-CM

## 2022-06-29 LAB
ANION GAP SERPL CALCULATED.3IONS-SCNC: 16 MMOL/L (ref 7–15)
BUN SERPL-MCNC: 24.4 MG/DL (ref 6–20)
CALCIUM SERPL-MCNC: 9.5 MG/DL (ref 8.6–10)
CHLORIDE SERPL-SCNC: 99 MMOL/L (ref 98–107)
CREAT SERPL-MCNC: 0.64 MG/DL (ref 0.51–0.95)
DEPRECATED HCO3 PLAS-SCNC: 21 MMOL/L (ref 22–29)
ERYTHROCYTE [DISTWIDTH] IN BLOOD BY AUTOMATED COUNT: 13.8 % (ref 10–15)
GFR SERPL CREATININE-BSD FRML MDRD: >90 ML/MIN/1.73M2
GLUCOSE SERPL-MCNC: 227 MG/DL (ref 70–99)
HBA1C MFR BLD: 9.3 % (ref 0–5.6)
HCT VFR BLD AUTO: 46.3 % (ref 35–47)
HGB BLD-MCNC: 15.6 G/DL (ref 11.7–15.7)
MCH RBC QN AUTO: 30.4 PG (ref 26.5–33)
MCHC RBC AUTO-ENTMCNC: 33.7 G/DL (ref 31.5–36.5)
MCV RBC AUTO: 90 FL (ref 78–100)
PLATELET # BLD AUTO: 266 10E3/UL (ref 150–450)
POTASSIUM SERPL-SCNC: 4.3 MMOL/L (ref 3.4–5.3)
RBC # BLD AUTO: 5.13 10E6/UL (ref 3.8–5.2)
SODIUM SERPL-SCNC: 136 MMOL/L (ref 136–145)
WBC # BLD AUTO: 12.6 10E3/UL (ref 4–11)

## 2022-06-29 PROCEDURE — 36415 COLL VENOUS BLD VENIPUNCTURE: CPT | Performed by: STUDENT IN AN ORGANIZED HEALTH CARE EDUCATION/TRAINING PROGRAM

## 2022-06-29 PROCEDURE — 96372 THER/PROPH/DIAG INJ SC/IM: CPT | Performed by: STUDENT IN AN ORGANIZED HEALTH CARE EDUCATION/TRAINING PROGRAM

## 2022-06-29 PROCEDURE — 80048 BASIC METABOLIC PNL TOTAL CA: CPT | Performed by: STUDENT IN AN ORGANIZED HEALTH CARE EDUCATION/TRAINING PROGRAM

## 2022-06-29 PROCEDURE — 85027 COMPLETE CBC AUTOMATED: CPT | Performed by: STUDENT IN AN ORGANIZED HEALTH CARE EDUCATION/TRAINING PROGRAM

## 2022-06-29 PROCEDURE — 93000 ELECTROCARDIOGRAM COMPLETE: CPT | Performed by: STUDENT IN AN ORGANIZED HEALTH CARE EDUCATION/TRAINING PROGRAM

## 2022-06-29 PROCEDURE — 99215 OFFICE O/P EST HI 40 MIN: CPT | Mod: 25 | Performed by: STUDENT IN AN ORGANIZED HEALTH CARE EDUCATION/TRAINING PROGRAM

## 2022-06-29 PROCEDURE — 83036 HEMOGLOBIN GLYCOSYLATED A1C: CPT | Performed by: STUDENT IN AN ORGANIZED HEALTH CARE EDUCATION/TRAINING PROGRAM

## 2022-06-29 RX ORDER — CLOPIDOGREL BISULFATE 75 MG/1
75 TABLET ORAL DAILY
COMMUNITY
Start: 2022-06-29 | End: 2023-05-30

## 2022-06-29 RX ORDER — TRAZODONE HYDROCHLORIDE 50 MG/1
50 TABLET, FILM COATED ORAL AT BEDTIME
Qty: 30 TABLET | Refills: 0 | Status: SHIPPED | OUTPATIENT
Start: 2022-06-29 | End: 2022-07-07

## 2022-06-29 RX ADMIN — MEDROXYPROGESTERONE ACETATE 150 MG: 150 INJECTION, SUSPENSION INTRAMUSCULAR at 08:33

## 2022-06-29 NOTE — LETTER
June 30, 2022       Teresa Perez  [address]  [place] [address]        Dear ,    We are writing to inform you of your test results.    Your labs are stable and look good.  You should be set to move ahead with surgery.  Please call the clinic at [phone] if you have any questions.         Resulted Orders   Basic metabolic panel   Result Value Ref Range    Creatinine 0.64 0.51 - 0.95 mg/dL    Sodium 136 136 - 145 mmol/L    Potassium 4.3 3.4 - 5.3 mmol/L    Urea Nitrogen 24.4 (H) 6.0 - 20.0 mg/dL    Chloride 99 98 - 107 mmol/L    Carbon Dioxide (CO2) 21 (L) 22 - 29 mmol/L    Anion Gap 16 (H) 7 - 15 mmol/L    Glucose 227 (H) 70 - 99 mg/dL    GFR Estimate >90 >60 mL/min/1.73m2      Comment:      Effective December 21, 2021 eGFRcr in adults is calculated using the 2021 CKD-EPI creatinine equation which includes age and gender (Len et al., NE, DOI: 10.1056/PGZTiq0114884)    Calcium 9.5 8.6 - 10.0 mg/dL   CBC with platelets   Result Value Ref Range    WBC Count 12.6 (H) 4.0 - 11.0 10e3/uL    RBC Count 5.13 3.80 - 5.20 10e6/uL    Hemoglobin 15.6 11.7 - 15.7 g/dL    Hematocrit 46.3 35.0 - 47.0 %    MCV 90 78 - 100 fL    MCH 30.4 26.5 - 33.0 pg    MCHC 33.7 31.5 - 36.5 g/dL    RDW 13.8 10.0 - 15.0 %    Platelet Count 266 150 - 450 10e3/uL   Hemoglobin A1c   Result Value Ref Range    Hemoglobin A1C 9.3 (H) 0.0 - 5.6 %      Comment:      Normal <5.7%   Prediabetes 5.7-6.4%    Diabetes 6.5% or higher     Note: Adopted from ADA consensus guidelines.       If you have any questions or concerns, please call the clinic at the number listed above.       Sincerely,      Tyra Bullock MD

## 2022-06-29 NOTE — PROGRESS NOTES
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 MOUTH EVERY DAY 90 tablet 2     pramipexole (MIRAPEX) 0.5 MG tablet TAKE 1 TABLET BY MOUTH AT BEDTIME 90 tablet 0     pravastatin (PRAVACHOL) 80 MG tablet TAKE 1 TABLET BY MOUTH EVERY DAY 90 tablet 3     QUEtiapine (SEROQUEL) 100 MG tablet TAKE 5 TABLETS (500 MG) BY MOUTH AT BEDTIME 150 tablet 0     SUMAtriptan (IMITREX) 50 MG tablet Take 50 mg by mouth       tiotropium (SPIRIVA RESPIMAT) 2.5 MCG/ACT inhaler Inhale 2 puffs into the lungs daily 4 g 11     traZODone (DESYREL) 50 MG tablet TAKE 1 TABLET BY MOUTH AT BEDTIME AS NEEDED FOR SLEEP.       venlafaxine (EFFEXOR-XR) 150 MG 24 hr capsule Take 2 capsules (300 mg) by mouth daily 180 capsule 0       Allergies   Allergen Reactions     Augmentin Nausea and Vomiting and Hives     Bee Venom Anaphylaxis     Nuts Anaphylaxis     Doxycycline Rash     Abilify Discmelt      Animal Dander Other (See Comments)     asthma     Aspirin      Atorvastatin      Contrast Dye Other (See Comments)     Patient had normal reaction to contrast dye, flushed/warm/wetting pants feeling. This Allergy needs to be removed from her chart. -AVW 11/13/21     Metformin Difficulty breathing     \"throat swelling and elevated liver enzymes\"     Naproxen      Niacin      Valium [Diazepam]      " "Zocor [Simvastatin - High Dose]         Social History     Tobacco Use     Smoking status: Current Every Day Smoker     Packs/day: 0.50     Years: 33.00     Pack years: 16.50     Types: Cigarettes     Smokeless tobacco: Never Used   Substance Use Topics     Alcohol use: No     Alcohol/week: 0.0 standard drinks       History   Drug Use     Types: Marijuana     Comment: Drug use: \"A little marijuana here and there, but no drug addiction.\"         Objective     /75 (BP Location: Right arm, Patient Position: Sitting, Cuff Size: Adult Regular)   Pulse 101   Temp 98.9  F (37.2  C) (Oral)   Resp 16   Wt 103.9 kg (229 lb)   SpO2 94%   BMI 43.99 kg/m      Physical Exam  Objective:    Vitals:  Vitals are reviewed and are within the normal range  Gen:  Alert, pleasant, no acute distress  Head:  Normal cephalic, atraumatic  Ears:  Tympanic membranes viewed bilaterally, no erythema, bulging, posiitive for fluid present in left TM  Throat:  Clear.  Non-erythematous and without exudate  Neck:  No cervical lymphadenopathy  Cardiac:  Regular rate and rhythm, no murmurs, rubs or gallops  Respiratory:  Lungs clear to auscultation bilaterally. No crackles or wheeze  Abdomen:  Soft, non-tender, non-distended, bowel sounds positive, large palpable ventral hernia  Extremities:  Warm, well-perfused, pulses diminished, Left BKA, well healed, no edema, 1+ pitting edema to mid shin on the right leg.    Skin:  Mucous membranes moist.  Areas of hydradenitits in armpits, groin.       Results for orders placed or performed in visit on 06/29/22   Basic metabolic panel     Status: Abnormal   Result Value Ref Range    Creatinine 0.64 0.51 - 0.95 mg/dL    Sodium 136 136 - 145 mmol/L    Potassium 4.3 3.4 - 5.3 mmol/L    Urea Nitrogen 24.4 (H) 6.0 - 20.0 mg/dL    Chloride 99 98 - 107 mmol/L    Carbon Dioxide (CO2) 21 (L) 22 - 29 mmol/L    Anion Gap 16 (H) 7 - 15 mmol/L    Glucose 227 (H) 70 - 99 mg/dL    GFR Estimate >90 >60 mL/min/1.73m2 "    Calcium 9.5 8.6 - 10.0 mg/dL   CBC with platelets     Status: Abnormal   Result Value Ref Range    WBC Count 12.6 (H) 4.0 - 11.0 10e3/uL    RBC Count 5.13 3.80 - 5.20 10e6/uL    Hemoglobin 15.6 11.7 - 15.7 g/dL    Hematocrit 46.3 35.0 - 47.0 %    MCV 90 78 - 100 fL    MCH 30.4 26.5 - 33.0 pg    MCHC 33.7 31.5 - 36.5 g/dL    RDW 13.8 10.0 - 15.0 %    Platelet Count 266 150 - 450 10e3/uL   Hemoglobin A1c     Status: Abnormal   Result Value Ref Range    Hemoglobin A1C 9.3 (H) 0.0 - 5.6 %       Diagnostics:  Labs pending at this time.  Results will be reviewed when available.   (BMP)  EKG: appears normal, NSR, sinus tachycardia, normal axis, normal intervals, no acute ST/T changes c/w acute ischemia, Qwaves present.  nonspecific ST-T changes, unchanged from previous tracings    Revised Cardiac Risk Index (RCRI):  The patient has the following serious cardiovascular risks for perioperative complications:   - Coronary Artery Disease (MI, positive stress test, angina, Qs on EKG) = 1 point   - Cerebrovascular Disease (TIA or CVA) = 1 point   - Diabetes Mellitus (on Insulin) = 1 point     RCRI Interpretation: 3 points: Class IV (high risk - >11% complication rate)    Estimated Functional Capacity: CANNOT perform 4 METS due to amputation.        Signed Electronically by: Tyra Bullock MD  Copy of this evaluation report is provided to requesting physician.

## 2022-06-29 NOTE — PATIENT INSTRUCTIONS
Already stopped the plavix and ibuprofen.  No increased bleeding/DVT risk.      For breathing:    Take inhalers on day of surgery.  Bring them with you to surgery    For diabetes:    Day before surgery:  65 Units in the morning.  40 Units at night.   Take normal mealtime insulin the day before surgery, but nothing in the morning BEFORE going to surgery  No AM Toujeo on day of surgery.  Restart in evening.

## 2022-06-29 NOTE — NURSING NOTE
Clinic Administered Medication Documentation    Administrations This Visit     medroxyPROGESTERone (DEPO-PROVERA) injection 150 mg     Admin Date  06/29/2022 Action  Given Dose  150 mg Route  Intramuscular Site  Left Deltoid Administered By  Cindy Baker CMA    Ordering Provider: Tyra Bullock MD    NDC: 0123-6581-96    Lot#: [id number]    : AMPHASTAR-IMS    Patient Supplied?: No                  Depo Provera Documentation    URINE HCG: not indicated    Depo-Provera Standing Order inclusion/exclusion criteria reviewed.   Patient meets: inclusion criteria     BP: 114/75  LAST PAP/EXAM: No results found for: PAP    Prior to injection, verified patient identity using patient's name and date of birth. Medication was administered. Please see MAR and medication order for additional information.     Was entire vial of medication used? Yes  Vial/Syringe: Single dose vial  Expiration Date:  12/30/2023    Patient instructed to remain in clinic for 15 minutes.  NEXT INJECTION DUE: 9/15/22 - 9/29/22      Name of provider who requested the medication administration: Dr. bullock  Name of provider on site (faculty or community preceptor) at the time of performing the medication administration: Dr. Bullock    Date of next administration: 9/15/22 - 9/29/22  Date of next office visit with provider to renew medication plan (must be seen annually): 01/04/2023

## 2022-06-29 NOTE — H&P (VIEW-ONLY)
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     pramipexole (MIRAPEX) 0.5 MG tablet TAKE 1 TABLET BY MOUTH AT BEDTIME 90 tablet 0     pravastatin (PRAVACHOL) 80 MG tablet TAKE 1 TABLET BY MOUTH EVERY DAY 90 tablet 3     QUEtiapine (SEROQUEL) 100 MG tablet TAKE 5 TABLETS (500 MG) BY MOUTH AT BEDTIME 150 tablet 0     SUMAtriptan (IMITREX) 50 MG tablet Take 50 mg by mouth       tiotropium (SPIRIVA RESPIMAT) 2.5 MCG/ACT inhaler Inhale 2 puffs into the lungs daily 4 g 11     traZODone (DESYREL) 50 MG tablet TAKE 1 TABLET BY MOUTH AT BEDTIME AS NEEDED FOR SLEEP.       venlafaxine (EFFEXOR-XR) 150 MG 24 hr capsule Take 2 capsules (300 mg) by mouth daily 180 capsule 0       Allergies   Allergen Reactions     Augmentin Nausea and Vomiting and Hives     Bee Venom Anaphylaxis     Nuts Anaphylaxis     Doxycycline Rash     Abilify Discmelt      Animal Dander Other (See Comments)     asthma     Aspirin      Atorvastatin      Contrast Dye Other (See Comments)     Patient had normal reaction to contrast dye, flushed/warm/wetting pants feeling. This Allergy needs to be removed from her chart. -AVW 11/13/21     Metformin Difficulty breathing     \"throat swelling and elevated liver enzymes\"     Naproxen      Niacin      Valium [Diazepam]      " "Zocor [Simvastatin - High Dose]         Social History     Tobacco Use     Smoking status: Current Every Day Smoker     Packs/day: 0.50     Years: 33.00     Pack years: 16.50     Types: Cigarettes     Smokeless tobacco: Never Used   Substance Use Topics     Alcohol use: No     Alcohol/week: 0.0 standard drinks       History   Drug Use     Types: Marijuana     Comment: Drug use: \"A little marijuana here and there, but no drug addiction.\"         Objective     /75 (BP Location: Right arm, Patient Position: Sitting, Cuff Size: Adult Regular)   Pulse 101   Temp 98.9  F (37.2  C) (Oral)   Resp 16   Wt 103.9 kg (229 lb)   SpO2 94%   BMI 43.99 kg/m      Physical Exam  Objective:    Vitals:  Vitals are reviewed and are within the normal range  Gen:  Alert, pleasant, no acute distress  Head:  Normal cephalic, atraumatic  Ears:  Tympanic membranes viewed bilaterally, no erythema, bulging, posiitive for fluid present in left TM  Throat:  Clear.  Non-erythematous and without exudate  Neck:  No cervical lymphadenopathy  Cardiac:  Regular rate and rhythm, no murmurs, rubs or gallops  Respiratory:  Lungs clear to auscultation bilaterally. No crackles or wheeze  Abdomen:  Soft, non-tender, non-distended, bowel sounds positive, large palpable ventral hernia  Extremities:  Warm, well-perfused, pulses diminished, Left BKA, well healed, no edema, 1+ pitting edema to mid shin on the right leg.    Skin:  Mucous membranes moist.  Areas of hydradenitits in armpits, groin.       Results for orders placed or performed in visit on 06/29/22   Basic metabolic panel     Status: Abnormal   Result Value Ref Range    Creatinine 0.64 0.51 - 0.95 mg/dL    Sodium 136 136 - 145 mmol/L    Potassium 4.3 3.4 - 5.3 mmol/L    Urea Nitrogen 24.4 (H) 6.0 - 20.0 mg/dL    Chloride 99 98 - 107 mmol/L    Carbon Dioxide (CO2) 21 (L) 22 - 29 mmol/L    Anion Gap 16 (H) 7 - 15 mmol/L    Glucose 227 (H) 70 - 99 mg/dL    GFR Estimate >90 >60 mL/min/1.73m2 "    Calcium 9.5 8.6 - 10.0 mg/dL   CBC with platelets     Status: Abnormal   Result Value Ref Range    WBC Count 12.6 (H) 4.0 - 11.0 10e3/uL    RBC Count 5.13 3.80 - 5.20 10e6/uL    Hemoglobin 15.6 11.7 - 15.7 g/dL    Hematocrit 46.3 35.0 - 47.0 %    MCV 90 78 - 100 fL    MCH 30.4 26.5 - 33.0 pg    MCHC 33.7 31.5 - 36.5 g/dL    RDW 13.8 10.0 - 15.0 %    Platelet Count 266 150 - 450 10e3/uL   Hemoglobin A1c     Status: Abnormal   Result Value Ref Range    Hemoglobin A1C 9.3 (H) 0.0 - 5.6 %       Diagnostics:  Labs pending at this time.  Results will be reviewed when available.   (BMP)  EKG: appears normal, NSR, sinus tachycardia, normal axis, normal intervals, no acute ST/T changes c/w acute ischemia, Qwaves present.  nonspecific ST-T changes, unchanged from previous tracings    Revised Cardiac Risk Index (RCRI):  The patient has the following serious cardiovascular risks for perioperative complications:   - Coronary Artery Disease (MI, positive stress test, angina, Qs on EKG) = 1 point   - Cerebrovascular Disease (TIA or CVA) = 1 point   - Diabetes Mellitus (on Insulin) = 1 point     RCRI Interpretation: 3 points: Class IV (high risk - >11% complication rate)    Estimated Functional Capacity: CANNOT perform 4 METS due to amputation.        Signed Electronically by: Tyra Bullock MD  Copy of this evaluation report is provided to requesting physician.

## 2022-06-30 ENCOUNTER — TELEPHONE (OUTPATIENT)
Dept: FAMILY MEDICINE | Facility: CLINIC | Age: 52
End: 2022-06-30

## 2022-06-30 NOTE — RESULT ENCOUNTER NOTE
Teresa Perez-    Your labs are stable and look good.  You should be set to move ahead with surgery.  Please call the clinic at [phone] if you have any questions.      Tyra Bullock MD    Please send results to patient.

## 2022-06-30 NOTE — TELEPHONE ENCOUNTER
Outreach call placed to surgery team about patients upcoming surgery, to check in on plans for surgery.  I suspect that she might need additional support after surgery and doesn't have good support at home, so wanted to see what post op plans are in place for her.     Spoke with Carey on the surgery team and shared my concerns about patient going home on day of surgery.      Left clinic number and my cell to call to discuss if needed.     Tyra Bullock MD

## 2022-07-01 ENCOUNTER — LAB (OUTPATIENT)
Dept: LAB | Facility: CLINIC | Age: 52
End: 2022-07-01
Payer: COMMERCIAL

## 2022-07-01 DIAGNOSIS — Z20.822 ENCOUNTER FOR LABORATORY TESTING FOR COVID-19 VIRUS: ICD-10-CM

## 2022-07-01 LAB — SARS-COV-2 RNA RESP QL NAA+PROBE: NEGATIVE

## 2022-07-01 PROCEDURE — U0003 INFECTIOUS AGENT DETECTION BY NUCLEIC ACID (DNA OR RNA); SEVERE ACUTE RESPIRATORY SYNDROME CORONAVIRUS 2 (SARS-COV-2) (CORONAVIRUS DISEASE [COVID-19]), AMPLIFIED PROBE TECHNIQUE, MAKING USE OF HIGH THROUGHPUT TECHNOLOGIES AS DESCRIBED BY CMS-2020-01-R: HCPCS

## 2022-07-01 PROCEDURE — U0005 INFEC AGEN DETEC AMPLI PROBE: HCPCS

## 2022-07-04 ENCOUNTER — ANESTHESIA EVENT (OUTPATIENT)
Dept: SURGERY | Facility: HOSPITAL | Age: 52
End: 2022-07-04
Payer: COMMERCIAL

## 2022-07-05 ENCOUNTER — HOSPITAL ENCOUNTER (OUTPATIENT)
Facility: HOSPITAL | Age: 52
Discharge: HOME | End: 2022-07-06
Attending: SURGERY | Admitting: SURGERY
Payer: COMMERCIAL

## 2022-07-05 ENCOUNTER — ANESTHESIA (OUTPATIENT)
Dept: SURGERY | Facility: HOSPITAL | Age: 52
End: 2022-07-05
Payer: COMMERCIAL

## 2022-07-05 DIAGNOSIS — K43.2 RECURRENT INCISIONAL HERNIA: Primary | ICD-10-CM

## 2022-07-05 LAB
GLUCOSE BLDC GLUCOMTR-MCNC: 161 MG/DL (ref 70–99)
GLUCOSE BLDC GLUCOMTR-MCNC: 229 MG/DL (ref 70–99)
GLUCOSE BLDC GLUCOMTR-MCNC: 243 MG/DL (ref 70–99)
GLUCOSE BLDC GLUCOMTR-MCNC: 265 MG/DL (ref 70–99)
GLUCOSE BLDC GLUCOMTR-MCNC: 352 MG/DL (ref 70–99)

## 2022-07-05 PROCEDURE — 250N000011 HC RX IP 250 OP 636: Performed by: ANESTHESIOLOGY

## 2022-07-05 PROCEDURE — 99204 OFFICE O/P NEW MOD 45 MIN: CPT | Performed by: INTERNAL MEDICINE

## 2022-07-05 PROCEDURE — 272N000001 HC OR GENERAL SUPPLY STERILE: Performed by: SURGERY

## 2022-07-05 PROCEDURE — 82962 GLUCOSE BLOOD TEST: CPT

## 2022-07-05 PROCEDURE — 710N000009 HC RECOVERY PHASE 1, LEVEL 1, PER MIN: Performed by: SURGERY

## 2022-07-05 PROCEDURE — 250N000011 HC RX IP 250 OP 636: Performed by: SURGERY

## 2022-07-05 PROCEDURE — 250N000012 HC RX MED GY IP 250 OP 636 PS 637: Performed by: ANESTHESIOLOGY

## 2022-07-05 PROCEDURE — 250N000013 HC RX MED GY IP 250 OP 250 PS 637: Performed by: INTERNAL MEDICINE

## 2022-07-05 PROCEDURE — 258N000003 HC RX IP 258 OP 636: Performed by: ANESTHESIOLOGY

## 2022-07-05 PROCEDURE — 250N000011 HC RX IP 250 OP 636: Performed by: NURSE ANESTHETIST, CERTIFIED REGISTERED

## 2022-07-05 PROCEDURE — 250N000012 HC RX MED GY IP 250 OP 636 PS 637: Performed by: INTERNAL MEDICINE

## 2022-07-05 PROCEDURE — 250N000025 HC SEVOFLURANE, PER MIN: Performed by: SURGERY

## 2022-07-05 PROCEDURE — 250N000009 HC RX 250: Performed by: NURSE ANESTHETIST, CERTIFIED REGISTERED

## 2022-07-05 PROCEDURE — 250N000013 HC RX MED GY IP 250 OP 250 PS 637: Performed by: SURGERY

## 2022-07-05 PROCEDURE — 370N000017 HC ANESTHESIA TECHNICAL FEE, PER MIN: Performed by: SURGERY

## 2022-07-05 PROCEDURE — 360N000080 HC SURGERY LEVEL 7, PER MIN: Performed by: SURGERY

## 2022-07-05 PROCEDURE — 258N000003 HC RX IP 258 OP 636: Performed by: SURGERY

## 2022-07-05 PROCEDURE — S2900 ROBOTIC SURGICAL SYSTEM: HCPCS | Performed by: SURGERY

## 2022-07-05 PROCEDURE — 250N000009 HC RX 250: Performed by: ANESTHESIOLOGY

## 2022-07-05 PROCEDURE — 250N000009 HC RX 250: Performed by: SURGERY

## 2022-07-05 PROCEDURE — C1781 MESH (IMPLANTABLE): HCPCS | Performed by: SURGERY

## 2022-07-05 PROCEDURE — 999N000141 HC STATISTIC PRE-PROCEDURE NURSING ASSESSMENT: Performed by: SURGERY

## 2022-07-05 PROCEDURE — 96372 THER/PROPH/DIAG INJ SC/IM: CPT | Mod: XU | Performed by: ANESTHESIOLOGY

## 2022-07-05 PROCEDURE — 96372 THER/PROPH/DIAG INJ SC/IM: CPT | Performed by: INTERNAL MEDICINE

## 2022-07-05 DEVICE — COMPOSITE MESH MONOFILAMENT POLYPROPYLENE MESH WITH ABSORBABLE SYNTHETIC FILM AND MARKING
Type: IMPLANTABLE DEVICE | Site: ABDOMEN | Status: FUNCTIONAL
Brand: PARIETENE DS

## 2022-07-05 RX ORDER — MONTELUKAST SODIUM 10 MG/1
10 TABLET ORAL AT BEDTIME
Status: DISCONTINUED | OUTPATIENT
Start: 2022-07-05 | End: 2022-07-06 | Stop reason: HOSPADM

## 2022-07-05 RX ORDER — FLUTICASONE PROPIONATE 50 MCG
2 SPRAY, SUSPENSION (ML) NASAL DAILY
Status: DISCONTINUED | OUTPATIENT
Start: 2022-07-05 | End: 2022-07-06 | Stop reason: HOSPADM

## 2022-07-05 RX ORDER — HYDROMORPHONE HCL IN WATER/PF 6 MG/30 ML
0.2 PATIENT CONTROLLED ANALGESIA SYRINGE INTRAVENOUS
Status: DISCONTINUED | OUTPATIENT
Start: 2022-07-05 | End: 2022-07-06 | Stop reason: HOSPADM

## 2022-07-05 RX ORDER — DEXAMETHASONE SODIUM PHOSPHATE 10 MG/ML
INJECTION, SOLUTION INTRAMUSCULAR; INTRAVENOUS PRN
Status: DISCONTINUED | OUTPATIENT
Start: 2022-07-05 | End: 2022-07-05

## 2022-07-05 RX ORDER — ALBUTEROL SULFATE 0.83 MG/ML
2.5 SOLUTION RESPIRATORY (INHALATION) EVERY 6 HOURS PRN
Status: DISCONTINUED | OUTPATIENT
Start: 2022-07-05 | End: 2022-07-06 | Stop reason: HOSPADM

## 2022-07-05 RX ORDER — BUDESONIDE AND FORMOTEROL FUMARATE DIHYDRATE 80; 4.5 UG/1; UG/1
2 AEROSOL RESPIRATORY (INHALATION) 2 TIMES DAILY
Status: DISCONTINUED | OUTPATIENT
Start: 2022-07-05 | End: 2022-07-06 | Stop reason: HOSPADM

## 2022-07-05 RX ORDER — PROPOFOL 10 MG/ML
INJECTION, EMULSION INTRAVENOUS CONTINUOUS PRN
Status: DISCONTINUED | OUTPATIENT
Start: 2022-07-05 | End: 2022-07-05

## 2022-07-05 RX ORDER — HYDROMORPHONE HYDROCHLORIDE 1 MG/ML
0.2 INJECTION, SOLUTION INTRAMUSCULAR; INTRAVENOUS; SUBCUTANEOUS EVERY 5 MIN PRN
Status: DISCONTINUED | OUTPATIENT
Start: 2022-07-05 | End: 2022-07-05 | Stop reason: HOSPADM

## 2022-07-05 RX ORDER — ONDANSETRON 4 MG/1
4 TABLET, ORALLY DISINTEGRATING ORAL EVERY 30 MIN PRN
Status: DISCONTINUED | OUTPATIENT
Start: 2022-07-05 | End: 2022-07-05 | Stop reason: HOSPADM

## 2022-07-05 RX ORDER — AZELASTINE HYDROCHLORIDE 0.5 MG/ML
1 SOLUTION/ DROPS OPHTHALMIC 2 TIMES DAILY
Status: DISCONTINUED | OUTPATIENT
Start: 2022-07-05 | End: 2022-07-06 | Stop reason: HOSPADM

## 2022-07-05 RX ORDER — DEXTROSE MONOHYDRATE 25 G/50ML
25-50 INJECTION, SOLUTION INTRAVENOUS
Status: DISCONTINUED | OUTPATIENT
Start: 2022-07-05 | End: 2022-07-06 | Stop reason: HOSPADM

## 2022-07-05 RX ORDER — LIDOCAINE 40 MG/G
CREAM TOPICAL
Status: DISCONTINUED | OUTPATIENT
Start: 2022-07-05 | End: 2022-07-05 | Stop reason: HOSPADM

## 2022-07-05 RX ORDER — FENTANYL CITRATE 50 UG/ML
25 INJECTION, SOLUTION INTRAMUSCULAR; INTRAVENOUS EVERY 5 MIN PRN
Status: DISCONTINUED | OUTPATIENT
Start: 2022-07-05 | End: 2022-07-05 | Stop reason: HOSPADM

## 2022-07-05 RX ORDER — GABAPENTIN 300 MG/1
1200 CAPSULE ORAL 3 TIMES DAILY
Status: DISCONTINUED | OUTPATIENT
Start: 2022-07-05 | End: 2022-07-06 | Stop reason: HOSPADM

## 2022-07-05 RX ORDER — KETAMINE HYDROCHLORIDE 10 MG/ML
INJECTION INTRAMUSCULAR; INTRAVENOUS PRN
Status: DISCONTINUED | OUTPATIENT
Start: 2022-07-05 | End: 2022-07-05

## 2022-07-05 RX ORDER — PRAVASTATIN SODIUM 20 MG
80 TABLET ORAL DAILY
Status: DISCONTINUED | OUTPATIENT
Start: 2022-07-05 | End: 2022-07-06 | Stop reason: HOSPADM

## 2022-07-05 RX ORDER — OXYCODONE HYDROCHLORIDE 5 MG/1
10 TABLET ORAL EVERY 4 HOURS PRN
Status: DISCONTINUED | OUTPATIENT
Start: 2022-07-05 | End: 2022-07-06 | Stop reason: HOSPADM

## 2022-07-05 RX ORDER — AMLODIPINE BESYLATE 5 MG/1
10 TABLET ORAL DAILY
Status: DISCONTINUED | OUTPATIENT
Start: 2022-07-05 | End: 2022-07-06 | Stop reason: HOSPADM

## 2022-07-05 RX ORDER — SODIUM CHLORIDE, SODIUM LACTATE, POTASSIUM CHLORIDE, CALCIUM CHLORIDE 600; 310; 30; 20 MG/100ML; MG/100ML; MG/100ML; MG/100ML
INJECTION, SOLUTION INTRAVENOUS CONTINUOUS
Status: DISCONTINUED | OUTPATIENT
Start: 2022-07-05 | End: 2022-07-05 | Stop reason: HOSPADM

## 2022-07-05 RX ORDER — VENLAFAXINE HYDROCHLORIDE 150 MG/1
300 CAPSULE, EXTENDED RELEASE ORAL DAILY
Status: DISCONTINUED | OUTPATIENT
Start: 2022-07-05 | End: 2022-07-06 | Stop reason: HOSPADM

## 2022-07-05 RX ORDER — SODIUM CHLORIDE, SODIUM LACTATE, POTASSIUM CHLORIDE, AND CALCIUM CHLORIDE .6; .31; .03; .02 G/100ML; G/100ML; G/100ML; G/100ML
IRRIGANT IRRIGATION PRN
Status: DISCONTINUED | OUTPATIENT
Start: 2022-07-05 | End: 2022-07-05 | Stop reason: HOSPADM

## 2022-07-05 RX ORDER — HYDROMORPHONE HCL IN WATER/PF 6 MG/30 ML
0.4 PATIENT CONTROLLED ANALGESIA SYRINGE INTRAVENOUS
Status: DISCONTINUED | OUTPATIENT
Start: 2022-07-05 | End: 2022-07-06 | Stop reason: HOSPADM

## 2022-07-05 RX ORDER — CLINDAMYCIN PHOSPHATE 900 MG/50ML
900 INJECTION, SOLUTION INTRAVENOUS
Status: COMPLETED | OUTPATIENT
Start: 2022-07-05 | End: 2022-07-05

## 2022-07-05 RX ORDER — ONDANSETRON 2 MG/ML
INJECTION INTRAMUSCULAR; INTRAVENOUS PRN
Status: DISCONTINUED | OUTPATIENT
Start: 2022-07-05 | End: 2022-07-05

## 2022-07-05 RX ORDER — LORATADINE 10 MG/1
10 TABLET ORAL DAILY
Status: DISCONTINUED | OUTPATIENT
Start: 2022-07-05 | End: 2022-07-06 | Stop reason: HOSPADM

## 2022-07-05 RX ORDER — BACLOFEN 10 MG/1
20 TABLET ORAL 2 TIMES DAILY
Status: DISCONTINUED | OUTPATIENT
Start: 2022-07-05 | End: 2022-07-06 | Stop reason: HOSPADM

## 2022-07-05 RX ORDER — OXYCODONE HYDROCHLORIDE 5 MG/1
5-10 TABLET ORAL
Qty: 15 TABLET | Refills: 0 | Status: SHIPPED | OUTPATIENT
Start: 2022-07-05 | End: 2022-07-06

## 2022-07-05 RX ORDER — MAGNESIUM SULFATE 4 G/50ML
4 INJECTION INTRAVENOUS ONCE
Status: COMPLETED | OUTPATIENT
Start: 2022-07-05 | End: 2022-07-05

## 2022-07-05 RX ORDER — PANTOPRAZOLE SODIUM 40 MG/1
40 TABLET, DELAYED RELEASE ORAL DAILY
Status: DISCONTINUED | OUTPATIENT
Start: 2022-07-05 | End: 2022-07-06 | Stop reason: HOSPADM

## 2022-07-05 RX ORDER — NALOXONE HYDROCHLORIDE 0.4 MG/ML
0.4 INJECTION, SOLUTION INTRAMUSCULAR; INTRAVENOUS; SUBCUTANEOUS
Status: DISCONTINUED | OUTPATIENT
Start: 2022-07-05 | End: 2022-07-06 | Stop reason: HOSPADM

## 2022-07-05 RX ORDER — DICLOFENAC SODIUM 25 MG/1
50 TABLET, DELAYED RELEASE ORAL 2 TIMES DAILY
Status: DISCONTINUED | OUTPATIENT
Start: 2022-07-05 | End: 2022-07-06 | Stop reason: HOSPADM

## 2022-07-05 RX ORDER — LIDOCAINE 40 MG/G
CREAM TOPICAL
Status: DISCONTINUED | OUTPATIENT
Start: 2022-07-05 | End: 2022-07-06 | Stop reason: HOSPADM

## 2022-07-05 RX ORDER — FENTANYL CITRATE 50 UG/ML
INJECTION, SOLUTION INTRAMUSCULAR; INTRAVENOUS PRN
Status: DISCONTINUED | OUTPATIENT
Start: 2022-07-05 | End: 2022-07-05

## 2022-07-05 RX ORDER — PROPOFOL 10 MG/ML
INJECTION, EMULSION INTRAVENOUS PRN
Status: DISCONTINUED | OUTPATIENT
Start: 2022-07-05 | End: 2022-07-05

## 2022-07-05 RX ORDER — NALOXONE HYDROCHLORIDE 0.4 MG/ML
0.2 INJECTION, SOLUTION INTRAMUSCULAR; INTRAVENOUS; SUBCUTANEOUS
Status: DISCONTINUED | OUTPATIENT
Start: 2022-07-05 | End: 2022-07-06 | Stop reason: HOSPADM

## 2022-07-05 RX ORDER — OXYCODONE HYDROCHLORIDE 5 MG/1
5 TABLET ORAL EVERY 4 HOURS PRN
Status: DISCONTINUED | OUTPATIENT
Start: 2022-07-05 | End: 2022-07-05 | Stop reason: HOSPADM

## 2022-07-05 RX ORDER — DOCUSATE SODIUM 100 MG/1
100 CAPSULE, LIQUID FILLED ORAL 2 TIMES DAILY
Qty: 30 CAPSULE | Refills: 0 | Status: SHIPPED | OUTPATIENT
Start: 2022-07-05 | End: 2022-11-29

## 2022-07-05 RX ORDER — OXYCODONE HYDROCHLORIDE 5 MG/1
5 TABLET ORAL EVERY 4 HOURS PRN
Status: DISCONTINUED | OUTPATIENT
Start: 2022-07-05 | End: 2022-07-06 | Stop reason: HOSPADM

## 2022-07-05 RX ORDER — NYSTATIN 100000/ML
500000 SUSPENSION, ORAL (FINAL DOSE FORM) ORAL DAILY PRN
COMMUNITY
End: 2023-02-17

## 2022-07-05 RX ORDER — BUDESONIDE AND FORMOTEROL FUMARATE DIHYDRATE 160; 4.5 UG/1; UG/1
2 AEROSOL RESPIRATORY (INHALATION) 2 TIMES DAILY
COMMUNITY
End: 2022-11-17

## 2022-07-05 RX ORDER — PRAMIPEXOLE DIHYDROCHLORIDE 0.5 MG/1
0.5 TABLET ORAL AT BEDTIME
Status: DISCONTINUED | OUTPATIENT
Start: 2022-07-05 | End: 2022-07-06 | Stop reason: HOSPADM

## 2022-07-05 RX ORDER — TRAZODONE HYDROCHLORIDE 50 MG/1
50 TABLET, FILM COATED ORAL AT BEDTIME
Status: DISCONTINUED | OUTPATIENT
Start: 2022-07-05 | End: 2022-07-06 | Stop reason: HOSPADM

## 2022-07-05 RX ORDER — LABETALOL HYDROCHLORIDE 5 MG/ML
INJECTION, SOLUTION INTRAVENOUS PRN
Status: DISCONTINUED | OUTPATIENT
Start: 2022-07-05 | End: 2022-07-05

## 2022-07-05 RX ORDER — IPRATROPIUM BROMIDE AND ALBUTEROL SULFATE 2.5; .5 MG/3ML; MG/3ML
3 SOLUTION RESPIRATORY (INHALATION) EVERY 4 HOURS PRN
Status: DISCONTINUED | OUTPATIENT
Start: 2022-07-05 | End: 2022-07-06 | Stop reason: HOSPADM

## 2022-07-05 RX ORDER — LIDOCAINE HYDROCHLORIDE 10 MG/ML
INJECTION, SOLUTION INFILTRATION; PERINEURAL PRN
Status: DISCONTINUED | OUTPATIENT
Start: 2022-07-05 | End: 2022-07-05

## 2022-07-05 RX ORDER — ONDANSETRON 2 MG/ML
4 INJECTION INTRAMUSCULAR; INTRAVENOUS EVERY 30 MIN PRN
Status: DISCONTINUED | OUTPATIENT
Start: 2022-07-05 | End: 2022-07-05 | Stop reason: HOSPADM

## 2022-07-05 RX ORDER — CLINDAMYCIN PHOSPHATE 900 MG/50ML
900 INJECTION, SOLUTION INTRAVENOUS SEE ADMIN INSTRUCTIONS
Status: DISCONTINUED | OUTPATIENT
Start: 2022-07-05 | End: 2022-07-05 | Stop reason: HOSPADM

## 2022-07-05 RX ORDER — BUPIVACAINE HYDROCHLORIDE 2.5 MG/ML
INJECTION, SOLUTION EPIDURAL; INFILTRATION; INTRACAUDAL
Status: COMPLETED | OUTPATIENT
Start: 2022-07-05 | End: 2022-07-05

## 2022-07-05 RX ORDER — LISINOPRIL 20 MG/1
40 TABLET ORAL DAILY
Status: DISCONTINUED | OUTPATIENT
Start: 2022-07-05 | End: 2022-07-06 | Stop reason: HOSPADM

## 2022-07-05 RX ORDER — NICOTINE POLACRILEX 4 MG
15-30 LOZENGE BUCCAL
Status: DISCONTINUED | OUTPATIENT
Start: 2022-07-05 | End: 2022-07-06 | Stop reason: HOSPADM

## 2022-07-05 RX ADMIN — SODIUM CHLORIDE, POTASSIUM CHLORIDE, SODIUM LACTATE AND CALCIUM CHLORIDE: 600; 310; 30; 20 INJECTION, SOLUTION INTRAVENOUS at 06:34

## 2022-07-05 RX ADMIN — PROPOFOL 30 MG: 10 INJECTION, EMULSION INTRAVENOUS at 07:50

## 2022-07-05 RX ADMIN — MAGNESIUM SULFATE HEPTAHYDRATE 4 G: 4 INJECTION, SOLUTION INTRAVENOUS at 06:35

## 2022-07-05 RX ADMIN — IPRATROPIUM BROMIDE AND ALBUTEROL SULFATE 3 ML: 2.5; .5 SOLUTION RESPIRATORY (INHALATION) at 12:09

## 2022-07-05 RX ADMIN — QUETIAPINE FUMARATE 500 MG: 300 TABLET, FILM COATED ORAL at 20:32

## 2022-07-05 RX ADMIN — OXYCODONE HYDROCHLORIDE 10 MG: 5 TABLET ORAL at 17:32

## 2022-07-05 RX ADMIN — PROPOFOL 40 MG: 10 INJECTION, EMULSION INTRAVENOUS at 07:41

## 2022-07-05 RX ADMIN — BACLOFEN 20 MG: 10 TABLET ORAL at 20:37

## 2022-07-05 RX ADMIN — PROPOFOL 50 MCG/KG/MIN: 10 INJECTION, EMULSION INTRAVENOUS at 09:32

## 2022-07-05 RX ADMIN — FENTANYL CITRATE 25 MCG: 50 INJECTION, SOLUTION INTRAMUSCULAR; INTRAVENOUS at 14:26

## 2022-07-05 RX ADMIN — HYDROMORPHONE HYDROCHLORIDE 0.4 MG: 0.2 INJECTION, SOLUTION INTRAMUSCULAR; INTRAVENOUS; SUBCUTANEOUS at 23:47

## 2022-07-05 RX ADMIN — OXYCODONE HYDROCHLORIDE 5 MG: 5 TABLET ORAL at 22:09

## 2022-07-05 RX ADMIN — FENTANYL CITRATE 25 MCG: 50 INJECTION, SOLUTION INTRAMUSCULAR; INTRAVENOUS at 14:37

## 2022-07-05 RX ADMIN — FENTANYL CITRATE 50 MCG: 50 INJECTION, SOLUTION INTRAMUSCULAR; INTRAVENOUS at 09:15

## 2022-07-05 RX ADMIN — PRAVASTATIN SODIUM 80 MG: 20 TABLET ORAL at 20:31

## 2022-07-05 RX ADMIN — TRAZODONE HYDROCHLORIDE 50 MG: 50 TABLET ORAL at 20:37

## 2022-07-05 RX ADMIN — HYDROMORPHONE HYDROCHLORIDE 0.4 MG: 0.2 INJECTION, SOLUTION INTRAMUSCULAR; INTRAVENOUS; SUBCUTANEOUS at 19:48

## 2022-07-05 RX ADMIN — FENTANYL CITRATE 100 MCG: 50 INJECTION, SOLUTION INTRAMUSCULAR; INTRAVENOUS at 08:16

## 2022-07-05 RX ADMIN — DEXAMETHASONE SODIUM PHOSPHATE 4 MG: 10 INJECTION, SOLUTION INTRAMUSCULAR; INTRAVENOUS at 07:35

## 2022-07-05 RX ADMIN — SODIUM CHLORIDE, POTASSIUM CHLORIDE, SODIUM LACTATE AND CALCIUM CHLORIDE: 600; 310; 30; 20 INJECTION, SOLUTION INTRAVENOUS at 09:00

## 2022-07-05 RX ADMIN — LABETALOL HYDROCHLORIDE 10 MG: 5 INJECTION, SOLUTION INTRAVENOUS at 11:30

## 2022-07-05 RX ADMIN — LISINOPRIL 40 MG: 20 TABLET ORAL at 20:33

## 2022-07-05 RX ADMIN — GABAPENTIN 1200 MG: 300 CAPSULE ORAL at 20:36

## 2022-07-05 RX ADMIN — HYDROMORPHONE HYDROCHLORIDE 0.5 MG: 1 INJECTION, SOLUTION INTRAMUSCULAR; INTRAVENOUS; SUBCUTANEOUS at 08:25

## 2022-07-05 RX ADMIN — ROCURONIUM BROMIDE 50 MG: 50 INJECTION, SOLUTION INTRAVENOUS at 07:21

## 2022-07-05 RX ADMIN — FENTANYL CITRATE 100 MCG: 50 INJECTION, SOLUTION INTRAMUSCULAR; INTRAVENOUS at 07:35

## 2022-07-05 RX ADMIN — PANTOPRAZOLE SODIUM 40 MG: 40 TABLET, DELAYED RELEASE ORAL at 18:16

## 2022-07-05 RX ADMIN — BUPIVACAINE HYDROCHLORIDE 40 ML: 2.5 INJECTION, SOLUTION EPIDURAL; INFILTRATION; INTRACAUDAL at 07:42

## 2022-07-05 RX ADMIN — LABETALOL HYDROCHLORIDE 10 MG: 5 INJECTION, SOLUTION INTRAVENOUS at 08:39

## 2022-07-05 RX ADMIN — PROPOFOL 130 MG: 10 INJECTION, EMULSION INTRAVENOUS at 07:21

## 2022-07-05 RX ADMIN — MIDAZOLAM 2 MG: 1 INJECTION INTRAMUSCULAR; INTRAVENOUS at 07:21

## 2022-07-05 RX ADMIN — PROPOFOL 100 MG: 10 INJECTION, EMULSION INTRAVENOUS at 08:20

## 2022-07-05 RX ADMIN — ROCURONIUM BROMIDE 10 MG: 50 INJECTION, SOLUTION INTRAVENOUS at 09:13

## 2022-07-05 RX ADMIN — MONTELUKAST 10 MG: 10 TABLET, FILM COATED ORAL at 20:38

## 2022-07-05 RX ADMIN — LABETALOL HYDROCHLORIDE 5 MG: 5 INJECTION, SOLUTION INTRAVENOUS at 08:52

## 2022-07-05 RX ADMIN — LABETALOL HYDROCHLORIDE 5 MG: 5 INJECTION, SOLUTION INTRAVENOUS at 08:59

## 2022-07-05 RX ADMIN — HYDROMORPHONE HYDROCHLORIDE 0.5 MG: 1 INJECTION, SOLUTION INTRAMUSCULAR; INTRAVENOUS; SUBCUTANEOUS at 08:02

## 2022-07-05 RX ADMIN — PROPOFOL 50 MG: 10 INJECTION, EMULSION INTRAVENOUS at 08:34

## 2022-07-05 RX ADMIN — LORATADINE 10 MG: 10 TABLET ORAL at 18:16

## 2022-07-05 RX ADMIN — DICLOFENAC SODIUM 50 MG: 25 TABLET, DELAYED RELEASE ORAL at 20:34

## 2022-07-05 RX ADMIN — PROPOFOL 50 MG: 10 INJECTION, EMULSION INTRAVENOUS at 09:13

## 2022-07-05 RX ADMIN — PRAMIPEXOLE DIHYDROCHLORIDE 0.5 MG: 0.5 TABLET ORAL at 20:36

## 2022-07-05 RX ADMIN — LABETALOL HYDROCHLORIDE 10 MG: 5 INJECTION, SOLUTION INTRAVENOUS at 11:25

## 2022-07-05 RX ADMIN — INSULIN GLARGINE 65 UNITS: 100 INJECTION, SOLUTION SUBCUTANEOUS at 21:48

## 2022-07-05 RX ADMIN — BUDESONIDE AND FORMOTEROL FUMARATE DIHYDRATE 2 PUFF: 80; 4.5 AEROSOL RESPIRATORY (INHALATION) at 20:46

## 2022-07-05 RX ADMIN — KETAMINE HYDROCHLORIDE 30 MG: 10 INJECTION, SOLUTION INTRAMUSCULAR; INTRAVENOUS at 08:03

## 2022-07-05 RX ADMIN — SUGAMMADEX 210 MG: 100 INJECTION, SOLUTION INTRAVENOUS at 11:18

## 2022-07-05 RX ADMIN — LIDOCAINE HYDROCHLORIDE 50 MG: 10 INJECTION, SOLUTION INFILTRATION; PERINEURAL at 07:35

## 2022-07-05 RX ADMIN — HYDROMORPHONE HYDROCHLORIDE 0.4 MG: 0.2 INJECTION, SOLUTION INTRAMUSCULAR; INTRAVENOUS; SUBCUTANEOUS at 15:44

## 2022-07-05 RX ADMIN — FENTANYL CITRATE 50 MCG: 50 INJECTION, SOLUTION INTRAMUSCULAR; INTRAVENOUS at 09:12

## 2022-07-05 RX ADMIN — INSULIN ASPART 5 UNITS: 100 INJECTION, SOLUTION INTRAVENOUS; SUBCUTANEOUS at 13:02

## 2022-07-05 RX ADMIN — INSULIN ASPART 5 UNITS: 100 INJECTION, SOLUTION INTRAVENOUS; SUBCUTANEOUS at 17:30

## 2022-07-05 RX ADMIN — ROCURONIUM BROMIDE 20 MG: 50 INJECTION, SOLUTION INTRAVENOUS at 10:09

## 2022-07-05 RX ADMIN — ROCURONIUM BROMIDE 30 MG: 50 INJECTION, SOLUTION INTRAVENOUS at 08:18

## 2022-07-05 RX ADMIN — KETAMINE HYDROCHLORIDE 20 MG: 10 INJECTION, SOLUTION INTRAMUSCULAR; INTRAVENOUS at 08:32

## 2022-07-05 RX ADMIN — CLINDAMYCIN PHOSPHATE 900 MG: 900 INJECTION, SOLUTION INTRAVENOUS at 07:55

## 2022-07-05 RX ADMIN — ROCURONIUM BROMIDE 10 MG: 50 INJECTION, SOLUTION INTRAVENOUS at 09:32

## 2022-07-05 RX ADMIN — ONDANSETRON 4 MG: 2 INJECTION INTRAMUSCULAR; INTRAVENOUS at 11:17

## 2022-07-05 ASSESSMENT — COPD QUESTIONNAIRES: COPD: 1

## 2022-07-05 ASSESSMENT — LIFESTYLE VARIABLES: TOBACCO_USE: 1

## 2022-07-05 NOTE — ANESTHESIA PROCEDURE NOTES
TAP Procedure Note    Pre-Procedure   Staff -        Anesthesiologist:  Deann Anglin MD       Performed By: anesthesiologist       Location: OR       Procedure Start/Stop Times: 7/5/2022 7:42 AM and 7/5/2022 7:44 AM       Pre-Anesthestic Checklist: patient identified, IV checked, site marked, risks and benefits discussed, informed consent, monitors and equipment checked, pre-op evaluation, at physician/surgeon's request and post-op pain management  Timeout:       Correct Patient: Yes        Correct Procedure: Yes        Correct Site: Yes        Correct Position: Yes        Correct Laterality: Yes        Site Marked: Yes  Procedure Documentation  Procedure: TAP       Laterality: bilateral       Patient Position: supine       Skin prep: Chloraprep       Needle Gauge: 20.        Needle Length (Inches): 4        Ultrasound guided       1. Ultrasound was used to identify targeted nerve, plexus, vascular marker, or fascial plane and place a needle adjacent to it in real-time.       2. Ultrasound was used to visualize the spread of anesthetic in close proximity to the above referenced structure.       3. A permanent image is entered into the patient's record.       4. The visualized anatomic structures appeared normal.       5. There were no apparent abnormal pathologic findings.    Assessment/Narrative         The placement was negative for: blood aspirated and site bleeding       Bolus given via needle..        Secured via.        Insertion/Infusion Method: Single Shot       Complications: none       Injection made incrementally with aspirations every 5 mL.    Medication(s) Administered   Bupivacaine 0.25% PF (Infiltration) - Infiltration   40 mL - 7/5/2022 7:42:00 AM  Medication Administration Time: 7/5/2022 7:42 AM
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
## 2022-07-05 NOTE — OP NOTE
Name:  Teresa Perez  PCP:  Tyra Bullock  Procedure Date:  7/5/2022      Procedure(s):  1.RECURRED INCISIONAL HERNIA REPAIR ROBOT-ASSISTED, LAPOAROSCOPIC USING DA MOHAN XI 2.PLACEMENT OF MESH  3.EXTENSIVE ADHESIOLYSIS, ROBOT-ASSISTED, LAPAROSCOPIC, USING DA MOHAN XI    Pre-Procedure Diagnosis:  Incisional hernia, without obstruction or gangrene [K43.2]     Post-Procedure Diagnosis:    6 x 7cm epigastric incisional hernia with intra-abdominal adhesions    Surgeon(s):  Abdelrahman Ware DO    Circulator: Camryn Sanchez RN; Nikolas Vance RN  Relief Circulator: Apoorva Ramirez RN  Scrub Person: Dmitriy Li; Catrina Moore    Anesthesia Type:  GET      Findings:  6 x 7cm recurrent incisional epigastric hernia with incarcerated colon and intra-abdominal adhesions  Evidence of: Diffuse to previously placed abdominal wall mesh  Operative Report:      The Patient was taken the operating placed in supine position.  Endotracheal intubation was performed and antibiotics were given prior to skin incision.  A tap block was performed by the anesthesia team.  A Vargas catheter was placed.  The abdomen was prepped and draped in sterile fashion.   The patient was then flexed and the patient was rotated to the right tilt.  I first established the pneumoperitoneum by make an 8 mm left upper quadrant incision and a Veress needle technique.  Once this was complete I then placed an 8 mm trocar into the abdomen.  The surrounding structures were scrutinized for any injuries upon entry I did not find any.  I placed 2 additional left mid abdominal wall 8 mm trochars and then upsized the left upper quadrant trocar for a 12 mm trocar. The robot was then docked and I turned my attention to the console.  I proceeded to take down the adhesions throughout the entire abdomen.  There is noted to be a large ventral hernia in the epigastric region with incarcerated colon.  I proceeded to take down the hernia sac  surrounding this and spent quite some time taking down the adhesions within the hernia sac to liberate the transverse colon.  Care was taken so as to not injure the bowel.  There was an area just lateral to the hernia defect that had evidence of previously placed mesh which was fused to the colon.  I attempted to remove some of this mesh but this appeared very high risk for bowel perforation.  At this point I left the mesh in situ with the colon in its previous position.  I evaluated the rest of the colon which appeared intact.  Once complete I then entered the retrorectus space on the right side and took this inferiorly.  The rectus muscle was extremely attenuated.  As I began dissecting cephalad I noted that the hernia defect was in the position that it could be closed transversely without performing a transversus abdominis release approach.  Additionally, the location would have made the closure of by the xiphoid tenuous at best if closed in a craniocaudal fashion.  Once I recognized this I elected to undocked the robot and placed 2 additional trochars in the right lower abdominal region.  I then redocked the robot and addressed the hernia from an inferior pelvic approach.  I cleaned up the edges of the hernia defect and measured this.  This was noted to be approximately 6 x 7 cm in its largest dimension.  I then closed the defect with a running 0 V-Loc suture.  Once this was complete I then reapproximated the transversalis fascia to the previous transection point of the peritoneum and transversalis fascia.  This was done with a running 2-0 V-Loc suture x2.  Once this was complete I then placed a 15 cm round Covidien pariah teen DS mesh over the defect.  This was a defect that was closed nicely.  I then sutured this to the abdominal wall with 2-0 V-Loc suture x3.  Additional 0 Ethibond was used to secure the mesh to the abdominal wall as stay sutures.    At this point the hernia closure and mesh placement were  complete I then undocked the robot and remove any small fluid collections with suction irrigation.  Surgicel powder was used for extra added hemostasis.  The 12 mm trocar was removed and the abdominal wall defect was closed with a 0 Vicryl suture using a Deepak Thomas suture passer.  I then desufflated the abdomen.  All skin edges were then reapproximated with 4-0 Monocryl suture.  A pressure dressing was placed in the mid abdominal region to decrease chances of seroma postoperatively.  An abdominal binder was placed the patient was extubated and Vargas catheter was removed.  All lap counts and needle counts were correct at the end of the procedure.  Estimated Blood Loss:   50cc    Specimens:    * No specimens in log *       Drains:   Urethral Catheter 07/05/22 Double-lumen;Latex 16 fr (Active)       Complications:    None    Abdelrahman Ware DO

## 2022-07-05 NOTE — INTERVAL H&P NOTE
I have reviewed the surgical (or preoperative) H&P that is linked to this encounter, and examined the patient. There are no significant changes    Plan for Procedure(s):  HERNIORRHAPHY, INCISIONAL, ROBOT-ASSISTED, LAPAROSCOPIC, USING DA MOHAN XI WITH BILATERAL TRANSVERSUS ABDOMINUS RELEASE     Jose Roberto Ware [facility] Surgery  [phone]
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
## 2022-07-05 NOTE — PHARMACY-ADMISSION MEDICATION HISTORY
Pharmacy Note - Admission Medication History    Pertinent Provider Information: she did not take any oral medication this morning despite instructions to do so.    ______________________________________________________________________    Prior To Admission (PTA) med list completed and updated in EMR.       Current Facility-Administered Medications for the 7/5/22 encounter (Hospital Encounter)   Medication     medroxyPROGESTERone (DEPO-PROVERA) injection 150 mg     medroxyPROGESTERone (DEPO-PROVERA) injection 150 mg     medroxyPROGESTERone (DEPO-PROVERA) injection 150 mg     medroxyPROGESTERone (DEPO-PROVERA) injection 150 mg     PTA Med List   Medication Sig Note Last Dose     acetaminophen (TYLENOL) 500 MG tablet Take 1-2 tablets (500-1,000 mg) by mouth every 6 hours as needed for mild pain       albuterol (PROVENTIL) (2.5 MG/3ML) 0.083% neb solution Take 1 vial (2.5 mg) by nebulization every 6 hours as needed for shortness of breath / dyspnea or wheezing       amLODIPine (NORVASC) 10 MG tablet Take 1 tablet (10 mg) by mouth daily  7/4/2022 at am     ammonium lactate (AMLACTIN) 12 % external cream Apply topically 2 times daily  Past Week     azelastine (OPTIVAR) 0.05 % ophthalmic solution APPLY 1 DROP TO AFFECTED EYE(S) 2 TIMES DAILY.  7/4/2022 at pm     baclofen (LIORESAL) 20 MG tablet Take 1 tablet (20 mg) by mouth 2 times daily  7/4/2022 at pm     blood glucose (NO BRAND SPECIFIED) test strip Use to test blood sugar 4 times daily or as directed.       budesonide-formoterol (SYMBICORT) 160-4.5 MCG/ACT Inhaler TAKE 2 PUFFS BY MOUTH TWICE A DAY  7/5/2022 at am     BYDUREON BCISE 2 MG/0.85ML auto-injector INJECT 2 MG SUBCUTANEOUS EVERY 7 DAYS  7/1/2022     TRUNG-GEST ANTACID 500 MG chewable tablet TAKE 1 TABLET (500 MG) BY MOUTH 2 TIMES DAILY AS NEEDED FOR HEARTBURN       cetirizine (ZYRTEC) 10 MG tablet Take 1 tablet (10 mg) by mouth 2 times daily as needed for allergies       clopidogrel (PLAVIX) 75 MG tablet Take 1  tablet (75 mg) by mouth daily  6/29/2022     diclofenac (VOLTAREN) 1 % topical gel Apply 2 g topically 4 times daily 7/5/2022: Lower back  7/4/2022 at pm     diclofenac (VOLTAREN) 50 MG EC tablet TAKE 1 TABLET BY MOUTH TWICE A DAY  6/29/2022 at pm     docusate sodium (COLACE) 100 MG capsule Take 1 capsule (100 mg) by mouth 2 times daily       docusate sodium (COLACE) 100 MG tablet Take 1 tablet (100 mg) by mouth daily as needed for constipation       EPINEPHrine (ANY BX GENERIC EQUIV) 0.3 MG/0.3ML injection 2-pack Inject 0.3 mLs (0.3 mg) into the muscle once as needed for anaphylaxis       fluticasone (FLONASE) 50 MCG/ACT nasal spray Spray 2 sprays into both nostrils daily  7/4/2022 at am     gabapentin (NEURONTIN) 600 MG tablet Take 2 tabs three times daily.  7/4/2022 at pm     insulin glargine U-300 (TOUJEO SOLOSTAR) 300 UNIT/ML (1 units dial) pen Take 65 Units in AM and 65 Units in PM.  7/4/2022 at pm     insulin lispro (HUMALOG KWIKPEN) 100 UNIT/ML (1 unit dial) KWIKPEN Take 28 Units plus 2 units for every 50 over 150 per sliding scale.  Max 38 Units.  Max daily dose 114 Units.  7/4/2022 at pm     JARDIANCE 25 MG TABS tablet TAKE 1 TABLET (25 MG) BY MOUTH DAILY  7/4/2022 at am     lidocaine (XYLOCAINE) 5 % external ointment Apply topically 3 times daily as needed for moderate pain       lisinopril (ZESTRIL) 40 MG tablet TAKE 1 TABLET BY MOUTH EVERY DAY  7/4/2022 at am     loratadine (CLARITIN) 10 MG tablet TAKE 1 TABLET (10 MG) BY MOUTH DAILY.  7/4/2022 at am     metoprolol succinate ER (TOPROL-XL) 50 MG 24 hr tablet TAKE 1 TABLET BY MOUTH EVERY DAY  7/4/2022 at am     miconazole (MICATIN) 2 % external cream Apply topically to affected area(s) twice daily as needed       miconazole (MONISTAT 1 DAY OR NIGHT) 1200 & 2 MG & % kit Use once as needed for discharge       montelukast (SINGULAIR) 10 MG tablet TAKE 1 TABLET BY MOUTH EVERYDAY AT BEDTIME  7/4/2022 at pm     nystatin (MYCOSTATIN) 979611 UNIT/ML suspension  Take 500,000 Units by mouth daily as needed       ondansetron (ZOFRAN) 4 MG tablet TAKE 1 TABLET BY MOUTH EVERY 8 HOURS AS NEEDED FOR NAUSEA       oxyCODONE (ROXICODONE) 5 MG tablet Take 1-2 tablets (5-10 mg) by mouth every 3 hours as needed for pain (Moderate to Severe)       pantoprazole (PROTONIX) 40 MG EC tablet TAKE 1 TABLET BY MOUTH EVERY DAY  7/4/2022 at am     pramipexole (MIRAPEX) 0.5 MG tablet TAKE 1 TABLET BY MOUTH AT BEDTIME  7/4/2022 at hs     pravastatin (PRAVACHOL) 80 MG tablet TAKE 1 TABLET BY MOUTH EVERY DAY  7/4/2022 at hs     QUEtiapine (SEROQUEL) 100 MG tablet TAKE 5 TABLETS (500 MG) BY MOUTH AT BEDTIME  7/4/2022 at hs     SUMAtriptan (IMITREX) 50 MG tablet Take 50 mg by mouth at onset of headache       tiotropium (SPIRIVA RESPIMAT) 2.5 MCG/ACT inhaler Inhale 2 puffs into the lungs daily  7/5/2022 at am     traZODone (DESYREL) 50 MG tablet Take 1 tablet (50 mg) by mouth At Bedtime  7/4/2022 at hs     venlafaxine (EFFEXOR-XR) 150 MG 24 hr capsule Take 2 capsules (300 mg) by mouth daily  7/4/2022 at am       Information source(s): Patient, Clinic records and [facility]    Method of interview communication: in-person    Patient was asked about OTC/herbal products specifically.  PTA med list reflects this.    Based on the pharmacist's assessment, the PTA med list information appears reliable    Allergies were reviewed, assessed, and updated with the patient.      Medications available for use during hospital stay: Symbicort .      Thank you for the opportunity to participate in the care of this patient.      Gallito Melton [facility]     7/5/2022     4:31 PM

## 2022-07-05 NOTE — ANESTHESIA PROCEDURE NOTES
Airway       Patient location during procedure: OR       Procedure Start/Stop Times: 7/5/2022 7:37 AM and 7/5/2022 7:38 AM  Staff -        Anesthesiologist:  Deann Anglin MD       CRNA: Bradley Greenfield APRN CRNA       Performed By: CRNA  Consent for Airway        Urgency: elective  Indications and Patient Condition       Indications for airway management: vineet-procedural         Mask difficulty assessment: 1 - vent by mask    Final Airway Details       Final airway type: endotracheal airway       Successful airway: ETT - single  Endotracheal Airway Details        ETT size (mm): 7.5       Cuffed: yes       Successful intubation technique: direct laryngoscopy       DL Blade Type: Seymour 2       Grade View of Cords: 1       Adjucts: stylet       Position: Right       Measured from: lips       Secured at (cm): 21       Bite block used: None    Post intubation assessment        Placement verified by: capnometry, equal breath sounds and chest rise        Number of attempts at approach: 1       Number of other approaches attempted: 0       Secured with: silk tape       Ease of procedure: easy       Dentition: Unchanged (Edentulous)    Medication(s) Administered   Medication Administration Time: 7/5/2022 7:37 AM

## 2022-07-05 NOTE — ANESTHESIA POSTPROCEDURE EVALUATION
Patient: Teresa Perez    Procedure: Procedure(s):  RECURRED INCISIONAL HERNIA REPAIR ROBOT-ASSISTED, LAPAROSCOPIC USING DA MOHAN XI PLACEMENT OF MESH  EXTENSIVE ADHESIOLYSIS, ROBOT-ASSISTED, LAPAROSCOPIC, USING DA MOHAN XI       Anesthesia Type:  General    Note:  Disposition: Admission   Postop Pain Control: Uneventful            Sign Out: ONGOING pain issues   PONV: No   Neuro/Psych: Uneventful            Sign Out: Acceptable/Baseline neuro status   Airway/Respiratory: Uneventful            Sign Out: Acceptable/Baseline resp. status; O2 supplementation               Oxygen: Nasal Cannula   CV/Hemodynamics: Uneventful            Sign Out: Acceptable CV status; No obvious hypovolemia; No obvious fluid overload   Other NRE: NONE   DID A NON-ROUTINE EVENT OCCUR? No    Event details/Postop Comments:  Patient will be admitted due to ongoing issues with pain control and oxygen requirements.            Last vitals:  Vitals Value Taken Time   /65 07/05/22 1445   Temp 36.7  C (98  F) 07/05/22 1230   Pulse 94 07/05/22 1455   Resp 18 07/05/22 1453   SpO2 95 % 07/05/22 1455   Vitals shown include unvalidated device data.    Electronically Signed By: Deann Anglin MD  July 5, 2022  5:09 PM

## 2022-07-05 NOTE — ANESTHESIA CARE TRANSFER NOTE
Patient: Teresa Perez    Procedure: Procedure(s):  RECURRED INCISIONAL HERNIA REPAIR ROBOT-ASSISTED, LAPAROSCOPIC USING DA MOHAN XI PLACEMENT OF MESH  EXTENSIVE ADHESIOLYSIS, ROBOT-ASSISTED, LAPAROSCOPIC, USING DA MOHAN XI       Diagnosis: Incisional hernia, without obstruction or gangrene [K43.2]  Diagnosis Additional Information: No value filed.    Anesthesia Type:   General     Note:    Oropharynx: oropharynx clear of all foreign objects and spontaneously breathing  Level of Consciousness: drowsy  Oxygen Supplementation: face mask  Level of Supplemental Oxygen (L/min / FiO2): 8  Independent Airway: airway patency satisfactory and stable  Dentition: dentition unchanged  Vital Signs Stable: post-procedure vital signs reviewed and stable  Report to RN Given: handoff report given  Patient transferred to: PACU    Handoff Report: Identifed the Patient, Identified the Reponsible Provider, Reviewed the pertinent medical history, Discussed the surgical course, Reviewed Intra-OP anesthesia mangement and issues during anesthesia, Set expectations for post-procedure period and Allowed opportunity for questions and acknowledgement of understanding      Vitals:  Vitals Value Taken Time   /59 07/05/22 1136   Temp 36.6  C (97.8  F) 07/05/22 1136   Pulse 91 07/05/22 1137   Resp 25 07/05/22 1137   SpO2 97 % 07/05/22 1137   Vitals shown include unvalidated device data.    Electronically Signed By: RACHID Le CRNA  July 5, 2022  11:38 AM

## 2022-07-06 ENCOUNTER — TELEPHONE (OUTPATIENT)
Dept: FAMILY MEDICINE | Facility: CLINIC | Age: 52
End: 2022-07-06

## 2022-07-06 VITALS
WEIGHT: 227.6 LBS | BODY MASS INDEX: 43.72 KG/M2 | HEART RATE: 113 BPM | SYSTOLIC BLOOD PRESSURE: 157 MMHG | RESPIRATION RATE: 20 BRPM | OXYGEN SATURATION: 88 % | DIASTOLIC BLOOD PRESSURE: 82 MMHG | TEMPERATURE: 98.1 F

## 2022-07-06 LAB
ANION GAP SERPL CALCULATED.3IONS-SCNC: 12 MMOL/L (ref 5–18)
BUN SERPL-MCNC: 15 MG/DL (ref 8–22)
CALCIUM SERPL-MCNC: 9.5 MG/DL (ref 8.5–10.5)
CHLORIDE BLD-SCNC: 100 MMOL/L (ref 98–107)
CO2 SERPL-SCNC: 24 MMOL/L (ref 22–31)
CREAT SERPL-MCNC: 0.72 MG/DL (ref 0.6–1.1)
ERYTHROCYTE [DISTWIDTH] IN BLOOD BY AUTOMATED COUNT: 13.8 % (ref 10–15)
GFR SERPL CREATININE-BSD FRML MDRD: >90 ML/MIN/1.73M2
GLUCOSE BLD-MCNC: 268 MG/DL (ref 70–125)
GLUCOSE BLDC GLUCOMTR-MCNC: 223 MG/DL (ref 70–99)
GLUCOSE BLDC GLUCOMTR-MCNC: 281 MG/DL (ref 70–99)
HCT VFR BLD AUTO: 44.5 % (ref 35–47)
HGB BLD-MCNC: 15.1 G/DL (ref 11.7–15.7)
MCH RBC QN AUTO: 31.2 PG (ref 26.5–33)
MCHC RBC AUTO-ENTMCNC: 33.9 G/DL (ref 31.5–36.5)
MCV RBC AUTO: 92 FL (ref 78–100)
PLATELET # BLD AUTO: 248 10E3/UL (ref 150–450)
POTASSIUM BLD-SCNC: 4.2 MMOL/L (ref 3.5–5)
RBC # BLD AUTO: 4.84 10E6/UL (ref 3.8–5.2)
SODIUM SERPL-SCNC: 136 MMOL/L (ref 136–145)
WBC # BLD AUTO: 14 10E3/UL (ref 4–11)

## 2022-07-06 PROCEDURE — 49655 PR LAP, INCISIONAL HERNIA REPAIR,INCARCERATED: CPT | Performed by: SURGERY

## 2022-07-06 PROCEDURE — 999N000157 HC STATISTIC RCP TIME EA 10 MIN

## 2022-07-06 PROCEDURE — 250N000013 HC RX MED GY IP 250 OP 250 PS 637: Performed by: INTERNAL MEDICINE

## 2022-07-06 PROCEDURE — 80048 BASIC METABOLIC PNL TOTAL CA: CPT | Performed by: INTERNAL MEDICINE

## 2022-07-06 PROCEDURE — 94640 AIRWAY INHALATION TREATMENT: CPT

## 2022-07-06 PROCEDURE — 250N000013 HC RX MED GY IP 250 OP 250 PS 637: Performed by: SURGERY

## 2022-07-06 PROCEDURE — 250N000009 HC RX 250: Performed by: ANESTHESIOLOGY

## 2022-07-06 PROCEDURE — 82962 GLUCOSE BLOOD TEST: CPT

## 2022-07-06 PROCEDURE — 36415 COLL VENOUS BLD VENIPUNCTURE: CPT | Performed by: INTERNAL MEDICINE

## 2022-07-06 PROCEDURE — 85027 COMPLETE CBC AUTOMATED: CPT | Performed by: INTERNAL MEDICINE

## 2022-07-06 RX ORDER — GABAPENTIN 400 MG/1
1200 CAPSULE ORAL 3 TIMES DAILY
Qty: 30 CAPSULE | Refills: 0 | Status: SHIPPED | OUTPATIENT
Start: 2022-07-06 | End: 2022-07-19

## 2022-07-06 RX ORDER — OXYCODONE HYDROCHLORIDE 5 MG/1
5 TABLET ORAL EVERY 4 HOURS PRN
Qty: 20 TABLET | Refills: 0 | Status: SHIPPED | OUTPATIENT
Start: 2022-07-06 | End: 2022-07-11

## 2022-07-06 RX ADMIN — IPRATROPIUM BROMIDE AND ALBUTEROL SULFATE 3 ML: 2.5; .5 SOLUTION RESPIRATORY (INHALATION) at 11:06

## 2022-07-06 RX ADMIN — GABAPENTIN 1200 MG: 300 CAPSULE ORAL at 08:42

## 2022-07-06 RX ADMIN — LISINOPRIL 40 MG: 20 TABLET ORAL at 08:45

## 2022-07-06 RX ADMIN — LORATADINE 10 MG: 10 TABLET ORAL at 08:46

## 2022-07-06 RX ADMIN — OXYCODONE HYDROCHLORIDE 10 MG: 5 TABLET ORAL at 12:53

## 2022-07-06 RX ADMIN — INSULIN GLARGINE 65 UNITS: 100 INJECTION, SOLUTION SUBCUTANEOUS at 08:43

## 2022-07-06 RX ADMIN — UMECLIDINIUM 1 PUFF: 62.5 AEROSOL, POWDER ORAL at 09:03

## 2022-07-06 RX ADMIN — DICLOFENAC SODIUM 50 MG: 25 TABLET, DELAYED RELEASE ORAL at 08:41

## 2022-07-06 RX ADMIN — OXYCODONE HYDROCHLORIDE 10 MG: 5 TABLET ORAL at 03:32

## 2022-07-06 RX ADMIN — AMLODIPINE BESYLATE 10 MG: 5 TABLET ORAL at 08:37

## 2022-07-06 RX ADMIN — BACLOFEN 20 MG: 10 TABLET ORAL at 08:39

## 2022-07-06 RX ADMIN — INSULIN ASPART 6 UNITS: 100 INJECTION, SOLUTION INTRAVENOUS; SUBCUTANEOUS at 08:57

## 2022-07-06 RX ADMIN — PANTOPRAZOLE SODIUM 40 MG: 40 TABLET, DELAYED RELEASE ORAL at 08:46

## 2022-07-06 RX ADMIN — OXYCODONE HYDROCHLORIDE 10 MG: 5 TABLET ORAL at 08:54

## 2022-07-06 RX ADMIN — BUDESONIDE AND FORMOTEROL FUMARATE DIHYDRATE 2 PUFF: 80; 4.5 AEROSOL RESPIRATORY (INHALATION) at 08:39

## 2022-07-06 NOTE — PLAN OF CARE
Problem: Plan of Care - These are the overarching goals to be used throughout the patient stay.    Goal: Optimal Comfort and Wellbeing  Intervention: Monitor Pain and Promote Comfort  Recent Flowsheet Documentation  Taken 7/6/2022 0854 by Pepper Huang RN  Pain Management Interventions: medication (see MAR)   Goal Outcome Evaluation:    Plan of Care Reviewed With: patient      Pain 8/10. Oxycodone 10mg. Given. Drowsy between cares. Requesting IV. Dilaudid. States doctor said she could have this before she leaves. Moving to commode and in bed with minimal help.  Problem: Pain Acute  Goal: Acceptable Pain Control and Functional Ability  Intervention: Prevent or Manage Pain  Recent Flowsheet Documentation  Taken 7/6/2022 0854 by Pepper Huang RN  Medication Review/Management: medications reviewed   Requesting ride set-up for discharge home.

## 2022-07-06 NOTE — PROGRESS NOTES
[facility] was asked by nursing to assist with transportation.  Pt usually uses [facility] [phone] and are full and require 1 day in advance.  [facility] scheduled [facility] transport at 1:00 PM and Pt aware of potential costs.      11:51 AM  MEGHANN Ellsworth

## 2022-07-06 NOTE — PLAN OF CARE
Problem: Hyperglycemia  Goal: Blood Glucose Level Within Targeted Range  Outcome: Ongoing, Not Progressing     Problem: Pain Acute  Goal: Acceptable Pain Control and Functional Ability  Outcome: Ongoing, Progressing  Intervention: Develop Pain Management Plan  Recent Flowsheet Documentation  Taken 7/5/2022 1948 by Quiana Santo RN  Pain Management Interventions:   medication (see MAR)   cold applied   emotional support   rest  Taken 7/5/2022 1732 by Quiana Santo RN  Pain Management Interventions:   medication (see MAR)   rest  Taken 7/5/2022 1544 by Quiana Santo RN  Pain Management Interventions:   medication (see MAR)   rest  Intervention: Prevent or Manage Pain  Recent Flowsheet Documentation  Taken 7/5/2022 1952 by Quiana Santo RN  Medication Review/Management: medications reviewed  Taken 7/5/2022 1546 by Quiana Santo RN  Medication Review/Management: medications reviewed     Problem: Hypertension Acute  Goal: Blood Pressure Within Desired Range  Outcome: Ongoing, Progressing  Intervention: Normalize Blood Pressure  Recent Flowsheet Documentation  Taken 7/5/2022 1952 by Quiana Santo RN  Medication Review/Management: medications reviewed  Taken 7/5/2022 1546 by Quiana Santo, RN  Medication Review/Management: medications reviewed     Patient tolerated full liquid diet; diet advanced to regular. No redness or drainage noted from lap sites. Abdominal binder in place. Patient on 0.5 oxygen at LPM with oxygen saturation in the low 90s. Blood sugar needing insulin coverage. SBA to bedside commode.       Goal Outcome Evaluation:                       yes

## 2022-07-06 NOTE — PLAN OF CARE
Problem: Plan of Care - These are the overarching goals to be used throughout the patient stay.    Goal: Optimal Comfort and Wellbeing  Outcome: Ongoing, Progressing     Pt c/o 9/10 pain all night. Pt seemed calm, was able to sleep, and relatively comfortable. Not typical of 9/10 pain.   SB assist to commode, A/O 4x, makes needs known.   ABD binder in place.

## 2022-07-06 NOTE — PROGRESS NOTES
Teresajacobo Perez is s/p robotic incisional hernia repair.  She is doing well, sore, tolerating diet        Vitals:    07/05/22 2021 07/05/22 2049 07/05/22 2334 07/06/22 0332   BP: 131/75  (!) 153/85 137/67   BP Location: Left arm  Left arm Left arm   Patient Position:   Semi-Salazar's Semi-Salazar's   Cuff Size:       Pulse: 96  101 106   Resp: 16  20 20   Temp: 98  F (36.7  C)  98.3  F (36.8  C) 98  F (36.7  C)   TempSrc: Oral   Oral   SpO2: 92% 90% 93% 91%   Weight:           PHYSICAL EXAM:  GEN: No acute distress, comfortable  ABD:soft, no seromas at present, hernia repair intact  EXT:No cyanosis, edema or obvious abnormalities        Recent Labs   Lab 06/29/22  0755   WBC 12.6*   HGB 15.6   HCT 46.3          Recent Labs   Lab 06/29/22  0755      CO2 21*   BUN 24.4*         A/P:  Teresa Perez is s/p incisional hernia repair  -appreciate hospitalist evaluation  -will discharge to home today unless hospitalist team has any reservations.     Abdelrahman Ware,   FACS  [phone]  [facility] Department of Surgery

## 2022-07-06 NOTE — PROGRESS NOTES
Called for PRN tx d/t wheezes, room air SpO2 88 %, O2 off per pt as she was told earlier that her SpO2 was good at 94 % on 2 lpm/NC, BS scattered E/wheezes with good aeration., BS after unchanged. O2 replaced at 2 lpm/NC. RT available as needed      BP (!) 157/82 (BP Location: Left arm)   Pulse 113   Temp 98.1  F (36.7  C) (Oral)   Resp 20   Wt 103.2 kg (227 lb 9.6 oz)   SpO2 (!) 88%   BMI 43.72 kg/m

## 2022-07-06 NOTE — TELEPHONE ENCOUNTER
[facility] phone call message- patient requesting a refill:    Full Medication Name: guaiFENesin-codeine (ROBITUSSIN AC) 100-10 MG/5ML solution     Dose: Take 5-10 mLs by mouth every 4 hours as needed for cough     Pharmacy confirmed as   [id number] - [place], [place] - [address]  [address] [place] [address]  Phone: [phone] Fax: [phone]   : Yes    Additional Comments: pt is wondering if the doctor would prescribe the above medication without codeine.    OK to leave a message on voice mail? Yes    Primary language: English      needed? No    Call taken on July 6, 2022 at 4:20 PM by Mary Ann Giraldo

## 2022-07-11 DIAGNOSIS — K43.2 RECURRENT INCISIONAL HERNIA: ICD-10-CM

## 2022-07-11 DIAGNOSIS — Z98.890 POSTOPERATIVE STATE: Primary | ICD-10-CM

## 2022-07-11 DIAGNOSIS — G89.4 CHRONIC PAIN SYNDROME: ICD-10-CM

## 2022-07-11 DIAGNOSIS — R11.0 NAUSEA: ICD-10-CM

## 2022-07-11 RX ORDER — OXYCODONE HYDROCHLORIDE 5 MG/1
5 TABLET ORAL EVERY 4 HOURS PRN
Qty: 20 TABLET | Refills: 0 | Status: SHIPPED | OUTPATIENT
Start: 2022-07-11 | End: 2022-07-15

## 2022-07-11 RX ORDER — ONDANSETRON 4 MG/1
TABLET, FILM COATED ORAL
Qty: 18 TABLET | Refills: 1 | Status: SHIPPED | OUTPATIENT
Start: 2022-07-11 | End: 2022-09-16

## 2022-07-11 RX ORDER — OXYCODONE HYDROCHLORIDE 5 MG/1
5 TABLET ORAL EVERY 6 HOURS PRN
Qty: 12 TABLET | Refills: 0 | Status: SHIPPED | OUTPATIENT
Start: 2022-07-11 | End: 2022-07-14

## 2022-07-11 NOTE — TELEPHONE ENCOUNTER
Teresa called today to ask for a refill of pain medication. She is s/p robotic recurrent incisional hernia repair by Dr. Ware on 7/5/22. She was prescribed Oxycodone 5mg on 7/6/22 #20 tablets and is currently taking #1 tablet q 4 hours. This has been helpful for her pain. Asked if she is taking Ibuprofen or Tylenol in addition to help with longer lasting pain relief. She denied use, so we discussed how to incorporate either into her regimen of Oxycodone. She verbalized understanding. Despite pain, her recovery is going OK. She is eating regularly, having regular bowel movements, and drinking enough water. She does have some nausea in the mornings and is asking for a refill of Zofran. Will discuss with Dr. Ware and call her back if medications can be refilled.

## 2022-07-15 ENCOUNTER — OFFICE VISIT (OUTPATIENT)
Dept: SURGERY | Facility: CLINIC | Age: 52
End: 2022-07-15
Payer: COMMERCIAL

## 2022-07-15 DIAGNOSIS — Z98.890 POSTOPERATIVE STATE: Primary | ICD-10-CM

## 2022-07-15 DIAGNOSIS — K43.2 RECURRENT INCISIONAL HERNIA: ICD-10-CM

## 2022-07-15 PROCEDURE — 99024 POSTOP FOLLOW-UP VISIT: CPT | Performed by: SURGERY

## 2022-07-15 RX ORDER — CEPHALEXIN 500 MG/1
500 CAPSULE ORAL 2 TIMES DAILY
Qty: 20 CAPSULE | Refills: 0 | Status: ON HOLD | OUTPATIENT
Start: 2022-07-15 | End: 2022-07-26

## 2022-07-15 RX ORDER — OXYCODONE HYDROCHLORIDE 5 MG/1
5 TABLET ORAL EVERY 4 HOURS PRN
Qty: 20 TABLET | Refills: 0 | Status: ON HOLD | OUTPATIENT
Start: 2022-07-15 | End: 2022-07-26

## 2022-07-15 NOTE — LETTER
7/15/2022         RE: Teresa Perez  [address]  [place] [address]        Dear Colleague,    Thank you for referring your patient, Teresa Perez, to the [facility] [place]. Please see a copy of my visit note below.     HPI: Teresa Perez is here for follow up after abdominal wall hernia repair.  She is doing relatively well but her main complaint is a sore on her backside.  She denies any fevers or chills and states that her abdominal discomfort is manageable.  Allergies, Medications, Social History, Past Medical History and Past Surgical History were reviewed and are noted in the chart.    There were no vitals taken for this visit.  There is no height or weight on file to calculate BMI.      EXAM:   GENERAL: Appears well  Abdomen-soft, mild erythema over anterior abdominal wall with no fluctuance or seromas    Wound Buttocks Pressure injury community acquired Stage 2 (Active)       Incision/Surgical Site 07/05/22 Abdomen (Active)       Assessment/Plan: Teresa Perez continues to do well. Her wound is healing and overall progressing well.  At this point given her mild anterior abdominal wall erythema I will have her begin some antibiotics as a precaution.  She states she does have some ulcers on her backside which she is treating.  I will have her follow-up with me in 1 week for ongoing evaluation.    Abdelrahman Ware DO [facility] Department of Surgery      Again, thank you for allowing me to participate in the care of your patient.        Sincerely,        Abdelrahman Ware DO

## 2022-07-15 NOTE — PROGRESS NOTES
HPI: Teresa Perez is here for follow up after abdominal wall hernia repair.  She is doing relatively well but her main complaint is a sore on her backside.  She denies any fevers or chills and states that her abdominal discomfort is manageable.  Allergies, Medications, Social History, Past Medical History and Past Surgical History were reviewed and are noted in the chart.    There were no vitals taken for this visit.  There is no height or weight on file to calculate BMI.      EXAM:   GENERAL: Appears well  Abdomen-soft, mild erythema over anterior abdominal wall with no fluctuance or seromas    Wound Buttocks Pressure injury community acquired Stage 2 (Active)       Incision/Surgical Site 07/05/22 Abdomen (Active)       Assessment/Plan: Teresa Perez continues to do well. Her wound is healing and overall progressing well.  At this point given her mild anterior abdominal wall erythema I will have her begin some antibiotics as a precaution.  She states she does have some ulcers on her backside which she is treating.  I will have her follow-up with me in 1 week for ongoing evaluation.    Abdelrahman Ware DO [facility] Department of Surgery

## 2022-07-18 ENCOUNTER — APPOINTMENT (OUTPATIENT)
Dept: RADIOLOGY | Facility: HOSPITAL | Age: 52
DRG: 593 | End: 2022-07-18
Attending: EMERGENCY MEDICINE
Payer: COMMERCIAL

## 2022-07-18 ENCOUNTER — HOSPITAL ENCOUNTER (INPATIENT)
Facility: HOSPITAL | Age: 52
LOS: 8 days | Discharge: NURSING, REHAB OR LONG-TERM CARE | DRG: 593 | End: 2022-07-26
Attending: EMERGENCY MEDICINE | Admitting: FAMILY MEDICINE
Payer: COMMERCIAL

## 2022-07-18 DIAGNOSIS — L03.90 CELLULITIS, UNSPECIFIED CELLULITIS SITE: ICD-10-CM

## 2022-07-18 DIAGNOSIS — S31.829A BUTTOCK WOUND, LEFT, INITIAL ENCOUNTER: ICD-10-CM

## 2022-07-18 DIAGNOSIS — A41.9 SEPSIS, DUE TO UNSPECIFIED ORGANISM, UNSPECIFIED WHETHER ACUTE ORGAN DYSFUNCTION PRESENT (H): ICD-10-CM

## 2022-07-18 DIAGNOSIS — K43.2 RECURRENT INCISIONAL HERNIA: ICD-10-CM

## 2022-07-18 DIAGNOSIS — S31.829S BUTTOCK WOUND, LEFT, SEQUELA: Primary | ICD-10-CM

## 2022-07-18 LAB
ANION GAP SERPL CALCULATED.3IONS-SCNC: 12 MMOL/L (ref 5–18)
BASOPHILS # BLD AUTO: 0.1 10E3/UL (ref 0–0.2)
BASOPHILS NFR BLD AUTO: 1 %
BUN SERPL-MCNC: 15 MG/DL (ref 8–22)
C REACTIVE PROTEIN LHE: 20.2 MG/DL (ref 0–?)
CALCIUM SERPL-MCNC: 9.3 MG/DL (ref 8.5–10.5)
CHLORIDE BLD-SCNC: 98 MMOL/L (ref 98–107)
CO2 SERPL-SCNC: 24 MMOL/L (ref 22–31)
CREAT SERPL-MCNC: 0.65 MG/DL (ref 0.6–1.1)
EOSINOPHIL # BLD AUTO: 0.4 10E3/UL (ref 0–0.7)
EOSINOPHIL NFR BLD AUTO: 2 %
ERYTHROCYTE [DISTWIDTH] IN BLOOD BY AUTOMATED COUNT: 13.4 % (ref 10–15)
ERYTHROCYTE [SEDIMENTATION RATE] IN BLOOD BY WESTERGREN METHOD: 88 MM/HR (ref 0–20)
GFR SERPL CREATININE-BSD FRML MDRD: >90 ML/MIN/1.73M2
GLUCOSE BLD-MCNC: 140 MG/DL (ref 70–125)
GLUCOSE BLDC GLUCOMTR-MCNC: 124 MG/DL (ref 70–99)
HCT VFR BLD AUTO: 42 % (ref 35–47)
HGB BLD-MCNC: 13.8 G/DL (ref 11.7–15.7)
IMM GRANULOCYTES # BLD: 0.4 10E3/UL
IMM GRANULOCYTES NFR BLD: 2 %
LACTATE SERPL-SCNC: 1.1 MMOL/L (ref 0.7–2)
LYMPHOCYTES # BLD AUTO: 2 10E3/UL (ref 0.8–5.3)
LYMPHOCYTES NFR BLD AUTO: 11 %
MCH RBC QN AUTO: 29.5 PG (ref 26.5–33)
MCHC RBC AUTO-ENTMCNC: 32.9 G/DL (ref 31.5–36.5)
MCV RBC AUTO: 90 FL (ref 78–100)
MONOCYTES # BLD AUTO: 1.3 10E3/UL (ref 0–1.3)
MONOCYTES NFR BLD AUTO: 7 %
NEUTROPHILS # BLD AUTO: 15.1 10E3/UL (ref 1.6–8.3)
NEUTROPHILS NFR BLD AUTO: 77 %
NRBC # BLD AUTO: 0 10E3/UL
NRBC BLD AUTO-RTO: 0 /100
PLATELET # BLD AUTO: 427 10E3/UL (ref 150–450)
POTASSIUM BLD-SCNC: 3.7 MMOL/L (ref 3.5–5)
RBC # BLD AUTO: 4.68 10E6/UL (ref 3.8–5.2)
SARS-COV-2 RNA RESP QL NAA+PROBE: NEGATIVE
SODIUM SERPL-SCNC: 134 MMOL/L (ref 136–145)
WBC # BLD AUTO: 18.9 10E3/UL (ref 4–11)

## 2022-07-18 PROCEDURE — 96365 THER/PROPH/DIAG IV INF INIT: CPT

## 2022-07-18 PROCEDURE — 99285 EMERGENCY DEPT VISIT HI MDM: CPT | Mod: 25

## 2022-07-18 PROCEDURE — 36415 COLL VENOUS BLD VENIPUNCTURE: CPT | Performed by: EMERGENCY MEDICINE

## 2022-07-18 PROCEDURE — 258N000003 HC RX IP 258 OP 636: Performed by: EMERGENCY MEDICINE

## 2022-07-18 PROCEDURE — 250N000011 HC RX IP 250 OP 636: Performed by: EMERGENCY MEDICINE

## 2022-07-18 PROCEDURE — 87635 SARS-COV-2 COVID-19 AMP PRB: CPT | Performed by: EMERGENCY MEDICINE

## 2022-07-18 PROCEDURE — 72170 X-RAY EXAM OF PELVIS: CPT

## 2022-07-18 PROCEDURE — 85652 RBC SED RATE AUTOMATED: CPT | Performed by: EMERGENCY MEDICINE

## 2022-07-18 PROCEDURE — 96367 TX/PROPH/DG ADDL SEQ IV INF: CPT

## 2022-07-18 PROCEDURE — 96366 THER/PROPH/DIAG IV INF ADDON: CPT

## 2022-07-18 PROCEDURE — 250N000011 HC RX IP 250 OP 636

## 2022-07-18 PROCEDURE — 120N000001 HC R&B MED SURG/OB

## 2022-07-18 PROCEDURE — 86140 C-REACTIVE PROTEIN: CPT | Performed by: EMERGENCY MEDICINE

## 2022-07-18 PROCEDURE — C9803 HOPD COVID-19 SPEC COLLECT: HCPCS

## 2022-07-18 PROCEDURE — 83605 ASSAY OF LACTIC ACID: CPT | Performed by: EMERGENCY MEDICINE

## 2022-07-18 PROCEDURE — 96375 TX/PRO/DX INJ NEW DRUG ADDON: CPT

## 2022-07-18 PROCEDURE — 87040 BLOOD CULTURE FOR BACTERIA: CPT | Performed by: EMERGENCY MEDICINE

## 2022-07-18 PROCEDURE — 85025 COMPLETE CBC W/AUTO DIFF WBC: CPT | Performed by: EMERGENCY MEDICINE

## 2022-07-18 PROCEDURE — 80048 BASIC METABOLIC PNL TOTAL CA: CPT | Performed by: EMERGENCY MEDICINE

## 2022-07-18 RX ORDER — GABAPENTIN 100 MG/1
100 CAPSULE ORAL 3 TIMES DAILY
Status: DISCONTINUED | OUTPATIENT
Start: 2022-07-19 | End: 2022-07-22

## 2022-07-18 RX ORDER — ALBUTEROL SULFATE 0.83 MG/ML
2.5 SOLUTION RESPIRATORY (INHALATION) EVERY 6 HOURS PRN
Status: DISCONTINUED | OUTPATIENT
Start: 2022-07-18 | End: 2022-07-26 | Stop reason: HOSPADM

## 2022-07-18 RX ORDER — LIDOCAINE 40 MG/G
CREAM TOPICAL
Status: DISCONTINUED | OUTPATIENT
Start: 2022-07-18 | End: 2022-07-23

## 2022-07-18 RX ORDER — ONDANSETRON 4 MG/1
4 TABLET, ORALLY DISINTEGRATING ORAL EVERY 6 HOURS PRN
Status: DISCONTINUED | OUTPATIENT
Start: 2022-07-18 | End: 2022-07-26 | Stop reason: HOSPADM

## 2022-07-18 RX ORDER — AMLODIPINE BESYLATE 5 MG/1
10 TABLET ORAL DAILY
Status: DISCONTINUED | OUTPATIENT
Start: 2022-07-19 | End: 2022-07-26 | Stop reason: HOSPADM

## 2022-07-18 RX ORDER — NICOTINE POLACRILEX 4 MG
15-30 LOZENGE BUCCAL
Status: DISCONTINUED | OUTPATIENT
Start: 2022-07-18 | End: 2022-07-26 | Stop reason: HOSPADM

## 2022-07-18 RX ORDER — AMOXICILLIN 250 MG
2 CAPSULE ORAL 2 TIMES DAILY PRN
Status: DISCONTINUED | OUTPATIENT
Start: 2022-07-18 | End: 2022-07-26 | Stop reason: HOSPADM

## 2022-07-18 RX ORDER — FLUTICASONE PROPIONATE 50 MCG
2 SPRAY, SUSPENSION (ML) NASAL DAILY
Status: CANCELLED | OUTPATIENT
Start: 2022-07-19

## 2022-07-18 RX ORDER — TRAZODONE HYDROCHLORIDE 50 MG/1
50 TABLET, FILM COATED ORAL AT BEDTIME
Status: DISCONTINUED | OUTPATIENT
Start: 2022-07-19 | End: 2022-07-26 | Stop reason: HOSPADM

## 2022-07-18 RX ORDER — ACETAMINOPHEN 650 MG/1
650 SUPPOSITORY RECTAL EVERY 6 HOURS PRN
Status: DISCONTINUED | OUTPATIENT
Start: 2022-07-18 | End: 2022-07-24

## 2022-07-18 RX ORDER — ACETAMINOPHEN 325 MG/1
650 TABLET ORAL EVERY 6 HOURS PRN
Status: DISCONTINUED | OUTPATIENT
Start: 2022-07-18 | End: 2022-07-24

## 2022-07-18 RX ORDER — CETIRIZINE HYDROCHLORIDE 10 MG/1
10 TABLET ORAL 2 TIMES DAILY PRN
Status: DISCONTINUED | OUTPATIENT
Start: 2022-07-18 | End: 2022-07-26 | Stop reason: HOSPADM

## 2022-07-18 RX ORDER — CEFTRIAXONE 1 G/1
1 INJECTION, POWDER, FOR SOLUTION INTRAMUSCULAR; INTRAVENOUS ONCE
Status: COMPLETED | OUTPATIENT
Start: 2022-07-18 | End: 2022-07-18

## 2022-07-18 RX ORDER — ONDANSETRON 2 MG/ML
4 INJECTION INTRAMUSCULAR; INTRAVENOUS EVERY 6 HOURS PRN
Status: DISCONTINUED | OUTPATIENT
Start: 2022-07-18 | End: 2022-07-26 | Stop reason: HOSPADM

## 2022-07-18 RX ORDER — PROCHLORPERAZINE MALEATE 10 MG
10 TABLET ORAL EVERY 6 HOURS PRN
Status: DISCONTINUED | OUTPATIENT
Start: 2022-07-18 | End: 2022-07-26 | Stop reason: HOSPADM

## 2022-07-18 RX ORDER — CLOPIDOGREL BISULFATE 75 MG/1
75 TABLET ORAL DAILY
Status: DISCONTINUED | OUTPATIENT
Start: 2022-07-19 | End: 2022-07-26 | Stop reason: HOSPADM

## 2022-07-18 RX ORDER — PROCHLORPERAZINE 25 MG
25 SUPPOSITORY, RECTAL RECTAL EVERY 12 HOURS PRN
Status: DISCONTINUED | OUTPATIENT
Start: 2022-07-18 | End: 2022-07-26 | Stop reason: HOSPADM

## 2022-07-18 RX ORDER — FENTANYL CITRATE 50 UG/ML
50 INJECTION, SOLUTION INTRAMUSCULAR; INTRAVENOUS ONCE
Status: COMPLETED | OUTPATIENT
Start: 2022-07-18 | End: 2022-07-18

## 2022-07-18 RX ORDER — AMOXICILLIN 250 MG
1 CAPSULE ORAL 2 TIMES DAILY PRN
Status: DISCONTINUED | OUTPATIENT
Start: 2022-07-18 | End: 2022-07-26 | Stop reason: HOSPADM

## 2022-07-18 RX ORDER — CEFAZOLIN SODIUM 1 G/50ML
2000 SOLUTION INTRAVENOUS ONCE
Status: COMPLETED | OUTPATIENT
Start: 2022-07-18 | End: 2022-07-19

## 2022-07-18 RX ORDER — METOPROLOL SUCCINATE 50 MG/1
50 TABLET, EXTENDED RELEASE ORAL DAILY
Status: DISCONTINUED | OUTPATIENT
Start: 2022-07-19 | End: 2022-07-26 | Stop reason: HOSPADM

## 2022-07-18 RX ORDER — LISINOPRIL 20 MG/1
40 TABLET ORAL DAILY
Status: DISCONTINUED | OUTPATIENT
Start: 2022-07-19 | End: 2022-07-26 | Stop reason: HOSPADM

## 2022-07-18 RX ORDER — PANTOPRAZOLE SODIUM 40 MG/1
40 TABLET, DELAYED RELEASE ORAL DAILY
Status: DISCONTINUED | OUTPATIENT
Start: 2022-07-19 | End: 2022-07-26 | Stop reason: HOSPADM

## 2022-07-18 RX ORDER — DEXTROSE MONOHYDRATE 25 G/50ML
25-50 INJECTION, SOLUTION INTRAVENOUS
Status: DISCONTINUED | OUTPATIENT
Start: 2022-07-18 | End: 2022-07-26 | Stop reason: HOSPADM

## 2022-07-18 RX ADMIN — HYDROMORPHONE HYDROCHLORIDE 1 MG: 1 INJECTION, SOLUTION INTRAMUSCULAR; INTRAVENOUS; SUBCUTANEOUS at 22:51

## 2022-07-18 RX ADMIN — ONDANSETRON 4 MG: 2 INJECTION INTRAMUSCULAR; INTRAVENOUS at 23:13

## 2022-07-18 RX ADMIN — FENTANYL CITRATE 50 MCG: 50 INJECTION, SOLUTION INTRAMUSCULAR; INTRAVENOUS at 21:22

## 2022-07-18 RX ADMIN — CEFTRIAXONE SODIUM 1 G: 1 INJECTION, POWDER, FOR SOLUTION INTRAMUSCULAR; INTRAVENOUS at 21:24

## 2022-07-18 RX ADMIN — VANCOMYCIN HYDROCHLORIDE 2000 MG: 5 INJECTION, POWDER, LYOPHILIZED, FOR SOLUTION INTRAVENOUS at 22:05

## 2022-07-18 ASSESSMENT — ACTIVITIES OF DAILY LIVING (ADL)
ADLS_ACUITY_SCORE: 35
ADLS_ACUITY_SCORE: 35

## 2022-07-18 NOTE — ED NOTES
[facility] EMERGENCY DEPARTMENT  PHYSICIAN-IN-TRIAGE NOTE    MRN: [id number]    Teresa Perez was seen in triage at 1:35 PM to expedite care while awaiting a room in the emergency department.    Brief HPI  The patient is here with a painful left gluteal ulcer. She had surgery two weeks ago (hernia repair) and the wound has developed since she's been at home. No fever but she is having nausea. The wound is draining bloody purulent discharge. She uses a wheelchair due to left LE amputation but has been trying to move around not to stay in one position.      Brief Exam  BP (!) 144/63   Pulse 101   Temp 97.9  F (36.6  C) (Temporal)   Resp 18   Wt 103 kg (227 lb)   SpO2 95%   BMI 43.60 kg/m        Constitutional: Non-toxic appearing.    HENT: Atraumatic.    Pulm: No respiratory distress.    Neuro: Alert, oriented, answers questions appropriately.    Psych: Behavior appropriate    Wound examination deferred due to location and patient's ability to stand.    Plan Initiated in Triage  Orders Placed This Encounter   Procedures     XR Pelvis 1/2 Views     Basic metabolic panel     Lactic acid whole blood     CRP inflammation     Erythrocyte sedimentation rate auto     Peripheral IV catheter       PIT Dispo  Place patient in the next available ED bed      Patient was evaluated by the Physician in Triage due to a limitation of available rooms in the Emergency Department. A plan of care was discussed based on the information obtained on the initial evaluation and patient was consuled to return back to the Emergency Department lobby after this initial evalutaiton until results were obtained or a room became available in the Emergency Department. Patient was counseled not to leave prior to receiving the results of their workup.        Xavi Bliss MD  07/18/22 [id number]

## 2022-07-18 NOTE — ED TRIAGE NOTES
Pt arrives via [place] [facility] from home. Had hernia surgery recently. Developed wound on her bottom after discharging from hospital. States the wound is open and draining. No fevers or chills. States she is wheelchair bound. Hx of left BKA.

## 2022-07-19 ENCOUNTER — ANESTHESIA (OUTPATIENT)
Dept: SURGERY | Facility: HOSPITAL | Age: 52
DRG: 593 | End: 2022-07-19
Payer: COMMERCIAL

## 2022-07-19 ENCOUNTER — ANESTHESIA EVENT (OUTPATIENT)
Dept: SURGERY | Facility: HOSPITAL | Age: 52
DRG: 593 | End: 2022-07-19
Payer: COMMERCIAL

## 2022-07-19 ENCOUNTER — TELEPHONE (OUTPATIENT)
Dept: FAMILY MEDICINE | Facility: CLINIC | Age: 52
End: 2022-07-19

## 2022-07-19 LAB
ALBUMIN SERPL-MCNC: 2.6 G/DL (ref 3.5–5)
ALP SERPL-CCNC: 106 U/L (ref 45–120)
ALT SERPL W P-5'-P-CCNC: 15 U/L (ref 0–45)
ANION GAP SERPL CALCULATED.3IONS-SCNC: 17 MMOL/L (ref 5–18)
AST SERPL W P-5'-P-CCNC: 15 U/L (ref 0–40)
BASOPHILS # BLD AUTO: 0.1 10E3/UL (ref 0–0.2)
BASOPHILS NFR BLD AUTO: 1 %
BILIRUB SERPL-MCNC: 0.3 MG/DL (ref 0–1)
BUN SERPL-MCNC: 11 MG/DL (ref 8–22)
CALCIUM SERPL-MCNC: 9.1 MG/DL (ref 8.5–10.5)
CHLORIDE BLD-SCNC: 102 MMOL/L (ref 98–107)
CO2 SERPL-SCNC: 19 MMOL/L (ref 22–31)
CREAT SERPL-MCNC: 0.68 MG/DL (ref 0.6–1.1)
CREAT SERPL-MCNC: 0.68 MG/DL (ref 0.6–1.1)
EOSINOPHIL # BLD AUTO: 0.4 10E3/UL (ref 0–0.7)
EOSINOPHIL NFR BLD AUTO: 2 %
ERYTHROCYTE [DISTWIDTH] IN BLOOD BY AUTOMATED COUNT: 13.3 % (ref 10–15)
ERYTHROCYTE [DISTWIDTH] IN BLOOD BY AUTOMATED COUNT: 13.3 % (ref 10–15)
GFR SERPL CREATININE-BSD FRML MDRD: >90 ML/MIN/1.73M2
GFR SERPL CREATININE-BSD FRML MDRD: >90 ML/MIN/1.73M2
GLUCOSE BLD-MCNC: 122 MG/DL (ref 70–125)
GLUCOSE BLDC GLUCOMTR-MCNC: 126 MG/DL (ref 70–99)
GLUCOSE BLDC GLUCOMTR-MCNC: 127 MG/DL (ref 70–99)
GLUCOSE BLDC GLUCOMTR-MCNC: 138 MG/DL (ref 70–99)
GLUCOSE BLDC GLUCOMTR-MCNC: 143 MG/DL (ref 70–99)
GLUCOSE BLDC GLUCOMTR-MCNC: 163 MG/DL (ref 70–99)
GLUCOSE BLDC GLUCOMTR-MCNC: 197 MG/DL (ref 70–99)
GLUCOSE BLDC GLUCOMTR-MCNC: 203 MG/DL (ref 70–99)
HCT VFR BLD AUTO: 41.2 % (ref 35–47)
HCT VFR BLD AUTO: 41.2 % (ref 35–47)
HGB BLD-MCNC: 13.3 G/DL (ref 11.7–15.7)
HGB BLD-MCNC: 13.3 G/DL (ref 11.7–15.7)
IMM GRANULOCYTES # BLD: 0.3 10E3/UL
IMM GRANULOCYTES NFR BLD: 2 %
LACTATE SERPL-SCNC: 1 MMOL/L (ref 0.7–2)
LYMPHOCYTES # BLD AUTO: 1.6 10E3/UL (ref 0.8–5.3)
LYMPHOCYTES NFR BLD AUTO: 10 %
MCH RBC QN AUTO: 29.3 PG (ref 26.5–33)
MCH RBC QN AUTO: 29.3 PG (ref 26.5–33)
MCHC RBC AUTO-ENTMCNC: 32.3 G/DL (ref 31.5–36.5)
MCHC RBC AUTO-ENTMCNC: 32.3 G/DL (ref 31.5–36.5)
MCV RBC AUTO: 91 FL (ref 78–100)
MCV RBC AUTO: 91 FL (ref 78–100)
MONOCYTES # BLD AUTO: 1.1 10E3/UL (ref 0–1.3)
MONOCYTES NFR BLD AUTO: 8 %
NEUTROPHILS # BLD AUTO: 11.9 10E3/UL (ref 1.6–8.3)
NEUTROPHILS NFR BLD AUTO: 77 %
PLATELET # BLD AUTO: 368 10E3/UL (ref 150–450)
PLATELET # BLD AUTO: 368 10E3/UL (ref 150–450)
POTASSIUM BLD-SCNC: 3.8 MMOL/L (ref 3.5–5)
PROT SERPL-MCNC: 7.1 G/DL (ref 6–8)
RBC # BLD AUTO: 4.54 10E6/UL (ref 3.8–5.2)
RBC # BLD AUTO: 4.54 10E6/UL (ref 3.8–5.2)
SODIUM SERPL-SCNC: 138 MMOL/L (ref 136–145)
WBC # BLD AUTO: 14.2 10E3/UL (ref 4–11)
WBC # BLD AUTO: 14.2 10E3/UL (ref 4–11)

## 2022-07-19 PROCEDURE — 99222 1ST HOSP IP/OBS MODERATE 55: CPT | Mod: AI | Performed by: FAMILY MEDICINE

## 2022-07-19 PROCEDURE — 250N000009 HC RX 250: Performed by: SURGERY

## 2022-07-19 PROCEDURE — 370N000017 HC ANESTHESIA TECHNICAL FEE, PER MIN: Performed by: SURGERY

## 2022-07-19 PROCEDURE — 250N000011 HC RX IP 250 OP 636: Performed by: SURGERY

## 2022-07-19 PROCEDURE — 87205 SMEAR GRAM STAIN: CPT | Performed by: SURGERY

## 2022-07-19 PROCEDURE — 87070 CULTURE OTHR SPECIMN AEROBIC: CPT | Performed by: SURGERY

## 2022-07-19 PROCEDURE — 250N000012 HC RX MED GY IP 250 OP 636 PS 637

## 2022-07-19 PROCEDURE — 99221 1ST HOSP IP/OBS SF/LOW 40: CPT | Mod: 25 | Performed by: SURGERY

## 2022-07-19 PROCEDURE — 250N000013 HC RX MED GY IP 250 OP 250 PS 637: Performed by: SURGERY

## 2022-07-19 PROCEDURE — 250N000013 HC RX MED GY IP 250 OP 250 PS 637: Performed by: FAMILY MEDICINE

## 2022-07-19 PROCEDURE — 120N000001 HC R&B MED SURG/OB

## 2022-07-19 PROCEDURE — 10061 I&D ABSCESS COMP/MULTIPLE: CPT | Performed by: SURGERY

## 2022-07-19 PROCEDURE — 250N000011 HC RX IP 250 OP 636: Performed by: FAMILY MEDICINE

## 2022-07-19 PROCEDURE — 250N000011 HC RX IP 250 OP 636: Performed by: NURSE ANESTHETIST, CERTIFIED REGISTERED

## 2022-07-19 PROCEDURE — 250N000009 HC RX 250: Performed by: NURSE ANESTHETIST, CERTIFIED REGISTERED

## 2022-07-19 PROCEDURE — 85025 COMPLETE CBC W/AUTO DIFF WBC: CPT | Performed by: FAMILY MEDICINE

## 2022-07-19 PROCEDURE — 250N000013 HC RX MED GY IP 250 OP 250 PS 637: Performed by: ANESTHESIOLOGY

## 2022-07-19 PROCEDURE — 258N000003 HC RX IP 258 OP 636: Performed by: ANESTHESIOLOGY

## 2022-07-19 PROCEDURE — 250N000013 HC RX MED GY IP 250 OP 250 PS 637: Performed by: STUDENT IN AN ORGANIZED HEALTH CARE EDUCATION/TRAINING PROGRAM

## 2022-07-19 PROCEDURE — 360N000075 HC SURGERY LEVEL 2, PER MIN: Performed by: SURGERY

## 2022-07-19 PROCEDURE — 83605 ASSAY OF LACTIC ACID: CPT | Performed by: FAMILY MEDICINE

## 2022-07-19 PROCEDURE — 250N000011 HC RX IP 250 OP 636: Performed by: ANESTHESIOLOGY

## 2022-07-19 PROCEDURE — 999N000141 HC STATISTIC PRE-PROCEDURE NURSING ASSESSMENT: Performed by: SURGERY

## 2022-07-19 PROCEDURE — 80053 COMPREHEN METABOLIC PANEL: CPT | Performed by: FAMILY MEDICINE

## 2022-07-19 PROCEDURE — 272N000001 HC OR GENERAL SUPPLY STERILE: Performed by: SURGERY

## 2022-07-19 PROCEDURE — 85027 COMPLETE CBC AUTOMATED: CPT

## 2022-07-19 PROCEDURE — 87075 CULTR BACTERIA EXCEPT BLOOD: CPT | Performed by: SURGERY

## 2022-07-19 PROCEDURE — 250N000013 HC RX MED GY IP 250 OP 250 PS 637

## 2022-07-19 PROCEDURE — 82040 ASSAY OF SERUM ALBUMIN: CPT | Performed by: FAMILY MEDICINE

## 2022-07-19 PROCEDURE — 36415 COLL VENOUS BLD VENIPUNCTURE: CPT | Performed by: FAMILY MEDICINE

## 2022-07-19 PROCEDURE — 710N000012 HC RECOVERY PHASE 2, PER MINUTE: Performed by: SURGERY

## 2022-07-19 PROCEDURE — 0HD8XZZ EXTRACTION OF BUTTOCK SKIN, EXTERNAL APPROACH: ICD-10-PCS | Performed by: SURGERY

## 2022-07-19 RX ORDER — HYDROMORPHONE HYDROCHLORIDE 1 MG/ML
0.2 INJECTION, SOLUTION INTRAMUSCULAR; INTRAVENOUS; SUBCUTANEOUS
Status: DISCONTINUED | OUTPATIENT
Start: 2022-07-19 | End: 2022-07-21

## 2022-07-19 RX ORDER — LABETALOL HYDROCHLORIDE 5 MG/ML
10 INJECTION, SOLUTION INTRAVENOUS
Status: DISCONTINUED | OUTPATIENT
Start: 2022-07-19 | End: 2022-07-19 | Stop reason: HOSPADM

## 2022-07-19 RX ORDER — HALOPERIDOL 5 MG/ML
1 INJECTION INTRAMUSCULAR
Status: DISCONTINUED | OUTPATIENT
Start: 2022-07-19 | End: 2022-07-19 | Stop reason: HOSPADM

## 2022-07-19 RX ORDER — NALOXONE HYDROCHLORIDE 0.4 MG/ML
0.4 INJECTION, SOLUTION INTRAMUSCULAR; INTRAVENOUS; SUBCUTANEOUS
Status: DISCONTINUED | OUTPATIENT
Start: 2022-07-19 | End: 2022-07-26 | Stop reason: HOSPADM

## 2022-07-19 RX ORDER — OXYCODONE HYDROCHLORIDE 5 MG/1
5 TABLET ORAL EVERY 4 HOURS PRN
Status: DISCONTINUED | OUTPATIENT
Start: 2022-07-19 | End: 2022-07-24

## 2022-07-19 RX ORDER — ONDANSETRON 4 MG/1
4 TABLET, ORALLY DISINTEGRATING ORAL EVERY 30 MIN PRN
Status: DISCONTINUED | OUTPATIENT
Start: 2022-07-19 | End: 2022-07-19 | Stop reason: HOSPADM

## 2022-07-19 RX ORDER — PROCHLORPERAZINE MALEATE 10 MG
10 TABLET ORAL EVERY 6 HOURS PRN
Status: DISCONTINUED | OUTPATIENT
Start: 2022-07-19 | End: 2022-07-26 | Stop reason: HOSPADM

## 2022-07-19 RX ORDER — CEFTRIAXONE 1 G/1
1 INJECTION, POWDER, FOR SOLUTION INTRAMUSCULAR; INTRAVENOUS EVERY 24 HOURS
Status: DISCONTINUED | OUTPATIENT
Start: 2022-07-19 | End: 2022-07-19

## 2022-07-19 RX ORDER — METRONIDAZOLE 500 MG/100ML
500 INJECTION, SOLUTION INTRAVENOUS EVERY 12 HOURS
Status: DISCONTINUED | OUTPATIENT
Start: 2022-07-19 | End: 2022-07-21

## 2022-07-19 RX ORDER — SODIUM CHLORIDE, SODIUM LACTATE, POTASSIUM CHLORIDE, CALCIUM CHLORIDE 600; 310; 30; 20 MG/100ML; MG/100ML; MG/100ML; MG/100ML
INJECTION, SOLUTION INTRAVENOUS CONTINUOUS
Status: DISCONTINUED | OUTPATIENT
Start: 2022-07-19 | End: 2022-07-19 | Stop reason: HOSPADM

## 2022-07-19 RX ORDER — VANCOMYCIN HYDROCHLORIDE 1 G/200ML
1000 INJECTION, SOLUTION INTRAVENOUS EVERY 12 HOURS
Status: DISCONTINUED | OUTPATIENT
Start: 2022-07-19 | End: 2022-07-21

## 2022-07-19 RX ORDER — ONDANSETRON 2 MG/ML
4 INJECTION INTRAMUSCULAR; INTRAVENOUS EVERY 6 HOURS PRN
Status: DISCONTINUED | OUTPATIENT
Start: 2022-07-19 | End: 2022-07-19

## 2022-07-19 RX ORDER — ACETAMINOPHEN 325 MG/1
975 TABLET ORAL EVERY 8 HOURS
Status: COMPLETED | OUTPATIENT
Start: 2022-07-19 | End: 2022-07-22

## 2022-07-19 RX ORDER — ONDANSETRON 4 MG/1
4 TABLET, ORALLY DISINTEGRATING ORAL EVERY 6 HOURS PRN
Status: DISCONTINUED | OUTPATIENT
Start: 2022-07-19 | End: 2022-07-19

## 2022-07-19 RX ORDER — MEPERIDINE HYDROCHLORIDE 25 MG/ML
12.5 INJECTION INTRAMUSCULAR; INTRAVENOUS; SUBCUTANEOUS EVERY 5 MIN PRN
Status: DISCONTINUED | OUTPATIENT
Start: 2022-07-19 | End: 2022-07-19 | Stop reason: HOSPADM

## 2022-07-19 RX ORDER — ALBUTEROL SULFATE 90 UG/1
1-2 AEROSOL, METERED RESPIRATORY (INHALATION) EVERY 4 HOURS PRN
Status: DISCONTINUED | OUTPATIENT
Start: 2022-07-19 | End: 2022-07-26 | Stop reason: HOSPADM

## 2022-07-19 RX ORDER — CEFTRIAXONE 1 G/1
1 INJECTION, POWDER, FOR SOLUTION INTRAMUSCULAR; INTRAVENOUS EVERY 24 HOURS
Status: DISCONTINUED | OUTPATIENT
Start: 2022-07-19 | End: 2022-07-22

## 2022-07-19 RX ORDER — CEFAZOLIN SODIUM/WATER 2 G/20 ML
2 SYRINGE (ML) INTRAVENOUS
Status: DISCONTINUED | OUTPATIENT
Start: 2022-07-19 | End: 2022-07-20 | Stop reason: CLARIF

## 2022-07-19 RX ORDER — AMITRIPTYLINE HYDROCHLORIDE 10 MG/1
10 TABLET ORAL
Status: ON HOLD | COMMUNITY
End: 2022-07-26

## 2022-07-19 RX ORDER — PROPOFOL 10 MG/ML
INJECTION, EMULSION INTRAVENOUS CONTINUOUS PRN
Status: DISCONTINUED | OUTPATIENT
Start: 2022-07-19 | End: 2022-07-19

## 2022-07-19 RX ORDER — CEFAZOLIN SODIUM/WATER 2 G/20 ML
2 SYRINGE (ML) INTRAVENOUS SEE ADMIN INSTRUCTIONS
Status: DISCONTINUED | OUTPATIENT
Start: 2022-07-19 | End: 2022-07-20 | Stop reason: CLARIF

## 2022-07-19 RX ORDER — LIDOCAINE 40 MG/G
CREAM TOPICAL
Status: DISCONTINUED | OUTPATIENT
Start: 2022-07-19 | End: 2022-07-19

## 2022-07-19 RX ORDER — ALBUTEROL SULFATE 90 UG/1
1-2 AEROSOL, METERED RESPIRATORY (INHALATION) EVERY 4 HOURS PRN
COMMUNITY
End: 2022-12-16

## 2022-07-19 RX ORDER — FENTANYL CITRATE 50 UG/ML
50 INJECTION, SOLUTION INTRAMUSCULAR; INTRAVENOUS EVERY 5 MIN PRN
Status: DISCONTINUED | OUTPATIENT
Start: 2022-07-19 | End: 2022-07-19 | Stop reason: HOSPADM

## 2022-07-19 RX ORDER — OXYCODONE HYDROCHLORIDE 5 MG/1
5 TABLET ORAL EVERY 4 HOURS PRN
Status: DISCONTINUED | OUTPATIENT
Start: 2022-07-19 | End: 2022-07-19

## 2022-07-19 RX ORDER — FENTANYL CITRATE 50 UG/ML
INJECTION, SOLUTION INTRAMUSCULAR; INTRAVENOUS PRN
Status: DISCONTINUED | OUTPATIENT
Start: 2022-07-19 | End: 2022-07-19

## 2022-07-19 RX ORDER — CLINDAMYCIN PHOSPHATE 900 MG/50ML
INJECTION, SOLUTION INTRAVENOUS
Status: DISCONTINUED
Start: 2022-07-19 | End: 2022-07-19 | Stop reason: WASHOUT

## 2022-07-19 RX ORDER — MEDROXYPROGESTERONE ACETATE 150 MG/ML
150 INJECTION, SUSPENSION INTRAMUSCULAR
COMMUNITY
End: 2022-09-16

## 2022-07-19 RX ORDER — ACETAMINOPHEN 325 MG/1
650 TABLET ORAL EVERY 4 HOURS PRN
Status: DISCONTINUED | OUTPATIENT
Start: 2022-07-22 | End: 2022-07-26 | Stop reason: HOSPADM

## 2022-07-19 RX ORDER — NALOXONE HYDROCHLORIDE 0.4 MG/ML
0.2 INJECTION, SOLUTION INTRAMUSCULAR; INTRAVENOUS; SUBCUTANEOUS
Status: DISCONTINUED | OUTPATIENT
Start: 2022-07-19 | End: 2022-07-26 | Stop reason: HOSPADM

## 2022-07-19 RX ORDER — ALBUTEROL SULFATE 0.83 MG/ML
2.5 SOLUTION RESPIRATORY (INHALATION) EVERY 4 HOURS PRN
Status: DISCONTINUED | OUTPATIENT
Start: 2022-07-19 | End: 2022-07-19 | Stop reason: HOSPADM

## 2022-07-19 RX ORDER — BISACODYL 10 MG
10 SUPPOSITORY, RECTAL RECTAL DAILY PRN
Status: DISCONTINUED | OUTPATIENT
Start: 2022-07-19 | End: 2022-07-26 | Stop reason: HOSPADM

## 2022-07-19 RX ORDER — LIDOCAINE HYDROCHLORIDE 10 MG/ML
INJECTION, SOLUTION INFILTRATION; PERINEURAL PRN
Status: DISCONTINUED | OUTPATIENT
Start: 2022-07-19 | End: 2022-07-19

## 2022-07-19 RX ORDER — HYDROMORPHONE HYDROCHLORIDE 1 MG/ML
0.4 INJECTION, SOLUTION INTRAMUSCULAR; INTRAVENOUS; SUBCUTANEOUS
Status: DISCONTINUED | OUTPATIENT
Start: 2022-07-19 | End: 2022-07-21

## 2022-07-19 RX ORDER — VENLAFAXINE HYDROCHLORIDE 150 MG/1
300 CAPSULE, EXTENDED RELEASE ORAL DAILY
Status: DISCONTINUED | OUTPATIENT
Start: 2022-07-19 | End: 2022-07-26 | Stop reason: HOSPADM

## 2022-07-19 RX ORDER — CLINDAMYCIN PHOSPHATE 900 MG/50ML
900 INJECTION, SOLUTION INTRAVENOUS SEE ADMIN INSTRUCTIONS
Status: DISCONTINUED | OUTPATIENT
Start: 2022-07-19 | End: 2022-07-19

## 2022-07-19 RX ORDER — ONDANSETRON 2 MG/ML
4 INJECTION INTRAMUSCULAR; INTRAVENOUS EVERY 30 MIN PRN
Status: DISCONTINUED | OUTPATIENT
Start: 2022-07-19 | End: 2022-07-19 | Stop reason: HOSPADM

## 2022-07-19 RX ORDER — POLYETHYLENE GLYCOL 3350 17 G/17G
17 POWDER, FOR SOLUTION ORAL DAILY
Status: DISCONTINUED | OUTPATIENT
Start: 2022-07-20 | End: 2022-07-21

## 2022-07-19 RX ORDER — OXYCODONE HYDROCHLORIDE 5 MG/1
10 TABLET ORAL EVERY 4 HOURS PRN
Status: DISCONTINUED | OUTPATIENT
Start: 2022-07-19 | End: 2022-07-24

## 2022-07-19 RX ORDER — SODIUM CHLORIDE, SODIUM LACTATE, POTASSIUM CHLORIDE, CALCIUM CHLORIDE 600; 310; 30; 20 MG/100ML; MG/100ML; MG/100ML; MG/100ML
INJECTION, SOLUTION INTRAVENOUS CONTINUOUS
Status: DISCONTINUED | OUTPATIENT
Start: 2022-07-19 | End: 2022-07-19

## 2022-07-19 RX ORDER — AMOXICILLIN 250 MG
1 CAPSULE ORAL 2 TIMES DAILY
Status: DISCONTINUED | OUTPATIENT
Start: 2022-07-19 | End: 2022-07-26 | Stop reason: HOSPADM

## 2022-07-19 RX ORDER — ONDANSETRON 2 MG/ML
INJECTION INTRAMUSCULAR; INTRAVENOUS PRN
Status: DISCONTINUED | OUTPATIENT
Start: 2022-07-19 | End: 2022-07-19

## 2022-07-19 RX ORDER — HEPARIN SODIUM 5000 [USP'U]/.5ML
5000 INJECTION, SOLUTION INTRAVENOUS; SUBCUTANEOUS
Status: DISCONTINUED | OUTPATIENT
Start: 2022-07-19 | End: 2022-07-26 | Stop reason: HOSPADM

## 2022-07-19 RX ORDER — FLUTICASONE PROPIONATE 50 MCG
2 SPRAY, SUSPENSION (ML) NASAL DAILY
Status: DISCONTINUED | OUTPATIENT
Start: 2022-07-19 | End: 2022-07-26 | Stop reason: HOSPADM

## 2022-07-19 RX ORDER — HYDROMORPHONE HYDROCHLORIDE 1 MG/ML
0.4 INJECTION, SOLUTION INTRAMUSCULAR; INTRAVENOUS; SUBCUTANEOUS EVERY 5 MIN PRN
Status: DISCONTINUED | OUTPATIENT
Start: 2022-07-19 | End: 2022-07-19 | Stop reason: HOSPADM

## 2022-07-19 RX ORDER — LIDOCAINE 40 MG/G
CREAM TOPICAL
Status: DISCONTINUED | OUTPATIENT
Start: 2022-07-19 | End: 2022-07-26 | Stop reason: HOSPADM

## 2022-07-19 RX ORDER — CLINDAMYCIN PHOSPHATE 900 MG/50ML
900 INJECTION, SOLUTION INTRAVENOUS
Status: DISCONTINUED | OUTPATIENT
Start: 2022-07-19 | End: 2022-07-19

## 2022-07-19 RX ADMIN — CEFTRIAXONE SODIUM 1 G: 1 INJECTION, POWDER, FOR SOLUTION INTRAMUSCULAR; INTRAVENOUS at 20:06

## 2022-07-19 RX ADMIN — QUETIAPINE FUMARATE 500 MG: 300 TABLET, FILM COATED ORAL at 21:43

## 2022-07-19 RX ADMIN — LISINOPRIL 40 MG: 20 TABLET ORAL at 21:44

## 2022-07-19 RX ADMIN — VANCOMYCIN HYDROCHLORIDE 1000 MG: 1 INJECTION, SOLUTION INTRAVENOUS at 22:04

## 2022-07-19 RX ADMIN — ONDANSETRON 4 MG: 2 INJECTION INTRAMUSCULAR; INTRAVENOUS at 13:55

## 2022-07-19 RX ADMIN — INSULIN ASPART 1 UNITS: 100 INJECTION, SOLUTION INTRAVENOUS; SUBCUTANEOUS at 06:26

## 2022-07-19 RX ADMIN — GABAPENTIN 100 MG: 100 CAPSULE ORAL at 20:01

## 2022-07-19 RX ADMIN — PANTOPRAZOLE SODIUM 40 MG: 40 TABLET, DELAYED RELEASE ORAL at 08:06

## 2022-07-19 RX ADMIN — AMLODIPINE BESYLATE 10 MG: 5 TABLET ORAL at 08:40

## 2022-07-19 RX ADMIN — OXYCODONE HYDROCHLORIDE 5 MG: 5 TABLET ORAL at 02:00

## 2022-07-19 RX ADMIN — SENNOSIDES AND DOCUSATE SODIUM 1 TABLET: 50; 8.6 TABLET ORAL at 20:01

## 2022-07-19 RX ADMIN — ACETAMINOPHEN 975 MG: 325 TABLET ORAL at 20:00

## 2022-07-19 RX ADMIN — INSULIN ASPART 1 UNITS: 100 INJECTION, SOLUTION INTRAVENOUS; SUBCUTANEOUS at 03:18

## 2022-07-19 RX ADMIN — GABAPENTIN 100 MG: 100 CAPSULE ORAL at 00:51

## 2022-07-19 RX ADMIN — FENTANYL CITRATE 50 MCG: 50 INJECTION, SOLUTION INTRAMUSCULAR; INTRAVENOUS at 18:02

## 2022-07-19 RX ADMIN — MEPERIDINE HYDROCHLORIDE 12.5 MG: 25 INJECTION INTRAMUSCULAR; INTRAVENOUS; SUBCUTANEOUS at 14:51

## 2022-07-19 RX ADMIN — HYDROMORPHONE HYDROCHLORIDE 0.4 MG: 1 INJECTION, SOLUTION INTRAMUSCULAR; INTRAVENOUS; SUBCUTANEOUS at 19:23

## 2022-07-19 RX ADMIN — UMECLIDINIUM 1 PUFF: 62.5 AEROSOL, POWDER ORAL at 09:48

## 2022-07-19 RX ADMIN — FENTANYL CITRATE 50 MCG: 50 INJECTION, SOLUTION INTRAMUSCULAR; INTRAVENOUS at 13:29

## 2022-07-19 RX ADMIN — INSULIN GLARGINE 10 UNITS: 100 INJECTION, SOLUTION SUBCUTANEOUS at 21:48

## 2022-07-19 RX ADMIN — METOPROLOL SUCCINATE 50 MG: 50 TABLET, EXTENDED RELEASE ORAL at 08:07

## 2022-07-19 RX ADMIN — TRAZODONE HYDROCHLORIDE 50 MG: 50 TABLET ORAL at 00:51

## 2022-07-19 RX ADMIN — PROPOFOL 100 MCG/KG/MIN: 10 INJECTION, EMULSION INTRAVENOUS at 13:29

## 2022-07-19 RX ADMIN — OXYCODONE HYDROCHLORIDE 5 MG: 5 TABLET ORAL at 08:07

## 2022-07-19 RX ADMIN — FLUTICASONE FUROATE AND VILANTEROL TRIFENATATE 1 PUFF: 100; 25 POWDER RESPIRATORY (INHALATION) at 09:48

## 2022-07-19 RX ADMIN — MIDAZOLAM 2 MG: 1 INJECTION INTRAMUSCULAR; INTRAVENOUS at 13:29

## 2022-07-19 RX ADMIN — TRAZODONE HYDROCHLORIDE 50 MG: 50 TABLET ORAL at 21:43

## 2022-07-19 RX ADMIN — QUETIAPINE FUMARATE 500 MG: 300 TABLET, FILM COATED ORAL at 00:51

## 2022-07-19 RX ADMIN — LIDOCAINE HYDROCHLORIDE 20 MG: 10 INJECTION, SOLUTION INFILTRATION; PERINEURAL at 13:29

## 2022-07-19 RX ADMIN — OXYCODONE HYDROCHLORIDE 5 MG: 5 TABLET ORAL at 14:53

## 2022-07-19 RX ADMIN — OXYCODONE HYDROCHLORIDE 10 MG: 5 TABLET ORAL at 23:08

## 2022-07-19 RX ADMIN — ACETAMINOPHEN 650 MG: 325 TABLET ORAL at 08:05

## 2022-07-19 RX ADMIN — SODIUM CHLORIDE, POTASSIUM CHLORIDE, SODIUM LACTATE AND CALCIUM CHLORIDE: 600; 310; 30; 20 INJECTION, SOLUTION INTRAVENOUS at 13:26

## 2022-07-19 RX ADMIN — FENTANYL CITRATE 50 MCG: 50 INJECTION, SOLUTION INTRAMUSCULAR; INTRAVENOUS at 13:37

## 2022-07-19 RX ADMIN — VANCOMYCIN HYDROCHLORIDE 1000 MG: 1 INJECTION, SOLUTION INTRAVENOUS at 11:36

## 2022-07-19 RX ADMIN — GABAPENTIN 100 MG: 100 CAPSULE ORAL at 08:07

## 2022-07-19 RX ADMIN — FLUTICASONE PROPIONATE 2 SPRAY: 50 SPRAY, METERED NASAL at 20:05

## 2022-07-19 RX ADMIN — INSULIN ASPART 1 UNITS: 100 INJECTION, SOLUTION INTRAVENOUS; SUBCUTANEOUS at 22:16

## 2022-07-19 RX ADMIN — HYDROMORPHONE HYDROCHLORIDE 0.4 MG: 1 INJECTION, SOLUTION INTRAMUSCULAR; INTRAVENOUS; SUBCUTANEOUS at 21:27

## 2022-07-19 RX ADMIN — FLUTICASONE FUROATE AND VILANTEROL TRIFENATATE 1 PUFF: 200; 25 POWDER RESPIRATORY (INHALATION) at 22:24

## 2022-07-19 ASSESSMENT — ACTIVITIES OF DAILY LIVING (ADL)
ADLS_ACUITY_SCORE: 35
EQUIPMENT_CURRENTLY_USED_AT_HOME: WHEELCHAIR, POWER;WHEELCHAIR, MANUAL
WALKING_OR_CLIMBING_STAIRS_DIFFICULTY: NO
WEAR_GLASSES_OR_BLIND: YES
VISION_MANAGEMENT: GLASSES
ADLS_ACUITY_SCORE: 35
NUMBER_OF_TIMES_PATIENT_HAS_FALLEN_WITHIN_LAST_SIX_MONTHS: -1
DOING_ERRANDS_INDEPENDENTLY_DIFFICULTY: YES
ADLS_ACUITY_SCORE: 37
FALL_HISTORY_WITHIN_LAST_SIX_MONTHS: YES
ADLS_ACUITY_SCORE: 37
TOILETING_ISSUES: NO
CONCENTRATING,_REMEMBERING_OR_MAKING_DECISIONS_DIFFICULTY: NO
ADLS_ACUITY_SCORE: 37
DIFFICULTY_EATING/SWALLOWING: NO
ADLS_ACUITY_SCORE: 35
ADLS_ACUITY_SCORE: 35
CHANGE_IN_FUNCTIONAL_STATUS_SINCE_ONSET_OF_CURRENT_ILLNESS/INJURY: NO
ADLS_ACUITY_SCORE: 35
DRESSING/BATHING_DIFFICULTY: NO
ADLS_ACUITY_SCORE: 26
ADLS_ACUITY_SCORE: 35
ADLS_ACUITY_SCORE: 26
ADLS_ACUITY_SCORE: 35

## 2022-07-19 ASSESSMENT — COPD QUESTIONNAIRES: COPD: 1

## 2022-07-19 ASSESSMENT — LIFESTYLE VARIABLES: TOBACCO_USE: 1

## 2022-07-19 NOTE — TELEPHONE ENCOUNTER
[facility] phone call message- general phone call:    Reason for call: Starting 7/12 she is not longer receiving care from them per patients request.    Action desired: call back if questions.    Return call needed: Yes    OK to leave a message on voice mail? Yes    Advised patient to response may take up to 2 business days: Yes    Primary language: English      needed? No    Call taken on July 19, 2022 at 4:24 PM by Lo Lao

## 2022-07-19 NOTE — ANESTHESIA CARE TRANSFER NOTE
Patient: Teresa Perez    Procedure: Procedure(s):  IRRIGATION AND DEBRIDEMENT, LEFT BUTTOCK       Diagnosis: Buttock wound, left, initial encounter [S35.335C]  Diagnosis Additional Information: No value filed.    Anesthesia Type:   MAC     Note:    Oropharynx: oropharynx clear of all foreign objects and spontaneously breathing  Level of Consciousness: awake  Oxygen Supplementation: room air    Independent Airway: airway patency satisfactory and stable  Dentition: dentition unchanged  Vital Signs Stable: post-procedure vital signs reviewed and stable  Report to RN Given: handoff report given  Patient transferred to: Phase II    Handoff Report: Identifed the Patient, Identified the Reponsible Provider, Reviewed the pertinent medical history, Discussed the surgical course, Reviewed Intra-OP anesthesia mangement and issues during anesthesia, Set expectations for post-procedure period and Allowed opportunity for questions and acknowledgement of understanding      Vitals:  Vitals Value Taken Time   /60 07/19/22 1414   Temp 36.6  C (97.8  F) 07/19/22 1414   Pulse 90 07/19/22 1414   Resp 15 07/19/22 1414   SpO2 94 % 07/19/22 1414       Electronically Signed By: RACHID Alcazar CRNA  July 19, 2022  2:14 PM
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
## 2022-07-19 NOTE — PROGRESS NOTES
[facility]    Progress Note - Hospitalist Service       Date of Admission:  7/18/2022    Assessment & Plan          Teresa AUSTIN Chris is a 51 year old female w/ complex medical hx significant for CVA, CAD, COPD, Epilepsy, bipolar disorder, DM II, s/p L BKA admitted on 7/18/2022 with cellulitis.      Decubitus ulcer, left buttock  Sepsis, SIRS criteria positive  Pt did meet SIRS criteria on admission (HR >100, leukocytosis to 18.9, likely source of infx), though lactic acid was WNL. Elevated CRP, ESR. Inflammatory markers were also elevated on admission. Plan for IV abx was discussed w/ her Surgeon Dr. Ware given her recent hernia repair and concern over potential for seeding infx here. She has remained stable. BCs x2 7/18 pending.   - General surgery following - washout today  - Wound culture intra-op  - Okay with holding plavix for a couple days post-op, though should restart after that given h/o stroke  - AM CBC  - IV Ceftriaxone and Vancomycin. Add metronidazole to cover anaerobes given wound proximity to rectum  - Follow BCs  - Will hold on IVF for now given normal LA and likely low tolerance for volume overload  - Wound cares ordered     Mobility concerns  S/p L sided BKA  Pt w/ left sided BKA. Mobility has been somewhat limited with this. She reports that she has been unable to care for her L buttock wound at homethus far. Has been looking into home PCA options.  - PT and OT consult  - Care coordination consulted to help w/ eventual discharge planning     Diabetes, type 2  A1c 9.3 two weeks ago. On 65 units toujeo BID, 28 units lispro BID with meals, max dose jardiance, max dose bydureon.  - Lantus 50u BID  - Medium sensitive sliding scale insulin   - Hold PTA oral agents/GLP1     Chronic conditions  Bipolar disorder  - Continue PTA Seroquel and Trazodone  Hypertension  - PTA Amlodipine  COPD  Asthma  - PTA Albuterol, symbicort -> Breo Ellipta d/t not on formulary       Diet: NPO  per Anesthesia Guidelines for Procedure/Surgery Except for: Meds    DVT Prophylaxis: Pneumatic Compression Devices  Vargas Catheter: Not present  Fluids: none  Central Lines: None  Cardiac Monitoring: None  Code Status: Full Code      Disposition Plan      Expected Discharge Date: 07/22/2022                The patient's care was discussed with Dr. Ashley Yoo MD  Hospitalist Service  [facility]  Securely message with the Vocera Web Console (learn more here)  Text page via In[employer] Paging/Directory         Clinically Significant Risk Factors Present on Admission             # Hypoalbuminemia: Albumin = 2.6 g/dL (Ref range: 3.5 - 5.0 g/dL) on admission, will monitor as appropriate    # Platelet Defect: home medication list includes an antiplatelet medication  # Hypertension: home medication list includes antihypertensive(s)    # DMII: A1C = N/A within past 3 months        ______________________________________________________________________    Interval History   Feeling okay this morning. Right hip is sore from lying on it all night. No questions about today's procedure.    Data reviewed today: I reviewed all medications, new labs and imaging results over the last 24 hours. I personally reviewed no images or EKG's today.    Physical Exam   Vital Signs: Temp: 97.8  F (36.6  C) Temp src: Tympanic BP: 136/63 Pulse: 92   Resp: 18 SpO2: 94 % O2 Device: None (Room air)    Weight: 227 lbs 0 oz  Constitutional: awake, alert, cooperative, no apparent distress, and appears stated age  Respiratory: No increased work of breathing, good air exchange  Cardiovascular: acyanotic  Skin: 2x3cm ulcer superior left medial buttock w/ surrounding erythema, purulent drainage  Neurologic: Awake, alert, oriented to name, place and time    Data   Recent Results (from the past 24 hour(s))   XR Pelvis 1/2 Views    Narrative    EXAM: XR PELVIS 1/2 VW  LOCATION: [facility]  HOSPITAL  DATE/TIME: 7/18/2022 3:18 PM    INDICATION: wound, eval for sacral osteo  COMPARISON: None.      Impression    IMPRESSION: Normal joint spaces and alignment. No focal bone destruction to suggest osteomyelitis. Lower sacrum partially obscured by bowel gas. Generalized demineralization versus artifact visualized proximal left femur.

## 2022-07-19 NOTE — PHARMACY-VANCOMYCIN DOSING SERVICE
Pharmacy Vancomycin Initial Note  Date of Service 2022  Patient's  1970  51 year old, female    Indication: Sepsis and Skin and Soft Tissue Infection    Current estimated CrCl = Estimated Creatinine Clearance: 113.1 mL/min (based on SCr of 0.65 mg/dL).    Creatinine for last 3 days  2022:  2:22 PM Creatinine 0.65 mg/dL    Recent Vancomycin Level(s) for last 3 days  No results found for requested labs within last 72 hours.      Vancomycin IV Administrations (past 72 hours)                   vancomycin (VANCOCIN) 2,000 mg in sodium chloride 0.9 % 500 mL intermittent infusion (mg) 2,000 mg New Bag 22 2205                Nephrotoxins and other renal medications (From now, onward)    Start     Dose/Rate Route Frequency Ordered Stop    22 1000  vancomycin (VANCOCIN) 1000 mg in dextrose 5% 200 mL PREMIX         1,000 mg  200 mL/hr over 1 Hours Intravenous EVERY 12 HOURS 22 0312      22 0800  lisinopril (ZESTRIL) tablet 40 mg         40 mg Oral DAILY 22 2139            Contrast Orders - past 72 hours (72h ago, onward)    None          [employer] Prediction of Planned Initial Vancomycin Regimen  Loading dose: N/A  Regimen: 1000 mg IV every 12 hours.  Start time: 04:11 on 2022  Exposure target: AUC24 (range)400-600 mg/L.hr   AUC24,ss: 475 mg/L.hr  Probability of AUC24 > 400: 63 %  Ctrough,ss: 13.4 mg/L  Probability of Ctrough,ss > 20: 30 %  Probability of nephrotoxicity (Lodise ORIANA ): 9 %          Plan:  1. Start vancomycin  1000 mg IV q12h.   2. Vancomycin monitoring method: AUC  3. Vancomycin therapeutic monitoring goal: 400-600 mg*h/L  4. Pharmacy will check vancomycin levels as appropriate in 1-3 Days.    5. Serum creatinine levels will be ordered daily for the first week of therapy and at least twice weekly for subsequent weeks.      Blossom Kyle [facility]
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
## 2022-07-19 NOTE — OP NOTE
[facility]    Operative Note    Pre-operative diagnosis: Buttock wound, left, initial encounter [S31.829A]  Post-operative diagnosis Same as pre-operative diagnosis    Procedure: Procedure(s):  IRRIGATION AND DEBRIDEMENT, LEFT BUTTOCK  Surgeon: Surgeon(s) and Role:     * Nabil Pedroza DO - Primary  Anesthesia: Monitor Anesthesia Care   Estimated Blood Loss: 5 mL    Drains: None  Specimens:   ID Type Source Tests Collected by Time Destination   A : left buttock wound  Swab Buttock, Left ANAEROBIC BACTERIAL CULTURE ROUTINE, GRAM STAIN, AEROBIC BACTERIAL CULTURE ROUTINE Nabil Pedroza DO 7/19/2022  1:49 PM      Findings:     -Left gluteal open wound with abscess.    Complications: None.  Implants: * No implants in log *    Indication: 51-year-old female who presented with a left gluteal open wound with abscess draining purulent material.  General surgery was consulted to evaluate this patient.  Offered the patient a incision and drainage of the site. The risks and benefits of the procedure were explained detail to the patient. The risks include infection, bleeding, damage to the surrounding structures. Patient verbalized understanding provided consent to undergo the procedure above.    Procedure: Patient was brought to the operating room placed on operative table in a right lateral decubitus position.  The patient then underwent sedation by the anesthesia team.  The patient's left gluteal wound was then prepped and draped in usual sterile fashion.  Prior to initiating the procedure, a timeout was completed.  All present were in agreement.    A 10 blade was then used to make a sharp skin incision peripherally around the circular open wound of the left gluteal region.  Electrocautery was used to dissect through the subcutaneous tissue and onto the gluteal muscle.  Once the necrotic tissue around the periphery of the wound was removed, I then obtained wound cultures.  The wound cultures were  passed off the surgical field for further analysis.    The wound continues to drain purulent material.  I then used a hemostat to break up the loculations peripherally around the wound.  The necrotic tissue was then removed using electrocautery.  The wound bed was cleaned down to healthy and muscular tissue.  The wound was then irrigated with copious amounts of a mixture of Betadine with sterile saline.  The opening of the skin measured 4 x 3 x 1 cm.  The tunneling of the entire wound measured 9 x 5 x 1 cm.  At the end of the procedure, 1% lidocaine with epinephrine was used to localize around the entire wound.  A final count was completed.  I was told that all sharps, sponges, instruments were accounted for.  The wound was then packed with 2 feet of Kerlix.  Sterile dressings were then applied patient was transferred to PACU in stable condition.     Nabil Pedroza DO  General Surgeon  [facility]  Surgery [address] [address]?  Office: [phone]  Employed by - [facility] [employer]  Pager: [phone]

## 2022-07-19 NOTE — PHARMACY-ADMISSION MEDICATION HISTORY
Pharmacy Note - Admission Medication History         ______________________________________________________________________    Prior To Admission (PTA) med list completed and updated in EMR.       PTA Med List   Medication Sig Last Dose     acetaminophen (TYLENOL) 500 MG tablet Take 1-2 tablets (500-1,000 mg) by mouth every 6 hours as needed for mild pain (Patient taking differently: Take 1,000 mg by mouth 3 times daily) 7/17/2022 at pm     albuterol (PROAIR HFA/PROVENTIL HFA/VENTOLIN HFA) 108 (90 Base) MCG/ACT inhaler Inhale 1-2 puffs into the lungs every 4 hours as needed 7/17/2022     albuterol (PROVENTIL) (2.5 MG/3ML) 0.083% neb solution Take 1 vial (2.5 mg) by nebulization every 6 hours as needed for shortness of breath / dyspnea or wheezing Past Week at Unknown time     amitriptyline (ELAVIL) 10 MG tablet Take 10 mg by mouth nightly as needed More than a month at last fill: 5/25     amLODIPine (NORVASC) 10 MG tablet Take 1 tablet (10 mg) by mouth daily 7/17/2022 at am     ammonium lactate (AMLACTIN) 12 % external cream Apply topically 2 times daily (Patient taking differently: Apply topically as needed) More than a month at Unknown time     azelastine (OPTIVAR) 0.05 % ophthalmic solution APPLY 1 DROP TO AFFECTED EYE(S) 2 TIMES DAILY. (Patient taking differently: as needed) 7/17/2022 at am     baclofen (LIORESAL) 20 MG tablet Take 1 tablet (20 mg) by mouth 2 times daily (Patient taking differently: Take 20 mg by mouth 3 times daily) 7/18/2022 at am     budesonide-formoterol (SYMBICORT) 160-4.5 MCG/ACT Inhaler Inhale 2 puffs into the lungs 2 times daily 7/17/2022 at pm     BYDUREON BCISE 2 MG/0.85ML auto-injector INJECT 2 MG SUBCUTANEOUS EVERY 7 DAYS 7/8/2022     TRUNG-GEST ANTACID 500 MG chewable tablet TAKE 1 TABLET (500 MG) BY MOUTH 2 TIMES DAILY AS NEEDED FOR HEARTBURN More than a month at Unknown time     cephALEXin (KEFLEX) 500 MG capsule Take 1 capsule (500 mg) by mouth 2 times daily 7/18/2022 at am      cetirizine (ZYRTEC) 10 MG tablet Take 1 tablet (10 mg) by mouth 2 times daily as needed for allergies (Patient taking differently: Take 10 mg by mouth daily) 7/17/2022 at pm     clopidogrel (PLAVIX) 75 MG tablet Take 1 tablet (75 mg) by mouth daily 7/18/2022 at am     diclofenac (VOLTAREN) 1 % topical gel Apply 2 g topically 4 times daily (Patient taking differently: Apply 2 g topically 4 times daily as needed) Unknown at Unknown time     diclofenac (VOLTAREN) 50 MG EC tablet TAKE 1 TABLET BY MOUTH TWICE A DAY 7/18/2022 at am     docusate sodium (COLACE) 100 MG capsule Take 1 capsule (100 mg) by mouth 2 times daily 7/17/2022 at pm     fluticasone (FLONASE) 50 MCG/ACT nasal spray Spray 2 sprays into both nostrils daily (Patient taking differently: Spray 2 sprays into both nostrils as needed) Unknown at Unknown time     gabapentin (NEURONTIN) 600 MG tablet Take 2 tabs three times daily. 7/18/2022 at am     insulin glargine U-300 (TOUJEO SOLOSTAR) 300 UNIT/ML (1 units dial) pen Take 65 Units in AM and 65 Units in PM. 7/18/2022 at am     insulin lispro (HUMALOG KWIKPEN) 100 UNIT/ML (1 unit dial) KWIKPEN Take 28 Units plus 2 units for every 50 over 150 per sliding scale.  Max 38 Units.  Max daily dose 114 Units. 7/18/2022 at am     JARDIANCE 25 MG TABS tablet TAKE 1 TABLET (25 MG) BY MOUTH DAILY 7/18/2022 at am     lidocaine (XYLOCAINE) 5 % external ointment Apply topically 3 times daily as needed for moderate pain More than a month at Unknown time     lisinopril (ZESTRIL) 40 MG tablet TAKE 1 TABLET BY MOUTH EVERY DAY 7/17/2022 at pm     medroxyPROGESTERone (DEPO-PROVERA) 150 MG/ML IM injection Inject 150 mg into the muscle every 3 months 6/29/2022     metoprolol succinate ER (TOPROL-XL) 50 MG 24 hr tablet TAKE 1 TABLET BY MOUTH EVERY DAY      miconazole (MONISTAT 1 DAY OR NIGHT) 1200 & 2 MG & % kit Use once as needed for discharge Unknown at prn     [facility] (SINGUL[facility]IR) 10 MG tablet TAKE 1 TABLET BY MOUTH EVERYDAY AT  BEDTIME 7/17/2022 at pm     nystatin (MYCOSTATIN) 537367 UNIT/ML suspension Take 500,000 Units by mouth daily as needed Past Week at Unknown time     ondansetron (ZOFRAN) 4 MG tablet TAKE 1 TABLET BY MOUTH EVERY 8 HOURS AS NEEDED FOR NAUSEA 7/18/2022 at am     oxyCODONE (ROXICODONE) 5 MG tablet Take 1 tablet (5 mg) by mouth every 4 hours as needed for breakthrough pain 7/18/2022 at am     pantoprazole (PROTONIX) 40 MG EC tablet TAKE 1 TABLET BY MOUTH EVERY DAY 7/18/2022 at am     pramipexole (MIRAPEX) 0.5 MG tablet TAKE 1 TABLET BY MOUTH AT BEDTIME 7/17/2022 at pm     pravastatin (PRAVACHOL) 80 MG tablet TAKE 1 TABLET BY MOUTH EVERY DAY 7/17/2022 at pm     QUEtiapine (SEROQUEL) 100 MG tablet TAKE 5 TABLETS (500 MG) BY MOUTH AT BEDTIME 7/17/2022 at pm     SUMAtriptan (IMITREX) 50 MG tablet Take 50 mg by mouth at onset of headache Unknown at prn     tiotropium (SPIRIVA RESPIMAT) 2.5 MCG/ACT inhaler Inhale 2 puffs into the lungs daily 7/17/2022 at pm     traZODone (DESYREL) 50 MG tablet TAKE 1 TABLET BY MOUTH AT BEDTIME 7/17/2022 at pm     venlafaxine (EFFEXOR-XR) 150 MG 24 hr capsule Take 2 capsules (300 mg) by mouth daily 7/18/2022 at am         [facility](s[facility]  Method of interview communication: in-person    Summary of Changes to PTA Med List  New: Amitriptyline, albuterol inhaler    Discontinued: loratadine, gabapentin 400mg  Changed:   - azelastine 2 times daily, ammonium 2 times daily, Voltaren 4 times daily, fluticasone daily--> prn.  - Baclofen 2 times daily--> 3 times daily  - Zyrtec 2 times daily as needed--> daily  - docusate BID--> QD   - Symbicort 80-4.5 --> 160-4.5    Patient was asked about OTC/herbal products specifically.  PTA med list reflects this.    In the past week, patient estimated taking medication this percent of the time:  50-90% due to other.    Allergies were reviewed, assessed, and updated with the patient.      Patient did not bring any  medications to the hospital and can't retrieve from home. No multi-dose medications are available for use during hospital stay.     The information provided in this note is only as accurate as the sources available at the time of the update(s).    Thank you.    Sandy Kwan. Pharmacy Resident  7/19/2022 11:41 AM
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
## 2022-07-19 NOTE — ANESTHESIA POSTPROCEDURE EVALUATION
Patient: Teresa Perez    Procedure: Procedure(s):  IRRIGATION AND DEBRIDEMENT, LEFT BUTTOCK       Anesthesia Type:  MAC    Note:  Disposition: Outpatient   Postop Pain Control: Uneventful            Sign Out: Well controlled pain   PONV: No   Neuro/Psych: Uneventful            Sign Out: Acceptable/Baseline neuro status   Airway/Respiratory: Uneventful            Sign Out: Acceptable/Baseline resp. status   CV/Hemodynamics: Uneventful            Sign Out: Acceptable CV status; No obvious hypovolemia; No obvious fluid overload   Other NRE: NONE   DID A NON-ROUTINE EVENT OCCUR?            Last vitals:  Vitals Value Taken Time   /58 07/19/22 1515   Temp 36.6  C (97.8  F) 07/19/22 1414   Pulse 95 07/19/22 1522   Resp 18 07/19/22 1430   SpO2 94 % 07/19/22 1522   Vitals shown include unvalidated device data.    Electronically Signed By: Levi Fontana MD  July 19, 2022  3:23 PM

## 2022-07-19 NOTE — PHARMACY-VANCOMYCIN DOSING SERVICE
Pharmacy Vancomycin Initial Note  Date of Service 2022  Patient's  1970  51 year old, female    Indication: Skin and Soft Tissue Infection    Current estimated CrCl = Estimated Creatinine Clearance: 113.1 mL/min (based on SCr of 0.65 mg/dL).    Creatinine for last 3 days  2022:  2:22 PM Creatinine 0.65 mg/dL    Recent Vancomycin Level(s) for last 3 days  No results found for requested labs within last 72 hours.      Vancomycin IV Administrations (past 72 hours)      No vancomycin orders with administrations in past 72 hours.                Nephrotoxins and other renal medications (From now, onward)    Start     Dose/Rate Route Frequency Ordered Stop    22  vancomycin (VANCOCIN) 2,000 mg in sodium chloride 0.9 % 500 mL intermittent infusion         2,000 mg  over 2 Hours Intravenous ONCE 22            Contrast Orders - past 72 hours (72h ago, onward)    None                Plan:    Vancomycin 2000 mg IV x 1 now.  Re-consult pharmacy if vancomycin is to be continued inpatient.    Natalio Foss [facility]

## 2022-07-20 ENCOUNTER — APPOINTMENT (OUTPATIENT)
Dept: PHYSICAL THERAPY | Facility: HOSPITAL | Age: 52
DRG: 593 | End: 2022-07-20
Payer: COMMERCIAL

## 2022-07-20 LAB
ANION GAP SERPL CALCULATED.3IONS-SCNC: 14 MMOL/L (ref 5–18)
BUN SERPL-MCNC: 11 MG/DL (ref 8–22)
CALCIUM SERPL-MCNC: 9.2 MG/DL (ref 8.5–10.5)
CHLORIDE BLD-SCNC: 103 MMOL/L (ref 98–107)
CO2 SERPL-SCNC: 20 MMOL/L (ref 22–31)
CREAT SERPL-MCNC: 0.7 MG/DL (ref 0.6–1.1)
ERYTHROCYTE [DISTWIDTH] IN BLOOD BY AUTOMATED COUNT: 13.4 % (ref 10–15)
GFR SERPL CREATININE-BSD FRML MDRD: >90 ML/MIN/1.73M2
GLUCOSE BLD-MCNC: 183 MG/DL (ref 70–125)
GLUCOSE BLDC GLUCOMTR-MCNC: 151 MG/DL (ref 70–99)
GLUCOSE BLDC GLUCOMTR-MCNC: 162 MG/DL (ref 70–99)
GLUCOSE BLDC GLUCOMTR-MCNC: 188 MG/DL (ref 70–99)
GLUCOSE BLDC GLUCOMTR-MCNC: 215 MG/DL (ref 70–99)
GLUCOSE BLDC GLUCOMTR-MCNC: 215 MG/DL (ref 70–99)
GLUCOSE BLDC GLUCOMTR-MCNC: 219 MG/DL (ref 70–99)
GRAM STAIN RESULT: ABNORMAL
HCT VFR BLD AUTO: 40 % (ref 35–47)
HGB BLD-MCNC: 13 G/DL (ref 11.7–15.7)
MAGNESIUM SERPL-MCNC: 2 MG/DL (ref 1.8–2.6)
MCH RBC QN AUTO: 29.5 PG (ref 26.5–33)
MCHC RBC AUTO-ENTMCNC: 32.5 G/DL (ref 31.5–36.5)
MCV RBC AUTO: 91 FL (ref 78–100)
PHOSPHATE SERPL-MCNC: 3.4 MG/DL (ref 2.5–4.5)
PLATELET # BLD AUTO: 396 10E3/UL (ref 150–450)
POTASSIUM BLD-SCNC: 3.8 MMOL/L (ref 3.5–5)
RBC # BLD AUTO: 4.41 10E6/UL (ref 3.8–5.2)
SODIUM SERPL-SCNC: 137 MMOL/L (ref 136–145)
WBC # BLD AUTO: 11.7 10E3/UL (ref 4–11)

## 2022-07-20 PROCEDURE — 250N000011 HC RX IP 250 OP 636: Performed by: SURGERY

## 2022-07-20 PROCEDURE — G0463 HOSPITAL OUTPT CLINIC VISIT: HCPCS

## 2022-07-20 PROCEDURE — 120N000001 HC R&B MED SURG/OB

## 2022-07-20 PROCEDURE — 97161 PT EVAL LOW COMPLEX 20 MIN: CPT | Mod: GP

## 2022-07-20 PROCEDURE — 84100 ASSAY OF PHOSPHORUS: CPT | Performed by: SURGERY

## 2022-07-20 PROCEDURE — 250N000013 HC RX MED GY IP 250 OP 250 PS 637: Performed by: SURGERY

## 2022-07-20 PROCEDURE — 80048 BASIC METABOLIC PNL TOTAL CA: CPT | Performed by: SURGERY

## 2022-07-20 PROCEDURE — 999N000127 HC STATISTIC PERIPHERAL IV START W US GUIDANCE

## 2022-07-20 PROCEDURE — 36415 COLL VENOUS BLD VENIPUNCTURE: CPT | Performed by: SURGERY

## 2022-07-20 PROCEDURE — 99233 SBSQ HOSP IP/OBS HIGH 50: CPT | Mod: GC | Performed by: FAMILY MEDICINE

## 2022-07-20 PROCEDURE — 85027 COMPLETE CBC AUTOMATED: CPT | Performed by: SURGERY

## 2022-07-20 PROCEDURE — 97530 THERAPEUTIC ACTIVITIES: CPT | Mod: GP

## 2022-07-20 PROCEDURE — 999N000197 HC STATISTIC WOC PT EDUCATION, 0-15 MIN

## 2022-07-20 PROCEDURE — 83735 ASSAY OF MAGNESIUM: CPT | Performed by: SURGERY

## 2022-07-20 PROCEDURE — 250N000013 HC RX MED GY IP 250 OP 250 PS 637: Performed by: STUDENT IN AN ORGANIZED HEALTH CARE EDUCATION/TRAINING PROGRAM

## 2022-07-20 RX ORDER — ARGININE/GLUTAMINE/CALCIUM BMB 7G-7G-1.5G
1 POWDER IN PACKET (EA) ORAL 2 TIMES DAILY WITH MEALS
Status: DISCONTINUED | OUTPATIENT
Start: 2022-07-20 | End: 2022-07-26 | Stop reason: HOSPADM

## 2022-07-20 RX ADMIN — QUETIAPINE FUMARATE 500 MG: 300 TABLET, FILM COATED ORAL at 20:42

## 2022-07-20 RX ADMIN — ACETAMINOPHEN 975 MG: 325 TABLET ORAL at 04:48

## 2022-07-20 RX ADMIN — GABAPENTIN 100 MG: 100 CAPSULE ORAL at 20:42

## 2022-07-20 RX ADMIN — GABAPENTIN 100 MG: 100 CAPSULE ORAL at 14:37

## 2022-07-20 RX ADMIN — HYDROMORPHONE HYDROCHLORIDE 0.4 MG: 1 INJECTION, SOLUTION INTRAMUSCULAR; INTRAVENOUS; SUBCUTANEOUS at 12:49

## 2022-07-20 RX ADMIN — METRONIDAZOLE 500 MG: 500 INJECTION, SOLUTION INTRAVENOUS at 12:00

## 2022-07-20 RX ADMIN — Medication 1 PACKET: at 16:53

## 2022-07-20 RX ADMIN — FLUTICASONE FUROATE AND VILANTEROL TRIFENATATE 1 PUFF: 200; 25 POWDER RESPIRATORY (INHALATION) at 09:29

## 2022-07-20 RX ADMIN — FLUTICASONE PROPIONATE 2 SPRAY: 50 SPRAY, METERED NASAL at 09:16

## 2022-07-20 RX ADMIN — SENNOSIDES AND DOCUSATE SODIUM 1 TABLET: 50; 8.6 TABLET ORAL at 09:15

## 2022-07-20 RX ADMIN — HYDROMORPHONE HYDROCHLORIDE 0.4 MG: 1 INJECTION, SOLUTION INTRAMUSCULAR; INTRAVENOUS; SUBCUTANEOUS at 22:59

## 2022-07-20 RX ADMIN — HYDROMORPHONE HYDROCHLORIDE 0.4 MG: 1 INJECTION, SOLUTION INTRAMUSCULAR; INTRAVENOUS; SUBCUTANEOUS at 20:33

## 2022-07-20 RX ADMIN — OXYCODONE HYDROCHLORIDE 10 MG: 5 TABLET ORAL at 14:44

## 2022-07-20 RX ADMIN — AMLODIPINE BESYLATE 10 MG: 5 TABLET ORAL at 09:17

## 2022-07-20 RX ADMIN — ONDANSETRON 4 MG: 2 INJECTION INTRAMUSCULAR; INTRAVENOUS at 21:57

## 2022-07-20 RX ADMIN — CLOPIDOGREL BISULFATE 75 MG: 75 TABLET ORAL at 09:18

## 2022-07-20 RX ADMIN — CEFTRIAXONE SODIUM 1 G: 1 INJECTION, POWDER, FOR SOLUTION INTRAMUSCULAR; INTRAVENOUS at 20:43

## 2022-07-20 RX ADMIN — OXYCODONE HYDROCHLORIDE 10 MG: 5 TABLET ORAL at 21:49

## 2022-07-20 RX ADMIN — TRAZODONE HYDROCHLORIDE 50 MG: 50 TABLET ORAL at 20:42

## 2022-07-20 RX ADMIN — HYDROMORPHONE HYDROCHLORIDE 0.4 MG: 1 INJECTION, SOLUTION INTRAMUSCULAR; INTRAVENOUS; SUBCUTANEOUS at 10:38

## 2022-07-20 RX ADMIN — METRONIDAZOLE 500 MG: 500 INJECTION, SOLUTION INTRAVENOUS at 00:49

## 2022-07-20 RX ADMIN — METOPROLOL SUCCINATE 50 MG: 50 TABLET, EXTENDED RELEASE ORAL at 09:19

## 2022-07-20 RX ADMIN — SENNOSIDES AND DOCUSATE SODIUM 1 TABLET: 50; 8.6 TABLET ORAL at 20:43

## 2022-07-20 RX ADMIN — VANCOMYCIN HYDROCHLORIDE 1000 MG: 1 INJECTION, SOLUTION INTRAVENOUS at 09:11

## 2022-07-20 RX ADMIN — OXYCODONE HYDROCHLORIDE 10 MG: 5 TABLET ORAL at 04:49

## 2022-07-20 RX ADMIN — POLYETHYLENE GLYCOL 3350 17 G: 17 POWDER, FOR SOLUTION ORAL at 09:19

## 2022-07-20 RX ADMIN — UMECLIDINIUM 1 PUFF: 62.5 AEROSOL, POWDER ORAL at 09:16

## 2022-07-20 RX ADMIN — Medication 1 MG: at 20:43

## 2022-07-20 RX ADMIN — OXYCODONE HYDROCHLORIDE 10 MG: 5 TABLET ORAL at 09:37

## 2022-07-20 RX ADMIN — VENLAFAXINE HYDROCHLORIDE 300 MG: 150 CAPSULE, EXTENDED RELEASE ORAL at 09:15

## 2022-07-20 RX ADMIN — OXYCODONE HYDROCHLORIDE 10 MG: 5 TABLET ORAL at 21:56

## 2022-07-20 RX ADMIN — ACETAMINOPHEN 975 MG: 325 TABLET ORAL at 11:59

## 2022-07-20 RX ADMIN — LISINOPRIL 40 MG: 20 TABLET ORAL at 20:43

## 2022-07-20 RX ADMIN — PANTOPRAZOLE SODIUM 40 MG: 40 TABLET, DELAYED RELEASE ORAL at 09:19

## 2022-07-20 RX ADMIN — GABAPENTIN 100 MG: 100 CAPSULE ORAL at 09:18

## 2022-07-20 RX ADMIN — VANCOMYCIN HYDROCHLORIDE 1000 MG: 1 INJECTION, SOLUTION INTRAVENOUS at 21:54

## 2022-07-20 RX ADMIN — ACETAMINOPHEN 975 MG: 325 TABLET ORAL at 18:29

## 2022-07-20 ASSESSMENT — ACTIVITIES OF DAILY LIVING (ADL)
ADLS_ACUITY_SCORE: 26

## 2022-07-20 NOTE — CONSULTS
[facility] Nurse Inpatient Assessment     Consulted for: left buttrock    Patient History (according to provider note(s):    Per 8/18 hospitalist note,  Teresa AUSTIN Chris is a 51 year old female w/ complex medical hx significant for CVA, CAD, COPD, Epilepsy, bipolar disorder, DM II, s/p L BKA admitted on 7/18/2022 with cellulitis.   Decubitus ulcer, left buttock  Sepsis  Pt did meet SIRS criteria on admission (HR >100, leukocytosis to 18.9, likely source of infx), though lactic acid was WNL. Elevated CRP, ESR. Plan for IV abx was discussed w/ her Surgeon Dr. Ware given her recent hernia repair and concern over potential for seeding infx here. She has remained stable. BCs x2 7/18 pending.   - General surgery following - washout 7/19  - Wound culture intra-op pending  - Okay with holding plavix for a couple days post-op, though should restart after that given h/o stroke  - AM CBC  - IV Ceftriaxone and Vancomycin. Added metronidazole to cover anaerobes given wound proximity to rectum  - Follow BCs  - Will hold on IVF for now given normal LA and likely low tolerance for volume overload  - Wound cares ordered    Mobility concerns  S/p L sided BKA  Pt w/ left sided BKA. Mobility has been somewhat limited with this. She reports that she has been unable to care for her L buttock wound at homethus far. Has been looking into home PCA options.  - PT and OT consult  - Care coordination consulted to help w/ eventual discharge planning     Diabetes, type 2  A1c 9.3 two weeks ago. On 65 units toujeo BID, 28 units lispro BID with meals, max dose jardiance, max dose bydureon. Was only given 10 units lantus 7/19 night. Sugar the next morning of 188.  - Lantus 40 units BID  - Medium sensitive sliding scale insulin   - 1:10 carb count with meals  - Hold PTA oral agents/GLP1     IRRIGATION AND DEBRIDEMENT DONE BY SURGICAL TEAM    Areas Assessed:      Areas visualized during today's visit: Focused: LEFT LOW  BUTTOCK    Pressure Injury Location:      Left low buttock    Last photo: today 7/20/22     Wound type: Pressure Injury consistent with     Pressure Injury: present on admission    Wound history/plan of care: as above    Wound base: mostly granular tissue. Geography of the wound bed not clear for understanding today     Palpation of the wound bed: normal      Drainage: moderate     Description of drainage: serous     Measurements (length x width x depth, in cm)  Entrance to wound 2 x 3 cm     Tunneling and undermining present, not able to measure due to pt position and pain level   Periwound skin: Intact      Color: normal and consistent with surrounding tissue      Temperature: normal   Odor: none  Pain: moderate,  On manipulations  Pain intervention prior to dressing change: IV medicine  Treatment goal: Heal , Drainage control, Infection control/prevention and Increase granulation  STATUS: initial   Supplies ordered: gathered and at bedside           Treatment Plan:     Left buttock wound(s): Daily  or twice per day  Pack with rolled gauze moistened by Vashe solution    Orders: Reviewed and Written    RECOMMEND PRIMARY TEAM ORDER: pt under surgery team care  Education provided: importance of repositioning, plan of care, wound progress, Infection prevention  and Hygiene  Discussed plan of care with: Family and Nurse  WOC nurse follow-up plan: twice weekly  Notify WOC if wound(s) deteriorate.  Nursing to notify the Provider(s) and re-consult the WOC Nurse if new skin concern.    DATA:     Current support surface: Standard  Foam mattress  Containment of urine/stool: Continent of bladder and Continent of bowel  BMI: Body mass index is 41.47 kg/m .   Active diet order: Orders Placed This Encounter      Moderate Consistent Carb (60 g CHO per Meal) Diet     Output: I/O last 3 completed shifts:  In: 1160 [P.O.:760; I.V.:400]  Out: 2400 [Urine:2400]     Labs: Recent Labs   Lab 07/20/22  0808 07/19/22  0758 07/18/22  1422    ALBUMIN  --  2.6*  --    HGB 13.0 13.3  13.3 13.8   WBC 11.7* 14.2*  14.2* 18.9*   CRP  --   --  20.2*     Pressure injury risk assessment:   Sensory Perception: 3-->slightly limited  Moisture: 3-->occasionally moist  Activity: 2-->chairfast  Mobility: 3-->slightly limited  Nutrition: 3-->adequate  Friction and Shear: 2-->potential problem  Chi Score: 16    Cyndi Powell RN

## 2022-07-20 NOTE — PROGRESS NOTES
"   07/20/22 0800   Quick Adds   Type of Visit Initial PT Evaluation   Living Environment   People in Home alone   Current Living Arrangements apartment   Home Accessibility no concerns   Transportation Anticipated none;health plan transportation   Living Environment Comments Pt lives alone in an apartment, no stairs that she needs to do. Reports has some friends and family that can check in.   Self-Care   Usual Activity Tolerance moderate   Current Activity Tolerance fair   Regular Exercise No   Equipment Currently Used at Home wheelchair, manual;wheelchair, power   Fall history within last six months yes   Number of times patient has fallen within last six months 3   Activity/Exercise/Self-Care Comment Pt not currently working. L AKA \"several years ago.\"   General Information   Onset of Illness/Injury or Date of Surgery 07/18/22   Referring Physician Dr. Madhuri Cook.   Patient/Family Therapy Goals Statement (PT) To go home.   Pertinent History of Current Problem (include personal factors and/or comorbidities that impact the POC) From chart:   Teresa Perez is a 51 year old female w/ complex medical hx significant for CVA, CAD, COPD, Epilepsy, bipolar disorder, DM II, s/p L BKA admitted on 7/18/2022 with cellulitis.   Existing Precautions/Restrictions fall   Weight-Bearing Status - RLE full weight-bearing   Heart Disease Risk Factors Lack of physical activity;Overweight;Medical history   General Observations Female supine in bed. L AKA.   Cognition   Affect/Mental Status (Cognition) WNL   Orientation Status (Cognition) oriented x 4   Follows Commands (Cognition) WNL   Cognitive Status Comments Pleasant, cooperative.   Pain Assessment   Patient Currently in Pain Yes, see Vital Sign flowsheet  (\"pretty bad\" in L buttock, abdominal pain)   Integumentary/Edema   Integumentary/Edema Comments Not formally assessed.   Posture    Posture Forward head position;Protracted shoulders   Range of Motion (ROM)   ROM Comment " Grossly WFL.   Strength (Manual Muscle Testing)   Strength Comments Grossly WFL during functional mobility.   Bed Mobility   Comment, (Bed Mobility) SBA with HOB slightly elevated.   Transfers   Comment, (Transfers) Sit-stand with SBA. Pivots to chair with SBA, using bed rail and chair for stability.   Gait/Stairs (Locomotion)   Comment, (Gait/Stairs) Pt politely declined to attempt this today.   Balance   Balance Comments Appears to have adequate standing balance with BUE support.   Sensory Examination   Sensory Perception Comments Reports numbness and tingling in hands and feet.   Coordination   Coordination no deficits were identified   Muscle Tone   Muscle Tone no deficits were identified   Clinical Impression   Criteria for Skilled Therapeutic Intervention Yes, treatment indicated   PT Diagnosis (PT) Impaired functional mobility and activity tolerance.   Influenced by the following impairments Medical, weakness, pain.   Functional limitations due to impairments Ambulation, endurance.   Clinical Presentation (PT Evaluation Complexity) Stable/Uncomplicated   Clinical Presentation Rationale Clinician judgment.   Clinical Decision Making (Complexity) low complexity   Planned Therapy Interventions (PT) gait training;transfer training   Risk & Benefits of therapy have been explained patient   PT Discharge Planning   PT Discharge Recommendation (DC Rec) home with assist;home with home care physical therapy   PT Rationale for DC Rec Pt mobilizing near baseline, demos safe transfers. Reports has family/friends to check on her. Would like HHPT to keep working on strength/endurance. I agree with this.   PT Brief overview of current status PT eval completed. Pt is SBA for transfers, declined to demo ambulation today.   Plan of Care Review   Plan of Care Reviewed With patient   Total Evaluation Time   Total Evaluation Time (Minutes) 8   Physical Therapy Goals   PT Frequency Daily   PT Predicted Duration/Target Date for Goal  Attainment 07/25/22   PT Goals Transfers;Gait   PT: Transfers Independent;Sit to/from stand;Bed to/from chair;Assistive device   PT: Gait Modified independent;Assistive device;Within precautions;10 feet

## 2022-07-20 NOTE — CONSULTS
Chart reviewed. Surgery has assumed care for this patient and is directing care. WOC will defer to surgery at this time and will s/o. Contact surgery with questions about this wound.

## 2022-07-20 NOTE — PROGRESS NOTES
"CLINICAL NUTRITION SERVICES - ASSESSMENT NOTE     Nutrition Prescription    RECOMMENDATIONS FOR MDs/PROVIDERS TO ORDER:  None at this time    Malnutrition Status:    Moderate malnutrition  In Context of: Chronic illness or disease    Recommendations already ordered by Registered Dietitian (RD):  Kevon SUTTON, MWF weighs    Future/Additional Recommendations:  Outpatient weight management when medically appropriate.     REASON FOR ASSESSMENT  Teresa Perez is a/an 51 year old female assessed by the dietitian for Admission Nutrition Risk Screen for weight loss and loss of appetite.    Pt admitted 7/18 w/ cellulitis, sepsis, decubitus ulcer to L buttock, HTN, s/p hernia repair. Hx of CVA, CAD, COPD, Epilepsy, BPD, DM2, L BKA (2019).    NUTRITION HISTORY  Visited with Teresa today. She is experiencing great pain, impacting her appetite, that she attributes to hernia correction surgery 2 weeks prior to today. Pt usual intake consists of 3 meals per day. Breakfast is cereal with milk, lunch and dinner are \"random.\" During hospitalization, pt reports consuming 50% of meals received compared to usual intake. No concerns with chewing or swallowing reported. Pt notices no difference in tolerance to specific foods or beverages regarding oral intake and pain level. Pt reports noticeable weight loss dating back 6 months, and correlates loss to pain-related decreased appetite. Although pt reports fluctuations in weight and a history of hemodialysis treatment, no fluid rentention is currently reported nor documented. Importance of blood sugar control and increased protein need protein was discussed regarding pressure injury. Pt has received blood sugar management dietary education historically, and remains mindful in her food intake. Pt is also aware of increased protein need from previous RD hospital visits. Pt is willing to try Kevon between meals BID to assist in wound healing and meeting estimated daily nutrition goals. " "Adequate oral intake was encouraged during meals as tolerated.    CURRENT NUTRITION ORDERS  Diet: Moderate Consistent Carbohydrate  Intake/Tolerance: Per flowsheets, pt consuming 100% of meals provided (one exception of 50% of turkey sandwich, pudding, gelatin consumed 7/19).    LABS  Labs reviewed   06/29/22 07:55   Glucose 227 (H)      07/18/22 14:22   CRP 20.2 (H)      06/29/22 07:55   Hemoglobin A1C 9.3 (H)     Lab values consistent with DM2 and Sepsis.    MEDICATIONS  Neurontin, Insulin, IV Flagyl, Protonix, Miralax, Seroquel, Senokot, Desryl, Vancocin, Effexor, Dulcolax, Glucose PRN, Dilaudid, MoM, Zofran, Oxycodone, Compazine    ANTHROPOMETRICS  Height: 153.7 cm (5' .5\")  Most Recent Weight: 97.9 kg (215 lb 14.4 oz)    IBW: 45.5 kg  BMI: Obesity Grade III BMI >40    Encounter Weight   8/14/2021 245 lbs   1/26/2022 239 lbs 6 oz   6/29/2022 229 lbs   7/5/2022 227 lbs 10 oz   7/18/2022 227 lbs   7/19/2022 215 lbs 14 oz     5.2% weight loss in 2 weeks - significant per ASPEN criteria  6.1% weight loss in less than 1 month - significant per ASPEN criteria  10% weight loss in 6 months - significant per ASPEN criteria    No documented fluid resuscitation or diuretic usage, hx hemodialsys 2019.  Per H&P 7/19 pt has low fluid overload tolerance.    ASSESSED NUTRITION NEEDS  Estimated Energy Needs (ABW: 97.9 kg): 3381-0039 kcals/day (11 - 14 kcals/kg)  Justification: Wound healing  Estimated Protein Needs (IBW: 45.5 kg): 114 grams protein/day (2.5 grams of pro/kg)  Justification: Wound healing  Estimated Fluid Needs (ABW: 97.9 kg): 2935 mL/day (30 mL/kg)   Justification: Maintenance    PHYSICAL FINDINGS  -Decubitus ulcer to L buttock, WOC consulted.  -See malnutrition section below.    MALNUTRITION:  % Weight Loss: 5% weight loss in less than 1 month, 10% weight loss in 6 months  % Intake:  <75% for > 3 months  Subcutaneous Fat Loss: None observed  Muscle Loss:  None observed  Fluid Retention:  None " noted    Malnutrition Diagnosis: Moderate malnutrition  In Context of: Chronic illness or disease    NUTRITION DIAGNOSIS  Moderated protein-calorie malnutrition related to chronic illness as evidenced by 5.2% weight loss in 2 weeks, 6.1% weight loss in less than 1 month, 10% weight loss in 6 months, energy intake less than 75% of estimated needs for greater than 3 months.    Increased protein need related to wound healing as evidenced by decubitus ulcer to left buttock.    INTERVENTIONS  Implementation  Nutrition education for increased protein need.   Supplement meals with Kevon BID to provide 190 kcal, 14 gm arginine, 14 gm glutamine, and adequate Vitamin C and Zinc if all consumed.    Goals  Patient to consume % of nutritionally adequate meals three times per day, or the equivalent with supplements/snacks.  Weight maintenance during admission.     Monitoring/Evaluation  Progress toward goals will be monitored and evaluated per protocol.

## 2022-07-20 NOTE — PLAN OF CARE
Goal Outcome Evaluation:          Problem: Plan of Care - These are the overarching goals to be used throughout the patient stay.    Goal: Plan of Care Review/Shift Note  Description: The Plan of Care Review/Shift note should be completed every shift.  The Outcome Evaluation is a brief statement about your assessment that the patient is improving, declining, or no change.  This information will be displayed automatically on your shift note.  7/20/2022 1847 by Dahlia Jarrett, YULISA  Outcome: Ongoing, Progressing  7/20/2022 1826 by Dahlia Jarrett, YULISA  Outcome: Ongoing, Progressing      Pt pleasant and cooperative with all cares and treatments this shift, except the use of her PCD to right leg. She states that she moves around too much and it gets caught up.  She also refused to do her ins stephany with attempts to have pt do x 3.    Pt did c/o pain most of the shift she rates it pretty consistently at 7-8/10. With some relief form the IVP Dilaudid as well as the oxycodone 10 mg.  She was given 10 mg of Po oxycodone at 0937 and 1444. Dilaudid 0.4 mg IVP was given at 1038 and 1249. Both doses of IVP were dressing change related.    The left buttock wound was seen today by the surg team NP and they changed the dressing then ordered for the CWOCN to see the pt. CWOCN saw the pt and stated that she was going to start a new type of dressing cleanser BID to the wound. At this time there are no orders and this writer called and left a message with the ET line [id number] to address in the am.  Also she ordered an air mattress for the pt that finally arrived this evening and was set up for the pt.    Kevon supplement was started and pt took the first dose at 100%    Pt started on carb count and Lantus as increased, she is covered with S/S insulin as well. BS's have been 215,219 and 151. Pt states that she was on a lot more insulin at home. Diabetes educator was consulted.    Phos 3.41, Mag 2.0 WBC 11.7 MD aware.  "ABX run in this shift as per MD orders.    Wound culture is pending prelim shows 3+ gram pos cocci    Update as this note is being written 1930:   pt is able to self transfer to the chair and commode.  When she voids her dressing dangles down and sits in the pt's urine.  This writer has changed the dressing x 4 this shift after each void on the commode. Concerned that the pt needs a different dressing to avoid wound contamination from urine and stool getting into the wound when voiding. [facility] to address in the treatment team section.    Will cont to encourage this pt to pivot transfer and move around as much as possible. Pt is able to repo herself independently and is good at turning herself every 1-2 hours to offload pressure on her wound.    She stated that she also has a prosthetic that she has not been able to use yet because. \"Covid has set the process back with being able to get the PT treatments\", that she needs to use this.    Will cont to monitor this pt and her commodities and alert the MD of any status changes.                   "

## 2022-07-20 NOTE — PROGRESS NOTES
[facility]    Progress Note - Hospitalist Service       Date of Admission:  7/18/2022    Assessment & Plan            Teresa AUSTIN Chris is a 51 year old female w/ complex medical hx significant for CVA, CAD, COPD, Epilepsy, bipolar disorder, DM II, s/p L BKA admitted on 7/18/2022 with cellulitis.      Decubitus ulcer, left buttock  Sepsis  Pt did meet SIRS criteria on admission (HR >100, leukocytosis to 18.9, likely source of infx), though lactic acid was WNL. Elevated CRP, ESR. Plan for IV abx was discussed w/ her Surgeon Dr. Ware given her recent hernia repair and concern over potential for seeding infx here. She has remained stable. BCs x2 7/18 pending.   - General surgery following - washout 7/19  - Wound culture intra-op pending  - Okay with holding plavix for a couple days post-op, though should restart after that given h/o stroke  - AM CBC  - IV Ceftriaxone and Vancomycin. Added metronidazole to cover anaerobes given wound proximity to rectum  - Follow BCs  - Will hold on IVF for now given normal LA and likely low tolerance for volume overload  - Wound cares ordered     Mobility concerns  S/p L sided BKA  Pt w/ left sided BKA. Mobility has been somewhat limited with this. She reports that she has been unable to care for her L buttock wound at homethus far. Has been looking into home PCA options.  - PT and OT consult  - Care coordination consulted to help w/ eventual discharge planning     Diabetes, type 2  A1c 9.3 two weeks ago. On 65 units toujeo BID, 28 units lispro BID with meals, max dose jardiance, max dose bydureon. Was only given 10 units lantus 7/19 night. Sugar the next morning of 188.  - Lantus 40 units BID  - Medium sensitive sliding scale insulin   - 1:10 carb count with meals  - Hold PTA oral agents/GLP1     Chronic conditions  Bipolar disorder  - Continue PTA Seroquel and Trazodone  Hypertension  - PTA Amlodipine  COPD  Asthma  - PTA Albuterol, symbicort -> Breo  "Ellipta d/t not on formulary       Diet: Moderate Consistent Carb (60 g CHO per Meal) Diet    DVT Prophylaxis: Pneumatic Compression Devices  Vargas Catheter: Not present  Fluids: none  Central Lines: None  Cardiac Monitoring: None  Code Status: Full Code      Disposition Plan     Expected Discharge Date: 07/22/2022                The patient's care was discussed with Dr. Ashley Yoo MD  Hospitalist Service  [facility]  Securely message with the Vocera Web Console (learn more here)  Text page via [employer] Paging/Directory         Clinically Significant Risk Factors Present on Admission                # DMII: A1C = N/A within past 3 months  # Severe Obesity: Estimated body mass index is 41.47 kg/m  as calculated from the following:    Height as of this encounter: 1.537 m (5' 0.5\").    Weight as of this encounter: 97.9 kg (215 lb 14.4 oz).        ______________________________________________________________________    Interval History   No questions today. In some pain right now, requesting pain meds. CARLOS overnight. Surgery already saw this morning - repacked wound.     Data reviewed today: I reviewed all medications, new labs and imaging results over the last 24 hours. I personally reviewed no images or EKG's today.    Physical Exam   Vital Signs: Temp: 98  F (36.7  C) Temp src: Oral BP: 99/49 Pulse: 101   Resp: 20 SpO2: 92 % O2 Device: None (Room air)    Weight: 215 lbs 14.4 oz  Constitutional: awake, alert, cooperative, no apparent distress, and appears stated age  Respiratory: No increased work of breathing, good air exchange  Cardiovascular: acyanotic  Skin: 2x3cm ulcer superior left medial buttock w/ surrounding erythema, purulent drainage  Neurologic: Awake, alert, oriented to name, place and time    Data   No results found for this or any previous visit (from the past 24 hour(s)).  "

## 2022-07-20 NOTE — PROGRESS NOTES
"General Surgery Progress Note:    Hospital Day # 2    ASSESSMENT:   1. Buttock wound, left, initial encounter    2. Cellulitis, unspecified cellulitis site    3. Sepsis, due to unspecified organism, unspecified whether acute organ dysfunction present (H)        Teresa Perez is a 51 year old female s/p left buttock wound irrigation and debridement.    PLAN:   - Activity encouraged   - Regular diet  - Will re-consult wound team regarding continued care and wound vac placement   - Recommend repacking wound twice daily      SUBJECTIVE:   Teresa Perez is resting in bed. Reports pressure, pain and burning in wound. Denies numbness/tingling.     Patient Vitals for the past 24 hrs:   BP Temp Temp src Pulse Resp SpO2 Height Weight   07/20/22 0839 99/49 98  F (36.7  C) Oral 101 20 92 % -- --   07/19/22 2326 116/54 99.3  F (37.4  C) Oral 106 18 90 % -- --   07/19/22 2135 (!) 148/68 98.8  F (37.1  C) Oral 94 18 92 % -- --   07/19/22 2030 135/57 99.5  F (37.5  C) Oral 98 18 93 % -- --   07/19/22 1930 135/67 98.7  F (37.1  C) Oral 97 18 96 % -- --   07/19/22 1900 128/59 99.3  F (37.4  C) Oral 99 18 95 % 1.537 m (5' 0.5\") 97.9 kg (215 lb 14.4 oz)   07/19/22 1835 115/53 97.7  F (36.5  C) Temporal 100 -- -- -- --   07/19/22 1545 111/53 -- -- 97 -- 94 % -- --   07/19/22 1530 116/58 -- -- 96 -- 94 % -- --   07/19/22 1515 122/58 -- -- 93 -- 94 % -- --   07/19/22 1500 125/57 -- -- 98 -- 95 % -- --   07/19/22 1445 129/66 -- -- 90 -- 96 % -- --   07/19/22 1430 136/63 -- -- 92 18 94 % -- --   07/19/22 1415 125/62 -- -- 92 -- 92 % -- --   07/19/22 1414 124/60 97.8  F (36.6  C) Tympanic 90 15 94 % -- --   07/19/22 1412 124/60 97.8  F (36.6  C) -- 93 20 94 % -- --   07/19/22 1310 115/57 98.7  F (37.1  C) Oral 94 22 94 % -- --   07/19/22 1006 130/62 -- -- 87 20 94 % -- --       Physical Exam:  General: NAD, pleasant  Left buttock: Wound with mild erythema or induration. Wound bed clean. Packing removed and saturated with " serosanguinous drainage.    Admission on 07/18/2022   Component Date Value     Sodium 07/18/2022 134 (A)     Potassium 07/18/2022 3.7      Chloride 07/18/2022 98      Carbon Dioxide (CO2) 07/18/2022 24      Anion Gap 07/18/2022 12      Urea Nitrogen 07/18/2022 15      Creatinine 07/18/2022 0.65      Calcium 07/18/2022 9.3      Glucose 07/18/2022 140 (A)     GFR Estimate 07/18/2022 >90      Lactic Acid 07/18/2022 1.1      CRP 07/18/2022 20.2 (A)     Erythrocyte Sedimentatio* 07/18/2022 88 (A)     WBC Count 07/18/2022 18.9 (A)     RBC Count 07/18/2022 4.68      Hemoglobin 07/18/2022 13.8      Hematocrit 07/18/2022 42.0      MCV 07/18/2022 90      MCH 07/18/2022 29.5      MCHC 07/18/2022 32.9      RDW 07/18/2022 13.4      Platelet Count 07/18/2022 427      % Neutrophils 07/18/2022 77      % Lymphocytes 07/18/2022 11      % Monocytes 07/18/2022 7      % Eosinophils 07/18/2022 2      % Basophils 07/18/2022 1      % Immature Granulocytes 07/18/2022 2      NRBCs per 100 WBC 07/18/2022 0      Absolute Neutrophils 07/18/2022 15.1 (A)     Absolute Lymphocytes 07/18/2022 2.0      Absolute Monocytes 07/18/2022 1.3      Absolute Eosinophils 07/18/2022 0.4      Absolute Basophils 07/18/2022 0.1      Absolute Immature Granul* 07/18/2022 0.4      Absolute NRBCs 07/18/2022 0.0      SARS CoV2 PCR 07/18/2022 Negative      Culture 07/18/2022 No growth after 1 day      Culture 07/18/2022 No growth after 1 day      GLUCOSE BY METER POCT 07/18/2022 124 (A)     WBC Count 07/19/2022 14.2 (A)     RBC Count 07/19/2022 4.54      Hemoglobin 07/19/2022 13.3      Hematocrit 07/19/2022 41.2      MCV 07/19/2022 91      MCH 07/19/2022 29.3      MCHC 07/19/2022 32.3      RDW 07/19/2022 13.3      Platelet Count 07/19/2022 368      GLUCOSE BY METER POCT 07/19/2022 163 (A)     Creatinine 07/19/2022 0.68      GFR Estimate 07/19/2022 >90      GLUCOSE BY METER POCT 07/19/2022 143 (A)     Sodium 07/19/2022 138      Potassium 07/19/2022 3.8      Chloride  07/19/2022 102      Carbon Dioxide (CO2) 07/19/2022 19 (A)     Anion Gap 07/19/2022 17      Urea Nitrogen 07/19/2022 11      Creatinine 07/19/2022 0.68      Calcium 07/19/2022 9.1      Glucose 07/19/2022 122      Alkaline Phosphatase 07/19/2022 106      AST 07/19/2022 15      ALT 07/19/2022 15      Protein Total 07/19/2022 7.1      Albumin 07/19/2022 2.6 (A)     Bilirubin Total 07/19/2022 0.3      GFR Estimate 07/19/2022 >90      Lactic Acid 07/19/2022 1.0      WBC Count 07/19/2022 14.2 (A)     RBC Count 07/19/2022 4.54      Hemoglobin 07/19/2022 13.3      Hematocrit 07/19/2022 41.2      MCV 07/19/2022 91      MCH 07/19/2022 29.3      MCHC 07/19/2022 32.3      RDW 07/19/2022 13.3      Platelet Count 07/19/2022 368      % Neutrophils 07/19/2022 77      % Lymphocytes 07/19/2022 10      % Monocytes 07/19/2022 8      % Eosinophils 07/19/2022 2      % Basophils 07/19/2022 1      % Immature Granulocytes 07/19/2022 2      Absolute Neutrophils 07/19/2022 11.9 (A)     Absolute Lymphocytes 07/19/2022 1.6      Absolute Monocytes 07/19/2022 1.1      Absolute Eosinophils 07/19/2022 0.4      Absolute Basophils 07/19/2022 0.1      Absolute Immature Granul* 07/19/2022 0.3      GLUCOSE BY METER POCT 07/19/2022 126 (A)     GLUCOSE BY METER POCT 07/19/2022 138 (A)     Gram Stain Result 07/19/2022 3+ Gram positive cocci (A)     Gram Stain Result 07/19/2022 1+ Gram negative bacilli (A)     Gram Stain Result 07/19/2022 2+ WBC seen (A)     GLUCOSE BY METER POCT 07/19/2022 127 (A)     GLUCOSE BY METER POCT 07/19/2022 197 (A)     GLUCOSE BY METER POCT 07/19/2022 203 (A)     Sodium 07/20/2022 137      Potassium 07/20/2022 3.8      Chloride 07/20/2022 103      Carbon Dioxide (CO2) 07/20/2022 20 (A)     Anion Gap 07/20/2022 14      Urea Nitrogen 07/20/2022 11      Creatinine 07/20/2022 0.70      Calcium 07/20/2022 9.2      Glucose 07/20/2022 183 (A)     GFR Estimate 07/20/2022 >90      Magnesium 07/20/2022 2.0      Phosphorus 07/20/2022 3.4       WBC Count 07/20/2022 11.7 (A)     RBC Count 07/20/2022 4.41      Hemoglobin 07/20/2022 13.0      Hematocrit 07/20/2022 40.0      MCV 07/20/2022 91      MCH 07/20/2022 29.5      MCHC 07/20/2022 32.5      RDW 07/20/2022 13.4      Platelet Count 07/20/2022 396      GLUCOSE BY METER POCT 07/20/2022 215 (A)     GLUCOSE BY METER POCT 07/20/2022 188 (A)     GLUCOSE BY METER POCT 07/20/2022 215 (A)        Rosalba Layton PA-C  [facility]  [address] [address]  Phone- [phone]  Fax- [phone]

## 2022-07-20 NOTE — PLAN OF CARE
Problem: Diabetes Comorbidity  Goal: Blood Glucose Level Within Targeted Range  Outcome: Ongoing, Not Progressing     Problem: Pain Acute  Goal: Acceptable Pain Control and Functional Ability  Outcome: Ongoing, Not Progressing  Intervention: Develop Pain Management Plan  Recent Flowsheet Documentation  Taken 7/19/2022 1902 by Quiana Santo, RN  Pain Management Interventions: (waiting for medications to be reviewed by pharmacy)   repositioned   rest  Intervention: Prevent or Manage Pain  Recent Flowsheet Documentation  Taken 7/19/2022 1911 by Quiana Santo, RN  Medication Review/Management: medications reviewed     Wound with moderate sanguinous drainage. Pad changed. PRN medications given for left buttock pain rated 8-9/10; reassessment was 8/10. Insulin given per sliding scale. Patient eating and drinking well. SBA to bedside commode.       Goal Outcome Evaluation:

## 2022-07-20 NOTE — TELEPHONE ENCOUNTER
Spoke with home care agency and requested callback from Lori. They state they will pass the message along.     MINESH StoutN, RN, PHN, CHFN, HNB-BC   7/20/2022 at 9:00 AM

## 2022-07-20 NOTE — PLAN OF CARE
Problem: Pain Acute  Goal: Acceptable Pain Control and Functional Ability  Outcome: Ongoing, Progressing   Goal Outcome Evaluation:                    Pt oriented and pleasant. Drowsy but arousable.  LSC. BS active. Voiding without difficulty.  Pad had scant-small sanguous drainage. Changed X1.   Up to BSC with pivot on R leg and SBA.

## 2022-07-20 NOTE — PROGRESS NOTES
Went to help patient with home cpap, PT stated she does not use cpap. I will discontinue the order.

## 2022-07-21 LAB
ANION GAP SERPL CALCULATED.3IONS-SCNC: 13 MMOL/L (ref 5–18)
BUN SERPL-MCNC: 12 MG/DL (ref 8–22)
CALCIUM SERPL-MCNC: 9 MG/DL (ref 8.5–10.5)
CHLORIDE BLD-SCNC: 102 MMOL/L (ref 98–107)
CO2 SERPL-SCNC: 22 MMOL/L (ref 22–31)
CREAT SERPL-MCNC: 0.59 MG/DL (ref 0.6–1.1)
CREAT SERPL-MCNC: 0.68 MG/DL (ref 0.6–1.1)
ERYTHROCYTE [DISTWIDTH] IN BLOOD BY AUTOMATED COUNT: 13.3 % (ref 10–15)
GFR SERPL CREATININE-BSD FRML MDRD: >90 ML/MIN/1.73M2
GFR SERPL CREATININE-BSD FRML MDRD: >90 ML/MIN/1.73M2
GLUCOSE BLD-MCNC: 155 MG/DL (ref 70–125)
GLUCOSE BLDC GLUCOMTR-MCNC: 149 MG/DL (ref 70–99)
GLUCOSE BLDC GLUCOMTR-MCNC: 149 MG/DL (ref 70–99)
GLUCOSE BLDC GLUCOMTR-MCNC: 161 MG/DL (ref 70–99)
GLUCOSE BLDC GLUCOMTR-MCNC: 187 MG/DL (ref 70–99)
HCT VFR BLD AUTO: 40.1 % (ref 35–47)
HGB BLD-MCNC: 12.9 G/DL (ref 11.7–15.7)
HOLD SPECIMEN: NORMAL
MCH RBC QN AUTO: 29.2 PG (ref 26.5–33)
MCHC RBC AUTO-ENTMCNC: 32.2 G/DL (ref 31.5–36.5)
MCV RBC AUTO: 91 FL (ref 78–100)
PLATELET # BLD AUTO: 363 10E3/UL (ref 150–450)
POTASSIUM BLD-SCNC: 3.8 MMOL/L (ref 3.5–5)
RBC # BLD AUTO: 4.42 10E6/UL (ref 3.8–5.2)
SODIUM SERPL-SCNC: 137 MMOL/L (ref 136–145)
VANCOMYCIN SERPL-MCNC: 7.7 MG/L
WBC # BLD AUTO: 9.9 10E3/UL (ref 4–11)

## 2022-07-21 PROCEDURE — 250N000013 HC RX MED GY IP 250 OP 250 PS 637: Performed by: SURGERY

## 2022-07-21 PROCEDURE — 120N000001 HC R&B MED SURG/OB

## 2022-07-21 PROCEDURE — 99233 SBSQ HOSP IP/OBS HIGH 50: CPT | Mod: GC | Performed by: FAMILY MEDICINE

## 2022-07-21 PROCEDURE — 250N000013 HC RX MED GY IP 250 OP 250 PS 637: Performed by: STUDENT IN AN ORGANIZED HEALTH CARE EDUCATION/TRAINING PROGRAM

## 2022-07-21 PROCEDURE — 82435 ASSAY OF BLOOD CHLORIDE: CPT | Performed by: STUDENT IN AN ORGANIZED HEALTH CARE EDUCATION/TRAINING PROGRAM

## 2022-07-21 PROCEDURE — 36415 COLL VENOUS BLD VENIPUNCTURE: CPT | Performed by: SURGERY

## 2022-07-21 PROCEDURE — 250N000011 HC RX IP 250 OP 636: Performed by: SURGERY

## 2022-07-21 PROCEDURE — G0463 HOSPITAL OUTPT CLINIC VISIT: HCPCS

## 2022-07-21 PROCEDURE — 85027 COMPLETE CBC AUTOMATED: CPT | Performed by: STUDENT IN AN ORGANIZED HEALTH CARE EDUCATION/TRAINING PROGRAM

## 2022-07-21 PROCEDURE — 250N000011 HC RX IP 250 OP 636: Performed by: STUDENT IN AN ORGANIZED HEALTH CARE EDUCATION/TRAINING PROGRAM

## 2022-07-21 PROCEDURE — 36415 COLL VENOUS BLD VENIPUNCTURE: CPT | Performed by: STUDENT IN AN ORGANIZED HEALTH CARE EDUCATION/TRAINING PROGRAM

## 2022-07-21 PROCEDURE — 80202 ASSAY OF VANCOMYCIN: CPT | Performed by: FAMILY MEDICINE

## 2022-07-21 PROCEDURE — 250N000009 HC RX 250: Performed by: MASSAGE THERAPIST

## 2022-07-21 PROCEDURE — 82565 ASSAY OF CREATININE: CPT | Performed by: SURGERY

## 2022-07-21 RX ORDER — AZELASTINE HYDROCHLORIDE 0.5 MG/ML
1 SOLUTION/ DROPS OPHTHALMIC 2 TIMES DAILY
Status: DISCONTINUED | OUTPATIENT
Start: 2022-07-21 | End: 2022-07-26 | Stop reason: HOSPADM

## 2022-07-21 RX ORDER — HYDROMORPHONE HYDROCHLORIDE 1 MG/ML
.5-1 INJECTION, SOLUTION INTRAMUSCULAR; INTRAVENOUS; SUBCUTANEOUS
Status: DISCONTINUED | OUTPATIENT
Start: 2022-07-21 | End: 2022-07-24

## 2022-07-21 RX ORDER — POLYETHYLENE GLYCOL 3350 17 G/17G
17 POWDER, FOR SOLUTION ORAL 2 TIMES DAILY
Status: DISCONTINUED | OUTPATIENT
Start: 2022-07-21 | End: 2022-07-22

## 2022-07-21 RX ORDER — HYDROMORPHONE HYDROCHLORIDE 1 MG/ML
0.2 INJECTION, SOLUTION INTRAMUSCULAR; INTRAVENOUS; SUBCUTANEOUS
Status: DISCONTINUED | OUTPATIENT
Start: 2022-07-21 | End: 2022-07-24

## 2022-07-21 RX ADMIN — TRAZODONE HYDROCHLORIDE 50 MG: 50 TABLET ORAL at 21:13

## 2022-07-21 RX ADMIN — ACETAMINOPHEN 975 MG: 325 TABLET ORAL at 18:10

## 2022-07-21 RX ADMIN — VENLAFAXINE HYDROCHLORIDE 300 MG: 150 CAPSULE, EXTENDED RELEASE ORAL at 09:46

## 2022-07-21 RX ADMIN — GABAPENTIN 100 MG: 100 CAPSULE ORAL at 09:47

## 2022-07-21 RX ADMIN — UMECLIDINIUM 1 PUFF: 62.5 AEROSOL, POWDER ORAL at 09:50

## 2022-07-21 RX ADMIN — OXYCODONE HYDROCHLORIDE 10 MG: 5 TABLET ORAL at 14:25

## 2022-07-21 RX ADMIN — ACETAMINOPHEN 975 MG: 325 TABLET ORAL at 12:37

## 2022-07-21 RX ADMIN — FLUTICASONE PROPIONATE 2 SPRAY: 50 SPRAY, METERED NASAL at 09:46

## 2022-07-21 RX ADMIN — POLYETHYLENE GLYCOL 3350 17 G: 17 POWDER, FOR SOLUTION ORAL at 10:07

## 2022-07-21 RX ADMIN — HYDROMORPHONE HYDROCHLORIDE 0.5 MG: 1 INJECTION, SOLUTION INTRAMUSCULAR; INTRAVENOUS; SUBCUTANEOUS at 12:37

## 2022-07-21 RX ADMIN — QUETIAPINE FUMARATE 500 MG: 300 TABLET, FILM COATED ORAL at 21:13

## 2022-07-21 RX ADMIN — Medication 1 PACKET: at 09:57

## 2022-07-21 RX ADMIN — HYDROMORPHONE HYDROCHLORIDE 0.5 MG: 1 INJECTION, SOLUTION INTRAMUSCULAR; INTRAVENOUS; SUBCUTANEOUS at 18:11

## 2022-07-21 RX ADMIN — PANTOPRAZOLE SODIUM 40 MG: 40 TABLET, DELAYED RELEASE ORAL at 09:47

## 2022-07-21 RX ADMIN — HYDROMORPHONE HYDROCHLORIDE 0.5 MG: 1 INJECTION, SOLUTION INTRAMUSCULAR; INTRAVENOUS; SUBCUTANEOUS at 22:41

## 2022-07-21 RX ADMIN — SENNOSIDES AND DOCUSATE SODIUM 1 TABLET: 50; 8.6 TABLET ORAL at 09:48

## 2022-07-21 RX ADMIN — OXYCODONE HYDROCHLORIDE 10 MG: 5 TABLET ORAL at 02:17

## 2022-07-21 RX ADMIN — CLOPIDOGREL BISULFATE 75 MG: 75 TABLET ORAL at 09:48

## 2022-07-21 RX ADMIN — LISINOPRIL 40 MG: 20 TABLET ORAL at 20:36

## 2022-07-21 RX ADMIN — METRONIDAZOLE 500 MG: 500 INJECTION, SOLUTION INTRAVENOUS at 00:40

## 2022-07-21 RX ADMIN — ACETAMINOPHEN 975 MG: 325 TABLET ORAL at 02:18

## 2022-07-21 RX ADMIN — OXYCODONE HYDROCHLORIDE 10 MG: 5 TABLET ORAL at 19:53

## 2022-07-21 RX ADMIN — CEFTRIAXONE SODIUM 1 G: 1 INJECTION, POWDER, FOR SOLUTION INTRAMUSCULAR; INTRAVENOUS at 19:54

## 2022-07-21 RX ADMIN — AMLODIPINE BESYLATE 10 MG: 5 TABLET ORAL at 09:50

## 2022-07-21 RX ADMIN — VANCOMYCIN HYDROCHLORIDE 1000 MG: 1 INJECTION, SOLUTION INTRAVENOUS at 09:52

## 2022-07-21 RX ADMIN — ONDANSETRON 4 MG: 4 TABLET, ORALLY DISINTEGRATING ORAL at 14:25

## 2022-07-21 RX ADMIN — HYDROMORPHONE HYDROCHLORIDE 0.4 MG: 1 INJECTION, SOLUTION INTRAMUSCULAR; INTRAVENOUS; SUBCUTANEOUS at 05:01

## 2022-07-21 RX ADMIN — POLYETHYLENE GLYCOL 3350 17 G: 17 POWDER, FOR SOLUTION ORAL at 20:35

## 2022-07-21 RX ADMIN — GABAPENTIN 100 MG: 100 CAPSULE ORAL at 19:53

## 2022-07-21 RX ADMIN — SENNOSIDES AND DOCUSATE SODIUM 1 TABLET: 50; 8.6 TABLET ORAL at 20:36

## 2022-07-21 RX ADMIN — HYDROMORPHONE HYDROCHLORIDE 0.4 MG: 1 INJECTION, SOLUTION INTRAMUSCULAR; INTRAVENOUS; SUBCUTANEOUS at 10:04

## 2022-07-21 RX ADMIN — GABAPENTIN 100 MG: 100 CAPSULE ORAL at 14:15

## 2022-07-21 RX ADMIN — AZELASTINE HYDROCHLORIDE 1 DROP: 0.5 SOLUTION/ DROPS INTRAOCULAR at 22:40

## 2022-07-21 RX ADMIN — FLUTICASONE FUROATE AND VILANTEROL TRIFENATATE 1 PUFF: 200; 25 POWDER RESPIRATORY (INHALATION) at 09:51

## 2022-07-21 RX ADMIN — METOPROLOL SUCCINATE 50 MG: 50 TABLET, EXTENDED RELEASE ORAL at 09:47

## 2022-07-21 RX ADMIN — Medication 1 PACKET: at 17:54

## 2022-07-21 ASSESSMENT — ACTIVITIES OF DAILY LIVING (ADL)
ADLS_ACUITY_SCORE: 26
DEPENDENT_IADLS:: CLEANING;TRANSPORTATION
ADLS_ACUITY_SCORE: 26
ADLS_ACUITY_SCORE: 26

## 2022-07-21 NOTE — DISCHARGE INSTRUCTIONS
The patient will follow up with Dr. Natalio Chris our wound care specialist after discharge.  Please feel free to contact me should there be any questions or concerns.     Nabil Pedroza DO  General Surgeon  [facility]  Surgery [facility] - [address]  [place] [address]?  Office: [phone]  Employed by - [facility]  Pager: [phone]      Left medial buttock surgical wound daily and prn soilage and dislodgement  Fill wound (undermining with Vashe moistened 2x2 gauze)  Apply antifungal barrier cream  Apply critic aid barrier paste   Fold abd pad or superabsorbent dressing between buttocks   Hold in place with underwear   Air loss mattress   Avoid supine  Avoid sitting

## 2022-07-21 NOTE — PROGRESS NOTES
General Surgery Progress Note:    Hospital Day # 3    ASSESSMENT:   1. Buttock wound, left, initial encounter    2. Cellulitis, unspecified cellulitis site    3. Sepsis, due to unspecified organism, unspecified whether acute organ dysfunction present (H)        Teresa Perez is a 51 year old female s/p left buttock wound irrigation and debridement.    PLAN:   - Activity encouraged   - Regular diet  - Continue BID packing of wound with damp kerlix       SUBJECTIVE:   Teresa Perez is resting in bed. Reports continued pain and burning in wound. Denies numbness/tingling.     Patient Vitals for the past 24 hrs:   BP Temp Temp src Pulse Resp SpO2   07/21/22 1100 109/61 98.7  F (37.1  C) Oral 88 18 91 %   07/21/22 0942 121/58 98.2  F (36.8  C) Oral 88 16 96 %   07/20/22 2348 111/62 98.1  F (36.7  C) Oral 85 20 90 %   07/20/22 1602 107/53 97.9  F (36.6  C) Oral 81 20 92 %       Physical Exam:  General: NAD, pleasant  Left buttock: Wound with some erythema but no induration. Wound bed clean.     Admission on 07/18/2022   Component Date Value     Sodium 07/18/2022 134 (A)     Potassium 07/18/2022 3.7      Chloride 07/18/2022 98      Carbon Dioxide (CO2) 07/18/2022 24      Anion Gap 07/18/2022 12      Urea Nitrogen 07/18/2022 15      Creatinine 07/18/2022 0.65      Calcium 07/18/2022 9.3      Glucose 07/18/2022 140 (A)     GFR Estimate 07/18/2022 >90      Lactic Acid 07/18/2022 1.1      CRP 07/18/2022 20.2 (A)     Erythrocyte Sedimentatio* 07/18/2022 88 (A)     WBC Count 07/18/2022 18.9 (A)     RBC Count 07/18/2022 4.68      Hemoglobin 07/18/2022 13.8      Hematocrit 07/18/2022 42.0      MCV 07/18/2022 90      MCH 07/18/2022 29.5      MCHC 07/18/2022 32.9      RDW 07/18/2022 13.4      Platelet Count 07/18/2022 427      % Neutrophils 07/18/2022 77      % Lymphocytes 07/18/2022 11      % Monocytes 07/18/2022 7      % Eosinophils 07/18/2022 2      % Basophils 07/18/2022 1      % Immature Granulocytes 07/18/2022 2       NRBCs per 100 WBC 07/18/2022 0      Absolute Neutrophils 07/18/2022 15.1 (A)     Absolute Lymphocytes 07/18/2022 2.0      Absolute Monocytes 07/18/2022 1.3      Absolute Eosinophils 07/18/2022 0.4      Absolute Basophils 07/18/2022 0.1      Absolute Immature Granul* 07/18/2022 0.4      Absolute NRBCs 07/18/2022 0.0      SARS CoV2 PCR 07/18/2022 Negative      Culture 07/18/2022 No growth after 1 day      Culture 07/18/2022 No growth after 1 day      GLUCOSE BY METER POCT 07/18/2022 124 (A)     WBC Count 07/19/2022 14.2 (A)     RBC Count 07/19/2022 4.54      Hemoglobin 07/19/2022 13.3      Hematocrit 07/19/2022 41.2      MCV 07/19/2022 91      MCH 07/19/2022 29.3      MCHC 07/19/2022 32.3      RDW 07/19/2022 13.3      Platelet Count 07/19/2022 368      GLUCOSE BY METER POCT 07/19/2022 163 (A)     Creatinine 07/19/2022 0.68      GFR Estimate 07/19/2022 >90      GLUCOSE BY METER POCT 07/19/2022 143 (A)     Sodium 07/19/2022 138      Potassium 07/19/2022 3.8      Chloride 07/19/2022 102      Carbon Dioxide (CO2) 07/19/2022 19 (A)     Anion Gap 07/19/2022 17      Urea Nitrogen 07/19/2022 11      Creatinine 07/19/2022 0.68      Calcium 07/19/2022 9.1      Glucose 07/19/2022 122      Alkaline Phosphatase 07/19/2022 106      AST 07/19/2022 15      ALT 07/19/2022 15      Protein Total 07/19/2022 7.1      Albumin 07/19/2022 2.6 (A)     Bilirubin Total 07/19/2022 0.3      GFR Estimate 07/19/2022 >90      Lactic Acid 07/19/2022 1.0      WBC Count 07/19/2022 14.2 (A)     RBC Count 07/19/2022 4.54      Hemoglobin 07/19/2022 13.3      Hematocrit 07/19/2022 41.2      MCV 07/19/2022 91      MCH 07/19/2022 29.3      MCHC 07/19/2022 32.3      RDW 07/19/2022 13.3      Platelet Count 07/19/2022 368      % Neutrophils 07/19/2022 77      % Lymphocytes 07/19/2022 10      % Monocytes 07/19/2022 8      % Eosinophils 07/19/2022 2      % Basophils 07/19/2022 1      % Immature Granulocytes 07/19/2022 2      Absolute Neutrophils 07/19/2022 11.9  (A)     Absolute Lymphocytes 07/19/2022 1.6      Absolute Monocytes 07/19/2022 1.1      Absolute Eosinophils 07/19/2022 0.4      Absolute Basophils 07/19/2022 0.1      Absolute Immature Granul* 07/19/2022 0.3      GLUCOSE BY METER POCT 07/19/2022 126 (A)     GLUCOSE BY METER POCT 07/19/2022 138 (A)     Gram Stain Result 07/19/2022 3+ Gram positive cocci (A)     Gram Stain Result 07/19/2022 1+ Gram negative bacilli (A)     Gram Stain Result 07/19/2022 2+ WBC seen (A)     GLUCOSE BY METER POCT 07/19/2022 127 (A)     GLUCOSE BY METER POCT 07/19/2022 197 (A)     GLUCOSE BY METER POCT 07/19/2022 203 (A)     Sodium 07/20/2022 137      Potassium 07/20/2022 3.8      Chloride 07/20/2022 103      Carbon Dioxide (CO2) 07/20/2022 20 (A)     Anion Gap 07/20/2022 14      Urea Nitrogen 07/20/2022 11      Creatinine 07/20/2022 0.70      Calcium 07/20/2022 9.2      Glucose 07/20/2022 183 (A)     GFR Estimate 07/20/2022 >90      Magnesium 07/20/2022 2.0      Phosphorus 07/20/2022 3.4      WBC Count 07/20/2022 11.7 (A)     RBC Count 07/20/2022 4.41      Hemoglobin 07/20/2022 13.0      Hematocrit 07/20/2022 40.0      MCV 07/20/2022 91      MCH 07/20/2022 29.5      MCHC 07/20/2022 32.5      RDW 07/20/2022 13.4      Platelet Count 07/20/2022 396      GLUCOSE BY METER POCT 07/20/2022 215 (A)     GLUCOSE BY METER POCT 07/20/2022 188 (A)     GLUCOSE BY METER POCT 07/20/2022 215 (A)        Rosalba Layton PA-C  [facility] General Surgery [facility]  [address] [address]  Phone- [phone]  Fax- [phone]

## 2022-07-21 NOTE — PROGRESS NOTES
[facility]    Progress Note - Hospitalist Service       Date of Admission:  7/18/2022    Assessment & Plan            Teresa GEOVANNA Perez is a 51 year old female w/ complex medical hx significant for CVA, CAD, COPD, Epilepsy, bipolar disorder, DM II, s/p L BKA admitted on 7/18/2022 with cellulitis.      Decubitus ulcer, left buttock  Sepsis  Pt did meet SIRS criteria on admission (HR >100, leukocytosis to 18.9, likely source of infx), though lactic acid was WNL. Elevated CRP, ESR. Plan for IV abx was discussed w/ her Surgeon Dr. Ware given her recent hernia repair and concern over potential for seeding infx here. She has remained stable. BCs x2 7/18 pending.   - General surgery following - washout 7/19  - Plavix resumed  - AM CBC  - Intra-Op wound cultures growing out strep angina gnosis.  Stop Flagyl and vancomycin.  Continue ceftriaxone  - Follow BCs  - Wound cares ordered  - Purewick catheter to prevent skin breakdown from urinary incontinence     Mobility concerns  S/p L sided BKA  Pt w/ left sided BKA. Mobility has been somewhat limited with this. She reports that she has been unable to care for her L buttock wound at homethus far. Has been looking into home PCA options.  - PT and OT consult  - Care coordination consulted to help w/ eventual discharge planning     Diabetes, type 2  A1c 9.3 two weeks ago. On 65 units toujeo BID, 28 units lispro BID with meals, max dose jardiance, max dose bydureon. Was only given 10 units lantus 7/19 night. Sugar the next morning of 188.  - Lantus 40 units BID  - Medium sensitive sliding scale insulin   - 1:10 carb count with meals  - Hold PTA oral agents/GLP1     Chronic conditions  Bipolar disorder  - Continue PTA Seroquel and Trazodone  Hypertension  - PTA Amlodipine  COPD  Asthma  - PTA Albuterol, symbicort -> Breo Ellipta d/t not on formulary       Diet: Moderate Consistent Carb (60 g CHO per Meal) Diet  Snacks/Supplements Adult: Kevon; With  "Meals    DVT Prophylaxis: Pneumatic Compression Devices  Vargas Catheter: Not present  Fluids: none  Central Lines: None  Cardiac Monitoring: None  Code Status: Full Code      Disposition Plan      Expected Discharge Date: 07/22/2022    Discharge Delays: IV Medication - consider oral or Home Infusion  Other (Add Comment)    Discharge Comments: WOC says no wound vac-   current dressing falls off everytime patient is up to bathroom        The patient's care was discussed with Dr. Ashley Yoo MD  Hospitalist Service  [facility]  Securely message with the Vocera Web Console (learn more here)  Text page via [employer] Paging/Directory         Clinically Significant Risk Factors Present on Admission                # DMII: A1C = N/A within past 3 months  # Severe Obesity: Estimated body mass index is 41.47 kg/m  as calculated from the following:    Height as of this encounter: 1.537 m (5' 0.5\").    Weight as of this encounter: 97.9 kg (215 lb 14.4 oz).    # Moderate Malnutrition: based on nutrition assessment     ______________________________________________________________________    Interval History   Reports pain in her belly today.  New for her.  Wonders if it may be constipation, as she is only passing gas and has not had a bowel movement yet this hospital stay.  Requesting more laxatives.  Also requesting increased dose of pain medications.    Data reviewed today: I reviewed all medications, new labs and imaging results over the last 24 hours. I personally reviewed no images or EKG's today.    Physical Exam   Vital Signs: Temp: 98.7  F (37.1  C) Temp src: Oral BP: 109/61 Pulse: 88   Resp: 18 SpO2: 91 % O2 Device: None (Room air)    Weight: 215 lbs 14.4 oz  Constitutional: awake, alert, cooperative, no apparent distress, and appears stated age  Respiratory: No increased work of breathing, good air exchange  Cardiovascular: acyanotic  Skin: 2x3cm ulcer superior left medial " buttock w/ improving surrounding erythema, no active purulent drainage  Neurologic: Awake, alert, oriented to name, place and time    Data   No results found for this or any previous visit (from the past 24 hour(s)).

## 2022-07-21 NOTE — TELEPHONE ENCOUNTER
Called and spoke with  [employer], who reports the contract was terminated (unclear if by patient or by ) due to interpersonal conflicts between patient and staff.     Pt currently admitted re: sepsis.    FYI to Dr. Bullock.     Martina Mendosa, MINESHN, RN, PHN, CHFN, HNB-BC   7/21/2022 at 10:22 AM

## 2022-07-21 NOTE — PROGRESS NOTES
[facility]  WO Nurse Inpatient Assessment     Consulted for: left medial buttock - previously an abscess and not a pressure ulcer. Patient reports getting these previously and this is common skin manifestation due to DM. S/p I&D surgical wound     Wound not deep enough to warrant initiation of NWPT. Currently area bathed in urinary incontinence due to absence of containment device.     Recommend indwelling urinary catheter for wound healing benefits   Antifungal barrier paste for periwound skin rash    Patient lives alone and wound will not likely follow normal healing trajectory due to needing daily and prn wound care, co-morbidities.   Needs a higher level of care to manage BS in order to successfully heal this wound.      Patient History (according to provider note(s):    Per 8/18 hospitalist note,  Teresa AUSTIN Chris is a 51 year old female w/ complex medical hx significant for CVA, CAD, COPD, Epilepsy, bipolar disorder, DM II, s/p L BKA admitted on 7/18/2022 with cellulitis.   Decubitus ulcer, left buttock  Sepsis  Pt did meet SIRS criteria on admission (HR >100, leukocytosis to 18.9, likely source of infx), though lactic acid was WNL. Elevated CRP, ESR. Plan for IV abx was discussed w/ her Surgeon Dr. Ware given her recent hernia repair and concern over potential for seeding infx here. She has remained stable. BCs x2 7/18 pending.   - General surgery following - washout 7/19  - Wound culture intra-op pending  - Okay with holding plavix for a couple days post-op, though should restart after that given h/o stroke  - AM CBC  - IV Ceftriaxone and Vancomycin. Added metronidazole to cover anaerobes given wound proximity to rectum  - Follow BCs  - Will hold on IVF for now given normal LA and likely low tolerance for volume overload  - Wound cares ordered    Mobility concerns  S/p L sided BKA  Pt w/ left sided BKA. Mobility has been somewhat limited with this. She reports that she has been  unable to care for her L buttock wound at homethus far. Has been looking into home PCA options.  - PT and OT consult  - Care coordination consulted to help w/ eventual discharge planning     Diabetes, type 2  A1c 9.3 two weeks ago. On 65 units toujeo BID, 28 units lispro BID with meals, max dose jardiance, max dose bydureon. Was only given 10 units lantus 7/19 night. Sugar the next morning of 188.  - Lantus 40 units BID  - Medium sensitive sliding scale insulin   - 1:10 carb count with meals  - Hold PTA oral agents/GLP1     IRRIGATION AND DEBRIDEMENT DONE BY SURGICAL TEAM    Areas Assessed:      Areas visualized during today's visit: Focused: LEFT Medial  BUTTOCK    Pressure Injury Location:      Left low buttock          Last photo: today 7/21/22     Wound type: abscess not pressure      Pressure Injury: present on admission    Wound history/plan of care: as above  Wound base: 100 Pink subcutaneous tissue     Palpation of the wound bed: fluffy and slimy      Drainage: moderate     Description of drainage: serosanguinous      Measurements (length x width x depth, in cm)  Entrance to wound 2 x 3 cm     Undermining to 2.5cm from 6 to 2 oclock   Periwound skin: MASD and fungal rash due to urinary incontinence       Color: normal and consistent with surrounding tissue      Temperature: normal   Odor: none  Pain: moderate,  On manipulations  Pain intervention prior to dressing change: IV medicine  Treatment goal: Heal , Drainage control, Infection control/prevention and Increase granulation  STATUS: follow up to determine weather or not vac appropriate   Supplies ordered: gathered and at bedside           Treatment Plan:     Left medial buttock surgical wound daily and prn soilage and dislodgement  Fill wound (undermining with Vashe moistened 2x2 gauze)  Apply antifungal barrier cream  Apply critic aid barrier paste   Fold abd pad or superabsorbent dressing between buttocks   Hold in place with underwear   Air loss  mattress   Avoid supine  Avoid sitting    Orders: reviewed and updated     RECOMMEND PRIMARY TEAM ORDER: consult placed for woc team to assess for wound vac placement   Education provided: importance of repositioning, plan of care, wound progress, Infection prevention  and Hygiene  Discussed plan of care with: Family and Nurse  WOC nurse follow-up plan: weekly   Notify WOC if wound(s) deteriorate.  Nursing to notify the Provider(s) and re-consult the WOC Nurse if new skin concern.    DATA:     Current support surface: now on airloss mattress   Containment of urine/stool: Continent of bladder and Continent of bowel  BMI: Body mass index is 41.47 kg/m .   Active diet order: Orders Placed This Encounter      Moderate Consistent Carb (60 g CHO per Meal) Diet     Output: I/O last 3 completed shifts:  In: 2078 [P.O.:1680; I.V.:398]  Out: -      Labs:   Recent Labs   Lab 07/21/22  1038 07/20/22  0808 07/19/22  0758 07/18/22  1422   ALBUMIN  --   --  2.6*  --    HGB 12.9   < > 13.3  13.3 13.8   WBC 9.9   < > 14.2*  14.2* 18.9*   CRP  --   --   --  20.2*    < > = values in this interval not displayed.     Pressure injury risk assessment:   Sensory Perception: 3-->slightly limited  Moisture: 3-->occasionally moist  Activity: 2-->chairfast  Mobility: 3-->slightly limited  Nutrition: 3-->adequate  Friction and Shear: 2-->potential problem  Chi Score: 16    Amelia Silva RN BS CWOCN

## 2022-07-21 NOTE — CONSULTS
Care Management Initial Consult    General Information  Assessment completed with: Patient,    Type of CM/SW Visit: Initial Assessment    Primary Care Provider verified and updated as needed:     Readmission within the last 30 days: no previous admission in last 30 days         Advance Care Planning:            Communication Assessment  Patient's communication style: spoken language (English or Bilingual)    Hearing Difficulty or Deaf: yes   Wear Glasses or Blind: yes    Cognitive  Cognitive/Neuro/Behavioral: WDL                      Living Environment:   People in home: alone     Current living Arrangements: apartment      Able to return to prior arrangements: yes       Family/Social Support:  Care provided by: self  Provides care for: no one  Marital Status: Single  None          Description of Support System:           Current Resources:   Patient receiving home care services: No     Community Resources: [facility] Worker  Equipment currently used at home: wheelchair, manual  Supplies currently used at home:      Employment/Financial:  Employment Status:          Financial Concerns:             Lifestyle & Psychosocial Needs:  Social Determinants of Health     Tobacco Use: High Risk     Smoking Tobacco Use: Current Every Day Smoker     Smokeless Tobacco Use: Never Used   Alcohol Use: Not on file   Financial Resource Strain: Not on file   Food Insecurity: Not on file   Transportation Needs: Not on file   Physical Activity: Not on file   Stress: Not on file   Social Connections: Not on file   Intimate Partner Violence: Not on file   Depression: Not at risk     PHQ-2 Score: 2   Housing Stability: Not on file       Functional Status:  Prior to admission patient needed assistance:   Dependent ADLs:: Wheelchair-independent  Dependent IADLs:: Cleaning, Transportation                Additional Information:    Assessment completed with patient. Patient states she lives in an apartment. She currently has not PCA but has  31.5hrs per week authorized. She also has housekeeping hours but there is no one to fill this position. She reports she is independent with ADLs, able to self transfer into her wheelchair.  Her wheelchair is standard with no cushion to off set pressure.  Patient states she has a [place] coordinatior named Cait [phone].  She also has a [employer] [phone] ext [phone].  Currently wound care is daily if not more.  Reviewed options.  She states she prefers not to go to TCU.  For transportation she uses [facility].     Will need to discuss further with WOCN for wound care possibilities.     Final discharge plan pending progression and recommendations.       Nay Arellano RN

## 2022-07-21 NOTE — PROVIDER NOTIFICATION
PICC team paged - pt current PIV infiltrated. Pt very hard stick per staff last night. Receiving x3 IV antibiotics and IV Zofran intermittently.

## 2022-07-21 NOTE — PLAN OF CARE
Did not sleep well until 0545 - now sleeping.  Previously had ABD on buttock packing however, this was not staying and with every void the dressing became saturated with urine. Attempted a smaller gauze cover with similar results. Now, on top of packing, a small mepilex has been placed. This is successfully staying put and not becoming saturated by urine. Minimal drainage noted on mepilex from wound.    Pt was inquiring about using a Vargas catheter to keep urine off wound. Will bring this up to AM staff.  Independent to commode.  A/O x4.    Pt pain minimally controlled this shift - needing to alternate between scheduled and PRN TYL, PRN PO oxycodone, and PRN IV Dilaudid.  Problem: Pain Acute  Goal: Acceptable Pain Control and Functional Ability  Outcome: Ongoing, Not Progressing

## 2022-07-21 NOTE — TELEPHONE ENCOUNTER
Message reviewed.  As stated, patient is currently admitted to [facility] for sepsis and has had difficulty with wounds following her surgery.      I anticipate home care and nursing needs will be addressed by inpatient team, care coordinators prior to discharge.      I fully support appropriate boundaries with patient, and appropriate scope of practice for the home care team.  I do also think she will likely need/benefit from homecare support on discharge.      I also hope that her case management team is working to help idenitify PCA support, as she likely needs this as well.  It is a complicated situation.    May need transitional care in the interim as well.      Please let me know how I can be of help.     Tyra Bullock MD

## 2022-07-21 NOTE — PLAN OF CARE
Problem: Plan of Care - These are the overarching goals to be used throughout the patient stay.    Goal: Plan of Care Review/Shift Note  Description: The Plan of Care Review/Shift note should be completed every shift.  The Outcome Evaluation is a brief statement about your assessment that the patient is improving, declining, or no change.  This information will be displayed automatically on your shift note.  Outcome: Ongoing, Progressing   Goal Outcome Evaluation:  Had been in bed on her sides all day. Up to a commode with assist of 1. Eating and drinking well. C/O wound pain. Tylenol, oxycodone and iv dilaudid given pt slept in between med's.      Leidy Bang RN

## 2022-07-21 NOTE — PHARMACY-VANCOMYCIN DOSING SERVICE
Pharmacy Vancomycin Note  Date of Service 2022  Patient's  1970   51 year old, female    Indication: Skin and Soft Tissue Infection  Day of Therapy: 4  Current vancomycin regimen:  1000 mg IV q12h  Current vancomycin monitoring method: AUC  Current vancomycin therapeutic monitoring goal: 400-600 mg*h/L    Insight[employer] Prediction of Current Vancomycin Regimen  Loading dose: N/A  Regimen: 1000 mg IV every 12 hours.  Start time: 12:59 on 2022  Exposure target: AUC24 (range)400-600 mg/L.hr   AUC24,ss: 344 mg/L.hr  Probability of AUC24 > 400: 24 %  Ctrough,ss: 6.3 mg/L  Probability of Ctrough,ss > 20: 0 %  Probability of nephrotoxicity (Lodise ORIANA ): 4 %      Current estimated CrCl = Estimated Creatinine Clearance: 121 mL/min (A) (based on SCr of 0.59 mg/dL (L)).    Creatinine for last 3 days  2022:  2:22 PM Creatinine 0.65 mg/dL  2022:  7:58 AM Creatinine 0.68 mg/dL;  7:58 AM Creatinine 0.68 mg/dL  2022:  8:08 AM Creatinine 0.70 mg/dL  2022:  8:44 AM Creatinine 0.68 mg/dL; 10:38 AM Creatinine 0.59 mg/dL    Recent Vancomycin Levels (past 3 days)  2022:  8:44 AM Vancomycin 7.7 mg/L    Vancomycin IV Administrations (past 72 hours)                   vancomycin (VANCOCIN) 1000 mg in dextrose 5% 200 mL PREMIX (mg) 1,000 mg New Bag 22 0952     1,000 mg New Bag 22 2154     1,000 mg New Bag  0911     1,000 mg New Bag 22 2204     1,000 mg New Bag  1136    vancomycin (VANCOCIN) 2,000 mg in sodium chloride 0.9 % 500 mL intermittent infusion (mg) 2,000 mg New Bag 22 220                Nephrotoxins and other renal medications (From now, onward)    Start     Dose/Rate Route Frequency Ordered Stop    22 220  vancomycin (VANCOCIN) 1,500 mg in sodium chloride 0.9 % 250 mL intermittent infusion         1,500 mg  over 90 Minutes Intravenous EVERY 12 HOURS 22 1201      22 0800  lisinopril (ZESTRIL) tablet 40 mg         40 mg Oral DAILY 22 9125                Contrast Orders - past 72 hours (72h ago, onward)    None          Interpretation of levels and current regimen:  Vancomycin level is reflective of AUC less than 400    Has serum creatinine changed greater than 50% in last 72 hours: No    Urine output:  unable to determine    Renal Function: Stable    InsightRX Prediction of Planned New Vancomycin Regimen  Loading dose: N/A  Regimen: 1500 mg IV every 12 hours.  Start time: 12:59 on 07/21/2022  Exposure target: AUC24 (range)400-600 mg/L.hr   AUC24,ss: 516 mg/L.hr  Probability of AUC24 > 400: 88 %  Ctrough,ss: 10.1 mg/L  Probability of Ctrough,ss > 20: 0 %  Probability of nephrotoxicity (Lodise ORIANA 2009): 6 %      Plan:  1. Decrease Dose to 1500 q 12h   2. Vancomycin monitoring method: AUC  3. Vancomycin therapeutic monitoring goal: 400-600 mg*h/L  4. Pharmacy will check vancomycin levels as appropriate in 1-3 Days.  5. Serum creatinine levels will be ordered daily for the first week of therapy and at least twice weekly for subsequent weeks.    ROSA ELENA BARKER RP

## 2022-07-22 LAB
ANION GAP SERPL CALCULATED.3IONS-SCNC: 9 MMOL/L (ref 5–18)
BACTERIA WND CULT: NORMAL
BUN SERPL-MCNC: 13 MG/DL (ref 8–22)
CALCIUM SERPL-MCNC: 9.1 MG/DL (ref 8.5–10.5)
CHLORIDE BLD-SCNC: 102 MMOL/L (ref 98–107)
CO2 SERPL-SCNC: 26 MMOL/L (ref 22–31)
CREAT SERPL-MCNC: 0.55 MG/DL (ref 0.6–1.1)
ERYTHROCYTE [DISTWIDTH] IN BLOOD BY AUTOMATED COUNT: 13.2 % (ref 10–15)
GFR SERPL CREATININE-BSD FRML MDRD: >90 ML/MIN/1.73M2
GLUCOSE BLD-MCNC: 153 MG/DL (ref 70–125)
GLUCOSE BLDC GLUCOMTR-MCNC: 147 MG/DL (ref 70–99)
GLUCOSE BLDC GLUCOMTR-MCNC: 150 MG/DL (ref 70–99)
GLUCOSE BLDC GLUCOMTR-MCNC: 191 MG/DL (ref 70–99)
GLUCOSE BLDC GLUCOMTR-MCNC: 207 MG/DL (ref 70–99)
HCT VFR BLD AUTO: 39.8 % (ref 35–47)
HGB BLD-MCNC: 13.1 G/DL (ref 11.7–15.7)
MCH RBC QN AUTO: 29.3 PG (ref 26.5–33)
MCHC RBC AUTO-ENTMCNC: 32.9 G/DL (ref 31.5–36.5)
MCV RBC AUTO: 89 FL (ref 78–100)
PLATELET # BLD AUTO: 394 10E3/UL (ref 150–450)
POTASSIUM BLD-SCNC: 3.7 MMOL/L (ref 3.5–5)
RBC # BLD AUTO: 4.47 10E6/UL (ref 3.8–5.2)
SODIUM SERPL-SCNC: 137 MMOL/L (ref 136–145)
WBC # BLD AUTO: 7.7 10E3/UL (ref 4–11)

## 2022-07-22 PROCEDURE — 99233 SBSQ HOSP IP/OBS HIGH 50: CPT | Mod: GC | Performed by: STUDENT IN AN ORGANIZED HEALTH CARE EDUCATION/TRAINING PROGRAM

## 2022-07-22 PROCEDURE — 250N000013 HC RX MED GY IP 250 OP 250 PS 637: Performed by: STUDENT IN AN ORGANIZED HEALTH CARE EDUCATION/TRAINING PROGRAM

## 2022-07-22 PROCEDURE — 250N000011 HC RX IP 250 OP 636: Performed by: STUDENT IN AN ORGANIZED HEALTH CARE EDUCATION/TRAINING PROGRAM

## 2022-07-22 PROCEDURE — 120N000001 HC R&B MED SURG/OB

## 2022-07-22 PROCEDURE — 80048 BASIC METABOLIC PNL TOTAL CA: CPT | Performed by: STUDENT IN AN ORGANIZED HEALTH CARE EDUCATION/TRAINING PROGRAM

## 2022-07-22 PROCEDURE — 999N000033 HC STATISTIC CHRONIC PULMONARY DISEASE SPECIALIST

## 2022-07-22 PROCEDURE — 250N000011 HC RX IP 250 OP 636: Performed by: SURGERY

## 2022-07-22 PROCEDURE — 250N000013 HC RX MED GY IP 250 OP 250 PS 637: Performed by: INTERNAL MEDICINE

## 2022-07-22 PROCEDURE — 99222 1ST HOSP IP/OBS MODERATE 55: CPT | Performed by: INTERNAL MEDICINE

## 2022-07-22 PROCEDURE — 36415 COLL VENOUS BLD VENIPUNCTURE: CPT | Performed by: STUDENT IN AN ORGANIZED HEALTH CARE EDUCATION/TRAINING PROGRAM

## 2022-07-22 PROCEDURE — 85027 COMPLETE CBC AUTOMATED: CPT | Performed by: STUDENT IN AN ORGANIZED HEALTH CARE EDUCATION/TRAINING PROGRAM

## 2022-07-22 PROCEDURE — 250N000013 HC RX MED GY IP 250 OP 250 PS 637: Performed by: SURGERY

## 2022-07-22 RX ORDER — POLYETHYLENE GLYCOL 3350 17 G/17G
17 POWDER, FOR SOLUTION ORAL DAILY
Status: DISCONTINUED | OUTPATIENT
Start: 2022-07-23 | End: 2022-07-26 | Stop reason: HOSPADM

## 2022-07-22 RX ORDER — CEFDINIR 300 MG/1
300 CAPSULE ORAL EVERY 12 HOURS SCHEDULED
Status: DISCONTINUED | OUTPATIENT
Start: 2022-07-22 | End: 2022-07-26 | Stop reason: HOSPADM

## 2022-07-22 RX ORDER — GABAPENTIN 300 MG/1
300 CAPSULE ORAL 3 TIMES DAILY
Status: DISCONTINUED | OUTPATIENT
Start: 2022-07-22 | End: 2022-07-26 | Stop reason: HOSPADM

## 2022-07-22 RX ADMIN — AZELASTINE HYDROCHLORIDE 1 DROP: 0.5 SOLUTION/ DROPS INTRAOCULAR at 20:12

## 2022-07-22 RX ADMIN — ACETAMINOPHEN 975 MG: 325 TABLET ORAL at 10:29

## 2022-07-22 RX ADMIN — VENLAFAXINE HYDROCHLORIDE 300 MG: 150 CAPSULE, EXTENDED RELEASE ORAL at 08:17

## 2022-07-22 RX ADMIN — GABAPENTIN 100 MG: 100 CAPSULE ORAL at 13:05

## 2022-07-22 RX ADMIN — PANTOPRAZOLE SODIUM 40 MG: 40 TABLET, DELAYED RELEASE ORAL at 08:16

## 2022-07-22 RX ADMIN — AZELASTINE HYDROCHLORIDE 1 DROP: 0.5 SOLUTION/ DROPS INTRAOCULAR at 08:17

## 2022-07-22 RX ADMIN — HYDROMORPHONE HYDROCHLORIDE 0.2 MG: 1 INJECTION, SOLUTION INTRAMUSCULAR; INTRAVENOUS; SUBCUTANEOUS at 23:02

## 2022-07-22 RX ADMIN — CLOPIDOGREL BISULFATE 75 MG: 75 TABLET ORAL at 08:17

## 2022-07-22 RX ADMIN — UMECLIDINIUM 1 PUFF: 62.5 AEROSOL, POWDER ORAL at 08:15

## 2022-07-22 RX ADMIN — HYDROMORPHONE HYDROCHLORIDE 0.5 MG: 1 INJECTION, SOLUTION INTRAMUSCULAR; INTRAVENOUS; SUBCUTANEOUS at 17:27

## 2022-07-22 RX ADMIN — TRAZODONE HYDROCHLORIDE 50 MG: 50 TABLET ORAL at 21:19

## 2022-07-22 RX ADMIN — OXYCODONE HYDROCHLORIDE 10 MG: 5 TABLET ORAL at 16:04

## 2022-07-22 RX ADMIN — SENNOSIDES AND DOCUSATE SODIUM 1 TABLET: 50; 8.6 TABLET ORAL at 20:11

## 2022-07-22 RX ADMIN — AMLODIPINE BESYLATE 10 MG: 5 TABLET ORAL at 08:16

## 2022-07-22 RX ADMIN — GABAPENTIN 300 MG: 300 CAPSULE ORAL at 20:10

## 2022-07-22 RX ADMIN — FLUTICASONE PROPIONATE 2 SPRAY: 50 SPRAY, METERED NASAL at 08:15

## 2022-07-22 RX ADMIN — OXYCODONE HYDROCHLORIDE 10 MG: 5 TABLET ORAL at 10:29

## 2022-07-22 RX ADMIN — HYDROMORPHONE HYDROCHLORIDE 0.5 MG: 1 INJECTION, SOLUTION INTRAMUSCULAR; INTRAVENOUS; SUBCUTANEOUS at 13:11

## 2022-07-22 RX ADMIN — LISINOPRIL 40 MG: 20 TABLET ORAL at 20:10

## 2022-07-22 RX ADMIN — OXYCODONE HYDROCHLORIDE 10 MG: 5 TABLET ORAL at 00:05

## 2022-07-22 RX ADMIN — HYDROMORPHONE HYDROCHLORIDE 0.5 MG: 1 INJECTION, SOLUTION INTRAMUSCULAR; INTRAVENOUS; SUBCUTANEOUS at 08:31

## 2022-07-22 RX ADMIN — POLYETHYLENE GLYCOL 3350 17 G: 17 POWDER, FOR SOLUTION ORAL at 08:14

## 2022-07-22 RX ADMIN — CEFDINIR 300 MG: 300 CAPSULE ORAL at 20:10

## 2022-07-22 RX ADMIN — GABAPENTIN 100 MG: 100 CAPSULE ORAL at 08:16

## 2022-07-22 RX ADMIN — ACETAMINOPHEN 975 MG: 325 TABLET ORAL at 03:30

## 2022-07-22 RX ADMIN — FLUTICASONE FUROATE AND VILANTEROL TRIFENATATE 1 PUFF: 200; 25 POWDER RESPIRATORY (INHALATION) at 08:15

## 2022-07-22 RX ADMIN — SENNOSIDES AND DOCUSATE SODIUM 1 TABLET: 50; 8.6 TABLET ORAL at 08:17

## 2022-07-22 RX ADMIN — OXYCODONE HYDROCHLORIDE 10 MG: 5 TABLET ORAL at 20:11

## 2022-07-22 RX ADMIN — QUETIAPINE FUMARATE 500 MG: 300 TABLET, FILM COATED ORAL at 21:19

## 2022-07-22 RX ADMIN — HYDROMORPHONE HYDROCHLORIDE 0.5 MG: 1 INJECTION, SOLUTION INTRAMUSCULAR; INTRAVENOUS; SUBCUTANEOUS at 21:19

## 2022-07-22 RX ADMIN — ONDANSETRON 4 MG: 4 TABLET, ORALLY DISINTEGRATING ORAL at 08:31

## 2022-07-22 RX ADMIN — HYDROMORPHONE HYDROCHLORIDE 1 MG: 1 INJECTION, SOLUTION INTRAMUSCULAR; INTRAVENOUS; SUBCUTANEOUS at 03:02

## 2022-07-22 RX ADMIN — OXYCODONE HYDROCHLORIDE 10 MG: 5 TABLET ORAL at 06:27

## 2022-07-22 RX ADMIN — Medication 1 PACKET: at 09:05

## 2022-07-22 RX ADMIN — METOPROLOL SUCCINATE 50 MG: 50 TABLET, EXTENDED RELEASE ORAL at 08:16

## 2022-07-22 RX ADMIN — Medication 1 PACKET: at 17:18

## 2022-07-22 ASSESSMENT — ACTIVITIES OF DAILY LIVING (ADL)
ADLS_ACUITY_SCORE: 27
ADLS_ACUITY_SCORE: 31
ADLS_ACUITY_SCORE: 26
ADLS_ACUITY_SCORE: 27
ADLS_ACUITY_SCORE: 28
ADLS_ACUITY_SCORE: 26
ADLS_ACUITY_SCORE: 26
ADLS_ACUITY_SCORE: 27
ADLS_ACUITY_SCORE: 26
ADLS_ACUITY_SCORE: 26

## 2022-07-22 NOTE — PLAN OF CARE
Problem: Plan of Care - These are the overarching goals to be used throughout the patient stay.    Goal: Optimal Comfort and Wellbeing  Outcome: Ongoing, Progressing   Goal Outcome Evaluation:    C/O pain to buttock wound, asking for pain medication whenever it's available.  Appears comfortable in bed.  Smiling, pleasant.  Medicated with both Oxycodone and Dilaudid per order.      Up to BSC with one assist.      Diabetic diet with carb counting and sliding scale, eats 100% of meals, covered with sliding scale and meal coverage.

## 2022-07-22 NOTE — PROGRESS NOTES
CLINICAL NUTRITION SERVICES - REASSESSMENT NOTE    RECOMMENDATIONS FOR MD/PROVIDER TO ORDER: None at this time   Recommendations Ordered by Registered Dietitian (RD): Daily weighs   Future/Additional Recommendations: Outpatient weight management when medically appropriate.   Malnutrition: Moderate malnutrition     EVALUATION OF PROGRESS TOWARD GOALS   Diet: Moderate Consistent Carbohydrate    Nutrition Support: Kevon BID    Intake/Tolerance: % of meals received. 100% of supplements received.    ASSESSED NUTRITION NEEDS:  Estimated Energy Needs (ABW: 97.9 kg): 4798-7112 kcals/day (11 - 14 kcals/kg)  Justification: Wound healing  Estimated Protein Needs (IBW: 45.5 kg): 114 grams protein/day (2.5 grams of pro/kg)  Justification: Wound healing  Estimated Fluid Needs (ABW: 97.9 kg): 2935 mL/day (30 mL/kg)   Justification: Maintenance    NEW FINDINGS:  Pt reports significant improvement in appetite with no further concerns at this time regarding nutrition status.    07/19/22 97.9 kg (215 lb 14.4 oz)   07/05/22 103.2 kg (227 lb 9.6 oz)   06/29/22 103.9 kg (229 lb)   No new weights available to evaluate.    Previous Goals:   Patient to consume % of nutritionally adequate meals three times per day, or the equivalent with supplements/snacks.    Evaluation: Met    Weight maintenance during admission.    Evaluation: Unable to evaluate    Previous Nutrition Diagnosis:   Moderated protein-calorie malnutrition related to chronic illness as evidenced by 5.2% weight loss in 2 weeks, 6.1% weight loss in less than 1 month, 10% weight loss in 6 months, energy intake less than 75% of estimated needs for greater than 3 months.     Evaluation: Improving    Increased protein need related to wound healing as evidenced by decubitus ulcer to left buttock.     Evaluation: Improving    MALNUTRITION  % Weight Loss: 5% weight loss in less than 1 month, 10% weight loss in 6 months  % Intake:  <75% for > 3 months  Subcutaneous Fat Loss:  None observed  Muscle Loss:  None observed  Fluid Retention:  None noted    Malnutrition Diagnosis: Moderate malnutrition  In Context of: Chronic illness or disease     CURRENT NUTRITION DIAGNOSIS  Moderated protein-calorie malnutrition related to chronic illness as evidenced by 5.2% weight loss in 2 weeks, 6.1% weight loss in less than 1 month, 10% weight loss in 6 months, energy intake less than 75% of estimated needs for greater than 3 months.    Increased protein need related to wound healing as evidenced by decubitus ulcer to left buttock.    INTERVENTIONS  Implementation  Continue to supplement meals with Kevon BID to provide 190 kcal, 14 gm arginine, 14 gm glutamine, and adequate Vitamin C and Zinc if all consumed.    Goals  Weight maintenance during admission.    MONITORING AND EVALUATION:  Progress towards goals will be monitored and evaluated per protocol and Practice Guidelines

## 2022-07-22 NOTE — PROGRESS NOTES
ASSESSMENT:  1. Buttock wound, left, initial encounter    2. Cellulitis, unspecified cellulitis site    3. Sepsis, due to unspecified organism, unspecified whether acute organ dysfunction present (H)        Tereas Perez is a 51 year old female who is s/p I and D left Buttock.    PLAN:  Appreciate [facility] RN cares.  Continue wound cares per [facility]  Discharge disposition per medicine. Will need to follow up with Wound clinic and I will place referral in for patient.   Vargas if needed for urinary incontinence until wound healed vs good wound care and keeping clean and dry.   No further surgical intervention. Will sign off. Please call if any concerns.     SUBJECTIVE:   She is resting. No significant events.       Patient Vitals for the past 24 hrs:   BP Temp Temp src Pulse Resp SpO2   07/22/22 1012 90/55 98.2  F (36.8  C) Oral 81 18 95 %   07/22/22 0852 121/57 -- -- 88 -- 93 %   07/21/22 2341 101/60 98.1  F (36.7  C) Oral 90 16 94 %   07/21/22 2002 127/60 98.2  F (36.8  C) Oral 75 16 96 %   07/21/22 1659 127/61 97.8  F (36.6  C) Oral 86 18 93 %         PHYSICAL EXAM:  GEN: No acute distress, comfortable  LUNGS: CTA bilaterally  Wound clean and has mild fibrinous debris.     Output by Drain (mL) 07/20/22 0700 - 07/20/22 1459 07/20/22 1500 - 07/20/22 2259 07/20/22 2300 - 07/21/22 0659 07/21/22 0700 - 07/21/22 1459 07/21/22 1500 - 07/21/22 2259 07/21/22 2300 - 07/22/22 0659 07/22/22 0700 - 07/22/22 1112   Patient has no LDAs of requested type attached.      EXT:No cyanosis, edema or obvious abnormalities        07/21 0700 - 07/22 0659  In: 843.5 [P.O.:843.5]  Out: 600 [Urine:600]    Admission on 07/18/2022   Component Date Value     Sodium 07/18/2022 134 (A)     Potassium 07/18/2022 3.7      Chloride 07/18/2022 98      Carbon Dioxide (CO2) 07/18/2022 24      Anion Gap 07/18/2022 12      Urea Nitrogen 07/18/2022 15      Creatinine 07/18/2022 0.65      Calcium 07/18/2022 9.3      Glucose 07/18/2022 140 (A)     GFR Estimate  07/18/2022 >90      Lactic Acid 07/18/2022 1.1      CRP 07/18/2022 20.2 (A)     Erythrocyte Sedimentatio* 07/18/2022 88 (A)     WBC Count 07/18/2022 18.9 (A)     RBC Count 07/18/2022 4.68      Hemoglobin 07/18/2022 13.8      Hematocrit 07/18/2022 42.0      MCV 07/18/2022 90      MCH 07/18/2022 29.5      MCHC 07/18/2022 32.9      RDW 07/18/2022 13.4      Platelet Count 07/18/2022 427      % Neutrophils 07/18/2022 77      % Lymphocytes 07/18/2022 11      % Monocytes 07/18/2022 7      % Eosinophils 07/18/2022 2      % Basophils 07/18/2022 1      % Immature Granulocytes 07/18/2022 2      NRBCs per 100 WBC 07/18/2022 0      Absolute Neutrophils 07/18/2022 15.1 (A)     Absolute Lymphocytes 07/18/2022 2.0      Absolute Monocytes 07/18/2022 1.3      Absolute Eosinophils 07/18/2022 0.4      Absolute Basophils 07/18/2022 0.1      Absolute Immature Granul* 07/18/2022 0.4      Absolute NRBCs 07/18/2022 0.0      SARS CoV2 PCR 07/18/2022 Negative      Culture 07/18/2022 No growth after 3 days      Culture 07/18/2022 No growth after 3 days      GLUCOSE BY METER POCT 07/18/2022 124 (A)     WBC Count 07/19/2022 14.2 (A)     RBC Count 07/19/2022 4.54      Hemoglobin 07/19/2022 13.3      Hematocrit 07/19/2022 41.2      MCV 07/19/2022 91      MCH 07/19/2022 29.3      MCHC 07/19/2022 32.3      RDW 07/19/2022 13.3      Platelet Count 07/19/2022 368      GLUCOSE BY METER POCT 07/19/2022 163 (A)     Creatinine 07/19/2022 0.68      GFR Estimate 07/19/2022 >90      GLUCOSE BY METER POCT 07/19/2022 143 (A)     Sodium 07/19/2022 138      Potassium 07/19/2022 3.8      Chloride 07/19/2022 102      Carbon Dioxide (CO2) 07/19/2022 19 (A)     Anion Gap 07/19/2022 17      Urea Nitrogen 07/19/2022 11      Creatinine 07/19/2022 0.68      Calcium 07/19/2022 9.1      Glucose 07/19/2022 122      Alkaline Phosphatase 07/19/2022 106      AST 07/19/2022 15      ALT 07/19/2022 15      Protein Total 07/19/2022 7.1      Albumin 07/19/2022 2.6 (A)     Bilirubin  Total 07/19/2022 0.3      GFR Estimate 07/19/2022 >90      Lactic Acid 07/19/2022 1.0      WBC Count 07/19/2022 14.2 (A)     RBC Count 07/19/2022 4.54      Hemoglobin 07/19/2022 13.3      Hematocrit 07/19/2022 41.2      MCV 07/19/2022 91      MCH 07/19/2022 29.3      MCHC 07/19/2022 32.3      RDW 07/19/2022 13.3      Platelet Count 07/19/2022 368      % Neutrophils 07/19/2022 77      % Lymphocytes 07/19/2022 10      % Monocytes 07/19/2022 8      % Eosinophils 07/19/2022 2      % Basophils 07/19/2022 1      % Immature Granulocytes 07/19/2022 2      Absolute Neutrophils 07/19/2022 11.9 (A)     Absolute Lymphocytes 07/19/2022 1.6      Absolute Monocytes 07/19/2022 1.1      Absolute Eosinophils 07/19/2022 0.4      Absolute Basophils 07/19/2022 0.1      Absolute Immature Granul* 07/19/2022 0.3      GLUCOSE BY METER POCT 07/19/2022 126 (A)     GLUCOSE BY METER POCT 07/19/2022 138 (A)     Culture 07/19/2022 No anaerobic organisms isolated after 2 days      Gram Stain Result 07/19/2022 3+ Gram positive cocci (A)     Gram Stain Result 07/19/2022 1+ Gram negative bacilli (A)     Gram Stain Result 07/19/2022 2+ WBC seen (A)     Culture 07/19/2022 Culture in progress      Culture 07/19/2022 3+ Streptococcus anginosus (A)     Culture 07/19/2022 2+ Enterococcus faecalis (A)     GLUCOSE BY METER POCT 07/19/2022 127 (A)     GLUCOSE BY METER POCT 07/19/2022 197 (A)     GLUCOSE BY METER POCT 07/19/2022 203 (A)     Sodium 07/20/2022 137      Potassium 07/20/2022 3.8      Chloride 07/20/2022 103      Carbon Dioxide (CO2) 07/20/2022 20 (A)     Anion Gap 07/20/2022 14      Urea Nitrogen 07/20/2022 11      Creatinine 07/20/2022 0.70      Calcium 07/20/2022 9.2      Glucose 07/20/2022 183 (A)     GFR Estimate 07/20/2022 >90      Magnesium 07/20/2022 2.0      Phosphorus 07/20/2022 3.4      WBC Count 07/20/2022 11.7 (A)     RBC Count 07/20/2022 4.41      Hemoglobin 07/20/2022 13.0      Hematocrit 07/20/2022 40.0      MCV 07/20/2022 91       MCH 07/20/2022 29.5      MCHC 07/20/2022 32.5      RDW 07/20/2022 13.4      Platelet Count 07/20/2022 396      GLUCOSE BY METER POCT 07/20/2022 215 (A)     GLUCOSE BY METER POCT 07/20/2022 188 (A)     GLUCOSE BY METER POCT 07/20/2022 215 (A)     GLUCOSE BY METER POCT 07/20/2022 219 (A)     GLUCOSE BY METER POCT 07/20/2022 151 (A)     GLUCOSE BY METER POCT 07/20/2022 162 (A)     Creatinine 07/21/2022 0.68      GFR Estimate 07/21/2022 >90      Vancomycin 07/21/2022 7.7      WBC Count 07/21/2022 9.9      RBC Count 07/21/2022 4.42      Hemoglobin 07/21/2022 12.9      Hematocrit 07/21/2022 40.1      MCV 07/21/2022 91      MCH 07/21/2022 29.2      MCHC 07/21/2022 32.2      RDW 07/21/2022 13.3      Platelet Count 07/21/2022 363      Sodium 07/21/2022 137      Potassium 07/21/2022 3.8      Chloride 07/21/2022 102      Carbon Dioxide (CO2) 07/21/2022 22      Anion Gap 07/21/2022 13      Urea Nitrogen 07/21/2022 12      Creatinine 07/21/2022 0.59 (A)     Calcium 07/21/2022 9.0      Glucose 07/21/2022 155 (A)     GFR Estimate 07/21/2022 >90      GLUCOSE BY METER POCT 07/21/2022 149 (A)     Hold Specimen 07/21/2022 JIC      GLUCOSE BY METER POCT 07/21/2022 149 (A)     GLUCOSE BY METER POCT 07/21/2022 161 (A)     GLUCOSE BY METER POCT 07/21/2022 187 (A)     WBC Count 07/22/2022 7.7      RBC Count 07/22/2022 4.47      Hemoglobin 07/22/2022 13.1      Hematocrit 07/22/2022 39.8      MCV 07/22/2022 89      MCH 07/22/2022 29.3      MCHC 07/22/2022 32.9      RDW 07/22/2022 13.2      Platelet Count 07/22/2022 394      Sodium 07/22/2022 137      Potassium 07/22/2022 3.7      Chloride 07/22/2022 102      Carbon Dioxide (CO2) 07/22/2022 26      Anion Gap 07/22/2022 9      Urea Nitrogen 07/22/2022 13      Creatinine 07/22/2022 0.55 (A)     Calcium 07/22/2022 9.1      Glucose 07/22/2022 153 (A)     GFR Estimate 07/22/2022 >90      GLUCOSE BY METER POCT 07/22/2022 147 (A)          JOSY BowlesC  [facility]  General Surgery & Bariatric Care  [address] [address]  Phone- [phone]  Fax- [phone]

## 2022-07-22 NOTE — PROGRESS NOTES
Occupational Therapy:  Pt declining OT again today (after declining x 2 yesterday)  Spoke with pt about current function, pt stating that she has been still able to trsf well and doesn't feel she has the need for OT at this time. Encouraged pt to participate in PT to continue to work on trsfsing I'ly and mobility with FWW.  Pt stating she will this pm.  Discharge OT at this time, if OT needs arise, please reorder.    Missy Leon OTR/L, CLT  7/22/2022

## 2022-07-22 NOTE — UTILIZATION REVIEW
Admission Status; Secondary Review Determination       Under the authority of the Utilization Management Committee, the utilization review process indicated a secondary review on the above patient. The review outcome is based on review of the medical records, discussions with staff, and applying clinical experience noted on the date of the review.     (x) Inpatient Status Appropriate - This patient's medical care is consistent with medical management for inpatient care and reasonable inpatient medical practice.     RATIONALE FOR DETERMINATION     Ms. Perez is a 50 yo female w/ complex medical hx significant for CVA, CAD, COPD, Epilepsy, bipolar disorder, DM II, s/p L BKA admitted on 7/18/2022 with concerns over acute cellulitis associated with a buttock wound.  She had been started on po abx several days before presentation, but she continued to clinically worsen.  She presented with SIRS (tachy, leukocytosis, elevated CRP/ESR and clearly identified infection); she was deemed high risk for development of sepsis and started on IV vanco/rocephin.  Surgery was consulted; went to the OR on 7/19/22 for I&D.  Inter-op cultures prelim strep; remaining on IV rocephin.  She has required inpatient medical treatment and monitoring.    At the time of admission with the information available to the attending physician more than 2 nights Hospital complex care was anticipated, based on patient risk of adverse outcome if treated as outpatient and complex care required. Inpatient admission is appropriate based on the Medicare guidelines.     The information on this document is developed by the utilization review team in order for the business office to ensure compliance. This only denotes the appropriateness of proper admission status and does not reflect the quality of care rendered.   The definitions of Inpatient Status and Observation Status used in making the determination above are those provided in the CMS Coverage Manual,  Chapter 1 and Chapter 6, section 70.4.         Sincerely,     Angela Bermudez, DO  Utilization Review  Physician Advisor  [facility].

## 2022-07-22 NOTE — CONSULTS
Tobacco Treatment Consult  7/22/2022, 2:10 PM    Patient admitted on: 7/18/2022   Patient admitted for: Buttock wound, left, initial encounter [S31.820C]  Cellulitis, unspecified cellulitis site [L03.90]  Sepsis, due to unspecified organism, unspecified whether acute organ dysfunction present (H) [A41.9]   Patient seen on: 7/22/2022    Teresa gaonately declined tobacco treatment counseling and receipt of smoking cessation resource materials at this time. She states that she has everything at home if she needs it.  1  Total 2 minutes spent in smoking cessation, and 20 minutes spent in chart review, care coordination, and documentation.    Rea Leavitt, RT, Chronic Pulmonary Disease Specialist & Certified Tobacco Treatment Specialist  Phone [phone]

## 2022-07-22 NOTE — PROGRESS NOTES
"CM attended care progression huddle. Nurse states patient needs daily dressing changes at the minimum, but dressing falls off throughout the day so needs to be changed more frequently    Met with patient. Says she is trying to get her PCA back but currently is alone and does not have family that can help with dressing changes. She is agreeable to TCU if she qualifies. Would like to stay as close to home as possible, so she can \"go check on her cat\". I told her typically TCUs dont allow patients to come and go, patient says the last Tcu she was at ([facility]) allowed that .    TCU referrals sent.     Patient states she has a [place] coordinatior named Cait [phone].  She also has a [employer]  [place] [phone] ext [phone]  "

## 2022-07-22 NOTE — PLAN OF CARE
Problem: Plan of Care - These are the overarching goals to be used throughout the patient stay.    Goal: Optimal Comfort and Wellbeing  Outcome: Ongoing, Progressing  Intervention: Monitor Pain and Promote Comfort  Recent Flowsheet Documentation  Taken 7/22/2022 0046 by Shavonne Ellsworth RN  Pain Management Interventions: rest  Taken 7/21/2022 2341 by Shavonne Ellsworth RN  Pain Management Interventions: medication (see MAR)  Taken 7/21/2022 2002 by Shavonne Ellsworth RN  Pain Management Interventions: medication (see MAR)     Problem: Pain Acute  Goal: Acceptable Pain Control and Functional Ability  Outcome: Ongoing, Progressing  Intervention: Develop Pain Management Plan  Recent Flowsheet Documentation  Taken 7/22/2022 0046 by Shavonne Ellsworth RN  Pain Management Interventions: rest  Taken 7/21/2022 2341 by Shavonne Ellsworth RN  Pain Management Interventions: medication (see MAR)  Taken 7/21/2022 2002 by Shavonne Ellsworth RN  Pain Management Interventions: medication (see MAR)  Intervention: Prevent or Manage Pain  Recent Flowsheet Documentation  Taken 7/21/2022 2345 by Shavonne Ellsworth RN  Medication Review/Management: medications reviewed  Taken 7/21/2022 2002 by Shavonne Ellsworth RN  Medication Review/Management: medications reviewed     Problem: Impaired Wound Healing  Goal: Optimal Wound Healing  Outcome: Ongoing, Progressing  Intervention: Promote Wound Healing  Recent Flowsheet Documentation  Taken 7/22/2022 0046 by Shavonne Ellsworth RN  Pain Management Interventions: rest  Taken 7/21/2022 2345 by Shavonne Ellsworth RN  Activity Management:    activity adjusted per tolerance    activity encouraged    up to commode  Taken 7/21/2022 2341 by Shavonne Ellsworth RN  Pain Management Interventions: medication (see MAR)  Activity Management: activity adjusted per tolerance  Taken 7/21/2022 2002 by Shavonne Ellsworth RN  Pain Management Interventions: medication (see MAR)  Activity Management:    activity adjusted per tolerance    activity  encouraged    up to commode   Goal Outcome Evaluation:      Patient is continent of urine and stool, wound dressing is not continually getting soiled. Patient continues to have pain at wound site and hernia repair as well, prn pain medications utilized.

## 2022-07-22 NOTE — PLAN OF CARE
"  Problem: Plan of Care - These are the overarching goals to be used throughout the patient stay.    Description: The Care Plan Review/Shift Note, Individualized Goals, Hospital-Acquired Illness or Injury, Comfort and Wellbeing, and Transition Planning are the \"Overarching Goals\" and should be updated throughout the hospitalization.  Please hover over the (i) for specific inforamation on each goal topic.      Goal: Plan of Care Review/Shift Note  Description: The Plan of Care Review/Shift note should be completed every shift.  The Outcome Evaluation is a brief statement about your assessment that the patient is improving, declining, or no change.  This information will be displayed automatically on your shift note.  Outcome: Ongoing, Progressing  Flowsheets (Taken 7/22/2022 0540)  Plan of Care Reviewed With: patient  Overall Patient Progress: improving     Problem: Impaired Wound Healing  Goal: Optimal Wound Healing  Outcome: Ongoing, Progressing     Problem: Malnutrition  Goal: Improved Nutritional Intake  Outcome: Ongoing, Progressing   Goal Outcome Evaluation:      Pt is eating well and meeting her nutrition needs. Taking 100% mindy bid for wound healing.                 "
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
## 2022-07-22 NOTE — PROGRESS NOTES
[facility]    Progress Note - Hospitalist Service       Date of Admission:  7/18/2022    Assessment & Plan            Teresajacobo Perez is a 51 year old female w/ complex medical hx significant for CVA, CAD, COPD, Epilepsy, bipolar disorder, DM II, s/p L BKA admitted on 7/18/2022 with cellulitis.      Decubitus ulcer, left buttock  Sepsis  Pt did meet SIRS criteria on admission (HR >100, leukocytosis to 18.9, likely source of infx), though lactic acid was WNL. Elevated CRP, ESR. Plan for IV abx was discussed w/ her Surgeon Dr. Ware given her recent hernia repair and concern over potential for seeding infx here. She has remained stable. BCs x2 7/18 pending.   - General surgery following - washout 7/19  - Plavix resumed  - AM CBC  - Intra-Op wound cultures growing out strep anginosus and now E. faecalis.  Stopped Flagyl and vancomycin 7/21 AM.  Continue ceftriaxone.  - Consult ID given new growth of e faecalis  - unclear if she's clinically improving d/t vanc being on board for a few days or if ceftriaxone has been sufficient and e. Faecalis isn't clinically significant. Linezolid?  - Follow BCs  - Wound cares ordered  - Patient okay with henriquez if needed, though likely unnecessary at this point per nursing  - SW looking for TCU     Mobility concerns  S/p L sided BKA  Pt w/ left sided BKA. Mobility has been somewhat limited with this. She reports that she has been unable to care for her L buttock wound at [facility]. Has been looking into home PCA options.  - PT and OT consult - rec TCU  - Care coordination consulted to help w/ TCU dispo     Diabetes, type 2  A1c 9.3 two weeks ago. On 65 units toujeo BID, 28 units lispro BID with meals, max dose jardiance, max dose bydureon. Was only given 10 units lantus 7/19 night. Sugar the next morning of 188.  - Lantus 44 units BID  - High resistance sliding scale insulin   - 1:8 carb count with meals  - Hold PTA oral agents/GLP1     Chronic  "conditions  Bipolar disorder  - Continue PTA Seroquel and Trazodone  Hypertension  - PTA Amlodipine  COPD  Asthma  - PTA Albuterol, symbicort -> Breo Ellipta d/t not on formulary       Diet: Moderate Consistent Carb (60 g CHO per Meal) Diet  Snacks/Supplements Adult: Kevon; With Meals    DVT Prophylaxis: Pneumatic Compression Devices  Vargas Catheter: Not present  Fluids: none  Central Lines: None  Cardiac Monitoring: None  Code Status: Full Code      Disposition Plan     Expected Discharge Date: 07/25/2022    Discharge Delays: IV Medication - consider oral or Home Infusion  Other (Add Comment)    Discharge Comments: WOC says no wound vac-   current dressing falls off everytime patient is up to bathroom        The patient's care was discussed with Dr. Analia Yoo MD  Hospitalist Service  [facility]  Securely message with the Vocera Web Console (learn more here)  Text page via [employer] Paging/Directory         Clinically Significant Risk Factors Present on Admission                # DMII: A1C = N/A within past 3 months  # Severe Obesity: Estimated body mass index is 41.47 kg/m  as calculated from the following:    Height as of this encounter: 1.537 m (5' 0.5\").    Weight as of this encounter: 97.9 kg (215 lb 14.4 oz).    # Moderate Malnutrition: based on nutrition assessment     ______________________________________________________________________    Interval History   Belly pain slightly improved today tho still a bit tender on the left side. Did have BM yesterday afternoon. Surgery signing off. Teresa is now okay with going to TCU for wound cares.    Data reviewed today: I reviewed all medications, new labs and imaging results over the last 24 hours. I personally reviewed no images or EKG's today.    Physical Exam   Vital Signs: Temp: 98.2  F (36.8  C) Temp src: Oral BP: 90/55 Pulse: 81   Resp: 18 SpO2: 95 % O2 Device: None (Room air)    Weight: 215 lbs 14.4 " oz  Constitutional: awake, alert, cooperative, no apparent distress, and appears stated age  Respiratory: No increased work of breathing, good air exchange  Cardiovascular: acyanotic  Skin: 2x3cm ulcer superior left medial buttock w/ improving surrounding erythema, no active purulent drainage  Neurologic: Awake, alert, oriented to name, place and time    Data   No results found for this or any previous visit (from the past 24 hour(s)).

## 2022-07-23 LAB
ANION GAP SERPL CALCULATED.3IONS-SCNC: 7 MMOL/L (ref 5–18)
BACTERIA BLD CULT: NO GROWTH
BACTERIA BLD CULT: NO GROWTH
BACTERIA WND CULT: ABNORMAL
BACTERIA WND CULT: ABNORMAL
BUN SERPL-MCNC: 15 MG/DL (ref 8–22)
CALCIUM SERPL-MCNC: 9.5 MG/DL (ref 8.5–10.5)
CHLORIDE BLD-SCNC: 102 MMOL/L (ref 98–107)
CO2 SERPL-SCNC: 28 MMOL/L (ref 22–31)
CREAT SERPL-MCNC: 0.63 MG/DL (ref 0.6–1.1)
ERYTHROCYTE [DISTWIDTH] IN BLOOD BY AUTOMATED COUNT: 13.2 % (ref 10–15)
GFR SERPL CREATININE-BSD FRML MDRD: >90 ML/MIN/1.73M2
GLUCOSE BLD-MCNC: 156 MG/DL (ref 70–125)
GLUCOSE BLDC GLUCOMTR-MCNC: 150 MG/DL (ref 70–99)
GLUCOSE BLDC GLUCOMTR-MCNC: 160 MG/DL (ref 70–99)
GLUCOSE BLDC GLUCOMTR-MCNC: 163 MG/DL (ref 70–99)
GLUCOSE BLDC GLUCOMTR-MCNC: 174 MG/DL (ref 70–99)
GLUCOSE BLDC GLUCOMTR-MCNC: 194 MG/DL (ref 70–99)
HCT VFR BLD AUTO: 41.8 % (ref 35–47)
HGB BLD-MCNC: 13.8 G/DL (ref 11.7–15.7)
MCH RBC QN AUTO: 29.4 PG (ref 26.5–33)
MCHC RBC AUTO-ENTMCNC: 33 G/DL (ref 31.5–36.5)
MCV RBC AUTO: 89 FL (ref 78–100)
PLATELET # BLD AUTO: 428 10E3/UL (ref 150–450)
POTASSIUM BLD-SCNC: 4 MMOL/L (ref 3.5–5)
RBC # BLD AUTO: 4.69 10E6/UL (ref 3.8–5.2)
SODIUM SERPL-SCNC: 137 MMOL/L (ref 136–145)
WBC # BLD AUTO: 9 10E3/UL (ref 4–11)

## 2022-07-23 PROCEDURE — 36415 COLL VENOUS BLD VENIPUNCTURE: CPT | Performed by: STUDENT IN AN ORGANIZED HEALTH CARE EDUCATION/TRAINING PROGRAM

## 2022-07-23 PROCEDURE — 120N000001 HC R&B MED SURG/OB

## 2022-07-23 PROCEDURE — 250N000011 HC RX IP 250 OP 636: Performed by: STUDENT IN AN ORGANIZED HEALTH CARE EDUCATION/TRAINING PROGRAM

## 2022-07-23 PROCEDURE — 250N000013 HC RX MED GY IP 250 OP 250 PS 637: Performed by: STUDENT IN AN ORGANIZED HEALTH CARE EDUCATION/TRAINING PROGRAM

## 2022-07-23 PROCEDURE — 250N000012 HC RX MED GY IP 250 OP 636 PS 637: Performed by: STUDENT IN AN ORGANIZED HEALTH CARE EDUCATION/TRAINING PROGRAM

## 2022-07-23 PROCEDURE — 80048 BASIC METABOLIC PNL TOTAL CA: CPT | Performed by: STUDENT IN AN ORGANIZED HEALTH CARE EDUCATION/TRAINING PROGRAM

## 2022-07-23 PROCEDURE — 99231 SBSQ HOSP IP/OBS SF/LOW 25: CPT | Mod: GC | Performed by: STUDENT IN AN ORGANIZED HEALTH CARE EDUCATION/TRAINING PROGRAM

## 2022-07-23 PROCEDURE — 85027 COMPLETE CBC AUTOMATED: CPT | Performed by: STUDENT IN AN ORGANIZED HEALTH CARE EDUCATION/TRAINING PROGRAM

## 2022-07-23 PROCEDURE — 250N000013 HC RX MED GY IP 250 OP 250 PS 637: Performed by: SURGERY

## 2022-07-23 PROCEDURE — 250N000013 HC RX MED GY IP 250 OP 250 PS 637: Performed by: INTERNAL MEDICINE

## 2022-07-23 RX ADMIN — FLUTICASONE FUROATE AND VILANTEROL TRIFENATATE 1 PUFF: 200; 25 POWDER RESPIRATORY (INHALATION) at 09:36

## 2022-07-23 RX ADMIN — OXYCODONE HYDROCHLORIDE 10 MG: 5 TABLET ORAL at 22:50

## 2022-07-23 RX ADMIN — SENNOSIDES AND DOCUSATE SODIUM 1 TABLET: 50; 8.6 TABLET ORAL at 09:27

## 2022-07-23 RX ADMIN — GABAPENTIN 300 MG: 300 CAPSULE ORAL at 20:38

## 2022-07-23 RX ADMIN — FLUTICASONE PROPIONATE 2 SPRAY: 50 SPRAY, METERED NASAL at 09:30

## 2022-07-23 RX ADMIN — OXYCODONE HYDROCHLORIDE 10 MG: 5 TABLET ORAL at 16:59

## 2022-07-23 RX ADMIN — INSULIN GLARGINE 46 UNITS: 100 INJECTION, SOLUTION SUBCUTANEOUS at 21:27

## 2022-07-23 RX ADMIN — HYDROMORPHONE HYDROCHLORIDE 0.5 MG: 1 INJECTION, SOLUTION INTRAMUSCULAR; INTRAVENOUS; SUBCUTANEOUS at 11:41

## 2022-07-23 RX ADMIN — OXYCODONE HYDROCHLORIDE 10 MG: 5 TABLET ORAL at 06:03

## 2022-07-23 RX ADMIN — AZELASTINE HYDROCHLORIDE 1 DROP: 0.5 SOLUTION/ DROPS INTRAOCULAR at 20:39

## 2022-07-23 RX ADMIN — HYDROMORPHONE HYDROCHLORIDE 0.2 MG: 1 INJECTION, SOLUTION INTRAMUSCULAR; INTRAVENOUS; SUBCUTANEOUS at 03:15

## 2022-07-23 RX ADMIN — GABAPENTIN 300 MG: 300 CAPSULE ORAL at 09:28

## 2022-07-23 RX ADMIN — UMECLIDINIUM 1 PUFF: 62.5 AEROSOL, POWDER ORAL at 09:36

## 2022-07-23 RX ADMIN — HYDROMORPHONE HYDROCHLORIDE 0.5 MG: 1 INJECTION, SOLUTION INTRAMUSCULAR; INTRAVENOUS; SUBCUTANEOUS at 18:57

## 2022-07-23 RX ADMIN — OXYCODONE HYDROCHLORIDE 10 MG: 5 TABLET ORAL at 00:14

## 2022-07-23 RX ADMIN — Medication 1 PACKET: at 17:00

## 2022-07-23 RX ADMIN — POLYETHYLENE GLYCOL 3350 17 G: 17 POWDER, FOR SOLUTION ORAL at 09:29

## 2022-07-23 RX ADMIN — CEFDINIR 300 MG: 300 CAPSULE ORAL at 20:38

## 2022-07-23 RX ADMIN — HYDROMORPHONE HYDROCHLORIDE 0.5 MG: 1 INJECTION, SOLUTION INTRAMUSCULAR; INTRAVENOUS; SUBCUTANEOUS at 09:30

## 2022-07-23 RX ADMIN — LISINOPRIL 40 MG: 20 TABLET ORAL at 20:38

## 2022-07-23 RX ADMIN — QUETIAPINE FUMARATE 500 MG: 300 TABLET, FILM COATED ORAL at 20:38

## 2022-07-23 RX ADMIN — TRAZODONE HYDROCHLORIDE 50 MG: 50 TABLET ORAL at 20:38

## 2022-07-23 RX ADMIN — PANTOPRAZOLE SODIUM 40 MG: 40 TABLET, DELAYED RELEASE ORAL at 09:29

## 2022-07-23 RX ADMIN — GABAPENTIN 300 MG: 300 CAPSULE ORAL at 14:46

## 2022-07-23 RX ADMIN — CLOPIDOGREL BISULFATE 75 MG: 75 TABLET ORAL at 09:29

## 2022-07-23 RX ADMIN — BENZOCAINE AND MENTHOL 1 LOZENGE: 15; 3.6 LOZENGE ORAL at 20:45

## 2022-07-23 RX ADMIN — SENNOSIDES AND DOCUSATE SODIUM 1 TABLET: 50; 8.6 TABLET ORAL at 20:38

## 2022-07-23 RX ADMIN — CEFDINIR 300 MG: 300 CAPSULE ORAL at 09:29

## 2022-07-23 RX ADMIN — OXYCODONE HYDROCHLORIDE 10 MG: 5 TABLET ORAL at 12:53

## 2022-07-23 RX ADMIN — AZELASTINE HYDROCHLORIDE 1 DROP: 0.5 SOLUTION/ DROPS INTRAOCULAR at 09:31

## 2022-07-23 RX ADMIN — VENLAFAXINE HYDROCHLORIDE 300 MG: 150 CAPSULE, EXTENDED RELEASE ORAL at 09:27

## 2022-07-23 RX ADMIN — HYDROMORPHONE HYDROCHLORIDE 0.5 MG: 1 INJECTION, SOLUTION INTRAMUSCULAR; INTRAVENOUS; SUBCUTANEOUS at 14:53

## 2022-07-23 RX ADMIN — HYDROMORPHONE HYDROCHLORIDE 0.2 MG: 1 INJECTION, SOLUTION INTRAMUSCULAR; INTRAVENOUS; SUBCUTANEOUS at 20:39

## 2022-07-23 ASSESSMENT — ACTIVITIES OF DAILY LIVING (ADL)
ADLS_ACUITY_SCORE: 29
ADLS_ACUITY_SCORE: 31
ADLS_ACUITY_SCORE: 29
ADLS_ACUITY_SCORE: 25
ADLS_ACUITY_SCORE: 29
ADLS_ACUITY_SCORE: 25
ADLS_ACUITY_SCORE: 27
ADLS_ACUITY_SCORE: 28
ADLS_ACUITY_SCORE: 27
ADLS_ACUITY_SCORE: 28

## 2022-07-23 NOTE — PLAN OF CARE
Problem: Plan of Care - These are the overarching goals to be used throughout the patient stay.    Goal: Absence of Hospital-Acquired Illness or Injury  Intervention: Identify and Manage Fall Risk  Recent Flowsheet Documentation  Taken 7/22/2022 1604 by Melissa Godinez RN  Safety Promotion/Fall Prevention:      increase visualization of patient    increased rounding and observation    fall prevention program maintained    clutter free environment maintained    mobility aid in reach    nonskid shoes/slippers when out of bed    patient and family education      Pt refusing alarm, does not call for assist. Steady on feet. Independent with pivoting self from bed to commode.    Problem: Plan of Care - These are the overarching goals to be used throughout the patient stay.    Goal: Optimal Comfort and Wellbeing  Intervention: Monitor Pain and Promote Comfort  Recent Flowsheet Documentation  Taken 7/22/2022 2119 by Melissa Godinez RN  Pain Management Interventions: medication (see MAR)  Taken 7/22/2022 1727 by Melissa Godinez RN  Pain Management Interventions: medication (see MAR)  Taken 7/22/2022 1604 by Melissa Godinez RN  Pain Management Interventions: medication (see MAR)   Gave PO oxycodone x2 and IV dilaudid x2 for pain rating between 8-10/10. Gets slight relief from pain meds.  Looks comfortable on assessments.    Dressingt to left buttock changed this evening. Small amount of serosanguineous drainage.    Blood sugar were 150 & 191.

## 2022-07-23 NOTE — PLAN OF CARE
Problem: Impaired Wound Healing  Goal: Optimal Wound Healing  Intervention: Promote Wound Healing  Recent Flowsheet Documentation  Taken 7/23/2022 0603 by Rae Ivan RN  Pain Management Interventions: medication (see MAR)  Taken 7/23/2022 0315 by Rae Ivan RN  Pain Management Interventions: medication (see MAR)  Taken 7/23/2022 0020 by Rae Ivan RN  Activity Management: up to bedside commode  Taken 7/23/2022 0014 by Rae Ivan RN  Pain Management Interventions: medication (see MAR)     Problem: Pain Acute  Goal: Acceptable Pain Control and Functional Ability  Outcome: Ongoing, Progressing  Intervention: Develop Pain Management Plan  Recent Flowsheet Documentation  Taken 7/23/2022 0603 by Rae Ivan RN  Pain Management Interventions: medication (see MAR)  Taken 7/23/2022 0315 by Rae Ivan RN  Pain Management Interventions: medication (see MAR)  Taken 7/23/2022 0014 by Rae Ivan RN  Pain Management Interventions: medication (see MAR)  Intervention: Prevent or Manage Pain  Recent Flowsheet Documentation  Taken 7/23/2022 0020 by Rae Ivan RN  Medication Review/Management: medications reviewed   Goal Outcome Evaluation:      Pt is alert oriented. VSS. Medicated x3 this shift for left buttocks pain with relief. Dressing came off after pt voiding this morning. New dressing clean dry and intact. Pt slept well between cares and pain meds. Will cont to monitor.

## 2022-07-23 NOTE — PROGRESS NOTES
Primary Medicine Progress Note    Assessment/Plan  Active Problems:    Buttock wound, left, initial encounter    Cellulitis, unspecified cellulitis site    Sepsis, due to unspecified organism, unspecified whether acute organ dysfunction present (H)    Teresa Perez is a 51 year old female w/ complex medical hx significant for CVA, CAD, COPD, Epilepsy, bipolar disorder, DM II, s/p L BKA admitted on 7/18/2022 with left buttock ulcer and concern for sepsis.  No growth on blood cultures after 4 days, on antibiotics with wound care following and currently awaiting placement given location of wound too difficult for patient to care for at home.       Decubitus ulcer, left buttock  On admission concern for sepsis related to left buttock ulcer, however blood cultures x4 negative and no fevers/chills with resolved leukocytosis.  Was on ceftriaxone, vancomycin, metronidazole.  ID saw patient and changed over to p.o. Omnicef for 5 days.  Surgery has now signed off but also saw patient and performed wound washout with further recommendations of continued wound care and outpatient follow-up with them which they are setting up.  Was on ceftriaxone, vancomycin, metronidazole.  ID saw patient given intra-op wound cx showing strep anginosus and E faecalis; they changed over to p.o. Omnicef for 5 days.  -ID consulted and following  -Cefdinir 300 mg twice daily x5 days, per ID  -CBC daily  -WOC  -Patient okay with henriquez if needed, though likely unnecessary at this point per nursing  - placed referrals for TCU (7/22)     Mobility concerns  S/p L BKA  Pt w/ left sided BKA. Mobility has been somewhat limited with this. She reports that she has been unable to care for her L buttock wound [facility]. Has been looking into home PCA options.  PT/OT saw patient and recommended a TCU  -PT/OT  -Care coordination consulted to help w/ TCU dispo     T2DM, insulin dependent  A1c 9.3 two weeks ago. On 65 units toujeo BID, 28 units lispro  BID with meals, max dose jardiance, max dose bydureon. Sugars 156-191. Would benefit from tight blood sugar control with goal <180 given infection.  -Increased lantus from 44 to 46u BID  -High resistance sliding scale insulin   -1:8 carb count with meals  -Hold PTA oral agents/GLP1 while admitted     Chronic conditions:   Bipolar disorder: Continue PTA Seroquel and Trazodone  Hypertension: SBP , PTA amlodipine, lisinopril, metoprolol  COPD/Asthma: PTA Albuterol, symbicort -> Breo Ellipta d/t not on formulary        Diet: Moderate Consistent Carb (60 g CHO per Meal) Diet  Snacks/Supplements Adult: Kevon; With Meals    DVT Prophylaxis: Pneumatic Compression Devices  Vargas Catheter: Not present  Fluids: none  Central Lines: None  Cardiac Monitoring: None  Code Status: Full Code      Disposition/Advanced Care Planning  Discharge Planning discussed with Patient  Barriers to discharge: Awaiting TCU  Anticipated discharge date:~2 days  Disposition: TCU      Subjective:   Doing well today without any concerns.  Is wondering when she can feel to go to TCU.  Denies any fevers or chills or other symptoms.      Objective    Vital signs in last 24 hours Temp:  [97.9  F (36.6  C)-98.2  F (36.8  C)] 97.9  F (36.6  C)  Pulse:  [79-91] 91  Resp:  [16-18] 18  BP: ()/(53-55) 98/53  SpO2:  [94 %-96 %] 94 %       Weight:   Vitals:    07/18/22 1337 07/19/22 1900 07/22/22 2118   Weight: 103 kg (227 lb) 97.9 kg (215 lb 14.4 oz) 95.6 kg (210 lb 11.2 oz)       Intake/Output last 3 shift I/O last 3 completed shifts:  In: 825 [P.O.:825]  Out: 1475 [Urine:1475]    Intake/Output this shift:I/O this shift:  In: -   Out: 1000 [Urine:1000]    PHYSICAL EXAM  GENERAL APPEARANCE: Cooperative and lying in bed comfortably; appears stated age  LUNGS: Lungs CTA  HEART: RRR, no murmurs. Radial pulses 2+. Brisk cap refill  ABDOMEN: Obese abdomen, non-distended, soft, no tenderness  EXTREMITIES: Grossly normal without deformity, no edema, left  BKA  SKIN: no rashes, skin warm, bandage over left lower buttock without strikethrough or surrounding erythema  NEURO: Oriented to time with clear mentation.    Pertinent Labs and Pertinent Radiology   Lab Results: personally reviewed.     Recent Labs   Lab 07/23/22  0619   WBC 9.0   HGB 13.8   HCT 41.8          Recent Labs   Lab 07/23/22  0619 07/20/22  0808 07/19/22  0758      < > 138   CO2 28   < > 19*   BUN 15   < > 11   ALBUMIN  --   --  2.6*   ALKPHOS  --   --  106   ALT  --   --  15   AST  --   --  15    < > = values in this interval not displayed.       Radiology Results:   Results for orders placed or performed during the hospital encounter of 07/18/22   XR Pelvis 1/2 Views    Impression    IMPRESSION: Normal joint spaces and alignment. No focal bone destruction to suggest osteomyelitis. Lower sacrum partially obscured by bowel gas. Generalized demineralization versus artifact visualized proximal left femur.       Precepted patient with Dr Rosenstein Andrew Calvin Dahl, MD  [facility] [place] Resident   Pager #: [phone]    Parts of this note were created with the assistance of voice recognition software.  The note was reviewed for accuracy.  However, errors in spelling, etc may have resulted.

## 2022-07-23 NOTE — PLAN OF CARE
Problem: Plan of Care - These are the overarching goals to be used throughout the patient stay.    Goal: Optimal Comfort and Wellbeing  Intervention: Monitor Pain and Promote Comfort  Recent Flowsheet Documentation  Taken 7/23/2022 1253 by Lola Carrillo RN  Pain Management Interventions: medication (see MAR)  Taken 7/23/2022 0930 by Lola Carrillo RN  Pain Management Interventions: medication (see MAR)   Goal Outcome Evaluation:    Rates pain at 9.  Asking for medication as soon as it's available.  Requests largest dose she can have.  Says medication is effective, no other indications of pain.      Eating 100% of meals, blood sugar checks with carb counting and sliding scale.

## 2022-07-24 LAB
ANION GAP SERPL CALCULATED.3IONS-SCNC: 9 MMOL/L (ref 5–18)
BUN SERPL-MCNC: 13 MG/DL (ref 8–22)
CALCIUM SERPL-MCNC: 9.2 MG/DL (ref 8.5–10.5)
CHLORIDE BLD-SCNC: 102 MMOL/L (ref 98–107)
CO2 SERPL-SCNC: 25 MMOL/L (ref 22–31)
CREAT SERPL-MCNC: 0.57 MG/DL (ref 0.6–1.1)
ERYTHROCYTE [DISTWIDTH] IN BLOOD BY AUTOMATED COUNT: 13.1 % (ref 10–15)
GFR SERPL CREATININE-BSD FRML MDRD: >90 ML/MIN/1.73M2
GLUCOSE BLD-MCNC: 160 MG/DL (ref 70–125)
GLUCOSE BLDC GLUCOMTR-MCNC: 146 MG/DL (ref 70–99)
GLUCOSE BLDC GLUCOMTR-MCNC: 157 MG/DL (ref 70–99)
GLUCOSE BLDC GLUCOMTR-MCNC: 170 MG/DL (ref 70–99)
GLUCOSE BLDC GLUCOMTR-MCNC: 170 MG/DL (ref 70–99)
GLUCOSE BLDC GLUCOMTR-MCNC: 196 MG/DL (ref 70–99)
HCT VFR BLD AUTO: 39.5 % (ref 35–47)
HGB BLD-MCNC: 13.2 G/DL (ref 11.7–15.7)
MCH RBC QN AUTO: 29.8 PG (ref 26.5–33)
MCHC RBC AUTO-ENTMCNC: 33.4 G/DL (ref 31.5–36.5)
MCV RBC AUTO: 89 FL (ref 78–100)
PLATELET # BLD AUTO: 402 10E3/UL (ref 150–450)
POTASSIUM BLD-SCNC: 3.8 MMOL/L (ref 3.5–5)
RBC # BLD AUTO: 4.43 10E6/UL (ref 3.8–5.2)
SODIUM SERPL-SCNC: 136 MMOL/L (ref 136–145)
WBC # BLD AUTO: 8.3 10E3/UL (ref 4–11)

## 2022-07-24 PROCEDURE — 250N000013 HC RX MED GY IP 250 OP 250 PS 637: Performed by: INTERNAL MEDICINE

## 2022-07-24 PROCEDURE — 85027 COMPLETE CBC AUTOMATED: CPT | Performed by: STUDENT IN AN ORGANIZED HEALTH CARE EDUCATION/TRAINING PROGRAM

## 2022-07-24 PROCEDURE — 36415 COLL VENOUS BLD VENIPUNCTURE: CPT | Performed by: STUDENT IN AN ORGANIZED HEALTH CARE EDUCATION/TRAINING PROGRAM

## 2022-07-24 PROCEDURE — 99231 SBSQ HOSP IP/OBS SF/LOW 25: CPT | Mod: GC

## 2022-07-24 PROCEDURE — 250N000013 HC RX MED GY IP 250 OP 250 PS 637

## 2022-07-24 PROCEDURE — 250N000011 HC RX IP 250 OP 636: Performed by: STUDENT IN AN ORGANIZED HEALTH CARE EDUCATION/TRAINING PROGRAM

## 2022-07-24 PROCEDURE — 120N000001 HC R&B MED SURG/OB

## 2022-07-24 PROCEDURE — 80048 BASIC METABOLIC PNL TOTAL CA: CPT | Performed by: STUDENT IN AN ORGANIZED HEALTH CARE EDUCATION/TRAINING PROGRAM

## 2022-07-24 PROCEDURE — 250N000013 HC RX MED GY IP 250 OP 250 PS 637: Performed by: SURGERY

## 2022-07-24 PROCEDURE — 250N000013 HC RX MED GY IP 250 OP 250 PS 637: Performed by: STUDENT IN AN ORGANIZED HEALTH CARE EDUCATION/TRAINING PROGRAM

## 2022-07-24 RX ORDER — HYDROMORPHONE HYDROCHLORIDE 2 MG/1
2 TABLET ORAL
Status: DISCONTINUED | OUTPATIENT
Start: 2022-07-24 | End: 2022-07-26 | Stop reason: HOSPADM

## 2022-07-24 RX ADMIN — GABAPENTIN 300 MG: 300 CAPSULE ORAL at 08:44

## 2022-07-24 RX ADMIN — LISINOPRIL 40 MG: 20 TABLET ORAL at 20:02

## 2022-07-24 RX ADMIN — FLUTICASONE PROPIONATE 2 SPRAY: 50 SPRAY, METERED NASAL at 08:40

## 2022-07-24 RX ADMIN — CEFDINIR 300 MG: 300 CAPSULE ORAL at 20:01

## 2022-07-24 RX ADMIN — OXYCODONE HYDROCHLORIDE 10 MG: 5 TABLET ORAL at 04:37

## 2022-07-24 RX ADMIN — BENZOCAINE AND MENTHOL 1 LOZENGE: 15; 3.6 LOZENGE ORAL at 16:07

## 2022-07-24 RX ADMIN — GABAPENTIN 300 MG: 300 CAPSULE ORAL at 20:02

## 2022-07-24 RX ADMIN — Medication 1 PACKET: at 17:21

## 2022-07-24 RX ADMIN — CEFDINIR 300 MG: 300 CAPSULE ORAL at 09:22

## 2022-07-24 RX ADMIN — SENNOSIDES AND DOCUSATE SODIUM 1 TABLET: 50; 8.6 TABLET ORAL at 20:02

## 2022-07-24 RX ADMIN — HYDROMORPHONE HYDROCHLORIDE 0.5 MG: 1 INJECTION, SOLUTION INTRAMUSCULAR; INTRAVENOUS; SUBCUTANEOUS at 07:59

## 2022-07-24 RX ADMIN — VENLAFAXINE HYDROCHLORIDE 300 MG: 150 CAPSULE, EXTENDED RELEASE ORAL at 09:22

## 2022-07-24 RX ADMIN — HYDROMORPHONE HYDROCHLORIDE 2 MG: 2 TABLET ORAL at 22:06

## 2022-07-24 RX ADMIN — HYDROMORPHONE HYDROCHLORIDE 0.5 MG: 1 INJECTION, SOLUTION INTRAMUSCULAR; INTRAVENOUS; SUBCUTANEOUS at 00:09

## 2022-07-24 RX ADMIN — AZELASTINE HYDROCHLORIDE 1 DROP: 0.5 SOLUTION/ DROPS INTRAOCULAR at 08:39

## 2022-07-24 RX ADMIN — HYDROMORPHONE HYDROCHLORIDE 0.2 MG: 1 INJECTION, SOLUTION INTRAMUSCULAR; INTRAVENOUS; SUBCUTANEOUS at 02:17

## 2022-07-24 RX ADMIN — INSULIN GLARGINE 46 UNITS: 100 INJECTION, SOLUTION SUBCUTANEOUS at 20:53

## 2022-07-24 RX ADMIN — HYDROMORPHONE HYDROCHLORIDE 0.5 MG: 1 INJECTION, SOLUTION INTRAMUSCULAR; INTRAVENOUS; SUBCUTANEOUS at 19:06

## 2022-07-24 RX ADMIN — FLUTICASONE FUROATE AND VILANTEROL TRIFENATATE 1 PUFF: 200; 25 POWDER RESPIRATORY (INHALATION) at 08:40

## 2022-07-24 RX ADMIN — HYDROMORPHONE HYDROCHLORIDE 0.5 MG: 1 INJECTION, SOLUTION INTRAMUSCULAR; INTRAVENOUS; SUBCUTANEOUS at 11:48

## 2022-07-24 RX ADMIN — Medication 1 PACKET: at 08:39

## 2022-07-24 RX ADMIN — GABAPENTIN 300 MG: 300 CAPSULE ORAL at 13:50

## 2022-07-24 RX ADMIN — PANTOPRAZOLE SODIUM 40 MG: 40 TABLET, DELAYED RELEASE ORAL at 08:44

## 2022-07-24 RX ADMIN — AMLODIPINE BESYLATE 10 MG: 5 TABLET ORAL at 08:45

## 2022-07-24 RX ADMIN — SENNOSIDES AND DOCUSATE SODIUM 1 TABLET: 50; 8.6 TABLET ORAL at 08:44

## 2022-07-24 RX ADMIN — OXYCODONE HYDROCHLORIDE 10 MG: 5 TABLET ORAL at 09:19

## 2022-07-24 RX ADMIN — UMECLIDINIUM 1 PUFF: 62.5 AEROSOL, POWDER ORAL at 08:40

## 2022-07-24 RX ADMIN — QUETIAPINE FUMARATE 500 MG: 300 TABLET, FILM COATED ORAL at 20:02

## 2022-07-24 RX ADMIN — POLYETHYLENE GLYCOL 3350 17 G: 17 POWDER, FOR SOLUTION ORAL at 08:43

## 2022-07-24 RX ADMIN — METOPROLOL SUCCINATE 50 MG: 50 TABLET, EXTENDED RELEASE ORAL at 08:44

## 2022-07-24 RX ADMIN — OXYCODONE HYDROCHLORIDE 10 MG: 5 TABLET ORAL at 20:02

## 2022-07-24 RX ADMIN — TRAZODONE HYDROCHLORIDE 50 MG: 50 TABLET ORAL at 20:02

## 2022-07-24 RX ADMIN — HYDROMORPHONE HYDROCHLORIDE 0.5 MG: 1 INJECTION, SOLUTION INTRAMUSCULAR; INTRAVENOUS; SUBCUTANEOUS at 16:03

## 2022-07-24 RX ADMIN — INSULIN GLARGINE 46 UNITS: 100 INJECTION, SOLUTION SUBCUTANEOUS at 08:40

## 2022-07-24 RX ADMIN — CLOPIDOGREL BISULFATE 75 MG: 75 TABLET ORAL at 08:44

## 2022-07-24 RX ADMIN — OXYCODONE HYDROCHLORIDE 10 MG: 5 TABLET ORAL at 13:50

## 2022-07-24 ASSESSMENT — ACTIVITIES OF DAILY LIVING (ADL)
ADLS_ACUITY_SCORE: 32
ADLS_ACUITY_SCORE: 32
ADLS_ACUITY_SCORE: 31
ADLS_ACUITY_SCORE: 32
ADLS_ACUITY_SCORE: 31
ADLS_ACUITY_SCORE: 32
ADLS_ACUITY_SCORE: 31
ADLS_ACUITY_SCORE: 32
ADLS_ACUITY_SCORE: 30
ADLS_ACUITY_SCORE: 31
ADLS_ACUITY_SCORE: 31
ADLS_ACUITY_SCORE: 30

## 2022-07-24 NOTE — PLAN OF CARE
Problem: Plan of Care - These are the overarching goals to be used throughout the patient stay.    Goal: Optimal Comfort and Wellbeing  Intervention: Monitor Pain and Promote Comfort  Recent Flowsheet Documentation  Taken 7/24/2022 7719 by Lola Carrillo RN  Pain Management Interventions: medication (see MAR)   Goal Outcome Evaluation:    C/O pain 9, requests medication each time she is able to have it, appears calm, watching tv, smiling.  Says Dilaudid and Oxycodone are effective.      Blood sugars 1446 and 170, carb counting, and sliding scale.      Tolerated dressing change.

## 2022-07-24 NOTE — PLAN OF CARE
Problem: Diabetes Comorbidity  Goal: Blood Glucose Level Within Targeted Range  Outcome: Ongoing, Progressing     Problem: Pain Acute  Goal: Acceptable Pain Control and Functional Ability  Outcome: Ongoing, Progressing  Intervention: Develop Pain Management Plan  Recent Flowsheet Documentation  Taken 7/24/2022 0009 by Ole Escobar RN  Pain Management Interventions: medication (see MAR)     Problem: Impaired Wound Healing  Goal: Optimal Wound Healing  Outcome: Ongoing, Progressing  Intervention: Promote Wound Healing  Recent Flowsheet Documentation  Taken 7/24/2022 0009 by Ole Escobar RN  Pain Management Interventions: medication (see MAR)   Goal Outcome Evaluation:      Pt alert and oriented,  pain managed with prn oxycodone and dilaudid with some relief. Dressing on wound is intact, call light within reach.

## 2022-07-24 NOTE — PLAN OF CARE
Problem: Plan of Care - These are the overarching goals to be used throughout the patient stay.    Goal: Absence of Hospital-Acquired Illness or Injury  Intervention: Prevent Skin Injury  Recent Flowsheet Documentation  Taken 7/23/2022 1755 by Melissa Godinez RN  Body Position: position changed independently  Taken 7/23/2022 1659 by Melissa Godinez RN  Body Position: position changed independently    Pt able to turn self appropriately. Dressing reinforced x1 and replaced x1 after having bm.       Problem: Plan of Care - These are the overarching goals to be used throughout the patient stay.    Goal: Optimal Comfort and Wellbeing  Intervention: Monitor Pain and Promote Comfort  Recent Flowsheet Documentation  Taken 7/23/2022 2039 by Melissa Godinez RN  Pain Management Interventions: medication (see MAR)  Taken 7/23/2022 1857 by Melissa Godinez RN  Pain Management Interventions: medication (see MAR)     Gave PO oxycodone x1 and IV dialudid x2. Pt looks comfortable on pre assessment and post assessment.  Rates pain 8-9/10, and reports of slight improvement.

## 2022-07-24 NOTE — PROGRESS NOTES
[facility]    Progress Note - Hospitalist Service       Date of Admission:  7/18/2022    Assessment & Plan              Teresa GEOVANNA Peerz is a 51 year old female w/ complex medical hx significant for CVA, CAD, COPD, Epilepsy, bipolar disorder, DM II, s/p L BKA admitted on 7/18/2022 with left buttock ulcer and concern for sepsis.  No growth on blood cultures after 4 days, on antibiotics with wound care following and currently awaiting placement given location of wound too difficult for patient to care for at home.       Decubitus ulcer, left buttock  On admission concern for sepsis related to left buttock ulcer, however blood cultures x4 negative and no fevers/chills with resolved leukocytosis.  Was on ceftriaxone, vancomycin, metronidazole. Underwent washout with surgery, now signed off. Wound culture growing strep anginosus and E faecalis ID recommended p.o. Omnicef for 5 days. Continue wound cares.  -ID consulted and following  -Cefdinir 300 mg twice daily x5 days, per ID  -CBC daily  -WOC  -Patient okay with henriquez if needed, though likely unnecessary at this point per nursing  - placed referrals for TCU (7/22)  - Outpatient surgery follow up     Mobility concerns  S/p L BKA  Pt w/ left sided BKA. Mobility has been somewhat limited with this. She reports that she has been unable to care for her L buttock wound [facility]. Has been looking into home PCA options.  PT/OT saw patient and recommended a TCU  -PT/OT  -Care coordination consulted to help w/ TCU dispo     T2DM, insulin dependent  A1c 9.3 two weeks ago. On 65 units toujeo BID, 28 units lispro BID with meals, max dose jardiance, max dose bydureon. Would benefit from tight blood sugar control with goal <180 given infection.  -Increased lantus from 44 to 46u BID  -High resistance sliding scale insulin   -1:8 carb count with meals  -Hold PTA oral agents/GLP1 while admitted     Chronic conditions:   Bipolar disorder: Continue  PTA Seroquel and Trazodone  Hypertension: SBP , PTA amlodipine, lisinopril, metoprolol  COPD/Asthma: PTA Albuterol, symbicort -> Breo Ellipta d/t not on formulary      Diet: Moderate Consistent Carb (60 g CHO per Meal) Diet  Snacks/Supplements Adult: Kevon; With Meals    DVT Prophylaxis: Pneumatic Compression Devices  Vargas Catheter: Not present  Fluids: None  Central Lines: None  Cardiac Monitoring: None  Code Status: Full Code      Disposition Plan     Expected Discharge Date: 07/25/2022    Discharge Delays: IV Medication - consider oral or Home Infusion  Other (Add Comment)  Placement - TCU    Discharge Comments: WOC says no wound vac- if wound care/dressing can be done every other day- then would want to go home  current dressing falls off everytime patient is up to bathroom        The patient's care was discussed with Dr Rosenstein Christopher Little, MD  Hospitalist Service  [facility]  Securely message with the Vocera Web Console (learn more here)  Text page via [facility] Paging/Directory         Clinically Significant Risk Factors Present on Admission                      ______________________________________________________________________    Interval History   Continues to have pain at her wound site, does get some relief with current regimen.  Noted slight abdominal pain on her left side.  No nausea vomiting.  No constipation or diarrhea.  No fever no chills.  Tolerating normal diet.    Data reviewed today: I reviewed all medications, new labs and imaging results over the last 24 hours.    Physical Exam   Vital Signs: Temp: 98.3  F (36.8  C) Temp src: Oral BP: 111/62 Pulse: 92   Resp: 18 SpO2: 92 % O2 Device: None (Room air)    Weight: 211 lbs 0 oz  General Appearance: Comfortable.  No acute distress.  Respiratory: Comfortable respiratory effort.  Clear to auscultation bilaterally.  No crackles or wheezing.  Cardiovascular: Regular rate and rhythm.  No murmurs rubs or  gallops.  GI: Nondistended.  Bowel sounds present.  Mild tenderness in the left lower quadrant  Extremities: Left BKA.  No significant edema  Neuro: Neutral affect.  Fluent speech.       Data   Recent Labs   Lab 07/24/22  0752 07/24/22  0734 07/24/22  0147 07/23/22  0711 07/23/22  0619 07/22/22  1217 07/22/22  0842 07/19/22  1140 07/19/22  0758   WBC  --  8.3  --   --  9.0  --  7.7   < > 14.2*  14.2*   HGB  --  13.2  --   --  13.8  --  13.1   < > 13.3  13.3   MCV  --  89  --   --  89  --  89   < > 91  91   PLT  --  402  --   --  428  --  394   < > 368  368   NA  --  136  --   --  137  --  137   < > 138   POTASSIUM  --  3.8  --   --  4.0  --  3.7   < > 3.8   CHLORIDE  --  102  --   --  102  --  102   < > 102   CO2  --  25  --   --  28  --  26   < > 19*   BUN  --  13  --   --  15  --  13   < > 11   CR  --  0.57*  --   --  0.63  --  0.55*   < > 0.68  0.68   ANIONGAP  --  9  --   --  7  --  9   < > 17   TRUNG  --  9.2  --   --  9.5  --  9.1   < > 9.1   * 160* 196*   < > 156*   < > 153*   < > 122   ALBUMIN  --   --   --   --   --   --   --   --  2.6*   PROTTOTAL  --   --   --   --   --   --   --   --  7.1   BILITOTAL  --   --   --   --   --   --   --   --  0.3   ALKPHOS  --   --   --   --   --   --   --   --  106   ALT  --   --   --   --   --   --   --   --  15   AST  --   --   --   --   --   --   --   --  15    < > = values in this interval not displayed.

## 2022-07-25 ENCOUNTER — APPOINTMENT (OUTPATIENT)
Dept: PHYSICAL THERAPY | Facility: HOSPITAL | Age: 52
DRG: 593 | End: 2022-07-25
Payer: COMMERCIAL

## 2022-07-25 LAB
ANION GAP SERPL CALCULATED.3IONS-SCNC: 10 MMOL/L (ref 5–18)
BUN SERPL-MCNC: 17 MG/DL (ref 8–22)
CALCIUM SERPL-MCNC: 9.7 MG/DL (ref 8.5–10.5)
CHLORIDE BLD-SCNC: 99 MMOL/L (ref 98–107)
CO2 SERPL-SCNC: 25 MMOL/L (ref 22–31)
CREAT SERPL-MCNC: 0.66 MG/DL (ref 0.6–1.1)
ERYTHROCYTE [DISTWIDTH] IN BLOOD BY AUTOMATED COUNT: 13.2 % (ref 10–15)
GFR SERPL CREATININE-BSD FRML MDRD: >90 ML/MIN/1.73M2
GLUCOSE BLD-MCNC: 219 MG/DL (ref 70–125)
GLUCOSE BLDC GLUCOMTR-MCNC: 155 MG/DL (ref 70–99)
GLUCOSE BLDC GLUCOMTR-MCNC: 190 MG/DL (ref 70–99)
GLUCOSE BLDC GLUCOMTR-MCNC: 200 MG/DL (ref 70–99)
GLUCOSE BLDC GLUCOMTR-MCNC: 217 MG/DL (ref 70–99)
GLUCOSE BLDC GLUCOMTR-MCNC: 238 MG/DL (ref 70–99)
HCT VFR BLD AUTO: 42.5 % (ref 35–47)
HGB BLD-MCNC: 13.6 G/DL (ref 11.7–15.7)
MCH RBC QN AUTO: 28.8 PG (ref 26.5–33)
MCHC RBC AUTO-ENTMCNC: 32 G/DL (ref 31.5–36.5)
MCV RBC AUTO: 90 FL (ref 78–100)
PLATELET # BLD AUTO: 424 10E3/UL (ref 150–450)
POTASSIUM BLD-SCNC: 4 MMOL/L (ref 3.5–5)
RBC # BLD AUTO: 4.73 10E6/UL (ref 3.8–5.2)
SODIUM SERPL-SCNC: 134 MMOL/L (ref 136–145)
WBC # BLD AUTO: 8 10E3/UL (ref 4–11)

## 2022-07-25 PROCEDURE — 82310 ASSAY OF CALCIUM: CPT

## 2022-07-25 PROCEDURE — 250N000013 HC RX MED GY IP 250 OP 250 PS 637: Performed by: STUDENT IN AN ORGANIZED HEALTH CARE EDUCATION/TRAINING PROGRAM

## 2022-07-25 PROCEDURE — 85027 COMPLETE CBC AUTOMATED: CPT

## 2022-07-25 PROCEDURE — 99232 SBSQ HOSP IP/OBS MODERATE 35: CPT | Mod: GC | Performed by: STUDENT IN AN ORGANIZED HEALTH CARE EDUCATION/TRAINING PROGRAM

## 2022-07-25 PROCEDURE — 250N000013 HC RX MED GY IP 250 OP 250 PS 637

## 2022-07-25 PROCEDURE — 36415 COLL VENOUS BLD VENIPUNCTURE: CPT

## 2022-07-25 PROCEDURE — 120N000001 HC R&B MED SURG/OB

## 2022-07-25 PROCEDURE — 250N000011 HC RX IP 250 OP 636: Performed by: SURGERY

## 2022-07-25 PROCEDURE — 250N000013 HC RX MED GY IP 250 OP 250 PS 637: Performed by: SURGERY

## 2022-07-25 PROCEDURE — 250N000013 HC RX MED GY IP 250 OP 250 PS 637: Performed by: INTERNAL MEDICINE

## 2022-07-25 PROCEDURE — 97116 GAIT TRAINING THERAPY: CPT | Mod: GP

## 2022-07-25 PROCEDURE — 250N000012 HC RX MED GY IP 250 OP 636 PS 637: Performed by: STUDENT IN AN ORGANIZED HEALTH CARE EDUCATION/TRAINING PROGRAM

## 2022-07-25 RX ADMIN — HYDROMORPHONE HYDROCHLORIDE 2 MG: 2 TABLET ORAL at 22:56

## 2022-07-25 RX ADMIN — CEFDINIR 300 MG: 300 CAPSULE ORAL at 21:08

## 2022-07-25 RX ADMIN — LISINOPRIL 40 MG: 20 TABLET ORAL at 21:08

## 2022-07-25 RX ADMIN — AMLODIPINE BESYLATE 10 MG: 5 TABLET ORAL at 09:37

## 2022-07-25 RX ADMIN — FLUTICASONE FUROATE AND VILANTEROL TRIFENATATE 1 PUFF: 200; 25 POWDER RESPIRATORY (INHALATION) at 09:40

## 2022-07-25 RX ADMIN — VENLAFAXINE HYDROCHLORIDE 300 MG: 150 CAPSULE, EXTENDED RELEASE ORAL at 09:36

## 2022-07-25 RX ADMIN — INSULIN GLARGINE 48 UNITS: 100 INJECTION, SOLUTION SUBCUTANEOUS at 21:05

## 2022-07-25 RX ADMIN — HYDROMORPHONE HYDROCHLORIDE 2 MG: 2 TABLET ORAL at 01:07

## 2022-07-25 RX ADMIN — GABAPENTIN 300 MG: 300 CAPSULE ORAL at 09:38

## 2022-07-25 RX ADMIN — PANTOPRAZOLE SODIUM 40 MG: 40 TABLET, DELAYED RELEASE ORAL at 09:38

## 2022-07-25 RX ADMIN — ONDANSETRON 4 MG: 4 TABLET, ORALLY DISINTEGRATING ORAL at 16:13

## 2022-07-25 RX ADMIN — METOPROLOL SUCCINATE 50 MG: 50 TABLET, EXTENDED RELEASE ORAL at 09:38

## 2022-07-25 RX ADMIN — QUETIAPINE FUMARATE 500 MG: 300 TABLET, FILM COATED ORAL at 21:07

## 2022-07-25 RX ADMIN — HYDROMORPHONE HYDROCHLORIDE 2 MG: 2 TABLET ORAL at 13:09

## 2022-07-25 RX ADMIN — Medication 1 PACKET: at 09:52

## 2022-07-25 RX ADMIN — CEFDINIR 300 MG: 300 CAPSULE ORAL at 09:37

## 2022-07-25 RX ADMIN — HYDROMORPHONE HYDROCHLORIDE 2 MG: 2 TABLET ORAL at 06:21

## 2022-07-25 RX ADMIN — GABAPENTIN 300 MG: 300 CAPSULE ORAL at 13:09

## 2022-07-25 RX ADMIN — GABAPENTIN 300 MG: 300 CAPSULE ORAL at 21:07

## 2022-07-25 RX ADMIN — ONDANSETRON 4 MG: 4 TABLET, ORALLY DISINTEGRATING ORAL at 22:20

## 2022-07-25 RX ADMIN — SENNOSIDES AND DOCUSATE SODIUM 1 TABLET: 50; 8.6 TABLET ORAL at 21:09

## 2022-07-25 RX ADMIN — TRAZODONE HYDROCHLORIDE 50 MG: 50 TABLET ORAL at 21:07

## 2022-07-25 RX ADMIN — ACETAMINOPHEN 650 MG: 325 TABLET ORAL at 06:21

## 2022-07-25 RX ADMIN — HYDROMORPHONE HYDROCHLORIDE 2 MG: 2 TABLET ORAL at 09:35

## 2022-07-25 RX ADMIN — ACETAMINOPHEN 650 MG: 325 TABLET ORAL at 01:07

## 2022-07-25 RX ADMIN — AZELASTINE HYDROCHLORIDE 1 DROP: 0.5 SOLUTION/ DROPS INTRAOCULAR at 09:39

## 2022-07-25 RX ADMIN — INSULIN GLARGINE 46 UNITS: 100 INJECTION, SOLUTION SUBCUTANEOUS at 09:46

## 2022-07-25 RX ADMIN — UMECLIDINIUM 1 PUFF: 62.5 AEROSOL, POWDER ORAL at 09:52

## 2022-07-25 RX ADMIN — Medication 1 PACKET: at 17:01

## 2022-07-25 RX ADMIN — HYDROMORPHONE HYDROCHLORIDE 2 MG: 2 TABLET ORAL at 19:40

## 2022-07-25 RX ADMIN — HYDROMORPHONE HYDROCHLORIDE 2 MG: 2 TABLET ORAL at 16:09

## 2022-07-25 RX ADMIN — CLOPIDOGREL BISULFATE 75 MG: 75 TABLET ORAL at 09:38

## 2022-07-25 RX ADMIN — Medication 1 PACKET: at 09:51

## 2022-07-25 ASSESSMENT — ACTIVITIES OF DAILY LIVING (ADL)
ADLS_ACUITY_SCORE: 30
ADLS_ACUITY_SCORE: 30
ADLS_ACUITY_SCORE: 27
ADLS_ACUITY_SCORE: 27
ADLS_ACUITY_SCORE: 28
ADLS_ACUITY_SCORE: 26
ADLS_ACUITY_SCORE: 30
ADLS_ACUITY_SCORE: 26
ADLS_ACUITY_SCORE: 27

## 2022-07-25 NOTE — PLAN OF CARE
Problem: Impaired Wound Healing  Goal: Optimal Wound Healing  Outcome: Ongoing, Progressing  Intervention: Promote Wound Healing  Recent Flowsheet Documentation  Taken 7/25/2022 1309 by Sharmin Soto, RN  Activity Management: activity adjusted per tolerance  Taken 7/25/2022 1300 by Sharmin Soto, RN  Activity Management: activity adjusted per tolerance  Taken 7/25/2022 0935 by Sharmin Soto, RN  Pain Management Interventions:   medication (see MAR)   cold applied  Activity Management: activity adjusted per tolerance  Taken 7/25/2022 0800 by Sharmin Soto, RN  Activity Management: activity adjusted per tolerance   Goal Outcome Evaluation:      Wound on left buttock clean no packing in place new packing  Applied wound covered with foam dressing, Co pain 8/10 prior to pain   Medication sleeping after medications. Hoping to go to TCU today.

## 2022-07-25 NOTE — PLAN OF CARE
Goal Outcome Evaluation:      Problem: Pain Acute  Goal: Acceptable Pain Control and Functional Ability  Outcome: Ongoing, Progressing  Intervention: Develop Pain Management Plan  Recent Flowsheet Documentation  Taken 7/25/2022 0107 by Missy Hennessy RN  Pain Management Interventions:   medication (see MAR)   distraction   emotional support   rest  Intervention: Prevent or Manage Pain  Recent Flowsheet Documentation  Taken 7/25/2022 0128 by Missy Hennessy RN  Medication Review/Management: medications reviewed   Patient had adequate pain control overnight with oral pain medication and slept well. Rates pain 8/10 but is able to complete ADLs. Will continue to monitor and assess.

## 2022-07-25 NOTE — PROGRESS NOTES
General Surgery Progress Note:    Hospital Day # 7    ASSESSMENT:   1. Buttock wound, left, sequela    2. Buttock wound, left, initial encounter    3. Cellulitis, unspecified cellulitis site    4. Sepsis, due to unspecified organism, unspecified whether acute organ dysfunction present (H)        Teresa Perez is a 51 year old female s/p left open gluteal wound incision and drainage on 7/19    PLAN:   -No further acute surgical intervention from my standpoint.  Patient is cleared for discharge when medically stable.  -Please contact the general surgery team should any questions or concerns arise.        SUBJECTIVE:   Teresa Perez was seen on a.m. rounds.  Patient states that she is doing well.  She has no complaints regarding her left buttock wound.  Patient is optimistic that she will be transferred to transitional care in the near future.    Patient Vitals for the past 24 hrs:   BP Temp Temp src Pulse Resp SpO2   07/25/22 0840 94/45 98.3  F (36.8  C) Oral 95 18 94 %   07/25/22 0007 93/53 98.1  F (36.7  C) Oral 99 18 94 %   07/24/22 2003 118/56 -- -- 77 -- --   07/24/22 1551 109/58 98.2  F (36.8  C) Oral 79 20 96 %       Physical Exam:  General: NAD, pleasant  CV:RRR  LUNGS:Normal respiratory effort, no accessory muscle use, breath sounds bilaterally on auscultation  Skin: Left-sided buttock wound has clean wound base.  Minimal tenderness to palpation.  Minimal drainage.  EXT:no CCE    No results displayed because visit has over 200 results.           DO Nabil Fulton DO  General Surgeon  [facility]  Surgery [address]  [address]  [place] [address]?  Office: [phone]  Employed by - [employer]  Pager: [phone]

## 2022-07-25 NOTE — PROGRESS NOTES
Primary Medicine Progress Note    Assessment/Plan  Active Problems:    Buttock wound, left, initial encounter    Cellulitis, unspecified cellulitis site    Sepsis, due to unspecified organism, unspecified whether acute organ dysfunction present (H)      Teresa Perez is a 51 year old female w/ complex medical hx significant for CVA, CAD, COPD, Epilepsy, bipolar disorder, DM II, s/p L BKA admitted on 7/18/2022 with left buttock ulcer and concern for sepsis.  No growth on blood cultures after 4 days, on antibiotics with wound care following and currently awaiting placement to TCU given location of wound too difficult for patient to care for at home.       Decubitus ulcer, left buttock  On admission concern for sepsis related to left buttock ulcer, however blood cultures x4 negative and no fevers/chills with resolved leukocytosis.  Was on ceftriaxone, vancomycin, metronidazole. Underwent washout with surgery, now signed off. Wound culture growing strep anginosus and E faecalis, ID recommended PO Omnicef for 5 days.   -ID consulted and following  -Cefdinir 300 mg twice daily x5 days, per ID  -CBC daily  -WOC  -Patient okay with henriquez if needed, though likely unnecessary at this point per nursing  - placed referrals for TCU (7/22)  -Outpatient surgery follow up     Mobility concerns  S/p L BKA  Pt w/ left sided BKA. Mobility has been somewhat limited with this. She reports that she has been unable to care for her L buttock wound [facility]. Has been looking into home PCA options.  PT/OT saw patient and recommended a TCU  -PT/OT  -Care coordination consulted to help w/ TCU dispo     T2DM, insulin dependent  A1c 9.3 two weeks ago. On 65 units toujeo BID, 28 units lispro BID with meals, max dose jardiance, max dose bydureon. Would benefit from tight blood sugar control with goal <180 given infection.  Mealtime insulins have overall been within goal range however fastings have been elevated and thus suspect she  "may need some increase of long-acting.  -Increased lantus from 46 to 48 BID  -High resistance sliding scale insulin   -1:8 carb count with meals  -Hold PTA oral agents/GLP1 while admitted     Chronic conditions:   Bipolar disorder: Continue PTA Seroquel and Trazodone  Hypertension: SBP , PTA amlodipine, lisinopril, metoprolol  COPD/Asthma: PTA Albuterol, symbicort -> Breo Ellipta d/t not on formulary        Diet: Moderate Consistent Carb (60 g CHO per Meal) Diet  Snacks/Supplements Adult: Kevon; With Meals    DVT Prophylaxis: Pneumatic Compression Devices  Vargas Catheter: Not present  Fluids: None  Central Lines: None  Cardiac Monitoring: None  Code Status: Full Code          Disposition Plan        Expected Discharge Date: 1-2 days  Discharge Delays: Placement - TCU       Subjective:   Doing well this morning, still waiting on placement.  Denies fever/chills.   Says she had some IV issues last night and thus had pain medication changed to oral.  Waiting for WOC to change bandage today,.      Objective    Vital signs in last 24 hours Temp:  [98.1  F (36.7  C)-98.3  F (36.8  C)] 98.3  F (36.8  C)  Pulse:  [77-99] 95  Resp:  [18-20] 18  BP: ()/(45-58) 94/45  SpO2:  [94 %-96 %] 94 %       Weight:   Vitals:    07/19/22 1900 07/22/22 2118 07/23/22 1900   Weight: 97.9 kg (215 lb 14.4 oz) 95.6 kg (210 lb 11.2 oz) 95.7 kg (211 lb)       Intake/Output last 3 shift I/O last 3 completed shifts:  In: 603 [P.O.:600; I.V.:3]  Out: -     Intake/Output this shift:No intake/output data recorded.    PHYSICAL EXAM  GENERAL APPEARANCE: Cooperative; appears stated age, lying in bed comfortably  LUNGS: Lungs CTA, no increased work of breathing  HEART: Warm well perfused. Radial pulses 2+. Brisk cap refill  ABDOMEN: Obese, non-distended, soft, no tenderness  EXTREMITIES: Grossly normal without deformity, no edema  SKIN: 0.5cm deep wound of ~2x2\" over left buttock without surrounding erythema or discharge  NEURO: Oriented to " time and place.  Normal mentation    Pertinent Labs and Pertinent Radiology   Lab Results: personally reviewed.     Recent Labs   Lab 07/25/22  0703   WBC 8.0   HGB 13.6   HCT 42.5          Recent Labs   Lab 07/25/22  0703 07/20/22  0808 07/19/22  0758   *   < > 138   CO2 25   < > 19*   BUN 17   < > 11   ALBUMIN  --   --  2.6*   ALKPHOS  --   --  106   ALT  --   --  15   AST  --   --  15    < > = values in this interval not displayed.       Radiology Results:   Results for orders placed or performed during the hospital encounter of 07/18/22   XR Pelvis 1/2 Views    Impression    IMPRESSION: Normal joint spaces and alignment. No focal bone destruction to suggest osteomyelitis. Lower sacrum partially obscured by bowel gas. Generalized demineralization versus artifact visualized proximal left femur.         Precepted patient with Dr. Maria Alejandra Gonzalez MD  [facility] [place] Resident   Pager #: [phone]    Parts of this note were created with the assistance of voice recognition software.  The note was reviewed for accuracy.  However, errors in spelling, etc may have resulted.

## 2022-07-25 NOTE — PROGRESS NOTES
CHADD spoke to Azeem from the [facility], pt has been accepted at [facility] for today or tomorrow. Pt pleased with placement at [facility]. Pt wants medical transport arranged.   CHADD sent MD a page regarding discharge date.  MD states that pt will be ready to discharge on 7/26.    BUTCH Fuentes

## 2022-07-25 NOTE — PLAN OF CARE
"  Problem: Plan of Care - These are the overarching goals to be used throughout the patient stay.    Goal: Optimal Comfort and Wellbeing  Intervention: Monitor Pain and Promote Comfort  Recent Flowsheet Documentation  Taken 7/24/2022 2002 by Melissa Godinez RN  Pain Management Interventions: medication (see MAR)  Taken 7/24/2022 1603 by Melissa Godinez RN  Pain Management Interventions: medication (see MAR)     Pt calling for pain medication on the clock. Given IV dilaudid x2 and oxycodone x1. Reports pain 8-9/10.  Reassessment 7/10.  States \"effective\".  Encouraged patient to change pain medication to PO. Resident notified. Gave PO dilaudid at HS. Sleeping after po dilaudid.    Dressing to bottom intact. Turns self in bed.   "

## 2022-07-26 VITALS
OXYGEN SATURATION: 94 % | HEIGHT: 61 IN | SYSTOLIC BLOOD PRESSURE: 114 MMHG | WEIGHT: 211 LBS | BODY MASS INDEX: 39.84 KG/M2 | TEMPERATURE: 98.3 F | RESPIRATION RATE: 18 BRPM | HEART RATE: 108 BPM | DIASTOLIC BLOOD PRESSURE: 55 MMHG

## 2022-07-26 LAB
ANION GAP SERPL CALCULATED.3IONS-SCNC: 11 MMOL/L (ref 5–18)
BUN SERPL-MCNC: 18 MG/DL (ref 8–22)
CALCIUM SERPL-MCNC: 9.7 MG/DL (ref 8.5–10.5)
CHLORIDE BLD-SCNC: 102 MMOL/L (ref 98–107)
CO2 SERPL-SCNC: 25 MMOL/L (ref 22–31)
CREAT SERPL-MCNC: 0.7 MG/DL (ref 0.6–1.1)
ERYTHROCYTE [DISTWIDTH] IN BLOOD BY AUTOMATED COUNT: 13.4 % (ref 10–15)
GFR SERPL CREATININE-BSD FRML MDRD: >90 ML/MIN/1.73M2
GLUCOSE BLD-MCNC: 226 MG/DL (ref 70–125)
GLUCOSE BLDC GLUCOMTR-MCNC: 204 MG/DL (ref 70–99)
GLUCOSE BLDC GLUCOMTR-MCNC: 238 MG/DL (ref 70–99)
HCT VFR BLD AUTO: 44.5 % (ref 35–47)
HGB BLD-MCNC: 14.5 G/DL (ref 11.7–15.7)
MCH RBC QN AUTO: 29.4 PG (ref 26.5–33)
MCHC RBC AUTO-ENTMCNC: 32.6 G/DL (ref 31.5–36.5)
MCV RBC AUTO: 90 FL (ref 78–100)
PLATELET # BLD AUTO: 446 10E3/UL (ref 150–450)
POTASSIUM BLD-SCNC: 4.5 MMOL/L (ref 3.5–5)
RBC # BLD AUTO: 4.93 10E6/UL (ref 3.8–5.2)
SODIUM SERPL-SCNC: 138 MMOL/L (ref 136–145)
WBC # BLD AUTO: 10.3 10E3/UL (ref 4–11)

## 2022-07-26 PROCEDURE — 99238 HOSP IP/OBS DSCHRG MGMT 30/<: CPT | Mod: GC | Performed by: FAMILY MEDICINE

## 2022-07-26 PROCEDURE — 250N000013 HC RX MED GY IP 250 OP 250 PS 637: Performed by: SURGERY

## 2022-07-26 PROCEDURE — 36415 COLL VENOUS BLD VENIPUNCTURE: CPT

## 2022-07-26 PROCEDURE — 250N000013 HC RX MED GY IP 250 OP 250 PS 637: Performed by: INTERNAL MEDICINE

## 2022-07-26 PROCEDURE — 250N000013 HC RX MED GY IP 250 OP 250 PS 637: Performed by: STUDENT IN AN ORGANIZED HEALTH CARE EDUCATION/TRAINING PROGRAM

## 2022-07-26 PROCEDURE — 85014 HEMATOCRIT: CPT

## 2022-07-26 PROCEDURE — 250N000013 HC RX MED GY IP 250 OP 250 PS 637

## 2022-07-26 PROCEDURE — 80048 BASIC METABOLIC PNL TOTAL CA: CPT

## 2022-07-26 RX ORDER — OXYCODONE HYDROCHLORIDE 5 MG/1
5 TABLET ORAL EVERY 6 HOURS PRN
Qty: 15 TABLET | Refills: 0 | Status: SHIPPED | OUTPATIENT
Start: 2022-07-26 | End: 2022-08-29

## 2022-07-26 RX ORDER — CEFDINIR 300 MG/1
300 CAPSULE ORAL EVERY 12 HOURS
Qty: 4 CAPSULE | Refills: 0 | Status: SHIPPED | OUTPATIENT
Start: 2022-07-26 | End: 2022-07-28

## 2022-07-26 RX ORDER — GABAPENTIN 300 MG/1
300 CAPSULE ORAL 3 TIMES DAILY
COMMUNITY
Start: 2022-07-26 | End: 2022-09-16

## 2022-07-26 RX ADMIN — HYDROMORPHONE HYDROCHLORIDE 2 MG: 2 TABLET ORAL at 12:02

## 2022-07-26 RX ADMIN — VENLAFAXINE HYDROCHLORIDE 300 MG: 150 CAPSULE, EXTENDED RELEASE ORAL at 08:06

## 2022-07-26 RX ADMIN — INSULIN GLARGINE 48 UNITS: 100 INJECTION, SOLUTION SUBCUTANEOUS at 08:01

## 2022-07-26 RX ADMIN — CEFDINIR 300 MG: 300 CAPSULE ORAL at 07:57

## 2022-07-26 RX ADMIN — AZELASTINE HYDROCHLORIDE 1 DROP: 0.5 SOLUTION/ DROPS INTRAOCULAR at 08:07

## 2022-07-26 RX ADMIN — PANTOPRAZOLE SODIUM 40 MG: 40 TABLET, DELAYED RELEASE ORAL at 08:03

## 2022-07-26 RX ADMIN — CLOPIDOGREL BISULFATE 75 MG: 75 TABLET ORAL at 08:07

## 2022-07-26 RX ADMIN — AMLODIPINE BESYLATE 10 MG: 5 TABLET ORAL at 07:55

## 2022-07-26 RX ADMIN — Medication 1 PACKET: at 08:02

## 2022-07-26 RX ADMIN — HYDROMORPHONE HYDROCHLORIDE 2 MG: 2 TABLET ORAL at 06:26

## 2022-07-26 RX ADMIN — GABAPENTIN 300 MG: 300 CAPSULE ORAL at 07:59

## 2022-07-26 RX ADMIN — FLUTICASONE FUROATE AND VILANTEROL TRIFENATATE 1 PUFF: 200; 25 POWDER RESPIRATORY (INHALATION) at 07:59

## 2022-07-26 RX ADMIN — METOPROLOL SUCCINATE 50 MG: 50 TABLET, EXTENDED RELEASE ORAL at 08:02

## 2022-07-26 RX ADMIN — UMECLIDINIUM 1 PUFF: 62.5 AEROSOL, POWDER ORAL at 07:59

## 2022-07-26 ASSESSMENT — ACTIVITIES OF DAILY LIVING (ADL)
ADLS_ACUITY_SCORE: 28

## 2022-07-26 NOTE — PLAN OF CARE
Problem: Pain Acute  Goal: Acceptable Pain Control and Functional Ability  Outcome: Ongoing, Progressing  Intervention: Develop Pain Management Plan  Intervention: Prevent or Manage Pain  Problem: Skin Injury Risk Increased  Goal: Skin Health and Integrity  Outcome: Ongoing, Progressing  Problem: Impaired Wound Healing  Goal: Optimal Wound Healing  Outcome: Ongoing, Progressing  Intervention: Promote Wound Healing    VSS. Patient sleeping most of shift. Given PRN dilaudid x1 this morning. Dressing intact on buttock. Plan to discharge to TCU today. Continuing to monitor.    Margie Godwin RN

## 2022-07-26 NOTE — PLAN OF CARE
Problem: Plan of Care - These are the overarching goals to be used throughout the patient stay.    Goal: Plan of Care Review/Shift Note  Description: The Plan of Care Review/Shift note should be completed every shift.  The Outcome Evaluation is a brief statement about your assessment that the patient is improving, declining, or no change.  This information will be displayed automatically on your shift note.  Outcome: Ongoing, Progressing   Goal Outcome E     Continue to monitor left butt.wound. repack as ordered.  Problem: Plan of Care - These are the overarching goals to be used throughout the patient stay.    Goal: Absence of Hospital-Acquired Illness or Injury  Intervention: Identify and Manage Fall Risk  Recent Flowsheet Documentation  Taken 7/26/2022 0740 by Pepper Huang RN  Safety Promotion/Fall Prevention:   room door open   nonskid shoes/slippers when out of bed        Transfers self without problems. Has LBKA and isindep. At home with wotto  Problem: Pain Acute  Goal: Acceptable Pain Control and Functional Ability  Intervention: Prevent or Manage Pain  Recent Flowsheet Documentation  Taken 7/26/2022 0740 by Pepper Huang RN  Medication Review/Management: medications reviewed   Has chronic pain and uses medication with good results.

## 2022-07-26 NOTE — PLAN OF CARE
Physical Therapy Discharge Summary    Reason for therapy discharge:    Discharged to transitional care facility.    Progress towards therapy goal(s). See goals on Care Plan in [facility] electronic health record for goal details.  Goals partially met.  Barriers to achieving goals:   discharge from facility.    Therapy recommendation(s):    Recommend continued therapies to improve overall strength, balance and mobility.       Carey Moeller, PT, DPT  7/26/2022

## 2022-07-26 NOTE — DISCHARGE SUMMARY
[facility]  DISCHARGE SUMMARY     Primary Care Physician: Tyra Bullock  Admission Date: 7/18/2022   Discharge Provider: Nikolas Gonzalez MD Discharge Date: 7/26/2022   Diet: Orders Placed This Encounter      Moderate Consistent Carb (60 g CHO per Meal) Diet      Diet     Code Status: Full Code   Activity: Regular        Condition at Discharge: Good     REASON FOR PRESENTATION(See Admission Note for Details)   Left buttock wound    PRINCIPAL & ACTIVE DISCHARGE DIAGNOSES     Active Problems:    Buttock wound, left, initial encounter    Cellulitis, unspecified cellulitis site    Sepsis, due to unspecified organism, unspecified whether acute organ dysfunction present (H)      SIGNIFICANT FINDINGS (Imaging, labs):         PENDING LABS     None    PROCEDURES ( this hospitalization only)      Wound washout with General surgery    RECOMMENDATIONS TO OUTPATIENT PROVIDER FOR F/U VISIT     -Encourage work with PT  -Wound cares  -Aggressive diabetes management    DISPOSITION     TCU    SUMMARY OF HOSPITAL COURSE:      Teresa Perez is a 51 year old female w/ complex medical hx significant for CVA, CAD, COPD, Epilepsy, bipolar disorder, DM II, s/p L BKA admitted on 7/18/2022 with left buttock ulcer and concern for sepsis. Surgery performed washout. ID consulted and transitioned patient to 5 day course of cefdinir which she has not yet completed. WOC saw patient throughout admission. Discharging to TCU with continued WOC given location of wound too difficult for patient to care for at home.      Discharge Medications with Med changes:        Review of your medicines      START taking      Dose / Directions   cefdinir 300 MG capsule  Commonly known as: OMNICEF  Indication: Abscess  Used for: Buttock wound, left, sequela      Dose: 300 mg  Take 1 capsule (300 mg) by mouth every 12 hours for 2 days  Quantity: 4 capsule  Refills: 0     gabapentin 300 MG capsule  Commonly known as: NEURONTIN  Replaces: gabapentin  600 MG tablet      Dose: 300 mg  Take 1 capsule (300 mg) by mouth 3 times daily  Refills: 0        CONTINUE these medicines which may have CHANGED, or have new prescriptions. If we are uncertain of the size of tablets/capsules you have at home, strength may be listed as something that might have changed.      Dose / Directions   acetaminophen 500 MG tablet  Commonly known as: TYLENOL  This may have changed:   how much to take  when to take this  Another medication with the same name was removed. Continue taking this medication, and follow the directions you see here.  Used for: Left shoulder pain, unspecified chronicity      Dose: 500-1,000 mg  Take 1-2 tablets (500-1,000 mg) by mouth every 6 hours as needed for mild pain  Quantity: 90 tablet  Refills: 0     ammonium lactate 12 % external cream  Commonly known as: AMLACTIN  This may have changed:   when to take this  reasons to take this  Used for: Rash      Apply topically 2 times daily  Quantity: 385 g  Refills: 1     azelastine 0.05 % ophthalmic solution  Commonly known as: OPTIVAR  This may have changed: See the new instructions.  Used for: Allergic reaction, sequela      APPLY 1 DROP TO AFFECTED EYE(S) 2 TIMES DAILY.  Quantity: 18 mL  Refills: 1     baclofen 20 MG tablet  Commonly known as: LIORESAL  This may have changed: when to take this  Used for: Left shoulder pain, unspecified chronicity      Dose: 20 mg  Take 1 tablet (20 mg) by mouth 2 times daily  Quantity: 60 tablet  Refills: 1     cetirizine 10 MG tablet  Commonly known as: zyrTEC  This may have changed: when to take this  Used for: Cough      Dose: 10 mg  Take 1 tablet (10 mg) by mouth 2 times daily as needed for allergies  Quantity: 180 tablet  Refills: 1     diclofenac 1 % topical gel  Commonly known as: VOLTAREN  This may have changed:   when to take this  reasons to take this  Another medication with the same name was removed. Continue taking this medication, and follow the directions you see  here.  Used for: Left shoulder pain, unspecified chronicity      Dose: 2 g  Apply 2 g topically 4 times daily  Quantity: 50 g  Refills: 0     docusate sodium 100 MG capsule  Commonly known as: COLACE  This may have changed: when to take this  Used for: Recurrent incisional hernia      Dose: 100 mg  Take 1 capsule (100 mg) by mouth 2 times daily  Quantity: 30 capsule  Refills: 0     fluticasone 50 MCG/ACT nasal spray  Commonly known as: FLONASE  This may have changed:   when to take this  reasons to take this  Used for: Chronic allergic conjunctivitis      Dose: 2 spray  Spray 2 sprays into both nostrils daily  Quantity: 9.9 g  Refills: 11     oxyCODONE 5 MG tablet  Commonly known as: ROXICODONE  This may have changed:   when to take this  reasons to take this  Used for: Buttock wound, left, sequela      Dose: 5 mg  Take 1 tablet (5 mg) by mouth every 6 hours as needed for pain  Quantity: 15 tablet  Refills: 0        CONTINUE these medicines which have NOT CHANGED      Dose / Directions   * albuterol (2.5 MG/3ML) 0.083% neb solution  Commonly known as: PROVENTIL  Used for: Mild persistent asthma without complication      Dose: 2.5 mg  Take 1 vial (2.5 mg) by nebulization every 6 hours as needed for shortness of breath / dyspnea or wheezing  Quantity: 3 mL  Refills: 3     * albuterol 108 (90 Base) MCG/ACT inhaler  Commonly known as: PROAIR HFA/PROVENTIL HFA/VENTOLIN HFA      Dose: 1-2 puff  Inhale 1-2 puffs into the lungs every 4 hours as needed  Refills: 0     amLODIPine 10 MG tablet  Commonly known as: NORVASC  Used for: Benign essential hypertension      Dose: 10 mg  Take 1 tablet (10 mg) by mouth daily  Quantity: 90 tablet  Refills: 3     B-D U/F 31G X 5 MM miscellaneous  Used for: Type 2 diabetes mellitus with hyperglycemia, with long-term current use of insulin (H)  Generic drug: insulin pen needle      USE 4 TIMES A DAY OR AS DIRECTED  Quantity: 100 each  Refills: 3     blood glucose lancets standard  Commonly  known as: NO BRAND SPECIFIED  Used for: Type 2 diabetes mellitus with hyperglycemia, with long-term current use of insulin (H)      Use to test blood sugar 4 times daily or as directed.  Quantity: 100 lancet  Refills: 5     blood glucose monitoring meter device kit  Commonly known as: NO BRAND SPECIFIED  Used for: Type 2 diabetes mellitus with hyperglycemia, with long-term current use of insulin (H)      Use to test blood sugar 4 times daily or as directed.  Quantity: 1 kit  Refills: 0     blood glucose test strip  Commonly known as: NO BRAND SPECIFIED  Used for: Type 2 diabetes mellitus with hyperglycemia, with long-term current use of insulin (H)      Use to test blood sugar 4 times daily or as directed.  Quantity: 100 strip  Refills: 5     budesonide-formoterol 160-4.5 MCG/ACT Inhaler  Commonly known as: SYMBICORT      Dose: 2 puff  Inhale 2 puffs into the lungs 2 times daily  Refills: 0     Bydureon BCise 2 MG/0.85ML auto-injector  Used for: Type 2 diabetes mellitus with hyperglycemia, with long-term current use of insulin (H)  Generic drug: exenatide ER      INJECT 2 MG SUBCUTANEOUS EVERY 7 DAYS  Quantity: 10.2 mL  Refills: 5     Tapan-Gest Antacid 500 MG chewable tablet  Used for: Cough, Gastroesophageal reflux disease with esophagitis without hemorrhage  Generic drug: calcium carbonate      TAKE 1 TABLET (500 MG) BY MOUTH 2 TIMES DAILY AS NEEDED FOR HEARTBURN  Quantity: 60 tablet  Refills: 3     clopidogrel 75 MG tablet  Commonly known as: PLAVIX      Dose: 75 mg  Take 1 tablet (75 mg) by mouth daily  Refills: 0     Dexcom G6 Sensor Misc  Used for: Type 2 diabetes mellitus with hyperglycemia, with long-term current use of insulin (H)      Dose: 1 each  1 each every 10 days Change every 10 days.  Quantity: 3 each  Refills: 5     Dexcom G6 Transmitter Misc  Used for: Type 2 diabetes mellitus with hyperglycemia, with long-term current use of insulin (H)      Dose: 1 each  1 each every 3 months Change every 3  months.  Quantity: 1 each  Refills: 1     EPINEPHrine 0.3 MG/0.3ML injection 2-pack  Commonly known as: ANY BX GENERIC EQUIV  Used for: Endometriosis, Encounter for smoking cessation counseling      Dose: 0.3 mg  Inject 0.3 mLs (0.3 mg) into the muscle once as needed for anaphylaxis  Quantity: 2 mL  Refills: 0     insulin lispro 100 UNIT/ML (1 unit dial) KWIKPEN  Commonly known as: HumaLOG KWIKpen  Used for: Type 2 diabetes mellitus with hyperglycemia, with long-term current use of insulin (H)      Take 28 Units plus 2 units for every 50 over 150 per sliding scale.  Max 38 Units.  Max daily dose 114 Units.  Quantity: 60 mL  Refills: 3     Jardiance 25 MG Tabs tablet  Used for: Type 2 diabetes mellitus with hyperglycemia, with long-term current use of insulin (H)  Generic drug: empagliflozin      TAKE 1 TABLET (25 MG) BY MOUTH DAILY  Quantity: 90 tablet  Refills: 3     lidocaine 5 % external ointment  Commonly known as: XYLOCAINE  Used for: Cough, Abdominal pain, generalized      Apply topically 3 times daily as needed for moderate pain  Quantity: 141.76 g  Refills: 1     lisinopril 40 MG tablet  Commonly known as: ZESTRIL  Used for: Essential hypertension, benign      TAKE 1 TABLET BY MOUTH EVERY DAY  Quantity: 90 tablet  Refills: 1     medroxyPROGESTERone 150 MG/ML IM injection  Commonly known as: DEPO-PROVERA      Dose: 150 mg  Inject 150 mg into the muscle every 3 months  Refills: 0     metoprolol succinate ER 50 MG 24 hr tablet  Commonly known as: TOPROL XL  Used for: Benign essential hypertension      TAKE 1 TABLET BY MOUTH EVERY DAY  Quantity: 90 tablet  Refills: 1     miconazole 1200 & 2 MG & % kit  Commonly known as: MONISTAT 1 DAY OR NIGHT  Used for: Yeast infection of the vagina      Use once as needed for discharge  Quantity: 1 kit  Refills: 2     montelukast 10 MG tablet  Commonly known as: SINGULAIR  Used for: Mild intermittent asthma without complication, Allergic reaction, sequela      TAKE 1 TABLET  BY MOUTH EVERYDAY AT BEDTIME  Quantity: 90 tablet  Refills: 1     nystatin 885888 UNIT/ML suspension  Commonly known as: MYCOSTATIN  Indication: Candidiasis Fungal Infection of the Oropharynx      Dose: 500,000 Units  Take 500,000 Units by mouth daily as needed  Refills: 0     ondansetron 4 MG tablet  Commonly known as: ZOFRAN  Used for: Chronic pain syndrome, Nausea      TAKE 1 TABLET BY MOUTH EVERY 8 HOURS AS NEEDED FOR NAUSEA  Quantity: 18 tablet  Refills: 1     pantoprazole 40 MG EC tablet  Commonly known as: PROTONIX  Used for: RUQ abdominal pain      TAKE 1 TABLET BY MOUTH EVERY DAY  Quantity: 90 tablet  Refills: 2     pramipexole 0.5 MG tablet  Commonly known as: MIRAPEX  Used for: Restless legs syndrome      TAKE 1 TABLET BY MOUTH AT BEDTIME  Quantity: 90 tablet  Refills: 0     pravastatin 80 MG tablet  Commonly known as: PRAVACHOL  Used for: Hyperlipidemia LDL goal <100      TAKE 1 TABLET BY MOUTH EVERY DAY  Quantity: 90 tablet  Refills: 3     QUEtiapine 100 MG tablet  Commonly known as: SEROquel  Used for: Moderate recurrent major depression (H)      Dose: 500 mg  TAKE 5 TABLETS (500 MG) BY MOUTH AT BEDTIME  Quantity: 150 tablet  Refills: 0     SUMAtriptan 50 MG tablet  Commonly known as: IMITREX      Dose: 50 mg  Take 50 mg by mouth at onset of headache  Refills: 0     tiotropium 2.5 MCG/ACT inhaler  Commonly known as: SPIRIVA RESPIMAT  Used for: Type 2 diabetes mellitus with hyperglycemia, with long-term current use of insulin (H)      Dose: 2 puff  Inhale 2 puffs into the lungs daily  Quantity: 4 g  Refills: 11     Toujeo SoloStar 300 UNIT/ML (1 units dial) pen  Used for: Type 2 diabetes mellitus with hyperglycemia, with long-term current use of insulin (H)  Generic drug: insulin glargine U-300      Take 65 Units in AM and 65 Units in PM.  Quantity: 9 mL  Refills: 11     traZODone 50 MG tablet  Commonly known as: DESYREL  Used for: Other insomnia      TAKE 1 TABLET BY MOUTH AT BEDTIME  Quantity: 30  tablet  Refills: 0     venlafaxine 150 MG 24 hr capsule  Commonly known as: EFFEXOR XR      Dose: 300 mg  Take 2 capsules (300 mg) by mouth daily  Quantity: 180 capsule  Refills: 0         * This list has 2 medication(s) that are the same as other medications prescribed for you. Read the directions carefully, and ask your doctor or other care provider to review them with you.            STOP taking    amitriptyline 10 MG tablet  Commonly known as: ELAVIL        cephALEXin 500 MG capsule  Commonly known as: KEFLEX        diclofenac 50 MG EC tablet  Commonly known as: VOLTAREN        gabapentin 600 MG tablet  Commonly known as: Neurontin  Replaced by: gabapentin 300 MG capsule              Where to get your medicines      These medications were sent to [facility] [address] [address]    Phone: [phone]   cefdinir 300 MG capsule  oxyCODONE 5 MG tablet             Rationale for medication changes:      Cefdinir for wound  Oxycodone for pain        Consults   General surgery  ID  PT/OT  WOC  RD      Immunizations given this encounter     Most Recent Immunizations   Administered Date(s) Administered     COVID-19,PF,[place] 04/03/2021     COVID-19,PF,Moderna Booster 01/04/2022     Flu, Unspecified 03/26/2012     Hep B, Peds or Adolescent 04/09/1997     HepB-Adult 02/09/2021     Influenza (IIV3) PF 01/03/2014     Influenza Vaccine IM > 6 months Valent IIV4 (Alfuria,Fluzone) 01/04/2022     Influenza Vaccine, 6+MO IM (QUADRIVALENT W/PRESERVATIVES) 10/22/2019     Pneumococcal 23 valent 10/12/2010     TD (ADULT, 7+) 11/01/2002     TDAP Vaccine (Boostrix) 09/23/2017     Tdap (Adacel,Boostrix) 10/12/2010          Anticoagulation Information      Recent INR results: No results for input(s): INR in the last 168 hours.  Warfarin doses (if applicable) or name of other anticoagulant: N/A      Discharge Orders     Discharge Procedure  "Orders   Wound Care Referral   Standing Status: Future   Referral Priority: Routine: Next available opening Referral Type: Consultation   Requested Specialty: Wound Care   Number of Visits Requested: 1     General info for SNF   Order Comments: Length of Stay Estimate: Short Term Care: Estimated # of Days <30  Condition at Discharge: Improving  Level of care:skilled   Rehabilitation Potential: Good  Admission H&P remains valid and up-to-date: Yes  Recent Chemotherapy: N/A  Use Nursing Home Standing Orders: Yes     Mantoux instructions   Order Comments: Give two-step Mantoux (PPD) Per Facility Policy Yes; per facility discretion     Follow Up and recommended labs and tests   Order Comments: Follow up with Nursing home physician.     Reason for your hospital stay   Order Comments: Buttock ulcer     Glucose monitor nursing POCT   Order Comments: Before meals and at bedtime     Wound care (specify)   Order Comments: Site:   Left buttock  Instructions:  per WOC instructions     Physical Therapy Adult Consult   Order Comments: Evaluate and treat as clinically indicated.    Reason:  Work on transferring/mobility, BKA     Diet   Order Comments: Follow this diet upon discharge:       Moderate Consistent Carb (60 g CHO per Meal) Diet     Order Specific Question Answer Comments   Is discharge order? Yes        Examination     Vital Signs in last 24 hours:   B/P: 114/55, T: 98.3, P: 108, R: 18    General: Awake, alert and oriented. No acute distress  Respiratory: No respiratory distress on RA  Cardiac: Warm and well perfused  Abdominal: Abdomen is obese, soft and non-tender  Extremities: BKA  Skin: ~0.5cm deep wound of ~2x2\" over left medial buttock without surrounding discharge or erythema  Neurological: The patient is fully oriented      Nikolas Gonzalez MD  [facility] Medicine Resident  Pager #: [phone]      Precepted patient with Dr. Madhuri Cook    CC:Tyra Bullock    "

## 2022-07-26 NOTE — PROGRESS NOTES
This note is solely for coding purposes.    Decubitus ulcer, left buttock, stage 3  On admission concern for sepsis related to left buttock ulcer, however blood cultures x4 negative and no fevers/chills with resolved leukocytosis and thus determined no sepsis.  Was on ceftriaxone, vancomycin, metronidazole. Underwent washout with surgery, now signed off. Wound culture growing strep anginosus and E faecalis, ID recommended PO Omnicef for 5 days.   -ID consulted and following  -Cefdinir 300 mg twice daily x5 days, per ID  -CBC daily  -WOC  -Outpatient surgery follow up    Nikolas Gonzalez MD  [facility]

## 2022-07-26 NOTE — PLAN OF CARE
Pt is A&O x4. Rates left buttock wound pain 7-8/10. PRN PO dilaudid given x3 and is effective pain management per pt. C/o nausea this evening, zofran given x2. Pt stated nausea may be related to pain. Ate 75% of dinner and had snacks at bedtime. Up to the bathroom with SBA and a walker. Voiding without difficulty. Wound dressing changed per order x1. Dressing had moderate amount of serosanguinous drainage.

## 2022-07-27 ENCOUNTER — PATIENT OUTREACH (OUTPATIENT)
Dept: CARE COORDINATION | Facility: CLINIC | Age: 52
End: 2022-07-27

## 2022-07-27 DIAGNOSIS — Z71.89 OTHER SPECIFIED COUNSELING: ICD-10-CM

## 2022-07-27 NOTE — PROGRESS NOTES
[facility] Care [facility] [place]    Background: Care Coordination referral placed from [facility] discharge report for reason of patient meeting criteria for a TCM outreach call by [facility] team.    Assessment: Upon chart review, [facility] Team member will cancel/close the referral for TCM outreach due to reason below:    Patient is not established within [facility]. Upon chart review, CCRC Team member noted patient discharged to TCU.    Plan: Care Coordination referral for TCM outreach canceled.      ARLET Fernandez  [facility] [place], [facility]    *Connected Care Resource Team does NOT follow patient ongoing. Referrals are identified based on internal discharge reports and the outreach is to ensure patient has an understanding of their discharge instructions.

## 2022-08-22 ENCOUNTER — TELEPHONE (OUTPATIENT)
Dept: SURGERY | Facility: CLINIC | Age: 52
End: 2022-08-22

## 2022-08-22 NOTE — TELEPHONE ENCOUNTER
Patient had surgery 7/19 and wants a refill on pain medication.    Please call and advise.    Thanks!

## 2022-08-23 NOTE — TELEPHONE ENCOUNTER
Spoke with Teresa and let her know that narcotic pain medication is not typically prescribed 4 weeks out from surgery. She specifically asked for Dilaudid.  Recommended that she contact her PCP for pain medication if needed. She verbalized understanding and said she would contact Dr. Bullock today. She has a follow up next week with Dr. Ware.

## 2022-08-29 ENCOUNTER — OFFICE VISIT (OUTPATIENT)
Dept: SURGERY | Facility: CLINIC | Age: 52
End: 2022-08-29
Payer: COMMERCIAL

## 2022-08-29 DIAGNOSIS — S31.829S BUTTOCK WOUND, LEFT, SEQUELA: ICD-10-CM

## 2022-08-29 PROCEDURE — 99024 POSTOP FOLLOW-UP VISIT: CPT | Performed by: SURGERY

## 2022-08-29 RX ORDER — OXYCODONE HYDROCHLORIDE 5 MG/1
5 TABLET ORAL EVERY 6 HOURS PRN
Qty: 10 TABLET | Refills: 0 | Status: SHIPPED | OUTPATIENT
Start: 2022-08-29 | End: 2022-11-29

## 2022-08-29 NOTE — PROGRESS NOTES
HPI: Teresa Perez is here for follow up after recent admission to an outside hospital for bowel obstruction.  She states she notes that she has a new bulge in the mid abdominal region and was diagnosed with a recurrent hernia in her abdomen.  Currently she is doing relatively well but has mild discomfort.    Allergies, Medications, Social History, Past Medical History and Past Surgical History were reviewed and are noted in the chart.    There were no vitals taken for this visit.  There is no height or weight on file to calculate BMI.      EXAM:   GENERAL: Appears well  Abdomen-obese, current ventral hernia, soft    Wound Buttocks Pressure injury community acquired Stage 2 (Active)       Incision/Surgical Site 07/05/22 Abdomen (Active)       Incision/Surgical Site 07/19/22 Left Buttocks (Active)       Assessment/Plan: Teresa Perez unfortunately appears as though she may have a recurrent ventral hernia or a new hernia.  She had CT scan imaging done at the outside facility.  I will track this down and give her a call with my assessment.  We will likely plan on surgery in the future.      Abdelrahman Ware DO [facility] Department of Surgery

## 2022-08-29 NOTE — LETTER
8/29/2022         RE: Teresa Perez  [address]  [place] [address]        Dear Colleague,    Thank you for referring your patient, Teresa Perez, to the [facility] [place]. Please see a copy of my visit note below.     HPI: Teresa Perez is here for follow up after recent admission to an outside hospital for bowel obstruction.  She states she notes that she has a new bulge in the mid abdominal region and was diagnosed with a recurrent hernia in her abdomen.  Currently she is doing relatively well but has mild discomfort.    Allergies, Medications, Social History, Past Medical History and Past Surgical History were reviewed and are noted in the chart.    There were no vitals taken for this visit.  There is no height or weight on file to calculate BMI.      EXAM:   GENERAL: Appears well  Abdomen-obese, current ventral hernia, soft    Wound Buttocks Pressure injury community acquired Stage 2 (Active)       Incision/Surgical Site 07/05/22 Abdomen (Active)       Incision/Surgical Site 07/19/22 Left Buttocks (Active)       Assessment/Plan: Teresa Perez unfortunately appears as though she may have a recurrent ventral hernia or a new hernia.  She had CT scan imaging done at the outside facility.  I will track this down and give her a call with my assessment.  We will likely plan on surgery in the future.      Abdelrahman Ware DO [facility] Department of Surgery      Again, thank you for allowing me to participate in the care of your patient.        Sincerely,        Abdelrahman Ware DO

## 2022-09-09 DIAGNOSIS — E11.65 TYPE 2 DIABETES MELLITUS WITH HYPERGLYCEMIA, WITH LONG-TERM CURRENT USE OF INSULIN (H): ICD-10-CM

## 2022-09-09 DIAGNOSIS — Z79.4 TYPE 2 DIABETES MELLITUS WITH HYPERGLYCEMIA, WITH LONG-TERM CURRENT USE OF INSULIN (H): ICD-10-CM

## 2022-09-09 DIAGNOSIS — F33.1 MODERATE RECURRENT MAJOR DEPRESSION (H): ICD-10-CM

## 2022-09-09 RX ORDER — INSULIN LISPRO 100 [IU]/ML
INJECTION, SOLUTION INTRAVENOUS; SUBCUTANEOUS
Qty: 75 ML | Refills: 1 | Status: SHIPPED | OUTPATIENT
Start: 2022-09-09 | End: 2022-12-23

## 2022-09-09 RX ORDER — QUETIAPINE FUMARATE 100 MG/1
500 TABLET, FILM COATED ORAL AT BEDTIME
Qty: 75 TABLET | Refills: 0 | Status: SHIPPED | OUTPATIENT
Start: 2022-09-09 | End: 2022-09-14

## 2022-09-16 ENCOUNTER — OFFICE VISIT (OUTPATIENT)
Dept: FAMILY MEDICINE | Facility: CLINIC | Age: 52
End: 2022-09-16
Payer: COMMERCIAL

## 2022-09-16 VITALS
BODY MASS INDEX: 40.72 KG/M2 | DIASTOLIC BLOOD PRESSURE: 80 MMHG | TEMPERATURE: 99.1 F | SYSTOLIC BLOOD PRESSURE: 135 MMHG | OXYGEN SATURATION: 97 % | HEART RATE: 92 BPM | WEIGHT: 212 LBS | RESPIRATION RATE: 16 BRPM

## 2022-09-16 DIAGNOSIS — R11.0 NAUSEA: ICD-10-CM

## 2022-09-16 DIAGNOSIS — Z79.4 TYPE 2 DIABETES MELLITUS WITH HYPERGLYCEMIA, WITH LONG-TERM CURRENT USE OF INSULIN (H): ICD-10-CM

## 2022-09-16 DIAGNOSIS — R10.84 ABDOMINAL PAIN, GENERALIZED: ICD-10-CM

## 2022-09-16 DIAGNOSIS — Z12.31 VISIT FOR SCREENING MAMMOGRAM: ICD-10-CM

## 2022-09-16 DIAGNOSIS — B37.31 YEAST INFECTION OF THE VAGINA: ICD-10-CM

## 2022-09-16 DIAGNOSIS — R05.9 COUGH: Primary | ICD-10-CM

## 2022-09-16 DIAGNOSIS — Z32.00 PREGNANCY EXAMINATION OR TEST, PREGNANCY UNCONFIRMED: ICD-10-CM

## 2022-09-16 DIAGNOSIS — E11.65 TYPE 2 DIABETES MELLITUS WITH HYPERGLYCEMIA, WITH LONG-TERM CURRENT USE OF INSULIN (H): ICD-10-CM

## 2022-09-16 DIAGNOSIS — N80.9 ENDOMETRIOSIS: ICD-10-CM

## 2022-09-16 DIAGNOSIS — Z79.899 ENCOUNTER FOR LONG-TERM (CURRENT) USE OF MEDICATIONS: ICD-10-CM

## 2022-09-16 DIAGNOSIS — G89.4 CHRONIC PAIN SYNDROME: ICD-10-CM

## 2022-09-16 DIAGNOSIS — G25.81 RESTLESS LEGS SYNDROME: ICD-10-CM

## 2022-09-16 LAB
CREAT UR-MCNC: 79 MG/DL
HCG UR QL: NEGATIVE
MICROALBUMIN UR-MCNC: <12 MG/L
MICROALBUMIN/CREAT UR: NORMAL MG/G{CREAT}
SARS-COV-2 RNA RESP QL NAA+PROBE: POSITIVE

## 2022-09-16 PROCEDURE — U0003 INFECTIOUS AGENT DETECTION BY NUCLEIC ACID (DNA OR RNA); SEVERE ACUTE RESPIRATORY SYNDROME CORONAVIRUS 2 (SARS-COV-2) (CORONAVIRUS DISEASE [COVID-19]), AMPLIFIED PROBE TECHNIQUE, MAKING USE OF HIGH THROUGHPUT TECHNOLOGIES AS DESCRIBED BY CMS-2020-01-R: HCPCS | Performed by: STUDENT IN AN ORGANIZED HEALTH CARE EDUCATION/TRAINING PROGRAM

## 2022-09-16 PROCEDURE — 99214 OFFICE O/P EST MOD 30 MIN: CPT | Mod: 25 | Performed by: STUDENT IN AN ORGANIZED HEALTH CARE EDUCATION/TRAINING PROGRAM

## 2022-09-16 PROCEDURE — 96372 THER/PROPH/DIAG INJ SC/IM: CPT | Performed by: FAMILY MEDICINE

## 2022-09-16 PROCEDURE — U0005 INFEC AGEN DETEC AMPLI PROBE: HCPCS | Performed by: STUDENT IN AN ORGANIZED HEALTH CARE EDUCATION/TRAINING PROGRAM

## 2022-09-16 PROCEDURE — 81025 URINE PREGNANCY TEST: CPT | Performed by: STUDENT IN AN ORGANIZED HEALTH CARE EDUCATION/TRAINING PROGRAM

## 2022-09-16 PROCEDURE — 82043 UR ALBUMIN QUANTITATIVE: CPT | Performed by: STUDENT IN AN ORGANIZED HEALTH CARE EDUCATION/TRAINING PROGRAM

## 2022-09-16 RX ORDER — GABAPENTIN 300 MG/1
300 CAPSULE ORAL 3 TIMES DAILY
Qty: 90 CAPSULE | Refills: 11 | Status: SHIPPED | OUTPATIENT
Start: 2022-09-16 | End: 2022-10-04

## 2022-09-16 RX ORDER — PRAMIPEXOLE DIHYDROCHLORIDE 0.5 MG/1
0.5 TABLET ORAL AT BEDTIME
Qty: 90 TABLET | Refills: 0 | Status: SHIPPED | OUTPATIENT
Start: 2022-09-16 | End: 2022-12-16

## 2022-09-16 RX ORDER — MEDROXYPROGESTERONE ACETATE 150 MG/ML
150 INJECTION, SUSPENSION INTRAMUSCULAR
Status: ACTIVE | OUTPATIENT
Start: 2022-09-16 | End: 2023-09-11

## 2022-09-16 RX ORDER — CETIRIZINE HYDROCHLORIDE 10 MG/1
10 TABLET ORAL DAILY
Qty: 30 TABLET | Refills: 11 | Status: SHIPPED | OUTPATIENT
Start: 2022-09-16 | End: 2022-10-10

## 2022-09-16 RX ORDER — FLURBIPROFEN SODIUM 0.3 MG/ML
SOLUTION/ DROPS OPHTHALMIC
Qty: 100 EACH | Refills: 3 | Status: SHIPPED | OUTPATIENT
Start: 2022-09-16

## 2022-09-16 RX ORDER — MEDROXYPROGESTERONE ACETATE 150 MG/ML
150 INJECTION, SUSPENSION INTRAMUSCULAR
Qty: 1 ML | Refills: 3 | OUTPATIENT
Start: 2022-09-16 | End: 2022-09-16

## 2022-09-16 RX ORDER — METOCLOPRAMIDE 5 MG/1
5 TABLET ORAL 3 TIMES DAILY PRN
Qty: 30 TABLET | Refills: 1 | Status: SHIPPED | OUTPATIENT
Start: 2022-09-16 | End: 2022-10-17

## 2022-09-16 RX ORDER — ONDANSETRON 4 MG/1
TABLET, FILM COATED ORAL
Qty: 18 TABLET | Refills: 1 | Status: SHIPPED | OUTPATIENT
Start: 2022-09-16 | End: 2023-03-31

## 2022-09-16 RX ADMIN — MEDROXYPROGESTERONE ACETATE 150 MG: 150 INJECTION, SUSPENSION INTRAMUSCULAR at 09:15

## 2022-09-16 NOTE — NURSING NOTE
Clinic Administered Medication Documentation    Administrations This Visit     medroxyPROGESTERone (DEPO-PROVERA) injection 150 mg     Admin Date  09/16/2022 Action  Given Dose  150 mg Route  Intramuscular Site  Right Deltoid Administered By  Kraig Valderrama    Ordering Provider: Lis Vidal MD    Patient Supplied?: No                  Depo Provera Documentation    URINE HCG: negative    Depo-Provera Standing Order inclusion/exclusion criteria reviewed.   Patient meets: inclusion criteria     BP: 135/80  LAST PAP/EXAM: No results found for: PAP    Prior to injection, verified patient identity using patient's name and date of birth. Medication was administered. Please see MAR and medication order for additional information.     Was entire vial of medication used? Yes  Vial/Syringe: Single dose vial  Expiration Date:  01/2024    Patient instructed to remain in clinic for 15 minutes.  NEXT INJECTION DUE: 12/3/22 - 12/17/22      Name of provider who requested the medication administration: Dr. No   Name of provider on site (faculty or community preceptor) at the time of performing the medication administration: Dr. Vidal    Date of next administration: 12/3/22 - 12/17/22   Date of next office visit with provider to renew medication plan (must be seen annually): 09/16/2023

## 2022-09-16 NOTE — PROGRESS NOTES
Preceptor attestation:  Vital signs reviewed: /80   Pulse 92   Temp 99.1  F (37.3  C) (Oral)   Resp 16   Wt 96.2 kg (212 lb)   SpO2 97%   BMI 40.72 kg/m      Patient seen, evaluated, and discussed with the resident.  I have verified the content of the note, which accurately reflects my assessment of the patient and the plan of care.    Supervising physician: Lis Vidal MD  [facility]

## 2022-09-16 NOTE — PATIENT INSTRUCTIONS
Thank you for coming into the clinic today!  Here is what we discussed:  Follow up with Dr. Bullock as directed  Use reglan as needed for stomach pain     If you have any questions, please call the clinic at [phone].
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
## 2022-09-19 ENCOUNTER — NURSE TRIAGE (OUTPATIENT)
Dept: NURSING | Facility: CLINIC | Age: 52
End: 2022-09-19

## 2022-09-19 ENCOUNTER — VIRTUAL VISIT (OUTPATIENT)
Dept: URGENT CARE | Facility: CLINIC | Age: 52
End: 2022-09-19
Payer: COMMERCIAL

## 2022-09-19 DIAGNOSIS — U07.1 CLINICAL DIAGNOSIS OF COVID-19: Primary | ICD-10-CM

## 2022-09-19 PROCEDURE — 99441 PR PHYSICIAN TELEPHONE EVALUATION 5-10 MIN: CPT | Mod: CS

## 2022-09-19 NOTE — TELEPHONE ENCOUNTER
Reason for Disposition    Prescription not at pharmacy and was prescribed by PCP recently  (Exception: triager has access to EMR and prescription is recorded there. Go to Home Care and confirm for pharmacy.)     Spoke with pharmacy and prescription is there and filled. Patient has been notified as well.    Additional Information    Negative: Intentional drug overdose and suicidal thoughts or ideas    Negative: Drug overdose and triager unable to answer question    Negative: Caller requesting a renewal or refill of a medicine patient is currently taking    Negative: Caller requesting information unrelated to medicine    Negative: Caller requesting information about COVID-19 Vaccine    Negative: Caller requesting information about Emergency Contraception    Negative: Caller requesting information about Combined Birth Control Pills    Negative: Caller requesting information about Progestin Birth Control Pills    Negative: Caller requesting information about Post-Op pain or medicines    Negative: Caller requesting a prescription antibiotic (such as penicillin) for Strep throat and has a positive culture result    Negative: Caller requesting a prescription anti-viral med (such as Tamiflu) and has influenza (flu) symptoms    Negative: Immunization reaction suspected    Negative: Rash while taking a medicine or within 3 days of stopping it    Negative: Asthma and having symptoms of asthma (cough, wheezing, etc.)    Negative: Symptom of illness (e.g., headache, abdominal pain, earache, vomiting) that are more than mild    Negative: Breastfeeding questions about mother's medicines and diet    Negative: MORE THAN A DOUBLE DOSE of a prescription or over-the-counter (OTC) drug    Negative: DOUBLE DOSE (an extra dose or lesser amount) of prescription drug and any symptoms (e.g., dizziness, nausea, pain, sleepiness)    Negative: DOUBLE DOSE (an extra dose or lesser amount) of over-the-counter (OTC) drug and any symptoms  "(e.g., dizziness, nausea, pain, sleepiness)    Negative: Took another person's prescription drug    Negative: DOUBLE DOSE (an extra dose or lesser amount) of prescription drug and NO symptoms  (Exception: A double dose of antibiotics.)    Negative: Diabetes drug error or overdose (e.g., took wrong type of insulin or took extra dose)    Negative: Caller has medication question about med NOT prescribed by PCP and triager unable to answer question (e.g., compatibility with other med, storage)    Answer Assessment - Initial Assessment Questions  1. NAME of MEDICATION: \"What medicine are you calling about?\"      faustina  2. QUESTION: \"What is your question?\" (e.g., double dose of medicine, side effect)      Pharmacy says they did not get the rx  3. PRESCRIBING HCP: \"Who prescribed it?\" Reason: if prescribed by specialist, call should be referred to that group.      Virtual visit  4. SYMPTOMS: \"Do you have any symptoms?\"      covid +  5. SEVERITY: If symptoms are present, ask \"Are they mild, moderate or severe?\"      symptoms  6. PREGNANCY:  \"Is there any chance that you are pregnant?\" \"When was your last menstrual period?\"      no    Protocols used: MEDICATION QUESTION CALL-A-OH      "

## 2022-09-19 NOTE — PROGRESS NOTES
"Teresa is a 51 year old who is being evaluated via a billable telephone visit.      COVID vaccinated and boosted.  Tested + Friday.  Sx started 9/16.  GI sx a week before.  Cough, wheezing.  Nasal congestion.  Ears hurt.  On Plavix and Seroquel.    What phone number would you like to be contacted at? cell  How would you like to obtain your AVS? MyChart    Assessment & Plan     Clinical diagnosis of COVID-19    - molnupiravir (LAGEVRIO) 200 MG capsule; Take 4 capsules (800 mg) by mouth every 12 hours for 5 [id number]     COVID-19 positive patient.  Encounter for consideration of medication intervention. Patient does qualify for a prescription. Full discussion with patient including medication options, risks and benefits. Potential drug interactions reviewed with patient.     Treatment Planned Molnupiravir    Temporary change to home medications:  None     Estimated body mass index is 40.72 kg/m  as calculated from the following:    Height as of 7/19/22: 1.537 m (5' 0.5\").    Weight as of 9/16/22: 96.2 kg (212 lb).  GFR Estimate   Date Value Ref Range Status   07/26/2022 >90 >60 mL/min/1.73m2 Final     Comment:     Effective December 21, 2021 eGFRcr in adults is calculated using the 2021 CKD-EPI creatinine equation which includes age and gender (Len et al., NEJ, DOI: 10.1056/OSQMln0566911)   05/26/2021 >90 >60.0 mL/min/1.7 m2 Final     Comment:     eGFR is calculated by the CKD-EPI creatinine equation, without race   adjustment. eGFR can be influenced by muscle mass, exercise, and diet. The   reported eGFR is an estimation only and is only applicable if the renal   function is stable.       Lab Results   Component Value Date    [id number] Positive (A) 09/16/2022     Meghna Bellamy MD  [facility]  [facility]    Subjective   Teresa is a 51 year old, presenting for the following health issues:  No chief complaint on file.      HPI     COVID vaccinated and boosted.  Tested + " Saturday.  Sx started 9/16  Cough, wheezing.  Nasal congestion.  Ears hurt.  On Plavix and Seroquel.      Review of Systems   Constitutional, HEENT, cardiovascular, pulmonary, GI, , musculoskeletal, neuro, skin, endocrine and psych systems are negative, except as otherwise noted.      Objective           Vitals:  No vitals were obtained today due to virtual visit.    Physical Exam   healthy, alert and no distress  PSYCH: Alert and oriented times 3; coherent speech, normal   rate and volume, able to articulate logical thoughts, able   to abstract reason, no tangential thoughts, no hallucinations   or delusions  Her affect is normal and pleasant  RESP: No cough, no audible wheezing, able to talk in full sentences  Remainder of exam unable to be completed due to telephone visit          Phone call duration: 10 minutes

## 2022-09-20 NOTE — PROGRESS NOTES
"  The patient has been notified of following:     \"This telephone visit will be conducted via a call between you and your physician/provider. We have found that certain health care needs can be provided without the need for a physical exam.  This service lets us provide the care you need with a short phone conversation.  If a prescription is necessary we can send it directly to your pharmacy.  If lab work is needed we can place an order for that and you can then stop by our lab to have the test done at a later time.    Telephone visits are billed at different rates depending on your insurance coverage. During this emergency period, for some insurers they may be billed the same as an in-person visit.  Please reach out to your insurance provider with any questions.    If during the course of the call the physician/provider feels a telephone visit is not appropriate, you will not be charged for this service.\"    Patient has given verbal consent to a Telephone visit? Yes    What phone number would you like to be contacted at? [phone]      Patient would like to receive their AVS by Ashley      Additional provider notes: I spoke with Teresa regarding her CT scan results.  Unfortunately she does have a fairly large recurrence soon after her initial surgery.  This will need to be repaired by an abdominal wall reconstruction.  However, she is recently diagnosed as having COVID and is in the process of undergoing treatment for this.  Having said that, we will have her follow-up with me in 3 to 4 weeks for ongoing evaluation and to discuss the findings of the CT scan.  We will likely discuss surgery at that time.    Phone call duration: 10 minutes    Jose Roberto Ware DO [facility]  [phone]          "

## 2022-09-21 ENCOUNTER — VIRTUAL VISIT (OUTPATIENT)
Dept: SURGERY | Facility: CLINIC | Age: 52
End: 2022-09-21
Payer: COMMERCIAL

## 2022-09-21 DIAGNOSIS — Z98.890 POSTOPERATIVE STATE: Primary | ICD-10-CM

## 2022-09-21 PROCEDURE — 99024 POSTOP FOLLOW-UP VISIT: CPT | Performed by: SURGERY

## 2022-09-21 NOTE — LETTER
"    9/21/2022         RE: Teresa Perez  [address]  [place] [address]        Dear Colleague,    Thank you for referring your patient, Teresa Perez, to the [facility]. Please see a copy of my visit note below.      The patient has been notified of following:     \"This telephone visit will be conducted via a call between you and your physician/provider. We have found that certain health care needs can be provided without the need for a physical exam.  This service lets us provide the care you need with a short phone conversation.  If a prescription is necessary we can send it directly to your pharmacy.  If lab work is needed we can place an order for that and you can then stop by our lab to have the test done at a later time.    Telephone visits are billed at different rates depending on your insurance coverage. During this emergency period, for some insurers they may be billed the same as an in-person visit.  Please reach out to your insurance provider with any questions.    If during the course of the call the physician/provider feels a telephone visit is not appropriate, you will not be charged for this service.\"    Patient has given verbal consent to a Telephone visit? Yes    What phone number would you like to be contacted at? [phone]      Patient would like to receive their AVS by Ashley      Additional provider notes: I spoke with Teresa regarding her CT scan results.  Unfortunately she does have a fairly large recurrence soon after her initial surgery.  This will need to be repaired by an abdominal wall reconstruction.  However, she is recently diagnosed as having COVID and is in the process of undergoing treatment for this.  Having said that, we will have her follow-up with me in 3 to 4 weeks for ongoing evaluation and to discuss the findings of the CT scan.  We will likely discuss surgery at that time.    Phone call " duration: 10 minutes    Jose Roberto Ware DO [facility]  [phone]              Again, thank you for allowing me to participate in the care of your patient.        Sincerely,        Abdelrahman Ware, DO

## 2022-09-23 ENCOUNTER — TELEPHONE (OUTPATIENT)
Dept: FAMILY MEDICINE | Facility: CLINIC | Age: 52
End: 2022-09-23

## 2022-09-23 DIAGNOSIS — R05.9 COUGH: Primary | ICD-10-CM

## 2022-09-23 DIAGNOSIS — U07.1 INFECTION DUE TO 2019 NOVEL CORONAVIRUS: ICD-10-CM

## 2022-09-23 RX ORDER — BENZONATATE 200 MG/1
200 CAPSULE ORAL 3 TIMES DAILY PRN
Qty: 20 CAPSULE | Refills: 0 | Status: SHIPPED | OUTPATIENT
Start: 2022-09-23 | End: 2022-12-07

## 2022-09-23 RX ORDER — GUAIFENESIN/DEXTROMETHORPHAN 100-10MG/5
10 SYRUP ORAL EVERY 4 HOURS PRN
Qty: 354 ML | Refills: 0 | Status: SHIPPED | OUTPATIENT
Start: 2022-09-23 | End: 2022-11-29

## 2022-09-23 NOTE — TELEPHONE ENCOUNTER
[facility] phone call message-patient reporting a symptom:     Symptom: cough     Same Day Visit Offered: has covid     Additional comments: the Pt would like a prescription for cough medicine sent In to the Pharmacy and would like a call back     OK to leave message on voice mail? Yes    Primary language: English      needed? No    Call taken on September 23, 2022 at 1:11 PM by Juan Peralta

## 2022-09-23 NOTE — TELEPHONE ENCOUNTER
I have sent in a prescription for cough syrup and tessalon perles for cough.    Faizan, can you call the patient and let her know?  Thank you!    Tyra Bullock MD    Routed to LAURA Gloria.

## 2022-09-23 NOTE — TELEPHONE ENCOUNTER
[facility] phone call message- patient requesting a refill:    Full Medication Name: cough medication    Pharmacy confirmed as   [id number] - [place], [place] - [address] [facility]  [address] [place] [address]  Phone: [phone] Fax: [phone]  : Yes    Additional Comments: Pt called back to check on the status of this.  I did clarify that she wants it sent to the above pharmacy.    OK to leave a message on voice mail? Yes    Primary language: English      needed? No    Call taken on September 23, 2022 at 1:29 PM by Mary Ann Giraldo

## 2022-10-04 DIAGNOSIS — E11.65 TYPE 2 DIABETES MELLITUS WITH HYPERGLYCEMIA, WITH LONG-TERM CURRENT USE OF INSULIN (H): ICD-10-CM

## 2022-10-04 DIAGNOSIS — Z79.4 TYPE 2 DIABETES MELLITUS WITH HYPERGLYCEMIA, WITH LONG-TERM CURRENT USE OF INSULIN (H): ICD-10-CM

## 2022-10-04 RX ORDER — GABAPENTIN 300 MG/1
300 CAPSULE ORAL 3 TIMES DAILY
Qty: 90 CAPSULE | Refills: 11 | Status: SHIPPED | OUTPATIENT
Start: 2022-10-04 | End: 2022-11-17

## 2022-10-10 DIAGNOSIS — E11.65 TYPE 2 DIABETES MELLITUS WITH HYPERGLYCEMIA, WITH LONG-TERM CURRENT USE OF INSULIN (H): Primary | ICD-10-CM

## 2022-10-10 DIAGNOSIS — Z79.4 TYPE 2 DIABETES MELLITUS WITH HYPERGLYCEMIA, WITH LONG-TERM CURRENT USE OF INSULIN (H): Primary | ICD-10-CM

## 2022-10-11 ENCOUNTER — OFFICE VISIT (OUTPATIENT)
Dept: FAMILY MEDICINE | Facility: CLINIC | Age: 52
End: 2022-10-11
Payer: COMMERCIAL

## 2022-10-11 VITALS
SYSTOLIC BLOOD PRESSURE: 114 MMHG | HEART RATE: 92 BPM | TEMPERATURE: 98.4 F | WEIGHT: 208.4 LBS | DIASTOLIC BLOOD PRESSURE: 82 MMHG | RESPIRATION RATE: 20 BRPM | BODY MASS INDEX: 40.03 KG/M2 | OXYGEN SATURATION: 97 %

## 2022-10-11 DIAGNOSIS — L84 PRE-ULCERATIVE CALLUSES: ICD-10-CM

## 2022-10-11 DIAGNOSIS — E11.65 TYPE 2 DIABETES MELLITUS WITH HYPERGLYCEMIA, WITH LONG-TERM CURRENT USE OF INSULIN (H): ICD-10-CM

## 2022-10-11 DIAGNOSIS — Z23 NEED FOR PROPHYLACTIC VACCINATION AND INOCULATION AGAINST INFLUENZA: ICD-10-CM

## 2022-10-11 DIAGNOSIS — F31.9 BIPOLAR I DISORDER (H): ICD-10-CM

## 2022-10-11 DIAGNOSIS — E11.622 TYPE 2 DIABETES MELLITUS WITH OTHER SKIN ULCER, WITH LONG-TERM CURRENT USE OF INSULIN (H): ICD-10-CM

## 2022-10-11 DIAGNOSIS — Z79.4 TYPE 2 DIABETES MELLITUS WITH HYPERGLYCEMIA, WITH LONG-TERM CURRENT USE OF INSULIN (H): ICD-10-CM

## 2022-10-11 DIAGNOSIS — R19.7 DIARRHEA, UNSPECIFIED TYPE: Primary | ICD-10-CM

## 2022-10-11 DIAGNOSIS — Z79.4 TYPE 2 DIABETES MELLITUS WITH OTHER SKIN ULCER, WITH LONG-TERM CURRENT USE OF INSULIN (H): ICD-10-CM

## 2022-10-11 DIAGNOSIS — S46.212A RUPTURE OF LEFT BICEPS TENDON, INITIAL ENCOUNTER: ICD-10-CM

## 2022-10-11 DIAGNOSIS — Z23 HIGH PRIORITY FOR 2019-NCOV VACCINE: ICD-10-CM

## 2022-10-11 DIAGNOSIS — R05.3 CHRONIC COUGH: ICD-10-CM

## 2022-10-11 LAB
ALBUMIN SERPL BCG-MCNC: 4.2 G/DL (ref 3.5–5.2)
ALP SERPL-CCNC: 104 U/L (ref 35–104)
ALT SERPL W P-5'-P-CCNC: 22 U/L (ref 10–35)
ANION GAP SERPL CALCULATED.3IONS-SCNC: 14 MMOL/L (ref 3–14)
AST SERPL W P-5'-P-CCNC: 20 U/L (ref 10–35)
BILIRUB DIRECT SERPL-MCNC: <0.2 MG/DL (ref 0–0.3)
BILIRUB SERPL-MCNC: 0.2 MG/DL
BUN SERPL-MCNC: 20 MG/DL (ref 7–30)
CALCIUM SERPL-MCNC: 10.1 MG/DL (ref 8.5–10.1)
CHLORIDE BLD-SCNC: 101 MMOL/L (ref 94–109)
CO2 SERPL-SCNC: 27 MMOL/L (ref 20–32)
CREAT SERPL-MCNC: 0.6 MG/DL (ref 0.52–1.04)
GFR SERPL CREATININE-BSD FRML MDRD: >90 ML/MIN/1.73M2
GLUCOSE BLD-MCNC: 138 MG/DL (ref 70–99)
HBA1C MFR BLD: 7.4 % (ref 0–5.6)
POTASSIUM BLD-SCNC: 4.7 MMOL/L (ref 3.4–5.3)
PROT SERPL-MCNC: 7 G/DL (ref 6.4–8.3)
SODIUM SERPL-SCNC: 142 MMOL/L (ref 133–144)

## 2022-10-11 PROCEDURE — 90686 IIV4 VACC NO PRSV 0.5 ML IM: CPT | Performed by: STUDENT IN AN ORGANIZED HEALTH CARE EDUCATION/TRAINING PROGRAM

## 2022-10-11 PROCEDURE — 91313 COVID-19,PF,MODERNA BIVALENT: CPT | Performed by: STUDENT IN AN ORGANIZED HEALTH CARE EDUCATION/TRAINING PROGRAM

## 2022-10-11 PROCEDURE — 80053 COMPREHEN METABOLIC PANEL: CPT | Performed by: STUDENT IN AN ORGANIZED HEALTH CARE EDUCATION/TRAINING PROGRAM

## 2022-10-11 PROCEDURE — G0008 ADMIN INFLUENZA VIRUS VAC: HCPCS | Performed by: STUDENT IN AN ORGANIZED HEALTH CARE EDUCATION/TRAINING PROGRAM

## 2022-10-11 PROCEDURE — 83036 HEMOGLOBIN GLYCOSYLATED A1C: CPT | Performed by: STUDENT IN AN ORGANIZED HEALTH CARE EDUCATION/TRAINING PROGRAM

## 2022-10-11 PROCEDURE — 36415 COLL VENOUS BLD VENIPUNCTURE: CPT | Performed by: STUDENT IN AN ORGANIZED HEALTH CARE EDUCATION/TRAINING PROGRAM

## 2022-10-11 PROCEDURE — 99214 OFFICE O/P EST MOD 30 MIN: CPT | Mod: 25 | Performed by: STUDENT IN AN ORGANIZED HEALTH CARE EDUCATION/TRAINING PROGRAM

## 2022-10-11 PROCEDURE — 82248 BILIRUBIN DIRECT: CPT | Performed by: STUDENT IN AN ORGANIZED HEALTH CARE EDUCATION/TRAINING PROGRAM

## 2022-10-11 PROCEDURE — 0134A COVID-19,PF,MODERNA BIVALENT: CPT | Performed by: STUDENT IN AN ORGANIZED HEALTH CARE EDUCATION/TRAINING PROGRAM

## 2022-10-11 RX ORDER — HYDROCORTISONE 25 MG/G
CREAM TOPICAL 2 TIMES DAILY PRN
Qty: 30 G | Refills: 1 | Status: SHIPPED | OUTPATIENT
Start: 2022-10-11 | End: 2023-03-31

## 2022-10-11 RX ORDER — ZINC OXIDE
OINTMENT (GRAM) TOPICAL PRN
Qty: 113 G | Refills: 1 | Status: SHIPPED | OUTPATIENT
Start: 2022-10-11 | End: 2022-11-29

## 2022-10-11 NOTE — LETTER
October 13, 2022      Teresa Perez  [address]  [place] [address]        Dear ,    We are writing to inform you of your test results.  Here is a copy of your lab results.  Your kidney tests look good and are stable.  Your creatinine is good.  Your A1c is excellent - 7.4 is a great place to be. We'll keep working on the diarrhea - please bring back in those stool samples to make sure there is no infection.  It is also important to get in to see the pulmonologist so we can get this cough better and get you ready for hernia surgery.  I don't want you to cough and for the hernia to come back afterwards. Please call the clinic at [phone] if you have any questions.            Resulted Orders   Hemoglobin A1c   Result Value Ref Range    Hemoglobin A1C 7.4 (H) 0.0 - 5.6 %      Comment:      Normal <5.7%   Prediabetes 5.7-6.4%    Diabetes 6.5% or higher     Note: Adopted from ADA consensus guidelines.   Basic metabolic panel   Result Value Ref Range    Sodium 142 133 - 144 mmol/L    Potassium 4.7 3.4 - 5.3 mmol/L    Chloride 101 94 - 109 mmol/L    Carbon Dioxide (CO2) 27 20 - 32 mmol/L    Anion Gap 14 3 - 14 mmol/L    Urea Nitrogen 20 7 - 30 mg/dL    Creatinine 0.60 0.52 - 1.04 mg/dL    Calcium 10.1 8.5 - 10.1 mg/dL    Glucose 138 (H) 70 - 99 mg/dL    GFR Estimate >90 >60 mL/min/1.73m2      Comment:      Effective December 21, 2021 eGFRcr in adults is calculated using the 2021 CKD-EPI creatinine equation which includes age and gender (Len et al., NEJM, DOI: 10.1056/PKSLvp0459753)   Hepatic function panel   Result Value Ref Range    Protein Total 7.0 6.4 - 8.3 g/dL    Albumin 4.2 3.5 - 5.2 g/dL    Bilirubin Total 0.2 <=1.2 mg/dL    Alkaline Phosphatase 104 35 - 104 U/L    AST 20 10 - 35 U/L    ALT 22 10 - 35 U/L    Bilirubin Direct <0.20 0.00 - 0.30 mg/dL       If you have any questions or concerns, please call the clinic at the number listed above.       Sincerely,      Tyra ZAVALETA  MD Kobe
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
## 2022-10-11 NOTE — LETTER
October 19, 2022      Teresa Perez  [address]  [place] [address]        Dear ,    We are writing to inform you of your test results.    Here is a copy of the rest of your lab results.  Your stool tests are negative for infection, which is good news.  Please let me know if your diarrhea is still a problem - we should be able to try some medications to help slow down the stools and help you feel better. Please call the clinic at [phone] if you have any questions.       Resulted Orders   Clostridium difficile toxin B PCR   Result Value Ref Range    C Difficile Toxin B by PCR Negative Negative      Comment:      A negative result does not exclude actual disease due to C. difficile and may be due to improper collection, handling and storage of the specimen or the number of organisms in the specimen is below the detection limit of the assay.    Narrative    The [employer] Xpert C. difficile Assay, performed on the [employer]  Instrument Systems, is a qualitative in vitro diagnostic test for rapid detection of toxin B gene sequences from unformed (liquid or soft) stool specimens collected from patients suspected of having Clostridioides difficile infection (CDI). The test utilizes automated real-time polymerase chain reaction (PCR) to detect toxin gene sequences associated with toxin producing C. difficile. The Xpert C. difficile Assay is intended as an aid in the diagnosis of CDI.   Ova and Parasite Exam Routine   Result Value Ref Range    OVA AND PARASITE EXAM Negative Negative      Comment:      A single negative specimen does not rule out parasitic infection.    WBC'S, O&P 1+ PMNs     Narrative    Cryptosporidium, Cyclospora and Microsporidia are not readily detected by this method.   Hemoglobin A1c   Result Value Ref Range    Hemoglobin A1C 7.4 (H) 0.0 - 5.6 %      Comment:      Normal <5.7%   Prediabetes 5.7-6.4%    Diabetes 6.5% or higher     Note: Adopted from ADA  consensus guidelines.   Basic metabolic panel   Result Value Ref Range    Sodium 142 133 - 144 mmol/L    Potassium 4.7 3.4 - 5.3 mmol/L    Chloride 101 94 - 109 mmol/L    Carbon Dioxide (CO2) 27 20 - 32 mmol/L    Anion Gap 14 3 - 14 mmol/L    Urea Nitrogen 20 7 - 30 mg/dL    Creatinine 0.60 0.52 - 1.04 mg/dL    Calcium 10.1 8.5 - 10.1 mg/dL    Glucose 138 (H) 70 - 99 mg/dL    GFR Estimate >90 >60 mL/min/1.73m2      Comment:      Effective December 21, 2021 eGFRcr in adults is calculated using the 2021 CKD-EPI creatinine equation which includes age and gender (Len et al., NEJM, DOI: 10.1056/CZOKvv6112556)   Hepatic function panel   Result Value Ref Range    Protein Total 7.0 6.4 - 8.3 g/dL    Albumin 4.2 3.5 - 5.2 g/dL    Bilirubin Total 0.2 <=1.2 mg/dL    Alkaline Phosphatase 104 35 - 104 U/L    AST 20 10 - 35 U/L    ALT 22 10 - 35 U/L    Bilirubin Direct <0.20 0.00 - 0.30 mg/dL       If you have any questions or concerns, please call the clinic at the number listed above.       Sincerely,      Tyra Bullock MD

## 2022-10-11 NOTE — PATIENT INSTRUCTIONS
Stool testing to look for cause of diarrhea.     Creams for your bottom.    PRescription for gauze    -wheelchair, diabetic shoes (to brittany)    -Referral to see the pulmonologist to discuss cough.      -Check back in 1 month.

## 2022-10-12 PROBLEM — N18.5 CKD (CHRONIC KIDNEY DISEASE) STAGE 5, GFR LESS THAN 15 ML/MIN (H): Status: RESOLVED | Noted: 2019-04-11 | Resolved: 2022-10-12

## 2022-10-12 NOTE — RESULT ENCOUNTER NOTE
Teresa Perez-    Here is a copy of your lab results.  Your kidney tests look good and are stable.  Your creatinine is good.  Your A1c is excellent - 7.4 is a great place to be. We'll keep working on the diarrhea - please bring back in those stool samples to make sure there is no infection.  It is also important to get in to see the pulmonologist so we can get this cough better and get you ready for hernia surgery.  I don't want you to cough and for the hernia to come back afterwards. Please call the clinic at [phone] if you have any questions.      Tyra Bullock MD    Please send results to patient.

## 2022-10-13 PROCEDURE — 87177 OVA AND PARASITES SMEARS: CPT | Performed by: STUDENT IN AN ORGANIZED HEALTH CARE EDUCATION/TRAINING PROGRAM

## 2022-10-13 PROCEDURE — 87209 SMEAR COMPLEX STAIN: CPT | Performed by: STUDENT IN AN ORGANIZED HEALTH CARE EDUCATION/TRAINING PROGRAM

## 2022-10-13 PROCEDURE — 87493 C DIFF AMPLIFIED PROBE: CPT | Performed by: STUDENT IN AN ORGANIZED HEALTH CARE EDUCATION/TRAINING PROGRAM

## 2022-10-14 LAB — C DIFF TOX B STL QL: NEGATIVE

## 2022-10-17 LAB
O+P STL MICRO: NEGATIVE
TRI STN SPEC: NORMAL

## 2022-10-18 NOTE — RESULT ENCOUNTER NOTE
Teresa Perez-    Here is a copy of the rest of your lab results.  Your stool tests are negative for infection, which is good news.  Please let me know if your diarrhea is still a problem - we should be able to try some medications to help slow down the stools and help you feel better. Please call the clinic at [phone] if you have any questions.      Tyra Bullock MD    Please send results to patient.

## 2022-11-15 ENCOUNTER — MEDICAL CORRESPONDENCE (OUTPATIENT)
Dept: HEALTH INFORMATION MANAGEMENT | Facility: CLINIC | Age: 52
End: 2022-11-15

## 2022-11-17 ENCOUNTER — OFFICE VISIT (OUTPATIENT)
Dept: FAMILY MEDICINE | Facility: CLINIC | Age: 52
End: 2022-11-17
Payer: COMMERCIAL

## 2022-11-17 VITALS
RESPIRATION RATE: 16 BRPM | OXYGEN SATURATION: 97 % | TEMPERATURE: 98.1 F | HEART RATE: 90 BPM | DIASTOLIC BLOOD PRESSURE: 73 MMHG | SYSTOLIC BLOOD PRESSURE: 124 MMHG

## 2022-11-17 DIAGNOSIS — M54.50 CHRONIC BILATERAL LOW BACK PAIN, UNSPECIFIED WHETHER SCIATICA PRESENT: ICD-10-CM

## 2022-11-17 DIAGNOSIS — L03.311 CELLULITIS OF ABDOMINAL WALL: ICD-10-CM

## 2022-11-17 DIAGNOSIS — G89.29 CHRONIC BILATERAL LOW BACK PAIN, UNSPECIFIED WHETHER SCIATICA PRESENT: ICD-10-CM

## 2022-11-17 DIAGNOSIS — J44.9 CHRONIC OBSTRUCTIVE PULMONARY DISEASE, UNSPECIFIED COPD TYPE (H): ICD-10-CM

## 2022-11-17 DIAGNOSIS — J45.40 MODERATE PERSISTENT ASTHMA WITHOUT COMPLICATION: ICD-10-CM

## 2022-11-17 DIAGNOSIS — R05.1 ACUTE COUGH: Primary | ICD-10-CM

## 2022-11-17 DIAGNOSIS — R19.7 DIARRHEA, UNSPECIFIED TYPE: ICD-10-CM

## 2022-11-17 LAB
FLUAV RNA SPEC QL NAA+PROBE: NEGATIVE
FLUBV RNA RESP QL NAA+PROBE: NEGATIVE
RSV RNA SPEC NAA+PROBE: NEGATIVE
SARS-COV-2 RNA RESP QL NAA+PROBE: NEGATIVE

## 2022-11-17 PROCEDURE — 99214 OFFICE O/P EST MOD 30 MIN: CPT | Mod: CS | Performed by: STUDENT IN AN ORGANIZED HEALTH CARE EDUCATION/TRAINING PROGRAM

## 2022-11-17 PROCEDURE — 87637 SARSCOV2&INF A&B&RSV AMP PRB: CPT | Performed by: STUDENT IN AN ORGANIZED HEALTH CARE EDUCATION/TRAINING PROGRAM

## 2022-11-17 RX ORDER — BUDESONIDE AND FORMOTEROL FUMARATE DIHYDRATE 160; 4.5 UG/1; UG/1
2 AEROSOL RESPIRATORY (INHALATION) 2 TIMES DAILY
Qty: 10.2 G | Refills: 1 | Status: SHIPPED | OUTPATIENT
Start: 2022-11-17 | End: 2022-11-29

## 2022-11-17 RX ORDER — SULFAMETHOXAZOLE/TRIMETHOPRIM 800-160 MG
1 TABLET ORAL 2 TIMES DAILY
Qty: 14 TABLET | Refills: 0 | Status: SHIPPED | OUTPATIENT
Start: 2022-11-17 | End: 2022-11-24

## 2022-11-17 RX ORDER — GUAIFENESIN AND DEXTROMETHORPHAN HYDROBROMIDE 600; 30 MG/1; MG/1
1 TABLET, EXTENDED RELEASE ORAL EVERY 12 HOURS
Qty: 20 TABLET | Refills: 0 | Status: SHIPPED | OUTPATIENT
Start: 2022-11-17 | End: 2022-12-30

## 2022-11-17 RX ORDER — GABAPENTIN 600 MG/1
600 TABLET ORAL 3 TIMES DAILY
Qty: 90 TABLET | Refills: 1 | Status: SHIPPED | OUTPATIENT
Start: 2022-11-17 | End: 2022-12-30

## 2022-11-17 RX ORDER — LOPERAMIDE HYDROCHLORIDE 2 MG/1
2 TABLET ORAL 4 TIMES DAILY PRN
Qty: 30 TABLET | Refills: 1 | Status: SHIPPED | OUTPATIENT
Start: 2022-11-17 | End: 2023-01-11

## 2022-11-17 NOTE — PROGRESS NOTES
Assessment & Plan     Acute cough  Teresa is a 52-year-old woman with complex medical history presenting for evaluation of a few days of cough, wheezing, runny nose, nasal congestion.  Has been using Symbicort and albuterol with relief, but needs refill of Symbicort.  Would like a COVID swab today.  COVID/influenza/RSV swab collected.  Recommended treatment of symptoms with Mucinex DM as well as hot tea with honey.  - Symptomatic; Unknown Influenza A/B & SARS-CoV2 (COVID-19) Virus PCR Multiplex Nose  - dextromethorphan-guaiFENesin (MUCINEX DM)  MG 12 hr tablet  Dispense: 20 tablet; Refill: 0    Diarrhea, unspecified type  Patient continues to have nonbloody diarrhea.  Infectious work-up 10/13/2022 was negative.  Recommended trial of Imodium.  - loperamide (IMODIUM A-D) 2 MG tablet  Dispense: 30 tablet; Refill: 1    Chronic obstructive pulmonary disease, unspecified COPD type (H)  Moderate persistent asthma without complication  Refill for Symbicort requested.  - budesonide-formoterol (SYMBICORT) 160-4.5 MCG/ACT Inhaler  Dispense: 10.2 g; Refill: 1    Chronic bilateral low back pain, unspecified whether sciatica present  Currently having an acute flare of bilateral lower back pain, worse than normal chronic pain.  Patient notes she used to be on gabapentin 1200 mg 3 times daily, but that was reduced to 300 mg 3 times daily after being in rehab.  She request that the medication be increased.  She did get a refill and take 1200 mg yesterday, which did give her some relief from the back pain.  Refilled gabapentin for 600 mg 3 times daily.  Recommended that she only take 1200 mg for a few days and then decrease to 600 mg.  Patient also requesting Flexeril, but recommended patient start with gabapentin and hold off on Flexeril for right now due to risk of respiratory depression.  Recommended patient come back to clinic in a week if her pain is not improved.  Recommended follow-up with PCP to continue discussion  of pain medications.  - gabapentin (NEURONTIN) 600 MG tablet  Dispense: 90 tablet; Refill: 1    Cellulitis of abdominal wall  2 wounds on abdomen consistent with ecthyma.  Photo attached to chart.  Patient does have history of MRSA infection.  Will treat with Bactrim twice daily for 7 days.  - sulfamethoxazole-trimethoprim (BACTRIM DS) 800-160 MG tablet  Dispense: 14 tablet; Refill: 0      Diagnosis or treatment significantly limited by social determinants of health - Limited income, low health literacy    Patient was discussed with attending physician, Dr. Jaswant Baca MD, who agrees with the assessment and plan.    Ofelia Neri MD, PGY-3  [facility]  11/17/2022      Subjective   Teresa Perez is a 52 year old female who presents for the following health issues     Chief Complaint   Patient presents with     Cough     Have cough along with runny nose and ears popping for about four or five days now per patient.      other     Have something on the stomach that want the doctor to take a look at per patient.      Back Pain     Also have back pain and want some diarrhea medication per patient.      Medication Reconciliation     Reviewed.       Does have history of chronic cough with regimen of inhalers for asthma/COPD.  Last seen 10/11/2022, and referred to pulmonology for further evaluation.    Has been wheezing and coughing. Needs refills of Symbicort. Has albuterol. Also having runny nose. Some congestion. Sinus pressure. Blood in left ear yesterday. Needs cough medication. Is coughing up white sputum.     Has appointment with pulmonology on Dec 7.     Also has a wound on her abdomen that she is concerned is infected. Started as a scratch, maybe from her dog or cat. Also has a second wound on the left side of abdomen that appeared at the same time.     Still having diarrhea every day. Feeling very wiped out- no energy. No blood in the diarrhea.     Gabapentin. Her dose was decreased  when she was in rehab. 300 TID. Used to be 1200 TID. Was out of it. Filled yesterday. Took 4 pills = 1200 mg yesterday afternoon which made her back pain let up for awhile.     Low back pain. Lower back has been very painful for the last few days. Different from her other chronic pain. Very constant. Taking extra strength tylenol.      Objective    Vitals:    11/17/22 0812   BP: 124/73   BP Location: Left arm   Patient Position: Sitting   Cuff Size: Adult Regular   Pulse: 90   Resp: 16   Temp: 98.1  F (36.7  C)   TempSrc: Oral   SpO2: 97%     There is no height or weight on file to calculate BMI.  Physical Exam   General: alert, appears comfortable, no acute distress  HEENT: atraumatic, conjunctiva clear without erythema, EOM's intact, bilateral TMs gray and translucent without erythema or signs of exudate, left ear with small abrasion near the auricle, no active bleeding, no nasal discharge, MMM, posterior pharynx clear without erythema or exudate  Neck: supple, no adenopathy  Cardiac: normal rate and rhythm with no murmurs or extra sounds  Resp: lungs clear to auscultation bilaterally with no crackles or wheezes, no increased work of breathing  Abdomen: soft, non-tender to palpation, no masses  Skin: 2 wounds on abdomen as shown below        Neuro: CN's grossly intact  Psych: affect congruent with mood

## 2022-11-18 ENCOUNTER — TELEPHONE (OUTPATIENT)
Dept: FAMILY MEDICINE | Facility: CLINIC | Age: 52
End: 2022-11-18

## 2022-11-18 NOTE — TELEPHONE ENCOUNTER
Routing to provider per pt's request to let her know she has another open sore right below her belly button. It's larger than the other 2 sores in the picture from her last visit. She said it's inflamed, red, has white patch over it, draining blood and pus, pain 9/10, and has a foul odor. Denies fever. She is putting an antibiotic cream w/ a gauze over it for now. Advised her to continue w/ bactrim as prescribed.    She also said the pharmacy doesn't have the rx for mucinex DM. Told pt will call pharmacy.     Pharmacy said rx received but it's not covered because it's an OTC medication. Pt notified and she said she was given robitussin DM in the past and it was covered.     Routing to provider to send in rx for robitussin DM as it was prescribed by Dr. Bullock in the past and it was covered.    Bhavani Lacey RN on 11/18/2022 at 2:29 PM

## 2022-11-18 NOTE — TELEPHONE ENCOUNTER
[facility] phone call message- general phone call:    Reason for call: She needs a call back from .    Action desired: call back.    Return call needed: Yes    OK to leave a message on voice mail? Yes    Advised patient to response may take up to 2 business days: Yes    Primary language: English      needed? No    Call taken on November 18, 2022 at 12:31 PM by Lo Lao

## 2022-11-22 ENCOUNTER — MEDICAL CORRESPONDENCE (OUTPATIENT)
Dept: HEALTH INFORMATION MANAGEMENT | Facility: CLINIC | Age: 52
End: 2022-11-22

## 2022-11-22 ENCOUNTER — TELEPHONE (OUTPATIENT)
Dept: FAMILY MEDICINE | Facility: CLINIC | Age: 52
End: 2022-11-22

## 2022-11-22 NOTE — TELEPHONE ENCOUNTER
[facility] phone call message- medication clarification/question:    Full Medication Name:    Glucose monitor and supplies    Question:    Needing progress notes no longer than 6mos and clarification of order.     Fax to: [phone]      OK to leave a message on voice mail? Yes    Primary language: English      needed? No    Call taken on November 22, 2022 at 10:31 AM by Nay Jefferson

## 2022-11-23 ENCOUNTER — OFFICE VISIT (OUTPATIENT)
Dept: FAMILY MEDICINE | Facility: CLINIC | Age: 52
End: 2022-11-23
Payer: COMMERCIAL

## 2022-11-23 VITALS
SYSTOLIC BLOOD PRESSURE: 104 MMHG | DIASTOLIC BLOOD PRESSURE: 71 MMHG | OXYGEN SATURATION: 97 % | TEMPERATURE: 98.5 F | RESPIRATION RATE: 20 BRPM | HEART RATE: 91 BPM

## 2022-11-23 DIAGNOSIS — E11.65 TYPE 2 DIABETES MELLITUS WITH HYPERGLYCEMIA, WITH LONG-TERM CURRENT USE OF INSULIN (H): ICD-10-CM

## 2022-11-23 DIAGNOSIS — Z79.4 TYPE 2 DIABETES MELLITUS WITH HYPERGLYCEMIA, WITH LONG-TERM CURRENT USE OF INSULIN (H): ICD-10-CM

## 2022-11-23 DIAGNOSIS — Z51.89 ENCOUNTER FOR WOUND CARE: Primary | ICD-10-CM

## 2022-11-23 PROCEDURE — 99214 OFFICE O/P EST MOD 30 MIN: CPT | Mod: 25 | Performed by: STUDENT IN AN ORGANIZED HEALTH CARE EDUCATION/TRAINING PROGRAM

## 2022-11-23 PROCEDURE — 97602 WOUND(S) CARE NON-SELECTIVE: CPT | Performed by: STUDENT IN AN ORGANIZED HEALTH CARE EDUCATION/TRAINING PROGRAM

## 2022-11-23 RX ORDER — CLINDAMYCIN HCL 300 MG
300 CAPSULE ORAL 3 TIMES DAILY
Qty: 21 CAPSULE | Refills: 0 | Status: SHIPPED | OUTPATIENT
Start: 2022-11-23 | End: 2022-12-07

## 2022-11-23 RX ORDER — MUPIROCIN 20 MG/G
OINTMENT TOPICAL 3 TIMES DAILY
Qty: 30 G | Refills: 1 | Status: SHIPPED | OUTPATIENT
Start: 2022-11-23 | End: 2022-11-29

## 2022-11-23 RX ORDER — OXYCODONE AND ACETAMINOPHEN 5; 325 MG/1; MG/1
1 TABLET ORAL EVERY 6 HOURS PRN
Qty: 14 TABLET | Refills: 0 | Status: SHIPPED | OUTPATIENT
Start: 2022-11-23 | End: 2022-11-29

## 2022-11-23 NOTE — PROGRESS NOTES
There are no exam notes on file for this visit.    ASSESSMENT AND PLAN:      Teresa was seen today for derm problem.    Diagnoses and all orders for this visit:    Encounter for wound care  Now with multiple abdominal wounds.  The Bactrim is helped some, but I am worried its not strong enough.  We will broaden antibiotics to clindamycin.  I am worried that this is MRSA, so this this is not working we will need to consider other options.  Wounds were debrided and cleaned and redressed.  Recommended using some Bactroban, and I did give her a small number of Percocet for pain.  We will set her up with home nurse to do wound care, and she will follow-up with me weekly as she is not interested in going to wound clinic for visits right now.  -     clindamycin (CLEOCIN) 300 MG capsule; Take 1 capsule (300 mg) by mouth 3 times daily  -     mupirocin (BACTROBAN) 2 % external ointment; Apply topically 3 times daily  -     oxyCODONE-acetaminophen (PERCOCET) 5-325 MG tablet; Take 1 tablet by mouth every 6 hours as needed for pain or moderate to severe pain    Type 2 diabetes mellitus with hyperglycemia, with long-term current use of insulin (H).  I worried about hypoglycemia if we increase things too much even though she is running high with this infection.  We will continue with the same regimen, refilled her test strips and Lantus today.  -     blood glucose monitoring (NO BRAND SPECIFIED) meter device kit; Use to test blood sugar 4 times daily or as directed.  -     blood glucose (NO BRAND SPECIFIED) test strip; Use to test blood sugar 4 times daily or as directed.  -     blood glucose (NO BRAND SPECIFIED) lancets standard; Use to test blood sugar 4 times daily or as directed.    I spent greater than 50 minutes with the patient today including chart review, wound care, education, and plan of care.    Patient Instructions   Antibiotics-    Clindamycin, 1 pill 3x daily for 1 week.   Mupirocin, with dressing change on all 3  lesions.   Pain medications, 1 pill 2x per day for 1 week.    Come back in 1 week to see Dr. Bullock.    Check about setting up wound care.   Check on status of wheelchair.   Bring in your blood sugar.      Schedule visit with Dr. Bullock every week for next 6 weeks.        Tyra Bullock MD    SUBJECTIVE  Teresa AUSTIN Chris is a 52 year old female with past medical history significant for    Patient Active Problem List   Diagnosis     Health Care Home     Acute peptic ulcer     Other allergy, other than to medicinal agents     Bulging lumbar disc     Cervical dysplasia     Common migraine without aura     Constipation     Dwarfism     Familial hypercholesterolemia     Insomnia     Low back pain     Intermittent asthma     Leg pain, bilateral     Smoking     Degeneration of thoracic or thoracolumbar intervertebral disc     Type 2 diabetes mellitus with other skin ulcer, with long-term current use of insulin (H)     LOMAS (nonalcoholic steatohepatitis)     Disease of lung     Hemorrhoids     Parotid mass     Endometriosis     NNAMDI (obstructive sleep apnea)     History of total right knee replacement     Lateral epicondylitis     Impingement syndrome of shoulder region, left     Hx of total knee replacement, left     Chronic pain syndrome     Cough     Borderline personality disorder (H)     Moderate recurrent major depression (H)     Recurrent ventral hernia     Type 2 diabetes mellitus with complication, with long-term current use of insulin (H)     Essential hypertension     Nonruptured cerebral aneurysm     Cerebrovascular accident (CVA) due to embolism (H)     Amputation of leg (H)     Pre-ulcerative corn or callous     Excessive bleeding in premenopausal period     Morbid obesity (H)     Pseudoseizure     Chronic obstructive pulmonary disease (H)     Recurrent incisional hernia     Buttock wound, left, initial encounter     Cellulitis, unspecified cellulitis site     Sepsis, due to unspecified organism,  unspecified whether acute organ dysfunction present (H)     Others present at the visit:  None    Presents for   Chief Complaint   Patient presents with     Derm Problem     Infection under the skin fold that needs to be look at.  Since last week     Patient presenting for follow-up visit for wounds located on her anterior stomach.  These were first discussed with Dr. Neri at a visit about 1 week ago.  At that time she was started on Bactrim.  There are 2 lesions on her upper stomach, which have been slightly getting better.  She has been putting some bacitracin, gauze coverage and is trying to wash them and keep them clean.  Now she has another larger lesion underneath her pannus.  This lesion is bigger, there is more pus, and it is very tender.  It hurts because it rubs between the skin.    She has not been having fevers or chills.  There is no expanding redness.  There is drainage from all 3 sites.  No bleeding.  She continues to have abdominal pain in general.  This is chronic and unchanged.  Is still hoping to move forward with hernia repair surgery at some point soon.    She notes that blood sugars have been quite high because of this.  Usually in the 300s to 400s.  She is having trouble keeping them down.  Has noticed increased urinary frequency with this.  She has been taking her regular diabetes medications which include Toujeo 65 units twice daily, bydurian weekly, and Jardiance.      OBJECTIVE:  Vitals: /71   Pulse 91   Temp 98.5  F (36.9  C) (Oral)   Resp 20   SpO2 97%   BMI= There is no height or weight on file to calculate BMI.  Objective:    Vitals:  Vitals are reviewed and are within the normal range  Gen:  Alert, pleasant, no acute distress  Cardiac:  Regular rate and rhythm, no murmurs, rubs or gallops, nontachycardic  Respiratory:  Lungs clear to auscultation bilaterally  Abdomen: Abdomen shows 3 lesions.  The 2 in the upper abdomen are circular about quarter sized, with some  ulceration present.  They are nonpurulent but erythematous.  1 on her lower abdomen is about 5 cm across and 3 cm up and down.  It is covered with a thick layer of yellowish thick hardened puslike material.    All 3 lesions were cleaned, soaked, and debrided.  They were then covered with Vaseline and nonadherent gauze.  She is interested in moving forward with wound care for these.    No results found for any visits on 11/23/22.        Patient Instructions   Antibiotics-    Clindamycin, 1 pill 3x daily for 1 week.   Mupirocin, with dressing change on all 3 lesions.   Pain medications, 1 pill 2x per day for 1 week.    Come back in 1 week to see Dr. Bullock.    Check about setting up wound care.   Check on status of wheelchair.   Bring in your blood sugar.      Schedule visit with Dr. Bullock every week for next 6 weeks.        Tyra Bullock MD

## 2022-11-23 NOTE — PATIENT INSTRUCTIONS
Antibiotics-    Clindamycin, 1 pill 3x daily for 1 week.   Mupirocin, with dressing change on all 3 lesions.   Pain medications, 1 pill 2x per day for 1 week.    Come back in 1 week to see Dr. Bullock.    Check about setting up wound care.   Check on status of wheelchair.   Bring in your blood sugar.      Schedule visit with Dr. Bullock every week for next 6 weeks.                      November 28, 2022  [facility] [address] [place], [place] [address]    P: [phone]  F: [phone]  Email: [email or web address]    Fax referral, demographic, and supportive notes to [phone], they will contact pt to schedule.    Margarita Lacey

## 2022-11-26 NOTE — TELEPHONE ENCOUNTER
Generic orders for glucometer, test strips and lancets placed out of our office visit from today.     I still need to finish the progress note from this visit, but should be done by Monday morning.     These can be sent with the orders.     Tyra Bullock MD    Routed to LAURA Gloria.

## 2022-11-27 DIAGNOSIS — T14.8XXA WOUND INFECTION: ICD-10-CM

## 2022-11-27 DIAGNOSIS — E11.65 TYPE 2 DIABETES MELLITUS WITH HYPERGLYCEMIA, WITH LONG-TERM CURRENT USE OF INSULIN (H): Primary | ICD-10-CM

## 2022-11-27 DIAGNOSIS — L08.9 WOUND INFECTION: ICD-10-CM

## 2022-11-27 DIAGNOSIS — Z79.4 TYPE 2 DIABETES MELLITUS WITH HYPERGLYCEMIA, WITH LONG-TERM CURRENT USE OF INSULIN (H): Primary | ICD-10-CM

## 2022-11-29 ENCOUNTER — OFFICE VISIT (OUTPATIENT)
Dept: FAMILY MEDICINE | Facility: CLINIC | Age: 52
End: 2022-11-29
Payer: COMMERCIAL

## 2022-11-29 VITALS
RESPIRATION RATE: 20 BRPM | HEART RATE: 84 BPM | SYSTOLIC BLOOD PRESSURE: 103 MMHG | TEMPERATURE: 98.7 F | DIASTOLIC BLOOD PRESSURE: 68 MMHG | OXYGEN SATURATION: 96 %

## 2022-11-29 DIAGNOSIS — Z79.899 ENCOUNTER FOR LONG-TERM (CURRENT) USE OF MEDICATIONS: ICD-10-CM

## 2022-11-29 DIAGNOSIS — Z79.4 TYPE 2 DIABETES MELLITUS WITH COMPLICATION, WITH LONG-TERM CURRENT USE OF INSULIN (H): ICD-10-CM

## 2022-11-29 DIAGNOSIS — Z12.4 CERVICAL CANCER SCREENING: ICD-10-CM

## 2022-11-29 DIAGNOSIS — R19.7 DIARRHEA, UNSPECIFIED TYPE: ICD-10-CM

## 2022-11-29 DIAGNOSIS — R30.0 DYSURIA: ICD-10-CM

## 2022-11-29 DIAGNOSIS — L08.9 WOUND INFECTION: Primary | ICD-10-CM

## 2022-11-29 DIAGNOSIS — Z12.11 SCREEN FOR COLON CANCER: ICD-10-CM

## 2022-11-29 DIAGNOSIS — E11.8 TYPE 2 DIABETES MELLITUS WITH COMPLICATION, WITH LONG-TERM CURRENT USE OF INSULIN (H): ICD-10-CM

## 2022-11-29 DIAGNOSIS — Z51.89 ENCOUNTER FOR WOUND CARE: ICD-10-CM

## 2022-11-29 DIAGNOSIS — E11.65 TYPE 2 DIABETES MELLITUS WITH HYPERGLYCEMIA, WITH LONG-TERM CURRENT USE OF INSULIN (H): ICD-10-CM

## 2022-11-29 DIAGNOSIS — J30.2 SEASONAL ALLERGIC RHINITIS, UNSPECIFIED TRIGGER: ICD-10-CM

## 2022-11-29 DIAGNOSIS — U07.1 INFECTION DUE TO 2019 NOVEL CORONAVIRUS: ICD-10-CM

## 2022-11-29 DIAGNOSIS — J44.9 CHRONIC OBSTRUCTIVE PULMONARY DISEASE, UNSPECIFIED COPD TYPE (H): ICD-10-CM

## 2022-11-29 DIAGNOSIS — T14.8XXA WOUND INFECTION: Primary | ICD-10-CM

## 2022-11-29 DIAGNOSIS — J45.40 MODERATE PERSISTENT ASTHMA WITHOUT COMPLICATION: ICD-10-CM

## 2022-11-29 DIAGNOSIS — G43.809 OTHER MIGRAINE WITHOUT STATUS MIGRAINOSUS, NOT INTRACTABLE: ICD-10-CM

## 2022-11-29 DIAGNOSIS — K43.2 RECURRENT INCISIONAL HERNIA: ICD-10-CM

## 2022-11-29 DIAGNOSIS — G47.09 OTHER INSOMNIA: ICD-10-CM

## 2022-11-29 DIAGNOSIS — Z79.4 TYPE 2 DIABETES MELLITUS WITH HYPERGLYCEMIA, WITH LONG-TERM CURRENT USE OF INSULIN (H): ICD-10-CM

## 2022-11-29 LAB
ALBUMIN UR-MCNC: NEGATIVE MG/DL
APPEARANCE UR: CLEAR
BASOPHILS # BLD AUTO: 0 10E3/UL (ref 0–0.2)
BASOPHILS NFR BLD AUTO: 0 %
BILIRUB UR QL STRIP: NEGATIVE
COLOR UR AUTO: YELLOW
EOSINOPHIL # BLD AUTO: 0.1 10E3/UL (ref 0–0.7)
EOSINOPHIL NFR BLD AUTO: 1 %
ERYTHROCYTE [DISTWIDTH] IN BLOOD BY AUTOMATED COUNT: 13.4 % (ref 10–15)
GLUCOSE UR STRIP-MCNC: 500 MG/DL
HCT VFR BLD AUTO: 39.7 % (ref 35–47)
HGB BLD-MCNC: 12.8 G/DL (ref 11.7–15.7)
HGB UR QL STRIP: NEGATIVE
IMM GRANULOCYTES # BLD: 0.1 10E3/UL
IMM GRANULOCYTES NFR BLD: 1 %
KETONES UR STRIP-MCNC: NEGATIVE MG/DL
LEUKOCYTE ESTERASE UR QL STRIP: NEGATIVE
LYMPHOCYTES # BLD AUTO: 2.3 10E3/UL (ref 0.8–5.3)
LYMPHOCYTES NFR BLD AUTO: 20 %
MCH RBC QN AUTO: 28.9 PG (ref 26.5–33)
MCHC RBC AUTO-ENTMCNC: 32.2 G/DL (ref 31.5–36.5)
MCV RBC AUTO: 90 FL (ref 78–100)
MONOCYTES # BLD AUTO: 0.5 10E3/UL (ref 0–1.3)
MONOCYTES NFR BLD AUTO: 4 %
NEUTROPHILS # BLD AUTO: 8.7 10E3/UL (ref 1.6–8.3)
NEUTROPHILS NFR BLD AUTO: 74 %
NITRATE UR QL: NEGATIVE
PH UR STRIP: 5.5 [PH] (ref 5–8)
PLATELET # BLD AUTO: 278 10E3/UL (ref 150–450)
RBC # BLD AUTO: 4.43 10E6/UL (ref 3.8–5.2)
SP GR UR STRIP: <=1.005 (ref 1–1.03)
UROBILINOGEN UR STRIP-ACNC: 0.2 E.U./DL
WBC # BLD AUTO: 11.7 10E3/UL (ref 4–11)

## 2022-11-29 PROCEDURE — 36415 COLL VENOUS BLD VENIPUNCTURE: CPT | Performed by: STUDENT IN AN ORGANIZED HEALTH CARE EDUCATION/TRAINING PROGRAM

## 2022-11-29 PROCEDURE — 99215 OFFICE O/P EST HI 40 MIN: CPT | Performed by: STUDENT IN AN ORGANIZED HEALTH CARE EDUCATION/TRAINING PROGRAM

## 2022-11-29 PROCEDURE — 85025 COMPLETE CBC W/AUTO DIFF WBC: CPT | Performed by: STUDENT IN AN ORGANIZED HEALTH CARE EDUCATION/TRAINING PROGRAM

## 2022-11-29 PROCEDURE — 81003 URINALYSIS AUTO W/O SCOPE: CPT | Performed by: STUDENT IN AN ORGANIZED HEALTH CARE EDUCATION/TRAINING PROGRAM

## 2022-11-29 PROCEDURE — 84443 ASSAY THYROID STIM HORMONE: CPT | Performed by: STUDENT IN AN ORGANIZED HEALTH CARE EDUCATION/TRAINING PROGRAM

## 2022-11-29 RX ORDER — HYDROCODONE BITARTRATE AND ACETAMINOPHEN 5; 325 MG/1; MG/1
1 TABLET ORAL EVERY 6 HOURS PRN
Qty: 14 TABLET | Refills: 0 | Status: SHIPPED | OUTPATIENT
Start: 2022-11-29 | End: 2022-12-02

## 2022-11-29 RX ORDER — BUDESONIDE AND FORMOTEROL FUMARATE DIHYDRATE 160; 4.5 UG/1; UG/1
2 AEROSOL RESPIRATORY (INHALATION) 2 TIMES DAILY
Qty: 10.2 G | Refills: 1 | Status: SHIPPED | OUTPATIENT
Start: 2022-11-29 | End: 2022-12-16

## 2022-11-29 RX ORDER — DOCUSATE SODIUM 100 MG/1
100 CAPSULE, LIQUID FILLED ORAL 2 TIMES DAILY
Qty: 30 CAPSULE | Refills: 0 | Status: SHIPPED | OUTPATIENT
Start: 2022-11-29 | End: 2023-10-09

## 2022-11-29 RX ORDER — CETIRIZINE HYDROCHLORIDE 10 MG/1
10 TABLET ORAL DAILY
Qty: 30 TABLET | Refills: 11 | Status: SHIPPED | OUTPATIENT
Start: 2022-11-29 | End: 2023-01-11

## 2022-11-29 RX ORDER — MUPIROCIN 20 MG/G
OINTMENT TOPICAL 3 TIMES DAILY
Qty: 30 G | Refills: 1 | Status: SHIPPED | OUTPATIENT
Start: 2022-11-29 | End: 2023-03-31

## 2022-11-29 RX ORDER — TRAZODONE HYDROCHLORIDE 50 MG/1
50 TABLET, FILM COATED ORAL AT BEDTIME
Qty: 30 TABLET | Refills: 0 | Status: SHIPPED | OUTPATIENT
Start: 2022-11-29 | End: 2022-12-09

## 2022-11-29 RX ORDER — GUAIFENESIN/DEXTROMETHORPHAN 100-10MG/5
10 SYRUP ORAL EVERY 4 HOURS PRN
Qty: 354 ML | Refills: 0 | Status: SHIPPED | OUTPATIENT
Start: 2022-11-29 | End: 2022-12-16

## 2022-11-29 RX ORDER — ZINC OXIDE
OINTMENT (GRAM) TOPICAL PRN
Qty: 113 G | Refills: 1 | Status: SHIPPED | OUTPATIENT
Start: 2022-11-29 | End: 2023-03-31

## 2022-11-29 RX ORDER — SUMATRIPTAN 50 MG/1
50 TABLET, FILM COATED ORAL
Qty: 12 TABLET | Refills: 1 | Status: SHIPPED | OUTPATIENT
Start: 2022-11-29 | End: 2023-06-20

## 2022-11-29 NOTE — PROGRESS NOTES
There are no exam notes on file for this visit.    ASSESSMENT AND PLAN:      Teresa was seen today for other.  Multiple issues were addressed today.  Patient also has multiple social determinant of health barriers that she is dealing with at this time.    Diagnoses and all orders for this visit:    Wound infection  3 wounds present on her abdomen.  The inferior 1 is getting better, and the 2 along her abdomen are stable.  I refilled her mupirocin, gave her some dressings and instructions today, and we will give her a small dose of Vicodin as well.  We will check for chronic inflammation with a CBC and make adjustments as necessary.  We will try a treatment or 2 with Hibiclens to decrease her overall bacterial load if she is also noticing increased boils forming on her backside and under her armpits.  -     CBC with Platelets & Differential  -     HYDROcodone-acetaminophen (NORCO) 5-325 MG tablet; Take 1 tablet by mouth every 6 hours as needed for severe pain (7-10)  -     mupirocin (BACTROBAN) 2 % external ointment; Apply topically 3 times daily  -     zinc oxide (DESITIN) 40 % external ointment; Apply topically as needed for dry skin or irritation  -     chlorhexidine (HIBICLENS) 4 % liquid; Apply topically daily as needed for wound care      Type 2 diabetes mellitus with hyperglycemia, with long-term current use of insulin (H)  Reviewed blood sugars today.  They remain somewhat high, but I worry more about lows especially with her falls and confusion.  No changes to medications today.  A1c in October was the best that its been in a long time.  -     TSH with free T4 reflex    Chronic obstructive pulmonary disease, unspecified COPD type (H)  Moderate persistent asthma without complication  Cough  Seasonal allergies  Breathing continues to be an issue.  Her sats are good.  No acute infectious symptoms.  I will resend the Symbicort, refill guaifenesin and cetirizine.  I do not have a lot of good continued options  for chronic cough, and I think she needs a pulmonologist for further evaluation.  Once wound care is improved we will move forward with this.  -     budesonide-formoterol (SYMBICORT) 160-4.5 MCG/ACT Inhaler; Inhale 2 puffs into the lungs 2 times daily  -     guaiFENesin-dextromethorphan (ROBITUSSIN DM) 100-10 MG/5ML syrup; Take 10 mLs by mouth every 4 hours as needed for cough  -     cetirizine (ZYRTEC) 10 MG tablet; Take 1 tablet (10 mg) by mouth daily    Constipation  -     docusate sodium (COLACE) 100 MG capsule; Take 1 capsule (100 mg) by mouth 2 times daily    Other insomnia  Did give her a one-time refill of this but recommended she discuss this with her psychiatrist coming up.  -     traZODone (DESYREL) 50 MG tablet; Take 1 tablet (50 mg) by mouth At Bedtime    Other migraine without status migrainosus, not intractable  Refilled her migraine medication.  -     SUMAtriptan (IMITREX) 50 MG tablet; Take 1 tablet (50 mg) by mouth at onset of headache for migraine    Dysuria  UA checked today.  This was bland.  No treatment needed.  -     UA reflex to Microscopic; Future  -     UA reflex to Microscopic    Chronic severe recurrent major depression.  -- She sees her psychiatrist, and has an upcoming appointment in the next week.  -- Provided her encouragement for all the things she is doing right now to support her health.  -- Continue with regular therapy appointments with Nolan.    Follow up in 1 week.      Additionally, she remains very interested in the chronical medical cannabis program.  I will forward this chart to Nesha to discuss the program with her.  She is gone through the CPM process in the past, but its been a while so we may need to reidentify her pain needs, and pain plan at this time.    Patient Instructions   For wounds:    Let all three of them rest for the next few days.   Use the non-adhesive bandages and tape, and no scraping for a little bit.   Keep using the topical antibiotics.    You can  also use the zinc oxide to protect the area.   NO additional antibiotics.    New wash for your skin to help prevent infections.    We are working on setting up a home nurse for wound care.      Refill medications:  --sumatriptan for headaches  --Guanfenesin for cough  --Symbicort for cough  --Colace for stool softener  --Trazodone for sleep - but ask you psychiatrist about this!        Tyra Bullock MD    SUBJECTIVE  Teresa AUSTIN Kellogg is a 52 year old female with past medical history significant for    Patient Active Problem List   Diagnosis     Health Care Home     Acute peptic ulcer     Other allergy, other than to medicinal agents     Bulging lumbar disc     Cervical dysplasia     Common migraine without aura     Constipation     Dwarfism     Familial hypercholesterolemia     Insomnia     Low back pain     Intermittent asthma     Leg pain, bilateral     Smoking     Degeneration of thoracic or thoracolumbar intervertebral disc     Type 2 diabetes mellitus with other skin ulcer, with long-term current use of insulin (H)     LOMAS (nonalcoholic steatohepatitis)     Disease of lung     Hemorrhoids     Parotid mass     Endometriosis     NNAMDI (obstructive sleep apnea)     History of total right knee replacement     Lateral epicondylitis     Impingement syndrome of shoulder region, left     Hx of total knee replacement, left     Chronic pain syndrome     Cough     Borderline personality disorder (H)     Moderate recurrent major depression (H)     Recurrent ventral hernia     Type 2 diabetes mellitus with complication, with long-term current use of insulin (H)     Essential hypertension     Nonruptured cerebral aneurysm     Cerebrovascular accident (CVA) due to embolism (H)     Amputation of leg (H)     Pre-ulcerative corn or callous     Excessive bleeding in premenopausal period     Morbid obesity (H)     Pseudoseizure     Chronic obstructive pulmonary disease (H)     Recurrent incisional hernia     Buttock wound,  left, initial encounter     Cellulitis, unspecified cellulitis site     Sepsis, due to unspecified organism, unspecified whether acute organ dysfunction present (H)     Others present at the visit:  None    Presents for   Chief Complaint   Patient presents with     Other     Follow-up skin and pain.  Wants to go over medical card and medical supplies for wounds at home     Patient presents today for follow-up on multiple issues.    1) she would like me to take a look at the wounds.  She has not noticed a lot of improvement on the wounds on her upper abdomen, but the lower abdomen has been getting better.  There is less drainage.  She has run out of dressings for the wounds, and is looking for some additional materials today.  She is had 1 episode of subjective fever, no nausea vomiting or other systemic symptoms.  No extending erythema.  She still taking the antibiotics and has 2 days left.  Has run out of the cream she has been putting on the top so would like a refill on this.    2) she shares she had an episode at home where she fell and hit her head.  These are happening more and more often.  Shares that she takes her night medications, and will stay in her chair for too long.  Gets sleepy and has difficulty transitioning into her bed.  This time she thinks she may have hit her head on her entertainment center.  She landed on her shoulder on the ground.  The shoulder is already painful, and now it is hurting more.  This is the left shoulder.  She is felt more fatigued and confused lately.  Does have an upcoming appointment with her psychiatrist to discuss her medications.    She shares that her mental health has been worse.  She is really feeling like she needs more help.  Is working with a  on this.  Needs a PCA, is working on getting an [employer] worker.  Her best friend is coming by to help with things, but this is not enough.  Has not seen her family in a long time.  Feels a sense of loss at this.   Feels like her mood is some of the lowest that its ever been.  Having trouble keeping on top of all the things going on right now.  She is seeing her therapist Nolan weekly and this has been helpful.  Is also working with the Housing Authority at alternative places to live where she will feel safer.  Is optimistic that there might be other housing opportunities in the near future.  He does express some excitement about receiving a new lift chair.  He has special features including massage, heat, and looks very comfortable.  She is still wondering about updates on her manual wheelchair.  She is having trouble with her electric wheelchair and someone is coming out to fix it later this week.  She also got a call during her visit from her previous home nurse who plans to come check in on her on Friday as well.    3) we reviewed her blood sugars.  They have been higher.  Some into the 300s.  Better than her last visit however and typically they are between 160 and 240.  Has been eating and drinking okay.  Has noticed some pain and discomfort with urination.  Would like me to check a urine today.    4) continues to cough regularly.  This remains painful both in her chest and in her abdomen.  Has not been able to get her cetirizine and would like a refill on this.  Has not been able to get her Symbicort and needs refill on this.  Would also like some more guaifenesin.  She has been cutting back on her smoking because of this.      OBJECTIVE:  Vitals: /68   Pulse 84   Temp 98.7  F (37.1  C) (Oral)   Resp 20   SpO2 96%   BMI= There is no height or weight on file to calculate BMI.  Objective:    Vitals:  Vitals are reviewed and are within the normal range  Gen:  Alert, pleasant, no acute distress  Cardiac:  Regular rate and rhythm, no murmurs, rubs or gallops  Respiratory:  Lungs clear to auscultation bilaterally  Abdomen: She continues to have 3 lesions on her abdomen.  The 2 upper ones look very similar, they are  dry and somewhat irritated.  There is no extending erythema.  No pus.  They do seem like they are starting to heal by secondary intention from the inside.  The lesion on her lower abdomen is below her pannus.  This is significantly smaller in size and is about the size of a quarter today.  No extending erythema.  Some small amount of pus drainage but there is good granulation tissue forming beneath it.  All 3 wounds were dried and cleaned today.    Results for orders placed or performed in visit on 11/29/22   CBC with platelets and differential     Status: Abnormal   Result Value Ref Range    WBC Count 11.7 (H) 4.0 - 11.0 10e3/uL    RBC Count 4.43 3.80 - 5.20 10e6/uL    Hemoglobin 12.8 11.7 - 15.7 g/dL    Hematocrit 39.7 35.0 - 47.0 %    MCV 90 78 - 100 fL    MCH 28.9 26.5 - 33.0 pg    MCHC 32.2 31.5 - 36.5 g/dL    RDW 13.4 10.0 - 15.0 %    Platelet Count 278 150 - 450 10e3/uL    % Neutrophils 74 %    % Lymphocytes 20 %    % Monocytes 4 %    % Eosinophils 1 %    % Basophils 0 %    % Immature Granulocytes 1 %    Absolute Neutrophils 8.7 (H) 1.6 - 8.3 10e3/uL    Absolute Lymphocytes 2.3 0.8 - 5.3 10e3/uL    Absolute Monocytes 0.5 0.0 - 1.3 10e3/uL    Absolute Eosinophils 0.1 0.0 - 0.7 10e3/uL    Absolute Basophils 0.0 0.0 - 0.2 10e3/uL    Absolute Immature Granulocytes 0.1 <=0.4 10e3/uL   UA reflex to Microscopic     Status: Abnormal   Result Value Ref Range    Color Urine Yellow Colorless, Straw, Light Yellow, Yellow    Appearance Urine Clear Clear    Glucose Urine 500 (A) Negative mg/dL    Bilirubin Urine Negative Negative    Ketones Urine Negative Negative mg/dL    Specific Gravity Urine <=1.005 1.005 - 1.030    Blood Urine Negative Negative    pH Urine 5.5 5.0 - 8.0    Protein Albumin Urine Negative Negative mg/dL    Urobilinogen Urine 0.2 0.2, 1.0 E.U./dL    Nitrite Urine Negative Negative    Leukocyte Esterase Urine Negative Negative    Narrative    Microscopic not indicated   CBC with Platelets & Differential      Status: Abnormal    Narrative    The following orders were created for panel order CBC with Platelets & Differential.  Procedure                               Abnormality         Status                     ---------                               -----------         ------                     CBC with platelets and d...[[id number]]  Abnormal            Final result                 Please view results for these tests on the individual orders.           Patient Instructions   For wounds:    Let all three of them rest for the next few days.   Use the non-adhesive bandages and tape, and no scraping for a little bit.   Keep using the topical antibiotics.    You can also use the zinc oxide to protect the area.   NO additional antibiotics.    New wash for your skin to help prevent infections.    We are working on setting up a home nurse for wound care.      Refill medications:  --sumatriptan for headaches  --Guanfenesin for cough  --Symbicort for cough  --Colace for stool softener  --Trazodone for sleep - but ask you psychiatrist about this!        Tyra Bullock MD

## 2022-11-29 NOTE — Clinical Note
This patient is interested in medical cannabis.  Can you run through the program with her and help get her scheduled for the appointments needed to certify?  Thank you!

## 2022-11-29 NOTE — LETTER
November 30, 2022      Teresa Perez  [address]  [place] [address]        Dear ,    We are writing to inform you of your test results.    Here is a copy of your lab results.  Your urine looks normal - just showing a little bit of glucose.  Your thyroid levels are normal.  Your white count is slightly elevated - we'll recheck this next visit and see if you need another round of antibiotics, but I'd like to see how things go off the antibiotics first.  Please call the clinic at [phone] if you have any questions.         Resulted Orders   TSH with free T4 reflex   Result Value Ref Range    TSH 1.04 0.30 - 4.20 uIU/mL   CBC with platelets and differential   Result Value Ref Range    WBC Count 11.7 (H) 4.0 - 11.0 10e3/uL    RBC Count 4.43 3.80 - 5.20 10e6/uL    Hemoglobin 12.8 11.7 - 15.7 g/dL    Hematocrit 39.7 35.0 - 47.0 %    MCV 90 78 - 100 fL    MCH 28.9 26.5 - 33.0 pg    MCHC 32.2 31.5 - 36.5 g/dL    RDW 13.4 10.0 - 15.0 %    Platelet Count 278 150 - 450 10e3/uL    % Neutrophils 74 %    % Lymphocytes 20 %    % Monocytes 4 %    % Eosinophils 1 %    % Basophils 0 %    % Immature Granulocytes 1 %    Absolute Neutrophils 8.7 (H) 1.6 - 8.3 10e3/uL    Absolute Lymphocytes 2.3 0.8 - 5.3 10e3/uL    Absolute Monocytes 0.5 0.0 - 1.3 10e3/uL    Absolute Eosinophils 0.1 0.0 - 0.7 10e3/uL    Absolute Basophils 0.0 0.0 - 0.2 10e3/uL    Absolute Immature Granulocytes 0.1 <=0.4 10e3/uL   UA reflex to Microscopic   Result Value Ref Range    Color Urine Yellow Colorless, Straw, Light Yellow, Yellow    Appearance Urine Clear Clear    Glucose Urine 500 (A) Negative mg/dL    Bilirubin Urine Negative Negative    Ketones Urine Negative Negative mg/dL    Specific Gravity Urine <=1.005 1.005 - 1.030    Blood Urine Negative Negative    pH Urine 5.5 5.0 - 8.0    Protein Albumin Urine Negative Negative mg/dL    Urobilinogen Urine 0.2 0.2, 1.0 E.U./dL    Nitrite Urine Negative Negative    Leukocyte  Esterase Urine Negative Negative    Narrative    Microscopic not indicated       If you have any questions or concerns, please call the clinic at the number listed above.       Sincerely,      Tyra Bullock MD

## 2022-11-29 NOTE — PATIENT INSTRUCTIONS
For wounds:    Let all three of them rest for the next few days.   Use the non-adhesive bandages and tape, and no scraping for a little bit.   Keep using the topical antibiotics.    You can also use the zinc oxide to protect the area.   NO additional antibiotics.    New wash for your skin to help prevent infections.    We are working on setting up a home nurse for wound care.      Refill medications:  --sumatriptan for headaches  --Guanfenesin for cough  --Symbicort for cough  --Colace for stool softener  --Trazodone for sleep - but ask you psychiatrist about this!

## 2022-11-30 LAB — TSH SERPL DL<=0.005 MIU/L-ACNC: 1.04 UIU/ML (ref 0.3–4.2)

## 2022-11-30 NOTE — RESULT ENCOUNTER NOTE
Teresa Perez-    Here is a copy of your lab results.  Your urine looks normal - just showing a little bit of glucose.  Your thyroid levels are normal.  Your white count is slightly elevated - we'll recheck this next visit and see if you need another round of antibiotics, but I'd like to see how things go off the antibiotics first.  Please call the clinic at [phone] if you have any questions.      Tyra Bullock MD    Please send results to patient.

## 2022-12-01 ENCOUNTER — TELEPHONE (OUTPATIENT)
Dept: FAMILY MEDICINE | Facility: CLINIC | Age: 52
End: 2022-12-01

## 2022-12-01 NOTE — TELEPHONE ENCOUNTER
----- Message from Tyra Bullock MD sent at 12/1/2022  9:17 AM CST -----  Regarding: RE: medical Cannabis  Thanks for the input team.  I appreciate the suggestions. I do think she is currently heavily using marijuana recreationally, and that her chronic medical problems have worsened, especially her respiratory status.  I worry that smoking THC is affecting her breathing, and I wonder if non-inhaled products might be safer as well as more effective.     What would you think about the following plan:    -Prior to doing certification, patient needs the following completed    1)  Pulmonology consult to fully assess respiratory status  (she has an appointment scheduled 12/7 with Dr. Thorne)  2)  Conversation with patient's psychiatrist about medical cannabis for pain.  Looks like her last STACY for her psychiatrist was 7/2021, so I will need to get an updated STACY signed to connect with them. She sees Dr. Georges, and has an upcoming appointment, but I'm not sure of the date.    3)  Conversation with patient's therapist.  She has a new therapist, Nolan, who she is seeing weekly right now.  Depression symptoms have been worse, with her chronic medical problems, including pain contributing.  I don't have an STACY for him, so will get one of these signed.    4)  Dr. Bullock and patient to review and complete a chronic pain care plan so current pain regimen is up to date and known prior to visit with medical cannabis certifier.  MARGARITA Goff RIOSORD at that time as well.  This requires a 40 min visit JUST for chronic pain.      This is a patient who creates a lot of frustration for me, because she has anxiety and doesn't like to come in for visits, and then has significant requests and acute issues that need to be dealt with all in one visit.  [employer] setting is a huge challenge.  She also has lost most of her outpatient services support (biggest problem is lack of PCA - previously had like 8-10 hrs per day of PCA).  She's  really struggling to take care of herself at home.  She's doing her best, but it is hard.  She is working with a  on this, but there has not been a lot of progress.      I am seeing her weekly right now for wound care for lesions on her abdomen.  If Mary, you would be willing to call Teresa and run through this plan, schedule her for a 40 min CPM follow up visit with me in 2 weeks, we can sign the releases and review the steps together at our 12/7 appointment.      I could also used some additional care coordinator support for this patient to help with coordinating home services for wound care, prescriptions for DME (needs a new manual wheelchair among other things).      Does that sound reasonable to everyone?      Dr. Bullock            ----- Message -----  From: Evie Santiago, PhD  Sent: 12/1/2022   6:54 AM CST  To: Mary Barrera RN, Alber Rivera MD, #  Subject: RE: medical Cannabis                             This patient was seen by Dr. Helm for  CPM eval 10/19/16.  See below for the psychiatric history gathered at that visit.  A history of bipolar disorder is a relative contraindication for medical cannabis given the increased risk of zia so I agree consultation with her psychiatrist is warranted.  She was reporting periodic recreational use of cannabis at the time of her assessment with Dr. Helm but I am not clear if there was any increase in acute psychiatric concerns at that time.  Obviously this assessment took place quite some time ago, but unless there have been significant changes and Dr. Bullock believes she would benefit from additional  consultation around pain management, I don't think we need to schedule another  consult for her prior to considering certification.  I would want the blessing of her psychiatrist prior to proceeding however.  Happy to see her back with our  team if Dr. Bullock/Dr. Rivera think this would be helpful but we are scheduling out to  January at this time so should also keep that in mind.  Let me know if you need any additional follow up from me on this!  Evie    Per Dr. Helm:    Psychiatric History: Reports she has been diagnosed with Bipolar disorder, Panic Disorder, OCD, PTSD, and Agoraphobia. She has been treated by a psychiatry for over 20 years. She currently is followed by Dr. Georges at [facility], who prescribes Seroquel, Effexor, and diphenhydramine. She sees him once every 2-3 months (STACY obtained today and added to care team). She also has an in-home therapist, Ofelia, through [facility], whom she sees every 2 weeks (STACY obtained today and added to care team). She had an [employer] worker in the past but is now taking a break from that while healing from recent surgery. She is satisfied with her mental health team and does not desire additional services at this time.   ----- Message -----  From: Mary Barrera RN  Sent: 11/30/2022  11:56 AM CST  To: Evie Santiago, PhD, Alber Rivera MD, #  Subject: FW: medical Cannabis                             This patient is interested in being certified for medical cannabis. CPM process has been completed minus behavioral health consult. Patient has been seen by outside mental health, unsure if she continues to follow with them at this time. Would a referral to see behavioral health here be needed for certification or could a chart review by behavioral health suffice?    Mary MÁRQUEZ    ----- Message -----  From: Alber Rivera MD  Sent: 11/30/2022   8:41 AM CST  To: Mary Barrera RN, Tyra Bullock MD, #  Subject: RE: medical Cannabis                             It looks like she should see Dr. Santiago first.  Also, I note that she sees a psychiatrist.  Often we want to get input from the psychiatrist before certifying the patient.    Can we get those 2 things done, and a urine drug screen, and then I can see her after that?    DB  ----- Message -----  From:  Mary Barrera RN  Sent: 11/29/2022   5:06 PM CST  To: Alber Rivera MD, Tyra Bullock MD, #  Subject: medical Cannabis                                 Patient interested in medical cannabis   CPM process completed 10/8/16 no updated scores noted on my chart review   Can we proceed with scheduling her for certification?    Mary MÁRQUEZ

## 2022-12-01 NOTE — TELEPHONE ENCOUNTER
Writer contacted patient regarding plan for certification for medical cannabis. Steps (as listed in prior message) explained to pt she voiced understanding and is agreeable to plan. Pt states she has an upcoming appt with her psychiatrist and she will sign an STACY with their office and will inform them of the plan for certification. Patient aware of upcoming appts at clinic including 40min appt on 12/16/22 patient aware of appt with pulm also. Patient states she was contacted by the PCA agency and she expects a call today with names of possible PCAs patient encouraged to inform clinic if she continues to have difficulty with obtaining PCA hrs and will can ask our  to assist. Patient agreeable. Writer will mail information regarding state regulations and process regarding medical cannabis.    Note routed to   /CAROLYN

## 2022-12-07 ENCOUNTER — OFFICE VISIT (OUTPATIENT)
Dept: FAMILY MEDICINE | Facility: CLINIC | Age: 52
End: 2022-12-07
Payer: COMMERCIAL

## 2022-12-07 VITALS
OXYGEN SATURATION: 99 % | TEMPERATURE: 98.8 F | DIASTOLIC BLOOD PRESSURE: 67 MMHG | SYSTOLIC BLOOD PRESSURE: 100 MMHG | HEART RATE: 91 BPM | RESPIRATION RATE: 20 BRPM

## 2022-12-07 DIAGNOSIS — N92.4 EXCESSIVE BLEEDING IN PREMENOPAUSAL PERIOD: ICD-10-CM

## 2022-12-07 DIAGNOSIS — K61.2 ABSCESS OF ANAL AND RECTAL REGIONS: Primary | ICD-10-CM

## 2022-12-07 DIAGNOSIS — J30.2 SEASONAL ALLERGIC RHINITIS, UNSPECIFIED TRIGGER: ICD-10-CM

## 2022-12-07 PROCEDURE — 99214 OFFICE O/P EST MOD 30 MIN: CPT | Mod: 25 | Performed by: STUDENT IN AN ORGANIZED HEALTH CARE EDUCATION/TRAINING PROGRAM

## 2022-12-07 PROCEDURE — 97602 WOUND(S) CARE NON-SELECTIVE: CPT | Mod: 59 | Performed by: STUDENT IN AN ORGANIZED HEALTH CARE EDUCATION/TRAINING PROGRAM

## 2022-12-07 PROCEDURE — 96372 THER/PROPH/DIAG INJ SC/IM: CPT | Performed by: STUDENT IN AN ORGANIZED HEALTH CARE EDUCATION/TRAINING PROGRAM

## 2022-12-07 RX ORDER — MEDROXYPROGESTERONE ACETATE 150 MG/ML
150 INJECTION, SUSPENSION INTRAMUSCULAR
Status: DISCONTINUED | OUTPATIENT
Start: 2022-12-07 | End: 2023-10-09

## 2022-12-07 RX ORDER — HYDROCODONE BITARTRATE AND ACETAMINOPHEN 5; 325 MG/1; MG/1
1 TABLET ORAL EVERY 6 HOURS PRN
Qty: 14 TABLET | Refills: 0 | Status: SHIPPED | OUTPATIENT
Start: 2022-12-07 | End: 2022-12-10

## 2022-12-07 RX ORDER — SULFAMETHOXAZOLE/TRIMETHOPRIM 800-160 MG
1 TABLET ORAL 2 TIMES DAILY
Qty: 14 TABLET | Refills: 0 | Status: SHIPPED | OUTPATIENT
Start: 2022-12-07 | End: 2022-12-23

## 2022-12-07 RX ORDER — CLINDAMYCIN PHOSPHATE 11.9 MG/ML
SOLUTION TOPICAL 2 TIMES DAILY
Qty: 360 ML | Refills: 3 | Status: SHIPPED | OUTPATIENT
Start: 2022-12-07 | End: 2024-06-28

## 2022-12-07 RX ADMIN — MEDROXYPROGESTERONE ACETATE 150 MG: 150 INJECTION, SUSPENSION INTRAMUSCULAR at 10:55

## 2022-12-07 NOTE — PROGRESS NOTES
Nursing Notes:   LUKAS TATE  12/7/2022 11:31 AM  Signed  Clinic Administered Medication Documentation    Administrations This Visit     medroxyPROGESTERone (DEPO-PROVERA) injection 150 mg     Admin Date  12/07/2022 Action  Given Dose  150 mg Route  Intramuscular Site  Right Deltoid Administered By  LUKAS TATE    Ordering Provider: Tyra Bullock MD    NDC: 50217-552-54    Lot#: [id number]    : [facility]    Patient Supplied?: No                  Depo Provera Documentation    URINE HCG: not indicated    Depo-Provera Standing Order inclusion/exclusion criteria reviewed.   Patient meets: inclusion criteria     BP: 100/67  LAST PAP/EXAM: No results found for: PAP    Prior to injection, verified patient identity using patient's name and date of birth. Medication was administered. Please see MAR and medication order for additional information.     Was entire vial of medication used? yes    Vial/Syringe: Single dose vial  Expiration Date:  04/30/2024    Patient instructed to remain in clinic for 15 minutes.  NEXT INJECTION DUE: 2/22/23 - 3/8/23      Name of provider who requested the medication administration: Kobe  Name of provider on site (faculty or community preceptor) at the time of performing the medication administration: Kobe    Date of next administration: 02/23/2023 to 03/09/2023  Date of next office visit with provider to renew medication plan (must be seen annually): 09/16/2023                  ASSESSMENT AND PLAN:      Teresa was seen today for other.  Multiple issues were addressed today.    Diagnoses and all orders for this visit:    Encounter for wound care.  -Examined, debrided, and redressed all 3 wounds.  We discussed leaving the wounds alone as much as possible.  I do think she benefit from some Medihoney or similar wound care products on these upper lesions.  The one below her pannus is healing well.  Discussed that I do not have access to the same wound care skills  and tools that a nurse would have and if its not getting better next week and would like to have her go to wound clinic.  We will check in on home nursing and wound care.    Abscess of anal and rectal regions  -She has a new lesion with some thickening and fluctuance on her right buttock.  I do not think this is quite ready to I&D, but it is  infected and tender.  Would like her to use Hibiclens and if this is not available clindamycin topical on this area.  We will treat with oral Bactrim.  Discussed using warm washcloths or soaking in the tub if she is able to do this.  Ideally will drain on its own, otherwise we will plan to I&D it at next visit if its come to ahead.  And worried about how well this area will heal, and wound care could have been helpful with this as well  -     sulfamethoxazole-trimethoprim (BACTRIM DS) 800-160 MG tablet; Take 1 tablet by mouth 2 times daily  -     chlorhexidine (HIBICLENS) 4 % liquid; Apply topically daily as needed for wound care  -     clindamycin (CLEOCIN T) 1 % external solution; Apply topically 2 times daily  -     HYDROcodone-acetaminophen (NORCO) 5-325 MG tablet; Take 1 tablet by mouth every 6 hours as needed for severe pain (7-10)      Excessive bleeding in premenopausal period  But shot given today.  -     medroxyPROGESTERone (DEPO-PROVERA) injection 150 mg    Chronic pain syndrome.  Did complete chronic pain care plan with her today.  Still need to do Tiger, ORT and resort.  Did not have time for this today.  We will try to complete this at her next appointment.  ROIs signed for her psychiatrist and therapist we can check with them about appropriateness of medical cannabis for pain.    Bipolar disorder with current depression symptoms.  We will reschedule appointment with psychiatry.  I will refill her medications until she can make that appointment.  Continue with regular visits with Nolan, her therapist.  I do have concerns about starting medical cannabis with the  depression.  Her social situation is challenging given her significant need for assistance with ADLs and no PCA available.  Appreciate social work and care management support around this.      She will follow-up in 1 week for wound care and evaluation of her abscess.    Patient Instructions   For the wounds on the upper belly:    --No picking at them  --Put cream/vaseline on with dressing changes but not otherwise.    --Leave them be!    For the the wounds on the lower belly:    --This looks good!  Keep doing what you are doing!    For the backside:    --Resend the hibiclens to use in that area  --Soak in the bathtub, or warm compress.  --Antibiotics:  Batrim, 1 pill 2x daily for 1 week.    --If getting worse, we will need to drain it!    -Refill pain medications    Call if you are unable to get your medications.     Follow up with Dr. Bullock next week.      Personal Care Plan for Chronic Pain    1.  Personal Goals:   Better control pain.  Get coverage for medical cannabis    2.  Sleep:     *  Basic sleep plan:    *reduce or eliminate caffeine and daytime naps    * relaxation before bed    * limit screen time 1-2 hours prior to bed    * establish dark/quiet sleep environment    * go to bed at target bedtime:      * keep consistent wake time:     *  Nighttime medications including the following: Trazodone and Seroquel    3.  Physical Activity:     * Formal physical rehabilitation:    -not participating in physical therapy currrently   * Home/community based activity:    -Minimal additional physical activity outside of ADL.  Previously had PCA to help with this, but doing all of her own cares at home and this is challenging, and exhausting and exacerbates her pain.     * Listen to your body.  Pace yourself for success.  Don't over-do it.  Don't under do it.   * Balance activities with rest and set realistic goals.     4.  Nutrition/Weight:     * Try to eat at least 5 servings of fresh fruits and vegetables each  day.   * Limit processed foods and foods high in sugar, sodium and fat.   * Maintain a healthy weight.    *Consider switching from Bydureon to Ozempic to help with blood sugars and weight loss.      5.  Mood/Stress Management:    * Formal interventions:    * Counseling - See's therapist Nolan weekly.  STACY signed today:  12/7/2022  * Follows with psychiatrist, Dr. Georges.  STACY signed today:  12/7/2022.  Missed appointment earlier this week because her ride did not come.    * Depression is currently a big challenge.  Family is not active in her life right now, and this has been hard.     * Home/community based interventions:  * Relaxation techniques  * Meditation  * Yoga  * Creative activity  * Spiritual /prayer  * Service-based activity.   * Medications: Takes Venlafaxine, Seroquel, and Trazodone from her psychiatrist    6.  Tobacco/Alcohol/Drug Use:      * Consider tobacco quit plan:    Working on cutting back to help with her breathing.  Minimal use   * Maintain healthy relationship with alcohol    * Has had some episodes of binge drinking recently due to stress and poor mood.  Some falls because of this   * Eliminate recreational drugs    * Regular THC use. This helps calm her and helps with pain.  Wanting to pursue medical cannabis for pain control.    7.  Pain:     * Non-medication treatments:    * ice - uses at home.  Heat doesn't help/is unsafe with her wheelchair    * other: Uses topical pain cream, and this helps some    8.  Pain Medications:    * Medications currently:    *Extra strength tylenol 2 pills 3x per day - doesn't really help    *Gabapentin 600mg 3x daily, helps some    * Currently getting small amount of vicodin because of infections in stomach and backside leading to acute pain.        Tyra Bullock MD    SUBJECTIVE  Teresa AUSTIN Yantic is a 52 year old female with past medical history significant for    Patient Active Problem List   Diagnosis     Health Care Home     Acute peptic ulcer      Other allergy, other than to medicinal agents     Bulging lumbar disc     Cervical dysplasia     Common migraine without aura     Constipation     Dwarfism     Familial hypercholesterolemia     Insomnia     Low back pain     Intermittent asthma     Leg pain, bilateral     Smoking     Degeneration of thoracic or thoracolumbar intervertebral disc     Type 2 diabetes mellitus with other skin ulcer, with long-term current use of insulin (H)     LOMAS (nonalcoholic steatohepatitis)     Disease of lung     Hemorrhoids     Parotid mass     Endometriosis     NNAMDI (obstructive sleep apnea)     History of total right knee replacement     Lateral epicondylitis     Impingement syndrome of shoulder region, left     Hx of total knee replacement, left     Chronic pain syndrome     Cough     Borderline personality disorder (H)     Moderate recurrent major depression (H)     Recurrent ventral hernia     Type 2 diabetes mellitus with complication, with long-term current use of insulin (H)     Essential hypertension     Nonruptured cerebral aneurysm     Cerebrovascular accident (CVA) due to embolism (H)     Amputation of leg (H)     Pre-ulcerative corn or callous     Excessive bleeding in premenopausal period     Morbid obesity (H)     Pseudoseizure     Chronic obstructive pulmonary disease (H)     Recurrent incisional hernia     Buttock wound, left, initial encounter     Cellulitis, unspecified cellulitis site     Sepsis, due to unspecified organism, unspecified whether acute organ dysfunction present (H)     Others present at the visit:  None    Presents for   Chief Complaint   Patient presents with     Other     Follow-up skin and pain and also cannabis card     Patient presents today for wound care visit and to discuss her chronic pain and application for medical cannabis.    She shares that she has not been able to get all of the medications prescribed last time.  She is requesting a refill on the cream for her bottom, the  ointment for her wounds, and has not been able to get the Hibiclens to help with issues of hidradenitis.  Had a friend go pick things up from the pharmacy but not all the medications were there.    She still is working on PCA options, but there are no options available currently.  No home care or wound nursing right now.  She is doing dressing changes herself, and she notes that the lesion on the bottom of her abdomen is getting better.  This seems to be healing.  The ones in her upper abdomen are not.  There is some new pus over the top, and some extending redness around the outside.  Pain has been improving in all of these areas.  She still has pain in her upper abdomen at the area of her known large hernia.    Is also having increased pain in her bottom.  Has had difficulty with hidradenitis and abscesses in the past.  This is located in the crack of her buttocks.  It hurts with sitting.  She has to shift positions.  Has been getting worse over the last couple of days.  She would like me to look at it today and see if there is treatment needed.  She has had wounds on her buttocks in the past, but this is in a different area and feels different to her.    She shares that mental health continues to be a challenge.  This is the lowest she is ever felt.  Is seeing her therapist Nolan weekly.  Was supposed to see her psychiatrist earlier this week, but the ride did not come.  She is working on rescheduling this visit.  Feels very lonely, and is really struggling with her pain and chronic medical issues.  She remains without a PCA and is doing all of her cares at home for herself.  This is challenging as she is wheelchair-bound and has to navigate with her amputation.    She remains interested in pursuing medical cannabis.  Does use THC at home and finds this helpful to chill her out and help with her pain.  She is hoping the medical cannabis will be more helpful in targeting her areas of pain.  She is currently using  extra strength Tylenol, gabapentin, and pain creams.  Uses some ice on the areas as well.  She has pain in her low back, neck, bilateral shoulders, as well as in her abdomen.    She is also due for her Depo shot today.  Has been getting this for nearly 30 years to prevent menorrhagia and heavy bleeding which was a problem previously.  She has noted symptoms of perimenopause.  Has not had a period during the time when she was using Depo.  She is worried about heavy bleeding if she discontinues, but is open to talking in the next year about weaning off and seeing what happens, as she is likely very close to menopause if not in it.    OBJECTIVE:  Vitals: /67   Pulse 91   Temp 98.8  F (37.1  C) (Oral)   Resp 20   SpO2 99%   BMI= There is no height or weight on file to calculate BMI.  Objective:    Vitals:  Vitals are reviewed and are within the normal range.  She is afebrile.  No tachycardia.  Gen:  Alert, pleasant, appears fatigued but in no acute distress  Cardiac:  Regular rate and rhythm, no murmurs, rubs or gallops  Respiratory:  Lungs clear to auscultation bilaterally  Abdomen: She has a large palpable ventral hernia.  The 2 lesions on her upper abdomen are similar in size to previous.  The 1 more midline looks shallower.  The lesion more lateral does not look much improved.  There is a very small half centimeter area of erythema around each of these.  Some thick yellowish discharge, and minimal granulation tissue.  The 1 on her lower abdomen looks much improved.  It is smaller, about quarter size now, with good beefy granulation tissue present and minimal pus.  No extending erythema from this area.  Buttocks and rectal area: She has an area on her right buttock just adjacent from the midline, and slightly anterior to the crease between the buttocks.  It is not anal or rectal, but superior to that.  The area is red, tense, but there is no palpable fluctuance.  No extending erythema.  It is quite tender  to the touch.    Results from previous visit were discussed today.    Office Visit on 11/29/2022   Component Date Value Ref Range Status     TSH 11/29/2022 1.04  0.30 - 4.20 uIU/mL Final     WBC Count 11/29/2022 11.7 (H)  4.0 - 11.0 10e3/uL Final     RBC Count 11/29/2022 4.43  3.80 - 5.20 10e6/uL Final     Hemoglobin 11/29/2022 12.8  11.7 - 15.7 g/dL Final     Hematocrit 11/29/2022 39.7  35.0 - 47.0 % Final     MCV 11/29/2022 90  78 - 100 fL Final     MCH 11/29/2022 28.9  26.5 - 33.0 pg Final     MCHC 11/29/2022 32.2  31.5 - 36.5 g/dL Final     RDW 11/29/2022 13.4  10.0 - 15.0 % Final     Platelet Count 11/29/2022 278  150 - 450 10e3/uL Final     % Neutrophils 11/29/2022 74  % Final     % Lymphocytes 11/29/2022 20  % Final     % Monocytes 11/29/2022 4  % Final     % Eosinophils 11/29/2022 1  % Final     % Basophils 11/29/2022 0  % Final     % Immature Granulocytes 11/29/2022 1  % Final     Absolute Neutrophils 11/29/2022 8.7 (H)  1.6 - 8.3 10e3/uL Final     Absolute Lymphocytes 11/29/2022 2.3  0.8 - 5.3 10e3/uL Final     Absolute Monocytes 11/29/2022 0.5  0.0 - 1.3 10e3/uL Final     Absolute Eosinophils 11/29/2022 0.1  0.0 - 0.7 10e3/uL Final     Absolute Basophils 11/29/2022 0.0  0.0 - 0.2 10e3/uL Final     Absolute Immature Granulocytes 11/29/2022 0.1  <=0.4 10e3/uL Final     Color Urine 11/29/2022 Yellow  Colorless, Straw, Light Yellow, Yellow Final     Appearance Urine 11/29/2022 Clear  Clear Final     Glucose Urine 11/29/2022 500 (A)  Negative mg/dL Final     Bilirubin Urine 11/29/2022 Negative  Negative Final     Ketones Urine 11/29/2022 Negative  Negative mg/dL Final     Specific Gravity Urine 11/29/2022 <=1.005  1.005 - 1.030 Final     Blood Urine 11/29/2022 Negative  Negative Final     pH Urine 11/29/2022 5.5  5.0 - 8.0 Final     Protein Albumin Urine 11/29/2022 Negative  Negative mg/dL Final     Urobilinogen Urine 11/29/2022 0.2  0.2, 1.0 E.U./dL Final     Nitrite Urine 11/29/2022 Negative  Negative Final      Leukocyte Esterase Urine 11/29/2022 Negative  Negative Final             Patient Instructions   For the wounds on the upper belly:    --No picking at them  --Put cream/vaseline on with dressing changes but not otherwise.    --Leave them be!    For the the wounds on the lower belly:    --This looks good!  Keep doing what you are doing!    For the backside:    --Resend the hibiclens to use in that area  --Soak in the bathtub, or warm compress.  --Antibiotics:  Batrim, 1 pill 2x daily for 1 week.    --If getting worse, we will need to drain it!    -Refill pain medications    Call if you are unable to get your medications.     Follow up with Dr. Bullock next week.      Personal Care Plan for Chronic Pain    1.  Personal Goals:   Better control pain.  Get coverage for medical cannabis    2.  Sleep:     *  Basic sleep plan:    *reduce or eliminate caffeine and daytime naps    * relaxation before bed    * limit screen time 1-2 hours prior to bed    * establish dark/quiet sleep environment    * go to bed at target bedtime:      * keep consistent wake time:     *  Nighttime medications including the following: Trazodone and Seroquel    3.  Physical Activity:     * Formal physical rehabilitation:    -not participating in physical therapy currrently   * Home/community based activity:    -Minimal additional physical activity outside of ADL.  Previously had PCA to help with this, but doing all of her own cares at home and this is challenging, and exhausting and exacerbates her pain.     * Listen to your body.  Pace yourself for success.  Don't over-do it.  Don't under do it.   * Balance activities with rest and set realistic goals.     4.  Nutrition/Weight:     * Try to eat at least 5 servings of fresh fruits and vegetables each day.   * Limit processed foods and foods high in sugar, sodium and fat.   * Maintain a healthy weight.    *Consider switching from Bydureon to Ozempic to help with blood sugars and weight loss.       5.  Mood/Stress Management:    * Formal interventions:    * Counseling - See's therapist Nolan weekly.  STACY signed today:  12/7/2022  * Follows with psychiatrist, Dr. Georges.  STACY signed today:  12/7/2022.  Missed appointment earlier this week because her ride did not come.    * Depression is currently a big challenge.  Family is not active in her life right now, and this has been hard.     * Home/community based interventions:  * Relaxation techniques  * Meditation  * Yoga  * Creative activity  * Spiritual /prayer  * Service-based activity.   * Medications: Takes Venlafaxine, Seroquel, and Trazodone from her psychiatrist    6.  Tobacco/Alcohol/Drug Use:      * Consider tobacco quit plan:    Working on cutting back to help with her breathing.  Minimal use   * Maintain healthy relationship with alcohol    * Has had some episodes of binge drinking recently due to stress and poor mood.  Some falls because of this   * Eliminate recreational drugs    * Regular THC use. This helps calm her and helps with pain.  Wanting to pursue medical cannabis for pain control.    7.  Pain:     * Non-medication treatments:    * ice - uses at home.  Heat doesn't help/is unsafe with her wheelchair    * other: Uses topical pain cream, and this helps some    8.  Pain Medications:    * Medications currently:    *Extra strength tylenol 2 pills 3x per day - doesn't really help    *Gabapentin 600mg 3x daily, helps some    * Currently getting small amount of vicodin because of infections in stomach and backside leading to acute pain.        Tyra Bullock MD

## 2022-12-07 NOTE — PATIENT INSTRUCTIONS
For the wounds on the upper belly:    --No picking at them  --Put cream/vaseline on with dressing changes but not otherwise.    --Leave them be!    For the the wounds on the lower belly:    --This looks good!  Keep doing what you are doing!    For the backside:    --Resend the hibiclens to use in that area  --Soak in the bathtub, or warm compress.  --Antibiotics:  Batrim, 1 pill 2x daily for 1 week.    --If getting worse, we will need to drain it!    -Refill pain medications    Call if you are unable to get your medications.     Follow up with Dr. Bullock next week.      Personal Care Plan for Chronic Pain    1.  Personal Goals:   Better control pain.  Get coverage for medical cannabis    2.  Sleep:     *  Basic sleep plan:    *reduce or eliminate caffeine and daytime naps    * relaxation before bed    * limit screen time 1-2 hours prior to bed    * establish dark/quiet sleep environment    * go to bed at target bedtime:      * keep consistent wake time:     *  Nighttime medications including the following: Trazodone and Seroquel    3.  Physical Activity:     * Formal physical rehabilitation:    -not participating in physical therapy currrently   * Home/community based activity:    -Minimal additional physical activity outside of ADL.  Previously had PCA to help with this, but doing all of her own cares at home and this is challenging, and exhausting and exacerbates her pain.     * Listen to your body.  Pace yourself for success.  Don't over-do it.  Don't under do it.   * Balance activities with rest and set realistic goals.     4.  Nutrition/Weight:     * Try to eat at least 5 servings of fresh fruits and vegetables each day.   * Limit processed foods and foods high in sugar, sodium and fat.   * Maintain a healthy weight.    *Consider switching from Bydureon to Ozempic to help with blood sugars and weight loss.      5.  Mood/Stress Management:    * Formal interventions:    * Counseling - See's therapist Nolan  weekly.  STACY signed today:  12/7/2022  * Follows with psychiatrist, Dr. Georges.  STACY signed today:  12/7/2022.  Missed appointment earlier this week because her ride did not come.    * Depression is currently a big challenge.  Family is not active in her life right now, and this has been hard.     * Home/community based interventions:  * Relaxation techniques  * Meditation  * Yoga  * Creative activity  * Spiritual /prayer  * Service-based activity.   * Medications: Takes Venlafaxine, Seroquel, and Trazodone from her psychiatrist    6.  Tobacco/Alcohol/Drug Use:      * Consider tobacco quit plan:    Working on cutting back to help with her breathing.  Minimal use   * Maintain healthy relationship with alcohol    * Has had some episodes of binge drinking recently due to stress and poor mood.  Some falls because of this   * Eliminate recreational drugs    * Regular THC use. This helps calm her and helps with pain.  Wanting to pursue medical cannabis for pain control.    7.  Pain:     * Non-medication treatments:    * ice - uses at home.  Heat doesn't help/is unsafe with her wheelchair    * other: Uses topical pain cream, and this helps some    8.  Pain Medications:    * Medications currently:    *Extra strength tylenol 2 pills 3x per day - doesn't really help    *Gabapentin 600mg 3x daily, helps some    * Currently getting small amount of vicodin because of infections in stomach and backside leading to acute pain.

## 2022-12-07 NOTE — NURSING NOTE
Clinic Administered Medication Documentation    Administrations This Visit     medroxyPROGESTERone (DEPO-PROVERA) injection 150 mg     Admin Date  12/07/2022 Action  Given Dose  150 mg Route  Intramuscular Site  Right Deltoid Administered By  LUKAS TATE    Ordering Provider: Tyra Bullock MD    NDC: 22741-380-35    Lot#: [id number]    [facility]    Patient Supplied?: No                  Depo Provera Documentation    URINE HCG: not indicated    Depo-Provera Standing Order inclusion/exclusion criteria reviewed.   Patient meets: inclusion criteria     BP: 100/67  LAST PAP/EXAM: No results found for: PAP    Prior to injection, verified patient identity using patient's name and date of birth. Medication was administered. Please see MAR and medication order for additional information.     Was entire vial of medication used? yes    Vial/Syringe: Single dose vial  Expiration Date:  04/30/2024    Patient instructed to remain in clinic for 15 minutes.  NEXT INJECTION DUE: 2/22/23 - 3/8/23      Name of provider who requested the medication administration: Kobe  Name of provider on site (faculty or community preceptor) at the time of performing the medication administration: Kobe    Date of next administration: 02/23/2023 to 03/09/2023  Date of next office visit with provider to renew medication plan (must be seen annually): 09/16/2023

## 2022-12-12 ENCOUNTER — TELEPHONE (OUTPATIENT)
Dept: FAMILY MEDICINE | Facility: CLINIC | Age: 52
End: 2022-12-12

## 2022-12-12 NOTE — TELEPHONE ENCOUNTER
[facility] phone call message- general phone call:    Reason for call: She needs a call back re her wounds.    Action desired: call back.    Return call needed: Yes    OK to leave a message on voice mail? Yes    Advised patient to response may take up to 2 business days: Yes    Primary language: English      needed? No    Call taken on December 12, 2022 at 10:49 AM by Lo Lao

## 2022-12-12 NOTE — TELEPHONE ENCOUNTER
Pt called in saying the wounds on her tailbone opened and now w/ yellow w/ tinge red and some white drainage. This started last Thursday. She has severe pain that she has been managing w/ vicodin. She ran out today. Temp 99.9 and is now 100.2. She reports body ace, chills, and fatigue. She is running out of wound care supplies. She denies foul odor. She has been using bactroban and vasoline as directed by Dr. Bullock once the wound breaks open. Tomorrow is her last day of bactrim.     Advised pt to come in today to be seen. Pt says she can't because she has no ride for today. Advised her to go to an urgent care or ER. Pt declined. She said she will go if she feels worse and will keep a close eye on her symptoms. Scheduled her for tomorrow with Dr. Baca at 8am. Pt will back if she can't get a ride for tomorrow or she ends up going to the ER. She will try to come despite the weather tomorrow.     Bhavani Lacey RN on 12/12/2022 at 2:49 PM

## 2022-12-12 NOTE — TELEPHONE ENCOUNTER
I agree.  Patient needs to be seen if she is having new fevers.     I actually think the wounds on her bottom opening up is a good thing - hopefully this means the wounds will drain.   And glad that she has an appointment scheduled for tomorrow for further evaluation.      Bhavani, I put in an order for a home nurse to come out to do wound care just before thanksgiving.  Can you check on that and see if we can expedite someone coming out to check on her and do dressing changes on a regular basis?  There really is no good way for her to take care of an open wound on her backside on her own.      I'm happy to place new orders if needed.     Tyra Bullock MD

## 2022-12-12 NOTE — TELEPHONE ENCOUNTER
Routing to provider per pt's request for refill on vicodin.     Pt is unable to come in tomorrow due to no ride. She is scheduled for Wednesday. She will go to the ER if she feels worse before her appointment.    Pt said no one has reached out to her yet. She wants HH order to go to   [facility]  P: [phone]    Called [facility] and they are not taking new clients at this time. Please route back when done w/ pt's request to follow up with [facility].    Pt also requesting for another order to be placed for a manual wheelchair to be sent to [facility]. She said she discussed this w/ Dr. Bullock and was told the order disappeared?    Bhavani Lacey RN on 12/12/2022 at 4:28 PM

## 2022-12-13 ENCOUNTER — MEDICAL CORRESPONDENCE (OUTPATIENT)
Dept: HEALTH INFORMATION MANAGEMENT | Facility: CLINIC | Age: 52
End: 2022-12-13

## 2022-12-13 ENCOUNTER — TELEPHONE (OUTPATIENT)
Dept: FAMILY MEDICINE | Facility: CLINIC | Age: 52
End: 2022-12-13

## 2022-12-13 ENCOUNTER — OFFICE VISIT (OUTPATIENT)
Dept: FAMILY MEDICINE | Facility: CLINIC | Age: 52
End: 2022-12-13
Payer: COMMERCIAL

## 2022-12-13 VITALS
HEART RATE: 101 BPM | OXYGEN SATURATION: 96 % | SYSTOLIC BLOOD PRESSURE: 119 MMHG | TEMPERATURE: 98.5 F | RESPIRATION RATE: 20 BRPM | DIASTOLIC BLOOD PRESSURE: 70 MMHG

## 2022-12-13 DIAGNOSIS — M46.28: Primary | ICD-10-CM

## 2022-12-13 DIAGNOSIS — L02.31 CUTANEOUS ABSCESS OF BUTTOCK: ICD-10-CM

## 2022-12-13 PROCEDURE — 10080 I&D PILONIDAL CYST SIMPLE: CPT | Mod: GC | Performed by: STUDENT IN AN ORGANIZED HEALTH CARE EDUCATION/TRAINING PROGRAM

## 2022-12-13 PROCEDURE — 99214 OFFICE O/P EST MOD 30 MIN: CPT | Mod: 25 | Performed by: STUDENT IN AN ORGANIZED HEALTH CARE EDUCATION/TRAINING PROGRAM

## 2022-12-13 RX ORDER — IBUPROFEN 200 MG
2 CAPSULE ORAL
Qty: 100 EACH | Refills: 1 | Status: SHIPPED | OUTPATIENT
Start: 2022-12-13 | End: 2023-03-31

## 2022-12-13 RX ORDER — ACETAMINOPHEN 500 MG
500-1000 TABLET ORAL EVERY 6 HOURS PRN
Qty: 50 TABLET | Refills: 3 | Status: SHIPPED | OUTPATIENT
Start: 2022-12-13 | End: 2023-03-31

## 2022-12-13 RX ORDER — HYDROCODONE BITARTRATE AND ACETAMINOPHEN 5; 325 MG/1; MG/1
1 TABLET ORAL EVERY 8 HOURS PRN
Qty: 10 TABLET | Refills: 0 | Status: SHIPPED | OUTPATIENT
Start: 2022-12-13 | End: 2022-12-16

## 2022-12-13 RX ORDER — SULFAMETHOXAZOLE/TRIMETHOPRIM 800-160 MG
1 TABLET ORAL 2 TIMES DAILY
Qty: 10 TABLET | Refills: 0 | Status: SHIPPED | OUTPATIENT
Start: 2022-12-13 | End: 2022-12-18

## 2022-12-13 NOTE — PROGRESS NOTES
Preceptor Attestation:    I discussed the patient with the resident and evaluated the patient in person. I was present for and supervised the entire procedure. I have verified the content of the note, which accurately reflects my assessment of the patient and the plan of care.   Supervising Physician:  Alber Linton MD.

## 2022-12-13 NOTE — TELEPHONE ENCOUNTER
Routing to provider to put in order for wound care orders and wound care supplies. Talked to Ariane pt's  RN and she recommends for now Idoform packing 3x/week. She says idoform can stay in for 72 hrs before it needs to be changed. Unsure if provider packed pt's wound w/ idoform but if that is the case Ariane can change the dressing again on Thursday.     Please route back when done to fax orders to Ariane at [phone].     Also routing to Dr. Bullock as ABIGAIL. Looks like pt received an rx for vicodin today during her visit w/ Dr. Neri.     Bhavani Lacey, RN on 12/13/2022 at 4:17 PM

## 2022-12-13 NOTE — PROGRESS NOTES
"    Assessment & Plan     Cutaneous abscess of buttock  Abscess, sacrum (H)  Teresa is a 52-year-old woman presenting for evaluation of a wound consistent with abscess on her sacrum.  Low-grade temperatures yesterday, but no true fever, afebrile on exam today.  Wound requiring I&D, which was done today.  See procedure note below.  Will extend Bactrim for 5 more days.  Patient requiring Norco for pain due to the sensitive area.  Tylenol and small amount of Norco sent to pharmacy.  Patient has follow-up with PCP in 4 days.  - sulfamethoxazole-trimethoprim (BACTRIM DS) 800-160 MG tablet  Dispense: 10 tablet; Refill: 0  - lidocaine-EPINEPHrine 1 %-1:920974 injection 3 mL  - acetaminophen (TYLENOL) 500 MG tablet  Dispense: 50 tablet; Refill: 3  - HYDROcodone-acetaminophen (NORCO) 5-325 MG tablet  Dispense: 10 tablet; Refill: 0  - DRAIN SKIN ABSCESS SIMPLE/SINGLE  - DRAIN SKIN ABSCESS SIMPLE/SINGLE    Obtained verbal and written consent from the patient after discussing the risks and benefits of the procedure.  Site was confirmed and prepped with alcohol.  Local anesthetic was administered.  Small 1 cm \"X\" incision was made with an 11 blade scalpel and a moderate amount of purulent drainage was expressed.  Wound was bandaged with sterile gauze.  Patient tolerated the procedure well without any complication.    Orders for home care for wound dressing changes placed.  Orders for supplies placed.  Directions for wound care:  Apply 2 4x4 gauze, each folded, over wound on sacrum every 3 days, covered by Telfa non-adherent dressing, with tape to hold in place  - Home Care Referral  - Gauze Pads & Dressings (GAUZE PADS 4\"X4\") 4\"X4\" PADS; 2 each every 3 days Apply 2 4x4 gauze, each folded, over wound on sacrum every 3 days, covered by Telfa non-adherent dressing, with tape to hold in place  Dispense: 100 each; Refill: 1  - Gauze Pads & Dressings (TELFA NON-ADHERENT DESSING) 2\"X3\" PADS; 1 each every 3 days Apply 2 4x4 gauze, each " "folded, over wound on sacrum every 3 days, covered by Telfa non-adherent dressing, with tape to hold in place  Dispense: 100 each; Refill: 1  - Adhesive Tape (ELASTIC FOAM TAPE 1\"X5YD) TAPE; 1 each every 3 days Apply 2 4x4 gauze, each folded, over wound on sacrum every 3 days, covered by Telfa non-adherent dressing, with tape to hold in place  Dispense: 1 each; Refill: 4      Diagnosis or treatment significantly limited by social determinants of health - Limited income, low health literacy, transportation difficulties    Patient was discussed with attending physician, Dr. Alber Linton MD, who agrees with the assessment and plan.    Ofelia Neri MD, PGY-3  [facility]  12/13/2022      Subjective   Teresa Perez is a 52 year old female who presents for the following health issues     Chief Complaint   Patient presents with     Other     Tailbone abscess drainage and painful     Wound on her tailbone.   Has been there for a week  Now much smaller than it was due to the drainage  Started draining on Thursday night/Friday morning  Drainage white and green at first, now bloody with a little bit of pus    Can't see the wound unless she stands up and looks in the mirror  Just had another wound that finally healed  One dose of bactrim left    Low grade temps at home, highest 100.2  Checking temp every few hours  Started getting higher than normal yesterday    Pain is bad  Not seeming to get better  Burning pain  Other times is a pressure  Only able to sit certain ways  Out of tylenol  Dr Bullock put her on vicodin  Now she is out of vicodin    Other wounds on her abdomen are seeming to improve  Changing dressings every 2 or 3 days    Needing more dressings and gauze and tape      Objective    Vitals:    12/13/22 0937   BP: 119/70   Pulse: 101   Resp: 20   Temp: 98.5  F (36.9  C)   TempSrc: Oral   SpO2: 96%     There is no height or weight on file to calculate BMI.  Physical Exam "   General: alert, appears comfortable, no acute distress  HEENT: atraumatic, conjunctiva clear without erythema, EOM's intact, no nasal discharge, MMM  Neck: supple  Cardiac: normal rate, appears well-perfused  Resp: Breathing comfortably on room air, no increased work of breathing  Skin: Abscess on sacrum as seen below        Neuro: CN's grossly intact  Psych: affect congruent with mood

## 2022-12-13 NOTE — TELEPHONE ENCOUNTER
[facility] phone call message- general phone call:    Reason for call: the Pt called to ask for the Nurse to call her back to see what the Dr is going to do     Return call needed: Yes    OK to leave a message on voice mail? Yes    Primary language: English      needed? No    Call taken on December 13, 2022 at 8:18 AM by Juan Peralta

## 2022-12-13 NOTE — TELEPHONE ENCOUNTER
Pt also requesting for another order to be placed for a manual wheelchair to be sent to [facility]. She said she discussed this w/ Dr. Bullock and was told the order disappeared?    See telephone encounter date 12/13/22 for other concerns discussed below that was addressed.    Bhavani Lacey RN on 12/13/2022 at 4:19 PM

## 2022-12-13 NOTE — TELEPHONE ENCOUNTER
Pt notified [facility] is not taking patients and pt said she is aware. She is requesting an order for wound care to be sent to HH RN that sees her weekly. Pt notified will call  RN to f/geo Anderson per pt to leave a detailed VM on [phone] number.    Bhavani Lacey RN on 12/13/2022 at 4:11 PM

## 2022-12-13 NOTE — TELEPHONE ENCOUNTER
Left message saying waiting for Dr. Bullock to respond back.    Bhavani Lacey RN on 12/13/2022 at 3:12 PM

## 2022-12-14 ENCOUNTER — TELEPHONE (OUTPATIENT)
Dept: FAMILY MEDICINE | Facility: CLINIC | Age: 52
End: 2022-12-14

## 2022-12-14 NOTE — TELEPHONE ENCOUNTER
Messages reviewed.      Here is the situation with the wheelchair.  Patient brought in a number of papers for manual wheelchair that she needed refilled.   She gave them to me, but I can't find them, and I'm not sure if I sent the prescription in or not.      I'm happy to write the orders, but I don't seem to have any of the materials.      Bhavani, would you be able to reach out to [facility] and see if they have the specifications for the size, features and details for her wheelchair and have them fax that info over to us.  I can then make sure I'm sending in the correct orders.      Tyra Bullock MD    Routed to YULISA Beckham.

## 2022-12-14 NOTE — TELEPHONE ENCOUNTER
I am recommending the following wound care orders for this patient:    --Home RN visits 3x weekly for wound care, with additional 1-2 visits per week as needed per RN discretion.    --Continue with regular wound care, cleaning, and dressing changes as outlined previously in note from Dr. Neri 12/13/22.     Tyra Bullock MD    Routed to YULISA Beckham

## 2022-12-14 NOTE — TELEPHONE ENCOUNTER
Per message from Dr. Neri:    Orders for wound care by home nursing and for wound care supplies placed.  Packing was not needed.  We just dressed the wound with two 4 x 4's covered by Telfa nonadherent dressing and taped in place.  Orders and information placed in the 12/13/2022 encounter.     Thanks for all your help on this Bhavani!    This plan sounds good to me too.  As long as the I and D stays open, it shouldn't need packing, but I definitely recommend 3x weekly dressing changes.      I'm happy to review and approve orders as needed too, but glad to have your input and help to day Dr. Neri, since you saw the wound in clinic.     Tyra Bullock MD    Routed to Bhavani MÁRQUEZ and Dr. Neri

## 2022-12-14 NOTE — TELEPHONE ENCOUNTER
Home Care referral for Skilled Nursing faxed to,    12/14/22   HOME CARE REFERRAL  [facility]   Phone: [phone]  Fax: [phone]    Faxed referral, demographics, medication list and last office note. They will contact patient to schedule.    Margarita Lacey

## 2022-12-14 NOTE — TELEPHONE ENCOUNTER
Pt notified wound care order sent this morning. She said the packing came out. Called JUDY Thakkar RN, and she said it's fine if packing out as there are other dressing w/ antibacterial properties. She can't come do it today but is scheudled to get her dressing changed tomorrow.     Routing to provider to send in order for wound care PRN as recommend by JUDY MÁRQUEZ.     Bhavani Lacey RN on 12/14/2022 at 2:28 PM

## 2022-12-14 NOTE — TELEPHONE ENCOUNTER
Thanks Dr. Bullock, the  RN, needs a written order so I can fax to her.    Bhavani Lacey RN on 12/14/2022 at 2:36 PM

## 2022-12-15 ENCOUNTER — TELEPHONE (OUTPATIENT)
Dept: FAMILY MEDICINE | Facility: CLINIC | Age: 52
End: 2022-12-15

## 2022-12-15 NOTE — TELEPHONE ENCOUNTER
Hi Dr. Bullock, talked to [facility] and they have the forms on their website. Printed out forms.     Please sign forms for wheel chair. Unsure which wheelchair provider would like pt to have as there are different documentation requirements for the kind of wheelchair ordered in addition to any accessories as well. There needs to be a face to face office visit w/in the past 6 months. Please amend note if none. Also printed a PT initial eval in July that says what wheelchair she has.     Forms placed on [employer]'s desk.    Bhavani Lacey RN on 12/15/2022 at 1:35 PM
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
## 2022-12-15 NOTE — TELEPHONE ENCOUNTER
Faxed order for from Dr. Bullock for wound care to [facility] at [phone].    Bhavani Lacey RN on 12/15/2022 at 8:13 AM

## 2022-12-16 ENCOUNTER — MEDICAL CORRESPONDENCE (OUTPATIENT)
Dept: HEALTH INFORMATION MANAGEMENT | Facility: CLINIC | Age: 52
End: 2022-12-16

## 2022-12-16 ENCOUNTER — OFFICE VISIT (OUTPATIENT)
Dept: FAMILY MEDICINE | Facility: CLINIC | Age: 52
End: 2022-12-16
Payer: COMMERCIAL

## 2022-12-16 VITALS
TEMPERATURE: 99.8 F | OXYGEN SATURATION: 98 % | DIASTOLIC BLOOD PRESSURE: 88 MMHG | RESPIRATION RATE: 20 BRPM | HEART RATE: 96 BPM | SYSTOLIC BLOOD PRESSURE: 134 MMHG

## 2022-12-16 DIAGNOSIS — Z79.4 TYPE 2 DIABETES MELLITUS WITH COMPLICATION, WITH LONG-TERM CURRENT USE OF INSULIN (H): ICD-10-CM

## 2022-12-16 DIAGNOSIS — F17.210 CIGARETTE NICOTINE DEPENDENCE WITHOUT COMPLICATION: ICD-10-CM

## 2022-12-16 DIAGNOSIS — M25.551 HIP PAIN, RIGHT: ICD-10-CM

## 2022-12-16 DIAGNOSIS — M46.28: ICD-10-CM

## 2022-12-16 DIAGNOSIS — G25.81 RESTLESS LEGS SYNDROME: ICD-10-CM

## 2022-12-16 DIAGNOSIS — E78.5 HYPERLIPIDEMIA LDL GOAL <100: ICD-10-CM

## 2022-12-16 DIAGNOSIS — J45.40 MODERATE PERSISTENT ASTHMA WITHOUT COMPLICATION: ICD-10-CM

## 2022-12-16 DIAGNOSIS — R50.9 FEVER, UNSPECIFIED FEVER CAUSE: ICD-10-CM

## 2022-12-16 DIAGNOSIS — K61.2 ABSCESS OF ANAL AND RECTAL REGIONS: Primary | ICD-10-CM

## 2022-12-16 DIAGNOSIS — G89.29 CHRONIC RIGHT SHOULDER PAIN: ICD-10-CM

## 2022-12-16 DIAGNOSIS — J30.2 SEASONAL ALLERGIC RHINITIS, UNSPECIFIED TRIGGER: ICD-10-CM

## 2022-12-16 DIAGNOSIS — R10.11 RUQ ABDOMINAL PAIN: ICD-10-CM

## 2022-12-16 DIAGNOSIS — M25.511 CHRONIC RIGHT SHOULDER PAIN: ICD-10-CM

## 2022-12-16 DIAGNOSIS — M25.561 ACUTE PAIN OF RIGHT KNEE: ICD-10-CM

## 2022-12-16 DIAGNOSIS — E11.8 TYPE 2 DIABETES MELLITUS WITH COMPLICATION, WITH LONG-TERM CURRENT USE OF INSULIN (H): ICD-10-CM

## 2022-12-16 DIAGNOSIS — U07.1 INFECTION DUE TO 2019 NOVEL CORONAVIRUS: ICD-10-CM

## 2022-12-16 DIAGNOSIS — J44.9 CHRONIC OBSTRUCTIVE PULMONARY DISEASE, UNSPECIFIED COPD TYPE (H): ICD-10-CM

## 2022-12-16 LAB
BASOPHILS # BLD AUTO: 0 10E3/UL (ref 0–0.2)
BASOPHILS NFR BLD AUTO: 0 %
EOSINOPHIL # BLD AUTO: 0.1 10E3/UL (ref 0–0.7)
EOSINOPHIL NFR BLD AUTO: 1 %
ERYTHROCYTE [DISTWIDTH] IN BLOOD BY AUTOMATED COUNT: 13.7 % (ref 10–15)
HCT VFR BLD AUTO: 41.8 % (ref 35–47)
HGB BLD-MCNC: 14 G/DL (ref 11.7–15.7)
IMM GRANULOCYTES # BLD: 0.2 10E3/UL
IMM GRANULOCYTES NFR BLD: 1 %
LYMPHOCYTES # BLD AUTO: 3.1 10E3/UL (ref 0.8–5.3)
LYMPHOCYTES NFR BLD AUTO: 25 %
MCH RBC QN AUTO: 29 PG (ref 26.5–33)
MCHC RBC AUTO-ENTMCNC: 33.5 G/DL (ref 31.5–36.5)
MCV RBC AUTO: 87 FL (ref 78–100)
MONOCYTES # BLD AUTO: 0.9 10E3/UL (ref 0–1.3)
MONOCYTES NFR BLD AUTO: 7 %
NEUTROPHILS # BLD AUTO: 8.3 10E3/UL (ref 1.6–8.3)
NEUTROPHILS NFR BLD AUTO: 66 %
PLATELET # BLD AUTO: 375 10E3/UL (ref 150–450)
RBC # BLD AUTO: 4.83 10E6/UL (ref 3.8–5.2)
WBC # BLD AUTO: 12.6 10E3/UL (ref 4–11)

## 2022-12-16 PROCEDURE — 99215 OFFICE O/P EST HI 40 MIN: CPT | Performed by: STUDENT IN AN ORGANIZED HEALTH CARE EDUCATION/TRAINING PROGRAM

## 2022-12-16 PROCEDURE — 85025 COMPLETE CBC W/AUTO DIFF WBC: CPT | Performed by: STUDENT IN AN ORGANIZED HEALTH CARE EDUCATION/TRAINING PROGRAM

## 2022-12-16 PROCEDURE — 36415 COLL VENOUS BLD VENIPUNCTURE: CPT | Performed by: STUDENT IN AN ORGANIZED HEALTH CARE EDUCATION/TRAINING PROGRAM

## 2022-12-16 RX ORDER — GUAIFENESIN/DEXTROMETHORPHAN 100-10MG/5
10 SYRUP ORAL EVERY 4 HOURS PRN
Qty: 354 ML | Refills: 0 | Status: SHIPPED | OUTPATIENT
Start: 2022-12-16 | End: 2022-12-23

## 2022-12-16 RX ORDER — BUDESONIDE AND FORMOTEROL FUMARATE DIHYDRATE 160; 4.5 UG/1; UG/1
2 AEROSOL RESPIRATORY (INHALATION) 2 TIMES DAILY
Qty: 10.2 G | Refills: 1 | Status: SHIPPED | OUTPATIENT
Start: 2022-12-16 | End: 2023-05-25

## 2022-12-16 RX ORDER — MELOXICAM 15 MG/1
15 TABLET ORAL DAILY
Qty: 30 TABLET | Refills: 1 | Status: SHIPPED | OUTPATIENT
Start: 2022-12-16 | End: 2023-01-11

## 2022-12-16 RX ORDER — PRAMIPEXOLE DIHYDROCHLORIDE 0.75 MG/1
0.75 TABLET ORAL AT BEDTIME
Qty: 90 TABLET | Refills: 1 | Status: SHIPPED | OUTPATIENT
Start: 2022-12-16 | End: 2023-04-20

## 2022-12-16 RX ORDER — PRAVASTATIN SODIUM 80 MG/1
80 TABLET ORAL DAILY
Qty: 90 TABLET | Refills: 3 | Status: SHIPPED | OUTPATIENT
Start: 2022-12-16 | End: 2023-01-11

## 2022-12-16 RX ORDER — LIDOCAINE 50 MG/G
1 PATCH TOPICAL EVERY 24 HOURS
Qty: 15 PATCH | Refills: 3 | Status: SHIPPED | OUTPATIENT
Start: 2022-12-16 | End: 2023-06-09

## 2022-12-16 RX ORDER — NICOTINE 21 MG/24HR
1 PATCH, TRANSDERMAL 24 HOURS TRANSDERMAL EVERY 24 HOURS
Qty: 30 PATCH | Refills: 1 | Status: SHIPPED | OUTPATIENT
Start: 2022-12-16 | End: 2023-10-09

## 2022-12-16 RX ORDER — ALBUTEROL SULFATE 90 UG/1
1-2 AEROSOL, METERED RESPIRATORY (INHALATION) EVERY 4 HOURS PRN
Qty: 18 G | Refills: 1 | Status: SHIPPED | OUTPATIENT
Start: 2022-12-16 | End: 2023-05-11

## 2022-12-16 RX ORDER — HYDROCODONE BITARTRATE AND ACETAMINOPHEN 5; 325 MG/1; MG/1
1 TABLET ORAL EVERY 8 HOURS PRN
Qty: 18 TABLET | Refills: 0 | Status: SHIPPED | OUTPATIENT
Start: 2022-12-16 | End: 2022-12-23

## 2022-12-16 RX ORDER — PANTOPRAZOLE SODIUM 40 MG/1
40 TABLET, DELAYED RELEASE ORAL DAILY
Qty: 90 TABLET | Refills: 2 | Status: SHIPPED | OUTPATIENT
Start: 2022-12-16 | End: 2023-01-11

## 2022-12-16 NOTE — PATIENT INSTRUCTIONS
Stop in lab to check infection labs  Xray to check R hip and R knee.     Continue antibiotics until gone  Continue home care nurse for dressing changes 3x per week and as needed (silver stuff is really helping!)  If not somewhat better by Monday, please call clinic and we can send in additional antibiotics for a couple more days.      Refill medications  Nicotine patches.   Meloxicam for pain  Vicodin for pain  Call if fevers are getting worse.   Keep taking tylenol.  Drink lots of fluids.

## 2022-12-16 NOTE — LETTER
December 20, 2022      Teresa Perez  [address]  [place] [address]        Dear ,    We are writing to inform you of your test results.    Here is a copy of your lab results.  As we talked over the phone, your white count is higher than we would like.  We will keep watching this.  I'm glad your nurse is coming back out to do dressing changes and wound care.  Please call the clinic at [phone] if you have any questions.        Resulted Orders   CBC with platelets and differential   Result Value Ref Range    WBC Count 12.6 (H) 4.0 - 11.0 10e3/uL    RBC Count 4.83 3.80 - 5.20 10e6/uL    Hemoglobin 14.0 11.7 - 15.7 g/dL    Hematocrit 41.8 35.0 - 47.0 %    MCV 87 78 - 100 fL    MCH 29.0 26.5 - 33.0 pg    MCHC 33.5 31.5 - 36.5 g/dL    RDW 13.7 10.0 - 15.0 %    Platelet Count 375 150 - 450 10e3/uL    % Neutrophils 66 %    % Lymphocytes 25 %    % Monocytes 7 %    % Eosinophils 1 %    % Basophils 0 %    % Immature Granulocytes 1 %    Absolute Neutrophils 8.3 1.6 - 8.3 10e3/uL    Absolute Lymphocytes 3.1 0.8 - 5.3 10e3/uL    Absolute Monocytes 0.9 0.0 - 1.3 10e3/uL    Absolute Eosinophils 0.1 0.0 - 0.7 10e3/uL    Absolute Basophils 0.0 0.0 - 0.2 10e3/uL    Absolute Immature Granulocytes 0.2 <=0.4 10e3/uL       If you have any questions or concerns, please call the clinic at the number listed above.       Sincerely,      Tyra Bullock MD

## 2022-12-16 NOTE — PROGRESS NOTES
There are no exam notes on file for this visit.    ASSESSMENT AND PLAN:      Teresa was seen today for wound care follow-up visit.  She had an I&D done rectal abscess earlier this week.  Continues to have difficulty with multiple skin wounds.  Unable to obtain Hibiclens because it appears that its not covered by her insurance.  She has been on a course of Bactrim, course of Clindamycin, and now a additional course of Bactrim, and continues to develop new lesions.  He is also now been having more fevers and chills, although she looks nontoxic on exam, and has only an elevated temp in clinic.  White blood count is elevated.  -- We will continue with the Bactrim for the next 3 days until it is done.  -- Discussed with Paty that she may have an extending abscess or that the antibiotics will not cover.  Given her allergies, we do not really have other options, so would recommend she go to ER for CT and consideration of IV antibiotics if symptoms worsen.  She voices understanding.   -- I will plan to check in with her by phone on Monday to see if things are improving and she has an appointment with me in person on Friday.  Wound care will continue to come out 3 days/week.    Diagnoses and all orders for this visit:    Abscess of anal and rectal regions  Abscess, sacrum (H)  Fever, unspecified fever cause  Continue Bactrim, Vicodin for pain, will resend the Hibiclens with different instructions so hopefully this week covered by the pharmacy.  -     CBC with Platelets & Differential; Future  -     HYDROcodone-acetaminophen (NORCO) 5-325 MG tablet; Take 1 tablet by mouth every 8 hours as needed for pain  -  Bactrium, continue BID for next 3 days.    -     chlorhexidine (HIBICLENS) 4 % liquid; Apply topically daily    Type 2 diabetes mellitus with complication, with long-term current use of insulin (H)  -Blood sugars remain high due to infection.  Did not adjust her insulin today she has had difficulties with lows in the  past.  We will continue to monitor.  Encourage good fluid intake.    RUQ abdominal pain  Chronic abdominal pain with a large abdominal hernia.  She is due for refills on her pantoprazole.  Feels like a lidocaine patch would help and I feel like it is worth giving this a try.  We will continue to work on treating cough, cutting down on smoking, and using her inhalers regularly to help with symptoms.  -     pantoprazole (PROTONIX) 40 MG EC tablet; Take 1 tablet (40 mg) by mouth daily  -     lidocaine (LIDODERM) 5 % patch; Place 1 patch onto the skin every 24 hours To prevent lidocaine toxicity, patient should be patch free for 12 hrs daily.    Hyperlipidemia LDL goal <100  Requesting refill on provided today.  -     pravastatin (PRAVACHOL) 80 MG tablet; Take 1 tablet (80 mg) by mouth daily    Chronic obstructive pulmonary disease, unspecified COPD type (H)  Moderate persistent asthma without complication  I do not see signs of infection on lung exam, but she is having an expiratory wheeze.  Wanting to quit smoking so we will start some nicotine patches, use Robitussin-DM for cough, and refilled her Symbicort and albuterol for her underlying asthma/COPD.  O2 sats are good.  -     budesonide-formoterol (SYMBICORT) 160-4.5 MCG/ACT Inhaler; Inhale 2 puffs into the lungs 2 times daily  -     albuterol (PROAIR HFA/PROVENTIL HFA/VENTOLIN HFA) 108 (90 Base) MCG/ACT inhaler; Inhale 1-2 puffs into the lungs every 4 hours as needed for shortness of breath or wheezing  -     nicotine (NICODERM CQ) 14 MG/24HR 24 hr patch; Place 1 patch onto the skin every 24 hours  -     guaiFENesin-dextromethorphan (ROBITUSSIN DM) 100-10 MG/5ML syrup; Take 10 mLs by mouth every 4 hours as needed for cough    Restless legs syndrome  She was requesting a refill and increase of dose on this and this was provided.  -     pramipexole (MIRAPEX) 0.75 MG tablet; Take 1 tablet (0.75 mg) by mouth At Bedtime    Chronic right shoulder pain  Continued right  shoulder and now right hip and knee pain.  Will try meloxicam and see if this helps.  She is on a PPI.  Can use lidocaine patches in this area as well.  We are continuing the evaluation for medical cannabis, but we have more pressing concerns today.  -     meloxicam (MOBIC) 15 MG tablet; Take 1 tablet (15 mg) by mouth daily  -     lidocaine (LIDODERM) 5 % patch; Place 1 patch onto the skin every 24 hours To prevent lidocaine toxicity, patient should be patch free for 12 hrs daily.    Cigarette nicotine dependence without complication  Discussed smoking cessation and patches prescribed today.  -     nicotine (NICODERM CQ) 14 MG/24HR 24 hr patch; Place 1 patch onto the skin every 24 hours    Acute pain of right knee  Hip pain, right  She left before completing x-rays so we will do them next visit.  -     XR Knee Right 3 Views; Future  -     XR Hip Right 2-3 Views; Future      I will check in with her by phone on Monday and we will do an in person visit on Friday with continued wound care on a regular schedule at home.    Additionally, patient is here for evaluation for manual wheelchair.  She has a history of an amputation on the left side.  Uses an electric wheelchair, but this is challenging for mobility in certain spaces, and she is had difficulty with pressure ulcers in the past.  She is requiring a lightweight heavy-duty manual wheelchair that is low to the ground to allow her to complete her activities of daily living.  Please see documentation in history of present illness for further information.    Patient Instructions   Stop in lab to check infection labs  Xray to check R hip and R knee.     Continue antibiotics until gone  Continue home care nurse for dressing changes 3x per week and as needed (silver stuff is really helping!)  If not somewhat better by Monday, please call clinic and we can send in additional antibiotics for a couple more days.      Refill medications  Nicotine patches.   Meloxicam for  pain  Vicodin for pain  Call if fevers are getting worse.   Keep taking tylenol.  Drink lots of fluids.       Tyra Bullock MD    SUBJECTIVE  Teresa Bridgese is a 52 year old female with past medical history significant for    Patient Active Problem List   Diagnosis     Health Care Home     Acute peptic ulcer     Other allergy, other than to medicinal agents     Bulging lumbar disc     Cervical dysplasia     Common migraine without aura     Constipation     Dwarfism     Familial hypercholesterolemia     Insomnia     Low back pain     Intermittent asthma     Leg pain, bilateral     Smoking     Degeneration of thoracic or thoracolumbar intervertebral disc     Type 2 diabetes mellitus with other skin ulcer, with long-term current use of insulin (H)     LOMAS (nonalcoholic steatohepatitis)     Disease of lung     Hemorrhoids     Parotid mass     Endometriosis     NNAMDI (obstructive sleep apnea)     History of total right knee replacement     Lateral epicondylitis     Impingement syndrome of shoulder region, left     Hx of total knee replacement, left     Chronic pain syndrome     Cough     Borderline personality disorder (H)     Moderate recurrent major depression (H)     Recurrent ventral hernia     Type 2 diabetes mellitus with complication, with long-term current use of insulin (H)     Essential hypertension     Nonruptured cerebral aneurysm     Cerebrovascular accident (CVA) due to embolism (H)     Amputation of leg (H)     Pre-ulcerative corn or callous     Excessive bleeding in premenopausal period     Morbid obesity (H)     Pseudoseizure     Chronic obstructive pulmonary disease (H)     Recurrent incisional hernia     Buttock wound, left, initial encounter     Cellulitis, unspecified cellulitis site     Sepsis, due to unspecified organism, unspecified whether acute organ dysfunction present (H)     Others present at the visit:  None    Presents for   Chief Complaint   Patient presents with     Other     Cpm  and pain in the butt     Patient presents today for reevaluation of multiple wounds.  Earlier this week she had an incision and drainage completed on her right buttocks.  She continues to endorse pain in that area.  Not really feeling any better.  She is getting regular wound care at home and they are packing it.  They are usingderamginate,, calcium alginate, antibacterial silver packing, and doing a wound dressing, and this has been helping.  She shares that the wound on her lower abdomen is nearly healed, the ones on her upper abdomen are starting to look better, but the one on her bottom is really bothering her.  She is also noticing some fevers and chills.  Woke up with sweats last night.  Continues to have drainage especially from the wound on her backside.    She is also noticing increased pain in her right hip and knee.  She fell off the table at her visit last week and landed on these areas.  She is wondering if she could get an x-ray today to make sure things look okay.  She is also having increased pain in her shoulders.  Had to use her arm to catch her when moving around at home, and this is caused increased pain as well.    She continues to have a cough, and would like a refill on her cough medicine.  Breathing overall is stable.  She would like some nicotine patches because the cough has become more painful and or bothersome.  She is currently smoking about 10 cigarettes/day.  Has found the brand-name patches work better for her and is requesting these.  She is requesting a refill on her albuterol inhaler as well as her Symbicort.  Finds that the Robitussin cough syrup works better than the Tessalon Perles for cough for her.    She is also noticing some continued difficulty with pain in her belly.  Feels like her hernia feels different.  Stools have been overall stable, she is having some looser stools but the Imodium has been helping.  She is also been taking some occasional stool softeners because she  has been using the Vicodin for her rectal pain.  No dysuria.  No increased sputum production with cough.  She is having pain now on her left buttocks as well as the right and this area is tender.    Sugars in general in the last few weeks have been high, in the low 300s to high 200s.  She shares that this last few days things have been better.  She had decreased appetite, and sugars have been in the low 200s to high 100s.  Continues to take her insulin regularly.  Has not had lows in the last week.    She and I reviewed papers for her wheelchair today.  She shares that she needs a manual, low to the ground, light weight wheelchair in order to be able to get around at home.  Has history of bilateral shoulder pain with recommendation from Ortho for future shoulder surgery, and at this time she is unable to push a standard wheelchair.  She has history of falling out of her wheelchair in the past and therefore requires a seatbelt.  She needs pads for the back and base as she has had history of pressure ulcers in the past.   Her ambulatory needs cannot be resolved with use of a walker or a cane.  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  Is unable to walk with a prosthetic.  Right now she is having difficulty sitting in her scooter because of the wounds on her bottom.  Has had a history of slow to heal wounds on her bottom after her last stay in rehab.  She also has a an electric scooter but is not able to sit in this for long periods of time due to chronic back pain.  Please see physical therapy evaluation from 7- for further information.      OBJECTIVE:  Vitals: /88   Pulse 96   Temp 99.8  F (37.7  C) (Oral)   Resp 20   SpO2 98%   BMI= There is no height or weight on file to calculate BMI.  Objective:    Vitals:  Vitals are reviewed and are within the normal range.  O2 sats are stable  Gen:  Alert, pleasant, appears pale and fatigued but in no acute distress  Cardiac:  Regular rate and  rhythm, no murmurs, rubs or gallops  Respiratory: Airflow to lungs are somewhat diminished.  She has occasional end expiratory wheeze but no crackles or rhonchi.  Abdomen: Belly is soft she does have tenderness large palpable hernia that is easily reducible.  Skin: She has 2 wounds on her upper abdomen that are about half a centimeter in depth, the medial one is 4 cm across and the lateral 1 is 3.  They show improved granulation tissue and healing, with some improvement in the depth of the wound.  The 1 on her lower abdomen is doing well and is now about dime sized and much improved.  The wound on her bottom is draining significant puslike discharge.  The tissue inside is red, with appropriate granulation tissue.  Dressings were changed, and the area was repacked.  There is also some extending erythema to the opposite left buttocks, with a small area of fluctuance just distal.  Mild erythema, but the tissue looks more affected by moisture than necessarily an infectious/cellulitis picture.  Dressing was changed and new dressings applied.  Extremities: She has a above-the-knee amputation on the left side.  Right side has some pain with palpation over the knee and hip.  She is able to pivot to transfer, and can stand on the leg, but is not able to ambulate due to inability to use a prosthetic on the other side right now.    Results for orders placed or performed in visit on 12/16/22   CBC with platelets and differential     Status: Abnormal   Result Value Ref Range    WBC Count 12.6 (H) 4.0 - 11.0 10e3/uL    RBC Count 4.83 3.80 - 5.20 10e6/uL    Hemoglobin 14.0 11.7 - 15.7 g/dL    Hematocrit 41.8 35.0 - 47.0 %    MCV 87 78 - 100 fL    MCH 29.0 26.5 - 33.0 pg    MCHC 33.5 31.5 - 36.5 g/dL    RDW 13.7 10.0 - 15.0 %    Platelet Count 375 150 - 450 10e3/uL    % Neutrophils 66 %    % Lymphocytes 25 %    % Monocytes 7 %    % Eosinophils 1 %    % Basophils 0 %    % Immature Granulocytes 1 %    Absolute Neutrophils 8.3 1.6 -  8.3 10e3/uL    Absolute Lymphocytes 3.1 0.8 - 5.3 10e3/uL    Absolute Monocytes 0.9 0.0 - 1.3 10e3/uL    Absolute Eosinophils 0.1 0.0 - 0.7 10e3/uL    Absolute Basophils 0.0 0.0 - 0.2 10e3/uL    Absolute Immature Granulocytes 0.2 <=0.4 10e3/uL   CBC with Platelets & Differential     Status: Abnormal    Narrative    The following orders were created for panel order CBC with Platelets & Differential.  Procedure                               Abnormality         Status                     ---------                               -----------         ------                     CBC with platelets and d...[[id number]]  Abnormal            Final result                 Please view results for these tests on the individual orders.           Patient Instructions   Stop in lab to check infection labs  Xray to check R hip and R knee.     Continue antibiotics until gone  Continue home care nurse for dressing changes 3x per week and as needed (silver stuff is really helping!)  If not somewhat better by Monday, please call clinic and we can send in additional antibiotics for a couple more days.      Refill medications  Nicotine patches.   Meloxicam for pain  Vicodin for pain  Call if fevers are getting worse.   Keep taking tylenol.  Drink lots of fluids.       Tyra Bullock MD

## 2022-12-19 ENCOUNTER — TELEPHONE (OUTPATIENT)
Dept: FAMILY MEDICINE | Facility: CLINIC | Age: 52
End: 2022-12-19

## 2022-12-19 DIAGNOSIS — M46.28: ICD-10-CM

## 2022-12-19 NOTE — TELEPHONE ENCOUNTER
Faxed signed form w/ PT evaluation on 7/20/22 and office visit note date 12/16/22 to [facility] on 12/19/22 to [phone]. Sent wheelchair order form in to be scanned into EMR.    Bhavani Lacey RN on 12/19/2022 at 4:02 PM

## 2022-12-19 NOTE — TELEPHONE ENCOUNTER
Outreach call placed to patient to check on status of symptoms with her multiple wounds/abscesses.      Things are maybe a little better than Friday.  The previous access on her right bottux itches, and no longer needs packing.  It is no longer as painful for her.  The other (right) side on the backside looked okay per the report from the nurse today, so does not look like it is developing into another abscess.  She has still not been able to  the Hibiclens, but has been able to get her other medications.  She is just finishing up the course of Bactrim today.      She wants to make sure we are not sending prescriptions to [facility].  Does not work well for her.  IF scripts go there, she is not able to  her medications here, and has run out of a couple of them.     Tyra Bullock MD

## 2022-12-19 NOTE — RESULT ENCOUNTER NOTE
Teresa Perez-    Here is a copy of your lab results.  As we talked over the phone, your white count is higher than we would like.  We will keep watching this.  I'm glad your nurse is coming back out to do dressing changes and wound care.  Please call the clinic at [phone] if you have any questions.      Tyra Bullock MD    Please send results to patient.

## 2022-12-23 ENCOUNTER — VIRTUAL VISIT (OUTPATIENT)
Dept: FAMILY MEDICINE | Facility: CLINIC | Age: 52
End: 2022-12-23
Payer: COMMERCIAL

## 2022-12-23 ENCOUNTER — TELEPHONE (OUTPATIENT)
Dept: FAMILY MEDICINE | Facility: CLINIC | Age: 52
End: 2022-12-23

## 2022-12-23 DIAGNOSIS — M46.28: Primary | ICD-10-CM

## 2022-12-23 DIAGNOSIS — E11.65 TYPE 2 DIABETES MELLITUS WITH HYPERGLYCEMIA, WITH LONG-TERM CURRENT USE OF INSULIN (H): ICD-10-CM

## 2022-12-23 DIAGNOSIS — U07.1 INFECTION DUE TO 2019 NOVEL CORONAVIRUS: ICD-10-CM

## 2022-12-23 DIAGNOSIS — R05.1 ACUTE COUGH: ICD-10-CM

## 2022-12-23 DIAGNOSIS — Z79.4 TYPE 2 DIABETES MELLITUS WITH HYPERGLYCEMIA, WITH LONG-TERM CURRENT USE OF INSULIN (H): ICD-10-CM

## 2022-12-23 PROCEDURE — 99214 OFFICE O/P EST MOD 30 MIN: CPT | Mod: 95 | Performed by: STUDENT IN AN ORGANIZED HEALTH CARE EDUCATION/TRAINING PROGRAM

## 2022-12-23 RX ORDER — HYDROCODONE BITARTRATE AND ACETAMINOPHEN 5; 325 MG/1; MG/1
1 TABLET ORAL EVERY 8 HOURS PRN
Qty: 18 TABLET | Refills: 0 | Status: SHIPPED | OUTPATIENT
Start: 2022-12-23 | End: 2022-12-30

## 2022-12-23 RX ORDER — GUAIFENESIN/DEXTROMETHORPHAN 100-10MG/5
10 SYRUP ORAL EVERY 4 HOURS PRN
Qty: 354 ML | Refills: 2 | Status: SHIPPED | OUTPATIENT
Start: 2022-12-23 | End: 2023-03-31

## 2022-12-23 RX ORDER — INSULIN LISPRO 100 [IU]/ML
INJECTION, SOLUTION INTRAVENOUS; SUBCUTANEOUS
Qty: 75 ML | Refills: 1 | Status: SHIPPED | OUTPATIENT
Start: 2022-12-23 | End: 2023-08-13

## 2022-12-23 NOTE — CONFIDENTIAL NOTE
Patient scheduled for a phone visit to discuss issues    Mary Barrera RN on 12/23/2022 at 3:24 PM

## 2022-12-23 NOTE — PROGRESS NOTES
Teresa is a 52 year old who is being evaluated via a billable telephone visit.      What phone number would you like to be contacted at? [phone]  How would you like to obtain your AVS? Mail a copy  Distant Location (provider location):  On-site    Assessment & Plan     Abscess, sacrum (H)  Seen today via telephone visit for follow-up of her sacral abscess.  Fortunately we are unable to  visualize this today.  She endorses worsening pain, but per report from home nurse the wound is healing appropriately.  She still having low-grade temps, but no actual fevers, and drainage has not been improving.  Nurse is coming out in the next 1 to 2 days.  We will get report back about how this goes.  She has follow-up scheduled with me on Friday, December 30.  We stopped antibiotics this past Monday and does not seem like things are much worse.  We will not restart antibiotics today.  Did refill her pain medications.  Is having some diarrhea which may be contributing as well.  - HYDROcodone-acetaminophen (NORCO) 5-325 MG tablet  Dispense: 18 tablet; Refill: 0    Type 2 diabetes mellitus with hyperglycemia, with long-term current use of insulin (H)  Blood sugars have been high.  I am worried this is getting the way of wound healing.  We will increase her mealtime insulin from 26-28 at baseline with additional sliding scale and continue with the 65 units twice daily of the long-acting insulin.  - insulin lispro (HUMALOG KWIKPEN) 100 UNIT/ML (1 unit dial) KWIKPEN  Dispense: 75 mL; Refill: 1    Acute cough  Infection due to 2019 novel coronavirus  She is working on cutting back on her smoking.  Has had a number of stressors which is not helping.  Requesting refill of cough medicine and this was provided today.  Additionally she will continue use nicotine patches and work on cutting back.  - guaiFENesin-dextromethorphan (ROBITUSSIN DM) 100-10 MG/5ML syrup  Dispense: 354 mL; Refill: 2       Nicotine/Tobacco Cessation:  She reports  that she has been smoking cigarettes. She has a 16.50 pack-year smoking history. She has never used smokeless tobacco.  Nicotine/Tobacco Cessation Plan:   Nicotine patches    Patient has home nurse coming out today or tomorrow to look at her wounds and do dressing changes.  They will call if symptoms are worse or she is having worsening of that area.    She has a follow-up visit already scheduled with me on December 30.  We will recheck labs, do dressing changes, and reconsider if antibiotics are needed at that time.      Tyra Bullock MD  [facility]    Bryson Moore is a 52 year old, presenting for the following health issues:  Other (Follow-up wound)      HPI     Low grade fevers and pain.  Fevers have been the same, but will feel like she has a fever.  Usually around 99.4, some up to 100.2, but no high.  Feeling some chills.      Now having diarrhea, since last night.  6 episodes of diarrhea, stool is soft, not running.      Just got [facility] today for the cholesterol medication and the stomach medication, so will have those.      Has been coughing more - not sure hwy.  They did not fill her cough syrup last time.  Would like a refill.  Her ears hurt as well.  Still trying to quit smoking - she went down a couple of     Ex-best friend came down, and she had to call the police.  They came out, and this was helpful.  Talking with her [employer] worker about it, and is considering a restraining order, and connected with her landlord as well.     Bottom is more painful again - The side we opened up is more painful.  Not really having any drainage.  Nurse came out Wednesday and said it was looking good.  There is a small amount of drainage, but less.  It is still getting packed with the silver.  Teresa has been trying to pack it herself, and this is challenging.  The other side is not bothering her.  It is painful with BMs.      The wounds on the front are getting better - the  left one is hurting, but the right one is getting better.  The one below her belly is almost healed.      Sugars have been fluctuating a d little high, between 250-300.  Has not been having any lows.  We will increase her mealtime insulin from 26 plus sliding scale to 28 plus sliding scale.      Working on housing, wheelchair, working with [employer] - she is getting them her insurance information to move forward with that.  Has a PCA - started a couple of weeks ago.          Objective           Vitals:  No vitals were obtained today due to virtual visit.    Physical Exam   Gen:  healthy, alert and no distress.  Occasional cough.    PSYCH: Alert and oriented times 3; coherent speech, normal   rate and volume, able to articulate logical thoughts, able   to abstract reason, no tangential thoughts, no hallucinations   or delusions  Her affect is normal, pleasant and appropriate  RESP: Occasional cough, no audible wheezing, able to talk in full sentences  Remainder of exam unable to be completed due to telephone visits    Phone call duration: 16 minutes

## 2022-12-23 NOTE — TELEPHONE ENCOUNTER
[facility] phone call message- general phone call:    Reason for call: the Pt called to ask for the Dr to call her     Return call needed: Yes    OK to leave a message on voice mail? Yes    Primary language: English      needed? No    Call taken on December 23, 2022 at 8:10 AM by Juan Peralta

## 2022-12-30 ENCOUNTER — DOCUMENTATION ONLY (OUTPATIENT)
Dept: FAMILY MEDICINE | Facility: CLINIC | Age: 52
End: 2022-12-30

## 2022-12-30 ENCOUNTER — MEDICAL CORRESPONDENCE (OUTPATIENT)
Dept: HEALTH INFORMATION MANAGEMENT | Facility: CLINIC | Age: 52
End: 2022-12-30

## 2022-12-30 ENCOUNTER — VIRTUAL VISIT (OUTPATIENT)
Dept: FAMILY MEDICINE | Facility: CLINIC | Age: 52
End: 2022-12-30
Payer: COMMERCIAL

## 2022-12-30 DIAGNOSIS — M46.28: ICD-10-CM

## 2022-12-30 DIAGNOSIS — R05.1 ACUTE COUGH: Primary | ICD-10-CM

## 2022-12-30 DIAGNOSIS — Z51.89 ENCOUNTER FOR WOUND CARE: ICD-10-CM

## 2022-12-30 DIAGNOSIS — M54.50 CHRONIC BILATERAL LOW BACK PAIN, UNSPECIFIED WHETHER SCIATICA PRESENT: ICD-10-CM

## 2022-12-30 DIAGNOSIS — G89.29 CHRONIC BILATERAL LOW BACK PAIN, UNSPECIFIED WHETHER SCIATICA PRESENT: ICD-10-CM

## 2022-12-30 DIAGNOSIS — E11.65 TYPE 2 DIABETES MELLITUS WITH HYPERGLYCEMIA, WITH LONG-TERM CURRENT USE OF INSULIN (H): ICD-10-CM

## 2022-12-30 DIAGNOSIS — Z79.4 TYPE 2 DIABETES MELLITUS WITH HYPERGLYCEMIA, WITH LONG-TERM CURRENT USE OF INSULIN (H): ICD-10-CM

## 2022-12-30 DIAGNOSIS — K61.2 ABSCESS OF ANAL AND RECTAL REGIONS: ICD-10-CM

## 2022-12-30 PROCEDURE — 99214 OFFICE O/P EST MOD 30 MIN: CPT | Mod: TEL | Performed by: STUDENT IN AN ORGANIZED HEALTH CARE EDUCATION/TRAINING PROGRAM

## 2022-12-30 RX ORDER — GABAPENTIN 600 MG/1
600 TABLET ORAL 3 TIMES DAILY
Qty: 90 TABLET | Refills: 3 | Status: SHIPPED | OUTPATIENT
Start: 2022-12-30 | End: 2023-08-13

## 2022-12-30 RX ORDER — HYDROCODONE BITARTRATE AND ACETAMINOPHEN 5; 325 MG/1; MG/1
1 TABLET ORAL EVERY 8 HOURS PRN
Qty: 8 TABLET | Refills: 0 | Status: SHIPPED | OUTPATIENT
Start: 2022-12-30 | End: 2023-03-31

## 2022-12-30 NOTE — PROGRESS NOTES
To be completed in Nursing note:    Please reference list for forms that require a visit for completion.  Please remind patients that providers are given 3-5 business days to complete and return forms.      Form type:  [employer] (Rep[employer] to [employer] Device)    Date form received: 2022    Date form completed by Physician: 2022    How was form returned to patient (mailed, faxed, or at  for patient to ):  Faxed to     Date form mailed/faxed/left at  for patient and sent to HIM for [email or web address]      Once form is left for patient, faxed, or mailed PCS will then close the documentation only encounter.

## 2022-12-30 NOTE — PROGRESS NOTES
Teresa is a 52 year old who is being evaluated via a billable telephone visit.      What phone number would you like to be contacted at? [phone]  How would you like to obtain your AVS? Mail a copy    Distant Location (provider location):  On-site    Assessment & Plan     Abscess, sacrum (H)  Abscess of anal and rectal regions  Encounter for wound care  Per patient report wounds are making good progress.  Still has regular wound care visit scheduled next week.  I will give her 1 more week of pain medications, we discussed that these will be done after that time.  No evidence of acute infection.  She is done with antibiotics.  Will discuss with the wound nurse about long-term prophylactic rinses or other options to prevent further wound issues.  She does have a PCA support  - HYDROcodone-acetaminophen (NORCO) 5-325 MG tablet  Dispense: 8 tablet; Refill: 0    Acute cough  She describes an acute cough, but not concerning for hypoxia.  Unfortunately over-the-counter products are not covered and she cannot afford them.  She will come in if symptoms are worsening.    Type 2 diabetes mellitus with hyperglycemia, with long-term current use of insulin (H)  Endorses some elevated sugars.  No hypoglycemia with the increase in mealtime insulin.  Continue with same dose.    Chronic bilateral low back pain, unspecified whether sciatica present  Requesting refill on gabapentin.  We discussed dosing changes made last time and how to use the medication safely.  - gabapentin (NEURONTIN) 600 MG tablet  Dispense: 90 tablet; Refill: 3    She will follow-up in 2 weeks.  Again discussed that there is no indication for further narcotic pain medications for these wounds as they are healing well.  Should not be given narcotics from our clinic for this unless there are new acute lesions.    Tyra Bullock MD  [facility]   Teresa is a 52 year old, presenting for the following health issues:  Other  (Follow-up from last visit)      HPI     Patient seen today for telephone visit to review her progress on multiple abdominal and buttock wounds.  Continues to have 3 times weekly wound care visits at home, and notes that her wounds are improving.  Is still having some pain with the healing process however.  No new areas of irritation.  And symptoms of systemic infection including fevers and chills are stable, but this is complicated by the fact that she has new respiratory symptoms.  Think she is developed some type of virus or cold.    Wounds on the stomach are slowly healing.  They are painful.  The 1 on the lower portion is nearly gone.    The one on the buttox is healing, nearly to the surface.  Still has some mild discharge from the area.  No trouble on the other side.     She has had the chills and sweats, but also coming down with a cold - runny nose, ear pain.    Needs to get a refill on gabapentin.  Need a refill on the Vicodin.      Blood sugars have been okay.  A couple of highs, no lows.      Needs a new prescription for the manual wheel to [facility] in [place] - [phone].      Needs wheelchair and new seat pad.          Objective           Vitals:  No vitals were obtained today due to virtual visit.    Physical Exam   healthy, alert and no distress  PSYCH: Alert and oriented times 3; coherent speech, normal   rate and volume, able to articulate logical thoughts, able   to abstract reason, no tangential thoughts, no hallucinations   or delusions  Her affect is normal and pleasant  RESP: No cough, no audible wheezing, able to talk in full sentences  Remainder of exam unable to be completed due to telephone visits      Phone call duration: 13 minutes

## 2023-01-11 ENCOUNTER — OFFICE VISIT (OUTPATIENT)
Dept: FAMILY MEDICINE | Facility: CLINIC | Age: 53
End: 2023-01-11
Payer: COMMERCIAL

## 2023-01-11 VITALS
OXYGEN SATURATION: 96 % | RESPIRATION RATE: 12 BRPM | TEMPERATURE: 98.3 F | SYSTOLIC BLOOD PRESSURE: 120 MMHG | DIASTOLIC BLOOD PRESSURE: 77 MMHG | HEART RATE: 87 BPM

## 2023-01-11 DIAGNOSIS — Z71.6 ENCOUNTER FOR TOBACCO USE CESSATION COUNSELING: ICD-10-CM

## 2023-01-11 DIAGNOSIS — E11.65 TYPE 2 DIABETES MELLITUS WITH HYPERGLYCEMIA, WITH LONG-TERM CURRENT USE OF INSULIN (H): ICD-10-CM

## 2023-01-11 DIAGNOSIS — S31.109D CHRONIC WOUND INFECTION OF ABDOMEN, SUBSEQUENT ENCOUNTER: ICD-10-CM

## 2023-01-11 DIAGNOSIS — R19.7 DIARRHEA, UNSPECIFIED TYPE: ICD-10-CM

## 2023-01-11 DIAGNOSIS — J30.2 SEASONAL ALLERGIC RHINITIS, UNSPECIFIED TRIGGER: Primary | ICD-10-CM

## 2023-01-11 DIAGNOSIS — R10.84 ABDOMINAL PAIN, GENERALIZED: ICD-10-CM

## 2023-01-11 DIAGNOSIS — Z79.4 TYPE 2 DIABETES MELLITUS WITH HYPERGLYCEMIA, WITH LONG-TERM CURRENT USE OF INSULIN (H): ICD-10-CM

## 2023-01-11 DIAGNOSIS — E78.5 HYPERLIPIDEMIA LDL GOAL <100: ICD-10-CM

## 2023-01-11 DIAGNOSIS — M25.512 LEFT SHOULDER PAIN, UNSPECIFIED CHRONICITY: ICD-10-CM

## 2023-01-11 DIAGNOSIS — R11.0 NAUSEA: ICD-10-CM

## 2023-01-11 DIAGNOSIS — F33.1 MODERATE RECURRENT MAJOR DEPRESSION (H): ICD-10-CM

## 2023-01-11 DIAGNOSIS — L08.9 CHRONIC WOUND INFECTION OF ABDOMEN, SUBSEQUENT ENCOUNTER: ICD-10-CM

## 2023-01-11 DIAGNOSIS — R10.11 RUQ ABDOMINAL PAIN: ICD-10-CM

## 2023-01-11 DIAGNOSIS — M46.28: ICD-10-CM

## 2023-01-11 PROCEDURE — 90677 PCV20 VACCINE IM: CPT | Performed by: STUDENT IN AN ORGANIZED HEALTH CARE EDUCATION/TRAINING PROGRAM

## 2023-01-11 PROCEDURE — G0009 ADMIN PNEUMOCOCCAL VACCINE: HCPCS | Performed by: STUDENT IN AN ORGANIZED HEALTH CARE EDUCATION/TRAINING PROGRAM

## 2023-01-11 PROCEDURE — 99214 OFFICE O/P EST MOD 30 MIN: CPT | Mod: 25 | Performed by: STUDENT IN AN ORGANIZED HEALTH CARE EDUCATION/TRAINING PROGRAM

## 2023-01-11 RX ORDER — METOCLOPRAMIDE 5 MG/1
5 TABLET ORAL 3 TIMES DAILY PRN
Qty: 45 TABLET | Refills: 1 | Status: SHIPPED | OUTPATIENT
Start: 2023-01-11 | End: 2023-04-05

## 2023-01-11 RX ORDER — NICOTINE 21 MG/24HR
1 PATCH, TRANSDERMAL 24 HOURS TRANSDERMAL EVERY 24 HOURS
Qty: 14 PATCH | Refills: 0 | Status: SHIPPED | OUTPATIENT
Start: 2023-01-11 | End: 2023-10-09

## 2023-01-11 RX ORDER — ACETAMINOPHEN 500 MG
500-1000 TABLET ORAL EVERY 6 HOURS PRN
Qty: 100 TABLET | Refills: 3 | Status: SHIPPED | OUTPATIENT
Start: 2023-01-11 | End: 2023-09-06

## 2023-01-11 RX ORDER — PRAVASTATIN SODIUM 80 MG/1
80 TABLET ORAL DAILY
Qty: 90 TABLET | Refills: 3 | Status: SHIPPED | OUTPATIENT
Start: 2023-01-11 | End: 2023-06-16

## 2023-01-11 RX ORDER — MELOXICAM 15 MG/1
15 TABLET ORAL DAILY
Qty: 30 TABLET | Refills: 1 | Status: SHIPPED | OUTPATIENT
Start: 2023-01-11 | End: 2023-04-20

## 2023-01-11 RX ORDER — LOPERAMIDE HYDROCHLORIDE 2 MG/1
2 TABLET ORAL 4 TIMES DAILY PRN
Qty: 30 TABLET | Refills: 1 | Status: SHIPPED | OUTPATIENT
Start: 2023-01-11 | End: 2023-03-31

## 2023-01-11 RX ORDER — PANTOPRAZOLE SODIUM 40 MG/1
40 TABLET, DELAYED RELEASE ORAL DAILY
Qty: 90 TABLET | Refills: 2 | Status: SHIPPED | OUTPATIENT
Start: 2023-01-11 | End: 2023-05-22

## 2023-01-11 RX ORDER — QUETIAPINE FUMARATE 400 MG/1
400 TABLET, FILM COATED ORAL AT BEDTIME
Qty: 30 TABLET | Refills: 0 | Status: SHIPPED | OUTPATIENT
Start: 2023-01-11 | End: 2023-02-02

## 2023-01-11 RX ORDER — CETIRIZINE HYDROCHLORIDE 10 MG/1
10 TABLET ORAL DAILY
Qty: 30 TABLET | Refills: 11 | Status: SHIPPED | OUTPATIENT
Start: 2023-01-11 | End: 2023-07-20

## 2023-01-11 NOTE — PROGRESS NOTES
Preceptor Attestation:    I discussed the patient with the resident and evaluated the patient in person. I have verified the content of the note, which accurately reflects my assessment of the patient and the plan of care.   Supervising Physician:  Sukumar Willingham DO.

## 2023-01-11 NOTE — PROGRESS NOTES
Assessment & Plan     Chronic wound infection of abdomen, subsequent encounter  Teresa Perez is a 52-year-old woman with history of type 2 diabetes presenting for reevaluation of wounds.  Currently with 2 wounds over her abdomen that have been present for at least 2 months.  Wounds appear to be chronic and not healing well, with rolled edges, and eschar formation over one of the wounds.  See photos below.  Wound care clinic referral placed as the wounds likely need debridement.  Patient continues to have home nurse for wound care, 3 times a week.  - Wound Care Referral    Abscess, sacrum (H)  Abscess over the sacrum appears to be healing well.  Recommended continuing current wound care with home nurse.    Left shoulder pain, unspecified chronicity  Refill Tylenol  - acetaminophen (TYLENOL) 500 MG tablet  Dispense: 100 tablet; Refill: 3    RUQ abdominal pain  Refill  meloxicam and pantoprazole   - meloxicam (MOBIC) 15 MG tablet  Dispense: 30 tablet; Refill: 1  - pantoprazole (PROTONIX) 40 MG EC tablet  Dispense: 90 tablet; Refill: 2    Moderate recurrent major depression (H)  Refilled Seroquel  - QUEtiapine (SEROQUEL) 400 MG tablet  Dispense: 30 tablet; Refill: 0    Diarrhea, unspecified type  Refilled Imodium  - loperamide (IMODIUM A-D) 2 MG tablet  Dispense: 30 tablet; Refill: 1    Hyperlipidemia LDL goal <100  Refilled statin  - pravastatin (PRAVACHOL) 80 MG tablet  Dispense: 90 tablet; Refill: 3    Nausea  Abdominal pain, generalized  Refilled Reglan  - metoclopramide (REGLAN) 5 MG tablet  Dispense: 45 tablet; Refill: 1    Type 2 diabetes mellitus with hyperglycemia, with long-term current use of insulin (H)  Refilled strips.  - blood glucose (NO BRAND SPECIFIED) test strip  Dispense: 100 strip; Refill: 5    Seasonal allergic rhinitis, unspecified trigger  Refilled cetirizine  - cetirizine (ZYRTEC) 10 MG tablet  Dispense: 30 tablet; Refill: 11    Encounter for tobacco use cessation counseling  Patient  requests increased dose of nicotine patch as she is currently smoking 2 packs/day.  - nicotine (NICODERM CQ) 21 MG/24HR 24 hr patch  Dispense: 14 patch; Refill: 0      Diagnosis or treatment significantly limited by social determinants of health - Limited income, low health literacy, transportation difficulties    Patient was discussed with attending physician, Dr. Josemanuel Willingham, who agrees with the assessment and plan.    Ofelia Neri MD, PGY-3  [facility] Medicine Residency  1/11/2023      Subjective   Teresajacobo Perez is a 52 year old female who presents for the following health issues     Chief Complaint   Patient presents with     Follow Up     Abscess      Refill Request     Medication Reconciliation     Provider to review, pt request to have smoking patch increase in dosage     Abscess on her butt is healing well  Wounds on her abdomen have not been healing  She and the wound nurse are worried about them  They have seemed to have not made any progress healing  Was using silver bandages on them, but they stopped working and started irritating her skin    Brings a list of medications needing refill    Needs a higher dose of nicotine patch as she is currently smoking 2 packs a day      Objective    Vitals:    01/11/23 0826   BP: 120/77   BP Location: Left arm   Patient Position: Sitting   Cuff Size: Adult Large   Pulse: 87   Resp: 12   Temp: 98.3  F (36.8  C)   TempSrc: Oral   SpO2: 96%     There is no height or weight on file to calculate BMI.  Physical Exam   General: alert, appears comfortable, no acute distress  HEENT: atraumatic, conjunctiva clear without erythema, EOM's intact, no nasal discharge, MMM  Neck: supple  Cardiac: normal rate, appears well-perfused  Resp: breathing comfortably on room air, no increased work of breathing  Abdomen:         Skin: Wound over sacrum is healing well, now less than 2 cm in diameter with pink granular tissue overlying wound  Neuro: CN's grossly intact  Psych: affect  congruent with mood

## 2023-01-16 ENCOUNTER — DOCUMENTATION ONLY (OUTPATIENT)
Dept: FAMILY MEDICINE | Facility: CLINIC | Age: 53
End: 2023-01-16

## 2023-01-16 NOTE — PROGRESS NOTES
To be completed in Nursing note:    Please reference list for forms that require a visit for completion.  Please remind patients that providers are given 3-5 business days to complete and return forms.      Form type:[facility]    Date form received: 2023    Date form completed by Physician:2023    How was form returned to patient (mailed, faxed, or at  for patient to ):faxed to [phone]    Date form mailed/faxed/left at  for patient and sent to HIM for scannin2023      Once form is left for patient, faxed, or mailed PCS will then close the documentation only encounter.

## 2023-02-02 NOTE — TELEPHONE ENCOUNTER
Pharmacy called and states that patient is totally out.  Please refill ASAP!  Thanks.!     normal...

## 2023-02-10 ENCOUNTER — TELEPHONE (OUTPATIENT)
Dept: FAMILY MEDICINE | Facility: CLINIC | Age: 53
End: 2023-02-10

## 2023-02-10 NOTE — TELEPHONE ENCOUNTER
Tried calling back but clinic closed. Will try calling again.     Routing to provider as FYI as well.    Bhavani Lacey RN on 2/10/2023 at 4:36 PM

## 2023-02-10 NOTE — TELEPHONE ENCOUNTER
[facility] Medicine Clinic phone call message- general phone call:    Reason for call:Dr Georges her psychologist has diagnosised this patient with cannabis use disorder and medical cannabis is psychoactive contra indicated for this patient.    Return call needed: Yes    OK to leave a message on voice mail? Yes    Primary language: English      needed? No    Call taken on February 10, 2023 at 1:40 PM by Mary Ann Giraldo

## 2023-02-11 NOTE — TELEPHONE ENCOUNTER
Message noted.  Patient was wanting to pursue medical cannabis, and is not longer eligible for evaluation or certification if not recommended by psychiatrist.     I am glad she has connected with him again. It is important that she continues to see him regularly.     Tyra Bullock MD    Routed to YULISA Beckham

## 2023-03-09 NOTE — PROGRESS NOTES
There are no exam notes on file for this visit.  Chief Complaint   Patient presents with     Cough     chills, night sweats, headaches, fatigue, pain in groin area down to leg, onset: 1 week ago     Blood pressure 125/83, pulse 103, temperature 99.3  F (37.4  C), temperature source Oral, resp. rate 20, SpO2 98 %, not currently breastfeeding.           TACO Moore is a 50 year old  female with a PMH significant for:     Patient Active Problem List   Diagnosis     Health Care Home     Acute peptic ulcer     Other allergy, other than to medicinal agents     Bipolar disorder (H)     Bulging lumbar disc     Cervical dysplasia     Common migraine without aura     Constipation     Dwarfism     Familial hypercholesterolemia     Insomnia     Low back pain     Intermittent asthma     Leg pain, bilateral     Smoking     Degeneration of thoracic or thoracolumbar intervertebral disc     LOMAS (nonalcoholic steatohepatitis)     Disease of lung     Hemorrhoids     Parotid mass     Endometriosis     NNAMDI (obstructive sleep apnea)     History of total right knee replacement     Lateral epicondylitis     Impingement syndrome, shoulder, left     Hx of total knee replacement, left     Chronic pain syndrome     Cough     Borderline personality disorder (H)     Moderate recurrent major depression (H)     Recurrent ventral hernia     Type 2 diabetes mellitus with complication, with long-term current use of insulin (H)     Essential hypertension     Nonruptured cerebral aneurysm     Cerebrovascular accident (CVA) due to embolism (H)     Amputation of leg (H)     CKD (chronic kidney disease) stage 5, GFR less than 15 ml/min (H)     Pre-ulcerative corn or callous     Excessive bleeding in premenopausal period     Morbid obesity (H)     Pseudoseizure     Patient coming complaining of right hip pain and cough for the past week.  States that symptoms started after she fell out of her chair while getting food in the middle of the  night.  Describes waking up on the floor, but does not recall how she got there.  Patient not on Ambien.  Reports history of stress-induced seizures.  Denies any loss of bowel or bladder function.  Does report some headache upon awakening.  Currently rates her hip pain at 10 out of 10, has tried Tylenol extra strength 6 times a day, with intermittent ibuprofen.  Also on Flexeril, which has not been helping.  Since this incident, patient has also been complaining of productive cough.  Patient does smoke daily, coughs up black tarry mucus, occasionally with some streaks of blood if she coughs hard enough.  Has tried her usual inhaler medications which has helped.        OBJECTIVE     Vitals:    05/26/21 1115   BP: 125/83   BP Location: Left arm   Patient Position: Sitting   Cuff Size: Adult Regular   Pulse: 103   Resp: 20   Temp: 99.3  F (37.4  C)   TempSrc: Oral   SpO2: 98%     There is no height or weight on file to calculate BMI.    Constitutional: Awake, alert, cooperative, no acute distress, and appears older than stated age, sitting in power wheelchair  Eyes: sclera clear, conjunctiva normal.  ENT: NC/AT, MMM  Neck: Supple, symmetrical, trachea midline  Back: Symmetric, no curvature  Lungs: No increased WOB, CTABL, no crackles or wheezing appreciated.  Cardiovascular: RRR, normal S1 and S2, no S3 or S4, and no murmur appreciated.  Abdomen: Obese, previously known large abdominal hernia.  Positive bowel sounds, nontender.  Musculoskeletal: Previously known left above-the-knee amputation.  Tenderness to palpation at the right groin.  Patient unable to bear weight secondary to pain.  Normal sensation of right lower extremity, 2+ DP pulse.  Neurologic: A&Ox3.  Cranial nerves II-XII are grossly intact.  Sensory is intact and gait is normal.  Neuropsychiatric: Normal affect, mood, orientation, memory and insight.    Results for orders placed or performed in visit on 05/26/21   XR Pelvis 1/2 Views     Status: None     Narrative    See Historical Hospital Medical Record for documentation   Results for orders placed or performed in visit on 05/26/21   Basic Metabolic Panel ([place])     Status: Abnormal   Result Value Ref Range    Urea Nitrogen 17.4 7.0 - 19.0 mg/dL    Calcium 10.3 (H) 8.5 - 10.1 mg/dL    Chloride 97.6 (L) 98.0 - 110.0 mmol/L    Carbon Dioxide 24.7 20.0 - 32.0 mmol/L    Creatinine 0.7 0.5 - 1.0 mg/dL    Glucose 191.5 (H) 70.0 - 99.0 mg'dL    Potassium 4.3 3.2 - 4.6 mmol/L    Sodium 135.1 132.0 - 142.0 mmol/L    GFR Estimate >90 >60.0 mL/min/1.7 m2   CBC with Plt ([facility])     Status: Abnormal   Result Value Ref Range    WBC 13.9 (H) 4.0 - 11.0 10e9/L    RBC 5.5 (H) 3.8 - 5.2 10e12/L    Hemoglobin 16.9 (H) 11.7 - 15.7 g/dL    Hematocrit 49.1 (H) 35.0 - 47.0 %    MCV 89.3 78.0 - 100.0 fL    MCH 30.7 26.5 - 35.0 pg    MCHC 34.4 32.0 - 36.0 g/dL    RDW 13 10 - 15 %    Platelets 292.0 150.0 - 450.0 10e9/L   Lipid Panel ([place])     Status: Abnormal   Result Value Ref Range    Cholesterol 173.5 0.0 - 200.0 mg/dL    Cholesterol/HDL Ratio 7.0 (H) 0.0 - 5.0    HDL Cholesterol 24.8 (L) >40.0 mg/dL    LDL Cholesterol Calculated 99 0 - 129 mg/dL    Triglycerides 247.7 (H) 0.0 - 150.0 mg/dL    VLDL Cholesterol 49.5 (H) 7.0 - 32.0 mg/dL         ASSESSMENT AND PLAN     Teresa was seen today for cough.    Diagnoses and all orders for this visit:    Syncope, unspecified syncope type  Pseudoseizure  Unwitnessed syncopal episode versus seizure.  EKG showing normal sinus rhythm, pending formal cardiology read.  BMP showing elevated glucose at 191, otherwise normal creatinine and electrolytes.  CBC showing WBC 13.9, likely reactive in the setting of patient without fever or source of infection.  Hemoglobin up at 16.9, which would not explain her loss of consciousness.  TSH pending.  Advised to follow-up with neurology for formal EEG.  No imaging indicated at this time given unremarkable neurological exam.  -     EKG 12-lead complete  w/read - Clinics  -     Basic Metabolic Panel ([place])  -     CBC with Plt ([facility])  -     Thyroid [place] ([facility])  -     NEUROLOGY ADULT REFERRAL; Future    Cough  Secondary to tobacco use.  Possible viral URI.  Tested for Covid given pandemic, advised to remain quarantine until results come back.  Recommend to continue all of her albuterol as as indicated.  -     COVID-19 Virus PCR MRF ([facility])    Hip pain, right  Hip strain versus hip fracture.  Given the low velocity injury, suspect more hip strain.  If hip fracture, refer to Ortho.  If x-ray negative, continue conservative management and follow-up with PCP.  Consider possible physical therapy referral.  -     XR Hip Right 2-3 Views; Future    Encounter for tobacco use cessation counseling  Precontemplative state.  Still smoking.  Not ready to quit yet.  Using nicotine patch.    Lipid screening   LDL 99 today goal of less than 70 given history of stroke.  Will discuss on next visit about additional lipid-lowering medications, given patient's very complex past medical history.  Consider ezetimibe and rechecking lipid in 1 to 2 months..  -     Lipid Panel ([place])        Return in about 2 weeks (around 6/9/2021) for Follow up.    Enrique aWlls MD  5/26/2021    Precepted with Dr. Baca.   14-Mar-2023

## 2023-03-29 ENCOUNTER — APPOINTMENT (OUTPATIENT)
Dept: CT IMAGING | Facility: HOSPITAL | Age: 53
End: 2023-03-29
Attending: EMERGENCY MEDICINE
Payer: COMMERCIAL

## 2023-03-29 ENCOUNTER — TELEPHONE (OUTPATIENT)
Dept: FAMILY MEDICINE | Facility: CLINIC | Age: 53
End: 2023-03-29

## 2023-03-29 ENCOUNTER — HOSPITAL ENCOUNTER (EMERGENCY)
Facility: HOSPITAL | Age: 53
Discharge: HOME | End: 2023-03-29
Attending: EMERGENCY MEDICINE | Admitting: EMERGENCY MEDICINE
Payer: COMMERCIAL

## 2023-03-29 ENCOUNTER — VIRTUAL VISIT (OUTPATIENT)
Dept: FAMILY MEDICINE | Facility: CLINIC | Age: 53
End: 2023-03-29
Payer: COMMERCIAL

## 2023-03-29 VITALS
WEIGHT: 208 LBS | HEIGHT: 61 IN | RESPIRATION RATE: 16 BRPM | BODY MASS INDEX: 39.27 KG/M2 | HEART RATE: 97 BPM | DIASTOLIC BLOOD PRESSURE: 79 MMHG | OXYGEN SATURATION: 99 % | TEMPERATURE: 97.9 F | SYSTOLIC BLOOD PRESSURE: 129 MMHG

## 2023-03-29 DIAGNOSIS — R10.84 ABDOMINAL PAIN, GENERALIZED: ICD-10-CM

## 2023-03-29 DIAGNOSIS — S09.90XA INJURY OF HEAD, INITIAL ENCOUNTER: ICD-10-CM

## 2023-03-29 DIAGNOSIS — E66.01 MORBID OBESITY (H): ICD-10-CM

## 2023-03-29 DIAGNOSIS — K43.2 RECURRENT INCISIONAL HERNIA: ICD-10-CM

## 2023-03-29 DIAGNOSIS — Z59.82 TRANSPORTATION UNAVAILABLE: Primary | ICD-10-CM

## 2023-03-29 DIAGNOSIS — R19.7 DIARRHEA, UNSPECIFIED TYPE: Primary | ICD-10-CM

## 2023-03-29 LAB
ALBUMIN SERPL BCG-MCNC: 4.1 G/DL (ref 3.5–5.2)
ALP SERPL-CCNC: 124 U/L (ref 35–104)
ALT SERPL W P-5'-P-CCNC: 34 U/L (ref 10–35)
ANION GAP SERPL CALCULATED.3IONS-SCNC: 14 MMOL/L (ref 7–15)
AST SERPL W P-5'-P-CCNC: 26 U/L (ref 10–35)
BILIRUB DIRECT SERPL-MCNC: <0.2 MG/DL (ref 0–0.3)
BILIRUB SERPL-MCNC: 0.2 MG/DL
BUN SERPL-MCNC: 15 MG/DL (ref 6–20)
CALCIUM SERPL-MCNC: 9.5 MG/DL (ref 8.6–10)
CHLORIDE SERPL-SCNC: 102 MMOL/L (ref 98–107)
CREAT BLD-MCNC: 0.3 MG/DL (ref 0.6–1.1)
CREAT BLD-MCNC: 0.4 MG/DL (ref 0.6–1.1)
CREAT SERPL-MCNC: 0.45 MG/DL (ref 0.51–0.95)
DEPRECATED HCO3 PLAS-SCNC: 20 MMOL/L (ref 22–29)
ERYTHROCYTE [DISTWIDTH] IN BLOOD BY AUTOMATED COUNT: 13.9 % (ref 10–15)
GFR SERPL CREATININE-BSD FRML MDRD: >60 ML/MIN/1.73M2
GFR SERPL CREATININE-BSD FRML MDRD: >60 ML/MIN/1.73M2
GFR SERPL CREATININE-BSD FRML MDRD: >90 ML/MIN/1.73M2
GLUCOSE SERPL-MCNC: 153 MG/DL (ref 70–99)
HCT VFR BLD AUTO: 43.8 % (ref 35–47)
HGB BLD-MCNC: 14.5 G/DL (ref 11.7–15.7)
HOLD SPECIMEN: NORMAL
LIPASE SERPL-CCNC: 186 U/L (ref 13–60)
MCH RBC QN AUTO: 29.2 PG (ref 26.5–33)
MCHC RBC AUTO-ENTMCNC: 33.1 G/DL (ref 31.5–36.5)
MCV RBC AUTO: 88 FL (ref 78–100)
PLATELET # BLD AUTO: 266 10E3/UL (ref 150–450)
POTASSIUM SERPL-SCNC: 4 MMOL/L (ref 3.4–5.3)
PROT SERPL-MCNC: 7.4 G/DL (ref 6.4–8.3)
RBC # BLD AUTO: 4.96 10E6/UL (ref 3.8–5.2)
SODIUM SERPL-SCNC: 136 MMOL/L (ref 136–145)
WBC # BLD AUTO: 12.8 10E3/UL (ref 4–11)

## 2023-03-29 PROCEDURE — 83690 ASSAY OF LIPASE: CPT | Performed by: EMERGENCY MEDICINE

## 2023-03-29 PROCEDURE — 74177 CT ABD & PELVIS W/CONTRAST: CPT

## 2023-03-29 PROCEDURE — 36415 COLL VENOUS BLD VENIPUNCTURE: CPT | Performed by: EMERGENCY MEDICINE

## 2023-03-29 PROCEDURE — 80053 COMPREHEN METABOLIC PANEL: CPT | Performed by: EMERGENCY MEDICINE

## 2023-03-29 PROCEDURE — 250N000013 HC RX MED GY IP 250 OP 250 PS 637: Performed by: EMERGENCY MEDICINE

## 2023-03-29 PROCEDURE — 99285 EMERGENCY DEPT VISIT HI MDM: CPT | Mod: 25

## 2023-03-29 PROCEDURE — 82565 ASSAY OF CREATININE: CPT

## 2023-03-29 PROCEDURE — 70450 CT HEAD/BRAIN W/O DYE: CPT

## 2023-03-29 PROCEDURE — 85027 COMPLETE CBC AUTOMATED: CPT | Performed by: EMERGENCY MEDICINE

## 2023-03-29 PROCEDURE — 82248 BILIRUBIN DIRECT: CPT | Performed by: EMERGENCY MEDICINE

## 2023-03-29 PROCEDURE — 250N000011 HC RX IP 250 OP 636: Performed by: EMERGENCY MEDICINE

## 2023-03-29 PROCEDURE — 99214 OFFICE O/P EST MOD 30 MIN: CPT | Mod: 93 | Performed by: STUDENT IN AN ORGANIZED HEALTH CARE EDUCATION/TRAINING PROGRAM

## 2023-03-29 PROCEDURE — 258N000003 HC RX IP 258 OP 636: Performed by: EMERGENCY MEDICINE

## 2023-03-29 PROCEDURE — 96360 HYDRATION IV INFUSION INIT: CPT | Mod: 59

## 2023-03-29 RX ORDER — IOPAMIDOL 755 MG/ML
100 INJECTION, SOLUTION INTRAVASCULAR ONCE
Status: COMPLETED | OUTPATIENT
Start: 2023-03-29 | End: 2023-03-29

## 2023-03-29 RX ORDER — HYDROXYZINE HYDROCHLORIDE 25 MG/1
25 TABLET, FILM COATED ORAL ONCE
Status: COMPLETED | OUTPATIENT
Start: 2023-03-29 | End: 2023-03-29

## 2023-03-29 RX ORDER — HYDROCODONE BITARTRATE AND ACETAMINOPHEN 5; 325 MG/1; MG/1
1 TABLET ORAL EVERY 6 HOURS PRN
Qty: 8 TABLET | Refills: 0 | Status: SHIPPED | OUTPATIENT
Start: 2023-03-29 | End: 2023-04-01

## 2023-03-29 RX ORDER — HYDROCODONE BITARTRATE AND ACETAMINOPHEN 5; 325 MG/1; MG/1
2 TABLET ORAL ONCE
Status: COMPLETED | OUTPATIENT
Start: 2023-03-29 | End: 2023-03-29

## 2023-03-29 RX ADMIN — SODIUM CHLORIDE 1000 ML: 9 INJECTION, SOLUTION INTRAVENOUS at 10:36

## 2023-03-29 RX ADMIN — IOPAMIDOL 100 ML: 755 INJECTION, SOLUTION INTRAVENOUS at 10:26

## 2023-03-29 RX ADMIN — HYDROXYZINE HYDROCHLORIDE 25 MG: 25 TABLET ORAL at 12:10

## 2023-03-29 RX ADMIN — HYDROCODONE BITARTRATE AND ACETAMINOPHEN 2 TABLET: 5; 325 TABLET ORAL at 11:34

## 2023-03-29 SDOH — ECONOMIC STABILITY - TRANSPORTATION SECURITY: TRANSPORTATION INSECURITY: Z59.82

## 2023-03-29 NOTE — ED TRIAGE NOTES
Pt concerned with bleeding from umbilicus, on warfarin, no loss of conciousness, increased vomiting in since falling 2 days ago and striking head on corner of TV stand. Pt also reports bright red blood in stool.     Triage Assessment     Row Name 03/29/23 0941       Triage Assessment (Adult)    Airway WDL WDL       Respiratory WDL    Respiratory WDL WDL       Skin Circulation/Temperature WDL    Skin Circulation/Temperature WDL WDL       Cardiac WDL    Cardiac WDL WDL       Peripheral/Neurovascular WDL    Peripheral Neurovascular WDL WDL       Cognitive/Neuro/Behavioral WDL    Cognitive/Neuro/Behavioral WDL WDL              
9

## 2023-03-29 NOTE — PROGRESS NOTES
Teresa is a 52 year old who is being evaluated via a billable telephone visit.      What phone number would you like to be contacted at? [phone]  How would you like to obtain your AVS? Mail a copy  Distant Location (provider location):  On-site    Assessment & Plan     Diarrhea, unspecified type  Recurrent incisional hernia  Morbid obesity (H)  Abdominal pain  Fatty liver  THC use  Telephone visit today for evaluation of abdominal pain.  She continues to have significant diarrhea, now with blood, concern for weight loss, with severe abdominal pain in the context of a large ventral hernia s/p recurrence after multiple repairs and abdominal infection this summer, requiring washout.  She has also been on multiple courses of antibiotics this winter for abdominal skin wounds, at risk for C. Diff, and has new umbilical lesion.      I am unable to safely evaluate her today, and transportation has been a huge issue, so she and I discussed going to the ER for acute evaluation and to help expedite needed testing to determine the etiology and next steps.  She is in agreement and will call an ambulance to take her to the ER for further evaluation.      No follow-ups on file.    Tyra Bullock MD  [facility]   Teresa is a 52 year old, presenting for the following health issues:  Other (Talk, a lots in pain, still bleeding from belly and still have bowl movement  problem)    Additional Questions 12/13/2022   Roomed by ramu   Accompanied by self     HPI     Everytime she eats or drinks it just flushes right through.  Persistent since she left rehab.  10x per day episodes of diarrhea.  Bowel movement are watery, and has had blood for the last couple of days.  Can't keep things in.  Worries that she is loosing weight.  Hasn't been able to get a depo shot so is having vaginal bleeding too.  Tired of feeling sick.      Can see the bowels and food going through.  Can see it under her skin.   Watches it move through her.      Anxiety and stress is super high.  Looking for a new psychiatrist because anxiety is so bad.     Feels very weak.  Worries about blood loss.  Has been having oozing from her belly button.  It has been bleeding on and off from a couple of weeks.  The nurse has been looking at it, and keeping it clean.      Frustrated with the transportation issues.  Couldn't get in for transportation for her Depo shot.  Would like to connect with social work about this.  Looking for a transportation company.      Isn't sure if, when she is or will be sick.  Isn't able to attend visits that she needs to because of this.      Still having fevers and chills.  Has been taking tylenol and this helps.  Does not have a temperature on her thermometer.      Has information for gastroenterology at home, but hasn't been able to schedule.     Son is out of penitentiary and she is worried that he will show up and cause trouble.      Dad had stomach cancer at 30 and they had to take half his stomach and half of his intestine.  She is worried that she might have cancer.      We previously worked her up for diarrhea in October 2022.  LFTs were normal, C. Diff negative, O and P negative.  BMP normal, thyroid normal.      She has known fatty liver, known large ventral hernia.  Has been on multiple courses of antibiotics this winter for treatment of abdominal wounds that have been slow to heal.    She has insulin dependent diabetes, hx of amputation and is wheelchair bound.  Has regular home nurse to help with wounds, but they have not been healing.       Previously saw Dr. Ware for hernia repair - repaired in 2016, again in July 2022, with recurrence in 8/2022.  Complications following surgery - infection requiring re-hospitalization and treatment with IV antibiotics.      Objective         Vitals:  No vitals were obtained today due to virtual visit.    Physical Exam   Alert, moderate distress and crying  PSYCH: Alert and  oriented times 3; coherent speech, normal   rate and volume, able to articulate logical thoughts, able   to abstract reason, no tangential thoughts, no hallucinations   or delusions  Her affect is anxious  RESP: No cough, no audible wheezing, able to talk in full sentences  Remainder of exam unable to be completed due to telephone visits    Phone call duration: 18 minutes

## 2023-03-29 NOTE — PATIENT INSTRUCTIONS
Go to ER.      Make sure they send the results to Dr. Bullock.     Dr. Bullock will connect with [place] to look into other options for transportation.     Follow up after ER/Hospitalization.
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
## 2023-03-29 NOTE — Clinical Note
Hi.  This patient is wheelchair bound and has been having difficulty with her wheelchair transportation company.  Can you help look into other options?  Thank you!

## 2023-03-29 NOTE — TELEPHONE ENCOUNTER
"[facility] Answering Service Pager Note    I received an answering service page at 7:30AM regarding \"Wounds? Belly Button Bleed.\"    I called patient and we spoke on the phone.     She reports she has an appointment with Dr. Bullock this morning at 8:00AM, but her medical ride did not show up. She is feeling frustrated because this company has not showed up to pick her up for her medical appointments many times now. She feels she really needs to be seen, prefers not to see other providers who are not her PCP.     I recommended patient call our clinic back at 8:00AM to inform them she cannot make it to her appointment and get assistance in rescheduling appointment and a medical ride, perhaps with a different transportation company.     Patient stated understanding. All questions/concerns were addressed.    Benita Behm, MD PGY3  [facility]  Pager: [phone]    "

## 2023-03-29 NOTE — DISCHARGE INSTRUCTIONS
Fortunately all of your testing today has been normal.  Take the prescribed pain medication as directed and follow-up closely with your primary care doctor.  Return to the emergency department for any worsening symptoms or other concerns.

## 2023-03-29 NOTE — TELEPHONE ENCOUNTER
Routing to Dr. Bullock and Dr. Matthews as FYI.     Pt schedule to be seen this Friday as she still has abd pain and blood stools most of the time when she has BM. She was given pain med by ER. See CT head and CT abd done. She also said her belly button is bleeding a bit and would like to know how to dress that so her HH RN can continue the dressing.     Pt would also like Dr. Bullock to send in rx for her anxiety meds since she switch psychiatrist and needs her med until she sees her new one. She would also like Dr. Bullock to call her.     Bhavani Lacey, RN on 3/29/2023 at 4:29 PM

## 2023-03-29 NOTE — TELEPHONE ENCOUNTER
[facility] phone call message- general phone call:    Reason for call: The Pt called to ask for the Dr to call her she was at the Er and would like a medication for anxiety and would like a call back     Return call needed: Yes    OK to leave a message on voice mail? Yes    Primary language: English      needed? No    Call taken on March 29, 2023 at 12:08 PM by Juan Peralta

## 2023-03-30 ENCOUNTER — TELEPHONE (OUTPATIENT)
Dept: FAMILY MEDICINE | Facility: CLINIC | Age: 53
End: 2023-03-30
Payer: COMMERCIAL

## 2023-03-30 NOTE — TELEPHONE ENCOUNTER
I have reviewed the ER notes.  The CT abd looks okay, her lab work also looks stable, and I can't tell how things look on her umbilicus without looking at it myself.      I also am not sure what she is currently taking for her anxiety. I will plan to be around the clinic on Friday morning to discuss Ms. Perez with Dr. Matthews, and I would recommend that she also get scheduled for a pharm D visit to review medications so we can optimize what she is taking.      We need to have an updated medication list and take a look at the wounds to develop a plan.     Tyra Bullock MD    Routed to YULISA Beckham

## 2023-03-30 NOTE — TELEPHONE ENCOUNTER
3/30/2023: Care Coordination     CC: arranged transportation for an upcoming in-person appointment on: 2023 @ 11:20 am. [employer] will pick:10:40 am when ready to return home, she will need to activate the will call service by [phone]. The patient has been notified       Diego Infante Sr.  Social Work  Care Coordination  [address] [address]  [email or web address]   Office: [phone]  Direct: [phone]  [facility]

## 2023-03-30 NOTE — TELEPHONE ENCOUNTER
Pt notified of Dr. Bullock's message. Scheduled her to see Dr. Olson at 8am and then Dr. Matthews at 8:40 am.     Bhavani Lacey RN on 3/30/2023 at 1:39 PM

## 2023-03-31 ENCOUNTER — VIRTUAL VISIT (OUTPATIENT)
Dept: PHARMACY | Facility: CLINIC | Age: 53
End: 2023-03-31
Payer: COMMERCIAL

## 2023-03-31 ENCOUNTER — TELEPHONE (OUTPATIENT)
Dept: FAMILY MEDICINE | Facility: CLINIC | Age: 53
End: 2023-03-31

## 2023-03-31 DIAGNOSIS — L85.3 DRY SKIN DERMATITIS: ICD-10-CM

## 2023-03-31 DIAGNOSIS — F33.1 MODERATE RECURRENT MAJOR DEPRESSION (H): ICD-10-CM

## 2023-03-31 DIAGNOSIS — F60.3 BORDERLINE PERSONALITY DISORDER (H): ICD-10-CM

## 2023-03-31 DIAGNOSIS — F41.9 ANXIETY: Primary | ICD-10-CM

## 2023-03-31 DIAGNOSIS — K27.9 PEPTIC ULCER: ICD-10-CM

## 2023-03-31 DIAGNOSIS — J44.89 ASTHMA-COPD OVERLAP SYNDROME (H): ICD-10-CM

## 2023-03-31 DIAGNOSIS — G89.29 CHRONIC BILATERAL LOW BACK PAIN WITHOUT SCIATICA: ICD-10-CM

## 2023-03-31 DIAGNOSIS — F43.22 ADJUSTMENT DISORDER WITH ANXIOUS MOOD: ICD-10-CM

## 2023-03-31 DIAGNOSIS — I63.419 CEREBROVASCULAR ACCIDENT (CVA) DUE TO EMBOLISM OF MIDDLE CEREBRAL ARTERY, UNSPECIFIED BLOOD VESSEL LATERALITY (H): ICD-10-CM

## 2023-03-31 DIAGNOSIS — Z79.4 TYPE 2 DIABETES MELLITUS WITH COMPLICATION, WITH LONG-TERM CURRENT USE OF INSULIN (H): ICD-10-CM

## 2023-03-31 DIAGNOSIS — R19.7 DIARRHEA, UNSPECIFIED TYPE: Primary | ICD-10-CM

## 2023-03-31 DIAGNOSIS — K64.9 HEMORRHOIDS, UNSPECIFIED HEMORRHOID TYPE: ICD-10-CM

## 2023-03-31 DIAGNOSIS — F17.200 SMOKING: ICD-10-CM

## 2023-03-31 DIAGNOSIS — K59.00 CONSTIPATION, UNSPECIFIED CONSTIPATION TYPE: ICD-10-CM

## 2023-03-31 DIAGNOSIS — E11.8 TYPE 2 DIABETES MELLITUS WITH COMPLICATION, WITH LONG-TERM CURRENT USE OF INSULIN (H): ICD-10-CM

## 2023-03-31 DIAGNOSIS — M54.50 CHRONIC BILATERAL LOW BACK PAIN WITHOUT SCIATICA: ICD-10-CM

## 2023-03-31 DIAGNOSIS — G43.909 MIGRAINE WITHOUT STATUS MIGRAINOSUS, NOT INTRACTABLE, UNSPECIFIED MIGRAINE TYPE: ICD-10-CM

## 2023-03-31 DIAGNOSIS — J30.2 SEASONAL ALLERGIC RHINITIS, UNSPECIFIED TRIGGER: ICD-10-CM

## 2023-03-31 DIAGNOSIS — I11.9 HYPERTENSIVE HEART DISEASE WITHOUT HEART FAILURE: ICD-10-CM

## 2023-03-31 DIAGNOSIS — G25.81 RESTLESS LEG SYNDROME: ICD-10-CM

## 2023-03-31 PROCEDURE — 99207 PR NO CHARGE LOS: CPT | Mod: 93 | Performed by: PHARMACIST

## 2023-03-31 RX ORDER — AZELASTINE HYDROCHLORIDE 0.5 MG/ML
1 SOLUTION/ DROPS OPHTHALMIC 2 TIMES DAILY PRN
Qty: 18 ML | Refills: 1 | Status: SHIPPED | OUTPATIENT
Start: 2023-03-31 | End: 2023-04-25

## 2023-03-31 RX ORDER — AMMONIUM LACTATE 12 G/100G
CREAM TOPICAL DAILY PRN
COMMUNITY
Start: 2023-03-31

## 2023-03-31 RX ORDER — LOPERAMIDE HYDROCHLORIDE 2 MG/1
2 TABLET ORAL 4 TIMES DAILY PRN
Qty: 30 TABLET | Refills: 1 | Status: SHIPPED | OUTPATIENT
Start: 2023-03-31 | End: 2023-05-08

## 2023-03-31 RX ORDER — FLUTICASONE PROPIONATE 50 MCG
2 SPRAY, SUSPENSION (ML) NASAL PRN
COMMUNITY
Start: 2023-03-31 | End: 2023-10-09

## 2023-03-31 RX ORDER — HYDROXYZINE HYDROCHLORIDE 25 MG/1
25-50 TABLET, FILM COATED ORAL 3 TIMES DAILY PRN
Qty: 30 TABLET | Refills: 1 | Status: SHIPPED | OUTPATIENT
Start: 2023-03-31 | End: 2023-04-20

## 2023-03-31 NOTE — TELEPHONE ENCOUNTER
[facility] phone call message- general phone call:    Reason for call: She has a appointment today at 8:00am but she didn't make it she  Would like to talk to her PCP.    Action desired: call back.    Return call needed: Yes    OK to leave a message on voice mail? Yes    Advised patient to response may take up to 2 business days: Yes    Primary language: English      needed? No    Call taken on March 31, 2023 at 8:51 AM by Lo Lao

## 2023-03-31 NOTE — TELEPHONE ENCOUNTER
Spoke with Dr. Olson    --We need to do further stool testing to safely prescribe additional meds.     --Okay to do a short course of loperamide - as long as she knows that if she has C. DIff, this will make it worse.     --She can take tylenol, meloxicam, and baclofen for pain.  This type of pain is not appropriate for narcotics.     --I will send in a prescription for hydroxyzine for anxiety.  Can take 1-2 pills TID prn.     Scripts sent in for patient.     Dr. Olson will communicate plan to patient, as she completed a med rec today.     Tyra Bullock MD
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
## 2023-03-31 NOTE — PATIENT INSTRUCTIONS
Start loperamide for diarrhea.  Do not completely stop having bowel movements.  Start hydroxyzine as needed for anxiety

## 2023-04-03 PROBLEM — F44.5 PSYCHOGENIC NONEPILEPTIC SEIZURE: Status: ACTIVE | Noted: 2018-10-11

## 2023-04-05 ENCOUNTER — TELEPHONE (OUTPATIENT)
Dept: FAMILY MEDICINE | Facility: CLINIC | Age: 53
End: 2023-04-05

## 2023-04-05 ENCOUNTER — VIRTUAL VISIT (OUTPATIENT)
Dept: FAMILY MEDICINE | Facility: CLINIC | Age: 53
End: 2023-04-05
Payer: COMMERCIAL

## 2023-04-05 DIAGNOSIS — R11.0 NAUSEA: ICD-10-CM

## 2023-04-05 DIAGNOSIS — R10.84 ABDOMINAL PAIN, GENERALIZED: ICD-10-CM
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
## 2023-04-05 DIAGNOSIS — R19.7 DIARRHEA, UNSPECIFIED TYPE: Primary | ICD-10-CM

## 2023-04-05 DIAGNOSIS — S31.105D OPEN WOUND OF UMBILICAL REGION, SUBSEQUENT ENCOUNTER: ICD-10-CM

## 2023-04-05 DIAGNOSIS — F41.9 ANXIETY: ICD-10-CM

## 2023-04-05 DIAGNOSIS — Z59.82 TRANSPORTATION UNAVAILABLE: ICD-10-CM

## 2023-04-05 PROCEDURE — 99213 OFFICE O/P EST LOW 20 MIN: CPT | Mod: 93 | Performed by: STUDENT IN AN ORGANIZED HEALTH CARE EDUCATION/TRAINING PROGRAM

## 2023-04-05 RX ORDER — METOCLOPRAMIDE 5 MG/1
5 TABLET ORAL 3 TIMES DAILY PRN
Qty: 45 TABLET | Refills: 1 | Status: SHIPPED | OUTPATIENT
Start: 2023-04-05 | End: 2023-05-16

## 2023-04-05 SDOH — ECONOMIC STABILITY - TRANSPORTATION SECURITY: TRANSPORTATION INSECURITY: Z59.82

## 2023-04-05 NOTE — TELEPHONE ENCOUNTER
Call placed to patient at her request.      Transportation didn't get ordered right - she needs a lift van and they only sent a regular van.     Doing okay right now.  Up all night with diarrhea.  Has been lose.  Had 15 episodes yesterday.  No trouble with urine.  Has tried taking the loperamide yesterday - diarrhea is better today.  Now having to put barrier cream on her bottom.      Eating and drinking has been okay.  Not vomitting.     She has been having panic attacks - so many things going.  Still seeing Nolan - he comes to the house.  Had 2 panic attacks last week - lasted about an hour.  Hands sweat, breathing increases gets confused.      When she feels panic attacks -  Will walk whoever is over out and lay down.  Hydroxyzine has been helping with the panic.     Belly button is  - they are doing wound care regularly.  Both Teresa and nurse have not seen any signs of infection.       She is going to work on setting up another appointment and getting transportation set up.  She is going to look for an alternative transportation company.     She needs a refill on the metoclopramide/reglan.  Is okay on her other medications for now.      Tyra Bullock MD    I spent 12 min on the phone with patient.

## 2023-04-05 NOTE — PROGRESS NOTES
Teresa is a 52 year old who is being evaluated via a billable telephone visit.      What phone number would you like to be contacted at? [phone]  How would you like to obtain your AVS? Mail a copy    Distant Location (provider location):  On-site    Assessment & Plan     Diarrhea, unspecified type  Abdominal pain, generalized  Ventral hernia  Open wound of umbilical region, subsequent encounter  Symptoms somewhat improved.  Today is better after taking loperamide.  Discussed the importance of doing stool testing and she will work on looking for new options for transportation.  -- Continue loperamide as needed.  -- Refill sent in on her metoclopramide, as this too has been helpful.  -- Continued wound care support from her home nurse on her umbilical wound.  -- No additional antibiotics for now.  She is at high risk for C. difficile with recurrent antibiotic usage.    Anxiety  -Anxiety has gotten better with the hydroxyzine.  She continues to see her therapist Nolan weekly.  Expresses frustration at her transportation situation, which has been very challenging.  Many things happening in her life.  Overall stable.    Transportation unavailable  She will work on alternative transportation options.    Fall, subsequent encounter  Reviewed recent ER visit with negative head CT.  Has had multiple issues of dizziness, falls, and loss of consciousness in the last year.  I am concerned about polypharmacy.  Appreciate our Pharm.D. input.    Recommended follow-up as soon as she is able to arrange transportation.    No follow-ups on file.    Tyra Bullock MD  [facility]   Teresa is a 52 year old, presenting for the following health issues:  Other (Talk about everything/wound and med)        12/13/2022     9:37 AM   Additional Questions   Roomed by ramu   Accompanied by self     HPI     Call placed to patient at her request.       Transportation didn't get ordered right - she needs  a lift van and they only sent a regular van.      Doing okay right now.  Up all night with diarrhea.  Has been lose.  Had 15 episodes yesterday.  No trouble with urine.  Has tried taking the loperamide yesterday - diarrhea is better today.  Now having to put barrier cream on her bottom.       Eating and drinking has been okay.  Not vomitting.      She has been having panic attacks - so many things going.  Still seeing Nolan - he comes to the house.  Had 2 panic attacks last week - lasted about an hour.  Hands sweat, breathing increases gets confused.       When she feels panic attacks -  Will walk whoever is over out and lay down.  Hydroxyzine has been helping with the panic.      Belly button is  - they are doing wound care regularly.  Both Teresa and nurse have not seen any signs of infection.        She is going to work on setting up another appointment and getting transportation set up.  She is going to look for an alternative transportation company.      She needs a refill on the metoclopramide/reglan.  Is okay on her other medications for now.       Tyra Bullock MD     I spent 12 min on the phone with patient. Call placed to patient at her request.       T    Objective         Vitals:  No vitals were obtained today due to virtual visit.    Physical Exam   healthy, alert and no distress  PSYCH: Alert and oriented times 3; coherent speech, normal   rate and volume, able to articulate logical thoughts, able   to abstract reason, no tangential thoughts, no hallucinations   or delusions  Her affect is anxious  RESP: No cough, no audible wheezing, able to talk in full sentences  Remainder of exam unable to be completed due to telephone visits    Results from recent ER visit were reviewed.  CT head was normal.  These show a CT abdomen pelvis without any new pathology.  It indicates chronic persisting large ventral hernia.  Blood work was benign.  LFTs were normal.  Creatinine was stable.  Mildly  elevated white count.    Phone call duration: 12 minutes

## 2023-04-05 NOTE — PATIENT INSTRUCTIONS
I am glad we got to talk on the phone for a little bit today.    -- Okay to keep taking the loperamide as needed for diarrhea.  Only take it if you need it.  --I have sent in a refill of the metoclopramide.  Let me know if that continues to help.    -- Continue to use hydroxyzine for anxiety.  Keep working regularly with your therapist.  --Got the home nurses coming out and that your wound is getting appropriate care.    Schedule follow-up appointment to come to [place] so we can take a look at the wound, but evaluate diarrhea, and run appropriate stool test.  I also think will be helpful to get get you into see a GI specialist, and I can place a referral for this.

## 2023-04-05 NOTE — TELEPHONE ENCOUNTER
[facility] phone call message- general phone call:    Reason for call: Patient would like Dr. Bullock to give her a call.    Return call needed: Yes    OK to leave a message on voice mail? Yes    Primary language: English      needed? No    Call taken on April 5, 2023 at 11:02 AM by Naeem No

## 2023-04-14 DIAGNOSIS — Z53.9 DIAGNOSIS NOT YET DEFINED: Primary | ICD-10-CM

## 2023-04-14 PROCEDURE — G0179 MD RECERTIFICATION HHA PT: HCPCS | Performed by: STUDENT IN AN ORGANIZED HEALTH CARE EDUCATION/TRAINING PROGRAM

## 2023-04-17 ENCOUNTER — DOCUMENTATION ONLY (OUTPATIENT)
Dept: FAMILY MEDICINE | Facility: CLINIC | Age: 53
End: 2023-04-17
Payer: COMMERCIAL

## 2023-04-17 NOTE — PROGRESS NOTES
To be completed in Nursing note:    Please reference list for forms that require a visit for completion.  Please remind patients that providers are given 3-5 business days to complete and return forms.      Form type:  Home Health care Certification and Plan of care  [facility]    Date form received: 23    Date form completed by Physician:  23    How was form returned to patient (mailed, faxed, or at  for patient to ):  [phone]    Date form mailed/faxed/left at  for patient and sent to HIM for scannin23      Once form is left for patient, faxed, or mailed PCS will then close the documentation only encounter.

## 2023-05-16 ENCOUNTER — OFFICE VISIT (OUTPATIENT)
Dept: VASCULAR SURGERY | Facility: CLINIC | Age: 53
End: 2023-05-16
Attending: FAMILY MEDICINE
Payer: COMMERCIAL

## 2023-05-16 VITALS
DIASTOLIC BLOOD PRESSURE: 62 MMHG | RESPIRATION RATE: 16 BRPM | SYSTOLIC BLOOD PRESSURE: 102 MMHG | HEART RATE: 84 BPM | TEMPERATURE: 98.1 F

## 2023-05-16 DIAGNOSIS — S31.109D CHRONIC WOUND INFECTION OF ABDOMEN, SUBSEQUENT ENCOUNTER: ICD-10-CM

## 2023-05-16 DIAGNOSIS — L08.9 CHRONIC WOUND INFECTION OF ABDOMEN, SUBSEQUENT ENCOUNTER: ICD-10-CM

## 2023-05-16 PROCEDURE — 99215 OFFICE O/P EST HI 40 MIN: CPT | Performed by: FAMILY MEDICINE

## 2023-05-16 PROCEDURE — G0463 HOSPITAL OUTPT CLINIC VISIT: HCPCS | Performed by: FAMILY MEDICINE

## 2023-05-16 RX ORDER — BACLOFEN 20 MG/1
20 TABLET ORAL 3 TIMES DAILY PRN
COMMUNITY
Start: 2023-05-10 | End: 2023-07-13

## 2023-05-16 ASSESSMENT — PAIN SCALES - GENERAL: PAINLEVEL: WORST PAIN (10)

## 2023-05-16 NOTE — PATIENT INSTRUCTIONS
Wound care supplies were ordered today through [employer] and if you are not receiving your supplies or have a question on your bill please contact Kathleen Jeff at [phone]. Please allow 2-5 business days for delivery of supplies. You may get a call from a [phone] # if there are additional information [facility] needs. It is important to  or return their call. PLEASE NOTE: If you need to return your supplies, you MUST call customer service within 15 days of delivery date.         Wound Care Instructions    EVERY OTHER DAY and as needed, Cleanse your Abdominal wound(s) with Normal saline.    Pat Dry with non-sterile gauze    Apply Lotion to the intact skin surrounding your wound and other dry skin locations. Some good lotions include: Remedy Skin Repair Cream, Sarna, Vanicream or Cetaphil    Primary Dressing: Apply endoform into/onto the wounds    Secondary dressing: Cover with mepilex 3x3      It is ok to get your wound wet in the bath or shower      If for some reason you are not able to get your dressing(s) changed as outlined above (due to illness, lack of supplies, lack of help) please do the following: remove old, soiled dressings; wash the wounds with saline; pat dry; apply ABD pad or other absorbant pad and secure with rolled gauze; avoid tape directly on your skin; Call the clinic as soon as possible to let us know what the current issues are in receiving wound care [phone].      SEEK MEDICAL CARE IF:  You have an increase in swelling, pain, or redness around the wound.  You have an increase in the amount of pus coming from the wound.  There is a bad smell coming from the wound.  The wound appears to be worsening/enlarging  You have a fever greater than 101.5 F      It is ok to continue current wound care treatment/products for the next 2-3 days until new wound care supplies are ordered and arrive. If longer than this please contact our office at [phone].    If you have a 2 layer or 4 layer  compression wrap on these are safe to have on for ONLY 7 days. If for some reason you are not able to get the wrap(s) changed (due to illness; lack of supplies, lack of help, lack of transportation) please do the following: unwrap the old 2 or 4 layer compression wrap; avoid using scissors as you could cut your skin and cause wounds; use tubular compression when available. Call to reschedule your home care or clinic visit appointment as soon as possible.    Please NOTE: if you are 15 minutes late to your clinic appointment you will have to be rescheduled. Please call our clinic as soon as possible if you know you will not be able to get to your appointment at [phone].    If you fail to show up to 3 scheduled clinic appointments you will be dismissed from our clinic.              We want to hear from you!  In the next few weeks, you should receive a call or email to complete a survey about your visit at [facility]. Please help us improve your appointment experience by letting us know how we did today. We strive to make your experience good and value any ways in which we could do better.      We value your input and suggestions.    Thank you for choosing the [facility]!

## 2023-05-16 NOTE — LETTER
[facility]  [address] [place] [address] [address]  [place], [place] [address]  [phone]      Fax [phone]    [facility]           Fax: [phone]            Customer Service: [phone]        Account #: [id number]    Wound Dressing Rx and Order Form  Order Status: New  Verbal: Re MÁRQUEZ  Date: May 16, 2023     Teresa Perez  Gender: female  : 1970  [address]  [place] [address]  [phone] (home)     Medical Record: [id number]  Primary Care Provider: Tyra Bullock      ICD-10-CM    1. Chronic wound infection of abdomen, subsequent encounter  S31.109D Wound Care Referral    L08.9 Wound care            Insurance Info:  INSURER: Payor: [employer] / Plan: [employer] MEDICARE ADVANTAGE / Product Type: HMO /   Policy ID#:  [id number]  SECONDARY INSURANCE:  MEDICA  Secondary Policy ID#:  [id number]        Physician Info:   Name:  ROBERTA DECKER     Dept Address/Phones:   [address] [place] [address], [address] [id number] [phone]  Fax: [phone]    Lymphedema circumferential measurements (in cm):       View : No data to display.                  Wound info:  Wound Buttocks Pressure injury community acquired Stage 2 (Active)   Number of days: 302       VASC Wound Umbilicus (Active)   Pre Size Length 0.6 23 0903   Pre Size Width 1.7 23 0903   Pre Size Depth 2 23 0903   Pre Total Sq cm 1.02 23 0903   Number of days: 0       VASC Wound Abdomen middle (Active)   Pre Size Length 3.3 23 0903   Pre Size Width 3.7 23 0903   Pre Size Depth 0.1 23 0903   Pre Total Sq cm 12.21 23 0903   Description scattered 23 0903   Number of days: 0       VASC Wound Abdomen left (Active)   Description scabbed 23 0903   Number of days: 0       Incision/Surgical Site 22 Abdomen (Active)   Number of days: 315       Incision/Surgical Site 22 Left Buttocks (Active)   Number of  days: 301        Drainage: moderate  Thickness:  full  Duration of Need: 30 DAYS  Days Supply: 30 DAYS  Start Date: 5/16/2023  Starter Kit: Ancillary Kit (saline, gloves, gauze)  Qualifying wound/Debridement: Yes      Dressing Type Brand Size Frequency of change -or- Quantity   Primary collagen endoform 2x2 EVERY OTHER DAY and as needed                        Secondary         Mepilex border adhesive   3x3 EVERY OTHER DAY and as needed (needs 2 per dressing change, 1 per each wound)    Saline   EVERY OTHER DAY and as needed                 Tape                       No substitutions preferred. Call [phone].         OK to forward to covered supplier.    Electronically Signed Physician:  ROBERTA DECKER             Date: May 16, 2023
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
## 2023-05-17 ENCOUNTER — TRANSFERRED RECORDS (OUTPATIENT)
Dept: HEALTH INFORMATION MANAGEMENT | Facility: CLINIC | Age: 53
End: 2023-05-17
Payer: COMMERCIAL

## 2023-05-29 ENCOUNTER — TELEPHONE (OUTPATIENT)
Dept: VASCULAR SURGERY | Facility: CLINIC | Age: 53
End: 2023-05-29
Payer: COMMERCIAL

## 2023-05-30 NOTE — TELEPHONE ENCOUNTER
General Call      Reason for Call:  Please cancel patient Teresa Perez's appt. With the vascular center on 5/30. She called late in evening on 5/29 to let us know she cannot make it to her appointment on 5/30 and she will call to reschedule it. Thank you.     What are your questions or concerns:  Cancel appointment on 5/30 with Dr. Chris at Vascular center   Date of last appointment with provider: n/a    Okay to leave a detailed message?: n/a

## 2023-06-02 ENCOUNTER — TELEPHONE (OUTPATIENT)
Dept: VASCULAR SURGERY | Facility: CLINIC | Age: 53
End: 2023-06-02
Payer: COMMERCIAL

## 2023-06-02 NOTE — TELEPHONE ENCOUNTER
Caller: Teresa    Provider: MD Natalio Chris    Detailed reason for call: Patient called to rescheduled the canceled appointment from last week.  She is now scheduled for 6/12/23.  She states she also needs an order placed for more supplies.      Best phone number to contact: [phone]    Best time to contact: any    Ok to leave a detailed message: Yes    Ok to speak to authorized person if needed: Yes: anyone is fine      (Noted to patient if reason is related to wound or incision, to please send a photo via email or [employer].)

## 2023-06-02 NOTE — TELEPHONE ENCOUNTER
Spoke with patient.  She thinks she has enough dressings to get her by until the upcoming appointment.  Will wait to order supplies until then in case the plan changes.

## 2023-06-05 DIAGNOSIS — Z86.73 HISTORY OF CVA (CEREBROVASCULAR ACCIDENT): ICD-10-CM

## 2023-06-05 DIAGNOSIS — R10.11 RUQ ABDOMINAL PAIN: ICD-10-CM

## 2023-06-05 DIAGNOSIS — Z79.4 TYPE 2 DIABETES MELLITUS WITH HYPERGLYCEMIA, WITH LONG-TERM CURRENT USE OF INSULIN (H): ICD-10-CM

## 2023-06-05 DIAGNOSIS — R10.84 ABDOMINAL PAIN, GENERALIZED: Primary | ICD-10-CM

## 2023-06-05 DIAGNOSIS — I10 ESSENTIAL HYPERTENSION, BENIGN: ICD-10-CM

## 2023-06-05 DIAGNOSIS — E11.65 TYPE 2 DIABETES MELLITUS WITH HYPERGLYCEMIA, WITH LONG-TERM CURRENT USE OF INSULIN (H): ICD-10-CM

## 2023-06-06 ENCOUNTER — OFFICE VISIT (OUTPATIENT)
Dept: FAMILY MEDICINE | Facility: CLINIC | Age: 53
End: 2023-06-06
Payer: COMMERCIAL

## 2023-06-06 VITALS
DIASTOLIC BLOOD PRESSURE: 78 MMHG | SYSTOLIC BLOOD PRESSURE: 112 MMHG | RESPIRATION RATE: 20 BRPM | WEIGHT: 195 LBS | OXYGEN SATURATION: 96 % | BODY MASS INDEX: 37.46 KG/M2 | TEMPERATURE: 98.2 F | HEART RATE: 88 BPM

## 2023-06-06 DIAGNOSIS — Z12.4 CERVICAL CANCER SCREENING: ICD-10-CM

## 2023-06-06 DIAGNOSIS — S88.919A AMPUTATION OF LEG (H): Primary | ICD-10-CM

## 2023-06-06 DIAGNOSIS — E11.65 TYPE 2 DIABETES MELLITUS WITH HYPERGLYCEMIA, WITH LONG-TERM CURRENT USE OF INSULIN (H): ICD-10-CM

## 2023-06-06 DIAGNOSIS — R10.84 ABDOMINAL PAIN, GENERALIZED: ICD-10-CM

## 2023-06-06 DIAGNOSIS — I10 ESSENTIAL HYPERTENSION, BENIGN: ICD-10-CM

## 2023-06-06 DIAGNOSIS — Z79.4 TYPE 2 DIABETES MELLITUS WITH OTHER SKIN ULCER, WITH LONG-TERM CURRENT USE OF INSULIN (H): ICD-10-CM

## 2023-06-06 DIAGNOSIS — Z12.11 SCREEN FOR COLON CANCER: ICD-10-CM

## 2023-06-06 DIAGNOSIS — E11.8 TYPE 2 DIABETES MELLITUS WITH COMPLICATION, WITH LONG-TERM CURRENT USE OF INSULIN (H): ICD-10-CM

## 2023-06-06 DIAGNOSIS — R10.11 RUQ ABDOMINAL PAIN: ICD-10-CM

## 2023-06-06 DIAGNOSIS — Z23 NEED FOR SHINGLES VACCINE: ICD-10-CM

## 2023-06-06 DIAGNOSIS — E11.622 TYPE 2 DIABETES MELLITUS WITH OTHER SKIN ULCER, WITH LONG-TERM CURRENT USE OF INSULIN (H): ICD-10-CM

## 2023-06-06 DIAGNOSIS — Z79.4 TYPE 2 DIABETES MELLITUS WITH HYPERGLYCEMIA, WITH LONG-TERM CURRENT USE OF INSULIN (H): ICD-10-CM

## 2023-06-06 DIAGNOSIS — R19.7 DIARRHEA, UNSPECIFIED TYPE: ICD-10-CM

## 2023-06-06 DIAGNOSIS — Z86.73 HISTORY OF CVA (CEREBROVASCULAR ACCIDENT): ICD-10-CM

## 2023-06-06 DIAGNOSIS — Z89.512 ACQUIRED ABSENCE OF LEFT LEG BELOW KNEE (H): ICD-10-CM

## 2023-06-06 DIAGNOSIS — Z79.4 TYPE 2 DIABETES MELLITUS WITH COMPLICATION, WITH LONG-TERM CURRENT USE OF INSULIN (H): ICD-10-CM

## 2023-06-06 DIAGNOSIS — L02.411 ABSCESS OF RIGHT AXILLA: ICD-10-CM

## 2023-06-06 LAB
ALBUMIN SERPL BCG-MCNC: 4 G/DL (ref 3.5–5.2)
ALP SERPL-CCNC: 119 U/L (ref 35–104)
ALT SERPL W P-5'-P-CCNC: 39 U/L (ref 10–35)
ANION GAP SERPL CALCULATED.3IONS-SCNC: 14 MMOL/L (ref 7–15)
AST SERPL W P-5'-P-CCNC: 25 U/L (ref 10–35)
BILIRUB DIRECT SERPL-MCNC: <0.2 MG/DL (ref 0–0.3)
BILIRUB SERPL-MCNC: <0.2 MG/DL
BUN SERPL-MCNC: 19.9 MG/DL (ref 6–20)
CALCIUM SERPL-MCNC: 9.6 MG/DL (ref 8.6–10)
CHLORIDE SERPL-SCNC: 100 MMOL/L (ref 98–107)
CHOLEST SERPL-MCNC: 117 MG/DL
CREAT SERPL-MCNC: 0.49 MG/DL (ref 0.51–0.95)
DEPRECATED HCO3 PLAS-SCNC: 21 MMOL/L (ref 22–29)
GFR SERPL CREATININE-BSD FRML MDRD: >90 ML/MIN/1.73M2
GLUCOSE SERPL-MCNC: 191 MG/DL (ref 70–99)
HBA1C MFR BLD: 8.6 % (ref 0–5.6)
HDLC SERPL-MCNC: 28 MG/DL
LDLC SERPL CALC-MCNC: 50 MG/DL
MAGNESIUM SERPL-MCNC: 1.9 MG/DL (ref 1.7–2.3)
NONHDLC SERPL-MCNC: 89 MG/DL
POTASSIUM SERPL-SCNC: 4.9 MMOL/L (ref 3.4–5.3)
PROT SERPL-MCNC: 7.1 G/DL (ref 6.4–8.3)
SODIUM SERPL-SCNC: 135 MMOL/L (ref 136–145)
TRIGL SERPL-MCNC: 197 MG/DL

## 2023-06-06 PROCEDURE — 99215 OFFICE O/P EST HI 40 MIN: CPT | Mod: 25 | Performed by: STUDENT IN AN ORGANIZED HEALTH CARE EDUCATION/TRAINING PROGRAM

## 2023-06-06 PROCEDURE — 80061 LIPID PANEL: CPT | Performed by: STUDENT IN AN ORGANIZED HEALTH CARE EDUCATION/TRAINING PROGRAM

## 2023-06-06 PROCEDURE — 82248 BILIRUBIN DIRECT: CPT | Performed by: STUDENT IN AN ORGANIZED HEALTH CARE EDUCATION/TRAINING PROGRAM

## 2023-06-06 PROCEDURE — 83735 ASSAY OF MAGNESIUM: CPT | Performed by: STUDENT IN AN ORGANIZED HEALTH CARE EDUCATION/TRAINING PROGRAM

## 2023-06-06 PROCEDURE — G0010 ADMIN HEPATITIS B VACCINE: HCPCS | Performed by: STUDENT IN AN ORGANIZED HEALTH CARE EDUCATION/TRAINING PROGRAM

## 2023-06-06 PROCEDURE — 80053 COMPREHEN METABOLIC PANEL: CPT | Performed by: STUDENT IN AN ORGANIZED HEALTH CARE EDUCATION/TRAINING PROGRAM

## 2023-06-06 PROCEDURE — 90746 HEPB VACCINE 3 DOSE ADULT IM: CPT | Performed by: STUDENT IN AN ORGANIZED HEALTH CARE EDUCATION/TRAINING PROGRAM

## 2023-06-06 PROCEDURE — 83036 HEMOGLOBIN GLYCOSYLATED A1C: CPT | Performed by: STUDENT IN AN ORGANIZED HEALTH CARE EDUCATION/TRAINING PROGRAM

## 2023-06-06 PROCEDURE — 36415 COLL VENOUS BLD VENIPUNCTURE: CPT | Performed by: STUDENT IN AN ORGANIZED HEALTH CARE EDUCATION/TRAINING PROGRAM

## 2023-06-06 ASSESSMENT — ASTHMA QUESTIONNAIRES: ACT_TOTALSCORE: 16

## 2023-06-06 NOTE — LETTER
June 7, 2023      Teresa Perez  [id number]  [place] [address]        Dear ,    We are writing to inform you of your test results.    It was nice to see you in clinic.  You have been working so hard at getting all of these things taken care of.  Here is a copy of your lab results.  As we discussed in clinic, your A1c is up a bit, but overall still doing well.  Your kidney tests are stable.  Your liver tests are stable - but I am glad that you are seeing the GI team for follow up.  You cholesterol is stable.  Your magnesium is normal.  Please call the clinic at [phone] if you have any questions.     Resulted Orders   Hemoglobin A1c   Result Value Ref Range    Hemoglobin A1C 8.6 (H) 0.0 - 5.6 %      Comment:      Normal <5.7%   Prediabetes 5.7-6.4%    Diabetes 6.5% or higher     Note: Adopted from ADA consensus guidelines.   Basic metabolic panel   Result Value Ref Range    Sodium 135 (L) 136 - 145 mmol/L    Potassium 4.9 3.4 - 5.3 mmol/L    Chloride 100 98 - 107 mmol/L    Carbon Dioxide (CO2) 21 (L) 22 - 29 mmol/L    Anion Gap 14 7 - 15 mmol/L    Urea Nitrogen 19.9 6.0 - 20.0 mg/dL    Creatinine 0.49 (L) 0.51 - 0.95 mg/dL    Calcium 9.6 8.6 - 10.0 mg/dL    Glucose 191 (H) 70 - 99 mg/dL    GFR Estimate >90 >60 mL/min/1.73m2      Comment:      eGFR calculated using 2021 CKD-EPI equation.   Hepatic function panel   Result Value Ref Range    Protein Total 7.1 6.4 - 8.3 g/dL    Albumin 4.0 3.5 - 5.2 g/dL    Bilirubin Total <0.2 <=1.2 mg/dL    Alkaline Phosphatase 119 (H) 35 - 104 U/L    AST 25 10 - 35 U/L    ALT 39 (H) 10 - 35 U/L    Bilirubin Direct <0.20 0.00 - 0.30 mg/dL   Magnesium   Result Value Ref Range    Magnesium 1.9 1.7 - 2.3 mg/dL   Lipid Profile   Result Value Ref Range    Cholesterol 117 <200 mg/dL    Triglycerides 197 (H) <150 mg/dL    Direct Measure HDL 28 (L) >=50 mg/dL    LDL Cholesterol Calculated 50 <=100 mg/dL    Non HDL Cholesterol 89 <130 mg/dL    Narrative     Cholesterol  Desirable:  <200 mg/dL    Triglycerides  Normal:  Less than 150 mg/dL  Borderline High:  150-199 mg/dL  High:  200-499 mg/dL  Very High:  Greater than or equal to 500 mg/dL    Direct Measure HDL  Female:  Greater than or equal to 50 mg/dL   Male:  Greater than or equal to 40 mg/dL    LDL Cholesterol  Desirable:  <100mg/dL  Above Desirable:  100-129 mg/dL   Borderline High:  130-159 mg/dL   High:  160-189 mg/dL   Very High:  >= 190 mg/dL    Non HDL Cholesterol  Desirable:  130 mg/dL  Above Desirable:  130-159 mg/dL  Borderline High:  160-189 mg/dL  High:  190-219 mg/dL  Very High:  Greater than or equal to 220 mg/dL       If you have any questions or concerns, please call the clinic at the number listed above.       Sincerely,      Tyra Bullock MD

## 2023-06-06 NOTE — NURSING NOTE
Prior to immunization administration, verified patients identity using patient s name and date of birth. Please see Immunization Activity for additional information.     Screening Questionnaire for Adult Immunization    Are you sick today?   No   Do you have allergies to medications, food, a vaccine component or latex?   Yes   Have you ever had a serious reaction after receiving a vaccination?   No   Do you have a long-term health problem with heart, lung, kidney, or metabolic disease (e.g., diabetes), asthma, a blood disorder, no spleen, complement component deficiency, a cochlear implant, or a spinal fluid leak?  Are you on long-term aspirin therapy?   Yes   Do you have cancer, leukemia, HIV/AIDS, or any other immune system problem?   No   Do you have a parent, brother, or sister with an immune system problem?   Don't Know   In the past 3 months, have you taken medications that affect  your immune system, such as prednisone, other steroids, or anticancer drugs; drugs for the treatment of rheumatoid arthritis, Crohn s disease, or psoriasis; or have you had radiation treatments?   No   Have you had a seizure, or a brain or other nervous system problem?   No   During the past year, have you received a transfusion of blood or blood    products, or been given immune (gamma) globulin or antiviral drug?   No   For women: Are you pregnant or is there a chance you could become       pregnant during the next month?   No   Have you received any vaccinations in the past 4 weeks?   No     Immunization questionnaire was positive for at least one answer.  Notified Dr. Bullock, she's ok to go ahead to give vaccine.      Injection of HepB given by aFizan Lacey. Patient instructed to remain in clinic for 15 minutes afterwards, and to report any adverse reactions.     Screening performed by Faizan Lacey on 6/6/2023 at 10:56 AM.

## 2023-06-07 ENCOUNTER — MEDICAL CORRESPONDENCE (OUTPATIENT)
Dept: HEALTH INFORMATION MANAGEMENT | Facility: CLINIC | Age: 53
End: 2023-06-07

## 2023-06-07 NOTE — PROGRESS NOTES
Nursing Notes:   FAIZAN LACEY  6/6/2023 10:57 AM  Signed  Prior to immunization administration, verified patients identity using patient s name and date of birth. Please see Immunization Activity for additional information.     Screening Questionnaire for Adult Immunization    Are you sick today?   No   Do you have allergies to medications, food, a vaccine component or latex?   Yes   Have you ever had a serious reaction after receiving a vaccination?   No   Do you have a long-term health problem with heart, lung, kidney, or metabolic disease (e.g., diabetes), asthma, a blood disorder, no spleen, complement component deficiency, a cochlear implant, or a spinal fluid leak?  Are you on long-term aspirin therapy?   Yes   Do you have cancer, leukemia, HIV/AIDS, or any other immune system problem?   No   Do you have a parent, brother, or sister with an immune system problem?   Don't Know   In the past 3 months, have you taken medications that affect  your immune system, such as prednisone, other steroids, or anticancer drugs; drugs for the treatment of rheumatoid arthritis, Crohn s disease, or psoriasis; or have you had radiation treatments?   No   Have you had a seizure, or a brain or other nervous system problem?   No   During the past year, have you received a transfusion of blood or blood    products, or been given immune (gamma) globulin or antiviral drug?   No   For women: Are you pregnant or is there a chance you could become       pregnant during the next month?   No   Have you received any vaccinations in the past 4 weeks?   No     Immunization questionnaire was positive for at least one answer.  Notified Dr. Bullock, she's ok to go ahead to give vaccine.      Injection of HepB given by Faizan Lacey. Patient instructed to remain in clinic for 15 minutes afterwards, and to report any adverse reactions.     Screening performed by Faizan Lacey on 6/6/2023 at 10:56 AM.            ASSESSMENT AND PLAN:      Teresa garcia  seen today for multiple chronic medical concerns.  Has been working hard at attending follow-up visits with multiple specialists and stay on top of her medical needs.  Congratulated her on this.    Diagnoses and all orders for this visit:    Amputation of leg (H)  Is due for replacement of the liners for her prosthetic.  New prescription was given today.  -     Miscellaneous Order for DME - ONLY FOR DME  -     Miscellaneous Order for DME - ONLY FOR DME    Type 2 diabetes mellitus with hyperglycemia, with long-term current use of insulin (H)  A1c is 8.6.  Overall this has been stable, and near goal she has been losing weight, and has more regular access to food.  Treating hypoglycemia appropriately.  I did not make any changes today, she has been doing well on this regimen.  Prescription for diabetic shoes was given.  She is going to see the eye doctor tomorrow for evaluation.  -     Hemoglobin A1c  -     Basic metabolic panel  -     Lipid Profile  -     Miscellaneous Order for DME - ONLY FOR DME  -     Miscellaneous Order for DME - ONLY FOR DME    Essential hypertension, benign  Blood pressure is appropriately controlled today.  -     Basic metabolic panel  -     Lipid Profile    Abdominal pain, generalized  RUQ abdominal pain  Diarrhea  Has had chronic loose stools, requiring Imodium.  Initial infectious work-up was negative.  Testing for thyroid and celiac was negative.  Has had multiple abdominal hernia repairs and previous abscesses, and so her anatomy may not be completely normal either.  She has also had post distant mild liver function test elevation, and was seen previously at [facility] for this, which is believed to be secondary to fatty liver.  Recommended further evaluation of this diarrhea, and we agreed the next best step was to see a GI specialist.  Can we check her plan for fatty liver, do a more full evaluation of diarrhea.  I discussed that she may need a colonoscopy for further evaluation.  -      Hepatic function panel  -     Adult GI  Referral - Consult Only; Future  -     Magnesium    Cervical cancer screening  History of menorrhagia  Likely postmenopausal  She declines cervical cancer screening.  Is no longer having periods.  Is no longer needing Depo for treatment of menorrhagia.  Is happy about this.  Does still get some mild cramping.    Other orders  Recommended hepatitis B vaccine [facility].  -     HEPATITIS B VACCINE ADULT 3 DOSE IM (ENGERIX-B/RECOMBIVAX HB)    Abscess of the right axilla.  She has had difficulty with recurrent hidradenitis, and multiple abscesses of other locations with history of MRSA.  This wound looks to have drained.  There is only mild erythema around the edge.  No extending erythema.  It appears to be healing well with some granulation tissue formation.  Discussed that no antibiotics are needed.  Would continue to keep the area as clean and dry as possible.  Recommend wound care nurse to look at this area as well.    Asthma/COPD.  Her lungs sound the best that they have for a while.  Continue with nicotine patches.  Continue to work on cutting down on cigarettes.  Okay to use the Symbicort on a more intermittent basis if not tolerating it daily with mild symptoms.  Recommended cessation of cigarettes and THC in the long run.    Chronic pain:  Getting injections at [place].  Most recently one of the shoulder.  She reports need for neck and low back injections as well.  We will try to coordinate these all through [place].  She is interested in seeing a pain clinic, I am in support of this.  We have placed multiple referrals.  I do think she would be a challenging candidate for long-term chronic narcotics given her complex history.    Follow-up again in 6 weeks.  Congratulated patient on all her hard work attending appointments.    Tyra Bullock MD    SUBJECTIVE  Teresa Perez is a 52 year old female with past medical history significant for    Patient Active  Problem List   Diagnosis     Health Care Home     Acute peptic ulcer     Other allergy, other than to medicinal agents     Bulging lumbar disc     Cervical dysplasia     Common migraine without aura     Constipation     Dwarfism     Familial hypercholesterolemia     Insomnia     Low back pain     Intermittent asthma     Leg pain, bilateral     Smoking     Degeneration of thoracic or thoracolumbar intervertebral disc     Type 2 diabetes mellitus with other skin ulcer, with long-term current use of insulin (H)     LOMAS (nonalcoholic steatohepatitis)     Disease of lung     Hemorrhoids     Parotid mass     Endometriosis     NNAMDI (obstructive sleep apnea)     History of total right knee replacement     Lateral epicondylitis     Impingement syndrome of shoulder region, left     Hx of total knee replacement, left     Chronic pain syndrome     Cough     Borderline personality disorder (H)     Moderate recurrent major depression (H)     Recurrent ventral hernia     Type 2 diabetes mellitus with complication, with long-term current use of insulin (H)     Essential hypertension     Nonruptured cerebral aneurysm     Cerebrovascular accident (CVA) due to embolism (H)     Amputation of leg (H)     Pre-ulcerative corn or callous     Excessive bleeding in premenopausal period     Morbid obesity (H)     Pseudoseizure     Chronic obstructive pulmonary disease (H)     Recurrent incisional hernia     Buttock wound, left, initial encounter     Cellulitis, unspecified cellulitis site     Sepsis, due to unspecified organism, unspecified whether acute organ dysfunction present (H)     Others present at the visit:  None    Presents for   Chief Complaint   Patient presents with     Other     Follow-up med check and right under arm painful     Patient presents today for follow-up of multiple issues.    Primary today is an abscess located under her armpit.  She has had difficulty with abscesses in multiple locations including those that formed  fairly large wounds on her buttocks and abdomen.  She is following with wound care for these.  This started a few days ago.  She was able to get it to drain.  Continues to drain into her armpit.  She is wearing a dressing over it because of the drainage.  It is painful.  Does seem to be getting smaller.  No fevers or chills.    We reviewed her social situation.  She is planning an upcoming move.  Is looking forward to this as she is concerned about safety in her current location.    She has a new PCA, Consuelo, who comes 3 days/week.  This is going well.  Is getting good care and support.  Her PCA son is actually getting signed up for 2 additional days and then she will have support at home 5 days a week.    She continues to see the wound clinic for the wounds on her abdomen.  There is one around her bellybutton and a larger one down below.  Nurse comes out weekly to do dressing changes and care and this is helpful.      She is having some itching and irritation around her bottom, where she had a previous abscess that is since healed and would like a refill of hydrocortisone cream for this.    We reviewed her diabetes.  She is currently doing 65 units twice daily of the Toujeo.  She is doing by tracee once weekly.  She is doing Humalog, 28 units with meals plus sliding scale with 2 units for every 50/150.  Sugars overall have been fairly good.  She denies any nighttime low sugars.  Does have some lows during the daytime when she is not eating regularly.  Is able to get a snack.  She has cut down significantly on her pop intake and is drinking more water, and this seems to help.  She has lost 13 pounds in the last 3 months through dietary changes and feels good about this.    Breathing has also been better.  She gets sores and irritation in her mouth from using the inhaler regularly, so she is cut back.  Is just using the Symbicort as needed right now.  She has been coughing up some occasional phlegm, but no blood.   Has cut down on her smoking and is back to smoking currently half a pack per day.  Has not been able to cut back on the THC right now but knows this would be helpful as well.    She continues to work on her other medical problems.  Has an appointment scheduled with the eye doctor for evaluation tomorrow.    She has been following with [facility] for her pain.  Had injections done in her shoulder a couple of weeks ago and this was helpful.  Also sees a neck specialist there and is planning to do injections as well.  She has had injections done in her back previously through [facility], but has not had imaging or evaluation since before COVID.  She plans to transfer her back concerns to [facility] as well so that these injections are coordinated.    She has been having more headaches lately.    She is considering pursuing further pain management care at [facility].      The diarrhea is better since taking the Imodium.  She has to use it fairly regularly, but then stools are okay.  Her back to side and bottom is healing up better with less diarrhea.    She is requesting new prescriptions for diabetic shoe and liners for her prosthetic limb and would like these printed out so she can take them to [place]    She has been off of Depo, and menses have not returned.  She does get some mild cramping on a monthly cycle.  Is comfortable continuing forward without the injections for now.      OBJECTIVE:  Vitals: /78   Pulse 88   Temp 98.2  F (36.8  C) (Oral)   Resp 20   Wt 88.5 kg (195 lb)   SpO2 96%   BMI 37.46 kg/m    BMI= Body mass index is 37.46 kg/m .  Objective:    Vitals:  Vitals are reviewed and are within the normal range.  Nontachycardic.  O2 sats are good.  Gen:  Alert, pleasant, no acute distress  Cardiac:  Regular rate and rhythm, no murmurs, rubs or gallops  Respiratory: Lungs with some transmitted upper airway noises, but no wheezes, no crackles, improved airflow.  Right axilla: There is a about  dime sized open area in the axilla.  Small amounts of erythema around the outside but nothing extending.  There is no palpable fluctuance.  The opening is healing from within and there is granulation tissue present.  Abdomen: I did not reexamine her abdomen today.  Extremities: She has a amputation present on the left leg.  No evidence of skin breakdown or irritation.        6/19/2019     9:28 AM 10/9/2019    12:50 PM 1/26/2022     2:47 PM   PHQ   PHQ-9 Total Score 8 8 10   Q9: Thoughts of better off dead/self-harm past 2 weeks Not at all Not at all Several days          6/30/2017     9:20 AM 7/21/2017     3:57 PM 10/9/2019    12:50 PM   ROSALVA-7 SCORE   Total Score 13 13 7         Results for orders placed or performed in visit on 06/06/23   Hemoglobin A1c     Status: Abnormal   Result Value Ref Range    Hemoglobin A1C 8.6 (H) 0.0 - 5.6 %   Basic metabolic panel     Status: Abnormal   Result Value Ref Range    Sodium 135 (L) 136 - 145 mmol/L    Potassium 4.9 3.4 - 5.3 mmol/L    Chloride 100 98 - 107 mmol/L    Carbon Dioxide (CO2) 21 (L) 22 - 29 mmol/L    Anion Gap 14 7 - 15 mmol/L    Urea Nitrogen 19.9 6.0 - 20.0 mg/dL    Creatinine 0.49 (L) 0.51 - 0.95 mg/dL    Calcium 9.6 8.6 - 10.0 mg/dL    Glucose 191 (H) 70 - 99 mg/dL    GFR Estimate >90 >60 mL/min/1.73m2   Hepatic function panel     Status: Abnormal   Result Value Ref Range    Protein Total 7.1 6.4 - 8.3 g/dL    Albumin 4.0 3.5 - 5.2 g/dL    Bilirubin Total <0.2 <=1.2 mg/dL    Alkaline Phosphatase 119 (H) 35 - 104 U/L    AST 25 10 - 35 U/L    ALT 39 (H) 10 - 35 U/L    Bilirubin Direct <0.20 0.00 - 0.30 mg/dL   Magnesium     Status: Normal   Result Value Ref Range    Magnesium 1.9 1.7 - 2.3 mg/dL   Lipid Profile     Status: Abnormal   Result Value Ref Range    Cholesterol 117 <200 mg/dL    Triglycerides 197 (H) <150 mg/dL    Direct Measure HDL 28 (L) >=50 mg/dL    LDL Cholesterol Calculated 50 <=100 mg/dL    Non HDL Cholesterol 89 <130 mg/dL    Narrative     Cholesterol  Desirable:  <200 mg/dL    Triglycerides  Normal:  Less than 150 mg/dL  Borderline High:  150-199 mg/dL  High:  200-499 mg/dL  Very High:  Greater than or equal to 500 mg/dL    Direct Measure HDL  Female:  Greater than or equal to 50 mg/dL   Male:  Greater than or equal to 40 mg/dL    LDL Cholesterol  Desirable:  <100mg/dL  Above Desirable:  100-129 mg/dL   Borderline High:  130-159 mg/dL   High:  160-189 mg/dL   Very High:  >= 190 mg/dL    Non HDL Cholesterol  Desirable:  130 mg/dL  Above Desirable:  130-159 mg/dL  Borderline High:  160-189 mg/dL  High:  190-219 mg/dL  Very High:  Greater than or equal to 220 mg/dL           There are no Patient Instructions on file for this visit.    Tyra Bullock MD

## 2023-06-07 NOTE — PATIENT INSTRUCTIONS
06/07/23   GASTROENTEROLOGY REFERRAL  [facility]  Phone [phone]  Fax: [phone]    Online referral placed with [facility] who will contact patient to schedule.     Margarita Lacey

## 2023-06-07 NOTE — RESULT ENCOUNTER NOTE
Teresa Perez-    It was nice to see you in clinic.  You have been working so hard at getting all of these things taken care of.  Here is a copy of your lab results.  As we discussed in clinic, your A1c is up a bit, but overall still doing well.  Your kidney tests are stable.  Your liver tests are stable - but I am glad that you are seeing the GI team for follow up.  You cholesterol is stable.  Your magnesium is normal.  Please call the clinic at [phone] if you have any questions.      Tyra Bullock MD    Please send results to patient.

## 2023-06-12 ENCOUNTER — OFFICE VISIT (OUTPATIENT)
Dept: VASCULAR SURGERY | Facility: CLINIC | Age: 53
End: 2023-06-12
Attending: FAMILY MEDICINE
Payer: COMMERCIAL

## 2023-06-12 VITALS
SYSTOLIC BLOOD PRESSURE: 115 MMHG | TEMPERATURE: 97.8 F | OXYGEN SATURATION: 96 % | RESPIRATION RATE: 16 BRPM | HEART RATE: 82 BPM | DIASTOLIC BLOOD PRESSURE: 72 MMHG

## 2023-06-12 DIAGNOSIS — S31.109D OPEN WOUND OF ABDOMINAL WALL, SUBSEQUENT ENCOUNTER: Primary | ICD-10-CM

## 2023-06-12 PROBLEM — S31.109A OPEN ABDOMINAL WALL WOUND: Status: ACTIVE | Noted: 2023-06-12

## 2023-06-12 PROBLEM — F12.90 CANNABIS USE DISORDER: Status: ACTIVE | Noted: 2021-10-14

## 2023-06-12 PROCEDURE — G0463 HOSPITAL OUTPT CLINIC VISIT: HCPCS | Mod: 25 | Performed by: FAMILY MEDICINE

## 2023-06-12 PROCEDURE — 11042 DBRDMT SUBQ TIS 1ST 20SQCM/<: CPT | Performed by: FAMILY MEDICINE

## 2023-06-12 PROCEDURE — 99214 OFFICE O/P EST MOD 30 MIN: CPT | Mod: 25 | Performed by: FAMILY MEDICINE

## 2023-06-12 RX ORDER — DICLOFENAC POTASSIUM 50 MG/1
50 TABLET, FILM COATED ORAL 2 TIMES DAILY PRN
COMMUNITY
Start: 2023-06-09 | End: 2023-11-03

## 2023-06-12 RX ORDER — GABAPENTIN 300 MG/1
CAPSULE ORAL
COMMUNITY
Start: 2023-06-09 | End: 2023-10-09

## 2023-06-12 ASSESSMENT — PAIN SCALES - GENERAL: PAINLEVEL: EXTREME PAIN (8)

## 2023-06-12 NOTE — LETTER
2023             [facility]             Wound Dressing Rx and Order Form             Order Status:Reorder             Verbal: Mari MONZON CMA  [facility]   Account # [id number]  Fax: [phone]  Customer Service: [phone]      Patient Info:  Name: Teresa Perez  : 1970  Address:   [address] [place] [address]  Phone: [phone]      Insurance Info:  PRIMARY INSURANCE: Payor: [employer] / Plan: [employer] MEDICARE ADVANTAGE / Product Type: HMO /   Primary Policy ID#: [id number]  SECONDARY INSURANCE: MEDICA MEDICA ACCESS ABILITY [place]   Secondary Policy ID#: [id number]    Physician Info:   Name:  Natalio Chris MD, MD   Dept Address/Phones:   [facility] [address] [id number] [phone]  Dept: [phone]  Fax: [phone]    Wound info:  Encounter Diagnosis   Name Primary?    Open wound of abdominal wall, subsequent encounter Yes     Wound Buttocks Pressure injury community acquired Stage 2 (Active)       VASC Wound Umbilicus (Active)   Pre Size Length 3.5 23 0700   Pre Size Width 1.5 23 0700   Pre Size Depth 1.5 23 0700   Pre Total Sq cm 1.02 23 0903       VASC Wound Abdomen middle (Active)   Pre Size Length 4 23 0700   Pre Size Width 4.2 23 0700   Pre Size Depth 0.1 23 0700   Pre Total Sq cm 16.8 23 0700   Description scattered 23 0903       VASC Wound Abdomen left (Active)   Description scabbed 23 0903       Incision/Surgical Site 22 Abdomen (Active)       Incision/Surgical Site 22 Left Buttocks (Active)     Drainage: moderate  Thickness:  Full  Duration of Need: 30  Days Supply: 30  Start Date: 2023  Starter Kit: Ancillary Kit (saline, gloves, gauze)  Qualifying wound/Debridement: Yes     Dressing Type Brand Size Days Supply  15/30 Quantity  changes/week   Primary Collagen  "Endoform 2\"x2\" 30 Every other day    Bordered Foam Adhesive Mepilex 3\"x3\" 30 Every other day     OK to forward to covered supplier.    Electronically Signed Physician: Natalio Chris MD, MD Date: 6/12/2023        "

## 2023-06-12 NOTE — PATIENT INSTRUCTIONS
Wound care supplies were ordered today through [employer] and if you are not receiving your supplies or have a question on your bill please contact Kathleen Jeff at [phone]. Please allow 2-5 business days for delivery of supplies. You may get a call from a [phone] if there are additional information [facility] needs. It is important to  or return their call. PLEASE NOTE: If you need to return your supplies, you MUST call customer service within 15 days of delivery date.       Wound Care Instructions    EVERY OTHER DAY and as needed, Cleanse your Abdominal wound(s) with Normal saline.    Pat Dry with non-sterile gauze    Apply Lotion to the intact skin surrounding your wound and other dry skin locations. Some good lotions include: Remedy Skin Repair Cream, Sarna, Vanicream or Cetaphil    Primary Dressing: Apply endoform into/onto the wounds    Secondary dressing: Cover with mepilex 3x3      It is ok to get your wound wet in the bath or shower      If for some reason you are not able to get your dressing(s) changed as outlined above (due to illness, lack of supplies, lack of help) please do the following: remove old, soiled dressings; wash the wounds with saline; pat dry; apply ABD pad or other absorbant pad and secure with rolled gauze; avoid tape directly on your skin; Call the clinic as soon as possible to let us know what the current issues are in receiving wound care [phone].      SEEK MEDICAL CARE IF:  You have an increase in swelling, pain, or redness around the wound.  You have an increase in the amount of pus coming from the wound.  There is a bad smell coming from the wound.  The wound appears to be worsening/enlarging  You have a fever greater than 101.5 F      It is ok to continue current wound care treatment/products for the next 2-3 days until new wound care supplies are ordered and arrive. If longer than this please contact our office at [phone].    If you have a 2 layer or 4 layer  compression wrap on these are safe to have on for ONLY 7 days. If for some reason you are not able to get the wrap(s) changed (due to illness; lack of supplies, lack of help, lack of transportation) please do the following: unwrap the old 2 or 4 layer compression wrap; avoid using scissors as you could cut your skin and cause wounds; use tubular compression when available. Call to reschedule your home care or clinic visit appointment as soon as possible.    Please NOTE: if you are 15 minutes late to your clinic appointment you will have to be rescheduled. Please call our clinic as soon as possible if you know you will not be able to get to your appointment at [phone].    If you fail to show up to 3 scheduled clinic appointments you will be dismissed from our clinic.              We want to hear from you!  In the next few weeks, you should receive a call or email to complete a survey about your visit at [facility]. Please help us improve your appointment experience by letting us know how we did today. We strive to make your experience good and value any ways in which we could do better.      We value your input and suggestions.    Thank you for choosing the [facility]!
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
## 2023-06-15 NOTE — TELEPHONE ENCOUNTER
Patient left a VM stating she received her supplies however they didn't send her anything to cover the wound.

## 2023-06-15 NOTE — TELEPHONE ENCOUNTER
Left voicemail for patient.  Insurance only allows 12 mepilex per month and it had not been a month yet from the last order.  The order for mepilex should be shipping out today according to the report from [facility].

## 2023-06-16 ENCOUNTER — TELEPHONE (OUTPATIENT)
Dept: FAMILY MEDICINE | Facility: CLINIC | Age: 53
End: 2023-06-16
Payer: COMMERCIAL

## 2023-06-16 NOTE — TELEPHONE ENCOUNTER
[facility] phone call message- general phone call:    Reason for call: Pt would like a callback from nurse or provider regarding forms that need to be completed within the next 7 days. Pt wanted to schedule an in office visit,but there is no openings.    Return call needed: Yes    OK to leave a message on voice mail? Yes    Primary language: English      needed? No    Call taken on June 16, 2023 at 8:27 AM by Grisel Flores-Cardona

## 2023-06-19 NOTE — TELEPHONE ENCOUNTER
Pt needing housing and diet form filled out. Scheduled her w/ Dr. Behm for this Wednesday.    Bhavani Lacey RN on 6/19/2023 at 10:18 AM

## 2023-06-21 ENCOUNTER — OFFICE VISIT (OUTPATIENT)
Dept: FAMILY MEDICINE | Facility: CLINIC | Age: 53
End: 2023-06-21
Payer: COMMERCIAL

## 2023-06-21 ENCOUNTER — MEDICAL CORRESPONDENCE (OUTPATIENT)
Dept: HEALTH INFORMATION MANAGEMENT | Facility: CLINIC | Age: 53
End: 2023-06-21

## 2023-06-21 VITALS
WEIGHT: 195 LBS | BODY MASS INDEX: 37.46 KG/M2 | HEART RATE: 82 BPM | SYSTOLIC BLOOD PRESSURE: 92 MMHG | OXYGEN SATURATION: 95 % | TEMPERATURE: 98.2 F | DIASTOLIC BLOOD PRESSURE: 62 MMHG

## 2023-06-21 DIAGNOSIS — Z02.89 ENCOUNTER FOR COMPLETION OF FORM WITH PATIENT: Primary | ICD-10-CM

## 2023-06-21 DIAGNOSIS — F41.9 ANXIETY: ICD-10-CM

## 2023-06-21 DIAGNOSIS — R19.7 DIARRHEA, UNSPECIFIED TYPE: ICD-10-CM

## 2023-06-21 PROCEDURE — 99214 OFFICE O/P EST MOD 30 MIN: CPT | Mod: GC | Performed by: STUDENT IN AN ORGANIZED HEALTH CARE EDUCATION/TRAINING PROGRAM

## 2023-06-21 RX ORDER — HYDROXYZINE HYDROCHLORIDE 25 MG/1
25-50 TABLET, FILM COATED ORAL 3 TIMES DAILY PRN
Qty: 30 TABLET | Refills: 11 | Status: SHIPPED | OUTPATIENT
Start: 2023-06-21 | End: 2023-07-19

## 2023-06-21 RX ORDER — LOPERAMIDE HCL 2 MG
CAPSULE ORAL
Qty: 100 CAPSULE | Refills: 11 | Status: SHIPPED | OUTPATIENT
Start: 2023-06-21 | End: 2024-07-08

## 2023-06-21 NOTE — PROGRESS NOTES
There are no exam notes on file for this visit.    Assessment & Plan    1. Encounter for completion of form with patient  Form completed to support physical disability (L above knee amputation) for public housing (she is currently living in but moving to a new building), and diet form. Diet form completed for high protein diet per vascular surgery recommendations to help with delayed wound healing (has CKD, used to be on dialysis due to severe rhabdomyolysis, review of past BMPs showed last GFR >90 and Cr 0.49), low cholesterol diet for h/o CAD, CVD, and hypoglycemic diet for complicated diabetes.   - forms copied to be scanned into medical records  - disability form faxed to provided number    2. Diarrhea, unspecified type  Refilled  - loperamide (IMODIUM) 2 MG capsule; TAKE 1 TABLET (2 MG) BY MOUTH 4 TIMES DAILY AS NEEDED FOR DIARRHEA  Dispense: 100 capsule; Refill: 11    3. Anxiety  Refilled  - hydrOXYzine (ATARAX) 25 MG tablet; Take 1-2 tablets (25-50 mg) by mouth 3 times daily as needed for itching  Dispense: 30 tablet; Refill: 11    Preventative Health: Reminded patient she is due for CPE    Options for treatment and/or follow-up care were reviewed with the patient/guardian. Patient and/or guardian was engaged and actively involved in the decision making process, verbalized understanding of the options discussed and was satisfied with the final plan.    For today's visit, I reviewed patient's PMH, PSH, FH, Medications, Allergies, Immunizations, and notes from most recent clinic encounter(s).  Patient's diagnosis, treatment, and care coordination is limited due to social determinants of health - Limited income, transportation barriers  I reviewed most recent labs.  I reviewed notes from nephrology, vascular specialist.   40 minutes spent on the date of the encounter doing chart review, history and exam, documentation, care coordination, and further activities per the note      Benita Behm, MD PGY3  [facility]  06/21/23    I precepted today with Dr. Willingham.    Subjective   Teresa Perez is a 52 year old who presents for the following health issues: forms    HPI    Patient need diet form completed for food benefits through the county, and documentation of physical disability to get public housing.   She also need refills of loperamide and hydroxyzine.     Review of Systems  Constitutional, HEENT, cardiovascular, pulmonary, gi and gu systems are negative, except as otherwise noted.    Objective   BP 92/62 (BP Location: Left arm, Patient Position: Sitting, Cuff Size: Adult Large)   Pulse 82   Temp 98.2  F (36.8  C) (Oral)   Wt 88.5 kg (195 lb)   SpO2 95%   BMI 37.46 kg/m    Physical Exam  GENERAL: Awake, alert, cooperative, no apparent distress  EYES: Lids and lashes normal, pupils equal, round and reactive to light, extra ocular muscles intact, sclera clear, conjunctiva normal. No hypopyon, hyphema, or pterygium.  HENT: Normocephalic, without obvious abnormality, atraumatic, external ears without lesions.  NECK: Supple, symmetrical, trachea midline, skin normal.  LUNGS: No audible wheezing or stridor, no increased work of breathing, talks in full sentences  MSK: using electric wheelchair, L above knee amputation.   NEURO: Awake, alert, oriented to name, place and time.  Cranial nerves II-XII are grossly intact. Gait is normal.  NEUROPSYCH: Normal affect, mood, orientation, and memory. Coherent speech, normal rate and volume, able to articulate logical thoughts, able to abstract reason, no tangential thoughts, no hallucinations or delusions   SKIN: No visible rashes, erythema, pallor, ecchymosis, petechia or purpura.      ----- Service Performed and Documented by Resident or Fellow ------

## 2023-06-23 ENCOUNTER — TELEPHONE (OUTPATIENT)
Dept: FAMILY MEDICINE | Facility: CLINIC | Age: 53
End: 2023-06-23
Payer: COMMERCIAL

## 2023-06-26 ENCOUNTER — TELEPHONE (OUTPATIENT)
Dept: VASCULAR SURGERY | Facility: CLINIC | Age: 53
End: 2023-06-26

## 2023-06-26 ENCOUNTER — OFFICE VISIT (OUTPATIENT)
Dept: VASCULAR SURGERY | Facility: CLINIC | Age: 53
End: 2023-06-26
Attending: FAMILY MEDICINE
Payer: COMMERCIAL

## 2023-06-26 VITALS — HEART RATE: 83 BPM | TEMPERATURE: 98.3 F | DIASTOLIC BLOOD PRESSURE: 60 MMHG | SYSTOLIC BLOOD PRESSURE: 90 MMHG

## 2023-06-26 DIAGNOSIS — S31.109D OPEN WOUND OF ABDOMINAL WALL, SUBSEQUENT ENCOUNTER: Primary | ICD-10-CM

## 2023-06-26 PROCEDURE — G0463 HOSPITAL OUTPT CLINIC VISIT: HCPCS | Mod: 25 | Performed by: FAMILY MEDICINE

## 2023-06-26 PROCEDURE — 11042 DBRDMT SUBQ TIS 1ST 20SQCM/<: CPT | Performed by: FAMILY MEDICINE

## 2023-06-26 RX ORDER — SULFAMETHOXAZOLE/TRIMETHOPRIM 800-160 MG
1 TABLET ORAL 2 TIMES DAILY
Qty: 28 TABLET | Refills: 0 | Status: SHIPPED | OUTPATIENT
Start: 2023-06-26 | End: 2023-07-10

## 2023-06-26 ASSESSMENT — PAIN SCALES - GENERAL: PAINLEVEL: EXTREME PAIN (9)

## 2023-06-26 NOTE — LETTER
[facility]  [address] [place] [address] [address], [place] [address]  [phone]      Fax [phone]    [facility]           Fax: [phone]            Customer Service: [phone]        Account #: [id number]    Wound Dressing Rx and Order Form  Order Status: new  Verbal: Tiffanie  Date: 2023     [facility]  Gender: female  : 1970  [address]  [place] [address]  [phone] (home)     Medical Record: [id number]  Primary Care Provider: Tyra Bullock      ICD-10-CM    1. Open wound of abdominal wall, subsequent encounter  S31.109D DEBRIDE SKIN/SUBQ TISSUE     Wound care            Insurance Info:  INSURER: Payor: [employer] / Plan: [employer] MEDICARE ADVANTAGE / Product Type: HMO /   Policy ID#:  [id number]  SECONDARY INSURANCE:  MEDICA  Secondary Policy ID#:  [id number]        Physician Info:   Name:  ROBERTA DECKER     Dept Address/Phones:   [address] [place] [address], [address] [address] [phone]  Fax: [phone]    Lymphedema circumferential measurements (in cm):       No data to display                  Wound info:  Wound Buttocks Pressure injury community acquired Stage 2 (Active)   Number of days: 343       VASC Wound Umbilicus (Active)   Pre Size Length 2 23 0800   Pre Size Width 2 23 0800   Pre Size Depth 2.5 23 0800   Pre Total Sq cm 4 23 0800   Number of days: 41       VASC Wound Abdomen middle (Active)   Pre Size Length 4 23 0800   Pre Size Width 5 23 0800   Pre Size Depth 0.1 23 0800   Pre Total Sq cm 20 23 0800   Description scattered 23 0903   Number of days: 41       VASC Wound Abdomen left (Active)   Description stable eschar 23 0700   Number of days: 41       VASC Wound abd wound distal (Active)   Pre Size Length 2 23 0800   Pre Size Width 2.4 23 0800   Pre Size Depth 0.1 23 0800   Pre Total Sq cm 4.8  "06/26/23 0800   Number of days: 0       Incision/Surgical Site 07/05/22 Abdomen (Active)   Number of days: 356       Incision/Surgical Site 07/19/22 Left Buttocks (Active)   Number of days: 342        Drainage: moderate  Thickness:  full  Duration of Need: 30 DAYS  Days Supply: 30 DAYS  Start Date: 6/26/2023  Starter Kit: Ancillary Kit (saline, gloves, gauze)  Qualifying wound/Debridement: Yes      Dressing Type Brand Size Frequency of change -or- Quantity   Primary Allevyn gentle border adhesive bandage  5\"X5\" EVERY OTHER DAY and as needed    Aquacel AG  4\"x5\" EVERY OTHER DAY and as needed     No substitutions preferred. Call [phone].         OK to forward to covered supplier.    Electronically Signed Physician:  ROBERTA DECKER             Date: June 26, 2023    "

## 2023-06-26 NOTE — PATIENT INSTRUCTIONS
Wound care supplies were ordered today through [facility] and if you are not receiving your supplies or have a question on your bill please contact Kathleen Jeff at [phone]. Please allow 2-5 business days for delivery of supplies. You may get a call from a [phone] if there are additional information [facility] needs. It is important to  or return their call. PLEASE NOTE: If you need to return your supplies, you MUST call customer service within 15 days of delivery date.       Wound Care Instructions    EVERY OTHER DAY and as needed, Cleanse your Abdominal wound(s) with Normal saline.    Pat Dry with non-sterile gauze    Apply Lotion to the intact skin surrounding your wound and other dry skin locations. Some good lotions include: Remedy Skin Repair Cream, Sarna, Vanicream or Cetaphil    Primary Dressing: Apply aquacel AG into/onto the wounds    Secondary dressing: Cover with mepilex       It is ok to get your wound wet in the bath or shower      If for some reason you are not able to get your dressing(s) changed as outlined above (due to illness, lack of supplies, lack of help) please do the following: remove old, soiled dressings; wash the wounds with saline; pat dry; apply ABD pad or other absorbant pad and secure with rolled gauze; avoid tape directly on your skin; Call the clinic as soon as possible to let us know what the current issues are in receiving wound care [phone].      SEEK MEDICAL CARE IF:  You have an increase in swelling, pain, or redness around the wound.  You have an increase in the amount of pus coming from the wound.  There is a bad smell coming from the wound.  The wound appears to be worsening/enlarging  You have a fever greater than 101.5 F      It is ok to continue current wound care treatment/products for the next 2-3 days until new wound care supplies are ordered and arrive. If longer than this please contact our office at [phone].    If you have a 2 layer or 4 layer  compression wrap on these are safe to have on for ONLY 7 days. If for some reason you are not able to get the wrap(s) changed (due to illness; lack of supplies, lack of help, lack of transportation) please do the following: unwrap the old 2 or 4 layer compression wrap; avoid using scissors as you could cut your skin and cause wounds; use tubular compression when available. Call to reschedule your home care or clinic visit appointment as soon as possible.    Please NOTE: if you are 15 minutes late to your clinic appointment you will have to be rescheduled. Please call our clinic as soon as possible if you know you will not be able to get to your appointment at [phone].    If you fail to show up to 3 scheduled clinic appointments you will be dismissed from our clinic.              We want to hear from you!  In the next few weeks, you should receive a call or email to complete a survey about your visit at [facility]. Please help us improve your appointment experience by letting us know how we did today. We strive to make your experience good and value any ways in which we could do better.      We value your input and suggestions.    Thank you for choosing the [facility]!
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
## 2023-06-26 NOTE — TELEPHONE ENCOUNTER
Caller: Patient     Provider: MD Natalio Chris    Detailed reason for call: Patient requesting Rx for bactrim (prev thought she had some at home already, but she does not). Would like this sent to [facility] on [address]    Best phone number to contact: [phone]    Best time to contact: Any    Ok to leave a detailed message: Yes    Ok to speak to authorized person if needed: No      (Noted to patient if reason is related to wound or incision, to please send a photo via email or Aigout.)       Physician addendum: I have sent a prescription for Bactrim for 14 days to her pharmacy.  I selected the [facility] pharmacy on [address].    Natalio Chris MD

## 2023-06-28 DIAGNOSIS — S88.919A AMPUTATION OF LEG (H): Primary | ICD-10-CM

## 2023-06-28 NOTE — TELEPHONE ENCOUNTER
[facility] [address]  [facility] [address]  [facility] [address]      Brian Lindsay :  1962 MRN:  [id number]    This visit was performed via live interactive two-way Video visit with patient's consent.   Clinician Location:Home.  Patient Location: Work.  [address] [place] [address] [address] [place] [address]  Verified patient identity:  [x] Yes    Treatment Plan:   Session  of current treatment plan    2023 Time Visit Began:  1pm  Time Visit Ended:  1:47pm    Session Type:  Therapy 38-52 minutes (18581)  Others Present:  No     Primary Diagnosis:  Major Depression Recurrent  :  1 Mild     Chief complaint in patient's own words:  \"Things are a little better at home\"    D:  Patient reported things are slightly better at home. He shared he realized his issues at work were contributing to his unhappiness at home because he was \"taking crap from work to home and from home to work\".    Intake completed during this visit    Suicide/Homicide/Violence Ideation:  No imminent safety concerns identified or reported during current encounter.     Change in Medication(s) Reported:  No     Current Outpatient Medications   Medication Sig   • tadalafil (CIALIS) 10 MG tablet Take 1 tablet by mouth as needed for Erectile Dysfunction. 1 hour  before sex   • docusate sodium (COLACE) 100 MG capsule    • baclofen (LIORESAL) 10 MG tablet every 12 hours.   • HYDROcodone-acetaminophen (NORCO) 5-325 MG per tablet every 6 hours.   • ibuprofen (MOTRIN) 600 MG tablet every 8 hours.     No current facility-administered medications for this visit.       PHQ 9 Scores  Date Adult PHQ 9 Score Adult PHQ 9 Interpretation   2023 7 Mild Depression   2023 12 Moderate Depression   3/14/2023 9 Mild Depression        MICHELLE 7 Scores  Date MICHELLE 7 Score   2023 7   2023 15   3/14/2023 12        A:  Re-administered the Patient Health Questionnaire - 9 (PHQ-9) and  [facility] phone call message-patient reporting a symptom:     Symptom:    Pt states she has a fever of 99.4 and she woke up drenched in sweat with chills.     Same Day Visit Offered: Pt wants to talk to nurse first.     Additional comments:     None    OK to leave message on voice mail? Yes    Primary language: English      needed? No    Call taken on October 14, 2021 at 12:17 PM by Nay Jefferson       Generalized Anxiety Disorder - 7 (MICHELLE-Georgia Rizo LCSW

## 2023-06-30 NOTE — TELEPHONE ENCOUNTER
DME and chart notes faxed to [employer] as requested     Mary Barrera RN on 6/30/2023 at 1:54 PM    
DME order for w/c and chart notes faxed to [facility] Rosy Barrera RN on 6/28/2023 at 11:44 AM    
Elva the nurse called back.  [facility] [employer] does not take her insurance.  Can you please fax this to [facility] at [phone].  If you have any questions and need to reach Elva, her number is [phone].  
[facility] phone call message- general phone call:    Reason for call: Her chair broke while she was in it.    Action desired: call back.    Return call needed: Yes    OK to leave a message on voice mail? Yes    Advised patient to response may take up to 2 business days: Yes    Primary language: English      needed? No    Call taken on June 23, 2023 at 4:21 PM by Lo Lao  
Her case manger Elva # [phone] calling back re where this order was sent to  
Message noted.  Please let me know if there is further documentation needed for prescription.     Tyra Bullock MD    Routed to YULISA Hernandez  
Patient states her manual w/c broke from under her she bumped her head and hurt her left shoulder when she fell she declines going to urgent care or needing to be seen. Patient asking what happened to the w/c that had been ordered from [employer].  Writer informed pt she would call on Monday and see what the delay is. Patient agreeable to plan. Patient encouraged to seek medical attention if her symptoms worsen or others occur.    Mary Barrera RN on 6/23/2023 at 4:32 PM  Note routed to Dr Bullock           
Writer unable to locate a current DME order for manual wheelchair to be faxed to [facility] Rosy Barrera RN on 6/27/2023 at 10:07 AM  Note routed to Dr Bullock              
DISPLAY PLAN FREE TEXT

## 2023-07-11 ENCOUNTER — TELEPHONE (OUTPATIENT)
Dept: FAMILY MEDICINE | Facility: CLINIC | Age: 53
End: 2023-07-11
Payer: COMMERCIAL

## 2023-07-11 DIAGNOSIS — G89.4 CHRONIC PAIN SYNDROME: Primary | ICD-10-CM

## 2023-07-11 NOTE — TELEPHONE ENCOUNTER
F/U call made to [employer] regarding status of w/c order. Rep unable to find order asked that order be re-faxed.  DME order demographics and chart notes re-faxed     Mary Barrera RN on 7/11/2023 at 11:19 AM

## 2023-07-13 RX ORDER — BACLOFEN 20 MG/1
TABLET ORAL
Qty: 45 TABLET | Refills: 10 | Status: SHIPPED | OUTPATIENT
Start: 2023-07-13 | End: 2023-08-02

## 2023-07-18 ENCOUNTER — TELEPHONE (OUTPATIENT)
Dept: FAMILY MEDICINE | Facility: CLINIC | Age: 53
End: 2023-07-18
Payer: COMMERCIAL

## 2023-07-18 NOTE — TELEPHONE ENCOUNTER
Message noted.  Agree with plan.  I will be around the clinic tomorrow morning, Dr. Roberts if you have any questions.  Glad patient was able to get an urgent appointment, and that she already has wound visits scheduled.  It has been challenging to get these wounds to heal.     Dr. Bullock

## 2023-07-18 NOTE — TELEPHONE ENCOUNTER
"Pt states that last night she was cleaning her abdominal wound \"scraping of the white stuff\" as the wound doctor taught her and it started hemorrhaging.  She states that she called 911 and Fire rescue guys came and wanted to take her to [facility] but she declined as she only goes to [facility]. She states that she was able to get the bleeding to stop.  She is feeling like she is \"hungover\" today with body aches.  She made an appt to see Dr Roberts tomorrow and her next wound clinic appt is 7/26/23.  Told her to call the wound clinic to update them on what happened.  She states that her wound does not have any signs of infection and appears to be getting better.  Discussed not picking at the wound anymore and need to call if she has further issues with bleeding or any other concerns.  Routed note to Dr Roberts and Dr Bullock./Yvonne English RN BSN  "

## 2023-07-18 NOTE — TELEPHONE ENCOUNTER
[facility] phone call message- general phone call:    Reason for call:     Patient would like Dr. Bullock to return her phone call.     Return call needed: Yes    OK to leave a message on voice mail? Yes    Primary language: English      needed? No    Call taken on July 18, 2023 at 10:25 AM by Naeem No

## 2023-07-19 ENCOUNTER — OFFICE VISIT (OUTPATIENT)
Dept: FAMILY MEDICINE | Facility: CLINIC | Age: 53
End: 2023-07-19
Payer: COMMERCIAL

## 2023-07-19 VITALS
OXYGEN SATURATION: 98 % | TEMPERATURE: 98.5 F | SYSTOLIC BLOOD PRESSURE: 119 MMHG | DIASTOLIC BLOOD PRESSURE: 79 MMHG | RESPIRATION RATE: 16 BRPM | HEART RATE: 102 BPM

## 2023-07-19 DIAGNOSIS — S31.109D OPEN WOUND OF ABDOMINAL WALL, SUBSEQUENT ENCOUNTER: ICD-10-CM

## 2023-07-19 DIAGNOSIS — M53.3 PAIN IN THE COCCYX: ICD-10-CM

## 2023-07-19 DIAGNOSIS — S31.829A BUTTOCK WOUND, LEFT, INITIAL ENCOUNTER: Primary | ICD-10-CM

## 2023-07-19 DIAGNOSIS — F41.9 ANXIETY: ICD-10-CM

## 2023-07-19 DIAGNOSIS — G47.09 OTHER INSOMNIA: ICD-10-CM

## 2023-07-19 LAB — HGB BLD-MCNC: 14.5 G/DL (ref 11.7–15.7)

## 2023-07-19 PROCEDURE — 85018 HEMOGLOBIN: CPT

## 2023-07-19 PROCEDURE — 99214 OFFICE O/P EST MOD 30 MIN: CPT | Mod: GC

## 2023-07-19 PROCEDURE — 36415 COLL VENOUS BLD VENIPUNCTURE: CPT

## 2023-07-19 RX ORDER — ACETAMINOPHEN 500 MG
500-1000 TABLET ORAL EVERY 6 HOURS PRN
Qty: 30 TABLET | Refills: 3 | Status: SHIPPED | OUTPATIENT
Start: 2023-07-19 | End: 2023-08-24

## 2023-07-19 RX ORDER — TRAZODONE HYDROCHLORIDE 50 MG/1
50 TABLET, FILM COATED ORAL AT BEDTIME
Qty: 30 TABLET | Refills: 0 | Status: SHIPPED | OUTPATIENT
Start: 2023-07-19 | End: 2023-08-13

## 2023-07-19 RX ORDER — HYDROXYZINE HYDROCHLORIDE 25 MG/1
25-50 TABLET, FILM COATED ORAL 3 TIMES DAILY PRN
Qty: 30 TABLET | Refills: 11 | Status: SHIPPED | OUTPATIENT
Start: 2023-07-19 | End: 2023-10-24

## 2023-07-19 NOTE — PATIENT INSTRUCTIONS
Thank you for trusting us with your care.     Here's a summary of the visit today:   Stop at lab   Start taking Tylenol every 6 hours as needed  Home care referral placed  Apply Mepilex dressing to be wound in your posterior thigh  Utilize the doughnut pillow to take pressure off  Refill sent to pharmacy  Follow up in 2 weeks               Thank you.     Dr. Roberts

## 2023-07-19 NOTE — LETTER
July 21, 2023      Teresa Perez  [address]  [place] [address]        Dear Ms.Chris,    We are writing to inform you of your test results.    Chela Moore,     I hope you're well. I wanted to communicate with you the results of the tests that we did.     The laboratory results show your hemoglobin is normal. Please let me know if you have any other questions or concerns.     Thank you!   Libertad Roberts, DO PGY2     Resulted Orders   Hemoglobin   Result Value Ref Range    Hemoglobin 14.5 11.7 - 15.7 g/dL       If you have any questions or concerns, please call the clinic at the number listed above.       Sincerely,      Alber Rivera MD

## 2023-07-19 NOTE — PROGRESS NOTES
Preceptor Attestation:    I discussed the patient with the resident and evaluated the patient in person. I have verified the content of the note, which accurately reflects my assessment of the patient and the plan of care.   Supervising Physician:  Alber Rivera MD.

## 2023-07-19 NOTE — PROGRESS NOTES
"  Assessment & Plan       Open wound of abdominal wall, subsequent encounter  Chronic stable abdominal wall wound without active bleeding or surrounding erythema.no suspecicion of ubfection.   Wound clinic follow scheduled on 7/24. finished 14 day course of bactrim yesterday. No concern for infection at this time. Follow up hemoglobin to rule our acute blood loss anemia. Home care referral placed. PCP updated.   - Hemoglobin  - Home Care Referral    Buttock wound, left, initial encounter  Pain in the coccyx  Left posterior thigh wound, superficial pressure wound ulcer without bleeding or acute signs of infection. Would benefit from taking pressure off intermittently. Apply mepilex dressing. Acetaminophen 500 mg Q6 hours PRN for discomfort. Follow up in 2 weeks.   -donut pillow DME order   - Miscellaneous Order for DME - ONLY FOR DME  - acetaminophen (TYLENOL) 500 MG tablet  Dispense: 30 tablet; Refill: 3  - Silver (MEPILEX AG) 4\"X4\" PADS  Dispense: 5 each; Refill: 3    Other insomnia  Anxiety  Refills sent.   - traZODone (DESYREL) 50 MG tablet  Dispense: 30 tablet; Refill: 0  - hydrOXYzine (ATARAX) 25 MG tablet  Dispense: 30 tablet; Refill: 11    Return in about 2 weeks (around 8/2/2023).    Libertad Roberts DO, PGY-3  [facility]    Today I precepted with Dr. Miguel MD, who agrees with the assessment and plan.      Bryson Moore is a 52 year old, presenting for the following health issues:  Other (Hemorrhage on abdomin  2 days ago . Ambulance  was called  but she didn't go to hospital )        6/21/2023     8:03 AM   Additional Questions   Roomed by bailey   Accompanied by self     HPI     Patient reports about 2 nights ago she had bleeding from the abdominal wall wounds. Fire dept called, expected patient to need medical care and recommended [facility] hospital. However, patient declined due to [facility] not being her home hospital. She did not want to deal with unexpected costs. She was recently " "moving, feels she's been moving around more which may have contributed to her bleeding. She also reports pressure ulcer just below the buttocks. She does have a  waiver so gets some assistance at home.     Patient reports she previously had home care services through, cannot recall name of company, her nurses name was deborah. Reports feeling \"hung over\" after she lost blood the night before. But denies falling or LOC. She denies alcohol use.     Has run out of trazodone and hydroxyzine for a couple of weeks. Requests refills today.     Review of Systems   Constitutional, HEENT, cardiovascular, pulmonary, gi and gu systems are negative, except as otherwise noted.      Objective    /79 (BP Location: Left arm, Patient Position: Sitting, Cuff Size: Adult Regular)   Pulse 102   Temp 98.5  F (36.9  C) (Oral)   Resp 16   SpO2 98%   There is no height or weight on file to calculate BMI.  Physical Exam   GENERAL: healthy, alert and no distress  NECK: no adenopathy, no asymmetry, masses, or scars and thyroid normal to palpation  RESP: lungs clear to auscultation - no rales, rhonchi or wheezes  CV: regular rate and rhythm, normal S1 S2, no S3 or S4, no murmur, click or rub, no peripheral edema and peripheral pulses strong  ABDOMEN: soft, nontender, no hepatosplenomegaly, no masses and bowel sounds normal  MS: no gross musculoskeletal defects noted, no edema              abdominal wall wound        Left coccyx wound        "

## 2023-07-24 ENCOUNTER — OFFICE VISIT (OUTPATIENT)
Dept: VASCULAR SURGERY | Facility: CLINIC | Age: 53
End: 2023-07-24
Payer: COMMERCIAL

## 2023-07-24 VITALS — HEART RATE: 87 BPM | TEMPERATURE: 98.2 F | SYSTOLIC BLOOD PRESSURE: 108 MMHG | DIASTOLIC BLOOD PRESSURE: 73 MMHG

## 2023-07-24 DIAGNOSIS — S31.109D OPEN WOUND OF ABDOMINAL WALL, SUBSEQUENT ENCOUNTER: Primary | ICD-10-CM

## 2023-07-24 PROCEDURE — 99214 OFFICE O/P EST MOD 30 MIN: CPT | Performed by: FAMILY MEDICINE

## 2023-07-24 PROCEDURE — G0463 HOSPITAL OUTPT CLINIC VISIT: HCPCS | Performed by: FAMILY MEDICINE

## 2023-07-24 NOTE — LETTER
[facility]  [address] [place] [address] [address]  [place], [place] [address]  [phone]      Fax [phone]    [facility]           Fax: [phone]            Customer Service: [phone]        Account #: [id number]    Wound Dressing Rx and Order Form  Order Status: refill  Verbal: Tiffanie  Date: 2023     Teresa Perez  Gender: female  : 1970  [address]  [place] [address]  [phone] (home)     Medical Record: [id number]  Primary Care Provider: Tyra Bullock      ICD-10-CM    1. Open wound of abdominal wall, subsequent encounter  S31.109D Wound care            Insurance Info:  INSURER: Payor: [employer] / Plan: [employer] MEDICARE ADVANTAGE / Product Type: HMO /   Policy ID#:  [id number]  SECONDARY INSURANCE:  MEDICA  Secondary Policy ID#:  [id number]        Physician Info:   Name:  ROBERTA DECKER     Dept Address/Phones:   [address] [place] [address], [address] [id number] [phone]  Fax: [phone]    Lymphedema circumferential measurements (in cm):       No data to display                  Wound info:  Wound Buttocks Pressure injury community acquired Stage 2 (Active)   Number of days: 371       VASC Wound Umbilicus (Active)   Pre Size Length 1.5 23 0700   Pre Size Width 1.5 23 0700   Pre Size Depth 1.3 23 0700   Pre Total Sq cm 2.25 23 0700   Number of days: 69       VASC Wound Abdomen middle (Active)   Pre Size Length 3.5 23 0700   Pre Size Width 5.5 23 0700   Pre Size Depth 0.1 23 0700   Pre Total Sq cm 19.25 23 0700   Description scattered 23 0903   Number of days: 69       VASC Wound Abdomen left (Active)   Description stable eschar 23 0700   Number of days: 69       VASC Wound abd wound distal (Active)   Pre Size Length 2 23 0700   Pre Size Width 3 23 0700   Pre Size Depth 0.1 23   Pre Total Sq cm 6 23   Number  "of days: 28       VASC Wound left IT (Active)   Pre Size Length 1 07/24/23 0700   Pre Size Width 0.5 07/24/23 0700   Pre Size Depth 0.1 07/24/23 0700   Pre Total Sq cm 0.5 07/24/23 0700   Number of days: 0       Incision/Surgical Site 07/05/22 Abdomen (Active)   Number of days: 384       Incision/Surgical Site 07/19/22 Left Buttocks (Active)   Number of days: 370        Drainage: moderate  Thickness:  full  Duration of Need: 30 DAYS  Days Supply: 30 DAYS  Start Date: 7/24/2023  Starter Kit: Ancillary Kit (saline, gloves, gauze)  Qualifying wound/Debridement: Yes      Dressing Type Brand Size Frequency of change -or- Quantity   Primary Allevyn gentle border adhesive  5\"x5\" EVERY OTHER DAY and as needed    Allevyn gentle border adhesive  3\"x3\" EVERY OTHER DAY and as needed  *NEEDS 2 PER DRESSING CHANGE*     No substitutions preferred. Call [phone].         OK to forward to covered supplier.    Electronically Signed Physician:  ROBERTA DECKER             Date: July 24, 2023    "

## 2023-07-24 NOTE — PATIENT INSTRUCTIONS
Wound care supplies were ordered today through [employer] and if you are not receiving your supplies or have a question on your bill please contact Kathleen Jeff at [phone]. Please allow 2-5 business days for delivery of supplies. You may get a call from a [phone] if there are additional information [facility] needs. It is important to  or return their call. PLEASE NOTE: If you need to return your supplies, you MUST call customer service within 15 days of delivery date.     Call the company that you got your wheelchair from and see if they can complete seat mapping for you.    Wound Care Instructions    EVERY OTHER DAY and as needed, Cleanse your Abdominal wound(s) with Normal saline.    Pat Dry with non-sterile gauze    Apply Lotion to the intact skin surrounding your wound and other dry skin locations. Some good lotions include: Remedy Skin Repair Cream, Sarna, Vanicream or Cetaphil    Primary Dressing: Apply aquacel AG into/onto the wounds    Secondary dressing: Cover with mepilex     Use your abdominal binder every day    Left IT:  Cleanse with saline or clean tap water, pat dry  Cover with mepilex 3x3  Change 3 times weekly      It is ok to get your wound wet in the bath or shower      If for some reason you are not able to get your dressing(s) changed as outlined above (due to illness, lack of supplies, lack of help) please do the following: remove old, soiled dressings; wash the wounds with saline; pat dry; apply ABD pad or other absorbant pad and secure with rolled gauze; avoid tape directly on your skin; Call the clinic as soon as possible to let us know what the current issues are in receiving wound care [phone].      SEEK MEDICAL CARE IF:  You have an increase in swelling, pain, or redness around the wound.  You have an increase in the amount of pus coming from the wound.  There is a bad smell coming from the wound.  The wound appears to be worsening/enlarging  You have a fever greater than  101.5 F      It is ok to continue current wound care treatment/products for the next 2-3 days until new wound care supplies are ordered and arrive. If longer than this please contact our office at [phone].    If you have a 2 layer or 4 layer compression wrap on these are safe to have on for ONLY 7 days. If for some reason you are not able to get the wrap(s) changed (due to illness; lack of supplies, lack of help, lack of transportation) please do the following: unwrap the old 2 or 4 layer compression wrap; avoid using scissors as you could cut your skin and cause wounds; use tubular compression when available. Call to reschedule your home care or clinic visit appointment as soon as possible.    Please NOTE: if you are 15 minutes late to your clinic appointment you will have to be rescheduled. Please call our clinic as soon as possible if you know you will not be able to get to your appointment at [phone].    If you fail to show up to 3 scheduled clinic appointments you will be dismissed from our clinic.              We want to hear from you!  In the next few weeks, you should receive a call or email to complete a survey about your visit at [facility]. Please help us improve your appointment experience by letting us know how we did today. We strive to make your experience good and value any ways in which we could do better.      We value your input and suggestions.    Thank you for choosing the [facility]!
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
## 2023-07-26 ENCOUNTER — MEDICAL CORRESPONDENCE (OUTPATIENT)
Dept: HEALTH INFORMATION MANAGEMENT | Facility: CLINIC | Age: 53
End: 2023-07-26
Payer: COMMERCIAL

## 2023-07-27 ENCOUNTER — DOCUMENTATION ONLY (OUTPATIENT)
Dept: FAMILY MEDICINE | Facility: CLINIC | Age: 53
End: 2023-07-27
Payer: COMMERCIAL

## 2023-07-27 NOTE — PROGRESS NOTES
To be completed in Nursing note:    Please reference list for forms that require a visit for completion.  Please remind patients that providers are given 3-5 business days to complete and return forms.      Form type:Continuous Glucose monitoring  (CGM)    Date form received: 23    Date form completed by Physician:23    How was form returned to patient (mailed, faxed, or at  for patient to ):  Faxed to   Date form mailed/faxed/left at  for patient and sent to HIM for scannin23    Once form is left for patient, faxed, or mailed PCS will then close the documentation only encounter.

## 2023-08-01 ENCOUNTER — TELEPHONE (OUTPATIENT)
Dept: FAMILY MEDICINE | Facility: CLINIC | Age: 53
End: 2023-08-01
Payer: COMMERCIAL

## 2023-08-01 DIAGNOSIS — G89.4 CHRONIC PAIN SYNDROME: ICD-10-CM

## 2023-08-01 NOTE — TELEPHONE ENCOUNTER
[facility] phone call message- patient requesting a refill:    Full Medication Name: Baclofen    Dose: 20mg    Pharmacy confirmed as   [id number] Closed - [place], [place] - [address] [facility]  [address]  [place] [address]  Phone: [phone] Fax: [phone]    [id number] - [place], [place] - [address] [facility] [address]  [address] [place] [address]  Phone: [phone] Fax: [phone]    [id number] - [place], [place] - [address]  [address] [place] [address]  Phone: [phone] Fax: [phone]  : No:     Additional Comments: pt moved and needs a written presc sent to [facility] at [address] [place] [address] to leave a message on voice mail? Yes    Primary language: English      needed? No    Call taken on August 1, 2023 at 10:55 AM by Alix Moeller

## 2023-08-02 RX ORDER — BACLOFEN 20 MG/1
20 TABLET ORAL 3 TIMES DAILY PRN
Qty: 45 TABLET | Refills: 10 | Status: SHIPPED | OUTPATIENT
Start: 2023-08-02 | End: 2024-01-08

## 2023-08-10 ENCOUNTER — TELEPHONE (OUTPATIENT)
Dept: FAMILY MEDICINE | Facility: CLINIC | Age: 53
End: 2023-08-10

## 2023-08-10 DIAGNOSIS — Z79.4 TYPE 2 DIABETES MELLITUS WITH HYPERGLYCEMIA, WITH LONG-TERM CURRENT USE OF INSULIN (H): ICD-10-CM

## 2023-08-10 DIAGNOSIS — E11.65 TYPE 2 DIABETES MELLITUS WITH HYPERGLYCEMIA, WITH LONG-TERM CURRENT USE OF INSULIN (H): ICD-10-CM

## 2023-08-10 DIAGNOSIS — G89.29 CHRONIC BILATERAL LOW BACK PAIN, UNSPECIFIED WHETHER SCIATICA PRESENT: ICD-10-CM

## 2023-08-10 DIAGNOSIS — G47.09 OTHER INSOMNIA: ICD-10-CM

## 2023-08-10 DIAGNOSIS — T78.40XS ALLERGY, UNSPECIFIED, SEQUELA: ICD-10-CM

## 2023-08-10 DIAGNOSIS — T78.40XS ALLERGIC REACTION, SEQUELA: ICD-10-CM

## 2023-08-10 DIAGNOSIS — I10 BENIGN ESSENTIAL HYPERTENSION: ICD-10-CM

## 2023-08-10 DIAGNOSIS — M54.50 CHRONIC BILATERAL LOW BACK PAIN, UNSPECIFIED WHETHER SCIATICA PRESENT: ICD-10-CM

## 2023-08-10 DIAGNOSIS — J45.20 MILD INTERMITTENT ASTHMA WITHOUT COMPLICATION: ICD-10-CM

## 2023-08-10 NOTE — TELEPHONE ENCOUNTER
[facility] phone call message- patient requesting a refill:    Full Medication Name: all medication - patient transferred pharmacy's and the refills did not transfer.    Pharmacy confirmed as   [id number] - [place], [place] - [address] [place] [address] AT [address] [facility] [address] [place] [address]  Phone: [phone] Fax: [phone]   : Yes    Additional Comments: none     OK to leave a message on voice mail? Yes    Primary language: English      needed? No    Call taken on August 10, 2023 at 8:52 AM by Mary Ann Giraldo

## 2023-08-11 NOTE — TELEPHONE ENCOUNTER
Patient previously went to the [facility] on [facility] and is now going to the [facility] on [address].  They should be able to transfer these medications over for her.     Faizan, can you call and see if there is something we need to do from our end to allow that transfer?      I am seeing the patient on 9/6 and will review and send in refills at that time, but she is on a lot of medications and should have a med rec and visit to ensure we are filling the right list.     Tyra Bullock MD    Routed to Faizan, PCS

## 2023-08-12 DIAGNOSIS — G47.09 OTHER INSOMNIA: ICD-10-CM

## 2023-08-13 RX ORDER — MONTELUKAST SODIUM 10 MG/1
1 TABLET ORAL AT BEDTIME
Qty: 30 TABLET | Refills: 10 | Status: SHIPPED | OUTPATIENT
Start: 2023-08-13 | End: 2023-08-15

## 2023-08-13 RX ORDER — EXENATIDE 2 MG/.85ML
INJECTION, SUSPENSION, EXTENDED RELEASE SUBCUTANEOUS
Qty: 3.4 ML | Refills: 10 | Status: SHIPPED | OUTPATIENT
Start: 2023-08-13 | End: 2023-08-24

## 2023-08-13 RX ORDER — INSULIN GLARGINE 300 U/ML
INJECTION, SOLUTION SUBCUTANEOUS
Qty: 9 ML | Refills: 10 | Status: ON HOLD | OUTPATIENT
Start: 2023-08-13 | End: 2023-10-16

## 2023-08-13 RX ORDER — GABAPENTIN 600 MG/1
600 TABLET ORAL 3 TIMES DAILY
Qty: 90 TABLET | Refills: 3 | Status: SHIPPED | OUTPATIENT
Start: 2023-08-13 | End: 2023-12-10

## 2023-08-13 RX ORDER — AMLODIPINE BESYLATE 10 MG/1
10 TABLET ORAL DAILY
Qty: 30 TABLET | Refills: 10 | Status: SHIPPED | OUTPATIENT
Start: 2023-08-13 | End: 2023-08-15

## 2023-08-13 RX ORDER — INSULIN LISPRO 100 [IU]/ML
INJECTION, SOLUTION INTRAVENOUS; SUBCUTANEOUS
Qty: 75 ML | Refills: 1 | Status: SHIPPED | OUTPATIENT
Start: 2023-08-13

## 2023-08-13 RX ORDER — TRAZODONE HYDROCHLORIDE 50 MG/1
50 TABLET, FILM COATED ORAL AT BEDTIME
Qty: 30 TABLET | Refills: 0 | Status: SHIPPED | OUTPATIENT
Start: 2023-08-13 | End: 2023-09-11

## 2023-08-13 RX ORDER — AZELASTINE HYDROCHLORIDE 0.5 MG/ML
SOLUTION/ DROPS OPHTHALMIC
Qty: 18 ML | Refills: 5 | Status: SHIPPED | OUTPATIENT
Start: 2023-08-13

## 2023-08-13 NOTE — TELEPHONE ENCOUNTER
Amlopine, Trazodone, Azenlastine, Montellukast, gabapentine, bydureon injection.            Medications indicated above as well as her insulin prescriptions have been sent to the [facility] on [address].     Tyra Bullock MD    Routed to LAURA Gloria.

## 2023-08-14 ENCOUNTER — TELEPHONE (OUTPATIENT)
Dept: FAMILY MEDICINE | Facility: CLINIC | Age: 53
End: 2023-08-14

## 2023-08-14 DIAGNOSIS — J45.30 MILD PERSISTENT ASTHMA WITHOUT COMPLICATION: ICD-10-CM

## 2023-08-14 DIAGNOSIS — J44.9 CHRONIC OBSTRUCTIVE PULMONARY DISEASE, UNSPECIFIED COPD TYPE (H): ICD-10-CM

## 2023-08-14 RX ORDER — TRAZODONE HYDROCHLORIDE 50 MG/1
50 TABLET, FILM COATED ORAL AT BEDTIME
Qty: 30 TABLET | Refills: 0 | OUTPATIENT
Start: 2023-08-14

## 2023-08-14 RX ORDER — ALBUTEROL SULFATE 90 UG/1
AEROSOL, METERED RESPIRATORY (INHALATION)
Qty: 8.5 G | Refills: 10 | Status: SHIPPED | OUTPATIENT
Start: 2023-08-14

## 2023-08-14 RX ORDER — ALBUTEROL SULFATE 0.83 MG/ML
2.5 SOLUTION RESPIRATORY (INHALATION) EVERY 6 HOURS PRN
Qty: 3 ML | Refills: 3 | Status: SHIPPED | OUTPATIENT
Start: 2023-08-14 | End: 2023-12-13

## 2023-08-14 NOTE — TELEPHONE ENCOUNTER
[facility] phone call message- patient requesting a refill:    Full Medication Name: Ventilation    Dose: 100mg    Pharmacy confirmed as       [id number] - [place], [place] - [address] [place] [address]  Phone: [phone] Fax: [phone]    : Yes    Additional Comments: she needs a refill     OK to leave a message on voice mail? Yes    Primary language: English      needed? No    Call taken on August 14, 2023 at 12:58 PM by Lo Lao

## 2023-08-14 NOTE — TELEPHONE ENCOUNTER
I have not refilled that medication since 2020, per our records.  This prescription should be coming from her psychiatrist.  The dose I have doesn't match the amount she is taking.     Can you please have her/pharmacy request this from psyche?    Tyra Bullock MD    Routed to LAURA Gloria.

## 2023-08-14 NOTE — TELEPHONE ENCOUNTER
I am assuming this is a refill of patient's Ventolin Inhaler.  The dose listed is a little atypical though.     I have sent in the refill for Ventolin, but Faizan, can you call and make sure that is the correct medication?  (She also takes Venlafaxine too?)    Tyra Bullock MD

## 2023-08-15 DIAGNOSIS — J45.20 MILD INTERMITTENT ASTHMA WITHOUT COMPLICATION: ICD-10-CM

## 2023-08-15 DIAGNOSIS — T78.40XS ALLERGIC REACTION, SEQUELA: ICD-10-CM

## 2023-08-15 DIAGNOSIS — I10 BENIGN ESSENTIAL HYPERTENSION: ICD-10-CM

## 2023-08-15 RX ORDER — MONTELUKAST SODIUM 10 MG/1
1 TABLET ORAL AT BEDTIME
Qty: 90 TABLET | Refills: 1 | Status: SHIPPED | OUTPATIENT
Start: 2023-08-15 | End: 2023-09-06

## 2023-08-15 RX ORDER — AMLODIPINE BESYLATE 10 MG/1
10 TABLET ORAL DAILY
Qty: 90 TABLET | Refills: 1 | Status: SHIPPED | OUTPATIENT
Start: 2023-08-15 | End: 2023-08-22

## 2023-08-16 ENCOUNTER — TELEPHONE (OUTPATIENT)
Dept: FAMILY MEDICINE | Facility: CLINIC | Age: 53
End: 2023-08-16
Payer: COMMERCIAL

## 2023-08-16 DIAGNOSIS — R10.11 RUQ ABDOMINAL PAIN: ICD-10-CM

## 2023-08-16 RX ORDER — MELOXICAM 15 MG/1
TABLET ORAL
Qty: 30 TABLET | Refills: 1 | Status: SHIPPED | OUTPATIENT
Start: 2023-08-16 | End: 2023-11-03

## 2023-08-16 NOTE — PATIENT INSTRUCTIONS
Wound care supplies were ordered today through [employer] and if you are not receiving your supplies or have a question on your bill please contact Kathleen Jeff at [phone]. Please allow 2-5 business days for delivery of supplies. You may get a call from a [phone] if there are additional information [facility] needs. It is important to  or return their call. PLEASE NOTE: If you need to return your supplies, you MUST call customer service within 15 days of delivery date.         Wound Care Instructions    DAILY and as needed, Cleanse your Abdominal wound(s) with Normal saline.    Pat Dry with non-sterile gauze    Apply Lotion to the intact skin surrounding your wound and other dry skin locations. Some good lotions include: Remedy Skin Repair Cream, Sarna, Vanicream or Cetaphil    Primary Dressing: Apply hydrofera blue ready transfer into/onto the wounds    Secondary dressing: Cover with ABD pad    Use your abdominal binder every day    Left IT:  Cleanse with saline or clean tap water, pat dry  Cover with hydrofera blue ready transfer, then mepilex 3x3  Change 3 times weekly      It is ok to get your wound wet in the bath or shower      If for some reason you are not able to get your dressing(s) changed as outlined above (due to illness, lack of supplies, lack of help) please do the following: remove old, soiled dressings; wash the wounds with saline; pat dry; apply ABD pad or other absorbant pad and secure with rolled gauze; avoid tape directly on your skin; Call the clinic as soon as possible to let us know what the current issues are in receiving wound care [phone].      SEEK MEDICAL CARE IF:  You have an increase in swelling, pain, or redness around the wound.  You have an increase in the amount of pus coming from the wound.  There is a bad smell coming from the wound.  The wound appears to be worsening/enlarging  You have a fever greater than 101.5 F      It is ok to continue current wound care  treatment/products for the next 2-3 days until new wound care supplies are ordered and arrive. If longer than this please contact our office at [phone].    If you have a 2 layer or 4 layer compression wrap on these are safe to have on for ONLY 7 days. If for some reason you are not able to get the wrap(s) changed (due to illness; lack of supplies, lack of help, lack of transportation) please do the following: unwrap the old 2 or 4 layer compression wrap; avoid using scissors as you could cut your skin and cause wounds; use tubular compression when available. Call to reschedule your home care or clinic visit appointment as soon as possible.    Please NOTE: if you are 15 minutes late to your clinic appointment you will have to be rescheduled. Please call our clinic as soon as possible if you know you will not be able to get to your appointment at [phone].    If you fail to show up to 3 scheduled clinic appointments you will be dismissed from our clinic.              We want to hear from you!  In the next few weeks, you should receive a call or email to complete a survey about your visit at [facility]. Please help us improve your appointment experience by letting us know how we did today. We strive to make your experience good and value any ways in which we could do better.      We value your input and suggestions.    Thank you for choosing the [facility]!

## 2023-08-16 NOTE — TELEPHONE ENCOUNTER
Call made to [employer] regarding request for manual w/c. Per rep pt not eligible for a new w/c thru her insurance carrier until 2/26 last w/c was received on 5/9/2019. Per rep Patient not willing to pay out of pocket for the w/c pt will discuss purchase of w/c with her [facility] worker.    Mary Barrera RN on 8/16/2023 at 10:09 AM

## 2023-08-21 ENCOUNTER — TELEPHONE (OUTPATIENT)
Dept: FAMILY MEDICINE | Facility: CLINIC | Age: 53
End: 2023-08-21

## 2023-08-21 ENCOUNTER — OFFICE VISIT (OUTPATIENT)
Dept: VASCULAR SURGERY | Facility: CLINIC | Age: 53
End: 2023-08-21
Payer: COMMERCIAL

## 2023-08-21 ENCOUNTER — TELEPHONE (OUTPATIENT)
Dept: VASCULAR SURGERY | Facility: CLINIC | Age: 53
End: 2023-08-21

## 2023-08-21 VITALS
OXYGEN SATURATION: 96 % | DIASTOLIC BLOOD PRESSURE: 71 MMHG | TEMPERATURE: 97.7 F | SYSTOLIC BLOOD PRESSURE: 112 MMHG | HEART RATE: 96 BPM | RESPIRATION RATE: 18 BRPM

## 2023-08-21 DIAGNOSIS — Z79.4 TYPE 2 DIABETES MELLITUS WITH HYPERGLYCEMIA, WITH LONG-TERM CURRENT USE OF INSULIN (H): ICD-10-CM

## 2023-08-21 DIAGNOSIS — J44.9 CHRONIC OBSTRUCTIVE PULMONARY DISEASE, UNSPECIFIED COPD TYPE (H): ICD-10-CM

## 2023-08-21 DIAGNOSIS — J45.40 MODERATE PERSISTENT ASTHMA WITHOUT COMPLICATION: ICD-10-CM

## 2023-08-21 DIAGNOSIS — Z86.73 HISTORY OF CVA (CEREBROVASCULAR ACCIDENT): ICD-10-CM

## 2023-08-21 DIAGNOSIS — S31.109D OPEN WOUND OF ABDOMINAL WALL, SUBSEQUENT ENCOUNTER: Primary | ICD-10-CM

## 2023-08-21 DIAGNOSIS — F31.70 BIPOLAR DISORDER IN FULL REMISSION, MOST RECENT EPISODE UNSPECIFIED TYPE (H): ICD-10-CM

## 2023-08-21 DIAGNOSIS — I10 BENIGN ESSENTIAL HYPERTENSION: ICD-10-CM

## 2023-08-21 DIAGNOSIS — E11.65 TYPE 2 DIABETES MELLITUS WITH HYPERGLYCEMIA, WITH LONG-TERM CURRENT USE OF INSULIN (H): ICD-10-CM

## 2023-08-21 PROCEDURE — 99214 OFFICE O/P EST MOD 30 MIN: CPT | Performed by: FAMILY MEDICINE

## 2023-08-21 PROCEDURE — G0463 HOSPITAL OUTPT CLINIC VISIT: HCPCS | Performed by: FAMILY MEDICINE

## 2023-08-21 RX ORDER — POLYETHYLENE GLYCOL-3350 AND ELECTROLYTES 236; 6.74; 5.86; 2.97; 22.74 G/274.31G; G/274.31G; G/274.31G; G/274.31G; G/274.31G
POWDER, FOR SOLUTION ORAL
COMMUNITY
Start: 2023-08-16

## 2023-08-21 RX ORDER — SULFAMETHOXAZOLE/TRIMETHOPRIM 800-160 MG
1 TABLET ORAL 2 TIMES DAILY
Qty: 28 TABLET | Refills: 0 | Status: SHIPPED | OUTPATIENT
Start: 2023-08-21 | End: 2023-09-04

## 2023-08-21 ASSESSMENT — PAIN SCALES - GENERAL: PAINLEVEL: EXTREME PAIN (8)

## 2023-08-21 NOTE — TELEPHONE ENCOUNTER
[facility] phone call message- general phone call:    Reason for call: Not been able to get refill on exfexer beta blocker and her am BP med, it is having a pretty hard affect on her     Action desired: for   back if questions    Return call needed: Yes    OK to leave a message on voice mail? Yes    Advised patient to response may take up to 2 business days: Yes    Primary language: English      needed? No    Call taken on August 21, 2023 at 9:44 AM by Lo Lao

## 2023-08-21 NOTE — TELEPHONE ENCOUNTER
Caller: Teresa    Provider: MD Natalio Chris    Detailed reason for call: Patient called asking for a call back from Dr. Chris's nurse.  She first said that she is concerned that the referral from home care is not on the AVS.  But then she said she has home care and they just need the orders to be placed.  Then she said that no one has ever come out and she needs us to follow up on getting a nurse to come out and do the dressing changes.  She would like a call back to discuss/find out what is going on with home care.      Best phone number to contact: [phone]    Best time to contact: any    Ok to leave a detailed message: No    Ok to speak to authorized person if needed: No      (Noted to patient if reason is related to wound or incision, to please send a photo via email or [email or web address].)

## 2023-08-21 NOTE — LETTER
[facility]  [address] [place] [address] [address]  [place], [place] [address]  [phone]      Fax [phone]    [facility]           Fax: [phone]            Customer Service: [phone]        Account #: [id number]    Wound Dressing Rx and Order Form  Order Status: new  Verbal: Tiffanie  Date: 2023     Teresa D [place]  Gender: female  : 1970  [address]  [place] [address]  [phone] (home)     Medical Record: [id number]  Primary Care Provider: Tyra Bullock      ICD-10-CM    1. Open wound of abdominal wall, subsequent encounter  S31.109D sulfamethoxazole-trimethoprim (BACTRIM DS) 800-160 MG tablet     Home Care Referral     Wound care            Insurance Info:  INSURER: Payor: [facility] / Plan: [employer] / Product Type: HMO /   Policy ID#:  [id number]  SECONDARY INSURANCE:  MEDICA  Secondary Policy ID#:  [id number]        Physician Info:   Name:  ROBERTA DECKER     Dept Address/Phones:   [address] [place] [address], [address]  [facility] [id number] [phone]  Fax: [phone]        Wound info:  Wound Buttocks Pressure injury community acquired Stage 2 (Active)   Number of days: 399       VASC Wound Umbilicus (Active)   Pre Size Length 1 23 0700   Pre Size Width 3 23 0700   Pre Size Depth 1.3 23 0700   Pre Total Sq cm 3 23 0700   Number of days: 97       VASC Wound Abdomen middle (Active)   Pre Size Length 3 23 0700   Pre Size Width 4 23 0700   Pre Size Depth 0.1 23 0700   Pre Total Sq cm 12 23 0700   Description scattered 23 0903   Number of days: 97       VASC Wound Abdomen left (Active)   Description stable eschar 23 0700   Number of days: 97       VASC Wound abd wound distal (Active)   Pre Size Length 3 23 0700   Pre Size Width 4.5 23 0700   Pre Size Depth 0.1 23 0700   Pre Total Sq cm 13.5 23 07   Number of days:  "56       VASC Wound left IT (Active)   Pre Size Length 2 08/21/23 0700   Pre Size Width 0.6 08/21/23 0700   Pre Size Depth 0.1 08/21/23 0700   Pre Total Sq cm 1.2 08/21/23 0700   Number of days: 28       Incision/Surgical Site 07/05/22 Abdomen (Active)   Number of days: 412       Incision/Surgical Site 07/19/22 Left Buttocks (Active)   Number of days: 398        Drainage: large  Thickness:  full  Duration of Need: 30 DAYS  Days Supply: 30 DAYS  Start Date: 8/21/2023  Starter Kit: Ancillary Kit (saline, gloves, gauze)  Qualifying wound/Debridement: Yes      Dressing Type Brand Size Frequency of change -or- Quantity   Primary Hydrofera blue ready transfer  4\"x5\" DAILY and as needed    ABD pads  8\"x10\" DAILY and as needed    Medipore tape  2\" rolls DAILY and as needed    Mepilex border adhesive bandage  3\"x3\" THREE TIMES WEEKLY and as needed     No substitutions preferred. Call [phone].         OK to forward to covered supplier.    Electronically Signed Physician:  ROBERTA DECKER             Date: August 21, 2023    "

## 2023-08-21 NOTE — TELEPHONE ENCOUNTER
Spoke with Teresa. She requested [facility] for the referral. Will fax referral to them.
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
## 2023-08-22 ENCOUNTER — TELEPHONE (OUTPATIENT)
Dept: VASCULAR SURGERY | Facility: CLINIC | Age: 53
End: 2023-08-22

## 2023-08-22 RX ORDER — VENLAFAXINE HYDROCHLORIDE 150 MG/1
150 CAPSULE, EXTENDED RELEASE ORAL DAILY
Qty: 30 CAPSULE | Refills: 1 | Status: SHIPPED | OUTPATIENT
Start: 2023-08-22 | End: 2023-09-13

## 2023-08-22 RX ORDER — METOPROLOL SUCCINATE 50 MG/1
50 TABLET, EXTENDED RELEASE ORAL DAILY
Qty: 30 TABLET | Refills: 5 | Status: SHIPPED | OUTPATIENT
Start: 2023-08-22 | End: 2023-09-01

## 2023-08-22 RX ORDER — BUDESONIDE AND FORMOTEROL FUMARATE DIHYDRATE 160; 4.5 UG/1; UG/1
AEROSOL RESPIRATORY (INHALATION)
Qty: 10.2 G | Refills: 10 | Status: SHIPPED | OUTPATIENT
Start: 2023-08-22 | End: 2023-10-09

## 2023-08-22 RX ORDER — CLOPIDOGREL BISULFATE 75 MG/1
75 TABLET ORAL DAILY
Qty: 30 TABLET | Refills: 10 | Status: SHIPPED | OUTPATIENT
Start: 2023-08-22 | End: 2023-09-01

## 2023-08-22 RX ORDER — AMLODIPINE BESYLATE 10 MG/1
10 TABLET ORAL DAILY
Qty: 90 TABLET | Refills: 1 | Status: SHIPPED | OUTPATIENT
Start: 2023-08-22 | End: 2023-12-13

## 2023-08-22 NOTE — TELEPHONE ENCOUNTER
It is very challenging to know which medications she is taking and if they are safe/have correct information and doses without an office visit.    I see that she has an appointment today with Dr. Alva.  Hopefully we can clarify the appropriate blood pressure medications at that visit.     I will refill the Effexor for the short term- but we will need to figure out a psychiatry referral for the long term.  I decreased the dose to 150mg per day, so as to limit interactions with other medications that she takes.      I have filled her amlodipine, metoprolol, symbicort,  jardiance, and plavix.     I am so so so glad we have an appointment on 9/6 for 40 minutes to make sure she has all of the things that she needs.     Tyra Bullock MD    Routed to Dr. Artie Gloria, pharm D for [facility].

## 2023-08-22 NOTE — TELEPHONE ENCOUNTER
Caller: Teresa    Provider: MD Natalio Chris    Detailed reason for call: Teresa is calling stating she received her supplies however she didn't get tap or anything to use for cleaning the wound, please advise    Best phone number to contact: [phone]    Best time to contact: any    Ok to leave a detailed message: Yes    Ok to speak to authorized person if needed: No      (Noted to patient if reason is related to wound or incision, to please send a photo via email or [employer].)

## 2023-08-23 NOTE — TELEPHONE ENCOUNTER
Called [facility] to check on referral. They did not get referral. Was provided with different fax number [phone]. Re-faxed

## 2023-08-23 NOTE — TELEPHONE ENCOUNTER
Fax failed twice. Called [facility] again,was provided with the correct number [phone]. Faxed again.

## 2023-08-23 NOTE — TELEPHONE ENCOUNTER
Spoke to Teresa. She did not get the ancillary kit or the medipore tape that were ordered. Message sent to [facility] [facility] to see if these can be sent.

## 2023-08-24 ENCOUNTER — OFFICE VISIT (OUTPATIENT)
Dept: FAMILY MEDICINE | Facility: CLINIC | Age: 53
End: 2023-08-24
Payer: COMMERCIAL

## 2023-08-24 VITALS
HEART RATE: 92 BPM | OXYGEN SATURATION: 95 % | RESPIRATION RATE: 16 BRPM | TEMPERATURE: 97.1 F | DIASTOLIC BLOOD PRESSURE: 74 MMHG | SYSTOLIC BLOOD PRESSURE: 115 MMHG

## 2023-08-24 DIAGNOSIS — M53.3 PAIN IN THE COCCYX: ICD-10-CM

## 2023-08-24 DIAGNOSIS — R05.1 ACUTE COUGH: Primary | ICD-10-CM

## 2023-08-24 DIAGNOSIS — Z79.4 TYPE 2 DIABETES MELLITUS WITH HYPERGLYCEMIA, WITH LONG-TERM CURRENT USE OF INSULIN (H): ICD-10-CM

## 2023-08-24 DIAGNOSIS — E11.65 TYPE 2 DIABETES MELLITUS WITH HYPERGLYCEMIA, WITH LONG-TERM CURRENT USE OF INSULIN (H): ICD-10-CM

## 2023-08-24 PROCEDURE — 87635 SARS-COV-2 COVID-19 AMP PRB: CPT

## 2023-08-24 PROCEDURE — 99214 OFFICE O/P EST MOD 30 MIN: CPT | Mod: GC

## 2023-08-24 RX ORDER — ACETAMINOPHEN 500 MG
500-1000 TABLET ORAL EVERY 6 HOURS PRN
Qty: 30 TABLET | Refills: 3 | Status: SHIPPED | OUTPATIENT
Start: 2023-08-24 | End: 2023-11-03

## 2023-08-24 RX ORDER — EXENATIDE 2 MG/.85ML
INJECTION, SUSPENSION, EXTENDED RELEASE SUBCUTANEOUS
Qty: 3.4 ML | Refills: 10 | Status: SHIPPED | OUTPATIENT
Start: 2023-08-24 | End: 2023-09-18

## 2023-08-24 NOTE — PROGRESS NOTES
Assessment & Plan     Acute cough  Upper respiratory viral infection  Patient presents for 1 week of cough, fever, and nausea vomiting.  She is concerned about possible COVID infection.  Negative home test 3 days ago.  No known sick contacts.  High risk for being diabetic with COPD and current smoker.  Lung exam was clear with no wheezing or crackles.  Suspect upper respiratory viral infection versus COPD exacerbation or bacterial pneumonia.  We will test for COVID today but she is out of the window for Paxlovid.  Symptomatic treatment at home.  - Symptomatic COVID-19 Virus (Coronavirus) by PCR Nose    Pain in the coccyx  -Refill acetaminophen (TYLENOL) 500 MG tablet  Dispense: 30 tablet; Refill: 3    Type 2 diabetes mellitus with hyperglycemia, with long-term current use of insulin (H)  -Refill exenatide ER (BYDUREON BCISE) 2 MG/0.85ML auto-injector  Dispense: 3.4 mL; Refill: 10        Deepak Quinones MD  [facility]    Bryson Moore is a 52 year old, presenting for the following health issues:  Radiology Visit (Would like a chest X RAY ) and COVID TEST        8/24/2023    10:08 AM   Additional Questions   Roomed by aamir   Accompanied by self       HPI     Acute Illness  Acute illness concerns: cough  Onset/Duration: 1 week  Symptoms:  Fever: YES  Chills/Sweats: YES  Headache (location?): No  Sinus Pressure: YES  Conjunctivitis:  No  Ear Pain: YES: bilateral  Rhinorrhea: No  Congestion: No  Sore Throat: No  Cough: YES-productive of clear sputum  Wheeze: No  Decreased Appetite: YES  Nausea: YES  Vomiting: YES  Diarrhea: yes, chronic  Dysuria/Freq.: No  Dysuria or Hematuria: No  Fatigue/Achiness: YES  Sick/Strep Exposure: No  Therapies tried and outcome: None      On bactrim day 4 for wound on abdomen and buttock    Review of Systems   Constitutional, HEENT, cardiovascular, pulmonary, gi and gu systems are negative, except as otherwise noted.      Objective    /74 (BP Location: Left  arm, Patient Position: Sitting, Cuff Size: Adult Regular)   Pulse 92   Temp 97.1  F (36.2  C) (Oral)   Resp 16   SpO2 95%   There is no height or weight on file to calculate BMI.  Physical Exam   GENERAL: healthy, alert and no distress  EYES: Eyes grossly normal to inspection, and conjunctivae and sclerae normal  HENT: ear canals and TM's normal, nose and mouth without ulcers or lesions  NECK: no adenopathy, no asymmetry, masses, or scars and thyroid normal to palpation  RESP: lungs clear to auscultation - no rales, rhonchi or wheezes  CV: regular rate and rhythm, normal S1 S2, no S3 or S4, no murmur, click or rub, no peripheral edema  SKIN: no suspicious lesions or rashes  PSYCH: mentation appears normal, affect normal/bright    No results found. However, due to the size of the patient record, not all encounters were searched. Please check Results Review for a complete set of results.    ----- Service Performed and Documented by Resident or Fellow ------

## 2023-08-24 NOTE — LETTER
August 25, 2023      Teresa Perez  [address]  [place] [address]        Dear ,    We are writing to inform you of your test results.    Your COVID swab was negative.  This is good news. .Please call the clinic at [phone] if you have any questions.       Resulted Orders   Symptomatic COVID-19 Virus (Coronavirus) by PCR Nose   Result Value Ref Range    SARS CoV2 PCR Negative Negative      Comment:      NEGATIVE: SARS-CoV-2 (COVID-19) RNA not detected, presumed negative.    Narrative    Testing was performed using the yenny SARS-CoV-2 assay on the yenny  [employer] System. This test should be ordered for the detection of  SARS-CoV-2 in individuals who meet SARS-CoV-2 clinical and/or  epidemiological criteria. Test performance is unknown in asymptomatic  patients. This test is for in vitro diagnostic use under the FDA EUA  for laboratories certified under CLIA to perform high and/or moderate  complexity testing. This test has not been FDA cleared or approved. A  negative result does not rule out the presence of PCR inhibitors in  the specimen or target RNA in concentration below the limit of  detection for the assay. The possibility of a false negative should  be considered if the patient's recent exposure or clinical  presentation suggests COVID-19. This test was validated by the [facility]. This  laboratory is certified under the Clinical Laboratory Improvement  Amendments of 1988 (CLIA-88) as qualified to perform high and/or  moderate complexity laboratory testing.       If you have any questions or concerns, please call the clinic at the number listed above.       Sincerely,      Tyra Bullock MD

## 2023-08-24 NOTE — PROGRESS NOTES
Preceptor Attestation:  I discussed the patient with the resident and evaluated the patient in person. I have verified the content of the note, which accurately reflects my assessment of the patient and the plan of care.  Supervising Physician:  Tyra Bullock MD.

## 2023-08-25 LAB — SARS-COV-2 RNA RESP QL NAA+PROBE: NEGATIVE

## 2023-08-25 NOTE — TELEPHONE ENCOUNTER
Spoke with patient.  She did contact [facility] and they were working on locating her package.  According to [employer] tracking package was picked up from wrong location and delivered to her at 1:15pm today.  She will go look for package.  She agrees to call [facility] if she does not have it.

## 2023-08-25 NOTE — RESULT ENCOUNTER NOTE
Teresa Perez-    Your COVID swab was negative.  This is good news. .Please call the clinic at [phone] if you have any questions.      Tyra Bullock MD    Please send results to patient.

## 2023-08-25 NOTE — TELEPHONE ENCOUNTER
Called [facility] to check on home care referral, intake person was on a call.  Left message to have intake call clinic to see if they received referral and if able to accept or not.  Awaiting call back.

## 2023-08-28 ENCOUNTER — TELEPHONE (OUTPATIENT)
Dept: FAMILY MEDICINE | Facility: CLINIC | Age: 53
End: 2023-08-28
Payer: COMMERCIAL

## 2023-08-28 DIAGNOSIS — J44.9 CHRONIC OBSTRUCTIVE PULMONARY DISEASE, UNSPECIFIED COPD TYPE (H): ICD-10-CM

## 2023-08-28 DIAGNOSIS — J45.40 MODERATE PERSISTENT ASTHMA WITHOUT COMPLICATION: ICD-10-CM

## 2023-08-28 RX ORDER — BUDESONIDE AND FORMOTEROL FUMARATE DIHYDRATE 160; 4.5 UG/1; UG/1
AEROSOL RESPIRATORY (INHALATION)
Qty: 20.4 G | Refills: 11 | Status: SHIPPED | OUTPATIENT
Start: 2023-08-28

## 2023-08-28 NOTE — TELEPHONE ENCOUNTER
Generic is just fine.      I have sent in a new prescription with the updated MACI SMART guidelines and generic option.     Tyra Bullock MD    Routed to LAURA Gloria

## 2023-08-30 ENCOUNTER — MEDICAL CORRESPONDENCE (OUTPATIENT)
Dept: HEALTH INFORMATION MANAGEMENT | Facility: CLINIC | Age: 53
End: 2023-08-30
Payer: COMMERCIAL

## 2023-09-01 DIAGNOSIS — Z86.73 HISTORY OF CVA (CEREBROVASCULAR ACCIDENT): ICD-10-CM

## 2023-09-01 DIAGNOSIS — I10 BENIGN ESSENTIAL HYPERTENSION: ICD-10-CM

## 2023-09-01 RX ORDER — METOPROLOL SUCCINATE 50 MG/1
50 TABLET, EXTENDED RELEASE ORAL DAILY
Qty: 90 TABLET | Refills: 1 | Status: SHIPPED | OUTPATIENT
Start: 2023-09-01 | End: 2024-07-12

## 2023-09-01 RX ORDER — CLOPIDOGREL BISULFATE 75 MG/1
75 TABLET ORAL DAILY
Qty: 90 TABLET | Refills: 1 | Status: SHIPPED | OUTPATIENT
Start: 2023-09-01

## 2023-09-04 DIAGNOSIS — Z79.4 TYPE 2 DIABETES MELLITUS WITH HYPERGLYCEMIA, WITH LONG-TERM CURRENT USE OF INSULIN (H): Primary | ICD-10-CM

## 2023-09-04 DIAGNOSIS — E11.65 TYPE 2 DIABETES MELLITUS WITH HYPERGLYCEMIA, WITH LONG-TERM CURRENT USE OF INSULIN (H): Primary | ICD-10-CM

## 2023-09-06 ENCOUNTER — VIRTUAL VISIT (OUTPATIENT)
Dept: FAMILY MEDICINE | Facility: CLINIC | Age: 53
End: 2023-09-06
Payer: COMMERCIAL

## 2023-09-06 DIAGNOSIS — S31.109D OPEN WOUND OF ABDOMINAL WALL, SUBSEQUENT ENCOUNTER: ICD-10-CM

## 2023-09-06 DIAGNOSIS — J44.9 CHRONIC OBSTRUCTIVE PULMONARY DISEASE, UNSPECIFIED COPD TYPE (H): Primary | ICD-10-CM

## 2023-09-06 DIAGNOSIS — Z79.4 TYPE 2 DIABETES MELLITUS WITH HYPERGLYCEMIA, WITH LONG-TERM CURRENT USE OF INSULIN (H): ICD-10-CM

## 2023-09-06 DIAGNOSIS — T78.40XS ALLERGIC REACTION, SEQUELA: ICD-10-CM

## 2023-09-06 DIAGNOSIS — M25.512 LEFT SHOULDER PAIN, UNSPECIFIED CHRONICITY: ICD-10-CM

## 2023-09-06 DIAGNOSIS — K61.2 ABSCESS OF ANAL AND RECTAL REGIONS: ICD-10-CM

## 2023-09-06 DIAGNOSIS — M53.3 PAIN IN THE COCCYX: ICD-10-CM

## 2023-09-06 DIAGNOSIS — E11.65 TYPE 2 DIABETES MELLITUS WITH HYPERGLYCEMIA, WITH LONG-TERM CURRENT USE OF INSULIN (H): ICD-10-CM

## 2023-09-06 DIAGNOSIS — J45.20 MILD INTERMITTENT ASTHMA WITHOUT COMPLICATION: ICD-10-CM

## 2023-09-06 PROCEDURE — 99442 PR PHYSICIAN TELEPHONE EVALUATION 11-20 MIN: CPT | Mod: 93 | Performed by: STUDENT IN AN ORGANIZED HEALTH CARE EDUCATION/TRAINING PROGRAM

## 2023-09-06 RX ORDER — ACETAMINOPHEN 500 MG
500-1000 TABLET ORAL EVERY 6 HOURS PRN
Qty: 100 TABLET | Refills: 3 | Status: SHIPPED | OUTPATIENT
Start: 2023-09-06 | End: 2023-10-09

## 2023-09-06 RX ORDER — AZELASTINE 1 MG/ML
1 SPRAY, METERED NASAL 2 TIMES DAILY
Qty: 30 ML | Refills: 1 | Status: SHIPPED | OUTPATIENT
Start: 2023-09-06 | End: 2024-06-28

## 2023-09-06 RX ORDER — MONTELUKAST SODIUM 10 MG/1
1 TABLET ORAL AT BEDTIME
Qty: 90 TABLET | Refills: 1 | Status: SHIPPED | OUTPATIENT
Start: 2023-09-06 | End: 2024-09-10

## 2023-09-06 NOTE — PROGRESS NOTES
Teresa is a 52 year old who is being evaluated via a billable telephone visit.      What phone number would you like to be contacted at? Number in chart  How would you like to obtain your AVS? Mail a copy  Distant Location (provider location):  On-site    Assessment & Plan     Chronic obstructive pulmonary disease, unspecified COPD type (H)  Mild intermittent asthma without complication  Allergic reaction, sequela  Patient with increased cough and respiratory symptoms.  She has recently been on Bactrim for her abdominal wounds, and this sounds viral in nature.  Was seen a couple weeks ago with a negative COVID test.  Unable to do full evaluation over the phone, but she is needing a new nebulizer mask and tubing, which was sent.  Also may have an allergy component, she has been out of her montelukast.  This was sent in as well as a new prescription for azelastine nasal spray.  - Nebulizer and Supplies Order for DME - ONLY FOR DME  - azelastine (ASTELIN) 0.1 % nasal spray  Dispense: 30 mL; Refill: 1  - montelukast (SINGULAIR) 10 MG tablet  Dispense: 90 tablet; Refill: 1    Chronic Pain:  Shoulder, abdomen, coccyx  Patient has multiple areas of chronic pain.  She is requesting stronger medications.  We reviewed options, I refilled her Tylenol today.  She is also on gabapentin.  Is using some topical products which are somewhat helpful.  She is not a candidate for chronic pain management, and I do not believe there are any acute concerns that need increased pain management today.  She has had been referred to pain clinic, and I think this is going to be the best long-term option given her complex pain needs.  - acetaminophen (TYLENOL) 500 MG tablet  Dispense: 100 tablet; Refill:       Open wound of abdominal wall, subsequent encounter  Abscess of anal and rectal regions  She continues to follow regularly at the wound care clinic.  Is having more difficulty with frequent wounds.  Recent course of Bactrim.  Appreciate  "their recommendations.    Type 2 diabetes mellitus with hyperglycemia, with long-term current use of insulin (H)  Per patient diabetes has been relatively well controlled.  We reviewed and updated her medication list and sent in prescriptions as appropriate.  She now has access to a PCA and some more ability to cook and eat appropriate healthy food.    Recommended that she schedule follow-up in person, so we can review her breathing, diabetes, wounds, and make sure she has the medications and services that she needs.    Tyra Bullock MD  [facility] TIAN Moore is a 52 year old, presenting for the following health issues:  Other (Med check)        8/24/2023    10:08 AM   Additional Questions   Roomed by aamir   Accompanied by self       HPI     Patient seen today via telephone because of transportation and illness barriers to coming to the clinic.  We have been working on transferring her medications over to new pharmacy, so we were able to review and update her medication list today.  Additionally, the following issues were discussed.    Shortness of breath and cough:  Breathing and cough got worse the day before yesterday  Nebulizer machine not working- needs the cup and tubing  Has been having fevers and chills.    Has been having more congestion and allergy symptoms as well.    Morning pill - ceterizine not really helping  Doing the flonase.   Is out of the monteleukast today.      Abdominal pain and nausea.  Stomach and nausea has been painful with all the coughing.  Hernia continues to be painful and bothersome.    Abdominal wounds.  She got a new abscess on the buttox region - on the left side.  Nurse continues to come out to do dressing changes and check in on her wounds.    Currently has 5 on her abdomen, 1 on her bottox.    Blood sugars have been \"normal\" .  Has been having a couple of lows, but is treating them.  Has access to food right now.   Has a PCA, who is " helping out.    Has all of her diabetes supplies, including her insulin.    Has been on antibiotics - sulfa medication (Bactrim) started at her wound care clinic.  Taking 2x per day.    She continues to have a nurse that comes out 3 times per week to check her wounds and do dressing changes, and she is following regularly at the wound care clinic.    Pain control  Having more pain - wanting a refill on the extra strength tylenol. Baclofen has not been helpful.  Diclofenac has not been helpful.   Has gabapentin at home.      Total time of visit:  14 min.            Objective         Vitals:  No vitals were obtained today due to virtual visit.    Physical Exam   alert, mild distress, and coughing  PSYCH: Alert and oriented times 3; coherent speech, normal   rate and volume, able to articulate logical thoughts, able   to abstract reason, no tangential thoughts, no hallucinations   or delusions  Her affect is normal  RESP: Present for  cough, but no audible wheezing, able to talk in full sentences  Remainder of exam unable to be completed due to telephone visits          Phone call duration: 14 minutes

## 2023-09-07 ENCOUNTER — DOCUMENTATION ONLY (OUTPATIENT)
Dept: FAMILY MEDICINE | Facility: CLINIC | Age: 53
End: 2023-09-07
Payer: COMMERCIAL

## 2023-09-07 ENCOUNTER — MEDICAL CORRESPONDENCE (OUTPATIENT)
Dept: HEALTH INFORMATION MANAGEMENT | Facility: CLINIC | Age: 53
End: 2023-09-07
Payer: COMMERCIAL

## 2023-09-07 NOTE — PROGRESS NOTES
To be completed in Nursing note:    Please reference list for forms that require a visit for completion.  Please remind patients that providers are given 3-5 business days to complete and return forms.      Form type:  [employer] starts here    Date form received: 23    Date form completed by Physician:  23    How was form returned to patient (mailed, faxed, or at  for patient to ):  Faxed   Date form mailed/faxed/left at  for patient and sent to HIM for scannin23, sent to MR for scanning    Once form is left for patient, faxed, or mailed PCS will then close the documentation only encounter.

## 2023-09-08 DIAGNOSIS — G47.09 OTHER INSOMNIA: ICD-10-CM

## 2023-09-11 ENCOUNTER — OFFICE VISIT (OUTPATIENT)
Dept: VASCULAR SURGERY | Facility: CLINIC | Age: 53
End: 2023-09-11
Attending: FAMILY MEDICINE
Payer: COMMERCIAL

## 2023-09-11 VITALS — SYSTOLIC BLOOD PRESSURE: 127 MMHG | DIASTOLIC BLOOD PRESSURE: 80 MMHG | HEART RATE: 102 BPM | OXYGEN SATURATION: 96 %

## 2023-09-11 DIAGNOSIS — S31.109D OPEN WOUND OF ABDOMINAL WALL, SUBSEQUENT ENCOUNTER: Primary | ICD-10-CM

## 2023-09-11 PROCEDURE — 99214 OFFICE O/P EST MOD 30 MIN: CPT | Performed by: FAMILY MEDICINE

## 2023-09-11 PROCEDURE — G0463 HOSPITAL OUTPT CLINIC VISIT: HCPCS | Performed by: FAMILY MEDICINE

## 2023-09-11 RX ORDER — TRAZODONE HYDROCHLORIDE 50 MG/1
50 TABLET, FILM COATED ORAL AT BEDTIME
Qty: 30 TABLET | Refills: 0 | Status: SHIPPED | OUTPATIENT
Start: 2023-09-11 | End: 2023-11-21

## 2023-09-11 ASSESSMENT — PAIN SCALES - GENERAL: PAINLEVEL: WORST PAIN (10)

## 2023-09-11 NOTE — PATIENT INSTRUCTIONS
Wound care supplies were ordered today through [facility] and if you are not receiving your supplies or have a question on your bill please contact Kathleen Jeff at [phone]. Please allow 2-5 business days for delivery of supplies. You may get a call from a [phone] # if there are additional information [facility] needs. It is important to  or return their call. PLEASE NOTE: If you need to return your supplies, you MUST call customer service within 15 days of delivery date.         Wound Care Instructions    DAILY and as needed, Cleanse your Abdominal wound(s) with Normal saline.    Pat Dry with non-sterile gauze    Apply Lotion to the intact skin surrounding your wound and other dry skin locations. Some good lotions include: Remedy Skin Repair Cream, Sarna, Vanicream or Cetaphil    Primary Dressing: Apply hydrofera blue ready transfer into/onto the wounds    Secondary dressing: Cover with ABD pad or silicone foam    Use your abdominal binder every day    Left IT:  Cleanse with saline or clean tap water, pat dry  Lightly pack wound with hydrofera blue ready transfer, then mepilex 4x4  Change 3 times weekly    For the other smaller open areas:  Cleanse with tap water, pat dry  Cover with a zinc oxide barrier cream like desitin 2-3 times daily.       It is ok to get your wound wet in the bath or shower      If for some reason you are not able to get your dressing(s) changed as outlined above (due to illness, lack of supplies, lack of help) please do the following: remove old, soiled dressings; wash the wounds with saline; pat dry; apply ABD pad or other absorbant pad and secure with rolled gauze; avoid tape directly on your skin; Call the clinic as soon as possible to let us know what the current issues are in receiving wound care [phone].      SEEK MEDICAL CARE IF:  You have an increase in swelling, pain, or redness around the wound.  You have an increase in the amount of pus coming from the wound.  There is a  bad smell coming from the wound.  The wound appears to be worsening/enlarging  You have a fever greater than 101.5 F      It is ok to continue current wound care treatment/products for the next 2-3 days until new wound care supplies are ordered and arrive. If longer than this please contact our office at [phone].    If you have a 2 layer or 4 layer compression wrap on these are safe to have on for ONLY 7 days. If for some reason you are not able to get the wrap(s) changed (due to illness; lack of supplies, lack of help, lack of transportation) please do the following: unwrap the old 2 or 4 layer compression wrap; avoid using scissors as you could cut your skin and cause wounds; use tubular compression when available. Call to reschedule your home care or clinic visit appointment as soon as possible.    Please NOTE: if you are 15 minutes late to your clinic appointment you will have to be rescheduled. Please call our clinic as soon as possible if you know you will not be able to get to your appointment at [phone].    If you fail to show up to 3 scheduled clinic appointments you will be dismissed from our clinic.              We want to hear from you!  In the next few weeks, you should receive a call or email to complete a survey about your visit at [facility]. Please help us improve your appointment experience by letting us know how we did today. We strive to make your experience good and value any ways in which we could do better.      We value your input and suggestions.    Thank you for choosing the [facility]!

## 2023-09-11 NOTE — LETTER
[facility]  [address] [place] [address] [address]  [place], [place] [address]  [phone]      Fax [phone]    [facility]           Fax: [phone]            Customer Service: [phone]        Account #: [id number]    Wound Dressing Rx and Order Form  Order Status: new  Verbal: Tiffanie  Date: 2023     Teresa Nerihope  Gender: female  : 1970  [address]  [place] [address]  [phone] (home)     Medical Record: [id number]  Primary Care Provider: Tyra Bullock      ICD-10-CM    1. Open wound of abdominal wall, subsequent encounter  S31.109D Wound care            Insurance Info:  INSURER: Payor: [employer] / Plan: [employer] MEDICARE ADVANTAGE / Product Type: HMO /   Policy ID#:  [id number]  SECONDARY INSURANCE:  MEDICA  Secondary Policy ID#:  [id number]        Physician Info:   Name:  ROBERTA DECKER     Dept Address/Phones:   [address] [place] [address], [address] [id number] [phone]  Fax: [phone]    Lymphedema circumferential measurements (in cm):       No data to display                  Wound info:  Wound Buttocks Pressure injury community acquired Stage 2 (Active)   Number of days: 420       VASC Wound Umbilicus (Active)   Pre Size Length 1 23 0700   Pre Size Width 3 23 0700   Pre Size Depth 1.3 23 0700   Pre Total Sq cm 3 23 0700   Description refused assessment 23 1500   Number of days: 118       VASC Wound Abdomen middle (Active)   Pre Size Length 3 23 1500   Pre Size Width 4 23 1500   Pre Size Depth 0.1 23 1500   Pre Total Sq cm 12 23 1500   Description scattered 23 0903   Number of days: 118       VASC Wound Abdomen left (Active)   Description stable eschar 23 0700   Number of days: 118       VASC Wound abd wound distal (Active)   Pre Size Length 4 23 1500   Pre Size Width 4.5 23 1500   Pre Size Depth 0.1 23 1500  "  Pre Total Sq cm 18 09/11/23 1500   Number of days: 77       VASC Wound left IT (Active)   Pre Size Length 3.5 09/11/23 1500   Pre Size Width 3.5 09/11/23 1500   Pre Size Depth 5 09/11/23 1500   Pre Total Sq cm 12.25 09/11/23 1500   Number of days: 49       Incision/Surgical Site 07/05/22 Abdomen (Active)   Number of days: 433       Incision/Surgical Site 07/19/22 Left Buttocks (Active)   Number of days: 419        Drainage: moderate  Thickness:  full  Duration of Need: 30 DAYS  Days Supply: 30 DAYS  Start Date: 9/11/2023  Starter Kit: Ancillary Kit (saline, gloves, gauze)  Qualifying wound/Debridement: Yes      Dressing Type Brand Size Frequency of change -or- Quantity   Primary Allevyn gentle border adhesive bandage  5\"X5\" 3 TIMES PER WEEK and as needed     No substitutions preferred. Call [phone].         OK to forward to covered supplier.    Electronically Signed Physician:  ROBERTA DECKER             Date: September 11, 2023    "
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
## 2023-09-13 DIAGNOSIS — R10.84 ABDOMINAL PAIN, GENERALIZED: ICD-10-CM

## 2023-09-13 DIAGNOSIS — F31.70 BIPOLAR DISORDER IN FULL REMISSION, MOST RECENT EPISODE UNSPECIFIED TYPE (H): ICD-10-CM

## 2023-09-13 DIAGNOSIS — R11.0 NAUSEA: ICD-10-CM

## 2023-09-13 RX ORDER — VENLAFAXINE HYDROCHLORIDE 150 MG/1
CAPSULE, EXTENDED RELEASE ORAL
Qty: 30 CAPSULE | Refills: 1 | Status: SHIPPED | OUTPATIENT
Start: 2023-09-13 | End: 2023-10-23

## 2023-09-13 RX ORDER — METOCLOPRAMIDE 5 MG/1
5 TABLET ORAL 3 TIMES DAILY PRN
Qty: 45 TABLET | Refills: 1 | Status: SHIPPED | OUTPATIENT
Start: 2023-09-13 | End: 2023-11-13

## 2023-09-15 DIAGNOSIS — Z79.4 TYPE 2 DIABETES MELLITUS WITH HYPERGLYCEMIA, WITH LONG-TERM CURRENT USE OF INSULIN (H): ICD-10-CM

## 2023-09-15 DIAGNOSIS — E11.65 TYPE 2 DIABETES MELLITUS WITH HYPERGLYCEMIA, WITH LONG-TERM CURRENT USE OF INSULIN (H): ICD-10-CM

## 2023-09-15 NOTE — TELEPHONE ENCOUNTER
Re-faxed last supply order from 9/11/23 to [facility] as patient was not on [facility] order report.  Left voicemail for patient with update.

## 2023-09-18 DIAGNOSIS — K61.2 ABSCESS OF ANAL AND RECTAL REGIONS: ICD-10-CM

## 2023-09-18 DIAGNOSIS — M25.511 CHRONIC RIGHT SHOULDER PAIN: ICD-10-CM

## 2023-09-18 DIAGNOSIS — R10.11 RUQ ABDOMINAL PAIN: ICD-10-CM

## 2023-09-18 DIAGNOSIS — G89.29 CHRONIC RIGHT SHOULDER PAIN: ICD-10-CM

## 2023-09-18 RX ORDER — EXENATIDE 2 MG/.85ML
INJECTION, SUSPENSION, EXTENDED RELEASE SUBCUTANEOUS
Qty: 3.4 ML | Refills: 10 | Status: SHIPPED | OUTPATIENT
Start: 2023-09-18 | End: 2024-09-09

## 2023-09-20 RX ORDER — LIDOCAINE 50 MG/G
PATCH TOPICAL
Qty: 15 PATCH | Refills: 3 | Status: SHIPPED | OUTPATIENT
Start: 2023-09-20 | End: 2023-10-09

## 2023-09-26 ENCOUNTER — MEDICAL CORRESPONDENCE (OUTPATIENT)
Dept: HEALTH INFORMATION MANAGEMENT | Facility: CLINIC | Age: 53
End: 2023-09-26
Payer: COMMERCIAL

## 2023-09-27 ENCOUNTER — DOCUMENTATION ONLY (OUTPATIENT)
Dept: FAMILY MEDICINE | Facility: CLINIC | Age: 53
End: 2023-09-27
Payer: COMMERCIAL

## 2023-09-27 NOTE — PROGRESS NOTES
To be completed in Nursing note:    Please reference list for forms that require a visit for completion.  Please remind patients that providers are given 3-5 business days to complete and return forms.      Form type:  [facility] mobility and independence start here    Date form received: 23    Date form completed by Physician:  23    How was form returned to patient (mailed, faxed, or at  for patient to ):  Faxed to   Date form mailed/faxed/left at  for patient and sent to HIM for scannin23, sent to MR for scanning    Once form is left for patient, faxed, or mailed [facility] will then close the documentation only encounter.

## 2023-10-02 DIAGNOSIS — F33.1 MODERATE RECURRENT MAJOR DEPRESSION (H): ICD-10-CM

## 2023-10-03 RX ORDER — QUETIAPINE FUMARATE 400 MG/1
400 TABLET, FILM COATED ORAL AT BEDTIME
Qty: 30 TABLET | Refills: 0 | Status: SHIPPED | OUTPATIENT
Start: 2023-10-03 | End: 2024-01-26

## 2023-10-05 NOTE — PATIENT INSTRUCTIONS
Wound care supplies were ordered today through [facility] and if you are not receiving your supplies or have a question on your bill please contact Kathleen Jeff at [phone]. Please allow 2-5 business days for delivery of supplies. You may get a call from a [phone] if there are additional information [facility] needs. It is important to  or return their call. PLEASE NOTE: If you need to return your supplies, you MUST call customer service within 15 days of delivery date.         Wound Care Instructions    DAILY and as needed, Cleanse your Abdominal wound(s) with Normal saline.    Pat Dry with non-sterile gauze    Apply Lotion to the intact skin surrounding your wound and other dry skin locations. Some good lotions include: Remedy Skin Repair Cream, Sarna, Vanicream or Cetaphil    Primary Dressing: Apply hydrofera blue ready transfer into/onto the wounds    Secondary dressing: Cover with ABD pad or silicone foam    Use your abdominal binder every day    Left IT:  Cleanse with saline or clean tap water, pat dry  Lightly pack wound with hydrofera blue ready transfer, then mepilex 4x4  Change 3 times weekly    Left Groin:  Cleanse with saline or clean tap water, pat dry  Cover with ABD pad   Change daily     For the other smaller open areas:  Cleanse with tap water, pat dry  Cover with a zinc oxide barrier cream like desitin 2-3 times daily.       It is ok to get your wound wet in the bath or shower      If for some reason you are not able to get your dressing(s) changed as outlined above (due to illness, lack of supplies, lack of help) please do the following: remove old, soiled dressings; wash the wounds with saline; pat dry; apply ABD pad or other absorbant pad and secure with rolled gauze; avoid tape directly on your skin; Call the clinic as soon as possible to let us know what the current issues are in receiving wound care [phone].      SEEK MEDICAL CARE IF:  You have an increase in swelling, pain, or  redness around the wound.  You have an increase in the amount of pus coming from the wound.  There is a bad smell coming from the wound.  The wound appears to be worsening/enlarging  You have a fever greater than 101.5 F      It is ok to continue current wound care treatment/products for the next 2-3 days until new wound care supplies are ordered and arrive. If longer than this please contact our office at [phone].    If you have a 2 layer or 4 layer compression wrap on these are safe to have on for ONLY 7 days. If for some reason you are not able to get the wrap(s) changed (due to illness; lack of supplies, lack of help, lack of transportation) please do the following: unwrap the old 2 or 4 layer compression wrap; avoid using scissors as you could cut your skin and cause wounds; use tubular compression when available. Call to reschedule your home care or clinic visit appointment as soon as possible.    Please NOTE: if you are 15 minutes late to your clinic appointment you will have to be rescheduled. Please call our clinic as soon as possible if you know you will not be able to get to your appointment at [phone].    If you fail to show up to 3 scheduled clinic appointments you will be dismissed from our clinic.              We want to hear from you!  In the next few weeks, you should receive a call or email to complete a survey about your visit at [facility]. Please help us improve your appointment experience by letting us know how we did today. We strive to make your experience good and value any ways in which we could do better.      We value your input and suggestions.    Thank you for choosing the [facility]!

## 2023-10-06 ENCOUNTER — DOCUMENTATION ONLY (OUTPATIENT)
Dept: FAMILY MEDICINE | Facility: CLINIC | Age: 53
End: 2023-10-06
Payer: COMMERCIAL

## 2023-10-07 DIAGNOSIS — G47.09 OTHER INSOMNIA: ICD-10-CM

## 2023-10-09 ENCOUNTER — ANESTHESIA EVENT (OUTPATIENT)
Dept: SURGERY | Facility: HOSPITAL | Age: 53
DRG: 853 | End: 2023-10-09
Payer: COMMERCIAL

## 2023-10-09 ENCOUNTER — HOSPITAL ENCOUNTER (INPATIENT)
Facility: HOSPITAL | Age: 53
LOS: 7 days | Discharge: HOME HEALTH | DRG: 853 | End: 2023-10-16
Attending: EMERGENCY MEDICINE | Admitting: FAMILY MEDICINE
Payer: COMMERCIAL

## 2023-10-09 ENCOUNTER — ANESTHESIA (OUTPATIENT)
Dept: SURGERY | Facility: HOSPITAL | Age: 53
DRG: 853 | End: 2023-10-09
Payer: COMMERCIAL

## 2023-10-09 ENCOUNTER — APPOINTMENT (OUTPATIENT)
Dept: CT IMAGING | Facility: HOSPITAL | Age: 53
DRG: 853 | End: 2023-10-09
Attending: EMERGENCY MEDICINE
Payer: COMMERCIAL

## 2023-10-09 ENCOUNTER — OFFICE VISIT (OUTPATIENT)
Dept: VASCULAR SURGERY | Facility: CLINIC | Age: 53
DRG: 853 | End: 2023-10-09
Attending: FAMILY MEDICINE
Payer: COMMERCIAL

## 2023-10-09 VITALS
DIASTOLIC BLOOD PRESSURE: 53 MMHG | HEART RATE: 95 BPM | TEMPERATURE: 98.1 F | OXYGEN SATURATION: 100 % | RESPIRATION RATE: 12 BRPM | SYSTOLIC BLOOD PRESSURE: 79 MMHG

## 2023-10-09 DIAGNOSIS — S31.109D OPEN WOUND OF ABDOMINAL WALL, SUBSEQUENT ENCOUNTER: Primary | ICD-10-CM

## 2023-10-09 DIAGNOSIS — S31.109D OPEN WOUND OF ABDOMINAL WALL, SUBSEQUENT ENCOUNTER: ICD-10-CM

## 2023-10-09 DIAGNOSIS — Z79.4 TYPE 2 DIABETES MELLITUS WITH HYPERGLYCEMIA, WITH LONG-TERM CURRENT USE OF INSULIN (H): ICD-10-CM

## 2023-10-09 DIAGNOSIS — S31.109A OPEN WOUND OF ABDOMINAL WALL, INITIAL ENCOUNTER: ICD-10-CM

## 2023-10-09 DIAGNOSIS — E11.65 TYPE 2 DIABETES MELLITUS WITH HYPERGLYCEMIA, WITH LONG-TERM CURRENT USE OF INSULIN (H): ICD-10-CM

## 2023-10-09 DIAGNOSIS — S31.819A BUTTOCK WOUND, RIGHT, INITIAL ENCOUNTER: Primary | ICD-10-CM

## 2023-10-09 DIAGNOSIS — I10 BENIGN ESSENTIAL HYPERTENSION: ICD-10-CM

## 2023-10-09 DIAGNOSIS — M72.6 NECROTIZING FASCIITIS (H): ICD-10-CM

## 2023-10-09 LAB
ALBUMIN UR-MCNC: 10 MG/DL
ANION GAP SERPL CALCULATED.3IONS-SCNC: 14 MMOL/L (ref 7–15)
APPEARANCE UR: CLEAR
BILIRUB UR QL STRIP: NEGATIVE
BUN SERPL-MCNC: 39.6 MG/DL (ref 6–20)
CALCIUM SERPL-MCNC: 9.6 MG/DL (ref 8.6–10)
CHLORIDE SERPL-SCNC: 99 MMOL/L (ref 98–107)
COLOR UR AUTO: ABNORMAL
CREAT SERPL-MCNC: 1.06 MG/DL (ref 0.51–0.95)
DEPRECATED HCO3 PLAS-SCNC: 24 MMOL/L (ref 22–29)
EGFRCR SERPLBLD CKD-EPI 2021: 63 ML/MIN/1.73M2
ERYTHROCYTE [DISTWIDTH] IN BLOOD BY AUTOMATED COUNT: 14.7 % (ref 10–15)
GLUCOSE BLDC GLUCOMTR-MCNC: 152 MG/DL (ref 70–99)
GLUCOSE BLDC GLUCOMTR-MCNC: 172 MG/DL (ref 70–99)
GLUCOSE BLDC GLUCOMTR-MCNC: 190 MG/DL (ref 70–99)
GLUCOSE BLDC GLUCOMTR-MCNC: 192 MG/DL (ref 70–99)
GLUCOSE SERPL-MCNC: 207 MG/DL (ref 70–99)
GLUCOSE UR STRIP-MCNC: >1000 MG/DL
HBA1C MFR BLD: 7.4 %
HCT VFR BLD AUTO: 35 % (ref 35–47)
HGB BLD-MCNC: 11 G/DL (ref 11.7–15.7)
HGB UR QL STRIP: NEGATIVE
KETONES UR STRIP-MCNC: 60 MG/DL
LACTATE SERPL-SCNC: 1.3 MMOL/L (ref 0.7–2)
LEUKOCYTE ESTERASE UR QL STRIP: NEGATIVE
MCH RBC QN AUTO: 28.2 PG (ref 26.5–33)
MCHC RBC AUTO-ENTMCNC: 31.4 G/DL (ref 31.5–36.5)
MCV RBC AUTO: 90 FL (ref 78–100)
MUCOUS THREADS #/AREA URNS LPF: PRESENT /LPF
NITRATE UR QL: NEGATIVE
PH UR STRIP: 5 [PH] (ref 5–7)
PLATELET # BLD AUTO: 227 10E3/UL (ref 150–450)
POTASSIUM SERPL-SCNC: 4.6 MMOL/L (ref 3.4–5.3)
RADIOLOGIST FLAGS: ABNORMAL
RBC # BLD AUTO: 3.9 10E6/UL (ref 3.8–5.2)
RBC URINE: <1 /HPF
SODIUM SERPL-SCNC: 137 MMOL/L (ref 135–145)
SP GR UR STRIP: 1.02 (ref 1–1.03)
SQUAMOUS EPITHELIAL: <1 /HPF
UROBILINOGEN UR STRIP-MCNC: <2 MG/DL
WBC # BLD AUTO: 23.1 10E3/UL (ref 4–11)
WBC URINE: <1 /HPF

## 2023-10-09 PROCEDURE — 250N000009 HC RX 250: Performed by: NURSE ANESTHETIST, CERTIFIED REGISTERED

## 2023-10-09 PROCEDURE — 250N000009 HC RX 250: Performed by: EMERGENCY MEDICINE

## 2023-10-09 PROCEDURE — 258N000003 HC RX IP 258 OP 636: Performed by: NURSE ANESTHETIST, CERTIFIED REGISTERED

## 2023-10-09 PROCEDURE — 96365 THER/PROPH/DIAG IV INF INIT: CPT

## 2023-10-09 PROCEDURE — 0JBM0ZZ EXCISION OF LEFT UPPER LEG SUBCUTANEOUS TISSUE AND FASCIA, OPEN APPROACH: ICD-10-PCS | Performed by: SPECIALIST

## 2023-10-09 PROCEDURE — 272N000452 HC KIT SHRLOCK 5FR POWER PICC TRIPLE LUMEN

## 2023-10-09 PROCEDURE — 96361 HYDRATE IV INFUSION ADD-ON: CPT

## 2023-10-09 PROCEDURE — 87077 CULTURE AEROBIC IDENTIFY: CPT | Performed by: SPECIALIST

## 2023-10-09 PROCEDURE — 258N000003 HC RX IP 258 OP 636: Performed by: ANESTHESIOLOGY

## 2023-10-09 PROCEDURE — 250N000011 HC RX IP 250 OP 636: Performed by: SPECIALIST

## 2023-10-09 PROCEDURE — 360N000075 HC SURGERY LEVEL 2, PER MIN: Performed by: SPECIALIST

## 2023-10-09 PROCEDURE — 83036 HEMOGLOBIN GLYCOSYLATED A1C: CPT | Performed by: INTERNAL MEDICINE

## 2023-10-09 PROCEDURE — 200N000001 HC R&B ICU

## 2023-10-09 PROCEDURE — 99291 CRITICAL CARE FIRST HOUR: CPT | Mod: 25

## 2023-10-09 PROCEDURE — 87040 BLOOD CULTURE FOR BACTERIA: CPT | Performed by: EMERGENCY MEDICINE

## 2023-10-09 PROCEDURE — 3E033XZ INTRODUCTION OF VASOPRESSOR INTO PERIPHERAL VEIN, PERCUTANEOUS APPROACH: ICD-10-PCS | Performed by: EMERGENCY MEDICINE

## 2023-10-09 PROCEDURE — 250N000011 HC RX IP 250 OP 636: Performed by: NURSE ANESTHETIST, CERTIFIED REGISTERED

## 2023-10-09 PROCEDURE — 250N000009 HC RX 250: Performed by: SPECIALIST

## 2023-10-09 PROCEDURE — 250N000011 HC RX IP 250 OP 636

## 2023-10-09 PROCEDURE — 83605 ASSAY OF LACTIC ACID: CPT | Performed by: EMERGENCY MEDICINE

## 2023-10-09 PROCEDURE — 250N000013 HC RX MED GY IP 250 OP 250 PS 637: Performed by: SPECIALIST

## 2023-10-09 PROCEDURE — 258N000003 HC RX IP 258 OP 636: Performed by: SPECIALIST

## 2023-10-09 PROCEDURE — 250N000011 HC RX IP 250 OP 636: Mod: JZ

## 2023-10-09 PROCEDURE — G0463 HOSPITAL OUTPT CLINIC VISIT: HCPCS | Performed by: FAMILY MEDICINE

## 2023-10-09 PROCEDURE — 99222 1ST HOSP IP/OBS MODERATE 55: CPT | Performed by: PHYSICIAN ASSISTANT

## 2023-10-09 PROCEDURE — 272N000001 HC OR GENERAL SUPPLY STERILE: Performed by: SPECIALIST

## 2023-10-09 PROCEDURE — 88304 TISSUE EXAM BY PATHOLOGIST: CPT | Mod: TC | Performed by: SPECIALIST

## 2023-10-09 PROCEDURE — 11045 DBRDMT SUBQ TISS EACH ADDL: CPT | Performed by: SPECIALIST

## 2023-10-09 PROCEDURE — 999N000141 HC STATISTIC PRE-PROCEDURE NURSING ASSESSMENT: Performed by: SPECIALIST

## 2023-10-09 PROCEDURE — 11042 DBRDMT SUBQ TIS 1ST 20SQCM/<: CPT | Performed by: SPECIALIST

## 2023-10-09 PROCEDURE — 36415 COLL VENOUS BLD VENIPUNCTURE: CPT | Performed by: EMERGENCY MEDICINE

## 2023-10-09 PROCEDURE — 87075 CULTR BACTERIA EXCEPT BLOOD: CPT | Performed by: SPECIALIST

## 2023-10-09 PROCEDURE — 250N000011 HC RX IP 250 OP 636: Mod: JZ | Performed by: NURSE ANESTHETIST, CERTIFIED REGISTERED

## 2023-10-09 PROCEDURE — 258N000003 HC RX IP 258 OP 636: Performed by: EMERGENCY MEDICINE

## 2023-10-09 PROCEDURE — 80048 BASIC METABOLIC PNL TOTAL CA: CPT | Performed by: EMERGENCY MEDICINE

## 2023-10-09 PROCEDURE — 250N000011 HC RX IP 250 OP 636: Performed by: EMERGENCY MEDICINE

## 2023-10-09 PROCEDURE — 370N000017 HC ANESTHESIA TECHNICAL FEE, PER MIN: Performed by: SPECIALIST

## 2023-10-09 PROCEDURE — 250N000025 HC SEVOFLURANE, PER MIN: Performed by: SPECIALIST

## 2023-10-09 PROCEDURE — 81003 URINALYSIS AUTO W/O SCOPE: CPT | Performed by: SPECIALIST

## 2023-10-09 PROCEDURE — 85027 COMPLETE CBC AUTOMATED: CPT | Performed by: EMERGENCY MEDICINE

## 2023-10-09 PROCEDURE — 250N000011 HC RX IP 250 OP 636: Mod: JZ | Performed by: EMERGENCY MEDICINE

## 2023-10-09 PROCEDURE — 99214 OFFICE O/P EST MOD 30 MIN: CPT | Mod: 25 | Performed by: FAMILY MEDICINE

## 2023-10-09 PROCEDURE — 36569 INSJ PICC 5 YR+ W/O IMAGING: CPT

## 2023-10-09 PROCEDURE — 96366 THER/PROPH/DIAG IV INF ADDON: CPT

## 2023-10-09 PROCEDURE — 250N000011 HC RX IP 250 OP 636: Performed by: ANESTHESIOLOGY

## 2023-10-09 PROCEDURE — 99223 1ST HOSP IP/OBS HIGH 75: CPT | Mod: AI

## 2023-10-09 PROCEDURE — 0JBB0ZZ EXCISION OF PERINEUM SUBCUTANEOUS TISSUE AND FASCIA, OPEN APPROACH: ICD-10-PCS | Performed by: SPECIALIST

## 2023-10-09 PROCEDURE — 74177 CT ABD & PELVIS W/CONTRAST: CPT

## 2023-10-09 PROCEDURE — 99222 1ST HOSP IP/OBS MODERATE 55: CPT | Performed by: INTERNAL MEDICINE

## 2023-10-09 PROCEDURE — 710N000009 HC RECOVERY PHASE 1, LEVEL 1, PER MIN: Performed by: SPECIALIST

## 2023-10-09 RX ORDER — LISINOPRIL 40 MG/1
40 TABLET ORAL AT BEDTIME
COMMUNITY
End: 2023-12-13

## 2023-10-09 RX ORDER — CLOPIDOGREL BISULFATE 75 MG/1
75 TABLET ORAL DAILY
Status: DISCONTINUED | OUTPATIENT
Start: 2023-10-09 | End: 2023-10-16 | Stop reason: HOSPADM

## 2023-10-09 RX ORDER — HYDROXYZINE HYDROCHLORIDE 25 MG/1
25-50 TABLET, FILM COATED ORAL 3 TIMES DAILY PRN
Status: DISCONTINUED | OUTPATIENT
Start: 2023-10-09 | End: 2023-10-16 | Stop reason: HOSPADM

## 2023-10-09 RX ORDER — OXYCODONE HYDROCHLORIDE 5 MG/1
5 TABLET ORAL EVERY 4 HOURS PRN
Status: DISCONTINUED | OUTPATIENT
Start: 2023-10-09 | End: 2023-10-15

## 2023-10-09 RX ORDER — ONDANSETRON 4 MG/1
4 TABLET, ORALLY DISINTEGRATING ORAL EVERY 30 MIN PRN
Status: DISCONTINUED | OUTPATIENT
Start: 2023-10-09 | End: 2023-10-09 | Stop reason: HOSPADM

## 2023-10-09 RX ORDER — SODIUM CHLORIDE 9 MG/ML
INJECTION, SOLUTION INTRAVENOUS CONTINUOUS
Status: DISCONTINUED | OUTPATIENT
Start: 2023-10-09 | End: 2023-10-10

## 2023-10-09 RX ORDER — NALOXONE HYDROCHLORIDE 0.4 MG/ML
0.2 INJECTION, SOLUTION INTRAMUSCULAR; INTRAVENOUS; SUBCUTANEOUS
Status: DISCONTINUED | OUTPATIENT
Start: 2023-10-09 | End: 2023-10-16 | Stop reason: HOSPADM

## 2023-10-09 RX ORDER — CEFAZOLIN SODIUM 1 G/50ML
2000 SOLUTION INTRAVENOUS ONCE
Status: COMPLETED | OUTPATIENT
Start: 2023-10-09 | End: 2023-10-09

## 2023-10-09 RX ORDER — FENTANYL CITRATE 50 UG/ML
50 INJECTION, SOLUTION INTRAMUSCULAR; INTRAVENOUS EVERY 5 MIN PRN
Status: DISCONTINUED | OUTPATIENT
Start: 2023-10-09 | End: 2023-10-09 | Stop reason: HOSPADM

## 2023-10-09 RX ORDER — LIDOCAINE 4 G/G
1 PATCH TOPICAL DAILY PRN
COMMUNITY
End: 2024-06-28

## 2023-10-09 RX ORDER — ONDANSETRON 4 MG/1
4 TABLET, ORALLY DISINTEGRATING ORAL EVERY 6 HOURS PRN
Status: DISCONTINUED | OUTPATIENT
Start: 2023-10-09 | End: 2023-10-16 | Stop reason: HOSPADM

## 2023-10-09 RX ORDER — CEFAZOLIN SODIUM 1 G/50ML
750 SOLUTION INTRAVENOUS EVERY 12 HOURS
Status: DISCONTINUED | OUTPATIENT
Start: 2023-10-09 | End: 2023-10-10

## 2023-10-09 RX ORDER — GABAPENTIN 300 MG/1
600 CAPSULE ORAL 3 TIMES DAILY
Status: DISCONTINUED | OUTPATIENT
Start: 2023-10-09 | End: 2023-10-16 | Stop reason: HOSPADM

## 2023-10-09 RX ORDER — AZELASTINE HYDROCHLORIDE 0.5 MG/ML
1 SOLUTION/ DROPS OPHTHALMIC 2 TIMES DAILY
Status: DISCONTINUED | OUTPATIENT
Start: 2023-10-09 | End: 2023-10-10

## 2023-10-09 RX ORDER — DIPHENHYDRAMINE HYDROCHLORIDE 50 MG/ML
25 INJECTION INTRAMUSCULAR; INTRAVENOUS EVERY 6 HOURS PRN
Status: DISCONTINUED | OUTPATIENT
Start: 2023-10-09 | End: 2023-10-09 | Stop reason: HOSPADM

## 2023-10-09 RX ORDER — ACETAMINOPHEN 325 MG/1
650 TABLET ORAL EVERY 6 HOURS PRN
Status: DISCONTINUED | OUTPATIENT
Start: 2023-10-09 | End: 2023-10-15

## 2023-10-09 RX ORDER — SODIUM CHLORIDE, SODIUM LACTATE, POTASSIUM CHLORIDE, CALCIUM CHLORIDE 600; 310; 30; 20 MG/100ML; MG/100ML; MG/100ML; MG/100ML
INJECTION, SOLUTION INTRAVENOUS CONTINUOUS PRN
Status: DISCONTINUED | OUTPATIENT
Start: 2023-10-09 | End: 2023-10-09

## 2023-10-09 RX ORDER — PROPOFOL 10 MG/ML
INJECTION, EMULSION INTRAVENOUS PRN
Status: DISCONTINUED | OUTPATIENT
Start: 2023-10-09 | End: 2023-10-09

## 2023-10-09 RX ORDER — LISINOPRIL 20 MG/1
40 TABLET ORAL AT BEDTIME
Status: DISCONTINUED | OUTPATIENT
Start: 2023-10-09 | End: 2023-10-16 | Stop reason: HOSPADM

## 2023-10-09 RX ORDER — NICOTINE 21 MG/24HR
1 PATCH, TRANSDERMAL 24 HOURS TRANSDERMAL DAILY
Status: DISCONTINUED | OUTPATIENT
Start: 2023-10-09 | End: 2023-10-14

## 2023-10-09 RX ORDER — PROCHLORPERAZINE 25 MG
25 SUPPOSITORY, RECTAL RECTAL EVERY 12 HOURS PRN
Status: DISCONTINUED | OUTPATIENT
Start: 2023-10-09 | End: 2023-10-16 | Stop reason: HOSPADM

## 2023-10-09 RX ORDER — MELOXICAM 7.5 MG/1
15 TABLET ORAL DAILY
Status: DISCONTINUED | OUTPATIENT
Start: 2023-10-09 | End: 2023-10-15

## 2023-10-09 RX ORDER — ONDANSETRON 2 MG/ML
4 INJECTION INTRAMUSCULAR; INTRAVENOUS EVERY 6 HOURS PRN
Status: DISCONTINUED | OUTPATIENT
Start: 2023-10-09 | End: 2023-10-09

## 2023-10-09 RX ORDER — TRAZODONE HYDROCHLORIDE 50 MG/1
50 TABLET, FILM COATED ORAL AT BEDTIME
Qty: 30 TABLET | Refills: 0 | OUTPATIENT
Start: 2023-10-09

## 2023-10-09 RX ORDER — BISACODYL 10 MG
10 SUPPOSITORY, RECTAL RECTAL DAILY PRN
Status: DISCONTINUED | OUTPATIENT
Start: 2023-10-09 | End: 2023-10-16 | Stop reason: HOSPADM

## 2023-10-09 RX ORDER — HYDROMORPHONE HYDROCHLORIDE 1 MG/ML
0.5 INJECTION, SOLUTION INTRAMUSCULAR; INTRAVENOUS; SUBCUTANEOUS
Status: DISCONTINUED | OUTPATIENT
Start: 2023-10-09 | End: 2023-10-15

## 2023-10-09 RX ORDER — LIDOCAINE 40 MG/G
CREAM TOPICAL
Status: DISCONTINUED | OUTPATIENT
Start: 2023-10-09 | End: 2023-10-09 | Stop reason: HOSPADM

## 2023-10-09 RX ORDER — LOPERAMIDE HCL 2 MG
2 CAPSULE ORAL 3 TIMES DAILY PRN
Status: DISCONTINUED | OUTPATIENT
Start: 2023-10-09 | End: 2023-10-12 | Stop reason: DRUGHIGH

## 2023-10-09 RX ORDER — MONTELUKAST SODIUM 10 MG/1
10 TABLET ORAL AT BEDTIME
Status: DISCONTINUED | OUTPATIENT
Start: 2023-10-09 | End: 2023-10-16 | Stop reason: HOSPADM

## 2023-10-09 RX ORDER — ACETAMINOPHEN 650 MG/1
650 SUPPOSITORY RECTAL EVERY 6 HOURS PRN
Status: DISCONTINUED | OUTPATIENT
Start: 2023-10-09 | End: 2023-10-15

## 2023-10-09 RX ORDER — AMOXICILLIN 250 MG
2 CAPSULE ORAL 2 TIMES DAILY PRN
Status: DISCONTINUED | OUTPATIENT
Start: 2023-10-09 | End: 2023-10-16 | Stop reason: HOSPADM

## 2023-10-09 RX ORDER — LIDOCAINE 40 MG/G
CREAM TOPICAL
Status: DISCONTINUED | OUTPATIENT
Start: 2023-10-09 | End: 2023-10-16 | Stop reason: HOSPADM

## 2023-10-09 RX ORDER — QUETIAPINE FUMARATE 100 MG/1
400 TABLET, FILM COATED ORAL AT BEDTIME
Status: DISCONTINUED | OUTPATIENT
Start: 2023-10-09 | End: 2023-10-16 | Stop reason: HOSPADM

## 2023-10-09 RX ORDER — HALOPERIDOL 5 MG/ML
1 INJECTION INTRAMUSCULAR
Status: DISCONTINUED | OUTPATIENT
Start: 2023-10-09 | End: 2023-10-09 | Stop reason: HOSPADM

## 2023-10-09 RX ORDER — POLYETHYLENE GLYCOL 3350 17 G/17G
17 POWDER, FOR SOLUTION ORAL DAILY
Status: DISCONTINUED | OUTPATIENT
Start: 2023-10-10 | End: 2023-10-16 | Stop reason: HOSPADM

## 2023-10-09 RX ORDER — ACETAMINOPHEN 325 MG/1
975 TABLET ORAL EVERY 8 HOURS
Status: COMPLETED | OUTPATIENT
Start: 2023-10-09 | End: 2023-10-12

## 2023-10-09 RX ORDER — IOPAMIDOL 755 MG/ML
90 INJECTION, SOLUTION INTRAVASCULAR ONCE
Status: COMPLETED | OUTPATIENT
Start: 2023-10-09 | End: 2023-10-09

## 2023-10-09 RX ORDER — ALBUTEROL SULFATE 90 UG/1
2 AEROSOL, METERED RESPIRATORY (INHALATION)
Status: DISCONTINUED | OUTPATIENT
Start: 2023-10-09 | End: 2023-10-16 | Stop reason: HOSPADM

## 2023-10-09 RX ORDER — DEXTROSE MONOHYDRATE 25 G/50ML
25-50 INJECTION, SOLUTION INTRAVENOUS
Status: DISCONTINUED | OUTPATIENT
Start: 2023-10-09 | End: 2023-10-10

## 2023-10-09 RX ORDER — PROCHLORPERAZINE MALEATE 10 MG
10 TABLET ORAL EVERY 6 HOURS PRN
Status: DISCONTINUED | OUTPATIENT
Start: 2023-10-09 | End: 2023-10-16 | Stop reason: HOSPADM

## 2023-10-09 RX ORDER — DEXTROSE MONOHYDRATE 25 G/50ML
25-50 INJECTION, SOLUTION INTRAVENOUS
Status: DISCONTINUED | OUTPATIENT
Start: 2023-10-09 | End: 2023-10-16 | Stop reason: HOSPADM

## 2023-10-09 RX ORDER — AMOXICILLIN 250 MG
1 CAPSULE ORAL 2 TIMES DAILY
Status: DISCONTINUED | OUTPATIENT
Start: 2023-10-09 | End: 2023-10-16 | Stop reason: HOSPADM

## 2023-10-09 RX ORDER — SODIUM CHLORIDE 9 MG/ML
INJECTION, SOLUTION INTRAVENOUS CONTINUOUS
Status: DISCONTINUED | OUTPATIENT
Start: 2023-10-09 | End: 2023-10-09

## 2023-10-09 RX ORDER — PROCHLORPERAZINE MALEATE 10 MG
10 TABLET ORAL EVERY 6 HOURS PRN
Status: DISCONTINUED | OUTPATIENT
Start: 2023-10-09 | End: 2023-10-09

## 2023-10-09 RX ORDER — NICOTINE POLACRILEX 4 MG
15-30 LOZENGE BUCCAL
Status: DISCONTINUED | OUTPATIENT
Start: 2023-10-09 | End: 2023-10-10

## 2023-10-09 RX ORDER — TRAZODONE HYDROCHLORIDE 50 MG/1
50 TABLET, FILM COATED ORAL AT BEDTIME
Status: DISCONTINUED | OUTPATIENT
Start: 2023-10-09 | End: 2023-10-16 | Stop reason: HOSPADM

## 2023-10-09 RX ORDER — DEXAMETHASONE SODIUM PHOSPHATE 4 MG/ML
INJECTION, SOLUTION INTRA-ARTICULAR; INTRALESIONAL; INTRAMUSCULAR; INTRAVENOUS; SOFT TISSUE PRN
Status: DISCONTINUED | OUTPATIENT
Start: 2023-10-09 | End: 2023-10-09

## 2023-10-09 RX ORDER — SODIUM CHLORIDE, SODIUM LACTATE, POTASSIUM CHLORIDE, CALCIUM CHLORIDE 600; 310; 30; 20 MG/100ML; MG/100ML; MG/100ML; MG/100ML
INJECTION, SOLUTION INTRAVENOUS CONTINUOUS
Status: DISCONTINUED | OUTPATIENT
Start: 2023-10-09 | End: 2023-10-09 | Stop reason: HOSPADM

## 2023-10-09 RX ORDER — NOREPINEPHRINE BITARTRATE 0.02 MG/ML
.01-.6 INJECTION, SOLUTION INTRAVENOUS CONTINUOUS
Status: DISCONTINUED | OUTPATIENT
Start: 2023-10-09 | End: 2023-10-11

## 2023-10-09 RX ORDER — PRAVASTATIN SODIUM 20 MG
80 TABLET ORAL AT BEDTIME
Status: DISCONTINUED | OUTPATIENT
Start: 2023-10-09 | End: 2023-10-16 | Stop reason: HOSPADM

## 2023-10-09 RX ORDER — DIPHENHYDRAMINE HCL 25 MG
25 CAPSULE ORAL EVERY 6 HOURS PRN
Status: DISCONTINUED | OUTPATIENT
Start: 2023-10-09 | End: 2023-10-09 | Stop reason: HOSPADM

## 2023-10-09 RX ORDER — CLINDAMYCIN PHOSPHATE 10 UG/ML
LOTION TOPICAL 2 TIMES DAILY PRN
Status: DISCONTINUED | OUTPATIENT
Start: 2023-10-09 | End: 2023-10-16 | Stop reason: HOSPADM

## 2023-10-09 RX ORDER — BACLOFEN 10 MG/1
20 TABLET ORAL 3 TIMES DAILY PRN
Status: DISCONTINUED | OUTPATIENT
Start: 2023-10-09 | End: 2023-10-16 | Stop reason: HOSPADM

## 2023-10-09 RX ORDER — DOCUSATE SODIUM 100 MG/1
100 CAPSULE, LIQUID FILLED ORAL 2 TIMES DAILY PRN
COMMUNITY
End: 2023-11-21

## 2023-10-09 RX ORDER — NICOTINE POLACRILEX 4 MG
15-30 LOZENGE BUCCAL
Status: DISCONTINUED | OUTPATIENT
Start: 2023-10-09 | End: 2023-10-16 | Stop reason: HOSPADM

## 2023-10-09 RX ORDER — VENLAFAXINE HYDROCHLORIDE 75 MG/1
150 CAPSULE, EXTENDED RELEASE ORAL
Status: DISCONTINUED | OUTPATIENT
Start: 2023-10-10 | End: 2023-10-16 | Stop reason: HOSPADM

## 2023-10-09 RX ORDER — LEVOFLOXACIN 5 MG/ML
500 INJECTION, SOLUTION INTRAVENOUS EVERY 24 HOURS
Status: DISCONTINUED | OUTPATIENT
Start: 2023-10-09 | End: 2023-10-10

## 2023-10-09 RX ORDER — LABETALOL HYDROCHLORIDE 5 MG/ML
10 INJECTION, SOLUTION INTRAVENOUS
Status: DISCONTINUED | OUTPATIENT
Start: 2023-10-09 | End: 2023-10-09 | Stop reason: HOSPADM

## 2023-10-09 RX ORDER — CETIRIZINE HYDROCHLORIDE 10 MG/1
10 TABLET ORAL DAILY
Status: DISCONTINUED | OUTPATIENT
Start: 2023-10-09 | End: 2023-10-16 | Stop reason: HOSPADM

## 2023-10-09 RX ORDER — SUMATRIPTAN 50 MG/1
50 TABLET, FILM COATED ORAL
Status: DISCONTINUED | OUTPATIENT
Start: 2023-10-09 | End: 2023-10-16 | Stop reason: HOSPADM

## 2023-10-09 RX ORDER — HYDROMORPHONE HCL IN WATER/PF 6 MG/30 ML
0.2 PATIENT CONTROLLED ANALGESIA SYRINGE INTRAVENOUS
Status: DISCONTINUED | OUTPATIENT
Start: 2023-10-09 | End: 2023-10-09

## 2023-10-09 RX ORDER — NALOXONE HYDROCHLORIDE 0.4 MG/ML
0.4 INJECTION, SOLUTION INTRAMUSCULAR; INTRAVENOUS; SUBCUTANEOUS
Status: DISCONTINUED | OUTPATIENT
Start: 2023-10-09 | End: 2023-10-16 | Stop reason: HOSPADM

## 2023-10-09 RX ORDER — ONDANSETRON 2 MG/ML
4 INJECTION INTRAMUSCULAR; INTRAVENOUS EVERY 6 HOURS PRN
Status: DISCONTINUED | OUTPATIENT
Start: 2023-10-09 | End: 2023-10-16 | Stop reason: HOSPADM

## 2023-10-09 RX ORDER — ACETAMINOPHEN 325 MG/1
650 TABLET ORAL EVERY 4 HOURS PRN
Status: DISCONTINUED | OUTPATIENT
Start: 2023-10-12 | End: 2023-10-09

## 2023-10-09 RX ORDER — FENTANYL CITRATE 50 UG/ML
INJECTION, SOLUTION INTRAMUSCULAR; INTRAVENOUS PRN
Status: DISCONTINUED | OUTPATIENT
Start: 2023-10-09 | End: 2023-10-09

## 2023-10-09 RX ORDER — FENTANYL CITRATE 50 UG/ML
25 INJECTION, SOLUTION INTRAMUSCULAR; INTRAVENOUS EVERY 5 MIN PRN
Status: DISCONTINUED | OUTPATIENT
Start: 2023-10-09 | End: 2023-10-09 | Stop reason: HOSPADM

## 2023-10-09 RX ORDER — LIDOCAINE 40 MG/G
CREAM TOPICAL
Status: ACTIVE | OUTPATIENT
Start: 2023-10-09 | End: 2023-10-12

## 2023-10-09 RX ORDER — ENOXAPARIN SODIUM 100 MG/ML
40 INJECTION SUBCUTANEOUS EVERY 24 HOURS
Status: DISCONTINUED | OUTPATIENT
Start: 2023-10-10 | End: 2023-10-09

## 2023-10-09 RX ORDER — ONDANSETRON 2 MG/ML
4 INJECTION INTRAMUSCULAR; INTRAVENOUS EVERY 30 MIN PRN
Status: DISCONTINUED | OUTPATIENT
Start: 2023-10-09 | End: 2023-10-09 | Stop reason: HOSPADM

## 2023-10-09 RX ORDER — CLINDAMYCIN PHOSPHATE 900 MG/50ML
900 INJECTION, SOLUTION INTRAVENOUS EVERY 8 HOURS
Status: DISCONTINUED | OUTPATIENT
Start: 2023-10-09 | End: 2023-10-11

## 2023-10-09 RX ORDER — LIDOCAINE HYDROCHLORIDE 10 MG/ML
INJECTION, SOLUTION INFILTRATION; PERINEURAL PRN
Status: DISCONTINUED | OUTPATIENT
Start: 2023-10-09 | End: 2023-10-09

## 2023-10-09 RX ORDER — AMOXICILLIN 250 MG
1 CAPSULE ORAL 2 TIMES DAILY PRN
Status: DISCONTINUED | OUTPATIENT
Start: 2023-10-09 | End: 2023-10-16 | Stop reason: HOSPADM

## 2023-10-09 RX ORDER — MEROPENEM 1 G/1
1 INJECTION, POWDER, FOR SOLUTION INTRAVENOUS EVERY 8 HOURS
Status: DISCONTINUED | OUTPATIENT
Start: 2023-10-09 | End: 2023-10-14

## 2023-10-09 RX ORDER — AMLODIPINE BESYLATE 5 MG/1
10 TABLET ORAL DAILY
Status: DISCONTINUED | OUTPATIENT
Start: 2023-10-09 | End: 2023-10-16 | Stop reason: HOSPADM

## 2023-10-09 RX ORDER — METOPROLOL SUCCINATE 50 MG/1
50 TABLET, EXTENDED RELEASE ORAL DAILY
Status: DISCONTINUED | OUTPATIENT
Start: 2023-10-09 | End: 2023-10-16 | Stop reason: HOSPADM

## 2023-10-09 RX ORDER — PANTOPRAZOLE SODIUM 40 MG/1
40 TABLET, DELAYED RELEASE ORAL DAILY
Status: DISCONTINUED | OUTPATIENT
Start: 2023-10-09 | End: 2023-10-16 | Stop reason: HOSPADM

## 2023-10-09 RX ORDER — HYDROMORPHONE HCL IN WATER/PF 6 MG/30 ML
0.4 PATIENT CONTROLLED ANALGESIA SYRINGE INTRAVENOUS
Status: DISCONTINUED | OUTPATIENT
Start: 2023-10-09 | End: 2023-10-09

## 2023-10-09 RX ORDER — ONDANSETRON 4 MG/1
4 TABLET, ORALLY DISINTEGRATING ORAL EVERY 6 HOURS PRN
Status: DISCONTINUED | OUTPATIENT
Start: 2023-10-09 | End: 2023-10-09

## 2023-10-09 RX ORDER — FLUTICASONE FUROATE AND VILANTEROL 100; 25 UG/1; UG/1
1 POWDER RESPIRATORY (INHALATION) DAILY
Status: DISCONTINUED | OUTPATIENT
Start: 2023-10-09 | End: 2023-10-16 | Stop reason: HOSPADM

## 2023-10-09 RX ORDER — ONDANSETRON 2 MG/ML
INJECTION INTRAMUSCULAR; INTRAVENOUS PRN
Status: DISCONTINUED | OUTPATIENT
Start: 2023-10-09 | End: 2023-10-09

## 2023-10-09 RX ADMIN — HYDROMORPHONE HYDROCHLORIDE 0.5 MG: 1 INJECTION, SOLUTION INTRAMUSCULAR; INTRAVENOUS; SUBCUTANEOUS at 19:11

## 2023-10-09 RX ADMIN — FENTANYL CITRATE 50 MCG: 50 INJECTION INTRAMUSCULAR; INTRAVENOUS at 17:19

## 2023-10-09 RX ADMIN — DEXAMETHASONE SODIUM PHOSPHATE 4 MG: 4 INJECTION, SOLUTION INTRA-ARTICULAR; INTRALESIONAL; INTRAMUSCULAR; INTRAVENOUS; SOFT TISSUE at 17:01

## 2023-10-09 RX ADMIN — ONDANSETRON 4 MG: 2 INJECTION INTRAMUSCULAR; INTRAVENOUS at 17:25

## 2023-10-09 RX ADMIN — SODIUM CHLORIDE, POTASSIUM CHLORIDE, SODIUM LACTATE AND CALCIUM CHLORIDE: 600; 310; 30; 20 INJECTION, SOLUTION INTRAVENOUS at 16:42

## 2023-10-09 RX ADMIN — SODIUM CHLORIDE 500 ML: 9 INJECTION, SOLUTION INTRAVENOUS at 10:50

## 2023-10-09 RX ADMIN — UMECLIDINIUM 1 PUFF: 62.5 AEROSOL, POWDER ORAL at 20:20

## 2023-10-09 RX ADMIN — PROPOFOL 100 MG: 10 INJECTION, EMULSION INTRAVENOUS at 16:54

## 2023-10-09 RX ADMIN — FENTANYL CITRATE 25 MCG: 50 INJECTION, SOLUTION INTRAMUSCULAR; INTRAVENOUS at 18:19

## 2023-10-09 RX ADMIN — FLUTICASONE FUROATE AND VILANTEROL TRIFENATATE 1 PUFF: 100; 25 POWDER RESPIRATORY (INHALATION) at 20:24

## 2023-10-09 RX ADMIN — FENTANYL CITRATE 25 MCG: 50 INJECTION, SOLUTION INTRAMUSCULAR; INTRAVENOUS at 18:04

## 2023-10-09 RX ADMIN — CLINDAMYCIN PHOSPHATE 900 MG: 900 INJECTION, SOLUTION INTRAVENOUS at 16:22

## 2023-10-09 RX ADMIN — SODIUM CHLORIDE 1000 ML: 9 INJECTION, SOLUTION INTRAVENOUS at 09:58

## 2023-10-09 RX ADMIN — SODIUM CHLORIDE 1000 ML: 9 INJECTION, SOLUTION INTRAVENOUS at 09:27

## 2023-10-09 RX ADMIN — SODIUM CHLORIDE, POTASSIUM CHLORIDE, SODIUM LACTATE AND CALCIUM CHLORIDE: 600; 310; 30; 20 INJECTION, SOLUTION INTRAVENOUS at 18:07

## 2023-10-09 RX ADMIN — LEVOFLOXACIN 500 MG: 500 INJECTION, SOLUTION INTRAVENOUS at 18:28

## 2023-10-09 RX ADMIN — ACETAMINOPHEN 975 MG: 325 TABLET ORAL at 20:19

## 2023-10-09 RX ADMIN — LIDOCAINE HYDROCHLORIDE 30 MG: 10 INJECTION, SOLUTION INFILTRATION; PERINEURAL at 16:54

## 2023-10-09 RX ADMIN — CLINDAMYCIN PHOSPHATE 900 MG: 900 INJECTION, SOLUTION INTRAVENOUS at 23:18

## 2023-10-09 RX ADMIN — IOPAMIDOL 90 ML: 755 INJECTION, SOLUTION INTRAVENOUS at 11:42

## 2023-10-09 RX ADMIN — AZELASTINE HYDROCHLORIDE OPHTHALMIC SOLUTION 0.05% 1 DROP: 0.5 SOLUTION/ DROPS OPHTHALMIC at 20:22

## 2023-10-09 RX ADMIN — PHENYLEPHRINE HYDROCHLORIDE 200 MCG: 10 INJECTION INTRAVENOUS at 16:54

## 2023-10-09 RX ADMIN — MEROPENEM 1 G: 1 INJECTION, POWDER, FOR SOLUTION INTRAVENOUS at 16:45

## 2023-10-09 RX ADMIN — HYDROMORPHONE HYDROCHLORIDE 0.5 MG: 1 INJECTION, SOLUTION INTRAMUSCULAR; INTRAVENOUS; SUBCUTANEOUS at 14:25

## 2023-10-09 RX ADMIN — SUGAMMADEX 200 MG: 100 INJECTION, SOLUTION INTRAVENOUS at 17:25

## 2023-10-09 RX ADMIN — VANCOMYCIN HYDROCHLORIDE 750 MG: 500 INJECTION, POWDER, LYOPHILIZED, FOR SOLUTION INTRAVENOUS at 21:04

## 2023-10-09 RX ADMIN — SODIUM CHLORIDE: 9 INJECTION, SOLUTION INTRAVENOUS at 20:18

## 2023-10-09 RX ADMIN — OXYCODONE HYDROCHLORIDE 5 MG: 5 TABLET ORAL at 20:49

## 2023-10-09 RX ADMIN — HYDROMORPHONE HYDROCHLORIDE 0.5 MG: 1 INJECTION, SOLUTION INTRAMUSCULAR; INTRAVENOUS; SUBCUTANEOUS at 23:32

## 2023-10-09 RX ADMIN — ROCURONIUM BROMIDE 50 MG: 50 INJECTION, SOLUTION INTRAVENOUS at 16:54

## 2023-10-09 RX ADMIN — LIDOCAINE HYDROCHLORIDE 4 ML: 10 INJECTION, SOLUTION EPIDURAL; INFILTRATION; INTRACAUDAL; PERINEURAL at 13:38

## 2023-10-09 RX ADMIN — SENNOSIDES AND DOCUSATE SODIUM 1 TABLET: 50; 8.6 TABLET ORAL at 20:20

## 2023-10-09 RX ADMIN — VANCOMYCIN HYDROCHLORIDE 2000 MG: 500 INJECTION, POWDER, LYOPHILIZED, FOR SOLUTION INTRAVENOUS at 09:59

## 2023-10-09 RX ADMIN — SODIUM CHLORIDE, POTASSIUM CHLORIDE, SODIUM LACTATE AND CALCIUM CHLORIDE: 600; 310; 30; 20 INJECTION, SOLUTION INTRAVENOUS at 16:22

## 2023-10-09 RX ADMIN — Medication 0.05 MCG/KG/MIN: at 12:17

## 2023-10-09 RX ADMIN — FENTANYL CITRATE 50 MCG: 50 INJECTION INTRAMUSCULAR; INTRAVENOUS at 17:13

## 2023-10-09 ASSESSMENT — ACTIVITIES OF DAILY LIVING (ADL)
DRESS: 0-->ASSISTANCE NEEDED (DEVELOPMENTALLY APPROPRIATE)
DOING_ERRANDS_INDEPENDENTLY_DIFFICULTY: YES
ADLS_ACUITY_SCORE: 35
TRANSFERRING: 1-->ASSISTANCE (EQUIPMENT/PERSON) NEEDED (NOT DEVELOPMENTALLY APPROPRIATE)
ADLS_ACUITY_SCORE: 35
WALKING_OR_CLIMBING_STAIRS: OTHER (SEE COMMENTS)
CONCENTRATING,_REMEMBERING_OR_MAKING_DECISIONS_DIFFICULTY: NO
TRANSFERRING: 1-->ASSISTANCE (EQUIPMENT/PERSON) NEEDED
WEAR_GLASSES_OR_BLIND: YES
ADLS_ACUITY_SCORE: 41
DRESSING/BATHING_MANAGEMENT: PCA
VISION_MANAGEMENT: GLASSES
ADLS_ACUITY_SCORE: 33
DRESSING/BATHING: BATHING DIFFICULTY, ASSISTANCE 1 PERSON;BATHING DIFFICULTY, DEPENDENT
DIFFICULTY_EATING/SWALLOWING: NO
ADLS_ACUITY_SCORE: 35
ADLS_ACUITY_SCORE: 35
DRESS: 1-->ASSISTANCE (EQUIPMENT/PERSON) NEEDED
BATHING: 1-->ASSISTANCE NEEDED
ADLS_ACUITY_SCORE: 35
ADLS_ACUITY_SCORE: 39
FALL_HISTORY_WITHIN_LAST_SIX_MONTHS: NO
WALKING_OR_CLIMBING_STAIRS_DIFFICULTY: YES
TOILETING_ISSUES: NO
CHANGE_IN_FUNCTIONAL_STATUS_SINCE_ONSET_OF_CURRENT_ILLNESS/INJURY: NO
DRESSING/BATHING_DIFFICULTY: YES

## 2023-10-09 ASSESSMENT — ENCOUNTER SYMPTOMS: SEIZURES: 1

## 2023-10-09 ASSESSMENT — COPD QUESTIONNAIRES
CAT_SEVERITY: MODERATE
COPD: 1

## 2023-10-09 ASSESSMENT — LIFESTYLE VARIABLES: TOBACCO_USE: 1

## 2023-10-09 ASSESSMENT — PAIN SCALES - GENERAL: PAINLEVEL: WORST PAIN (10)
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
## 2023-10-09 NOTE — OP NOTE
Operative Note    Name:  Teresa Perez  PCP:  Tyra Bullock  Procedure Date:  10/9/2023       Procedure:  Procedure(s):  DEBRIDEMENT LEFT MONS PUBIS     Pre-Procedure Diagnosis:  Necrotizing fasciitis (H) [M72.6]     Post-Procedure Diagnosis:    Same     Surgeon(s):  Trice Garcia MD     Assistant: None        Anesthesia Type:  General       Findings:  Extensive necrosis of the skin and subcutaneous fat of the left mons pubis extending to the labia.    Operative Report:    The patient was brought to the operating suite where she was placed in the supine position.  General anesthesia was administered.  She was prepped and draped in a sterile fashion.  A large somewhat elliptical but very irregular incision was made about the necrotic skin in the left mons pubis.  Using sharp dissection with a 10 blade knife I did an excisional debridement of skin and extensive soft tissue.  After the initial debridement I extended the incision just down onto the superior thigh as there is some extension of necrotic tissue in the subcutaneous tissues going that direction and also down onto the mons pubis where there was another area that had been draining.  All of the necrotic tissue was removed with sharp dissection.  The area of skin removed was approximately 15 x 7 cm.  This went approximately 6 to 7 m deep into the subcutaneous tissues also.  Hemostasis was achieved primarily with electrocautery but also with the treatment of Surgicel powder.  The wound was packed open with a sterile gauze.  She tolerated the procedure well.  Of note cultures were taken immediately after I made the incision and entered the primarily necrotic tissue.    Estimated Blood Loss:   50 cc        Specimens:    ID Type Source Tests Collected by Time Destination   1 : left mons pubis infected tissue Tissue Other SURGICAL PATHOLOGY EXAM Trice Garcia MD 10/9/2023  5:21 PM    A : abscess of mons pubis Swab Other ANAEROBIC BACTERIAL CULTURE  ROUTINE, AEROBIC BACTERIAL CULTURE ROUTINE Trice Garcia MD 10/9/2023  5:10 PM            Drains:        Complications:    None    Trice Garcia MD     Date: 10/9/2023  Time: 5:27 PM

## 2023-10-09 NOTE — ANESTHESIA POSTPROCEDURE EVALUATION
Patient: Teresa Perez    Procedure: Procedure(s):  DEBRIDEMENT LEFT MONS PUBIS       Anesthesia Type:  General    Note:  Disposition: Inpatient   Postop Pain Control: Uneventful            Sign Out: Well controlled pain   PONV: No   Neuro/Psych: Uneventful            Sign Out: Acceptable/Baseline neuro status   Airway/Respiratory: Uneventful            Sign Out: Acceptable/Baseline resp. status   CV/Hemodynamics: Uneventful            Sign Out: Acceptable CV status; No obvious hypovolemia; No obvious fluid overload   Other NRE: NONE   DID A NON-ROUTINE EVENT OCCUR? No         Last vitals:  Vitals Value Taken Time   /58 10/09/23 1832   Temp     Pulse 99 10/09/23 1841   Resp 17 10/09/23 1841   SpO2 95 % 10/09/23 1841   Vitals shown include unvalidated device data.    Electronically Signed By: Dejan Lozano MD  October 9, 2023  6:43 PM
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
## 2023-10-09 NOTE — PHARMACY-VANCOMYCIN DOSING SERVICE
Pharmacy Vancomycin Initial Note  Date of Service 2023  Patient's  1970  52 year old, female    Indication: Abscess    Current estimated CrCl = Estimated Creatinine Clearance: 62.1 mL/min (A) (based on SCr of 1.06 mg/dL (H)).    Creatinine for last 3 days  10/9/2023:  9:25 AM Creatinine 1.06 mg/dL    Recent Vancomycin Level(s) for last 3 days  No results found for requested labs within last 3 days.      Vancomycin IV Administrations (past 72 hours)                     vancomycin (VANCOCIN) 2,000 mg in sodium chloride 0.9 % 500 mL intermittent infusion (mg) 2,000 mg New Bag 10/09/23 0959                    Nephrotoxins and other renal medications (From now, onward)      Start     Dose/Rate Route Frequency Ordered Stop    10/09/23 1200  norepinephrine (LEVOPHED) 4 mg in  mL infusion PREMIX         0.01-0.6 mcg/kg/min × 88.5 kg  3.3-199.1 mL/hr  Intravenous CONTINUOUS 10/09/23 1142              Contrast Orders - past 72 hours (72h ago, onward)      Start     Dose/Rate Route Frequency Stop    10/09/23 1200  iopamidol (ISOVUE-370) solution 90 mL         90 mL Intravenous ONCE 10/09/23 1142            [employer] Prediction of Planned Initial Vancomycin Regimen  Regimen: 750 mg IV every 12 hours.  Start time: 21:59 on 10/09/2023  Exposure target: AUC24 (range)400-600 mg/L.hr   AUC24,ss: 427 mg/L.hr  Probability of AUC24 > 400: 57 %  Ctrough,ss: 14.2 mg/L  Probability of Ctrough,ss > 20: 21 %  Probability of nephrotoxicity (Lodise ORIANA ): 9 %          Plan:  Start vancomycin  750 mg IV q12h. This will be timed to start tonight 10/9/23 at 22:00.   Vancomycin monitoring method: AUC  Vancomycin therapeutic monitoring goal: 400-600 mg*h/L  Pharmacy will check vancomycin levels as appropriate in 1-3 Days.    Serum creatinine levels will be ordered daily for the first week of therapy and at least twice weekly for subsequent weeks.      ROSETTA COLEMAN, RP
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
## 2023-10-09 NOTE — LETTER
[facility]  [address] [place] [address] [address]  [place], [place] [address]  [phone]      Fax [phone]    [facility]           Fax: [phone]            Customer Service: [phone]        Account #: [id number]    Wound Dressing Rx and Order Form  Order Status: New  Verbal: Sneha  Date: 2023     Teresajacobo Bridgese  Gender: female  : 1970  [address]  [place] [address]  [phone] (home)     Medical Record: [id number]  Primary Care Provider: Tyra Bullock      ICD-10-CM    1. Open wound of abdominal wall, subsequent encounter  S31.109D             Insurance Info:  INSURER: Payor: [employer] / Plan: [employer] MEDICARE ADVANTAGE / Product Type: HMO /   Policy ID#:  [id number]  SECONDARY INSURANCE:  MEDICA  Secondary Policy ID#:  [id number]        Physician Info:   Name:  ROBERTA DECKER     Dept Address/Phones:   [address] [place] [address], [address] [id number] [phone]  Fax: [phone]    Lymphedema circumferential measurements (in cm):       No data to display                  Wound info:  Wound Buttocks Pressure injury community acquired Stage 2 (Active)   Number of days: 448       VASC Wound Umbilicus (Active)   Pre Size Length 0.6 10/09/23 0700   Pre Size Width 0.6 10/09/23 0700   Pre Size Depth 1.5 10/09/23 0700   Pre Total Sq cm 0.36 10/09/23 0700   Description refused assessment 23 1500   Number of days: 146       VASC Wound Abdomen middle (Active)   Pre Size Length 2.3 10/09/23 0700   Pre Size Width 3.8 10/09/23 0700   Pre Size Depth 0.1 10/09/23 0700   Pre Total Sq cm 12 23 1500   Description scattered 23 0903   Number of days: 146       VASC Wound Abdomen left (Active)   Description stable eschar 23 0700   Number of days: 146       VASC Wound abd wound distal (Active)   Pre Size Length 4 10/09/23 0700   Pre Size Width 3 10/09/23 0700   Pre Size Depth 0.1 10/09/23 0700   Pre  Total Sq cm 12 10/09/23 0700   Number of days: 105       VASC Wound left IT (Active)   Pre Size Length 1.5 10/09/23 0700   Pre Size Width 1.7 10/09/23 0700   Pre Size Depth 2 10/09/23 0700   Pre Total Sq cm 2.55 10/09/23 0700   Number of days: 77       Incision/Surgical Site 07/05/22 Abdomen (Active)   Number of days: 461       Incision/Surgical Site 07/19/22 Left Buttocks (Active)   Number of days: 447        Drainage: moderate  Thickness:  full  Duration of Need: 30 DAYS  Days Supply: 30 DAYS  Start Date: 10/9/2023  Starter Kit: Ancillary Kit (saline, gloves, gauze)  Qualifying wound/Debridement: Yes      Dressing Type Brand Size Frequency of change -or- Quantity   Primary Hydrofera blue ready  4x4 DAILY and as needed          Secondary        ABD pad  5x9 DAILY and as needed    (2 per dressing change)    Mepilex bordered foam adhesive   3x3 3 TIMES PER WEEK and as needed          Tape        Medipore tape   1in  DAILY and as needed     No substitutions preferred. Call [phone].         OK to forward to covered supplier.    Electronically Signed Physician:  ROBERTA DECKER             Date: October 9, 2023

## 2023-10-09 NOTE — ANESTHESIA PROCEDURE NOTES
Airway         Procedure Start/Stop Times: 10/9/2023 4:56 PM  Staff -        CRNA: Mehul Albarado APRN CRNA       Performed By: CRNAIndications and Patient Condition       Indications for airway management: airway protection       Induction type:intravenous       Mask difficulty assessment: 2 - vent by mask + OA or adjuvant +/- NMBA    Final Airway Details       Final airway type: endotracheal airway       Successful airway: ETT - single  Endotracheal Airway Details        ETT size (mm): 7.0       Cuffed: yes       Successful intubation technique: direct laryngoscopy       DL Blade Type: Seymour 2       Grade View of Cords: 1       Adjucts: stylet       Position: Right       Measured from: lips       Secured at (cm): 22    Post intubation assessment        Placement verified by: capnometry, equal breath sounds and chest rise        Number of attempts at approach: 1       Number of other approaches attempted: 0       Ease of procedure: easy       Dentition: Intact    Medication(s) Administered   Medication Administration Time: 10/9/2023 4:56 PM

## 2023-10-09 NOTE — PHARMACY-VANCOMYCIN DOSING SERVICE
Pharmacy Vancomycin Initial Note  Date of Service 2023  Patient's  1970  52 year old, female    Indication: Skin and Soft Tissue Infection    Current estimated CrCl = Estimated Creatinine Clearance: 134.4 mL/min (A) (based on SCr of 0.49 mg/dL (L)).    Creatinine for last 3 days  No results found for requested labs within last 3 days.    Recent Vancomycin Level(s) for last 3 days  No results found for requested labs within last 3 days.      Vancomycin IV Administrations (past 72 hours)        No vancomycin orders with administrations in past 72 hours.                    Nephrotoxins and other renal medications (From now, onward)      Start     Dose/Rate Route Frequency Ordered Stop    10/09/23 1000  vancomycin (VANCOCIN) 2,000 mg in sodium chloride 0.9 % 500 mL intermittent infusion         2,000 mg  over 2 Hours Intravenous ONCE 10/09/23 0947              Contrast Orders - past 72 hours (72h ago, onward)      None               Plan:  Start vancomycin  2000 mg IV once 22.6 mg/kg once. Re-consult to continue pharm to dose vanco once inpt.    Shari Metz, [facility]
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
## 2023-10-09 NOTE — ED NOTES
Bed: JNEDH-J  Expected date:   Expected time:   Means of arrival: Ambulance  Comments:  [facility]: CARLOS

## 2023-10-09 NOTE — ED NOTES
Expected Patient Referral to ED  8:04 AM    Referring Clinic/Provider:  Vascular clinic    Reason for referral/Clinical facts:  Soft tissue infection, concern for sepsis.  BP 79/53.  Elevated blood sugar.      Recommendations provided:  Send to ED for further evaluation    Caller was informed that this institution does possess the capabilities and/or resources to provide for patient and should be transferred to our facility.    Discussed that if direct admit is sought and any hurdles are encountered, this ED would be happy to see the patient and evaluate.    Informed caller that recommendations provided are recommendations based only on the facts provided and that they responsible to accept or reject the advice, or to seek a formal in person consultation as needed and that this ED will see/treat patient should they arrive.      Andreas Strong MD  [facility] EMERGENCY DEPARTMENT  [address] [place] [address] [id number] [phone]       Andreas Strong MD  10/09/23 0805

## 2023-10-09 NOTE — ANESTHESIA CARE TRANSFER NOTE
Patient: Teresa Perez    Procedure: Procedure(s):  DEBRIDEMENT LEFT MONS PUBIS       Diagnosis: Necrotizing fasciitis (H) [M72.6]  Diagnosis Additional Information: No value filed.    Anesthesia Type:   General     Note:    Oropharynx: oropharynx clear of all foreign objects  Level of Consciousness: drowsy  Oxygen Supplementation: face mask  Level of Supplemental Oxygen (L/min / FiO2): 6  Independent Airway: airway patency satisfactory and stable  Dentition: dentition unchanged  Vital Signs Stable: post-procedure vital signs reviewed and stable  Report to RN Given: handoff report given  Patient transferred to: PACU    Handoff Report: Identifed the Patient, Identified the Reponsible Provider, Reviewed the pertinent medical history, Discussed the surgical course, Reviewed Intra-OP anesthesia mangement and issues during anesthesia, Set expectations for post-procedure period and Allowed opportunity for questions and acknowledgement of understanding    Vitals:  Vitals Value Taken Time   BP 97/52 10/09/23 1745   Temp 37.5  C (99.5  F) 10/09/23 1739   Pulse 102 10/09/23 1749   Resp 14 10/09/23 1749   SpO2 93 % 10/09/23 1749   Vitals shown include unvalidated device data.    Electronically Signed By: RACHID Reed CRNA  October 9, 2023  5:51 PM

## 2023-10-09 NOTE — PROGRESS NOTES
Report called over to MD Andreas Strong regarding Teresa arriving by medical transport to the [facility] ED. Pt arrived to clinic and has new wound on left side of pannus. Pt reported it developed about 1 week ago and over the weekend she lanced it. Pt BS reported 487 over the weekend and c/o chills. Possible early necrotizing soft tissue infection and early signs of sepsis.
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
## 2023-10-09 NOTE — ANESTHESIA PROCEDURE NOTES
Arterial Line Procedure Note    Pre-Procedure   Staff -        Anesthesiologist:  Dejan Lozano MD       Performed By: anesthesiologist       Location: OR       Pre-Anesthestic Checklist: patient identified, IV checked, risks and benefits discussed, informed consent, monitors and equipment checked, pre-op evaluation and at physician/surgeon's request  Timeout:       Correct Patient: Yes        Correct Procedure: Yes        Correct Site: Yes        Correct Position: Yes   Line Placement:   This line was placed Post Induction starting at 10/9/2023 4:57 PM and ending at 10/9/2023 5:01 PM  Procedure   Procedure: arterial line       Diagnosis: hemodynamic monitoring       Laterality: left       Insertion Site: radial.  Sterile Prep        Standard elements of sterile barrier followed       Skin prep: Chloraprep  Insertion/Injection        Catheter Type/Size: 20 G, 12 cm  Narrative         Secured by: other       Tegaderm dressing used.       Complications: None apparent,        Arterial waveform: Yes        IBP within 10% of NIBP: Yes

## 2023-10-09 NOTE — PROCEDURES
"PICC Line Insertion Procedure Note  Pt. Name: Teresa Perez  MRN:  [id number]        Procedure: Insertion of a  triple Lumen  5 fr  Bard SOLO (valved) Power PICC,    Indications: Levo    Contraindications : n/a    Procedure Details   Patient identified with 2 identifiers and \"Time Out\" conducted.  .     Central line insertion bundle followed: hand hygeine performed prior to procedure, site cleansed with cholraprep, hat, mask, sterile gloves,sterile gown worn, patient draped with maximum barrier head to toe drape, sterile field maintained.    The vein was assessed and found to be compressible and of adequate size. 4 ml 1% Lidocaine administered sq to the insertion site. A 5 Fr PICC was inserted into the brachail vein of the left arm with ultrasound guidance. 1 attempt(s) required to access vein.   Catheter threaded without difficulty. Good blood return noted.    Modified Seldinger Technique used for insertion.    The 8 sharps that are included in the PICC insertion kit were accounted for and disposed of in the sharps container prior to breakdown of the sterile field.    Catheter secured with Statlock, biopatch and Tegaderm dressing applied.    Findings:  Total catheter length  47 cm, with 5 cm exposed. Mid upper arm circumference is 35 cm. Catheter was flushed with 30 cc NS. Patient  tolerated procedure well.    Tip placement verified by 3CG . Tip placement in the SVC.    Patient's primary RN notified PICC is ready for use.    Comments:        Herbert Corey, MINESHN, RN  [facility]  "

## 2023-10-09 NOTE — PHARMACY-ADMISSION MEDICATION HISTORY
Pharmacist Admission Medication History    Admission medication history is complete. The information provided in this note is only as accurate as the sources available at the time of the update.    Information Source(s): Patient and CareEverywhere/SureScripts via in-person    Pertinent Information: none    Changes made to PTA medication list:  Added: None  Deleted: nicotine patch, Depoprovera  Changed: jazz, lidocaine     Medication Affordability:  Not including over the counter (OTC) medications, was there a time in the past 3 months when you did not take your medications as prescribed because of cost?: No    Allergies reviewed with patient and updates made in EHR: yes    Medication History Completed By: Shari Metz RPH 10/9/2023 12:20 PM    PTA Med List   Medication Sig Note Last Dose    acetaminophen (TYLENOL) 500 MG tablet Take 1-2 tablets (500-1,000 mg) by mouth every 6 hours as needed for mild pain  Unknown at prn    albuterol (PROAIR HFA/PROVENTIL HFA/VENTOLIN HFA) 108 (90 Base) MCG/ACT inhaler INHALE ONE TO TWO PUFFS BY MOUTH EVERY 4 HOURS AS NEEDED  Unknown at prn    albuterol (PROVENTIL) (2.5 MG/3ML) 0.083% neb solution Take 1 vial (2.5 mg) by nebulization every 6 hours as needed for shortness of breath or wheezing 10/9/2023: Needs help getting new machine tubing and mask supplies  Unknown at prn    amLODIPine (NORVASC) 10 MG tablet Take 1 tablet (10 mg) by mouth daily  10/9/2023 at am    ammonium lactate (AMLACTIN) 12 % external cream Apply topically daily as needed  Unknown at prn    azelastine (ASTELIN) 0.1 % nasal spray Spray 1 spray into both nostrils 2 times daily  10/8/2023 at pm    azelastine (OPTIVAR) 0.05 % ophthalmic solution PLACE 1 DROP INTO BOTH EYES 2 TIMES DAILY AS NEEDED (ITCHY EYES)  Unknown at prn    baclofen (LIORESAL) 20 MG tablet Take 1 tablet (20 mg) by mouth 3 times daily as needed for muscle spasms  Unknown at prn    budesonide-formoterol (SYMBICORT) 160-4.5 MCG/ACT Inhaler  Inhale 2 puffs twice daily plus 1-2 puffs as needed. May use up to 12 puffs per day. (Patient taking differently: 2 puffs two times daily Inhale 2 puffs twice daily plus 1-2 puffs as needed. May use up to 12 puffs per day.)  10/8/2023 at pm    TRUNG-GEST ANTACID 500 MG chewable tablet TAKE 1 TABLET (500 MG) BY MOUTH 2 TIMES DAILY AS NEEDED FOR HEARTBURN  Unknown at prn    cetirizine (ZYRTEC) 10 MG tablet TAKE 1 TABLET BY MOUTH EVERY DAY  10/9/2023 at am    clindamycin (CLEOCIN T) 1 % external solution Apply topically 2 times daily (Patient taking differently: Apply topically 2 times daily as needed (skin infection))  Unknown at prn    clopidogrel (PLAVIX) 75 MG tablet TAKE 1 TABLET(75 MG) BY MOUTH DAILY  10/9/2023 at am    diclofenac (CATAFLAM) 50 MG tablet 50 mg 2 times daily as needed  Unknown at prn    diclofenac (VOLTAREN) 1 % topical gel APPLY 2 GRAMS TOPICALLY FOUR TIMES A DAY AS NEEDED FOR JOINT PAIN  Unknown at prn    docusate sodium (COLACE) 100 MG capsule Take 100 mg by mouth 2 times daily as needed for constipation  Unknown at prn    empagliflozin (JARDIANCE) 25 MG TABS tablet Take 1 tablet (25 mg) by mouth daily  10/9/2023 at am    EPINEPHrine (ANY BX GENERIC EQUIV) 0.3 MG/0.3ML injection 2-pack Inject 0.3 mLs (0.3 mg) into the muscle once as needed for anaphylaxis  Unknown at needs new supply    exenatide ER (BYDUREON BCISE) 2 MG/0.85ML auto-injector INJECT 2MG SUBCUTANEOUSLY EVERY 7 DAYS 10/9/2023: Friday  Past Week at Fri    gabapentin (NEURONTIN) 600 MG tablet Take 1 tablet (600 mg) by mouth 3 times daily  10/9/2023 at am    hydrOXYzine (ATARAX) 25 MG tablet Take 1-2 tablets (25-50 mg) by mouth 3 times daily as needed for itching  Unknown at prn    insulin lispro (HUMALOG KWIKPEN) 100 UNIT/ML (1 unit dial) KWIKPEN IF BS <100, NO HUMALOG. IF -150, TAKE 28 UNITS. INCREASE 2 UNITS FOR EVERY 50 ABOVE 150. TOTAL DAILY DOSE IS 90 UNITS/DAY  10/8/2023 at pm 30 units    Lidocaine (LIDOCARE) 4 % Patch  Place 1 patch onto the skin daily as needed for moderate pain To prevent lidocaine toxicity, patient should be patch free for 12 hrs daily.  Unknown at prn    lidocaine (XYLOCAINE) 5 % external ointment APPLY TOPICALLY THREE TIMES A DAY AS NEEDED FOR MODERATE PAIN  Unknown at prn    lisinopril (ZESTRIL) 40 MG tablet Take 40 mg by mouth at bedtime  10/8/2023 at pm    loperamide (IMODIUM) 2 MG capsule TAKE 1 TABLET (2 MG) BY MOUTH 4 TIMES DAILY AS NEEDED FOR DIARRHEA 10/9/2023: Needing daily 10/8/2023 at QID    meloxicam (MOBIC) 15 MG tablet TAKE 1 TABLET BY MOUTH EVERY DAY  10/9/2023 at am    metoclopramide (REGLAN) 5 MG tablet TAKE 1 TABLET (5 MG) BY MOUTH 3 TIMES DAILY AS NEEDED (STOMACH PAIN, NAUSEA, VOMITING)  Unknown at prn    metoprolol succinate ER (TOPROL XL) 50 MG 24 hr tablet TAKE 1 TABLET(50 MG) BY MOUTH DAILY  10/9/2023 at am    montelukast (SINGULAIR) 10 MG tablet Take 1 tablet (10 mg) by mouth At Bedtime  10/8/2023 at pm    mupirocin (BACTROBAN) 2 % external ointment APPLY TO AFFECTED AREA 3 TIMES A DAY (Patient taking differently: 3 times daily as needed)  Unknown at prn    nystatin (MYCOSTATIN) 436028 UNIT/ML suspension TAKE 5 MLS (500,000 UNITS) BY MOUTH DAILY AS NEEDED (THRUSH)  Unknown at prn    pantoprazole (PROTONIX) 40 MG EC tablet TAKE 1 TABLET BY MOUTH ONCE DAILY  10/9/2023 at am    pramipexole (MIRAPEX) 0.75 MG tablet TAKE 1 TABLET (0.75 MG) BY MOUTH AT BEDTIME  10/8/2023 at pm    pravastatin (PRAVACHOL) 80 MG tablet TAKE 1 TABLET BY MOUTH ONCE DAILY AT BEDTIME  10/8/2023 at pm    QUEtiapine (SEROQUEL) 400 MG tablet TAKE 1 TABLET (400 MG) BY MOUTH AT BEDTIME  10/8/2023 at pm    SPIRIVA RESPIMAT 2.5 MCG/ACT inhaler INHALE TWO (2) PUFFS BY MOUTH DAILY  10/8/2023 at am    SUMAtriptan (IMITREX) 50 MG tablet TAKE 1 TABLET (50 MG) BY MOUTH AT ONSET OF HEADACHE FOR MIGRAINE  Unknown at prn    TOUJEO SOLOSTAR 300 UNIT/ML (1 units dial) pen INJECT 65 UNITS IN THE MORNING AND IN THE EVENING  10/8/2023  at pm    traZODone (DESYREL) 50 MG tablet TAKE 1 TABLET(50 MG) BY MOUTH AT BEDTIME  10/8/2023 at pm    venlafaxine (EFFEXOR XR) 150 MG 24 hr capsule TAKE 1 CAPSULE BY MOUTH EVERY DAY. FURTHER REFILL FROM PSYCHIATRIST  10/9/2023 at am

## 2023-10-10 ENCOUNTER — TELEPHONE (OUTPATIENT)
Dept: VASCULAR SURGERY | Facility: CLINIC | Age: 53
End: 2023-10-10
Payer: COMMERCIAL

## 2023-10-10 ENCOUNTER — MEDICAL CORRESPONDENCE (OUTPATIENT)
Dept: HEALTH INFORMATION MANAGEMENT | Facility: CLINIC | Age: 53
End: 2023-10-10

## 2023-10-10 LAB
ALBUMIN SERPL BCG-MCNC: 3.1 G/DL (ref 3.5–5.2)
ALP SERPL-CCNC: 154 U/L (ref 35–104)
ALT SERPL W P-5'-P-CCNC: 21 U/L (ref 0–50)
ANION GAP SERPL CALCULATED.3IONS-SCNC: 16 MMOL/L (ref 7–15)
ANION GAP SERPL CALCULATED.3IONS-SCNC: 19 MMOL/L (ref 7–15)
AST SERPL W P-5'-P-CCNC: 16 U/L (ref 0–45)
BASO+EOS+MONOS # BLD AUTO: ABNORMAL 10*3/UL
BASO+EOS+MONOS NFR BLD AUTO: ABNORMAL %
BASOPHILS # BLD AUTO: 0 10E3/UL (ref 0–0.2)
BASOPHILS NFR BLD AUTO: 0 %
BILIRUB SERPL-MCNC: 0.3 MG/DL
BUN SERPL-MCNC: 22.7 MG/DL (ref 6–20)
BUN SERPL-MCNC: 26.1 MG/DL (ref 6–20)
CALCIUM SERPL-MCNC: 9.2 MG/DL (ref 8.6–10)
CALCIUM SERPL-MCNC: 9.5 MG/DL (ref 8.6–10)
CHLORIDE SERPL-SCNC: 104 MMOL/L (ref 98–107)
CHLORIDE SERPL-SCNC: 104 MMOL/L (ref 98–107)
CREAT SERPL-MCNC: 0.53 MG/DL (ref 0.51–0.95)
CREAT SERPL-MCNC: 0.53 MG/DL (ref 0.51–0.95)
CREAT SERPL-MCNC: 0.56 MG/DL (ref 0.51–0.95)
DEPRECATED HCO3 PLAS-SCNC: 17 MMOL/L (ref 22–29)
DEPRECATED HCO3 PLAS-SCNC: 17 MMOL/L (ref 22–29)
EGFRCR SERPLBLD CKD-EPI 2021: >90 ML/MIN/1.73M2
EOSINOPHIL # BLD AUTO: 0 10E3/UL (ref 0–0.7)
EOSINOPHIL NFR BLD AUTO: 0 %
ERYTHROCYTE [DISTWIDTH] IN BLOOD BY AUTOMATED COUNT: 14.7 % (ref 10–15)
GLUCOSE BLDC GLUCOMTR-MCNC: 130 MG/DL (ref 70–99)
GLUCOSE BLDC GLUCOMTR-MCNC: 134 MG/DL (ref 70–99)
GLUCOSE BLDC GLUCOMTR-MCNC: 141 MG/DL (ref 70–99)
GLUCOSE BLDC GLUCOMTR-MCNC: 151 MG/DL (ref 70–99)
GLUCOSE BLDC GLUCOMTR-MCNC: 159 MG/DL (ref 70–99)
GLUCOSE SERPL-MCNC: 151 MG/DL (ref 70–99)
GLUCOSE SERPL-MCNC: 167 MG/DL (ref 70–99)
HCT VFR BLD AUTO: 35 % (ref 35–47)
HGB BLD-MCNC: 10.9 G/DL (ref 11.7–15.7)
IMM GRANULOCYTES # BLD: 0.2 10E3/UL
IMM GRANULOCYTES NFR BLD: 1 %
LYMPHOCYTES # BLD AUTO: 0.5 10E3/UL (ref 0.8–5.3)
LYMPHOCYTES NFR BLD AUTO: 2 %
MCH RBC QN AUTO: 28.5 PG (ref 26.5–33)
MCHC RBC AUTO-ENTMCNC: 31.1 G/DL (ref 31.5–36.5)
MCV RBC AUTO: 92 FL (ref 78–100)
MONOCYTES # BLD AUTO: 0.2 10E3/UL (ref 0–1.3)
MONOCYTES NFR BLD AUTO: 1 %
NEUTROPHILS # BLD AUTO: 23.4 10E3/UL (ref 1.6–8.3)
NEUTROPHILS NFR BLD AUTO: 96 %
NRBC # BLD AUTO: 0 10E3/UL
NRBC BLD AUTO-RTO: 0 /100
PLATELET # BLD AUTO: 228 10E3/UL (ref 150–450)
POTASSIUM SERPL-SCNC: 5.3 MMOL/L (ref 3.4–5.3)
POTASSIUM SERPL-SCNC: 5.7 MMOL/L (ref 3.4–5.3)
POTASSIUM SERPL-SCNC: 5.8 MMOL/L (ref 3.4–5.3)
PROT SERPL-MCNC: 6.8 G/DL (ref 6.4–8.3)
RBC # BLD AUTO: 3.82 10E6/UL (ref 3.8–5.2)
SODIUM SERPL-SCNC: 137 MMOL/L (ref 135–145)
SODIUM SERPL-SCNC: 140 MMOL/L (ref 135–145)
VANCOMYCIN SERPL-MCNC: 5.2 UG/ML
WBC # BLD AUTO: 24.4 10E3/UL (ref 4–11)

## 2023-10-10 PROCEDURE — 250N000013 HC RX MED GY IP 250 OP 250 PS 637

## 2023-10-10 PROCEDURE — 200N000001 HC R&B ICU

## 2023-10-10 PROCEDURE — 250N000011 HC RX IP 250 OP 636: Mod: JZ | Performed by: STUDENT IN AN ORGANIZED HEALTH CARE EDUCATION/TRAINING PROGRAM

## 2023-10-10 PROCEDURE — 258N000003 HC RX IP 258 OP 636: Performed by: SPECIALIST

## 2023-10-10 PROCEDURE — 82565 ASSAY OF CREATININE: CPT | Performed by: STUDENT IN AN ORGANIZED HEALTH CARE EDUCATION/TRAINING PROGRAM

## 2023-10-10 PROCEDURE — 87040 BLOOD CULTURE FOR BACTERIA: CPT | Performed by: STUDENT IN AN ORGANIZED HEALTH CARE EDUCATION/TRAINING PROGRAM

## 2023-10-10 PROCEDURE — 258N000003 HC RX IP 258 OP 636: Performed by: FAMILY MEDICINE

## 2023-10-10 PROCEDURE — 258N000003 HC RX IP 258 OP 636: Performed by: STUDENT IN AN ORGANIZED HEALTH CARE EDUCATION/TRAINING PROGRAM

## 2023-10-10 PROCEDURE — 99233 SBSQ HOSP IP/OBS HIGH 50: CPT

## 2023-10-10 PROCEDURE — 94799 UNLISTED PULMONARY SVC/PX: CPT

## 2023-10-10 PROCEDURE — 999N000157 HC STATISTIC RCP TIME EA 10 MIN

## 2023-10-10 PROCEDURE — 250N000012 HC RX MED GY IP 250 OP 636 PS 637: Performed by: INTERNAL MEDICINE

## 2023-10-10 PROCEDURE — 36415 COLL VENOUS BLD VENIPUNCTURE: CPT

## 2023-10-10 PROCEDURE — 84132 ASSAY OF SERUM POTASSIUM: CPT

## 2023-10-10 PROCEDURE — 250N000013 HC RX MED GY IP 250 OP 250 PS 637: Performed by: SPECIALIST

## 2023-10-10 PROCEDURE — 999N000287 HC ICU ADULT ROUNDING, EACH 10 MINS

## 2023-10-10 PROCEDURE — 250N000011 HC RX IP 250 OP 636: Performed by: FAMILY MEDICINE

## 2023-10-10 PROCEDURE — 250N000009 HC RX 250: Performed by: SPECIALIST

## 2023-10-10 PROCEDURE — 250N000011 HC RX IP 250 OP 636: Performed by: SPECIALIST

## 2023-10-10 PROCEDURE — 80053 COMPREHEN METABOLIC PANEL: CPT | Performed by: INTERNAL MEDICINE

## 2023-10-10 PROCEDURE — 258N000003 HC RX IP 258 OP 636

## 2023-10-10 PROCEDURE — 99231 SBSQ HOSP IP/OBS SF/LOW 25: CPT | Performed by: SURGERY

## 2023-10-10 PROCEDURE — 36415 COLL VENOUS BLD VENIPUNCTURE: CPT | Performed by: STUDENT IN AN ORGANIZED HEALTH CARE EDUCATION/TRAINING PROGRAM

## 2023-10-10 PROCEDURE — 85004 AUTOMATED DIFF WBC COUNT: CPT | Performed by: INTERNAL MEDICINE

## 2023-10-10 PROCEDURE — G0463 HOSPITAL OUTPT CLINIC VISIT: HCPCS

## 2023-10-10 PROCEDURE — 80202 ASSAY OF VANCOMYCIN: CPT | Performed by: FAMILY MEDICINE

## 2023-10-10 RX ORDER — SODIUM CHLORIDE 9 MG/ML
INJECTION, SOLUTION INTRAVENOUS CONTINUOUS
Status: DISCONTINUED | OUTPATIENT
Start: 2023-10-10 | End: 2023-10-15

## 2023-10-10 RX ORDER — AZELASTINE HYDROCHLORIDE 0.5 MG/ML
1 SOLUTION/ DROPS OPHTHALMIC 2 TIMES DAILY PRN
Status: DISCONTINUED | OUTPATIENT
Start: 2023-10-10 | End: 2023-10-16 | Stop reason: HOSPADM

## 2023-10-10 RX ORDER — CEFAZOLIN SODIUM 1 G/50ML
750 SOLUTION INTRAVENOUS ONCE
Status: COMPLETED | OUTPATIENT
Start: 2023-10-10 | End: 2023-10-11

## 2023-10-10 RX ORDER — ENOXAPARIN SODIUM 100 MG/ML
40 INJECTION SUBCUTANEOUS EVERY 24 HOURS
Status: DISCONTINUED | OUTPATIENT
Start: 2023-10-10 | End: 2023-10-16 | Stop reason: HOSPADM

## 2023-10-10 RX ADMIN — HYDROXYZINE HYDROCHLORIDE 50 MG: 25 TABLET, FILM COATED ORAL at 00:35

## 2023-10-10 RX ADMIN — HYDROMORPHONE HYDROCHLORIDE 0.5 MG: 1 INJECTION, SOLUTION INTRAMUSCULAR; INTRAVENOUS; SUBCUTANEOUS at 22:44

## 2023-10-10 RX ADMIN — INSULIN ASPART 1 UNITS: 100 INJECTION, SOLUTION INTRAVENOUS; SUBCUTANEOUS at 08:59

## 2023-10-10 RX ADMIN — QUETIAPINE FUMARATE 400 MG: 300 TABLET ORAL at 21:33

## 2023-10-10 RX ADMIN — VANCOMYCIN HYDROCHLORIDE 750 MG: 500 INJECTION, POWDER, LYOPHILIZED, FOR SOLUTION INTRAVENOUS at 10:03

## 2023-10-10 RX ADMIN — PRAVASTATIN SODIUM 80 MG: 20 TABLET ORAL at 21:33

## 2023-10-10 RX ADMIN — CLINDAMYCIN PHOSPHATE 900 MG: 900 INJECTION, SOLUTION INTRAVENOUS at 09:11

## 2023-10-10 RX ADMIN — ENOXAPARIN SODIUM 40 MG: 40 INJECTION SUBCUTANEOUS at 11:00

## 2023-10-10 RX ADMIN — INSULIN GLARGINE 10 UNITS: 100 INJECTION, SOLUTION SUBCUTANEOUS at 21:38

## 2023-10-10 RX ADMIN — SODIUM CHLORIDE, POTASSIUM CHLORIDE, SODIUM LACTATE AND CALCIUM CHLORIDE 1000 ML: 600; 310; 30; 20 INJECTION, SOLUTION INTRAVENOUS at 08:24

## 2023-10-10 RX ADMIN — Medication 0.03 MCG/KG/MIN: at 00:15

## 2023-10-10 RX ADMIN — OXYCODONE HYDROCHLORIDE 5 MG: 5 TABLET ORAL at 22:44

## 2023-10-10 RX ADMIN — GABAPENTIN 600 MG: 300 CAPSULE ORAL at 20:24

## 2023-10-10 RX ADMIN — OXYCODONE HYDROCHLORIDE 5 MG: 5 TABLET ORAL at 18:11

## 2023-10-10 RX ADMIN — SENNOSIDES AND DOCUSATE SODIUM 1 TABLET: 50; 8.6 TABLET ORAL at 09:19

## 2023-10-10 RX ADMIN — HYDROXYZINE HYDROCHLORIDE 50 MG: 25 TABLET, FILM COATED ORAL at 15:17

## 2023-10-10 RX ADMIN — HYDROMORPHONE HYDROCHLORIDE 0.5 MG: 1 INJECTION, SOLUTION INTRAMUSCULAR; INTRAVENOUS; SUBCUTANEOUS at 16:39

## 2023-10-10 RX ADMIN — MEROPENEM 1 G: 1 INJECTION, POWDER, FOR SOLUTION INTRAVENOUS at 00:35

## 2023-10-10 RX ADMIN — PRAMIPEXOLE DIHYDROCHLORIDE 0.75 MG: 0.5 TABLET ORAL at 21:33

## 2023-10-10 RX ADMIN — HYDROMORPHONE HYDROCHLORIDE 0.5 MG: 1 INJECTION, SOLUTION INTRAMUSCULAR; INTRAVENOUS; SUBCUTANEOUS at 10:56

## 2023-10-10 RX ADMIN — OXYCODONE HYDROCHLORIDE 5 MG: 5 TABLET ORAL at 13:25

## 2023-10-10 RX ADMIN — AZELASTINE HYDROCHLORIDE 1 DROP: 0.5 SOLUTION/ DROPS OPHTHALMIC at 20:26

## 2023-10-10 RX ADMIN — GABAPENTIN 600 MG: 300 CAPSULE ORAL at 13:25

## 2023-10-10 RX ADMIN — POLYETHYLENE GLYCOL 3350 17 G: 17 POWDER, FOR SOLUTION ORAL at 09:19

## 2023-10-10 RX ADMIN — OXYCODONE HYDROCHLORIDE 5 MG: 5 TABLET ORAL at 01:57

## 2023-10-10 RX ADMIN — OXYCODONE HYDROCHLORIDE 5 MG: 5 TABLET ORAL at 09:19

## 2023-10-10 RX ADMIN — CLINDAMYCIN PHOSPHATE 900 MG: 900 INJECTION, SOLUTION INTRAVENOUS at 15:18

## 2023-10-10 RX ADMIN — VANCOMYCIN HYDROCHLORIDE 750 MG: 1 INJECTION, POWDER, LYOPHILIZED, FOR SOLUTION INTRAVENOUS at 23:59

## 2023-10-10 RX ADMIN — ACETAMINOPHEN 975 MG: 325 TABLET ORAL at 03:53

## 2023-10-10 RX ADMIN — ACETAMINOPHEN 975 MG: 325 TABLET ORAL at 18:10

## 2023-10-10 RX ADMIN — SODIUM CHLORIDE: 9 INJECTION, SOLUTION INTRAVENOUS at 13:25

## 2023-10-10 RX ADMIN — UMECLIDINIUM 1 PUFF: 62.5 AEROSOL, POWDER ORAL at 09:04

## 2023-10-10 RX ADMIN — MEROPENEM 1 G: 1 INJECTION, POWDER, FOR SOLUTION INTRAVENOUS at 18:11

## 2023-10-10 RX ADMIN — VANCOMYCIN HYDROCHLORIDE 750 MG: 500 INJECTION, POWDER, LYOPHILIZED, FOR SOLUTION INTRAVENOUS at 21:35

## 2023-10-10 RX ADMIN — HYDROMORPHONE HYDROCHLORIDE 0.5 MG: 1 INJECTION, SOLUTION INTRAMUSCULAR; INTRAVENOUS; SUBCUTANEOUS at 20:24

## 2023-10-10 RX ADMIN — FLUTICASONE FUROATE AND VILANTEROL TRIFENATATE 1 PUFF: 100; 25 POWDER RESPIRATORY (INHALATION) at 09:01

## 2023-10-10 RX ADMIN — HYDROMORPHONE HYDROCHLORIDE 0.5 MG: 1 INJECTION, SOLUTION INTRAMUSCULAR; INTRAVENOUS; SUBCUTANEOUS at 04:56

## 2023-10-10 RX ADMIN — ACETAMINOPHEN 975 MG: 325 TABLET ORAL at 10:03

## 2023-10-10 RX ADMIN — MEROPENEM 1 G: 1 INJECTION, POWDER, FOR SOLUTION INTRAVENOUS at 09:18

## 2023-10-10 RX ADMIN — INSULIN GLARGINE 10 UNITS: 100 INJECTION, SOLUTION SUBCUTANEOUS at 00:35

## 2023-10-10 ASSESSMENT — ACTIVITIES OF DAILY LIVING (ADL)
ADLS_ACUITY_SCORE: 41
ADLS_ACUITY_SCORE: 41
ADLS_ACUITY_SCORE: 45
ADLS_ACUITY_SCORE: 41
DEPENDENT_IADLS:: CLEANING;COOKING;LAUNDRY;MEAL PREPARATION;TRANSPORTATION
ADLS_ACUITY_SCORE: 41
ADLS_ACUITY_SCORE: 45
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
## 2023-10-10 NOTE — PLAN OF CARE
"[facility] - ICU    RN Progress Note:            Pertinent Assessments:      Please refer to flowsheet rows for full assessment     Alert and oriented, up awake almost all night. Pain manageable with prn dilaudid and oxycodone alternately. On levophed drip for low BP, Dressing intact, no other issue overnight.           Key Events - This Shift:     See above               Barriers to Discharge / Downgrade:     Wound management, IV ABX, BP         Problem: Plan of Care - These are the overarching goals to be used throughout the patient stay.    Goal: Patient-Specific Goal (Individualized)  Description: You can add care plan individualizations to a care plan. Examples of Individualization might be:  \"Parent requests to be called daily at 9am for status\", \"I have a hard time hearing out of my right ear\", or \"Do not touch me to wake me up as it startles me\".  Outcome: Progressing   Goal Outcome Evaluation:      Plan of Care Reviewed With: patient    Overall Patient Progress: improvingOverall Patient Progress: improving           "

## 2023-10-10 NOTE — CONSULTS
[facility] Nurse Inpatient Assessment     Consulted for: L AKA/groin - Nec Fasc    Summary: Requested input from surgeon about starting an irrigating wound vac    Patient History (according to provider note(s):      The patient was brought to the operating suite where she was placed in the supine position. General anesthesia was administered. She was prepped and draped in a sterile fashion. A large somewhat elliptical but very irregular incision was made about the necrotic skin in the left mons pubis. Using sharp dissection with a 10 blade knife I did an excisional debridement of skin and extensive soft tissue. After the initial debridement I extended the incision just down onto the superior thigh as there is some extension of necrotic tissue in the subcutaneous tissues going that direction and also down onto the mons pubis where there was another area that had been draining. All of the necrotic tissue was removed with sharp dissection. The area of skin removed was approximately 15 x 7 cm. This went approximately 6 to 7 m deep into the subcutaneous tissues also. Hemostasis was achieved primarily with electrocautery but also with the treatment of Surgicel powder. The wound was packed open with a sterile gauze. She tolerated the procedure well. Of note cultures were taken immediately after I made the incision and entered the primarily necrotic tissue.     Assessment:      Skin Injury Location: L groin/panniculus/mons pubis    10/10    Last photo: 10/10  Skin injury due to:  surgical - necrotizing fasciitis   Skin history and plan of care:   see above  Affected area:      Skin assessment:  full thickness open wound to adipose     Measurements (length x width x depth, in cm) 4  x 16  x  3.5 cm      Color: pink and red     Temperature  warm     Drainage: scant .      Color: serosanguinous      Odor: mild  Pain: mild, aching  Pain interventions prior to dressing change: slow and gentle cares    Treatment goal: Infection control/prevention, Increase granulation, and Protection  STATUS: initial assessment  Supplies ordered: ordered vashe      Sent a message to Dr. Ware about an instillation wound vac, will await surgical team decision about vac.      Treatment Plan:     L groin  Flush wound with NS, pat dry  Moisten gauze roll with vashe, fill wound and trim excess  Cover with abd pad and tuck into fold. Try to not use any tape  Change BID + PRN if soiled, saturated, falls off    Orders: Written    RECOMMEND PRIMARY TEAM ORDER: None, at this time  Education provided: plan of care  Discussed plan of care with: Patient and Nurse  [facility] nurse follow-up plan: Tuesday/Friday  Notify WOC if wound(s) deteriorate.  Nursing to notify the Provider(s) and re-consult the [facility] Nurse if new skin concern.    DATA:     Current support surface: Standard  Low air loss (JOHNATHAN pump, Isolibrium, Pulsate, skin guard, etc)  Containment of urine/stool: Incontinence Protocol  BMI: Body mass index is 38.08 kg/m .   Active diet order: Orders Placed This Encounter      Consistent Carbohydrate Diet Moderate Consistent Carb (60 g CHO per Meal) Diet     Output: I/O last 3 completed shifts:  In: 2400.91 [P.O.:20; I.V.:2380.91]  Out: 5400 [Urine:5350; Blood:50]     Labs:   Recent Labs   Lab 10/10/23  0406 10/09/23  0925   ALBUMIN 3.1*  --    HGB 10.9* 11.0*   WBC 24.4* 23.1*   A1C  --  7.4*     Pressure injury risk assessment:   Sensory Perception: 3-->slightly limited  Moisture: 3-->occasionally moist  Activity: 1-->bedfast  Mobility: 3-->slightly limited  Nutrition: 2-->probably inadequate  Friction and Shear: 2-->potential problem  Chi Score: 14    MINESH RiosN RN CWOCN  Pager no longer in use, please contact through [employer] group: [facility]

## 2023-10-10 NOTE — TELEPHONE ENCOUNTER
Care management from [facility] called to report patient was very anxious about getting her power chair that was left at the vascular clinic yesterday when she was transported by ambulance to [facility] ED.  Notified care management that vascular staff will bring the chair to [facility] as planned.      Left voicemail for patient that the chair will be brought over to her at [facility] tomorrow 10/11 as there is not enough staff in clinic to do it today.  Asked her to call vascular clinic back with any questions.

## 2023-10-10 NOTE — CONSULTS
Care Management Initial Consult    General Information  Assessment completed with: Patient, patient  Type of CM/SW Visit: Initial Assessment    Primary Care Provider verified and updated as needed: Yes   Readmission within the last 30 days:        Reason for Consult: discharge planning  Advance Care Planning:            Communication Assessment  Patient's communication style: spoken language (English or Bilingual)    Hearing Difficulty or Deaf: yes   Wear Glasses or Blind: yes    Cognitive  Cognitive/Neuro/Behavioral: WDL                      Living Environment:   People in home: alone     Current living Arrangements: apartment      Able to return to prior arrangements: yes       Family/Social Support:  Care provided by: homecare agency, other (see comments), self (PCA)  Provides care for: no one, unable/limited ability to care for self     PCA          Description of Support System: Supportive, Involved         Current Resources:   Patient receiving home care services: Yes  Skilled Home Care Services: Skilled Nursing  Community Resources: [facility], [facility] Work[facility]  Equipment currently used at home: shower chair, wheelchair, manual, wheelchair, power  Supplies currently used at home:      Employment/Financial:  Employment Status:          Financial Concerns:             Does the patient's insurance plan have a 3 day qualifying hospital stay waiver?  Yes     Which insurance plan 3 day waiver is available? Alternative insurance waiver    Will the waiver be used for post-acute placement? Undetermined at this time    Lifestyle & Psychosocial Needs:  Social Determinants of Health     Food Insecurity: Not on file   Depression: Not at risk (9/6/2023)    PHQ-2     PHQ-2 Score: 0   Housing Stability: Not on file   Tobacco Use: High Risk (10/9/2023)    Patient History     Smoking Tobacco Use: Every Day     Smokeless Tobacco Use: Never     Passive Exposure: Not on file   Financial Resource Strain: Not on file   Alcohol Use: Not on  file   Transportation Needs: Not on file   Physical Activity: Not on file   Interpersonal Safety: Not on file   Stress: Not on file   Social Connections: Not on file       Functional Status:  Prior to admission patient needed assistance:   Dependent ADLs:: Wheelchair-independent, Bathing, Dressing, Grooming  Dependent IADLs:: Cleaning, Cooking, Laundry, Meal Preparation, Transportation       Mental Health Status:          Chemical Dependency Status:                Values/Beliefs:  Spiritual, Cultural Beliefs, Yazdanism Practices, Values that affect care:                 Additional Information:  Met with patient. Says she lives alone in an apartment. Has a PCA 5 days per week who assists with ADLs/IADLs as needed. Uses a power wheelchair or manual wheelchair. Is kiran to transfer self. Uses [employer]. Has a nurse visit 2xweekly from [facility].     Patient's main concern at the moment is that her power wheelchair was left at the vascular clinic. Says they told her they would have it delivered to the hospital. Is requesting CM check on this.     Called vascular clinic, spoke to Tamera, says they will get the wheelchair here either today or tomorrow. Says she will call patient to update.   Tiffanie Quiroz RN

## 2023-10-10 NOTE — PROGRESS NOTES
[facility]    Progress Note - Hospitalist Service       Date of Admission:  10/9/2023    Assessment & Plan   Teresa Perez is a 52 year old female admitted on 10/9/2023. She has a history of T2DM, CKD, COPD,CAD, L AKA, follows wound care for multiple wounds on abdomen and buttock, and is admitted for new abscess worsening over the past week of L inguinal/lower abdominal region, sepsis and subsequently found to have necrotizing soft tissue infection, taken to OR 10/9/23 for I&D. Initially hypotensive on admission and received fluid boluses, levophed which was discontinued 10/10. Receiving IV antibiotics. Restart oral meds 10/10.    #Necrotizing Fasciitis L groin s/p I&D 10/9/23  #Sepsis  #Hypotension  53 y/o F with hx of CVA (on plavix), CAD, COPD, epilepsy, T2DM, L AKA admitted with fatigue, severe pain after being seen at vascular clinic for wound care follow up. Patient was hypotensive 70's-80's systolic/40's and found to have WBC 23.1, Lactate 1.5. CT AP 10/9/23 revealed collection of subcutaneous gas in the L lower abdomen and groin concerning for NSTI. Patient received Vancomycin in the ED, 2.5 L of NS boluses. Patient also started on levophed due to low BP's after fluid resuscitation. CT 10/9 demonstrating collection of subcutaneous gas with surrounding edema consistent with gas forming infection. Patient seen by surgery, taken to OR for I&D 10/9. Levophed stopped 10/10, arterial line removed. ICU signing off 10/10. Wound noted to have surrounding necrotic adipose tissue on exam 10/10, per surgery may require repeat debridement with wound vac placement in 2-3 days.  10/10 - WBC 24.4, abs. Neutrophils 23.4     -IV meropenem  -IV clindamycin  -Pharmacy to dose vancomycin  -Continue fluids @ 100 mL/hr NS  -Advance diet as tolerated to carb count diet  -Blood culturesx2 - No growth after 12 hours  -Anaerobic and aerobic wound cultures - pending  -wound care protocol per RN  -Daily  CBC     #T2DM  Patient notes blood sugars of 400's at home this past weekend, last A1C 06/2023 8.6. Current home dose includes Toujeo 65 units BID, novolog 28 units w/meals, weekly bydureon (exenatide) injections. Glucose 207 on admission. Patient endorses medication adherence. A1C 7.4 10/9, Glucose 167  -Lantus 50 units BID, sliding scale insulin.  -Novolog TID w/meals, carb count 1 unit/10 g carbs  -60 g carbohydrate meal  -Blood glucose monitoring     #Anemia, unspecified  HgB 10.9, changed from baseline. No source of bleeding identified currently, likely hemodilutional. Continue to monitor  -Daily CBC    #Hyperkalemia  K 5.8 10/10, repeat 5.7. Will repeat afternoon 10/10  -No intervention at this time, will continue K+ checks and daily BMP     Chronic Conditions:  #HTN  #CAD  -Hold PTA amlodipine, metoprolol, due to soft pressures, off levophed drip 10/10  -Continue PTA plavix  -Continue PTA Pravastatin      #COPD  -Continue PTA albuterol, symbicort  -Resume PTA singulair     #Hx of Psychiatric conditions: MDD, Bipolar, BPD  -Resume PTA Effexor   -Resume PTA Trazodone  - Resume Zyprexa PTA     #Hx of chronic pain  -Hold PTA maloxicam  -IV dilaudid 0.5 mg q2h PRN for pain control        Diet: Consistent Carbohydrate Diet Moderate Consistent Carb (60 g CHO per Meal) Diet    DVT Prophylaxis: Pneumatic Compression Devices  Vargas Catheter: PRESENT, indication: Wound Healing  Fluids: NS @ 100 mL/hr  Lines: PRESENT      PICC 10/09/23 Triple Lumen Left Brachial vein lateral-Site Assessment: WDL  Arterial Line 10/09/23 Radial-Site Assessment: WDL      Cardiac Monitoring: None  Code Status: Full Code      Clinically Significant Risk Factors Present on Admission        # Hyperkalemia: Highest K = 5.8 mmol/L in last 2 days, will monitor as appropriate    # Hypercalcemia: corrected calcium is >10.1, will monitor as appropriate   # Anion Gap Metabolic Acidosis: Highest Anion Gap = 19 mmol/L in last 2 days, will monitor and  treat as appropriate  # Hypoalbuminemia: Lowest albumin = 3.1 g/dL at 10/10/2023  4:06 AM, will monitor as appropriate   # Drug Induced Platelet Defect: home medication list includes an antiplatelet medication   # Hypertension: Noted on problem list   # Circulatory Shock: currently requiring pressors for blood pressure support    # DMII: A1C = 7.4 % (Ref range: <5.7 %) within past 6 months    # Obesity: Estimated body mass index is 38.08 kg/m  as calculated from the following:    Height as of this encounter: 1.524 m (5').    Weight as of this encounter: 88.5 kg (195 lb).       # COPD: noted on problem list        Disposition Plan      Expected Discharge Date: 10/13/2023      Destination: home with help/services          The patient's care was discussed with the Attending Physician, Dr. Ramirez .    Shilo Fairchild MD  Hospitalist Service  [facility]  Securely message with Vocera (more info)  Text page via [employer] Paging/Directory   ______________________________________________________________________    Interval History   Patient doing well post-op. Pain managed with IV dilaudid. Advance diet today, resume oral medications. Wound cares in place. Discontinued levophed drip, given fluid bolus for pressure support. Denies worsening SOB or pain. Vargas in place, urine output adequate with light yellow urine. Continue to treat sepsis with antibiotics and fluids.    Physical Exam   Vital Signs: Temp: 97.8  F (36.6  C) Temp src: Oral BP: 99/51 Pulse: 77   Resp: 12 SpO2: 96 % O2 Device: Nasal cannula Oxygen Delivery: 3 LPM  Weight: 195 lbs 0 oz    Physical Exam  Constitutional:       General: She is not in acute distress.  HENT:      Mouth/Throat:      Mouth: Mucous membranes are moist.      Pharynx: Oropharynx is clear.   Cardiovascular:      Rate and Rhythm: Normal rate and regular rhythm.      Pulses: Normal pulses.      Heart sounds: Normal heart sounds.   Pulmonary:      Effort: Pulmonary  effort is normal.      Breath sounds: Wheezing present.   Abdominal:      General: Bowel sounds are normal.      Palpations: Abdomen is soft.      Comments: L Lower abdomen/L groin surgical site dressings in place with minimal surrounding drainage and erythema. Abdominal wounds healing, dressings in place.   Musculoskeletal:      Right lower leg: No edema.      Comments: L AKA   Skin:     General: Skin is warm.   Neurological:      General: No focal deficit present.      Mental Status: She is alert and oriented to person, place, and time.   Psychiatric:         Mood and Affect: Mood normal.         Behavior: Behavior normal.          Data     I have personally reviewed the following data over the past 24 hrs:    24.4 (H)  \   10.9 (L)   / 228     140 104 22.7 (H) /  130 (H)   5.7 (H) 17 (L) 0.53; 0.53 \     ALT: 21 AST: 16 AP: 154 (H) TBILI: 0.3   ALB: 3.1 (L) TOT PROTEIN: 6.8 LIPASE: N/A     TSH: N/A T4: N/A A1C: N/A       Imaging results reviewed over the past 24 hrs:   No results found for this or any previous visit (from the past 24 hour(s)).    Shilo Fairchild MD  PGY-1  [facility]  [facility]   October 10, 2023

## 2023-10-10 NOTE — PROGRESS NOTES
Teresa Perez is alert, minimal pain in the left groin        Vitals:    10/10/23 0600 10/10/23 0615 10/10/23 0630 10/10/23 0645   BP: 100/55  99/51    BP Location: Right arm      Pulse: 78 81 81 77   Resp: 13 12 15 12   Temp:       TempSrc:       SpO2: 96% 96% 96% 96%   Weight:       Height:           PHYSICAL EXAM:  GEN: No acute distress, comfortable  ABD: Left groin-dressings intact, necrotic adipose tissue at base with no surrounding stigmata of crepitance or continued necrotizing soft tissue infection  EXT:No cyanosis, edema or obvious abnormalities        Recent Labs   Lab 10/10/23  0406   WBC 24.4*   HGB 10.9*   HCT 35.0          Recent Labs   Lab 10/10/23  0406      CO2 17*   BUN 26.1*   ALBUMIN 3.1*   ALKPHOS 154*   ALT 21   AST 16         A/P:  Teresa Perez is s/p debridement of left groin  -Would continue with daily dressing changes twice a day.  Wound care consult.  -If the necrotic adipose tissue does not slough off with microdebridement during dressing changes, then we will require return to the operating room in the next 2 to 3 days to clean the wound up and likely place a wound VAC    Abdelrahman Ware DO  FACS  [phone]  [facility] Department of Surgery

## 2023-10-10 NOTE — PROGRESS NOTES
[facility]  Critical Care Progress Note    Summary:   Teresa Perez 53 yo F PMH T2DM, HTN, stroke, peripheral vascular disease, L-AKA, central & obstructive sleep apnea (CPAP non-adherent), asthma-COPD, tobacco use, personality disorder, obesity, large ventral hernia, chronic abdominal wounds      Presented 10/9/23 from clinic for progressive left groin pain & hypotension. Found to have septic shock secondary to necrotizing soft tissue infection involving the left mons pubis & labia s/p debridement     Interval Events:  Awake most of the night. Ongoing pain. Afebrile. Off NE. Good UO. Resolution of BILLY. Mild hyper-K, rechecking later. WC stable, Hgb stable.     Advancing diet, additional 1 L, pull art line, discussed with hospitalists     Assessment & Plan:   Goals of Care:  Life prolonging without limits      Neurology, Psychiatry, Sedation, Analgesia:  Analgesia   - APAP 975 mg q8h  - hydromorphone 0.5 mg iv q2h prn   - oxycodone 5mg q4h prn      Personality disorder     Cardiovascular:  Septic shock - resolved     Respiratory, Airway:  Acute hypoxemic respiratory failure insufficiency   10/9/23 CT ABD demonstrated mild R>L bibasilar atelectasis &/or consolidation   - supplemental O2 to maintain spO2 >= 90-96% & PaO2 >= 60 mmHg  - pulmonary hygiene: acapella QID, IS Q2H while awake, out of bed to chair, PT/OT as able     Asthma-COPD  - pta umeclidinium every day, fluticasone vilaterol every day     Gastrointestinal:  Minimally elevated alkaline phosphatase & ALT  - monitor     Renal, Acid-base, Electrolytes, Volume:  Non-oliguric BILLY - resolved      Hyperkalemia - mild   - IVF bolus, recheck later today     Infectious Disease:  Necrotizing soft tissue infection involving mons pubis & labia   10/9/23 s/p incision, drainage, & debridement   10/9/23 CT A/P demonstrated moderate-sized fluid collection & subcutaneous gas with surrounding edema in the left lower ABD wall & left inguinal  region  - pending - 10/9/23 intra-operative cultures   - surgery consulted, may require return to OR in 2-3 days   - Meropenem, Vanc, Clinda (10/9-current)     Rule out aspiration or community acquired pneumonia  Evinced by productive cough, mild hypoxemia, mild R>L lower lobe atelectasis or consolidation  - ABX as above  - ordered sputum culture     Hematology, Oncology:  Neutrophilic leukocytosis secondary to sepsis     Normocytic anemia   Suspect secondary to surgical blood loss in conjunction with acute/chronic inflammation/infection   - monitor     Endocrine:  T2DM  - glargine 10 units every day   - MDSSI     Checklist:  FEN: NPO, meds + ice chips okay   VTE ppx: enoxaparin   GI ppx: pta protonix   Bowel regimen: PEG & senna   Lines/tube-size: PICC (10/10), art line (10/10), henriquez (10/10)   Skin: consulted WOC   PT/OT/SLP, early mobility: PT consulted   Code Status: Full     Physical Exam:  Neurologic: Alert & oriented. Opens eyes, tracks, & follows commands in all extremities.   HEENT: Head and face normal. No nasal discharge. Oropharynx normal. Eyelids, conjunctiva, & sclera normal.   Neck: Neck appearance normal. No neck masses. Thyroid not enlarged.  Respiratory: Lungs clear bilaterally. No wheezes, crackles, or rhonchi.   Cardiovascular: Regular rate & rhythm  Normal S1 & S2. No murmurs, rubs. or gallops.    Gastrointestinal: Bowel sounds present. Obese. Soft. Left lower ABD & groin dressing clean, dry, intact   Musculoskeletal: Skeletal configuration normal and muscle mass normal for age. Joint appearance overall normal.  Skin, Hair, & Nails: Skin color normal. No skin lesions.  Hair & nails normal.  Extremities: No peripheral edema. S/p L AKA, stump wound     All pertinent vital signs, ventilator settings, I&Os, laboratory, microbiology, ECGs, & imaging data has been personally reviewed. Subsequent visit 30 minutes.     WAQAR Rojas MD  Critical Care Medicine

## 2023-10-10 NOTE — PLAN OF CARE
Problem: Plan of Care - These are the overarching goals to be used throughout the patient stay.    Goal: Plan of Care Review  Description: The Plan of Care Review/Shift note should be completed every shift.  The Outcome Evaluation is a brief statement about your assessment that the patient is improving, declining, or no change.  This information will be displayed automatically on your shift note.  Outcome: Progressing   Goal Outcome Evaluation:  [facility] - ICU    RN Progress Note:            Pertinent Assessments:      Please refer to flowsheet rows for full assessment     VSS. Afebrile. Pt is eating and drinking well. Taking PRN Oxycodone 5mg and PRN IV Dilaudid 0.5mg for left abdominal wound pain. Pain meds effective.           Key Events - This Shift:     Pt refused to get out of bed and work with therapy, but will try tomorrow. IV Norepinephrine stopped this morning. Arterial line removed. New orders from [facility] nurse               Barriers to Discharge / Downgrade:     Off pressors since this morning

## 2023-10-11 ENCOUNTER — DOCUMENTATION ONLY (OUTPATIENT)
Dept: VASCULAR SURGERY | Facility: CLINIC | Age: 53
End: 2023-10-11
Payer: COMMERCIAL

## 2023-10-11 LAB
ALBUMIN SERPL BCG-MCNC: 2.9 G/DL (ref 3.5–5.2)
ALP SERPL-CCNC: 133 U/L (ref 35–104)
ALT SERPL W P-5'-P-CCNC: 16 U/L (ref 0–50)
ANION GAP SERPL CALCULATED.3IONS-SCNC: 8 MMOL/L (ref 7–15)
AST SERPL W P-5'-P-CCNC: 15 U/L (ref 0–45)
BACTERIA ABSC ANAEROBE+AEROBE CULT: ABNORMAL
BACTERIA SPT CULT: NORMAL
BILIRUB SERPL-MCNC: 0.2 MG/DL
BUN SERPL-MCNC: 16.7 MG/DL (ref 6–20)
CALCIUM SERPL-MCNC: 9.3 MG/DL (ref 8.6–10)
CHLORIDE SERPL-SCNC: 103 MMOL/L (ref 98–107)
CREAT SERPL-MCNC: 0.5 MG/DL (ref 0.51–0.95)
DEPRECATED HCO3 PLAS-SCNC: 24 MMOL/L (ref 22–29)
EGFRCR SERPLBLD CKD-EPI 2021: >90 ML/MIN/1.73M2
ERYTHROCYTE [DISTWIDTH] IN BLOOD BY AUTOMATED COUNT: 14.6 % (ref 10–15)
GLUCOSE BLDC GLUCOMTR-MCNC: 109 MG/DL (ref 70–99)
GLUCOSE BLDC GLUCOMTR-MCNC: 125 MG/DL (ref 70–99)
GLUCOSE BLDC GLUCOMTR-MCNC: 127 MG/DL (ref 70–99)
GLUCOSE BLDC GLUCOMTR-MCNC: 161 MG/DL (ref 70–99)
GLUCOSE BLDC GLUCOMTR-MCNC: 246 MG/DL (ref 70–99)
GLUCOSE SERPL-MCNC: 126 MG/DL (ref 70–99)
GRAM STAIN RESULT: NORMAL
HCT VFR BLD AUTO: 31.9 % (ref 35–47)
HGB BLD-MCNC: 10.3 G/DL (ref 11.7–15.7)
MCH RBC QN AUTO: 28.7 PG (ref 26.5–33)
MCHC RBC AUTO-ENTMCNC: 32.3 G/DL (ref 31.5–36.5)
MCV RBC AUTO: 89 FL (ref 78–100)
PLATELET # BLD AUTO: 243 10E3/UL (ref 150–450)
POTASSIUM SERPL-SCNC: 4.5 MMOL/L (ref 3.4–5.3)
PROT SERPL-MCNC: 5.9 G/DL (ref 6.4–8.3)
RBC # BLD AUTO: 3.59 10E6/UL (ref 3.8–5.2)
SODIUM SERPL-SCNC: 135 MMOL/L (ref 135–145)
WBC # BLD AUTO: 11.9 10E3/UL (ref 4–11)

## 2023-10-11 PROCEDURE — 250N000011 HC RX IP 250 OP 636

## 2023-10-11 PROCEDURE — 250N000013 HC RX MED GY IP 250 OP 250 PS 637: Performed by: SPECIALIST

## 2023-10-11 PROCEDURE — 250N000013 HC RX MED GY IP 250 OP 250 PS 637

## 2023-10-11 PROCEDURE — 120N000004 HC R&B MS OVERFLOW

## 2023-10-11 PROCEDURE — 80053 COMPREHEN METABOLIC PANEL: CPT | Performed by: STUDENT IN AN ORGANIZED HEALTH CARE EDUCATION/TRAINING PROGRAM

## 2023-10-11 PROCEDURE — G0463 HOSPITAL OUTPT CLINIC VISIT: HCPCS

## 2023-10-11 PROCEDURE — 99231 SBSQ HOSP IP/OBS SF/LOW 25: CPT | Performed by: PHYSICIAN ASSISTANT

## 2023-10-11 PROCEDURE — 258N000003 HC RX IP 258 OP 636: Performed by: FAMILY MEDICINE

## 2023-10-11 PROCEDURE — 97606 NEG PRS WND THER DME>50 SQCM: CPT

## 2023-10-11 PROCEDURE — 87070 CULTURE OTHR SPECIMN AEROBIC: CPT | Performed by: STUDENT IN AN ORGANIZED HEALTH CARE EDUCATION/TRAINING PROGRAM

## 2023-10-11 PROCEDURE — 250N000011 HC RX IP 250 OP 636: Performed by: SPECIALIST

## 2023-10-11 PROCEDURE — 258N000003 HC RX IP 258 OP 636

## 2023-10-11 PROCEDURE — 999N000157 HC STATISTIC RCP TIME EA 10 MIN

## 2023-10-11 PROCEDURE — 85027 COMPLETE CBC AUTOMATED: CPT | Performed by: SPECIALIST

## 2023-10-11 PROCEDURE — 250N000011 HC RX IP 250 OP 636: Performed by: FAMILY MEDICINE

## 2023-10-11 PROCEDURE — 87205 SMEAR GRAM STAIN: CPT | Performed by: STUDENT IN AN ORGANIZED HEALTH CARE EDUCATION/TRAINING PROGRAM

## 2023-10-11 PROCEDURE — 99233 SBSQ HOSP IP/OBS HIGH 50: CPT

## 2023-10-11 PROCEDURE — 250N000011 HC RX IP 250 OP 636: Mod: JZ | Performed by: STUDENT IN AN ORGANIZED HEALTH CARE EDUCATION/TRAINING PROGRAM

## 2023-10-11 RX ORDER — ARGININE/GLUTAMINE/CALCIUM BMB 7G-7G-1.5G
1 POWDER IN PACKET (EA) ORAL 2 TIMES DAILY WITH MEALS
Status: DISCONTINUED | OUTPATIENT
Start: 2023-10-11 | End: 2023-10-16 | Stop reason: HOSPADM

## 2023-10-11 RX ADMIN — SODIUM CHLORIDE: 9 INJECTION, SOLUTION INTRAVENOUS at 12:49

## 2023-10-11 RX ADMIN — MEROPENEM 1 G: 1 INJECTION, POWDER, FOR SOLUTION INTRAVENOUS at 00:07

## 2023-10-11 RX ADMIN — SODIUM CHLORIDE: 9 INJECTION, SOLUTION INTRAVENOUS at 00:07

## 2023-10-11 RX ADMIN — SODIUM CHLORIDE: 9 INJECTION, SOLUTION INTRAVENOUS at 20:00

## 2023-10-11 RX ADMIN — VANCOMYCIN HYDROCHLORIDE 1500 MG: 5 INJECTION, POWDER, LYOPHILIZED, FOR SOLUTION INTRAVENOUS at 10:15

## 2023-10-11 RX ADMIN — INSULIN GLARGINE 10 UNITS: 100 INJECTION, SOLUTION SUBCUTANEOUS at 21:08

## 2023-10-11 RX ADMIN — UMECLIDINIUM 1 PUFF: 62.5 AEROSOL, POWDER ORAL at 09:07

## 2023-10-11 RX ADMIN — ENOXAPARIN SODIUM 40 MG: 40 INJECTION SUBCUTANEOUS at 12:21

## 2023-10-11 RX ADMIN — CLOPIDOGREL BISULFATE 75 MG: 75 TABLET ORAL at 08:50

## 2023-10-11 RX ADMIN — HYDROXYZINE HYDROCHLORIDE 50 MG: 25 TABLET, FILM COATED ORAL at 00:07

## 2023-10-11 RX ADMIN — SENNOSIDES AND DOCUSATE SODIUM 1 TABLET: 50; 8.6 TABLET ORAL at 20:21

## 2023-10-11 RX ADMIN — HYDROMORPHONE HYDROCHLORIDE 0.5 MG: 1 INJECTION, SOLUTION INTRAMUSCULAR; INTRAVENOUS; SUBCUTANEOUS at 19:12

## 2023-10-11 RX ADMIN — OXYCODONE HYDROCHLORIDE 5 MG: 5 TABLET ORAL at 08:27

## 2023-10-11 RX ADMIN — ACETAMINOPHEN 975 MG: 325 TABLET ORAL at 19:11

## 2023-10-11 RX ADMIN — ACETAMINOPHEN 975 MG: 325 TABLET ORAL at 03:35

## 2023-10-11 RX ADMIN — VENLAFAXINE HYDROCHLORIDE 150 MG: 75 CAPSULE, EXTENDED RELEASE ORAL at 08:52

## 2023-10-11 RX ADMIN — OXYCODONE HYDROCHLORIDE 5 MG: 5 TABLET ORAL at 14:01

## 2023-10-11 RX ADMIN — PANTOPRAZOLE SODIUM 40 MG: 40 TABLET, DELAYED RELEASE ORAL at 08:50

## 2023-10-11 RX ADMIN — GABAPENTIN 600 MG: 300 CAPSULE ORAL at 13:55

## 2023-10-11 RX ADMIN — INSULIN ASPART 1 UNITS: 100 INJECTION, SOLUTION INTRAVENOUS; SUBCUTANEOUS at 17:27

## 2023-10-11 RX ADMIN — VANCOMYCIN HYDROCHLORIDE 1500 MG: 5 INJECTION, POWDER, LYOPHILIZED, FOR SOLUTION INTRAVENOUS at 21:08

## 2023-10-11 RX ADMIN — GABAPENTIN 600 MG: 300 CAPSULE ORAL at 08:50

## 2023-10-11 RX ADMIN — MEROPENEM 1 G: 1 INJECTION, POWDER, FOR SOLUTION INTRAVENOUS at 08:45

## 2023-10-11 RX ADMIN — MEROPENEM 1 G: 1 INJECTION, POWDER, FOR SOLUTION INTRAVENOUS at 16:58

## 2023-10-11 RX ADMIN — CETIRIZINE HYDROCHLORIDE 10 MG: 10 TABLET, FILM COATED ORAL at 08:50

## 2023-10-11 RX ADMIN — PRAMIPEXOLE DIHYDROCHLORIDE 0.75 MG: 0.5 TABLET ORAL at 21:07

## 2023-10-11 RX ADMIN — PRAVASTATIN SODIUM 80 MG: 20 TABLET ORAL at 21:07

## 2023-10-11 RX ADMIN — ACETAMINOPHEN 975 MG: 325 TABLET ORAL at 10:17

## 2023-10-11 RX ADMIN — HYDROMORPHONE HYDROCHLORIDE 0.5 MG: 1 INJECTION, SOLUTION INTRAMUSCULAR; INTRAVENOUS; SUBCUTANEOUS at 10:53

## 2023-10-11 RX ADMIN — FLUTICASONE FUROATE AND VILANTEROL TRIFENATATE 1 PUFF: 100; 25 POWDER RESPIRATORY (INHALATION) at 09:07

## 2023-10-11 RX ADMIN — GABAPENTIN 600 MG: 300 CAPSULE ORAL at 20:21

## 2023-10-11 RX ADMIN — HYDROMORPHONE HYDROCHLORIDE 0.5 MG: 1 INJECTION, SOLUTION INTRAMUSCULAR; INTRAVENOUS; SUBCUTANEOUS at 23:26

## 2023-10-11 RX ADMIN — QUETIAPINE FUMARATE 400 MG: 300 TABLET ORAL at 21:07

## 2023-10-11 RX ADMIN — Medication 1 PACKET: at 19:12

## 2023-10-11 RX ADMIN — CLINDAMYCIN PHOSPHATE 900 MG: 900 INJECTION, SOLUTION INTRAVENOUS at 08:47

## 2023-10-11 RX ADMIN — CLINDAMYCIN PHOSPHATE 900 MG: 900 INJECTION, SOLUTION INTRAVENOUS at 00:05

## 2023-10-11 RX ADMIN — OXYCODONE HYDROCHLORIDE 5 MG: 5 TABLET ORAL at 17:40

## 2023-10-11 RX ADMIN — HYDROMORPHONE HYDROCHLORIDE 0.5 MG: 1 INJECTION, SOLUTION INTRAMUSCULAR; INTRAVENOUS; SUBCUTANEOUS at 21:16

## 2023-10-11 ASSESSMENT — ACTIVITIES OF DAILY LIVING (ADL)
ADLS_ACUITY_SCORE: 46
ADLS_ACUITY_SCORE: 46
ADLS_ACUITY_SCORE: 45
ADLS_ACUITY_SCORE: 46
ADLS_ACUITY_SCORE: 45
ADLS_ACUITY_SCORE: 46
ADLS_ACUITY_SCORE: 45
ADLS_ACUITY_SCORE: 46
ADLS_ACUITY_SCORE: 45

## 2023-10-11 NOTE — PLAN OF CARE
[facility] - ICU    RN Progress Note:            Pertinent Assessments:      Please refer to flowsheet rows for full assessment     Alert and oriented,pain manageable with prn dilaudid and oxycodone. Vitals stable no issue over night. On IV abx and IV fluid,dressing intact.Refused to change her dressing. Slept most of the shift.           Key Events - This Shift:     Uneventful shift so far.               Barriers to Discharge / Downgrade:     IV ABX, Wound care and management             Problem: Plan of Care - These are the overarching goals to be used throughout the patient stay.    Goal: Readiness for Transition of Care  Outcome: Progressing   Goal Outcome Evaluation:      Plan of Care Reviewed With: patient    Overall Patient Progress: improvingOverall Patient Progress: improving

## 2023-10-11 NOTE — PLAN OF CARE
Goal Outcome Evaluation:       [facility] - ICU    RN Progress Note:Patient alert and oriented, complains of pain.  Receiving prn meds which provides relief.  VSS, has remained off levophed and has been downgraded to med/surg.  Patient had wound vac placed today with woc.  Taking po well.              Pertinent Assessments:      Please refer to flowsheet rows for full assessment     VSS, taking po well, pain meds given as need, wound vac in place.           Key Events - This Shift:       Wound vac in place        SJALONSO SAT (Sedation Awakening Trial): For use ONLY if intubated                               Barriers to Discharge / Downgrade:     Patient currently med/surg

## 2023-10-11 NOTE — PROGRESS NOTES
Patient is stable and O2 is wean off. Sats high 90s. Pt achieved 1500 ml and tolerated flutter valve.     Houston Flores, RT

## 2023-10-11 NOTE — TELEPHONE ENCOUNTER
Care management is calling wondering what the status is for us bring the wheelchair over?  please call and ask for Elina or Trixie [phone]

## 2023-10-11 NOTE — PHARMACY-VANCOMYCIN DOSING SERVICE
David juan and resumed without missing a dose while sensitivities are pending.    Regimen: 1500 mg IV every 12 hours.  Start time: 22:15 on 10/11/2023  Exposure target: AUC24 (range)400-600 mg/L.hr   AUC24,ss: 426 mg/L.hr  Probability of AUC24 > 400: 62 %  Ctrough,ss: 10 mg/L  Probability of Ctrough,ss > 20: 1 %  Probability of nephrotoxicity (Lodise ORIANA 2009): 6 %

## 2023-10-11 NOTE — PROGRESS NOTES
"CLINICAL NUTRITION SERVICES - ASSESSMENT NOTE     Nutrition Prescription    RECOMMENDATIONS FOR MDs/PROVIDERS TO ORDER:    Malnutrition Status:    Not Met    Recommendations already ordered by Registered Dietitian (RD):  Glucerna BID at Breakfast and Lunch- Strawberry or Vanilla   Kevon BID     Future/Additional Recommendations:  Monitor po, supplement ade, wt, wound healing.      REASON FOR ASSESSMENT  Teresa Perez is a/an 52 year old female assessed by the dietitian for Admission Nutrition Risk Screen for unintentional wt loss.     HPI: Per MD, Pt w/ hx of T2DM, CKD, COPD,CAD, L AKA, CKD, follows wound care for multiple wounds on abdomen and buttock, and is admitted for new abscess worsening over the past week of L inguinal/lower abdominal region concerning for NSTI.     NUTRITION HISTORY  Met with pt at bedside this afternoon. Pt reports following high protein diet at baseline, familiar with protein foods. Pt states drinking protein shakes at baseline- chooses cheapest options from grocery stores. Pt reports some wt loss and decreased appetite pta. She states she had a poor appetite for a few days, reports appetite okay now. Likes strawberry and vanilla protein shakes, requested Kevon.     Surgery noted pt tolerating diet. Pt with necrotizing fasciitis, per chart review plan for wound vac placement today, WOC following.     CURRENT NUTRITION ORDERS  Diet: Moderate Consistent Carbohydrate  Intake/Tolerance: Okay  100% x 2 meals noted yesterday 10/10, total 1272 kcals, 64 gm protein.   Pt meeting 90% of estimated calorie needs and 75% estimated protein needs.     LABS  Reviewed  Creat 0.50(L), Alk Phos 133(H), Glucose 109-127 this AM     MEDICATIONS  Reviewed   Continuous  ml/hr  Scheduled plavix, novolog, lantus, protonix, miralax, seroquel, senna    ANTHROPOMETRICS  Height: 152.4 cm (5' 0\")  Most Recent Weight: 84.1 kg (185 lb 6.4 oz)    IBW: 47.7 kg  BMI: Obesity Grade II BMI 35-39.9  Weight History: " 11% wt loss x1 year, 5% wt loss x6 months- not clinically significant wt loss  Wt Readings from Last 10 Encounters:   10/11/23 84.1 kg (185 lb 6.4 oz)   06/21/23 88.5 kg (195 lb)   06/06/23 88.5 kg (195 lb)   03/29/23 94.3 kg (208 lb)   10/11/22 94.5 kg (208 lb 6.4 oz)   09/16/22 96.2 kg (212 lb)   07/23/22 95.7 kg (211 lb)   07/05/22 103.2 kg (227 lb 9.6 oz)   06/29/22 103.9 kg (229 lb)   01/26/22 108.6 kg (239 lb 6.4 oz)     Dosing Weight: 56.8 kg adjusted    ASSESSED NUTRITION NEEDS  Estimated Energy Needs: 3234-0491+ kcals/day (25 - 30+ kcals/kg)  Justification: Wound healing  Estimated Protein Needs: + grams protein/day (1.5-2+ grams of pro/kg)  Justification: Wound healing  Estimated Fluid Needs: 1401-7656+ mL/day (25 - 30 mL/kg)   Justification: Maintenance    PHYSICAL FINDINGS  See malnutrition section below.  Per Flowsheets:   +UOP 5.75 L 10/10   No BM since admit   Incision/surgical site L-perineum, Wound abdomen, pelvis, thigh     MALNUTRITION:  % Weight Loss:  Weight loss does not meet criteria for malnutrition  % Intake:  No decreased intake noted  Subcutaneous Fat Loss:  None observed  Muscle Loss:  None observed  Fluid Retention:  None noted    Malnutrition Diagnosis: Patient does not meet two of the above criteria necessary for diagnosing malnutrition    NUTRITION DIAGNOSIS  Increased nutrient needs protein related to wound healing as evidenced by wounds.       INTERVENTIONS  Implementation  Glucerna BID at Breakfast and Lunch- Strawberry or Vanilla   Kevon BID   Education- emphasized adequate protein and calories for wound healing      Goals  Pt to meet nutritional needs   BG < 180   Wound healing      Monitoring/Evaluation  Progress toward goals will be monitored and evaluated per protocol.

## 2023-10-11 NOTE — PROGRESS NOTES
Patient remains on 2 lpm nasal cannula. Patient is able to achieve 1,000 ml to 1,500 ml of IS. Patient is tolerating flutter valve well. Patient reports using both IS and flutter independently this afternoon. Nonproductive cough noted. BS clear and diminished in left lung; coarse and diminished in right lung. Will change flutter valve to PRN.     Melonie Pedroza, RT

## 2023-10-11 NOTE — PROGRESS NOTES
[facility]    Progress Note - Hospitalist Service       Date of Admission:  10/9/2023    Assessment & Plan     Teresa Perez is a 52 year old female admitted on 10/9/2023. She has a history of T2DM, CKD, COPD,CAD, L AKA, follows wound care for multiple wounds on abdomen and buttock, and is admitted for new abscess worsening over the past week of L inguinal/lower abdominal region, sepsis and subsequently found to have necrotizing soft tissue infection, taken to OR 10/9/23 for I&D. Initially hypotensive on admission and received fluid boluses, levophed which was discontinued 10/10. Receiving IV antibiotics. Restart oral meds 10/10.     #Necrotizing Fasciitis L groin s/p I&D 10/9/23  #Sepsis  #Hypotension  53 y/o F with hx of CVA (on plavix), CAD, COPD, epilepsy, T2DM, L AKA admitted with fatigue, severe pain after being seen at vascular clinic for wound care follow up. Patient was hypotensive 70's-80's systolic/40's and found to have WBC 23.1, Lactate 1.5. CT AP 10/9/23 revealed collection of subcutaneous gas in the L lower abdomen and groin concerning for NSTI. Patient received Vancomycin in the ED, 2.5 L of NS boluses. Patient also started on levophed due to low BP's after fluid resuscitation. CT 10/9 demonstrating collection of subcutaneous gas with surrounding edema consistent with gas forming infection. Patient seen by surgery, taken to OR for I&D 10/9. Levophed stopped 10/10, arterial line removed. ICU signing off 10/10. Wound noted to have surrounding necrotic adipose tissue on exam 10/10, per surgery may require repeat debridement with wound vac placement in 2-3 days.  10/10 - WBC 24.4, abs. Neutrophils 23.4   WBC 11.9 10/11, improved from prior  Aerobic bacterial culture - positive for 3+ enterococcus faecalis, narrow IV abx today due to positive culture. Blood culture negative. Respiratory aerobic culture in process.  -Transfer pt. To inpatient, downgrade from ICU, levophed  stopped 10/10    -Continue IV meropenem - enterococcus faecalis coverage  -Continue IV vancomycin per PharmD  -Discontinue IV clindamycin  -Continue fluids @ 100 mL/hr NS  -wound care protocol per RN  -Plan for wound vac placement 10/11 per surgery  -No further plan for OR debridement at this time  -Daily CBC     #T2DM  Patient notes blood sugars of 400's at home this past weekend, last A1C 06/2023 8.6. Current home dose includes Toujeo 65 units BID, novolog 28 units w/meals, weekly bydureon (exenatide) injections. Glucose 207 on admission. Patient endorses medication adherence. A1C 7.4 10/9, Glucose 109 10/11  -Lantus 50 units BID, sliding scale insulin.  -Novolog TID w/meals, carb count 1 unit/10 g carbs  -60 g carbohydrate meal  -Blood glucose monitoring     #Anemia, unspecified  HgB 10.9, changed from baseline. No source of bleeding identified currently, likely hemodilutional. Continue to monitor  -Daily CBC     #Hyperkalemia, resolved  K 5.8 10/10, repeat 5.7. Normalized to 4.5 10/11  -No intervention at this time, will continue K+ checks and daily BMP     Chronic Conditions:  #HTN  #CAD  -Hold PTA amlodipine, metoprolol, due to soft pressures, off levophed drip 10/10  -Continue PTA plavix  -Continue PTA Pravastatin      #COPD  -Continue PTA albuterol, symbicort  -Resume PTA singulair     #Hx of Psychiatric conditions: MDD, Bipolar, BPD  -Resume PTA Effexor   -Resume PTA Trazodone  - Resume Zyprexa PTA     #Hx of chronic pain  -Hold PTA maloxicam  -IV dilaudid 0.5 mg q2h PRN for pain control        Diet: Consistent Carbohydrate Diet Moderate Consistent Carb (60 g CHO per Meal) Diet    DVT Prophylaxis: Pneumatic Compression Devices  Vargas Catheter: PRESENT, indication: Wound Healing  Fluids:  mL/hr  Lines: PRESENT      PICC 10/09/23 Triple Lumen Left Brachial vein lateral-Site Assessment: WDL      Cardiac Monitoring: None  Code Status: Full Code      Clinically Significant Risk Factors        #  Hyperkalemia: Highest K = 5.8 mmol/L in last 2 days, will monitor as appropriate    # Hypercalcemia: corrected calcium is >10.1, will monitor as appropriate   # Anion Gap Metabolic Acidosis: Highest Anion Gap = 19 mmol/L in last 2 days, will monitor and treat as appropriate  # Hypoalbuminemia: Lowest albumin = 2.9 g/dL at 10/11/2023  3:43 AM, will monitor as appropriate     # Hypertension: Noted on problem list       # DMII: A1C = 7.4 % (Ref range: <5.7 %) within past 6 months, PRESENT ON ADMISSION  # Obesity: Estimated body mass index is 36.21 kg/m  as calculated from the following:    Height as of this encounter: 1.524 m (5').    Weight as of this encounter: 84.1 kg (185 lb 6.4 oz)., PRESENT ON ADMISSION     # COPD: noted on problem list        Disposition Plan     Expected Discharge Date: 10/13/2023      Destination: home with help/services          The patient's care was discussed with the Attending Physician, Dr. Ramirez .    Shilo Fairchild MD  Hospitalist Service  [facility]  Securely message with [employer] (more info)  Text page via [facility] Paging/Directory   ______________________________________________________________________    Interval History   Patient fatigued and sleepy on exam, able to arouse and AxO x4. Notes pain at surgical site, not worsening and controlled with PRN's. Dressings in place, surgery following and wound care per nursing protocol. Plan for wound vac today.    Physical Exam   Vital Signs: Temp: 98.4  F (36.9  C) Temp src: Oral BP: 103/60 Pulse: 102   Resp: 18 SpO2: 96 % O2 Device: Nasal cannula Oxygen Delivery: 2 LPM  Weight: 185 lbs 6.4 oz    Physical Exam  HENT:      Mouth/Throat:      Mouth: Mucous membranes are moist.      Pharynx: Oropharynx is clear.   Cardiovascular:      Rate and Rhythm: Normal rate and regular rhythm.      Pulses: Normal pulses.      Heart sounds: Normal heart sounds.   Pulmonary:      Effort: Pulmonary effort is normal.      Breath  sounds: Normal breath sounds.   Abdominal:      General: Bowel sounds are normal.      Palpations: Abdomen is soft.      Comments: Surgical dressings in place over L lower abdomen   Skin:     General: Skin is warm.   Neurological:      General: No focal deficit present.      Mental Status: She is alert and oriented to person, place, and time.   Psychiatric:         Mood and Affect: Mood normal.         Behavior: Behavior normal.            Data     I have personally reviewed the following data over the past 24 hrs:    11.9 (H)  \   10.3 (L)   / 243     135 103 16.7 /  109 (H)   4.5 24 0.50 (L) \     ALT: 16 AST: 15 AP: 133 (H) TBILI: 0.2   ALB: 2.9 (L) TOT PROTEIN: 5.9 (L) LIPASE: N/A       Imaging results reviewed over the past 24 hrs:   No results found for this or any previous visit (from the past 24 hour(s)).    Shilo Fairchild MD  PGY-1  [facility]  [facility]   October 11, 2023

## 2023-10-11 NOTE — PROGRESS NOTES
General Surgery Progress Note:    Hospital Day # 2    ASSESSMENT:  1. Necrotizing fasciitis (H)        Teresa Perez is a 52 year old female who is s/p left labia debridement of NSTI on 10/9. Wound continues to appear clean without signs of further necrosis or surrounding crepitus. Patient seen and discussed with WOC RN and plan for Veraflo wound vac placement today. Leukocytosis continues to improve (WBC 11.9 < 24.4).    PLAN:  - No plan for further OR debridement at this time  - Wound vac today per WOC RN  - Continue supportive cares  - Antibiotics per ID  - General surgery will continue to follow    SUBJECTIVE:   She is doing okay. Endorses fatigue and feeling tired. Pain controlled. Tolerating a diet without issues. Did not get out of bed yesterday because she is waiting for her wheelchair to be brought to the hospital from the vascular clinic.       Patient Vitals for the past 24 hrs:   BP Temp Temp src Pulse Resp SpO2 Weight   10/11/23 1000 103/60 -- -- 102 18 96 % --   10/11/23 0900 114/66 -- -- 104 16 96 % --   10/11/23 0800 127/58 98.4  F (36.9  C) Oral 91 10 95 % --   10/11/23 0600 126/59 -- -- 75 (!) 9 97 % --   10/11/23 0530 109/55 -- -- 74 10 97 % --   10/11/23 0500 119/56 -- -- 74 10 97 % --   10/11/23 0430 106/54 -- -- 68 (!) 9 97 % --   10/11/23 0400 124/61 98  F (36.7  C) Oral 70 12 96 % --   10/11/23 0353 -- -- -- -- -- -- 84.1 kg (185 lb 6.4 oz)   10/11/23 0335 -- 98  F (36.7  C) -- -- -- -- --   10/11/23 0330 124/57 -- -- 70 (!) 7 97 % --   10/11/23 0300 102/53 -- -- 70 (!) 7 97 % --   10/11/23 0230 104/53 -- -- 77 (!) 8 96 % --   10/11/23 0200 113/56 -- -- 82 (!) 9 98 % --   10/11/23 0130 104/52 -- -- 77 12 97 % --   10/11/23 0100 107/53 -- -- 70 11 97 % --   10/11/23 0030 115/56 -- -- 71 11 96 % --   10/11/23 0000 111/55 98.7  F (37.1  C) Oral 77 12 96 % --   10/10/23 2330 104/55 -- -- 78 (!) 9 96 % --   10/10/23 2300 117/59 -- -- 74 11 96 % --   10/10/23 2244 -- -- -- 81 14 97 % --    10/10/23 2230 118/56 -- -- 78 11 97 % --   10/10/23 2200 109/57 98.3  F (36.8  C) Oral 80 13 91 % --   10/10/23 2130 112/56 -- -- 79 14 92 % --   10/10/23 2100 111/59 -- -- 79 15 92 % --   10/10/23 2030 104/72 -- -- 86 29 91 % --   10/10/23 2000 110/58 98.2  F (36.8  C) Oral 83 17 90 % --   10/10/23 1930 102/57 -- -- 84 17 91 % --   10/10/23 1915 -- -- -- 87 23 96 % --   10/10/23 1900 109/57 -- -- 79 17 97 % --   10/10/23 1845 -- -- -- 83 13 98 % --   10/10/23 1830 100/54 -- -- 85 14 97 % --   10/10/23 1815 -- -- -- 81 15 98 % --   10/10/23 1800 109/55 98.5  F (36.9  C) Oral 86 24 94 % --   10/10/23 1745 -- -- -- 88 22 94 % --   10/10/23 1730 110/57 -- -- 80 17 99 % --   10/10/23 1715 -- -- -- 83 22 98 % --   10/10/23 1700 109/55 -- -- 85 15 96 % --   10/10/23 1645 -- -- -- 82 13 98 % --   10/10/23 1630 115/58 -- -- 84 14 97 % --   10/10/23 1615 -- -- -- 85 22 97 % --   10/10/23 1600 109/59 98.4  F (36.9  C) Oral 88 23 95 % --   10/10/23 1545 -- -- -- 84 14 96 % --   10/10/23 1530 108/56 -- -- 81 13 95 % --   10/10/23 1515 -- -- -- 81 10 96 % --   10/10/23 1500 -- -- -- 82 17 96 % --   10/10/23 1445 107/56 -- -- 82 (!) 9 96 % --   10/10/23 1430 112/58 -- -- 81 16 96 % --   10/10/23 1415 117/64 -- -- 85 13 96 % --   10/10/23 1400 112/63 98.7  F (37.1  C) Oral 82 13 97 % --   10/10/23 1345 108/58 -- -- 88 14 96 % --   10/10/23 1330 110/59 -- -- 84 15 96 % --   10/10/23 1315 107/56 -- -- 86 11 95 % --   10/10/23 1300 117/54 98.4  F (36.9  C) Oral 89 23 96 % --   10/10/23 1245 107/61 -- -- 90 28 95 % --   10/10/23 1230 111/58 -- -- 93 28 91 % --   10/10/23 1215 111/56 -- -- 92 (!) 31 90 % --   10/10/23 1200 109/59 98.5  F (36.9  C) Oral 91 20 91 % --   10/10/23 1145 108/56 -- -- 91 25 95 % --   10/10/23 1130 112/59 -- -- 86 17 96 % --   10/10/23 1115 105/53 -- -- 87 14 95 % --   10/10/23 1100 110/56 -- -- 88 14 96 % --         PHYSICAL EXAM:  General: patient seen resting in bed, no acute distress  Resp: no  respiratory distress, breathing comfortably on 2L via nasal canula  Abdomen: Soft, non-tender  Groin: Left labia wound clean without evidence of further necrotic tissue or surrounding crepitus.  Extremities: warm and well perfused    10/10 0700 - 10/11 0659  In: 5036.6 [P.O.:2040; I.V.:2996.6]  Out: 7350 [Urine:7350]    Admission on 10/09/2023   Component Date Value    WBC Count 10/09/2023 23.1 (H)     RBC Count 10/09/2023 3.90     Hemoglobin 10/09/2023 11.0 (L)     Hematocrit 10/09/2023 35.0     MCV 10/09/2023 90     MCH 10/09/2023 28.2     MCHC 10/09/2023 31.4 (L)     RDW 10/09/2023 14.7     Platelet Count 10/09/2023 227     Sodium 10/09/2023 137     Potassium 10/09/2023 4.6     Chloride 10/09/2023 99     Carbon Dioxide (CO2) 10/09/2023 24     Anion Gap 10/09/2023 14     Urea Nitrogen 10/09/2023 39.6 (H)     Creatinine 10/09/2023 1.06 (H)     GFR Estimate 10/09/2023 63     Calcium 10/09/2023 9.6     Glucose 10/09/2023 207 (H)     Color Urine 10/09/2023 Light Yellow     Appearance Urine 10/09/2023 Clear     Glucose Urine 10/09/2023 >1000 (A)     Bilirubin Urine 10/09/2023 Negative     Ketones Urine 10/09/2023 60 (A)     Specific Gravity Urine 10/09/2023 1.025     Blood Urine 10/09/2023 Negative     pH Urine 10/09/2023 5.0     Protein Albumin Urine 10/09/2023 10 (A)     Urobilinogen Urine 10/09/2023 <2.0     Nitrite Urine 10/09/2023 Negative     Leukocyte Esterase Urine 10/09/2023 Negative     Mucus Urine 10/09/2023 Present (A)     RBC Urine 10/09/2023 <1     WBC Urine 10/09/2023 <1     Squamous Epithelials Uri* 10/09/2023 <1     Radiologist flags 10/09/2023 Left lower abdominal wall/left groin gas-forming infection (AA)     Lactic Acid 10/09/2023 1.3     Culture 10/09/2023 No growth after 1 day     Culture 10/09/2023 No growth after 1 day     Creatinine 10/10/2023 0.53     GFR Estimate 10/10/2023 >90     GLUCOSE BY METER POCT 10/09/2023 152 (H)     Culture 10/09/2023 No anaerobic organisms isolated after 1 day      Culture 10/09/2023 3+ Enterococcus faecalis (A)     GLUCOSE BY METER POCT 10/09/2023 190 (H)     GLUCOSE BY METER POCT 10/09/2023 192 (H)     Hemoglobin A1C 10/09/2023 7.4 (H)     GLUCOSE BY METER POCT 10/09/2023 172 (H)     Sodium 10/10/2023 137     Potassium 10/10/2023 5.8 (H)     Carbon Dioxide (CO2) 10/10/2023 17 (L)     Anion Gap 10/10/2023 16 (H)     Urea Nitrogen 10/10/2023 26.1 (H)     Creatinine 10/10/2023 0.56     GFR Estimate 10/10/2023 >90     Calcium 10/10/2023 9.2     Chloride 10/10/2023 104     Glucose 10/10/2023 167 (H)     Alkaline Phosphatase 10/10/2023 154 (H)     AST 10/10/2023 16     ALT 10/10/2023 21     Protein Total 10/10/2023 6.8     Albumin 10/10/2023 3.1 (L)     Bilirubin Total 10/10/2023 0.3     WBC Count 10/10/2023 24.4 (H)     RBC Count 10/10/2023 3.82     Hemoglobin 10/10/2023 10.9 (L)     Hematocrit 10/10/2023 35.0     MCV 10/10/2023 92     MCH 10/10/2023 28.5     MCHC 10/10/2023 31.1 (L)     RDW 10/10/2023 14.7     Platelet Count 10/10/2023 228     % Neutrophils 10/10/2023 96     % Lymphocytes 10/10/2023 2     % Monocytes 10/10/2023 1     % Eosinophils 10/10/2023 0     % Basophils 10/10/2023 0     % Immature Granulocytes 10/10/2023 1     NRBCs per 100 WBC 10/10/2023 0     Absolute Neutrophils 10/10/2023 23.4 (H)     Absolute Lymphocytes 10/10/2023 0.5 (L)     Absolute Monocytes 10/10/2023 0.2     Absolute Eosinophils 10/10/2023 0.0     Absolute Basophils 10/10/2023 0.0     Absolute Immature Granul* 10/10/2023 0.2     Absolute NRBCs 10/10/2023 0.0     GLUCOSE BY METER POCT 10/10/2023 159 (H)     Sodium 10/10/2023 140     Potassium 10/10/2023 5.7 (H)     Chloride 10/10/2023 104     Carbon Dioxide (CO2) 10/10/2023 17 (L)     Anion Gap 10/10/2023 19 (H)     Urea Nitrogen 10/10/2023 22.7 (H)     Creatinine 10/10/2023 0.53     GFR Estimate 10/10/2023 >90     Calcium 10/10/2023 9.5     Glucose 10/10/2023 151 (H)     Culture 10/10/2023 No growth after 12 hours     Culture 10/10/2023 No  growth after 12 hours     GLUCOSE BY METER POCT 10/10/2023 151 (H)     Potassium 10/10/2023 5.3     GLUCOSE BY METER POCT 10/10/2023 130 (H)     Vancomycin 10/10/2023 5.2     GLUCOSE BY METER POCT 10/10/2023 134 (H)     GLUCOSE BY METER POCT 10/10/2023 141 (H)     WBC Count 10/11/2023 11.9 (H)     RBC Count 10/11/2023 3.59 (L)     Hemoglobin 10/11/2023 10.3 (L)     Hematocrit 10/11/2023 31.9 (L)     MCV 10/11/2023 89     MCH 10/11/2023 28.7     MCHC 10/11/2023 32.3     RDW 10/11/2023 14.6     Platelet Count 10/11/2023 243     Sodium 10/11/2023 135     Potassium 10/11/2023 4.5     Carbon Dioxide (CO2) 10/11/2023 24     Anion Gap 10/11/2023 8     Urea Nitrogen 10/11/2023 16.7     Creatinine 10/11/2023 0.50 (L)     GFR Estimate 10/11/2023 >90     Calcium 10/11/2023 9.3     Chloride 10/11/2023 103     Glucose 10/11/2023 126 (H)     Alkaline Phosphatase 10/11/2023 133 (H)     AST 10/11/2023 15     ALT 10/11/2023 16     Protein Total 10/11/2023 5.9 (L)     Albumin 10/11/2023 2.9 (L)     Bilirubin Total 10/11/2023 0.2     GLUCOSE BY METER POCT 10/11/2023 125 (H)     GLUCOSE BY METER POCT 10/11/2023 109 (H)         Mary Abel PA-C  [facility]  [address]  [address]  [place] [address]

## 2023-10-11 NOTE — PROGRESS NOTES
[facility] Nurse Inpatient Assessment     Consulted for: L AKA/groin - Nec Fasc    Summary: Assessed wound with surgical PA, starting an irrigating wound vac    Patient History (according to provider note(s):      The patient was brought to the operating suite where she was placed in the supine position. General anesthesia was administered. She was prepped and draped in a sterile fashion. A large somewhat elliptical but very irregular incision was made about the necrotic skin in the left mons pubis. Using sharp dissection with a 10 blade knife I did an excisional debridement of skin and extensive soft tissue. After the initial debridement I extended the incision just down onto the superior thigh as there is some extension of necrotic tissue in the subcutaneous tissues going that direction and also down onto the mons pubis where there was another area that had been draining. All of the necrotic tissue was removed with sharp dissection. The area of skin removed was approximately 15 x 7 cm. This went approximately 6 to 7 m deep into the subcutaneous tissues also. Hemostasis was achieved primarily with electrocautery but also with the treatment of Surgicel powder. The wound was packed open with a sterile gauze. She tolerated the procedure well. Of note cultures were taken immediately after I made the incision and entered the primarily necrotic tissue.     Assessment:      Skin Injury Location: L groin/panniculus/mons pubis    10/10    Last photo: 10/10  Skin injury due to:  surgical - necrotizing fasciitis   Skin history and plan of care:   see above  Affected area:      Skin assessment:  full thickness open wound to adipose     Measurements (length x width x depth, in cm) 6  x 16  x  3.5 cm      Color: pink and red     Temperature  warm     Drainage: scant .      Color: serosanguinous      Odor: mild  Pain: mild, aching  Pain interventions prior to dressing change: slow and gentle cares    Treatment goal: Infection control/prevention, Increase granulation, and Protection  STATUS: initial assessment  Supplies ordered: ordered vashe    Negative pressure wound therapy applied to: L groin   Surgical date: 10/9   Service following: GS - Ogren  Date Negative Pressure Wound Therapy initiated: 10/11   Interventions in place: moisture/incontinence management  Is patient s nutritional status compromised? yes   If yes, what interventions are in place? Protein supplements  Reason for initiating vac therapy? Presence of co-morbidities, High risk of infections, Need for accelerated granulation tissue, and Prior history of delayed wound healing  Which?of?the?following?co-morbidities?apply? Diabetes  If diabetic is patient on a diabetic management program? Yes   Is osteomyelitis present in wound? no   If yes what treatments are in place? N/A     Volume in cannister: 0     Last cannister change date: 10/11    Number of foam pieces removed from a wound (excluding foam for bridge) :  0 CleanseChoice Foam   Verified this matched the number of foam pieces applied last dressing change: Yes   Number of foam pieces packed into wound (excluding foam for bridge) :  3 CleanseChoice Foam - 26cc vashe, 5 minute soak, 4 hour cycle, -125mmHg      Treatment Plan:     L groin  Flush wound with NS, pat dry  Moisten gauze roll with vashe, fill wound and trim excess  Cover with abd pad and tuck into fold. Try to not use any tape  Change BID + PRN if soiled, saturated, falls off    Orders: Written    RECOMMEND PRIMARY TEAM ORDER: None, at this time  Education provided: plan of care  Discussed plan of care with: Patient and Nurse  WOC nurse follow-up plan: Tuesday/Friday  Notify WOC if wound(s) deteriorate.  Nursing to notify the Provider(s) and re-consult the WOC Nurse if new skin concern.    DATA:     Current support surface: Standard  Low air loss (JOHNATHAN pump, Isolibrium, Pulsate, skin guard, etc)  Containment of urine/stool:  Incontinence Protocol  BMI: Body mass index is 36.21 kg/m .   Active diet order: Orders Placed This Encounter      Consistent Carbohydrate Diet Moderate Consistent Carb (60 g CHO per Meal) Diet     Output: I/O last 3 completed shifts:  In: 5036.6 [P.O.:2040; I.V.:2996.6]  Out: 7350 [Urine:7350]     Labs:   Recent Labs   Lab 10/11/23  0343 10/10/23  0406 10/09/23  0925   ALBUMIN 2.9*   < >  --    HGB 10.3*   < > 11.0*   WBC 11.9*   < > 23.1*   A1C  --   --  7.4*    < > = values in this interval not displayed.     Pressure injury risk assessment:   Sensory Perception: 3-->slightly limited  Moisture: 3-->occasionally moist  Activity: 1-->bedfast  Mobility: 3-->slightly limited  Nutrition: 2-->probably inadequate  Friction and Shear: 2-->potential problem  Chi Score: 14    LORENZO Rios RN CWOCN  Pager no longer in use, please contact through [employer] group: [facility] [employer] [facility]

## 2023-10-11 NOTE — PROGRESS NOTES
Care Management Follow Up    Length of Stay (days): 2    Expected Discharge Date: 10/13/2023     Concerns to be Addressed:     Care progression  Patient on IV meropenem, IV vancomycin, wound care - wound vac placement 10/11, daily CBC   Patient plan of care discussed at interdisciplinary rounds: Yes    Anticipated Discharge Disposition: Home with Home Care     Anticipated Discharge Services:  Home with Home Care  Anticipated Discharge DME:  Wound vac    Patient/family educated on Medicare website which has current facility and service quality ratings:  NA  Education Provided on the Discharge Plan: Per care team   Patient/Family in Agreement with the Plan:  NA    Referrals Placed by CM/SW:  NA  Private pay costs discussed: Not applicable    Additional Information:  Chart reviewed.   Planned for Veraflow wound vac placement today.  Sent [employer] message to [facility]     Social Hx: Lives alone in apartment. Has PCA 5 days/week. Uses power wheelchair. Has [facility] for RN 2x/week. Transport TBD.     RNCM to follow for medical progression, recommendations, and final discharge plan.     Melonie Parks RN

## 2023-10-11 NOTE — PROGRESS NOTES
Patient's electric wheelchair was brought over to [facility] at 3 pm today. Wheelchair was placed in the patient's room (341). Report given to Tamara the nurse taking care of Teresa in the ICU.

## 2023-10-12 LAB
ALBUMIN SERPL BCG-MCNC: 3.1 G/DL (ref 3.5–5.2)
ALP SERPL-CCNC: 144 U/L (ref 35–104)
ALT SERPL W P-5'-P-CCNC: 16 U/L (ref 0–50)
ANION GAP SERPL CALCULATED.3IONS-SCNC: 10 MMOL/L (ref 7–15)
AST SERPL W P-5'-P-CCNC: 15 U/L (ref 0–45)
BACTERIA ABSC ANAEROBE+AEROBE CULT: NORMAL
BILIRUB SERPL-MCNC: 0.2 MG/DL
BUN SERPL-MCNC: 15.1 MG/DL (ref 6–20)
CALCIUM SERPL-MCNC: 9.6 MG/DL (ref 8.6–10)
CHLORIDE SERPL-SCNC: 99 MMOL/L (ref 98–107)
CREAT SERPL-MCNC: 0.41 MG/DL (ref 0.51–0.95)
DEPRECATED HCO3 PLAS-SCNC: 28 MMOL/L (ref 22–29)
EGFRCR SERPLBLD CKD-EPI 2021: >90 ML/MIN/1.73M2
ERYTHROCYTE [DISTWIDTH] IN BLOOD BY AUTOMATED COUNT: 14.6 % (ref 10–15)
GLUCOSE BLDC GLUCOMTR-MCNC: 130 MG/DL (ref 70–99)
GLUCOSE BLDC GLUCOMTR-MCNC: 156 MG/DL (ref 70–99)
GLUCOSE BLDC GLUCOMTR-MCNC: 163 MG/DL (ref 70–99)
GLUCOSE BLDC GLUCOMTR-MCNC: 176 MG/DL (ref 70–99)
GLUCOSE BLDC GLUCOMTR-MCNC: 197 MG/DL (ref 70–99)
GLUCOSE SERPL-MCNC: 133 MG/DL (ref 70–99)
HCT VFR BLD AUTO: 34.8 % (ref 35–47)
HGB BLD-MCNC: 10.8 G/DL (ref 11.7–15.7)
MCH RBC QN AUTO: 27.8 PG (ref 26.5–33)
MCHC RBC AUTO-ENTMCNC: 31 G/DL (ref 31.5–36.5)
MCV RBC AUTO: 90 FL (ref 78–100)
PLATELET # BLD AUTO: 256 10E3/UL (ref 150–450)
POTASSIUM SERPL-SCNC: 3.9 MMOL/L (ref 3.4–5.3)
PROT SERPL-MCNC: 6.4 G/DL (ref 6.4–8.3)
RBC # BLD AUTO: 3.89 10E6/UL (ref 3.8–5.2)
SODIUM SERPL-SCNC: 137 MMOL/L (ref 135–145)
VANCOMYCIN SERPL-MCNC: 8.3 UG/ML
WBC # BLD AUTO: 10.5 10E3/UL (ref 4–11)

## 2023-10-12 PROCEDURE — 250N000011 HC RX IP 250 OP 636

## 2023-10-12 PROCEDURE — 250N000013 HC RX MED GY IP 250 OP 250 PS 637

## 2023-10-12 PROCEDURE — 250N000013 HC RX MED GY IP 250 OP 250 PS 637: Performed by: SPECIALIST

## 2023-10-12 PROCEDURE — 80202 ASSAY OF VANCOMYCIN: CPT | Performed by: FAMILY MEDICINE

## 2023-10-12 PROCEDURE — 94640 AIRWAY INHALATION TREATMENT: CPT

## 2023-10-12 PROCEDURE — 250N000011 HC RX IP 250 OP 636: Mod: JZ | Performed by: SPECIALIST

## 2023-10-12 PROCEDURE — 99231 SBSQ HOSP IP/OBS SF/LOW 25: CPT | Performed by: SURGERY

## 2023-10-12 PROCEDURE — 250N000011 HC RX IP 250 OP 636: Mod: JZ | Performed by: STUDENT IN AN ORGANIZED HEALTH CARE EDUCATION/TRAINING PROGRAM

## 2023-10-12 PROCEDURE — 99233 SBSQ HOSP IP/OBS HIGH 50: CPT

## 2023-10-12 PROCEDURE — 258N000003 HC RX IP 258 OP 636

## 2023-10-12 PROCEDURE — 250N000013 HC RX MED GY IP 250 OP 250 PS 637: Performed by: SURGERY

## 2023-10-12 PROCEDURE — 999N000157 HC STATISTIC RCP TIME EA 10 MIN

## 2023-10-12 PROCEDURE — 120N000001 HC R&B MED SURG/OB

## 2023-10-12 PROCEDURE — 80053 COMPREHEN METABOLIC PANEL: CPT | Performed by: STUDENT IN AN ORGANIZED HEALTH CARE EDUCATION/TRAINING PROGRAM

## 2023-10-12 PROCEDURE — 85027 COMPLETE CBC AUTOMATED: CPT | Performed by: SPECIALIST

## 2023-10-12 RX ORDER — LOPERAMIDE HCL 2 MG
2 CAPSULE ORAL 4 TIMES DAILY PRN
Status: DISCONTINUED | OUTPATIENT
Start: 2023-10-12 | End: 2023-10-16 | Stop reason: HOSPADM

## 2023-10-12 RX ORDER — CEFAZOLIN SODIUM 1 G/50ML
1750 SOLUTION INTRAVENOUS EVERY 12 HOURS
Status: DISCONTINUED | OUTPATIENT
Start: 2023-10-13 | End: 2023-10-14

## 2023-10-12 RX ADMIN — VANCOMYCIN HYDROCHLORIDE 1500 MG: 5 INJECTION, POWDER, LYOPHILIZED, FOR SOLUTION INTRAVENOUS at 21:56

## 2023-10-12 RX ADMIN — QUETIAPINE FUMARATE 400 MG: 300 TABLET ORAL at 21:53

## 2023-10-12 RX ADMIN — Medication 1 PACKET: at 11:20

## 2023-10-12 RX ADMIN — MEROPENEM 1 G: 1 INJECTION, POWDER, FOR SOLUTION INTRAVENOUS at 17:39

## 2023-10-12 RX ADMIN — MEROPENEM 1 G: 1 INJECTION, POWDER, FOR SOLUTION INTRAVENOUS at 08:47

## 2023-10-12 RX ADMIN — HYDROMORPHONE HYDROCHLORIDE 0.5 MG: 1 INJECTION, SOLUTION INTRAMUSCULAR; INTRAVENOUS; SUBCUTANEOUS at 08:35

## 2023-10-12 RX ADMIN — BACLOFEN 20 MG: 10 TABLET ORAL at 17:46

## 2023-10-12 RX ADMIN — LOPERAMIDE HYDROCHLORIDE 2 MG: 2 CAPSULE ORAL at 11:24

## 2023-10-12 RX ADMIN — OXYCODONE HYDROCHLORIDE 5 MG: 5 TABLET ORAL at 17:46

## 2023-10-12 RX ADMIN — ALBUTEROL SULFATE 2 PUFF: 90 AEROSOL, METERED RESPIRATORY (INHALATION) at 09:21

## 2023-10-12 RX ADMIN — GABAPENTIN 600 MG: 300 CAPSULE ORAL at 08:42

## 2023-10-12 RX ADMIN — LOPERAMIDE HYDROCHLORIDE 2 MG: 2 CAPSULE ORAL at 21:56

## 2023-10-12 RX ADMIN — ACETAMINOPHEN 975 MG: 325 TABLET ORAL at 10:39

## 2023-10-12 RX ADMIN — PANTOPRAZOLE SODIUM 40 MG: 40 TABLET, DELAYED RELEASE ORAL at 08:42

## 2023-10-12 RX ADMIN — UMECLIDINIUM 1 PUFF: 62.5 AEROSOL, POWDER ORAL at 08:44

## 2023-10-12 RX ADMIN — HYDROMORPHONE HYDROCHLORIDE 0.5 MG: 1 INJECTION, SOLUTION INTRAMUSCULAR; INTRAVENOUS; SUBCUTANEOUS at 05:58

## 2023-10-12 RX ADMIN — INSULIN ASPART 2 UNITS: 100 INJECTION, SOLUTION INTRAVENOUS; SUBCUTANEOUS at 11:47

## 2023-10-12 RX ADMIN — HYDROMORPHONE HYDROCHLORIDE 0.5 MG: 1 INJECTION, SOLUTION INTRAMUSCULAR; INTRAVENOUS; SUBCUTANEOUS at 02:08

## 2023-10-12 RX ADMIN — HYDROXYZINE HYDROCHLORIDE 50 MG: 25 TABLET, FILM COATED ORAL at 11:40

## 2023-10-12 RX ADMIN — PRAMIPEXOLE DIHYDROCHLORIDE 0.75 MG: 0.5 TABLET ORAL at 21:56

## 2023-10-12 RX ADMIN — SODIUM CHLORIDE: 9 INJECTION, SOLUTION INTRAVENOUS at 17:47

## 2023-10-12 RX ADMIN — AZELASTINE HYDROCHLORIDE 1 DROP: 0.5 SOLUTION/ DROPS OPHTHALMIC at 22:05

## 2023-10-12 RX ADMIN — INSULIN ASPART 1 UNITS: 100 INJECTION, SOLUTION INTRAVENOUS; SUBCUTANEOUS at 17:39

## 2023-10-12 RX ADMIN — HYDROMORPHONE HYDROCHLORIDE 0.5 MG: 1 INJECTION, SOLUTION INTRAMUSCULAR; INTRAVENOUS; SUBCUTANEOUS at 14:02

## 2023-10-12 RX ADMIN — VENLAFAXINE HYDROCHLORIDE 150 MG: 75 CAPSULE, EXTENDED RELEASE ORAL at 08:41

## 2023-10-12 RX ADMIN — GABAPENTIN 600 MG: 300 CAPSULE ORAL at 19:34

## 2023-10-12 RX ADMIN — CLOPIDOGREL BISULFATE 75 MG: 75 TABLET ORAL at 08:42

## 2023-10-12 RX ADMIN — ENOXAPARIN SODIUM 40 MG: 40 INJECTION SUBCUTANEOUS at 11:48

## 2023-10-12 RX ADMIN — HYDROMORPHONE HYDROCHLORIDE 0.5 MG: 1 INJECTION, SOLUTION INTRAMUSCULAR; INTRAVENOUS; SUBCUTANEOUS at 21:56

## 2023-10-12 RX ADMIN — HYDROMORPHONE HYDROCHLORIDE 0.5 MG: 1 INJECTION, SOLUTION INTRAMUSCULAR; INTRAVENOUS; SUBCUTANEOUS at 19:35

## 2023-10-12 RX ADMIN — ACETAMINOPHEN 650 MG: 325 TABLET ORAL at 17:46

## 2023-10-12 RX ADMIN — MEROPENEM 1 G: 1 INJECTION, POWDER, FOR SOLUTION INTRAVENOUS at 00:01

## 2023-10-12 RX ADMIN — ACETAMINOPHEN 975 MG: 325 TABLET ORAL at 02:08

## 2023-10-12 RX ADMIN — HYDROXYZINE HYDROCHLORIDE 25 MG: 25 TABLET, FILM COATED ORAL at 16:14

## 2023-10-12 RX ADMIN — SODIUM CHLORIDE: 9 INJECTION, SOLUTION INTRAVENOUS at 06:07

## 2023-10-12 RX ADMIN — HYDROMORPHONE HYDROCHLORIDE 0.5 MG: 1 INJECTION, SOLUTION INTRAMUSCULAR; INTRAVENOUS; SUBCUTANEOUS at 16:14

## 2023-10-12 RX ADMIN — CETIRIZINE HYDROCHLORIDE 10 MG: 10 TABLET, FILM COATED ORAL at 08:42

## 2023-10-12 RX ADMIN — FLUTICASONE FUROATE AND VILANTEROL TRIFENATATE 1 PUFF: 100; 25 POWDER RESPIRATORY (INHALATION) at 08:43

## 2023-10-12 RX ADMIN — GABAPENTIN 600 MG: 300 CAPSULE ORAL at 14:02

## 2023-10-12 RX ADMIN — PRAVASTATIN SODIUM 80 MG: 20 TABLET ORAL at 21:53

## 2023-10-12 RX ADMIN — HYDROMORPHONE HYDROCHLORIDE 0.5 MG: 1 INJECTION, SOLUTION INTRAMUSCULAR; INTRAVENOUS; SUBCUTANEOUS at 11:40

## 2023-10-12 RX ADMIN — VANCOMYCIN HYDROCHLORIDE 1500 MG: 5 INJECTION, POWDER, LYOPHILIZED, FOR SOLUTION INTRAVENOUS at 10:40

## 2023-10-12 RX ADMIN — LOPERAMIDE HYDROCHLORIDE 2 MG: 2 CAPSULE ORAL at 14:21

## 2023-10-12 ASSESSMENT — ACTIVITIES OF DAILY LIVING (ADL)
ADLS_ACUITY_SCORE: 46
ADLS_ACUITY_SCORE: 51
ADLS_ACUITY_SCORE: 51
ADLS_ACUITY_SCORE: 47
ADLS_ACUITY_SCORE: 51
ADLS_ACUITY_SCORE: 53
ADLS_ACUITY_SCORE: 47
ADLS_ACUITY_SCORE: 46
ADLS_ACUITY_SCORE: 53
ADLS_ACUITY_SCORE: 51
ADLS_ACUITY_SCORE: 53
ADLS_ACUITY_SCORE: 47

## 2023-10-12 NOTE — PLAN OF CARE
Problem: Pain Acute  Goal: Optimal Pain Control and Function  Outcome: Progressing     Goal Outcome Evaluation:  Patient got admitted from the ICU during the shift. Alert and oriented x4. Reported severe abdominal through ivneet pain, managed by prn iv dilaudid and scheduled tylenol. Vargas intact. Wound vac in place, no drainage noted.

## 2023-10-12 NOTE — PROGRESS NOTES
Care Management Follow Up    Length of Stay (days): 3    Expected Discharge Date: 10/13/2023     Concerns to be Addressed:   Home wound vac    Patient plan of care discussed at interdisciplinary rounds: Yes    Anticipated Discharge Disposition: Home Care     Anticipated Discharge Services:    Anticipated Discharge DME:      Patient/family educated on Medicare website which has current facility and service quality ratings:    Education Provided on the Discharge Plan:    Patient/Family in Agreement with the Plan:      Referrals Placed by CM/SW:    Private pay costs discussed: Not applicable    Additional Information:  Social history per previous CM note:  Social Hx: Lives alone in apartment. Has PCA 5 days/week. Uses power wheelchair. Has [facility] for RN 2x/week. Transport TBD.      Pt got wound vac placed yesterday. Plan is for pt to go home at discharge. ALEC went in to speak to pt about how she plans to care for herself at home with her new wound and also make sure she got her power WC delivered.  Her power WC is in the room. She told CM that she has a PCA that comes in daily and she has home care nursing 3 days a week with [facility] and she thinks she can manage at home with the support of HC and PCA. I told her I would call and verify with [facility] that they can manage her new wound needs and if so I will start to order a home wound vac for discharge. CM then reached out to [facility] and they wanted to verify with the DON before saying they can manage her new wound needs and DON was out of office so I left a voice message to call me back when they get the message. CM will start the wound vac rental process now so this does not delay the discharge.    2:09 PM  CM requested home wound vac on the [facility] portal, will await for benefit check. GENE#[id number]        Elina Berry RN

## 2023-10-12 NOTE — PROGRESS NOTES
Teresa Perez is doing well, wound VAC in place in the left groin.  Having several loose bowel movements and wants to be placed back on her loperamide.        Vitals:    10/12/23 0000 10/12/23 0100 10/12/23 0201 10/12/23 0758   BP: 114/63 (!) 148/74 105/60 129/72   BP Location:   Left arm Right arm   Pulse: 76 77 88 90   Resp: 14 (!) 9 16 18   Temp:   97.6  F (36.4  C) 97.6  F (36.4  C)   TempSrc:   Oral Oral   SpO2: 93% 97% 97% 96%   Weight:       Height:           PHYSICAL EXAM:  GEN: No acute distress, comfortable  ABD: Left groin, wound VAC in place, no surrounding stigmata of infection  EXT:No cyanosis, edema or obvious abnormalities        Recent Labs   Lab 10/12/23  0546   WBC 10.5   HGB 10.8*   HCT 34.8*          Recent Labs   Lab 10/12/23  0546      CO2 28   BUN 15.1   ALBUMIN 3.1*   ALKPHOS 144*   ALT 16   AST 15         A/P:  Teresa Perez is s/p debridement of left groin for necrotizing soft tissue infection  -At this point the patient is doing well with the wound VAC.  We will defer further care management to the wound care team.  She will follow-up with the wound care clinic across the street once she is discharged.  -Would be okay to restart her loperamide    Abdelrahman Ware DO FACS  [phone]  [facility] Department of Surgery

## 2023-10-12 NOTE — PROGRESS NOTES
Rn called for PRN neb per pt request. Patient doesn't have a PRN neb but does have a Albuterol inhaler PRN. Upon assessing patient she stated that she has wheezing, pt BS diminished. She doesn't show any sign of respiratory distress nor acknowledge any sob. Patient is on 2 LPM NC, SPO2 96%. Patient stated she is wheezing, told her BS are diminished, she insist that wheezing is on her lower lobes. Explained again to patient that I didn't hear wheezing. Gave patient her Albuterol inhaler, post inhaler BS unchanged.     Mary Lacey, RT

## 2023-10-12 NOTE — PROGRESS NOTES
[facility]    Progress Note - Hospitalist Service       Date of Admission:  10/9/2023    Assessment & Plan   Teresa Perez is a 52 year old female admitted on 10/9/2023. She has a history of T2DM, CKD, COPD,CAD, L AKA, follows wound care for multiple wounds on abdomen and buttock, and is admitted for new abscess worsening over the past week of L inguinal/lower abdominal region, sepsis and subsequently found to have necrotizing soft tissue infection, taken to OR 10/9/23 for I&D. Initially hypotensive on admission and received fluid boluses, levophed which was discontinued 10/10. Receiving IV antibiotics. Restart oral meds 10/10.     #Necrotizing Fasciitis L groin s/p I&D 10/9/23  #Sepsis  #Hypotension  51 y/o F with hx of CVA (on plavix), CAD, COPD, epilepsy, T2DM, L AKA admitted with fatigue, severe pain after being seen at vascular clinic for wound care follow up. Patient was hypotensive 70's-80's systolic/40's and found to have WBC 23.1, Lactate 1.5. CT AP 10/9/23 revealed collection of subcutaneous gas in the L lower abdomen and groin concerning for NSTI. Patient received Vancomycin in the ED, 2.5 L of NS boluses. Patient also started on levophed due to low BP's after fluid resuscitation. CT 10/9 demonstrating collection of subcutaneous gas with surrounding edema consistent with gas forming infection. Patient seen by surgery, taken to OR for I&D 10/9. Levophed stopped 10/10, arterial line removed. ICU signing off 10/10. Wound noted to have surrounding necrotic adipose tissue on exam 10/10, per surgery may require repeat debridement with wound vac placement in 2-3 days.  10/10 - WBC 24.4, abs. Neutrophils 23.4   WBC 10.5, wnl 10/12  Aerobic bacterial culture - positive for 3+ enterococcus faecalis 10/11, Blood culture negative. Respiratory aerobic culture negative. C diff negative.     -Sensitivities pending 10/12, will narrow abx coverage as appropriate     -Continue IV meropenem  - enterococcus faecalis coverage  -Continue IV vancomycin per PharmD  -Continue fluids @ 100 mL/hr NS  -wound care protocol per RN  -wound vac placement 10/11 per surgery  -No further plan for OR debridement at this time  -Daily CBC     #T2DM  Patient notes blood sugars of 400's at home this past weekend, last A1C 06/2023 8.6. Current home dose includes Toujeo 65 units BID, novolog 28 units w/meals, weekly bydureon (exenatide) injections. Glucose 207 on admission. Patient endorses medication adherence. A1C 7.4 10/9, Glucose 100's  -Lantus 50 units BID, sliding scale insulin.  -Novolog TID w/meals, carb count 1 unit/10 g carbs  -60 g carbohydrate meal  -Blood glucose monitoring     #Loose Stools  -Restarted Loperamide PTA    #Anemia, unspecified  HgB 10.9, changed from baseline. No source of bleeding identified currently, likely hemodilutional. Continue to monitor  -Daily CBC     #Hyperkalemia, resolved  K 3.9 10/12  -No intervention at this time, will continue K+ checks and daily BMP     Chronic Conditions:  #HTN  #CAD  -Hold PTA amlodipine, metoprolol, due to soft pressures, off levophed drip 10/10  -Continue PTA plavix  -Continue PTA Pravastatin      #COPD  -Continue PTA albuterol, symbicort  -Resume PTA singulair     #Hx of Psychiatric conditions: MDD, Bipolar, BPD  -Resume PTA Effexor   -Resume PTA Trazodone  - Resume Zyprexa PTA     #Hx of chronic pain  -Hold PTA maloxicam  -IV dilaudid 0.5 mg q2h PRN for pain control            Diet: Consistent Carbohydrate Diet Moderate Consistent Carb (60 g CHO per Meal) Diet  Snacks/Supplements Adult: Kevon; With Meals  Snacks/Supplements Adult: Glucerna; With Meals    DVT Prophylaxis: Pneumatic Compression Devices  Vargas Catheter: PRESENT, indication: Wound Healing  Fluids:  mL/hr  Lines: PRESENT      PICC 10/09/23 Triple Lumen Left Brachial vein lateral-Site Assessment: WDL      Cardiac Monitoring: None  Code Status: Full Code      Clinically Significant Risk  Factors           # Hypercalcemia: corrected calcium is >10.1, will monitor as appropriate    # Hypoalbuminemia: Lowest albumin = 2.9 g/dL at 10/11/2023  3:43 AM, will monitor as appropriate     # Hypertension: Noted on problem list       # DMII: A1C = 7.4 % (Ref range: <5.7 %) within past 6 months, PRESENT ON ADMISSION  # Obesity: Estimated body mass index is 36.21 kg/m  as calculated from the following:    Height as of this encounter: 1.524 m (5').    Weight as of this encounter: 84.1 kg (185 lb 6.4 oz)., PRESENT ON ADMISSION     # COPD: noted on problem list        Disposition Plan     Expected Discharge Date: 10/13/2023      Destination: home with help/services          The patient's care was discussed with the Attending Physician, Dr. Ramirez .    Shilo Fairchild MD  Hospitalist Service  [facility]  Securely message with [employer] (more info)  Text page via "[employer]" Paging/Directory   ______________________________________________________________________    Interval History   Patient slept well overnight, endorses abdominal pain from surgical site, managed with PRN dilaudid. Wound care involved. Wound vac placed yesterday. Patient satting 96% on intermittent 2 L NC. Continue fluids and abx.    Physical Exam   Vital Signs: Temp: 97.6  F (36.4  C) Temp src: Oral BP: 129/72 Pulse: 90   Resp: 18 SpO2: 96 % O2 Device: Nasal cannula Oxygen Delivery: 2 LPM  Weight: 185 lbs 6.4 oz    Physical Exam  HENT:      Mouth/Throat:      Mouth: Mucous membranes are moist.      Pharynx: Oropharynx is clear.   Cardiovascular:      Rate and Rhythm: Normal rate and regular rhythm.      Pulses: Normal pulses.      Heart sounds: Normal heart sounds.   Pulmonary:      Effort: Pulmonary effort is normal.      Breath sounds: Normal breath sounds.  Abdominal:      General: Bowel sounds are normal.      Palpations: Abdomen is soft.      Comments: Surgical dressings in place over L lower abdomen   Skin:     General:  Skin is warm.   Neurological:      General: No focal deficit present.      Mental Status: She is alert and oriented to person, place, and time.   Psychiatric:         Mood and Affect: Mood normal.         Behavior: Behavior normal.       Data     I have personally reviewed the following data over the past 24 hrs:    10.5  \   10.8 (L)   / 256     137 99 15.1 /  130 (H)   3.9 28 0.41 (L) \     ALT: 16 AST: 15 AP: 144 (H) TBILI: 0.2   ALB: 3.1 (L) TOT PROTEIN: 6.4 LIPASE: N/A       Imaging results reviewed over the past 24 hrs:   No results found for this or any previous visit (from the past 24 hour(s)).    Shilo Fairchild MD  PGY-1  [facility] Medicine Residency  [facility]   October 12, 2023

## 2023-10-12 NOTE — PLAN OF CARE
Problem: Plan of Care - These are the overarching goals to be used throughout the patient stay.    Goal: Plan of Care Review  Description: The Plan of Care Review/Shift note should be completed every shift.  The Outcome Evaluation is a brief statement about your assessment that the patient is improving, declining, or no change.  This information will be displayed automatically on your shift note.  Outcome: Progressing  Afebrile.  Pt eating well and had a loose BM.  Loperamide given x 2 per pt request.  Very pleasant and cooperative with cares.   Goal Outcome Evaluation:  Problem: Plan of Care - These are the overarching goals to be used throughout the patient stay.    Goal: Optimal Comfort and Wellbeing  Intervention: Monitor Pain and Promote Comfort  Recent Flowsheet Documentation  Taken 10/12/2023 1402 by Patricia Ramirez, RN  Pain Management Interventions: medication (see MAR)  Taken 10/12/2023 0836 by Patricia Ramirez, RN  Pain Management Interventions: medication (see MAR)  Pain in groin wound improved slightly with IV dilaudid and hydroxyzine.  Will continue to monitor closely.

## 2023-10-13 ENCOUNTER — APPOINTMENT (OUTPATIENT)
Dept: PHYSICAL THERAPY | Facility: HOSPITAL | Age: 53
DRG: 853 | End: 2023-10-13
Attending: STUDENT IN AN ORGANIZED HEALTH CARE EDUCATION/TRAINING PROGRAM
Payer: COMMERCIAL

## 2023-10-13 LAB
ALBUMIN SERPL BCG-MCNC: 3 G/DL (ref 3.5–5.2)
ALP SERPL-CCNC: 125 U/L (ref 35–104)
ALT SERPL W P-5'-P-CCNC: 14 U/L (ref 0–50)
ANION GAP SERPL CALCULATED.3IONS-SCNC: 10 MMOL/L (ref 7–15)
AST SERPL W P-5'-P-CCNC: 18 U/L (ref 0–45)
BILIRUB SERPL-MCNC: 0.2 MG/DL
BUN SERPL-MCNC: 12.3 MG/DL (ref 6–20)
CALCIUM SERPL-MCNC: 8.6 MG/DL (ref 8.6–10)
CHLORIDE SERPL-SCNC: 100 MMOL/L (ref 98–107)
CREAT SERPL-MCNC: 0.33 MG/DL (ref 0.51–0.95)
DEPRECATED HCO3 PLAS-SCNC: 26 MMOL/L (ref 22–29)
EGFRCR SERPLBLD CKD-EPI 2021: >90 ML/MIN/1.73M2
ERYTHROCYTE [DISTWIDTH] IN BLOOD BY AUTOMATED COUNT: 14.4 % (ref 10–15)
GLUCOSE BLDC GLUCOMTR-MCNC: 144 MG/DL (ref 70–99)
GLUCOSE BLDC GLUCOMTR-MCNC: 160 MG/DL (ref 70–99)
GLUCOSE BLDC GLUCOMTR-MCNC: 163 MG/DL (ref 70–99)
GLUCOSE BLDC GLUCOMTR-MCNC: 170 MG/DL (ref 70–99)
GLUCOSE BLDC GLUCOMTR-MCNC: 183 MG/DL (ref 70–99)
GLUCOSE SERPL-MCNC: 152 MG/DL (ref 70–99)
HCT VFR BLD AUTO: 33.4 % (ref 35–47)
HGB BLD-MCNC: 10.4 G/DL (ref 11.7–15.7)
MCH RBC QN AUTO: 27.7 PG (ref 26.5–33)
MCHC RBC AUTO-ENTMCNC: 31.1 G/DL (ref 31.5–36.5)
MCV RBC AUTO: 89 FL (ref 78–100)
PLATELET # BLD AUTO: 271 10E3/UL (ref 150–450)
POTASSIUM SERPL-SCNC: 3.5 MMOL/L (ref 3.4–5.3)
PROT SERPL-MCNC: 6 G/DL (ref 6.4–8.3)
RBC # BLD AUTO: 3.76 10E6/UL (ref 3.8–5.2)
SODIUM SERPL-SCNC: 136 MMOL/L (ref 135–145)
WBC # BLD AUTO: 9.2 10E3/UL (ref 4–11)

## 2023-10-13 PROCEDURE — 250N000013 HC RX MED GY IP 250 OP 250 PS 637

## 2023-10-13 PROCEDURE — 97162 PT EVAL MOD COMPLEX 30 MIN: CPT | Mod: GP

## 2023-10-13 PROCEDURE — 80053 COMPREHEN METABOLIC PANEL: CPT | Performed by: STUDENT IN AN ORGANIZED HEALTH CARE EDUCATION/TRAINING PROGRAM

## 2023-10-13 PROCEDURE — 258N000003 HC RX IP 258 OP 636: Performed by: FAMILY MEDICINE

## 2023-10-13 PROCEDURE — 250N000013 HC RX MED GY IP 250 OP 250 PS 637: Performed by: SPECIALIST

## 2023-10-13 PROCEDURE — 99233 SBSQ HOSP IP/OBS HIGH 50: CPT

## 2023-10-13 PROCEDURE — 97608 NEG PRS WND THER NDME>50SQCM: CPT

## 2023-10-13 PROCEDURE — 120N000001 HC R&B MED SURG/OB

## 2023-10-13 PROCEDURE — 250N000011 HC RX IP 250 OP 636: Performed by: SPECIALIST

## 2023-10-13 PROCEDURE — G0463 HOSPITAL OUTPT CLINIC VISIT: HCPCS | Mod: 25

## 2023-10-13 PROCEDURE — 258N000003 HC RX IP 258 OP 636

## 2023-10-13 PROCEDURE — 85027 COMPLETE CBC AUTOMATED: CPT | Performed by: SPECIALIST

## 2023-10-13 PROCEDURE — 97542 WHEELCHAIR MNGMENT TRAINING: CPT | Mod: GP

## 2023-10-13 PROCEDURE — 250N000013 HC RX MED GY IP 250 OP 250 PS 637: Performed by: SURGERY

## 2023-10-13 PROCEDURE — 250N000011 HC RX IP 250 OP 636: Performed by: FAMILY MEDICINE

## 2023-10-13 PROCEDURE — 250N000011 HC RX IP 250 OP 636: Mod: JZ | Performed by: STUDENT IN AN ORGANIZED HEALTH CARE EDUCATION/TRAINING PROGRAM

## 2023-10-13 RX ADMIN — HYDROMORPHONE HYDROCHLORIDE 0.5 MG: 1 INJECTION, SOLUTION INTRAMUSCULAR; INTRAVENOUS; SUBCUTANEOUS at 06:14

## 2023-10-13 RX ADMIN — SODIUM CHLORIDE: 9 INJECTION, SOLUTION INTRAVENOUS at 13:36

## 2023-10-13 RX ADMIN — Medication 1 PACKET: at 11:30

## 2023-10-13 RX ADMIN — INSULIN ASPART 1 UNITS: 100 INJECTION, SOLUTION INTRAVENOUS; SUBCUTANEOUS at 08:37

## 2023-10-13 RX ADMIN — VENLAFAXINE HYDROCHLORIDE 150 MG: 75 CAPSULE, EXTENDED RELEASE ORAL at 07:57

## 2023-10-13 RX ADMIN — INSULIN ASPART 1 UNITS: 100 INJECTION, SOLUTION INTRAVENOUS; SUBCUTANEOUS at 13:34

## 2023-10-13 RX ADMIN — INSULIN ASPART 1 UNITS: 100 INJECTION, SOLUTION INTRAVENOUS; SUBCUTANEOUS at 18:08

## 2023-10-13 RX ADMIN — HYDROMORPHONE HYDROCHLORIDE 0.5 MG: 1 INJECTION, SOLUTION INTRAMUSCULAR; INTRAVENOUS; SUBCUTANEOUS at 09:58

## 2023-10-13 RX ADMIN — OXYCODONE HYDROCHLORIDE 5 MG: 5 TABLET ORAL at 12:02

## 2023-10-13 RX ADMIN — ENOXAPARIN SODIUM 40 MG: 40 INJECTION SUBCUTANEOUS at 13:22

## 2023-10-13 RX ADMIN — GABAPENTIN 600 MG: 300 CAPSULE ORAL at 07:57

## 2023-10-13 RX ADMIN — GABAPENTIN 600 MG: 300 CAPSULE ORAL at 20:07

## 2023-10-13 RX ADMIN — HYDROMORPHONE HYDROCHLORIDE 0.5 MG: 1 INJECTION, SOLUTION INTRAMUSCULAR; INTRAVENOUS; SUBCUTANEOUS at 13:35

## 2023-10-13 RX ADMIN — UMECLIDINIUM 1 PUFF: 62.5 AEROSOL, POWDER ORAL at 08:37

## 2023-10-13 RX ADMIN — QUETIAPINE FUMARATE 400 MG: 300 TABLET ORAL at 21:24

## 2023-10-13 RX ADMIN — MEROPENEM 1 G: 1 INJECTION, POWDER, FOR SOLUTION INTRAVENOUS at 01:42

## 2023-10-13 RX ADMIN — CLOPIDOGREL BISULFATE 75 MG: 75 TABLET ORAL at 07:57

## 2023-10-13 RX ADMIN — PRAVASTATIN SODIUM 80 MG: 20 TABLET ORAL at 21:24

## 2023-10-13 RX ADMIN — PANTOPRAZOLE SODIUM 40 MG: 40 TABLET, DELAYED RELEASE ORAL at 07:58

## 2023-10-13 RX ADMIN — HYDROMORPHONE HYDROCHLORIDE 0.5 MG: 1 INJECTION, SOLUTION INTRAMUSCULAR; INTRAVENOUS; SUBCUTANEOUS at 03:27

## 2023-10-13 RX ADMIN — PRAMIPEXOLE DIHYDROCHLORIDE 0.75 MG: 0.5 TABLET ORAL at 21:24

## 2023-10-13 RX ADMIN — OXYCODONE HYDROCHLORIDE 5 MG: 5 TABLET ORAL at 07:58

## 2023-10-13 RX ADMIN — MEROPENEM 1 G: 1 INJECTION, POWDER, FOR SOLUTION INTRAVENOUS at 08:37

## 2023-10-13 RX ADMIN — HYDROMORPHONE HYDROCHLORIDE 0.5 MG: 1 INJECTION, SOLUTION INTRAMUSCULAR; INTRAVENOUS; SUBCUTANEOUS at 00:01

## 2023-10-13 RX ADMIN — SODIUM CHLORIDE: 9 INJECTION, SOLUTION INTRAVENOUS at 01:43

## 2023-10-13 RX ADMIN — HYDROMORPHONE HYDROCHLORIDE 0.5 MG: 1 INJECTION, SOLUTION INTRAMUSCULAR; INTRAVENOUS; SUBCUTANEOUS at 21:22

## 2023-10-13 RX ADMIN — FLUTICASONE FUROATE AND VILANTEROL TRIFENATATE 1 PUFF: 100; 25 POWDER RESPIRATORY (INHALATION) at 08:37

## 2023-10-13 RX ADMIN — CETIRIZINE HYDROCHLORIDE 10 MG: 10 TABLET, FILM COATED ORAL at 07:57

## 2023-10-13 RX ADMIN — HYDROMORPHONE HYDROCHLORIDE 0.5 MG: 1 INJECTION, SOLUTION INTRAMUSCULAR; INTRAVENOUS; SUBCUTANEOUS at 16:14

## 2023-10-13 RX ADMIN — VANCOMYCIN HYDROCHLORIDE 1750 MG: 5 INJECTION, POWDER, LYOPHILIZED, FOR SOLUTION INTRAVENOUS at 20:07

## 2023-10-13 RX ADMIN — HYDROMORPHONE HYDROCHLORIDE 0.5 MG: 1 INJECTION, SOLUTION INTRAMUSCULAR; INTRAVENOUS; SUBCUTANEOUS at 18:27

## 2023-10-13 RX ADMIN — VANCOMYCIN HYDROCHLORIDE 1750 MG: 5 INJECTION, POWDER, LYOPHILIZED, FOR SOLUTION INTRAVENOUS at 10:01

## 2023-10-13 RX ADMIN — MEROPENEM 1 G: 1 INJECTION, POWDER, FOR SOLUTION INTRAVENOUS at 18:37

## 2023-10-13 RX ADMIN — AZELASTINE HYDROCHLORIDE 1 DROP: 0.5 SOLUTION/ DROPS OPHTHALMIC at 08:37

## 2023-10-13 RX ADMIN — LOPERAMIDE HYDROCHLORIDE 2 MG: 2 CAPSULE ORAL at 19:01

## 2023-10-13 RX ADMIN — GABAPENTIN 600 MG: 300 CAPSULE ORAL at 13:35

## 2023-10-13 RX ADMIN — OXYCODONE HYDROCHLORIDE 5 MG: 5 TABLET ORAL at 01:42

## 2023-10-13 ASSESSMENT — ACTIVITIES OF DAILY LIVING (ADL)
ADLS_ACUITY_SCORE: 51
ADLS_ACUITY_SCORE: 51
ADLS_ACUITY_SCORE: 47
ADLS_ACUITY_SCORE: 51
ADLS_ACUITY_SCORE: 47
ADLS_ACUITY_SCORE: 51
ADLS_ACUITY_SCORE: 51
ADLS_ACUITY_SCORE: 47
ADLS_ACUITY_SCORE: 51
ADLS_ACUITY_SCORE: 51
ADLS_ACUITY_SCORE: 47
ADLS_ACUITY_SCORE: 51

## 2023-10-13 NOTE — PROGRESS NOTES
[facility]    Progress Note - Hospitalist Service       Date of Admission:  10/9/2023    Assessment & Plan   Teresa Perez is a 52 year old female admitted on 10/9/2023. She has a history of T2DM, CKD, COPD,CAD, L AKA, follows wound care for multiple wounds on abdomen and buttock, and is admitted for new abscess worsening over the past week of L inguinal/lower abdominal region, sepsis and subsequently found to have necrotizing soft tissue infection, taken to OR 10/9/23 for I&D. Initially hypotensive on admission and received fluid boluses, levophed which was discontinued 10/10. Receiving IV antibiotics. Restarted oral meds 10/10. Vargas catheter out 10/13.     #Necrotizing Fasciitis L groin s/p I&D 10/9/23  #Sepsis  #Hypotension  53 y/o F with hx of CVA (on plavix), CAD, COPD, epilepsy, T2DM, L AKA admitted with fatigue, severe pain after being seen at vascular clinic for wound care follow up. Patient was hypotensive 70's-80's systolic/40's and found to have WBC 23.1, Lactate 1.5. CT AP 10/9/23 revealed collection of subcutaneous gas in the L lower abdomen and groin concerning for NSTI. Patient received Vancomycin in the ED, 2.5 L of NS boluses. Patient also started on levophed due to low BP's after fluid resuscitation. CT 10/9 demonstrating collection of subcutaneous gas with surrounding edema consistent with gas forming infection. Patient seen by surgery, taken to OR for I&D 10/9. Levophed stopped 10/10, arterial line removed. ICU signing off 10/10. Wound noted to have surrounding necrotic adipose tissue on exam 10/10, per surgery may require repeat debridement with wound vac placement in 2-3 days.  10/10 - WBC 24.4, abs. Neutrophils 23.4   WBC 9.2, wnl 10/13  Aerobic bacterial culture - positive for 3+ enterococcus faecalis 10/11, Blood culture negative. Respiratory aerobic culture negative. C diff negative.         -Continue IV meropenem - enterococcus faecalis coverage  -Continue  IV vancomycin per PharmD  -Continue fluids @ 100 mL/hr NS  -wound care protocol per RN   -Wound care RN notified to address prior to admission abdominal and buttock wounds 10/13  -wound vac placement 10/11 per surgery  -No further plan for OR debridement at this time  -henriquez catheter discontinued 10/13  -Daily CBC     #T2DM  Patient notes blood sugars of 400's at home this past weekend, last A1C 06/2023 8.6. Current home dose includes Toujeo 65 units BID, novolog 28 units w/meals, weekly bydureon (exenatide) injections. Glucose 207 on admission. Patient endorses medication adherence. A1C 7.4 10/9, Glucose 152 10/13  -Titrate lantus up to 30 units AM 10/13, sliding scale insulin.  -Novolog TID w/meals, carb count 1 unit/10 g carbs  -60 g carbohydrate meal  -Blood glucose monitoring     #Loose Stools  -Restarted Loperamide PTA     #Anemia, unspecified  HgB 10.9, changed from baseline. No source of bleeding identified currently, likely hemodilutional. Continue to monitor  -Daily CBC     #Hyperkalemia, resolved  K 3.9 10/12, 3.5 10/13  -No intervention at this time, will continue K+ checks and daily BMP     Chronic Conditions:  #HTN  #CAD  -Hold PTA amlodipine, metoprolol, due to soft pressures, off levophed drip 10/10  -Continue PTA plavix  -Continue PTA Pravastatin      #COPD  -Continue PTA albuterol, symbicort  -Resume PTA singulair     #Hx of Psychiatric conditions: MDD, Bipolar, BPD  -Continue PTA Effexor   -Continue PTA Trazodone  -Continue Zyprexa PTA     #Hx of chronic pain  -Hold PTA maloxicam  -IV dilaudid 0.5 mg q2h PRN for pain control        Diet: Consistent Carbohydrate Diet Moderate Consistent Carb (60 g CHO per Meal) Diet  Snacks/Supplements Adult: Kevon; With Meals  Snacks/Supplements Adult: Glucerna; With Meals    DVT Prophylaxis: Pneumatic Compression Devices  Henriquez Catheter: PRESENT, indication: Wound Healing  Fluids:  mL/hr  Lines: PRESENT      PICC 10/09/23 Triple Lumen Left Brachial vein  lateral-Site Assessment: WDL      Cardiac Monitoring: None  Code Status: Full Code      Clinically Significant Risk Factors           # Hypercalcemia: corrected calcium is >10.1, will monitor as appropriate    # Hypoalbuminemia: Lowest albumin = 2.9 g/dL at 10/11/2023  3:43 AM, will monitor as appropriate     # Hypertension: Noted on problem list       # DMII: A1C = 7.4 % (Ref range: <5.7 %) within past 6 months, PRESENT ON ADMISSION  # Obesity: Estimated body mass index is 36.21 kg/m  as calculated from the following:    Height as of this encounter: 1.524 m (5').    Weight as of this encounter: 84.1 kg (185 lb 6.4 oz)., PRESENT ON ADMISSION     # COPD: noted on problem list        Disposition Plan      Expected Discharge Date: 10/14/2023      Destination: home with help/services          The patient's care was discussed with the Attending Physician, Dr. Ramirez .    Shiol Fairchild MD  Hospitalist Service  [facility]  Securely message with [employer] (more info)  Text page via [employer] Paging/Directory   ______________________________________________________________________    Interval History   Patient slept well overnight. Remove henriquez catheter today, patient making good urine output. On 2L NC for comfort, sats 97%. Denies SOB. Pain controlled with PRN dilaudid. Wound vac in place. Will keep her on IV abx 10/13, possible discharge 10/14 pending surgery approval for wound vac at home. Patient has PCA and wound care at facility.     Physical Exam   Vital Signs: Temp: 97.8  F (36.6  C) Temp src: Oral BP: 106/56 Pulse: 86   Resp: 18 SpO2: 97 % O2 Device: Nasal cannula Oxygen Delivery: 2 LPM  Weight: 185 lbs 6.4 oz    Physical Exam  HENT:      Mouth/Throat:      Mouth: Mucous membranes are moist.      Pharynx: Oropharynx is clear.   Cardiovascular:      Rate and Rhythm: Normal rate and regular rhythm.      Pulses: Normal pulses.      Heart sounds: Normal heart sounds.   Pulmonary:      Effort:  Pulmonary effort is normal.      Breath sounds: Normal breath sounds.  Abdominal:      General: Bowel sounds are normal.      Palpations: Abdomen is soft.      Comments: Surgical dressings in place over L lower abdomen   Skin:     General: Skin is warm.   Neurological:      General: No focal deficit present.      Mental Status: She is alert and oriented to person, place, and time.   Psychiatric:         Mood and Affect: Mood normal.         Behavior: Behavior normal.        Data     I have personally reviewed the following data over the past 24 hrs:    9.2  \   10.4 (L)   / 271     136 100 12.3 /  163 (H)   3.5 26 0.33 (L) \     ALT: 14 AST: 18 AP: 125 (H) TBILI: 0.2   ALB: 3.0 (L) TOT PROTEIN: 6.0 (L) LIPASE: N/A       Imaging results reviewed over the past 24 hrs:   No results found for this or any previous visit (from the past 24 hour(s)).    Shilo Fairchild MD  PGY-1  [facility] Residency  [facility]   October 13, 2023

## 2023-10-13 NOTE — CONSULTS
[facility] Nurse Inpatient Assessment     Consulted for: L AKA/groin - Nec Fasc; new consult 10/13 for abdomen and buttock    Summary: Assessed wound with surgical PA, starting an irrigating wound vac    Patient History (according to provider note(s):      Teresa Perez is a 52 year old female who presents with a severe L inguinal/lower abdominal wound worsening over the past week. She follows with wound clinic for multiple abdominal abscesses which have been healing, however she has a new wound from the past week on her L groin that has been extremely painful and growing with foul odor. Patient endorses diaphoresis, chills, nausea and occasional vomiting. She states she thought it was an abscess at home, and attempted to drain on her own with a needle and noticed purulent and bloody discharge. Symptoms have continued to worsen, denies bowel or bladder symptoms, no chest pain. SOB w/wheezing at baseline due to underlying COPD but no new respiratory symptoms.     Notable social history includes she lives at home by herself with her cat, has a home health nurse visit 2x weekly for wound care. Says she smokes 5-10 cigarettes daily, has smoked for approximately 40 years. Denies alcohol or recreational drug use aside from marijuana.       Assessment:      Skin Injury Location: L groin/panniculus/mons pubis        10/10                                                                  10/11                                                                 10/13    Last photo: 10/13  Skin injury due to:  surgical - necrotizing fasciitis   Skin history and plan of care:   see above  Affected area:      Skin assessment:  full thickness open wound to adipose     Measurements (length x width x depth, in cm) 6  x 16  x  3.5 cm      Color: pink and red     Temperature  warm     Drainage: scant .      Color: serosanguinous      Odor: none  Pain: mild, aching  Pain interventions prior to dressing  change: slow and gentle cares   Treatment goal: Infection control/prevention, Increase granulation, and Protection  STATUS: evolving  Supplies ordered: at bedside    Negative pressure wound therapy applied to: L groin   Surgical date: 10/9   Service following:  - Ogren  Date Negative Pressure Wound Therapy initiated: 10/11   Interventions in place: moisture/incontinence management  Is patient s nutritional status compromised? yes   If yes, what interventions are in place? Protein supplements  Reason for initiating vac therapy? Presence of co-morbidities, High risk of infections, Need for accelerated granulation tissue, and Prior history of delayed wound healing  Which?of?the?following?co-morbidities?apply? Diabetes  If diabetic is patient on a diabetic management program? Yes   Is osteomyelitis present in wound? no   If yes what treatments are in place? N/A     Volume in cannister: 0     Last cannister change date: 10/11    Number of foam pieces removed from a wound (excluding foam for bridge) :  3 CleanseChoice Foam   Verified this matched the number of foam pieces applied last dressing change: Yes   Number of foam pieces packed into wound (excluding foam for bridge) :  1 VerFlo Foam   _____________________________________________________________________________________________________________________________    Pressure Injury Location: R IT    10/13    Last photo: 10/13  Wound type: Pressure Injury     Pressure Injury Stage: 3, present on admission   Wound history/plan of care:   Woc began following this wound today. An outside facility dressing was noted to this area: alyvyn and hydrofera ready was still in place.    Wound base: 90 % granulation tissue, 10 % slough     Palpation of the wound bed: normal      Drainage: scant     Description of drainage: serosanguinous     Measurements (length x width x depth, in cm) 2  x 1  x  0.3 cm      Tunneling N/A     Undermining N/A  Periwound skin: Intact, Macerated, and  Scar tissue      Color: normal and consistent with surrounding tissue      Temperature: normal   Odor: mild  Pain: mild and during dressing change, aching  Pain intervention prior to dressing change: slow and gentle cares   Treatment goal: Heal , Infection control/prevention, Increase granulation, Protection, and Promote epidermal migration  STATUS: initial assessment  Supplies ordered: ordered medihoney as we do not carry hydrofera ready    My PI Risk Assessment     Sensory Perception: 3 - Slightly Limited     Moisture: 2 - Very moist      Activity: 1 - Bedfast      Mobility: 2 - Very limited     Nutrition: 3 - Adequate     Friction/Shear: 2 - Potential problem      TOTAL: 13  ___________________________________________________________________________________________________________________________________________    Wound location: abdomen, R    10/13    Last photo: 10/13    Wound due to: Unknown Etiology: patient does not know how they happened. 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   Wound history/plan of care: patient has been seen in the vascular clinic across from [facility] for these and the IT wound above, see MD noted from 10/9 by Dr Dozier  Wound base: 100 % granulation tissue,      Palpation of the wound bed: normal      Drainage: scant     Description of drainage: serosanguinous     Measurements (length x width x depth, in cm): 6.4  x 3  x  0.3 cm      Tunneling: N/A     Undermining: N/A  Periwound skin:  discoloration      Color: dusky      Temperature: normal   Odor: mild  Pain: mild, tender  Pain interventions prior to dressing change: slow and gentle cares   Treatment goal: Heal , Increase granulation, and Promote epidermal migration  STATUS: initial assessment  Supplies ordered: ordered medihoney        Treatment Plan:     Negative pressure wound therapy plan:  Wound location: L groin   Change Days: Mon/Wed/Fri by WOC RN    Supplies (including all accessories) used: medium  "Veraflo black foam  Cleanse with Vashe prior to replacing VAC    Suction setting: -125 mmHg, 26cc vashe, 10 minute soak, 2 hr cycle  Methods used: Window paned all periwound skin with vac drape prior to applying sponge and Placed barrier ring into periwound creases to improve seal    Staff RN to assess integrity of dressing and ensure suction is set at appropriate level every shift.   Date canister. Chart canister output every shift. Change cannister weekly and PRN if full/occluded     Remove foam dressing and replace with BID normal saline moist gauze dressing if:   -a dressing failure which cannot be repaired within 2 hours   -patient is discharging to home without a home pump   -patient is discharging to a facility outside the local area   -if a dressing is a \"Silver Foam\", remove before Radiation Therapy or MRI     The hospital VAC pump is not to be discharged with the patient.?Ensure to disconnect patient from machine prior to discharge. Then,    - If a home [employer] VAC pump has been delivered, connect home cannister to dressing tubing then connect cannister to home pump and turn on machine    - If transferring to a nearby facility with a [employer] vac, can disconnect and clamp tubing then cover end with glove so can be reconnected within 2 hours        L groin - if VAC fails  Flush wound with NS, pat dry  Moisten gauze roll with vashe, fill wound and trim excess  Cover with abd pad and tuck into fold. Try to not use any tape  Change BID + PRN if soiled, saturated, falls off    L IT & RLQ abdomen wounds  1. Cleanse wounds with vashe moistened gauze. Soak for 2 minutes.  2. Apply nickel thick layer of medihoney gel to wounds, cover with mepilex dressings to fit  3. Change dressings MWF + PRN if soiled, saturated, falls off    Orders: Written    RECOMMEND PRIMARY TEAM ORDER: None, at this time  Education provided: plan of care  Discussed plan of care with: Patient and Nurse  WOC nurse follow-up plan: " Monday/WednesdayFriday  Notify [facility] if wound(s) deteriorate.  Nursing to notify the Provider(s) and re-consult the [facility] Nurse if new skin concern.    DATA:     Current support surface: Standard  Low air loss (JOHNATHAN pump, Isolibrium, Pulsate, skin guard, etc)  Containment of urine/stool: Incontinence Protocol  BMI: Body mass index is 36.21 kg/m .   Active diet order: Orders Placed This Encounter      Consistent Carbohydrate Diet Moderate Consistent Carb (60 g CHO per Meal) Diet     Output: I/O last 3 completed shifts:  In: 2955 [P.O.:460; I.V.:2495]  Out: 3100 [Urine:3100]     Labs:   Recent Labs   Lab 10/13/23  0625 10/10/23  0406 10/09/23  0925   ALBUMIN 3.0*   < >  --    HGB 10.4*   < > 11.0*   WBC 9.2   < > 23.1*   A1C  --   --  7.4*    < > = values in this interval not displayed.     Pressure injury risk assessment:   Sensory Perception: 3-->slightly limited  Moisture: 4-->rarely moist  Activity: 2-->chairfast  Mobility: 2-->very limited  Nutrition: 2-->probably inadequate  Friction and Shear: 1-->problem  Chi Score: 14    MINESH RiosN RN CWOCN  Pager no longer in use, please contact through [employer]: [facility] [facility]

## 2023-10-13 NOTE — PROGRESS NOTES
10/13/23 1041   Appointment Info   Signing Clinician's Name / Credentials (PT) Estefany Salazar, PT, DPT   Living Environment   People in Home alone   Current Living Arrangements apartment   Home Accessibility no concerns   Living Environment Comments has PCA and home nursing services at home   Self-Care   Equipment Currently Used at Home wheelchair, power;wheelchair, manual  (manual wheelchair in house; power wheelchair for longer distances & in community)   Fall history within last six months yes   Number of times patient has fallen within last six months 2   General Information   Onset of Illness/Injury or Date of Surgery 10/09/23   Referring Physician Gilbert Rojas MD   Patient/Family Therapy Goals Statement (PT) Return to home   Pertinent History of Current Problem (include personal factors and/or comorbidities that impact the POC) Necrotizing fasciitis   Bed Mobility   Bed Mobility supine-sit   Supine-Sit Sebastian (Bed Mobility) minimum assist (75% patient effort);verbal cues;supervision   Transfers   Transfers sit-stand transfer   Sit-Stand Transfer   Sit-Stand Sebastian (Transfers) supervision;verbal cues;minimum assist (75% patient effort)   Assistive Device (Sit-Stand Transfers) walker, front-wheeled   Clinical Impression   Criteria for Skilled Therapeutic Intervention Yes, treatment indicated   PT Diagnosis (PT) impaired functional mobility   Influenced by the following impairments weakness, decreased ROM, impaired balance   Functional limitations due to impairments wheelchair mobility, transfers   Clinical Presentation (PT Evaluation Complexity) evolving   Clinical Presentation Rationale pt presents as medically diagnosed   Clinical Decision Making (Complexity) moderate complexity   Planned Therapy Interventions (PT) balance training;ROM (range of motion);strengthening;transfer training   Risk & Benefits of therapy have been explained evaluation/treatment results reviewed;patient   PT Total  Evaluation Time   PT Eval, Moderate Complexity Minutes (93247) 10   Plan of Care Review   Plan of Care Reviewed With patient   Physical Therapy Goals   PT Frequency 3x/week   PT Predicted Duration/Target Date for Goal Attainment 10/18/23   Interventions   Interventions Quick Adds Wheelchair Mgmt/Training;Therapeutic Activity   Therapeutic Activity   Symptoms Noted During/After Treatment None   Treatment Detail/Skilled Intervention Stand pivot transfer to/from Wheelchair on pt R side x1: cueing for safety, CGA   Wheelchair Managment/Training   Wheelchair Mgmt/Training Minutes (56150) 15   Symptoms Noted During/After Treatment Fatigue   Treatment Detail/Skilled Intervention manual wheelchair, level surface for 300ft, assist x2 for equipment management, pt declined use of R foot rest, use of R foot & B UE for propelling; cueing for safety   PT Discharge Planning   PT Plan wheelchair mobility   PT Discharge Recommendation (DC Rec) home with home care physical therapy;home with assist   PT Rationale for DC Rec pt has home health nursing & PCA services; home PT for improving activity tolerance & strengthening   PT Brief overview of current status CGA stand pivot transfers; Assist x2 for equipment management with wheelchair mobility   PT Equipment Needed at Discharge wheelchair   Total Session Time   Timed Code Treatment Minutes 15   Total Session Time (sum of timed and untimed services) 25

## 2023-10-13 NOTE — PLAN OF CARE
Goal Outcome Evaluation:               Alert and oriented. Assist of one to pivot to commode. Loose stool and voided after henriquez removed. Wounds all observed with WOC. Provider contacted for all wound consults to get orders placed. Vashe instillation at wound vac at left groin. Pain at left groin. Oxy and dilaudid helping. When provider to place consult orders so WOC can place orders for other wounds.

## 2023-10-13 NOTE — PROGRESS NOTES
Care Management Follow Up    Length of Stay (days): 4    Expected Discharge Date: 10/14/2023     Concerns to be Addressed:  provider to sign home wound vac     Patient plan of care discussed at interdisciplinary rounds: Yes    Anticipated Discharge Disposition: Home Care     Anticipated Discharge Services:    Anticipated Discharge DME:      Patient/family educated on Medicare website which has current facility and service quality ratings:    Education Provided on the Discharge Plan:    Patient/Family in Agreement with the Plan:      Referrals Placed by CM/SW:    Private pay costs discussed: Not applicable    Additional Information:  Social history per previous CM note:  Social Hx: Lives alone in apartment. Has PCA 5 days/week. Uses power wheelchair. Has [facility] for RN 2x/week. Transport TBD.       10/12 Pt got wound vac placed yesterday. Plan is for pt to go home at discharge. ALEC went in to speak to pt about how she plans to care for herself at home with her new wound and also make sure she got her power WC delivered.  Her power WC is in the room. She told CM that she has a PCA that comes in daily and she [facility] and she thinks she can manage at home with the support of HC and PCA. I told her I would call and verify with [facility] that they can manage her new wound needs and if so I will start to order a home wound vac for discharge. CM then reached out to [facility] and they wanted to verify with the DON before saying they can manage her new wound needs and DON was out of office so I left a voice message to call me back when they get the message. CM will start the wound vac rental process now so this does not delay the discharge.   2:09 PM  CM requested home wound vac on the [employer] portal, will await for benefit check. GENE#[id number]    10/13  CM spoke to [facility] who is the agency that provides RN and PCA services for the pt and they told CM that they can manage her new  wound care needs and a wound vac. Did reach out to the team to see if they think this is appropriate. Home Wound Vac is complete on [facility] protal except we still need provider signature. CM reached out to Donell Ware and he was in the middle of a surgery but said that someone from his team will be in today and can sign the form. The form is placed on the pt chart at the nursing station on P2.    [facility]  [phone], [phone] or [phone]   Fax: [phone]    12:31 PM   Provider signed the wound vac script. CM then faxed this into [facility] and will follow up with [facility] in a few hours.    3:33 PM  CM got a message that [facility] was unable to accept the signed wound vac script as some things were missing. CM then reached out to the provider that signed the script and the provider was able to come back into the hospital to fix the errors and CM then sent the script back into [facility].    4:17 PM  Still missing a spot for provider to initial so she is coming back to sign and then CM will send this to [facility] again.    4:37 PM  CM faxed all info into [facility] and they said it may take up to an hour this CM cannot wait. Pt will possibly be medically ready tomorrow so next CM will have to look into this in the morning, as [facility] told CM that it should be approved in the next hour.  Pt will have [facility] to help manage the wound/wound vac and the number to call if pt discharge over the weekend is [phone].      Elina Berry RN

## 2023-10-13 NOTE — PLAN OF CARE
Problem: Pain Acute  Goal: Optimal Pain Control and Function  10/12/2023 2253 by Meek Thorpe, RN  Outcome: Progressing  10/12/2023 2246 by Meek Thorpe RN  Outcome: Progressing  Intervention: Develop Pain Management Plan  Recent Flowsheet Documentation  Taken 10/12/2023 1614 by Meek Thorpe, RN  Pain Management Interventions:   medication (see MAR)   care clustered   emotional support   diversional activity provided  Intervention: Prevent or Manage Pain  Recent Flowsheet Documentation  Taken 10/12/2023 1614 by Meek Thorpe RN  Medication Review/Management: medications reviewed     Problem: Skin or Soft Tissue Infection  Goal: Absence of Infection Signs and Symptoms  Outcome: Progressing    Patient VSS on RA this shift, A+Ox4, endorsing pain at left groin wound 7-8/10, prn IV dilaudid most effective for pain management per pt, given x 3 this shift. Wound Vac in place, on cycle. Patient afebrile. IV abx per orders.  and 176. Continues to have diarrhea, prn imodium given x 1.     For detailed vital signs and assessments, please see documentation flowsheets. For detailed medication administrations, please see MAR.     Meek Thorpe RN 10/12/2023   [phone]

## 2023-10-13 NOTE — PLAN OF CARE
Problem: Pain Acute  Goal: Optimal Pain Control and Function  Outcome: Progressing  Intervention: Prevent or Manage Pain  Recent Flowsheet Documentation  Taken 10/13/2023 0000 by Guerrero Soni RN  Medication Review/Management: medications reviewed     Problem: Comorbidity Management  Goal: Blood Glucose Levels Within Targeted Range  Outcome: Progressing  Intervention: Monitor and Manage Glycemia  Recent Flowsheet Documentation  Taken 10/13/2023 0000 by Guerrero Soni, RN  Medication Review/Management: medications reviewed  Goal: Blood Pressure in Desired Range  Outcome: Progressing  Intervention: Maintain Blood Pressure Management  Recent Flowsheet Documentation  Taken 10/13/2023 0000 by Guerrero Soni RN  Medication Review/Management: medications reviewed   Goal Outcome Evaluation:    A/Ox4.    VSS. On 2L O2.    Reported severe pain around groin wound. Gave PRN dilaudid x3 and PRN oxycodone x1 with relief.     Denied nausea.     Vargas patent with high amount of output. No BM overnight.    Slept between cares.           Guerrero Soni, YULISA

## 2023-10-13 NOTE — PHARMACY-VANCOMYCIN DOSING SERVICE
Pharmacy Vancomycin Note  Date of Service 2023  Patient's  1970   52 year old, female    Indication: Abscess  Day of Therapy: 4  Current vancomycin regimen: 1500 mg IV q12h  Current vancomycin monitoring method: AUC  Current vancomycin therapeutic monitoring goal: 400-600 mg*h/L    InsightRX Prediction of Current Vancomycin Regimen  Loading dose: N/A  Regimen: 1500 mg IV every 12 hours.  Start time: 09:56 on 10/13/2023  Exposure target: AUC24 (range)400-600 mg/L.hr   AUC24,ss: 396 mg/L.hr  Probability of AUC24 > 400: 47 %  Ctrough,ss: 8.4 mg/L  Probability of Ctrough,ss > 20: 0 %  Probability of nephrotoxicity (Lodise ORIANA ): 5 %    Current estimated CrCl = Estimated Creatinine Clearance: 156.1 mL/min (A) (based on SCr of 0.41 mg/dL (L)).    Creatinine for last 3 days  10/10/2023:  4:06 AM Creatinine 0.56 mg/dL;  9:42 AM Creatinine 0.53 mg/dL;  9:42 AM Creatinine 0.53 mg/dL  10/11/2023:  3:43 AM Creatinine 0.50 mg/dL  10/12/2023:  5:46 AM Creatinine 0.41 mg/dL    Recent Vancomycin Levels (past 3 days)  10/10/2023:  8:43 PM Vancomycin 5.2 ug/mL  10/12/2023:  9:04 PM Vancomycin 8.3 ug/mL    Vancomycin IV Administrations (past 72 hours)                     vancomycin (VANCOCIN) 1,500 mg in sodium chloride 0.9 % 250 mL intermittent infusion (mg) 1,500 mg New Bag 10/12/23 2156     1,500 mg New Bag  1040     1,500 mg New Bag 10/11/23 2108    vancomycin (VANCOCIN) 1,500 mg in sodium chloride 0.9 % 250 mL intermittent infusion (mg) 1,500 mg New Bag 10/11/23 1015    vancomycin (VANCOCIN) 750 mg in sodium chloride 0.9 % 250 mL intermittent infusion (mg) 750 mg New Bag 10/10/23 2359    vancomycin (VANCOCIN) 750 mg in sodium chloride 0.9 % 250 mL intermittent infusion (mg) 750 mg New Bag 10/10/23 2135     750 mg New Bag  1003                    Nephrotoxins and other renal medications (From now, onward)      Start     Dose/Rate Route Frequency Ordered Stop    10/13/23 0800  vancomycin (VANCOCIN) 1,750 mg  in sodium chloride 0.9 % 500 mL intermittent infusion         1,750 mg  over 2 Hours Intravenous EVERY 12 HOURS 10/12/23 2254      10/09/23 2200  [Held by provider]  lisinopril (ZESTRIL) tablet 40 mg        (On hold since Mon 10/9/2023 at 1717 until manually unheld; held by Michelle Cunningham RNHold Reason: NPO)   Note to Pharmacy: PTA Sig:Take 40 mg by mouth at bedtime      40 mg Oral AT BEDTIME 10/09/23 1717                 Contrast Orders - past 72 hours (72h ago, onward)      None            Interpretation of levels and current regimen:  Vancomycin level is reflective of AUC less than 400    Has serum creatinine changed greater than 50% in last 72 hours: No    Urine output: good urine output    Renal Function: Stable    InsightRX Prediction of Planned New Vancomycin Regimen  Loading dose: N/A  Regimen: 1750 mg IV every 12 hours.  Start time: 09:56 on 10/13/2023  Exposure target: AUC24 (range)400-600 mg/L.hr   AUC24,ss: 461 mg/L.hr  Probability of AUC24 > 400: 80 %  Ctrough,ss: 9.8 mg/L  Probability of Ctrough,ss > 20: 0 %  Probability of nephrotoxicity (Lodise ORIANA 2009): 6 %    Plan:  Increase Dose to 1750 mg IV every 12 hours.   Vancomycin monitoring method: AUC  Vancomycin therapeutic monitoring goal: 400-600 mg*h/L  Pharmacy will check vancomycin levels as appropriate in 1-3 Days.  Serum creatinine levels will be ordered daily for the first week of therapy and at least twice weekly for subsequent weeks.    SAMRA RICHARDS [facility]

## 2023-10-13 NOTE — PROGRESS NOTES
"CLINICAL NUTRITION SERVICES - REASSESSMENT NOTE     Nutrition Prescription    RECOMMENDATIONS FOR MDs/PROVIDERS TO ORDER:    Malnutrition Status:    Not noted    Recommendations already ordered by Registered Dietitian (RD):  Continue mindy twice per day and glucerna twice per day    Future/Additional Recommendations:  Monitor po, supplement tolerance, weight, wound healing     EVALUATION OF THE PROGRESS TOWARD GOALS   Diet: Moderate Consistent Carbohydrate  Nutrition Supplement: mindy bid and glucerna bid  Intake: pt states she's eating well and taking her supplements: Ate 100% supper 10/12,  50% breakfast 10/12     NEW FINDINGS   Wound VAC in place    ANTHROPOMETRICS  Height: 152.4 cm (5' 0\")  Most Recent Weight: 84.1 kg (185 lb 6.4 oz)    Weight History:   Wt Readings from Last 10 Encounters:   10/11/23 84.1 kg (185 lb 6.4 oz)   06/21/23 88.5 kg (195 lb)   06/06/23 88.5 kg (195 lb)   03/29/23 94.3 kg (208 lb)   10/11/22 94.5 kg (208 lb 6.4 oz)   09/16/22 96.2 kg (212 lb)   07/23/22 95.7 kg (211 lb)   07/05/22 103.2 kg (227 lb 9.6 oz)   06/29/22 103.9 kg (229 lb)   01/26/22 108.6 kg (239 lb 6.4 oz)     GI CONCERNS  Audible normoactive BS  + diarrhea  Last BM 10/13    LABS  Reviewed: Na 136, K 3.5, Cr 0.33 (L), Alb 3.0 (L), Glu 152, 163 (H)    MEDICATIONS  Reviewed: novolog, lantus pen, mindy bid, merrem, miralax, senna-docusate, vancocin    CURRENT NUTRITION DIAGNOSIS  Increased nutrient needs  related to wound healing as evidenced by wounds      INTERVENTIONS  Implementation  Continue glucerna and mindy bid  Encouraged po/protein intake    Goals  Pt to meet estimated needs-progressing ( have limited data)  BG < 180-met  Wound healing-progressing    Monitoring/Evaluation  Progress toward goals will be monitored and evaluated per protocol.   "

## 2023-10-13 NOTE — DISCHARGE INSTRUCTIONS
"[facility] DISCHARGE INSTRUCTIONS:  Negative pressure wound therapy plan:  Wound location: L groin   Change Days: Mon/Wed/Fri by [facility] RN    Supplies (including all accessories) used: medium Veraflo black foam  Cleanse with Vashe prior to replacing VAC    Suction setting: -125 mmHg  Methods used: Window paned all periwound skin with vac drape prior to applying sponge and Placed barrier ring into periwound creases to improve seal    Staff RN to assess integrity of dressing and ensure suction is set at appropriate level every shift.   Date canister. Chart canister output every shift. Change cannister weekly and PRN if full/occluded     Remove foam dressing and replace with BID normal saline moist gauze dressing if:   -a dressing failure which cannot be repaired within 2 hours   -patient is discharging to home without a home pump   -patient is discharging to a facility outside the local area   -if a dressing is a \"Silver Foam\", remove before Radiation Therapy or MRI     The hospital VAC pump is not to be discharged with the patient.?Ensure to disconnect patient from machine prior to discharge. Then,    - If a home [employer] VAC pump has been delivered, connect home cannister to dressing tubing then connect cannister to home pump and turn on machine    - If transferring to a nearby facility with a [employer] vac, can disconnect and clamp tubing then cover end with glove so can be reconnected within 2 hours      R buttock & abdomen wounds  1. Cleanse wounds with vashe moistened gauze. Soak for 2 minutes.  2. Apply nickel thick layer of medihoney gel to wounds, cover with mepilex dressings to fit  3. Change dressings MWF + PRN if soiled, saturated, falls off    "

## 2023-10-14 LAB
ALBUMIN SERPL BCG-MCNC: 3.2 G/DL (ref 3.5–5.2)
ALP SERPL-CCNC: 141 U/L (ref 35–104)
ALT SERPL W P-5'-P-CCNC: 18 U/L (ref 0–50)
ANION GAP SERPL CALCULATED.3IONS-SCNC: 9 MMOL/L (ref 7–15)
AST SERPL W P-5'-P-CCNC: 16 U/L (ref 0–45)
BACTERIA BLD CULT: NO GROWTH
BACTERIA BLD CULT: NO GROWTH
BILIRUB SERPL-MCNC: 0.2 MG/DL
BUN SERPL-MCNC: 11.1 MG/DL (ref 6–20)
CALCIUM SERPL-MCNC: 9.1 MG/DL (ref 8.6–10)
CHLORIDE SERPL-SCNC: 99 MMOL/L (ref 98–107)
CREAT SERPL-MCNC: 0.38 MG/DL (ref 0.51–0.95)
DEPRECATED HCO3 PLAS-SCNC: 29 MMOL/L (ref 22–29)
EGFRCR SERPLBLD CKD-EPI 2021: >90 ML/MIN/1.73M2
ERYTHROCYTE [DISTWIDTH] IN BLOOD BY AUTOMATED COUNT: 14.3 % (ref 10–15)
GLUCOSE BLDC GLUCOMTR-MCNC: 175 MG/DL (ref 70–99)
GLUCOSE BLDC GLUCOMTR-MCNC: 176 MG/DL (ref 70–99)
GLUCOSE BLDC GLUCOMTR-MCNC: 177 MG/DL (ref 70–99)
GLUCOSE BLDC GLUCOMTR-MCNC: 194 MG/DL (ref 70–99)
GLUCOSE BLDC GLUCOMTR-MCNC: 216 MG/DL (ref 70–99)
GLUCOSE BLDC GLUCOMTR-MCNC: 222 MG/DL (ref 70–99)
GLUCOSE SERPL-MCNC: 153 MG/DL (ref 70–99)
HCT VFR BLD AUTO: 35.7 % (ref 35–47)
HGB BLD-MCNC: 11.5 G/DL (ref 11.7–15.7)
MCH RBC QN AUTO: 28.4 PG (ref 26.5–33)
MCHC RBC AUTO-ENTMCNC: 32.2 G/DL (ref 31.5–36.5)
MCV RBC AUTO: 88 FL (ref 78–100)
PLATELET # BLD AUTO: 294 10E3/UL (ref 150–450)
POTASSIUM SERPL-SCNC: 3.8 MMOL/L (ref 3.4–5.3)
PROT SERPL-MCNC: 6.6 G/DL (ref 6.4–8.3)
RBC # BLD AUTO: 4.05 10E6/UL (ref 3.8–5.2)
SODIUM SERPL-SCNC: 137 MMOL/L (ref 135–145)
WBC # BLD AUTO: 11 10E3/UL (ref 4–11)

## 2023-10-14 PROCEDURE — 250N000013 HC RX MED GY IP 250 OP 250 PS 637

## 2023-10-14 PROCEDURE — 80053 COMPREHEN METABOLIC PANEL: CPT | Performed by: STUDENT IN AN ORGANIZED HEALTH CARE EDUCATION/TRAINING PROGRAM

## 2023-10-14 PROCEDURE — 250N000011 HC RX IP 250 OP 636: Performed by: SPECIALIST

## 2023-10-14 PROCEDURE — 250N000011 HC RX IP 250 OP 636: Performed by: FAMILY MEDICINE

## 2023-10-14 PROCEDURE — 85027 COMPLETE CBC AUTOMATED: CPT | Performed by: SPECIALIST

## 2023-10-14 PROCEDURE — 250N000013 HC RX MED GY IP 250 OP 250 PS 637: Performed by: SURGERY

## 2023-10-14 PROCEDURE — 250N000011 HC RX IP 250 OP 636: Mod: JZ | Performed by: STUDENT IN AN ORGANIZED HEALTH CARE EDUCATION/TRAINING PROGRAM

## 2023-10-14 PROCEDURE — 250N000013 HC RX MED GY IP 250 OP 250 PS 637: Performed by: SPECIALIST

## 2023-10-14 PROCEDURE — 258N000003 HC RX IP 258 OP 636: Performed by: FAMILY MEDICINE

## 2023-10-14 PROCEDURE — 99231 SBSQ HOSP IP/OBS SF/LOW 25: CPT | Mod: GC

## 2023-10-14 PROCEDURE — 120N000001 HC R&B MED SURG/OB

## 2023-10-14 RX ORDER — LEVOFLOXACIN 500 MG/1
500 TABLET, FILM COATED ORAL DAILY
Status: DISCONTINUED | OUTPATIENT
Start: 2023-10-14 | End: 2023-10-16 | Stop reason: HOSPADM

## 2023-10-14 RX ORDER — LOPERAMIDE HCL 2 MG
2 CAPSULE ORAL 4 TIMES DAILY PRN
Status: DISCONTINUED | OUTPATIENT
Start: 2023-10-14 | End: 2023-10-16 | Stop reason: HOSPADM

## 2023-10-14 RX ADMIN — VENLAFAXINE HYDROCHLORIDE 150 MG: 75 CAPSULE, EXTENDED RELEASE ORAL at 09:54

## 2023-10-14 RX ADMIN — HYDROMORPHONE HYDROCHLORIDE 0.5 MG: 1 INJECTION, SOLUTION INTRAMUSCULAR; INTRAVENOUS; SUBCUTANEOUS at 13:02

## 2023-10-14 RX ADMIN — CETIRIZINE HYDROCHLORIDE 10 MG: 10 TABLET, FILM COATED ORAL at 09:54

## 2023-10-14 RX ADMIN — MEROPENEM 1 G: 1 INJECTION, POWDER, FOR SOLUTION INTRAVENOUS at 09:57

## 2023-10-14 RX ADMIN — HYDROMORPHONE HYDROCHLORIDE 0.5 MG: 1 INJECTION, SOLUTION INTRAMUSCULAR; INTRAVENOUS; SUBCUTANEOUS at 15:20

## 2023-10-14 RX ADMIN — OXYCODONE HYDROCHLORIDE 5 MG: 5 TABLET ORAL at 11:19

## 2023-10-14 RX ADMIN — CLOPIDOGREL BISULFATE 75 MG: 75 TABLET ORAL at 09:55

## 2023-10-14 RX ADMIN — OXYCODONE HYDROCHLORIDE 5 MG: 5 TABLET ORAL at 00:10

## 2023-10-14 RX ADMIN — Medication 1 PACKET: at 17:31

## 2023-10-14 RX ADMIN — GABAPENTIN 600 MG: 300 CAPSULE ORAL at 09:55

## 2023-10-14 RX ADMIN — ENOXAPARIN SODIUM 40 MG: 40 INJECTION SUBCUTANEOUS at 11:19

## 2023-10-14 RX ADMIN — INSULIN ASPART 1 UNITS: 100 INJECTION, SOLUTION INTRAVENOUS; SUBCUTANEOUS at 09:50

## 2023-10-14 RX ADMIN — MEROPENEM 1 G: 1 INJECTION, POWDER, FOR SOLUTION INTRAVENOUS at 02:03

## 2023-10-14 RX ADMIN — HYDROMORPHONE HYDROCHLORIDE 0.5 MG: 1 INJECTION, SOLUTION INTRAMUSCULAR; INTRAVENOUS; SUBCUTANEOUS at 17:32

## 2023-10-14 RX ADMIN — LOPERAMIDE HYDROCHLORIDE 2 MG: 2 CAPSULE ORAL at 09:52

## 2023-10-14 RX ADMIN — PRAMIPEXOLE DIHYDROCHLORIDE 0.75 MG: 0.5 TABLET ORAL at 21:24

## 2023-10-14 RX ADMIN — LEVOFLOXACIN 500 MG: 500 TABLET, FILM COATED ORAL at 17:27

## 2023-10-14 RX ADMIN — PANTOPRAZOLE SODIUM 40 MG: 40 TABLET, DELAYED RELEASE ORAL at 09:54

## 2023-10-14 RX ADMIN — Medication 1 PACKET: at 11:10

## 2023-10-14 RX ADMIN — QUETIAPINE FUMARATE 400 MG: 300 TABLET ORAL at 21:10

## 2023-10-14 RX ADMIN — FLUTICASONE FUROATE AND VILANTEROL TRIFENATATE 1 PUFF: 100; 25 POWDER RESPIRATORY (INHALATION) at 10:00

## 2023-10-14 RX ADMIN — HYDROXYZINE HYDROCHLORIDE 50 MG: 25 TABLET, FILM COATED ORAL at 09:55

## 2023-10-14 RX ADMIN — HYDROMORPHONE HYDROCHLORIDE 0.5 MG: 1 INJECTION, SOLUTION INTRAMUSCULAR; INTRAVENOUS; SUBCUTANEOUS at 09:40

## 2023-10-14 RX ADMIN — PRAVASTATIN SODIUM 80 MG: 20 TABLET ORAL at 21:08

## 2023-10-14 RX ADMIN — OXYCODONE HYDROCHLORIDE 5 MG: 5 TABLET ORAL at 18:37

## 2023-10-14 RX ADMIN — INSULIN ASPART 1 UNITS: 100 INJECTION, SOLUTION INTRAVENOUS; SUBCUTANEOUS at 17:27

## 2023-10-14 RX ADMIN — UMECLIDINIUM 1 PUFF: 62.5 AEROSOL, POWDER ORAL at 10:00

## 2023-10-14 RX ADMIN — GABAPENTIN 600 MG: 300 CAPSULE ORAL at 21:06

## 2023-10-14 RX ADMIN — OXYCODONE HYDROCHLORIDE 5 MG: 5 TABLET ORAL at 23:02

## 2023-10-14 RX ADMIN — VANCOMYCIN HYDROCHLORIDE 1750 MG: 5 INJECTION, POWDER, LYOPHILIZED, FOR SOLUTION INTRAVENOUS at 09:52

## 2023-10-14 RX ADMIN — HYDROMORPHONE HYDROCHLORIDE 0.5 MG: 1 INJECTION, SOLUTION INTRAMUSCULAR; INTRAVENOUS; SUBCUTANEOUS at 03:45

## 2023-10-14 RX ADMIN — GABAPENTIN 600 MG: 300 CAPSULE ORAL at 14:37

## 2023-10-14 RX ADMIN — HYDROXYZINE HYDROCHLORIDE 50 MG: 25 TABLET, FILM COATED ORAL at 14:34

## 2023-10-14 ASSESSMENT — ACTIVITIES OF DAILY LIVING (ADL)
ADLS_ACUITY_SCORE: 47
ADLS_ACUITY_SCORE: 49
ADLS_ACUITY_SCORE: 47
ADLS_ACUITY_SCORE: 49
ADLS_ACUITY_SCORE: 47
ADLS_ACUITY_SCORE: 47

## 2023-10-14 NOTE — PLAN OF CARE
Problem: Plan of Care - These are the overarching goals to be used throughout the patient stay.    Goal: Absence of Hospital-Acquired Illness or Injury  Intervention: Prevent Skin Injury  Recent Flowsheet Documentation  Taken 10/13/2023 1625 by Shreyas Reyes, RN  Body Position: position changed independently     Problem: Plan of Care - These are the overarching goals to be used throughout the patient stay.    Goal: Optimal Comfort and Wellbeing  Intervention: Monitor Pain and Promote Comfort  Recent Flowsheet Documentation  Taken 10/13/2023 1614 by Shreyas Reyes, RN  Pain Management Interventions: medication (see MAR)     Problem: Skin or Soft Tissue Infection  Goal: Absence of Infection Signs and Symptoms  Outcome: Progressing   Goal Outcome Evaluation:         A & O x 4. VSS except hypertensive. Pain addressed with PRN medication. New wound care orders carried out, vashe/Medihoney/Mepilex dressings applied. PICC dressing reinforced. Cont. NS infusing @ 100 mL/hr. PRN loperamide given for loose stools.

## 2023-10-14 NOTE — PLAN OF CARE
Problem: Plan of Care - These are the overarching goals to be used throughout the patient stay.    Goal: Plan of Care Review  Description: The Plan of Care Review/Shift note should be completed every shift.  The Outcome Evaluation is a brief statement about your assessment that the patient is improving, declining, or no change.  This information will be displayed automatically on your shift note.  Outcome: Progressing   Goal Outcome Evaluation:    Patient is alert and oriented. Continues to complain of pain 8/10, requests IV dilaudid. Left PICC infusing NS 100mL/hr. Vital signs stable. 60G carb diet. Assist of 1/pivot to commode. 2 liters 02 per NC while sleeping.   Vale Alvarado RN

## 2023-10-14 NOTE — PLAN OF CARE
Problem: Plan of Care - These are the overarching goals to be used throughout the patient stay.    Goal: Plan of Care Review  Description: The Plan of Care Review/Shift note should be completed every shift.  The Outcome Evaluation is a brief statement about your assessment that the patient is improving, declining, or no change.  This information will be displayed automatically on your shift note.  Outcome: Progressing   Goal Outcome Evaluation:  Continues to ask for pain medication alternating between oral and IV for wound pain related to groin wound.  Pain improved with this.    Appetite good.  Up to BSC with SBA.  Vargas remains out with good urine output.  Will continue to monitor.

## 2023-10-14 NOTE — PROGRESS NOTES
Wound vac still not approved in [facility] portal. Called Farhana [phone], says they are still working on it this morning and she will keep me updated     9:36 AM  Vac is approved . Notified resident     Per resident, patient not medically ready for discharge today

## 2023-10-14 NOTE — PROGRESS NOTES
[facility]    Progress Note - Hospitalist Service       Date of Admission:  10/9/2023    Assessment & Plan   Teresa Perez is a 52 year old female admitted on 10/9/2023. She has a history of T2DM, CKD, COPD,CAD, L AKA, follows wound care for multiple wounds on abdomen and buttock, and is admitted for new abscess worsening over the past week of L inguinal/lower abdominal region, sepsis and subsequently found to have necrotizing soft tissue infection, taken to OR 10/9/23 for I&D. Initially hypotensive on admission and received fluid boluses, levophed which was discontinued 10/10. Improved on IV antibiotics, transitioned to orals 10/14/23. Remains hospitalized to ensure tolerating and continues to clinically improve on oral antibiotics.      #Necrotizing Fasciitis L groin s/p I&D 10/9/23  #Sepsis  #Hypotension  53 y/o F with hx of CVA (on plavix), CAD, COPD, epilepsy, T2DM, L AKA admitted with fatigue, severe pain after being seen at vascular clinic for wound care follow up. Patient was hypotensive 70's-80's systolic/40's and found to have WBC 23.1, Lactate 1.5. CT AP 10/9/23 revealed collection of subcutaneous gas in the L lower abdomen and groin concerning for NSTI. Patient received Vancomycin in the ED, 2.5 L of NS boluses. Patient also started on levophed due to low BP's after fluid resuscitation. CT 10/9 demonstrating collection of subcutaneous gas with surrounding edema consistent with gas forming infection. Patient seen by surgery, taken to OR for I&D 10/9. Levophed stopped 10/10, arterial line removed. ICU signing off 10/10. Wound noted to have surrounding necrotic adipose tissue on exam 10/10, per surgery may require repeat debridement with wound vac placement in 2-3 days.  Aerobic bacterial culture - positive for 3+ enterococcus faecalis 10/11, Blood culture negative. Respiratory aerobic culture negative. C diff negative.   - Discontinue IV meropenem and vancomycin today   -  Start oral Levaquin 500 mg daily (discussed selection with PharmD given amoxicillin allergy and culture susceptibilities)   -Continue fluids @ 100 mL/hr NS  -wound care protocol per RN   -Wound care RN notified to address prior to admission abdominal and buttock wounds 10/13  -wound vac placement 10/11 per surgery  -No further plan for OR debridement at this time  -henriquez catheter discontinued 10/13  -Daily CBC     #T2DM  Patient notes blood sugars of 400's at home this past weekend, last A1C 06/2023 8.6. Current home dose includes Toujeo 65 units BID, novolog 28 units w/meals, weekly bydureon (exenatide) injections. Glucose 207 on admission. Patient endorses medication adherence. A1C 7.4 10/9, Glucose 152 10/13  -Titrate lantus up to 30 units AM 10/13, sliding scale insulin.  -Novolog TID w/meals, carb count 1 unit/10 g carbs  -60 g carbohydrate meal  -Blood glucose monitoring     #Loose Stools  -Restarted Loperamide PTA     #Anemia, unspecified  HgB 10.9, changed from baseline. No source of bleeding identified currently, likely hemodilutional. Continue to monitor  -Daily CBC     #Hyperkalemia, resolved  K 3.9 10/12, 3.5 10/13  -No intervention at this time, will continue K+ checks and daily BMP     Chronic Conditions:  #HTN  #CAD  -Hold PTA amlodipine, metoprolol, due to soft pressures, off levophed drip 10/10  -Continue PTA plavix  -Continue PTA Pravastatin      #COPD  -Continue PTA albuterol, symbicort  -Resume PTA singulair     #Hx of Psychiatric conditions: MDD, Bipolar, BPD  -Continue PTA Effexor   -Continue PTA Trazodone  -Continue Zyprexa PTA     #Hx of chronic pain  -Hold PTA maloxicam  -IV dilaudid 0.5 mg q2h PRN for pain control        Diet: Consistent Carbohydrate Diet Moderate Consistent Carb (60 g CHO per Meal) Diet  Snacks/Supplements Adult: Kevon; With Meals  Snacks/Supplements Adult: Glucerna; With Meals    DVT Prophylaxis: Pneumatic Compression Devices  Henriquez Catheter: Not present  Fluids:   mL/hr  Lines: PRESENT      PICC 10/09/23 Triple Lumen Left Brachial vein lateral-Site Assessment: WDL      Cardiac Monitoring: None  Code Status: Full Code      Clinically Significant Risk Factors              # Hypoalbuminemia: Lowest albumin = 2.9 g/dL at 10/11/2023  3:43 AM, will monitor as appropriate       # Hypertension: Noted on problem list       # DMII: A1C = 7.4 % (Ref range: <5.7 %) within past 6 months     # Obesity: Estimated body mass index is 36.21 kg/m  as calculated from the following:    Height as of this encounter: 1.524 m (5').    Weight as of this encounter: 84.1 kg (185 lb 6.4 oz).      # COPD: noted on problem list        Disposition Plan      Expected Discharge Date: 10/15/2023      Destination: home with help/services          The patient's care was discussed with the Attending Physician, Dr. Rosenstein .    Tiffanie Singh MD  Hospitalist Service  [facility]  Securely message with [employer] (more info)  Text page via [employer] Paging/Directory   ______________________________________________________________________    Interval History   Patient doing well today. States she is having loose stools, requesting to discontinue bowel meds and add imodium. States she is agreeable to trial of oral antibiotics today. Would like to remain in the hospital at least one more day to ensure she tolerates this change.     Physical Exam   Vital Signs: Temp: 97.8  F (36.6  C) Temp src: Oral BP: 127/70 Pulse: 93   Resp: 18 SpO2: 95 % O2 Device: Nasal cannula Oxygen Delivery: 2 LPM  Weight: 185 lbs 6.4 oz    Physical Exam  HENT:      Mouth/Throat:      Mouth: Mucous membranes are moist.      Pharynx: Oropharynx is clear.   Cardiovascular:      Rate and Rhythm: Normal rate and regular rhythm.      Pulses: Normal pulses.      Heart sounds: Normal heart sounds.   Pulmonary:      Effort: Pulmonary effort is normal.      Breath sounds: Normal breath sounds.  Abdominal:      General: Bowel sounds are  normal.      Palpations: Abdomen is soft.      Comments: Surgical dressings in place over L lower abdomen   Skin:     General: Skin is warm.   Neurological:      General: No focal deficit present.      Mental Status: She is alert and oriented to person, place, and time.   Psychiatric:         Mood and Affect: Mood normal.         Behavior: Behavior normal.        Data     I have personally reviewed the following data over the past 24 hrs:    11.0  \   11.5 (L)   / 294     137 99 11.1 /  216 (H)   3.8 29 0.38 (L) \     ALT: 18 AST: 16 AP: 141 (H) TBILI: 0.2   ALB: 3.2 (L) TOT PROTEIN: 6.6 LIPASE: N/A       Imaging results reviewed over the past 24 hrs:   No results found for this or any previous visit (from the past 24 hour(s)).

## 2023-10-15 LAB
ALBUMIN SERPL BCG-MCNC: 3.4 G/DL (ref 3.5–5.2)
ALP SERPL-CCNC: 140 U/L (ref 35–104)
ALT SERPL W P-5'-P-CCNC: 18 U/L (ref 0–50)
ANION GAP SERPL CALCULATED.3IONS-SCNC: 10 MMOL/L (ref 7–15)
AST SERPL W P-5'-P-CCNC: 17 U/L (ref 0–45)
BACTERIA BLD CULT: NO GROWTH
BACTERIA BLD CULT: NO GROWTH
BILIRUB SERPL-MCNC: 0.2 MG/DL
BUN SERPL-MCNC: 15.7 MG/DL (ref 6–20)
CALCIUM SERPL-MCNC: 9.4 MG/DL (ref 8.6–10)
CHLORIDE SERPL-SCNC: 97 MMOL/L (ref 98–107)
CREAT SERPL-MCNC: 0.4 MG/DL (ref 0.51–0.95)
DEPRECATED HCO3 PLAS-SCNC: 30 MMOL/L (ref 22–29)
EGFRCR SERPLBLD CKD-EPI 2021: >90 ML/MIN/1.73M2
ERYTHROCYTE [DISTWIDTH] IN BLOOD BY AUTOMATED COUNT: 14.3 % (ref 10–15)
GLUCOSE BLDC GLUCOMTR-MCNC: 166 MG/DL (ref 70–99)
GLUCOSE BLDC GLUCOMTR-MCNC: 168 MG/DL (ref 70–99)
GLUCOSE BLDC GLUCOMTR-MCNC: 185 MG/DL (ref 70–99)
GLUCOSE BLDC GLUCOMTR-MCNC: 186 MG/DL (ref 70–99)
GLUCOSE BLDC GLUCOMTR-MCNC: 198 MG/DL (ref 70–99)
GLUCOSE SERPL-MCNC: 172 MG/DL (ref 70–99)
HCT VFR BLD AUTO: 37.8 % (ref 35–47)
HGB BLD-MCNC: 11.9 G/DL (ref 11.7–15.7)
MCH RBC QN AUTO: 27.8 PG (ref 26.5–33)
MCHC RBC AUTO-ENTMCNC: 31.5 G/DL (ref 31.5–36.5)
MCV RBC AUTO: 88 FL (ref 78–100)
PATH REPORT.COMMENTS IMP SPEC: NORMAL
PATH REPORT.FINAL DX SPEC: NORMAL
PATH REPORT.GROSS SPEC: NORMAL
PATH REPORT.MICROSCOPIC SPEC OTHER STN: NORMAL
PATH REPORT.RELEVANT HX SPEC: NORMAL
PHOTO IMAGE: NORMAL
PLATELET # BLD AUTO: 338 10E3/UL (ref 150–450)
POTASSIUM SERPL-SCNC: 4.5 MMOL/L (ref 3.4–5.3)
PROT SERPL-MCNC: 7.2 G/DL (ref 6.4–8.3)
RBC # BLD AUTO: 4.28 10E6/UL (ref 3.8–5.2)
SODIUM SERPL-SCNC: 137 MMOL/L (ref 135–145)
WBC # BLD AUTO: 11.8 10E3/UL (ref 4–11)

## 2023-10-15 PROCEDURE — 120N000001 HC R&B MED SURG/OB

## 2023-10-15 PROCEDURE — 88304 TISSUE EXAM BY PATHOLOGIST: CPT | Mod: 26 | Performed by: PATHOLOGY

## 2023-10-15 PROCEDURE — 99232 SBSQ HOSP IP/OBS MODERATE 35: CPT | Mod: GC

## 2023-10-15 PROCEDURE — 250N000013 HC RX MED GY IP 250 OP 250 PS 637: Performed by: SPECIALIST

## 2023-10-15 PROCEDURE — 250N000011 HC RX IP 250 OP 636: Performed by: FAMILY MEDICINE

## 2023-10-15 PROCEDURE — 250N000013 HC RX MED GY IP 250 OP 250 PS 637

## 2023-10-15 PROCEDURE — 85027 COMPLETE CBC AUTOMATED: CPT | Performed by: SPECIALIST

## 2023-10-15 PROCEDURE — 250N000011 HC RX IP 250 OP 636: Performed by: SPECIALIST

## 2023-10-15 PROCEDURE — 258N000003 HC RX IP 258 OP 636

## 2023-10-15 PROCEDURE — 80053 COMPREHEN METABOLIC PANEL: CPT | Performed by: FAMILY MEDICINE

## 2023-10-15 PROCEDURE — 250N000011 HC RX IP 250 OP 636: Mod: JZ | Performed by: STUDENT IN AN ORGANIZED HEALTH CARE EDUCATION/TRAINING PROGRAM

## 2023-10-15 PROCEDURE — 250N000013 HC RX MED GY IP 250 OP 250 PS 637: Performed by: FAMILY MEDICINE

## 2023-10-15 RX ORDER — ACETAMINOPHEN 325 MG/1
975 TABLET ORAL 3 TIMES DAILY
Status: DISCONTINUED | OUTPATIENT
Start: 2023-10-15 | End: 2023-10-16 | Stop reason: HOSPADM

## 2023-10-15 RX ORDER — ACETAMINOPHEN 650 MG/1
650 SUPPOSITORY RECTAL 3 TIMES DAILY
Status: DISCONTINUED | OUTPATIENT
Start: 2023-10-15 | End: 2023-10-15

## 2023-10-15 RX ORDER — OXYCODONE HYDROCHLORIDE 5 MG/1
5-10 TABLET ORAL EVERY 4 HOURS PRN
Status: DISCONTINUED | OUTPATIENT
Start: 2023-10-15 | End: 2023-10-16

## 2023-10-15 RX ORDER — HYDROMORPHONE HYDROCHLORIDE 1 MG/ML
0.5 INJECTION, SOLUTION INTRAMUSCULAR; INTRAVENOUS; SUBCUTANEOUS EVERY 4 HOURS PRN
Status: DISCONTINUED | OUTPATIENT
Start: 2023-10-15 | End: 2023-10-16

## 2023-10-15 RX ORDER — MELOXICAM 7.5 MG/1
15 TABLET ORAL DAILY
Status: DISCONTINUED | OUTPATIENT
Start: 2023-10-15 | End: 2023-10-16 | Stop reason: HOSPADM

## 2023-10-15 RX ADMIN — SODIUM CHLORIDE: 9 INJECTION, SOLUTION INTRAVENOUS at 04:23

## 2023-10-15 RX ADMIN — HYDROMORPHONE HYDROCHLORIDE 0.5 MG: 1 INJECTION, SOLUTION INTRAMUSCULAR; INTRAVENOUS; SUBCUTANEOUS at 01:03

## 2023-10-15 RX ADMIN — OXYCODONE HYDROCHLORIDE 5 MG: 5 TABLET ORAL at 11:50

## 2023-10-15 RX ADMIN — Medication 1 PACKET: at 08:10

## 2023-10-15 RX ADMIN — BACLOFEN 20 MG: 10 TABLET ORAL at 11:50

## 2023-10-15 RX ADMIN — MELOXICAM 15 MG: 7.5 TABLET ORAL at 10:20

## 2023-10-15 RX ADMIN — OXYCODONE HYDROCHLORIDE 10 MG: 5 TABLET ORAL at 22:06

## 2023-10-15 RX ADMIN — HYDROMORPHONE HYDROCHLORIDE 0.5 MG: 1 INJECTION, SOLUTION INTRAMUSCULAR; INTRAVENOUS; SUBCUTANEOUS at 13:25

## 2023-10-15 RX ADMIN — HYDROMORPHONE HYDROCHLORIDE 0.5 MG: 1 INJECTION, SOLUTION INTRAMUSCULAR; INTRAVENOUS; SUBCUTANEOUS at 20:02

## 2023-10-15 RX ADMIN — LEVOFLOXACIN 500 MG: 500 TABLET, FILM COATED ORAL at 08:10

## 2023-10-15 RX ADMIN — OXYCODONE HYDROCHLORIDE 5 MG: 5 TABLET ORAL at 11:59

## 2023-10-15 RX ADMIN — QUETIAPINE FUMARATE 400 MG: 300 TABLET ORAL at 21:55

## 2023-10-15 RX ADMIN — INSULIN ASPART 1 UNITS: 100 INJECTION, SOLUTION INTRAVENOUS; SUBCUTANEOUS at 13:15

## 2023-10-15 RX ADMIN — INSULIN ASPART 1 UNITS: 100 INJECTION, SOLUTION INTRAVENOUS; SUBCUTANEOUS at 08:08

## 2023-10-15 RX ADMIN — PRAVASTATIN SODIUM 80 MG: 20 TABLET ORAL at 21:54

## 2023-10-15 RX ADMIN — OXYCODONE HYDROCHLORIDE 10 MG: 5 TABLET ORAL at 17:13

## 2023-10-15 RX ADMIN — HYDROMORPHONE HYDROCHLORIDE 0.5 MG: 1 INJECTION, SOLUTION INTRAMUSCULAR; INTRAVENOUS; SUBCUTANEOUS at 04:23

## 2023-10-15 RX ADMIN — VENLAFAXINE HYDROCHLORIDE 150 MG: 75 CAPSULE, EXTENDED RELEASE ORAL at 08:10

## 2023-10-15 RX ADMIN — ACETAMINOPHEN 975 MG: 325 TABLET ORAL at 13:24

## 2023-10-15 RX ADMIN — ACETAMINOPHEN 975 MG: 325 TABLET ORAL at 20:02

## 2023-10-15 RX ADMIN — PANTOPRAZOLE SODIUM 40 MG: 40 TABLET, DELAYED RELEASE ORAL at 08:11

## 2023-10-15 RX ADMIN — MONTELUKAST 10 MG: 10 TABLET, FILM COATED ORAL at 21:54

## 2023-10-15 RX ADMIN — GABAPENTIN 600 MG: 300 CAPSULE ORAL at 08:07

## 2023-10-15 RX ADMIN — HYDROMORPHONE HYDROCHLORIDE 0.5 MG: 1 INJECTION, SOLUTION INTRAMUSCULAR; INTRAVENOUS; SUBCUTANEOUS at 09:26

## 2023-10-15 RX ADMIN — GABAPENTIN 600 MG: 300 CAPSULE ORAL at 19:42

## 2023-10-15 RX ADMIN — PRAMIPEXOLE DIHYDROCHLORIDE 0.75 MG: 0.5 TABLET ORAL at 22:04

## 2023-10-15 RX ADMIN — GABAPENTIN 600 MG: 300 CAPSULE ORAL at 13:24

## 2023-10-15 RX ADMIN — OXYCODONE HYDROCHLORIDE 5 MG: 5 TABLET ORAL at 06:39

## 2023-10-15 RX ADMIN — ENOXAPARIN SODIUM 40 MG: 40 INJECTION SUBCUTANEOUS at 11:50

## 2023-10-15 RX ADMIN — CETIRIZINE HYDROCHLORIDE 10 MG: 10 TABLET, FILM COATED ORAL at 08:06

## 2023-10-15 RX ADMIN — INSULIN ASPART 2 UNITS: 100 INJECTION, SOLUTION INTRAVENOUS; SUBCUTANEOUS at 17:14

## 2023-10-15 RX ADMIN — CLOPIDOGREL BISULFATE 75 MG: 75 TABLET ORAL at 08:07

## 2023-10-15 RX ADMIN — Medication 1 PACKET: at 18:27

## 2023-10-15 ASSESSMENT — ACTIVITIES OF DAILY LIVING (ADL)
ADLS_ACUITY_SCORE: 49
ADLS_ACUITY_SCORE: 50
ADLS_ACUITY_SCORE: 50
ADLS_ACUITY_SCORE: 49
ADLS_ACUITY_SCORE: 49
ADLS_ACUITY_SCORE: 50

## 2023-10-15 NOTE — PROGRESS NOTES
Care Management Follow Up    Length of Stay (days): 6    Expected Discharge Date: 10/16/2023     Concerns to be Addressed:  IV pain meds     Patient plan of care discussed at interdisciplinary rounds: Yes    Anticipated Discharge Disposition: Home Care     Anticipated Discharge Services:    Anticipated Discharge DME:      Patient/family educated on Medicare website which has current facility and service quality ratings:    Education Provided on the Discharge Plan:    Patient/Family in Agreement with the Plan:      Referrals Placed by CM/SW:    Private pay costs discussed: Not applicable    Additional Information:  Social history per previous CM note:  Social Hx: Lives alone in apartment. Has PCA 5 days/week. Uses power wheelchair. Has [facility] for RN 2x/week. Transport TBD.       10/12 Pt got wound vac placed yesterday. Plan is for pt to go home at discharge. ALEC went in to speak to pt about how she plans to care for herself at home with her new wound and also make sure she got her power WC delivered.  Her power WC is in the room. She told CM that she has a PCA that comes in daily and [facility] and she thinks she can manage at home with the support of HC and PCA. I told her I would call and verify with [facility] that they can manage her new wound needs and if so I will start to order a home wound vac for discharge. CM then reached out to [facility] and they wanted to verify with the DON before saying they can manage her new wound needs and DON was out of office so I left a voice message to call me back when they get the message. CM will start the wound vac rental process now so this does not delay the discharge.   2:09 PM  CM requested home wound vac on the [employer] portal, will await for benefit check. [id number]     10/13  CM spoke to [employer] who is the agency that provides RN and PCA services for the pt and they told CM that they can manage her new wound care needs  and a wound vac. Did reach out to the team to see if they think this is appropriate. Home Wound Vac is complete on [employer] protal except we still need provider signature. CM reached out to Donell Ware and he was in the middle of a surgery but said that someone from his team will be in today and can sign the form. The form is placed on the pt chart at the nursing station on P[address].     [facility]  [phone], [phone] or [phone]   Fax: [phone]     10/14 wound vac has been approved.    10/15 per provider note pt remains in the hospital as she cont to require IV pain meds. Once pt is medically ready to go home CM will need to finish the home wound vac process. As of today the home wound vac has been approved but has not be distributed to the pt.    Elina Berry RN

## 2023-10-15 NOTE — PLAN OF CARE
Problem: Pain Acute  Goal: Optimal Pain Control and Function  Outcome: Progressing     Problem: Comorbidity Management  Goal: Blood Glucose Levels Within Targeted Range  Outcome: Progressing   Goal Outcome Evaluation:    Patient is A/O x4. VSS on RA.  C/o 6-8/10 pain, agrees to take PO pain meds rather than IV dilaudid; able to make needs known

## 2023-10-15 NOTE — PROGRESS NOTES
[facility]    Progress Note - Hospitalist Service       Date of Admission:  10/9/2023    Assessment & Plan   Teresa Perez is a 52 year old female admitted on 10/9/2023. She has a history of T2DM, CKD, COPD,CAD, L AKA, follows wound care for multiple wounds on abdomen and buttock, and is admitted for new abscess worsening over the past week of L inguinal/lower abdominal region, sepsis and subsequently found to have necrotizing soft tissue infection, taken to OR 10/9/23 for I&D. Initially hypotensive on admission and received fluid boluses, levophed which was discontinued 10/10. Improved on IV antibiotics, transitioned to orals 10/14/23. Remains hospitalized as she continues to require IV pain medications.      #Necrotizing Fasciitis L groin s/p I&D 10/9/23  #Sepsis  #Hypotension - resolved  51 y/o F with hx of CVA (on plavix), CAD, COPD, epilepsy, T2DM, L AKA admitted with fatigue, severe pain after being seen at vascular clinic for wound care follow up. Patient was hypotensive 70's-80's systolic/40's and found to have WBC 23.1, Lactate 1.5. CT AP 10/9/23 revealed collection of subcutaneous gas in the L lower abdomen and groin concerning for NSTI. Patient received Vancomycin in the ED, 2.5 L of NS boluses. Patient also started on levophed due to low BP's after fluid resuscitation. CT 10/9 demonstrating collection of subcutaneous gas with surrounding edema consistent with gas forming infection. Patient seen by surgery, taken to OR for I&D 10/9. Levophed stopped 10/10, arterial line removed. ICU signing off 10/10. Wound noted to have surrounding necrotic adipose tissue on exam 10/10, per surgery may require repeat debridement with wound vac placement in 2-3 days. Aerobic bacterial culture - positive for 3+ enterococcus faecalis 10/11, Blood culture negative. Respiratory aerobic culture negative. C diff negative.   Transitioned from IV meropenem/vancomycin to orals.   - continue  Levaquin 500 mg daily   -wound care protocol per RN  -wound vac placement 10/11 per surgery  -No further plan for OR debridement at this time  -Daily CBC  - pain regimen stepped up today 10/15: scheduled tylenol, PTA meloxicam, prn 0.5 mg IV dilaudid q4h, prn oxycodone 5-10 mg q4h     #T2DM  Patient notes blood sugars of 400's at home this past weekend, last A1C 06/2023 8.6. Current home dose includes Toujeo 65 units BID, novolog 28 units w/meals, weekly bydureon (exenatide) injections. Glucose 207 on admission. Patient endorses medication adherence. A1C 7.4 on 10/9. Fasting AM sugar 168 today 10/15.  -Titrate lantus 30->33U qHS  -Novolog TID w/meals, carb count 1 unit/10 g carbs, medium resistance sliding scale insulin      #Loose Stools  -PTA Loperamide      #Anemia, unspecified  HgB 10.9, changed from baseline. No source of bleeding identified currently, likely hemodilutional. Continue to monitor  -Daily CBC     #Hyperkalemia, resolved  - daily BMP     Chronic Conditions:  #HTN  #CAD  -Hold PTA amlodipine, lisinopril and metoprolol due to soft pressures, off levophed drip 10/10  -Continue PTA plavix  -Continue PTA Pravastatin      #COPD  -Continue PTA albuterol, symbicort  -Restart PTA singulair     #Hx of Psychiatric conditions: MDD, Bipolar, BPD  -Continue PTA Effexor   -Continue PTA Zyprexa   - Hold PTA Trazodone     #Hx of chronic pain  -Restart PTA maloxicam          Diet: Consistent Carbohydrate Diet Moderate Consistent Carb (60 g CHO per Meal) Diet  Snacks/Supplements Adult: Kevon; With Meals  Snacks/Supplements Adult: Glucerna; With Meals    DVT Prophylaxis: Lovenox   Vargas Catheter: Not present  Fluids: PO  Lines: PRESENT      PICC 10/09/23 Triple Lumen Left Brachial vein lateral-Site Assessment: WDL      Cardiac Monitoring: None  Code Status: Full Code      Clinically Significant Risk Factors              # Hypoalbuminemia: Lowest albumin = 2.9 g/dL at 10/11/2023  3:43 AM, will monitor as appropriate        # Hypertension: Noted on problem list       # DMII: A1C = 7.4 % (Ref range: <5.7 %) within past 6 months     # Obesity: Estimated body mass index is 36.21 kg/m  as calculated from the following:    Height as of this encounter: 1.524 m (5').    Weight as of this encounter: 84.1 kg (185 lb 6.4 oz).      # COPD: noted on problem list        Disposition Plan      Expected Discharge Date: 10/16/2023      Destination: home with help/services          The patient's care was discussed with the Attending Physician, Dr. Rosenstein .    Reynold Lei MD  Hospitalist Service  [facility]  Securely message with [employer] (more info)  Text page via [employer] Paging/Directory   ______________________________________________________________________    Interval History   Continued to require IV dilaudid overnight, in addition to oxycodone.   Endorses pain still not well controlled, worried about going home today.     Physical Exam   Vital Signs: Temp: 98  F (36.7  C) Temp src: Oral BP: 97/57 Pulse: 98   Resp: 20 SpO2: 95 % O2 Device: None (Room air)    Weight: 185 lbs 6.4 oz    Physical Exam  HENT:      Mouth/Throat:      Mouth: Mucous membranes are moist.      Pharynx: Oropharynx is clear.   Cardiovascular:      Rate and Rhythm: Normal rate and regular rhythm.      Pulses: Normal pulses.      Heart sounds: Normal heart sounds.   Pulmonary:      Effort: Pulmonary effort is normal.      Breath sounds: Normal breath sounds.  Abdominal:      General: Bowel sounds are normal.      Palpations: Abdomen is soft.      Comments: Surgical dressings in place over L lower abdomen   Skin:     General: Skin is warm.   Neurological:      General: No focal deficit present.      Mental Status: She is alert and oriented to person, place, and time.   Psychiatric:         Mood and Affect: Mood normal.         Behavior: Behavior normal.        Data     I have personally reviewed the following data over the past 24 hrs:    11.8 (H)   \   11.9   / 338     137 97 (L) 15.7 /  168 (H)   4.5 30 (H) 0.40 (L) \     ALT: 18 AST: 17 AP: 140 (H) TBILI: 0.2   ALB: 3.4 (L) TOT PROTEIN: 7.2 LIPASE: N/A       Imaging results reviewed over the past 24 hrs:   No results found for this or any previous visit (from the past 24 hour(s)).

## 2023-10-15 NOTE — PLAN OF CARE
Goal Outcome Evaluation:      Plan of Care Reviewed With: patient    Overall Patient Progress: no changeOverall Patient Progress: no change    Outcome Evaluation: Pt alert and oriented. Still requiring IV Dilaudid and oxy throughout the night for pain control. NS running at 100 mL/hr. 0200 . VSS. Slept between cares.    BP 97/57 (BP Location: Right arm)   Pulse 98   Temp 98  F (36.7  C) (Oral)   Resp 20   Ht 1.524 m (5')   Wt 84.1 kg (185 lb 6.4 oz)   SpO2 95%   BMI 36.21 kg/m      Kimo Pedroza RN

## 2023-10-15 NOTE — PLAN OF CARE
Problem: Plan of Care - These are the overarching goals to be used throughout the patient stay.    Goal: Absence of Hospital-Acquired Illness or Injury  Intervention: Identify and Manage Fall Risk  Recent Flowsheet Documentation  Taken 10/15/2023 0830 by Mira Salcido, RN  Safety Promotion/Fall Prevention: activity supervised     Problem: Plan of Care - These are the overarching goals to be used throughout the patient stay.    Goal: Optimal Comfort and Wellbeing  Intervention: Monitor Pain and Promote Comfort  Recent Flowsheet Documentation  Taken 10/15/2023 1150 by Mira Salcido, RN  Pain Management Interventions: medication (see MAR)  Taken 10/15/2023 0926 by Mira Salcido, RN  Pain Management Interventions: medication (see MAR)   Goal Outcome Evaluation:      Plan of Care Reviewed With: patient

## 2023-10-16 VITALS
WEIGHT: 185.4 LBS | HEART RATE: 93 BPM | TEMPERATURE: 97.5 F | HEIGHT: 60 IN | OXYGEN SATURATION: 94 % | BODY MASS INDEX: 36.4 KG/M2 | DIASTOLIC BLOOD PRESSURE: 59 MMHG | SYSTOLIC BLOOD PRESSURE: 122 MMHG | RESPIRATION RATE: 16 BRPM

## 2023-10-16 LAB
ALBUMIN SERPL BCG-MCNC: 3.5 G/DL (ref 3.5–5.2)
ALP SERPL-CCNC: 147 U/L (ref 35–104)
ALT SERPL W P-5'-P-CCNC: 20 U/L (ref 0–50)
ANION GAP SERPL CALCULATED.3IONS-SCNC: 11 MMOL/L (ref 7–15)
AST SERPL W P-5'-P-CCNC: 16 U/L (ref 0–45)
BILIRUB SERPL-MCNC: 0.2 MG/DL
BUN SERPL-MCNC: 19.9 MG/DL (ref 6–20)
CALCIUM SERPL-MCNC: 9.7 MG/DL (ref 8.6–10)
CHLORIDE SERPL-SCNC: 97 MMOL/L (ref 98–107)
CREAT SERPL-MCNC: 0.41 MG/DL (ref 0.51–0.95)
DEPRECATED HCO3 PLAS-SCNC: 30 MMOL/L (ref 22–29)
EGFRCR SERPLBLD CKD-EPI 2021: >90 ML/MIN/1.73M2
ERYTHROCYTE [DISTWIDTH] IN BLOOD BY AUTOMATED COUNT: 14.5 % (ref 10–15)
GLUCOSE BLDC GLUCOMTR-MCNC: 169 MG/DL (ref 70–99)
GLUCOSE BLDC GLUCOMTR-MCNC: 203 MG/DL (ref 70–99)
GLUCOSE BLDC GLUCOMTR-MCNC: 205 MG/DL (ref 70–99)
GLUCOSE SERPL-MCNC: 180 MG/DL (ref 70–99)
HCT VFR BLD AUTO: 39.9 % (ref 35–47)
HGB BLD-MCNC: 12.8 G/DL (ref 11.7–15.7)
MCH RBC QN AUTO: 28.1 PG (ref 26.5–33)
MCHC RBC AUTO-ENTMCNC: 32.1 G/DL (ref 31.5–36.5)
MCV RBC AUTO: 88 FL (ref 78–100)
PLATELET # BLD AUTO: 357 10E3/UL (ref 150–450)
POTASSIUM SERPL-SCNC: 4.5 MMOL/L (ref 3.4–5.3)
PROT SERPL-MCNC: 7.4 G/DL (ref 6.4–8.3)
RBC # BLD AUTO: 4.55 10E6/UL (ref 3.8–5.2)
SODIUM SERPL-SCNC: 138 MMOL/L (ref 135–145)
WBC # BLD AUTO: 8.7 10E3/UL (ref 4–11)

## 2023-10-16 PROCEDURE — 250N000013 HC RX MED GY IP 250 OP 250 PS 637

## 2023-10-16 PROCEDURE — 250N000011 HC RX IP 250 OP 636: Performed by: FAMILY MEDICINE

## 2023-10-16 PROCEDURE — 80053 COMPREHEN METABOLIC PANEL: CPT | Performed by: FAMILY MEDICINE

## 2023-10-16 PROCEDURE — 97606 NEG PRS WND THER DME>50 SQCM: CPT

## 2023-10-16 PROCEDURE — 99238 HOSP IP/OBS DSCHRG MGMT 30/<: CPT | Mod: GC

## 2023-10-16 PROCEDURE — G0463 HOSPITAL OUTPT CLINIC VISIT: HCPCS | Mod: 25

## 2023-10-16 PROCEDURE — 250N000013 HC RX MED GY IP 250 OP 250 PS 637: Performed by: FAMILY MEDICINE

## 2023-10-16 PROCEDURE — 250N000011 HC RX IP 250 OP 636: Mod: JZ | Performed by: STUDENT IN AN ORGANIZED HEALTH CARE EDUCATION/TRAINING PROGRAM

## 2023-10-16 PROCEDURE — 250N000013 HC RX MED GY IP 250 OP 250 PS 637: Performed by: SPECIALIST

## 2023-10-16 PROCEDURE — 85027 COMPLETE CBC AUTOMATED: CPT | Performed by: SPECIALIST

## 2023-10-16 RX ORDER — INSULIN GLARGINE 300 U/ML
INJECTION, SOLUTION SUBCUTANEOUS
Qty: 9 ML | Refills: 10 | Status: SHIPPED | OUTPATIENT
Start: 2023-10-16 | End: 2024-03-29

## 2023-10-16 RX ORDER — AMOXICILLIN 250 MG
1 CAPSULE ORAL 2 TIMES DAILY PRN
Qty: 30 TABLET | Refills: 0 | Status: SHIPPED | OUTPATIENT
Start: 2023-10-16

## 2023-10-16 RX ORDER — METRONIDAZOLE 500 MG/1
500 TABLET ORAL 3 TIMES DAILY
Qty: 21 TABLET | Refills: 0 | Status: SHIPPED | OUTPATIENT
Start: 2023-10-16 | End: 2023-11-21

## 2023-10-16 RX ORDER — LEVOFLOXACIN 500 MG/1
500 TABLET, FILM COATED ORAL DAILY
Qty: 7 TABLET | Refills: 0 | Status: SHIPPED | OUTPATIENT
Start: 2023-10-17 | End: 2023-11-21

## 2023-10-16 RX ORDER — OXYCODONE HYDROCHLORIDE 10 MG/1
10 TABLET ORAL EVERY 6 HOURS PRN
Qty: 12 TABLET | Refills: 0 | Status: SHIPPED | OUTPATIENT
Start: 2023-10-16 | End: 2023-11-03

## 2023-10-16 RX ORDER — OXYCODONE HYDROCHLORIDE 5 MG/1
10-15 TABLET ORAL EVERY 4 HOURS PRN
Status: DISCONTINUED | OUTPATIENT
Start: 2023-10-16 | End: 2023-10-16 | Stop reason: HOSPADM

## 2023-10-16 RX ORDER — LINEZOLID 600 MG/1
600 TABLET, FILM COATED ORAL 2 TIMES DAILY
Qty: 14 TABLET | Refills: 0 | Status: SHIPPED | OUTPATIENT
Start: 2023-10-16 | End: 2024-02-22

## 2023-10-16 RX ADMIN — LEVOFLOXACIN 500 MG: 500 TABLET, FILM COATED ORAL at 08:07

## 2023-10-16 RX ADMIN — GABAPENTIN 600 MG: 300 CAPSULE ORAL at 08:07

## 2023-10-16 RX ADMIN — ALBUTEROL SULFATE 2 PUFF: 90 AEROSOL, METERED RESPIRATORY (INHALATION) at 08:19

## 2023-10-16 RX ADMIN — INSULIN ASPART 2 UNITS: 100 INJECTION, SOLUTION INTRAVENOUS; SUBCUTANEOUS at 12:14

## 2023-10-16 RX ADMIN — OXYCODONE HYDROCHLORIDE 10 MG: 5 TABLET ORAL at 04:16

## 2023-10-16 RX ADMIN — INSULIN ASPART 2 UNITS: 100 INJECTION, SOLUTION INTRAVENOUS; SUBCUTANEOUS at 08:25

## 2023-10-16 RX ADMIN — ACETAMINOPHEN 975 MG: 325 TABLET ORAL at 08:07

## 2023-10-16 RX ADMIN — HYDROMORPHONE HYDROCHLORIDE 0.5 MG: 1 INJECTION, SOLUTION INTRAMUSCULAR; INTRAVENOUS; SUBCUTANEOUS at 06:46

## 2023-10-16 RX ADMIN — CLOPIDOGREL BISULFATE 75 MG: 75 TABLET ORAL at 08:07

## 2023-10-16 RX ADMIN — HYDROMORPHONE HYDROCHLORIDE 0.5 MG: 1 INJECTION, SOLUTION INTRAMUSCULAR; INTRAVENOUS; SUBCUTANEOUS at 00:37

## 2023-10-16 RX ADMIN — HYDROXYZINE HYDROCHLORIDE 50 MG: 25 TABLET, FILM COATED ORAL at 11:21

## 2023-10-16 RX ADMIN — PANTOPRAZOLE SODIUM 40 MG: 40 TABLET, DELAYED RELEASE ORAL at 08:08

## 2023-10-16 RX ADMIN — Medication 1 PACKET: at 08:38

## 2023-10-16 RX ADMIN — MELOXICAM 15 MG: 7.5 TABLET ORAL at 08:08

## 2023-10-16 RX ADMIN — VENLAFAXINE HYDROCHLORIDE 150 MG: 75 CAPSULE, EXTENDED RELEASE ORAL at 08:07

## 2023-10-16 RX ADMIN — ENOXAPARIN SODIUM 40 MG: 40 INJECTION SUBCUTANEOUS at 11:21

## 2023-10-16 RX ADMIN — ACETAMINOPHEN 975 MG: 325 TABLET ORAL at 14:44

## 2023-10-16 RX ADMIN — CETIRIZINE HYDROCHLORIDE 10 MG: 10 TABLET, FILM COATED ORAL at 08:08

## 2023-10-16 RX ADMIN — GABAPENTIN 600 MG: 300 CAPSULE ORAL at 14:44

## 2023-10-16 RX ADMIN — OXYCODONE HYDROCHLORIDE 10 MG: 5 TABLET ORAL at 08:05

## 2023-10-16 RX ADMIN — OXYCODONE HYDROCHLORIDE 15 MG: 5 TABLET ORAL at 12:16

## 2023-10-16 ASSESSMENT — ACTIVITIES OF DAILY LIVING (ADL)
ADLS_ACUITY_SCORE: 49

## 2023-10-16 NOTE — PLAN OF CARE
Problem: Plan of Care - These are the overarching goals to be used throughout the patient stay.    Goal: Optimal Comfort and Wellbeing  Intervention: Provide Person-Centered Care  Recent Flowsheet Documentation  Taken 10/15/2023 2230 by Emy Alvarez RN  Trust Relationship/Rapport:   care explained   choices provided   emotional support provided  Taken 10/15/2023 1525 by Emy Alvarez, YULISA  Trust Relationship/Rapport:   care explained   choices provided   emotional support provided     Problem: Plan of Care - These are the overarching goals to be used throughout the patient stay.    Goal: Optimal Comfort and Wellbeing  Outcome: Progressing  Intervention: Provide Person-Centered Care  Recent Flowsheet Documentation  Taken 10/15/2023 2230 by Emy Alvarez RN  Trust Relationship/Rapport:   care explained   choices provided   emotional support provided  Taken 10/15/2023 1525 by Emy Alvarez, RN  Trust Relationship/Rapport:   care explained   choices provided   emotional support provided   Goal Outcome Evaluation:    Patient is A/O x. 4  Reports that po oxy not relieving her pain, IV dilaudid given, explained that this may delay discharge; pt teary this afternoon, emotional support provided, pt reports grief due to friend of 30 years passing.  Voiding at bedside commode with sby assist.  CC diet, tolerating well; pt would like to go home tomorrow.

## 2023-10-16 NOTE — PROGRESS NOTES
[facility] Nurse Inpatient Assessment     Consulted for: L AKA/groin - Nec Fasc; new consult 10/13 for abdomen and buttock    Summary: Assessed wound with surgical PA, starting an irrigating wound vac    Patient History (according to provider note(s):      Teresa Perez is a 52 year old female who presents with a severe L inguinal/lower abdominal wound worsening over the past week. She follows with wound clinic for multiple abdominal abscesses which have been healing, however she has a new wound from the past week on her L groin that has been extremely painful and growing with foul odor. Patient endorses diaphoresis, chills, nausea and occasional vomiting. She states she thought it was an abscess at home, and attempted to drain on her own with a needle and noticed purulent and bloody discharge. Symptoms have continued to worsen, denies bowel or bladder symptoms, no chest pain. SOB w/wheezing at baseline due to underlying COPD but no new respiratory symptoms.     Notable social history includes she lives at home by herself with her cat, has a home health nurse visit 2x weekly for wound care. Says she smokes 5-10 cigarettes daily, has smoked for approximately 40 years. Denies alcohol or recreational drug use aside from marijuana.       Assessment:      Skin Injury Location: L groin/panniculus/mons pubis          10/10                                                                  10/11                                                                 10/13                                                                10/16    Last photo: 10/16  Skin injury due to:  surgical - necrotizing fasciitis   Skin history and plan of care:   see above  Affected area:      Skin assessment:  full thickness open wound to adipose     Measurements (length x width x depth, in cm) 6  x 16  x  3.5 cm      Color: pink and red     Temperature  warm     Drainage: small .      Color: serosanguinous, denise  blood at dressing removal, staunched during dressing change     Odor: none  Pain: mild, aching  Pain interventions prior to dressing change: slow and gentle cares   Treatment goal: Infection control/prevention, Increase granulation, and Protection  STATUS: evolving  Supplies ordered: at bedside    Negative pressure wound therapy applied to: L groin   Surgical date: 10/9   Service following: GS - Ogren  Date Negative Pressure Wound Therapy initiated: 10/11   Interventions in place: moisture/incontinence management  Is patient s nutritional status compromised? yes   If yes, what interventions are in place? Protein supplements  Reason for initiating vac therapy? Presence of co-morbidities, High risk of infections, Need for accelerated granulation tissue, and Prior history of delayed wound healing  Which?of?the?following?co-morbidities?apply? Diabetes  If diabetic is patient on a diabetic management program? Yes   Is osteomyelitis present in wound? no   If yes what treatments are in place? N/A     Volume in cannister: 350     Last cannister change date: 10/11    Number of foam pieces removed from a wound (excluding foam for bridge) :  3 CleanseChoice Foam   Verified this matched the number of foam pieces applied last dressing change: Yes   Number of foam pieces packed into wound (excluding foam for bridge) :  2 GranuFoam Black   _____________________________________________________________________________________________________________________________    Pressure Injury Location: R IT      10/13                                                                  10/16    Last photo: 10/16  Wound type: Pressure Injury     Pressure Injury Stage: 3, present on admission   Wound history/plan of care:   Woc began following this wound 10/13. An outside facility dressing was noted to this area: alyvyn and hydrofera ready was still in place.    Wound base: 95 % granulation tissue, 5 % slough     Palpation of the wound bed: normal       Drainage: scant     Description of drainage: serosanguinous     Measurements (length x width x depth, in cm) 1.8  x 1  x  0.3 cm      Tunneling N/A     Undermining N/A  Periwound skin: Intact, Macerated, and Scar tissue      Color: normal and consistent with surrounding tissue      Temperature: normal   Odor: mild  Pain: mild and during dressing change, aching  Pain intervention prior to dressing change: slow and gentle cares   Treatment goal: Heal , Infection control/prevention, Increase granulation, Protection, and Promote epidermal migration  STATUS: improving  Supplies ordered: at bedside    My PI Risk Assessment     Sensory Perception: 3 - Slightly Limited     Moisture: 2 - Very moist      Activity: 1 - Bedfast      Mobility: 2 - Very limited     Nutrition: 3 - Adequate     Friction/Shear: 2 - Potential problem      TOTAL: 13  ___________________________________________________________________________________________________________________________________________    Wound location: abdomen, R      10/13                                                                  10/16    Last photo: 10/16    Wound due to: Unknown Etiology: patient does not know how they happened. 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   Wound history/plan of care: patient has been seen in the vascular clinic across from [facility] for these and the IT wound above, see MD noted from 10/9 by Dr Dozier  Wound base: 100 % granulation tissue,      Palpation of the wound bed: normal      Drainage: scant     Description of drainage: serosanguinous     Measurements (length x width x depth, in cm): 6.4  x 3  x  0.3 cm      Tunneling: N/A     Undermining: N/A  Periwound skin:  discoloration      Color: dusky      Temperature: normal   Odor: mild  Pain: mild, tender  Pain interventions prior to dressing change: slow and gentle cares   Treatment goal: Heal , Increase granulation, and Promote epidermal migration  STATUS:  "improving  Supplies ordered: at bedside        Treatment Plan:     Negative pressure wound therapy plan:  Wound location: L groin   Change Days: Mon/Wed/Fri by WOC RN    Supplies (including all accessories) used: medium Black foam   Cleanse with Vashe prior to replacing VAC    Suction setting: -125 mmHg  Methods used: Window paned all periwound skin with vac drape prior to applying sponge and Placed barrier ring into periwound creases to improve seal    Staff RN to assess integrity of dressing and ensure suction is set at appropriate level every shift.   Date canister. Chart canister output every shift. Change cannister weekly and PRN if full/occluded     Remove foam dressing and replace with BID normal saline moist gauze dressing if:   -a dressing failure which cannot be repaired within 2 hours   -patient is discharging to home without a home pump   -patient is discharging to a facility outside the local area   -if a dressing is a \"Silver Foam\", remove before Radiation Therapy or MRI     The hospital VAC pump is not to be discharged with the patient.?Ensure to disconnect patient from machine prior to discharge. Then,    - If a home KCI VAC pump has been delivered, connect home cannister to dressing tubing then connect cannister to home pump and turn on machine    - If transferring to a nearby facility with a KCI vac, can disconnect and clamp tubing then cover end with glove so can be reconnected within 2 hours        L groin - if VAC fails  Flush wound with NS, pat dry  Moisten gauze roll with vashe, fill wound and trim excess  Cover with abd pad and tuck into fold. Try to not use any tape  Change BID + PRN if soiled, saturated, falls off    L IT & RLQ abdomen wounds  1. Cleanse wounds with vashe moistened gauze. Soak for 2 minutes.  2. Apply nickel thick layer of medihoney gel to wounds, cover with mepilex dressings to fit  3. Change dressings MWF + PRN if soiled, saturated, falls off    Orders: " Written    RECOMMEND PRIMARY TEAM ORDER: None, at this time  Education provided: plan of care  Discussed plan of care with: Patient and Nurse  [facility] nurse follow-up plan: Monday/WednesdayFriday  Notify WOC if wound(s) deteriorate.  Nursing to notify the Provider(s) and re-consult the [facility] Nurse if new skin concern.    DATA:     Current support surface: Standard  Low air loss (JOHNATHAN pump, Isolibrium, Pulsate, skin guard, etc)  Containment of urine/stool: Incontinence Protocol  BMI: Body mass index is 36.21 kg/m .   Active diet order: Orders Placed This Encounter      Consistent Carbohydrate Diet Moderate Consistent Carb (60 g CHO per Meal) Diet     Output: I/O last 3 completed shifts:  In: 560 [P.O.:560]  Out: 1200 [Urine:600; Drains:600]     Labs:   Recent Labs   Lab 10/16/23  0623   ALBUMIN 3.5   HGB 12.8   WBC 8.7     Pressure injury risk assessment:   Sensory Perception: 3-->slightly limited  Moisture: 4-->rarely moist  Activity: 1-->bedfast  Mobility: 3-->slightly limited  Nutrition: 3-->adequate  Friction and Shear: 3-->no apparent problem  Chi Score: 17    LORENZO Rios RN CWOCN  Pager no longer in use, please contact through mSpoke group: [facility] [address]

## 2023-10-16 NOTE — PLAN OF CARE
Goal Outcome Evaluation:  Pt ready for discharge.  Switched over to home wound vac.  Reviewed discharge orders with pt who verbalized understanding.  Pt left via own wheelchair with transport.  Homecare already set up.

## 2023-10-16 NOTE — PLAN OF CARE
"Goal Outcome Evaluation:         VSS .  Voiding.  Wound vac intact. Meplix dressings changed on isabela's and c/d/I. Requested dilaudid iv for pain rating #7 left groin at wound vac site. Possible discharge home if will tolerate po pain meds.  Triple lumen picc.  Flushed.  Care plan reviewed.   Problem: Plan of Care - These are the overarching goals to be used throughout the patient stay.    Goal: Plan of Care Review  Description: The Plan of Care Review/Shift note should be completed every shift.  The Outcome Evaluation is a brief statement about your assessment that the patient is improving, declining, or no change.  This information will be displayed automatically on your shift note.  Outcome: Progressing  Goal: Patient-Specific Goal (Individualized)  Description: You can add care plan individualizations to a care plan. Examples of Individualization might be:  \"Parent requests to be called daily at 9am for status\", \"I have a hard time hearing out of my right ear\", or \"Do not touch me to wake me up as it startles me\".  Outcome: Progressing  Goal: Absence of Hospital-Acquired Illness or Injury  Outcome: Progressing  Intervention: Identify and Manage Fall Risk  Recent Flowsheet Documentation  Taken 10/16/2023 0019 by Tanya Luther RN  Safety Promotion/Fall Prevention: activity supervised  Intervention: Prevent Skin Injury  Recent Flowsheet Documentation  Taken 10/16/2023 0019 by Tanya Luther, RN  Body Position: position changed independently  Goal: Optimal Comfort and Wellbeing  Outcome: Progressing  Goal: Readiness for Transition of Care  Outcome: Progressing     Problem: Risk for Delirium  Goal: Optimal Coping  Outcome: Progressing  Goal: Improved Behavioral Control  Outcome: Progressing  Goal: Improved Attention and Thought Clarity  Outcome: Progressing  Goal: Improved Sleep  Outcome: Progressing     Problem: Pain Acute  Goal: Optimal Pain Control and Function  Outcome: Progressing   "   Problem: Skin or Soft Tissue Infection  Goal: Absence of Infection Signs and Symptoms  Outcome: Progressing     Problem: Comorbidity Management  Goal: Blood Glucose Levels Within Targeted Range  Outcome: Progressing  Goal: Blood Pressure in Desired Range  Outcome: Progressing

## 2023-10-16 NOTE — PROGRESS NOTES
"Care Management Follow Up    Length of Stay (days): 7    Expected Discharge Date: 10/16/2023     Concerns to be Addressed:     Discharge planning  Patient plan of care discussed at interdisciplinary rounds: Yes    Anticipated Discharge Disposition: Home with home care     Anticipated Discharge Services:  Home with home care  Anticipated Discharge DME:  NA    Patient/family educated on Medicare website which has current facility and service quality ratings:  NA  Education Provided on the Discharge Plan:  Per team  Patient/Family in Agreement with the Plan:  Yes    Referrals Placed by CM/SW:  Yes  Private pay costs discussed: Transportation costs    Additional Information:  [facility] and spoke with Yuko. She reported patient is currently active with [facility] and will accept patient.    Updated patient    Social Hx: \"Lives alone in apartment. Has PCA 5 days/week. Uses power wheelchair. Has [facility] for RN 2x/week. Will need wound vac- approved in portal, need to deliver on day of discharge. Transport TBD.\"     RNCM to follow for medical progression, recommendations, and final discharge plan.     Melonie Parks RN     10:55 AM per provider, patient is medically ready for discharge to home with home care today.   P[facility] up the [facility] wound vac and delivered to patient in his room  I paper signed and fax for [id number]    Patient requested [facility] and medications filled prior to leaving  Discussed out of pocket cost of [facility] medical transportation by wheelchair with patient. Patient agreed with the plan to have transportation arranged by [facility] transport.     Called for [facility] transport for  2:38-3:23  Faxed orders to [facility] and updated bedside nurse  "

## 2023-10-16 NOTE — DISCHARGE SUMMARY
[facility]  Discharge Summary - Medicine & Pediatrics       Date of Admission:  10/9/2023  Date of Discharge:  10/16/2023    Discharging Provider: Amber Stokes MD and Guicho Ramirez MD   Discharge Service: Hospitalist Service    Discharge Diagnoses     Necrotizing fascitis   Sepsis secondary to soft tissue infection   T2DM  HTN    Clinically Significant Risk Factors     # DMII: A1C = 7.4 % (Ref range: <5.7 %) within past 6 months    # Obesity: Estimated body mass index is 36.21 kg/m  as calculated from the following:    Height as of this encounter: 1.524 m (5').    Weight as of this encounter: 84.1 kg (185 lb 6.4 oz).       Follow-ups Needed After Discharge       - Follow up with primary care provider, Tyra Bullock, within 3 days for hospital follow- up.  She has been discharged with wound vac and home nurse to assist with wound cares.   - Patient required blood pressure support during hospital stay; held antihypertensives on discharge, please assess blood pressure at follow-up and resume if needed.   - Patient's blood sugars were much lower than her average during hospital stay. Discharged with 50 units of Toujeo instead of her normal 130 units. Please assess at follow-up if her blood sugars rise again and an adjustment in her Toujeo is needed.  - Discharged with three days of oxycodone for pain control and instructed her to follow-up with her primary for next steps if pain is persistent.      Discharge Disposition   Discharged to home  Condition at discharge: Stable    Hospital Course      Necrotizing Fasciitis L groin s/p I&D 10/9/23  Sepsis with hypotension - resolved  Admitted with fatigue, severe pain after being seen at vascular clinic for wound care follow up. Patient was hypotensive 70's-80's systolic and found to have WBC 23.1. CT AP 10/9/23 revealed collection of subcutaneous gas in the L lower abdomen and groin concerning for necrotizing fascitis. Patient started on IV  antibiotics and on levophed due to hypotension despite fluid resuscitation. Patient seen by surgery, taken to OR for I&D 10/9. Patient went to the ICU due to continue pressor support but was able to discharge the next day. Wound vac placed, and patient received wound cares via WO. She had severe pain that delayed her discharge, but felt pain was manageable on day of discharge and wanted to return home.   - Discharged on Levaquin, Flagyl and Linezolid oral x 7 days for total antibiotic duration of 2 weeks per ID recommendation   - Patient discharged home with wound vac and home RN cares. She is an established patient at vascular clinic.   - Discharged with PTA meloxicam and PRN oxycodone 10 mg Q6H for three days   - Recommend close follow-up with PCP; sent message to primary and recommended that patient call tomorrow to make an appointment      T2DM  Patient notes blood sugars of 400's at home this past weekend, last A1C 06/2023 8.6. Current home dose includes Toujeo 65 units BID, novolog 28 units w/meals, weekly bydureon (exenatide) injections. A1C 7.4 on 10/9. Patient's blood sugars were lower than her baseline and her insulin needs were much lower. Patient was managed on 35 units daily with sliding scale. Discharged patient on 50 units daily with her Novolog. Recommend this being reevaluated at follow-up appointment.   - Toujeo lowered to 50 units daily      HTN  PTA amlodipine, lisinopril and metoprolol were held during hospitalization due to soft pressures secondary to sepsis. However, patient's blood pressure remained at goal off of these medications. Discussed holding antihypertensives with patient until she can be evaluated by PCP  - Holding lisinopril, amlodipine and metoprolol until re-evaluation by PCP     Consultations This Hospital Stay   PHARMACY TO DOSE VANCO  VASCULAR ACCESS ADULT IP CONSULT  SURGERY GENERAL IP CONSULT  PHARMACY TO DOSE VANCO  WOUND OSTOMY CONTINENCE NURSE  IP CONSULT  CARE  MANAGEMENT / SOCIAL WORK IP CONSULT  PHYSICAL THERAPY ADULT IP CONSULT  PHARMACY TO DOSE VANCO  WOUND OSTOMY CONTINENCE NURSE  IP CONSULT    Code Status   Prior     The patient was discussed with Dr. Ashley Stokes MD  [facility], PGY-3  ______________________________________________________________________    Physical Exam   Vital Signs: Temp: 97.5  F (36.4  C) Temp src: Oral BP: 122/59 Pulse: 93   Resp: 16 SpO2: 94 % O2 Device: None (Room air)    Weight: 185 lbs 6.4 oz    General:  No acute distress  Psych:  Alert and oriented to person, place, and time. Able to articulate logical thoughts.   HEENT:  Eyes grossly normal to inspection.  Mucous membranes moist.   Cardiovascular:  Regular rate and rhythm  Respiratory:  Lungs clear to auscultation bilaterally.   Musculoskeletal:  Left AKA, surgical dressing in place over left lower abdomen       Primary Care Physician   Tyra Bullock    Discharge Orders      Home Care Referral      Reason for your hospital stay    Patient presented with a necrotizing wound of her thigh. She underwent surgical management upon admission, and then went to the ICU for blood pressure support. She received the cares of the wound team, and will continue to follow with their team outpatient. Discharged with wound vac, oral antibiotics and pain control.     Follow-up and recommended labs and tests     Follow up with primary care provider, Tyra Bullock, within 3 days for hospital follow- up.  She has been discharged with wound vac and home nurse to assist with wound cares.   - Patient required blood pressure support during hospital stay; held antihypertensives on discharge, please assess blood pressure at follow-up and resume if needed.   - Patient's blood sugars were much lower than her average during hospital stay. Discharged with 50 units of Toujeo instead of her normal 130 units. Please assess at follow-up if her blood sugars rise again and  an adjustment in her Toujeo is needed.  - Discharged with three days of oxycodone for pain control and instructed her to follow-up with her primary for next steps if pain is persistent.     Activity    Your activity upon discharge: activity as tolerated     Diet    Follow this diet upon discharge: Orders Placed This Encounter      Snacks/Supplements Adult: Kevon; With Meals      Snacks/Supplements Adult: Glucerna; With Meals      Consistent Carbohydrate Diet Moderate Consistent Carb (60 g CHO per Meal) Diet       Significant Results and Procedures   Most Recent 3 CBC's:  Recent Labs   Lab Test 10/16/23  0623 10/15/23  0645 10/14/23  0611   WBC 8.7 11.8* 11.0   HGB 12.8 11.9 11.5*   MCV 88 88 88    338 294     Most Recent 3 BMP's:  Recent Labs   Lab Test 10/16/23  1148 10/16/23  0824 10/16/23  0623 10/15/23  0737 10/15/23  0645 10/14/23  0907 10/14/23  0611   NA  --   --  138  --  137  --  137   POTASSIUM  --   --  4.5  --  4.5  --  3.8   CHLORIDE  --   --  97*  --  97*  --  99   CO2  --   --  30*  --  30*  --  29   BUN  --   --  19.9  --  15.7  --  11.1   CR  --   --  0.41*  --  0.40*  --  0.38*   ANIONGAP  --   --  11  --  10  --  9   TRUNG  --   --  9.7  --  9.4  --  9.1   * 205* 180*   < > 172*   < > 153*    < > = values in this interval not displayed.   ,   Results for orders placed or performed during the hospital encounter of 10/09/23   CT Abdomen Pelvis w Contrast     Value    Radiologist flags (AA)     Left lower abdominal wall/left groin gas-forming infection    Narrative    EXAM: CT ABDOMEN PELVIS W CONTRAST  LOCATION: [facility]  DATE: 10/9/2023    INDICATION: abdominal wall infection  COMPARISON: CT exams 03/29/2023, 8/25/2022 and 1/20/2020  TECHNIQUE: CT scan of the abdomen and pelvis was performed following injection of IV contrast. Multiplanar reformats were obtained. Dose reduction techniques were used.  CONTRAST: 90ml isovue 370    FINDINGS:   LOWER CHEST: Mild  bilateral lower lobar dependent consolidation and surrounding airspace opacities.    HEPATOBILIARY: Gallbladder distention likely reflecting recent fasting. No surrounding edema. Stable biliary ductal dilatation. No obstructing mass lesion or radiodense stone. Stable mildly lobulated liver contour.    PANCREAS: Pancreatic divisum. Stable mild pancreatic ductal dilatation. No obstructing mass lesion is identified.    SPLEEN: Normal.    ADRENAL GLANDS: Normal.    KIDNEYS/BLADDER: Stable left mid renal cortical cyst. No follow-up is indicated. No hydronephrosis or hydroureter. Normal bladder.    BOWEL: Stable massive broad-based mid ventral abdominal wall hernia containing segments of small bowel and colon as well as the anterior left hepatic lobe. Gastric bypass. No small bowel or colonic distention. Mild to moderate colonic stool burden. No   free air or free fluid.    LYMPH NODES: Normal.    VASCULATURE: Unremarkable.    PELVIC ORGANS: Normal.    MUSCULOSKELETAL: Within the left lower abdominal wall and left groin there is a moderate-sized area of subcutaneous gas measuring up to 6 cm. Moderate surrounding edema. Overlying skin thickening. No associated fluid collection. This is centered within   the subcutaneous fat. No definite extension to the underlying fascial layer. Spinal degenerative changes.      Impression    IMPRESSION:   1.  Moderate-sized collection or subcutaneous gas with surrounding edema centered in the lower left abdominal wall and left inguinal region. These findings are consistent with a gas-forming infection. No associated fluid collection. No definite extension   to the underlying fascia.  2.  Stable massive ventral abdominal wall hernia.      [Critical Result: Left lower abdominal wall/left groin gas-forming infection]    Finding was identified on 10/9/2023 11:56 AM CDT.     Dr. Andreas Strong was contacted by me on 10/9/2023 12:12 PM CDT and verbalized understanding of the critical result.       *Note: Due to a large number of results and/or encounters for the requested time period, some results have not been displayed. A complete set of results can be found in Results Review.       Discharge Medications   Discharge Medication List as of 10/16/2023  2:46 PM        START taking these medications    Details   levofloxacin (LEVAQUIN) 500 MG tablet Take 1 tablet (500 mg) by mouth daily, Disp-7 tablet, R-0, E-Prescribe      linezolid (ZYVOX) 600 MG tablet Take 1 tablet (600 mg) by mouth 2 times daily, Disp-14 tablet, R-0, E-Prescribe      metroNIDAZOLE (FLAGYL) 500 MG tablet Take 1 tablet (500 mg) by mouth 3 times daily, Disp-21 tablet, R-0, E-Prescribe      oxyCODONE (ROXICODONE) 10 MG tablet Take 1 tablet (10 mg) by mouth every 6 hours as needed for moderate pain, Disp-12 tablet, R-0, E-Prescribe      senna-docusate (SENOKOT-S/PERICOLACE) 8.6-50 MG tablet Take 1 tablet by mouth 2 times daily as needed for constipation, Disp-30 tablet, R-0, E-Prescribe           CONTINUE these medications which have CHANGED    Details   TOUJEO SOLOSTAR 300 UNIT/ML (1 units dial) pen INJECT 50 units in the morning until check in with primary care., Disp-9 mL, R-10, ИРИНА, E-Prescribe           CONTINUE these medications which have NOT CHANGED    Details   acetaminophen (TYLENOL) 500 MG tablet Take 1-2 tablets (500-1,000 mg) by mouth every 6 hours as needed for mild pain, Disp-30 tablet, R-3, E-Prescribe      albuterol (PROAIR HFA/PROVENTIL HFA/VENTOLIN HFA) 108 (90 Base) MCG/ACT inhaler INHALE ONE TO TWO PUFFS BY MOUTH EVERY 4 HOURS AS NEEDED, Disp-8.5 g, R-10, E-PrescribePharmacy may dispense brand covered by insurance (Proair, or proventil or ventolin or generic albuterol inhaler)      albuterol (PROVENTIL) (2.5 MG/3ML) 0.083% neb solution Take 1 vial (2.5 mg) by nebulization every 6 hours as needed for shortness of breath or wheezing, Disp-3 mL, R-3, E-Prescribe      amLODIPine (NORVASC) 10 MG tablet Take 1 tablet (10 mg)  by mouth daily, Disp-90 tablet, R-1, E-Prescribe      ammonium lactate (AMLACTIN) 12 % external cream Apply topically daily as neededHistorical      azelastine (ASTELIN) 0.1 % nasal spray Spray 1 spray into both nostrils 2 times daily, Disp-30 mL, R-1, E-Prescribe      azelastine (OPTIVAR) 0.05 % ophthalmic solution PLACE 1 DROP INTO BOTH EYES 2 TIMES DAILY AS NEEDED (ITCHY EYES), Disp-18 mL, R-5, E-Prescribe      baclofen (LIORESAL) 20 MG tablet Take 1 tablet (20 mg) by mouth 3 times daily as needed for muscle spasms, Disp-45 tablet, R-10, E-Prescribe      blood glucose (NO BRAND SPECIFIED) lancets standard Use to test blood sugar 4 times daily or as directed.Disp-100 lancet, [id number]      blood glucose (NO BRAND SPECIFIED) test strip Use to test blood sugar 4 times daily or as directed., Disp-100 strip, R-5, E-Prescribe      budesonide-formoterol (SYMBICORT) 160-4.5 MCG/ACT Inhaler Inhale 2 puffs twice daily plus 1-2 puffs as needed. May use up to 12 puffs per day., Disp-20.4 g, R-11, E-PrescribePlease dispense #2 10.2g inhalers for a 30-day supply per MACI 2021 guidelines. If reject arises, enter DUR codes: HD / M0 / 1G. Note: MA  prefers brand Symbicort.      TRUNG-GEST ANTACID 500 MG chewable tablet TAKE 1 TABLET (500 MG) BY MOUTH 2 TIMES DAILY AS NEEDED FOR HEARTBURN, Disp-60 tablet, R-3, E-Prescribe      cetirizine (ZYRTEC) 10 MG tablet TAKE 1 TABLET BY MOUTH EVERY DAY, Disp-90 tablet, R-1, E-Prescribe**Patient requests 90 days supply**      clindamycin (CLEOCIN T) 1 % external solution Apply topically 2 times dailyDisp-360 mL, [id number]      clopidogrel (PLAVIX) 75 MG tablet TAKE 1 TABLET(75 MG) BY MOUTH DAILY, Disp-90 tablet, R-1, E-Prescribe**Patient requests 90 days supply**      Continuous Blood Gluc Sensor (DEXCOM G6 SENSOR) MISC 1 each every 10 days Change every 10 days., Disp-3 each, R-5, E-PrescribePlease route RX updates to Ernst Latif RPH      Continuous Blood Gluc Transmit (DEXCOM G6  TRANSMITTER) MISC 1 each every 3 months Change every 3 months., Disp-1 each, R-1, E-Prescribe      diclofenac (CATAFLAM) 50 MG tablet 50 mg 2 times daily as needed, Historical      diclofenac (VOLTAREN) 1 % topical gel APPLY 2 GRAMS TOPICALLY FOUR TIMES A DAY AS NEEDED FOR JOINT PAIN, Disp-100 g, R-10, E-Prescribe      docusate sodium (COLACE) 100 MG capsule Take 100 mg by mouth 2 times daily as needed for constipation, Historical      empagliflozin (JARDIANCE) 25 MG TABS tablet Take 1 tablet (25 mg) by mouth daily, Disp-30 tablet, R-10, E-Prescribe      EPINEPHrine (ANY BX GENERIC EQUIV) 0.3 MG/0.3ML injection 2-pack Inject 0.3 mLs (0.3 mg) into the muscle once as needed for anaphylaxis, Disp-2 mL, R-0, E-Prescribe      exenatide ER (BYDUREON BCISE) 2 MG/0.85ML auto-injector INJECT 2MG SUBCUTANEOUSLY EVERY 7 DAYS, Disp-3.4 mL, R-10, E-Prescribe      gabapentin (NEURONTIN) 600 MG tablet Take 1 tablet (600 mg) by mouth 3 times daily, Disp-90 tablet, R-3, E-Prescribe      GAVILYTE-G 236 g suspension MIX AND DRINK AS DIRECTED PER PATIENT INSTRUCTIONS, ИРИНА, Historical      hydrOXYzine (ATARAX) 25 MG tablet Take 1-2 tablets (25-50 mg) by mouth 3 times daily as needed for itching, Disp-30 tablet, R-11, E-Prescribe      insulin lispro (HUMALOG KWIKPEN) 100 UNIT/ML (1 unit dial) KWIKPEN IF BS <100, NO HUMALOG. IF -150, TAKE 28 UNITS. INCREASE 2 UNITS FOR EVERY 50 ABOVE 150. TOTAL DAILY DOSE IS 90 UNITS/DAY, Disp-75 mL, R-1, E-Prescribe      insulin pen needle (B-D U/F) 31G X 5 MM miscellaneous Use 4 times daily or as directed.Disp-100 each, [id number]      Lidocaine (LIDOCARE) 4 % Patch Place 1 patch onto the skin daily as needed for moderate pain To prevent lidocaine toxicity, patient should be patch free for 12 hrs daily.Historical      lidocaine (XYLOCAINE) 5 % external ointment APPLY TOPICALLY THREE TIMES A DAY AS NEEDED FOR MODERATE PAINDisp-35.44 g, [id number]      lisinopril (ZESTRIL) 40 MG tablet Take  "40 mg by mouth at bedtime, Historical      loperamide (IMODIUM) 2 MG capsule TAKE 1 TABLET (2 MG) BY MOUTH 4 TIMES DAILY AS NEEDED FOR DIARRHEA, Disp-100 capsule, R-11, E-Prescribe      meloxicam (MOBIC) 15 MG tablet TAKE 1 TABLET BY MOUTH EVERY DAY, Disp-30 tablet, R-1, E-Prescribe      metoclopramide (REGLAN) 5 MG tablet TAKE 1 TABLET (5 MG) BY MOUTH 3 TIMES DAILY AS NEEDED (STOMACH PAIN, NAUSEA, VOMITING), Disp-45 tablet, R-1, E-Prescribe      metoprolol succinate ER (TOPROL XL) 50 MG 24 hr tablet TAKE 1 TABLET(50 MG) BY MOUTH DAILY, Disp-90 tablet, R-1, E-Prescribe**Patient requests 90 days supply**      montelukast (SINGULAIR) 10 MG tablet Take 1 tablet (10 mg) by mouth At Bedtime, Disp-90 tablet, R-1, E-Prescribe      mupirocin (BACTROBAN) 2 % external ointment APPLY TO AFFECTED AREA 3 TIMES A DAYDisp-22 g, [id number]      nystatin (MYCOSTATIN) 307150 UNIT/ML suspension TAKE 5 MLS (500,000 UNITS) BY MOUTH DAILY AS NEEDED (THRUSH)Disp-420 mL, [id number]      pantoprazole (PROTONIX) 40 MG EC tablet TAKE 1 TABLET BY MOUTH ONCE DAILY, Disp-30 tablet, R-10, E-Prescribe      pramipexole (MIRAPEX) 0.75 MG tablet TAKE 1 TABLET (0.75 MG) BY MOUTH AT BEDTIME, Disp-90 tablet, R-1, E-Prescribe      pravastatin (PRAVACHOL) 80 MG tablet TAKE 1 TABLET BY MOUTH ONCE DAILY AT BEDTIME, Disp-30 tablet, R-10, E-Prescribe      QUEtiapine (SEROQUEL) 400 MG tablet TAKE 1 TABLET (400 MG) BY MOUTH AT BEDTIME, Disp-30 tablet, R-0, E-PrescribeThis is a limited fill.  Patient needs to follow up with her psychiatrist for further refills.      Silver (MEPILEX AG) 4\"X4\" PADS Externally apply 1 each topically 2 times daily as needed (at the wound site), Disp-5 each, R-3, E-Prescribe      SPIRIVA RESPIMAT 2.5 MCG/ACT inhaler INHALE TWO (2) PUFFS BY MOUTH DAILY, Disp-4 g, R-10, E-Prescribe      SUMAtriptan (IMITREX) 50 MG tablet TAKE 1 TABLET (50 MG) BY MOUTH AT ONSET OF HEADACHE FOR MIGRAINE, Disp-12 tablet, R-1, E-Prescribe    " "  traZODone (DESYREL) 50 MG tablet TAKE 1 TABLET(50 MG) BY MOUTH AT BEDTIME, Disp-30 tablet, R-0, E-PrescribePlease send future refills to patient's psychiatrist      venlafaxine (EFFEXOR XR) 150 MG 24 hr capsule TAKE 1 CAPSULE BY MOUTH EVERY DAY. FURTHER REFILL FROM PSYCHIATRIST, Disp-30 capsule, R-1, E-Prescribe           Allergies   Allergies   Allergen Reactions    Amoxicillin-Pot Clavulanate Nausea and Vomiting and Hives     10/9/23 meropenem at [facility] being tolerated     Bee Venom Anaphylaxis    Nuts Anaphylaxis    Doxycycline Rash    Abilify Discmelt     Animal Dander Other (See Comments)     asthma    Aripiprazole      Other Reaction(s): Irregular heartbeat    Aspirin Difficulty breathing    Atorvastatin      Liver enzyme increase     Contrast Dye Other (See Comments)     Patient had normal reaction to contrast dye, flushed/warm/wetting pants feeling. This Allergy needs to be removed from her chart. -AVW 11/13/21    Metformin Difficulty breathing     \"throat swelling and elevated liver enzymes\"    Naproxen Hives    Niacin     Selegiline     Valium [Diazepam] Other (See Comments)     rage    Zocor [Simvastatin - High Dose]      "

## 2023-10-16 NOTE — PROGRESS NOTES
Physical Therapy Discharge Summary    Reason for therapy discharge:    Discharged to home with home therapy.    Progress towards therapy goal(s). See [facility] on [facility] for goal details.  Goals not met.  Barriers to achieving goals:   discharge from facility.    Therapy recommendation(s):    Continued therapy is recommended.  Rationale/Recommendations:  home care PT.

## 2023-10-16 NOTE — PROGRESS NOTES
Care Management Discharge Note    Discharge Date: 10/16/2023       Discharge Disposition: Home with home care - [facility] for SN, PT    Discharge Services: Home with home care - [facility] for SN, PT    Discharge DME: Other (see comment) (Wound vac)    Discharge Transportation:  [facility]    Private pay costs discussed: transportation costs    Does the patient's insurance plan have a 3 day qualifying hospital stay waiver?  Yes     Which insurance plan 3 day waiver is available? Alternative insurance waiver    Will the waiver be used for post-acute placement? No    PAS Confirmation Code:  NA  Patient/family educated on Medicare website which has current facility and service quality ratings:  NA    Education Provided on the Discharge Plan: Yes per team  Persons Notified of Discharge Plans: Patient per team  Patient/Family in Agreement with the Plan:  Yes    Handoff Referral Completed: Yes    Additional Information:  Patient discharge Home with home care - [facility] for SN, PT.  [facility] 2:38-3:23 PM    Melonie Parks RN

## 2023-10-17 ENCOUNTER — PATIENT OUTREACH (OUTPATIENT)
Dept: CARE COORDINATION | Facility: CLINIC | Age: 53
End: 2023-10-17
Payer: COMMERCIAL

## 2023-10-17 DIAGNOSIS — F33.1 MODERATE RECURRENT MAJOR DEPRESSION (H): ICD-10-CM

## 2023-10-17 NOTE — PROGRESS NOTES
[facility] Care [facility]    Background: Transitional Care Management program identified per system criteria and reviewed by [facility] team for possible outreach.    Assessment: Upon chart review, CCR Team member will not proceed with patient outreach related to this episode of Transitional Care Management program due to reason below:    Patient declined to answer all post hospital discharge questions with CCRC team member and disconnected call.    Plan: Transitional Care Management episode addressed appropriately per reason noted above.      Shae Barragan MA  [facility] Resource [facility]    *Connected Care Resource Team does NOT follow patient ongoing. Referrals are identified based on internal discharge reports and the outreach is to ensure patient has an understanding of their discharge instructions.

## 2023-10-18 RX ORDER — QUETIAPINE FUMARATE 400 MG/1
400 TABLET, FILM COATED ORAL AT BEDTIME
Qty: 90 TABLET | Refills: 1 | OUTPATIENT
Start: 2023-10-18

## 2023-10-20 ENCOUNTER — OFFICE VISIT (OUTPATIENT)
Dept: FAMILY MEDICINE | Facility: CLINIC | Age: 53
End: 2023-10-20
Payer: COMMERCIAL

## 2023-10-20 VITALS
RESPIRATION RATE: 20 BRPM | SYSTOLIC BLOOD PRESSURE: 106 MMHG | TEMPERATURE: 98.3 F | OXYGEN SATURATION: 97 % | DIASTOLIC BLOOD PRESSURE: 72 MMHG | HEART RATE: 93 BPM

## 2023-10-20 DIAGNOSIS — M72.6 NECROTIZING FASCIITIS (H): ICD-10-CM

## 2023-10-20 DIAGNOSIS — F33.1 MODERATE RECURRENT MAJOR DEPRESSION (H): Primary | ICD-10-CM

## 2023-10-20 PROCEDURE — 90686 IIV4 VACC NO PRSV 0.5 ML IM: CPT

## 2023-10-20 PROCEDURE — G0008 ADMIN INFLUENZA VIRUS VAC: HCPCS

## 2023-10-20 PROCEDURE — 99495 TRANSJ CARE MGMT MOD F2F 14D: CPT | Mod: 25

## 2023-10-20 RX ORDER — OXYCODONE HYDROCHLORIDE 10 MG/1
10 TABLET ORAL EVERY 6 HOURS PRN
Qty: 12 TABLET | Refills: 0 | Status: CANCELLED | OUTPATIENT
Start: 2023-10-20

## 2023-10-20 RX ORDER — OXYCODONE HYDROCHLORIDE 5 MG/1
5 TABLET ORAL 2 TIMES DAILY PRN
Qty: 12 TABLET | Refills: 0 | Status: SHIPPED | OUTPATIENT
Start: 2023-10-20 | End: 2023-10-23

## 2023-10-20 RX ORDER — TRAZODONE HYDROCHLORIDE 50 MG/1
50 TABLET, FILM COATED ORAL AT BEDTIME
Qty: 90 TABLET | Refills: 3 | Status: SHIPPED | OUTPATIENT
Start: 2023-10-20 | End: 2024-03-14

## 2023-10-20 RX ORDER — QUETIAPINE FUMARATE 400 MG/1
400 TABLET, FILM COATED ORAL AT BEDTIME
Qty: 90 TABLET | Refills: 3 | Status: SHIPPED | OUTPATIENT
Start: 2023-10-20 | End: 2024-06-28

## 2023-10-20 NOTE — PROGRESS NOTES
Assessment & Plan     Necrotizing fasciitis  Patient was hospitalized 10/9/2023 - 10/16/2023 at [facility].  She had been admitted there after she had been noted to have fatigue, severe pain, hypotension at her regular wound care follow-up.  Hospitalization was remarkable for requiring IV antibiotics, pressor support with Levophed, I&D, wound VAC.  Since discharge, patient has been receiving home nursing cares every other day for wound dressing changes.  On exam today, wound appears to have some purulent discharge within the bandage but no surrounding erythema, edema, induration, significant tenderness.  - oxyCODONE (ROXICODONE) 5 MG tablet  Dispense: 12 tablet; Refill: 0   -Discussed that this should last until follow-up visit next week  - Return in 1 week for close follow-up of wound healing    Hypertension  Prior to hospital admission, patient was profoundly hypotensive.  During hospitalization, hypotension was such that patient brief required pressor support.  Discharge, blood pressure was stable within normal limits but at the low end end of normal.  Therefore, her antihypertensives were held at discharge.  Blood pressure today is 106/72.  - Will continue to hold antihypertensives for the time being given current blood pressure reading, will reassess at follow-up visit    Moderate recurrent major depression  Patient requesting refills of the following medications.  These were provided today.  - traZODone (DESYREL) 50 MG tablet  Dispense: 90 tablet; Refill: 3  - QUEtiapine (SEROQUEL) 400 MG tablet  Dispense: 90 tablet; Refill: 3    BMI:   Estimated body mass index is 36.21 kg/m  as calculated from the following:    Height as of 10/9/23: 1.524 m (5').    Weight as of 10/11/23: 84.1 kg (185 lb 6.4 oz).     Return in about 1 week (around 10/27/2023) for Follow up.    BRENDEN HERNANDEZ MD  [facility] TIAN Moore is a 52 year old, presenting for the following [facility]  issues:  RECHECK (Post op from 10/09/2023 )      10/20/2023     8:31 AM   Additional Questions   Roomed by PUSHPA. Her MA   Accompanied by Self       HPI   Paty is a 52-year-old female presenting today for hospital follow-up and questions regarding pain control.  Patient was hospitalized last week for necrotizing fasciitis of the left groin which required I&D, brief ICU stay and pressor support, IV antibiotics.  She reports overall improvement since discharge.  No further fevers, chills, symptoms of illness.  She has a home cares nurse that will be visiting her every other day for wound cares.  Next visit from home care will be today.  Patient notes that it is difficult for her to see her wound given its location, but she has not noticed any obvious redness, warmth, swelling.  The wound area does remain painful.  She states that it fluctuates between sometimes being sharp pain versus more of a pressure pain.  At baseline, she does have a history of chronic pain which she states is mostly related to her back and musculoskeletal symptoms.  Her PCP Dr. Bullock has referred her to the pain clinic for this in the past.  However, patient states that she has not seen the pain clinic because she feels overwhelmed with a number of appointments she already has.      Hospital Follow-up Visit:    Hospital/Nursing Home/IP Rehab Facility: [facility]  Date of Admission: 10/9/23  Date of Discharge: 10/16/23  Reason(s) for Admission: Necrotizing fascitis, sepsis secondary to soft tissue infection    Was your hospitalization related to COVID-19? No   Problems taking medications regularly:  None  Medication changes since discharge: held antihypertensives, Toujeo lowered to 50 units daily  Problems adhering to non-medication therapy:  None    Summary of hospitalization:  [facility] discharge summary reviewed  Diagnostic Tests/Treatments reviewed.  Follow up needed: close follow-up of wound  healing, continued home nursing visits for wound cares, assessment of further HTN and T2DM cares  Other Healthcare Providers Involved in Patient s Care:         Homecare  Update since discharge: improved.         Plan of care communicated with patient             Review of Systems   CONSTITUTIONAL: NEGATIVE for fever, chills, change in weight  ENT/MOUTH: NEGATIVE for ear, mouth and throat problems  RESP: NEGATIVE for SOB  CV: NEGATIVE for chest pain, palpitations        Objective    /72   Pulse 93   Temp 98.3  F (36.8  C) (Oral)   Resp 20   SpO2 97%   There is no height or weight on file to calculate BMI.  Physical Exam   GENERAL: healthy, alert and no distress  RESP: lungs clear to auscultation - no rales, rhonchi or wheezes  CV: regular rate and rhythm, normal S1 S2, no S3 or S4, no murmur, click or rub, no peripheral edema and peripheral pulses strong  ABDOMEN: soft, nontender, no hepatosplenomegaly, no masses and bowel sounds normal  MS: no gross musculoskeletal defects noted, no edema  NEURO: Normal strength and tone, mentation intact and speech normal  PSYCH: mentation appears normal, affect normal/bright      ----- Service Performed and Documented by Resident or Fellow ------

## 2023-10-20 NOTE — PROGRESS NOTES
Preceptor Attestation:    I discussed the patient with the resident and evaluated the patient in person. I have verified the content of the note, which accurately reflects my assessment of the patient and the plan of care.   Supervising Physician:  Abrahan Guzman MD.

## 2023-10-21 DIAGNOSIS — F31.70 BIPOLAR DISORDER IN FULL REMISSION, MOST RECENT EPISODE UNSPECIFIED TYPE (H): ICD-10-CM

## 2023-10-23 RX ORDER — VENLAFAXINE HYDROCHLORIDE 150 MG/1
CAPSULE, EXTENDED RELEASE ORAL
Qty: 0.1 CAPSULE | Refills: 0 | Status: SHIPPED | OUTPATIENT
Start: 2023-10-23 | End: 2023-10-23

## 2023-10-23 RX ORDER — VENLAFAXINE HYDROCHLORIDE 150 MG/1
150 CAPSULE, EXTENDED RELEASE ORAL DAILY
COMMUNITY
End: 2023-11-03

## 2023-10-23 NOTE — TELEPHONE ENCOUNTER
Refill request for venlafaxine.  Last refilled on 9/13/23 by Dr. Bullock for #30, R1 with sig that further refills should be from psychiatrist.  There's a telephone note prior to that (8/21/23) to the same effect, with a note to the pharmacy.    Review of CareEverywhere shows that Ms. Perez follows with Dr. Georges of [facility], last visit 2/10/23.  At that time, plan was to continue venlafaxine, among other medications.    - Declined refill.  Pharmacy needs to send request to Dr. Georges.  - Changed med list to reflect this as not originating from our clinic.    Lis Vidal MD  (covering for Dr. Bullock while out of office)

## 2023-10-24 DIAGNOSIS — F41.9 ANXIETY: ICD-10-CM

## 2023-10-24 RX ORDER — HYDROXYZINE HYDROCHLORIDE 25 MG/1
TABLET, FILM COATED ORAL
Qty: 30 TABLET | Refills: 1 | Status: SHIPPED | OUTPATIENT
Start: 2023-10-24 | End: 2024-03-29

## 2023-10-24 NOTE — TELEPHONE ENCOUNTER
Refill request for hydroxyzine. Approved.    Lis Vidal MD  (covering for Dr. Bullock while out of office)

## 2023-10-26 ENCOUNTER — OFFICE VISIT (OUTPATIENT)
Dept: FAMILY MEDICINE | Facility: CLINIC | Age: 53
End: 2023-10-26
Payer: COMMERCIAL

## 2023-10-26 ENCOUNTER — TELEPHONE (OUTPATIENT)
Dept: FAMILY MEDICINE | Facility: CLINIC | Age: 53
End: 2023-10-26

## 2023-10-26 VITALS
OXYGEN SATURATION: 96 % | TEMPERATURE: 98.1 F | DIASTOLIC BLOOD PRESSURE: 77 MMHG | SYSTOLIC BLOOD PRESSURE: 116 MMHG | RESPIRATION RATE: 16 BRPM | HEART RATE: 91 BPM

## 2023-10-26 DIAGNOSIS — Z79.4 TYPE 2 DIABETES MELLITUS WITH COMPLICATION, WITH LONG-TERM CURRENT USE OF INSULIN (H): ICD-10-CM

## 2023-10-26 DIAGNOSIS — Z12.11 SCREEN FOR COLON CANCER: Primary | ICD-10-CM

## 2023-10-26 DIAGNOSIS — Z12.4 CERVICAL CANCER SCREENING: ICD-10-CM

## 2023-10-26 DIAGNOSIS — E11.8 TYPE 2 DIABETES MELLITUS WITH COMPLICATION, WITH LONG-TERM CURRENT USE OF INSULIN (H): ICD-10-CM

## 2023-10-26 DIAGNOSIS — Z12.31 VISIT FOR SCREENING MAMMOGRAM: ICD-10-CM

## 2023-10-26 DIAGNOSIS — M72.6 NECROTIZING FASCIITIS (H): ICD-10-CM

## 2023-10-26 DIAGNOSIS — F12.90 CANNABIS USE DISORDER: ICD-10-CM

## 2023-10-26 PROCEDURE — 99214 OFFICE O/P EST MOD 30 MIN: CPT | Mod: GC

## 2023-10-26 RX ORDER — OXYCODONE HYDROCHLORIDE 5 MG/1
5 TABLET ORAL 2 TIMES DAILY PRN
Qty: 8 TABLET | Refills: 0 | Status: SHIPPED | OUTPATIENT
Start: 2023-10-26 | End: 2023-11-03

## 2023-10-26 RX ORDER — OXYCODONE HYDROCHLORIDE 10 MG/1
10 TABLET ORAL EVERY 6 HOURS PRN
Qty: 12 TABLET | Refills: 0 | Status: CANCELLED | OUTPATIENT
Start: 2023-10-26

## 2023-10-26 NOTE — PROGRESS NOTES
Assessment & Plan     Necrotizing fasciitis (H)  Patient was hospitalized 10/9/2023 - 10/16/2023 at [facility] for treatment of abdominal wounds, notably necrotizing wound in left groin.  She was initially seen for posthospital follow-up on 10/20/23.  At that time, wound had been healing well but she continued to have pain.  Therefore she was given 12 tablets oxycodone 5 mg with plan to follow-up today.  On follow-up today, patient reports continued but improving pain.  Wound is healing well with no significant drainage, erythema, or warmth on exam.  - Patient requesting to turn down suction on wound vac (currently set at 125 per patient report). She reported that drainage is decreasing but she is unable to report specific volumes. Advised her to work with her home health nurse to keep a log of volume that is draining. She can call the clinic with this log and her PCP or I can discuss decreasing suction rate based off of that data. Otherwise will keep wound vac and suction rate as-is until her Wound Clinic appointment on 11/6/23. Patient is agreeable to this plan.  - Patient is requesting additional course of oxycodone. She has used this medication very appropriately and has been weaning down. Therefore appropriate to give additional short course: oxyCODONE (ROXICODONE) 5 MG tablet  Dispense: 8 tablet; Refill: 0  - Follow-up in 2 weeks for wound check, sooner as needed       BMI:   Estimated body mass index is 36.21 kg/m  as calculated from the following:    Height as of 10/9/23: 1.524 m (5').    Weight as of 10/11/23: 84.1 kg (185 lb 6.4 oz).     Return in about 2 weeks (around 11/9/2023) for Follow up wound.    BRENDEN HERNANDEZ MD  [facility] TIAN Moore is a 52 year old, presenting for the following health issues:  Wound Check and Refill Request (oxycodone)        10/26/2023     7:56 AM   Additional Questions   Roomed by Mao   Accompanied by self     HPI   Patient  presenting today for follow-up of left groin wound.  She was initially seen for posthospital follow-up on 10/20/23.  At that time, wound had been healing well but she continued to have pain.  Therefore she was given 12 tablets oxycodone 5 mg with plan to follow-up today.  On exam today, patient reports continued but improving pain.  She reports taking the oxycodone once in the morning and once at night which works well for her.  Also using extra strength Tylenol for pain control.  Regarding wound cares, both she and her home health nurse feel that her wound is healing well.  It continues to drain but less volume than it had been; however, patient has not paid attention to exactly the volume and has been draining.  Patient states that when her home health care nurse came yesterday, her nurse gave her a break from the pump since it does cause patient discomfort with it pulling down and hanging on the skin.  Health nurse advised patient to ask if suction could be turned down.  Currently suction is set at 125.    Patient reports finishing her course of antibiotics yesterday.  She notes some GI upset but also notes that she does have a history of chronic diarrhea so this is contributing.  She otherwise denies any systemic symptoms of illness.    Review of Systems   CONSTITUTIONAL: NEGATIVE for fever, chills, change in weight  RESP: NEGATIVE for significant cough or SOB  GI: POSITIVE for diarrhea and gas or bloating      Objective    /77   Pulse 91   Temp 98.1  F (36.7  C) (Oral)   Resp 16   SpO2 96%   There is no height or weight on file to calculate BMI.  Physical Exam   GENERAL: Healthy, alert and no distress.  RESP: Mild wheezes, lungs otherwise clear to auscultation.   CV: Regular rate and rhythm, no murmur.  ABDOMEN: Soft, nontender, no palpable masses.  SKIN: Viewed pictures of patient's wounds taken from last dressing change; on picture visualization, there is no significant drainage or erythema. On  physical exam of wounds today, the bandages are dry with no leakage and no surrounding erythema, edema, or areas of induration. Her left groin wound remains tender to palpation but not out of proportion to what would be expected.      ----- Service Performed and Documented by Resident or Fellow ------

## 2023-10-26 NOTE — TELEPHONE ENCOUNTER
[facility] phone call message- general phone call:    Reason for call:     Home care nurse would like the doctor or a nurse to return her phone call. Patient was supposed to call Maren when she get to the clinic for her appt but did not call.     Return call needed: Yes    OK to leave a message on voice mail? Yes    Primary language: English      needed? No    Call taken on October 26, 2023 at 10:28 AM by Naeem No

## 2023-10-26 NOTE — TELEPHONE ENCOUNTER
Updated home health that we will be deferring to vascular clinic to change orders as seen fit. Phone number to vascular clinic provided. YULISA Mckeon

## 2023-10-26 NOTE — TELEPHONE ENCOUNTER
Pt's home health nurse calling regarding today's visit. Home health states that she sent the patient with all for the information surrounding wound vac output and vitals but patient forget to give it to us. Per home care-patient has  cc's per week from wound vac.     Due to pain, home health nurse uses peppermint oil for relief of pain.     Measurements per home health nurse noted here:    Wound on abdomen-length was 17.9cm x 8cm width x 3.9 cm depth originally   Yesterday is was 1cm depth with length and width unchanged.     Other open areas are resolved per home health.     Home health is wondering if they can wait until tomorrow to continue with vac and decrease suction to 75 with intermittent use.     YULISA Mckeon

## 2023-10-26 NOTE — PROGRESS NOTES
Preceptor Attestation:   Patient seen, evaluated and discussed with the resident. I have verified the content of the note, which accurately reflects my assessment of the patient and the plan of care.  PDMP reviewed.   Supervising Physician:  Sal Chauhan MD

## 2023-10-26 NOTE — TELEPHONE ENCOUNTER
This sounds appropriate to me, although I think patient is also following at the vascular clinic, and they would be more appropriate to review and give wound vac follow up orders.  Looking at the chart, she has an appointment with Dr. Chris on 11/6 as well as the appointment with Dr. Duarte today.      Dr. Duarte, do you feel comfortable approving these orders, or would you like the home nurse to run the info past the vascular team as well?    Tyra Bullock MD    Routed to Dr. Duarte and YULISA Mcgee

## 2023-10-31 ENCOUNTER — TELEPHONE (OUTPATIENT)
Dept: VASCULAR SURGERY | Facility: CLINIC | Age: 53
End: 2023-10-31
Payer: COMMERCIAL

## 2023-11-02 ENCOUNTER — TELEPHONE (OUTPATIENT)
Dept: VASCULAR SURGERY | Facility: CLINIC | Age: 53
End: 2023-11-02
Payer: COMMERCIAL

## 2023-11-02 NOTE — LETTER
2023             [facility]             Wound Dressing Rx and Order Form             Order Status:Reorder             Verbal: [facility]   Account # [id number]  Fax: [phone]  Customer Service: [phone]      Patient Info:  Name: Teresa Perez  : 1970  Address:   [address]  [place] [address]  Phone: [phone]    Insurance Info:  PRIMARY INSURANCE: Payor: [employer] / Plan: [employer] MEDICARE ADVANTAGE / Product Type: HMO /   Primary Policy ID#: [id number]  SECONDARY INSURANCE:  N/A   Secondary Policy ID#: N/A    Physician Info:  DrShiloh Name:  Natalio Chris MD, MD   Dept Address/Phones:   [address] [address] [phone]  Dept: [phone]  Fax: [phone]     Wound info:       Wound Buttocks Pressure injury community acquired Stage 2 (Active)     Negative Pressure Wound Therapy Pelvis Anterior;Left (Active)   Wound Type Surgical 10/16/23 0019   Unit Type wound vac 10/14/23 0954   Dressing Pieces Applied (# of Each Type) Silver foam 10/16/23 0019   Cycle Continuous 10/16/23 001   Target Pressure (mmHg) 125 10/16/23 0019   Instillation other 10/15/23 0830   Drainage Color/Characteristics Tan 10/16/23 001   Cannister changed? No 10/16/23 0019   Output (ml) 300 ml 10/16/23 0600       Wound Buttocks Pressure injury community acquired Stage 2 (Active)       Wound Abdomen (Active)   Wound Bed Pink;Moist 10/14/23 0010   Asha-wound Assessment Dusky;Erythema 10/13/23 2030   Drainage Amount None 10/16/23 001   Drainage Color/Characteristics Eric 10/15/23 1525   Wound Care/Cleansing Other (Comment) 10/13/23 2030   Dressing Foam 10/16/23 0019   Dressing Status Clean, dry, intact 10/16/23 0019   Dressing Change Due 10/16/23 10/13/23 2030       Wound Pelvis (Active)   Wound Bed Other (Comment) 10/13/23 1009   Asha-wound Assessment Black;Moist  10/10/23 1845   Drainage Amount Small 10/15/23 1525   Drainage Color/Characteristics Sanguinous 10/10/23 1845   Wound Care/Cleansing Normal saline 10/10/23 1845   Dressing Dry gauze 10/16/23 0019   Dressing Status Clean, dry, intact 10/16/23 0019   Dressing Change Due 10/11/23 10/11/23 0415       Wound Thigh (Active)   Wound Bed Other (Comment) 10/13/23 1009   Dressing Foam 10/16/23 0019   Dressing Status Clean, dry, intact 10/16/23 0019       VASC Wound Umbilicus (Active)   Pre Size Length 0.6 10/09/23 0700   Pre Size Width 0.6 10/09/23 0700   Pre Size Depth 1.5 10/09/23 0700   Pre Total Sq cm 0.36 10/09/23 0700   Description refused assessment 09/11/23 1500       VASC Wound Abdomen middle (Active)   Pre Size Length 2.3 10/09/23 0700   Pre Size Width 3.8 10/09/23 0700   Pre Size Depth 0.1 10/09/23 0700   Pre Total Sq cm 12 09/11/23 1500   Description scattered 05/16/23 0903       VASC Wound Abdomen left (Active)   Description stable eschar 06/12/23 0700       VASC Wound abd wound distal (Active)   Pre Size Length 4 10/09/23 0700   Pre Size Width 3 10/09/23 0700   Pre Size Depth 0.1 10/09/23 0700   Pre Total Sq cm 12 10/09/23 0700       VASC Wound left IT (Active)   Pre Size Length 1.5 10/09/23 0700   Pre Size Width 1.7 10/09/23 0700   Pre Size Depth 2 10/09/23 0700   Pre Total Sq cm 2.55 10/09/23 0700       Incision/Surgical Site 07/05/22 Abdomen (Active)       Incision/Surgical Site 07/19/22 Left Buttocks (Active)       Incision/Surgical Site 10/09/23 Left Perineum (Active)   Incision Assessment UTV 10/16/23 0822   Asha-Incision Assessment UTV 10/16/23 0822   Closure DESIRAE 10/16/23 0822   Incision Drainage Amount Small 10/10/23 1845   Drainage Description Sanguinous 10/10/23 1845   Incision Care Other (Comment) 10/11/23 0415   Dressing Intervention Clean, dry, intact 10/16/23 0822     Drainage: moderate  Thickness:  full  Duration of Need: 30 DAYS  Days Supply: 30 DAYS  Start Date: 10/9/2023  Starter Kit: Ancillary  Kit (saline, gloves, gauze)  Qualifying wound/Debridement: Yes          Dressing Type Brand Size Frequency of change -or- Quantity   Primary Hydrofera blue ready   4x4 DAILY and as needed               Secondary             ABD pad   5x9 DAILY and as needed     (2 per dressing change)     Mepilex bordered foam adhesive    3x3 3 TIMES PER WEEK and as needed               Tape             Medipore tape    1in  DAILY and as needed      No substitutions preferred. Call [phone].     OK to forward to covered supplier.    Electronically Signed Physician: Natalio Chris MD, MD Date: 11/2/2023

## 2023-11-02 NOTE — TELEPHONE ENCOUNTER
Pt stated she missed her appointment for today 11/2/23 but is rescheduled for 11/13. She is running out of supplies and requesting another order. Last order placed on 10/9/23. Explained insurance typically will only let us order once monthly. Will try but explained she may not receive them in time. Told her she could  some things to hold her over and she said she does not have a ride to  supplies. Told to call back if she does find transportation but will try and order supplies.

## 2023-11-03 ENCOUNTER — OFFICE VISIT (OUTPATIENT)
Dept: FAMILY MEDICINE | Facility: CLINIC | Age: 53
End: 2023-11-03
Payer: COMMERCIAL

## 2023-11-03 VITALS
HEART RATE: 90 BPM | OXYGEN SATURATION: 96 % | TEMPERATURE: 97.2 F | SYSTOLIC BLOOD PRESSURE: 120 MMHG | DIASTOLIC BLOOD PRESSURE: 79 MMHG

## 2023-11-03 DIAGNOSIS — M72.6 NECROTIZING FASCIITIS (H): ICD-10-CM

## 2023-11-03 DIAGNOSIS — B00.1 COLD SORE: ICD-10-CM

## 2023-11-03 DIAGNOSIS — F33.1 MODERATE RECURRENT MAJOR DEPRESSION (H): ICD-10-CM

## 2023-11-03 DIAGNOSIS — J30.2 SEASONAL ALLERGIC RHINITIS, UNSPECIFIED TRIGGER: ICD-10-CM

## 2023-11-03 DIAGNOSIS — M53.3 PAIN IN THE COCCYX: ICD-10-CM

## 2023-11-03 DIAGNOSIS — B37.2 CANDIDIASIS, CUTANEOUS: Primary | ICD-10-CM

## 2023-11-03 DIAGNOSIS — R10.11 RUQ ABDOMINAL PAIN: ICD-10-CM

## 2023-11-03 DIAGNOSIS — B37.31 CANDIDIASIS OF VAGINA: ICD-10-CM

## 2023-11-03 PROCEDURE — 99214 OFFICE O/P EST MOD 30 MIN: CPT | Mod: GC

## 2023-11-03 RX ORDER — VENLAFAXINE HYDROCHLORIDE 150 MG/1
150 CAPSULE, EXTENDED RELEASE ORAL DAILY
Qty: 90 CAPSULE | Refills: 1 | Status: SHIPPED | OUTPATIENT
Start: 2023-11-03 | End: 2024-01-24

## 2023-11-03 RX ORDER — DICLOFENAC POTASSIUM 50 MG/1
50 TABLET, FILM COATED ORAL 2 TIMES DAILY PRN
Qty: 90 TABLET | Refills: 0 | Status: SHIPPED | OUTPATIENT
Start: 2023-11-03 | End: 2023-12-15

## 2023-11-03 RX ORDER — ACETAMINOPHEN 500 MG
500-1000 TABLET ORAL EVERY 6 HOURS PRN
Qty: 30 TABLET | Refills: 3 | Status: SHIPPED | OUTPATIENT
Start: 2023-11-03 | End: 2023-11-13

## 2023-11-03 RX ORDER — EPINEPHRINE 0.3 MG/.3ML
0.3 INJECTION SUBCUTANEOUS
Qty: 2 ML | Refills: 0 | Status: SHIPPED | OUTPATIENT
Start: 2023-11-03

## 2023-11-03 RX ORDER — FLUCONAZOLE 150 MG/1
150 TABLET ORAL ONCE
Qty: 1 TABLET | Refills: 0 | Status: SHIPPED | OUTPATIENT
Start: 2023-11-03 | End: 2023-11-03

## 2023-11-03 RX ORDER — MELOXICAM 15 MG/1
15 TABLET ORAL DAILY
Qty: 30 TABLET | Refills: 1 | Status: SHIPPED | OUTPATIENT
Start: 2023-11-03 | End: 2024-01-23

## 2023-11-03 RX ORDER — OXYCODONE HYDROCHLORIDE 5 MG/1
5 TABLET ORAL EVERY OTHER DAY
Qty: 5 TABLET | Refills: 0 | Status: SHIPPED | OUTPATIENT
Start: 2023-11-03 | End: 2024-02-22

## 2023-11-03 RX ORDER — KETOCONAZOLE 20 MG/G
CREAM TOPICAL DAILY
Qty: 60 G | Refills: 1 | Status: SHIPPED | OUTPATIENT
Start: 2023-11-03 | End: 2023-11-21

## 2023-11-03 RX ORDER — CETIRIZINE HYDROCHLORIDE 10 MG/1
10 TABLET ORAL DAILY
Qty: 90 TABLET | Refills: 1 | Status: SHIPPED | OUTPATIENT
Start: 2023-11-03 | End: 2024-05-03

## 2023-11-03 RX ORDER — ACYCLOVIR 50 MG/G
CREAM TOPICAL
Qty: 5 G | Refills: 3 | Status: SHIPPED | OUTPATIENT
Start: 2023-11-03 | End: 2023-11-21

## 2023-11-03 NOTE — PATIENT INSTRUCTIONS
Typical beneficial practices include:  ?Daily cleansing of intertriginous skin with a mild cleanser followed by drying of affected area with a hair dryer on a cool setting  ?Aeration of affected area when feasible  ?Daily application of drying powders, such as powders composed of microporous cellulose  ?Use of absorbent material or clothing, such as cotton or jolly wool, to separate skin in folds  ?Application of barrier creams in areas that may come in contact with urine or feces  ?Treatment of hyperhidrosis in the affected area  ?Weight loss in persons who are overweight or obese  ?Appropriate treatment of coexisting diabetes mellitus

## 2023-11-03 NOTE — PROGRESS NOTES
Preceptor attestation:  Vital signs reviewed: /79 (BP Location: Right arm, Patient Position: Sitting, Cuff Size: Adult Regular)   Pulse 90   Temp 97.2  F (36.2  C) (Tympanic)   SpO2 96%     Patient seen, evaluated, and discussed with the resident.  I verified the content of the note, which accurately reflects my assessment of the patient and the plan of care.      PDMP Review         Value Time User    State PDMP site checked  Yes 11/3/2023  8:31 AM Lis Vidal MD          Supervising physician: Lis Vidal MD  [facility]

## 2023-11-03 NOTE — PROGRESS NOTES
Assessment & Plan     Candidiasis of vagina  Will treat without diagnostic confirmation due to high suspicion for yeast infection and difficulty obtaining wet prep. Will treat with one time Diflucan tablet 150 mg. Patient to return if symptoms persist or worsen.  - fluconazole (DIFLUCAN) 150 MG tablet  Dispense: 1 tablet; Refill: 0    Candidiasis, cutaneous  History and exam most consistent with new candidiasis infection of panniculus, located in close proximity to but seemingly unrelated to recent necrotizing fasciitis wounds. Patient denies any discharge or bleeding from area. Reviewed care instructions for area including cleansing with soap and water, keeping area cool and dry, wearing loose fitted, dry cotton clothing. Patient instructed to apply prescribed cream twice daily for 4 weeks and follow up if rash persists or worsens.  - ketoconazole (NIZORAL) 2 % external cream  Dispense: 60 g; Refill: 1    Necrotizing fasciitis (H)  Ongoing pain management regarding wound pain from recent hospitalization. Was given 8 tablets of oxycodone at last visit 8 days ago and is out of medication today. Discussed at length that we should ideally be decreasing pain medication as we get further out from hospitalization. Patient has inquired about chronic pain management at clinic before and was referred to outside pain clinic as she is not an ideal patient for chronic opioid therapy. Instructed patient to utilize the 5 additional tablets prescribed today as an every other day treatment option and not daily. Refills of 3 other analgesic medications done today (see below), recommended patient alternate amongst these therapy options for best results. Reviewed PDMP with attending physician today, no evidence of controlled substances being filled at other facilities. Following with wound care for wound vac settings/recommendations, next visit on 11/13. Follow up in 10 days.   - oxyCODONE (ROXICODONE) 5 MG tablet  Dispense: 5  tablet; Refill: 0    Seasonal allergic rhinitis, unspecified trigger  Stable symptoms per patient report, refill requested today.   - cetirizine (ZYRTEC) 10 MG tablet  Dispense: 90 tablet; Refill: 1    RUQ abdominal pain  Pain in the coccyx  Refills of analgesic medications requested today. Discussed risks of long term daily NSAID use with history of CKD. Patient to follow up and continue to discuss medication options with her PCP. Recent kidney lab work was reassuring with GFR>90.   - meloxicam (MOBIC) 15 MG tablet  Dispense: 30 tablet; Refill: 1  - diclofenac (CATAFLAM) 50 MG tablet  Dispense: 90 tablet; Refill: 0  - acetaminophen (TYLENOL) 500 MG tablet  Dispense: 30 tablet; Refill: 3    Moderate recurrent major depression (H)  Stable mood today, denies thoughts of harming self or others. Refill of medication requested by patient.   - venlafaxine (EFFEXOR XR) 150 MG 24 hr capsule  Dispense: 90 capsule; Refill: 1    Cold sore  Refill requested, denies current or recent flares.   - acyclovir (ZOVIRAX) 5 % external cream  Dispense: 5 g; Refill: 3        Follow up in 10 days for pain medication with Dr. Duarte.   Follow up in 1 month with PCP Dr. Bullock for preventative visit.    Zayra Martinez DO PGY2  [facility]    I discussed this patient with attending physician Dr. Vidal who agrees with my assessment and plan.     Bryson Moore is a 53 year old, presenting for the following health issues:  Follow Up (Had surgery 3 weeks ago and is noticing possible yeast infection around her wound. Also requesting for some pain meds) and Refill Request        11/3/2023     7:59 AM   Additional Questions   Roomed by bailey   Accompanied by self       HPI     Patient was hospitalized 10/9/2023 - 10/16/2023 at [facility] for treatment of abdominal wounds, notably necrotizing wound in left groin. She continues to follow with wound care. Presents today for new concern of yeast infection on  her panniculus and vaginal area. She first noticed redness and itching in these areas 4-5 days ago, no drainage from or involvement of actual wound site. Vaginal symptoms include increased white, chunky discharge with erythema and itchiness. Has had vaginal infections like this before, successfully treated as yeast infection with monistat. Denies fever, chills, abdominal pain, or vaginal bleeding today.         Objective    /79 (BP Location: Right arm, Patient Position: Sitting, Cuff Size: Adult Regular)   Pulse 90   Temp 97.2  F (36.2  C) (Tympanic)   SpO2 96%   There is no height or weight on file to calculate BMI.  Physical Exam   GENERAL: healthy, alert and no distress  RESP: lungs clear to auscultation bilaterally   CV: regular rate and rhythm, normal S1 S2, no murmur, click or rub  ABDOMEN: soft, nontender, no hepatosplenomegaly, no masses and bowel sounds normal  ABDOMEN: soft, nontender with large area of erythema without active weeping or discharge   SKIN: wounds with bandages that are c/d/I with no leakage and no surrounding erythema, edema, or areas of induration.     ----- Service Performed and Documented by Resident or Fellow ------

## 2023-11-13 ENCOUNTER — OFFICE VISIT (OUTPATIENT)
Dept: VASCULAR SURGERY | Facility: CLINIC | Age: 53
End: 2023-11-13
Attending: FAMILY MEDICINE
Payer: COMMERCIAL

## 2023-11-13 VITALS — HEART RATE: 72 BPM | OXYGEN SATURATION: 96 % | DIASTOLIC BLOOD PRESSURE: 80 MMHG | SYSTOLIC BLOOD PRESSURE: 126 MMHG

## 2023-11-13 DIAGNOSIS — R05.9 COUGH: ICD-10-CM

## 2023-11-13 DIAGNOSIS — T81.89XD NON-HEALING SURGICAL WOUND, SUBSEQUENT ENCOUNTER: ICD-10-CM

## 2023-11-13 DIAGNOSIS — U07.1 INFECTION DUE TO 2019 NOVEL CORONAVIRUS: ICD-10-CM

## 2023-11-13 DIAGNOSIS — Z79.4 TYPE 2 DIABETES MELLITUS WITH HYPERGLYCEMIA, WITH LONG-TERM CURRENT USE OF INSULIN (H): ICD-10-CM

## 2023-11-13 DIAGNOSIS — M53.3 PAIN IN THE COCCYX: ICD-10-CM

## 2023-11-13 DIAGNOSIS — R10.84 ABDOMINAL PAIN, GENERALIZED: ICD-10-CM

## 2023-11-13 DIAGNOSIS — R11.0 NAUSEA: ICD-10-CM

## 2023-11-13 DIAGNOSIS — E11.65 TYPE 2 DIABETES MELLITUS WITH HYPERGLYCEMIA, WITH LONG-TERM CURRENT USE OF INSULIN (H): ICD-10-CM

## 2023-11-13 DIAGNOSIS — S31.109D OPEN WOUND OF ABDOMINAL WALL, SUBSEQUENT ENCOUNTER: Primary | ICD-10-CM

## 2023-11-13 PROBLEM — T81.89XA NONHEALING SURGICAL WOUND: Status: ACTIVE | Noted: 2023-11-13

## 2023-11-13 PROCEDURE — 99214 OFFICE O/P EST MOD 30 MIN: CPT | Performed by: FAMILY MEDICINE

## 2023-11-13 PROCEDURE — G0463 HOSPITAL OUTPT CLINIC VISIT: HCPCS | Performed by: FAMILY MEDICINE

## 2023-11-13 RX ORDER — GUAIFENESIN AND DEXTROMETHORPHAN HYDROBROMIDE 100; 10 MG/5ML; MG/5ML
SOLUTION ORAL
Qty: 354 ML | Refills: 0 | Status: SHIPPED | OUTPATIENT
Start: 2023-11-13 | End: 2023-11-21

## 2023-11-13 RX ORDER — METOCLOPRAMIDE 5 MG/1
5 TABLET ORAL 3 TIMES DAILY PRN
Qty: 45 TABLET | Refills: 1 | Status: SHIPPED | OUTPATIENT
Start: 2023-11-13 | End: 2024-01-12

## 2023-11-13 RX ORDER — EMPAGLIFLOZIN 25 MG/1
25 TABLET, FILM COATED ORAL DAILY
Qty: 90 TABLET | Refills: 3 | Status: SHIPPED | OUTPATIENT
Start: 2023-11-13

## 2023-11-13 RX ORDER — GLYCERIN/MIN OIL/POLYCARBOPHIL
500-1000 GEL WITH APPLICATOR (GRAM) VAGINAL EVERY 6 HOURS PRN
Qty: 100 TABLET | Refills: 3 | Status: SHIPPED | OUTPATIENT
Start: 2023-11-13 | End: 2024-03-07

## 2023-11-13 ASSESSMENT — PAIN SCALES - GENERAL: PAINLEVEL: SEVERE PAIN (7)

## 2023-11-13 NOTE — LETTER
[facility]  [address] [place] [address] [address]  [place], [place] [address]  [phone]      Fax [phone]    [facility]           Fax: [phone]            Customer Service: [phone]        Account #: [id number]    Wound Dressing Rx and Order Form  Order Status: new  Verbal: Tiffanie  Date: 2023     Teresa Perez  Gender: female  : 1970  [address]  [place] [address]  [phone] (home)     Medical Record: [id number]  Primary Care Provider: Tyra Bullock      ICD-10-CM    1. Open wound of abdominal wall, subsequent encounter  S31.109D Wound care      2. Non-healing surgical wound, subsequent encounter  T81.89XD Wound care            Insurance Info:  INSURER: Payor: [employer] / Plan: [employer] MEDICARE ADVANTAGE / Product Type: HMO /   Policy ID#:  [id number]  SECONDARY INSURANCE:  MEDICA  Secondary Policy ID#:  [id number]        Physician Info:   Name:  ROBERTA DECKER     Dept Address/Phones:   [address] [place] [address], [address]  [facility] [id number] [phone]  Fax: [phone]    Lymphedema circumferential measurements (in cm):       No data to display                  Wound info:  PICC 10/09/23 Triple Lumen Left Brachial vein lateral (Active)   Site Assessment WDL 10/16/23 0821   External Cath Length (cm) 5 cm 10/09/23 1349   Extremity Circumference (cm) 35 cm 10/09/23 1349   Dressing Chlorhexidine disk 10/16/23 0821   Dressing Status clean;dry;intact 10/16/23 0821   Dressing Intervention dressing reinforced 10/14/23 0954   Dressing Change Due 10/16/23 10/16/23 0821   Line Necessity Yes, meets criteria 10/16/23 0821   Carbajal - Status saline locked 10/16/23 0000   Gray - Intervention Flushed 10/16/23 0000   Red - Status saline locked 10/16/23 0000   Red - Cap Change Due 10/15/23 10/11/23 1400   Red - Intervention Flushed 10/16/23 0000   White - Status saline locked 10/16/23 0000   White - Cap Change Due 10/15/23  10/11/23 1400   White - Intervention Flushed 10/16/23 0000   Phlebitis Scale 0-->no symptoms 10/15/23 2200   Infiltration? no 10/15/23 2200   Infiltration Scale 0 10/10/23 1800   PICC Comment PICC IS READY TO USE 10/09/23 1349   Number of days: 35       Negative Pressure Wound Therapy Pelvis Anterior;Left (Active)   Wound Type Surgical 10/16/23 0019   Unit Type wound vac 10/14/23 0954   Dressing Pieces Applied (# of Each Type) Silver foam 10/16/23 0019   Cycle Continuous 10/16/23 0019   Target Pressure (mmHg) 125 10/16/23 0019   Instillation other 10/15/23 0830   Drainage Color/Characteristics Tan 10/16/23 0019   Cannister changed? No 10/16/23 0019   Output (ml) 300 ml 10/16/23 0600   Number of days: 33       Wound Buttocks Pressure injury community acquired Stage 2 (Active)   Number of days: 483       Wound Abdomen (Active)   Wound Bed Pink;Moist 10/14/23 0010   Asha-wound Assessment Dusky;Erythema 10/13/23 2030   Drainage Amount None 10/16/23 0019   Drainage Color/Characteristics Eric 10/15/23 1525   Wound Care/Cleansing Other (Comment) 10/13/23 2030   Dressing Foam 10/16/23 0019   Dressing Status Clean, dry, intact 10/16/23 0019   Dressing Change Due 10/16/23 10/13/23 2030   Number of days: 35       Wound Pelvis (Active)   Wound Bed Other (Comment) 10/13/23 1009   Asha-wound Assessment Black;Moist 10/10/23 1845   Drainage Amount Small 10/15/23 1525   Drainage Color/Characteristics Sanguinous 10/10/23 1845   Wound Care/Cleansing Normal saline 10/10/23 1845   Dressing Dry gauze 10/16/23 0019   Dressing Status Clean, dry, intact 10/16/23 0019   Dressing Change Due 10/11/23 10/11/23 0415   Number of days: 35       Wound Thigh (Active)   Wound Bed Other (Comment) 10/13/23 1009   Dressing Foam 10/16/23 0019   Dressing Status Clean, dry, intact 10/16/23 0019   Number of days: 35       VASC Wound Umbilicus (Active)   Pre Size Length 0.6 10/09/23 0700   Pre Size Width 0.6 10/09/23 0700   Pre Size Depth 1.5 10/09/23 0700  "  Pre Total Sq cm 0.36 10/09/23 0700   Description refused assessment 09/11/23 1500   Number of days: 181       VASC Wound Abdomen middle (Active)   Pre Size Length 6.5 11/13/23 0800   Pre Size Width 6 11/13/23 0800   Pre Size Depth 0.1 11/13/23 0800   Pre Total Sq cm 39 11/13/23 0800   Description measured all abdomen wounds together 11/13/23 0800   Number of days: 181       VASC Wound Abdomen left (Active)   Description stable eschar 06/12/23 0700   Number of days: 181       VASC Wound abd wound distal (Active)   Pre Size Length 4 10/09/23 0700   Pre Size Width 3 10/09/23 0700   Pre Size Depth 0.1 10/09/23 0700   Pre Total Sq cm 12 10/09/23 0700   Number of days: 140       VASC Wound left IT (Active)   Pre Size Length 1 11/13/23 0800   Pre Size Width 0.6 11/13/23 0800   Pre Size Depth 0.5 11/13/23 0800   Pre Total Sq cm 0.6 11/13/23 0800   Number of days: 112       VASC Wound left groin (Active)   Pre Size Length 3 11/13/23 0800   Pre Size Width 13 11/13/23 0800   Pre Size Depth 2 11/13/23 0800   Pre Total Sq cm 39 11/13/23 0800   Number of days: 0       Incision/Surgical Site 07/05/22 Abdomen (Active)   Number of days: 496       Incision/Surgical Site 07/19/22 Left Buttocks (Active)   Number of days: 482       Incision/Surgical Site 10/09/23 Left Perineum (Active)   Incision Assessment UTV 10/16/23 0822   Asha-Incision Assessment UTV 10/16/23 0822   Closure DESIRAE 10/16/23 0822   Incision Drainage Amount Small 10/10/23 1845   Drainage Description Sanguinous 10/10/23 1845   Incision Care Other (Comment) 10/11/23 0415   Dressing Intervention Clean, dry, intact 10/16/23 0822   Number of days: 35        Drainage: moderate  Thickness:  full  Duration of Need: 30 DAYS  Days Supply: 30 DAYS  Start Date: 11/13/2023  Starter Kit: Ancillary Kit (saline, gloves, gauze)  Qualifying wound/Debridement: Yes      Dressing Type Brand Size Frequency of change -or- Quantity   Primary ABD pads  5\"x9\" 3 TIMES PER WEEK and as needed    " "Medipore tape  2\" rolls 3 TIMES PER WEEK and as needed    Square gauze  4\"x4\" 3 TIMES PER WEEK and as needed     No substitutions preferred. Call [phone].         OK to forward to covered supplier.    Electronically Signed Physician:  ROBERTA DECKER             Date: November 13, 2023    "
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
## 2023-11-13 NOTE — PATIENT INSTRUCTIONS
Wound care supplies were ordered today through [employer] and if you are not receiving your supplies or have a question on your bill please contact Vanita Hernandez at [phone]. Please allow 2-5 business days for delivery of supplies. You may get a call from a [phone] if there are additional information [facility] needs. It is important to  or return their call. PLEASE NOTE: If you need to return your supplies, you MUST call customer service within 15 days of delivery date.         Wound Care Instructions    3 TIMES PER WEEK and as needed, Cleanse your groin, abdomen, and buttocks wound(s) with Normal saline.    Pat Dry with non-sterile gauze    Abdomen and groin: hydrofera blue ready transfer, cover abdomen with mepilex, cover groin with ABD. If you are using hydrofera blue ready with the plastic backing, the plastic backing needs to be FACING AWAY from the wound    Buttock: cover with mepilex      It is ok to get your wound wet in the bath or shower      If for some reason you are not able to get your dressing(s) changed as outlined above (due to illness, lack of supplies, lack of help) please do the following: remove old, soiled dressings; wash the wounds with saline; pat dry; apply ABD pad or other absorbant pad and secure with rolled gauze; avoid tape directly on your skin; Call the clinic as soon as possible to let us know what the current issues are in receiving wound care [phone].      SEEK MEDICAL CARE IF:  You have an increase in swelling, pain, or redness around the wound.  You have an increase in the amount of pus coming from the wound.  There is a bad smell coming from the wound.  The wound appears to be worsening/enlarging  You have a fever greater than 101.5 F      It is ok to continue current wound care treatment/products for the next 2-3 days until new wound care supplies are ordered and arrive. If longer than this please contact our office at [phone].    If you have a 2 layer or 4 layer  compression wrap on these are safe to have on for ONLY 7 days. If for some reason you are not able to get the wrap(s) changed (due to illness; lack of supplies, lack of help, lack of transportation) please do the following: unwrap the old 2 or 4 layer compression wrap; avoid using scissors as you could cut your skin and cause wounds; use tubular compression when available. Call to reschedule your home care or clinic visit appointment as soon as possible.    Please NOTE: if you are 15 minutes late to your clinic appointment you will have to be rescheduled. Please call our clinic as soon as possible if you know you will not be able to get to your appointment at [phone].    If you fail to show up to 3 scheduled clinic appointments you will be dismissed from our clinic.              We want to hear from you!  In the next few weeks, you should receive a call or email to complete a survey about your visit at [facility]. Please help us improve your appointment experience by letting us know how we did today. We strive to make your experience good and value any ways in which we could do better.      We value your input and suggestions.    Thank you for choosing the [facility]!

## 2023-11-21 ENCOUNTER — OFFICE VISIT (OUTPATIENT)
Dept: FAMILY MEDICINE | Facility: CLINIC | Age: 53
End: 2023-11-21
Payer: COMMERCIAL

## 2023-11-21 VITALS
DIASTOLIC BLOOD PRESSURE: 84 MMHG | OXYGEN SATURATION: 98 % | SYSTOLIC BLOOD PRESSURE: 135 MMHG | WEIGHT: 186 LBS | RESPIRATION RATE: 16 BRPM | BODY MASS INDEX: 36.33 KG/M2 | HEART RATE: 89 BPM | TEMPERATURE: 98.8 F

## 2023-11-21 DIAGNOSIS — J40 BRONCHITIS: Primary | ICD-10-CM

## 2023-11-21 PROCEDURE — 99214 OFFICE O/P EST MOD 30 MIN: CPT | Performed by: FAMILY MEDICINE

## 2023-11-21 PROCEDURE — 87635 SARS-COV-2 COVID-19 AMP PRB: CPT | Performed by: FAMILY MEDICINE

## 2023-11-21 RX ORDER — PREDNISONE 20 MG/1
40 TABLET ORAL DAILY
Qty: 10 TABLET | Refills: 0 | Status: SHIPPED | OUTPATIENT
Start: 2023-11-21 | End: 2023-11-26

## 2023-11-21 RX ORDER — ACYCLOVIR 50 MG/G
CREAM TOPICAL
Qty: 5 G | Refills: 3 | Status: SHIPPED | OUTPATIENT
Start: 2023-11-21 | End: 2024-06-28

## 2023-11-21 NOTE — PROGRESS NOTES
Assessment & Plan     Bronchitis  Think she likely is having a COPD exacerbation.  Will treat with prednisone 40 mg for 5 days.  Will check for COVID as well.  - Nebulizer and Supplies Order for DME - ONLY FOR DME  - acyclovir (ZOVIRAX) 5 % external cream; Apply topically 5 times daily  - Symptomatic COVID-19 Virus (Coronavirus) by PCR Nasopharyngeal; Future  - predniSONE (DELTASONE) 20 MG tablet; Take 2 tablets (40 mg) by mouth daily for 5 days  - Symptomatic COVID-19 Virus (Coronavirus) by PCR Nose    Of note, the patient asked for refill of oxycodone.  She states she has been receiving this since she was discharged from the hospital to manage pain from her open surgical wound.  Looks like the last prescription was for 5 tablets prescribed on 11/3/2023.  At this point she is over 5 weeks out from surgery and I do not think it is indicated to be on oxycodone.  If she is having chronic pain now related to this wound and we can start her in the CPM process.  I did send a message in Epic secure chat to her PCP for more guidance on this.    Diagnosis or treatment significantly limited by social determinants of health - low health literacy.  25 minutes spent by me on the date of the encounter doing chart review, patient visit, documentation, and discussion with other provider(s)        BMI:   Estimated body mass index is 36.33 kg/m  as calculated from the following:    Height as of 10/9/23: 1.524 m (5').    Weight as of this encounter: 84.4 kg (186 lb).       Alber Rivera MD  [facility] TIAN Moore is a 53 year old, presenting for the following health issues:  Cough (Wants to get a covid test), Pharyngitis, ears popping, congestion (In lungs), Med Change Request (Wants pain med), and Dme (Would like to get mask and tubing- has a neb machine at home)      11/21/2023     1:28 PM   Additional Questions   Roomed by Fran   Accompanied by self       HPI   Hospitalized 10/9-10/16 with nec  fasc.    Home health RN comes out 3 times per week.  She is done with the wound vac.    Acute Illness  Acute illness concerns: sick since Saturday  Onset/Duration:   Symptoms:  Fever: No  Chills/Sweats: YES  Headache (location?): No  Sinus Pressure: No  Conjunctivitis:  No  Ear Pain: no  Rhinorrhea: YES  Congestion: YES  Sore Throat: YES  Cough: YES-productive of white sputum  Wheeze: YES  Decreased Appetite: No  Nausea: No  Vomiting: No  Diarrhea: YES- chronic      Review of Systems   Constitutional, HEENT, cardiovascular, pulmonary, gi and gu systems are negative, except as otherwise noted.      Objective    /84   Pulse 89   Temp 98.8  F (37.1  C) (Oral)   Resp 16   Wt 84.4 kg (186 lb)   SpO2 98%   BMI 36.33 kg/m    Body mass index is 36.33 kg/m .  Physical Exam   GENERAL: Somewhat disheveled but in no acute distress  NECK: no adenopathy  RESP: Prolonged expiratory phase but no wheezing or rhonchi appreciated  CV: regular rate and rhythm, normal S1 S2, no S3 or S4, no murmur  MS: Left-sided BKA

## 2023-11-21 NOTE — COMMUNITY RESOURCES LIST (ENGLISH)
11/21/2023   [facility]patient Clin[facility]  N/A  For additional resource needs, please contact your health insurance member services or your primary care team.  Phone: [phone]   Email: N/A   Address: [address] [place] [id number]   Hours: N/A        Transportation       Free or low-cost transportation  1  [employer] [facility] Circulator Bus Distance: 3.79 miles      In-Person   [address] [place] [address]  Language: English  Hours: Tue 9:00 AM - 2:00 PM  Fees: Self Pay   Phone: [phone]) 610-7873 Email: info@[employer] Website: http://www.[employer].org/     2  [facility] Distance: 5.32 miles      In-Person   [address] [place] [address]  Language: English, Belarusian  Hours: Mon 9:00 AM - 5:00 PM , Tue 9:30 AM - 7:00 PM , Wed 9:00 AM - 5:00 PM , Thu 9:30 AM - 7:00 PM , Fri 9:00 AM - 5:00 PM  Fees: Free   Phone: (174) 456-1842 Email: contactus@[employer] Website: http://www.[employer]     Transportation to medical appointments  3  [facility] - Non-Emergency Medical Transportation Distance: 1.05 miles      In-Person   [address] [place] [address]  Language: English  Hours: Mon - Fri 8:00 AM - 4:00 PM Appt. Only  Fees: Self Pay   Phone: (492) 476-3050 Website: http://www.[facility].org/Medical-Services/Emergency-medical-services/Non-emergency-transportation/     4  [employer] Ride Distance: 4.31 miles      In-Person   [address] [address]  Language: English  Hours: Mon - Thu 6:00 AM - 6:00 PM , Fri 6:00 AM - 5:00 PM  Fees: Insurance, Self Pay   Phone: (445) 390-7084 Email: office@[employer] Website: https://www.[employer]/          Important Numbers & Websites       [facility]   [address] [place] [address].org  Poison Control   (910) 187-4452 [facility].org  Suicide and Crisis Lifeline   988 988[facility].org  Childhelp [place] Child Abuse Hotline   259.297.9599 Childhelphotline.org  National Sexual  Assault Hotline   (517) 154-4032 (HOPE) Rainn.org  National Runaway Safeline   (418) 153-4964 (RUNAWAY) [employer].org  Pregnancy & Postpartum Support [place]   Call/text 067-267-1745 Ppsupport[place].org  Substance Abuse National Helpline ([facility]   991-299-HELP (6152) Findtreatment.gov  Emergency Services   912

## 2023-11-22 ENCOUNTER — VIRTUAL VISIT (OUTPATIENT)
Dept: FAMILY MEDICINE | Facility: CLINIC | Age: 53
End: 2023-11-22
Payer: COMMERCIAL

## 2023-11-22 ENCOUNTER — TELEPHONE (OUTPATIENT)
Dept: NURSING | Facility: CLINIC | Age: 53
End: 2023-11-22
Payer: COMMERCIAL

## 2023-11-22 DIAGNOSIS — U07.1 INFECTION DUE TO 2019 NOVEL CORONAVIRUS: Primary | ICD-10-CM

## 2023-11-22 LAB — SARS-COV-2 RNA RESP QL NAA+PROBE: POSITIVE

## 2023-11-22 PROCEDURE — 99442 PR PHYSICIAN TELEPHONE EVALUATION 11-20 MIN: CPT | Mod: 93 | Performed by: FAMILY MEDICINE

## 2023-11-22 NOTE — TELEPHONE ENCOUNTER
Patient classified as COVID treatment eligible by Epic high risk algorithm:  Yes    Coronavirus (COVID-19) Notification    Reason for call  Notify of POSITIVE COVID-19 lab result, assess symptoms,  review [facility] recommendations    Lab Result   Lab test for 2019-nCoV rRt-PCR or SARS-COV-2 PCR  Oropharyngeal AND/OR nasopharyngeal swabs were POSITIVE for 2019-nCoV RNA [OR] SARS-COV-2 RNA (COVID-19) RNA     We have been unable to reach patient by phone at this time to notify of their Positive COVID-19 result.    Left voicemail message requesting a call back to [phone] [facility] for results.        A Positive COVID-19 letter will be sent via [employer] or the mail.    Milena Holt

## 2023-11-22 NOTE — PROGRESS NOTES
Family Medicine Telephone Visit Note             HPI   Patients name: Teresa  Appointment start time:  2:00 PM    This patient was seen yesterday and had a COVID swab collected which came back positive.  She is at high risk for worsening outcome from COVID because of her and her.age previous stroke and underlying lung status.    Current Outpatient Medications   Medication Sig Dispense Refill    acyclovir (ZOVIRAX) 5 % external cream Apply topically 5 times daily 5 g 3    albuterol (PROAIR HFA/PROVENTIL HFA/VENTOLIN HFA) 108 (90 Base) MCG/ACT inhaler INHALE ONE TO TWO PUFFS BY MOUTH EVERY 4 HOURS AS NEEDED 8.5 g 10    albuterol (PROVENTIL) (2.5 MG/3ML) 0.083% neb solution Take 1 vial (2.5 mg) by nebulization every 6 hours as needed for shortness of breath or wheezing 3 mL 3    amLODIPine (NORVASC) 10 MG tablet Take 1 tablet (10 mg) by mouth daily (Patient not taking: Reported on 11/13/2023) 90 tablet 1    ammonium lactate (AMLACTIN) 12 % external cream Apply topically daily as needed      azelastine (ASTELIN) 0.1 % nasal spray Spray 1 spray into both nostrils 2 times daily 30 mL 1    azelastine (OPTIVAR) 0.05 % ophthalmic solution PLACE 1 DROP INTO BOTH EYES 2 TIMES DAILY AS NEEDED (ITCHY EYES) 18 mL 5    baclofen (LIORESAL) 20 MG tablet Take 1 tablet (20 mg) by mouth 3 times daily as needed for muscle spasms 45 tablet 10    blood glucose (NO BRAND SPECIFIED) lancets standard Use to test blood sugar 4 times daily or as directed. 100 lancet 5    blood glucose (NO BRAND SPECIFIED) test strip Use to test blood sugar 4 times daily or as directed. 100 strip 5    budesonide-formoterol (SYMBICORT) 160-4.5 MCG/ACT Inhaler Inhale 2 puffs twice daily plus 1-2 puffs as needed. May use up to 12 puffs per day. (Patient taking differently: 2 puffs two times daily Inhale 2 puffs twice daily plus 1-2 puffs as needed. May use up to 12 puffs per day.) 20.4 g 11    TRUNG-GEST ANTACID 500 MG chewable tablet TAKE 1 TABLET (500 MG) BY MOUTH  2 TIMES DAILY AS NEEDED FOR HEARTBURN 60 tablet 3    cetirizine (ZYRTEC) 10 MG tablet Take 1 tablet (10 mg) by mouth daily 90 tablet 1    clindamycin (CLEOCIN T) 1 % external solution Apply topically 2 times daily 360 mL 3    clopidogrel (PLAVIX) 75 MG tablet TAKE 1 TABLET(75 MG) BY MOUTH DAILY 90 tablet 1    Continuous Blood Gluc Sensor (DEXCOM G6 SENSOR) MISC 1 each every 10 days Change every 10 days. 3 each 5    Continuous Blood Gluc Transmit (DEXCOM G6 TRANSMITTER) MISC 1 each every 3 months Change every 3 months. 1 each 1    [employer]S ACETAMINOPHEN EX  MG tablet TAKE 1-2 TABLETS (500-1,000 MG) BY MOUTH EVERY 6 HOURS AS NEEDED FOR MILD PAIN 100 tablet 3    diclofenac (CATAFLAM) 50 MG tablet Take 1 tablet (50 mg) by mouth 2 times daily as needed for moderate pain 90 tablet 0    diclofenac (VOLTAREN) 1 % topical gel APPLY 2 GRAMS TOPICALLY FOUR TIMES A DAY AS NEEDED FOR JOINT PAIN 100 g 10    EPINEPHrine (ANY BX GENERIC EQUIV) 0.3 MG/0.3ML injection 2-pack Inject 0.3 mLs (0.3 mg) into the muscle once as needed for anaphylaxis 2 mL 0    exenatide ER (BYDUREON BCISE) 2 MG/0.85ML auto-injector INJECT 2MG SUBCUTANEOUSLY EVERY 7 DAYS 3.4 mL 10    gabapentin (NEURONTIN) 600 MG tablet Take 1 tablet (600 mg) by mouth 3 times daily 90 tablet 3    GAVILYTE-G 236 g suspension MIX AND DRINK AS DIRECTED PER PATIENT INSTRUCTIONS      hydrOXYzine (ATARAX) 25 MG tablet TAKE 1 TO 2 TABLETS(25 TO 50 MG) BY MOUTH THREE TIMES DAILY AS NEEDED FOR ITCHING 30 tablet 1    insulin lispro (HUMALOG KWIKPEN) 100 UNIT/ML (1 unit dial) KWIKPEN IF BS <100, NO HUMALOG. IF -150, TAKE 28 UNITS. INCREASE 2 UNITS FOR EVERY 50 ABOVE 150. TOTAL DAILY DOSE IS 90 UNITS/DAY 75 mL 1    insulin pen needle (B-D U/F) 31G X 5 MM miscellaneous Use 4 times daily or as directed. 100 each 3    JARDIANCE 25 MG TABS tablet TAKE 1 TABLET BY MOUTH ONCE DAILY 90 tablet 3    Lidocaine (LIDOCARE) 4 % Patch Place 1 patch onto the skin daily as needed for moderate  "pain To prevent lidocaine toxicity, patient should be patch free for 12 hrs daily.      lidocaine (XYLOCAINE) 5 % external ointment APPLY TOPICALLY THREE TIMES A DAY AS NEEDED FOR MODERATE PAIN 35.44 g 10    linezolid (ZYVOX) 600 MG tablet Take 1 tablet (600 mg) by mouth 2 times daily 14 tablet 0    lisinopril (ZESTRIL) 40 MG tablet Take 40 mg by mouth at bedtime (Patient not taking: Reported on 11/13/2023)      loperamide (IMODIUM) 2 MG capsule TAKE 1 TABLET (2 MG) BY MOUTH 4 TIMES DAILY AS NEEDED FOR DIARRHEA 100 capsule 11    meloxicam (MOBIC) 15 MG tablet Take 1 tablet (15 mg) by mouth daily 30 tablet 1    metoclopramide (REGLAN) 5 MG tablet TAKE 1 TABLET (5 MG) BY MOUTH 3 TIMES DAILY AS NEEDED (STOMACH PAIN, NAUSEA, VOMITING) 45 tablet 1    metoprolol succinate ER (TOPROL XL) 50 MG 24 hr tablet TAKE 1 TABLET(50 MG) BY MOUTH DAILY 90 tablet 1    montelukast (SINGULAIR) 10 MG tablet Take 1 tablet (10 mg) by mouth At Bedtime 90 tablet 1    oxyCODONE (ROXICODONE) 5 MG tablet Take 1 tablet (5 mg) by mouth every other day 5 tablet 0    pantoprazole (PROTONIX) 40 MG EC tablet TAKE 1 TABLET BY MOUTH ONCE DAILY 30 tablet 10    pramipexole (MIRAPEX) 0.75 MG tablet TAKE 1 TABLET (0.75 MG) BY MOUTH AT BEDTIME 90 tablet 1    pravastatin (PRAVACHOL) 80 MG tablet TAKE 1 TABLET BY MOUTH ONCE DAILY AT BEDTIME 30 tablet 10    predniSONE (DELTASONE) 20 MG tablet Take 2 tablets (40 mg) by mouth daily for 5 days 10 tablet 0    QUEtiapine (SEROQUEL) 400 MG tablet Take 1 tablet (400 mg) by mouth at bedtime 90 tablet 3    QUEtiapine (SEROQUEL) 400 MG tablet TAKE 1 TABLET (400 MG) BY MOUTH AT BEDTIME 30 tablet 0    senna-docusate (SENOKOT-S/PERICOLACE) 8.6-50 MG tablet Take 1 tablet by mouth 2 times daily as needed for constipation 30 tablet 0    Silver (MEPILEX AG) 4\"X4\" PADS Externally apply 1 each topically 2 times daily as needed (at the wound site) 5 each 3    SPIRIVA RESPIMAT 2.5 MCG/ACT inhaler INHALE TWO (2) PUFFS BY MOUTH " "DAILY 4 g 10    SUMAtriptan (IMITREX) 50 MG tablet TAKE 1 TABLET (50 MG) BY MOUTH AT ONSET OF HEADACHE FOR MIGRAINE 12 tablet 1    TOUJEO SOLOSTAR 300 UNIT/ML (1 units dial) pen INJECT 50 units in the morning until check in with primary care. 9 mL 10    traZODone (DESYREL) 50 MG tablet Take 1 tablet (50 mg) by mouth at bedtime 90 tablet 3    venlafaxine (EFFEXOR XR) 150 MG 24 hr capsule Take 1 capsule (150 mg) by mouth daily Prescribed by Dr. Georges of [facility] 90 capsule 1     Allergies   Allergen Reactions    Amoxicillin-Pot Clavulanate Nausea and Vomiting and Hives     10/9/23 meropenem at [facility] being tolerated     Bee Venom Anaphylaxis    Nuts Anaphylaxis    Doxycycline Rash    Abilify Discmelt     Animal Dander Other (See Comments)     asthma    Aripiprazole      Other Reaction(s): Irregular heartbeat    Aspirin Difficulty breathing    Atorvastatin      Liver enzyme increase     Contrast Dye Other (See Comments)     Patient had normal reaction to contrast dye, flushed/warm/wetting pants feeling. This Allergy needs to be removed from her chart. -AVW 11/13/21    Metformin Difficulty breathing     \"throat swelling and elevated liver enzymes\"    Naproxen Hives    Niacin     Selegiline     Valium [Diazepam] Other (See Comments)     rage    Zocor [Simvastatin - High Dose]               Review of Systems:     She is having ongoing cough.  She is not more short of breath than she was yesterday.  She is more short of breath than her baseline status.  She is not having nausea or vomiting.  She is not having fevers.  She does have some chills.         Physical Exam:     There were no vitals taken for this visit.  Estimated body mass index is 36.33 kg/m  as calculated from the following:    Height as of 10/9/23: 1.524 m (5').    Weight as of 11/21/23: 84.4 kg (186 lb).    Exam:  Constitutional: healthy, alert, and no distress  Psychiatric: mentation appears normal and affect normal/bright            Assessment and " Plan       ICD-10-CM    1. Infection due to 2019 novel coronavirus  U07.1 molnupiravir (LAGEVRIO) 200 MG capsule     DISCONTINUED: nirmatrelvir and ritonavir (PAXLOVID) 300 mg/100 mg therapy pack        We were going to proceed with Paxlovid but there is a contraindication with her Seroquel which she cannot discontinue so we will do molnupiravir instead.      After Visit Information:  Will print and mail AVS     No follow-ups on file.    Appointment end time: 2:12 PM  This is a telephone visit that took 12 minutes.      Clinician location:  [facility]

## 2023-12-11 ENCOUNTER — TELEPHONE (OUTPATIENT)
Dept: VASCULAR SURGERY | Facility: CLINIC | Age: 53
End: 2023-12-11
Payer: COMMERCIAL

## 2023-12-11 NOTE — LETTER
[facility]  [facility] [facility] [address]  [place], [place] [address]  [phone]      Fax [phone]    [facility]           Fax: [phone]            Customer Service: [phone]        Account #: [id number]    Wound Dressing Rx and Order Form  Order Status: refill  Verbal: Tiffanie  Date: 2023     Teresa Perez  Gender: female  : 1970  [address]  [place] [address]  [phone] (home)     Medical Record: [id number]  Primary Care Provider: Tyra Bullock    No diagnosis found.      Insurance Info:  INSURER: Payor: [employer] / Plan: UNITED HE[employer] MEDICARE ADVANTAGE / Product Type: HMO /   Policy ID#:  [id number]  SECONDARY INSURANCE:    Secondary Policy ID#:  N/A        Physician Info:   Name:  ROBERTA DECKER     Dept Address/Phones:   [address] [place] [address], [address]  [facility] [id number] [phone]  Fax: [phone]    Lymphedema circumferential measurements (in cm):       No data to display                  Wound info:  PICC 10/09/23 Triple Lumen Left Brachial vein lateral (Active)   Number of days: 63       Negative Pressure Wound Therapy Pelvis Anterior;Left (Active)   Number of days: 61       Wound Buttocks Pressure injury community acquired Stage 2 (Active)   Number of days: 511       Wound Abdomen (Active)   Number of days: 63       Wound Pelvis (Active)   Number of days: 63       Wound Thigh (Active)   Number of days: 63       VASC Wound Umbilicus (Active)   Pre Size Length 0.6 10/09/23 0700   Pre Size Width 0.6 10/09/23 0700   Pre Size Depth 1.5 10/09/23 0700   Pre Total Sq cm 0.36 10/09/23 0700   Description refused assessment 23 1500   Number of days: 209       VASC Wound Abdomen middle (Active)   Pre Size Length 6.5 23 0800   Pre Size Width 6 23 0800   Pre Size Depth 0.1 23 0800   Pre Total Sq cm 39 23 0800   Description measured all abdomen wounds together 23 0800  "  Number of days: 209       VASC Wound Abdomen left (Active)   Description stable eschar 06/12/23 0700   Number of days: 209       VASC Wound abd wound distal (Active)   Pre Size Length 4 10/09/23 0700   Pre Size Width 3 10/09/23 0700   Pre Size Depth 0.1 10/09/23 0700   Pre Total Sq cm 12 10/09/23 0700   Number of days: 168       VASC Wound left IT (Active)   Pre Size Length 1 11/13/23 0800   Pre Size Width 0.6 11/13/23 0800   Pre Size Depth 0.5 11/13/23 0800   Pre Total Sq cm 0.6 11/13/23 0800   Number of days: 140       VASC Wound left groin (Active)   Pre Size Length 3 11/13/23 0800   Pre Size Width 13 11/13/23 0800   Pre Size Depth 2 11/13/23 0800   Pre Total Sq cm 39 11/13/23 0800   Number of days: 28       Incision/Surgical Site 07/05/22 Abdomen (Active)   Number of days: 524       Incision/Surgical Site 07/19/22 Left Buttocks (Active)   Number of days: 510       Incision/Surgical Site 10/09/23 Left Perineum (Active)   Number of days: 63        Drainage: moderate  Thickness:  full  Duration of Need: 30 DAYS  Days Supply: 30 DAYS  Start Date: 12/11/2023  Starter Kit: Ancillary Kit (saline, gloves, gauze)  Qualifying wound/Debridement: Yes      Dressing Type Brand Size Frequency of change -or- Quantity   Primary Hydrofera blue ready transfer  4\"x5\" 3 TIMES PER WEEK and as needed    ABD pads  5\"x9\" 3 TIMES PER WEEK and as needed    Medipore tape  2\" rolls 3 TIMES PER WEEK and as needed    Square gauze  4\"x4\" 1 loaf           Mepilex border adhesive bandage  6\"x6\" 3 TIMES PER WEEK and as needed    Mepilex border adhesive bandage  3\"x3\" 3 TIMES PER WEEK and as needed     No substitutions preferred. Call [phone].         OK to forward to covered supplier.    Electronically Signed Physician:  ROBERTA DECKER             Date: December 11, 2023    "

## 2023-12-11 NOTE — TELEPHONE ENCOUNTER
Teresa left a VM last evening stating she will not make it to the apt Monday morning due to her elevator being out.  She also requested we order her supplies.

## 2023-12-13 ENCOUNTER — OFFICE VISIT (OUTPATIENT)
Dept: FAMILY MEDICINE | Facility: CLINIC | Age: 53
End: 2023-12-13
Payer: COMMERCIAL

## 2023-12-13 VITALS
SYSTOLIC BLOOD PRESSURE: 133 MMHG | TEMPERATURE: 98.3 F | OXYGEN SATURATION: 96 % | DIASTOLIC BLOOD PRESSURE: 86 MMHG | HEART RATE: 103 BPM | RESPIRATION RATE: 20 BRPM

## 2023-12-13 DIAGNOSIS — M53.3 PAIN IN THE COCCYX: ICD-10-CM

## 2023-12-13 DIAGNOSIS — E11.8 TYPE 2 DIABETES MELLITUS WITH COMPLICATION, WITH LONG-TERM CURRENT USE OF INSULIN (H): Primary | ICD-10-CM

## 2023-12-13 DIAGNOSIS — J45.40 MODERATE PERSISTENT ASTHMA WITHOUT COMPLICATION: ICD-10-CM

## 2023-12-13 DIAGNOSIS — M25.511 ACUTE PAIN OF RIGHT SHOULDER: ICD-10-CM

## 2023-12-13 DIAGNOSIS — R10.84 ABDOMINAL PAIN, GENERALIZED: ICD-10-CM

## 2023-12-13 DIAGNOSIS — S31.109A MULTIPLE OPEN WOUNDS OF ABDOMEN: ICD-10-CM

## 2023-12-13 DIAGNOSIS — F12.90 CANNABIS USE DISORDER: ICD-10-CM

## 2023-12-13 DIAGNOSIS — I10 ESSENTIAL HYPERTENSION: ICD-10-CM

## 2023-12-13 DIAGNOSIS — Z79.4 TYPE 2 DIABETES MELLITUS WITH COMPLICATION, WITH LONG-TERM CURRENT USE OF INSULIN (H): Primary | ICD-10-CM

## 2023-12-13 DIAGNOSIS — F33.1 MODERATE RECURRENT MAJOR DEPRESSION (H): ICD-10-CM

## 2023-12-13 DIAGNOSIS — R29.6 FALLS FREQUENTLY: ICD-10-CM

## 2023-12-13 PROCEDURE — 99214 OFFICE O/P EST MOD 30 MIN: CPT | Mod: 25 | Performed by: STUDENT IN AN ORGANIZED HEALTH CARE EDUCATION/TRAINING PROGRAM

## 2023-12-13 PROCEDURE — 90480 ADMN SARSCOV2 VAC 1/ONLY CMP: CPT | Performed by: STUDENT IN AN ORGANIZED HEALTH CARE EDUCATION/TRAINING PROGRAM

## 2023-12-13 PROCEDURE — 91320 SARSCV2 VAC 30MCG TRS-SUC IM: CPT | Performed by: STUDENT IN AN ORGANIZED HEALTH CARE EDUCATION/TRAINING PROGRAM

## 2023-12-13 RX ORDER — LIDOCAINE 50 MG/G
OINTMENT TOPICAL
Qty: 35.44 G | Refills: 10 | Status: SHIPPED | OUTPATIENT
Start: 2023-12-13

## 2023-12-13 RX ORDER — ALBUTEROL SULFATE 0.83 MG/ML
2.5 SOLUTION RESPIRATORY (INHALATION) EVERY 6 HOURS PRN
Qty: 3 ML | Refills: 3 | Status: SHIPPED | OUTPATIENT
Start: 2023-12-13

## 2023-12-13 RX ORDER — LISINOPRIL 5 MG/1
5 TABLET ORAL AT BEDTIME
Qty: 90 TABLET | Refills: 1 | COMMUNITY
Start: 2023-12-13 | End: 2024-03-14

## 2023-12-13 RX ORDER — IBUPROFEN 400 MG/1
400 TABLET, FILM COATED ORAL 3 TIMES DAILY
Qty: 42 TABLET | Refills: 0 | Status: SHIPPED | OUTPATIENT
Start: 2023-12-13 | End: 2023-12-27
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
## 2023-12-13 NOTE — PATIENT INSTRUCTIONS
Wound care supplies were ordered today through [employer] and if you are not receiving your supplies or have a question on your bill please contact Vanita Hernandez at [phone]. Please allow 2-5 business days for delivery of supplies. You may get a call from a [phone] if there are additional information [facility] needs. It is important to  or return their call. PLEASE NOTE: If you need to return your supplies, you MUST call customer service within 15 days of delivery date.         Wound Care Instructions    3 TIMES PER WEEK and as needed, Cleanse your groin and abdomen wound(s) with Normal saline. Change groin wound daily    Pat Dry with non-sterile gauze    Abdomen and groin: hydrofera blue ready transfer, cover abdomen with mepilex, cover groin with ABD. If you are using hydrofera blue ready with the plastic backing, the plastic backing needs to be FACING AWAY from the wound      It is ok to get your wound wet in the bath or shower      If for some reason you are not able to get your dressing(s) changed as outlined above (due to illness, lack of supplies, lack of help) please do the following: remove old, soiled dressings; wash the wounds with saline; pat dry; apply ABD pad or other absorbant pad and secure with rolled gauze; avoid tape directly on your skin; Call the clinic as soon as possible to let us know what the current issues are in receiving wound care [phone].      SEEK MEDICAL CARE IF:  You have an increase in swelling, pain, or redness around the wound.  You have an increase in the amount of pus coming from the wound.  There is a bad smell coming from the wound.  The wound appears to be worsening/enlarging  You have a fever greater than 101.5 F      It is ok to continue current wound care treatment/products for the next 2-3 days until new wound care supplies are ordered and arrive. If longer than this please contact our office at [phone].    If you have a 2 layer or 4 layer compression wrap  on these are safe to have on for ONLY 7 days. If for some reason you are not able to get the wrap(s) changed (due to illness; lack of supplies, lack of help, lack of transportation) please do the following: unwrap the old 2 or 4 layer compression wrap; avoid using scissors as you could cut your skin and cause wounds; use tubular compression when available. Call to reschedule your home care or clinic visit appointment as soon as possible.    Please NOTE: if you are 15 minutes late to your clinic appointment you will have to be rescheduled. Please call our clinic as soon as possible if you know you will not be able to get to your appointment at [phone].    If you fail to show up to 3 scheduled clinic appointments you will be dismissed from our clinic.              We want to hear from you!  In the next few weeks, you should receive a call or email to complete a survey about your visit at [facility]. Please help us improve your appointment experience by letting us know how we did today. We strive to make your experience good and value any ways in which we could do better.      We value your input and suggestions.    Thank you for choosing the [facility]!

## 2023-12-13 NOTE — PATIENT INSTRUCTIONS
Refilled the albuterol nebs    For the blood pressure:    NO amlodipine  Continue the metoprolol  Restart the Lisinopril at 5mg (low dose)    For the diabetes:    Increase Toujeo to 52 Units 2x per day  Keep same sliding scale    For the diarrhea:    Stool tests today  Schedule colonoscopy in 8-12 weeks when we think the wounds will have healed.      For the moods:    Good work on getting in/set up with Akila  Keep meeting with Nolan    For the shoulder:    Referral to see the orthopedist.    Use the lidocaine ointment and patches  Ibuprofen, 1 pill 3x per day with meals for 2 weeks.     Follow up in 3-4 weeks.

## 2023-12-14 DIAGNOSIS — M53.3 PAIN IN THE COCCYX: ICD-10-CM

## 2023-12-15 ENCOUNTER — DOCUMENTATION ONLY (OUTPATIENT)
Dept: FAMILY MEDICINE | Facility: CLINIC | Age: 53
End: 2023-12-15
Payer: COMMERCIAL

## 2023-12-15 DIAGNOSIS — Z53.9 DIAGNOSIS NOT YET DEFINED: Primary | ICD-10-CM

## 2023-12-15 PROCEDURE — G0179 MD RECERTIFICATION HHA PT: HCPCS | Performed by: STUDENT IN AN ORGANIZED HEALTH CARE EDUCATION/TRAINING PROGRAM

## 2023-12-15 RX ORDER — DICLOFENAC POTASSIUM 50 MG/1
TABLET, FILM COATED ORAL
Qty: 90 TABLET | Refills: 0 | Status: SHIPPED | OUTPATIENT
Start: 2023-12-15 | End: 2024-03-14

## 2023-12-15 NOTE — PROGRESS NOTES
To be completed in Nursing note:    Please reference list for forms that require a visit for completion.  Please remind patients that providers are given 3-5 business days to complete and return forms.      Form type:[employer]    Date form received: 23    Date form completed by Physician:12/15/23    How was form returned to patient (mailed, faxed, or at  for patient to ):  Faxed to   Date form mailed/faxed/left at  for patient and sent to HIM for scannin/15/23, sent to MR for scanning    Once form is left for patient, faxed, or mailed PCS will then close the documentation only encounter.

## 2023-12-15 NOTE — TELEPHONE ENCOUNTER
Medication requested: DICLOFENAC POTASSIUM 50MG TABLETS   Last office visit: 12/13/23  Future [facility] appointments: none  Medication last refilled: 11/3/23  Last qualifying labs:   Component      Latest Ref Rng 10/16/2023  6:23 AM   ALT      0 - 50 U/L 20    AST      0 - 45 U/L 16    Bilirubin Total      <=1.2 mg/dL 0.2    Protein Total      6.4 - 8.3 g/dL 7.4    Calcium      8.6 - 10.0 mg/dL 9.7    Creatinine      0.51 - 0.95 mg/dL 0.41 (L)    Sodium      135 - 145 mmol/L 138    GFR Estimate      >60 mL/min/1.73m2 >90    Anion Gap      7 - 15 mmol/L 11    Alkaline Phosphatase      35 - 104 U/L 147 (H)    Potassium      3.4 - 5.3 mmol/L 4.5    Urea Nitrogen      6.0 - 20.0 mg/dL 19.9    Chloride      98 - 107 mmol/L 97 (L)    Carbon Dioxide (CO2)      22 - 29 mmol/L 30 (H)    Glucose      70 - 99 mg/dL 180 (H)    Albumin      3.5 - 5.2 g/dL 3.5          Routing refill request to provider for review/approval because:    Labs out of range:    Allergy warning:    NSAID Medications [id number]/14/2023 04:43 PM   Protocol Details Normal serum creatinine on file in past 12 months       YULISA Doe, BSN

## 2023-12-18 DIAGNOSIS — E78.5 HYPERLIPIDEMIA LDL GOAL <100: ICD-10-CM

## 2023-12-19 RX ORDER — PRAVASTATIN SODIUM 80 MG/1
80 TABLET ORAL DAILY
Qty: 90 TABLET | Refills: 3 | Status: SHIPPED | OUTPATIENT
Start: 2023-12-19

## 2023-12-21 ENCOUNTER — OFFICE VISIT (OUTPATIENT)
Dept: FAMILY MEDICINE | Facility: CLINIC | Age: 53
End: 2023-12-21
Payer: COMMERCIAL

## 2023-12-21 ENCOUNTER — HOSPITAL ENCOUNTER (OUTPATIENT)
Dept: GENERAL RADIOLOGY | Facility: HOSPITAL | Age: 53
Discharge: HOME | End: 2023-12-21
Attending: STUDENT IN AN ORGANIZED HEALTH CARE EDUCATION/TRAINING PROGRAM
Payer: COMMERCIAL

## 2023-12-21 ENCOUNTER — OFFICE VISIT (OUTPATIENT)
Dept: VASCULAR SURGERY | Facility: CLINIC | Age: 53
End: 2023-12-21
Attending: FAMILY MEDICINE
Payer: COMMERCIAL

## 2023-12-21 VITALS
OXYGEN SATURATION: 97 % | TEMPERATURE: 99.2 F | HEART RATE: 93 BPM | RESPIRATION RATE: 16 BRPM | SYSTOLIC BLOOD PRESSURE: 128 MMHG | DIASTOLIC BLOOD PRESSURE: 83 MMHG

## 2023-12-21 VITALS
OXYGEN SATURATION: 97 % | DIASTOLIC BLOOD PRESSURE: 89 MMHG | RESPIRATION RATE: 18 BRPM | HEART RATE: 90 BPM | SYSTOLIC BLOOD PRESSURE: 137 MMHG

## 2023-12-21 DIAGNOSIS — T81.89XD NON-HEALING SURGICAL WOUND, SUBSEQUENT ENCOUNTER: ICD-10-CM
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
## 2023-12-21 DIAGNOSIS — S31.109D OPEN WOUND OF ABDOMINAL WALL, SUBSEQUENT ENCOUNTER: Primary | ICD-10-CM

## 2023-12-21 DIAGNOSIS — S49.91XA INJURY OF RIGHT SHOULDER, INITIAL ENCOUNTER: Primary | ICD-10-CM

## 2023-12-21 PROCEDURE — 73030 X-RAY EXAM OF SHOULDER: CPT | Mod: RT

## 2023-12-21 PROCEDURE — 99214 OFFICE O/P EST MOD 30 MIN: CPT | Performed by: FAMILY MEDICINE

## 2023-12-21 PROCEDURE — 99213 OFFICE O/P EST LOW 20 MIN: CPT | Performed by: STUDENT IN AN ORGANIZED HEALTH CARE EDUCATION/TRAINING PROGRAM

## 2023-12-21 PROCEDURE — 73060 X-RAY EXAM OF HUMERUS: CPT | Mod: RT

## 2023-12-21 PROCEDURE — G0463 HOSPITAL OUTPT CLINIC VISIT: HCPCS | Performed by: FAMILY MEDICINE

## 2023-12-21 RX ORDER — ACETAMINOPHEN 500 MG
500-1000 TABLET ORAL EVERY 6 HOURS PRN
Qty: 30 TABLET | Refills: 0 | Status: SHIPPED | OUTPATIENT
Start: 2023-12-21 | End: 2024-06-28

## 2023-12-21 ASSESSMENT — PAIN SCALES - GENERAL: PAINLEVEL: NO PAIN (0)
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
## 2023-12-21 NOTE — LETTER
[facility]  [address] [place] [address] [address]  [place], [place] [address]  [phone]      Fax [phone]    [facility]           Fax: [phone]            Customer Service: [phone]        Account #: [id number]    Wound Dressing Rx and Order Form  Order Status: refill  Verbal: Tiffanie  Date: 2023     Teresa Perez  Gender: female  : 1970  [address]  [place] [address]  [phone] (home)     Medical Record: [id number]  Primary Care Provider: Tyra Bullock      ICD-10-CM    1. Open wound of abdominal wall, subsequent encounter  S31.109D       2. Non-healing surgical wound, subsequent encounter  T81.89XD             Insurance Info:  INSURER: Payor: [employer] / Plan: [employer] MEDICARE ADVANTAGE / Product Type: HMO /   Policy ID#:  [id number]  SECONDARY INSURANCE:  MEDICA  Secondary Policy ID#:  [id number]        Physician Info:   Name:  ROBERTA DECKER     Dept Address/Phones:   [address] [place] [address], [address]  [facility] [id number] [phone]  Fax: [phone]    Lymphedema circumferential measurements (in cm):       No data to display                  Wound info:  PICC 10/09/23 Triple Lumen Left Brachial vein lateral (Active)   Number of days: 73       Negative Pressure Wound Therapy Pelvis Anterior;Left (Active)   Number of days: 71       Wound Buttocks Pressure injury community acquired Stage 2 (Active)   Number of days: 521       Wound Abdomen (Active)   Number of days: 73       Wound Pelvis (Active)   Number of days: 73       Wound Thigh (Active)   Number of days: 73       VASC Wound Umbilicus (Active)   Pre Size Length 0.6 10/09/23 0700   Pre Size Width 0.6 10/09/23 0700   Pre Size Depth 1.5 10/09/23 0700   Pre Total Sq cm 0.36 10/09/23 0700   Description refused assessment 23 1500   Number of days: 219       VASC Wound Abdomen middle (Active)   Pre Size Length 5 23 0700   Pre Size Width 6  "12/21/23 0700   Pre Size Depth 0.1 12/21/23 0700   Pre Total Sq cm 30 12/21/23 0700   Description measured all abdomen wounds together 11/13/23 0800   Number of days: 219       VASC Wound Abdomen left (Active)   Description stable eschar 06/12/23 0700   Number of days: 219       VASC Wound abd wound distal (Active)   Pre Size Length 4 10/09/23 0700   Pre Size Width 3 10/09/23 0700   Pre Size Depth 0.1 10/09/23 0700   Pre Total Sq cm 12 10/09/23 0700   Number of days: 178       VASC Wound left IT (Active)   Pre Size Length 1 11/13/23 0800   Pre Size Width 0.6 11/13/23 0800   Pre Size Depth 0.5 11/13/23 0800   Pre Total Sq cm 0.6 11/13/23 0800   Number of days: 150       VASC Wound left groin (Active)   Pre Size Length 2.3 12/21/23 0700   Pre Size Width 6.5 12/21/23 0700   Pre Size Depth 0.2 12/21/23 0700   Pre Total Sq cm 14.95 12/21/23 0700   Number of days: 38       Incision/Surgical Site 07/05/22 Abdomen (Active)   Number of days: 534       Incision/Surgical Site 07/19/22 Left Buttocks (Active)   Number of days: 520       Incision/Surgical Site 10/09/23 Left Perineum (Active)   Number of days: 73        Drainage: moderate  Thickness:  full  Duration of Need: 30 DAYS  Days Supply: 30 DAYS  Start Date: 12/21/2023  Starter Kit: Ancillary Kit (saline, gloves, gauze)  Qualifying wound/Debridement: Yes      Dressing Type Brand Size Frequency of change -or- Quantity   Primary ABD pads  5\"x9\" DAILY and as needed     No substitutions preferred. Call [phone].         OK to forward to covered supplier.    Electronically Signed Physician:  ROBERTA DECKER             Date: December 21, 2023    "

## 2023-12-21 NOTE — PROGRESS NOTES
ASSESSMENT & PLAN:   Diagnoses and all orders for this visit:  Injury of right shoulder, initial encounter  -     acetaminophen (TYLENOL) 500 MG tablet; Take 1-2 tablets (500-1,000 mg) by mouth every 6 hours as needed for mild pain  -     Orthopedic  Referral; Future  Fall, initial encounter  -     XR Humerus Right G/E 2 Views; Future    Right upper arm pain x 3 days following fall onto his shoulder. X-ray of shoulder and humerus are negative. Likely muscular. Recommend Tylenol/ibuprofen, RICE. Patient given shoulder sling in clinic to be used as needed. Also provided handout with pendulum exercises and shoulder stretches. Follow-up with Ortho if continued pain.     At the end of the encounter, I discussed results, diagnosis, medications. Discussed red flags for immediate return to clinic/ER, as well as indications for follow up if no improvement. Patient and/or caregiver understood and agreed to plan. Patient was stable for discharge.    There are no Patient Instructions on file for this visit.    ------------------------------------------------------------------------  SUBJECTIVE  History was obtained from patient.    Patient presents with:  Shoulder Injury: Rt shoulder injury few day ago. Constant ache.    HPI  Teresa GEOVANNA Chris is a(n) 53 year old female presenting to urgent care for right shoulder pain x 3 days after fall. She fell and landed on her right shoulder. Has had constant aching since. No arm numbness, tingling, weakness.    Review of Systems    Current Outpatient Medications   Medication Sig Dispense Refill    acetaminophen (TYLENOL) 500 MG tablet Take 1-2 tablets (500-1,000 mg) by mouth every 6 hours as needed for mild pain 30 tablet 0    acyclovir (ZOVIRAX) 5 % external cream Apply topically 5 times daily 5 g 3    albuterol (PROAIR HFA/PROVENTIL HFA/VENTOLIN HFA) 108 (90 Base) MCG/ACT inhaler INHALE ONE TO TWO PUFFS BY MOUTH EVERY 4 HOURS AS NEEDED 8.5 g 10    albuterol (PROVENTIL) (2.5  MG/3ML) 0.083% neb solution Take 1 vial (2.5 mg) by nebulization every 6 hours as needed for shortness of breath or wheezing 3 mL 3    ammonium lactate (AMLACTIN) 12 % external cream Apply topically daily as needed      azelastine (ASTELIN) 0.1 % nasal spray Spray 1 spray into both nostrils 2 times daily 30 mL 1    azelastine (OPTIVAR) 0.05 % ophthalmic solution PLACE 1 DROP INTO BOTH EYES 2 TIMES DAILY AS NEEDED (ITCHY EYES) 18 mL 5    baclofen (LIORESAL) 20 MG tablet Take 1 tablet (20 mg) by mouth 3 times daily as needed for muscle spasms 45 tablet 10    blood glucose (NO BRAND SPECIFIED) lancets standard Use to test blood sugar 4 times daily or as directed. 100 lancet 5    blood glucose (NO BRAND SPECIFIED) test strip Use to test blood sugar 4 times daily or as directed. 100 strip 5    budesonide-formoterol (SYMBICORT) 160-4.5 MCG/ACT Inhaler Inhale 2 puffs twice daily plus 1-2 puffs as needed. May use up to 12 puffs per day. (Patient taking differently: 2 puffs two times daily Inhale 2 puffs twice daily plus 1-2 puffs as needed. May use up to 12 puffs per day.) 20.4 g 11    TRUNG-GEST ANTACID 500 MG chewable tablet TAKE 1 TABLET (500 MG) BY MOUTH 2 TIMES DAILY AS NEEDED FOR HEARTBURN 60 tablet 3    cetirizine (ZYRTEC) 10 MG tablet Take 1 tablet (10 mg) by mouth daily 90 tablet 1    clindamycin (CLEOCIN T) 1 % external solution Apply topically 2 times daily 360 mL 3    clopidogrel (PLAVIX) 75 MG tablet TAKE 1 TABLET(75 MG) BY MOUTH DAILY 90 tablet 1    Continuous Blood Gluc Sensor (DEXCOM G6 SENSOR) MISC 1 each every 10 days Change every 10 days. 3 each 5    Continuous Blood Gluc Transmit (DEXCOM G6 TRANSMITTER) MISC 1 each every 3 months Change every 3 months. 1 each 1    [employer] ACETAMINOPHEN EX  MG tablet TAKE 1-2 TABLETS (500-1,000 MG) BY MOUTH EVERY 6 HOURS AS NEEDED FOR MILD PAIN 100 tablet 3    diclofenac (CATAFLAM) 50 MG tablet TAKE 1 TABLET(50 MG) BY MOUTH TWICE DAILY AS NEEDED FOR MODERATE PAIN 90  tablet 0    diclofenac (VOLTAREN) 1 % topical gel APPLY 2 GRAMS TOPICALLY FOUR TIMES A DAY AS NEEDED FOR JOINT PAIN 100 g 10    EPINEPHrine (ANY BX GENERIC EQUIV) 0.3 MG/0.3ML injection 2-pack Inject 0.3 mLs (0.3 mg) into the muscle once as needed for anaphylaxis 2 mL 0    exenatide ER (BYDUREON BCISE) 2 MG/0.85ML auto-injector INJECT 2MG SUBCUTANEOUSLY EVERY 7 DAYS 3.4 mL 10    gabapentin (NEURONTIN) 600 MG tablet TAKE 1 TABLET(600 MG) BY MOUTH THREE TIMES DAILY 90 tablet 3    GAVILYTE-G 236 g suspension MIX AND DRINK AS DIRECTED PER PATIENT INSTRUCTIONS      hydrOXYzine (ATARAX) 25 MG tablet TAKE 1 TO 2 TABLETS(25 TO 50 MG) BY MOUTH THREE TIMES DAILY AS NEEDED FOR ITCHING 30 tablet 1    ibuprofen (ADVIL/MOTRIN) 400 MG tablet Take 1 tablet (400 mg) by mouth 3 times daily 42 tablet 0    insulin lispro (HUMALOG KWIKPEN) 100 UNIT/ML (1 unit dial) KWIKPEN IF BS <100, NO HUMALOG. IF -150, TAKE 28 UNITS. INCREASE 2 UNITS FOR EVERY 50 ABOVE 150. TOTAL DAILY DOSE IS 90 UNITS/DAY 75 mL 1    insulin pen needle (B-D U/F) 31G X 5 MM miscellaneous Use 4 times daily or as directed. 100 each 3    JARDIANCE 25 MG TABS tablet TAKE 1 TABLET BY MOUTH ONCE DAILY 90 tablet 3    Lidocaine (LIDOCARE) 4 % Patch Place 1 patch onto the skin daily as needed for moderate pain To prevent lidocaine toxicity, patient should be patch free for 12 hrs daily.      lidocaine (XYLOCAINE) 5 % external ointment APPLY TOPICALLY THREE TIMES A DAY AS NEEDED FOR MODERATE PAIN IN SHOULDER 35.44 g 10    linezolid (ZYVOX) 600 MG tablet Take 1 tablet (600 mg) by mouth 2 times daily 14 tablet 0    lisinopril (ZESTRIL) 5 MG tablet Take 1 tablet (5 mg) by mouth at bedtime 90 tablet 1    loperamide (IMODIUM) 2 MG capsule TAKE 1 TABLET (2 MG) BY MOUTH 4 TIMES DAILY AS NEEDED FOR DIARRHEA 100 capsule 11    meloxicam (MOBIC) 15 MG tablet Take 1 tablet (15 mg) by mouth daily 30 tablet 1    metoclopramide (REGLAN) 5 MG tablet TAKE 1 TABLET (5 MG) BY MOUTH 3 TIMES  "DAILY AS NEEDED (STOMACH PAIN, NAUSEA, VOMITING) 45 tablet 1    metoprolol succinate ER (TOPROL XL) 50 MG 24 hr tablet TAKE 1 TABLET(50 MG) BY MOUTH DAILY 90 tablet 1    montelukast (SINGULAIR) 10 MG tablet Take 1 tablet (10 mg) by mouth At Bedtime 90 tablet 1    oxyCODONE (ROXICODONE) 5 MG tablet Take 1 tablet (5 mg) by mouth every other day 5 tablet 0    pantoprazole (PROTONIX) 40 MG EC tablet TAKE 1 TABLET BY MOUTH ONCE DAILY 30 tablet 10    pramipexole (MIRAPEX) 0.75 MG tablet TAKE 1 TABLET BY MOUTH EVERY NIGHT AT BEDTIME 90 tablet 1    pravastatin (PRAVACHOL) 80 MG tablet TAKE 1 TABLET BY MOUTH EVERY DAY 90 tablet 3    QUEtiapine (SEROQUEL) 400 MG tablet Take 1 tablet (400 mg) by mouth at bedtime 90 tablet 3    QUEtiapine (SEROQUEL) 400 MG tablet TAKE 1 TABLET (400 MG) BY MOUTH AT BEDTIME 30 tablet 0    senna-docusate (SENOKOT-S/PERICOLACE) 8.6-50 MG tablet Take 1 tablet by mouth 2 times daily as needed for constipation 30 tablet 0    Silver (MEPILEX AG) 4\"X4\" PADS Externally apply 1 each topically 2 times daily as needed (at the wound site) 5 each 3    SPIRIVA RESPIMAT 2.5 MCG/ACT inhaler INHALE TWO (2) PUFFS BY MOUTH DAILY 4 g 10    SUMAtriptan (IMITREX) 50 MG tablet TAKE 1 TABLET (50 MG) BY MOUTH AT ONSET OF HEADACHE FOR MIGRAINE 12 tablet 1    TOUJEO SOLOSTAR 300 UNIT/ML (1 units dial) pen INJECT 50 units in the morning until check in with primary care. 9 mL 10    traZODone (DESYREL) 50 MG tablet Take 1 tablet (50 mg) by mouth at bedtime 90 tablet 3    venlafaxine (EFFEXOR XR) 150 MG 24 hr capsule Take 1 capsule (150 mg) by mouth daily Prescribed by Dr. Georges of [facility] 90 capsule 1     Problem List:  2023-11: Infection due to 2019 novel coronavirus  2023-11: Nonhealing surgical wound  2023-10: Necrotizing fasciitis (H)  2023-06: Open abdominal wall wound  2022-07: Buttock wound, left, initial encounter  2022-07: Cellulitis, unspecified cellulitis site  2022-07: Sepsis, due to unspecified organism, " unspecified whether   acute organ dysfunction present (H)  2022-07: Recurrent incisional hernia  2022-05: Chronic obstructive pulmonary disease (H)  2021-10: Cannabis use disorder  2020-11: Morbid obesity (H)  2020-08: Excessive bleeding in premenopausal period  2019-11: Pre-ulcerative corn or callous  2019-04: Nonruptured cerebral aneurysm  2019-04: Cerebrovascular accident (CVA) due to embolism (H)  2019-04: CKD (chronic kidney disease) stage 5, GFR less than 15 ml/  min (H)  2018-11: Type 2 diabetes mellitus with complication, with long-term   current use of insulin (H)  2018-11: Essential hypertension  2018-10: Pseudoseizure  2018-09: Recurrent ventral hernia  2017-10: Cough  2016-10: Hx of total knee replacement, left  2016-10: Chronic pain syndrome  2016-03: Lateral epicondylitis  2016-03: Impingement syndrome of shoulder region, left  2015-07: History of total right knee replacement  2015-06: NNAMDI (obstructive sleep apnea)  2014-07: Endometriosis  2014-05: Parotid mass  2014-01: Hemorrhoids  2014-01: LOMAS (nonalcoholic steatohepatitis)  2014-01: Disease of lung  2012-11: Health Care Home  2012-11: Acute peptic ulcer  2012-11: Acute maxillary sinusitis  2012-11: Other allergy, other than to medicinal agents  2012-11: Asthma  2012-11: Bipolar disorder (H)  2012-11: Viral bronchitis  2012-11: Bulging lumbar disc  2012-11: Cervical dysplasia  2012-11: Common migraine without aura  2012-11: Constipation  2012-11: Dwarfism  2012-11: Familial hypercholesterolemia  2012-11: HTN (hypertension)  2012-11: Influenza  2012-11: Insomnia  2012-11: Low back pain  2012-11: DDD (degenerative disc disease), lumbar  2012-11: Intermittent asthma  2012-11: Leg pain, bilateral  2012-11: Smoking  2012-11: Degeneration of thoracic or thoracolumbar intervertebral disc  2007-07: Borderline personality disorder (H)  2007-07: Moderate recurrent major depression (H)  Type 2 diabetes mellitus with other skin ulcer, with long-term   current  "use of insulin (H)  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 This Allergy needs to be removed from her chart. -AVW 11/13/21    Metformin Difficulty breathing     \"throat swelling and elevated liver enzymes\"    Naproxen Hives    Niacin     Selegiline     Valium [Diazepam] Other (See Comments)     rage    Zocor [Simvastatin - High Dose]          OBJECTIVE  Vitals:    12/21/23 1002   BP: 128/83   Pulse: 93   Resp: 16   Temp: 99.2  F (37.3  C)   TempSrc: Oral   SpO2: 97%     Physical Exam   GENERAL: healthy, alert, no acute distress.   PSYCH: mentation appears normal. Normal affect  MSK: right UE -no deformity, edema, ecchymosis, erythema. Tender to palpation of AC joint and diffusely through upper arm. No tenderness in elbow, forearm, hand. Full ROM shoulder abduction, adduction, and forward flexion. 5/5 elbow extension and flexion. Distal sensation intact. Capillary refill is in 2 seconds.    Xrays were preliminarily reviewed by me - negative for fracture/dislocation.     Results for orders placed or performed during the hospital encounter of 12/21/23   XR Humerus Right G/E 2 Views     Status: None    Narrative    EXAM: XR HUMERUS RIGHT G/E 2 VIEWS  LOCATION: [facility]  DATE: 12/21/2023    INDICATION: fall on to shoulder, pain in elbow  COMPARISON: None.      Impression    IMPRESSION: Humerus negative. No acute fracture.   Results for orders placed or performed " during the hospital encounter of 12/21/23   XR Shoulder Right 2 Views     Status: None    Narrative    EXAM: XR SHOULDER RIGHT 2 VIEWS  LOCATION: [facility]  DATE: 12/21/2023    INDICATION: fall on to shoulder, pain  COMPARISON: None.      Impression    IMPRESSION: No acute fracture or dislocation. Mild degenerative arthrosis of the AC joint.

## 2023-12-26 DIAGNOSIS — M25.511 ACUTE PAIN OF RIGHT SHOULDER: ICD-10-CM

## 2023-12-27 RX ORDER — IBUPROFEN 400 MG/1
TABLET, FILM COATED ORAL
Qty: 42 TABLET | Refills: 0 | Status: SHIPPED | OUTPATIENT
Start: 2023-12-27 | End: 2024-02-22

## 2024-01-07 DIAGNOSIS — G89.4 CHRONIC PAIN SYNDROME: ICD-10-CM

## 2024-01-08 RX ORDER — BACLOFEN 20 MG/1
TABLET ORAL
Qty: 45 TABLET | Refills: 5 | Status: SHIPPED | OUTPATIENT
Start: 2024-01-08 | End: 2024-07-12

## 2024-01-09 ENCOUNTER — TRANSFERRED RECORDS (OUTPATIENT)
Dept: MULTI SPECIALTY CLINIC | Facility: CLINIC | Age: 54
End: 2024-01-09

## 2024-01-09 LAB — RETINOPATHY: NORMAL

## 2024-01-10 NOTE — PATIENT INSTRUCTIONS
Wound care supplies were ordered today through [employer] and if you are not receiving your supplies or have a question on your bill please contact Vanita Hernandez at [phone]. Please allow 2-5 business days for delivery of supplies. You may get a call from [phone] if there are additional information [facility] needs. It is important to  or return their call. PLEASE NOTE: If you need to return your supplies, you MUST call customer service within 15 days of delivery date.         Wound Care Instructions    3 TIMES PER WEEK and as needed, Cleanse your groin and abdomen wound(s) with Normal saline. Change groin wound daily    Pat Dry with non-sterile gauze    Abdomen and groin: hydrofera blue ready transfer, cover abdomen with mepilex, cover groin with ABD. If you are using hydrofera blue ready with the plastic backing, the plastic backing needs to be FACING AWAY from the wound      It is ok to get your wound wet in the bath or shower      If for some reason you are not able to get your dressing(s) changed as outlined above (due to illness, lack of supplies, lack of help) please do the following: remove old, soiled dressings; wash the wounds with saline; pat dry; apply ABD pad or other absorbant pad and secure with rolled gauze; avoid tape directly on your skin; Call the clinic as soon as possible to let us know what the current issues are in receiving wound care [phone].      SEEK MEDICAL CARE IF:  You have an increase in swelling, pain, or redness around the wound.  You have an increase in the amount of pus coming from the wound.  There is a bad smell coming from the wound.  The wound appears to be worsening/enlarging  You have a fever greater than 101.5 F      It is ok to continue current wound care treatment/products for the next 2-3 days until new wound care supplies are ordered and arrive. If longer than this please contact our office at [phone].    If you have a 2 layer or 4 layer compression wrap  on these are safe to have on for ONLY 7 days. If for some reason you are not able to get the wrap(s) changed (due to illness; lack of supplies, lack of help, lack of transportation) please do the following: unwrap the old 2 or 4 layer compression wrap; avoid using scissors as you could cut your skin and cause wounds; use tubular compression when available. Call to reschedule your home care or clinic visit appointment as soon as possible.    Please NOTE: if you are 15 minutes late to your clinic appointment you will have to be rescheduled. Please call our clinic as soon as possible if you know you will not be able to get to your appointment at [phone].    If you fail to show up to 3 scheduled clinic appointments you will be dismissed from our clinic.              We want to hear from you!  In the next few weeks, you should receive a call or email to complete a survey about your visit at [facility]. Please help us improve your appointment experience by letting us know how we did today. We strive to make your experience good and value any ways in which we could do better.      We value your input and suggestions.    Thank you for choosing the [facility]!

## 2024-01-11 DIAGNOSIS — R10.84 ABDOMINAL PAIN, GENERALIZED: ICD-10-CM

## 2024-01-11 DIAGNOSIS — R11.0 NAUSEA: ICD-10-CM

## 2024-01-12 RX ORDER — LIDOCAINE 50 MG/G
PATCH TOPICAL
Qty: 30 PATCH | Refills: 1 | Status: SHIPPED | OUTPATIENT
Start: 2024-01-12 | End: 2024-03-07

## 2024-01-12 RX ORDER — METOCLOPRAMIDE 5 MG/1
5 TABLET ORAL 3 TIMES DAILY PRN
Qty: 45 TABLET | Refills: 1 | Status: SHIPPED | OUTPATIENT
Start: 2024-01-12 | End: 2024-02-20

## 2024-01-12 NOTE — TELEPHONE ENCOUNTER
Medication requested: METOCLOPRAMIDE 5 MG TABLET   Last office visit: 12/21/23  Future [facility] appointments: none  Medication last refilled: 12/7/23  Last qualifying labs: none    Prescription approved per [facility] Refill Protocol.        Antivertigo/Antiemetic Agents [id number]/2024 02:53 AM   Protocol Details Medication is active on med list    Medication indicated for associated diagnosis    Recent (12 mo) or future (90 days) visit with authorizing provider's specialty    Patient is 18 years of age or older          Medication requested: LIDOCAINE 5% PATCH   Medication last refilled: 12/7/23  Last qualifying labs: none    Prescription approved per [facility] Refill Protocol.      Topical Anesthetic Agents [id number]/11/2024 02:53 AM   Protocol Details Recent (12 mo) or future (30 days) visit within the authorizing provider's specialty    Medication is active on med list    Patient is age 2 or older          YULISA Doe, BSN

## 2024-01-18 ENCOUNTER — OFFICE VISIT (OUTPATIENT)
Dept: VASCULAR SURGERY | Facility: CLINIC | Age: 54
End: 2024-01-18
Attending: FAMILY MEDICINE
Payer: COMMERCIAL

## 2024-01-18 VITALS
HEART RATE: 88 BPM | SYSTOLIC BLOOD PRESSURE: 135 MMHG | DIASTOLIC BLOOD PRESSURE: 88 MMHG | OXYGEN SATURATION: 98 % | RESPIRATION RATE: 20 BRPM

## 2024-01-18 DIAGNOSIS — S31.109D OPEN WOUND OF ABDOMINAL WALL, SUBSEQUENT ENCOUNTER: Primary | ICD-10-CM

## 2024-01-18 DIAGNOSIS — T81.89XD NON-HEALING SURGICAL WOUND, SUBSEQUENT ENCOUNTER: ICD-10-CM

## 2024-01-18 PROCEDURE — G0463 HOSPITAL OUTPT CLINIC VISIT: HCPCS | Performed by: FAMILY MEDICINE

## 2024-01-18 PROCEDURE — 99214 OFFICE O/P EST MOD 30 MIN: CPT | Performed by: FAMILY MEDICINE

## 2024-01-18 ASSESSMENT — PAIN SCALES - GENERAL: PAINLEVEL: NO PAIN (0)
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
## 2024-01-18 NOTE — LETTER
[facility]  [address] [place] [address] [address]  [place], [place] [address]  [phone]      Fax [phone]    [facility]           Fax: [phone]            Customer Service: [phone]        Account #: [id number]    Wound Dressing Rx and Order Form  Order Status: refill  Verbal: Tiffanie  Date: 2024     Teresa Perez  Gender: female  : 1970  [address]  [place] [address]  [phone] (home)     Medical Record: [id number]  Primary Care Provider: Tyra Bullock      ICD-10-CM    1. Open wound of abdominal wall, subsequent encounter  S31.109D       2. Non-healing surgical wound, subsequent encounter  T81.89XD             Insurance Info:  INSURER: Payor: [employer] / Plan: [employer] MEDICARE ADVANTAGE / Product Type: HMO /   Policy ID#:  [id number]  SECONDARY INSURANCE:  MEDICA  Secondary Policy ID#:  [id number]        Physician Info:   Name:  ROBERTA DECKER     Dept Address/Phones:   [address] [place] [address], [address]  [facility] [id number] [phone]  Fax: [phone]    Lymphedema circumferential measurements (in cm):       No data to display                  Wound info:  PICC 10/09/23 Triple Lumen Left Brachial vein lateral (Active)   Number of days: 101       Negative Pressure Wound Therapy Pelvis Anterior;Left (Active)   Number of days: 99       Wound Buttocks Pressure injury community acquired Stage 2 (Active)   Number of days: 549       Wound Abdomen (Active)   Number of days: 101       Wound Pelvis (Active)   Number of days: 101       Wound Thigh (Active)   Number of days: 101       VASC Wound Umbilicus (Active)   Pre Size Length 0.6 10/09/23 0700   Pre Size Width 0.6 10/09/23 0700   Pre Size Depth 1.5 10/09/23 0700   Pre Total Sq cm 0.36 10/09/23 0700   Description refused assessment 23 1500   Number of days: 247       VASC Wound Abdomen middle (Active)   Pre Size Length 9.5 24 0700   Pre Size Width 5  "01/18/24 0700   Pre Size Depth 0.2 01/18/24 0700   Pre Total Sq cm 47.5 01/18/24 0700   Description measured all abdomen wounds together 11/13/23 0800   Number of days: 247       VASC Wound Abdomen left (Active)   Description stable eschar 06/12/23 0700   Number of days: 247       VASC Wound abd wound distal (Active)   Pre Size Length 4 10/09/23 0700   Pre Size Width 3 10/09/23 0700   Pre Size Depth 0.1 10/09/23 0700   Pre Total Sq cm 12 10/09/23 0700   Number of days: 206       VASC Wound left IT (Active)   Pre Size Length 0 01/18/24 0700   Pre Size Width 0 01/18/24 0700   Pre Size Depth 0 01/18/24 0700   Pre Total Sq cm 0 01/18/24 0700   Number of days: 178       VASC Wound left groin (Active)   Pre Size Length 2.3 01/18/24 0700   Pre Size Width 0.5 01/18/24 0700   Pre Size Depth 0.2 01/18/24 0700   Pre Total Sq cm 1.15 01/18/24 0700   Number of days: 66       Incision/Surgical Site 07/05/22 Abdomen (Active)   Number of days: 562       Incision/Surgical Site 07/19/22 Left Buttocks (Active)   Number of days: 548       Incision/Surgical Site 10/09/23 Left Perineum (Active)   Number of days: 101        Drainage: moderate  Thickness:  full  Duration of Need: 30 DAYS  Days Supply: 30 DAYS  Start Date: 1/18/2024  Starter Kit: Ancillary Kit (saline, gloves, gauze)  Qualifying wound/Debridement: Yes      Dressing Type Brand Size Frequency of change  Quantity   Primary Hydrofera blue ready transfer  4\"x5\" 3 TIMES PER WEEK and as needed     Mepilex bordered adhesive bandage  6\"x6\" 3 TIMES PER WEEK and as needed     Square gauze  4\"x4\" 3 TIMES PER WEEK and as needed      No substitutions preferred. Call [phone].         OK to forward to covered supplier.    Electronically Signed Physician:  ROBERTA DECKER             Date: January 18, 2024    "

## 2024-01-23 DIAGNOSIS — M53.3 PAIN IN THE COCCYX: ICD-10-CM

## 2024-01-23 DIAGNOSIS — R10.11 RUQ ABDOMINAL PAIN: ICD-10-CM

## 2024-01-23 RX ORDER — DICLOFENAC POTASSIUM 50 MG/1
TABLET, FILM COATED ORAL
Qty: 90 TABLET | Refills: 0 | OUTPATIENT
Start: 2024-01-23

## 2024-01-23 RX ORDER — MELOXICAM 15 MG/1
15 TABLET ORAL DAILY
Qty: 30 TABLET | Refills: 0 | Status: SHIPPED | OUTPATIENT
Start: 2024-01-23 | End: 2024-02-20

## 2024-01-23 NOTE — TELEPHONE ENCOUNTER
Medication requested: DICLOFENAC POTASSIUM 50MG TABLETS   Last office visit: 12/18/24  Future [facility] appointments: 2/7/24  Medication last refilled: 12/15/23  Last qualifying labs: none    Routing refill request to provider for review/approval because:    NSAID Medications [id number]/23/2024 03:10 AM   Protocol Details Always Fail Criteria - Chart Review Required    Normal GFR on file in past 12 months    Normal serum creatinine on file in past 12 months        Brook RN, BSN

## 2024-01-23 NOTE — TELEPHONE ENCOUNTER
Medication requested: MELOXICAM 15MG TABLETS   Last office visit: 12/21/23  Future [facility] appointments: 2/7/24  Medication last refilled: 12/4/23  Last qualifying labs: none      Routing refill request to provider for review/approval because:  NSAID Medications [id number]/23/2024 03:08 AM   Protocol Details Always Fail Criteria - Chart Review Required    Normal GFR on file in past 12 months    Normal serum creatinine on file in past 12 months     Brook RN, BSN

## 2024-01-24 DIAGNOSIS — F33.1 MODERATE RECURRENT MAJOR DEPRESSION (H): ICD-10-CM

## 2024-01-24 RX ORDER — VENLAFAXINE HYDROCHLORIDE 150 MG/1
150 CAPSULE, EXTENDED RELEASE ORAL DAILY
Qty: 30 CAPSULE | Refills: 0 | Status: SHIPPED | OUTPATIENT
Start: 2024-01-24 | End: 2024-06-14

## 2024-01-24 NOTE — TELEPHONE ENCOUNTER
Medication requested: VENLAFAXINE ER 150MG CAPSULES   Last office visit: 12/21/23  Future [facility] appointments: 2/7/24  Medication last refilled: 1/23/24  Last qualifying labs: none    Routing refill request to provider for review/approval because:      Serotonin-Norepinephrine Reuptake Inhibitors  Ifzfan8101/24/2024 08:07 AM   Protocol Details PHQ-9 score of less than 5 in past 6 months    Normal serum creatinine on file in past 12 months        Brook RN, BSN

## 2024-01-25 ENCOUNTER — TRANSFERRED RECORDS (OUTPATIENT)
Dept: HEALTH INFORMATION MANAGEMENT | Facility: CLINIC | Age: 54
End: 2024-01-25
Payer: COMMERCIAL

## 2024-01-26 DIAGNOSIS — F33.1 MODERATE RECURRENT MAJOR DEPRESSION (H): ICD-10-CM

## 2024-01-26 RX ORDER — QUETIAPINE FUMARATE 400 MG/1
400 TABLET, FILM COATED ORAL AT BEDTIME
Qty: 15 TABLET | Refills: 0 | Status: SHIPPED | OUTPATIENT
Start: 2024-01-26 | End: 2024-04-25

## 2024-01-26 NOTE — TELEPHONE ENCOUNTER
Medication requested: QUETIAPINE FUMARATE 400 MG TAB   Last office visit: 12/21/23  Future [facility] appointments: 2/7/24  Medication last refilled: 1/13/24  Last qualifying labs: none      Routing refill request to provider for review/approval because:    Antipsychotic Medications [id number]/26/2024 02:24 PM   Protocol Details PHQ9 score less than 5 in past 6 beny Doe RN, BSN

## 2024-01-30 DIAGNOSIS — G43.809 OTHER MIGRAINE WITHOUT STATUS MIGRAINOSUS, NOT INTRACTABLE: ICD-10-CM

## 2024-01-30 DIAGNOSIS — M25.511 ACUTE PAIN OF RIGHT SHOULDER: ICD-10-CM

## 2024-01-30 RX ORDER — SUMATRIPTAN 50 MG/1
50 TABLET, FILM COATED ORAL
Qty: 12 TABLET | Refills: 1 | Status: SHIPPED | OUTPATIENT
Start: 2024-01-30 | End: 2024-06-27

## 2024-01-31 ENCOUNTER — TELEPHONE (OUTPATIENT)
Dept: FAMILY MEDICINE | Facility: CLINIC | Age: 54
End: 2024-01-31

## 2024-01-31 DIAGNOSIS — Z79.4 TYPE 2 DIABETES MELLITUS WITH COMPLICATION, WITH LONG-TERM CURRENT USE OF INSULIN (H): Primary | ICD-10-CM

## 2024-01-31 DIAGNOSIS — E11.8 TYPE 2 DIABETES MELLITUS WITH COMPLICATION, WITH LONG-TERM CURRENT USE OF INSULIN (H): Primary | ICD-10-CM

## 2024-01-31 RX ORDER — IBUPROFEN 400 MG/1
TABLET, FILM COATED ORAL
Qty: 42 TABLET | Refills: 0 | OUTPATIENT
Start: 2024-01-31

## 2024-01-31 NOTE — TELEPHONE ENCOUNTER
Medication requested: IBUPROFEN 400MG TABLETS   Last office visit: 12/13/23  Future [facility] appointments: 2/7/24  Medication last refilled: 12/27/23  Last qualifying labs: none    Routing refill request to provider for review/approval because:  NSAID Medications [id number]/30/2024 10:24 AM   Protocol Details Always Fail Criteria - Chart Review Required    Normal serum creatinine on file in past 12 months     Brook RN, BSN

## 2024-01-31 NOTE — TELEPHONE ENCOUNTER
Unable to find One Touch Blue Test strips in EPIC.  Ordered generic test strips - should be okay to substitute as needed.     Tyra Bullock MD    Routed to LAURA Gloria.    
Not applicable

## 2024-02-04 DIAGNOSIS — Z79.4 TYPE 2 DIABETES MELLITUS WITH COMPLICATION, WITH LONG-TERM CURRENT USE OF INSULIN (H): Primary | ICD-10-CM

## 2024-02-04 DIAGNOSIS — E11.8 TYPE 2 DIABETES MELLITUS WITH COMPLICATION, WITH LONG-TERM CURRENT USE OF INSULIN (H): Primary | ICD-10-CM

## 2024-02-05 NOTE — PATIENT INSTRUCTIONS
Wound Care Instructions    3 TIMES PER WEEK and as needed, Cleanse your groin and abdomen wound(s) with Normal saline. Change groin wound daily    Pat Dry with non-sterile gauze    Abdomen and groin: hydrofera blue ready transfer, cover abdomen with mepilex, cover groin with ABD. If you are using hydrofera blue ready with the plastic backing, the plastic backing needs to be FACING AWAY from the wound      It is ok to get your wound wet in the bath or shower      If for some reason you are not able to get your dressing(s) changed as outlined above (due to illness, lack of supplies, lack of help) please do the following: remove old, soiled dressings; wash the wounds with saline; pat dry; apply ABD pad or other absorbant pad and secure with rolled gauze; avoid tape directly on your skin; Call the clinic as soon as possible to let us know what the current issues are in receiving wound care [phone].      SEEK MEDICAL CARE IF:  You have an increase in swelling, pain, or redness around the wound.  You have an increase in the amount of pus coming from the wound.  There is a bad smell coming from the wound.  The wound appears to be worsening/enlarging  You have a fever greater than 101.5 F      It is ok to continue current wound care treatment/products for the next 2-3 days until new wound care supplies are ordered and arrive. If longer than this please contact our office at [phone].    If you have a 2 layer or 4 layer compression wrap on these are safe to have on for ONLY 7 days. If for some reason you are not able to get the wrap(s) changed (due to illness; lack of supplies, lack of help, lack of transportation) please do the following: unwrap the old 2 or 4 layer compression wrap; avoid using scissors as you could cut your skin and cause wounds; use tubular compression when available. Call to reschedule your home care or clinic visit appointment as soon as possible.    Please NOTE: if you are 15 minutes  late to your clinic appointment you will have to be rescheduled. Please call our clinic as soon as possible if you know you will not be able to get to your appointment at [phone].    If you fail to show up to 3 scheduled clinic appointments you will be dismissed from our clinic.              We want to hear from you!  In the next few weeks, you should receive a call or email to complete a survey about your visit at [facility]. Please help us improve your appointment experience by letting us know how we did today. We strive to make your experience good and value any ways in which we could do better.      We value your input and suggestions.    Thank you for choosing the [facility]!

## 2024-02-08 ENCOUNTER — OFFICE VISIT (OUTPATIENT)
Dept: VASCULAR SURGERY | Facility: CLINIC | Age: 54
End: 2024-02-08
Attending: FAMILY MEDICINE
Payer: COMMERCIAL

## 2024-02-08 VITALS
DIASTOLIC BLOOD PRESSURE: 85 MMHG | OXYGEN SATURATION: 96 % | SYSTOLIC BLOOD PRESSURE: 136 MMHG | HEART RATE: 95 BPM | TEMPERATURE: 97.3 F

## 2024-02-08 DIAGNOSIS — S31.109D OPEN WOUND OF ABDOMINAL WALL, SUBSEQUENT ENCOUNTER: Primary | ICD-10-CM

## 2024-02-08 DIAGNOSIS — T81.89XD NON-HEALING SURGICAL WOUND, SUBSEQUENT ENCOUNTER: ICD-10-CM

## 2024-02-08 PROCEDURE — G0463 HOSPITAL OUTPT CLINIC VISIT: HCPCS | Performed by: FAMILY MEDICINE

## 2024-02-08 PROCEDURE — 99213 OFFICE O/P EST LOW 20 MIN: CPT | Performed by: FAMILY MEDICINE

## 2024-02-08 ASSESSMENT — PAIN SCALES - GENERAL: PAINLEVEL: NO PAIN (0)
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
## 2024-02-13 ENCOUNTER — DOCUMENTATION ONLY (OUTPATIENT)
Dept: FAMILY MEDICINE | Facility: CLINIC | Age: 54
End: 2024-02-13
Payer: COMMERCIAL

## 2024-02-13 DIAGNOSIS — Z53.9 DIAGNOSIS NOT YET DEFINED: Primary | ICD-10-CM

## 2024-02-13 NOTE — PROGRESS NOTES
To be completed in Nursing note:    Please reference list for forms that require a visit for completion.  Please remind patients that providers are given 3-5 business days to complete and return forms.      Form type:Widdle    Date form received: 24    Date form completed by Physician:24    How was form returned to patient (mailed, faxed, or at  for patient to ):  Faxed to   Date form mailed/faxed/left at  for patient and sent to HIM for scannin24, sent to HIM for scanning    Once form is left for patient, faxed, or mailed PCS will then close the documentation only encounter.       Faizan Lacey MA

## 2024-02-17 DIAGNOSIS — R11.0 NAUSEA: ICD-10-CM

## 2024-02-17 DIAGNOSIS — R10.11 RUQ ABDOMINAL PAIN: ICD-10-CM

## 2024-02-17 DIAGNOSIS — R10.84 ABDOMINAL PAIN, GENERALIZED: ICD-10-CM

## 2024-02-18 DIAGNOSIS — Z71.6 ENCOUNTER FOR TOBACCO USE CESSATION COUNSELING: ICD-10-CM

## 2024-02-19 RX ORDER — NICOTINE 21 MG/24HR
1 PATCH, TRANSDERMAL 24 HOURS TRANSDERMAL EVERY 24 HOURS
Qty: 14 PATCH | Refills: 0 | Status: SHIPPED | OUTPATIENT
Start: 2024-02-19 | End: 2024-05-03

## 2024-02-20 DIAGNOSIS — M53.3 PAIN IN THE COCCYX: ICD-10-CM

## 2024-02-20 RX ORDER — DICLOFENAC POTASSIUM 50 MG/1
TABLET, FILM COATED ORAL
Qty: 90 TABLET | Refills: 0 | OUTPATIENT
Start: 2024-02-20

## 2024-02-20 RX ORDER — METOCLOPRAMIDE 5 MG/1
5 TABLET ORAL 3 TIMES DAILY PRN
Qty: 45 TABLET | Refills: 1 | Status: SHIPPED | OUTPATIENT
Start: 2024-02-20 | End: 2024-03-19

## 2024-02-20 RX ORDER — MELOXICAM 15 MG/1
15 TABLET ORAL DAILY
Qty: 30 TABLET | Refills: 0 | Status: SHIPPED | OUTPATIENT
Start: 2024-02-20 | End: 2024-02-22

## 2024-02-20 NOTE — TELEPHONE ENCOUNTER
Medication requested: METOCLOPRAMIDE 5 MG TABLET   Last office visit: 12/13/24  Future [facility] appointments: 2/28/24  Medication last refilled: 1/26/24  Last qualifying labs: none    Prescription approved per [facility] Refill Protocol.     Antivertigo/Antiemetic Agents [id number]/17/2024 09:08 AM   Protocol Details Medication is active on med list    Medication indicated for associated diagnosis    Recent (12 mo) or future (90 days) visit with authorizing provider's specialty    Patient is 18 years of age or older        YULISA Doe, BSN

## 2024-02-20 NOTE — TELEPHONE ENCOUNTER
Medication requested: DICLOFENAC POTASSIUM 50MG TABLETS   Last office visit: 12/13/23  Future [facility] appointments: 2/28/24  Medication last refilled: 12/15/24  Last qualifying labs: none    Routing refill request to provider for review/approval because:    NSAID Medications [id number]/20/2024 10:32 AM   Protocol Details Always Fail Criteria - Chart Review Required    Normal serum creatinine on file in past 12 months        YULISA Doe, BSN

## 2024-02-20 NOTE — TELEPHONE ENCOUNTER
Medication requested: MELOXICAM 15MG TABLETS   Last office visit: 12/13/23  Future [facility] appointments: 2/28/24  Medication last refilled: 1/23/24  Last qualifying labs: none      Routing refill request to provider for review/approval because:  NSAID Medications [id number]/17/2024 02:11 PM   Protocol Details Always Fail Criteria - Chart Review Required    Normal serum creatinine on file in past 12 months     Brook RN, BSN

## 2024-02-25 DIAGNOSIS — Z79.4 TYPE 2 DIABETES MELLITUS WITH COMPLICATION, WITH LONG-TERM CURRENT USE OF INSULIN (H): Primary | ICD-10-CM

## 2024-02-25 DIAGNOSIS — E11.8 TYPE 2 DIABETES MELLITUS WITH COMPLICATION, WITH LONG-TERM CURRENT USE OF INSULIN (H): Primary | ICD-10-CM

## 2024-02-25 DIAGNOSIS — N18.2 CKD (CHRONIC KIDNEY DISEASE) STAGE 2, GFR 60-89 ML/MIN: ICD-10-CM

## 2024-02-25 DIAGNOSIS — I10 ESSENTIAL HYPERTENSION: ICD-10-CM

## 2024-03-01 ENCOUNTER — TELEPHONE (OUTPATIENT)
Dept: FAMILY MEDICINE | Facility: CLINIC | Age: 54
End: 2024-03-01
Payer: COMMERCIAL

## 2024-03-01 NOTE — TELEPHONE ENCOUNTER
[facility] phone call message- general phone call:    Reason for call: for    He heard you were trying to write a letter for jena re a two bed room. he talked to jena and he has a couple of thing he would like to suggest on what in the letter your giving to her talks about, for not just her mental health but her physical was well . He has written theses letter for people before he wanted to give you some bullet points.    Action desired: please call back    Return call needed: Yes    OK to leave a message on voice mail? Yes    Advised patient to response may take up to 2 business days: Yes    Primary language: English      needed? No    Call taken on March 1, 2024 at 12:55 PM by Lo Lao

## 2024-03-01 NOTE — TELEPHONE ENCOUNTER
Attempted to call patient back for additional information for letter for patient  NO answer.  Left voice mail message to call back.     I have not seen the patient since December and she does not have a visit scheduled, so will need to have a visit so she and I can discuss her needs as well.     Tyra Bullock MD

## 2024-03-03 DIAGNOSIS — R10.11 RUQ ABDOMINAL PAIN: ICD-10-CM

## 2024-03-04 RX ORDER — PANTOPRAZOLE SODIUM 40 MG/1
40 TABLET, DELAYED RELEASE ORAL DAILY
Qty: 90 TABLET | Refills: 1 | Status: SHIPPED | OUTPATIENT
Start: 2024-03-04 | End: 2024-07-12

## 2024-03-04 NOTE — TELEPHONE ENCOUNTER
Routing refill request to provider for review/approval because:  PPI Protocol Evoizi7003/03/2024 11:26 AM   Protocol Details Medication indicated for associated diagnosis     Medication requested: PANTOPRAZOLE 40MG TABLETS   Last office visit: 12/13/2023  Future [facility] appointments: none  Medication last refilled: 12/8/2023   Last qualifying labs: none    Routed to provider due to failed RN protocol.     YULISA Valverde

## 2024-03-07 ENCOUNTER — TELEPHONE (OUTPATIENT)
Dept: FAMILY MEDICINE | Facility: CLINIC | Age: 54
End: 2024-03-07
Payer: COMMERCIAL

## 2024-03-07 DIAGNOSIS — M53.3 PAIN IN THE COCCYX: ICD-10-CM

## 2024-03-07 DIAGNOSIS — R10.84 ABDOMINAL PAIN, GENERALIZED: ICD-10-CM

## 2024-03-07 RX ORDER — LIDOCAINE 50 MG/G
PATCH TOPICAL
Qty: 30 PATCH | Refills: 1 | Status: SHIPPED | OUTPATIENT
Start: 2024-03-07 | End: 2024-05-03

## 2024-03-07 RX ORDER — PSEUDOEPHED/ACETAMINOPH/DIPHEN 30MG-500MG
500-1000 TABLET ORAL EVERY 6 HOURS PRN
Qty: 100 TABLET | Refills: 3 | Status: SHIPPED | OUTPATIENT
Start: 2024-03-07

## 2024-03-07 NOTE — TELEPHONE ENCOUNTER
Medication requested: LIDOCAINE 5% PATCH   Last office visit: 12/13/23  Future [facility] appointments: none  Medication last refilled: 2/7/24  Last qualifying labs: none    Prescription approved per [facility] Refill Protocol.    Topical Anesthetic Agents Pfdblc0203/07/2024 11:42 AM   Protocol Details Recent (12 mo) or future (30 days) visit within the authorizing provider's specialty    Medication is active on med list    Patient is age 2 or older          Medication requested: ACETAMINOPHEN 500 MG TABLET   Medication last refilled: 2/7/24  Last qualifying labs: none    Routing refill request to provider for review/approval because:    Analgesics (Non-Narcotic Tylenol and ASA Only) Eqiwyn5103/07/2024 11:42 AM   Protocol Details Medication matches indication        YULISA Doe, BSN

## 2024-03-07 NOTE — TELEPHONE ENCOUNTER
Spoke with pt regarding symptoms. Pt states for the last 2 days she has been experiencing a productive cough, fever of 99.6, chills, headache and body aches. Pt has been treating at home with OTC medication and drinking lots of fluids, but feels like her symptoms are worsening. Pt has not tested at home for Covid. Pt desires a clinic visit to be evaluated, but only wants to see Dr Bullock. Scheduled pt in the clinic with Dr Bullock 3/8/24. Instructed pt to seek immediate care if symptoms continue to worsen or shortness of breath occurs. Pt agrees with plan of care.   YULISA Doe, BSN

## 2024-03-07 NOTE — TELEPHONE ENCOUNTER
[facility] phone call message-patient reporting a symptom:     Symptom: Cough fever head pain     Same Day Visit Offered: would like to talk to the Dr      Additional comments:     OK to leave message on voice mail? Yes    Primary language: English      needed? No    Call taken on March 7, 2024 at 3:01 PM by Juan Peralta

## 2024-03-08 ENCOUNTER — VIRTUAL VISIT (OUTPATIENT)
Dept: FAMILY MEDICINE | Facility: CLINIC | Age: 54
End: 2024-03-08
Payer: COMMERCIAL

## 2024-03-08 DIAGNOSIS — Z12.11 SCREEN FOR COLON CANCER: ICD-10-CM

## 2024-03-08 DIAGNOSIS — J45.51 SEVERE PERSISTENT ASTHMA WITH EXACERBATION (H): Primary | ICD-10-CM

## 2024-03-08 PROCEDURE — 99442 PR PHYSICIAN TELEPHONE EVALUATION 11-20 MIN: CPT | Mod: 93 | Performed by: STUDENT IN AN ORGANIZED HEALTH CARE EDUCATION/TRAINING PROGRAM

## 2024-03-08 RX ORDER — PREDNISONE 50 MG/1
50 TABLET ORAL DAILY
Qty: 5 TABLET | Refills: 0 | Status: SHIPPED | OUTPATIENT
Start: 2024-03-08 | End: 2024-06-28

## 2024-03-08 RX ORDER — AZITHROMYCIN 250 MG/1
TABLET, FILM COATED ORAL
Qty: 6 TABLET | Refills: 0 | Status: SHIPPED | OUTPATIENT
Start: 2024-03-08 | End: 2024-03-13

## 2024-03-08 NOTE — PROGRESS NOTES
Teresa is a 53 year old who is being evaluated via a billable telephone visit.      What phone number would you like to be contacted at? [phone]  How would you like to obtain your AVS? Ashley  Originating Location (pt. Location): Home    Distant Location (provider location):  On-site    Teresa was seen today for cough.    Diagnoses and all orders for this visit:    Severe persistent asthma with exacerbation (H28)  Patient with a history of severe persistent asthma, with likely overlying COPD, presenting with worsening acute respiratory symptoms.  Hard to evaluate over the phone, and we are unable to do swab testing due to this.  However I have concerns about how she is doing and so we we will treat this as an asthma exacerbation.  Treat her for 5 days with prednisone and 5 days of azithromycin.  Continue to use her nebulizers and inhalers regularly.  If she is not feeling by better by Monday she should come in for an in person visit.  Discussed reasons to call the ambulance, especially for elevators not working and that the only way she can get down and evaluated.  -     azithromycin (ZITHROMAX) 250 MG tablet; Take 2 tablets (500 mg) by mouth daily for 1 day, THEN 1 tablet (250 mg) daily for 4 days.  -     predniSONE (DELTASONE) 50 MG tablet; Take 1 tablet (50 mg) by mouth daily    Screen for colon cancer  She does have a colonoscopy scheduled for Tuesday.  Ideally she will be able to leave her apartment and complete this test.  Has been having lots of diarrhea and abdominal issues so this is important not just from a preventative standpoint.    Follow-up when able, ideally in the next 1 to 2 weeks.    Subjective   Teresa is a 53 year old, presenting for the following health issues:  Cough (Cough. H/a, running nose, fever.  Took med not helping.  Since last Friday.  Also back pain/)        3/8/2024     3:27 PM   Additional Questions   Roomed by ramu   Accompanied by self         3/8/2024     Information    services provided? No     HPI     Elevator broke down again, can't get out of her apartment.  Last Friday symptoms started - initially was a cough, nose drainage, throat irritation.  No fevers last week.     Patient seen today for evaluation of shortness of breath.  Symptoms started last Friday.    Over the last few days, he developed low grade fevers, coughing, coughing stuff up, bad headaches.      Low back hurts really bad - not sure if it is from being sick or her kidneys.  Worries that there is damage to them when she is this sick.  She has been drinking lots of water and apple juice.  Peeing normally.    Some nausea, no vomitting.  Has chronic diarrhea!  Has colonoscopy next week.     Having left ear pain.  Sinus pain on both sides.  No eye drainage.      Coughing up whitish with green tint.  Cough is early morning.  This AM took cold medicine, and that helped with the cough.      She has her inhalers.  Is using the Symibcort as a rescue.  Using the nebulizer and other inhaler - Spiriva.  Using the nebulizer multiple times a day and helps for a while.     Does feel short of breath.  Whole body moves with breathing.  Chest hurts from coughing.  Abdomen hurts from coughing.      Sweating with her air conditioner one.  No one is coming in right now to check on her, but she does have some friends in the building that can  medications for her.          Objective           Vitals:  No vitals were obtained today due to virtual visit.    Physical Exam   General: Alert and no distress //Respiratory: No audible wheeze, or shortness of breath, voice is muffled and occasional barky cough noted./ Psychiatric:  Appropriate affect, tone, and pace of words, sounds mildly anxious.        Phone call duration: 12 minutes  Signed Electronically by: Tyra Bullock MD

## 2024-03-08 NOTE — TELEPHONE ENCOUNTER
Message reviewed.  Agree with recommendation from Milena that patient be seen sooner than 3/8.  Glad she is coming in though, and will do a thorough evaluation at that time, including COVID testing.      Tyra Bullock MD

## 2024-03-14 ENCOUNTER — OFFICE VISIT (OUTPATIENT)
Dept: FAMILY MEDICINE | Facility: CLINIC | Age: 54
End: 2024-03-14
Payer: COMMERCIAL

## 2024-03-14 VITALS
DIASTOLIC BLOOD PRESSURE: 88 MMHG | RESPIRATION RATE: 16 BRPM | SYSTOLIC BLOOD PRESSURE: 138 MMHG | TEMPERATURE: 97.5 F | HEART RATE: 83 BPM | OXYGEN SATURATION: 97 %

## 2024-03-14 DIAGNOSIS — J44.9 CHRONIC OBSTRUCTIVE PULMONARY DISEASE, UNSPECIFIED COPD TYPE (H): ICD-10-CM

## 2024-03-14 DIAGNOSIS — M54.50 CHRONIC BILATERAL LOW BACK PAIN WITHOUT SCIATICA: ICD-10-CM

## 2024-03-14 DIAGNOSIS — R05.1 ACUTE COUGH: Primary | ICD-10-CM

## 2024-03-14 DIAGNOSIS — G89.29 CHRONIC BILATERAL LOW BACK PAIN WITHOUT SCIATICA: ICD-10-CM

## 2024-03-14 DIAGNOSIS — F33.1 MODERATE RECURRENT MAJOR DEPRESSION (H): ICD-10-CM

## 2024-03-14 DIAGNOSIS — E11.8 TYPE 2 DIABETES MELLITUS WITH COMPLICATION, WITH LONG-TERM CURRENT USE OF INSULIN (H): ICD-10-CM

## 2024-03-14 DIAGNOSIS — I10 ESSENTIAL HYPERTENSION: ICD-10-CM

## 2024-03-14 DIAGNOSIS — Z79.4 TYPE 2 DIABETES MELLITUS WITH COMPLICATION, WITH LONG-TERM CURRENT USE OF INSULIN (H): ICD-10-CM

## 2024-03-14 LAB
ERYTHROCYTE [DISTWIDTH] IN BLOOD BY AUTOMATED COUNT: 15.5 % (ref 10–15)
FLUAV RNA SPEC QL NAA+PROBE: NEGATIVE
FLUBV RNA RESP QL NAA+PROBE: NEGATIVE
HBA1C MFR BLD: 9.3 % (ref 0–5.6)
HCT VFR BLD AUTO: 47.1 % (ref 35–47)
HGB BLD-MCNC: 15.4 G/DL (ref 11.7–15.7)
MCH RBC QN AUTO: 26.6 PG (ref 26.5–33)
MCHC RBC AUTO-ENTMCNC: 32.7 G/DL (ref 31.5–36.5)
MCV RBC AUTO: 81 FL (ref 78–100)
PLATELET # BLD AUTO: 289 10E3/UL (ref 150–450)
RBC # BLD AUTO: 5.79 10E6/UL (ref 3.8–5.2)
RSV RNA SPEC NAA+PROBE: NEGATIVE
SARS-COV-2 RNA RESP QL NAA+PROBE: NEGATIVE
WBC # BLD AUTO: 17.4 10E3/UL (ref 4–11)

## 2024-03-14 PROCEDURE — 85027 COMPLETE CBC AUTOMATED: CPT

## 2024-03-14 PROCEDURE — 83036 HEMOGLOBIN GLYCOSYLATED A1C: CPT

## 2024-03-14 PROCEDURE — 80053 COMPREHEN METABOLIC PANEL: CPT

## 2024-03-14 PROCEDURE — 99214 OFFICE O/P EST MOD 30 MIN: CPT | Mod: GC

## 2024-03-14 PROCEDURE — 36415 COLL VENOUS BLD VENIPUNCTURE: CPT

## 2024-03-14 PROCEDURE — 87637 SARSCOV2&INF A&B&RSV AMP PRB: CPT

## 2024-03-14 RX ORDER — POLYETHYLENE GLYCOL 3350 17 G/17G
17 POWDER, FOR SOLUTION ORAL DAILY
COMMUNITY
Start: 2024-03-07 | End: 2024-06-28

## 2024-03-14 RX ORDER — BENZONATATE 100 MG/1
100 CAPSULE ORAL 3 TIMES DAILY PRN
Qty: 30 CAPSULE | Refills: 0 | Status: SHIPPED | OUTPATIENT
Start: 2024-03-14 | End: 2024-06-28

## 2024-03-14 RX ORDER — GUAIFENESIN 600 MG/1
1200 TABLET, EXTENDED RELEASE ORAL 2 TIMES DAILY
Qty: 28 TABLET | Refills: 0 | Status: SHIPPED | OUTPATIENT
Start: 2024-03-14 | End: 2024-03-14

## 2024-03-14 RX ORDER — GUAIFENESIN 200 MG/10ML
200 LIQUID ORAL EVERY 4 HOURS PRN
Qty: 473 ML | Refills: 0 | Status: SHIPPED | OUTPATIENT
Start: 2024-03-14 | End: 2024-06-28

## 2024-03-14 RX ORDER — LISINOPRIL 5 MG/1
5 TABLET ORAL AT BEDTIME
Qty: 90 TABLET | Refills: 1 | Status: SHIPPED | OUTPATIENT
Start: 2024-03-14 | End: 2024-07-12

## 2024-03-14 RX ORDER — TRAZODONE HYDROCHLORIDE 50 MG/1
50 TABLET, FILM COATED ORAL AT BEDTIME
Qty: 90 TABLET | Refills: 3 | Status: SHIPPED | OUTPATIENT
Start: 2024-03-14 | End: 2024-06-28

## 2024-03-14 RX ORDER — CYCLOBENZAPRINE HCL 5 MG
5 TABLET ORAL 3 TIMES DAILY PRN
Qty: 30 TABLET | Refills: 0 | Status: SHIPPED | OUTPATIENT
Start: 2024-03-14 | End: 2024-06-19

## 2024-03-14 RX ORDER — DICLOFENAC POTASSIUM 50 MG/1
TABLET, FILM COATED ORAL
Qty: 90 TABLET | Refills: 3 | Status: SHIPPED | OUTPATIENT
Start: 2024-03-14 | End: 2024-09-10

## 2024-03-14 NOTE — PATIENT INSTRUCTIONS
Thank you for coming in to see us today!    - I will call you tomorrow with the results of your test  - Take the Mucinex as instructed. 2 pills, twice per day  - Take the benzonatate (Tessalon) up to 3 times per day for cough  - Take the diclofenac twice per day for low back pain  - Continue to take all your other pain medications    Frandy Avitia, DO

## 2024-03-14 NOTE — LETTER
March 18, 2024      Teresa [place]  [address]  [place] [address]        Dear ,    We are writing to inform you of your test results.    Here is a copy of your lab results.  Your kidney tests are stable.  Diabetes numbers are higher than previous - your A1c is 9.3, which is higher than before.  Please schedule a diabetes follow up visit so we can talk about adjustments to your regimen.  I hope your cough is getting better!.  Please call the clinic at [phone] if you have any questions     Resulted Orders   CBC with platelets   Result Value Ref Range    WBC Count 17.4 (H) 4.0 - 11.0 10e3/uL    RBC Count 5.79 (H) 3.80 - 5.20 10e6/uL    Hemoglobin 15.4 11.7 - 15.7 g/dL    Hematocrit 47.1 (H) 35.0 - 47.0 %    MCV 81 78 - 100 fL    MCH 26.6 26.5 - 33.0 pg    MCHC 32.7 31.5 - 36.5 g/dL    RDW 15.5 (H) 10.0 - 15.0 %    Platelet Count 289 150 - 450 10e3/uL   Comprehensive metabolic panel   Result Value Ref Range    Sodium 134 (L) 135 - 145 mmol/L      Comment:      Reference intervals for this test were updated on 09/26/2023 to more accurately reflect our healthy population. There may be differences in the flagging of prior results with similar values performed with this method. Interpretation of those prior results can be made in the context of the updated reference intervals.     Potassium 4.5 3.4 - 5.3 mmol/L    Carbon Dioxide (CO2) 25 22 - 29 mmol/L    Anion Gap 13 7 - 15 mmol/L    Urea Nitrogen 13.2 6.0 - 20.0 mg/dL    Creatinine 0.46 (L) 0.51 - 0.95 mg/dL    GFR Estimate >90 >60 mL/min/1.73m2    Calcium 9.4 8.6 - 10.0 mg/dL    Chloride 96 (L) 98 - 107 mmol/L    Glucose 157 (H) 70 - 99 mg/dL    Alkaline Phosphatase 144 40 - 150 U/L      Comment:      Reference intervals for this test were updated on 11/14/2023 to more accurately reflect our healthy population. There may be differences in the flagging of prior results with similar values performed with this method. Interpretation of  those prior results can be made in the context of the updated reference intervals.    AST 24 0 - 45 U/L      Comment:      Reference intervals for this test were updated on 6/12/2023 to more accurately reflect our healthy population. There may be differences in the flagging of prior results with similar values performed with this method. Interpretation of those prior results can be made in the context of the updated reference intervals.    ALT 39 0 - 50 U/L      Comment:      Reference intervals for this test were updated on 6/12/2023 to more accurately reflect our healthy population. There may be differences in the flagging of prior results with similar values performed with this method. Interpretation of those prior results can be made in the context of the updated reference intervals.      Protein Total 7.6 6.4 - 8.3 g/dL    Albumin 3.9 3.5 - 5.2 g/dL    Bilirubin Total 0.2 <=1.2 mg/dL   Hemoglobin A1c   Result Value Ref Range    Hemoglobin A1C 9.3 (H) 0.0 - 5.6 %      Comment:      Normal <5.7%   Prediabetes 5.7-6.4%    Diabetes 6.5% or higher     Note: Adopted from ADA consensus guidelines.       If you have any questions or concerns, please call the clinic at the number listed above.       Sincerely,      Tyra Bullock MD

## 2024-03-14 NOTE — PROGRESS NOTES
Assessment & Plan     Acute cough  Patient presents with an acute cough that is likely viral, as it did not respond well to antibiotics or prednisone.  Will treat symptoms with Mucinex and Tessalon Perles.  Also swabbed for RSV, flu, and COVID.  - guaiFENesin (MUCINEX) 600 MG 12 hr tablet  Dispense: 28 tablet; Refill: 0  - benzonatate (TESSALON) 100 MG capsule  Dispense: 30 capsule; Refill: 0  - Symptomatic Influenza A/B, RSV, & SARS-CoV2 PCR (COVID-19) Nose    Chronic obstructive pulmonary disease, unspecified COPD type (H)  Patient has a history of COPD and with her current URI, I recommended she continue to use her inhalers as needed.    Chronic bilateral low back pain without sciatica  Patient has a history of chronic low back pain that has been exacerbated by her frequent coughing.  She has taken oral diclofenac in the past so I refilled this today.  Patient also mentioned that although she takes baclofen, Flexeril has been more effective in the past.  I prescribed some Flexeril today and told her to stay off the baclofen while she was taking the Flexeril.  Patient expressed understanding with the plan.  - diclofenac (CATAFLAM) 50 MG tablet  Dispense: 90 tablet; Refill: 3  - cyclobenzaprine (FLEXERIL) 5 MG tablet  Dispense: 30 tablet; Refill: 0    Moderate recurrent major depression (H)  Patient needed a refill on her trazodone  - traZODone (DESYREL) 50 MG tablet  Dispense: 90 tablet; Refill: 3    Essential hypertension  Patient needed a refill on her lisinopril.  Labs were future ordered prior to her last appointment and she will stop by the lab to get those done today.  - lisinopril (ZESTRIL) 5 MG tablet  Dispense: 90 tablet; Refill: 1  - Comprehensive metabolic panel    Type 2 diabetes mellitus with complication, with long-term current use of insulin (H)  Labs were future ordered prior to her last appointment and she will stop by the lab to get those done today.  Of note, patient has been on prednisone for  the past week.  - Hemoglobin A1c    BMI  Estimated body mass index is 36.33 kg/m  as calculated from the following:    Height as of 10/9/23: 1.524 m (5').    Weight as of 11/21/23: 84.4 kg (186 lb).     Return if symptoms worsen or fail to improve.    Bryson Moore is a 53 year old, presenting for the following health issues:  Cough (Started on Antibiotics on Saturday - however pt has not been feeling better . Still coughing, pressure pain, chills and sweats, along with Nausea )      3/14/2024     2:44 PM   Additional Questions   Roomed by JENISE genao MA   Accompanied by Self         3/14/2024    Information    services provided? No     Patient presents for follow-up after virtual visit last Friday.  At her previous visit, she was complaining of a cough and was treated for COPD exacerbation versus pneumonia with 5-day course of prednisone and a Z-Jeffery.  She reports minimal improvement in her symptoms after completing this treatment.  She has also been using her home inhalers as needed that provides temporary relief.  Along with the cough, she complains of some chest and low back pain that is worsened with coughing, headache, congestion, left ear pain, and low-grade fevers at home with temps around 100.  She has tried Tylenol, ibuprofen, Flonase, and an OTC cold and flu medication that have all provided no to minimal relief.  She feels that the cough is deep in her chest and is productive of some yellowish-green mucus.       ROS: 10 point ROS neg other than the symptoms noted above in the HPI.       Objective    /88   Pulse 83   Temp 97.5  F (36.4  C) (Tympanic)   Resp 16   SpO2 97%   There is no height or weight on file to calculate BMI.  Physical Exam  Vitals reviewed.   Constitutional:       General: She is not in acute distress.     Appearance: She is obese. She is ill-appearing.   HENT:      Right Ear: Tympanic membrane, ear canal and external ear normal.      Left Ear: Tympanic  membrane, ear canal and external ear normal.      Ears:      Comments: Signs of congestion noted in the left ear     Nose: Congestion present.      Mouth/Throat:      Mouth: Mucous membranes are moist.      Pharynx: Posterior oropharyngeal erythema present. No oropharyngeal exudate.   Eyes:      Extraocular Movements: Extraocular movements intact.      Conjunctiva/sclera: Conjunctivae normal.   Pulmonary:      Effort: Pulmonary effort is normal. No respiratory distress.      Breath sounds: Wheezing present.      Comments: Wheezes noted in all lung fields.  No focal area of decreased breath sounds indicative of bacterial pneumonia  Neurological:      Mental Status: She is alert and oriented to person, place, and time. Mental status is at baseline.   Psychiatric:         Mood and Affect: Mood normal.         Behavior: Behavior normal.         Thought Content: Thought content normal.         Judgment: Judgment normal.           Signed Electronically by: Frandy Avitia DO

## 2024-03-15 ENCOUNTER — TELEPHONE (OUTPATIENT)
Dept: FAMILY MEDICINE | Facility: CLINIC | Age: 54
End: 2024-03-15

## 2024-03-15 LAB
ALBUMIN SERPL BCG-MCNC: 3.9 G/DL (ref 3.5–5.2)
ALP SERPL-CCNC: 144 U/L (ref 40–150)
ALT SERPL W P-5'-P-CCNC: 39 U/L (ref 0–50)
ANION GAP SERPL CALCULATED.3IONS-SCNC: 13 MMOL/L (ref 7–15)
AST SERPL W P-5'-P-CCNC: 24 U/L (ref 0–45)
BILIRUB SERPL-MCNC: 0.2 MG/DL
BUN SERPL-MCNC: 13.2 MG/DL (ref 6–20)
CALCIUM SERPL-MCNC: 9.4 MG/DL (ref 8.6–10)
CHLORIDE SERPL-SCNC: 96 MMOL/L (ref 98–107)
CREAT SERPL-MCNC: 0.46 MG/DL (ref 0.51–0.95)
DEPRECATED HCO3 PLAS-SCNC: 25 MMOL/L (ref 22–29)
EGFRCR SERPLBLD CKD-EPI 2021: >90 ML/MIN/1.73M2
GLUCOSE SERPL-MCNC: 157 MG/DL (ref 70–99)
POTASSIUM SERPL-SCNC: 4.5 MMOL/L (ref 3.4–5.3)
PROT SERPL-MCNC: 7.6 G/DL (ref 6.4–8.3)
SODIUM SERPL-SCNC: 134 MMOL/L (ref 135–145)

## 2024-03-15 NOTE — TELEPHONE ENCOUNTER
[facility] phone call message- patient requesting results:    Test: Lab    Date of test: 3/14/24    Additional Comments: call back with results.    OK to leave a message on voice mail? Yes    Primary language: English      needed? No    Call taken on March 15, 2024 at 9:29 AM by oL Lao

## 2024-03-15 NOTE — TELEPHONE ENCOUNTER
Spoke with pt to review test results. Pt is negative for Influenza, RSV and Covid per test completed 3/14/24.  YULISA Doe, BSN

## 2024-03-16 DIAGNOSIS — R10.11 RUQ ABDOMINAL PAIN: ICD-10-CM

## 2024-03-18 RX ORDER — MELOXICAM 15 MG/1
15 TABLET ORAL DAILY
Qty: 30 TABLET | Refills: 0 | OUTPATIENT
Start: 2024-03-18

## 2024-03-18 NOTE — RESULT ENCOUNTER NOTE
Teresa Perez-    Here is a copy of your lab results.  Your kidney tests are stable.  Diabetes numbers are higher than previous - your A1c is 9.3, which is higher than before.  Please schedule a diabetes follow up visit so we can talk about adjustments to your regimen.  I hope your cough is getting better!.  Please call the clinic at [phone] if you have any questions.      Tyra Bullock MD    Please send results to patient.

## 2024-03-18 NOTE — TELEPHONE ENCOUNTER
Routing refill request to provider for review/approval because:  NSAID Medications [id number]/16/2024 08:07 AM   Protocol Details Normal CBC on file in past 12 months    Always Fail Criteria - Chart Review Required    Normal serum creatinine on file in past 12 months     Medication requested: MELOXICAM 15MG TABLETS   Last office visit: 3/14/24  [facility] appointments: 3/29/24  Medication last refilled: 3/15/2024   Last qualifying labs:   Component      Latest Ref Rng 3/14/2024  4:09 PM   Creatinine      0.51 - 0.95 mg/dL 0.46 (L)       Legend:  (L) Low  Component      Latest Ref Rng 3/14/2024  4:09 PM   WBC      4.0 - 11.0 10e3/uL 17.4 (H)    RBC Count      3.80 - 5.20 10e6/uL 5.79 (H)    Hemoglobin      11.7 - 15.7 g/dL 15.4    Hematocrit      35.0 - 47.0 % 47.1 (H)    MCV      78 - 100 fL 81    MCH      26.5 - 33.0 pg 26.6    MCHC      31.5 - 36.5 g/dL 32.7    RDW      10.0 - 15.0 % 15.5 (H)    Platelet Count      150 - 450 10e3/uL 289       Legend:  (H) High  Routed to provider due to failed RN protocol.     YULISA Valverde

## 2024-03-19 DIAGNOSIS — R10.84 ABDOMINAL PAIN, GENERALIZED: ICD-10-CM

## 2024-03-19 DIAGNOSIS — R11.0 NAUSEA: ICD-10-CM

## 2024-03-19 RX ORDER — METOCLOPRAMIDE 5 MG/1
5 TABLET ORAL 3 TIMES DAILY PRN
Qty: 45 TABLET | Refills: 1 | Status: SHIPPED | OUTPATIENT
Start: 2024-03-19 | End: 2024-04-16

## 2024-03-19 NOTE — TELEPHONE ENCOUNTER
Medication requested: METOCLOPRAMIDE 5 MG TABLET   Last office visit: 3/14/24  Future [facility] appointments: 3/29/24  Medication last refilled: 3/5/24  Last qualifying labs: none    Prescription approved per [facility] Refill Protocol.  Antivertigo/Antiemetic Agents Ilwmjp3703/19/2024 09:08 AM   Protocol Details Medication is active on med list    Medication indicated for associated diagnosis    Recent (12 mo) or future (90 days) visit with authorizing provider's specialty    Patient is 18 years of age or older     YULISA Doe, BSN

## 2024-03-29 ENCOUNTER — OFFICE VISIT (OUTPATIENT)
Dept: FAMILY MEDICINE | Facility: CLINIC | Age: 54
End: 2024-03-29
Payer: COMMERCIAL

## 2024-03-29 ENCOUNTER — TELEPHONE (OUTPATIENT)
Dept: FAMILY MEDICINE | Facility: CLINIC | Age: 54
End: 2024-03-29

## 2024-03-29 VITALS
RESPIRATION RATE: 20 BRPM | HEART RATE: 90 BPM | OXYGEN SATURATION: 96 % | SYSTOLIC BLOOD PRESSURE: 126 MMHG | TEMPERATURE: 97.9 F | DIASTOLIC BLOOD PRESSURE: 80 MMHG

## 2024-03-29 DIAGNOSIS — S78.112S: ICD-10-CM

## 2024-03-29 DIAGNOSIS — S31.109S CHRONIC WOUND INFECTION OF ABDOMEN, SEQUELA: ICD-10-CM

## 2024-03-29 DIAGNOSIS — Z79.4 TYPE 2 DIABETES MELLITUS WITH HYPERGLYCEMIA, WITH LONG-TERM CURRENT USE OF INSULIN (H): ICD-10-CM

## 2024-03-29 DIAGNOSIS — B35.3 TINEA PEDIS OF RIGHT FOOT: ICD-10-CM

## 2024-03-29 DIAGNOSIS — E11.8 TYPE 2 DIABETES MELLITUS WITH COMPLICATION, WITH LONG-TERM CURRENT USE OF INSULIN (H): Primary | ICD-10-CM

## 2024-03-29 DIAGNOSIS — K62.5 RECTAL BLEEDING: ICD-10-CM

## 2024-03-29 DIAGNOSIS — R32 URINARY INCONTINENCE, UNSPECIFIED TYPE: ICD-10-CM

## 2024-03-29 DIAGNOSIS — L08.9 CHRONIC WOUND INFECTION OF ABDOMEN, SEQUELA: ICD-10-CM

## 2024-03-29 DIAGNOSIS — F41.9 ANXIETY: ICD-10-CM

## 2024-03-29 DIAGNOSIS — E11.65 TYPE 2 DIABETES MELLITUS WITH HYPERGLYCEMIA, WITH LONG-TERM CURRENT USE OF INSULIN (H): ICD-10-CM

## 2024-03-29 DIAGNOSIS — F12.90 CANNABIS USE DISORDER: ICD-10-CM

## 2024-03-29 DIAGNOSIS — Z79.4 TYPE 2 DIABETES MELLITUS WITH COMPLICATION, WITH LONG-TERM CURRENT USE OF INSULIN (H): Primary | ICD-10-CM

## 2024-03-29 PROCEDURE — 99215 OFFICE O/P EST HI 40 MIN: CPT | Performed by: STUDENT IN AN ORGANIZED HEALTH CARE EDUCATION/TRAINING PROGRAM

## 2024-03-29 RX ORDER — PROCHLORPERAZINE 25 MG/1
1 SUPPOSITORY RECTAL
Qty: 1 EACH | Refills: 1 | Status: SHIPPED | OUTPATIENT
Start: 2024-03-29 | End: 2024-05-01

## 2024-03-29 RX ORDER — INSULIN GLARGINE 300 U/ML
INJECTION, SOLUTION SUBCUTANEOUS
COMMUNITY
Start: 2024-03-29

## 2024-03-29 RX ORDER — PROCHLORPERAZINE 25 MG/1
1 SUPPOSITORY RECTAL
Qty: 3 EACH | Refills: 5 | Status: SHIPPED | OUTPATIENT
Start: 2024-03-29

## 2024-03-29 RX ORDER — PRENATAL VIT 91/IRON/FOLIC/DHA 28-975-200
COMBINATION PACKAGE (EA) ORAL 2 TIMES DAILY
Qty: 28.4 G | Refills: 1 | Status: SHIPPED | OUTPATIENT
Start: 2024-03-29 | End: 2024-06-28

## 2024-03-29 RX ORDER — HYDROXYZINE HYDROCHLORIDE 25 MG/1
TABLET, FILM COATED ORAL
Qty: 180 TABLET | Refills: 1 | Status: SHIPPED | OUTPATIENT
Start: 2024-03-29 | End: 2024-06-28

## 2024-03-29 NOTE — TELEPHONE ENCOUNTER
Pharmacy request for refill: hydrOXYzine (ATARAX) 25 MG tablet     [facility] [address] [place] [address]  Tel: [phone]  Fax: [phone]    Unable to pend order, please advise on refill   Meenakshi Lacey CMA

## 2024-03-29 NOTE — PROGRESS NOTES
There are no exam notes on file for this visit.    ASSESSMENT AND PLAN:      Teresa was seen today for diabetes.  Multiple issues discussed today.      Diagnoses and all orders for this visit:    Type 2 diabetes mellitus with complication, with long-term current use of insulin (H)  Recent increase in A1c, but patient feels this was due to depression and missing her medication.  Has been taking it more regularly.  We will continue with her regular regimen, and she is interested in starting a Dexcom sensor.  Recommended follow-up with our Pharm.D. team, and she will try to work on dietary changes.  This is challenging given her limited income and inability to shop or prepare a lot of food for herself.       -Toujeo, 55 units twice daily        -Humalog 30 units with meals, usually uses this twice daily.        -Bydureon once weekly        -Empagliflozin 25 mg daily        -She has not tolerated metformin in the past.  -     Continuous Blood Gluc Sensor (DEXCOM G6 SENSOR) MISC; 1 each every 10 days Change every 10 days.  -     Continuous Blood Gluc Transmit (DEXCOM G6 TRANSMITTER) MISC; 1 each every 3 months Change every 3 months.  -     Med Therapy Management Referral  -     Miscellaneous Order for DME - ONLY FOR DME    Anxiety  Not sure exactly what she is taking for her mood right now.  Has an appointment coming up with her psychiatrist in the next 2 weeks.  Wanting a refill on hydroxyzine, which I think is safe and appropriate.  She will sign an STACY when she meets the new psychiatrist.  Is still doing regular therapy.  Current social stressor includes the death of her best friend's father.  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   New DME orders were placed.  Her old prosthetic does not no longer fit since she is lost so much weight.  Has been having frequent falls and difficulty getting in and out of bed so a hospital bed will help keep her safe and she has had issues with wounds on her coccyx, as well as on her abdomen.  -     Prosthetics Supplies Order  -     Hospital Bed Order  -     Support Surface Mattresses (Group 1/Group 2)      Tinea pedis of right foot  Preulcerative callus  Peripheral neuropathy  Decreased sensation on monofilament exam, callus present on feet, and rash consistent with tinea pedis.  Will write prescription for terbinafine, recommended good footcare, prescription for diabetic shoes, and will work on glucose control.  -     terbinafine (LAMISIL) 1 % external cream; Apply topically 2 times daily    Chronic diarrhea  Urinary incontinence, unspecified type  She has an upcoming colonoscopy scheduled.  Continues to use Imodium as needed.  Needs incontinence supplies that she has leakage of both urine and stool at times.  Some skin breakdown and so a bed with the mattress will be helpful as well.  -     Incontinence Supplies Order for DME - ONLY FOR DME    Cannabis use disorder  Moderate persistent asthma  COPD  Postviral cough  Lungs are clear today.  O2 sats are good.  She is breathing comfortably.  Would not want to put her on another course of prednisone and antibiotics are not warranted.  Recommended continuing with current regimen.  -Spiriva daily  -Symbicort twice daily and as needed  -Albuterol nebs  -Allergy medication which includes Flonase, ceterizine, aselastine    Amputation  Frequent falls  Difficulty with ambulation  --Wrote for hospital bed  --Completed [employer] mobility paperwork  --Completed handicap parking slip  --She is getting support for a new battery for her wheelchair.  --Wrote for a new prosthesis today.  --May need additional physical therapy and training to be able to walk again given her overall  deconditioning and current functional status.    Discussed the importance of continuing to manage her diabetes well especially if she wants to have an upcoming surgery.    Discussed the importance of regular preventative screening, as she is behind on this as well.    She has been working hard on options for new housing, and has fairly stable and high-quality support in place including therapy, PCA, psychiatry, which has been helpful.  Wounds are healing, and she is ready to move forward with some of her additional specialty needs.    Follow-up in 2 to 3 weeks.    Patient Instructions   1)  Braces for the wrists  2)  DME for hospital bed, mattress  3)  DME for prosthetic, diabetic shoes, all the linings etc  4)  Handicap parking form  5)  [employer] mobility form  6) Clotrimazole cream for the feet  7)  Dexcom monitor for diabetes  8)  Refilled the hydroxyzine  9)  Keep all of the upcoming appointment!  10) Keep the diabetes medications the same for now.  Okay to continue with the 55 Units 2x per day for the Toujeo.    11)  Keep the inhalers the same for now.      Tyra Bullock MD    SUBJECTIVE  Teresa Bridgese is a 53 year old female with past medical history significant for    Patient Active Problem List   Diagnosis    Health Care Home    Acute peptic ulcer    Other allergy, other than to medicinal agents    Bulging lumbar disc    Cervical dysplasia    Common migraine without aura    Constipation    Dwarfism    Familial hypercholesterolemia    Insomnia    Low back pain    Intermittent asthma    Leg pain, bilateral    Smoking    Degeneration of thoracic or thoracolumbar intervertebral disc    Type 2 diabetes mellitus with other skin ulcer, with long-term current use of insulin (H)    LOMAS (nonalcoholic steatohepatitis)    Disease of lung    Hemorrhoids    Parotid mass    Endometriosis    NNAMDI (obstructive sleep apnea)    History of total right knee replacement    Lateral epicondylitis    Impingement syndrome of  shoulder region, left    Hx of total knee replacement, left    Chronic pain syndrome    Cough    Borderline personality disorder (H)    Moderate recurrent major depression (H)    Recurrent ventral hernia    Type 2 diabetes mellitus with complication, with long-term current use of insulin (H)    Essential hypertension    Nonruptured cerebral aneurysm    Cerebrovascular accident (CVA) due to embolism (H)    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)    Pre-ulcerative corn or callous    Excessive bleeding in premenopausal period    Morbid obesity (H)    Pseudoseizure    Chronic obstructive pulmonary disease (H)    Recurrent incisional hernia    Buttock wound, left, initial encounter    Cellulitis, unspecified cellulitis site    Sepsis, due to unspecified organism, unspecified whether acute organ dysfunction present (H)    Cannabis use disorder    Open abdominal wall wound    Necrotizing fasciitis (H)    Nonhealing surgical wound    Infection due to 2019 novel coronavirus     Others present at the visit:  None    Presents for   Chief Complaint   Patient presents with    Diabetes     Dm and follow-up cough and look at head     Patient presents today to follow-up on multiple issues.    She continues to have difficulty with cough.  She is experiencing runny nose, coughing, wheezing, and quite a bit of pain with the cough.  This overall is getting better but the cough is still quite bothersome.  She continues to use her inhalers regularly which include Spiriva, Symbicort, and nebs at home.  This is helpful, but the cough still persist.    She is requesting a new carpal tunnel brace for each hand.  Is noticing increased numbness and tingling in both hands, but worse in the right.  She has been having more right shoulder pain as well and is going to [facility] for evaluation of this.  She has MRI scheduled for her cervical spine and right shoulder next week to better understand where the pain is coming from.  She has had good  luck with wrist splints for carpal tunnel in the past but her old ones are falling apart.    She continues to have diarrhea.  This makes it challenging for her to eat, and she is lost quite a bit of weight.  She has a colonoscopy scheduled for May, as she could not get out for her previous colonoscopy.  Has been taking Imodium and this has been somewhat helpful.    Patient continues to be a barrier for multiple things.  She is replacing batteries for her wheelchair and this is caused her to miss visits.  A technician came out yesterday to fix it.  She is also looking for opportunities to move into a new apartment.  She is now in line and hoping to move in the next few months.  She needs to demonstrate that her cat has appropriate vaccines and find a suitable location.    Has consistent PCA support right now which is good.  Has consistent therapy support right now which is good.  Her housing case management team has been helpful.  She is got good friends in her building, and this has been helpful, but shares that things have been challenging as her best friend's father recently passed away.    Is requesting a refill of hydroxyzine.  She takes this 3 times a day to help with anxiety and sleep.  Has an appoint with a psychiatrist scheduled for next Thursday and just wants a bridging prescription for today.    She is needing a new prescription for a p.o. basis for her right leg.  The old one no longer fits because she is lost so much weight.  Has been limited to wheelchair and motorized wheelchair because of this.  Also needing new diabetic shoes.  She would like to go to [facility] for these.  Worsening pins-and-needles sensations in her right lower leg.  Has some irritation in her stump.  Has some itching as well.    She continues to gutierrez abdominal wounds, but these are getting better.  The wound nurses only coming out occasionally and she has only 1 more follow-up visit at the wound clinic.  Has Mepilex on her abdomen  "but only small wounds underneath.  She is having some irritation and rectal bleeding from the diarrhea and her bottom.  Is requesting some incontinence supplies to help with this.    Her CADI  has requested a number of supplies.  They feel hospital bed would be easier and safer for her to get around.  Needing more incontinence supplies.  She has difficulty with both urine and stool.  She is not always able to make it to the toilet.    She is needing additional forms completed.  Is due for rehab for [employer] as well as for handicap parking sticker.    Diabetes continues to be a challenge.  She attributes some of this to her mental health, as there is days when she \"does not give a shit\".  Still likes drinking pop but has moved to diet.  Is working on drinking more water.  Sometimes does not have great access to food and everything gives her diarrhea, so she will eat much when her bottom is irritated.  She is trying to eat more.  Cereal is going okay.  Drinking water is going okay.  She gets groceries picked up by her PCA, and sometimes it is not the healthy food that she is requested but things that she likes instead.  She continues to have difficulty with her teeth and still cannot chew anything either.  Has developed some sores in the back of her mouth.    's name is Shannon KUMAR signed for her today.[phone].    She thinks her diabetes has been worse because she has had some days when she did not take any insulin.    Currently taking Toujeo 54-56 units twice daily.  Taking Humalog 30 units with meals which is usually twice a day.  She is still taking weekly Bydureon, oral Jardiance 25 mg daily,  She is forgetting her insulin about once a week.  She is interested in a Dexcom.  Had difficulties with her freestyle augustine.  Would be interested in talking with our Pharm.D.'s and getting a system where she could download to the clinic      OBJECTIVE:  Vitals: /80   Pulse 90   Temp " 97.9  F (36.6  C) (Oral)   Resp 20   SpO2 96%   BMI= There is no height or weight on file to calculate BMI.  Objective:    Vitals:  Vitals are reviewed and are within the normal range  Gen:  Alert, pleasant, no acute distress  Cardiac:  Regular rate and rhythm, no murmurs, rubs or gallops  Respiratory:  Lungs clear to auscultation bilaterally  Abdomen: Her abdomen is distended but soft.  Large palpable hernia present.  2 small dime sized wounds on her mid abdomen which are healing better.  The wounds in her lower left abdomen have healed completely.  I did not look at her bottom today.  Bowel sounds are present.  Extremities: She has an above-the-knee amputation on the left side.  Some areas of irritation around the stump but no ulceration or wound.  On her left foot she has decreased sensation to monofilament testing in multiple areas including extending up the leg.  Has some dry patches with an erythematous base consistent with tinea pedis.  Pulses are strong, and she has trace lower extremity edema        6/19/2019     9:28 AM 10/9/2019    12:50 PM 1/26/2022     2:47 PM   PHQ   PHQ-9 Total Score 8 8 10   Q9: Thoughts of better off dead/self-harm past 2 weeks Not at all Not at all Several days          6/30/2017     9:20 AM 7/21/2017     3:57 PM 10/9/2019    12:50 PM   ROSALVA-7 SCORE   Total Score 13 13 7         Patient Instructions   1)  Braces for the wrists  2)  DME for hospital bed, mattress  3)  DME for prosthetic, diabetic shoes, all the linings etc  4)  Handicap parking form  5)  [employer] mobility form  6) Clotrimazole cream for the feet  7)  Dexcom monitor for diabetes  8)  Refilled the hydroxyzine  9)  Keep all of the upcoming appointment!  10) Keep the diabetes medications the same for now.  Okay to continue with the 55 Units 2x per day for the Toujeo.    11)  Keep the inhalers the same for now.      Tyra Bullock MD  DME (Durable Medical Equipment) Orders and Documentation  Orders Placed This  Encounter   Procedures    Prosthetics Supplies Order    Hospital Bed Order    Support Surface Mattresses (Group 1/Group 2)    Miscellaneous Order for DME - ONLY FOR DME    Incontinence Supplies Order for DME - ONLY FOR DME        The patient was assessed and it was determined the patient is in need of the following listed DME Supplies/Equipment. Please complete supporting documentation below to demonstrate medical necessity.

## 2024-03-29 NOTE — TELEPHONE ENCOUNTER
Refill has already been sent in during patient's visit today.     Tyra Bullock MD    Routed to LAURA Gloria for FYI.

## 2024-03-29 NOTE — PATIENT INSTRUCTIONS
1)  Braces for the wrists  2)  DME for hospital bed, mattress  3)  DME for prosthetic, diabetic shoes, all the linings etc  4)  Handicap parking form  5)  [employer] mobility form  6) Clotrimazole cream for the feet  7)  Dexcom monitor for diabetes  8)  Refilled the hydroxyzine  9)  Keep all of the upcoming appointment!  10) Keep the diabetes medications the same for now.  Okay to continue with the 55 Units 2x per day for the Toujeo.    11)  Keep the inhalers the same for now.

## 2024-03-29 NOTE — TELEPHONE ENCOUNTER
Spoke with patient about this during our visit today. 3/29.  He was calling about a letter of support for a two bedroom apartment, given her amount of medical equipment and space constraints.      She is not interested in a two bedroom apartment because it could be more expensive, and she is worried she may have family and friends want to stay (and she would not want them to do so.      No letter needed right now.     Tyra Bullock MD

## 2024-04-03 ENCOUNTER — TELEPHONE (OUTPATIENT)
Dept: FAMILY MEDICINE | Facility: CLINIC | Age: 54
End: 2024-04-03
Payer: COMMERCIAL

## 2024-04-03 DIAGNOSIS — F41.9 ANXIETY: Primary | ICD-10-CM

## 2024-04-03 RX ORDER — LORAZEPAM 2 MG/1
2 TABLET ORAL ONCE
Qty: 2 TABLET | Refills: 0 | Status: SHIPPED | OUTPATIENT
Start: 2024-04-03 | End: 2024-04-03

## 2024-04-03 NOTE — TELEPHONE ENCOUNTER
MTM referral from: [facility] visit (referral by provider)    MTM referral outreach attempt #2 on April 3, 2024 at 8:41 AM      Outcome: Patient not reachable after several attempts, will route to MTM Pharmacist/Provider as an FYI.  MTM scheduling number is .  Thank you for the referral.    Use [facility] med adv for the carrier/Plan on the flowsheet      MTM Practitioner please send patient letter    Rylie Kraus CPhT  MTM

## 2024-04-03 NOTE — TELEPHONE ENCOUNTER
Chief complaint:   Chief Complaint   Patient presents with   • Follow-up     hemotoma on groin not healing well and surgial site in neck       Vitals:  Visit Vitals  /74   Pulse 98   Ht 5' 10\" (1.778 m)   Wt 75.3 kg   SpO2 100%   BMI 23.83 kg/m²       HISTORY OF PRESENT ILLNESS     HPI  Pt present today for f/u he complain of neck incision not healing well and hematoma on the right groin that is not improving    pt state that he had an US of the right groin and was negative for hernia just fluid filled .   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     Priority: Low   • Sinus tachycardia 05/17/2017     Priority: Low   • Ischemic bowel disease (CMS/HCC) 05/17/2017     Priority: Low   • Mesenteric,portal,splenic thrombosis (CMS/HCC) 05/16/2017     Priority: Low       PAST MEDICAL, FAMILY AND SOCIAL HISTORY     Medications:  Current Outpatient Medications   Medication   • enoxaparin (LOVENOX) 80 MG/0.8ML injectable solution   • clopidogrel (PLAVIX) 75 MG tablet   • ferrous sulfate 324 (65 Fe) MG EC tablet   • albumin human 25 %   • ondansetron (ZOFRAN ODT) 4 MG disintegrating tablet   • apixaban (ELIQUIS) 5 MG Tab     No current facility-administered medications for this visit.        Allergies:  ALLERGIES:  No Known Allergies    Past Medical  History/Surgeries:  Past Medical History:   Diagnosis Date   • Blood clot associated with vein wall inflammation    • Gastroesophageal reflux disease    • Liver disease    • Otitis media        Past Surgical History:   Procedure Laterality Date   •  [facility] phone call message- general phone call:    Reason for call: Pt was unable to do her MRI today.  She is allergic to valium and that is what they had for her.  She is requesting Dr Bullock prescribe something for her for sedation.  She gets anxiety doing the MRI.    Action desired: medication being called in for MRI    Return call needed: Yes    OK to leave a message on voice mail? Yes    Advised patient to response may take up to 2 business days: Yes    Primary language: English      needed? No    Call taken on April 3, 2024 at 10:57 AM by Mary Ann Giraldo   Appendectomy  05/16/2017    Lula Peters MD   • Cholecystectomy  05/16/2017    Lula Peters MD   • Exploratory laparotomy w/ bowel resection  05/16/2017    200cm of small bowel removed for ischemic bowel   • Hernia repair     • Incisional hernia repair  11/17/2017    with mesh by Lula Peters MD   • Removal gallbladder     • Tips procedure  06/05/2018    Attempted. Unsuccessful   • Tips procedure  10/19/2018   • Tonsillectomy         Family History:  Family History   Problem Relation Age of Onset   • Diabetes Father        Social History:  Social History     Tobacco Use   • Smoking status: Never Smoker   • Smokeless tobacco: Never Used   • Tobacco comment: does not use tobacco products   Substance Use Topics   • Alcohol use: Yes     Comment: rarely       REVIEW OF SYSTEMS     Review of Systems   Constitutional: Negative.    HENT: Negative.    Eyes: Negative.    Respiratory: Negative.    Cardiovascular: Negative.    Gastrointestinal: Negative.    Endocrine: Negative.    Genitourinary: Negative.    Musculoskeletal: Negative.    Skin: Negative.    Allergic/Immunologic: Negative.    Neurological: Negative.    Hematological: Negative.    Psychiatric/Behavioral: Negative.        PHYSICAL EXAM     Physical Exam   Constitutional: He is oriented to person, place, and time. He appears well-developed and well-nourished.   HENT:   Head: Normocephalic and atraumatic.   Eyes: Conjunctivae and EOM are normal. Pupils are equal, round, and reactive to light.   Neck: Normal range of motion. Neck supple.   Cardiovascular: Normal rate and regular rhythm.   Pulmonary/Chest: Effort normal and breath sounds normal.   Abdominal: Soft. Bowel sounds are normal.       Musculoskeletal: Normal range of motion.   Neurological: He is alert and oriented to person, place, and time. He has normal reflexes.   Skin: Skin is warm and dry.        Psychiatric: He has a normal mood and affect.       ASSESSMENT/PLAN     Kaden was seen today  for follow-up.    Diagnoses and all orders for this visit:    Hematoma    Pain at surgical incision    Hematoma most likely secondary to the recent procedure and the current status of medication which include but thinner and Plavix  Recommend to monitor  Patient will call us if he notice any increased size and hematoma of the groin otherwise we'll continue to monitor.    surgical incision on the right side of the neck appears to be healing well no signs of bleeding reapplied no guaze     And follow-up as needed

## 2024-04-10 ENCOUNTER — DOCUMENTATION ONLY (OUTPATIENT)
Dept: FAMILY MEDICINE | Facility: CLINIC | Age: 54
End: 2024-04-10
Payer: COMMERCIAL

## 2024-04-10 NOTE — PROGRESS NOTES
To be completed in Nursing note:    Please reference list for forms that require a visit for completion.  Please remind patients that providers are given 3-5 business days to complete and return forms.      Form type:[employer] Starts Here    Date form received: 24    Date form completed by Physician:04/09/10    How was form returned to patient (mailed, faxed, or at  for patient to ):  Faxed to   Date form mailed/faxed/left at  for patient and sent to HIM for scannin/10/2024, sent to HIM for scanning    Once form is left for patient, faxed, or mailed PCS will then close the documentation only encounter.     Faizan Lacey MA

## 2024-04-13 DIAGNOSIS — G89.29 CHRONIC BILATERAL LOW BACK PAIN, UNSPECIFIED WHETHER SCIATICA PRESENT: ICD-10-CM

## 2024-04-13 DIAGNOSIS — M54.50 CHRONIC BILATERAL LOW BACK PAIN, UNSPECIFIED WHETHER SCIATICA PRESENT: ICD-10-CM

## 2024-04-14 RX ORDER — GABAPENTIN 600 MG/1
600 TABLET ORAL 3 TIMES DAILY
Qty: 90 TABLET | Refills: 3 | Status: SHIPPED | OUTPATIENT
Start: 2024-04-14

## 2024-04-17 ENCOUNTER — TELEPHONE (OUTPATIENT)
Dept: VASCULAR SURGERY | Facility: CLINIC | Age: 54
End: 2024-04-17
Payer: COMMERCIAL

## 2024-04-17 NOTE — TELEPHONE ENCOUNTER
Teresa called asking if more supplies can be ordered as she is out. Informed her that since it has been over a month since she has been seen in clinic, we cannot order any more supplies. She reports she is having issues with her wheelchair battery and that is why she has not been able to come to her appts- she reports she will be getting a new battery soon and will make it to her appt next week. Will order supplies at that visit.

## 2024-04-18 NOTE — PATIENT INSTRUCTIONS
Wound Care Instructions    3 TIMES PER WEEK and as needed, Cleanse your buttock wound(s) with Normal saline.     Pat Dry with non-sterile gauze    Cover with mepilex    It is ok to get your wound wet in the bath or shower      If for some reason you are not able to get your dressing(s) changed as outlined above (due to illness, lack of supplies, lack of help) please do the following: remove old, soiled dressings; wash the wounds with saline; pat dry; apply ABD pad or other absorbant pad and secure with rolled gauze; avoid tape directly on your skin; Call the clinic as soon as possible to let us know what the current issues are in receiving wound care [phone].      SEEK MEDICAL CARE IF:  You have an increase in swelling, pain, or redness around the wound.  You have an increase in the amount of pus coming from the wound.  There is a bad smell coming from the wound.  The wound appears to be worsening/enlarging  You have a fever greater than 101.5 F      It is ok to continue current wound care treatment/products for the next 2-3 days until new wound care supplies are ordered and arrive. If longer than this please contact our office at [phone].    If you have a 2 layer or 4 layer compression wrap on these are safe to have on for ONLY 7 days. If for some reason you are not able to get the wrap(s) changed (due to illness; lack of supplies, lack of help, lack of transportation) please do the following: unwrap the old 2 or 4 layer compression wrap; avoid using scissors as you could cut your skin and cause wounds; use tubular compression when available. Call to reschedule your home care or clinic visit appointment as soon as possible.    Please NOTE: if you are 15 minutes late to your clinic appointment you will have to be rescheduled. Please call our clinic as soon as possible if you know you will not be able to get to your appointment at [phone].    If you fail to show up to 3 scheduled clinic  appointments you will be dismissed from our clinic.              We want to hear from you!  In the next few weeks, you should receive a call or email to complete a survey about your visit at [facility]. Please help us improve your appointment experience by letting us know how we did today. We strive to make your experience good and value any ways in which we could do better.      We value your input and suggestions.    Thank you for choosing the [facility]!

## 2024-04-24 ENCOUNTER — DOCUMENTATION ONLY (OUTPATIENT)
Dept: FAMILY MEDICINE | Facility: CLINIC | Age: 54
End: 2024-04-24
Payer: COMMERCIAL

## 2024-04-24 DIAGNOSIS — Z53.9 DIAGNOSIS NOT YET DEFINED: Primary | ICD-10-CM

## 2024-04-24 PROCEDURE — G0179 MD RECERTIFICATION HHA PT: HCPCS | Performed by: STUDENT IN AN ORGANIZED HEALTH CARE EDUCATION/TRAINING PROGRAM

## 2024-04-24 NOTE — PROGRESS NOTES
To be completed in Nursing note:    Please reference list for forms that require a visit for completion.  Please remind patients that providers are given 3-5 business days to complete and return forms.      Form type:Home Health Certification and Plan of Care    Date form received: 24    Date form completed by Physician:22    How was form returned to patient (mailed, faxed, or at  for patient to ):faxed to     Date form mailed/faxed/left at  for patient and sent to HIM for scannin24, sent to HIM for scanning      Once form is left for patient, faxed, or mailed PCS will then close the documentation only encounter.     Faizan Lacey MA

## 2024-04-25 ENCOUNTER — OFFICE VISIT (OUTPATIENT)
Dept: VASCULAR SURGERY | Facility: CLINIC | Age: 54
End: 2024-04-25
Attending: FAMILY MEDICINE
Payer: COMMERCIAL

## 2024-04-25 VITALS — DIASTOLIC BLOOD PRESSURE: 60 MMHG | SYSTOLIC BLOOD PRESSURE: 92 MMHG | HEART RATE: 100 BPM | OXYGEN SATURATION: 96 %

## 2024-04-25 DIAGNOSIS — F33.1 MODERATE RECURRENT MAJOR DEPRESSION (H): ICD-10-CM

## 2024-04-25 DIAGNOSIS — S31.109D OPEN WOUND OF ABDOMINAL WALL, SUBSEQUENT ENCOUNTER: Primary | ICD-10-CM

## 2024-04-25 PROBLEM — T81.89XA NONHEALING SURGICAL WOUND: Status: RESOLVED | Noted: 2023-11-13 | Resolved: 2024-04-25

## 2024-04-25 PROCEDURE — 99214 OFFICE O/P EST MOD 30 MIN: CPT | Performed by: FAMILY MEDICINE

## 2024-04-25 PROCEDURE — G0463 HOSPITAL OUTPT CLINIC VISIT: HCPCS | Performed by: FAMILY MEDICINE

## 2024-04-25 RX ORDER — LIDOCAINE 50 MG/G
OINTMENT TOPICAL ONCE
Status: COMPLETED | OUTPATIENT
Start: 2024-04-25 | End: 2024-04-25

## 2024-04-25 RX ORDER — QUETIAPINE FUMARATE 400 MG/1
400 TABLET, FILM COATED ORAL AT BEDTIME
Qty: 30 TABLET | Refills: 0 | Status: SHIPPED | OUTPATIENT
Start: 2024-04-25

## 2024-04-25 RX ADMIN — LIDOCAINE: 50 OINTMENT TOPICAL at 09:14

## 2024-04-25 ASSESSMENT — PAIN SCALES - GENERAL: PAINLEVEL: MILD PAIN (3)
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
## 2024-04-25 NOTE — LETTER
[facility]  [address] [place] [address] [address]  [place], [place] [address]  [phone]      Fax [phone]    [facility], located within [facility]           Fax: [phone]            Customer Service: [phone]        Account #: [id number]    Wound Dressing Rx and Order Form  Order Status: refill  Verbal: Tiffanie  Date: 2024     Teresa Perez  Gender: female  : 1970  [address]  [place] [address]  [phone] (home)     Medical Record: [id number]  Primary Care Provider: Tyra Bullock      ICD-10-CM    1. Open wound of abdominal wall, subsequent encounter  S31.109D lidocaine (XYLOCAINE) 5 % ointment     Wound care            Insurance Info:  INSURER: Payor: [facility] / Plan: [place] [employer] / Product Type: HMO /   Policy ID#:  [id number]  SECONDARY INSURANCE:  MEDICA  Secondary Policy ID#:  [id number]        Physician Info:   Name:  ROBERTA DECKER     Dept Address/Phones:   [address] [place] [address], [address]  [facility] [id number] [phone]  Fax: [phone]    Lymphedema circumferential measurements (in cm):       No data to display                  Wound info:  PICC 10/09/23 Triple Lumen Left Brachial vein lateral (Active)   Number of days: 199       Negative Pressure Wound Therapy Pelvis Anterior;Left (Active)   Number of days: 197       Wound Buttocks Pressure injury community acquired Stage 2 (Active)   Number of days: 647       Wound Abdomen (Active)   Number of days: 199       Wound Pelvis (Active)   Number of days: 199       Wound Thigh (Active)   Number of days: 199       VASC Wound Umbilicus (Active)   Pre Size Length 0.6 10/09/23 0700   Pre Size Width 0.6 10/09/23 0700   Pre Size Depth 1.5 10/09/23 0700   Pre Total Sq cm 0.36 10/09/23 0700   Description refused assessment 23 1500   Number of days: 345       VASC Wound Abdomen middle (Active)   Pre Size Length 3 24 0700   Pre Size Width 3 24 0700   Pre  "Size Depth 0.2 02/08/24 0700   Pre Total Sq cm 9 02/08/24 0700   Description scab 04/25/24 0900   Number of days: 345       VASC Wound Abdomen left (Active)   Description stable eschar 06/12/23 0700   Number of days: 345       VASC Wound abd wound distal (Active)   Pre Size Length 4 10/09/23 0700   Pre Size Width 3 10/09/23 0700   Pre Size Depth 0.1 10/09/23 0700   Pre Total Sq cm 12 10/09/23 0700   Number of days: 304       VASC Wound left IT (Active)   Pre Size Length 0 01/18/24 0700   Pre Size Width 0 01/18/24 0700   Pre Size Depth 0 01/18/24 0700   Pre Total Sq cm 0 01/18/24 0700   Number of days: 276       VASC Wound left groin (Active)   Pre Size Length 0 02/08/24 0700   Pre Size Width 0 02/08/24 0700   Pre Size Depth 0 02/08/24 0700   Pre Total Sq cm 0 02/08/24 0700   Number of days: 164       VASC Wound right buttocks (Active)   Pre Size Length 0.3 04/25/24 0900   Pre Size Width 0.3 04/25/24 0900   Pre Size Depth 0.1 04/25/24 0900   Pre Total Sq cm 0.09 04/25/24 0900   Number of days: 0       Incision/Surgical Site 07/05/22 Abdomen (Active)   Number of days: 660       Incision/Surgical Site 07/19/22 Left Buttocks (Active)   Number of days: 646       Incision/Surgical Site 10/09/23 Left Perineum (Active)   Number of days: 199        Drainage: moderate  Thickness:  full  Duration of Need: 30 DAYS  Days Supply: 30 DAYS  Start Date: 4/25/2024  Starter Kit, Ancillary Kit (saline, gloves, gauze): Yes   Qualifying wound/Debridement: Yes     NO SUBSTITUTIONS. Call [phone].      Dressing Type Brand Size Frequency of change  Quantity   Primary Mepilex border adhesive bandage  3\"x3\" 3 TIMES PER WEEK and as needed      NO SUBSTITUTIONS. Call [phone] with questions.       OK to forward to covered supplier.    Electronically Signed Physician:  ROBERTA DECKER             Date: April 25, 2024    "

## 2024-04-30 DIAGNOSIS — Z79.4 TYPE 2 DIABETES MELLITUS WITH COMPLICATION, WITH LONG-TERM CURRENT USE OF INSULIN (H): Primary | ICD-10-CM

## 2024-04-30 DIAGNOSIS — E11.8 TYPE 2 DIABETES MELLITUS WITH COMPLICATION, WITH LONG-TERM CURRENT USE OF INSULIN (H): Primary | ICD-10-CM

## 2024-05-01 ENCOUNTER — OFFICE VISIT (OUTPATIENT)
Dept: FAMILY MEDICINE | Facility: CLINIC | Age: 54
End: 2024-05-01
Payer: COMMERCIAL

## 2024-05-01 ENCOUNTER — TELEPHONE (OUTPATIENT)
Dept: FAMILY MEDICINE | Facility: CLINIC | Age: 54
End: 2024-05-01

## 2024-05-01 VITALS
RESPIRATION RATE: 20 BRPM | SYSTOLIC BLOOD PRESSURE: 115 MMHG | HEART RATE: 92 BPM | TEMPERATURE: 98.9 F | DIASTOLIC BLOOD PRESSURE: 78 MMHG | OXYGEN SATURATION: 94 %

## 2024-05-01 DIAGNOSIS — M54.50 CHRONIC BILATERAL LOW BACK PAIN WITHOUT SCIATICA: ICD-10-CM

## 2024-05-01 DIAGNOSIS — S31.109D OPEN WOUND OF ABDOMINAL WALL, SUBSEQUENT ENCOUNTER: ICD-10-CM

## 2024-05-01 DIAGNOSIS — F12.90 CANNABIS USE DISORDER: ICD-10-CM

## 2024-05-01 DIAGNOSIS — E66.01 MORBID OBESITY (H): ICD-10-CM

## 2024-05-01 DIAGNOSIS — F33.1 MODERATE RECURRENT MAJOR DEPRESSION (H): ICD-10-CM

## 2024-05-01 DIAGNOSIS — E11.622 TYPE 2 DIABETES MELLITUS WITH OTHER SKIN ULCER, WITH LONG-TERM CURRENT USE OF INSULIN (H): ICD-10-CM

## 2024-05-01 DIAGNOSIS — E11.8 TYPE 2 DIABETES MELLITUS WITH COMPLICATION, WITH LONG-TERM CURRENT USE OF INSULIN (H): Primary | ICD-10-CM

## 2024-05-01 DIAGNOSIS — F60.3 BORDERLINE PERSONALITY DISORDER (H): ICD-10-CM

## 2024-05-01 DIAGNOSIS — N87.9 CERVICAL DYSPLASIA: ICD-10-CM

## 2024-05-01 DIAGNOSIS — I10 ESSENTIAL HYPERTENSION: ICD-10-CM

## 2024-05-01 DIAGNOSIS — G89.4 CHRONIC PAIN SYNDROME: ICD-10-CM

## 2024-05-01 DIAGNOSIS — Z79.4 TYPE 2 DIABETES MELLITUS WITH COMPLICATION, WITH LONG-TERM CURRENT USE OF INSULIN (H): Primary | ICD-10-CM

## 2024-05-01 DIAGNOSIS — G89.29 CHRONIC BILATERAL LOW BACK PAIN WITHOUT SCIATICA: ICD-10-CM

## 2024-05-01 DIAGNOSIS — Z79.4 TYPE 2 DIABETES MELLITUS WITH OTHER SKIN ULCER, WITH LONG-TERM CURRENT USE OF INSULIN (H): ICD-10-CM

## 2024-05-01 DIAGNOSIS — F17.200 SMOKING: ICD-10-CM

## 2024-05-01 DIAGNOSIS — Z89.512 ACQUIRED ABSENCE OF LEFT LEG BELOW KNEE (H): ICD-10-CM

## 2024-05-01 PROBLEM — M72.6 NECROTIZING FASCIITIS (H): Status: RESOLVED | Noted: 2023-10-09 | Resolved: 2024-05-01

## 2024-05-01 PROCEDURE — 99214 OFFICE O/P EST MOD 30 MIN: CPT | Performed by: STUDENT IN AN ORGANIZED HEALTH CARE EDUCATION/TRAINING PROGRAM

## 2024-05-01 RX ORDER — PROCHLORPERAZINE 25 MG/1
1 SUPPOSITORY RECTAL
Qty: 1 EACH | Refills: 1 | Status: SHIPPED | OUTPATIENT
Start: 2024-05-01

## 2024-05-01 NOTE — LETTER
May 1, 2024      To whom this may concern.     Teresa Perez is a patient of mine here at [facility].  She has severe persistent asthma and COPD, with frequent exacerbations.  I am recommending regular nebulizer use, HEPA filter, and an air conditioning unit in her bedroom.   This will help better manage her symptoms and prevent hospitalizations.   Please call the clinic at [phone] if you have any questions.        Sincerely,        Tyra Bullock MD

## 2024-05-01 NOTE — PATIENT INSTRUCTIONS
Patient was not able to obtain any vitals signs for video  visit.  Rachell Méndez, LINDA     Send in new Dexcom reader for blood sugars    NO changes in medications today.    Come back in 1 month to recheck A1c and breathing    Schedule appt for medical cannabis - 40 min with Dr. Kobe Bullock will write letter for apartment.     Keep working on housing!

## 2024-05-01 NOTE — TELEPHONE ENCOUNTER
Called Psychiatrist Akila at  to get fax number to fax STACY over.  No answer.  Left message to call us back with fax number.      Need to fax STACY over to Akila.    Faizan Lacey MA

## 2024-05-01 NOTE — LETTER
May 1, 2024      To whom this may concern:    Teresa Perez is a long-term patient of mine at [facility] in [place].  She has a complex medical history, including history of previous right leg amputation, requiring her to use a prosthetic or wheelchair or power scooter to get around.  She also has moderate persistent asthma with COPD, and requires regular inhalers, nebulizers, and other treatments to help control her breathing symptoms.  She has chronic pain in multiple joints with significant osteoarthritis, and is at high risk for falls.  In addition, she has type 2 diabetes, is on insulin, requiring multiple daily injections, and significant glucose monitoring.  She needs a number of different pieces of equipment to manage her health, including power scooter, wheelchair, nebulizer machine, CPAP machine, physical therapy equipment, wound care supplies, and she has multiple people that help her with her activities of daily living.  Because of this I am recommending that she be considered for a 2 bedroom apartment, so she has 1 bedroom for herself and 1 bedroom for her medical supplies and equipment, as well as space for a staff member to stay if she is needing supports overnight or for an extended period of time.  Please call the clinic at [phone] if you have any questions.        Sincerely,          Tyra Bullock MD

## 2024-05-01 NOTE — PROGRESS NOTES
There are no exam notes on file for this visit.    ASSESSMENT AND PLAN:      Teresa was seen today for recheck.  Multiple issues were discussed today.    Diagnoses and all orders for this visit:    Type 2 diabetes mellitus with complication, with long-term current use of insulin (H)  History of recurrent wounds.  Below the knee amputation.  I do not have a glucometer to review, but it sounds like sugars have been improved.  She is not having lows.  Is working on eating more regularly and is taking her insulin more consistently.  We ordered a new Dexcom reader, and we will continue with the same regimen.  I do think it be helpful for her to review her diabetes with our Pharm.D.'s at some point as well.  -     Cancel: Albumin Random Urine Quantitative with Creat Ratio  -     Continuous Glucose Transmitter (DEXCOM G6 TRANSMITTER) MISC; 1 each every 3 months Change every 3 months.  -Continue Toujeo 55 units twice daily  -Continue Humalog, 30 units if blood sugar greater than 150 with an additional 2 units for every 50 greater than 150.  -Jardiance 25 mg daily  -Bydureon once weekly.    Cannabis use disorder  Chronic pain syndrome  Chronic bilateral low back pain without sciatica  Right shoulder pain  Cervical dysplasia  Chronic pain continues to be an issue.  She describes daily difficulties with pain.  Would be interested in medical cannabis for this indication.  I discussed if it is okay with her psychiatrist and with pulmonology that I would be happy to certify her for this.  Described the process to process.  She would need to continue to follow with [facility] for further evaluation of her back, neck, and shoulder.  She is asking for injections from [facility] today.  I will work on placing these orders.  --Follow-up with [facility] for right shoulder MRI and lumbar spine MRI, as they ordered these tests.  -- Patient is not a candidate for narcotics  -- She will reach out to schedule a medical cannabis evaluation.   Need to complete the CPM process at [place] to do this.  This involves 40-minute appointments with me and an evaluation with behavioral health.  She is seeing a psychiatrist and we would want to reach out to this person as well.  -- Continue with Tylenol and topical products.  -- Referral for IR to evaluate for lumbar epidural spinal injection placed.    Essential hypertension  -Blood pressure well-controlled.    Open wound of abdominal wall, subsequent encounter  -Newly opened abdominal wound.  The other ones look to be healing well.  No evidence of cellulitis or extending infection.  Likely the outer scab just came off.  Wound care being provided at home.  Continue to monitor    Moderate recurrent major depression (H)  PTSD  Borderline personality disorder  STACY signed for Dahlia, her new psychiatrist.  Medication list updated as able.  Will make sure that further refills on her psychiatric medications are sent to the psychiatrist.  Continue with regular therapy.    Moderate persistent asthma  COPD  Smoking  Chronic cough  Overall the symptoms are stable.  We reviewed her regimen and she will continue with the same medications.    Other orders  -     Continuous Glucose Transmitter (DEXCOM G6 TRANSMITTER) MISC; 1 each every 3 months Change every 3 months.    I drafted a letter indicating the need for a ear conditioner in her bedroom, as well as recommendations for a 2 bedroom apartment with her new housing, to accommodate her medical equipment and  support staff needs.    Patient Instructions   Send in new Dexcom reader for blood sugars    NO changes in medications today.    Come back in 1 month to recheck A1c and breathing    Schedule appt for medical cannabis - 40 min with Dr. Kobe Bullock will write letter for apartment.     Keep working on housing!    Tyra Bullock MD    SUBJECTIVE  Teresa Perez is a 53 year old female with past medical history significant for    Patient Active Problem  List   Diagnosis    Health Care Home    Acute peptic ulcer    Other allergy, other than to medicinal agents    Bulging lumbar disc    Cervical dysplasia    Common migraine without aura    Constipation    Dwarfism    Familial hypercholesterolemia    Insomnia    Low back pain    Moderate persistent asthma without complication    Leg pain, bilateral    Smoking    Degeneration of thoracic or thoracolumbar intervertebral disc    Type 2 diabetes mellitus with other skin ulcer, with long-term current use of insulin (H)    LOMAS (nonalcoholic steatohepatitis)    Disease of lung    Hemorrhoids    Parotid mass    Endometriosis    NNAMDI (obstructive sleep apnea)    History of total right knee replacement    Lateral epicondylitis    Impingement syndrome of shoulder region, left    Hx of total knee replacement, left    Chronic pain syndrome    Cough    Borderline personality disorder (H)    Moderate recurrent major depression (H)    Recurrent ventral hernia    Type 2 diabetes mellitus with complication, with long-term current use of insulin (H)    Essential hypertension    Nonruptured cerebral aneurysm    Cerebrovascular accident (CVA) due to embolism (H)    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)    Pre-ulcerative corn or callous    Excessive bleeding in premenopausal period    Morbid obesity (H)    Pseudoseizure    Chronic obstructive pulmonary disease (H)    Recurrent incisional hernia    Buttock wound, left, initial encounter    Cellulitis, unspecified cellulitis site    Sepsis, due to unspecified organism, unspecified whether acute organ dysfunction present (H)    Cannabis use disorder    Open abdominal wall wound    Necrotizing fasciitis (H)    Infection due to 2019 novel coronavirus     Others present at the visit:  None    Presents for   Chief Complaint   Patient presents with    RECHECK     Don't know, just follow-up, result for MRI       Patient presents today for follow-up.  She was hoping to discuss the results of  "her recent right shoulder and low back MRI.  These were ordered through [facility], she requested that a copy be sent here.  Unfortunately they are not available in her chart.  She is planning to schedule follow-up with them to discuss these results.    Other biggest issue is housing.  She remains on the waiting list for a new apartment.  Is interested in a 2 bedroom now that she thinks about all the medical supplies, equipment, and and home support that she utilizes on a regular basis.  Is requesting a letter in support of this.    Things are going well currently with her PCA, homemaking, and other supports.  She is working with [facility], [phone], and they are available 24 hours a day if she has questions or she needs something.    She shares that she has graduated from the wound clinic as her wounds all healed over, however one of them opened up recently.  Likely she still has a nurse coming out regularly who is doing dressing changes and caring for the wound.  Does not want to take the dressing off today as she has only limited supplies.  Does feel like it is starting to heal appropriately.  No extending warmth or redness.    We reviewed her diabetes.  Blood sugars have been \"better \".  She does not have her glucometer with her.  Is requesting a new Dexcom G6 meter because it is not compatible with her phone.  She shares that morning fasting sugars tend to be under 200 consistently.  They are also better with meals.  She denies any recent low sugars.  She is taking Jardiance 25 mg daily, Bydureon once weekly, Toujeo 55 units twice daily and Humalog 30 units if her sugars are above 150 with 2 extra units for every 50 above 150.  Sometimes has difficulty eating and nausea.  Still is not going low at these times.  She sure she was not using her insulin previously because of mood, and mood has been better so she has been much more consistent about taking the medication.    She has a " new psychiatrist, Dahlia.  This has been going well.  They have discontinued her hydroxyzine and trazodone.  She is no longer taking Xanax.  He is going to be going on Ativan.  She is currently still taking the venlafaxine and the Seroquel, but there going to discuss other medications after they had a copy of her records by [facility].  Has follow-up scheduled with Dahlia, and her therapist Nolan continues to come out weekly and this has been helpful.    She notes that her breathing has been better recently.  She still has good and bad days and the allergies make it worse.  She is having a little bit of cough but it is not bad.  Has been using her inhalers, nebs, and allergy medications regularly and feels like they have been helpful.  She notes that her breathing gets worse in the summertime and she is requesting an air conditioner for her bedroom so that she can breathe appropriately at nighttime.    We did discuss her NNAMDI.  She does not think she has NNAMDI anymore.  Is not having trouble breathing at nighttime.  Tried her CPAP once and felt like she was inhaling water.  Does not feel comfortable trying it again.    She is interested in medical cannabis.  Shares that her psychiatrist would also be interested in her trying this for PTSD.  I discussed the process at [facility] which involves a 40-minute visit with me with a behavioral health eval, and completion of paperwork.  I would also want her to see a pulmonologist if she is considering any inhaled products, to determine the potential effect on her lungs.    OBJECTIVE:  Vitals: /78   Pulse 92   Temp 98.9  F (37.2  C) (Oral)   Resp 20   SpO2 94%   BMI= There is no height or weight on file to calculate BMI.  Objective:    Vitals:  Vitals are reviewed and are within the normal range  Gen:  Alert, pleasant, no acute distress  Cardiac:  Regular rate and rhythm, no murmurs, rubs or gallops  Respiratory:  Lungs clear to auscultation bilaterally, good  airflow throughout, improved from where she normally is.  No crackles or wheezes.  Abdomen:  Soft, mild diffuse tenderness.  She has multiple healed abdominal wounds and 1 month covered with a Mepilex dressing is about 2 x 2 inches that she reports is open underneath.  There is no extending erythema or warmth.  Extremities: Left leg is amputated.  There is no edema in the stump or skin breakdown.  The right foot has no lower extremity edema.      Patient Instructions   Send in new Dexcom reader for blood sugars    NO changes in medications today.    Come back in 1 month to recheck A1c and breathing    Schedule appt for medical cannabis - 40 min with Dr. Kobe Bullock will write letter for apartment.     Keep working on housing!    Tyra Bullock MD

## 2024-05-02 DIAGNOSIS — R10.84 ABDOMINAL PAIN, GENERALIZED: ICD-10-CM

## 2024-05-02 DIAGNOSIS — Z71.6 ENCOUNTER FOR TOBACCO USE CESSATION COUNSELING: ICD-10-CM

## 2024-05-02 DIAGNOSIS — J30.2 SEASONAL ALLERGIC RHINITIS, UNSPECIFIED TRIGGER: ICD-10-CM

## 2024-05-03 RX ORDER — LIDOCAINE 50 MG/G
PATCH TOPICAL
Qty: 30 PATCH | Refills: 1 | Status: SHIPPED | OUTPATIENT
Start: 2024-05-03 | End: 2024-07-11

## 2024-05-03 RX ORDER — NICOTINE 21 MG/24HR
1 PATCH, TRANSDERMAL 24 HOURS TRANSDERMAL EVERY 24 HOURS
Qty: 30 PATCH | Refills: 0 | Status: SHIPPED | OUTPATIENT
Start: 2024-05-03 | End: 2024-09-18

## 2024-05-03 RX ORDER — CETIRIZINE HYDROCHLORIDE 10 MG/1
10 TABLET ORAL DAILY
Qty: 90 TABLET | Refills: 1 | Status: SHIPPED | OUTPATIENT
Start: 2024-05-03 | End: 2024-06-28

## 2024-05-03 NOTE — TELEPHONE ENCOUNTER
Medication requested: CETIRIZINE 10MG TABLETS   Last office visit: 5/1/24  Future [facility] appointments: none  Medication last refilled: 2/7/24  Last qualifying labs: none    Prescription approved per [facility] Refill Protocol.  Antihistamines Protocol [id number]/02/2024 03:01 PM   Protocol Details Patient is 3-64 years of age    Recent (12 mo) or future (30 days) visit within the authorizing provider's specialty    Medication is active on med list    Medication indicated for associated diagnosis     YULISA Doe, BSN

## 2024-05-03 NOTE — TELEPHONE ENCOUNTER
Medication requested: LIDOCAINE 5% PATCH   Last office visit: 5/1/24  Future [facility] appointments: none  Medication last refilled: 3/22/24  Last qualifying labs: none    Prescription approved per [facility] Refill Protocol.    Topical Anesthetic Agents Gdmjct2405/02/2024 03:47 PM   Protocol Details Recent (12 mo) or future (30 days) visit within the authorizing provider's specialty    Medication is active on med list    Patient is age 2 or older        Name from pharmacy: NICOTINE 21 MG/24HR PATCH          Will file in chart as: nicotine (NICODERM CQ) 21 MG/24HR 24 hr patch    Sig: PLACE 1 PATCH ONTO THE SKIN EVERY 24 HOURS.    Disp: 14 patch    Refills: 0 (Pharmacy requested: Not specified)    Start: 5/2/2024    Class: E-Prescribe    Non-formulary For: Encounter for tobacco use cessation counseling    Last ordered: 2 months ago (2/19/2024) by Tyra Bulolck MD    Last refill: 2/19/2024    Rx #: [id number]     Routing refill request to provider for review/approval because:    Partial Cholinergic Nicotinic Agonist Agents [id number]/02/2024 03:47 PM   Protocol Details Medication indicated for associated diagnosis        YULISA Doe, BSN

## 2024-05-17 ENCOUNTER — TRANSFERRED RECORDS (OUTPATIENT)
Dept: HEALTH INFORMATION MANAGEMENT | Facility: CLINIC | Age: 54
End: 2024-05-17
Payer: COMMERCIAL

## 2024-05-21 ENCOUNTER — HOSPITAL ENCOUNTER (OUTPATIENT)
Facility: CLINIC | Age: 54
End: 2024-05-21
Attending: INTERNAL MEDICINE | Admitting: INTERNAL MEDICINE
Payer: COMMERCIAL

## 2024-05-22 ENCOUNTER — OFFICE VISIT (OUTPATIENT)
Dept: FAMILY MEDICINE | Facility: CLINIC | Age: 54
End: 2024-05-22
Payer: COMMERCIAL

## 2024-05-22 VITALS
WEIGHT: 185 LBS | BODY MASS INDEX: 36.13 KG/M2 | TEMPERATURE: 98.5 F | OXYGEN SATURATION: 97 % | SYSTOLIC BLOOD PRESSURE: 130 MMHG | HEART RATE: 93 BPM | RESPIRATION RATE: 20 BRPM | DIASTOLIC BLOOD PRESSURE: 84 MMHG

## 2024-05-22 DIAGNOSIS — J45.51 SEVERE PERSISTENT ASTHMA WITH EXACERBATION (H): ICD-10-CM

## 2024-05-22 DIAGNOSIS — R07.9 CHEST PAIN, UNSPECIFIED TYPE: ICD-10-CM

## 2024-05-22 DIAGNOSIS — F12.90 CANNABIS USE DISORDER: ICD-10-CM

## 2024-05-22 DIAGNOSIS — S31.109D OPEN WOUND OF ABDOMINAL WALL, SUBSEQUENT ENCOUNTER: ICD-10-CM

## 2024-05-22 DIAGNOSIS — Z01.818 PRE-OPERATIVE CLEARANCE: ICD-10-CM

## 2024-05-22 DIAGNOSIS — E66.01 MORBID OBESITY (H): ICD-10-CM

## 2024-05-22 DIAGNOSIS — G89.29 OTHER CHRONIC PAIN: ICD-10-CM

## 2024-05-22 DIAGNOSIS — N87.9 CERVICAL DYSPLASIA: ICD-10-CM

## 2024-05-22 DIAGNOSIS — J44.9 CHRONIC OBSTRUCTIVE PULMONARY DISEASE, UNSPECIFIED COPD TYPE (H): ICD-10-CM

## 2024-05-22 DIAGNOSIS — E11.8 TYPE 2 DIABETES MELLITUS WITH COMPLICATION, WITH LONG-TERM CURRENT USE OF INSULIN (H): Primary | ICD-10-CM

## 2024-05-22 DIAGNOSIS — I10 ESSENTIAL HYPERTENSION: ICD-10-CM

## 2024-05-22 DIAGNOSIS — Z79.4 TYPE 2 DIABETES MELLITUS WITH COMPLICATION, WITH LONG-TERM CURRENT USE OF INSULIN (H): Primary | ICD-10-CM

## 2024-05-22 PROCEDURE — 99214 OFFICE O/P EST MOD 30 MIN: CPT | Performed by: STUDENT IN AN ORGANIZED HEALTH CARE EDUCATION/TRAINING PROGRAM

## 2024-05-22 NOTE — PATIENT INSTRUCTIONS
Referral placed for [facility].  Please call them to schedule Phone: [phone]  Referral placed for Cardiology at [facility].  We will call you to schedule.      Schedule medial cannabis evaluation at [place].   --3 visit process--  -Initial with Dr. Bullock  -Pyschologist visit (Dr. Santiago)  -Followup with Dr. Kobe Abbott RN does the scheduling.      Call the clinic and ask for process to schedule.    Already scheduled pre-operative evaluation for colonoscopy.      Forms completed for special diet.

## 2024-05-22 NOTE — PROGRESS NOTES
Patient decline to phq9.    Special Diet Supplement and Verification of prescribed diet form sent to HIM for scanning and copy go to patient.      Faizan Lacey MA

## 2024-05-24 NOTE — PROGRESS NOTES
There are no exam notes on file for this visit.    ASSESSMENT AND PLAN:      Teresa was seen today for forms.  Multiple issues were addressed.  We completed her special diet form as well today.    Diagnoses and all orders for this visit:    Type 2 diabetes mellitus with complication, with long-term current use of insulin (H)  Blood sugars continue to stay stable and are slightly improving.  No change in medications today.    Cannabis use disorder  Other chronic pain  Cervical dysplasia  Rotator cuff tear  Moving forward with additional injections through [facility].  She would like a referral to pain clinic, and this was provided today.  She would like to go to [facility].  Additionally she is interested in evaluation for medical cannabis.  Outlined the 3 visits policy here at [facility].  She would like to move forward with this.  May also be an option at a pain clinic for her.  -     Pain Management  Referral; Future    Essential hypertension  Chest pain, unspecified type  Pre-operative clearance  Type 2 diabetes mellitus with complication, with long-term current use of insulin (H)  Working on getting her colonoscopy done for better evaluation of her chronic diarrhea.  Agreed that she needs a preop prior to colonoscopy, as well as her cardiac evaluation.  Referral placed for this today.  -     Adult Cardiology Eval  Referral; Future    Open wound of abdominal wall, subsequent encounter  Continues to work on this with a home nurse.    Chronic obstructive pulmonary disease, unspecified COPD type (H)  Severe persistent asthma with exacerbation (H28)  Breathing is stable today.    Follow-up in 1 month for preop physical.    Patient Instructions   Referral placed for [facility].  Please call them to schedule Phone: [phone]  Referral placed for Cardiology at [facility].  We will call you to schedule.      Schedule medial cannabis evaluation at [facility].   --3 visit process--  -Initial with  Dr. Bullock  -Pyschologist visit (Dr. Santiago)  -Followup with Dr. Kobe Abbott RN does the scheduling.      Call the clinic and ask for process to schedule.    Already scheduled pre-operative evaluation for colonoscopy.      Forms completed for special diet.        Tyra Bullock MD    SUBJECTIVE  Teresa AUSTIN Chris is a 53 year old female with past medical history significant for    Patient Active Problem List   Diagnosis    Health Care Home    Acute peptic ulcer    Other allergy, other than to medicinal agents    Bulging lumbar disc    Cervical dysplasia    Common migraine without aura    Constipation    Dwarfism    Familial hypercholesterolemia    Insomnia    Low back pain    Moderate persistent asthma without complication    Leg pain, bilateral    Smoking    Degeneration of thoracic or thoracolumbar intervertebral disc    Type 2 diabetes mellitus with other skin ulcer, with long-term current use of insulin (H)    LOMAS (nonalcoholic steatohepatitis)    Disease of lung    Hemorrhoids    Parotid mass    Endometriosis    NNAMDI (obstructive sleep apnea)    History of total right knee replacement    Lateral epicondylitis    Impingement syndrome of shoulder region, left    Hx of total knee replacement, left    Chronic pain syndrome    Cough    Borderline personality disorder (H)    Moderate recurrent major depression (H)    Recurrent ventral hernia    Type 2 diabetes mellitus with complication, with long-term current use of insulin (H)    Essential hypertension    Nonruptured cerebral aneurysm    Cerebrovascular accident (CVA) due to embolism (H)    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)    Pre-ulcerative corn or callous    Excessive bleeding in premenopausal period    Morbid obesity (H)    Pseudoseizure    Chronic obstructive pulmonary disease (H)    Recurrent incisional hernia    Buttock wound, left, initial encounter    Cellulitis, unspecified cellulitis site    Sepsis, due to unspecified organism,  unspecified whether acute organ dysfunction present (H)    Cannabis use disorder    Open abdominal wall wound    Infection due to 2019 novel coronavirus     Others present at the visit:  None    Presents for   Chief Complaint   Patient presents with    Forms     Form to complete     Patient presents with a couple of concerns.    First of all, she needs a special diet form completed for the county.  Has had a cut back and benefits.  Has multiple health comorbidities that require close dietary management.  Form was completed for her today.  She continues to lose weight and is worried about this.    She shares that she was unable to do her colonoscopy.  They said with her multiple comorbidities this needs to be done in the hospital.  I agree with that.  We also discussed whether or not a cardiac evaluation would be warranted prior to the procedure, and I think this would be a good idea.  Referral was placed.  She does have chest pain associated with episodes of anxiety.  Due to her amputation, she is unable to ambulate.  Does have shortness of breath when she is exerting herself.  Additionally she has hypertension, insulin-dependent diabetes, asthma and COPD, and history of stroke.  Her most recent echo was in 2021, which was normal.  She had a Avril scan completed in 2021 was mildly abnormal with evidence of a nontransmural infarction in the septal wall.    She was also recently in to see the orthopedist.  They did MRIs on her shoulder and she has multiple rotator cuff muscle tears.  Was able to get injections completed and has been scheduled with [facility] for injections in her back, as this has been helpful in the past.  Continues to have significant pain.  She is again requesting a pain clinic referral.  Would like to go to [facility] pain clinic in [place].  She also has pain in her stump, along with phantom limb pain, has chronic headaches.  She attended written pain clinic in the past.    She shares that her breathing has  been fairly good.  She is using her controller inhalers regularly and has not needed to use her rescue for a while.  Is also taking her allergy medications.    She endorses improvement in her blood sugars.  Is taking her insulin regularly.    She shares that she has a wound that has opened up again in her abdomen.  The wound nurses coming to her house weekly to help with this.  She is using a purple salve to help dry out the moisture currently.  The other wounds remain healed.  She is trying to eat healthy to help promote wound healing.    She would like to pursue medical cannabis.  Has questions about the process for this at [facility].    OBJECTIVE:  Vitals: /84 (BP Location: Right arm, Patient Position: Sitting, Cuff Size: Adult Regular)   Pulse 93   Temp 98.5  F (36.9  C) (Oral)   Resp 20   Wt 83.9 kg (185 lb)   SpO2 97%   BMI 36.13 kg/m    BMI= Body mass index is 36.13 kg/m .  Objective:    Vitals:  Vitals are reviewed and are within the normal range  Gen:  Alert, pleasant, no acute distress  Cardiac:  Regular rate and rhythm, no murmurs, rubs or gallops  Respiratory:  Lungs clear to auscultation bilaterally  Abdomen: Large hernia plaque present.  The wound area is covered and bandaged with no extending erythema.  Extremities: Limited range of motion and pain bilaterally and shoulders.        No results found for any visits on 05/22/24.        Patient Instructions   Referral placed for [facility].  Please call them to schedule Phone: [phone]  Referral placed for Cardiology at [facility].  We will call you to schedule.      Schedule medial cannabis evaluation at [facility].   --3 visit process--  -Initial with Dr. Bullock  -Pyschologist visit (Dr. Santiago)  -Followup with Dr. Kobe Abbtot RN does the scheduling.      Call the clinic and ask for process to schedule.    Already scheduled pre-operative evaluation for colonoscopy.      Forms completed for special diet.        Tyra Bullock,  MD

## 2024-06-09 DIAGNOSIS — G25.81 RESTLESS LEGS SYNDROME: ICD-10-CM

## 2024-06-10 RX ORDER — PRAMIPEXOLE DIHYDROCHLORIDE 0.75 MG/1
0.75 TABLET ORAL AT BEDTIME
Qty: 90 TABLET | Refills: 1 | Status: SHIPPED | OUTPATIENT
Start: 2024-06-10

## 2024-06-10 NOTE — TELEPHONE ENCOUNTER
Name from pharmacy: PRAMIPEXOLE 0.75MG TABLETS          Will file in chart as: pramipexole (MIRAPEX) 0.75 MG tablet    Sig: TAKE 1 TABLET BY MOUTH EVERY NIGHT AT BEDTIME    Disp: 90 tablet    Refills: 1 (Pharmacy requested: Not specified)    Start: 6/9/2024    Class: E-Prescribe    Non-formulary For: Restless legs syndrome    Last ordered: 6 months ago (12/10/2023) by Tyra Bullock MD    Last refill: 3/12/2024    Rx #: [id number]    Antiparkinson's Agents Protocol Kvhntj7506/09/2024 07:00 PM   Protocol Details Blood pressure under 140/90 in past 12 months    CBC on record in past 12 months    ALT on record in past 12 months        Serum Creatinine on file in past 12 months    Medication is active on med list    Patient is age 18 or older    No active pregnancy on record    No positive pregnancy test in the past 12 months    Recent (6 mo) or future (30 days) visit within the authorizing provider's specialty        BP Readings from Last 3 Encounters:   05/22/24 130/84   05/01/24 115/78   04/25/24 92/60     Component      Latest Ref Rng 3/14/2024  4:09 PM   Creatinine      0.51 - 0.95 mg/dL 0.46 (L)      Component      Latest Ref Rng 3/14/2024  4:09 PM   ALT      0 - 50 U/L 39         Component      Latest Ref Rng 3/14/2024  4:09 PM   Hemoglobin      11.7 - 15.7 g/dL 15.4    Hematocrit      35.0 - 47.0 % 47.1 (H)    MCV      78 - 100 fL 81    MCH      26.5 - 33.0 pg 26.6    MCHC      31.5 - 36.5 g/dL 32.7    WBC      4.0 - 11.0 10e3/uL 17.4 (H)    RBC Count      3.80 - 5.20 10e6/uL 5.79 (H)    RDW      10.0 - 15.0 % 15.5 (H)    Platelet Count      150 - 450 10e3/uL 289       Prescription approved per [facility] Refill Protocol.    YULISA Doe, BSN

## 2024-06-14 DIAGNOSIS — F33.1 MODERATE RECURRENT MAJOR DEPRESSION (H): ICD-10-CM

## 2024-06-14 RX ORDER — VENLAFAXINE HYDROCHLORIDE 150 MG/1
150 CAPSULE, EXTENDED RELEASE ORAL DAILY
Qty: 30 CAPSULE | Refills: 0 | Status: SHIPPED | OUTPATIENT
Start: 2024-06-14

## 2024-06-14 NOTE — TELEPHONE ENCOUNTER
Name from pharmacy: VENLAFAXINE ER 150MG CAPSULES          Will file in chart as: venlafaxine (EFFEXOR XR) 150 MG 24 hr capsule    Sig: TAKE 1 CAPSULE BY MOUTH EVERY DAY    Disp: 30 capsule    Refills: 0 (Pharmacy requested: Not specified)    Start: 6/14/2024    Class: E-Prescribe    Non-formulary For: Moderate recurrent major depression (H)    Last ordered: 4 months ago (1/24/2024) by Tyra Bullock MD    Last refill: 5/2/2024    Rx #: [id number]    Serotonin-Norepinephrine Reuptake Inhibitors  Aukafu6706/14/2024 12:09 PM   Protocol Details PHQ-9 score of less than 5 in past 6 months        YULISA Doe, BSN
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
## 2024-06-19 ENCOUNTER — DOCUMENTATION ONLY (OUTPATIENT)
Dept: FAMILY MEDICINE | Facility: CLINIC | Age: 54
End: 2024-06-19

## 2024-06-19 ENCOUNTER — OFFICE VISIT (OUTPATIENT)
Dept: FAMILY MEDICINE | Facility: CLINIC | Age: 54
End: 2024-06-19
Payer: COMMERCIAL

## 2024-06-19 VITALS
DIASTOLIC BLOOD PRESSURE: 82 MMHG | RESPIRATION RATE: 16 BRPM | OXYGEN SATURATION: 96 % | TEMPERATURE: 98.3 F | SYSTOLIC BLOOD PRESSURE: 130 MMHG | HEART RATE: 90 BPM

## 2024-06-19 DIAGNOSIS — Z53.9 DIAGNOSIS NOT YET DEFINED: Primary | ICD-10-CM

## 2024-06-19 DIAGNOSIS — S00.83XA CONTUSION OF OTHER PART OF HEAD, INITIAL ENCOUNTER: ICD-10-CM

## 2024-06-19 DIAGNOSIS — G89.29 CHRONIC BILATERAL LOW BACK PAIN WITHOUT SCIATICA: ICD-10-CM

## 2024-06-19 DIAGNOSIS — M25.511 ACUTE PAIN OF RIGHT SHOULDER: ICD-10-CM

## 2024-06-19 DIAGNOSIS — R10.11 RUQ ABDOMINAL PAIN: ICD-10-CM
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
## 2024-06-19 DIAGNOSIS — M54.50 CHRONIC BILATERAL LOW BACK PAIN WITHOUT SCIATICA: ICD-10-CM

## 2024-06-19 PROCEDURE — 99214 OFFICE O/P EST MOD 30 MIN: CPT | Mod: GC

## 2024-06-19 RX ORDER — MELOXICAM 15 MG/1
15 TABLET ORAL DAILY
Qty: 30 TABLET | Refills: 0 | Status: SHIPPED | OUTPATIENT
Start: 2024-06-19 | End: 2024-07-08

## 2024-06-19 RX ORDER — TRAMADOL HYDROCHLORIDE 50 MG/1
50 TABLET ORAL EVERY 6 HOURS PRN
Qty: 10 TABLET | Refills: 0 | Status: CANCELLED | OUTPATIENT
Start: 2024-06-19 | End: 2024-06-22

## 2024-06-19 RX ORDER — TRAMADOL HYDROCHLORIDE 50 MG/1
50 TABLET ORAL EVERY 6 HOURS PRN
Qty: 10 TABLET | Refills: 0 | Status: SHIPPED | OUTPATIENT
Start: 2024-06-19 | End: 2024-06-22

## 2024-06-19 RX ORDER — CYCLOBENZAPRINE HCL 5 MG
5 TABLET ORAL 3 TIMES DAILY PRN
Qty: 30 TABLET | Refills: 0 | Status: SHIPPED | OUTPATIENT
Start: 2024-06-19 | End: 2024-07-12

## 2024-06-19 NOTE — PATIENT INSTRUCTIONS
Healthy Upper Back: Exercises  Introduction  Here are some examples of exercises for your upper back. Start each exercise slowly. Ease off the exercise if you start to have pain.  Your doctor or physical therapist will tell you when you can start these exercises and which ones will work best for you.  How to do the exercises  Lower neck and upper back (rhomboid) stretch    Sit in a firm chair, or stand tall.  With your arms about shoulder height, clasp your hands in front of you.  Drop your chin toward your chest.  Reach straight forward so you are rounding your upper back. Think about pulling your shoulder blades apart. You'll feel a stretch across your upper back and shoulders.  Hold for 15 to 30 seconds.  Repeat 2 to 4 times.  Midback stretch    If you have knee pain, do not do this exercise.  Kneel on the floor, and sit back on your ankles.  Lean forward, place your hands on the floor, and stretch your arms out in front of you. Rest your head between your arms.  Gently push your chest toward the floor, reaching as far in front of you as possible.  Hold for 15 to 30 seconds.  Repeat 2 to 4 times.  Shoulder roll    Stand or sit up straight, with your chin slightly tucked.  Keep your arms relaxed. All motion will be in your shoulders.  Roll your shoulders up, then back, then down, and then forward in a smooth, circular motion. Repeat at least 2 to 4 times.  Then go the other direction. Press your shoulders down, then back, then up, and then forward in a smooth, circular motion. Repeat at least 2 to 4 times.  Wall push-up    Stand facing a wall with your feet about 12 to 24 inches from the wall. If you feel any pain when you do this exercise, stand closer to the wall.  Place your hands on the wall at shoulder height, slightly wider apart than your shoulders. Turn your fingers out a little, rather than straight up and down.  Slowly bend your elbows and bring your face toward the wall, keeping your shoulders and hips  lined up. Then slowly push back to the starting position. Keep the motion smooth and controlled.  Repeat 8 to 12 times.  When you can do this exercise against a wall with ease and no pain, you can try it against a counter. You can then slowly progress to the end of a couch, then to a sturdy chair, and finally to the floor.  Resisted shoulder-blade squeeze (elbows bent)    Sit or stand, holding an exercise band in both hands in front of you. Keep your elbows close to your sides, bent at a 90-degree angle. Your palms should face up.  Squeeze your shoulder blades together and move your hands to the outside, stretching the band. Keep your elbows at your sides.  Slowly return to your starting position.  Repeat 8 to 12 times.  Resisted row    [place] an exercise band at about waist level. You can loop the band around a solid object, like a bedpost or handrail. Or you can tie a knot in the middle of the band and shut a door on the band so the knot is on the other side of the door. (Or you can have someone hold one end of the loop to provide resistance.)  Stand or sit facing where you have placed the band. Hold one end of the band in each hand.  Hold your arms out in front of you. Adjust your hold on the band so you have some tension on it.  With your shoulders relaxed, pull the bands back, and move your shoulder blades toward each other. Your elbows will pass along your waist.  Slowly return to the starting position.  Repeat 8 to 12 times.  Follow-up care is a key part of your treatment and safety. Be sure to make and go to all appointments, and call your doctor if you are having problems. It's also a good idea to know your test results and keep a list of the medicines you take.  Current as of: July 18, 2023               Content Version: 13.8    [id number] [employer].   Care instructions adapted under license by your healthcare professional. If you have questions about a medical condition or this instruction,  always ask your healthcare professional. [employer] disclaims any warranty or liability for your use of this information.

## 2024-06-19 NOTE — PROGRESS NOTES
To be completed in Nursing note:    Please reference list for forms that require a visit for completion.  Please remind patients that providers are given 3-5 business days to complete and return forms.      Form type:Home Health Certification and Plan of Care/[place] [facility].    Date form received: 24    Date form completed by Physician:24    How was form returned to patient (mailed, faxed, or at  for patient to ):  Faxed to   Date form mailed/faxed/left at  for patient and sent to HIM for scannin24, sent to HIM for scanning    Once form is left for patient, faxed, or mailed PCS will then close the documentation only encounter.     Faizan Lacey MA

## 2024-06-19 NOTE — PROGRESS NOTES
Preceptor Attestation:    I discussed the patient with the resident and evaluated the patient in person. I have verified the content of the note, which accurately reflects my assessment of the patient and the plan of care.  Significant contusion, endorsing moderate-severe pain and impacting sleep and ADL's.  Issues despite conservative, maximum therapy  Teresa requesting short course of controlled substance tramadol.  checked. Dispensed lorazepam and gabapentin. No opioids since Nov 2023.  #10 Tramadol dispensed, with instructions this will not be an ongoing dispensed medication and to use only for severe, activity altering pain, keep track of use on calendar and bring this record to the upcoming scheduled follow up with PCP.    Supervising Physician:  Jcarlos Woodard MD.

## 2024-06-24 ENCOUNTER — TELEPHONE (OUTPATIENT)
Dept: FAMILY MEDICINE | Facility: CLINIC | Age: 54
End: 2024-06-24
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
## 2024-06-25 NOTE — TELEPHONE ENCOUNTER
Agree with documentation per Dr. Black.  We do no refill controlled substances outside of an office visit.  Dr. Black clearly documented that this was an acute pain prescription and would not be refilled.      I will discuss with patient at 6/28 appointment.     Tyra Bullock MD    Routed to Faizan Rodrigues, Sasha Black for FYI.

## 2024-06-27 DIAGNOSIS — G43.809 OTHER MIGRAINE WITHOUT STATUS MIGRAINOSUS, NOT INTRACTABLE: ICD-10-CM

## 2024-06-27 DIAGNOSIS — Z51.81 ENCOUNTER FOR THERAPEUTIC DRUG LEVEL MONITORING: ICD-10-CM

## 2024-06-27 DIAGNOSIS — I10 ESSENTIAL HYPERTENSION: ICD-10-CM

## 2024-06-27 DIAGNOSIS — F12.90 CANNABIS USE DISORDER: ICD-10-CM

## 2024-06-27 DIAGNOSIS — G25.81 RESTLESS LEGS SYNDROME: ICD-10-CM

## 2024-06-27 DIAGNOSIS — Z79.4 TYPE 2 DIABETES MELLITUS WITH COMPLICATION, WITH LONG-TERM CURRENT USE OF INSULIN (H): Primary | ICD-10-CM

## 2024-06-27 DIAGNOSIS — E11.8 TYPE 2 DIABETES MELLITUS WITH COMPLICATION, WITH LONG-TERM CURRENT USE OF INSULIN (H): Primary | ICD-10-CM

## 2024-06-27 RX ORDER — SUMATRIPTAN 50 MG/1
TABLET, FILM COATED ORAL
Qty: 12 TABLET | Refills: 1 | Status: SHIPPED | OUTPATIENT
Start: 2024-06-27 | End: 2024-08-08

## 2024-06-27 NOTE — TELEPHONE ENCOUNTER
Name from pharmacy: SUMATRIPTAN 50MG TABLETS          Will file in chart as: SUMAtriptan (IMITREX) 50 MG tablet    Sig: TAKE 1 TABLET(50 MG) BY MOUTH AT ONSET OF HEADACHE FOR MIGRAINE    Disp: 12 tablet    Refills: 1 (Pharmacy requested: Not specified)    Start: 6/27/2024    Class: E-Prescribe    Non-formulary For: Other migraine without status migrainosus, not intractable    Last ordered: 4 months ago (1/30/2024) by Tyra Bullock MD    Last refill: 5/2/2024    Rx #: [id number]    Serotonin Agonists Meibnf2006/27/2024 12:10 PM   Protocol Details Serotonin Agonist request needs review.        YULISA Doe, BSN

## 2024-06-28 ENCOUNTER — OFFICE VISIT (OUTPATIENT)
Dept: PHARMACY | Facility: CLINIC | Age: 54
End: 2024-06-28
Payer: COMMERCIAL

## 2024-06-28 ENCOUNTER — OFFICE VISIT (OUTPATIENT)
Dept: FAMILY MEDICINE | Facility: CLINIC | Age: 54
End: 2024-06-28
Payer: COMMERCIAL

## 2024-06-28 VITALS
OXYGEN SATURATION: 98 % | TEMPERATURE: 98.2 F | HEART RATE: 86 BPM | SYSTOLIC BLOOD PRESSURE: 122 MMHG | RESPIRATION RATE: 16 BRPM | DIASTOLIC BLOOD PRESSURE: 80 MMHG

## 2024-06-28 DIAGNOSIS — J44.89 ASTHMA-COPD OVERLAP SYNDROME (H): ICD-10-CM

## 2024-06-28 DIAGNOSIS — Z79.4 TYPE 2 DIABETES MELLITUS WITH COMPLICATION, WITH LONG-TERM CURRENT USE OF INSULIN (H): Primary | ICD-10-CM

## 2024-06-28 DIAGNOSIS — I10 ESSENTIAL HYPERTENSION: ICD-10-CM

## 2024-06-28 DIAGNOSIS — M94.0 COSTOCHONDRITIS: ICD-10-CM

## 2024-06-28 DIAGNOSIS — E78.2 MIXED HYPERLIPIDEMIA: ICD-10-CM

## 2024-06-28 DIAGNOSIS — F33.1 MODERATE RECURRENT MAJOR DEPRESSION (H): ICD-10-CM

## 2024-06-28 DIAGNOSIS — F12.90 CANNABIS USE DISORDER: ICD-10-CM

## 2024-06-28 DIAGNOSIS — Z89.512 ACQUIRED ABSENCE OF LEFT LEG BELOW KNEE (H): ICD-10-CM

## 2024-06-28 DIAGNOSIS — M25.511 ACUTE PAIN OF RIGHT SHOULDER: ICD-10-CM

## 2024-06-28 DIAGNOSIS — Z51.81 ENCOUNTER FOR THERAPEUTIC DRUG LEVEL MONITORING: ICD-10-CM

## 2024-06-28 DIAGNOSIS — M54.50 CHRONIC BILATERAL LOW BACK PAIN WITHOUT SCIATICA: ICD-10-CM

## 2024-06-28 DIAGNOSIS — J30.1 SEASONAL ALLERGIC RHINITIS DUE TO POLLEN: ICD-10-CM

## 2024-06-28 DIAGNOSIS — R19.7 DIARRHEA, UNSPECIFIED TYPE: ICD-10-CM

## 2024-06-28 DIAGNOSIS — E11.8 TYPE 2 DIABETES MELLITUS WITH COMPLICATION, WITH LONG-TERM CURRENT USE OF INSULIN (H): Primary | ICD-10-CM

## 2024-06-28 DIAGNOSIS — L30.4 INTERTRIGO: ICD-10-CM

## 2024-06-28 DIAGNOSIS — J44.9 CHRONIC OBSTRUCTIVE PULMONARY DISEASE, UNSPECIFIED COPD TYPE (H): ICD-10-CM

## 2024-06-28 DIAGNOSIS — G89.29 CHRONIC BILATERAL LOW BACK PAIN WITHOUT SCIATICA: ICD-10-CM

## 2024-06-28 DIAGNOSIS — K52.9 CHRONIC DIARRHEA: ICD-10-CM

## 2024-06-28 DIAGNOSIS — G43.809 OTHER MIGRAINE WITHOUT STATUS MIGRAINOSUS, NOT INTRACTABLE: ICD-10-CM

## 2024-06-28 LAB
ANION GAP SERPL CALCULATED.3IONS-SCNC: 10 MMOL/L (ref 7–15)
BUN SERPL-MCNC: 33.3 MG/DL (ref 6–20)
CALCIUM SERPL-MCNC: 10.4 MG/DL (ref 8.6–10)
CHLORIDE SERPL-SCNC: 101 MMOL/L (ref 98–107)
CHOLEST SERPL-MCNC: 126 MG/DL
CREAT SERPL-MCNC: 0.6 MG/DL (ref 0.51–0.95)
DEPRECATED HCO3 PLAS-SCNC: 25 MMOL/L (ref 22–29)
EGFRCR SERPLBLD CKD-EPI 2021: >90 ML/MIN/1.73M2
FASTING STATUS PATIENT QL REPORTED: YES
FASTING STATUS PATIENT QL REPORTED: YES
GLUCOSE SERPL-MCNC: 217 MG/DL (ref 70–99)
HBA1C MFR BLD: 8.2 % (ref 0–5.6)
HDLC SERPL-MCNC: 30 MG/DL
LDLC SERPL CALC-MCNC: 63 MG/DL
NONHDLC SERPL-MCNC: 96 MG/DL
POTASSIUM SERPL-SCNC: 4.9 MMOL/L (ref 3.4–5.3)
SODIUM SERPL-SCNC: 136 MMOL/L (ref 135–145)
TRIGL SERPL-MCNC: 165 MG/DL

## 2024-06-28 PROCEDURE — 99207 PR NO CHARGE LOS: CPT | Performed by: PHARMACIST

## 2024-06-28 PROCEDURE — 80048 BASIC METABOLIC PNL TOTAL CA: CPT | Performed by: STUDENT IN AN ORGANIZED HEALTH CARE EDUCATION/TRAINING PROGRAM

## 2024-06-28 PROCEDURE — 80061 LIPID PANEL: CPT | Performed by: STUDENT IN AN ORGANIZED HEALTH CARE EDUCATION/TRAINING PROGRAM

## 2024-06-28 PROCEDURE — 36415 COLL VENOUS BLD VENIPUNCTURE: CPT | Performed by: STUDENT IN AN ORGANIZED HEALTH CARE EDUCATION/TRAINING PROGRAM

## 2024-06-28 PROCEDURE — 83036 HEMOGLOBIN GLYCOSYLATED A1C: CPT | Performed by: STUDENT IN AN ORGANIZED HEALTH CARE EDUCATION/TRAINING PROGRAM

## 2024-06-28 PROCEDURE — 99214 OFFICE O/P EST MOD 30 MIN: CPT | Performed by: STUDENT IN AN ORGANIZED HEALTH CARE EDUCATION/TRAINING PROGRAM

## 2024-06-28 RX ORDER — FLUCONAZOLE 150 MG/1
150 TABLET ORAL
Qty: 3 TABLET | Refills: 0 | Status: SHIPPED | OUTPATIENT
Start: 2024-06-28 | End: 2024-07-12

## 2024-06-28 RX ORDER — PROCHLORPERAZINE 25 MG/1
SUPPOSITORY RECTAL
Qty: 1 EACH | Refills: 0 | Status: SHIPPED | OUTPATIENT
Start: 2024-06-28

## 2024-06-28 RX ORDER — SUMATRIPTAN 50 MG/1
50 TABLET, FILM COATED ORAL
Qty: 12 TABLET | Refills: 1 | Status: CANCELLED | OUTPATIENT
Start: 2024-06-28

## 2024-06-28 RX ORDER — TRAMADOL HYDROCHLORIDE 50 MG/1
50 TABLET ORAL EVERY 6 HOURS PRN
Qty: 14 TABLET | Refills: 0 | Status: SHIPPED | OUTPATIENT
Start: 2024-06-28 | End: 2024-07-05

## 2024-06-28 RX ORDER — CLOTRIMAZOLE 1 %
CREAM (GRAM) TOPICAL 2 TIMES DAILY
Qty: 45 G | Refills: 2 | Status: SHIPPED | OUTPATIENT
Start: 2024-06-28

## 2024-06-28 RX ORDER — NYSTATIN 100000 [USP'U]/G
POWDER TOPICAL 3 TIMES DAILY PRN
Qty: 60 G | Refills: 0 | Status: SHIPPED | OUTPATIENT
Start: 2024-06-28

## 2024-06-28 NOTE — PROGRESS NOTES
Medication Therapy Management (MTM) Encounter    ASSESSMENT:                            Medication Adherence/Access: No issues identified    Type 2 diabetes:   A1c improved today from 9.3 to 8.2. Will assist with getting Dexcom 6 reader. Recommend switching bydureon to ozempic.     Hyperlipidemia:   Stable. Lipid panel pending today. Last LDL 50 on 6/6/23, at goal.     COPD:  Stable.     Allergies:   Stable    Chronic pain:   Stable.    Depression/mental health:  Stable. Following with psychiatry.     Chronic diarrhea  Colonoscopy planned.       PLAN:                            Switch from Bydureon to Ozempic 0.5 mg weekly   Dexcom reader sent to pharmacy  Follow up for Dexcom teaching if needed   Continue insulin at current regimen    Follow-up: Follow up in 4 weeks with MTM or Dr Bullock    SUBJECTIVE/OBJECTIVE:                          Teresa Perez is a 53 year old female seen for a co-visit with Dr Bullock for diabetes and med reconciliation. Did not bring medications but knowledge about medications she is taking.     Reason for visit: diabetes .    Allergies/ADRs: Reviewed in chart  Past Medical History: Reviewed in chart  Tobacco: She reports that she has been smoking cigarettes. She has a 16.5 pack-year smoking history. She has never used smokeless tobacco.Nicotine/Tobacco Cessation Plan  Pharmacotherapies : Nicotine patch    Alcohol: none    Medication Adherence/Access: no issues reported    Diabetes   Last A1c 9.3. Today A1c 8.2.   Tuojeo 55 units twice a day   Humalog sliding scale (>150, then start with 30 units; every 50 increase then add 2 units)  Jardiance 25 mg, no side effects and not missing doses   Bydureon 2 mg weekly, no side effects and not missing doses   Patient is not experiencing side effects.  Current diabetes symptoms: polyuria and polydipsia  Blood sugar monitoring: 3-4 times daily, Morning sugars upper 100s, rarely over 200   Random lows, lowest 64. Lows occur once every couple weeks.    Diet/Exercise: limited exercise      Foot exam: due    Hyperlipidemia   Pravastatin 80mg daily  Patient reports no significant myalgias or other side effects.  The ASCVD Risk score (Fran DK, et al., 2019) failed to calculate for the following reasons:    The patient has a prior MI or stroke diagnosis     COPD   Albuterol as needed, haven't needed it for months   Taking Symbicort twice daily  Taking Spirivia daily   Singulair, taking without side effects   Well controlled currently per patient  Patient rinses their mouth after using steroid inhaler.    Patient reports no current medication side effects.    Patient reports the following symptoms: none.       GERD    Taking Protonix daily. No side effects.   Well controlled        Mental Health   Current regimen includes Seroquel 400 mg at bedtime, Effexor 150 mg daily  Patient is seeing a psychiatrist.  Reports mood is stable     Allergies  Uses azelastine eye drops   Not taking cetirizine due to not covered by insurance  Allergies not currently a problem     Hx of stroke  Aneurysm   Adhering to Plavix 75 mg daily    Chronic pain  Using Flexeril right now instead of baclofen   Dicolofenac and meloxicam tablets - not currently taking but wants to keep on problem list   Gabapentin   Lidocaine patch and ointment   Tolerating medications well without side effects    Chronic diarrhea  Loperamide as needed   Colonoscopy planned     Today's Vitals: There were no vitals taken for this visit.  ----------------      I spent 30 minutes with this patient today. All changes were made via collaborative practice agreement with Tyra Bullock MD. A copy of the visit note was provided to the patient's provider(s).    A summary of these recommendations was given to the patient (see Dr Bullock note today).     Meghna Alva MD PGY3  [facility]  06/28/24    I precepted today with Dr. Olson    I have verified the content of the note, which accurately  reflects my assessment of the patient and the plan of care.   Alejandra Olson, [facility], PharmD     Medication Therapy Recommendations  No medication therapy recommendations to display

## 2024-06-28 NOTE — LETTER
July 1, 2024      Teresa Perez  [address]  [place] [address]        Dear ,    We are writing to inform you of your test results.    Here's a copy of your lab results.  As we discussed in clinic, your A1c is 8.2 - this is a nice drop - good work.  Your cholesterol is excellent.  Your kidney tests are stable.  Please call the clinic at [phone] if you have any questions.       Resulted Orders   Lipid Profile   Result Value Ref Range    Cholesterol 126 <200 mg/dL    Triglycerides 165 (H) <150 mg/dL    Direct Measure HDL 30 (L) >=50 mg/dL    LDL Cholesterol Calculated 63 <=100 mg/dL    Non HDL Cholesterol 96 <130 mg/dL    Patient Fasting > 8hrs? Yes     Narrative    Cholesterol  Desirable:  <200 mg/dL    Triglycerides  Normal:  Less than 150 mg/dL  Borderline High:  150-199 mg/dL  High:  200-499 mg/dL  Very High:  Greater than or equal to 500 mg/dL    Direct Measure HDL  Female:  Greater than or equal to 50 mg/dL   Male:  Greater than or equal to 40 mg/dL    LDL Cholesterol  Desirable:  <100mg/dL  Above Desirable:  100-129 mg/dL   Borderline High:  130-159 mg/dL   High:  160-189 mg/dL   Very High:  >= 190 mg/dL    Non HDL Cholesterol  Desirable:  130 mg/dL  Above Desirable:  130-159 mg/dL  Borderline High:  160-189 mg/dL  High:  190-219 mg/dL  Very High:  Greater than or equal to 220 mg/dL   Basic metabolic panel   Result Value Ref Range    Sodium 136 135 - 145 mmol/L    Potassium 4.9 3.4 - 5.3 mmol/L    Chloride 101 98 - 107 mmol/L    Carbon Dioxide (CO2) 25 22 - 29 mmol/L    Anion Gap 10 7 - 15 mmol/L    Urea Nitrogen 33.3 (H) 6.0 - 20.0 mg/dL    Creatinine 0.60 0.51 - 0.95 mg/dL    GFR Estimate >90 >60 mL/min/1.73m2      Comment:      eGFR calculated using 2021 CKD-EPI equation.    Calcium 10.4 (H) 8.6 - 10.0 mg/dL    Glucose 217 (H) 70 - 99 mg/dL    Patient Fasting > 8hrs? Yes    Hemoglobin A1c   Result Value Ref Range    Hemoglobin A1C 8.2 (H) 0.0 - 5.6 %      Comment:       Normal <5.7%   Prediabetes 5.7-6.4%    Diabetes 6.5% or higher     Note: Adopted from ADA consensus guidelines.       If you have any questions or concerns, please call the clinic at the number listed above.       Sincerely,      Tyra Bullock MD

## 2024-06-28 NOTE — PROGRESS NOTES
Tuojeo 55 units twice a day   Humalog sliding scale   >150, start with 30 units  Every 50 units add 2 units     Not missing doses  Dexcom 6 sensors at home but does not have the reader   Order for reader last month did not go through  Morning sugars upper 100s, rarely over 200   Random lows, lowest 64. Occur once every couple weeks. Symptoms  Polydipsia and polyuria present    Bydureon injection once a week  No side effects  Not missing doses     Taking Jardiance 25 mg   Not missing doses  Tolerating well    Pravastatin taking     COPD - well controlled  Albuterol as needed, haven't needed recently   Taking Symbicort twice daily  Singulair  Spirivia daily   Well controlled     Allergies  Uses azelastine eye drops   Cetrizine not taking    Hx of stroke  Aneurysm   Plavix adhering    Using Flexeril right now instead of baclofen   Dicolofenac and meloxicam tablets - on hold  Gabepntin TID   Luidocaine patch     Protonix   Gerd controlled     Seroquel bedtime psychiatry   Venlafaxine   Mood stable

## 2024-06-28 NOTE — Clinical Note
FYI only - pharmacy department requires that I route this note to you.  We discussed in person today.  rosa

## 2024-06-28 NOTE — PATIENT INSTRUCTIONS
For the Chest and shoulder pain:    --Continue to do ice, heat, meditation, cold spray  --Okay to continue to use the flexeril (No baclofen with Flexeril)  --Restart your meloxicam  --Refill for 14 pills of Tramadol for 1 more week.     For skin:  Fluconazole pills, every other day for 3 pills  Cream 2-3x per day as needed  Powder 2-3x per day as needed.      Keep diabetes medications the same.   Get you a dexcom reader.      Follow up in 1 month.
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
## 2024-06-30 RX ORDER — LORAZEPAM 0.5 MG/1
0.5 TABLET ORAL 3 TIMES DAILY
COMMUNITY
Start: 2024-06-30

## 2024-06-30 NOTE — RESULT ENCOUNTER NOTE
Teresa Perez-    Here's a copy of your lab results.  As we discussed in clinic, your A1c is 8.2 - this is a nice drop - good work.  Your cholesterol is excellent.  Your kidney tests are stable.  Please call the clinic at [phone] if you have any questions.      Tyra Bullock MD    Please send results to patient.

## 2024-07-06 DIAGNOSIS — R10.11 RUQ ABDOMINAL PAIN: ICD-10-CM

## 2024-07-08 DIAGNOSIS — R19.7 DIARRHEA, UNSPECIFIED TYPE: ICD-10-CM

## 2024-07-08 RX ORDER — LOPERAMIDE HCL 2 MG
CAPSULE ORAL
Qty: 100 CAPSULE | Refills: 11 | Status: SHIPPED | OUTPATIENT
Start: 2024-07-08

## 2024-07-08 RX ORDER — MELOXICAM 15 MG/1
15 TABLET ORAL DAILY
Qty: 30 TABLET | Refills: 0 | Status: SHIPPED | OUTPATIENT
Start: 2024-07-08 | End: 2024-07-12

## 2024-07-11 DIAGNOSIS — L30.4 INTERTRIGO: ICD-10-CM

## 2024-07-11 DIAGNOSIS — G89.4 CHRONIC PAIN SYNDROME: ICD-10-CM

## 2024-07-11 DIAGNOSIS — I10 BENIGN ESSENTIAL HYPERTENSION: ICD-10-CM

## 2024-07-11 DIAGNOSIS — I10 ESSENTIAL HYPERTENSION: ICD-10-CM

## 2024-07-11 DIAGNOSIS — R10.11 RUQ ABDOMINAL PAIN: ICD-10-CM

## 2024-07-11 DIAGNOSIS — M54.50 CHRONIC BILATERAL LOW BACK PAIN WITHOUT SCIATICA: ICD-10-CM

## 2024-07-11 DIAGNOSIS — J30.2 SEASONAL ALLERGIC RHINITIS, UNSPECIFIED TRIGGER: ICD-10-CM

## 2024-07-11 DIAGNOSIS — M25.511 ACUTE PAIN OF RIGHT SHOULDER: ICD-10-CM

## 2024-07-11 DIAGNOSIS — G89.29 CHRONIC BILATERAL LOW BACK PAIN WITHOUT SCIATICA: ICD-10-CM

## 2024-07-11 RX ORDER — MELOXICAM 15 MG/1
15 TABLET ORAL DAILY
Qty: 30 TABLET | Refills: 0 | OUTPATIENT
Start: 2024-07-11

## 2024-07-11 RX ORDER — LISINOPRIL 5 MG/1
5 TABLET ORAL AT BEDTIME
Qty: 90 TABLET | Refills: 1 | OUTPATIENT
Start: 2024-07-11

## 2024-07-11 NOTE — TELEPHONE ENCOUNTER
Name from pharmacy: LISINOPRIL 5MG TABLETS         Will file in chart as: lisinopril (ZESTRIL) 5 MG tablet     Possible duplicate: Misty to review recent actions on this medication    Sig: TAKE 1 TABLET(5 MG) BY MOUTH AT BEDTIME    Disp: 90 tablet    Refills: 1 (Pharmacy requested: Not specified)    Start: 7/11/2024    Class: E-Prescribe    Non-formulary For: Essential hypertension    Last ordered: 3 months ago (3/14/2024) by Alber Rivera MD    Last refill: 6/9/2024    Rx #: [id number]    ACE Inhibitors (Including Combos) Protocol [id number]/2024 12:58 PM   Protocol Details Blood pressure under 140/90 in past 12 months- Clinicial or Patient Reported    Medication is active on med list    Medication indicated for associated diagnosis    Has GFR on file in past 12 months and most recent value is normal    Recent (12 mo) or future (90 days) visit within the authorizing provider's specialty    Patient is age 18 or older    No active pregnancy on record    Normal serum potassium on file in past 12 months    No positive pregnancy test within past 12 months      To be filled at: [id number] - [place], [place] - [address] [facility] [address]     BP Readings from Last 3 Encounters:   06/28/24 122/80   06/19/24 130/82   05/22/24 130/84     Component      Latest Ref Rng 6/28/2024  9:12 AM   Potassium      3.4 - 5.3 mmol/L 4.9        Component      Latest Ref Rng 6/28/2024  9:12 AM   GFR Estimate      >60 mL/min/1.73m2 >90

## 2024-07-12 RX ORDER — BACLOFEN 20 MG/1
TABLET ORAL
Qty: 45 TABLET | Refills: 0 | Status: SHIPPED | OUTPATIENT
Start: 2024-07-12 | End: 2024-08-19

## 2024-07-12 RX ORDER — CYCLOBENZAPRINE HCL 5 MG
5 TABLET ORAL 3 TIMES DAILY PRN
Qty: 30 TABLET | Refills: 0 | Status: SHIPPED | OUTPATIENT
Start: 2024-07-12 | End: 2024-09-18

## 2024-07-12 RX ORDER — CETIRIZINE HYDROCHLORIDE 10 MG/1
10 TABLET ORAL DAILY
Qty: 90 TABLET | Refills: 1 | Status: SHIPPED | OUTPATIENT
Start: 2024-07-12 | End: 2024-09-18

## 2024-07-12 RX ORDER — FLUCONAZOLE 150 MG/1
TABLET ORAL
Qty: 3 TABLET | Refills: 0 | OUTPATIENT
Start: 2024-07-12

## 2024-07-12 RX ORDER — PANTOPRAZOLE SODIUM 40 MG/1
40 TABLET, DELAYED RELEASE ORAL DAILY
Qty: 90 TABLET | Refills: 1 | Status: SHIPPED | OUTPATIENT
Start: 2024-07-12

## 2024-07-12 RX ORDER — FLUCONAZOLE 150 MG/1
TABLET ORAL
Qty: 3 TABLET | Refills: 0 | Status: SHIPPED | OUTPATIENT
Start: 2024-07-12 | End: 2024-10-01

## 2024-07-12 RX ORDER — BACLOFEN 20 MG/1
TABLET ORAL
Qty: 45 TABLET | Refills: 5 | OUTPATIENT
Start: 2024-07-12

## 2024-07-12 RX ORDER — TRAMADOL HYDROCHLORIDE 50 MG/1
50 TABLET ORAL EVERY 6 HOURS PRN
Qty: 14 TABLET | OUTPATIENT
Start: 2024-07-12

## 2024-07-12 RX ORDER — METOPROLOL SUCCINATE 50 MG/1
50 TABLET, EXTENDED RELEASE ORAL DAILY
Qty: 90 TABLET | Refills: 1 | Status: SHIPPED | OUTPATIENT
Start: 2024-07-12

## 2024-07-12 RX ORDER — TRAMADOL HYDROCHLORIDE 50 MG/1
50 TABLET ORAL EVERY 6 HOURS PRN
Qty: 14 TABLET | Refills: 0 | OUTPATIENT
Start: 2024-07-12

## 2024-07-12 RX ORDER — MELOXICAM 15 MG/1
15 TABLET ORAL DAILY
Qty: 30 TABLET | Refills: 2 | Status: SHIPPED | OUTPATIENT
Start: 2024-07-12 | End: 2024-09-18

## 2024-07-12 RX ORDER — LISINOPRIL 5 MG/1
5 TABLET ORAL AT BEDTIME
Qty: 90 TABLET | Refills: 1 | Status: SHIPPED | OUTPATIENT
Start: 2024-07-12

## 2024-07-18 ENCOUNTER — OFFICE VISIT (OUTPATIENT)
Dept: CARDIOLOGY | Facility: CLINIC | Age: 54
End: 2024-07-18
Payer: COMMERCIAL

## 2024-07-18 VITALS
SYSTOLIC BLOOD PRESSURE: 122 MMHG | OXYGEN SATURATION: 96 % | WEIGHT: 180 LBS | BODY MASS INDEX: 35.15 KG/M2 | HEART RATE: 96 BPM | DIASTOLIC BLOOD PRESSURE: 75 MMHG

## 2024-07-18 DIAGNOSIS — I63.419 CEREBROVASCULAR ACCIDENT (CVA) DUE TO EMBOLISM OF MIDDLE CEREBRAL ARTERY, UNSPECIFIED BLOOD VESSEL LATERALITY (H): ICD-10-CM

## 2024-07-18 DIAGNOSIS — I73.9 PERIPHERAL ARTERY DISEASE (H): ICD-10-CM

## 2024-07-18 DIAGNOSIS — E11.622 TYPE 2 DIABETES MELLITUS WITH OTHER SKIN ULCER, WITH LONG-TERM CURRENT USE OF INSULIN (H): ICD-10-CM

## 2024-07-18 DIAGNOSIS — I10 ESSENTIAL HYPERTENSION: ICD-10-CM

## 2024-07-18 DIAGNOSIS — Z79.4 TYPE 2 DIABETES MELLITUS WITH OTHER SKIN ULCER, WITH LONG-TERM CURRENT USE OF INSULIN (H): ICD-10-CM

## 2024-07-18 DIAGNOSIS — Z01.810 PRE-OPERATIVE CARDIOVASCULAR EXAMINATION: Primary | ICD-10-CM

## 2024-07-18 DIAGNOSIS — E78.01 FAMILIAL HYPERCHOLESTEROLEMIA: ICD-10-CM

## 2024-07-18 DIAGNOSIS — I25.10 CORONARY ARTERY DISEASE INVOLVING NATIVE CORONARY ARTERY OF NATIVE HEART WITHOUT ANGINA PECTORIS: ICD-10-CM

## 2024-07-18 PROCEDURE — 99214 OFFICE O/P EST MOD 30 MIN: CPT | Performed by: INTERNAL MEDICINE

## 2024-07-18 PROCEDURE — G2211 COMPLEX E/M VISIT ADD ON: HCPCS | Performed by: INTERNAL MEDICINE

## 2024-07-18 PROCEDURE — 93000 ELECTROCARDIOGRAM COMPLETE: CPT | Performed by: INTERNAL MEDICINE
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
## 2024-07-24 ENCOUNTER — MEDICAL CORRESPONDENCE (OUTPATIENT)
Dept: HEALTH INFORMATION MANAGEMENT | Facility: CLINIC | Age: 54
End: 2024-07-24

## 2024-07-24 ENCOUNTER — HOSPITAL ENCOUNTER (OUTPATIENT)
Dept: CARDIOLOGY | Facility: CLINIC | Age: 54
Discharge: HOME | End: 2024-07-24
Attending: INTERNAL MEDICINE | Admitting: INTERNAL MEDICINE
Payer: COMMERCIAL

## 2024-07-24 VITALS — HEART RATE: 82 BPM | SYSTOLIC BLOOD PRESSURE: 118 MMHG | DIASTOLIC BLOOD PRESSURE: 75 MMHG

## 2024-07-24 DIAGNOSIS — I25.10 CORONARY ARTERY DISEASE INVOLVING NATIVE CORONARY ARTERY OF NATIVE HEART WITHOUT ANGINA PECTORIS: ICD-10-CM

## 2024-07-24 DIAGNOSIS — I10 ESSENTIAL HYPERTENSION: ICD-10-CM

## 2024-07-24 DIAGNOSIS — Z01.810 PRE-OPERATIVE CARDIOVASCULAR EXAMINATION: ICD-10-CM

## 2024-07-24 PROCEDURE — 75574 CT ANGIO HRT W/3D IMAGE: CPT | Mod: 26 | Performed by: INTERNAL MEDICINE

## 2024-07-24 PROCEDURE — 250N000011 HC RX IP 250 OP 636: Performed by: INTERNAL MEDICINE

## 2024-07-24 PROCEDURE — 250N000013 HC RX MED GY IP 250 OP 250 PS 637: Performed by: INTERNAL MEDICINE

## 2024-07-24 PROCEDURE — 250N000009 HC RX 250: Performed by: INTERNAL MEDICINE

## 2024-07-24 PROCEDURE — 75574 CT ANGIO HRT W/3D IMAGE: CPT

## 2024-07-24 RX ORDER — DILTIAZEM HYDROCHLORIDE 5 MG/ML
10-15 INJECTION INTRAVENOUS
Status: DISCONTINUED | OUTPATIENT
Start: 2024-07-24 | End: 2024-07-25 | Stop reason: HOSPADM

## 2024-07-24 RX ORDER — NITROGLYCERIN 0.4 MG/1
0.4 TABLET SUBLINGUAL
Status: DISCONTINUED | OUTPATIENT
Start: 2024-07-24 | End: 2024-07-25 | Stop reason: HOSPADM

## 2024-07-24 RX ORDER — METHYLPREDNISOLONE SODIUM SUCCINATE 125 MG/2ML
125 INJECTION, POWDER, LYOPHILIZED, FOR SOLUTION INTRAMUSCULAR; INTRAVENOUS
Status: DISCONTINUED | OUTPATIENT
Start: 2024-07-24 | End: 2024-07-25 | Stop reason: HOSPADM

## 2024-07-24 RX ORDER — IOPAMIDOL 755 MG/ML
120 INJECTION, SOLUTION INTRAVASCULAR ONCE
Status: COMPLETED | OUTPATIENT
Start: 2024-07-24 | End: 2024-07-24

## 2024-07-24 RX ORDER — METOPROLOL TARTRATE 1 MG/ML
5-15 INJECTION, SOLUTION INTRAVENOUS
Status: DISCONTINUED | OUTPATIENT
Start: 2024-07-24 | End: 2024-07-25 | Stop reason: HOSPADM

## 2024-07-24 RX ORDER — DILTIAZEM HCL 60 MG
120 TABLET ORAL
Status: DISCONTINUED | OUTPATIENT
Start: 2024-07-24 | End: 2024-07-25 | Stop reason: HOSPADM

## 2024-07-24 RX ORDER — DIPHENHYDRAMINE HYDROCHLORIDE 50 MG/ML
25-50 INJECTION INTRAMUSCULAR; INTRAVENOUS
Status: DISCONTINUED | OUTPATIENT
Start: 2024-07-24 | End: 2024-07-25 | Stop reason: HOSPADM

## 2024-07-24 RX ORDER — METOPROLOL TARTRATE 25 MG/1
25-100 TABLET, FILM COATED ORAL
Status: COMPLETED | OUTPATIENT
Start: 2024-07-24 | End: 2024-07-24

## 2024-07-24 RX ORDER — DIPHENHYDRAMINE HCL 25 MG
25 CAPSULE ORAL
Status: DISCONTINUED | OUTPATIENT
Start: 2024-07-24 | End: 2024-07-25 | Stop reason: HOSPADM

## 2024-07-24 RX ORDER — ONDANSETRON 2 MG/ML
4 INJECTION INTRAMUSCULAR; INTRAVENOUS
Status: DISCONTINUED | OUTPATIENT
Start: 2024-07-24 | End: 2024-07-25 | Stop reason: HOSPADM

## 2024-07-24 RX ORDER — LIDOCAINE 40 MG/G
CREAM TOPICAL
OUTPATIENT
Start: 2024-07-24

## 2024-07-24 RX ORDER — IVABRADINE 5 MG/1
5-15 TABLET, FILM COATED ORAL
Status: COMPLETED | OUTPATIENT
Start: 2024-07-24 | End: 2024-07-24

## 2024-07-24 RX ADMIN — METOPROLOL TARTRATE 10 MG: 5 INJECTION INTRAVENOUS at 15:07

## 2024-07-24 RX ADMIN — IOPAMIDOL 120 ML: 755 INJECTION, SOLUTION INTRAVENOUS at 15:38

## 2024-07-24 RX ADMIN — METOPROLOL TARTRATE 10 MG: 5 INJECTION INTRAVENOUS at 15:03

## 2024-07-24 RX ADMIN — METOPROLOL TARTRATE 100 MG: 50 TABLET, FILM COATED ORAL at 13:56

## 2024-07-24 RX ADMIN — NITROGLYCERIN 0.4 MG: 0.4 TABLET SUBLINGUAL at 15:22

## 2024-07-24 RX ADMIN — METOPROLOL TARTRATE 5 MG: 5 INJECTION INTRAVENOUS at 14:55

## 2024-07-24 RX ADMIN — METOPROLOL TARTRATE 5 MG: 5 INJECTION INTRAVENOUS at 14:58

## 2024-07-24 RX ADMIN — IVABRADINE 15 MG: 5 TABLET, FILM COATED ORAL at 13:56

## 2024-07-25 ENCOUNTER — DOCUMENTATION ONLY (OUTPATIENT)
Dept: FAMILY MEDICINE | Facility: CLINIC | Age: 54
End: 2024-07-25
Payer: COMMERCIAL

## 2024-07-25 ENCOUNTER — TELEPHONE (OUTPATIENT)
Dept: CARDIOLOGY | Facility: CLINIC | Age: 54
End: 2024-07-25
Payer: COMMERCIAL

## 2024-07-25 DIAGNOSIS — I20.0 UNSTABLE ANGINA (H): Primary | ICD-10-CM
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
## 2024-07-25 NOTE — PROGRESS NOTES
To be completed in Nursing note:    Please reference list for forms that require a visit for completion.  Please remind patients that providers are given 3-5 business days to complete and return forms.      Form type:Numotion    Date form received: 24    Date form completed by Physician:24    How was form returned to patient (mailed, faxed, or at  for patient to ):  Faxed to   Date form mailed/faxed/left at  for patient and sent to HIM for scannin24, sent to HIM for scanning    Once form is left for patient, faxed, or mailed PCS will then close the documentation only encounter.       Faizan Lacey MA

## 2024-07-25 NOTE — TELEPHONE ENCOUNTER
RN called patient and reviewed with her Dr. Prado's recommendations. Patient verbalized understanding and is in agreement with plan. Patient is aware to present to ED if symptoms of chest pain/pressure or SOB worsen prior to coronary angiogram procedure. Order for coronary angiogram placed as tier 1. Will route to scheduling to arrange.             Thanks for letting me know.  If she has been having new/accelerating symptoms, then we should go ahead and do a coronary angiogram (indication unstable angina).  This should be ordered as a tier 1 and not delayed for insurance prior authorization.  And of course if symptoms continue to worsen, standard ER precautions for chest pain apply.    Thanks   Dr. Prado

## 2024-07-26 DIAGNOSIS — I25.10 CORONARY ARTERY DISEASE INVOLVING NATIVE CORONARY ARTERY OF NATIVE HEART WITHOUT ANGINA PECTORIS: ICD-10-CM

## 2024-07-26 DIAGNOSIS — I20.0 UNSTABLE ANGINA (H): Primary | ICD-10-CM

## 2024-07-26 RX ORDER — LIDOCAINE 40 MG/G
CREAM TOPICAL
Status: CANCELLED | OUTPATIENT
Start: 2024-07-26

## 2024-07-26 RX ORDER — SODIUM CHLORIDE 9 MG/ML
INJECTION, SOLUTION INTRAVENOUS CONTINUOUS
Status: CANCELLED | OUTPATIENT
Start: 2024-07-26

## 2024-07-26 RX ORDER — POTASSIUM CHLORIDE 1500 MG/1
20 TABLET, EXTENDED RELEASE ORAL
Status: CANCELLED | OUTPATIENT
Start: 2024-07-26

## 2024-07-26 NOTE — PROGRESS NOTES
Coronary angiogram/PCI/Right Heart Cath prep instructions.     Patient is scheduled for a Coronary Angio w/possible PCI at [facility] - [address], [place] [address] - Main Entrance of the Hospital on 7/30/24.  Check in time is at 7:30 am and procedure to follow.    Patient instructed to remain NPO for solid foods 8 hours prior to arrival and may have clear liquids up to 2 hours prior to arrival.    Patient does not require extra fluids prior to procedure.    Patient is taking insulin and Toujeo and should contact PCP regarding hold instructions prior to this procedure. Reviewed to hold any short acting insulin the morning of procedure.     Patient is not on anticoagulation.    Patient is not on diuretics.     Patient is not currently taking ASA. Patient stated she is not able to take aspirin as she is allergic to this medication and it has caused anaphylaxis. Will update Dr. Prado, sent message.      Pt is on Jardiance and should hold it for 3 days prior to procedure.    Pt is on Bydureon and has been instructed to hold 7 days prior to procedure.    Patient advised to take their other daily medications the morning of the procedure with small sips of water.     Patient advised to shower the night before and morning of their procedure with regular soap.    Verified patient does not have a contrast allergy.     Verified patient has someone available to drive them home from the hospital and can stay with them for 24 hours after the procedure. Patient stated she has arranged medical transport as she is in a wheelchair and she will also have a friend accompany her to the procedure and follow her home in a private vehicle.     Patient advised they will have bedrest post procedure.  Length of time is 2-6 hours and dependent on access site used for procedure.  This bedrest is to allow proper clotting of the access site to prevent bleeding.  .   Patient advised to notify care team with any  new COVID like symptoms prior to procedure. Day of procedure phone number: Lina at [phone]    Patient is aware of visitor policy.    Patient expresses understanding of above instructions and denies further questions at this time.      Maura Vila RN  [facility]

## 2024-07-29 NOTE — PROGRESS NOTES
Staff message received from Dr. Prado regarding ASA allergy.   Darryl Prado MD Hair, Ashley W, RN; MACE[facility] Heart Team 4  Thanks for letting me know.  I think that's fine, but then you will also need to let her know that this will almost certainly just be a diagnostic angio, and if it confirms severe disease like on the CTA, she will need to be admitted to undergo aspirin desensitization as an inpatient prior to PCI.    Thanks,  Reji      Spoke with patient and she is updated on this will just be a diagnostic angio. Pt agreeable to continue as scheduled.

## 2024-07-30 ENCOUNTER — HOSPITAL ENCOUNTER (OUTPATIENT)
Facility: CLINIC | Age: 54
Discharge: HOME | End: 2024-07-30
Attending: INTERNAL MEDICINE | Admitting: INTERNAL MEDICINE
Payer: COMMERCIAL

## 2024-07-30 VITALS
TEMPERATURE: 98.5 F | SYSTOLIC BLOOD PRESSURE: 114 MMHG | DIASTOLIC BLOOD PRESSURE: 74 MMHG | RESPIRATION RATE: 16 BRPM | HEART RATE: 72 BPM | OXYGEN SATURATION: 93 %

## 2024-07-30 DIAGNOSIS — I20.0 UNSTABLE ANGINA (H): ICD-10-CM

## 2024-07-30 DIAGNOSIS — I25.10 CORONARY ARTERY DISEASE INVOLVING NATIVE CORONARY ARTERY OF NATIVE HEART WITHOUT ANGINA PECTORIS: ICD-10-CM

## 2024-07-30 PROBLEM — Z98.890 STATUS POST CORONARY ANGIOGRAM: Status: ACTIVE | Noted: 2024-07-30

## 2024-07-30 LAB
ANION GAP SERPL CALCULATED.3IONS-SCNC: 8 MMOL/L (ref 7–15)
APTT PPP: 30 SECONDS (ref 22–38)
ATRIAL RATE - MUSE: 82 BPM
BUN SERPL-MCNC: 26.5 MG/DL (ref 6–20)
CALCIUM SERPL-MCNC: 9 MG/DL (ref 8.8–10.4)
CHLORIDE SERPL-SCNC: 101 MMOL/L (ref 98–107)
CREAT SERPL-MCNC: 0.64 MG/DL (ref 0.51–0.95)
DIASTOLIC BLOOD PRESSURE - MUSE: NORMAL MMHG
EGFRCR SERPLBLD CKD-EPI 2021: >90 ML/MIN/1.73M2
ERYTHROCYTE [DISTWIDTH] IN BLOOD BY AUTOMATED COUNT: 13.7 % (ref 10–15)
GLUCOSE BLDC GLUCOMTR-MCNC: 155 MG/DL (ref 70–99)
GLUCOSE SERPL-MCNC: 200 MG/DL (ref 70–99)
HCO3 SERPL-SCNC: 25 MMOL/L (ref 22–29)
HCT VFR BLD AUTO: 41.1 % (ref 35–47)
HGB BLD-MCNC: 13.5 G/DL (ref 11.7–15.7)
INR PPP: 1.01 (ref 0.85–1.15)
INTERPRETATION ECG - MUSE: NORMAL
MCH RBC QN AUTO: 29.8 PG (ref 26.5–33)
MCHC RBC AUTO-ENTMCNC: 32.8 G/DL (ref 31.5–36.5)
MCV RBC AUTO: 91 FL (ref 78–100)
P AXIS - MUSE: 38 DEGREES
PLATELET # BLD AUTO: 185 10E3/UL (ref 150–450)
POTASSIUM SERPL-SCNC: 4.5 MMOL/L (ref 3.4–5.3)
PR INTERVAL - MUSE: 142 MS
QRS DURATION - MUSE: 84 MS
QT - MUSE: 394 MS
QTC - MUSE: 460 MS
R AXIS - MUSE: -23 DEGREES
RBC # BLD AUTO: 4.53 10E6/UL (ref 3.8–5.2)
SODIUM SERPL-SCNC: 134 MMOL/L (ref 135–145)
SYSTOLIC BLOOD PRESSURE - MUSE: NORMAL MMHG
T AXIS - MUSE: 23 DEGREES
VENTRICULAR RATE- MUSE: 82 BPM
WBC # BLD AUTO: 9.9 10E3/UL (ref 4–11)

## 2024-07-30 PROCEDURE — 250N000011 HC RX IP 250 OP 636: Performed by: INTERNAL MEDICINE

## 2024-07-30 PROCEDURE — 85041 AUTOMATED RBC COUNT: CPT | Performed by: INTERNAL MEDICINE

## 2024-07-30 PROCEDURE — 93005 ELECTROCARDIOGRAM TRACING: CPT

## 2024-07-30 PROCEDURE — 999N000184 HC STATISTIC TELEMETRY

## 2024-07-30 PROCEDURE — 99152 MOD SED SAME PHYS/QHP 5/>YRS: CPT | Performed by: INTERNAL MEDICINE

## 2024-07-30 PROCEDURE — 82962 GLUCOSE BLOOD TEST: CPT

## 2024-07-30 PROCEDURE — 272N000001 HC OR GENERAL SUPPLY STERILE: Performed by: INTERNAL MEDICINE

## 2024-07-30 PROCEDURE — 93454 CORONARY ARTERY ANGIO S&I: CPT | Performed by: INTERNAL MEDICINE

## 2024-07-30 PROCEDURE — 99213 OFFICE O/P EST LOW 20 MIN: CPT | Mod: 25 | Performed by: INTERNAL MEDICINE

## 2024-07-30 PROCEDURE — 85610 PROTHROMBIN TIME: CPT | Performed by: INTERNAL MEDICINE

## 2024-07-30 PROCEDURE — 36415 COLL VENOUS BLD VENIPUNCTURE: CPT | Performed by: INTERNAL MEDICINE

## 2024-07-30 PROCEDURE — 999N000054 HC STATISTIC EKG NON-CHARGEABLE

## 2024-07-30 PROCEDURE — 250N000013 HC RX MED GY IP 250 OP 250 PS 637: Performed by: STUDENT IN AN ORGANIZED HEALTH CARE EDUCATION/TRAINING PROGRAM

## 2024-07-30 PROCEDURE — 250N000009 HC RX 250: Performed by: INTERNAL MEDICINE

## 2024-07-30 PROCEDURE — 82374 ASSAY BLOOD CARBON DIOXIDE: CPT | Performed by: INTERNAL MEDICINE

## 2024-07-30 PROCEDURE — 258N000003 HC RX IP 258 OP 636: Performed by: INTERNAL MEDICINE

## 2024-07-30 PROCEDURE — 999N000071 HC STATISTIC HEART CATH LAB OR EP LAB

## 2024-07-30 PROCEDURE — 85730 THROMBOPLASTIN TIME PARTIAL: CPT | Performed by: INTERNAL MEDICINE

## 2024-07-30 RX ORDER — SODIUM CHLORIDE 9 MG/ML
INJECTION, SOLUTION INTRAVENOUS CONTINUOUS
Status: DISCONTINUED | OUTPATIENT
Start: 2024-07-30 | End: 2024-07-30 | Stop reason: HOSPADM

## 2024-07-30 RX ORDER — FENTANYL CITRATE 50 UG/ML
INJECTION, SOLUTION INTRAMUSCULAR; INTRAVENOUS
Status: DISCONTINUED | OUTPATIENT
Start: 2024-07-30 | End: 2024-07-30 | Stop reason: HOSPADM

## 2024-07-30 RX ORDER — LIDOCAINE 40 MG/G
CREAM TOPICAL
Status: DISCONTINUED | OUTPATIENT
Start: 2024-07-30 | End: 2024-07-30 | Stop reason: HOSPADM

## 2024-07-30 RX ORDER — NALOXONE HYDROCHLORIDE 0.4 MG/ML
0.2 INJECTION, SOLUTION INTRAMUSCULAR; INTRAVENOUS; SUBCUTANEOUS
Status: DISCONTINUED | OUTPATIENT
Start: 2024-07-30 | End: 2024-07-30 | Stop reason: HOSPADM

## 2024-07-30 RX ORDER — ACETAMINOPHEN 325 MG/1
650 TABLET ORAL EVERY 4 HOURS PRN
Status: DISCONTINUED | OUTPATIENT
Start: 2024-07-30 | End: 2024-07-30 | Stop reason: HOSPADM

## 2024-07-30 RX ORDER — POTASSIUM CHLORIDE 1500 MG/1
20 TABLET, EXTENDED RELEASE ORAL
Status: DISCONTINUED | OUTPATIENT
Start: 2024-07-30 | End: 2024-07-30 | Stop reason: HOSPADM

## 2024-07-30 RX ORDER — ASPIRIN 325 MG
325 TABLET ORAL ONCE
Status: COMPLETED | OUTPATIENT
Start: 2024-07-30 | End: 2024-07-30

## 2024-07-30 RX ORDER — ATROPINE SULFATE 0.1 MG/ML
0.5 INJECTION INTRAVENOUS
Status: DISCONTINUED | OUTPATIENT
Start: 2024-07-30 | End: 2024-07-30 | Stop reason: HOSPADM

## 2024-07-30 RX ORDER — FENTANYL CITRATE 50 UG/ML
25 INJECTION, SOLUTION INTRAMUSCULAR; INTRAVENOUS
Status: DISCONTINUED | OUTPATIENT
Start: 2024-07-30 | End: 2024-07-30 | Stop reason: HOSPADM

## 2024-07-30 RX ORDER — IOPAMIDOL 755 MG/ML
INJECTION, SOLUTION INTRAVASCULAR
Status: DISCONTINUED | OUTPATIENT
Start: 2024-07-30 | End: 2024-07-30 | Stop reason: HOSPADM

## 2024-07-30 RX ORDER — NALOXONE HYDROCHLORIDE 0.4 MG/ML
0.4 INJECTION, SOLUTION INTRAMUSCULAR; INTRAVENOUS; SUBCUTANEOUS
Status: DISCONTINUED | OUTPATIENT
Start: 2024-07-30 | End: 2024-07-30 | Stop reason: HOSPADM

## 2024-07-30 RX ORDER — FLUMAZENIL 0.1 MG/ML
0.2 INJECTION, SOLUTION INTRAVENOUS
Status: DISCONTINUED | OUTPATIENT
Start: 2024-07-30 | End: 2024-07-30 | Stop reason: HOSPADM

## 2024-07-30 RX ORDER — OXYCODONE HYDROCHLORIDE 5 MG/1
10 TABLET ORAL EVERY 4 HOURS PRN
Status: DISCONTINUED | OUTPATIENT
Start: 2024-07-30 | End: 2024-07-30 | Stop reason: HOSPADM

## 2024-07-30 RX ORDER — OXYCODONE HYDROCHLORIDE 5 MG/1
5 TABLET ORAL EVERY 4 HOURS PRN
Status: DISCONTINUED | OUTPATIENT
Start: 2024-07-30 | End: 2024-07-30 | Stop reason: HOSPADM

## 2024-07-30 RX ADMIN — SODIUM CHLORIDE: 9 INJECTION, SOLUTION INTRAVENOUS at 08:09

## 2024-07-30 RX ADMIN — ASPIRIN 325 MG ORAL TABLET 325 MG: 325 PILL ORAL at 08:23

## 2024-07-30 ASSESSMENT — ACTIVITIES OF DAILY LIVING (ADL)
ADLS_ACUITY_SCORE: 38

## 2024-07-30 NOTE — PROGRESS NOTES
Brief cardiology progress note:    Based on the previous notes and chart review, the patient is reported to have history of aspirin allergy causing shortness of breath.  Upon further questioning which was done by myself and attending cardiologist Dr. Dotson, the patient mentions that she has never taken aspirin in her life that she recalls, and her parents have told her that they think she had aspirin allergy as a child.    The patient would like to proceed with taking aspirin and monitoring for symptoms and if no symptoms to proceed with her coronary angiogram and possible PCI.    As per the request of Dr. Dotson, we will administer aspirin 325 mg p.o., monitor for any evidence of allergic reaction, and if no symptoms we will proceed with her coronary angiogram and possible PCI.    Onofre Conklin, PGY7  Interventional cardiology fellow

## 2024-07-30 NOTE — Clinical Note
Conscious sedation is planned for this procedure Detail Level: Detailed Quality 110: Preventive Care And Screening: Influenza Immunization: Influenza Immunization Administered during Influenza season Quality 226: Preventive Care And Screening: Tobacco Use: Screening And Cessation Intervention: Patient screened for tobacco use and is an ex/non-smoker Quality 402: Tobacco Use And Help With Quitting Among Adolescents: Patient screened for tobacco and never smoked Quality 130: Documentation Of Current Medications In The Medical Record: Current Medications Documented Quality 47: Advance Care Plan: Advance Care Planning discussed and documented; advance care plan or surrogate decision maker documented in the medical record. Quality 111:Pneumonia Vaccination Status For Older Adults: Pneumococcal Vaccination Previously Received Quality 431: Preventive Care And Screening: Unhealthy Alcohol Use - Screening: Patient screened for unhealthy alcohol use using a single question and scores less than 2 times per year

## 2024-07-30 NOTE — PROGRESS NOTES
Care Suites Admission Nursing Note    Patient Information  Name: Teresa Perez  Age: 53 year old  Reason for admission: Angiogram  Care Suites arrival time: 0730    Visitor Information  Name: Rula     Patient Admission/Assessment   Pre-procedure assessment complete: Yes  If abnormal assessment/labs, provider notified: N/A  NPO: Yes  Medications held per instructions/orders: Yes  Consent: obtained  If applicable, pregnancy test status: deferred  Patient oriented to room: Yes  Education/questions answered: Yes  Plan/other: Prep for procedure.    Pt given Aspirin per Dr Dotson after he interviewed patient    Discharge Planning  Discharge name/phone number: Rula.. Friend.   Will be using Transport service for ride home  Overnight post sedation caregiver: Rula  Discharge location: home    Natalio Rosalse RN

## 2024-07-30 NOTE — PROGRESS NOTES
Care Suites Post Procedure Note    Patient Information  Name: Teresa Perez  Age: 53 year old    Post Procedure  Time patient returned to Care Suites: 0930  Concerns/abnormal assessment: None at this time  If abnormal assessment, provider notified: N/A  Plan/Other: Monitor site, vitals and sedation    Natalio Rosales RN

## 2024-07-30 NOTE — PROGRESS NOTES
Care Suites Discharge Nursing Note    Patient Information  Name: Teresa Perez  Age: 53 year old    Discharge Education:  Discharge instructions reviewed: Yes  Additional education/resources provided: Per cardiology  Patient/patient representative verbalizes understanding: Yes  Patient discharging on new medications: No  Medication education completed: Yes    Discharge Plans:   Discharge location: home  Discharge ride contacted: Yes.  Mobility transport called.  Pts caregiver will be at home upon arrival  Approximate discharge time: 1205    Discharge Criteria:  Discharge criteria met and vital signs stable: Yes    Patient Belongs:  Patient belongings returned to patient: Yes    Natalio Rosales RN

## 2024-07-30 NOTE — DISCHARGE INSTRUCTIONS
Cardiac Angiogram Discharge Instructions - Femoral    After you go home:    Have an adult stay with you until tomorrow.  Drink extra fluids for 2 days.  You may resume your normal diet.  No smoking       For 24 hours - due to the sedation you received:  Relax and take it easy.  Do NOT make any important or legal decisions.  Do NOT drive or operate machines at home or at work.  Do NOT drink alcohol.    Care of Groin Puncture Site:    For the first 24 hrs - check the puncture site every 1-2 hours while awake.  For 2 days, when you cough, sneeze, laugh or move your bowels, hold your hand over the puncture site and press firmly.  Remove the bandaid after 24 hours. If there is minor oozing, apply another bandaid and remove it after 12 hours.  It is normal to have a small bruise or pea size lump at the site.  You may shower tomorrow. Do NOT take a bath, or use a hot tub or pool for at least 3 days. Do NOT scrub the site. Do not use lotion or powder near the puncture site.    Activity:            For 2 days:  No stooping or squatting  Do NOT do any heavy activity such as exercise, lifting, or straining.   No housework, yard work or any activity that make you sweat  Do NOT lift more than 10 pounds    Bleeding:    If you start bleeding from the site in your groin, lie down flat and press firmly on/above the site for 10 minutes.   Once bleeding stops, lay flat for 2 hours.   Call [facility] as soon as you can.       Call 911 right away if you have heavy bleeding or bleeding that does not stop.      Medicines:    If you are taking an antiplatelet medication such as Plavix, Brilinta or Effient, do not stop taking it until you talk to your cardiologist.    If you are on Metformin (Glucophage), do not restart it until you have blood tests (within 2 to 3 days after discharge).  After you have your blood drawn, you may restart the Metformin.   Take your medications, including blood thinners, unless your provider tells you not to.     If you take Coumadin (Warfarin), have your INR checked by your provider in  3-5 days. Call your clinic to schedule this.  If you have stopped any medicines, check with your provider about when to restart them.    Follow Up Appointments:    Follow up with [facility] Nurse Practitioner at [facility] of patient preference in 7-10 days.    Call the clinic if:    You have increased pain or a large or growing hard lump around the site.  The site is red, swollen, hot or tender.  Blood or fluid is draining from the site.  You have chills or a fever greater than 101 F (38 C).  Your leg feels numb, cool or changes color.  You have hives, a rash or unusual itching.  New pain in the back or belly that you cannot control with Tylenol.  Any questions or concerns.          [facility] [place]:    [phone] [facility] (7 days a week)    Or you may contact your provider via My Chart

## 2024-07-31 ENCOUNTER — TELEPHONE (OUTPATIENT)
Dept: CARDIOLOGY | Facility: CLINIC | Age: 54
End: 2024-07-31
Payer: COMMERCIAL

## 2024-07-31 NOTE — TELEPHONE ENCOUNTER
Patient was admitted to [facility] on 7/30/24 for OP coronary angiogram for unstable angina.    7/30/24: Coronary angiogram via RFA showed:         Mid LAD lesion is 45% stenosed.    Ramus lesion is 20% stenosed.    Prox RCA to Mid RCA lesion is 35% stenosed.    Mid RCA to Dist RCA lesion is 40% stenosed.     Mild-moderate diffuse CAD.  Recommend medical management and smoking cessation.  Patient given ASA without reaction. Patient states she has never had ASA.     No medication changes made.    Called patient to discuss any post hospital d/c questions she may have and confirm f/u appts.     Patient denied any SOB, chest pain or lightheadedness.     RFA cardiac cath site is without bleeding, swelling, redness or signs of infection.     RN confirmed with patient that she is scheduled for an OV on 10/21/24 at 0750 with RUDOLPH Danna Lopez at our [place] Office.     Patient advised to call clinic with any cardiac related questions or concerns prior to this rudolph't. Patient verbalized understanding and agreed with plan. NAKUL Mccall RN.

## 2024-08-05 RX ORDER — LIDOCAINE 40 MG/G
CREAM TOPICAL
Status: CANCELLED | OUTPATIENT
Start: 2024-08-05

## 2024-08-05 RX ORDER — FENTANYL CITRATE 50 UG/ML
25-100 INJECTION, SOLUTION INTRAMUSCULAR; INTRAVENOUS
Status: CANCELLED | OUTPATIENT
Start: 2024-08-05

## 2024-08-05 RX ORDER — SODIUM CHLORIDE, SODIUM LACTATE, POTASSIUM CHLORIDE, CALCIUM CHLORIDE 600; 310; 30; 20 MG/100ML; MG/100ML; MG/100ML; MG/100ML
INJECTION, SOLUTION INTRAVENOUS CONTINUOUS
Status: CANCELLED | OUTPATIENT
Start: 2024-08-05

## 2024-08-08 DIAGNOSIS — G43.809 OTHER MIGRAINE WITHOUT STATUS MIGRAINOSUS, NOT INTRACTABLE: ICD-10-CM

## 2024-08-08 RX ORDER — SUMATRIPTAN 50 MG/1
TABLET, FILM COATED ORAL
Qty: 12 TABLET | Refills: 1 | Status: SHIPPED | OUTPATIENT
Start: 2024-08-08

## 2024-08-08 NOTE — TELEPHONE ENCOUNTER
Name from pharmacy: SUMATRIPTAN 50MG TABLETS          Will file in chart as: SUMAtriptan (IMITREX) 50 MG tablet    Sig: TAKE 1 TABLET(50 MG) BY MOUTH AT ONSET OF HEADACHE FOR MIGRAINE    Disp: 12 tablet    Refills: 1 (Pharmacy requested: Not specified)    Start: 8/8/2024    Class: E-Prescribe    Non-formulary For: Other migraine without status migrainosus, not intractable    Last ordered: 1 month ago (6/27/2024) by Tyra Bullock MD    Last refill: 7/11/2024    Rx #: [id number]    Serotonin Agonists Cdjpyq1108/08/2024 03:14 AM   Protocol Details Serotonin Agonist request needs review.        YULISA Doe, BSN

## 2024-08-09 ENCOUNTER — HOSPITAL ENCOUNTER (OUTPATIENT)
Facility: CLINIC | Age: 54
End: 2024-08-09
Attending: INTERNAL MEDICINE | Admitting: INTERNAL MEDICINE
Payer: COMMERCIAL

## 2024-08-15 DIAGNOSIS — R11.0 NAUSEA: ICD-10-CM

## 2024-08-15 DIAGNOSIS — R10.84 ABDOMINAL PAIN, GENERALIZED: ICD-10-CM

## 2024-08-15 RX ORDER — METOCLOPRAMIDE 5 MG/1
5 TABLET ORAL 3 TIMES DAILY PRN
Qty: 45 TABLET | Refills: 1 | Status: SHIPPED | OUTPATIENT
Start: 2024-08-15

## 2024-08-15 NOTE — TELEPHONE ENCOUNTER
Name from pharmacy: METOCLOPRAMIDE 5 MG TABLET          Will file in chart as: metoclopramide (REGLAN) 5 MG tablet    Sig: TAKE 1 TABLET (5 MG) BY MOUTH 3 TIMES DAILY AS NEEDED (STOMACH PAIN, NAUSEA, VOMITING)    Disp: 45 tablet    Refills: 1    Start: 8/15/2024    Class: E-Prescribe    Non-formulary For: Nausea; Abdominal pain, generalized    Last ordered: 2 months ago (5/23/2024) by Tyra Bullock MD    Last refill: 7/11/2024    Rx #: [id number]     Antivertigo/Antiemetic Agents Fzyqbv28/15/2024 08:28 AM   Protocol Details Medication is active on med list    Medication indicated for associated diagnosis    Recent (12 mo) or future (90 days) visit with authorizing provider's specialty    Patient is 18 years of age or older        Routing refill request to provider for review/approval because:  Drug interaction warning        YULISA Doe, BSN

## 2024-08-16 DIAGNOSIS — G89.4 CHRONIC PAIN SYNDROME: ICD-10-CM

## 2024-08-19 RX ORDER — BACLOFEN 20 MG/1
TABLET ORAL
Qty: 45 TABLET | Refills: 0 | Status: SHIPPED | OUTPATIENT
Start: 2024-08-19 | End: 2024-09-10

## 2024-08-19 NOTE — TELEPHONE ENCOUNTER
Name from pharmacy: BACLOFEN 20MG TABLETS          Will file in chart as: baclofen (LIORESAL) 20 MG tablet    Sig: TAKE 1 TABLET(20 MG) BY MOUTH THREE TIMES DAILY AS NEEDED FOR MUSCLE SPASMS    Disp: 45 tablet    Refills: 0 (Pharmacy requested: Not specified)    Start: 8/16/2024    Class: E-Prescribe    Non-formulary For: Chronic pain syndrome    Last ordered: 1 month ago (7/12/2024) by Tyra Bullock MD    Last refill: 7/6/2024    Rx #: [id number]     YULISA Doe, BSN

## 2024-09-04 ENCOUNTER — TELEPHONE (OUTPATIENT)
Dept: FAMILY MEDICINE | Facility: CLINIC | Age: 54
End: 2024-09-04

## 2024-09-04 NOTE — TELEPHONE ENCOUNTER
Patient Quality Outreach    Patient is due for the following:   Physical Preventive Adult Physical    Next Steps:   Schedule a Adult Preventative    Type of outreach:    Phone, spoke to patient/parent. Scheduled cpe on 10/29/24, pre-op, and 09/18/24 for follow-up DM    Next Steps:  Reach out within 90 days via Phone.    Max number of attempts reached: Yes. Will try again in 90 days if patient still on fail list.    Questions for provider review:    None           Faizan Lacey  Chart routed to Care Team.

## 2024-09-07 DIAGNOSIS — E11.65 TYPE 2 DIABETES MELLITUS WITH HYPERGLYCEMIA, WITH LONG-TERM CURRENT USE OF INSULIN (H): ICD-10-CM

## 2024-09-07 DIAGNOSIS — Z79.4 TYPE 2 DIABETES MELLITUS WITH HYPERGLYCEMIA, WITH LONG-TERM CURRENT USE OF INSULIN (H): ICD-10-CM

## 2024-09-09 DIAGNOSIS — M54.50 CHRONIC BILATERAL LOW BACK PAIN WITHOUT SCIATICA: ICD-10-CM

## 2024-09-09 DIAGNOSIS — G89.4 CHRONIC PAIN SYNDROME: ICD-10-CM

## 2024-09-09 DIAGNOSIS — T78.40XS ALLERGIC REACTION, SEQUELA: ICD-10-CM

## 2024-09-09 DIAGNOSIS — J45.20 MILD INTERMITTENT ASTHMA WITHOUT COMPLICATION: ICD-10-CM

## 2024-09-09 DIAGNOSIS — G89.29 CHRONIC BILATERAL LOW BACK PAIN WITHOUT SCIATICA: ICD-10-CM

## 2024-09-09 RX ORDER — EXENATIDE 2 MG/.85ML
INJECTION, SUSPENSION, EXTENDED RELEASE SUBCUTANEOUS
Qty: 0.85 ML | Refills: 3 | Status: SHIPPED | OUTPATIENT
Start: 2024-09-09 | End: 2024-09-18

## 2024-09-09 NOTE — TELEPHONE ENCOUNTER
Name from pharmacy: BYDUREON BCISE 2 MG AUTOINJECT          Will file in chart as: BYDUREON BCISE 2 MG/0.85ML auto-injector    Sig: INJECT 2MG SUBCUTANEOUSLY EVERY 7 DAYS    Disp: Not specified (Pharmacy requested: 10.2 each)    Refills: 3    Start: 9/7/2024    Class: E-Prescribe    Non-formulary For: Type 2 diabetes mellitus with hyperglycemia, with long-term current use of insulin (H)    Last ordered: 11 months ago (9/18/2023) by Tyra Bullock MD    Last refill: 6/10/2024    Rx #: [id number]    GLP-1 Agonists Protocol Xcodru6609/07/2024 10:07 AM   Protocol Details HgbA1C in past 3 or 6 months    Medication is active on med list    Has GFR on file in past 12 months and most recent value is normal    Recent (6 mo) or future (90 days) visit within the authorizing provider's specialty    Medication indicated for associated diagnosis    Patient is age 18 or older    No active pregnancy on record    No positive pregnancy test in past 12 months        Hemoglobin A1C   Date Value Ref Range Status   06/28/2024 8.2 (H) 0.0 - 5.6 % Final     Comment:     Normal <5.7%   Prediabetes 5.7-6.4%    Diabetes 6.5% or higher     Note: Adopted from ADA consensus guidelines.   04/21/2021 9.8 (H) 4.1 - 5.7 % Final     GFR Estimate   Date Value Ref Range Status   07/30/2024 >90 >60 mL/min/1.73m2 Final     Comment:     eGFR calculated using 2021 CKD-EPI equation.   05/26/2021 >90 >60.0 mL/min/1.7 m2 Final     Comment:     eGFR is calculated by the CKD-EPI creatinine equation, without race   adjustment. eGFR can be influenced by muscle mass, exercise, and diet. The   reported eGFR is an estimation only and is only applicable if the renal   function is stable.       GFR, ESTIMATED POCT   Date Value Ref Range Status   03/29/2023 >60 >60 mL/min/1.73m2 Final     Prescription approved per [facility] Refill Protocol.  Brook RN, BSN

## 2024-09-10 RX ORDER — BACLOFEN 20 MG/1
TABLET ORAL
Qty: 45 TABLET | Refills: 1 | Status: SHIPPED | OUTPATIENT
Start: 2024-09-10

## 2024-09-10 RX ORDER — DICLOFENAC POTASSIUM 50 MG/1
TABLET, FILM COATED ORAL
Qty: 90 TABLET | Refills: 0 | Status: SHIPPED | OUTPATIENT
Start: 2024-09-10

## 2024-09-10 RX ORDER — MONTELUKAST SODIUM 10 MG/1
1 TABLET ORAL AT BEDTIME
Qty: 30 TABLET | Refills: 5 | Status: SHIPPED | OUTPATIENT
Start: 2024-09-10

## 2024-09-10 NOTE — TELEPHONE ENCOUNTER
Name from pharmacy: DICLOFENAC POTASSIUM 50MG TABLETS          Will file in chart as: diclofenac (CATAFLAM) 50 MG tablet    Sig: TAKE 1 TABLET(50 MG) BY MOUTH TWICE DAILY AS NEEDED FOR MODERATE PAIN    Disp: 90 tablet    Refills: 3 (Pharmacy requested: Not specified)    Start: 9/9/2024    Class: E-Prescribe    Non-formulary For: Chronic bilateral low back pain without sciatica    Last ordered: 6 months ago (3/14/2024) by Alber Rivera MD    Last refill: 6/9/2024    Rx #: [id number]    NSAID Medications Lytkms6609/09/2024 08:50 AM   Protocol Details Always Fail Criteria - Chart Review Required        Gael Redman, RN, MSN

## 2024-09-10 NOTE — TELEPHONE ENCOUNTER
Name from pharmacy: MONTELUKAST 10MG TABLETS          Will file in chart as: montelukast (SINGULAIR) 10 MG tablet    Sig: TAKE 1 TABLET(10 MG) BY MOUTH AT BEDTIME    Disp: 30 tablet    Refills: Not specified    Start: 9/9/2024    Class: E-Prescribe    Non-formulary For: Mild intermittent asthma without complication; Allergic reaction, sequela    Last ordered: 1 year ago (9/6/2023) by Tyra Bullock MD    Last refill: 6/10/2024    Rx #: [id number]    Leukotriene Inhibitors Protocol Ntyflv6609/09/2024 08:13 AM   Protocol Details Asthma control assessment score within normal limits in last 6 months    Patient is age 12 or older    Medication is active on med list    Recent (6 mo) or future (90 days) visit within the authorizing provider's specialty    Medication indicated for associated diagnosis       Name from pharmacy: BACLOFEN 20MG TABLETS         Will file in chart as: baclofen (LIORESAL) 20 MG tablet    Sig: TAKE 1 TABLET(20 MG) BY MOUTH THREE TIMES DAILY AS NEEDED FOR MUSCLE SPASMS    Disp: 45 tablet    Refills: 0 (Pharmacy requested: Not specified)    Start: 9/9/2024    Class: E-Prescribe    Non-formulary For: Chronic pain syndrome    Last ordered: 3 weeks ago (8/19/2024) by Tyra Bullock MD    Last refill: 8/19/2024    Rx #: [id number]     YULISA Doe, BSN

## 2024-09-17 DIAGNOSIS — I10 ESSENTIAL HYPERTENSION: ICD-10-CM

## 2024-09-17 DIAGNOSIS — Z79.4 TYPE 2 DIABETES MELLITUS WITH HYPERGLYCEMIA, WITH LONG-TERM CURRENT USE OF INSULIN (H): Primary | ICD-10-CM

## 2024-09-17 DIAGNOSIS — Z01.818 PRE-OPERATIVE EXAMINATION: ICD-10-CM

## 2024-09-17 DIAGNOSIS — E11.65 TYPE 2 DIABETES MELLITUS WITH HYPERGLYCEMIA, WITH LONG-TERM CURRENT USE OF INSULIN (H): Primary | ICD-10-CM

## 2024-09-18 ENCOUNTER — OFFICE VISIT (OUTPATIENT)
Dept: FAMILY MEDICINE | Facility: CLINIC | Age: 54
End: 2024-09-18
Payer: COMMERCIAL

## 2024-09-18 VITALS
BODY MASS INDEX: 38.04 KG/M2 | HEART RATE: 91 BPM | OXYGEN SATURATION: 92 % | DIASTOLIC BLOOD PRESSURE: 88 MMHG | RESPIRATION RATE: 18 BRPM | SYSTOLIC BLOOD PRESSURE: 137 MMHG | TEMPERATURE: 98.2 F | WEIGHT: 194.8 LBS

## 2024-09-18 DIAGNOSIS — E11.65 TYPE 2 DIABETES MELLITUS WITH HYPERGLYCEMIA, WITH LONG-TERM CURRENT USE OF INSULIN (H): ICD-10-CM

## 2024-09-18 DIAGNOSIS — S31.829A BUTTOCK WOUND, LEFT, INITIAL ENCOUNTER: ICD-10-CM

## 2024-09-18 DIAGNOSIS — Z11.3 SCREEN FOR SEXUALLY TRANSMITTED DISEASES: ICD-10-CM

## 2024-09-18 DIAGNOSIS — Z11.59 ENCOUNTER FOR SCREENING FOR OTHER VIRAL DISEASES: ICD-10-CM

## 2024-09-18 DIAGNOSIS — N89.8 VAGINAL DISCHARGE: ICD-10-CM

## 2024-09-18 DIAGNOSIS — G89.29 CHRONIC BILATERAL LOW BACK PAIN WITHOUT SCIATICA: ICD-10-CM

## 2024-09-18 DIAGNOSIS — R06.02 SHORTNESS OF BREATH: ICD-10-CM

## 2024-09-18 DIAGNOSIS — M51.369 BULGING LUMBAR DISC: ICD-10-CM

## 2024-09-18 DIAGNOSIS — Z79.4 TYPE 2 DIABETES MELLITUS WITH HYPERGLYCEMIA, WITH LONG-TERM CURRENT USE OF INSULIN (H): ICD-10-CM

## 2024-09-18 DIAGNOSIS — I10 ESSENTIAL HYPERTENSION: Primary | ICD-10-CM

## 2024-09-18 DIAGNOSIS — M54.50 CHRONIC BILATERAL LOW BACK PAIN WITHOUT SCIATICA: ICD-10-CM

## 2024-09-18 DIAGNOSIS — L73.9 FOLLICULITIS: ICD-10-CM

## 2024-09-18 DIAGNOSIS — S78.112S: ICD-10-CM

## 2024-09-18 DIAGNOSIS — J44.1 COPD EXACERBATION (H): ICD-10-CM

## 2024-09-18 DIAGNOSIS — G89.4 CHRONIC PAIN SYNDROME: ICD-10-CM

## 2024-09-18 DIAGNOSIS — N39.46 MIXED STRESS AND URGE URINARY INCONTINENCE: ICD-10-CM

## 2024-09-18 LAB
CLUE CELLS: ABNORMAL
CREAT UR-MCNC: 51 MG/DL
ERYTHROCYTE [DISTWIDTH] IN BLOOD BY AUTOMATED COUNT: 13.1 % (ref 10–15)
EST. AVERAGE GLUCOSE BLD GHB EST-MCNC: 203 MG/DL
FLUAV RNA SPEC QL NAA+PROBE: NEGATIVE
FLUBV RNA RESP QL NAA+PROBE: NEGATIVE
HBA1C MFR BLD: 8.7 % (ref 0–5.6)
HCT VFR BLD AUTO: 39.4 % (ref 35–47)
HGB BLD-MCNC: 13.2 G/DL (ref 11.7–15.7)
MCH RBC QN AUTO: 29.8 PG (ref 26.5–33)
MCHC RBC AUTO-ENTMCNC: 33.5 G/DL (ref 31.5–36.5)
MCV RBC AUTO: 89 FL (ref 78–100)
MICROALBUMIN UR-MCNC: <12 MG/L
MICROALBUMIN/CREAT UR: NORMAL MG/G{CREAT}
PLATELET # BLD AUTO: 233 10E3/UL (ref 150–450)
RBC # BLD AUTO: 4.43 10E6/UL (ref 3.8–5.2)
RSV RNA SPEC NAA+PROBE: NEGATIVE
SARS-COV-2 RNA RESP QL NAA+PROBE: NEGATIVE
TRICHOMONAS, WET PREP: ABNORMAL
WBC # BLD AUTO: 11.1 10E3/UL (ref 4–11)
WBC'S/HIGH POWER FIELD, WET PREP: ABNORMAL
YEAST, WET PREP: PRESENT

## 2024-09-18 PROCEDURE — 82043 UR ALBUMIN QUANTITATIVE: CPT | Performed by: STUDENT IN AN ORGANIZED HEALTH CARE EDUCATION/TRAINING PROGRAM

## 2024-09-18 PROCEDURE — 87591 N.GONORRHOEAE DNA AMP PROB: CPT | Performed by: STUDENT IN AN ORGANIZED HEALTH CARE EDUCATION/TRAINING PROGRAM

## 2024-09-18 PROCEDURE — 80048 BASIC METABOLIC PNL TOTAL CA: CPT | Performed by: STUDENT IN AN ORGANIZED HEALTH CARE EDUCATION/TRAINING PROGRAM

## 2024-09-18 PROCEDURE — 87389 HIV-1 AG W/HIV-1&-2 AB AG IA: CPT | Performed by: STUDENT IN AN ORGANIZED HEALTH CARE EDUCATION/TRAINING PROGRAM

## 2024-09-18 PROCEDURE — 99214 OFFICE O/P EST MOD 30 MIN: CPT | Performed by: STUDENT IN AN ORGANIZED HEALTH CARE EDUCATION/TRAINING PROGRAM

## 2024-09-18 PROCEDURE — 36415 COLL VENOUS BLD VENIPUNCTURE: CPT | Performed by: STUDENT IN AN ORGANIZED HEALTH CARE EDUCATION/TRAINING PROGRAM

## 2024-09-18 PROCEDURE — 82570 ASSAY OF URINE CREATININE: CPT | Performed by: STUDENT IN AN ORGANIZED HEALTH CARE EDUCATION/TRAINING PROGRAM

## 2024-09-18 PROCEDURE — 87637 SARSCOV2&INF A&B&RSV AMP PRB: CPT | Performed by: STUDENT IN AN ORGANIZED HEALTH CARE EDUCATION/TRAINING PROGRAM

## 2024-09-18 PROCEDURE — 87210 SMEAR WET MOUNT SALINE/INK: CPT | Performed by: STUDENT IN AN ORGANIZED HEALTH CARE EDUCATION/TRAINING PROGRAM

## 2024-09-18 PROCEDURE — 87491 CHLMYD TRACH DNA AMP PROBE: CPT | Performed by: STUDENT IN AN ORGANIZED HEALTH CARE EDUCATION/TRAINING PROGRAM

## 2024-09-18 PROCEDURE — 83036 HEMOGLOBIN GLYCOSYLATED A1C: CPT | Performed by: STUDENT IN AN ORGANIZED HEALTH CARE EDUCATION/TRAINING PROGRAM

## 2024-09-18 PROCEDURE — 87340 HEPATITIS B SURFACE AG IA: CPT | Performed by: STUDENT IN AN ORGANIZED HEALTH CARE EDUCATION/TRAINING PROGRAM

## 2024-09-18 PROCEDURE — 85027 COMPLETE CBC AUTOMATED: CPT | Performed by: STUDENT IN AN ORGANIZED HEALTH CARE EDUCATION/TRAINING PROGRAM

## 2024-09-18 RX ORDER — CYCLOBENZAPRINE HCL 5 MG
5 TABLET ORAL 3 TIMES DAILY PRN
Qty: 30 TABLET | Refills: 0 | Status: SHIPPED | OUTPATIENT
Start: 2024-09-18

## 2024-09-18 RX ORDER — FLUCONAZOLE 150 MG/1
150 TABLET ORAL ONCE
Qty: 1 TABLET | Refills: 0 | Status: SHIPPED | OUTPATIENT
Start: 2024-09-18 | End: 2024-09-18

## 2024-09-18 RX ORDER — PREDNISONE 50 MG/1
50 TABLET ORAL DAILY
Qty: 7 TABLET | Refills: 0 | Status: SHIPPED | OUTPATIENT
Start: 2024-09-18

## 2024-09-18 RX ORDER — EXENATIDE 2 MG/.85ML
INJECTION, SUSPENSION, EXTENDED RELEASE SUBCUTANEOUS
Qty: 0.85 ML | Refills: 3 | Status: SHIPPED | OUTPATIENT
Start: 2024-09-18

## 2024-09-18 RX ORDER — SULFAMETHOXAZOLE/TRIMETHOPRIM 800-160 MG
1 TABLET ORAL 2 TIMES DAILY
Qty: 14 TABLET | Refills: 0 | Status: SHIPPED | OUTPATIENT
Start: 2024-09-18

## 2024-09-18 RX ORDER — TRAMADOL HYDROCHLORIDE 50 MG/1
50 TABLET ORAL EVERY 6 HOURS PRN
Qty: 5 TABLET | Refills: 0 | Status: SHIPPED | OUTPATIENT
Start: 2024-09-18 | End: 2024-09-21

## 2024-09-18 ASSESSMENT — PATIENT HEALTH QUESTIONNAIRE - PHQ9
SUM OF ALL RESPONSES TO PHQ QUESTIONS 1-9: 12
10. IF YOU CHECKED OFF ANY PROBLEMS, HOW DIFFICULT HAVE THESE PROBLEMS MADE IT FOR YOU TO DO YOUR WORK, TAKE CARE OF THINGS AT HOME, OR GET ALONG WITH OTHER PEOPLE: VERY DIFFICULT
SUM OF ALL RESPONSES TO PHQ QUESTIONS 1-9: 12

## 2024-09-18 NOTE — PROGRESS NOTES
There are no exam notes on file for this visit.    ASSESSMENT AND PLAN:      Teresa was seen today for recheck.  Multiple issues discussed today.    Diagnoses and all orders for this visit:    Shortness of breath  COPD exacerbation  Folliculitis  Noting 1 week of increased shortness of breath.  She is not wheezing but does have decreased breath sounds and O2 sats in the low 90s.  Will treat with a short course of prednisone and Bactrim to cover for both of folliculitis and skin infections and for COPD antibiotics.  I did prescribe a low-dose of tramadol to help with her pain from the abscesses.  -     Symptomatic Influenza A/B, RSV, & SARS-CoV2 PCR (COVID-19); Future  -     Symptomatic Influenza A/B, RSV, & SARS-CoV2 PCR (COVID-19) Nose  -     predniSONE (DELTASONE) 50 MG tablet; Take 1 tablet (50 mg) by mouth daily.  -     traMADol (ULTRAM) 50 MG tablet; Take 1 tablet (50 mg) by mouth every 6 hours as needed for severe pain.  -     sulfamethoxazole-trimethoprim (BACTRIM DS) 800-160 MG tablet; Take 1 tablet by mouth 2 times daily.    Essential hypertension  Blood pressure well-controlled.  Continue medications.  -     Basic metabolic panel  -     Albumin Random Urine Quantitative with Creat Ratio    Type 2 diabetes mellitus with hyperglycemia, with long-term current use of insulin (H)  Recent hyperglycemia, likely secondary to acute infection.  Discussed increasing her sliding scale.  Continuing with her long-acting Toujeo, and sent new prescription for Bydureon.  -     Albumin Random Urine Quantitative with Creat Ratio  -     Hemoglobin A1c  -     exenatide ER (BYDUREON BCISE) 2 MG/0.85ML auto-injector; INJECT 2MG SUBCUTANEOUSLY EVERY 7 DAYS    Screen for sexually transmitted diseases  Vaginal discharge  Patient noting vaginal discharge and requesting STD testing.  This was completed today.  Wet prep positive for yeast.  Prescription sent in for Diflucan.  -     fluconazole (DIFLUCAN) 150 MG tablet; Take 1 tablet  (150 mg) by mouth once for 1 dose.  -     HIV Antigen Antibody Combo Cascade; Future  -     Treponema Abs w Reflex to RPR and Titer; Future  -     Hepatitis B surface antigen; Future  -     Hepatitis C Screen Reflex to HCV RNA Quant and Genotype; Future  -     NEISSERIA GONORRHOEA PCR  -     CHLAMYDIA TRACHOMATIS PCR  -     Wet preparation  -     HIV Antigen Antibody Combo Cascade  -     Hepatitis B surface antigen    Chronic bilateral low back pain without sciatica  -     cyclobenzaprine (FLEXERIL) 5 MG tablet; Take 1 tablet (5 mg) by mouth 3 times daily as needed for muscle spasms.    Other orders  -     CBC with platelets    Follow-up in 1 month.  Additionally I am requesting that her home nurse visits are increased to twice weekly as she is dealing with its folliculitis and will need increased wound care.    She also request that I assist with getting DME orders for briefs for increased incontinence.  Needing 1 briefs per day, size extra-large.  Works with [employer].    Also requesting hospital bed.  We have been working on this in the past.  I will resend the prescription as well.    Patient Instructions   1)  STD testing  --Dr. Bullock will call    2)  Breathing  --COVID, RSV, FLU testing today  --PRednisone 50mg for 7 days  --Flexeril as needed  --Bactrim should help with lungs  --Keep doing your inhalers    3)  Folliculitis  --Keep doing soaks - 3x per day  --Antibiotics with Bactrim 1 pill 2x per day  --Increase nurse visits with [facility] to 2x per week with 1 additional as needed  --Tramadol as need 5 pills.     4)  Referral for TBI clinic.      5)  Sugars will be high with infection and prednisone.  Use some extra sliding scale    6)  STOP taking the meloxican  COntinue the diclofenac but HOLD while taking prednisone.   Stop taking the Baclofen while taking the flexeril.       Follow up in 1 month.      Tyra Bullock MD    SUBJECTIVE  Teresa Perez is a 53 year old female with past  medical history significant for    Patient Active Problem List   Diagnosis    Acute peptic ulcer    Other allergy, other than to medicinal agents    Bulging lumbar disc    Cervical dysplasia    Common migraine without aura    Constipation    Dwarfism    Familial hypercholesterolemia    Insomnia    Low back pain    Moderate persistent asthma without complication    Leg pain, bilateral    Smoking    Degeneration of thoracic or thoracolumbar intervertebral disc    Type 2 diabetes mellitus with other skin ulcer, with long-term current use of insulin (H)    LOMAS (nonalcoholic steatohepatitis)    Disease of lung    Hemorrhoids    Parotid mass    Endometriosis    NNAMDI (obstructive sleep apnea)    History of total right knee replacement    Lateral epicondylitis    Impingement syndrome of shoulder region, left    Hx of total knee replacement, left    Chronic pain syndrome    Cough    Borderline personality disorder (H)    Moderate recurrent major depression (H)    Recurrent ventral hernia    Type 2 diabetes mellitus with complication, with long-term current use of insulin (H)    Essential hypertension    Nonruptured cerebral aneurysm    Cerebrovascular accident (CVA) due to embolism (H)    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)    Pre-ulcerative corn or callous    Excessive bleeding in premenopausal period    Morbid obesity (H)    Pseudoseizure    Chronic obstructive pulmonary disease (H)    Recurrent incisional hernia    Buttock wound, left, initial encounter    Cellulitis, unspecified cellulitis site    Sepsis, due to unspecified organism, unspecified whether acute organ dysfunction present (H)    Cannabis use disorder    Open abdominal wall wound    Infection due to 2019 novel coronavirus    Unstable angina (H)    Status post coronary angiogram    Coronary artery disease involving native coronary artery of native heart without angina pectoris     Others present at the visit:  None    Presents for   Chief Complaint    Patient presents with    RECHECK     Follow-up dm, wants covid test [id number] and referral, and std check     Patient presents today with a number of concerns.    First concern is about upper respiratory symptoms.  She has been feeling overall unwell for the last about 10 days.  She describes increased cough, shortness of breath, fevers, chills, left-sided ear pain, and an aching popping sensation in her ear and sinuses.  She denies wheezing.  She continues to use her inhalers and nebs regularly but does not find them as helpful as they usually are.  She is having increased chest pain and abdominal pain from all the coughing.  She has taken 2 COVID test at home and both were negative.  She would like repeat COVID/flu/RSV testing done today.    Second concern is new lumps and wounds present.  She notes a abscess in her right axilla.  She is open to this when up and it is draining.  Is still red and painful.  Sore to the touch.  She has a number lump in her right labial area.  This 1 is not yet draining and feels like it is deeper.  She is wondering if it needs to be opened up.  She has had difficulty with complex wounds in the past.  History of MRSA.  She has a wound nurse that comes to check on her once a week and is wondering if she can increase the frequency with these new lesions.    She has noticed that her blood sugars have been high, especially over the last 3 to 4 days.  She associates this with her recent infection.  Has been taking her insulin regularly.  Has noticed increased urination and is having difficulty getting to the bathroom.  She is needing to wear briefs and put a Chux pad on her wheelchair because of this.  Stools overall have been okay.    She has a small lesion on her right great toe.  This happens after a drawer fell off her stool.  Has had a history of toenail removal on that toe.  Is wondering what she should do to help take care of it.    Finally she is wondering if she could get a  "neurology referral.  She had an incident where he television fell on her head.  Ever since then she feels like her brain is \"not\".  She has more difficulty remembering names, faces and things.  Gets chronic headaches and feels dizzy at times.  Feels like her judgment is worse and she is making poor choices.  1 recent poor choices that she decided to have intercourse with a man.  Would like STD testing for this.  She is not interested in male partners and does not know why she did this.  Is worried that she has a traumatic brain injury    OBJECTIVE:  Vitals: /88 (BP Location: Right arm, Patient Position: Sitting, Cuff Size: Adult Regular)   Pulse 91   Temp 98.2  F (36.8  C) (Oral)   Resp 18   Wt 88.4 kg (194 lb 12.8 oz)   SpO2 92%   BMI 38.04 kg/m    BMI= Body mass index is 38.04 kg/m .  Objective:    Vitals:  Vitals are reviewed and are within the normal range.  Oxygen levels are at the low end of normal.  He is afebrile.  Gen:  Alert, pleasant, no acute distress, but appears pale and mildly fatigued.  Cardiac:  Regular rate and rhythm, no murmurs, rubs or gallops  Respiratory:  Lungs clear to auscultation bilaterally, moderately diminished.  No crackles or wheezes.  Abdomen:  Soft, non-tender, non-distended, bowel sounds positive  Extremities:  Warm, well-perfused, pulses 2+/4, no lower extremity edema  Skin: She has a 2 cm x 1 cm erythematous abscess in her right axilla.  This is open and draining.  Mild extending erythema and cellulitis noted.  In her right groin there is a 1 cm by half centimeter soft mobile lesion with in the right labia.  It does not appear open and draining.  Is not warm or red.  Does appear deeper.  Not located along the inguinal crease.  Not located inside the vagina.    Results for orders placed or performed in visit on 09/18/24   CBC with platelets     Status: Abnormal   Result Value Ref Range    WBC Count 11.1 (H) 4.0 - 11.0 10e3/uL    RBC Count 4.43 3.80 - 5.20 10e6/uL    " Hemoglobin 13.2 11.7 - 15.7 g/dL    Hematocrit 39.4 35.0 - 47.0 %    MCV 89 78 - 100 fL    MCH 29.8 26.5 - 33.0 pg    MCHC 33.5 31.5 - 36.5 g/dL    RDW 13.1 10.0 - 15.0 %    Platelet Count 233 150 - 450 10e3/uL   Hemoglobin A1c     Status: Abnormal   Result Value Ref Range    Estimated Average Glucose 203 (H) <117 mg/dL    Hemoglobin A1C 8.7 (H) 0.0 - 5.6 %    Narrative    Results consistent with previous, repeat testing unnecessary    Wet preparation     Status: Abnormal    Specimen: Vagina; Swab   Result Value Ref Range    Trichomonas Absent Absent    Yeast Present (A) Absent    Clue Cells Absent Absent    WBCs/high power field 1+ (A) None    Narrative    Few bacteria; negative odor            Patient Instructions   1)  STD testing  --Dr. Bullock will call    2)  Breathing  --COVID, RSV, FLU testing today  --PRednisone 50mg for 7 days  --Flexeril as needed  --Bactrim should help with lungs  --Keep doing your inhalers    3)  Folliculitis  --Keep doing soaks - 3x per day  --Antibiotics with Bactrim 1 pill 2x per day  --Increase nurse visits with [facility] to 2x per week with 1 additional as needed  --Tramadol as need 5 pills.     4)  Referral for TBI clinic.      5)  Sugars will be high with infection and prednisone.  Use some extra sliding scale    6)  STOP taking the meloxican  COntinue the diclofenac but HOLD while taking prednisone.   Stop taking the Baclofen while taking the flexeril.       Follow up in 1 month.      Tyra Bullock MD    Answers submitted by the patient for this visit:  Patient Health Questionnaire (Submitted on 9/18/2024)  If you checked off any problems, how difficult have these problems made it for you to do your work, take care of things at home, or get along with other people?: Very difficult  PHQ9 TOTAL SCORE: 12  DME (Durable Medical Equipment) Orders and Documentation  Orders Placed This Encounter   Procedures    Hospital Bed Order        The patient was assessed and it was determined  the patient is in need of the following listed DME Supplies/Equipment. Please complete supporting documentation below to demonstrate medical necessity.

## 2024-09-18 NOTE — PATIENT INSTRUCTIONS
1)  STD testing  --Dr. Bullock will call    2)  Breathing  --COVID, RSV, FLU testing today  --PRednisone 50mg for 7 days  --Flexeril as needed  --Bactrim should help with lungs  --Keep doing your inhalers    3)  Folliculitis  --Keep doing soaks - 3x per day  --Antibiotics with Bactrim 1 pill 2x per day  --Increase nurse visits with [facility] to 2x per week with 1 additional as needed  --Tramadol as need 5 pills.     4)  Referral for TBI clinic.      5)  Sugars will be high with infection and prednisone.  Use some extra sliding scale    6)  STOP taking the meloxican  COntinue the diclofenac but HOLD while taking prednisone.   Stop taking the Baclofen while taking the flexeril.       Follow up in 1 month.

## 2024-09-18 NOTE — LETTER
September 23, 2024      Teresa GEOVANNA Perez  [address]  [place] [address]        Dear ,    We are writing to inform you of your test results.        Resulted Orders   Basic metabolic panel   Result Value Ref Range    Sodium 137 135 - 145 mmol/L    Potassium 5.2 3.4 - 5.3 mmol/L    Chloride 99 98 - 107 mmol/L    Carbon Dioxide (CO2) 21 (L) 22 - 29 mmol/L    Anion Gap 17 (H) 7 - 15 mmol/L    Urea Nitrogen 21.5 (H) 6.0 - 20.0 mg/dL    Creatinine 0.61 0.51 - 0.95 mg/dL    GFR Estimate >90 >60 mL/min/1.73m2      Comment:      eGFR calculated using 2021 CKD-EPI equation.    Calcium 9.4 8.8 - 10.4 mg/dL      Comment:      Reference intervals for this test were updated on 7/16/2024 to reflect our healthy population more accurately. There may be differences in the flagging of prior results with similar values performed with this method. Those prior results can be interpreted in the context of the updated reference intervals.    Glucose 180 (H) 70 - 99 mg/dL   CBC with platelets   Result Value Ref Range    WBC Count 11.1 (H) 4.0 - 11.0 10e3/uL    RBC Count 4.43 3.80 - 5.20 10e6/uL    Hemoglobin 13.2 11.7 - 15.7 g/dL    Hematocrit 39.4 35.0 - 47.0 %    MCV 89 78 - 100 fL    MCH 29.8 26.5 - 33.0 pg    MCHC 33.5 31.5 - 36.5 g/dL    RDW 13.1 10.0 - 15.0 %    Platelet Count 233 150 - 450 10e3/uL   Albumin Random Urine Quantitative with Creat Ratio   Result Value Ref Range    Creatinine Urine mg/dL 51.0 mg/dL      Comment:      The reference ranges have not been established in urine creatinine. The results should be integrated into the clinical context for interpretation.    Albumin Urine mg/L <12.0 mg/L      Comment:      The reference ranges have not been established in urine albumin. The results should be integrated into the clinical context for interpretation.    Albumin Urine mg/g Cr        Comment:      Unable to calculate, urine albumin and/or urine creatinine is outside detectable  limits.  Microalbuminuria is defined as an albumin:creatinine ratio of 17 to 299 for males and 25 to 299 for females. A ratio of albumin:creatinine of 300 or higher is indicative of overt proteinuria.  Due to biologic variability, positive results should be confirmed by a second, first-morning random or 24-hour timed urine specimen. If there is discrepancy, a third specimen is recommended. When 2 out of 3 results are in the microalbuminuria range, this is evidence for incipient nephropathy and warrants increased efforts at glucose control, blood pressure control, and institution of therapy with an angiotensin-converting-enzyme (ACE) inhibitor (if the patient can tolerate it).     Hemoglobin A1c   Result Value Ref Range    Estimated Average Glucose 203 (H) <117 mg/dL    Hemoglobin A1C 8.7 (H) 0.0 - 5.6 %      Comment:      Normal <5.7%   Prediabetes 5.7-6.4%    Diabetes 6.5% or higher     Note: Adopted from ADA consensus guidelines.    Narrative    Results consistent with previous, repeat testing unnecessary    NEISSERIA GONORRHOEA PCR   Result Value Ref Range    Neisseria gonorrhoeae Negative Negative      Comment:      Negative for N. gonorrhoeae rRNA by transcription mediated amplification. A negative result by transcription mediated amplification does not preclude the presence of C. trachomatis infection because results are dependent on proper and adequate collection, absence of inhibitors and sufficient rRNA to be detected.   CHLAMYDIA TRACHOMATIS PCR   Result Value Ref Range    Chlamydia trachomatis Negative Negative      Comment:      A negative result by transcription mediated amplification does not preclude the presence of C. trachomatis infection because results are dependent on proper and adequate collection, absence of inhibitors and sufficient rRNA to be detected.   Symptomatic Influenza A/B, RSV, & SARS-CoV2 PCR (COVID-19) Nose   Result Value Ref Range    Influenza A PCR Negative Negative    Influenza B  PCR Negative Negative    RSV PCR Negative Negative    SARS CoV2 PCR Negative Negative      Comment:      NEGATIVE: SARS-CoV-2 (COVID-19) RNA not detected, presumed negative.    Narrative    Testing was performed using the Xpert Xpress CoV2/Flu/RSV Assay on the [employer] GeneXpert Instrument. This test should be ordered for the detection of SARS-CoV2, influenza, and RSV viruses in individuals with signs and symptoms of respiratory tract infection. This test is for in vitro diagnostic use under the [place] FDA for laboratories certified under CLIA to perform high or moderate complexity testing. This test has been [place] FDA cleared. A negative result does not rule out the presence of PCR inhibitors in the specimen or target RNA in concentration below the limit of detection for the assay. If only one viral target is positive but coinfection with multiple targets is suspected, the sample should be re-tested with another FDA cleared, approved, or authorized test, if coninfection would change clinical management. This test was validated by the [facility]. These laboratories are certified under the Clinical Laboratory Improvement Amendments of 1988 (CLIA-88) as qualified to perfom high complexity laboratory testing.   Wet preparation   Result Value Ref Range    Trichomonas Absent Absent    Yeast Present (A) Absent    Clue Cells Absent Absent    WBCs/high power field 1+ (A) None    Narrative    Few bacteria; negative odor    HIV Antigen Antibody Combo Cascade   Result Value Ref Range    HIV Antigen Antibody Combo Nonreactive Nonreactive      Comment:      Negative HIV-1 p24 antigen and HIV-1/2 antibody screening test results usually indicate the absence of HIV-1 and HIV-2 infection. However, such negative results do not rule-out acute HIV infection.  If acute HIV-1 or HIV-2 infection is suspected, detection of HIV-1 or HIV-2 RNA  is recommended.    Hepatitis B surface antigen   Result Value Ref Range    Hepatitis B  Surface Antigen Nonreactive Nonreactive       If you have any questions or concerns, please call the clinic at the number listed above.       Sincerely,      Tyra Bullock MD

## 2024-09-19 LAB
ANION GAP SERPL CALCULATED.3IONS-SCNC: 17 MMOL/L (ref 7–15)
BUN SERPL-MCNC: 21.5 MG/DL (ref 6–20)
C TRACH DNA SPEC QL NAA+PROBE: NEGATIVE
CALCIUM SERPL-MCNC: 9.4 MG/DL (ref 8.8–10.4)
CHLORIDE SERPL-SCNC: 99 MMOL/L (ref 98–107)
CREAT SERPL-MCNC: 0.61 MG/DL (ref 0.51–0.95)
EGFRCR SERPLBLD CKD-EPI 2021: >90 ML/MIN/1.73M2
GLUCOSE SERPL-MCNC: 180 MG/DL (ref 70–99)
HBV SURFACE AG SERPL QL IA: NONREACTIVE
HCO3 SERPL-SCNC: 21 MMOL/L (ref 22–29)
HIV 1+2 AB+HIV1 P24 AG SERPL QL IA: NONREACTIVE
N GONORRHOEA DNA SPEC QL NAA+PROBE: NEGATIVE
POTASSIUM SERPL-SCNC: 5.2 MMOL/L (ref 3.4–5.3)
SODIUM SERPL-SCNC: 137 MMOL/L (ref 135–145)

## 2024-09-20 NOTE — RESULT ENCOUNTER NOTE
Call placed to patient to discuss lab results.  Wet prep positive for yeast, otherwise negative.  Prescription sent in for Diflucan.  Her kidney tests are stable although her BUN is mildly elevated.  We discussed drinking additional fluids.  A1c is also slightly up from last visit.  Her hemoglobin is normal, white count is mildly elevated.  Flu COVID RSV were negative.  Gonorrhea chlamydia testing HIV, hep B, and trichomonas testing were all negative.  Please also send her a copy for her own records.    Tyra Bullock MD    Please send results to patient.

## 2024-09-21 DIAGNOSIS — R06.02 SHORTNESS OF BREATH: ICD-10-CM

## 2024-09-21 DIAGNOSIS — L73.9 FOLLICULITIS: ICD-10-CM

## 2024-09-22 RX ORDER — TRAMADOL HYDROCHLORIDE 50 MG/1
50 TABLET ORAL EVERY 6 HOURS PRN
Qty: 5 TABLET | OUTPATIENT
Start: 2024-09-22

## 2024-09-24 ENCOUNTER — TELEPHONE (OUTPATIENT)
Dept: FAMILY MEDICINE | Facility: CLINIC | Age: 54
End: 2024-09-24
Payer: COMMERCIAL

## 2024-09-24 NOTE — TELEPHONE ENCOUNTER
Rec'd fax from [facility] include 234 beds at the [facility] [employer] [place] for Orthopedic Brace (s) -YSV shoulder [employer]    Called to verify with patient if patient requested.  No answer left message to call us back.    Faizan Lacey MA

## 2024-10-01 ENCOUNTER — VIRTUAL VISIT (OUTPATIENT)
Dept: FAMILY MEDICINE | Facility: CLINIC | Age: 54
End: 2024-10-01
Payer: COMMERCIAL

## 2024-10-01 DIAGNOSIS — G89.4 CHRONIC PAIN SYNDROME: ICD-10-CM

## 2024-10-01 DIAGNOSIS — Z01.818 PRE-OPERATIVE EXAMINATION: Primary | ICD-10-CM

## 2024-10-01 DIAGNOSIS — Z79.4 TYPE 2 DIABETES MELLITUS WITH HYPERGLYCEMIA, WITH LONG-TERM CURRENT USE OF INSULIN (H): ICD-10-CM

## 2024-10-01 DIAGNOSIS — E11.65 TYPE 2 DIABETES MELLITUS WITH HYPERGLYCEMIA, WITH LONG-TERM CURRENT USE OF INSULIN (H): ICD-10-CM

## 2024-10-01 DIAGNOSIS — B37.31 VAGINAL CANDIDIASIS: ICD-10-CM

## 2024-10-01 PROCEDURE — 99442 PR PHYSICIAN TELEPHONE EVALUATION 11-20 MIN: CPT | Mod: 93 | Performed by: STUDENT IN AN ORGANIZED HEALTH CARE EDUCATION/TRAINING PROGRAM

## 2024-10-01 RX ORDER — FLUCONAZOLE 150 MG/1
150 TABLET ORAL DAILY
Qty: 1 TABLET | Refills: 0 | Status: SHIPPED | OUTPATIENT
Start: 2024-10-01

## 2024-10-01 RX ORDER — PRAZOSIN HYDROCHLORIDE 1 MG/1
CAPSULE ORAL
COMMUNITY
Start: 2024-09-30

## 2024-10-01 NOTE — LETTER
October 1, 2024      This may concern:    Teresa Perez is a long-term patient of mine here at [facility].  I am requesting increased frequency of nursing services from her home nursing company, [facility].  I am recommending nursing visits 2-3 times per week to assist with wound care, skin integrity checks, management of vitals and blood pressure, and instructions and education around medications.  Patient continues to have chronic recurrent wounds, and this nursing care is essential to keeping her healthy and out of the hospital.  Please call the clinic at [phone] if you have any questions.      Sincerely,            Tyra Bullock MD

## 2024-10-01 NOTE — Clinical Note
This patient had to cancel her pre-op today, but I already prepped for the visit.  She is scheduled with you on 10/8 for a 10/10 colonoscopy.  We discussed hold parameters, and I outlined her risk factors in a secondary note - I hope it is helpful.  Please let me know if you have additional questions and thanks for seeing her!

## 2024-10-01 NOTE — PROGRESS NOTES
Teresa is a 53 year old who is being evaluated via a billable telephone visit.    What phone number would you like to be contacted at? [phone]  How would you like to obtain your AVS? Mail a copy  Originating Location (pt. Location): Home    Distant Location (provider location):  On-site    Teresa was seen today for talk.    Diagnoses and all orders for this visit:    Pre-operative examination  Patient seen by telephone today due to an inability to come into clinic for her preop examination.  She does have another one scheduled next week.  We reviewed her medications and some hold parameters for the upcoming colonoscopy.  -- The morning before colonoscopy, take 40 units of Toujeo.    --The evening prior to the colonoscopy take 20 units of Toujeo.    --The morning of the colonoscopy take 20 units of Toujeo.  -- Take your normal NovoLog via sliding scale the day before the procedure.  Of note you will not be eating as much as you complete your prep so err on less insulin versus more.  -- No a.m. NovoLog on the morning of your colonoscopy.  -- Hold the Jardiance for 3 days prior to surgery.  -- Hold the Plavix for 3 days prior to surgery.  -- Hold the Bydureon for 1 week prior to surgery.  -- Bring your lorazepam and your inhalers with you to surgery.  -- Wait to take your normal a.m. pills until after your procedure.  Do use your regular scheduled inhalers in the morning.    Vaginal candidiasis  Still having vaginal discharge.  Prescription for Diflucan sent.  -     fluconazole (DIFLUCAN) 150 MG tablet; Take 1 tablet (150 mg) by mouth daily.    Type 2 diabetes mellitus with hyperglycemia, with long-term current use of insulin (H)  Last A1c was 8.7 in September.  Above our recommendations for prior to surgery for her insulin and diabetes medications.    Chronic pain syndrome  This patient does have chronic pain.  She is not on chronic narcotics.  Does have a lot of anxiety as well.  Should not need extra pain meds  after surgery.        Bryson Moore is a 53 year old, presenting for the following health issues:  Talk (Unable to come in due to schedule conflict and transportation, check in to [facility])        10/1/2024     1:05 PM   Additional Questions   Roomed by Meenakshi   Accompanied by patient         10/1/2024    Information    services provided? No        HPI     Patient seen for telephone visit today.  She was supposed to be here for a preop physical, but was unable to make it to clinic.  Instead we talked through things by phone.    Overall has been good.  She has a colonoscopy coming up on Thursday, October 10.  Is scheduled for 7 AM arrival.  She is already heard from her surgery team that she is to stop her Plavix and Jardiance 3 days prior to the surgery.  She stopping her Bydureon 1 week prior.    She has instructions and materials for her prep.  Doing her colonoscopy inpatient at [facility].    We discussed her insulin, as I am concerned that both with the prep and with the procedure that she may go low.  She typically takes 55 units twice daily of Toujeo, we will decrease this to 40 units the morning prior to colonoscopy, 20 units the night before and 28 units the morning of.  She can go back to her regular 55 units the evening after her colonoscopy.  She will hold her sliding scale insulin in the morning, and will likely need less sliding scale the day prior if she is not eating well doing her prep.    Shares that she recently saw her psychiatrist and they added a new medication, prazosin once daily at bedtime.    We discussed holding her normal morning medications the day of surgery, and taking them after the procedure.  I do want her to make sure she has her lorazepam with her, as well as her inhalers, she should take her normal daily inhalers the morning of the procedure.    Is scheduled for a full preop next week on Tuesday, October 8 with Dr. Luz here in clinic.    We  reviewed some forms that I had received about an orthopedic brace from [employer].  This was for a left shoulder immobilizer.  She knows she does not want this.  Hangs up on the supply company when they call.  Does not want me to fill out these forms and does not want to the immobilizer.  She has been told by her orthopedist she needs to be moving her arm.    We reviewed and updated her med list today.  Of note she is on both Flexeril and baclofen.  Is taking Flexeril currently for some chest pain due to coughing, and is not taking baclofen.  She will transition back to the baclofen when she is done with the Flexeril.    She is requesting orders for her nursing company for visits 2-3 times per week.  Continues to struggle with areas of rash, and wounds.      Objective           Vitals:  No vitals were obtained today due to virtual visit.    Physical Exam   General: Alert and no distress //Respiratory: No audible wheeze, cough, or shortness of breath // Psychiatric:  Appropriate affect, tone, and pace of words    Phone call duration: 15 minutes  Signed Electronically by: Tyra Bullock MD

## 2024-10-02 NOTE — PROGRESS NOTES
Additional Notes per Dr. Luz in regards to the patient's preop evaluation.    1) cardiac risk factors: Patient has a limited capacity to exercise secondary to her above-the-knee amputation.  She saw cardiology for a preop clearance evaluation this summer.  She had a CT angiogram completed on July 24, 2024 which showed an elevated calcium score and concern for blockage.  She then had a angiogram done on July 30, 2024 that showed mild to moderate disease, but low risk for cardiac complications with her colonoscopy.  Okay to hold Plavix and proceed with procedure.    Her most recent echo was in November 2021 that showed mild diastolic dysfunction with a normal EF of 55 to 60% and normal wall motion.    She has a history of stroke, most likely associated with drug use at that time, and has been on pravastatin due to an intolerance to other statin medications, and takes Plavix secondary to an allergy to aspirin.  She has minimal residual side effects from this.    2) respiratory risk factors: Patient has history of moderate persistent asthma, as well as long smoking history, with likely progression towards COPD.  She has a chronic cough and takes scheduled Symbicort, Spiriva, and montelukast.  Breathing has been stable recently.  She had pulmonary function test completed in March 2022 that showed normal PFTs without evidence of obstruction.    3) patient has type 2 insulin-dependent diabetes.  Her regular regimen includes Jardiance 25 mg daily, Bydureon once weekly, and she takes 55 units twice daily of Toujeo.  Additionally she does a sliding scale with meals, but has a somewhat irregular mealtime eating pattern.  She has had episodes of severe hypoglycemia leading to syncopal episodes, and I am concerned about hypoglycemia with her prep and with the procedure.  We discussed doing with 40 units of Toujeo in the morning the day prior to surgery, 20 units in the evening prior to surgery, and 20 units in the morning  the day of her procedure.  This is a 50% decrease in total insulin, but decreases the risk of hypoglycemia in the short-term.  Her most recent A1c was in September 2024 and was 8.7.    4) patient has a history of chronic kidney disease.  She was on dialysis for a period of time following her stroke and amputation.  Her renal function returned, and creatinine has been stable.    5) mental health.  She has a history of severe recurrent major depression, PTSD, and borderline personality disorder.  She sees a psychiatrist.  Her mental health symptoms overall have been relatively stable.  She was recently started on prazosin by her psychiatrist.  She also takes venlafaxine, Seroquel, and lorazepam.    Please reach out if you have any other specific questions.    Tyra Bullock MD

## 2024-10-08 ENCOUNTER — OFFICE VISIT (OUTPATIENT)
Dept: FAMILY MEDICINE | Facility: CLINIC | Age: 54
End: 2024-10-08
Payer: COMMERCIAL

## 2024-10-08 VITALS
TEMPERATURE: 98.1 F | SYSTOLIC BLOOD PRESSURE: 131 MMHG | DIASTOLIC BLOOD PRESSURE: 82 MMHG | OXYGEN SATURATION: 95 % | RESPIRATION RATE: 16 BRPM | HEART RATE: 89 BPM

## 2024-10-08 DIAGNOSIS — L73.9 FOLLICULITIS: ICD-10-CM

## 2024-10-08 DIAGNOSIS — R05.1 ACUTE COUGH: Primary | ICD-10-CM

## 2024-10-08 PROCEDURE — 99213 OFFICE O/P EST LOW 20 MIN: CPT | Mod: GC

## 2024-10-08 PROCEDURE — 87637 SARSCOV2&INF A&B&RSV AMP PRB: CPT

## 2024-10-08 RX ORDER — TRAMADOL HYDROCHLORIDE 50 MG/1
50 TABLET ORAL EVERY 6 HOURS PRN
Qty: 10 TABLET | Refills: 0 | Status: SHIPPED | OUTPATIENT
Start: 2024-10-08 | End: 2024-10-11

## 2024-10-08 NOTE — PROGRESS NOTES
Assessment & Plan     Acute cough  Patient states that she feels the same way that she felt when she saw Dr. Bullock on 9/18.  She is unclear if she really ever got better and feels different.  Back in September she was treated for a COPD exacerbation for she has some underlying emphysema.  She was given antibiotics as well as a short course of prednisone.  Today, she is saturating well on room air, with normal breath sounds throughout and has a shallow cough.  I believe that this picture is more consistent with an upper respiratory infection.  She states that she lives in a very small building with 50 units and multiple people have been having COVID.  I think she likely got better from the COPD exacerbation and then has since developed a viral infection.  Will swab today and call with results.  - Symptomatic Influenza A/B, RSV, & SARS-CoV2 PCR (COVID-19)  - Symptomatic Influenza A/B, RSV, & SARS-CoV2 PCR (COVID-19) Nose    Folliculitis  Patient has folliculitis with an abscess in her navel area as well as along her right groin.  She was previously treated with antiyeast for a yeast skin infection.  She was also given antibiotics at the last visit with Dr. Bullock for her COPD exacerbation with the thought that this would also treat the abscesses.  These look to be healing very well and are no longer draining.  There is no warmth.  Slight area of pink erythema around the abscess on her stomach.  Patient states that these are still painful and that the tramadol was helpful in the past although is still having a lot of pain with the open wound rubbing on her clothing.  She is requesting a refill of this and I will do a short course of 10 tablets of tramadol 50 mg.  Patient aware not to take more often than previously prescribed and to follow-up with PCP.  Patient states that she does have Narcan at home given her underlying history of COPD.  - traMADol (ULTRAM) 50 MG tablet  Dispense: 10 tablet; Refill:  0    Additional note: Patient is scheduled for colonoscopy on the 10th.  Patient is aware that she should call to get this rescheduled and then once has a date for the colonoscopy will schedule a preop appointment here at [facility].  I will reach out to the physician doing the colonoscopy that patient was seen for illness today and will likely reschedule.      BMI  Estimated body mass index is 38.04 kg/m  as calculated from the following:    Height as of 10/9/23: 1.524 m (5').    Weight as of 9/18/24: 88.4 kg (194 lb 12.8 oz).       No follow-ups on file.    Bryson Moore is a 53 year old, presenting for the following health issues:  Pre-Op Exam (Cough and runny nose /Pre op )    KATHRYN Moore is a patient of Dr. Bullock. She has an interesting medical history. She had bariatric surgery that led chronic diarrhea. She was seen by Dr. Bullock on 9/18 and was found to have notable shortness of breath, decreased SpO2 and diminished breath sounds.  She states that she also has folliculitis abscesses on her stomach and in her groin area that have improved.  She feels similar to how she felt at this visit with Dr. Bullock.  She has an upcoming colonoscopy on the 10th that will need to be rescheduled for this visit was originally going to be a preop visit but will be transition to an acute illness visit.        Objective    /82   Pulse 89   Temp 98.1  F (36.7  C) (Oral)   Resp 16   SpO2 95%   There is no height or weight on file to calculate BMI.  Physical Exam   GENERAL: Awake, alert, No acute distress.   HEENT: No scleral icterus or conjunctival injection.  SKIN: Warm and dry.  Patient showed a 2 cm infected area on her stomach.  The area appears to be a well-healing abscess that is no longer draining and does have some dried crust along where the folliculitis started.  Does have a centimeter border around the lesion of pink erythema.  There is no warmth in this area.  LUNGS: Normal work of breathing  with no use of accessory muscles. Clear breath sounds in all lung fields bilaterally with no wheezes or crackles appreciated.  CARDIAC: RRR. Normal S1 and S2. No murmurs, clicks, or rubs appreciated.   ABDOMEN: Distended at baseline.        Signed Electronically by: Bradley Luz  Staffed with Donnell Evangelista MD

## 2024-10-08 NOTE — LETTER
October 10, 2024      Teresa Perez  [address]  [place] [address]        Dear ,    We are writing to inform you of your test results.    Leslieprimo Teresa, it was good to see you in clinic on the 8th. Your covid, flu, RSV was negative although you likely have some lingering virus that is making you feel ill. Please reach out if there is anything else we can do for you.     Thanks Cindy Huerta     Resulted Orders   Symptomatic Influenza A/B, RSV, & SARS-CoV2 PCR (COVID-19) Nose   Result Value Ref Range    Influenza A PCR Negative Negative    Influenza B PCR Negative Negative    RSV PCR Negative Negative    SARS CoV2 PCR Negative Negative      Comment:      NEGATIVE: SARS-CoV-2 (COVID-19) RNA not detected, presumed negative.    Narrative    Testing was performed using the Xpert Xpress CoV2/Flu/RSV Assay on the [employer] GeneXpert Instrument. This test should be ordered for the detection of SARS-CoV2, influenza, and RSV viruses in individuals with signs and symptoms of respiratory tract infection. This test is for in vitro diagnostic use under the [place] FDA for laboratories certified under CLIA to perform high or moderate complexity testing. This test has been [place] FDA cleared. A negative result does not rule out the presence of PCR inhibitors in the specimen or target RNA in concentration below the limit of detection for the assay. If only one viral target is positive but coinfection with multiple targets is suspected, the sample should be re-tested with another FDA cleared, approved, or authorized test, if coninfection would change clinical management. This test was validated by the [facility]. These laboratories are certified under the Clinical Laboratory Improvement Amendments of 1988 (CLIA-88) as qualified to perfom high complexity laboratory testing.       If you have any questions or concerns, please call the clinic at the number listed above.       Sincerely,      Donnell AUSTIN  JENNA Evangelista MD

## 2024-10-08 NOTE — PROGRESS NOTES
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 marijuana here and there\" H?O cocaine use, currently sober           {ROS Picklists (Optional):042294}    Objective    /82   Pulse 89   Temp 98.1  F (36.7  C) (Oral)   Resp 16   SpO2 95%    Estimated body mass index is 38.04 kg/m  as calculated from the following:    Height as of 10/9/23: 1.524 m (5').    Weight as of 9/18/24: 88.4 kg (194 lb 12.8 oz).  "

## 2024-10-09 ENCOUNTER — TELEPHONE (OUTPATIENT)
Dept: FAMILY MEDICINE | Facility: CLINIC | Age: 54
End: 2024-10-09
Payer: COMMERCIAL

## 2024-10-09 DIAGNOSIS — B37.31 VAGINAL CANDIDIASIS: ICD-10-CM

## 2024-10-09 DIAGNOSIS — T78.40XS ALLERGIC REACTION, SEQUELA: Primary | ICD-10-CM

## 2024-10-09 DIAGNOSIS — J45.40 MODERATE PERSISTENT ASTHMA WITHOUT COMPLICATION: ICD-10-CM

## 2024-10-09 DIAGNOSIS — G25.81 RESTLESS LEGS SYNDROME: ICD-10-CM

## 2024-10-09 DIAGNOSIS — J45.51 SEVERE PERSISTENT ASTHMA WITH EXACERBATION (H): ICD-10-CM

## 2024-10-09 DIAGNOSIS — E78.5 HYPERLIPIDEMIA LDL GOAL <100: ICD-10-CM

## 2024-10-09 DIAGNOSIS — Z86.73 HISTORY OF CVA (CEREBROVASCULAR ACCIDENT): ICD-10-CM

## 2024-10-09 NOTE — TELEPHONE ENCOUNTER
Test Results        Who ordered the test:  Arden    Type of test: Lab    Date of test:  10/08/24    Where was the test performed:  [facility]    What are your questions/concerns?:  results    Okay to leave a detailed message?: Yes at Cell number on file:    Telephone Information:   Mobile [phone]       Does patient or caller know when to expect a call? Yes, Nurses will return call within 2-3 business hours.       Mary Ann Giraldo on 10/9/2024 at 9:49 AM

## 2024-10-10 ENCOUNTER — TELEPHONE (OUTPATIENT)
Dept: FAMILY MEDICINE | Facility: CLINIC | Age: 54
End: 2024-10-10
Payer: COMMERCIAL

## 2024-10-10 DIAGNOSIS — B37.31 VAGINAL CANDIDIASIS: ICD-10-CM

## 2024-10-10 DIAGNOSIS — T78.40XS ALLERGY, UNSPECIFIED, SEQUELA: ICD-10-CM

## 2024-10-10 DIAGNOSIS — G89.29 CHRONIC BILATERAL LOW BACK PAIN WITHOUT SCIATICA: ICD-10-CM

## 2024-10-10 DIAGNOSIS — M54.50 CHRONIC BILATERAL LOW BACK PAIN, UNSPECIFIED WHETHER SCIATICA PRESENT: ICD-10-CM

## 2024-10-10 DIAGNOSIS — G89.29 CHRONIC BILATERAL LOW BACK PAIN, UNSPECIFIED WHETHER SCIATICA PRESENT: ICD-10-CM

## 2024-10-10 DIAGNOSIS — Z79.4 TYPE 2 DIABETES MELLITUS WITH HYPERGLYCEMIA, WITH LONG-TERM CURRENT USE OF INSULIN (H): ICD-10-CM

## 2024-10-10 DIAGNOSIS — E11.65 TYPE 2 DIABETES MELLITUS WITH HYPERGLYCEMIA, WITH LONG-TERM CURRENT USE OF INSULIN (H): ICD-10-CM

## 2024-10-10 DIAGNOSIS — M54.50 CHRONIC BILATERAL LOW BACK PAIN WITHOUT SCIATICA: ICD-10-CM

## 2024-10-10 DIAGNOSIS — Z86.73 HISTORY OF CVA (CEREBROVASCULAR ACCIDENT): ICD-10-CM

## 2024-10-10 RX ORDER — INSULIN LISPRO 100 [IU]/ML
INJECTION, SOLUTION INTRAVENOUS; SUBCUTANEOUS
Qty: 75 ML | Refills: 3 | Status: SHIPPED | OUTPATIENT
Start: 2024-10-10

## 2024-10-10 RX ORDER — PRAMIPEXOLE DIHYDROCHLORIDE 0.75 MG/1
0.75 TABLET ORAL AT BEDTIME
Qty: 90 TABLET | Refills: 1 | Status: SHIPPED | OUTPATIENT
Start: 2024-10-10

## 2024-10-10 RX ORDER — PRAVASTATIN SODIUM 80 MG/1
80 TABLET ORAL DAILY
Qty: 90 TABLET | Refills: 3 | Status: SHIPPED | OUTPATIENT
Start: 2024-10-10

## 2024-10-10 RX ORDER — FLUCONAZOLE 150 MG/1
150 TABLET ORAL ONCE
Qty: 1 TABLET | Refills: 0 | OUTPATIENT
Start: 2024-10-10 | End: 2024-10-10

## 2024-10-10 RX ORDER — CYCLOBENZAPRINE HCL 5 MG
5 TABLET ORAL 3 TIMES DAILY PRN
Qty: 30 TABLET | Refills: 5 | Status: SHIPPED | OUTPATIENT
Start: 2024-10-10

## 2024-10-10 RX ORDER — BUDESONIDE AND FORMOTEROL FUMARATE DIHYDRATE 160; 4.5 UG/1; UG/1
2 AEROSOL RESPIRATORY (INHALATION)
Qty: 20.4 G | Refills: 11 | Status: SHIPPED | OUTPATIENT
Start: 2024-10-10

## 2024-10-10 RX ORDER — EPINEPHRINE 0.3 MG/.3ML
INJECTION SUBCUTANEOUS
Qty: 2 EACH | Refills: 1 | Status: SHIPPED | OUTPATIENT
Start: 2024-10-10

## 2024-10-10 RX ORDER — CLOPIDOGREL BISULFATE 75 MG/1
75 TABLET ORAL DAILY
Qty: 90 TABLET | Refills: 1 | Status: SHIPPED | OUTPATIENT
Start: 2024-10-10 | End: 2024-11-07

## 2024-10-10 RX ORDER — FLUCONAZOLE 150 MG/1
150 TABLET ORAL ONCE
Qty: 1 TABLET | Refills: 0 | Status: SHIPPED | OUTPATIENT
Start: 2024-10-10 | End: 2024-10-17

## 2024-10-10 RX ORDER — AZITHROMYCIN 250 MG/1
TABLET, FILM COATED ORAL
Qty: 6 TABLET | Refills: 0 | OUTPATIENT
Start: 2024-10-10

## 2024-10-10 RX ORDER — GABAPENTIN 600 MG/1
600 TABLET ORAL 3 TIMES DAILY
Qty: 90 TABLET | Refills: 3 | Status: SHIPPED | OUTPATIENT
Start: 2024-10-10

## 2024-10-10 RX ORDER — CLOPIDOGREL BISULFATE 75 MG/1
75 TABLET ORAL DAILY
Qty: 90 TABLET | Refills: 1 | Status: SHIPPED | OUTPATIENT
Start: 2024-10-10

## 2024-10-10 RX ORDER — FLUCONAZOLE 150 MG/1
150 TABLET ORAL DAILY
Qty: 1 TABLET | Refills: 0 | OUTPATIENT
Start: 2024-10-10

## 2024-10-10 RX ORDER — AZELASTINE HYDROCHLORIDE 0.5 MG/ML
SOLUTION/ DROPS OPHTHALMIC
Qty: 18 ML | Refills: 5 | Status: SHIPPED | OUTPATIENT
Start: 2024-10-10

## 2024-10-10 RX ORDER — DICLOFENAC POTASSIUM 50 MG/1
TABLET, FILM COATED ORAL
Qty: 90 TABLET | Refills: 1 | Status: SHIPPED | OUTPATIENT
Start: 2024-10-10

## 2024-10-10 NOTE — TELEPHONE ENCOUNTER
Name from pharmacy: FLUCONAZOLE 150MG TABLETS          Will file in chart as: fluconazole (DIFLUCAN) 150 MG tablet     Possible duplicate: Misty to review recent actions on this medication    Sig: TAKE 1 TABLET(150 MG) BY MOUTH 1 TIME FOR 1 DOSE    Disp: 1 tablet    Refills: 0 (Pharmacy requested: Not specified)    Start: 10/9/2024    Class: E-Prescribe    Non-formulary For: Vaginal candidiasis    Last ordered: 1 week ago (10/1/2024) by Tyra Bullock MD    Last refill: 11/3/2023    Rx #: [id number]    Antifungal Agents Hoheuw76/09/2024 06:52 PM   Protocol Details Always Fail Criteria    Medication is active on med list    Recent (12 mo) or future (90 days) visit within the authorizing provider's specialty       Name from pharmacy: EPINEPHRINE 0.3MG INJ 2 PACK         Will file in chart as: EPINEPHrine (ANY BX GENERIC EQUIV) 0.3 MG/0.3ML injection 2-pack    Sig: INJECT 1 PEN IN THE MUSCLE ONE TIME AS DIRECTED    Disp: Not specified (Pharmacy requested: 2 Units)    Refills: Not specified    Start: 10/9/2024    Class: E-Prescribe    Non-formulary    Last ordered: 11 months ago (11/3/2023) by Lis Vidal MD    Last refill: 11/3/2023    Rx #: [id number]    Anaphylaxis Kits Protocol Qvcpyg91/09/2024 06:52 PM   Protocol Details Medication incated for associated diagnosis          Gael Redman RN, MSN

## 2024-10-10 NOTE — TELEPHONE ENCOUNTER
Name from pharmacy: CLOPIDOGREL 75MG TABLETS          Will file in chart as: clopidogrel (PLAVIX) 75 MG tablet     Possible duplicate: Misty to review recent actions on this medication    Sig: TAKE 1 TABLET(75 MG) BY MOUTH DAILY    Disp: 90 tablet    Refills: 1 (Pharmacy requested: Not specified)    Start: 10/10/2024    Class: E-Prescribe    Non-formulary For: History of CVA (cerebrovascular accident)    To pharmacy: **Patient requests 90 days supply**    Last ordered: 1 year ago (9/1/2023) by Tyra Bullock MD    Last refill: 7/11/2024    Rx #: [id number]    Plavix Aiankm49/10/2024 08:11 AM   Protocol Details Medication indicated for associated diagnosis    Normal HGB on file in past 12 months    Normal Platelets on file in past 12 months    Medication is active on med list    Recent (12 mo) or future (90 days) visit within the authorizing provider's specialty    Patient is age 18 or older    No active pregnancy on record    No positive pregnancy test in past 12 months       Name from pharmacy: CYCLOBENZAPRINE 5MG TABLETS         Will file in chart as: cyclobenzaprine (FLEXERIL) 5 MG tablet    Sig: TAKE 1 TABLET(5 MG) BY MOUTH THREE TIMES DAILY AS NEEDED FOR MUSCLE SPASMS    Disp: 30 tablet    Refills: 0 (Pharmacy requested: Not specified)    Start: 10/10/2024    Class: E-Prescribe    Non-formulary For: Chronic bilateral low back pain without sciatica    Last ordered: 3 weeks ago (9/18/2024) by Tyra Bullock MD    Last refill: 9/18/2024    Rx #: [id number]        Name from pharmacy: GABAPENTIN 600MG TABLETS         Will file in chart as: gabapentin (NEURONTIN) 600 MG tablet    Sig: TAKE 1 TABLET(600 MG) BY MOUTH THREE TIMES DAILY    Disp: 90 tablet    Refills: 3 (Pharmacy requested: Not specified)    Start: 10/10/2024    Class: E-Prescribe    Non-formulary For: Chronic bilateral low back pain, unspecified whether sciatica present    Last ordered: 5 months ago (4/14/2024) by Tyra ZAVALETA  MD Kobe    Last refill: 9/12/2024    Rx #: [id number]        Name from pharmacy: DICLOFENAC POTASSIUM 50MG TABLETS         Will file in chart as: diclofenac (CATAFLAM) 50 MG tablet    Sig: TAKE 1 TABLET(50 MG) BY MOUTH TWICE DAILY AS NEEDED FOR MODERATE PAIN    Disp: 90 tablet    Refills: 0 (Pharmacy requested: Not specified)    Start: 10/10/2024    Class: E-Prescribe    Non-formulary For: Chronic bilateral low back pain without sciatica    Last ordered: 1 month ago (9/10/2024) by Tyra Bullock MD    Last refill: 9/11/2024    Rx #: [id number]    NSAID Medications Rlenwn70/10/2024 08:11 AM   Protocol Details Normal CBC on file in past 12 months    Always Fail Criteria - Chart Review Required          Gael Redman, RN, MSN

## 2024-10-10 NOTE — TELEPHONE ENCOUNTER
Name from pharmacy: AZELASTINE 0.05% OPHTH SOLUTION 6ML         Will file in chart as: azelastine (OPTIVAR) 0.05 % ophthalmic solution    Sig: INSTILL 1 DROP IN BOTH EYES TWICE DAILY AS NEEDED FOR ITCHY EYES    Disp: 18 mL    Refills: 5 (Pharmacy requested: Not specified)    Start: 10/10/2024    Class: E-Prescribe    Non-formulary For: Allergy, unspecified, sequela    Last ordered: 1 year ago (8/13/2023) by Tyra Bullock MD    Last refill: 8/15/2023    Rx #: [id number]    Miscellaneous Opthalmic Allergy Drops Protocol Dkvbtr40/10/2024 08:17 AM   Protocol Details Patient is age 4 or older    Recent (12 mo) or future (30 days) visit within the authorizing provider's specialty    Medication is active on med list    Patient is not pregnant    No positive pregnancy test on record in past 12 mos       Name from pharmacy: INSULIN LISPRO 100U/ML KWIKPEN 3ML         Will file in chart as: insulin lispro (HUMALOG KWIKPEN) 100 UNIT/ML (1 unit dial) KWIKPEN    Sig: INJECT UP TO 90 UNITS SUBCUTANEOUS DAILY PER SLIDING SCALE AS DIRECTED    Disp: 75 mL    Refills: 1 (Pharmacy requested: Not specified)    Start: 10/10/2024    Class: E-Prescribe    Non-formulary For: Type 2 diabetes mellitus with hyperglycemia, with long-term current use of insulin (H)    Last ordered: 1 year ago (8/13/2023) by Tyra Bullock MD    Last refill: 8/14/2023    Rx #: [id number]    Insulin Protocol Cazmqa50/10/2024 08:17 AM   Protocol Details Chart Review Required               AZELASTINE 0.05% OPHTH SOLUTION 6ML  Prescription approved per [facility] Refill Protocol.    Gael Redman, RN, MSN

## 2024-10-10 NOTE — TELEPHONE ENCOUNTER
Name from pharmacy: FLUCONAZOLE 150MG TABLETS          Will file in chart as: fluconazole (DIFLUCAN) 150 MG tablet     Possible duplicate: Hoida to review recent actions on this medication    Sig: TAKE 1 TABLET(150 MG) BY MOUTH DAILY    Disp: 1 tablet    Refills: 0 (Pharmacy requested: Not specified)    Start: 10/9/2024    Class: E-Prescribe    Non-formulary For: Vaginal candidiasis    Last ordered: 1 week ago (10/1/2024) by Tyra Bullock MD    Last refill: 10/1/2024    Rx #: [id number]    Antifungal Agents Rilnaf93/09/2024 07:00 PM   Protocol Details Always Fail Criteria    Medication is active on med list    Recent (12 mo) or future (90 days) visit within the authorizing provider's specialty       Name from pharmacy: PRAVASTATIN 80MG TABLETS         Will file in chart as: pravastatin (PRAVACHOL) 80 MG tablet    Sig: TAKE 1 TABLET BY MOUTH EVERY DAY    Disp: 90 tablet    Refills: 3 (Pharmacy requested: Not specified)    Start: 10/9/2024    Class: E-Prescribe    Non-formulary For: Hyperlipidemia LDL goal <100    Last ordered: 9 months ago (12/19/2023) by Tyra Bullock MD    Last refill: 9/6/2024    Rx #: [id number]    Antihyperlipidemic agents Zllwmv51/09/2024 07:00 PM   Protocol Details LDL on file in the past 12 months    Medication is active on med list    Recent (12 mo) or future (90 days) visit within the authorizing provider's specialty    Patient is age 18 years or older    No active pregnancy on record    No positive pregnancy test in past 12 mos       Name from pharmacy: PRAMIPEXOLE 0.75MG TABLETS         Will file in chart as: pramipexole (MIRAPEX) 0.75 MG tablet    Sig: TAKE 1 TABLET BY MOUTH EVERY NIGHT AT BEDTIME    Disp: 90 tablet    Refills: 1 (Pharmacy requested: Not specified)    Start: 10/9/2024    Class: E-Prescribe    Non-formulary For: Restless legs syndrome    Last ordered: 4 months ago (6/10/2024) by Tyra Bullock MD    Last refill: 9/12/2024    Rx #:  [id number]    Antiparkinson's Agents Protocol Sbhyvw95/09/2024 07:00 PM   Protocol Details Medication is active on med list    Recent (12 mo) or future (90 days) visit within the authorizing provider's specialty    Medication indicated for associated diagnosis    Patient is age 18 or older    No active pregnancy on record    No positive pregnancy test in the past 12 months       Name from pharmacy: CLOPIDOGREL 75MG TABLETS         Will file in chart as: clopidogrel (PLAVIX) 75 MG tablet     Possible duplicate: Hover to review recent actions on this medication    Sig: TAKE 1 TABLET(75 MG) BY MOUTH DAILY    Disp: 90 tablet    Refills: 1 (Pharmacy requested: Not specified)    Start: 10/9/2024    Class: E-Prescribe    Non-formulary For: History of CVA (cerebrovascular accident)    Last ordered: 1 year ago (9/1/2023) by Tyra Bullock MD    Last refill: 8/12/2024    Rx #: [id number]    Plavix Naitqg06/09/2024 07:00 PM   Protocol Details Medication indicated for associated diagnosis    Normal HGB on file in past 12 months    Normal Platelets on file in past 12 months    Medication is active on med list    Recent (12 mo) or future (90 days) visit within the authorizing provider's specialty    Patient is age 18 or older    No active pregnancy on record    No positive pregnancy test in past 12 months       Name from pharmacy: SYMBICORT 160/4.5MCG (120 ORAL INH)         Will file in chart as: SYMBICORT 160-4.5 MCG/ACT Inhaler    Sig: INHALE 2 PUFFS BY MOUTH TWICE DAILY    Disp: 10.2 g    Refills: Not specified    ИРИНА    Start: 10/9/2024    Class: E-Prescribe    Non-formulary For: Moderate persistent asthma without complication    Last ordered: 1 year ago (8/28/2023) by Tyra Bullock MD    Last refill: 3/31/2024    Rx #: [id number]    Inhaled Steroids Protocol Ohyrfm36/09/2024 07:00 PM   Protocol Details Asthma control assessment score within normal limits in last 6 months

## 2024-10-10 NOTE — TELEPHONE ENCOUNTER
Hello, I am reaching out in regards to Teresa Perez, -1970.  I am her CASDI .  She is requesting a hospital bed.  I called [facility] and they require a face-to-face visit.  Teresa said she already had a face-to-face visit with you requarding the hospital bed.  [facility] needs the date of that.  If you could please fax over the face-to-face visit date and hospital bed order to [facility] that would be great.  Their fax # is .    Thank you.

## 2024-10-10 NOTE — TELEPHONE ENCOUNTER
Name from pharmacy: FLUCONAZOLE 150MG TABLETS          Will file in chart as: fluconazole (DIFLUCAN) 150 MG tablet     Possible duplicate: Misty to review recent actions on this medication    Sig: TAKE 1 TABLET(150 MG) BY MOUTH 1 TIME FOR 1 DOSE    Disp: 1 tablet    Refills: 0 (Pharmacy requested: Not specified)    Start: 10/10/2024    Class: E-Prescribe    Non-formulary For: Vaginal candidiasis    Last ordered: 1 week ago (10/1/2024) by Tyra Bullock MD    Last refill: 10/9/2024    Rx #: [id number]    Antifungal Agents Ucmjjq51/10/2024 03:15 AM   Protocol Details Always Fail Criteria          DUPLICATE    Initial (On Arrival)

## 2024-10-11 NOTE — TELEPHONE ENCOUNTER
Message reviewed.    It looks like there are 2 visits where we have discussed hospital bed.  The first was on 3-.  Initial prescription was completed on that day.  This visit was in person.    Second visit where we discussed and evaluated for this was on 9-.  A secondary prescription was completed on that day.  This visit was also in person.    Will route this message to Dee Dee in medical records to fax over both of these visits to [facility].    Tyra Bullock MD    Routed to May, medical records, [facility] arnold MCCARTHY

## 2024-10-16 DIAGNOSIS — G89.4 CHRONIC PAIN SYNDROME: ICD-10-CM

## 2024-10-16 DIAGNOSIS — B37.31 VAGINAL CANDIDIASIS: ICD-10-CM

## 2024-10-17 RX ORDER — FLUCONAZOLE 150 MG/1
150 TABLET ORAL ONCE
Qty: 1 TABLET | Refills: 0 | Status: SHIPPED | OUTPATIENT
Start: 2024-10-17 | End: 2024-10-17

## 2024-10-17 RX ORDER — BACLOFEN 20 MG/1
TABLET ORAL
Qty: 45 TABLET | Refills: 1 | Status: SHIPPED | OUTPATIENT
Start: 2024-10-17 | End: 2024-11-13

## 2024-10-17 NOTE — TELEPHONE ENCOUNTER
Name from pharmacy: FLUCONAZOLE 150MG TABLETS          Will file in chart as: fluconazole (DIFLUCAN) 150 MG tablet     Possible duplicate: Hoida to review recent actions on this medication    Sig: TAKE 1 TABLET(150 MG) BY MOUTH 1 TIME FOR 1 DOSE    Disp: 1 tablet    Refills: 0 (Pharmacy requested: Not specified)    Start: 10/16/2024    Class: E-Prescribe    Non-formulary For: Vaginal candidiasis    Last ordered: 1 week ago (10/10/2024) by Tyra Bullock MD    Last refill: 10/10/2024    Rx #: [id number]    Antifungal Agents Ulfuya28/16/2024 01:48 PM   Protocol Details Always Fail Criteria    Medication is active on med list    Recent (12 mo) or future (90 days) visit within the authorizing provider's specialty       Name from pharmacy: BACLOFEN 20MG TABLETS         Will file in chart as: baclofen (LIORESAL) 20 MG tablet    Sig: TAKE 1 TABLET(20 MG) BY MOUTH THREE TIMES DAILY AS NEEDED FOR MUSCLE SPASMS    Disp: 45 tablet    Refills: 1 (Pharmacy requested: Not specified)    Start: 10/16/2024    Class: E-Prescribe    Non-formulary For: Chronic pain syndrome    Last ordered: 1 month ago (9/10/2024) by Tyra Bullock MD    Last refill: 10/9/2024    Rx #: [id number]

## 2024-10-28 DIAGNOSIS — G43.809 OTHER MIGRAINE WITHOUT STATUS MIGRAINOSUS, NOT INTRACTABLE: ICD-10-CM

## 2024-10-28 RX ORDER — SUMATRIPTAN 50 MG/1
TABLET, FILM COATED ORAL
Qty: 12 TABLET | Refills: 1 | Status: SHIPPED | OUTPATIENT
Start: 2024-10-28

## 2024-10-28 NOTE — TELEPHONE ENCOUNTER
Name from pharmacy: SUMATRIPTAN 50MG TABLETS          Will file in chart as: SUMAtriptan (IMITREX) 50 MG tablet    Sig: TAKE 1 TABLET(50 MG) BY MOUTH AT ONSET OF HEADACHE FOR MIGRAINE    Disp: 12 tablet    Refills: 1 (Pharmacy requested: Not specified)    Start: 10/28/2024    Class: E-Prescribe    Non-formulary For: Other migraine without status migrainosus, not intractable    Last ordered: 2 months ago (8/8/2024) by Tyra Bullock MD    Last refill: 10/9/2024    Rx #: [id number]    Serotonin Agonists Xynyto05/28/2024 03:14 AM   Protocol Details Serotonin Agonist request needs review.        Gael Redman, RN, MSN

## 2024-11-05 ENCOUNTER — TELEPHONE (OUTPATIENT)
Dept: FAMILY MEDICINE | Facility: CLINIC | Age: 54
End: 2024-11-05

## 2024-11-05 NOTE — TELEPHONE ENCOUNTER
Symptoms    Describe your symptoms: Pt states that her endometriosis is acting up.  She is spotting, cramping and having pain. Patient is requesting Tramadol to relieve the pain.    Any pain: Yes: see above    How long have you been having symptoms: today       Have you been seen for this:  Yes: in the past    Appointment offered?: No    Triage offered?: No    Home remedies tried: tylenol heating pad ice pack    Preferred Pharmacy:      [id number] - [place], [place] - [address] [place] [address] & [address] [place] [address]  Phone: [phone] Fax: [phone]       Okay to leave a detailed message?: Yes at Cell number on file:    Telephone Information:   Mobile [phone]       Does patient or caller know when to expect a call? Yes, Nurses will return call within 2-3 business hours.       Mary Ann Giraldo on 11/5/2024 at 11:11 AM

## 2024-11-06 NOTE — TELEPHONE ENCOUNTER
Glad patient will be seen in clinic.  Tramadol is a controlled substance and cannot be filled outside a visit, and we have not previously discussed endometriosis or worked this up and it warrants an appropriate reaction.     Tyra Bullock MD

## 2024-11-07 ENCOUNTER — OFFICE VISIT (OUTPATIENT)
Dept: FAMILY MEDICINE | Facility: CLINIC | Age: 54
End: 2024-11-07
Payer: COMMERCIAL

## 2024-11-07 VITALS
SYSTOLIC BLOOD PRESSURE: 126 MMHG | TEMPERATURE: 98.5 F | HEART RATE: 88 BPM | RESPIRATION RATE: 18 BRPM | OXYGEN SATURATION: 97 % | DIASTOLIC BLOOD PRESSURE: 82 MMHG

## 2024-11-07 DIAGNOSIS — G89.4 CHRONIC PAIN SYNDROME: ICD-10-CM

## 2024-11-07 DIAGNOSIS — N95.0 POSTMENOPAUSAL BLEEDING: Primary | ICD-10-CM

## 2024-11-07 PROBLEM — L03.90 CELLULITIS, UNSPECIFIED CELLULITIS SITE: Status: RESOLVED | Noted: 2022-07-18 | Resolved: 2024-11-07

## 2024-11-07 PROBLEM — R05.9 COUGH: Status: RESOLVED | Noted: 2017-10-17 | Resolved: 2024-11-07

## 2024-11-07 PROBLEM — A41.9 SEPSIS, DUE TO UNSPECIFIED ORGANISM, UNSPECIFIED WHETHER ACUTE ORGAN DYSFUNCTION PRESENT (H): Status: RESOLVED | Noted: 2022-07-18 | Resolved: 2024-11-07

## 2024-11-07 PROBLEM — E11.622 TYPE 2 DIABETES MELLITUS WITH OTHER SKIN ULCER, WITH LONG-TERM CURRENT USE OF INSULIN (H): Status: ACTIVE | Noted: 2018-11-12

## 2024-11-07 PROBLEM — Z79.4 TYPE 2 DIABETES MELLITUS WITH OTHER SKIN ULCER, WITH LONG-TERM CURRENT USE OF INSULIN (H): Status: ACTIVE | Noted: 2018-11-12

## 2024-11-07 LAB
ALBUMIN UR-MCNC: NEGATIVE MG/DL
APPEARANCE UR: CLEAR
BACTERIA #/AREA URNS HPF: NORMAL /HPF
BILIRUB UR QL STRIP: NEGATIVE
COLOR UR AUTO: YELLOW
FSH SERPL IRP2-ACNC: 25.5 MIU/ML
GLUCOSE UR STRIP-MCNC: >=1000 MG/DL
HGB UR QL STRIP: ABNORMAL
KETONES UR STRIP-MCNC: NEGATIVE MG/DL
LEUKOCYTE ESTERASE UR QL STRIP: NEGATIVE
NITRATE UR QL: NEGATIVE
PH UR STRIP: 5.5 [PH] (ref 5–8)
RBC #/AREA URNS AUTO: NORMAL /HPF
SP GR UR STRIP: <=1.005 (ref 1–1.03)
SQUAMOUS #/AREA URNS AUTO: NORMAL /LPF
UROBILINOGEN UR STRIP-ACNC: 0.2 E.U./DL
WBC #/AREA URNS AUTO: NORMAL /HPF

## 2024-11-07 PROCEDURE — 99214 OFFICE O/P EST MOD 30 MIN: CPT | Mod: GC

## 2024-11-07 PROCEDURE — 83001 ASSAY OF GONADOTROPIN (FSH): CPT

## 2024-11-07 PROCEDURE — 36415 COLL VENOUS BLD VENIPUNCTURE: CPT

## 2024-11-07 PROCEDURE — 81001 URINALYSIS AUTO W/SCOPE: CPT

## 2024-11-07 RX ORDER — TRAMADOL HYDROCHLORIDE 50 MG/1
50 TABLET ORAL EVERY 6 HOURS PRN
Qty: 10 TABLET | Refills: 0 | Status: SHIPPED | OUTPATIENT
Start: 2024-11-07 | End: 2024-11-10

## 2024-11-07 RX ORDER — POLYETHYLENE GLYCOL 3350, SODIUM CHLORIDE, SODIUM BICARBONATE, POTASSIUM CHLORIDE 420; 11.2; 5.72; 1.48 G/4L; G/4L; G/4L; G/4L
POWDER, FOR SOLUTION ORAL
COMMUNITY
Start: 2024-05-21

## 2024-11-07 NOTE — PROGRESS NOTES
Assessment & Plan     Postmenopausal bleeding  Teresa presents with postmenopausal uterine bleeding and cramping.  Reports her symptoms started a few days ago and have been occurring every day.  States it feels like a period, but she has not had period in years.  Is certain the blood is coming from her vagina, however, she notices it most after using the bathroom.  Will obtain an FSH to ensure that patient is in fact post-menopausal.  Patient left a urine sample that was negative for infection but positive for blood.  This blood may be from the bladder or the vagina based on anatomical proximity.  Also ordered pelvic ultrasound to evaluate for any uterine abnormalities and uterine stripe presence/thickness.  She is due for a Pap and has a annual visit scheduled with Dr. Bullock in about 1 month where she can get this done.  Encouraged her to get her ultrasound completed prior to the visit with Dr. Bullock.  - US Pelvic Complete with Transvaginal  - Follicle stimulating hormone  - UA Microscopic with Reflex to Culture    Chronic pain syndrome  Teresa has a history of chronic pain syndrome and is taking tramadol in the past for breakthrough pain.  Reports the pain from her cramping is not well-controlled with ibuprofen or Tylenol.  Recommended she heat the area when she is experiencing the cramping and also prescribed 10 tablets of tramadol to be used for breakthrough pain.  Encouraged her to alternate ibuprofen and Tylenol every 4 hours for continuous pain management.  - Alternate Tylenol and ibuprofen every 4 hours  - Apply heat to the area during cramping  - traMADol (ULTRAM) 50 MG tablet  Dispense: 10 tablet; Refill: 0    No follow-ups on file.    Subjective   Teresa is a 54 year old, presenting for the following health issues:  No chief complaint on file.    Teresa presents with postmenopausal uterine bleeding.  Patient notes that for the past few days she has had cramping and a small amount of bleeding from  her vagina as if she was on her period.  However, she has not had a period in years and this is concerning to her.  Has tried Tylenol and ibuprofen for pain that provided minimal relief.  Patient is certain that the blood is coming from her vagina, although she notices it mainly after stooling and/or urinating.    ROS: 10 point ROS neg other than the symptoms noted above in the HPI.       Objective    There were no vitals taken for this visit.  There is no height or weight on file to calculate BMI.  Physical Exam  Vitals reviewed.   Constitutional:       General: She is not in acute distress.     Appearance: She is not ill-appearing or toxic-appearing.   HENT:      Head: Normocephalic and atraumatic.      Right Ear: External ear normal.      Left Ear: External ear normal.   Eyes:      Extraocular Movements: Extraocular movements intact.      Conjunctiva/sclera: Conjunctivae normal.   Pulmonary:      Effort: Pulmonary effort is normal. No respiratory distress.   Musculoskeletal:      Comments: Wheelchair-bound   Neurological:      Mental Status: She is alert and oriented to person, place, and time. Mental status is at baseline.   Psychiatric:         Mood and Affect: Mood normal.         Behavior: Behavior normal.         Thought Content: Thought content normal.         Judgment: Judgment normal.          Results for orders placed or performed in visit on 11/07/24 (from the past 24 hours)   UA with Microscopic reflex to Culture - Clinic Collect    Specimen: Urine, Clean Catch   Result Value Ref Range    Color Urine Yellow Colorless, Straw, Light Yellow, Yellow    Appearance Urine Clear Clear    Glucose Urine >=1000 (A) Negative mg/dL    Bilirubin Urine Negative Negative    Ketones Urine Negative Negative mg/dL    Specific Gravity Urine <=1.005 1.005 - 1.030    Blood Urine Moderate (A) Negative    pH Urine 5.5 5.0 - 8.0    Protein Albumin Urine Negative Negative mg/dL    Urobilinogen Urine 0.2 0.2, 1.0 E.U./dL     Nitrite Urine Negative Negative    Leukocyte Esterase Urine Negative Negative   UA Microscopic with Reflex to Culture   Result Value Ref Range    Bacteria Urine None Seen None Seen /HPF    RBC Urine None Seen 0-2 /HPF /HPF    WBC Urine None Seen 0-5 /HPF /HPF    Squamous Epithelials Urine None Seen None Seen /LPF    Narrative    Urine Culture not indicated     *Note: Due to a large number of results and/or encounters for the requested time period, some results have not been displayed. A complete set of results can be found in Results Review.           Signed Electronically by: Frandy Avitia DO

## 2024-11-07 NOTE — PATIENT INSTRUCTIONS
Thank you for coming in to see us today!    - Someone will call you to schedule the ultrasound  - I will call you regarding your urine and bloodwork  - Follow up as scheduled with Dr. Kobe Avitia, DO

## 2024-11-12 NOTE — TELEPHONE ENCOUNTER
Received notification 11/12/24 via fax from [facility] that hospital bed is not covered through patient's insurance.  Perhaps her CASDI  could see if the CAD[employer] waivier might cover the hospital bed?    Tyra Bullock MD    Routed to LAURA Gloria.

## 2024-11-12 NOTE — TELEPHONE ENCOUNTER
Called CARIDAD  at .  Left message to see if she able to submit this via waiver for the hospital bed.      Also called patient notify her the DME Hospital Bed [facility] decline.    Faizan Lacey MA

## 2024-11-13 DIAGNOSIS — G89.4 CHRONIC PAIN SYNDROME: ICD-10-CM

## 2024-11-13 RX ORDER — BACLOFEN 20 MG/1
TABLET ORAL
Qty: 45 TABLET | Refills: 1 | Status: SHIPPED | OUTPATIENT
Start: 2024-11-13

## 2024-11-14 ENCOUNTER — HOSPITAL ENCOUNTER (OUTPATIENT)
Dept: ULTRASOUND IMAGING | Facility: CLINIC | Age: 54
Discharge: HOME | End: 2024-11-14
Attending: FAMILY MEDICINE
Payer: COMMERCIAL

## 2024-11-14 DIAGNOSIS — N95.0 POSTMENOPAUSAL BLEEDING: ICD-10-CM

## 2024-11-14 PROCEDURE — 76856 US EXAM PELVIC COMPLETE: CPT

## 2024-11-18 ENCOUNTER — TELEPHONE (OUTPATIENT)
Dept: FAMILY MEDICINE | Facility: CLINIC | Age: 54
End: 2024-11-18
Payer: COMMERCIAL

## 2024-11-18 DIAGNOSIS — J40 BRONCHITIS: ICD-10-CM

## 2024-11-18 DIAGNOSIS — R05.1 ACUTE COUGH: ICD-10-CM

## 2024-11-18 DIAGNOSIS — M53.3 PAIN IN THE COCCYX: ICD-10-CM

## 2024-11-18 RX ORDER — PSEUDOEPHED/ACETAMINOPH/DIPHEN 30MG-500MG
TABLET ORAL
Qty: 100 TABLET | Refills: 3 | Status: SHIPPED | OUTPATIENT
Start: 2024-11-18

## 2024-11-18 RX ORDER — PREDNISONE 20 MG/1
TABLET ORAL
Qty: 10 TABLET | Refills: 0 | OUTPATIENT
Start: 2024-11-18

## 2024-11-18 NOTE — TELEPHONE ENCOUNTER
Name from pharmacy: ACETAMINOPHEN 500MG E/S CAPLETS         Will file in chart as: acetaminophen (TYLENOL) 500 MG tablet    The original prescription was reordered on 11/18/2024 by Tyra Bullock MD. Renewing this prescription may not be appropriate.     Possible duplicate: Hover to review recent actions on this medication    Sig: TAKE 1 TO 2 TABLETS(500 TO 1000 MG) BY MOUTH EVERY 6 HOURS AS NEEDED FOR MILD PAIN    Disp: 100 tablet    Refills: 3 (Pharmacy requested: Not specified)    Start: 11/18/2024    Class: E-Prescribe    Non-formulary For: Pain in the coccyx    Last ordered: 8 months ago (3/7/2024) by Tyra Bullock MD    Last refill: 12/26/2023    Rx #: [id number]    Analgesics (Non-Narcotic Tylenol and ASA Only) Wvocbs6911/18/2024 10:52 AM   Protocol Details Patient is 7 months old or older    Medication is active on med list    Medication matches indication    Recent (12 mo) or future (90 days) visit within the authorizing provider's specialty          Gael Redman, RN, MSN

## 2024-11-18 NOTE — TELEPHONE ENCOUNTER
Medication Question or Refill        What medication are you calling about (include dose and sig)?: guaiFENesin-codeine (ROBITUSSIN AC) 100-10 MG/5ML solution- Take 5-10 mLs by mouth every 4 hours as needed for cough. - Oral     Preferred Pharmacy:       [id number] - [place], [place] - [address] [facility] [address] [facility] [address] [facility] [address]  [address] [place] [address]  Phone: [phone] Fax: [phone]          Controlled Substance Agreement on file:   CSA -- Patient Level:    CSA: None found at the patient level.       Who prescribed the medication?: Kobe    Do you need a refill? No    When did you use the medication last? Have not yet started    Patient offered an appointment? No    Do you have any questions or concerns?  Yes: medication is not covered by insurance, please prescribe something else.      Okay to leave a detailed message?: Yes at Cell number on file:    Telephone Information:   Mobile [phone]       Does patient or caller know when to expect a call? Yes, PCS will return call within 24 business hours.       Gale Lacey on 11/18/2024 at 1:19 PM

## 2024-11-19 DIAGNOSIS — R10.84 ABDOMINAL PAIN, GENERALIZED: ICD-10-CM

## 2024-11-19 RX ORDER — GUAIFENESIN/DEXTROMETHORPHAN 100-10MG/5
10 SYRUP ORAL EVERY 4 HOURS PRN
Qty: 236 ML | Refills: 1 | Status: SHIPPED | OUTPATIENT
Start: 2024-11-19

## 2024-11-19 RX ORDER — BENZONATATE 200 MG/1
200 CAPSULE ORAL 3 TIMES DAILY PRN
Qty: 30 CAPSULE | Refills: 0 | Status: SHIPPED | OUTPATIENT
Start: 2024-11-19

## 2024-11-19 RX ORDER — CODEINE PHOSPHATE AND GUAIFENESIN 10; 100 MG/5ML; MG/5ML
1-2 SOLUTION ORAL EVERY 4 HOURS PRN
Qty: 237 ML | Refills: 0 | OUTPATIENT
Start: 2024-11-19

## 2024-11-19 RX ORDER — LIDOCAINE 50 MG/G
PATCH TOPICAL
Qty: 30 PATCH | Refills: 1 | Status: SHIPPED | OUTPATIENT
Start: 2024-11-19

## 2024-11-19 NOTE — TELEPHONE ENCOUNTER
Alternative cough syrup sent.  Again, not sure if this will be covered, but we can try.  Will also try and send in tessalon perls. These may not be covered, but it is worth a try.  These are the end of the options I have to send in for her.     Tyra Bullock MD    Routed to LAURA Gloria.

## 2024-11-19 NOTE — TELEPHONE ENCOUNTER
Name from pharmacy: LIDOCAINE 5% PATCH         Will file in chart as: lidocaine (LIDODERM) 5 % patch    Sig: APPLY 1 PATCH TOPICALLY TO THE AFFECTED AREA AND LEAVE ON FOR 12 HOURS WITHIN A 24 HOUR PERIOD    Disp: 30 patch    Refills: 1    Start: 11/19/2024    Class: E-Prescribe    Non-formulary For: Abdominal pain, generalized    Last ordered: 4 months ago (7/11/2024) by Tyra Bullock MD    Last refill: 9/9/2024    Rx #: [id number]    Topical Anesthetic Agents Fphklb4511/19/2024 03:15 PM   Protocol Details Recent (12 mo) or future (30 days) visit within the authorizing provider's specialty    Medication is active on med list    Patient is age 2 or older          Prescription approved per [facility] Refill Protocol.    Gael Redman RN, MSN

## 2024-12-04 ENCOUNTER — TELEPHONE (OUTPATIENT)
Dept: CARE COORDINATION | Facility: CLINIC | Age: 54
End: 2024-12-04

## 2024-12-04 NOTE — TELEPHONE ENCOUNTER
Care Coordination: 12/4/2024    DME Order Number: [id number]  Device: Hospital Bed  Vendor: [employer]  Fax: [phone]    Diego Infante Sr.   Care Coordination  [address] [address]  [email or web address]   Office: [phone] Direct: [phone]  [facility]

## 2024-12-05 DIAGNOSIS — M25.511 ACUTE PAIN OF RIGHT SHOULDER: ICD-10-CM

## 2024-12-05 RX ORDER — IBUPROFEN 400 MG/1
800 TABLET, FILM COATED ORAL EVERY 8 HOURS PRN
Qty: 42 TABLET | Refills: 0 | Status: SHIPPED | OUTPATIENT
Start: 2024-12-05

## 2024-12-05 NOTE — TELEPHONE ENCOUNTER
Name from pharmacy: IBUPROFEN 400MG TABLETS         Will file in chart as: ibuprofen (ADVIL/MOTRIN) 400 MG tablet    The original prescription was discontinued on 2/22/2024 by Tyra Bullock MD. Renewing this prescription may not be appropriate.    Sig: TAKE 1 TABLET(400 MG) BY MOUTH THREE TIMES DAILY    Disp: 42 tablet    Refills: 0 (Pharmacy requested: Not specified)    Start: 12/5/2024    Class: E-Prescribe    Non-formulary For: Acute pain of right shoulder    Last ordered: 11 months ago (12/27/2023) by Tyra Bullock MD    Last refill: 12/27/2023    Rx #: [id number]    NSAID Medications Crdbgg4112/05/2024 10:43 AM   Protocol Details Normal CBC on file in past 12 months    Medication is active on med list    Always Fail Criteria - Chart Review Required          Gael Redman, RN, MSN

## 2024-12-07 DIAGNOSIS — E11.65 TYPE 2 DIABETES MELLITUS WITH HYPERGLYCEMIA, WITH LONG-TERM CURRENT USE OF INSULIN (H): ICD-10-CM

## 2024-12-07 DIAGNOSIS — Z79.4 TYPE 2 DIABETES MELLITUS WITH HYPERGLYCEMIA, WITH LONG-TERM CURRENT USE OF INSULIN (H): ICD-10-CM

## 2024-12-09 RX ORDER — EMPAGLIFLOZIN 25 MG/1
25 TABLET, FILM COATED ORAL DAILY
Qty: 90 TABLET | Refills: 3 | Status: SHIPPED | OUTPATIENT
Start: 2024-12-09

## 2024-12-10 ENCOUNTER — VIRTUAL VISIT (OUTPATIENT)
Dept: FAMILY MEDICINE | Facility: CLINIC | Age: 54
End: 2024-12-10
Payer: COMMERCIAL

## 2024-12-10 DIAGNOSIS — G89.4 CHRONIC PAIN SYNDROME: ICD-10-CM

## 2024-12-10 DIAGNOSIS — Z92.89 HISTORY OF ENDOMETRIAL BIOPSY: ICD-10-CM

## 2024-12-10 DIAGNOSIS — N95.0 POST-MENOPAUSAL BLEEDING: Primary | ICD-10-CM

## 2024-12-10 DIAGNOSIS — B37.31 YEAST INFECTION OF THE VAGINA: ICD-10-CM

## 2024-12-10 RX ORDER — TRAMADOL HYDROCHLORIDE 50 MG/1
50 TABLET ORAL EVERY 6 HOURS PRN
Qty: 10 TABLET | Refills: 0 | Status: SHIPPED | OUTPATIENT
Start: 2024-12-10 | End: 2024-12-10

## 2024-12-10 RX ORDER — TRAMADOL HYDROCHLORIDE 50 MG/1
50 TABLET ORAL EVERY 6 HOURS PRN
Qty: 10 TABLET | Refills: 0 | Status: SHIPPED | OUTPATIENT
Start: 2024-12-10

## 2024-12-10 RX ORDER — TRAMADOL HYDROCHLORIDE 50 MG/1
50 TABLET ORAL EVERY 6 HOURS PRN
Qty: 10 TABLET | OUTPATIENT
Start: 2024-12-10

## 2024-12-10 RX ORDER — TRAMADOL HYDROCHLORIDE 50 MG/1
50 TABLET ORAL EVERY 6 HOURS PRN
Qty: 5 TABLET | Refills: 0 | Status: SHIPPED | OUTPATIENT
Start: 2024-12-10 | End: 2024-12-10

## 2024-12-10 NOTE — PATIENT INSTRUCTIONS
Nice to see you today!    Here's what we talked about:  - I have sent 5 more tablets of Tramadol to the pharmacy. Please keep taking ibuprofen and Tylenol and using heat/cold packs. The pain should get better.  - You can finish the last dose of Diflucan.  - Please come back in 1 month to see Dr. Bullock.

## 2024-12-10 NOTE — TELEPHONE ENCOUNTER
Discussed with Dr. Rivera that pt was seen today and is at the pharmacy waiting. Pt would like medication send to [facility] on [address] instead.     Dr. Rivera resent medication.     JUNIOR Rodrigues on 12/10/2024 at 3:05 PM

## 2024-12-10 NOTE — PROGRESS NOTES
Preceptor Attestation:   I discussed the patient with the resident. Patient seen and evaluated via video visit. I have verified the content of the note, which accurately reflects my assessment of the patient and the plan of care.   Supervising Physician:  Alber Rivera MD.

## 2024-12-10 NOTE — PROGRESS NOTES
Teresa is a 54 year old who is being evaluated via a billable video visit.    How would you like to obtain your AVS? Mail a copy  If the video visit is dropped, the invitation should be resent by: Text to cell phone: [phone]  Will anyone else be joining your video visit? No    Assessment & Plan     Post-menopausal bleeding  History of endometrial biopsy  Chronic pain syndrome  Patient continues to have some cramping after EMB done on 12/4.  Has been taking ibuprofen/Tylenol and using heat/cold packs but also using tramadol as needed for pain.  Pain is overall getting better.  Reassuring that bleeding after EMB has stopped several days ago.  Discussed with patient that I will give her 5 more tablets of tramadol for now as pain should overall improve.  She is aware of the risks of taking tramadol with a benzodiazepine and has Narcan at home.  Is due for preop for colonoscopy next month as well as 1 month follow-up with PCP.  Would recommend getting CSA at a future date given monthly tramadol prescriptions.  PDMP reviewed and appropriate.  - traMADol (ULTRAM) 50 MG tablet  Dispense: 5 tablet; Refill: 0    Yeast infection of the vagina  Unable to  miconazole due to cost but did  oral Diflucan.  Encouraged patient to take last dose of Diflucan as symptoms are improving.      Return in about 4 weeks (around 1/7/2025) for Follow up.    Subjective   Teresa is a 54 year old, presenting for the following health issues:  Pain (Having pain since last Wednesday )    Video Start Time: 8:36 AM    HPI   Patient underwent Pap smear/HPV and endometrial biopsy on 12/4.    Pap and HPV negative, biopsy results pending - informed patient.    She was able to  3 tablets of Diflucan for yeast infection but wasn't able to pay for miconazole. Took 2 tablets of Diflucan so far and symptoms are getting better.    Has had more cramping since EMB. Has been alternating ibuprofen and Tylenol and using heat/cold packs with  some relief. Has also been taking Tramadol - was sent home with 10 tablets on 12/4. Was initially taking it every 6 hours, then every morning and night. Ran out yesterday. States cramping is worse with activity. Pain is getting better.    Bleeding from EMB stopped several days ago.        Objective           Vitals:  No vitals were obtained today due to virtual visit.    Physical Exam   GENERAL: alert and no distress  EYES: Eyes grossly normal to inspection.  No discharge or erythema, or obvious scleral/conjunctival abnormalities.  RESP: No audible wheeze, cough, or visible cyanosis.    SKIN: Visible skin clear. No significant rash, abnormal pigmentation or lesions.  NEURO: Cranial nerves grossly intact.  Mentation and speech appropriate for age.  PSYCH: Appropriate affect, tone, and pace of words      Video-Visit Details    Type of service:  Video Visit   Video End Time:9:00 AM  Originating Location (pt. Location): Home  Distant Location (provider location):  On-site  Platform used for Video Visit: [facility]    Patient precepted with Alber Rivera MD.    Signed Electronically by: Blossom Grimes MD

## 2024-12-19 DIAGNOSIS — N89.8 VAGINAL DISCHARGE: ICD-10-CM

## 2024-12-19 RX ORDER — FLUCONAZOLE 150 MG/1
TABLET ORAL
Qty: 3 TABLET | Refills: 0 | Status: SHIPPED | OUTPATIENT
Start: 2024-12-19

## 2024-12-19 NOTE — TELEPHONE ENCOUNTER
Name from pharmacy: FLUCONAZOLE 150MG TABLETS          Will file in chart as: fluconazole (DIFLUCAN) 150 MG tablet    Sig: TAKE 1 TABLET(150 MG) BY MOUTH EVERY 3 DAYS FOR 3 DOSES    Disp: 3 tablet    Refills: 0 (Pharmacy requested: Not specified)    Start: 12/19/2024    Class: E-Prescribe    Non-formulary For: Vaginal discharge    Last ordered: 2 weeks ago (12/5/2024) by Tyra Bullock MD    Last refill: 12/5/2024    Rx #: [id number]    Antifungal Agents Bupmqc4712/19/2024 10:11 AM   Protocol Details Medication indicated for associated diagnosis    Medication is active on med list    Recent (12 mo) or future (90 days) visit within the authorizing provider's specialty   Azoles (Oral) Failed   Protocol Details Most recent CrCl is >50 ml/min if drug is Fluconazole    Medication indicated for associated diagnosis    Liver Function Panel within the past 6 months          Routed to provider due to failed RN protocol.       Gael eRdman, RN, MSN

## 2024-12-27 ENCOUNTER — OFFICE VISIT (OUTPATIENT)
Dept: FAMILY MEDICINE | Facility: CLINIC | Age: 54
End: 2024-12-27
Payer: COMMERCIAL

## 2024-12-27 VITALS
DIASTOLIC BLOOD PRESSURE: 70 MMHG | RESPIRATION RATE: 12 BRPM | HEART RATE: 104 BPM | OXYGEN SATURATION: 98 % | TEMPERATURE: 97.9 F | SYSTOLIC BLOOD PRESSURE: 103 MMHG

## 2024-12-27 DIAGNOSIS — Z01.818 PREOP GENERAL PHYSICAL EXAM: Primary | ICD-10-CM

## 2024-12-27 DIAGNOSIS — J22 LOWER RESPIRATORY INFECTION: ICD-10-CM

## 2024-12-27 DIAGNOSIS — F12.90 CANNABIS USE DISORDER: ICD-10-CM

## 2024-12-27 DIAGNOSIS — Z11.3 SCREEN FOR SEXUALLY TRANSMITTED DISEASES: ICD-10-CM

## 2024-12-27 DIAGNOSIS — E11.622 TYPE 2 DIABETES MELLITUS WITH OTHER SKIN ULCER, WITH LONG-TERM CURRENT USE OF INSULIN (H): ICD-10-CM

## 2024-12-27 DIAGNOSIS — Z89.512 ACQUIRED ABSENCE OF LEFT LEG BELOW KNEE (H): ICD-10-CM

## 2024-12-27 DIAGNOSIS — Z79.4 TYPE 2 DIABETES MELLITUS WITH OTHER SKIN ULCER, WITH LONG-TERM CURRENT USE OF INSULIN (H): ICD-10-CM

## 2024-12-27 DIAGNOSIS — H66.012 NON-RECURRENT ACUTE SUPPURATIVE OTITIS MEDIA OF LEFT EAR WITH SPONTANEOUS RUPTURE OF TYMPANIC MEMBRANE: ICD-10-CM

## 2024-12-27 LAB
ANION GAP SERPL CALCULATED.3IONS-SCNC: 14 MMOL/L (ref 7–15)
BUN SERPL-MCNC: 17.9 MG/DL (ref 6–20)
CALCIUM SERPL-MCNC: 9.4 MG/DL (ref 8.8–10.4)
CHLORIDE SERPL-SCNC: 100 MMOL/L (ref 98–107)
CREAT SERPL-MCNC: 0.56 MG/DL (ref 0.51–0.95)
EGFRCR SERPLBLD CKD-EPI 2021: >90 ML/MIN/1.73M2
ERYTHROCYTE [DISTWIDTH] IN BLOOD BY AUTOMATED COUNT: 12.9 % (ref 10–15)
EST. AVERAGE GLUCOSE BLD GHB EST-MCNC: 206 MG/DL
FLUAV RNA SPEC QL NAA+PROBE: NEGATIVE
FLUBV RNA RESP QL NAA+PROBE: NEGATIVE
GLUCOSE SERPL-MCNC: 195 MG/DL (ref 70–99)
HBA1C MFR BLD: 8.8 % (ref 0–5.6)
HCO3 SERPL-SCNC: 20 MMOL/L (ref 22–29)
HCT VFR BLD AUTO: 42.2 % (ref 35–47)
HCV AB SERPL QL IA: NONREACTIVE
HGB BLD-MCNC: 13.9 G/DL (ref 11.7–15.7)
MCH RBC QN AUTO: 29.3 PG (ref 26.5–33)
MCHC RBC AUTO-ENTMCNC: 32.9 G/DL (ref 31.5–36.5)
MCV RBC AUTO: 89 FL (ref 78–100)
PLATELET # BLD AUTO: 226 10E3/UL (ref 150–450)
POTASSIUM SERPL-SCNC: 4.4 MMOL/L (ref 3.4–5.3)
RBC # BLD AUTO: 4.75 10E6/UL (ref 3.8–5.2)
RSV RNA SPEC NAA+PROBE: NEGATIVE
SARS-COV-2 RNA RESP QL NAA+PROBE: NEGATIVE
SODIUM SERPL-SCNC: 134 MMOL/L (ref 135–145)
T PALLIDUM AB SER QL: NONREACTIVE
WBC # BLD AUTO: 11 10E3/UL (ref 4–11)

## 2024-12-27 RX ORDER — TRAMADOL HYDROCHLORIDE 50 MG/1
50 TABLET ORAL EVERY 6 HOURS PRN
Qty: 5 TABLET | Refills: 0 | Status: SHIPPED | OUTPATIENT
Start: 2024-12-27 | End: 2024-12-30

## 2024-12-27 RX ORDER — PREDNISONE 20 MG/1
40 TABLET ORAL DAILY
Qty: 10 TABLET | Refills: 0 | Status: SHIPPED | OUTPATIENT
Start: 2024-12-27 | End: 2025-01-01

## 2024-12-27 RX ORDER — CEFDINIR 300 MG/1
300 CAPSULE ORAL 2 TIMES DAILY
Qty: 20 CAPSULE | Refills: 0 | Status: SHIPPED | OUTPATIENT
Start: 2024-12-27 | End: 2025-01-06

## 2024-12-27 NOTE — PROGRESS NOTES
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 tablet TAKE 1 TABLET(50 MG) BY MOUTH TWICE DAILY AS NEEDED FOR MODERATE PAIN 90 tablet 1    EPINEPHrine (ANY BX GENERIC EQUIV) 0.3 MG/0.3ML injection 2-pack INJECT 1 PEN IN THE MUSCLE ONE TIME AS DIRECTED 2 each 1    exenatide ER (BYDUREON BCISE) 2 MG/0.85ML auto-injector INJECT 2MG SUBCUTANEOUSLY EVERY 7 DAYS 0.85 mL 3    fluconazole (DIFLUCAN) 150 MG tablet TAKE 1 TABLET(150 MG) BY MOUTH EVERY 3 DAYS FOR 3 DOSES 3 tablet 0    gabapentin (NEURONTIN) 600 MG tablet TAKE 1 TABLET(600 MG) BY MOUTH THREE TIMES DAILY 90 tablet 3    GAVILYTE-G 236 g suspension MIX AND DRINK AS DIRECTED PER PATIENT INSTRUCTIONS      guaiFENesin-codeine (ROBITUSSIN AC) 100-10 MG/5ML solution Take 5-10 mLs by mouth every 4 hours as needed for cough. 237 mL 0    guaiFENesin-dextromethorphan (ROBITUSSIN DM) 100-10 MG/5ML syrup Take 10 mLs by mouth every 4 hours as needed for cough. 236 mL 1    ibuprofen (ADVIL/MOTRIN) 400 MG tablet Take 2 tablets (800 mg) by mouth every 8 hours as needed for moderate pain. 42 tablet 0    insulin lispro (HUMALOG KWIKPEN) 100  UNIT/ML (1 unit dial) KWIKPEN INJECT UP TO 90 UNITS SUBCUTANEOUS DAILY PER SLIDING SCALE AS DIRECTED 75 mL 3    insulin pen needle (B-D U/F) 31G X 5 MM miscellaneous Use 4 times daily or as directed. 100 each 3    JARDIANCE 25 MG TABS tablet TAKE 1 TABLET BY MOUTH EVERY DAY 90 tablet 3    lidocaine (LIDODERM) 5 % patch APPLY 1 PATCH TOPICALLY TO THE AFFECTED AREA AND LEAVE ON FOR 12 HOURS WITHIN A 24 HOUR PERIOD 30 patch 1    lidocaine (XYLOCAINE) 5 % external ointment APPLY TOPICALLY THREE TIMES A DAY AS NEEDED FOR MODERATE PAIN IN SHOULDER 35.44 g 10    lisinopril (ZESTRIL) 5 MG tablet TAKE 1 TABLET(5 MG) BY MOUTH AT BEDTIME 90 tablet 1    loperamide (IMODIUM) 2 MG capsule TAKE 1 TABLET (2 MG) BY MOUTH 4 TIMES DAILY AS NEEDED FOR DIARRHEA 100 capsule 11    LORazepam (ATIVAN) 0.5 MG tablet Take 1 tablet (0.5 mg) by mouth 3 times daily      Menthol, Topical Analgesic, (BIOFREEZE) 4 % GEL Externally apply topically 2 times daily as needed (muscle soreness) 74 mL 3    metoclopramide (REGLAN) 5 MG tablet Take 1 tablet (5 mg) by mouth 3 times daily as needed (stomach pain, nausea, vomiting). 45 tablet 1    metoprolol succinate ER (TOPROL XL) 50 MG 24 hr tablet TAKE 1 TABLET(50 MG) BY MOUTH DAILY 90 tablet 1    miconazole (MICONAZOLE 7) 2 % cream Place 1 applicator vaginally at bedtime. 45 g 0    montelukast (SINGULAIR) 10 MG tablet TAKE 1 TABLET(10 MG) BY MOUTH AT BEDTIME 30 tablet 5    nystatin (MYCOSTATIN) 156168 UNIT/GM external powder Apply topically 3 times daily as needed for other (intertrigo). 60 g 0    pantoprazole (PROTONIX) 40 MG EC tablet TAKE 1 TABLET BY MOUTH EVERY DAY 90 tablet 1    polyethylene glycol-electrolytes (NULYTELY) 420 g solution MIX AND DRINK AS DIRECTED PER PATIENT INSTRUCTIONS      pramipexole (MIRAPEX) 0.75 MG tablet TAKE 1 TABLET BY MOUTH EVERY NIGHT AT BEDTIME 90 tablet 1    pravastatin (PRAVACHOL) 80 MG tablet TAKE 1 TABLET BY MOUTH EVERY DAY 90 tablet 3    prazosin (MINIPRESS) 1 MG  "capsule       QUEtiapine (SEROQUEL) 400 MG tablet TAKE 1 TABLET (400 MG) BY MOUTH AT BEDTIME 30 tablet 0    senna-docusate (SENOKOT-S/PERICOLACE) 8.6-50 MG tablet Take 1 tablet by mouth 2 times daily as needed for constipation 30 tablet 0    SPIRIVA RESPIMAT 2.5 MCG/ACT inhaler INHALE TWO (2) PUFFS BY MOUTH DAILY 4 g 10    SUMAtriptan (IMITREX) 50 MG tablet TAKE 1 TABLET(50 MG) BY MOUTH AT ONSET OF HEADACHE FOR MIGRAINE 12 tablet 1    SYMBICORT 160-4.5 MCG/ACT Inhaler Inhale 2 puffs into the lungs two times daily. Inhale 2 puffs twice daily plus 1-2 puffs as needed. May use up to 12 puffs per day. 20.4 g 11    TOUJEO SOLOSTAR 300 UNIT/ML (1 units dial) pen INJECT 55 Units BID      traMADol (ULTRAM) 50 MG tablet Take 1 tablet (50 mg) by mouth every 6 hours as needed for severe pain. 10 tablet 0    venlafaxine (EFFEXOR XR) 150 MG 24 hr capsule TAKE 1 CAPSULE BY MOUTH EVERY DAY 30 capsule 0       Allergies   Allergen Reactions    Amoxicillin-Pot Clavulanate Nausea and Vomiting and Hives     10/9/23 meropenem at [facility] being tolerated     Bee Venom Anaphylaxis    Nuts Anaphylaxis    Doxycycline Rash    Abilify Discmelt     Animal Dander Other (See Comments)     asthma    Aripiprazole      Other Reaction(s): Irregular heartbeat    Atorvastatin      Liver enzyme increase     Contrast Dye Other (See Comments)     Patient had normal reaction to contrast dye, flushed/warm/wetting pants feeling. This Allergy needs to be removed from her chart. -AVW 11/13/21    Metformin Difficulty breathing     \"throat swelling and elevated liver enzymes\"    Naproxen Hives    Niacin     Selegiline     Valium [Diazepam] Other (See Comments)     rage    Zocor [Simvastatin - High Dose]         Social History     Tobacco Use    Smoking status: Every Day     Current packs/day: 0.50     Average packs/day: 0.5 packs/day for 33.0 years (16.5 ttl pk-yrs)     Types: Cigarettes    Smokeless tobacco: Never    Tobacco comments:     Seen IP by TTS on " "7/22/22 and declined counseling and resource materials   Substance Use Topics    Alcohol use: No     Alcohol/week: 0.0 standard drinks of alcohol       History   Drug Use    Types: Marijuana     Comment: \"A little marijuana here and there\" H?O cocaine use, currently sober             Review of Systems  CONSTITUTIONAL: POSITIVE for chills x 5 days  INTEGUMENTARY/SKIN: NEGATIVE for worrisome rashes, moles or lesions  EYES: POSITIVE for blurred vision.  ENT/MOUTH: as above  RESP: as above  BREAST: NEGATIVE for masses, tenderness or discharge  CV: NEGATIVE for chest pain with daily activities, palpitations or peripheral edema  GI: as above  : POSITIVE for urinary leakage  MUSCULOSKELETAL: POSITIVE for joint pain  NEURO:POSITIVE for post-CVA weakness  ENDOCRINE: NEGATIVE for temperature intolerance, skin/hair changes  HEME: POSITIVE for clopidogrel  PSYCHIATRIC: POSITIVE for bipolar disorder    Objective    /70 (BP Location: Left arm, Patient Position: Sitting, Cuff Size: Adult Large)   Pulse 104   Temp 97.9  F (36.6  C) (Oral)   Resp 12   SpO2 98%    Estimated body mass index is 38.04 kg/m  as calculated from the following:    Height as of 10/9/23: 1.524 m (5').    Weight as of 9/18/24: 88.4 kg (194 lb 12.8 oz).  Physical Exam  Clinically appears morbidly obese.  Seated in wheelchair.  EYES: Eyes grossly normal to inspection, PERRL and conjunctivae and sclerae normal  HENT: bulging erythematous opaque L TM c/w ASOM,  R TM satisfactory  NECK: no adenopathy, no asymmetry, masses, or scars  RESP: decreased AE throughout with intermittent rhonchus clears with coughing  CV: very distant heart sounds, mild tachycardia, regular rate and rhythm, normal S1 S2, no S3 or S4, no murmur, click or rub, no peripheral edema  ABDOMEN: soft, nontender, no hepatosplenomegaly, no masses and bowel sounds normal.  Healed previous anterior abdominal sores  MS: Absent left leg in wheelchair no gross musculoskeletal defects noted, " no edema  SKIN: no suspicious lesions or rashes  NEURO: Normal strength and tone, mentation intact and speech normal  PSYCH: mentation appears normal, affect normal/bright    Diagnostics    EKG: appears normal, NSR, normal axis, normal intervals, no acute ST/T changes c/w ischemia, unchanged from previous tracings    Labs below reviewed 12/30/24 - suboptimal DM2 control but OK to proceed with procedure, good renal function, no anemia.    Results for orders placed or performed in visit on 12/27/24   Influenza A/B, RSV and SARS-CoV2 PCR (COVID-19) Nose     Status: Normal    Specimen: Nose; Swab   Result Value Ref Range    Influenza A PCR Negative Negative    Influenza B PCR Negative Negative    RSV PCR Negative Negative    SARS CoV2 PCR Negative Negative    Narrative    Testing was performed using the Xpert Xpress CoV2/Flu/RSV Assay on the Cepheid GeneXpert Instrument. This test should be ordered for the detection of SARS-CoV2, influenza, and RSV viruses in individuals with signs and symptoms of respiratory tract infection. This test is for in vitro diagnostic use under the [place] FDA for laboratories certified under CLIA to perform high or moderate complexity testing. This test has been [place] FDA cleared. A negative result does not rule out the presence of PCR inhibitors in the specimen or target RNA in concentration below the limit of detection for the assay. If only one viral target is positive but coinfection with multiple targets is suspected, the sample should be re-tested with another FDA cleared, approved, or authorized test, if coninfection would change clinical management. This test was validated by the [facility]. These laboratories are certified under the Clinical Laboratory Improvement Amendments of 1988 (CLIA-88) as qualified to perfom high complexity laboratory testing.   Treponema Abs w Reflex to RPR and Titer     Status: Normal   Result Value Ref Range    Treponema Antibody Total  Nonreactive Nonreactive   Hepatitis C Screen Reflex to HCV RNA Quant and Genotype     Status: Normal   Result Value Ref Range    Hepatitis C Antibody Nonreactive Nonreactive   Hemoglobin A1c     Status: Abnormal   Result Value Ref Range    Estimated Average Glucose 206 (H) <117 mg/dL    Hemoglobin A1C 8.8 (H) 0.0 - 5.6 %   Basic metabolic panel     Status: Abnormal   Result Value Ref Range    Sodium 134 (L) 135 - 145 mmol/L    Potassium 4.4 3.4 - 5.3 mmol/L    Chloride 100 98 - 107 mmol/L    Carbon Dioxide (CO2) 20 (L) 22 - 29 mmol/L    Anion Gap 14 7 - 15 mmol/L    Urea Nitrogen 17.9 6.0 - 20.0 mg/dL    Creatinine 0.56 0.51 - 0.95 mg/dL    GFR Estimate >90 >60 mL/min/1.73m2    Calcium 9.4 8.8 - 10.4 mg/dL    Glucose 195 (H) 70 - 99 mg/dL   CBC with platelets     Status: Normal   Result Value Ref Range    WBC Count 11.0 4.0 - 11.0 10e3/uL    RBC Count 4.75 3.80 - 5.20 10e6/uL    Hemoglobin 13.9 11.7 - 15.7 g/dL    Hematocrit 42.2 35.0 - 47.0 %    MCV 89 78 - 100 fL    MCH 29.3 26.5 - 33.0 pg    MCHC 32.9 31.5 - 36.5 g/dL    RDW 12.9 10.0 - 15.0 %    Platelet Count 226 150 - 450 10e3/uL       Revised Cardiac Risk Index (RCRI)  The patient has the following serious cardiovascular risks for perioperative complications:   - Coronary Artery Disease (MI, positive stress test, angina, Qs on EKG) = 1 point   - Cerebrovascular Disease (TIA or CVA) = 1 point   - Diabetes Mellitus (on Insulin) = 1 point     RCRI Interpretation: 3 points: Class IV (high risk - >11% complication rate)    Estimated Functional Capacity: Cannot be estimated - is wheelchair confined.  But no chest pains with daily activity.    Total visit time with patient was 50 mins, all of which was face to face MD time, and over 50% of this time was spent in counseling and coordination of care.  Including post-encounter documentation and orders on the date of service, total encounter time was 55 mins.           Signed Electronically by: Juan Wall MD  A copy  of this evaluation report is provided to the requesting physician.    {

## 2024-12-27 NOTE — LETTER
December 30, 2024      Teresa Perez  [address]  [place] [address]        Dear ,    We are writing to inform you of your test results.    You tested negative for COVID and Influenza.  Hope you feel better.  Your average sugar A1c was OK but could be lower - it is OK to proceed with the procedure.  Your kidneys work well.  Take car!     Resulted Orders   Influenza A/B, RSV and SARS-CoV2 PCR (COVID-19) Nose   Result Value Ref Range    Influenza A PCR Negative Negative    Influenza B PCR Negative Negative    RSV PCR Negative Negative    SARS CoV2 PCR Negative Negative      Comment:      NEGATIVE: SARS-CoV-2 (COVID-19) RNA not detected, presumed negative.    Narrative    Testing was performed using the Xpert Xpress CoV2/Flu/RSV Assay on the [employer] Instrument. This test should be ordered for the detection of SARS-CoV2, influenza, and RSV viruses in individuals with signs and symptoms of respiratory tract infection. This test is for in vitro diagnostic use under the [place] FDA for laboratories certified under CLIA to perform high or moderate complexity testing. This test has been [place] FDA cleared. A negative result does not rule out the presence of PCR inhibitors in the specimen or target RNA in concentration below the limit of detection for the assay. If only one viral target is positive but coinfection with multiple targets is suspected, the sample should be re-tested with another FDA cleared, approved, or authorized test, if coninfection would change clinical management. This test was validated by the [facility]. These laboratories are certified under the Clinical Laboratory Improvement Amendments of 1988 (CLIA-88) as qualified to perfom high complexity laboratory testing.   Hemoglobin A1c   Result Value Ref Range    Estimated Average Glucose 206 (H) <117 mg/dL    Hemoglobin A1C 8.8 (H) 0.0 - 5.6 %      Comment:      Normal <5.7%   Prediabetes 5.7-6.4%    Diabetes  6.5% or higher     Note: Adopted from ADA consensus guidelines.   Basic metabolic panel   Result Value Ref Range    Sodium 134 (L) 135 - 145 mmol/L    Potassium 4.4 3.4 - 5.3 mmol/L    Chloride 100 98 - 107 mmol/L    Carbon Dioxide (CO2) 20 (L) 22 - 29 mmol/L    Anion Gap 14 7 - 15 mmol/L    Urea Nitrogen 17.9 6.0 - 20.0 mg/dL    Creatinine 0.56 0.51 - 0.95 mg/dL    GFR Estimate >90 >60 mL/min/1.73m2      Comment:      eGFR calculated using 2021 CKD-EPI equation.    Calcium 9.4 8.8 - 10.4 mg/dL      Comment:      Reference intervals for this test were updated on 7/16/2024 to reflect our healthy population more accurately. There may be differences in the flagging of prior results with similar values performed with this method. Those prior results can be interpreted in the context of the updated reference intervals.    Glucose 195 (H) 70 - 99 mg/dL   CBC with platelets   Result Value Ref Range    WBC Count 11.0 4.0 - 11.0 10e3/uL    RBC Count 4.75 3.80 - 5.20 10e6/uL    Hemoglobin 13.9 11.7 - 15.7 g/dL    Hematocrit 42.2 35.0 - 47.0 %    MCV 89 78 - 100 fL    MCH 29.3 26.5 - 33.0 pg    MCHC 32.9 31.5 - 36.5 g/dL    RDW 12.9 10.0 - 15.0 %    Platelet Count 226 150 - 450 10e3/uL       If you have any questions or concerns, please call the clinic at the number listed above.       Sincerely,      Juan Wall MD    Electronically signed

## 2024-12-27 NOTE — Clinical Note
FYI - complex patient for colonoscopy.  Need to confirm resolution of COPD exacerbation / otitis media few days before procedure.

## 2024-12-27 NOTE — PATIENT INSTRUCTIONS
Medications prior to procedure:   - PLAVIX (clopidrogel)  Stop taking 5 days before procedure.  - Do not take BYDUREON for 7 days before procedure  - Stop JARDIANCE 3 days before procedure   - stop HUMALOG when you are not eating (day before and day of)  : Toujeo take 40 units the evening or morning dose before procedure and NONE the morning of procedure.     You will need to talk with GI nurse a few days before procedure to confirm that you have recovered from COPD flare up and ear infection.      Indication: Lumbar spine weakness, radiculopathy

 

Technique: Sagittal T1 and T2 fast spin echo, sagittal STIR, axial T1 and T2 fast

spin-echo images of the lumbar spine

 

Comparison: 12/15/2016

 

Findings: Bony alignment is normal. Vertebral body heights are preserved. Disc spaces

are preserved. There is anterior fusion hardware bridging L4-5 and L5-S1. This throws

off of susceptibility artifact which may obscure surrounding pathology, particularly

within the vertebral bodies and at the L4-5 and L5-S1 discs. The conus medullaris

terminates at the T12 level

 

At L2-3, there is circumferential annular bulge. There is a high intensity zone

within the posterior aspect of the bulging disc again demonstrated. This results in

borderline narrowing of the spinal canal. There is mild right neural foraminal

compromise by the bulging disc. The disc space is preserved. Findings at this level

are unchanged.

 

At L3-4, there is circumferential annular bulge. There is a high intensity zone

within the posterior aspect of the bulging disc. There is only borderline narrowing

of the spinal canal at this level. There is bilateral facet arthrosis. There is

minimal compromise of the neural foramina by the bulging disc and the facet

arthrosis.

 

At L4-5, there appears to be some degenerative disc narrowing, although the disc is

largely obscured by artifact from the surrounding hardware. No significant disc bulge

or protrusion, spinal stenosis, or neural foraminal narrowing.

 

At L5-S1, the disc is largely obscured by artifact from the surrounding hardware, is

probably somewhat narrowed. No significant disc bulge or protrusion or spinal

stenosis. There is evidence of mild neural foraminal compromise largely by

osteophytes.

 

At the remaining levels, no significant disc bulge or protrusion, spinal stenosis,

disc narrowing, or neural foraminal stenosis.

 

The included extraspinal soft tissues are unremarkable.

 

Findings are unchanged from prior exam of 12/15/2016

 

Impression: Degenerative changes at L2-3 and L3-4, resulting in mild spinal canal and

neural foraminal compromise, unchanged from earlier study of 12/15/2016

 

Postsurgical changes at L4-5 and L5-S1, as described. Note that susceptibility

artifact from such could obscure pathology of this level. No evidence of neural

compromise except for minimal neural foraminal stenosis at L5-S1 bilaterally

## 2024-12-29 DIAGNOSIS — N89.8 VAGINAL DISCHARGE: ICD-10-CM

## 2024-12-29 DIAGNOSIS — J22 LOWER RESPIRATORY INFECTION: ICD-10-CM

## 2024-12-29 RX ORDER — TRAMADOL HYDROCHLORIDE 50 MG/1
50 TABLET ORAL EVERY 6 HOURS PRN
Qty: 5 TABLET | OUTPATIENT
Start: 2024-12-29

## 2024-12-30 ENCOUNTER — TELEPHONE (OUTPATIENT)
Dept: FAMILY MEDICINE | Facility: CLINIC | Age: 54
End: 2024-12-30
Payer: COMMERCIAL

## 2024-12-30 DIAGNOSIS — M25.511 ACUTE PAIN OF RIGHT SHOULDER: ICD-10-CM

## 2024-12-30 RX ORDER — FLUCONAZOLE 150 MG/1
150 TABLET ORAL ONCE
Qty: 1 TABLET | Refills: 0 | Status: SHIPPED | OUTPATIENT
Start: 2024-12-30 | End: 2024-12-30

## 2024-12-30 RX ORDER — IBUPROFEN 400 MG/1
TABLET, FILM COATED ORAL
Qty: 42 TABLET | Refills: 1 | Status: SHIPPED | OUTPATIENT
Start: 2024-12-30

## 2024-12-30 NOTE — TELEPHONE ENCOUNTER
Spoke with RN at [facility] to relay Dr Wall's message    Hello, can you help by communicating with RN team who works with Dr Maza (I believe [facility])?  Patient had otitis media and COPD exacerbation at pre-op.  They should confirm patient has recovered within a few days of procedure.  OK?  Thanks!  Juan Wall     Confirmed medications prescribed and end date for medications.Will be communicated to Dr Maza.    YULISA Doe, BSN

## 2024-12-30 NOTE — TELEPHONE ENCOUNTER
Spoke with Gilda MÁRQUEZ at [facility] regarding a message from Dr Maza:    Dr Maza is requesting pt have a follow up visit before 1/6/25 hospital based colonoscopy, to assess current illness and make sure pt is improving. [facility] states it is ultimately up to the Anesthesiologist on the day of procedure to clear pt. Scheduled pt for follow up with Dr Wall via video visit on 1/2/25. Pt is in agreement with this plan.    YULISA Doe, BSN

## 2024-12-30 NOTE — TELEPHONE ENCOUNTER
Name from pharmacy: FLUCONAZOLE 150MG TABLETS          Will file in chart as: fluconazole (DIFLUCAN) 150 MG tablet     Possible duplicate: Misty to review recent actions on this medication    Sig: TAKE 1 TABLET(150 MG) BY MOUTH EVERY 3 DAYS FOR 3 DOSES    Disp: 3 tablet    Refills: 0 (Pharmacy requested: Not specified)    Start: 12/29/2024    Class: E-Prescribe    Non-formulary For: Vaginal discharge    Last ordered: 1 week ago (12/19/2024) by Tyra Bullock MD    Last refill: 12/19/2024    Rx #: [id number]    Antifungal Agents [id number]/29/2024 09:38 AM   Protocol Details Medication indicated for associated diagnosis    Medication is active on med list    Recent (12 mo) or future (90 days) visit within the authorizing provider's specialty   Azoles (Oral) Failed   Protocol Details Most recent CrCl is >50 ml/min if drug is Fluconazole    Medication indicated for associated diagnosis    Liver Function Panel within the past 6 months        YULISA Doe, BSN

## 2024-12-30 NOTE — TELEPHONE ENCOUNTER
Name from pharmacy: IBUPROFEN 400MG TABLETS         Will file in chart as: ibuprofen (ADVIL/MOTRIN) 400 MG tablet    Sig: TAKE 2 TABLETS(800 MG) BY MOUTH EVERY 8 HOURS AS NEEDED FOR MODERATE PAIN    Disp: 42 tablet    Refills: 0 (Pharmacy requested: Not specified)    Start: 12/30/2024    Class: E-Prescribe    Non-formulary For: Acute pain of right shoulder    Last ordered: 3 weeks ago (12/5/2024) by Tyra Bullock MD    Last refill: 12/5/2024    Rx #: [id number]    NSAID Medications [id number]/30/2024 09:15 AM   Protocol Details Always Fail Criteria - Chart Review Required             Routed to provider due to failed RN protocol.

## 2024-12-30 NOTE — RESULT ENCOUNTER NOTE
Chela Moore, you tested negative for COVID and Influenza.  Hope you feel better.  Your average sugar A1c was OK but could be lower - it is OK to proceed with the procedure.  Your kidneys work well.  Take care, Dr Juan Wall

## 2025-01-02 ENCOUNTER — VIRTUAL VISIT (OUTPATIENT)
Dept: FAMILY MEDICINE | Facility: CLINIC | Age: 55
End: 2025-01-02
Payer: COMMERCIAL

## 2025-01-02 DIAGNOSIS — E11.622 TYPE 2 DIABETES MELLITUS WITH OTHER SKIN ULCER, WITH LONG-TERM CURRENT USE OF INSULIN (H): ICD-10-CM

## 2025-01-02 DIAGNOSIS — Z79.4 TYPE 2 DIABETES MELLITUS WITH OTHER SKIN ULCER, WITH LONG-TERM CURRENT USE OF INSULIN (H): ICD-10-CM

## 2025-01-02 DIAGNOSIS — J44.1 COPD EXACERBATION (H): Primary | ICD-10-CM

## 2025-01-02 RX ORDER — CODEINE PHOSPHATE AND GUAIFENESIN 10; 100 MG/5ML; MG/5ML
1 SOLUTION ORAL EVERY 4 HOURS PRN
Qty: 120 ML | Refills: 0 | Status: SHIPPED | OUTPATIENT
Start: 2025-01-02 | End: 2025-01-02

## 2025-01-02 RX ORDER — DEXTROMETHORPHAN POLISTIREX 30 MG/5ML
60 SUSPENSION ORAL 2 TIMES DAILY
Qty: 150 ML | Refills: 1 | Status: SHIPPED | OUTPATIENT
Start: 2025-01-02

## 2025-01-02 NOTE — PROGRESS NOTES
Teresa is a 54 year old who is being evaluated via a billable video visit.    How would you like to obtain your AVS? Pt decline AVS  If the video visit is dropped, the invitation should be resent by: Text to cell phone: [phone]  Will anyone else be joining your video visit? No      Diagnoses and associated orders for this visit:  COPD exacerbation (H)  -     dextromethorphan (DELSYM) 30 MG/5ML liquid; Take 10 mLs (60 mg) by mouth 2 times daily.    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  Recommend postponing procedure and will ask clinic RN to coordinate this with [facility].  Recommend follow-up visit with me in about 1 week to confirm resolution of current symptoms.  Patient continues to actively smoke and this is an impediment to full recovery.  Initially prescribed codeine-containing cough suppressant which is not covered by insurance and therefore prescribed Delsym instead.    Subjective   Teresa is a 54 year old, presenting for the following health issues:  Follow Up (Check in prior to procedure)      1/2/2025     8:18 AM   Additional Questions   Roomed by Meenakshi   Accompanied by patient         1/2/2025    Information    services provided? No     Video Start Time:  8.35am    HPI     Following up on in person visit from 6 days previously.  Patient had attended for a preop prior to a hospital colonoscopy.  At that time was having a COPD exacerbation.  Was prescribed prednisone which is now finished and continues to take antibiotic.  Reports that she is continuing to cough although this is improving.  Feels like she will not be better by scheduled colonoscopy on 1/6/2025 and requests that this be rescheduled again.    Would like to take something to help her stop coughing.            Objective           Vitals:  No vitals were obtained today due to virtual  visit.    Physical Exam   GENERAL: alert and no distress  EYES: Eyes grossly normal to inspection.  No discharge or erythema, or obvious scleral/conjunctival abnormalities.  RESP: Coughs intermittently during the video encounter  SKIN: Visible skin clear. No significant rash, abnormal pigmentation or lesions.  NEURO: Cranial nerves grossly intact.  Mentation and speech appropriate for age.  PSYCH: Appropriate affect, tone, and pace of words        Video-Visit Details    Type of service:  Video Visit   Video End Time: 8.44  Originating Location (pt. Location): Home  Distant Location (provider location):  On-site  Platform used for Video Visit: Swapnil  Signed Electronically by: Juan Wall MD

## 2025-01-08 ENCOUNTER — TELEPHONE (OUTPATIENT)
Dept: FAMILY MEDICINE | Facility: CLINIC | Age: 55
End: 2025-01-08
Payer: COMMERCIAL

## 2025-01-08 ENCOUNTER — TELEPHONE (OUTPATIENT)
Dept: CARE COORDINATION | Facility: CLINIC | Age: 55
End: 2025-01-08
Payer: COMMERCIAL

## 2025-01-08 NOTE — TELEPHONE ENCOUNTER
Order/Referral Request    Who is requesting: patient    Orders being requested: new hospital bed    Reason service is needed/diagnosis: amputee, kidney disease, dm patient - pressure points, sciatica disc and muscle atrophy    When are orders needed by: asap    Has this been discussed with Provider: No    Does patient have a preference on a Group/Provider/Facility? [facility]    Does patient have an appointment scheduled?: Yes: 01/10/25 with power    Where to send orders: Fax    Okay to leave a detailed message?: Yes at Cell number on file:    Telephone Information:   Mobile [phone]         Does this patient currently have active insurance coverage?  Yes, Pt has active insurance coverage.     Does patient or caller know when to expect a call? Yes, Nurses will return call within 2-3 business hours.    Mary Ann Giraldo on 1/8/2025 at 12:50 PM

## 2025-01-08 NOTE — TELEPHONE ENCOUNTER
Message reviewed.  I did a prescription for a hospital bed in March 2024, and again in September 2024.     The DME request was denied by [facility].  Per note from Faizan, 11/15/24    Prescription was resent to [facility] by Diego on 12/4.  They were unable to fill the prescription.  I'm assuming it is because it is not covered by patient's insurance.     It looks like this next request is for [facility].      Unfortunately, I don't think the bed is going to be covered.   If she is really needing the hospital bed, I would recommend that we refer her to physical and occupational therapy for a more complete assessment before completing another prescription.     If Dr. Wall is able to complete the prescription without further information on 1/10, that works too, but I would like further evaluation and documentation before requesting the item again.     Tyra Bullock MD    Routed to Faizan Cortez Dr. Power Milena

## 2025-01-08 NOTE — TELEPHONE ENCOUNTER
Care Coordination: 1/8/2025    DME Order Number: [id number]  Device: Hospital Bed  Vendor: [facility] [employer]  Fax: [phone]    Second attempt as [facility] Could not fill the order    Diego Infante Sr.   Care Coordination  [address] [address]  [email or web address]   Office: [phone] Direct: [phone]  [facility]

## 2025-01-09 DIAGNOSIS — M54.50 CHRONIC BILATERAL LOW BACK PAIN WITHOUT SCIATICA: ICD-10-CM

## 2025-01-09 DIAGNOSIS — G89.29 CHRONIC BILATERAL LOW BACK PAIN WITHOUT SCIATICA: ICD-10-CM

## 2025-01-09 RX ORDER — CYCLOBENZAPRINE HCL 5 MG
5 TABLET ORAL 3 TIMES DAILY PRN
Qty: 30 TABLET | Refills: 5 | Status: SHIPPED | OUTPATIENT
Start: 2025-01-09

## 2025-01-09 NOTE — TELEPHONE ENCOUNTER
Form submitted through [email or web address] Website.  We'll see how it goes.  Thanks all for your help!    Dr. Bullock

## 2025-01-09 NOTE — TELEPHONE ENCOUNTER
If someone can show my how to complete the form online and we have the information needed, I can do the prescription.  I apologize, I do not remember talking about this.  Can someone send me to the link to the program and online form, and I will complete.    Tyra Bullock MD    Routed to Faizan Cortez Shannon, Dr. Power

## 2025-01-10 NOTE — H&P (VIEW-ONLY)
Teresa is a 54 year old who is being evaluated via a billable video visit.    How would you like to obtain your AVS? Mail a copy  If the video visit is dropped, the invitation should be resent by: Text to cell phone: [phone]  Will anyone else be joining your video visit? No      Diagnoses and associated orders for this visit:  Chronic obstructive pulmonary disease with acute exacerbation (H)  -     guaiFENesin-codeine (ROBITUSSIN AC) 100-10 MG/5ML solution; Take 5-10 mLs by mouth every 4 hours as needed for cough.    Smoking      Patient believes she could not have colonoscopy.  Will cancel procedure again. There is risk of prescribing codeine with benzodiazepines - she assures me she will not take both at same time and space out doses.      She will call when she feels fit to  proceed with procedure.  If still within 30 days I can update my notes.  If over 30 days, will have to schedule a brand new pre-op.    Subjective   Teresa is a 54 year old, presenting for the following health issues:  No chief complaint on file.      1/10/2025     8:34 AM   Additional Questions   Roomed by Meenakshi   Accompanied by patient         1/10/2025    Information    services provided? No     Video Start Time:  8.45am    HPI     Was getting better but developed a cough again about 3 days ago.  Productive of some sputum.  Continues to smoke 0.5 ppd. Thinks couldn't go through colonoscopy till she is cough free.        Review of Systems  Psych: doing OK otherwise.        Objective           Vitals:  No vitals were obtained today due to virtual visit.    Physical Exam   GENERAL: no distress and drowsy  EYES: Eyes grossly normal to inspection.  No discharge or erythema, or obvious scleral/conjunctival abnormalities.  RESP: No audible wheeze, cough, or visible cyanosis.    SKIN: Visible skin clear. No significant rash, abnormal pigmentation or lesions.  NEURO: Cranial nerves grossly intact.  Mentation and speech  appropriate for age.  NEURO: Normal strength and tone, mentation intact but drowsy  PSYCH: Appropriate affect, tone, and pace of words        Video-Visit Details    Type of service:  Video Visit   Video End Time: 8.54am  Originating Location (pt. Location): Home  Distant Location (provider location):  On-site  Platform used for Video Visit: Swapnil  Signed Electronically by: Juan Wall MD

## 2025-01-13 DIAGNOSIS — J44.1 CHRONIC OBSTRUCTIVE PULMONARY DISEASE WITH ACUTE EXACERBATION (H): ICD-10-CM

## 2025-01-13 DIAGNOSIS — Z79.4 TYPE 2 DIABETES MELLITUS WITH HYPERGLYCEMIA, WITH LONG-TERM CURRENT USE OF INSULIN (H): ICD-10-CM

## 2025-01-13 DIAGNOSIS — E11.65 TYPE 2 DIABETES MELLITUS WITH HYPERGLYCEMIA, WITH LONG-TERM CURRENT USE OF INSULIN (H): ICD-10-CM

## 2025-01-13 RX ORDER — CODEINE PHOSPHATE AND GUAIFENESIN 10; 100 MG/5ML; MG/5ML
SOLUTION ORAL
Qty: 120 ML | OUTPATIENT
Start: 2025-01-13

## 2025-01-13 RX ORDER — INSULIN LISPRO 100 [IU]/ML
INJECTION, SOLUTION INTRAVENOUS; SUBCUTANEOUS
Qty: 75 ML | Refills: 3 | Status: SHIPPED | OUTPATIENT
Start: 2025-01-13

## 2025-01-13 NOTE — TELEPHONE ENCOUNTER
Medication Question or Refill    Contacts       Contact Date/Time Type Contact Phone/Fax    01/13/2025 09:00 AM CST Phone (Incoming) Teresa Perez (Self) [phone] (M)            What medication are you calling about (include dose and sig)?: PEN NEEDLES FOR HER INSULIN     Preferred Pharmacy:       [id number] - [place], [place] - [address] AT [facility] [address] [facility] [address] [place] [address]  Phone: [phone] Fax: [phone]      Controlled Substance Agreement on file:   CSA -- Patient Level:    CSA: None found at the patient level.       Who prescribed the medication?: PCP     Do you need a refill? Yes    When did you use the medication last? 1/13/2025    Patient offered an appointment? No    Do you have any questions or concerns?  No      Okay to leave a detailed message?: Yes at Cell number on file:    Telephone Information:   Mobile [phone]         Does this patient currently have active insurance coverage?  Yes, Pt has active insurance coverage.     Does patient or caller know when to expect a call? Yes, PCS will return call within 24 business hours.     Juan Peralta on 1/13/2025 at 9:01 AM

## 2025-01-19 DIAGNOSIS — G43.809 OTHER MIGRAINE WITHOUT STATUS MIGRAINOSUS, NOT INTRACTABLE: ICD-10-CM

## 2025-01-20 ENCOUNTER — ANESTHESIA EVENT (OUTPATIENT)
Dept: SURGERY | Facility: CLINIC | Age: 55
End: 2025-01-20
Payer: COMMERCIAL

## 2025-01-20 DIAGNOSIS — F33.1 MODERATE RECURRENT MAJOR DEPRESSION (H): ICD-10-CM

## 2025-01-20 NOTE — TELEPHONE ENCOUNTER
Name from pharmacy: SUMATRIPTAN 50MG TABLETS         Will file in chart as: SUMAtriptan (IMITREX) 50 MG tablet    Sig: TAKE 1 TABLET(50 MG) BY MOUTH AT ONSET OF HEADACHE FOR MIGRAINE    Disp: 12 tablet    Refills: 1 (Pharmacy requested: Not specified)    Start: 1/19/2025    Class: E-Prescribe    Non-formulary For: Other migraine without status migrainosus, not intractable    Last ordered: 2 months ago (10/28/2024) by Tyra Bullock MD    Last refill: 12/31/2024    Rx #: [id number]    Serotonin Agonists Fwhixa2001/19/2025 03:14 AM   Protocol Details Review patient's medical record. If the patient has used less than or equal to nine (9) tablets or injections for migraine a month the RN may authorize the refill request.          Routed to provider due to failed RN protocol.       Gael Redman, RN, MSN

## 2025-01-20 NOTE — TELEPHONE ENCOUNTER
Name from pharmacy: VENLAFAXINE ER 150MG CAPSULES         Will file in chart as: venlafaxine (EFFEXOR XR) 150 MG 24 hr capsule    Sig: TAKE 1 CAPSULE BY MOUTH EVERY DAY    Disp: 30 capsule    Refills: 0 (Pharmacy requested: Not specified)    Start: 1/20/2025    Class: E-Prescribe    Non-formulary For: Moderate recurrent major depression (H)    Last ordered: 7 months ago (6/14/2024) by Tyra Bullock MD    Last refill: 12/26/2024    Rx #: [id number]    Serotonin-Norepinephrine Reuptake Inhibitors  [id number]/20/2025 08:24 AM   Protocol Details PHQ-9 score of less than 5 in past 6 months            Gael Redman RN, MSN

## 2025-01-21 ENCOUNTER — HOSPITAL ENCOUNTER (OUTPATIENT)
Facility: CLINIC | Age: 55
Discharge: HOME | End: 2025-01-21
Attending: INTERNAL MEDICINE | Admitting: INTERNAL MEDICINE
Payer: COMMERCIAL

## 2025-01-21 ENCOUNTER — ANESTHESIA (OUTPATIENT)
Dept: SURGERY | Facility: CLINIC | Age: 55
End: 2025-01-21
Payer: COMMERCIAL

## 2025-01-21 VITALS
HEART RATE: 84 BPM | SYSTOLIC BLOOD PRESSURE: 134 MMHG | DIASTOLIC BLOOD PRESSURE: 82 MMHG | RESPIRATION RATE: 16 BRPM | OXYGEN SATURATION: 95 % | TEMPERATURE: 97.6 F

## 2025-01-21 DIAGNOSIS — K52.9 CHRONIC DIARRHEA: Primary | ICD-10-CM

## 2025-01-21 DIAGNOSIS — K62.5 RECTAL BLEEDING: ICD-10-CM

## 2025-01-21 LAB
COLONOSCOPY: NORMAL
GLUCOSE BLDC GLUCOMTR-MCNC: 237 MG/DL (ref 70–99)
GLUCOSE BLDC GLUCOMTR-MCNC: 280 MG/DL (ref 70–99)

## 2025-01-21 PROCEDURE — 250N000011 HC RX IP 250 OP 636

## 2025-01-21 PROCEDURE — 250N000012 HC RX MED GY IP 250 OP 636 PS 637: Performed by: ANESTHESIOLOGY

## 2025-01-21 PROCEDURE — 272N000001 HC OR GENERAL SUPPLY STERILE: Performed by: INTERNAL MEDICINE

## 2025-01-21 PROCEDURE — 88305 TISSUE EXAM BY PATHOLOGIST: CPT | Mod: TC | Performed by: INTERNAL MEDICINE

## 2025-01-21 PROCEDURE — 82962 GLUCOSE BLOOD TEST: CPT

## 2025-01-21 PROCEDURE — 360N000075 HC SURGERY LEVEL 2, PER MIN: Performed by: INTERNAL MEDICINE

## 2025-01-21 PROCEDURE — 710N000012 HC RECOVERY PHASE 2, PER MINUTE: Performed by: INTERNAL MEDICINE

## 2025-01-21 PROCEDURE — 370N000017 HC ANESTHESIA TECHNICAL FEE, PER MIN: Performed by: INTERNAL MEDICINE

## 2025-01-21 PROCEDURE — 999N000141 HC STATISTIC PRE-PROCEDURE NURSING ASSESSMENT: Performed by: INTERNAL MEDICINE

## 2025-01-21 PROCEDURE — 250N000009 HC RX 250

## 2025-01-21 RX ORDER — NICOTINE POLACRILEX 4 MG
15-30 LOZENGE BUCCAL
Status: DISCONTINUED | OUTPATIENT
Start: 2025-01-21 | End: 2025-01-21 | Stop reason: HOSPADM

## 2025-01-21 RX ORDER — ONDANSETRON 4 MG/1
4 TABLET, ORALLY DISINTEGRATING ORAL EVERY 30 MIN PRN
Status: DISCONTINUED | OUTPATIENT
Start: 2025-01-21 | End: 2025-01-21 | Stop reason: HOSPADM

## 2025-01-21 RX ORDER — SODIUM CHLORIDE, SODIUM LACTATE, POTASSIUM CHLORIDE, CALCIUM CHLORIDE 600; 310; 30; 20 MG/100ML; MG/100ML; MG/100ML; MG/100ML
INJECTION, SOLUTION INTRAVENOUS CONTINUOUS
Status: DISCONTINUED | OUTPATIENT
Start: 2025-01-21 | End: 2025-01-21 | Stop reason: HOSPADM

## 2025-01-21 RX ORDER — VENLAFAXINE HYDROCHLORIDE 150 MG/1
150 CAPSULE, EXTENDED RELEASE ORAL DAILY
Qty: 30 CAPSULE | Refills: 0 | Status: SHIPPED | OUTPATIENT
Start: 2025-01-21

## 2025-01-21 RX ORDER — LIDOCAINE HYDROCHLORIDE 10 MG/ML
INJECTION, SOLUTION INFILTRATION; PERINEURAL PRN
Status: DISCONTINUED | OUTPATIENT
Start: 2025-01-21 | End: 2025-01-21

## 2025-01-21 RX ORDER — DEXAMETHASONE SODIUM PHOSPHATE 10 MG/ML
4 INJECTION, SOLUTION INTRAMUSCULAR; INTRAVENOUS
Status: DISCONTINUED | OUTPATIENT
Start: 2025-01-21 | End: 2025-01-21 | Stop reason: HOSPADM

## 2025-01-21 RX ORDER — NALOXONE HYDROCHLORIDE 0.4 MG/ML
0.1 INJECTION, SOLUTION INTRAMUSCULAR; INTRAVENOUS; SUBCUTANEOUS
Status: DISCONTINUED | OUTPATIENT
Start: 2025-01-21 | End: 2025-01-21 | Stop reason: HOSPADM

## 2025-01-21 RX ORDER — PROPOFOL 10 MG/ML
INJECTION, EMULSION INTRAVENOUS PRN
Status: DISCONTINUED | OUTPATIENT
Start: 2025-01-21 | End: 2025-01-21

## 2025-01-21 RX ORDER — ONDANSETRON 2 MG/ML
4 INJECTION INTRAMUSCULAR; INTRAVENOUS EVERY 30 MIN PRN
Status: DISCONTINUED | OUTPATIENT
Start: 2025-01-21 | End: 2025-01-21 | Stop reason: HOSPADM

## 2025-01-21 RX ORDER — LIDOCAINE 40 MG/G
CREAM TOPICAL
Status: DISCONTINUED | OUTPATIENT
Start: 2025-01-21 | End: 2025-01-21 | Stop reason: HOSPADM

## 2025-01-21 RX ORDER — DEXTROSE MONOHYDRATE 25 G/50ML
25-50 INJECTION, SOLUTION INTRAVENOUS
Status: DISCONTINUED | OUTPATIENT
Start: 2025-01-21 | End: 2025-01-21 | Stop reason: HOSPADM

## 2025-01-21 RX ORDER — PROPOFOL 10 MG/ML
INJECTION, EMULSION INTRAVENOUS CONTINUOUS PRN
Status: DISCONTINUED | OUTPATIENT
Start: 2025-01-21 | End: 2025-01-21

## 2025-01-21 RX ORDER — SUMATRIPTAN 50 MG/1
TABLET, FILM COATED ORAL
Qty: 12 TABLET | Refills: 1 | Status: SHIPPED | OUTPATIENT
Start: 2025-01-21

## 2025-01-21 RX ADMIN — LIDOCAINE HYDROCHLORIDE 5 ML: 10 INJECTION, SOLUTION INFILTRATION; PERINEURAL at 08:40

## 2025-01-21 RX ADMIN — PROPOFOL 150 MCG/KG/MIN: 10 INJECTION, EMULSION INTRAVENOUS at 08:41

## 2025-01-21 RX ADMIN — INSULIN ASPART 6 UNITS: 100 INJECTION, SOLUTION INTRAVENOUS; SUBCUTANEOUS at 08:20

## 2025-01-21 RX ADMIN — PROPOFOL 50 MG: 10 INJECTION, EMULSION INTRAVENOUS at 08:41

## 2025-01-21 ASSESSMENT — ACTIVITIES OF DAILY LIVING (ADL)
ADLS_ACUITY_SCORE: 58

## 2025-01-21 ASSESSMENT — ENCOUNTER SYMPTOMS: SEIZURES: 1

## 2025-01-21 ASSESSMENT — LIFESTYLE VARIABLES: TOBACCO_USE: 1

## 2025-01-21 ASSESSMENT — COPD QUESTIONNAIRES: COPD: 1

## 2025-01-21 NOTE — PROGRESS NOTES
Pt refused to wait for medical transport to call when at the front of the building pt states she is feeling claustrophobic and needs to go smoke. Educated pt that she cannot be without staff/family in lobby after procedure until medical transport arrives. Also informed pt she cannot smoke on [facility] , pt said she didn't care and has done it before. Pt left unit in electric wheelchair.  staff and security were made aware of situation.
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
## 2025-01-21 NOTE — PROGRESS NOTES
Discharge instructions reviewed with both pt and [facility], [facility] will meet pt at house. Medical transport has been called.

## 2025-01-21 NOTE — ANESTHESIA CARE TRANSFER NOTE
Patient: Teresa Perez    Procedure: Procedure(s):  COLONOSCOPY WITH RANDOM COLONIC BIOPSIES       Diagnosis: Abdominal pain [R10.9]  Diagnosis Additional Information: No value filed.    Anesthesia Type:   MAC     Note:    Oropharynx: oropharynx clear of all foreign objects  Level of Consciousness: drowsy  Oxygen Supplementation: face mask  Level of Supplemental Oxygen (L/min / FiO2): 6  Independent Airway: airway patency satisfactory and stable  Dentition: dentition unchanged  Vital Signs Stable: post-procedure vital signs reviewed and stable  Report to RN Given: handoff report given  Patient transferred to: Phase II    Handoff Report: Identifed the Patient, Identified the Reponsible Provider, Reviewed the pertinent medical history, Discussed the surgical course, Reviewed Intra-OP anesthesia mangement and issues during anesthesia, Set expectations for post-procedure period and Allowed opportunity for questions and acknowledgement of understanding      Vitals:  Vitals Value Taken Time   /65 01/21/25 0901   Temp 97.3    Pulse 87 01/21/25 0902   Resp 14    SpO2 98 % 01/21/25 0902   Vitals shown include unfiled device data.    Electronically Signed By: RACHID Lane CRNA  January 21, 2025  9:03 AM

## 2025-01-21 NOTE — ANESTHESIA POSTPROCEDURE EVALUATION
Patient: Teresa Perez    Procedure: Procedure(s):  COLONOSCOPY WITH RANDOM COLONIC BIOPSIES       Anesthesia Type:  MAC    Note:  Disposition: Outpatient   Postop Pain Control: Uneventful            Sign Out: Well controlled pain   PONV: No   Neuro/Psych: Uneventful            Sign Out: Acceptable/Baseline neuro status   Airway/Respiratory: Uneventful            Sign Out: Acceptable/Baseline resp. status   CV/Hemodynamics: Uneventful            Sign Out: Acceptable CV status; No obvious hypovolemia; No obvious fluid overload   Other NRE: NONE   DID A NON-ROUTINE EVENT OCCUR? No           Last vitals:  Vitals Value Taken Time   /82 01/21/25 0920   Temp 36.4  C (97.6  F) 01/21/25 0920   Pulse 85 01/21/25 0921   Resp 16 01/21/25 0900   SpO2 94 % 01/21/25 0921   Vitals shown include unfiled device data.    Electronically Signed By: Kael Bell MD  January 21, 2025  11:00 AM

## 2025-01-21 NOTE — PROVIDER NOTIFICATION
Dr Bell notified that pts pre op blood sugar was 280. [facility] will place insulin orders.

## 2025-01-21 NOTE — PROVIDER NOTIFICATION
Dr Bell notified that pts post procedure blood sugar was 237, no new interventions, okay to discharge.

## 2025-01-21 NOTE — INTERVAL H&P NOTE
I have reviewed the surgical (or preoperative) H&P that is linked to this encounter, and examined the patient. There are no significant changes    Clinical Conditions Present on Arrival:  Clinically Significant Risk Factors Present on Admission         # Hyponatremia: Lowest Na = 134 mmol/L in last 30 days, will monitor as appropriate          # Drug Induced Platelet Defect: home medication list includes an antiplatelet medication      # DMII: A1C = 8.8 % (Ref range: 0.0 - 5.6 %) within past 6 months

## 2025-01-22 LAB
PATH REPORT.COMMENTS IMP SPEC: NORMAL
PATH REPORT.COMMENTS IMP SPEC: NORMAL
PATH REPORT.FINAL DX SPEC: NORMAL
PATH REPORT.GROSS SPEC: NORMAL
PATH REPORT.MICROSCOPIC SPEC OTHER STN: NORMAL
PATH REPORT.RELEVANT HX SPEC: NORMAL
PHOTO IMAGE: NORMAL

## 2025-01-22 PROCEDURE — 88305 TISSUE EXAM BY PATHOLOGIST: CPT | Mod: 26 | Performed by: PATHOLOGY

## 2025-01-25 DIAGNOSIS — G89.4 CHRONIC PAIN SYNDROME: ICD-10-CM

## 2025-01-25 DIAGNOSIS — N89.8 VAGINAL DISCHARGE: ICD-10-CM

## 2025-01-25 RX ORDER — TRAMADOL HYDROCHLORIDE 50 MG/1
50 TABLET ORAL EVERY 6 HOURS PRN
Qty: 10 TABLET | OUTPATIENT
Start: 2025-01-25

## 2025-01-27 DIAGNOSIS — G89.29 CHRONIC BILATERAL LOW BACK PAIN WITHOUT SCIATICA: ICD-10-CM

## 2025-01-27 DIAGNOSIS — M54.50 CHRONIC BILATERAL LOW BACK PAIN WITHOUT SCIATICA: ICD-10-CM

## 2025-01-27 DIAGNOSIS — Z79.4 TYPE 2 DIABETES MELLITUS WITH HYPERGLYCEMIA, WITH LONG-TERM CURRENT USE OF INSULIN (H): ICD-10-CM

## 2025-01-27 DIAGNOSIS — E11.65 TYPE 2 DIABETES MELLITUS WITH HYPERGLYCEMIA, WITH LONG-TERM CURRENT USE OF INSULIN (H): ICD-10-CM

## 2025-01-27 RX ORDER — DICLOFENAC POTASSIUM 50 MG/1
TABLET, FILM COATED ORAL
Qty: 45 TABLET | Refills: 1 | Status: SHIPPED | OUTPATIENT
Start: 2025-01-27

## 2025-01-27 RX ORDER — EXENATIDE 2 MG/.85ML
INJECTION, SUSPENSION, EXTENDED RELEASE SUBCUTANEOUS
Qty: 3.4 ML | Refills: 5 | Status: SHIPPED | OUTPATIENT
Start: 2025-01-27

## 2025-01-27 RX ORDER — FLUCONAZOLE 150 MG/1
150 TABLET ORAL ONCE
Qty: 1 TABLET | Refills: 0 | Status: SHIPPED | OUTPATIENT
Start: 2025-01-27 | End: 2025-01-27

## 2025-01-27 NOTE — TELEPHONE ENCOUNTER
Name from pharmacy: BYDUREON BCISE 2MG/0.85MLAUT INJ 4S         Will file in chart as: BYDUREON BCISE 2 MG/0.85ML auto-injector    Sig: INJECT 1 PEN(2MG) UNDER THE SKIN ONCE WEEKLY    Disp: 3.4 mL    Refills: Not specified    Start: 1/27/2025    Class: E-Prescribe    Non-formulary For: Type 2 diabetes mellitus with hyperglycemia, with long-term current use of insulin (H)    Last ordered: 4 months ago (9/18/2024) by Tyra Bullock MD    Last refill: 12/29/2024    Rx #: [id number]    GLP-1 Agonists Protocol Sepjku7201/27/2025 03:15 AM   Protocol Details HgbA1C in past 3 or 6 months    Medication is active on med list    Has GFR on file in past 12 months and most recent value is normal    Recent (6 mo) or future (90 days) visit within the authorizing provider's specialty    Medication indicated for associated diagnosis    Patient is age 18 or older    No active pregnancy on record    No positive pregnancy test in past 12 months          Hemoglobin A1C   Date Value Ref Range Status   12/27/2024 8.8 (H) 0.0 - 5.6 % Final     Comment:     Normal <5.7%   Prediabetes 5.7-6.4%    Diabetes 6.5% or higher     Note: Adopted from ADA consensus guidelines.   04/21/2021 9.8 (H) 4.1 - 5.7 % Final       GFR Estimate   Date Value Ref Range Status   12/27/2024 >90 >60 mL/min/1.73m2 Final     Comment:     eGFR calculated using 2021 CKD-EPI equation.   05/26/2021 >90 >60.0 mL/min/1.7 m2 Final     Comment:     eGFR is calculated by the CKD-EPI creatinine equation, without race   adjustment. eGFR can be influenced by muscle mass, exercise, and diet. The   reported eGFR is an estimation only and is only applicable if the renal   function is stable.       GFR, ESTIMATED POCT   Date Value Ref Range Status   03/29/2023 >60 >60 mL/min/1.73m2 Final       Prescription approved per [facility] Refill Protocol.      Gael Redman, RN, MSN

## 2025-01-27 NOTE — TELEPHONE ENCOUNTER
Name from pharmacy: DICLOFENAC POTASSIUM 50MG TABLETS         Will file in chart as: diclofenac (CATAFLAM) 50 MG tablet    Sig: TAKE 1 TABLET(50 MG) BY MOUTH TWICE DAILY AS NEEDED FOR MODERATE PAIN    Disp: 90 tablet    Refills: 1 (Pharmacy requested: Not specified)    Start: 1/27/2025    Class: E-Prescribe    Non-formulary For: Chronic bilateral low back pain without sciatica    Last ordered: 3 months ago (10/10/2024) by Tyra Bullock MD    Last refill: 12/5/2024    Rx #: [id number]    NSAID Medications Zdqrlz7501/27/2025 08:43 AM   Protocol Details Always Fail Criteria - Chart Review Required          Routed to provider due to failed RN protocol.       Gael Redman, RN, MSN

## 2025-01-27 NOTE — TELEPHONE ENCOUNTER
Name from pharmacy: FLUCONAZOLE 150MG TABLETS          Will file in chart as: fluconazole (DIFLUCAN) 150 MG tablet    Sig: TAKE 1 TABLET(150 MG) BY MOUTH 1 TIME FOR 1 DOSE    Disp: 1 tablet    Refills: 0 (Pharmacy requested: Not specified)    Start: 1/25/2025    Class: E-Prescribe    Non-formulary For: Vaginal discharge    Last ordered: 4 weeks ago (12/30/2024) by Tyra Bullock MD    Last refill: 12/30/2024    Rx #: [id number]    Antifungal Agents Bdzxlb9101/25/2025 11:22 AM   Protocol Details Medication is active on med list    Medication indicated for associated diagnosis    Recent (12 mo) or future (90 days) visit within the authorizing provider's specialty   Azoles (Oral) Failed   Protocol Details Medication is active on med list    Most recent CrCl is >50 ml/min if drug is Fluconazole    Medication indicated for associated diagnosis    Liver Function Panel within the past 6 months          Gael Redman RN, MSN

## 2025-01-30 DIAGNOSIS — R10.84 ABDOMINAL PAIN, GENERALIZED: ICD-10-CM

## 2025-01-30 DIAGNOSIS — R11.0 NAUSEA: ICD-10-CM

## 2025-01-30 RX ORDER — LIDOCAINE 50 MG/G
OINTMENT TOPICAL
Qty: 30 G | Refills: 5 | Status: SHIPPED | OUTPATIENT
Start: 2025-01-30

## 2025-01-30 RX ORDER — METOCLOPRAMIDE 5 MG/1
TABLET ORAL
Qty: 45 TABLET | Refills: 1 | Status: SHIPPED | OUTPATIENT
Start: 2025-01-30

## 2025-01-30 RX ORDER — LIDOCAINE 50 MG/G
PATCH TOPICAL
Qty: 30 PATCH | Refills: 4 | Status: SHIPPED | OUTPATIENT
Start: 2025-01-30

## 2025-01-30 NOTE — TELEPHONE ENCOUNTER
Faizan, can you call and see if they have options that will be covered for the prescription?  Thank you!    Dr. Bullock    Routed to Faizan, PCS

## 2025-01-30 NOTE — TELEPHONE ENCOUNTER
Name from pharmacy: LIDOCAINE 5% PATCH          Will file in chart as: lidocaine (LIDODERM) 5 % patch     Possible duplicate: Misty to review recent actions on this medication    Sig: APPLY 1 PATCH TOPICALLY TO THE AFFECTED AREA AND LEAVE ON FOR 12 HOURS WITHIN A 24 HOUR PERIOD    Disp: 30 patch    Refills: 1    Start: 1/30/2025    Class: E-Prescribe    Non-formulary For: Abdominal pain, generalized    Last ordered: 2 months ago (11/19/2024) by Tyra Bullock MD    Last refill: 12/19/2024    Rx #: [id number]    Topical Anesthetic Agents Fdbzyj6501/30/2025 08:19 AM   Protocol Details Recent (12 mo) or future (30 days) visit within the authorizing provider's specialty    Medication is active on med list    Patient is age 2 or older          Gael Redman RN, MSN

## 2025-01-30 NOTE — TELEPHONE ENCOUNTER
"Per pharmacy: \"insurance does not cover the ICD10 diagnosis code provided. Please contact us with new diagnosis code for coverage.\" . Thank you!    Ma       "

## 2025-01-30 NOTE — TELEPHONE ENCOUNTER
Name from pharmacy: LIDOCAINE 5% TOPICAL OINTMENT 30GM          Will file in chart as: lidocaine (XYLOCAINE) 5 % external ointment    Sig: APPLY TOPICALLY TO TO THE AFFECTED AREA THREE TIMES DAILY AS NEEDED FOR MODERATE FOR PAIN IN SHOULDER    Disp: 30 g    Refills: Not specified    Start: 1/30/2025    Class: E-Prescribe    Non-formulary For: Abdominal pain, generalized    Last ordered: 1 year ago (12/13/2023) by Tyra Bullock MD    Last refill: 7/11/2024    Rx #: [id number]    Topical Anesthetic Agents Bdviva9401/30/2025 08:11 AM   Protocol Details Recent (12 mo) or future (30 days) visit within the authorizing provider's specialty    Medication is active on med list    Patient is age 2 or older       Name from pharmacy: METOCLOPRAMIDE 5MG TABLETS         Will file in chart as: metoclopramide (REGLAN) 5 MG tablet    Sig: TAKE 1 TABLET(5 MG) BY MOUTH THREE TIMES DAILY AS NEEDED FOR STOMACH PAIN OR NAUSEA OR VOMITING    Disp: 45 tablet    Refills: 1 (Pharmacy requested: Not specified)    Start: 1/30/2025    Class: E-Prescribe    Non-formulary For: Nausea; Abdominal pain, generalized    Last ordered: 1 month ago (12/4/2024) by Tyra Bullock MD    Last refill: 12/30/2024    Rx #: [id number]     Antivertigo/Antiemetic Agents Xypcds1401/30/2025 08:11 AM   Protocol Details Medication is active on med list    Medication indicated for associated diagnosis    Recent (12 mo) or future (90 days) visit with authorizing provider's specialty    Patient is 18 years of age or older          Prescription approved per [facility] Refill Protocol.    Gael Redman, RN, MSN

## 2025-02-05 DIAGNOSIS — R10.84 ABDOMINAL PAIN, GENERALIZED: ICD-10-CM

## 2025-02-05 DIAGNOSIS — R11.0 NAUSEA: ICD-10-CM

## 2025-02-05 RX ORDER — METOCLOPRAMIDE 5 MG/1
TABLET ORAL
Qty: 45 TABLET | Refills: 1 | OUTPATIENT
Start: 2025-02-05

## 2025-02-14 NOTE — TELEPHONE ENCOUNTER
SYMBICORT 160-4.5 MCG/ACT Inhaler               Dr. Kobe Novoa is asking if this can be generic or do you want the patient to keep it brand name              Thank you    Susan Rosas CMA-LLUVIA       0

## 2025-02-18 DIAGNOSIS — N89.8 VAGINAL DISCHARGE: ICD-10-CM

## 2025-02-18 RX ORDER — FLUCONAZOLE 150 MG/1
150 TABLET ORAL ONCE
Qty: 1 TABLET | Refills: 0 | Status: SHIPPED | OUTPATIENT
Start: 2025-02-18 | End: 2025-02-18

## 2025-02-18 NOTE — TELEPHONE ENCOUNTER
Name from pharmacy: FLUCONAZOLE 150MG TABLETS          Will file in chart as: fluconazole (DIFLUCAN) 150 MG tablet    Sig: TAKE 1 TABLET(150 MG) BY MOUTH 1 TIME FOR 1 DOSE    Disp: 1 tablet    Refills: 0 (Pharmacy requested: Not specified)    Start: 2/18/2025    Class: E-Prescribe    Non-formulary For: Vaginal discharge    Last ordered: 3 weeks ago (1/27/2025) by Tyra Bullock MD    Last refill: 1/27/2025    Rx #: [id number]    Antifungal Agents Pxeuxv6502/18/2025 04:36 PM   Protocol Details Medication is active on med list and the sig matches. RN to manually verify dose and sig if red X/fail.    Medication indicated for associated diagnosis    Recent (12 mo) or future (90 days) visit within the authorizing provider's specialty   Azoles (Oral) Failed   Protocol Details Medication is active on med list and the sig matches. RN to manually verify dose and sig if red X/fail.    Medication indicated for associated diagnosis    Liver Function Panel within the past 6 months          Gael Redman RN, MSN

## 2025-02-20 ENCOUNTER — TELEPHONE (OUTPATIENT)
Dept: FAMILY MEDICINE | Facility: CLINIC | Age: 55
End: 2025-02-20
Payer: COMMERCIAL

## 2025-02-20 DIAGNOSIS — M53.3 PAIN IN THE COCCYX: ICD-10-CM

## 2025-02-20 RX ORDER — PSEUDOEPHED/ACETAMINOPH/DIPHEN 30MG-500MG
500-1000 TABLET ORAL EVERY 6 HOURS PRN
Qty: 100 TABLET | Refills: 3 | Status: SHIPPED | OUTPATIENT
Start: 2025-02-20

## 2025-02-24 DIAGNOSIS — F33.1 MODERATE RECURRENT MAJOR DEPRESSION (H): ICD-10-CM

## 2025-02-24 RX ORDER — VENLAFAXINE HYDROCHLORIDE 150 MG/1
150 CAPSULE, EXTENDED RELEASE ORAL DAILY
Qty: 30 CAPSULE | Refills: 0 | Status: SHIPPED | OUTPATIENT
Start: 2025-02-24

## 2025-02-24 NOTE — TELEPHONE ENCOUNTER
Name from pharmacy: VENLAFAXINE ER 150MG CAPSULES          Will file in chart as: venlafaxine (EFFEXOR XR) 150 MG 24 hr capsule    Sig: TAKE 1 CAPSULE BY MOUTH EVERY DAY    Disp: 30 capsule    Refills: 0 (Pharmacy requested: Not specified)    Start: 2/24/2025    Class: E-Prescribe    Non-formulary For: Moderate recurrent major depression (H)    Last ordered: 1 month ago (1/21/2025) by Tyra Bullock MD    Last refill: 1/21/2025    Rx #: [id number]    Serotonin-Norepinephrine Reuptake Inhibitors  Bykjsl7202/24/2025 08:20 AM   Protocol Details PHQ-9 score of less than 5 in past 6 months        YULISA Doe, BSN

## 2025-03-03 DIAGNOSIS — G89.29 CHRONIC BILATERAL LOW BACK PAIN WITHOUT SCIATICA: ICD-10-CM

## 2025-03-03 DIAGNOSIS — M54.50 CHRONIC BILATERAL LOW BACK PAIN WITHOUT SCIATICA: ICD-10-CM

## 2025-03-03 DIAGNOSIS — L30.4 INTERTRIGO: ICD-10-CM

## 2025-03-03 DIAGNOSIS — N89.8 VAGINAL DISCHARGE: ICD-10-CM

## 2025-03-03 DIAGNOSIS — J44.1 CHRONIC OBSTRUCTIVE PULMONARY DISEASE WITH ACUTE EXACERBATION (H): ICD-10-CM

## 2025-03-03 RX ORDER — FLUCONAZOLE 150 MG/1
150 TABLET ORAL ONCE
Qty: 1 TABLET | Refills: 0 | Status: SHIPPED | OUTPATIENT
Start: 2025-03-03 | End: 2025-03-03

## 2025-03-03 RX ORDER — CODEINE PHOSPHATE AND GUAIFENESIN 10; 100 MG/5ML; MG/5ML
SOLUTION ORAL
Qty: 120 ML | Refills: 0 | OUTPATIENT
Start: 2025-03-03

## 2025-03-03 RX ORDER — CYCLOBENZAPRINE HCL 5 MG
5 TABLET ORAL 3 TIMES DAILY PRN
Qty: 30 TABLET | Refills: 5 | Status: SHIPPED | OUTPATIENT
Start: 2025-03-03

## 2025-03-03 RX ORDER — NYSTATIN 100000 [USP'U]/G
POWDER TOPICAL
Qty: 60 G | Refills: 0 | Status: SHIPPED | OUTPATIENT
Start: 2025-03-03

## 2025-03-03 NOTE — TELEPHONE ENCOUNTER
Name from pharmacy: CYCLOBENZAPRINE 5MG TABLETS         Will file in chart as: cyclobenzaprine (FLEXERIL) 5 MG tablet    Sig: TAKE 1 TABLET(5 MG) BY MOUTH THREE TIMES DAILY AS NEEDED FOR MUSCLE SPASMS    Disp: 30 tablet    Refills: 5 (Pharmacy requested: Not specified)    Start: 3/3/2025    Class: E-Prescribe    Non-formulary For: Chronic bilateral low back pain without sciatica    Last ordered: 1 month ago (1/9/2025) by Tyra Bullock MD    Last refill: 2/22/2025    Rx #: [id number]        Name from pharmacy: FLUCONAZOLE 150MG TABLETS         Will file in chart as: fluconazole (DIFLUCAN) 150 MG tablet    Sig: TAKE 1 TABLET(150 MG) BY MOUTH 1 TIME FOR 1 DOSE    Disp: 1 tablet    Refills: 0 (Pharmacy requested: Not specified)    Start: 3/3/2025    Class: E-Prescribe    Non-formulary For: Vaginal discharge    Last ordered: 1 week ago (2/18/2025) by Tyra Bullock MD    Last refill: 2/18/2025    Rx #: [id number]    Antifungal Agents Lgmabo9203/03/2025 09:15 AM   Protocol Details Medication is active on med list and the sig matches. RN to manually verify dose and sig if red X/fail.    Medication indicated for associated diagnosis    Recent (12 mo) or future (90 days) visit within the authorizing provider's specialty   Azoles (Oral) Failed   Protocol Details Medication is active on med list and the sig matches. RN to manually verify dose and sig if red X/fail.    Medication indicated for associated diagnosis    Liver Function Panel within the past 6 months    Most recent ALT is within normal limits    Most recent AST is within normal limits    Recent (3 months) or future (90 days) visit with authorizing provider s specialty    Has GFR on file in past 12 months and most recent value is normal       Name from pharmacy: NYSTOP TOP PWDR 100,000 60GM         Will file in chart as: NYSTOP 520223 UNIT/GM powder    Sig: APPLY TOPICALLY TO THE AFFECTED AREA THREE TIMES DAILY AS NEEDED    Disp: 60 g     Refills: 0 (Pharmacy requested: Not specified)    Start: 3/3/2025    Class: E-Prescribe    Non-formulary For: Intertrigo    Last ordered: 2 months ago (12/4/2024) by Tyra Bullock MD    Last refill: 12/4/2024    Rx #: [id number]    Antifungal Agents Xaqfrn3103/03/2025 09:15 AM   Protocol Details Medication is active on med list and the sig matches. RN to manually verify dose and sig if red X/fail.    Medication indicated for associated diagnosis          Gael Redman, RN, MSN

## 2025-03-04 DIAGNOSIS — T78.40XS ALLERGIC REACTION, SEQUELA: ICD-10-CM

## 2025-03-04 DIAGNOSIS — J45.20 MILD INTERMITTENT ASTHMA WITHOUT COMPLICATION: ICD-10-CM

## 2025-03-04 RX ORDER — MONTELUKAST SODIUM 10 MG/1
1 TABLET ORAL AT BEDTIME
Qty: 30 TABLET | Refills: 5 | Status: SHIPPED | OUTPATIENT
Start: 2025-03-04

## 2025-03-04 NOTE — TELEPHONE ENCOUNTER
Name from pharmacy: MONTELUKAST 10MG TABLETS          Will file in chart as: montelukast (SINGULAIR) 10 MG tablet    Sig: TAKE 1 TABLET(10 MG) BY MOUTH AT BEDTIME    Disp: 30 tablet    Refills: 5 (Pharmacy requested: Not specified)    Start: 3/4/2025    Class: E-Prescribe    Non-formulary For: Mild intermittent asthma without complication; Allergic reaction, sequela    Last ordered: 5 months ago (9/10/2024) by Tyra Bullock MD    Last refill: 2/4/2025    Rx #: [id number]    Leukotriene Inhibitors Protocol Lfcqzh2103/04/2025 08:32 AM   Protocol Details Asthma control assessment score within normal limits in last 6 months        Brook, RN, BSN

## 2025-03-12 DIAGNOSIS — R11.0 NAUSEA: ICD-10-CM

## 2025-03-12 DIAGNOSIS — R10.84 ABDOMINAL PAIN, GENERALIZED: ICD-10-CM

## 2025-03-12 RX ORDER — METOCLOPRAMIDE 5 MG/1
TABLET ORAL
Qty: 45 TABLET | Refills: 1 | Status: SHIPPED | OUTPATIENT
Start: 2025-03-12

## 2025-03-12 NOTE — TELEPHONE ENCOUNTER
Name from pharmacy: METOCLOPRAMIDE 5MG TABLETS          Will file in chart as: metoclopramide (REGLAN) 5 MG tablet    Sig: TAKE 1 TABLET(5 MG) BY MOUTH THREE TIMES DAILY AS NEEDED FOR STOMACH PAIN OR NAUSEA OR VOMITING    Disp: 45 tablet    Refills: 1 (Pharmacy requested: Not specified)    Start: 3/12/2025    Class: E-Prescribe    Non-formulary For: Abdominal pain, generalized; Nausea    Last ordered: 1 month ago (1/30/2025) by Tyra Bullock MD    Last refill: 2/18/2025    Rx #: [id number]     Antivertigo/Antiemetic Agents Xxcxro4203/12/2025 10:40 AM   Protocol Details Medication is active on med list and the sig matches. RN to manually verify dose and sig if red X/fail.    Medication indicated for associated diagnosis    Recent (12 mo) or future (90 days) visit with authorizing provider's specialty    Patient is 18 years of age or older        Routing refill request to provider for review/approval because:  Drug interaction warning      YULISA Doe, BSN

## 2025-03-22 DIAGNOSIS — F33.1 MODERATE RECURRENT MAJOR DEPRESSION (H): ICD-10-CM

## 2025-03-24 RX ORDER — VENLAFAXINE HYDROCHLORIDE 150 MG/1
150 CAPSULE, EXTENDED RELEASE ORAL DAILY
Qty: 15 CAPSULE | Refills: 0 | Status: SHIPPED | OUTPATIENT
Start: 2025-03-24

## 2025-03-24 NOTE — TELEPHONE ENCOUNTER
Name from pharmacy: VENLAFAXINE ER 150MG CAPSULES         Will file in chart as: venlafaxine (EFFEXOR XR) 150 MG 24 hr capsule    Sig: TAKE 1 CAPSULE BY MOUTH EVERY DAY    Disp: 30 capsule    Refills: 0 (Pharmacy requested: Not specified)    Start: 3/22/2025    Class: E-Prescribe    Non-formulary For: Moderate recurrent major depression (H)    Last ordered: 4 weeks ago (2/24/2025) by Tyra Bullock MD    Last refill: 2/24/2025    Rx #: [id number]    Serotonin-Norepinephrine Reuptake Inhibitors  Fbcagg0403/22/2025 08:31 AM   Protocol Details PHQ-9 score of less than 5 in past 6 months        YULISA Doe, BSN

## 2025-03-30 DIAGNOSIS — Z86.73 HISTORY OF CVA (CEREBROVASCULAR ACCIDENT): ICD-10-CM

## 2025-03-31 RX ORDER — CLOPIDOGREL BISULFATE 75 MG/1
75 TABLET ORAL DAILY
Qty: 90 TABLET | Refills: 3 | Status: SHIPPED | OUTPATIENT
Start: 2025-03-31

## 2025-03-31 NOTE — TELEPHONE ENCOUNTER
Name from pharmacy: CLOPIDOGREL 75MG TABLETS          Will file in chart as: clopidogrel (PLAVIX) 75 MG tablet    Sig: TAKE 1 TABLET(75 MG) BY MOUTH DAILY    Disp: 90 tablet    Refills: 1 (Pharmacy requested: Not specified)    Start: 3/30/2025    Class: E-Prescribe    Non-formulary For: History of CVA (cerebrovascular accident)    Last ordered: 5 months ago (10/10/2024) by Tyra Bullock MD    Last refill: 12/29/2024    Rx #: [id number]    Plavix Btqiwn2303/30/2025 03:14 AM   Protocol Details Medication indicated for associated diagnosis            Gael Redman RN, MSN

## 2025-04-01 DIAGNOSIS — E11.622 TYPE 2 DIABETES MELLITUS WITH OTHER SKIN ULCER, WITH LONG-TERM CURRENT USE OF INSULIN (H): Primary | ICD-10-CM

## 2025-04-01 DIAGNOSIS — Z79.4 TYPE 2 DIABETES MELLITUS WITH OTHER SKIN ULCER, WITH LONG-TERM CURRENT USE OF INSULIN (H): Primary | ICD-10-CM

## 2025-04-01 NOTE — PATIENT INSTRUCTIONS
Left message to call back to obtain details for disability.    Type of Leave: long term disability   Reason for Leave: back and leg conditions, lost several discs, leg pain, leg spasms  Start date of leave: went on short term in August 2022, went on long term march 2023  End date of leave: no definite return to work date.  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 Patient had previous disability paperwork in April 2024. States all is the same. Last ovn in April 2024. Tasking MD.   We will call you to schedule in person visit for COVID test and to check on your asthma.

## 2025-04-03 DIAGNOSIS — N89.8 VAGINAL DISCHARGE: ICD-10-CM

## 2025-04-03 RX ORDER — FLUCONAZOLE 150 MG/1
150 TABLET ORAL
Qty: 1 TABLET | Refills: 0 | Status: SHIPPED | OUTPATIENT
Start: 2025-04-03 | End: 2025-04-04

## 2025-04-03 NOTE — TELEPHONE ENCOUNTER
Name from pharmacy: FLUCONAZOLE 150MG TABLETS          Will file in chart as: fluconazole (DIFLUCAN) 150 MG tablet    Sig: TAKE 1 TABLET(150 MG) BY MOUTH 1 TIME FOR 1 DOSE    Disp: 1 tablet    Refills: 0 (Pharmacy requested: Not specified)    Start: 4/3/2025    Class: E-Prescribe    Non-formulary For: Vaginal discharge    Last ordered: 1 month ago (3/3/2025) by Tyra Bullock MD    Last refill: 3/3/2025    Rx #: [id number]    Antifungal Agents Ebtlhp3004/03/2025 03:22 AM   Protocol Details Medication is active on med list and the sig matches. RN to manually verify dose and sig if red X/fail.    Medication indicated for associated diagnosis    Recent (12 mo) or future (90 days) visit within the authorizing provider's specialty   Azoles (Oral) Failed   Protocol Details Most recent ALT is within normal limits    Most recent AST is within normal limits    Medication is active on med list and the sig matches. RN to manually verify dose and sig if red X/fail.    Medication indicated for associated diagnosis    Liver Function Panel within the past 6 months          Gael Redman, RN, MSN

## 2025-04-14 DIAGNOSIS — M54.50 CHRONIC BILATERAL LOW BACK PAIN, UNSPECIFIED WHETHER SCIATICA PRESENT: ICD-10-CM

## 2025-04-14 DIAGNOSIS — G89.29 CHRONIC BILATERAL LOW BACK PAIN, UNSPECIFIED WHETHER SCIATICA PRESENT: ICD-10-CM

## 2025-04-14 RX ORDER — GABAPENTIN 600 MG/1
600 TABLET ORAL 3 TIMES DAILY
Qty: 90 TABLET | Refills: 3 | Status: SHIPPED | OUTPATIENT
Start: 2025-04-14

## 2025-04-21 DIAGNOSIS — N89.8 VAGINAL DISCHARGE: ICD-10-CM

## 2025-04-21 RX ORDER — FLUCONAZOLE 150 MG/1
150 TABLET ORAL
Qty: 1 TABLET | Refills: 0 | OUTPATIENT
Start: 2025-04-21 | End: 2025-04-22

## 2025-04-21 NOTE — TELEPHONE ENCOUNTER
Name from pharmacy: FLUCONAZOLE 150MG TABLETS          Will file in chart as: fluconazole (DIFLUCAN) 150 MG tablet    Sig: TAKE 1 TABLET(150 MG) BY MOUTH 1 TIME FOR 1 DOSE    Disp: 1 tablet    Refills: 0 (Pharmacy requested: Not specified)    Start: 4/21/2025    Class: E-Prescribe    Non-formulary For: Vaginal discharge    Last ordered: 2 weeks ago (4/3/2025) by Tyra Bullock MD    Last refill: 4/3/2025    Rx #: [id number]    Antifungal Agents Rqcxxg2504/21/2025 05:49 AM   Protocol Details Medication is active on med list and the sig matches. RN to manually verify dose and sig if red X/fail.    Medication indicated for associated diagnosis    Recent (12 mo) or future (90 days) visit within the authorizing provider's specialty   Azoles (Oral) Failed   Protocol Details Most recent ALT is within normal limits    Most recent AST is within normal limits    Medication is active on med list and the sig matches. RN to manually verify dose and sig if red X/fail.    Medication indicated for associated diagnosis    Liver Function Panel within the past 6 months    Recent (3 months) or future (90 days) visit with          Gael Redman RN, MSN

## 2025-04-23 DIAGNOSIS — M54.50 CHRONIC BILATERAL LOW BACK PAIN WITHOUT SCIATICA: ICD-10-CM

## 2025-04-23 DIAGNOSIS — G89.29 CHRONIC BILATERAL LOW BACK PAIN WITHOUT SCIATICA: ICD-10-CM

## 2025-04-23 RX ORDER — CYCLOBENZAPRINE HCL 5 MG
5 TABLET ORAL 3 TIMES DAILY PRN
Qty: 30 TABLET | Refills: 5 | Status: SHIPPED | OUTPATIENT
Start: 2025-04-23

## 2025-04-24 DIAGNOSIS — Z79.4 TYPE 2 DIABETES MELLITUS WITH HYPERGLYCEMIA, WITH LONG-TERM CURRENT USE OF INSULIN (H): Primary | ICD-10-CM

## 2025-04-24 DIAGNOSIS — E11.65 TYPE 2 DIABETES MELLITUS WITH HYPERGLYCEMIA, WITH LONG-TERM CURRENT USE OF INSULIN (H): Primary | ICD-10-CM

## 2025-04-25 ENCOUNTER — OFFICE VISIT (OUTPATIENT)
Dept: FAMILY MEDICINE | Facility: CLINIC | Age: 55
End: 2025-04-25
Payer: COMMERCIAL

## 2025-04-25 VITALS
WEIGHT: 194.6 LBS | HEART RATE: 112 BPM | HEIGHT: 60 IN | OXYGEN SATURATION: 99 % | DIASTOLIC BLOOD PRESSURE: 73 MMHG | RESPIRATION RATE: 18 BRPM | SYSTOLIC BLOOD PRESSURE: 111 MMHG | BODY MASS INDEX: 38.21 KG/M2 | TEMPERATURE: 97.8 F

## 2025-04-25 DIAGNOSIS — Z79.899 MEDICATION MANAGEMENT: ICD-10-CM

## 2025-04-25 DIAGNOSIS — E11.65 TYPE 2 DIABETES MELLITUS WITH HYPERGLYCEMIA, WITH LONG-TERM CURRENT USE OF INSULIN (H): Primary | ICD-10-CM

## 2025-04-25 DIAGNOSIS — Z12.31 VISIT FOR SCREENING MAMMOGRAM: ICD-10-CM

## 2025-04-25 DIAGNOSIS — L30.4 INTERTRIGO: ICD-10-CM

## 2025-04-25 DIAGNOSIS — R11.0 NAUSEA: ICD-10-CM

## 2025-04-25 DIAGNOSIS — Z79.4 TYPE 2 DIABETES MELLITUS WITH HYPERGLYCEMIA, WITH LONG-TERM CURRENT USE OF INSULIN (H): Primary | ICD-10-CM

## 2025-04-25 DIAGNOSIS — L03.319 CELLULITIS AND ABSCESS OF TRUNK: ICD-10-CM

## 2025-04-25 DIAGNOSIS — R10.84 ABDOMINAL PAIN, GENERALIZED: ICD-10-CM

## 2025-04-25 DIAGNOSIS — Z12.11 SCREEN FOR COLON CANCER: ICD-10-CM

## 2025-04-25 DIAGNOSIS — L02.219 CELLULITIS AND ABSCESS OF TRUNK: ICD-10-CM

## 2025-04-25 LAB
EST. AVERAGE GLUCOSE BLD GHB EST-MCNC: 223 MG/DL
HBA1C MFR BLD: 9.4 % (ref 0–5.6)

## 2025-04-25 PROCEDURE — 3074F SYST BP LT 130 MM HG: CPT | Performed by: STUDENT IN AN ORGANIZED HEALTH CARE EDUCATION/TRAINING PROGRAM

## 2025-04-25 PROCEDURE — 36415 COLL VENOUS BLD VENIPUNCTURE: CPT | Performed by: STUDENT IN AN ORGANIZED HEALTH CARE EDUCATION/TRAINING PROGRAM

## 2025-04-25 PROCEDURE — 83036 HEMOGLOBIN GLYCOSYLATED A1C: CPT | Performed by: STUDENT IN AN ORGANIZED HEALTH CARE EDUCATION/TRAINING PROGRAM

## 2025-04-25 PROCEDURE — 3078F DIAST BP <80 MM HG: CPT | Performed by: STUDENT IN AN ORGANIZED HEALTH CARE EDUCATION/TRAINING PROGRAM

## 2025-04-25 PROCEDURE — G2211 COMPLEX E/M VISIT ADD ON: HCPCS | Performed by: STUDENT IN AN ORGANIZED HEALTH CARE EDUCATION/TRAINING PROGRAM

## 2025-04-25 PROCEDURE — 99214 OFFICE O/P EST MOD 30 MIN: CPT | Performed by: STUDENT IN AN ORGANIZED HEALTH CARE EDUCATION/TRAINING PROGRAM

## 2025-04-25 RX ORDER — AMOXICILLIN 250 MG
1 CAPSULE ORAL 2 TIMES DAILY PRN
Qty: 30 TABLET | Refills: 0 | Status: SHIPPED | OUTPATIENT
Start: 2025-04-25

## 2025-04-25 RX ORDER — NYSTATIN 100000 U/G
CREAM TOPICAL 2 TIMES DAILY
Qty: 60 G | Refills: 5 | Status: SHIPPED | OUTPATIENT
Start: 2025-04-25

## 2025-04-25 RX ORDER — SULFAMETHOXAZOLE AND TRIMETHOPRIM 800; 160 MG/1; MG/1
1 TABLET ORAL 2 TIMES DAILY
Qty: 14 TABLET | Refills: 0 | Status: SHIPPED | OUTPATIENT
Start: 2025-04-25

## 2025-04-25 RX ORDER — TRAMADOL HYDROCHLORIDE 50 MG/1
50 TABLET ORAL EVERY 6 HOURS PRN
Qty: 5 TABLET | Refills: 0 | Status: SHIPPED | OUTPATIENT
Start: 2025-04-25 | End: 2025-04-28

## 2025-04-25 RX ORDER — METOCLOPRAMIDE 5 MG/1
5 TABLET ORAL 3 TIMES DAILY PRN
Qty: 90 TABLET | Refills: 3 | Status: SHIPPED | OUTPATIENT
Start: 2025-04-25

## 2025-04-25 NOTE — PATIENT INSTRUCTIONS
Congratulations!    For diabetes:  Continue same Toujeo and Humolog  STOP taking Bydureon  START taking ozempic:  0.5 mg weekly for 1 month  1 mg weekly for 1 month  2 mg weekly for 1 month    Nystatin cream for the itchy areas.   Refill on the Reglan for the nausea  Order for mammogram    For abscess:  Bactrim, 1 pill 2x daily for 7 days  Tramadol 5 pills no refills.      Follow up in 1 month.      Dr. Bullock will complete forms for [employer] and for mattress.

## 2025-04-25 NOTE — PROGRESS NOTES
There are no exam notes on file for this visit.    ASSESSMENT AND PLAN:      Teresa was seen today for diabetes.    Diagnoses and all orders for this visit:    Type 2 diabetes mellitus with hyperglycemia, with long-term current use of insulin (H)  A1c is up today from previous.  She is currently doing 55 units of Toujeo twice daily, 1 shot in a.m. and 1 tab in p.m., and then doing 30 units short acting with 2 for every 50 over 150 with meals.  Has been eating more regularly, and doing well with her current PCA support.  She is also on Jardiance and by durian.  We will switch from Bydureon to Ozempic and see if that will help bring sugars down and better control meal time levels.  She has not done well with the Dexcom because it will not stay on her abdomen.  Check sugars with a standard glucometer and is due for refills.  She does not have her glucometer with her today.  -     Hemoglobin A1c  -Toujeo, 55 units twice daily  -Humalog, 30 units with meals +2 additional units for every 50 above 150 sliding scale.  -Jardiance 25 mg daily  -Stop bydureon, start Ozempic  -     Med Therapy Management Referral  -     semaglutide (OZEMPIC) 2 MG/3ML pen; Inject 0.5 mg subcutaneously every 7 days.  -     Semaglutide, 1 MG/DOSE, (OZEMPIC) 4 MG/3ML pen; Inject 1 mg subcutaneously every 7 days.  -     Semaglutide, 2 MG/DOSE, (OZEMPIC) 8 MG/3ML pen; Inject 2 mg subcutaneously every 7 days.  -     blood glucose (NO BRAND SPECIFIED) lancets standard; Use to test blood sugar 4 times daily or as directed.  -     Adult Eye  Referral; Future  -     blood glucose (NO BRAND SPECIFIED) test strip; Use to test blood sugar 3 times daily or as directed.    Visit for screening mammogram  She is interested in doing this.  Referral placed.  -     MA Screening Bilateral w/ Val[facility]; Future    Intertrigo  Recurrent intertrigo in her skin folds.  Has been requesting oral Diflucan for this.  Miconazole not covered.  We will see if nystatin  cream is covered, as she should not need systemic treatment for this.  -     nystatin (MYCOSTATIN) 645004 UNIT/GM external cream; Apply topically 2 times daily.    Abdominal pain, generalized  Nausea  Metoclopramide has been helpful for nausea.  -     metoclopramide (REGLAN) 5 MG tablet; Take 1 tablet (5 mg) by mouth 3 times daily as needed for vomiting.    Constipation  Refilled senna docusate.  -     senna-docusate (SENOKOT-S/PERICOLACE) 8.6-50 MG tablet; Take 1 tablet by mouth 2 times daily as needed for constipation.    Cellulitis and abscess of trunk  Lesion present in her axillary region.  Attempted to ultrasound to see if there is a drainable pocket, but the pocket was small.  No significant streaking cellulitis.  Will treat with a 1 week course of Bactrim.  I did give her 5 pills of tramadol to help with pain, but this should not be refilled unless she has a new lesion or issue.  -     sulfamethoxazole-trimethoprim (BACTRIM DS) 800-160 MG tablet; Take 1 tablet by mouth 2 times daily.  -     naloxone (NARCAN) 4 MG/0.1ML nasal spray; Spray 1 spray (4 mg) into one nostril alternating nostrils as needed for opioid reversal. every 2-3 minutes until assistance arrives  -     traMADol (ULTRAM) 50 MG tablet; Take 1 tablet (50 mg) by mouth every 6 hours as needed for severe pain.    She will follow-up in 1 month.    The longitudinal plan of care for the diagnosis(es)/condition(s) as documented were addressed during this visit. Due to the added complexity in care, I will continue to support Teresa in the subsequent management and with ongoing continuity of care.    Patient Instructions   Congratulations!    For diabetes:  Continue same Toujeo and Humolog  STOP taking Bydureon  START taking ozempic:  0.5 mg weekly for 1 month  1 mg weekly for 1 month  2 mg weekly for 1 month    Nystatin cream for the itchy areas.   Refill on the Reglan for the nausea  Order for mammogram    For abscess:  Bactrim, 1 pill 2x daily for 7  days  Tramadol 5 pills no refills.      Follow up in 1 month.      Dr. Bullock will complete forms for [facility] and for mattress.      Tyar Bullock MD    SUBJECTIVE  Teresa AUSTIN [place] is a 54 year old female with past medical history significant for    Patient Active Problem List   Diagnosis    Acute peptic ulcer    Other allergy, other than to medicinal agents    Bulging lumbar disc    Common migraine without aura    Constipation    Dwarfism    Familial hypercholesterolemia    Insomnia    Moderate persistent asthma without complication    Nicotine dependence, unspecified, uncomplicated    Degeneration of thoracic or thoracolumbar intervertebral disc    Type 2 diabetes mellitus with other skin ulcer, with long-term current use of insulin (H)    LOMAS (nonalcoholic steatohepatitis)    Disease of lung    Hemorrhoids    Parotid mass    Endometriosis    NNAMDI (obstructive sleep apnea)    History of total right knee replacement    Lateral epicondylitis    Impingement syndrome of shoulder region, left    Hx of total knee replacement, left    Chronic pain syndrome    Borderline personality disorder (H)    Moderate recurrent major depression (H)    Recurrent ventral hernia    Essential hypertension    Nonruptured cerebral aneurysm    Cerebrovascular accident (CVA) due to embolism (H)    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)    Pre-ulcerative corn or callous    Excessive bleeding in premenopausal period    Morbid obesity (H)    Pseudoseizure    Chronic obstructive pulmonary disease (H)    Recurrent incisional hernia    Buttock wound, left, initial encounter    Cannabis use disorder    Infection due to 2019 novel coronavirus    Unstable angina (H)    Status post coronary angiogram    Coronary artery disease involving native coronary artery of native heart without angina pectoris     Others present at the visit:  None    Presents for   Chief Complaint   Patient presents with    Diabetes     Follow-up dm and look at arm  "pit is painful     Patient presents for follow-up of multiple issues.    Primary concern today is an abscess located in her right axilla.  This started developing a few days ago.  She has had some drainage.  Since then, however it is closed up.  Area is tender, and painful.  She has intermittent feelings of hot and cold, but nothing different than normal.  No extending warmth from the armpit.    She has not the other wounds on her abdomen and lower body have healed up.  She is no longer having a home nurse coming to her house.  She does have some itching and irritation in her lower abdomen, and has been requesting frequent Diflucan for this.  Has not been using any cream because miconazole is not covered by her insurance.  Would be open to other creams.    She is working with her CADI  on a mattress that helps adjust and reduce his risk for pressure injuries.  She has a history of a previous pressure injury in her lower back.  It is now well-healed.  It took months to heal and frequent wound care.  She has been more mobile lately, and is starting to pursue use of prosthesis for her amputation.  She would like orders for this to be sent to [facility].      Has been working on eating more regularly, managing her weight, and taking care of her diabetes.  She has lost weight and is now down to 296 pounds.  Diabetes has been well-controlled.  She is still doing her own checks and does not have her glucometer with her today.  She is no longer having lows.  She has that sugars in general are in the upper 100s to lower 200s.  Continues to get good PCA help from her team at home, which is led by Janet and her family.  She describes the family as having \"adopted her \", and is getting regular support and assistance with what she needs.    Would like refills on lancets and strips.  The Dexcom monitor will not state in place on her abdomen, due to her body habitus and increased sweats.    She is still interested in " moving forward with a colonoscopy.  Her diarrhea has been better.  She is no longer taking any antidiarrheal medications and actually is requesting some stool softeners today.  She is having about 5-6 soft bowel movements per day.  No skin breakdown from this.  Wants to make sure she can set up another surgical colonoscopy with better bowel prep so they can see everything they need next time.    Is open to doing a mammogram.  Needs handicap accessible site so cannot do it at [place].    Has been meeting regularly with her new psychiatrist.  This is going well.  We are in agreement that the psychiatrist will fill these medications, and then I will not continue to fill.  She feels comfortable with this.  Has talked with some of her friends and has a phone number for a TBI specialist, which she thinks will be helpful for her as well.  Has had some falls out of her wheelchair, but nothing where she has hit her head or lost consciousness.  Does get some bruises and discomfort at times.    She is excited about a new relationship.  Is engaged to be  this summer.  Her fiancé's name is Meagan, and Meagan plans to move here to be with her.    Breathing has been stable.  Using her inhalers regularly.  No shortness of breath.  Continues to smoke cigarettes about 10 to 12/day.  This is stable.    OBJECTIVE:  Vitals: /73 (BP Location: Left arm, Patient Position: Sitting, Cuff Size: Adult Regular)   Pulse 112   Temp 97.8  F (36.6  C) (Oral)   Resp 18   Ht 1.524 m (5')   Wt 88.3 kg (194 lb 9.6 oz)   SpO2 99%   BMI 38.01 kg/m    BMI= Body mass index is 38.01 kg/m .  Objective:    Vitals:  Vitals are reviewed and are within the normal range,   Gen:  Alert, pleasant, no acute distress  Cardiac:  Regular rate and rhythm, no murmurs, rubs or gallops, tachycardic  Respiratory:  Lungs clear to auscultation bilaterally, no crackles or wheezes  Abdomen: He has an area of erythema and skin breakdown below her pannus,  consistent with intertrigo.  Continues to have large ventral hernia present in her left upper abdomen.  Soft and nontender.  Extremities: She has an area of mild erythema and some tenderness in her left anterior axilla.  Some fullness but no clear fluctuance.  I did use an ultrasound to see if there is an area that is drainable, but the area of the abscess is quite small.  No extending erythema or streaking present.    Results for orders placed or performed in visit on 04/25/25   Hemoglobin A1c     Status: Abnormal   Result Value Ref Range    Estimated Average Glucose 223 (H) <117 mg/dL    Hemoglobin A1C 9.4 (H) 0.0 - 5.6 %       Patient Instructions   Congratulations!    For diabetes:  Continue same Toujeo and Humolog  STOP taking Bydureon  START taking ozempic:  0.5 mg weekly for 1 month  1 mg weekly for 1 month  2 mg weekly for 1 month    Nystatin cream for the itchy areas.   Refill on the Reglan for the nausea  Order for mammogram    For abscess:  Bactrim, 1 pill 2x daily for 7 days  Tramadol 5 pills no refills.      Follow up in 1 month.      Dr. Bullock will complete forms for [facility] and for [facility].      Tyra Bullock MD

## 2025-04-25 NOTE — RESULT ENCOUNTER NOTE
Results discussed with patient in clinic.  Please also send a copy.     Tyra Bullock MD    Please send results to patient.

## 2025-04-25 NOTE — LETTER
April 25, 2025      Teresa Perez  [address]  [place] [address]        Dear ,    We are writing to inform you of your test results.      Resulted Orders   Hemoglobin A1c   Result Value Ref Range    Estimated Average Glucose 223 (H) <117 mg/dL    Hemoglobin A1C 9.4 (H) 0.0 - 5.6 %      Comment:      Normal <5.7%   Prediabetes 5.7-6.4%    Diabetes 6.5% or higher     Note: Adopted from ADA consensus guidelines.       If you have any questions or concerns, please call the clinic at the number listed above.       Sincerely,      Tyra Bullock MD    Electronically signed

## 2025-04-28 ENCOUNTER — PATIENT OUTREACH (OUTPATIENT)
Dept: CARE COORDINATION | Facility: CLINIC | Age: 55
End: 2025-04-28
Payer: COMMERCIAL

## 2025-04-29 ENCOUNTER — TELEPHONE (OUTPATIENT)
Dept: FAMILY MEDICINE | Facility: CLINIC | Age: 55
End: 2025-04-29
Payer: COMMERCIAL

## 2025-04-29 NOTE — TELEPHONE ENCOUNTER
MTM referral from: [facility] visit (referral by provider)    MTM referral outreach attempt #2 on April 29, 2025 at 11:20 AM      Outcome: Patient not reachable after several attempts, routed to Pharmacist Team/Provider as an FYI    Use [facility] med adv for the carrier/Plan on the flowsheet      MTM Practitioner please send patient letter    See Ronny  [facility]   [phone]

## 2025-04-30 ENCOUNTER — PATIENT OUTREACH (OUTPATIENT)
Dept: CARE COORDINATION | Facility: CLINIC | Age: 55
End: 2025-04-30
Payer: COMMERCIAL

## 2025-05-06 ENCOUNTER — HOSPITAL ENCOUNTER (EMERGENCY)
Facility: HOSPITAL | Age: 55
Discharge: HOME | End: 2025-05-06
Attending: EMERGENCY MEDICINE | Admitting: EMERGENCY MEDICINE
Payer: COMMERCIAL

## 2025-05-06 ENCOUNTER — ANCILLARY PROCEDURE (OUTPATIENT)
Dept: ULTRASOUND IMAGING | Facility: HOSPITAL | Age: 55
End: 2025-05-06
Attending: EMERGENCY MEDICINE
Payer: COMMERCIAL

## 2025-05-06 VITALS
SYSTOLIC BLOOD PRESSURE: 123 MMHG | WEIGHT: 195 LBS | TEMPERATURE: 99.2 F | HEART RATE: 108 BPM | RESPIRATION RATE: 18 BRPM | BODY MASS INDEX: 38.08 KG/M2 | DIASTOLIC BLOOD PRESSURE: 70 MMHG | OXYGEN SATURATION: 95 %

## 2025-05-06 DIAGNOSIS — L02.419 AXILLARY ABSCESS: ICD-10-CM

## 2025-05-06 LAB
ANION GAP SERPL CALCULATED.3IONS-SCNC: 10 MMOL/L (ref 7–15)
BASOPHILS # BLD AUTO: 0 10E3/UL (ref 0–0.2)
BASOPHILS NFR BLD AUTO: 0 %
BUN SERPL-MCNC: 10.6 MG/DL (ref 6–20)
CALCIUM SERPL-MCNC: 9.2 MG/DL (ref 8.8–10.4)
CHLORIDE SERPL-SCNC: 98 MMOL/L (ref 98–107)
CREAT SERPL-MCNC: 0.44 MG/DL (ref 0.51–0.95)
CRP SERPL-MCNC: 152.1 MG/L
EGFRCR SERPLBLD CKD-EPI 2021: >90 ML/MIN/1.73M2
EOSINOPHIL # BLD AUTO: 0.1 10E3/UL (ref 0–0.7)
EOSINOPHIL NFR BLD AUTO: 0 %
ERYTHROCYTE [DISTWIDTH] IN BLOOD BY AUTOMATED COUNT: 12.7 % (ref 10–15)
ERYTHROCYTE [SEDIMENTATION RATE] IN BLOOD BY WESTERGREN METHOD: 39 MM/HR (ref 0–30)
GLUCOSE SERPL-MCNC: 361 MG/DL (ref 70–99)
HCO3 SERPL-SCNC: 26 MMOL/L (ref 22–29)
HCT VFR BLD AUTO: 39.9 % (ref 35–47)
HGB BLD-MCNC: 14.1 G/DL (ref 11.7–15.7)
IMM GRANULOCYTES # BLD: 0.1 10E3/UL
IMM GRANULOCYTES NFR BLD: 0 %
LYMPHOCYTES # BLD AUTO: 1.3 10E3/UL (ref 0.8–5.3)
LYMPHOCYTES NFR BLD AUTO: 9 %
MCH RBC QN AUTO: 30.6 PG (ref 26.5–33)
MCHC RBC AUTO-ENTMCNC: 35.3 G/DL (ref 31.5–36.5)
MCV RBC AUTO: 87 FL (ref 78–100)
MONOCYTES # BLD AUTO: 0.8 10E3/UL (ref 0–1.3)
MONOCYTES NFR BLD AUTO: 6 %
NEUTROPHILS # BLD AUTO: 11.3 10E3/UL (ref 1.6–8.3)
NEUTROPHILS NFR BLD AUTO: 84 %
NRBC # BLD AUTO: 0 10E3/UL
NRBC BLD AUTO-RTO: 0 /100
PLATELET # BLD AUTO: 215 10E3/UL (ref 150–450)
POTASSIUM SERPL-SCNC: 4.7 MMOL/L (ref 3.4–5.3)
RBC # BLD AUTO: 4.61 10E6/UL (ref 3.8–5.2)
SODIUM SERPL-SCNC: 134 MMOL/L (ref 135–145)
WBC # BLD AUTO: 13.5 10E3/UL (ref 4–11)

## 2025-05-06 PROCEDURE — 80048 BASIC METABOLIC PNL TOTAL CA: CPT | Performed by: EMERGENCY MEDICINE

## 2025-05-06 PROCEDURE — 76942 ECHO GUIDE FOR BIOPSY: CPT | Mod: RT

## 2025-05-06 PROCEDURE — 258N000003 HC RX IP 258 OP 636: Performed by: EMERGENCY MEDICINE

## 2025-05-06 PROCEDURE — 99284 EMERGENCY DEPT VISIT MOD MDM: CPT | Mod: 25

## 2025-05-06 PROCEDURE — 85652 RBC SED RATE AUTOMATED: CPT | Performed by: EMERGENCY MEDICINE

## 2025-05-06 PROCEDURE — 10060 I&D ABSCESS SIMPLE/SINGLE: CPT

## 2025-05-06 PROCEDURE — 36415 COLL VENOUS BLD VENIPUNCTURE: CPT | Performed by: EMERGENCY MEDICINE

## 2025-05-06 PROCEDURE — 85004 AUTOMATED DIFF WBC COUNT: CPT | Performed by: EMERGENCY MEDICINE

## 2025-05-06 PROCEDURE — 86140 C-REACTIVE PROTEIN: CPT | Performed by: EMERGENCY MEDICINE

## 2025-05-06 PROCEDURE — 96360 HYDRATION IV INFUSION INIT: CPT | Mod: 59

## 2025-05-06 PROCEDURE — 250N000013 HC RX MED GY IP 250 OP 250 PS 637: Performed by: EMERGENCY MEDICINE

## 2025-05-06 RX ORDER — CLINDAMYCIN HYDROCHLORIDE 300 MG/1
300 CAPSULE ORAL 3 TIMES DAILY
Qty: 30 CAPSULE | Refills: 0 | Status: SHIPPED | OUTPATIENT
Start: 2025-05-06 | End: 2025-05-16

## 2025-05-06 RX ORDER — ACETAMINOPHEN 325 MG/1
975 TABLET ORAL ONCE
Status: COMPLETED | OUTPATIENT
Start: 2025-05-06 | End: 2025-05-06

## 2025-05-06 RX ADMIN — SODIUM CHLORIDE 1000 ML: 0.9 INJECTION, SOLUTION INTRAVENOUS at 11:28

## 2025-05-06 RX ADMIN — ACETAMINOPHEN 975 MG: 325 TABLET ORAL at 10:20

## 2025-05-06 ASSESSMENT — COLUMBIA-SUICIDE SEVERITY RATING SCALE - C-SSRS
6. HAVE YOU EVER DONE ANYTHING, STARTED TO DO ANYTHING, OR PREPARED TO DO ANYTHING TO END YOUR LIFE?: NO
2. HAVE YOU ACTUALLY HAD ANY THOUGHTS OF KILLING YOURSELF IN THE PAST MONTH?: NO
1. IN THE PAST MONTH, HAVE YOU WISHED YOU WERE DEAD OR WISHED YOU COULD GO TO SLEEP AND NOT WAKE UP?: NO

## 2025-05-06 ASSESSMENT — ACTIVITIES OF DAILY LIVING (ADL)
ADLS_ACUITY_SCORE: 58

## 2025-05-06 NOTE — ED TRIAGE NOTES
"SPF to triage 2.  Pt here for wound in R armpit started about 11 days ago and treated with bactrim by PMD but did not improve.  Started as pimple.  Denies CP or SOB.  \"I think I have an abscess.\"     Triage Assessment (Adult)       Row Name 05/06/25 0928          Triage Assessment    Airway WDL WDL        Respiratory WDL    Respiratory WDL X;cough        Skin Circulation/Temperature WDL    Skin Circulation/Temperature WDL WDL        Cardiac WDL    Cardiac WDL X;all     Pulse Rate & Regularity tachycardic        Peripheral/Neurovascular WDL    Peripheral Neurovascular WDL WDL        Cognitive/Neuro/Behavioral WDL    Cognitive/Neuro/Behavioral WDL WDL        Scott Coma Scale    Best Eye Response 4-->(E4) spontaneous     Best Motor Response 6-->(M6) obeys commands     Best Verbal Response 5-->(V5) oriented     Scott Coma Scale Score 15                     "
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
## 2025-05-14 ENCOUNTER — OFFICE VISIT (OUTPATIENT)
Dept: FAMILY MEDICINE | Facility: CLINIC | Age: 55
End: 2025-05-14
Payer: COMMERCIAL

## 2025-05-14 ENCOUNTER — DOCUMENTATION ONLY (OUTPATIENT)
Dept: FAMILY MEDICINE | Facility: CLINIC | Age: 55
End: 2025-05-14

## 2025-05-14 VITALS
DIASTOLIC BLOOD PRESSURE: 74 MMHG | TEMPERATURE: 98.1 F | SYSTOLIC BLOOD PRESSURE: 110 MMHG | OXYGEN SATURATION: 95 % | HEART RATE: 96 BPM | RESPIRATION RATE: 18 BRPM

## 2025-05-14 DIAGNOSIS — L02.419 ABSCESS OF AXILLARY REGION: Primary | ICD-10-CM

## 2025-05-14 RX ORDER — KETOROLAC TROMETHAMINE 30 MG/ML
30 INJECTION, SOLUTION INTRAMUSCULAR; INTRAVENOUS ONCE
Status: COMPLETED | OUTPATIENT
Start: 2025-05-14 | End: 2025-05-14

## 2025-05-14 RX ADMIN — KETOROLAC TROMETHAMINE 30 MG: 30 INJECTION, SOLUTION INTRAMUSCULAR; INTRAVENOUS at 09:15

## 2025-05-14 NOTE — PROGRESS NOTES
Preceptor Attestation:    I discussed the patient with the resident and evaluated the patient in person.  I was present with Dr. Matthews to US the area - some edema, but no significant abscess present.  Will continue with antibiotics and anti-inflammatories.  I have verified the content of the note, which accurately reflects my assessment of the patient and the plan of care.   Supervising Physician:  Tyra Bullock MD.

## 2025-05-14 NOTE — PROGRESS NOTES
Assessment & Plan     Abscess of axillary region  Presents for follow-up for right axillary abscess s/p Bactrim 4/25 followed by incision and drainage in the emergency department on 5/6 currently on 10-day clindamycin course with clinical improvement in erythema, drainage, and pain in that area.  Exam today with incision with mild active yellow/clear drainage but no clear fluid collection subcutaneously on bedside ultrasound today.  Discussed continued course of clindamycin, Toradol today for anti-inflammatory effects, and warm compresses.  Return precautions reviewed.  Follow-up with PCP in 1 month or sooner if needed.  - ketorolac (TORADOL) injection 30 mg    Follow-up  Return in about 4 weeks (around 6/11/2025), or if symptoms worsen or fail to improve.    Bryson Moore is a 54 year old, presenting for the following health issues:  Follow Up (ER follow up)        5/14/2025     8:20 AM   Additional Questions   Roomed by petty   Accompanied by self         5/14/2025    Information    services provided? No     HPI      Presents for follow-up of emergency room visit.  Patient was seen 4/25/2025 in the clinic for axillary abscess with no palpable fluid collection to drain at that time.  Symptoms progressed and patient presented to [facility] emergency department on 5/6/2025 with an axillary abscess noted, no overlying cellulitis now s/p incision and drainage with packing initially left in but spontaneously following up for patient 5/6 evening when the patient arrived home.  She was prescribed in the emergency room a 10-day course of clindamycin 3 times daily.  She has been taking her antibiotics as recommended.  Patient has noted ongoing drainage from 5/6 to 5/12 with mild pain in that area still as well.      Objective    /74 (BP Location: Left arm, Patient Position: Sitting, Cuff Size: Adult Regular)   Pulse 96   Temp 98.1  F (36.7  C) (Oral)   Resp 18   SpO2 95%   There is no  height or weight on file to calculate BMI.  Physical Exam   GENERAL: alert and no distress  SKIN: Right axilla with about 1 cm incision with mild yellow wall clear drainage with mild surrounding erythema, some induration, no significant fluctuance, no streaking, not warm to the touch    Bedside ultrasound demonstrating no significant fluid collection          Signed Electronically by: Tiffanie Matthews MD

## 2025-05-14 NOTE — PATIENT INSTRUCTIONS
Patient Education   Skin Abscess: Care Instructions  Overview     A skin abscess is a bacterial infection that forms a pocket of pus. A boil is a kind of skin abscess. The doctor may have cut an opening in the abscess so that the pus can drain out. You may have gauze in the cut so that the abscess will stay open and keep draining. You may need antibiotics. You will need to follow up with your doctor to make sure the infection has gone away.  The doctor has checked you carefully, but problems can develop later. If you notice any problems or new symptoms, get medical treatment right away.  Follow-up care is a key part of your treatment and safety. Be sure to make and go to all appointments, and call your doctor if you are having problems. It's also a good idea to know your test results and keep a list of the medicines you take.  How can you care for yourself at home?  Apply warm and dry compresses, a heating pad set on low, or a hot water bottle 3 or 4 times a day for pain. Keep a cloth between the heat source and your skin.  If your doctor prescribed antibiotics, take them as directed. Do not stop taking them just because you feel better. You need to take the full course of antibiotics.  Take pain medicines exactly as directed.  If the doctor gave you a prescription medicine for pain, take it as prescribed.  If you are not taking a prescription pain medicine, ask your doctor if you can take an over-the-counter medicine.  Keep your bandage clean and dry. Change the bandage whenever it gets wet or dirty, or at least one time a day.  If the abscess was packed with gauze:  Keep follow-up appointments to have the gauze changed or removed. If the doctor instructed you to remove the gauze, follow the instructions you were given for how to remove it.  After the gauze is removed, soak the area in warm water for 15 to 20 minutes 2 times a day, until the wound closes.  When should you call for help?   Call your doctor now or  "seek immediate medical care if:    You have signs of worsening infection, such as:  Increased pain, swelling, warmth, or redness.  Red streaks leading from the infected skin.  Pus draining from the wound.  A fever.   Watch closely for changes in your health, and be sure to contact your doctor if:    You do not get better as expected.   Where can you learn more?  Go to https://www.[employer].net/patiented  Enter D633 in the search box to learn more about \"Skin Abscess: Care Instructions.\"  Current as of: December 4, 2024  Content Version: 14.4 2024-2025 [employer].   Care instructions adapted under license by your healthcare professional. If you have questions about a medical condition or this instruction, always ask your healthcare professional. [employer] disclaims any warranty or liability for your use of this information.       "

## 2025-05-14 NOTE — PROGRESS NOTES
Clinic Administered Medication Documentation        Patient was given Ketorolac. Prior to medication administration, verified patient's identity using patient s name and date of birth. Please see MAR and medication order for additional information. Patient instructed to remain in clinic for 15 minutes and report any adverse reaction to staff immediately.    Vial/Syringe: Single dose vial. Was entire vial of medication used? Yes

## 2025-05-14 NOTE — PROGRESS NOTES
To be completed in Nursing note:    Please reference list for forms that require a visit for completion.  Please remind patients that providers are given 3-5 business days to complete and return forms.      Form type: [facility]    Date form received: 25    Date form completed by Physician:25    How was form returned to patient (mailed, faxed, or at  for patient to ): mailed to   [address] [place] [phone]  Date form mailed/faxed/left at  for patient and sent to HIM for scannin25, sent an copy to HIM for scanning and sent a copy to patient      Once form is left for patient, faxed, or mailed PCS will then close the documentation only encounter.     Faizan Lacey MA

## 2025-05-19 DIAGNOSIS — N89.8 VAGINAL DISCHARGE: ICD-10-CM

## 2025-05-20 RX ORDER — FLUCONAZOLE 150 MG/1
150 TABLET ORAL
Qty: 1 TABLET | Refills: 0 | Status: SHIPPED | OUTPATIENT
Start: 2025-05-20 | End: 2025-05-21

## 2025-05-20 NOTE — TELEPHONE ENCOUNTER
Name from pharmacy: FLUCONAZOLE 150MG TABLETS          Will file in chart as: fluconazole (DIFLUCAN) 150 MG tablet    Sig: TAKE 1 TABLET(150 MG) BY MOUTH 1 TIME FOR 1 DOSE    Disp: 1 tablet    Refills: 0 (Pharmacy requested: Not specified)    Start: 5/19/2025    Class: E-Prescribe    Non-formulary For: Vaginal discharge    Last ordered: 8 months ago (9/18/2024) by Tyra Bullock MD    Last refill: 9/18/2024    Rx #: [id number]    Antifungal Agents Pszjqt7705/19/2025 09:52 PM   Protocol Details Medication is active on med list and the sig matches. RN to manually verify dose and sig if red X/fail.    Medication indicated for associated diagnosis    Recent (12 mo) or future (90 days) visit within the authorizing provider's specialty   Azoles (Oral) Failed   Protocol Details Most recent ALT is within normal limits    Most recent AST is within normal limits    Medication is active on med list and the sig matches. RN to manually verify dose and sig if red X/fail.    Medication indicated for associated diagnosis    Liver Function Panel within the past 6 months        YULISA Doe, BSN

## 2025-05-22 ENCOUNTER — TELEPHONE (OUTPATIENT)
Dept: FAMILY MEDICINE | Facility: CLINIC | Age: 55
End: 2025-05-22
Payer: COMMERCIAL

## 2025-05-22 NOTE — TELEPHONE ENCOUNTER
Outreach call placed to Div[facility] at patient's request to inquire about next steps and prescriptions/orders needed to get her set up with a new prosthetic.      Spoke with Lis at [facility] about what might be needed.     Will need a face-to-face to get things approved, especially if she needs a new socket or leg due to changes in size and shape.      They will need to see her in person for an assessment and then will send me details for the prescription, and after that I will need to do an in person visit in clinic for face-to-face to document needs/appropriateness.     Tyra Bullock MD    Routed to YULISA Abbott for FYI, in case [facility] calls back.

## 2025-06-08 DIAGNOSIS — G25.81 RESTLESS LEGS SYNDROME: ICD-10-CM

## 2025-06-09 DIAGNOSIS — N89.8 VAGINAL DISCHARGE: ICD-10-CM

## 2025-06-09 DIAGNOSIS — G89.29 CHRONIC BILATERAL LOW BACK PAIN WITHOUT SCIATICA: ICD-10-CM

## 2025-06-09 DIAGNOSIS — I10 ESSENTIAL HYPERTENSION: ICD-10-CM

## 2025-06-09 DIAGNOSIS — F33.1 MODERATE RECURRENT MAJOR DEPRESSION (H): ICD-10-CM

## 2025-06-09 DIAGNOSIS — L02.219 CELLULITIS AND ABSCESS OF TRUNK: ICD-10-CM

## 2025-06-09 DIAGNOSIS — M54.50 CHRONIC BILATERAL LOW BACK PAIN WITHOUT SCIATICA: ICD-10-CM

## 2025-06-09 DIAGNOSIS — R10.11 RUQ ABDOMINAL PAIN: ICD-10-CM

## 2025-06-09 DIAGNOSIS — L03.319 CELLULITIS AND ABSCESS OF TRUNK: ICD-10-CM

## 2025-06-09 DIAGNOSIS — M25.511 ACUTE PAIN OF RIGHT SHOULDER: ICD-10-CM

## 2025-06-09 RX ORDER — PANTOPRAZOLE SODIUM 40 MG/1
40 TABLET, DELAYED RELEASE ORAL DAILY
Qty: 90 TABLET | Refills: 1 | Status: SHIPPED | OUTPATIENT
Start: 2025-06-09

## 2025-06-09 RX ORDER — QUETIAPINE FUMARATE 400 MG/1
TABLET, FILM COATED ORAL
Qty: 90 TABLET | OUTPATIENT
Start: 2025-06-09

## 2025-06-09 RX ORDER — DICLOFENAC POTASSIUM 50 MG/1
TABLET, FILM COATED ORAL
Qty: 45 TABLET | Refills: 1 | OUTPATIENT
Start: 2025-06-09

## 2025-06-09 RX ORDER — LISINOPRIL 5 MG/1
5 TABLET ORAL AT BEDTIME
Qty: 90 TABLET | Refills: 1 | Status: SHIPPED | OUTPATIENT
Start: 2025-06-09

## 2025-06-09 RX ORDER — IBUPROFEN 400 MG/1
TABLET, FILM COATED ORAL
Qty: 42 TABLET | Refills: 1 | Status: SHIPPED | OUTPATIENT
Start: 2025-06-09

## 2025-06-09 RX ORDER — TRAMADOL HYDROCHLORIDE 50 MG/1
50 TABLET ORAL EVERY 6 HOURS PRN
Qty: 5 TABLET | OUTPATIENT
Start: 2025-06-09

## 2025-06-09 RX ORDER — PRAMIPEXOLE DIHYDROCHLORIDE 0.75 MG/1
0.75 TABLET ORAL AT BEDTIME
Qty: 90 TABLET | Refills: 1 | Status: SHIPPED | OUTPATIENT
Start: 2025-06-09

## 2025-06-09 RX ORDER — FLUCONAZOLE 150 MG/1
150 TABLET ORAL
Qty: 1 TABLET | Refills: 0 | Status: SHIPPED | OUTPATIENT
Start: 2025-06-09 | End: 2025-06-10

## 2025-06-09 NOTE — TELEPHONE ENCOUNTER
Name from pharmacy: PRAMIPEXOLE 0.75MG TABLETS          Will file in chart as: pramipexole (MIRAPEX) 0.75 MG tablet    Sig: TAKE 1 TABLET BY MOUTH EVERY NIGHT AT BEDTIME    Disp: 90 tablet    Refills: 1 (Pharmacy requested: Not specified)    Start: 6/8/2025    Class: E-Prescribe    Non-formulary For: Restless legs syndrome    Last ordered: 8 months ago (10/10/2024) by Tyra Bullock MD    Last refill: 3/9/2025    Rx #: [id number]    Antiparkinson's Agents Protocol Ggclzx0906/08/2025 03:14 AM   Protocol Details Medication is active on med list and the sig matches. RN to manually verify dose and sig if red X/fail.    Recent (12 month) or future (90 days) visit with authorizing provider's specialty (provided they have been seen in the past 15 months)    Medication indicated for associated diagnosis    Patient is age 18 or older    No active pregnancy on record    No positive pregnancy test in the past 12 months        Prescription approved per [facility] Refill Protocol.  YULISA Doe, BSN

## 2025-06-09 NOTE — TELEPHONE ENCOUNTER
Name from pharmacy: QUETIAPINE 400MG TABLETS         Will file in chart as: QUEtiapine (SEROQUEL) 400 MG tablet    Sig: TAKE 1 TABLET(400 MG) BY MOUTH AT BEDTIME    Disp: 90 tablet    Refills: Not specified    Start: 6/9/2025    Class: E-Prescribe    Non-formulary For: Moderate recurrent major depression (H)    Last ordered: 1 year ago (4/25/2024) by Tyra Bullock MD    Last refill: 5/18/2024    Rx #: [id number]    Antipsychotic Medications Oiukpc4206/09/2025 09:18 AM   Protocol Details PHQ9 score less than 5 in past 6 monts    Medication is active on med list and the sig matches. RN to manually verify dose and sig if red X/fail.          Gael Redman RN, MSN

## 2025-06-09 NOTE — TELEPHONE ENCOUNTER
Name from pharmacy: FLUCONAZOLE 150MG TABLETS          Will file in chart as: fluconazole (DIFLUCAN) 150 MG tablet    Sig: TAKE 1 TABLET(150 MG) BY MOUTH 1 TIME FOR 1 DOSE    Disp: 1 tablet    Refills: 0 (Pharmacy requested: Not specified)    Start: 6/9/2025    Class: E-Prescribe    Non-formulary For: Vaginal discharge    Last ordered: 2 weeks ago (5/20/2025) by Tyra Bullock MD    Last refill: 5/20/2025    Rx #: [id number]    Antifungal Agents Kpakpw2206/09/2025 09:23 AM   Protocol Details Medication is active on med list and the sig matches. RN to manually verify dose and sig if red X/fail.    Medication indicated for associated diagnosis    Recent (12 month) or future (90 days) visit with authorizing provider's specialty (provided they have been seen in the past 15 months)   Azoles (Oral) Failed   Protocol Details Most recent ALT is within normal limits    Most recent AST is within normal limits    Medication is active on med list and the sig matches. RN to manually verify dose and sig if red X/fail.    Medication indicated for associated diagnosis    Liver Function Panel within the past 6 months    Recent (3 months) or future (90 days) visit with authorizing provider s specialty (provided they have been seen in the past 6 months)    Has GFR on file in past 12 months and most recent value is normal       Name from pharmacy: TRAMADOL 50MG TABLETS         Will file in chart as: traMADol (ULTRAM) 50 MG tablet    Sig: TAKE 1 TABLET(50 MG) BY MOUTH EVERY 6 HOURS AS NEEDED FOR SEVERE PAIN    Disp: 5 tablet    Refills: Not specified    Start: 6/9/2025    Class: E-Prescribe    Non-formulary For: Cellulitis and abscess of trunk    Last ordered: 1 month ago (4/25/2025) by Tyra Bullock MD    Last refill: 4/25/2025    Rx #: [id number]    Rx Protocol Controlled Substance Ifopmq4906/09/2025 09:23 AM   Protocol Details Medication is active on med list and the sig matches    Urine drug screeen results on  file in past 12 months    Controlled Substance Agreement on file in last 12 months    No Benzodiazepines on acive med list    Auto Fail - Please forward to Provider    ROSALVA-7 done in past year    Visit with relevant provider in past 3 months or upcoming 3 months (provided they have been seen in the last 6 months)    Medication not refilled in past 28 days    PHQ9 done in past year    Naloxone on active med list    No active pregnancy on record    No pregnancy test in past 12 months or most recent test was negative       Name from pharmacy: PANTOPRAZOLE 40MG TABLETS         Will file in chart as: pantoprazole (PROTONIX) 40 MG EC tablet    Sig: TAKE 1 TABLET BY MOUTH EVERY DAY    Disp: 90 tablet    Refills: 1 (Pharmacy requested: Not specified)    Start: 6/9/2025    Class: E-Prescribe    Non-formulary For: RUQ abdominal pain    Last ordered: 3 months ago (3/7/2025) by Tyra Bullock MD    Last refill: 6/8/2025    Rx #: [id number]    PPI Protocol Cdrmwk5606/09/2025 09:23 AM   Protocol Details Medication indicated for associated diagnosis    Medication is active on med list and the sig matches. RN to manually verify dose and sig if red X/fail.    Recent (12 month) or future (90 days) visit with authorizing provider's specialty (provided they have been seen in the past 15 months)    Patient is age 18 or older    No active pregnacy on record    No positive pregnancy test in past 12 months       Name from pharmacy: LISINOPRIL 5MG TABLETS         Will file in chart as: lisinopril (ZESTRIL) 5 MG tablet    Sig: TAKE 1 TABLET(5 MG) BY MOUTH AT BEDTIME    Disp: 90 tablet    Refills: 1 (Pharmacy requested: Not specified)    Start: 6/9/2025    Class: E-Prescribe    Non-formulary For: Essential hypertension    Last ordered: 3 months ago (3/7/2025) by Tyra Bullock MD    Last refill: 6/8/2025    Rx #: [id number]    ACE Inhibitors (Including Combos) Protocol Gadsij8006/09/2025 09:23 AM   Protocol Details Most  recent blood pressure under 140/90 in past 12 months- Clinicial or Patient Reported    Medication is active on med list and the sig matches. RN to manually verify dose and sig if red X/fail.    Medication indicated for associated diagnosis    Recent (12 month) or future (90 days) visit with authorizing provider's specialty (provided they have been seen in the past 15 months)    Most recent GFR on file in the past 12 months >30    Patient is age 18 or older    No active pregnancy on record    Normal serum potassium on file in past 12 months    No positive pregnancy test within past 12 months       Name from pharmacy: DICLOFENAC POTASSIUM 50MG TABLETS         Will file in chart as: diclofenac (CATAFLAM) 50 MG tablet    Sig: TAKE 1 TABLET(50 MG) BY MOUTH TWICE DAILY AS NEEDED FOR MODERATE PAIN    Disp: 45 tablet    Refills: 1 (Pharmacy requested: Not specified)    Start: 6/9/2025    Class: E-Prescribe    Non-formulary For: Chronic bilateral low back pain without sciatica    Last ordered: 3 months ago (3/7/2025) by Tyra Bullock MD    Last refill: 3/31/2025    Rx #: [id number]    NSAID Medications Ipgrkf4406/09/2025 09:23 AM   Protocol Details Normal CBC on file in past 12 months    Always Fail Criteria - Chart Review Required    Most recent blood pressure under 140/90 in past 12 months    Patient is age 6-64 years    Medication is active on med list and the sig matches. RN to manually verify dose and sig if red X/fail.    Normal GFR on file in past 12 months    Recent (12 month) or future (90 days) visit with authorizing provider's specialty (provided they have been seen in the past 15 months)    No active pregnancy on record    No positive pregnancy test in past 12 months       Name from pharmacy: IBUPROFEN 400MG TABLETS         Will file in chart as: ibuprofen (ADVIL/MOTRIN) 400 MG tablet    Sig: TAKE 2 TABLETS(800 MG) BY MOUTH EVERY 8 HOURS AS NEEDED FOR MODERATE PAIN    Disp: 42 tablet    Refills: 1  (Pharmacy requested: Not specified)    Start: 6/9/2025    Class: E-Prescribe    Non-formulary For: Acute pain of right shoulder    Last ordered: 5 months ago (12/30/2024) by Tyra Bullock MD    Last refill: 2/18/2025    Rx #: [id number]    NSAID Medications Gnpvvp6306/09/2025 09:23 AM   Protocol Details Normal CBC on file in past 12 months    Always Fail Criteria - Chart Review Required          Gael Redman RN, MSN

## 2025-06-18 ENCOUNTER — DOCUMENTATION ONLY (OUTPATIENT)
Dept: FAMILY MEDICINE | Facility: CLINIC | Age: 55
End: 2025-06-18
Payer: COMMERCIAL

## 2025-06-18 NOTE — PROGRESS NOTES
To be completed in Nursing note:    Please reference list for forms that require a visit for completion.  Please remind patients that providers are given 3-5 business days to complete and return forms.      Form type: [facility]    Date form received: 25    Date form completed by Physician:25    How was form returned to patient (mailed, faxed, or at  for patient to ): faxed to [phone]    Date form mailed/faxed/left at  for patient and sent to HIM for scannin25, sent to HIM for scanning      Once form is left for patient, faxed, or mailed PCS will then close the documentation only encounter.     Faizan Lacey MA

## 2025-06-23 DIAGNOSIS — M54.50 CHRONIC BILATERAL LOW BACK PAIN WITHOUT SCIATICA: ICD-10-CM

## 2025-06-23 DIAGNOSIS — G89.29 CHRONIC BILATERAL LOW BACK PAIN WITHOUT SCIATICA: ICD-10-CM

## 2025-06-23 RX ORDER — CYCLOBENZAPRINE HCL 5 MG
5 TABLET ORAL 3 TIMES DAILY PRN
Qty: 30 TABLET | Refills: 5 | Status: SHIPPED | OUTPATIENT
Start: 2025-06-23

## 2025-07-25 NOTE — TELEPHONE ENCOUNTER
7/26/2025  Georgette [place]  1963    Chief Complaint   Patient presents with    Follow-up         ASSESSMENT/PLAN  Problem List Items Addressed This Visit    None  Visit Diagnoses         Closed fracture of right foot, initial encounter    -  Primary    Relevant Medications    gabapentin (NEURONTIN) 300 MG capsule    oxyCODONE, IMM REL, (ROXICODONE) 5 MG immediate release tablet    Other Relevant Orders    SERVICE TO ORTHOPEDICS      Asymptomatic menopausal state        Relevant Orders    BD DEXA SCAN AXIAL SKELETON          Ankle and foot pain:    Neuropathic pain:  Continue with the gabapentin 300 mg 3 x a day.  Will decrease as pain improves    Post-surgical neuropathic pain  - Symptoms suggest post-surgical neuropathic pain, likely due to recent foot surgery, with burning pain at night starting after suture removal  - Referral to Dr. Velasco, a foot and ankle specialist, for further evaluation and management  - Continue gabapentin 300 mg 3 times daily for 2 to 3 weeks, with gradual reduction to twice daily as condition improves  - Continue Tylenol 650 mg up to 4 times daily, not exceeding a total daily dose of 3000 mg  - Refills for gabapentin and oxycodone will be provided    HISTORY OF PRESENT ILLNESS     The patient presents for evaluation of post-surgical neuropathic pain.    She recently underwent surgery to remove wires and pins from her foot, which were replaced with plates and screws. The sutures were removed 3 days ago, and she was provided with a boot for support. She is scheduled to wear the boot for a total of 6 weeks post-surgery. Currently, she is not bearing weight on the foot and has been advised to rest it. Her surgeon recommended a follow-up with an orthopedic specialist for x-rays to assess the healing process. She is interested in seeing a foot and ankle specialist and is requesting a referral for physical therapy.    She experienced severe burning pain in her foot 2 nights ago, which  Routed to Kaylie, RN. /BrysonRN     she believes may be due to the removal of the stitches and subsequent movement during x-rays. She managed the pain with oxycodone during a flight, which was uneventful. Upon returning home yesterday, she performed some stretching exercises as advised by her doctor due to concerns about her Achilles tendon. She is unsure if these exercises contributed to the burning sensation. She does not suspect nerve damage as the pain is not constant.    She took her last dose of gabapentin at midnight last night and has been taking it 3 times a day. She also takes Tylenol 650 mg and oxycodone 5 mg, of which she has 8.5 tablets left.    Social History:  Hobbies: [employer]    PAST SURGICAL HISTORY:  - External fixator with wires and pins  - Surgery to remove wires and pins, replaced with plates and screws    Past Medical History:   Diagnosis Date    Asthma (CMD)     History of COVID-19 2023    Hypertension     Lichen sclerosus     clinically diagnosed. Pt denies ever undergoing punch biopsy     Psoriasis     Thickened endometrium     Ulcerative colitis (CMD)     Vulval hidradenitis suppurativa        Past Surgical History:   Procedure Laterality Date    Ankle fracture surgery  06/22/2025    7/3/2025  x   2 surgery    Cholecystectomy      Colonoscopy w biopsy  12/06/2021 11/18/2017, 12/05/2015, 2008, 2002, 12/21/2013, 11/23/2019    Dilation and curettage  07/16/2020    D&C, operative hysteroscopy, polypectomy using TruClear morcellator    Flexible sigmoidoscopy diagnostic include specimens         Family History   Problem Relation Age of Onset    Stroke/TIA Mother     Pacemaker Mother     Other Mother         collitis    Heart disease Mother     Hypertension Father     Other Brother         hearing loss, tinnitus    Diabetes Maternal Grandmother        Social History     Tobacco Use    Smoking status: Never    Smokeless tobacco: Never   Vaping Use    Vaping status: never used   Substance Use Topics    Alcohol use: Yes      Alcohol/week: 7.0 standard drinks of alcohol     Types: 7 Standard drinks or equivalent per week     Comment: occ.    Drug use: Never       Current Outpatient Medications   Medication Sig Dispense Refill    acetaminophen (TYLENOL) 325 MG tablet Take 325 mg by mouth.      apixaBAN (ELIQUIS) 2.5 MG Tab Take 2.5 mg by mouth.      Calcium Carb-Cholecalciferol (Oyster Shell Calcium w/D) 500-5 MG-MCG Tab Take 1 tablet by mouth.      MAGNESIUM OXIDE PO Take 500 mg by mouth.      gabapentin (NEURONTIN) 300 MG capsule Take 1 capsule by mouth in the morning and 1 capsule at noon and 1 capsule in the evening. 90 capsule 3    oxyCODONE, IMM REL, (ROXICODONE) 5 MG immediate release tablet Take 1 tablet by mouth every 8 hours as needed for Pain. 10 tablet 0    irbesartan (AVAPRO) 300 MG tablet Take 1 tablet by mouth daily. As directed. 30 tablet 0    montelukast (SINGULAIR) 10 MG tablet Take 1 tablet by mouth daily. 30 tablet 0    ciclopirox olamine (LOPROX) 0.77 % cream APPLY TO NAVAL TWICE A DAY      fluocinolone acetonide scalp 0.01 % external oil APPLY TO SCALP OVERNIGHT IN TWO WEEK INCREMENTS.      ketoconazole (NIZORAL) 2 % shampoo APPLY SHAMPOO TO SCALP 3 TIMES A WEEK. LEAVE ON SCALP FOR 5 MINUTES BEFORE RINSING.      triamcinolone (ARISTOCORT) 0.1 % ointment apply twice a day to navel for 2 week increments.      clotrimazole-betamethasone (LOTRISONE) 1-0.05 % cream Apply topically 2 times daily. 30 g 1    spironolactone (ALDACTONE) 25 MG tablet TAKE 1 TABLET BY MOUTH EVERY DAY 90 tablet 1    clobetasol (TEMOVATE) 0.05 % ointment AAA BID x 7-10 days at a time 15 g 1    inFLIXimab (REMICADE) 100 MG injection Inject into the vein every 8 weeks.      budesonide (PULMICORT FLEXHALER) 180 MCG/ACT inhaler Inhale 2 puffs into the lungs daily. GENERIC ONLY 3 each 3    dicyclomine (BENTYL) 10 MG capsule Take 10 mg by mouth as needed.      Cholecalciferol (VITAMIN D3 PO)       biotin 1000 MCG tablet Take 3,500 mcg by mouth daily.       folic acid (FOLATE) 1 MG tablet Take 1,000 mcg by mouth daily.      loratadine (CLARITIN) 10 MG tablet Take 10 mg by mouth daily.      azaTHIOprine (IMURAN) 50 MG tablet Take 50 mg by mouth 2 times daily.       No current facility-administered medications for this visit.       ALLERGIES:   Allergen Reactions    Adult Aspirin Low RASH     Allergy     Diclofenac Other (See Comments)     triggered her ulcerative colitis    Diclofenac Sodium Other (See Comments)    Nitrofurantoin Monohyd Macro RASH     Allergy     Tetracycline Other (See Comments)     Triggered ulcerative colitis       Review of Systems   Constitutional:  Negative for chills, fatigue, fever and unexpected weight change.   HENT:  Negative for congestion, ear pain, hearing loss, nosebleeds, postnasal drip, rhinorrhea, sinus pressure, sinus pain, sneezing, sore throat, tinnitus, trouble swallowing and voice change.    Eyes:  Negative for visual disturbance.   Respiratory:  Negative for cough, chest tightness, shortness of breath and wheezing.    Cardiovascular:  Negative for chest pain, palpitations and leg swelling.   Gastrointestinal:  Negative for abdominal pain, blood in stool, constipation, diarrhea, nausea and vomiting.   Endocrine: Negative for cold intolerance, heat intolerance, polydipsia and polyuria.   Genitourinary:  Negative for dysuria, frequency, hematuria and urgency.   Musculoskeletal:  Positive for arthralgias. Negative for myalgias.   Skin:  Negative for rash.   Neurological:  Negative for dizziness, tremors, light-headedness, numbness and headaches.   Hematological:  Negative for adenopathy.   Psychiatric/Behavioral:  Negative for confusion and sleep disturbance. The patient is not nervous/anxious.          OBJECTIVE  Visit Vitals  /60 (BP Location: LUE - Left upper extremity, Patient Position: Sitting, Cuff Size: Regular)   Pulse 66   Temp 97 °F (36.1 °C) (Temporal)   Ht 5' 2.99\" (1.6 m)   Wt 83 kg (183 lb) Comment: pt  reported   LMP  (LMP Unknown)   SpO2 98%   BMI 32.43 kg/m²       Physical Exam  Constitutional:       General: She is not in acute distress.     Appearance: Normal appearance. She is well-developed. She is not diaphoretic.   HENT:      Head: Normocephalic and atraumatic.      Right Ear: External ear normal.      Left Ear: External ear normal.      Nose: Nose normal.      Mouth/Throat:      Mouth: Mucous membranes are dry.      Pharynx: Oropharynx is clear. No oropharyngeal exudate or posterior oropharyngeal erythema.   Eyes:      General: No scleral icterus.        Right eye: No discharge.         Left eye: No discharge.      Conjunctiva/sclera: Conjunctivae normal.      Pupils: Pupils are equal, round, and reactive to light.   Neck:      Vascular: No JVD.      Trachea: No tracheal deviation.   Cardiovascular:      Rate and Rhythm: Normal rate and regular rhythm.      Heart sounds: Normal heart sounds. No murmur heard.     No friction rub. No gallop.   Pulmonary:      Effort: Pulmonary effort is normal. No respiratory distress.      Breath sounds: Normal breath sounds. No wheezing or rales.   Abdominal:      General: Bowel sounds are normal. There is no distension.      Palpations: Abdomen is soft. There is no mass.      Tenderness: There is no abdominal tenderness. There is no guarding or rebound.   Musculoskeletal:         General: Normal range of motion.      Cervical back: Normal range of motion and neck supple. No rigidity.   Lymphadenopathy:      Cervical: No cervical adenopathy.   Skin:     General: Skin is warm and dry.      Findings: No erythema or rash.   Neurological:      Mental Status: She is alert and oriented to person, place, and time.      Cranial Nerves: No cranial nerve deficit.      Motor: No abnormal muscle tone.      Coordination: Coordination normal.   Psychiatric:         Behavior: Behavior normal.         Thought Content: Thought content normal.         Judgment: Judgment normal.            DISPOSITION  No follow-ups on file.    This note was made using [employer] AI Voice Dictation and may include inadvertent errors due to the software.   Please contact us for clarification regarding any note discrepancies.  AI scribe dictation technology is utilized to convert spoken words into text format for the purpose of documenting medical information in an efficient and accurate manner.   The AI system may be employed in the transcription of medical notes, reports, and other relevant documents    Yancy Landry MD  7/26/2025   100% of the time/able to follow single-step instructions

## 2025-08-14 DIAGNOSIS — Z79.4 TYPE 2 DIABETES MELLITUS WITH HYPERGLYCEMIA, WITH LONG-TERM CURRENT USE OF INSULIN (H): ICD-10-CM

## 2025-08-14 DIAGNOSIS — Z86.73 HISTORY OF CVA (CEREBROVASCULAR ACCIDENT): ICD-10-CM

## 2025-08-14 DIAGNOSIS — I10 ESSENTIAL HYPERTENSION: Primary | ICD-10-CM

## 2025-08-14 DIAGNOSIS — E11.65 TYPE 2 DIABETES MELLITUS WITH HYPERGLYCEMIA, WITH LONG-TERM CURRENT USE OF INSULIN (H): ICD-10-CM

## 2025-08-25 DIAGNOSIS — M53.3 PAIN IN THE COCCYX: ICD-10-CM

## 2025-08-25 RX ORDER — PSEUDOEPHED/ACETAMINOPH/DIPHEN 30MG-500MG
500-1000 TABLET ORAL EVERY 6 HOURS PRN
Qty: 100 TABLET | Refills: 3 | Status: SHIPPED | OUTPATIENT
Start: 2025-08-25

## 2025-08-26 ENCOUNTER — TELEPHONE (OUTPATIENT)
Dept: FAMILY MEDICINE | Facility: CLINIC | Age: 55
End: 2025-08-26

## 2025-08-26 ENCOUNTER — VIRTUAL VISIT (OUTPATIENT)
Dept: FAMILY MEDICINE | Facility: CLINIC | Age: 55
End: 2025-08-26
Payer: COMMERCIAL

## 2025-08-26 DIAGNOSIS — Z79.4 TYPE 2 DIABETES MELLITUS WITH HYPERGLYCEMIA, WITH LONG-TERM CURRENT USE OF INSULIN (H): ICD-10-CM

## 2025-08-26 DIAGNOSIS — E11.65 TYPE 2 DIABETES MELLITUS WITH HYPERGLYCEMIA, WITH LONG-TERM CURRENT USE OF INSULIN (H): ICD-10-CM

## 2025-08-26 DIAGNOSIS — G43.809 OTHER MIGRAINE WITHOUT STATUS MIGRAINOSUS, NOT INTRACTABLE: ICD-10-CM

## 2025-08-26 DIAGNOSIS — N30.00 ACUTE CYSTITIS WITHOUT HEMATURIA: Primary | ICD-10-CM

## 2025-08-26 RX ORDER — PHENAZOPYRIDINE HYDROCHLORIDE 100 MG/1
100 TABLET, FILM COATED ORAL 3 TIMES DAILY PRN
Qty: 10 TABLET | Refills: 0 | Status: SHIPPED | OUTPATIENT
Start: 2025-08-26 | End: 2025-08-28

## 2025-08-26 RX ORDER — SULFAMETHOXAZOLE AND TRIMETHOPRIM 800; 160 MG/1; MG/1
1 TABLET ORAL 2 TIMES DAILY
Qty: 10 TABLET | Refills: 0 | Status: SHIPPED | OUTPATIENT
Start: 2025-08-26 | End: 2025-08-31

## (undated) DEVICE — DRAPE SHEET REV FOLD 3/4 9349

## (undated) DEVICE — GUIDEWIRE VASC 0.035INX150CM INQWIRE J TIP IQ35F150J3F/A

## (undated) DEVICE — DRESSING COVERLET STRIP 3/4 X 3 LF 230

## (undated) DEVICE — DRSG ABD TNDRSRB WET PRUF 8IN X 10IN STRL  9194A

## (undated) DEVICE — SU WND CLOSURE V-LOC PBT SZ 0 18" GS-21 VLOCN0326

## (undated) DEVICE — TOTE ANGIO CORP PC15AT SAN32CC83O

## (undated) DEVICE — PREP CHLORAPREP 26ML TINTED HI-LITE ORANGE 930815

## (undated) DEVICE — TUBING LAP SUCT/IRRIG STRYKER 250070500

## (undated) DEVICE — SOL NACL 0.9% IRRIG 1000ML BOTTLE 2F7124

## (undated) DEVICE — GOWN XLG DISP 9545

## (undated) DEVICE — GLOVE SURGEON PI ORTHO SZ 7 LF

## (undated) DEVICE — CATH TRAY FOLEY SURESTEP 16FR DRAIN BAG STATOCK A899916

## (undated) DEVICE — GLOVE BIOGEL PI ULTRATOUCH G SZ 6.5 42165

## (undated) DEVICE — SUCTION MANIFOLD NEPTUNE 2 SYS 1 PORT 702-025-000

## (undated) DEVICE — DRSG STERI STRIP 1/2X4" R1547

## (undated) DEVICE — DRAPE UNDER BUTTOCK 89415

## (undated) DEVICE — CUSTOM PACK LAP CHOLE SBA5BLCHEA

## (undated) DEVICE — SOL WATER IRRIG 1000ML BOTTLE 2F7114

## (undated) DEVICE — DEFIB PRO-PADZ LVP LQD GEL ADULT 8900-2105-01

## (undated) DEVICE — SU VICRYL+ 0 27 UR6 VLT VCP603H

## (undated) DEVICE — TUBING SUCTION MEDI-VAC 1/4"X20' N620A

## (undated) DEVICE — GLOVE BIOGEL PI ORTHOPRO SZ 7.5 47675

## (undated) DEVICE — BLADE KNIFE SURG 11 371111

## (undated) DEVICE — CUSTOM PACK GEN MAJOR SBA5BGMHEA

## (undated) DEVICE — PREP POVIDONE-IODINE 10% SOLUTION 4OZ BOTTLE MDS093944

## (undated) DEVICE — TRAY PREP DRY SKIN SCRUB 067

## (undated) DEVICE — SYR 50ML SLIP TIP W/O NDL 309654

## (undated) DEVICE — DRAPE SHEET TABLE COVER KC 42301*

## (undated) DEVICE — SU MONOCRYL+ 4-0 18IN PS2 UND MCP496G

## (undated) DEVICE — KIT HAND CONTROL ANGIOTOUCH ACIST 65CM AT-P65

## (undated) DEVICE — GOWN LG DISP 9515

## (undated) DEVICE — APPLICATOR ENDOSCOPIC 5 SURGICEL POWDER 3123SPEA

## (undated) DEVICE — NDL INSUFFLATION 13GA 120MM C2201

## (undated) DEVICE — DAVINCI XI SEAL UNIVERSAL 5-8MM 470361

## (undated) DEVICE — DAVINCI XI OBTURATOR BLADELESS 8MM 470359

## (undated) DEVICE — DRSG TEGADERM 4X4 3/4" 1626W

## (undated) DEVICE — CATH DIAG 4FR JL 4.5 538417

## (undated) DEVICE — Device

## (undated) DEVICE — MARKER SURG SKIN STRL 77734

## (undated) DEVICE — DRSG KERLIX FLUFFS X5

## (undated) DEVICE — LUBRICANT INST ELECTROLUBE EL101

## (undated) DEVICE — COTTON SQUARE 3X3 STERILE

## (undated) DEVICE — SUTURE VICRYL+ 2-0 27IN SH UND VCP417H

## (undated) DEVICE — FORCEP BIOPSY 2.3MM DISP COATED 000388

## (undated) DEVICE — GLOVE BIOGEL PI ULTRATOUCH G SZ 6.0 42160

## (undated) DEVICE — BANDAGE ADH LF 1X3 ABN3100A

## (undated) DEVICE — BINDER ABDOMINAL 3PAN 46-62 XL 08140362

## (undated) DEVICE — DAVINCI HOT SHEARS TIP COVER  400180

## (undated) DEVICE — PLATE GROUNDING ADULT W/CORD 9165L

## (undated) DEVICE — SU WND CLOSURE V-LOC 90 SZ 2-0 12" GS-21 VLOCM0315

## (undated) DEVICE — DRSG KERLIX 4 1/2"X4YDS ROLL 6715

## (undated) DEVICE — DECANTER VIAL 2006S

## (undated) DEVICE — DRAPE OR LAPAROTOMY KC 89228*

## (undated) DEVICE — GLOVE UNDER INDICATOR PI SZ 7.0 LF 41670

## (undated) DEVICE — PAD POS XL 1X20X40IN PINK PIGAZZI

## (undated) DEVICE — DRAPE U SPLIT 74X120" 29440

## (undated) DEVICE — SURGICEL POWDER ABSORBABLE HEMOSTAT 3GM 3013SP

## (undated) DEVICE — MANIFOLD KIT ANGIO AUTOMATED 014613

## (undated) DEVICE — DAVINCI XI DRAPE COLUMN 470341

## (undated) DEVICE — SU VICRYL+ 3-0 27IN SH UND VCP416H

## (undated) DEVICE — GLOVE SURG PI ULTRA TOUCH M SZ 6-1/2 LF

## (undated) DEVICE — SPONGE LAP 18X18" X8435

## (undated) DEVICE — DRAPE SHEET EXTREMITY 88X131 89276*

## (undated) DEVICE — INTRO SHEATH 4FRX10CM PINNACLE RSS402

## (undated) DEVICE — SU ETHIBOND 0 CT-2 30" X412H

## (undated) DEVICE — CATH ANGIO INFINITI 3DRC 4FRX100CM 538476

## (undated) DEVICE — TUBING SMOKE EVAC PNEUMOCLEAR HIGH FLOW 0620050250

## (undated) DEVICE — ESU PENCIL W/HOLSTER E2350H

## (undated) DEVICE — PREP POVIDONE-IODINE 7.5% SCRUB 4OZ BOTTLE MDS093945

## (undated) DEVICE — ESU PENCIL SMOKE EVAC W/ROCKER SWITCH 0703-047-000

## (undated) DEVICE — DAVINCI XI DRAPE ARM 470015

## (undated) RX ORDER — PROPOFOL 10 MG/ML
INJECTION, EMULSION INTRAVENOUS
Status: DISPENSED
Start: 2023-10-09

## (undated) RX ORDER — DEXAMETHASONE SODIUM PHOSPHATE 10 MG/ML
INJECTION INTRAMUSCULAR; INTRAVENOUS
Status: DISPENSED
Start: 2022-07-05

## (undated) RX ORDER — LIDOCAINE HYDROCHLORIDE 10 MG/ML
INJECTION, SOLUTION EPIDURAL; INFILTRATION; INTRACAUDAL; PERINEURAL
Status: DISPENSED
Start: 2022-07-05

## (undated) RX ORDER — HEPARIN SODIUM 1000 [USP'U]/ML
INJECTION, SOLUTION INTRAVENOUS; SUBCUTANEOUS
Status: DISPENSED
Start: 2024-07-30

## (undated) RX ORDER — ONDANSETRON 2 MG/ML
INJECTION INTRAMUSCULAR; INTRAVENOUS
Status: DISPENSED
Start: 2022-07-05

## (undated) RX ORDER — FENTANYL CITRATE 50 UG/ML
INJECTION, SOLUTION INTRAMUSCULAR; INTRAVENOUS
Status: DISPENSED
Start: 2023-10-09

## (undated) RX ORDER — ASPIRIN 325 MG
TABLET ORAL
Status: DISPENSED
Start: 2024-07-30

## (undated) RX ORDER — FENTANYL CITRATE 50 UG/ML
INJECTION, SOLUTION INTRAMUSCULAR; INTRAVENOUS
Status: DISPENSED
Start: 2022-07-05

## (undated) RX ORDER — HEPARIN SODIUM 200 [USP'U]/100ML
INJECTION, SOLUTION INTRAVENOUS
Status: DISPENSED
Start: 2024-07-30

## (undated) RX ORDER — PROPOFOL 10 MG/ML
INJECTION, EMULSION INTRAVENOUS
Status: DISPENSED
Start: 2022-07-19

## (undated) RX ORDER — METOPROLOL TARTRATE 50 MG
TABLET ORAL
Status: DISPENSED
Start: 2024-07-24

## (undated) RX ORDER — ONDANSETRON 2 MG/ML
INJECTION INTRAMUSCULAR; INTRAVENOUS
Status: DISPENSED
Start: 2022-07-19

## (undated) RX ORDER — PROPOFOL 10 MG/ML
INJECTION, EMULSION INTRAVENOUS
Status: DISPENSED
Start: 2022-07-05

## (undated) RX ORDER — KETAMINE HYDROCHLORIDE 10 MG/ML
INJECTION INTRAMUSCULAR; INTRAVENOUS
Status: DISPENSED
Start: 2022-07-05

## (undated) RX ORDER — FENTANYL CITRATE 50 UG/ML
INJECTION, SOLUTION INTRAMUSCULAR; INTRAVENOUS
Status: DISPENSED
Start: 2024-07-30

## (undated) RX ORDER — IVABRADINE 5 MG/1
TABLET, FILM COATED ORAL
Status: DISPENSED
Start: 2024-07-24

## (undated) RX ORDER — LIDOCAINE HYDROCHLORIDE 10 MG/ML
INJECTION, SOLUTION EPIDURAL; INFILTRATION; INTRACAUDAL; PERINEURAL
Status: DISPENSED
Start: 2024-07-30

## (undated) RX ORDER — FENTANYL CITRATE 50 UG/ML
INJECTION, SOLUTION INTRAMUSCULAR; INTRAVENOUS
Status: DISPENSED
Start: 2022-07-19

## (undated) RX ORDER — GINSENG 100 MG
CAPSULE ORAL
Status: DISPENSED
Start: 2023-10-09

## (undated) RX ORDER — METOPROLOL TARTRATE 1 MG/ML
INJECTION, SOLUTION INTRAVENOUS
Status: DISPENSED
Start: 2024-07-24

## (undated) RX ORDER — LABETALOL HYDROCHLORIDE 5 MG/ML
INJECTION, SOLUTION INTRAVENOUS
Status: DISPENSED
Start: 2022-07-05